# Patient Record
Sex: MALE | Race: WHITE | NOT HISPANIC OR LATINO | Employment: OTHER | URBAN - METROPOLITAN AREA
[De-identification: names, ages, dates, MRNs, and addresses within clinical notes are randomized per-mention and may not be internally consistent; named-entity substitution may affect disease eponyms.]

---

## 2017-08-09 ENCOUNTER — APPOINTMENT (OUTPATIENT)
Dept: RADIOLOGY | Facility: CLINIC | Age: 62
End: 2017-08-09
Payer: COMMERCIAL

## 2017-08-09 ENCOUNTER — TRANSCRIBE ORDERS (OUTPATIENT)
Dept: RADIOLOGY | Facility: CLINIC | Age: 62
End: 2017-08-09

## 2017-08-09 DIAGNOSIS — M54.40 LOW BACK PAIN WITH SCIATICA, SCIATICA LATERALITY UNSPECIFIED, UNSPECIFIED BACK PAIN LATERALITY, UNSPECIFIED CHRONICITY: ICD-10-CM

## 2017-08-09 DIAGNOSIS — M54.40 LOW BACK PAIN WITH SCIATICA, SCIATICA LATERALITY UNSPECIFIED, UNSPECIFIED BACK PAIN LATERALITY, UNSPECIFIED CHRONICITY: Primary | ICD-10-CM

## 2017-08-09 PROCEDURE — 72110 X-RAY EXAM L-2 SPINE 4/>VWS: CPT

## 2018-06-02 ENCOUNTER — HOSPITAL ENCOUNTER (EMERGENCY)
Facility: HOSPITAL | Age: 63
Discharge: HOME | End: 2018-06-02
Attending: EMERGENCY MEDICINE
Payer: MEDICARE

## 2018-06-02 VITALS
TEMPERATURE: 97.8 F | DIASTOLIC BLOOD PRESSURE: 80 MMHG | OXYGEN SATURATION: 99 % | RESPIRATION RATE: 18 BRPM | HEART RATE: 56 BPM | SYSTOLIC BLOOD PRESSURE: 149 MMHG | WEIGHT: 190 LBS

## 2018-06-02 DIAGNOSIS — Z76.0 PRESCRIPTION REFILL: Primary | ICD-10-CM

## 2018-06-02 PROCEDURE — 99281 EMR DPT VST MAYX REQ PHY/QHP: CPT

## 2018-06-02 RX ORDER — VENLAFAXINE HYDROCHLORIDE 150 MG/1
150 CAPSULE, EXTENDED RELEASE ORAL DAILY
COMMUNITY
End: 2018-06-14 | Stop reason: SDUPTHER

## 2018-06-02 RX ORDER — DIAZEPAM 5 MG/1
5 TABLET ORAL 2 TIMES DAILY
COMMUNITY
End: 2018-06-14 | Stop reason: SDUPTHER

## 2018-06-02 RX ORDER — BISOPROLOL FUMARATE AND HYDROCHLOROTHIAZIDE 10; 6.25 MG/1; MG/1
1 TABLET ORAL DAILY
COMMUNITY
End: 2018-06-14 | Stop reason: SDUPTHER

## 2018-06-02 RX ORDER — VENLAFAXINE HYDROCHLORIDE 150 MG/1
150 CAPSULE, EXTENDED RELEASE ORAL DAILY
Qty: 14 CAPSULE | Refills: 0 | Status: SHIPPED | OUTPATIENT
Start: 2018-06-02 | End: 2018-06-14 | Stop reason: SDUPTHER

## 2018-06-02 NOTE — ED PROVIDER NOTES
History  Chief Complaint   Patient presents with    Medication Refill     Pt reports he needs his Venlafaxine ER 150mg refilled  He reports his ins hasn't paid his provider so they won't refill his prescription  Pt reports having "withdrawl symptoms" including "electric shocks" and being "irritable which isn't good for anyone "  Pt was seen at [facility] yesterday but was told "due to regulations, go to [place] "       History provided by:  Patient   used: No    Medication Refill   Medications/supplies requested:  Venlafaxine  [facility]  Reason for refill: PCP was not paid by insurance company  Medications taken before: yes - see home medications    Patient has complete original prescription information: yes        Prior to Admission Medications   Prescriptions Last Dose Informant Patient Reported? Taking?   bisoprolol-hydrochlorothiazide (ZIAC) 10-6 25 MG per tablet   Yes Yes   Sig: Take 1 tablet by mouth daily   diazepam (VALIUM) 5 mg tablet   Yes Yes   Sig: Take 5 mg by mouth 2 (two) times a day   venlafaxine (EFFEXOR-XR) 150 mg 24 hr capsule   Yes Yes   Sig: Take 150 mg by mouth daily      Facility-Administered Medications: None       Past Medical History:   Diagnosis Date    Hypertension     Psychiatric disorder        Past Surgical History:   Procedure Laterality Date    APPENDECTOMY         History reviewed  No pertinent family history  I have reviewed and agree with the history as documented  Social History   Substance Use Topics    Smoking status: Current Some Day Smoker     Types: Cigars    Smokeless tobacco: Never Used    Alcohol use No        Review of Systems   Constitutional: Negative for activity change, appetite change and fatigue  HENT: Negative for nosebleeds, sneezing, sore throat, trouble swallowing and voice change  Eyes: Negative for photophobia, pain and visual disturbance  Respiratory: Negative for apnea, choking and stridor      Cardiovascular: Negative for palpitations and leg swelling  Gastrointestinal: Negative for anal bleeding and constipation  Endocrine: Negative for cold intolerance, heat intolerance, polydipsia and polyphagia  Genitourinary: Negative for decreased urine volume, enuresis, frequency, genital sores and urgency  Musculoskeletal: Negative for joint swelling and myalgias  Allergic/Immunologic: Negative for environmental allergies and food allergies  Neurological: Negative for tremors, seizures, speech difficulty and weakness  Hematological: Negative for adenopathy  Psychiatric/Behavioral: Negative for behavioral problems, decreased concentration, dysphoric mood and hallucinations  Physical Exam  Physical Exam   Constitutional: He is oriented to person, place, and time  He appears well-developed and well-nourished  No distress  HENT:   Head: Normocephalic and atraumatic  Right Ear: External ear normal    Left Ear: External ear normal    Nose: Nose normal    Mouth/Throat: Oropharynx is clear and moist    Eyes: Conjunctivae and EOM are normal  Pupils are equal, round, and reactive to light  Neck: Normal range of motion  Neck supple  Cardiovascular: Normal rate, regular rhythm and normal heart sounds  Exam reveals no gallop and no friction rub  No murmur heard  Pulmonary/Chest: Effort normal and breath sounds normal  No respiratory distress  He has no wheezes  Abdominal: Soft  Bowel sounds are normal    Neurological: He is alert and oriented to person, place, and time  Skin: Skin is warm and dry  He is not diaphoretic  Psychiatric: He has a normal mood and affect  His behavior is normal    Vitals reviewed        Vital Signs  ED Triage Vitals [06/02/18 1128]   Temperature Pulse Respirations Blood Pressure SpO2   97 8 °F (36 6 °C) 56 18 149/80 99 %      Temp Source Heart Rate Source Patient Position - Orthostatic VS BP Location FiO2 (%)   Tympanic Monitor Sitting Right arm --      Pain Score       7 Vitals:    06/02/18 1128   BP: 149/80   Pulse: 56   Patient Position - Orthostatic VS: Sitting       Visual Acuity      ED Medications  Medications - No data to display    Diagnostic Studies  Results Reviewed     None                 No orders to display              Procedures  Procedures       Phone Contacts  ED Phone Contact    ED Course                               MDM  CritCare Time    Disposition  Final diagnoses:   Prescription refill     Time reflects when diagnosis was documented in both MDM as applicable and the Disposition within this note     Time User Action Codes Description Comment    6/2/2018 11:40 AM Whitney Bermudez Add [Z76 0] Prescription refill       ED Disposition     ED Disposition Condition Comment    Discharge  Ambika Graham discharge to home/self care  Condition at discharge: Stable        Follow-up Information     Follow up With Specialties Details Why [address]  Schedule an appointment as soon as possible for a visit  Francisco Long [phone]          Patient's Medications   Discharge Prescriptions    VENLAFAXINE (EFFEXOR-XR) 150 MG 24 HR CAPSULE    Take 1 capsule (150 mg total) by mouth daily for 14 days       Start Date: 6/2/2018  End Date: 6/16/2018       Order Dose: 150 mg       Quantity: 14 capsule    Refills: 0     No discharge procedures on file      ED Provider  Electronically Signed by           José Martinez PA-C  06/02/18 1143

## 2018-06-02 NOTE — DISCHARGE INSTRUCTIONS
Medicine Refill   WHAT YOU NEED TO KNOW:   You may have been given a prescription in the emergency department for a few days of your medicine  It is important to refill your medicine before you completely run out  You need to follow up with your healthcare provider for a full prescription within the next few days  You will not be given additional refills in the emergency department  DISCHARGE INSTRUCTIONS:   Follow up with your healthcare provider:  Contact your healthcare provider before  you are completely out of medicine  Write down your questions so you remember to ask them during your visits  Refill tips:  Your medicine will treat your condition if you take the medicine regularly  Prevent missed doses by doing the following:  · Keep a chart of your medicine  Include all of your current medicines  Write down the name and strength of each medicine, the prescription number, and the number of refills  Also write down the dates of your refills  Ask your pharmacy or insurance provider for other ways to help you keep track of your medicines  · Refill medicines a few days before you run out  This will decrease any problems that will prevent you from getting your medicines on time  Problems include a closed pharmacy, or the pharmacy may have to contact your healthcare provider  · If you know you are going to be traveling, refill your medicines before you leave  You may not be able to get refills if you do not use your local pharmacy  You may need to call your insurance provider to make them aware of your travels  © 2017 [address] Information is for End User's use only and may not be sold, redistributed or otherwise used for commercial purposes  All illustrations and images included in CareNotes® are the copyrighted property of [employer]  or [employer]  The above information is an  only   It is not intended as medical advice for individual conditions or treatments  Talk to your doctor, nurse or pharmacist before following any medical regimen to see if it is safe and effective for you

## 2018-06-14 ENCOUNTER — OFFICE VISIT (OUTPATIENT)
Dept: FAMILY MEDICINE CLINIC | Facility: CLINIC | Age: 63
End: 2018-06-14
Payer: MEDICARE

## 2018-06-14 VITALS
HEART RATE: 58 BPM | SYSTOLIC BLOOD PRESSURE: 140 MMHG | DIASTOLIC BLOOD PRESSURE: 64 MMHG | HEIGHT: 72 IN | WEIGHT: 195 LBS | RESPIRATION RATE: 18 BRPM | OXYGEN SATURATION: 99 % | TEMPERATURE: 97.4 F | BODY MASS INDEX: 26.41 KG/M2

## 2018-06-14 DIAGNOSIS — F41.9 ANXIETY: ICD-10-CM

## 2018-06-14 DIAGNOSIS — I10 ESSENTIAL HYPERTENSION: ICD-10-CM

## 2018-06-14 DIAGNOSIS — F33.2 SEVERE EPISODE OF RECURRENT MAJOR DEPRESSIVE DISORDER, WITHOUT PSYCHOTIC FEATURES (HCC): Primary | ICD-10-CM

## 2018-06-14 PROBLEM — F43.10 PTSD (POST-TRAUMATIC STRESS DISORDER): Status: ACTIVE | Noted: 2018-06-14

## 2018-06-14 PROBLEM — F32.9 MAJOR DEPRESSION: Status: ACTIVE | Noted: 2018-06-14

## 2018-06-14 PROBLEM — F42.9 OCD (OBSESSIVE COMPULSIVE DISORDER): Status: ACTIVE | Noted: 2018-06-14

## 2018-06-14 PROCEDURE — 99213 OFFICE O/P EST LOW 20 MIN: CPT | Performed by: FAMILY MEDICINE

## 2018-06-14 RX ORDER — VENLAFAXINE HYDROCHLORIDE 150 MG/1
150 CAPSULE, EXTENDED RELEASE ORAL DAILY
Qty: 30 CAPSULE | Refills: 2 | Status: SHIPPED | OUTPATIENT
Start: 2018-06-14 | End: 2018-09-11 | Stop reason: SDUPTHER

## 2018-06-14 RX ORDER — BISOPROLOL FUMARATE AND HYDROCHLOROTHIAZIDE 10; 6.25 MG/1; MG/1
1 TABLET ORAL DAILY
Qty: 30 TABLET | Refills: 2 | Status: SHIPPED | OUTPATIENT
Start: 2018-06-14 | End: 2019-08-07 | Stop reason: ALTCHOICE

## 2018-06-14 RX ORDER — DIAZEPAM 5 MG/1
5 TABLET ORAL 2 TIMES DAILY
Qty: 60 TABLET | Refills: 0 | Status: SHIPPED | OUTPATIENT
Start: 2018-06-14 | End: 2020-09-14 | Stop reason: SDUPTHER

## 2018-06-14 RX ORDER — DIAZEPAM 5 MG/1
5 TABLET ORAL 2 TIMES DAILY
Qty: 30 TABLET | Refills: 0 | Status: SHIPPED | OUTPATIENT
Start: 2018-06-14 | End: 2018-06-14 | Stop reason: SDUPTHER

## 2018-06-14 NOTE — PATIENT INSTRUCTIONS
Anxiety   AMBULATORY CARE:   Anxiety  is a condition that causes you to feel extremely worried or nervous  The feelings are so strong that they can cause problems with your daily activities or sleep  Anxiety may be triggered by something you fear, or it may happen without a cause  Family or work stress, smoking, caffeine, and alcohol can increase your risk for anxiety  Certain medicines or health conditions can also increase your risk  Anxiety can become a long-term condition if it is not managed or treated  Common signs and symptoms that may occur with anxiety:   · Fatigue or muscle tightness     · Shaking, restlessness, or irritability     · Problems focusing     · Trouble sleeping     · Feeling jumpy, easily startled, or dizzy     · Rapid heartbeat or shortness of breath  Call 911 if:   · You have chest pain, tightness, or heaviness that may spread to your shoulders, arms, jaw, neck, or back  · You feel like hurting yourself or someone else  Contact your healthcare provider if:   · Your symptoms get worse or do not get better with treatment  · You think your medicine may be causing side effects  · Your anxiety keeps you from doing your regular daily activities  · You have new symptoms since your last visit  · You have questions or concerns about your condition or care  Treatment for anxiety  may include medicines to help you feel calm and relaxed, and decrease your symptoms  Medicines are usually given together with therapy or other treatments  Manage anxiety:   · Talk to someone about your anxiety  Your healthcare provider may suggest counseling  Cognitive behavioral therapy can help you understand and change how you react to events that trigger your symptoms  You might feel more comfortable talking with a friend or family member about your anxiety  Choose someone you know will be supportive and encouraging  · Find ways to relax    Activities such as exercise, meditation, or listening to music can help you relax  Spend time with friends, or do things you enjoy  · Practice deep breathing  Deep breathing can help you relax when you feel anxious  Focus on taking slow, deep breaths several times a day, or during an anxiety attack  Breathe in through your nose and out through your mouth  · Create a regular sleep routine  Regular sleep can help you feel calmer during the day  Go to sleep and wake up at the same times every day  Do not watch television or use the computer right before bed  Your room should be comfortable, dark, and quiet  · Eat a variety of healthy foods  Healthy foods include fruits, vegetables, low-fat dairy products, lean meats, fish, whole-grain breads, and cooked beans  Healthy foods can help you feel less anxious and have more energy  · Exercise regularly  Exercise can increase your energy level  Exercise may also lift your mood and help you sleep better  Your healthcare provider can help you create an exercise plan  · Do not smoke  Nicotine and other chemicals in cigarettes and cigars can increase anxiety  Ask your healthcare provider for information if you currently smoke and need help to quit  E-cigarettes or smokeless tobacco still contain nicotine  Talk to your healthcare provider before you use these products  · Do not have caffeine  Caffeine can make your symptoms worse  Do not have foods or drinks that are meant to increase your energy level  · Limit or do not drink alcohol  Ask your healthcare provider if alcohol is safe for you  You may not be able to drink alcohol if you take certain anxiety or depression medicines  Limit alcohol to 1 drink per day if you are a woman  Limit alcohol to 2 drinks per day if you are a man  A drink of alcohol is 12 ounces of beer, 5 ounces of wine, or 1½ ounces of liquor  · Do not use drugs  Drugs can make your anxiety worse  It can also make anxiety hard to manage   Talk to your healthcare provider if you use drugs and want help to quit  Follow up with your healthcare provider as directed:  Write down your questions so you remember to ask them during your visits  © 2017 [address] [employer] Information is for End User's use only and may not be sold, redistributed or otherwise used for commercial purposes  All illustrations and images included in CareNotes® are the copyrighted property of A [employer]  or [employer]  The above information is an  only  It is not intended as medical advice for individual conditions or treatments  Talk to your doctor, nurse or pharmacist before following any medical regimen to see if it is safe and effective for you  Anxiety   AMBULATORY CARE:   Anxiety  is a condition that causes you to feel extremely worried or nervous  The feelings are so strong that they can cause problems with your daily activities or sleep  Anxiety may be triggered by something you fear, or it may happen without a cause  Family or work stress, smoking, caffeine, and alcohol can increase your risk for anxiety  Certain medicines or health conditions can also increase your risk  Anxiety can become a long-term condition if it is not managed or treated  Common signs and symptoms that may occur with anxiety:   · Fatigue or muscle tightness     · Shaking, restlessness, or irritability     · Problems focusing     · Trouble sleeping     · Feeling jumpy, easily startled, or dizzy     · Rapid heartbeat or shortness of breath  Call 911 if:   · You have chest pain, tightness, or heaviness that may spread to your shoulders, arms, jaw, neck, or back  · You feel like hurting yourself or someone else  Contact your healthcare provider if:   · Your symptoms get worse or do not get better with treatment  · You think your medicine may be causing side effects  · Your anxiety keeps you from doing your regular daily activities  · You have new symptoms since your last visit      · You have questions or concerns about your condition or care  Treatment for anxiety  may include medicines to help you feel calm and relaxed, and decrease your symptoms  Medicines are usually given together with therapy or other treatments  Manage anxiety:   · Talk to someone about your anxiety  Your healthcare provider may suggest counseling  Cognitive behavioral therapy can help you understand and change how you react to events that trigger your symptoms  You might feel more comfortable talking with a friend or family member about your anxiety  Choose someone you know will be supportive and encouraging  · Find ways to relax  Activities such as exercise, meditation, or listening to music can help you relax  Spend time with friends, or do things you enjoy  · Practice deep breathing  Deep breathing can help you relax when you feel anxious  Focus on taking slow, deep breaths several times a day, or during an anxiety attack  Breathe in through your nose and out through your mouth  · Create a regular sleep routine  Regular sleep can help you feel calmer during the day  Go to sleep and wake up at the same times every day  Do not watch television or use the computer right before bed  Your room should be comfortable, dark, and quiet  · Eat a variety of healthy foods  Healthy foods include fruits, vegetables, low-fat dairy products, lean meats, fish, whole-grain breads, and cooked beans  Healthy foods can help you feel less anxious and have more energy  · Exercise regularly  Exercise can increase your energy level  Exercise may also lift your mood and help you sleep better  Your healthcare provider can help you create an exercise plan  · Do not smoke  Nicotine and other chemicals in cigarettes and cigars can increase anxiety  Ask your healthcare provider for information if you currently smoke and need help to quit  E-cigarettes or smokeless tobacco still contain nicotine   Talk to your healthcare provider before you use these products  · Do not have caffeine  Caffeine can make your symptoms worse  Do not have foods or drinks that are meant to increase your energy level  · Limit or do not drink alcohol  Ask your healthcare provider if alcohol is safe for you  You may not be able to drink alcohol if you take certain anxiety or depression medicines  Limit alcohol to 1 drink per day if you are a woman  Limit alcohol to 2 drinks per day if you are a man  A drink of alcohol is 12 ounces of beer, 5 ounces of wine, or 1½ ounces of liquor  · Do not use drugs  Drugs can make your anxiety worse  It can also make anxiety hard to manage  Talk to your healthcare provider if you use drugs and want help to quit  Follow up with your healthcare provider as directed:  Write down your questions so you remember to ask them during your visits  © 2017 [address] Information is for End User's use only and may not be sold, redistributed or otherwise used for commercial purposes  All illustrations and images included in CareNotes® are the copyrighted property of [employer]  or [employer]  The above information is an  only  It is not intended as medical advice for individual conditions or treatments  Talk to your doctor, nurse or pharmacist before following any medical regimen to see if it is safe and effective for you

## 2018-09-11 DIAGNOSIS — F33.2 SEVERE EPISODE OF RECURRENT MAJOR DEPRESSIVE DISORDER, WITHOUT PSYCHOTIC FEATURES (HCC): ICD-10-CM

## 2018-09-24 RX ORDER — VENLAFAXINE HYDROCHLORIDE 150 MG/1
150 CAPSULE, EXTENDED RELEASE ORAL DAILY
Qty: 30 CAPSULE | Refills: 0 | Status: SHIPPED | OUTPATIENT
Start: 2018-09-24 | End: 2021-05-13 | Stop reason: HOSPADM

## 2019-07-12 ENCOUNTER — TRANSCRIBE ORDERS (OUTPATIENT)
Dept: ADMINISTRATIVE | Facility: HOSPITAL | Age: 64
End: 2019-07-12

## 2019-07-12 DIAGNOSIS — K22.9 LESION OF ESOPHAGUS: Primary | ICD-10-CM

## 2019-07-15 ENCOUNTER — HOSPITAL ENCOUNTER (OUTPATIENT)
Dept: RADIOLOGY | Facility: HOSPITAL | Age: 64
Discharge: HOME | End: 2019-07-15
Payer: MEDICARE

## 2019-07-15 DIAGNOSIS — K22.9 LESION OF ESOPHAGUS: ICD-10-CM

## 2019-07-15 PROCEDURE — 74160 CT ABDOMEN W/CONTRAST: CPT

## 2019-07-15 PROCEDURE — 71260 CT THORAX DX C+: CPT

## 2019-07-15 RX ADMIN — IOHEXOL 100 ML: 350 INJECTION, SOLUTION INTRAVENOUS at 12:37

## 2019-07-18 ENCOUNTER — TRANSCRIBE ORDERS (OUTPATIENT)
Dept: ADMINISTRATIVE | Facility: HOSPITAL | Age: 64
End: 2019-07-18

## 2019-07-18 DIAGNOSIS — C15.9 MALIGNANT NEOPLASM OF ESOPHAGUS, UNSPECIFIED LOCATION (HCC): Primary | ICD-10-CM

## 2019-07-22 ENCOUNTER — HOSPITAL ENCOUNTER (OUTPATIENT)
Dept: RADIOLOGY | Age: 64
Discharge: HOME | End: 2019-07-22
Payer: MEDICARE

## 2019-07-22 DIAGNOSIS — C15.5 MALIGNANT NEOPLASM OF LOWER THIRD OF ESOPHAGUS (HCC): ICD-10-CM

## 2019-07-22 LAB — GLUCOSE SERPL-MCNC: 115 MG/DL (ref 65–140)

## 2019-07-22 PROCEDURE — 82948 REAGENT STRIP/BLOOD GLUCOSE: CPT

## 2019-07-22 PROCEDURE — A9552 F18 FDG: HCPCS

## 2019-07-22 PROCEDURE — 78815 PET IMAGE W/CT SKULL-THIGH: CPT

## 2019-07-24 ENCOUNTER — DOCUMENTATION (OUTPATIENT)
Dept: HEMATOLOGY ONCOLOGY | Facility: CLINIC | Age: 64
End: 2019-07-24

## 2019-07-24 NOTE — PROGRESS NOTES
GI Oncology Nurse Navigator Note    Called and spoke to Aidan Peng wife, introduced myself and asked if the [address] was ok for them as they live in [place], she stated they actually live close to this campus and will be fine coming here, told her I will be working on an appointment with Dr Luzma Andrade for Emily Lugo, scheduled the appointment and called her back, there was no answer, left her a voicemail message with the appointment, date, time and location and provided my contact information so she can call me back to confirm, awaiting her call back      Tk Cavazos returned call, appointment confirmed

## 2019-07-26 ENCOUNTER — CONSULT (OUTPATIENT)
Dept: HEMATOLOGY ONCOLOGY | Facility: CLINIC | Age: 64
End: 2019-07-26
Payer: MEDICARE

## 2019-07-26 ENCOUNTER — TELEPHONE (OUTPATIENT)
Dept: HEMATOLOGY ONCOLOGY | Facility: CLINIC | Age: 64
End: 2019-07-26

## 2019-07-26 ENCOUNTER — DOCUMENTATION (OUTPATIENT)
Dept: HEMATOLOGY ONCOLOGY | Facility: CLINIC | Age: 64
End: 2019-07-26

## 2019-07-26 VITALS
HEIGHT: 70 IN | DIASTOLIC BLOOD PRESSURE: 72 MMHG | SYSTOLIC BLOOD PRESSURE: 118 MMHG | WEIGHT: 157 LBS | OXYGEN SATURATION: 97 % | HEART RATE: 60 BPM | TEMPERATURE: 96.5 F | RESPIRATION RATE: 16 BRPM | BODY MASS INDEX: 22.48 KG/M2

## 2019-07-26 DIAGNOSIS — C16.0 MALIGNANT NEOPLASM OF CARDIA OF STOMACH (HCC): ICD-10-CM

## 2019-07-26 DIAGNOSIS — R11.0 NAUSEA: ICD-10-CM

## 2019-07-26 DIAGNOSIS — E86.0 DEHYDRATION: ICD-10-CM

## 2019-07-26 DIAGNOSIS — C15.5 MALIGNANT NEOPLASM OF LOWER THIRD OF ESOPHAGUS (HCC): ICD-10-CM

## 2019-07-26 DIAGNOSIS — C15.5 MALIGNANT NEOPLASM OF LOWER THIRD OF ESOPHAGUS (HCC): Primary | ICD-10-CM

## 2019-07-26 DIAGNOSIS — R11.0 NAUSEA: Primary | ICD-10-CM

## 2019-07-26 PROCEDURE — 99205 OFFICE O/P NEW HI 60 MIN: CPT | Performed by: INTERNAL MEDICINE

## 2019-07-26 RX ORDER — SODIUM CHLORIDE 9 MG/ML
20 INJECTION, SOLUTION INTRAVENOUS ONCE AS NEEDED
Status: CANCELLED | OUTPATIENT
Start: 2019-08-01

## 2019-07-26 RX ORDER — FLUOROURACIL 50 MG/ML
400 INJECTION, SOLUTION INTRAVENOUS ONCE
Status: CANCELLED | OUTPATIENT
Start: 2019-08-01

## 2019-07-26 RX ORDER — DEXTROSE MONOHYDRATE 50 MG/ML
20 INJECTION, SOLUTION INTRAVENOUS ONCE
Status: CANCELLED | OUTPATIENT
Start: 2019-08-01

## 2019-07-26 RX ORDER — PROCHLORPERAZINE MALEATE 10 MG
10 TABLET ORAL EVERY 6 HOURS PRN
Qty: 45 TABLET | Refills: 3 | Status: SHIPPED | OUTPATIENT
Start: 2019-07-26 | End: 2019-08-07

## 2019-07-26 NOTE — PROGRESS NOTES
GI Oncology Nurse Navigator Visit    Met Emily Parish and his wife face to face at his visit with Dr Luzma Andrade today, gave them printed information on [employer], support groups, eating hints book, chemotherapy and you book, and information on Palliative care, he may be interested in medical marijuana, he is having trouble eating/swallowing and has lost a significant amount of weight, I sent a referral to the dieticians, instructed him to call with any other questions or concerns

## 2019-07-26 NOTE — PROGRESS NOTES
Oncology Outpatient Consult Note  Jose Enrique Chavira 75 y o  male MRN: @ Encounter: [id number]        Date:  7/26/2019        CC:  Metastatic gastroesophageal cancer      HPI:  Jose Enrique Chavira is seen for initial consultation 7/26/2019 regarding Newly diagnosed metastatic gastroesophageal cancer  The patient has biopsy-proven adenocarcinoma of the esophagus  PET/CT scan was done which shows omental nodularity that is FDG avid indicating stage IV disease  The patient has lost approximately 40-50 pounds  His ECOG performance status is a 1-2  He states he does have some dysphagia and is only able to drink boost  He is not able to swallow solid foods  Does have nausea when he tries to eat too much  Denies any diarrhea  Does have abdominal discomfort and tenseness  Denies any focal neurological signs  The rest of his 14 point review of systems today was negative  Test Results:    Imaging: Nm Pet Ct Skull Base To Mid Thigh    Result Date: 7/22/2019  Narrative: PET/CT SCAN INDICATION: Newly diagnosed esophageal cancer  Initial staging  C15 5: Malignant neoplasm of lower third of esophagus MODIFIER: PI COMPARISON: CT chest and abdomen 7/15/2019 CELL TYPE:  adenocarcinoma, intestinal type mucin (distal esophagus mass biopsy 7/11/19) TECHNIQUE:   8 7 mCi F-18-FDG administered IV  Multiplanar attenuation corrected and non attenuation corrected PET images are available for interpretation, and contiguous, low dose, axial CT sections were obtained from the skull vertex through the femurs    Intravenous contrast material was not utilized  This examination, like all CT scans performed in the [facility], was performed utilizing techniques to minimize radiation dose exposure, including the use of iterative reconstruction and automated exposure control  Fasting serum glucose: 115 mg/dl FINDINGS: VISUALIZED BRAIN:   No acute abnormalities are seen   HEAD/NECK:   Small focus of FDG uptake noted in the right upper cervical chain, SUV max of 3 4  No CT correlate  See image 80 series 12 of the PET images  CT images: Unremarkable  CHEST:   Moderate FDG uptake at the distal esophagus, GE junction and proximal stomach, SUV max of 5 2  Region of activity is approximately 6 cm in length  Scattered subcentimeter pulmonary nodules again noted  6 mm nodule in the right middle lobe medially image 143 series 3  These are not FDG avid but may be too small to characterize  CT images: Esophagus is moderately distended with air-fluid level down to the GE junction  Small to moderate-sized bilateral pleural effusions, slightly increased  Emphysematous changes of the lung fields  ABDOMEN:   Moderate FDG uptake at the distal esophagus, GE junction and proximal stomach, SUV max of 5 2  Moderate FDG uptake within the nodular appearing omentum, SUV max of 5 0  CT images: Large volume of ascites similar to the prior exam   Scattered subcentimeter hypodensities in the liver, stable, not FDG avid  Scattered bilateral renal cysts  8 7 cm cyst in the right kidney midpole posteriorly  Abdominal aorta is mildly ectatic  There is colonic diverticulosis  PELVIS: Small focus of FDG uptake at the left aspect of the prostate gland, SUV max of 5 2  No obvious findings here on limited CT  The pelvic ascites demonstrates wall thickening with slight focal areas of FDG uptake along the margins, SUV max of 3 6 on the left  Findings raise suspicion for peritoneal disease  No FDG avid lymph nodes  CT images: Prostate is enlarged  OSSEOUS STRUCTURES: No FDG avid lesions are seen  CT images: No significant findings  Impression: 1  FDG uptake at the distal esophagus, GE junction and proximal stomach suspicious for malignancy  2   Small focus of FDG uptake in the right upper cervical chain, nonspecific, question reactive rather than metastatic  Recommend attention on follow-up   3   The subcentimeter pulmonary nodules are not FDG avid but may be too small to characterize  Recommend continued follow-up  4   FDG uptake within the nodular omentum compatible with metastasis  5   The pelvic ascites demonstrates wall thickening with focal areas of FDG uptake  Findings raise suspicion for peritoneal disease  6   Small focus of FDG uptake at the prostate gland on the left  Prostatic malignancy should be excluded  Workstation performed: [id number]     Ct Chest And Abdomen W Contrast    Result Date: 7/15/2019  Narrative: CT CHEST AND ABDOMEN WITH IV CONTRAST INDICATION:   K22 9: Disease of esophagus, unspecified  COMPARISON: None  TECHNIQUE:  CT examination of the chest and abdomen was performed  Axial, sagittal, and coronal 2D reformatted images were created from the source data and submitted for interpretation  Radiation dose length product (DLP) for this visit:  655 02 mGy-cm   This examination, like all CT scans performed in the [place], was performed utilizing techniques to minimize radiation dose exposure, including the use of iterative  reconstruction and automated exposure control  IV Contrast:  100 mL of iohexol (OMNIPAQUE) Enteric Contrast:  Enteric contrast was not administered  FINDINGS: CHEST LUNGS:  There is moderate pulmonary emphysema mostly in the upper lobes and mostly peripheral in location  There are bands of atelectasis within the right middle lobe and right lower lobe  Adjacent to the band of atelectasis in the right middle lobe there is a small noncalcified rounded nodule which is 5 mm diameter on image 37 series 2  There is another small nodule seen on image 27 series 2 although this may be along the fissure  It is 4 mm  A 3rd nodular density in the right lung on image 32 anteriorly is also possibly fissural in location and is 6 mm  There are no left-sided nodules appreciated  There are no airway obstructions  PLEURA:  There are small bilateral pleural effusions   HEART/GREAT VESSELS:  Heart size is within normal limits  There is no evidence of pericardial effusion  The aorta is intact  There is some coronary artery and aortic calcification  Pulmonary arteries appear normal in caliber  MEDIASTINUM AND NEVAEH:  There is abnormal enhancement identified along the mucosal surface of the distal esophagus and cardia of the stomach  This is most evident on images 43-56 of series 2  Delayed images which were obtained through this area demonstrate persistent abnormal enhancement and perhaps some thickening of the wall of the GE junction as well  This presumably represents the patient's known malignancy  There is no mediastinal or hilar lymphadenopathy seen  There do appear to be a couple small lymph nodes immediately adjacent to the GE junction  These are only a few millimeters each  BODY WALL AND LOWER NECK:   Unremarkable  ABDOMEN LIVER/BILIARY TREE:  There are small cystic foci at the dome of the liver and also scattered in the left hepatic lobe  These are subcentimeter size  There are no suspicious enhancing liver masses appreciated  Liver size is normal   No biliary obstruction  GALLBLADDER:  No calcified gallstones  No pericholecystic inflammatory change  SPLEEN:  Unremarkable  PANCREAS:  Unremarkable  ADRENAL GLANDS:  Unremarkable  KIDNEYS/URETERS:  There are multiple bilateral renal cysts, right side more prominent than left  These appear to be uniform low attenuation benign type cysts  There are no solid enhancing kidney masses seen  No hydronephrosis or kidney stones  VISUALIZED BOWEL:  Evaluation of the bowel is limited  The small bowel loops are all clustered centrally in the abdomen secondary to large volume of ascites  Difficult to ascertain whether there is bowel wall thickening or intrinsic bowel lesion    Along the mesenteric border of the entire visualized portion of the small bowel there is serpiginous appearing abnormal enhancement which is felt to probably represent additional sites of carcinomatosis  While the serpiginous appearing pattern could conceivably be a vascular type process, there is no visible superior mesenteric vein or portal vein thrombosis or obstruction  Therefore, a vascular cause seems less likely  As above, there is abnormal thickening and enhancement of the GE junction wall  There is moderate amount of stool visible within the included portion of the colon  VISUALIZED ABDOMINAL CAVITY:  There is a very large volume of ascites  There is no free air  The omentum appears potentially abnormal, somewhat thickened and enhanced in appearance  This might represent omental caking and as such would be concerning for carcinomatosis in the abdomen  It might be reasonable to obtain diagnostic paracentesis to assess for any malignant cells  Peritoneal surface appears slightly thickened and enhanced particularly in the right mid to upper abdomen posteriorly  OSSEOUS STRUCTURES:  No acute fracture or destructive osseous lesion  Impression: Pulmonary emphysema  There are bands of platelike atelectasis in the right middle and lower lobes and there are also scattered small right lung nodules  I would suggest that the patient have a repeat chest CT in 6 months to reassess the nodules and also to ensure that the areas of presumed atelectasis resolved  I see no obvious obstructing airway lesions or dominant masses at this time  There are bilateral small pleural effusions  There is abnormal enhancement and thickening of the wall of the distal esophagus and the cardia of the stomach which is apparently known to represent malignancy based on patient's medical record  There are a few small adjacent lymph nodes  Whether these represent local francisco j metastases is uncertain   There is large volume of ascites and there is evidence of intra-abdominal carcinomatosis with enhancement and nodular thickening along the peritoneal surface mostly in the right side of the abdomen as well as suspected abnormal thickening and enhancement  throughout the omentum as well as abnormal enhancement along the mesenteric border of the entire visualized portion of the small bowel  Hepatic and renal cysts  The study was marked in [facility] for immediate notification  Workstation performed: [id number]       ROS: As stated in history of present illness otherwise her 14 point review of systems today was negative      Active Problems:   Patient Active Problem List   Diagnosis    PTSD (post-traumatic stress disorder)    Essential hypertension    Major depression    OCD (obsessive compulsive disorder)    Anxiety       Past Medical History:   Past Medical History:   Diagnosis Date    Hypertension     Psychiatric disorder        Surgical History:   Past Surgical History:   Procedure Laterality Date    APPENDECTOMY       Social History:   Social History     Socioeconomic History    Marital status: /Civil Union     Spouse name: Not on file    Number of children: Not on file    Years of education: Not on file    Highest education level: Not on file   Occupational History    Not on file   Social Needs    Financial resource strain: Not on file    Food insecurity:     Worry: Not on file     Inability: Not on file    Transportation needs:     Medical: Not on file     Non-medical: Not on file   Tobacco Use    Smoking status: Current Some Day Smoker     Types: Cigars    Smokeless tobacco: Never Used   Substance and Sexual Activity    Alcohol use: No    Drug use: Yes     Types: Marijuana    Sexual activity: Not on file   Lifestyle    Physical activity:     Days per week: Not on file     Minutes per session: Not on file    Stress: Not on file   Relationships    Social connections:     Talks on phone: Not on file     Gets together: Not on file     Attends Christian service: Not on file     Active member of club or organization: Not on file     Attends meetings of clubs or organizations: Not on file     Relationship status: Not on file    Intimate partner violence:     Fear of current or ex partner: Not on file     Emotionally abused: Not on file     Physically abused: Not on file     Forced sexual activity: Not on file   Other Topics Concern    Not on file   Social History Narrative    Not on file       Current Medications:   Current Outpatient Medications   Medication Sig Dispense Refill    bisoprolol-hydrochlorothiazide (ZIAC) 10-6 25 MG per tablet Take 1 tablet by mouth daily 30 tablet 2    diazepam (VALIUM) 5 mg tablet Take 1 tablet (5 mg total) by mouth 2 (two) times a day 60 tablet 0    venlafaxine (EFFEXOR-XR) 150 mg 24 hr capsule Take 1 capsule (150 mg total) by mouth daily 30 capsule 0     No current facility-administered medications for this visit  Allergies: Allergies   Allergen Reactions    Bee Venom Anaphylaxis         Physical Exam:    Body surface area is 1 87 meters squared  Wt Readings from Last 3 Encounters:   07/26/19 71 2 kg (157 lb)   06/14/18 88 5 kg (195 lb)   06/02/18 86 2 kg (190 lb)        Temp Readings from Last 3 Encounters:   07/26/19 (!) 96 5 °F (35 8 °C) (Tympanic)   06/14/18 (!) 97 4 °F (36 3 °C)   06/02/18 97 8 °F (36 6 °C) (Tympanic)        BP Readings from Last 3 Encounters:   07/26/19 118/72   06/14/18 140/64   06/02/18 149/80         Pulse Readings from Last 3 Encounters:   07/26/19 60   06/14/18 58   06/02/18 56       Physical Exam     Constitutional   General appearance: No acute distress, well appearing and well nourished  Eyes   Conjunctiva and lids: No swelling, erythema or discharge  Pupils and irises: Equal, round and reactive to light  Ears, Nose, Mouth, and Throat   External inspection of ears and nose: Normal     Nasal mucosa, septum, and turbinates: Normal without edema or erythema  Oropharynx: Normal with no erythema, edema, exudate or lesions  Pulmonary   Respiratory effort: No increased work of breathing or signs of respiratory distress  Auscultation of lungs: Clear to auscultation  Cardiovascular   Palpation of heart: Normal PMI, no thrills  Auscultation of heart: Normal rate and rhythm, normal S1 and S2, without murmurs  Examination of extremities for edema and/or varicosities: Normal     Carotid pulses: Normal     Abdomen   Abdomen: Distended with probable ascites  Liver and spleen: No hepatomegaly or splenomegaly  Lymphatic   Palpation of lymph nodes in neck: No lymphadenopathy  Musculoskeletal   Gait and station: Normal     Digits and nails: Normal without clubbing or cyanosis  Inspection/palpation of joints, bones, and muscles: Normal     Skin   Skin and subcutaneous tissue: Normal without rashes or lesions  Neurologic   Cranial nerves: Cranial nerves 2-12 intact  Sensation: No sensory loss  Psychiatric   Orientation to person, place, and time: Normal     Mood and affect: Normal           Assessment/ Plan: This is an unfortunate 76-year-old male with what appears to be metastatic gastroesophageal cancer  His PET/CT scan shows uptake in a nodular omentum indicating metastatic disease  Previous biopsy revealed adenocarcinoma  At this point recommendation would be systemic chemotherapy in the palliative setting to help improve survival  Options include modified FOLFOX 6 versus EOF versus FLOT  Looking at side effect profiles and patient's performance status I think modified FOLFOX 6 would be the most reasonable regimen  I explained the risks benefits and alternatives of therapy  I explained the possible side effects to include but not limited to nausea, vomiting, diarrhea, abdominal pain, low blood counts, risk of infection and even death  The patient agreed to proceed  I will send off his tumor for molecular testing and HER-2/pito testing  I will give him Neulasta growth factor support for chemotherapy-induced neutropenia  He will sign a consent form today        Goals and Barriers:  Current Goal: Prolong Survival from Esophageal Cancer  Barriers: None  Patient's Capacity to Self Care:  Patient able to self care  Portions of the record may have been created with voice recognition software   Occasional wrong word or "sound a like" substitutions may have occurred due to the inherent limitations of voice recognition software   Read the chart carefully and recognize, using context, where substitutions have occurred

## 2019-07-26 NOTE — H&P (VIEW-ONLY)
Oncology Outpatient Consult Note  Des Khan 75 y o  male MRN: @ Encounter: [id number]        Date:  7/26/2019        CC:  Metastatic gastroesophageal cancer      HPI:  Des Khan is seen for initial consultation 7/26/2019 regarding Newly diagnosed metastatic gastroesophageal cancer  The patient has biopsy-proven adenocarcinoma of the esophagus  PET/CT scan was done which shows omental nodularity that is FDG avid indicating stage IV disease  The patient has lost approximately 40-50 pounds  His ECOG performance status is a 1-2  He states he does have some dysphagia and is only able to drink boost  He is not able to swallow solid foods  Does have nausea when he tries to eat too much  Denies any diarrhea  Does have abdominal discomfort and tenseness  Denies any focal neurological signs  The rest of his 14 point review of systems today was negative  Test Results:    Imaging: Nm Pet Ct Skull Base To Mid Thigh    Result Date: 7/22/2019  Narrative: PET/CT SCAN INDICATION: Newly diagnosed esophageal cancer  Initial staging  C15 5: Malignant neoplasm of lower third of esophagus MODIFIER: PI COMPARISON: CT chest and abdomen 7/15/2019 CELL TYPE:  adenocarcinoma, intestinal type mucin (distal esophagus mass biopsy 7/11/19) TECHNIQUE:   8 7 mCi F-18-FDG administered IV  Multiplanar attenuation corrected and non attenuation corrected PET images are available for interpretation, and contiguous, low dose, axial CT sections were obtained from the skull vertex through the femurs    Intravenous contrast material was not utilized  This examination, like all CT scans performed in the [facility], was performed utilizing techniques to minimize radiation dose exposure, including the use of iterative reconstruction and automated exposure control  Fasting serum glucose: 115 mg/dl FINDINGS: VISUALIZED BRAIN:   No acute abnormalities are seen   HEAD/NECK:   Small focus of FDG uptake noted in the right upper cervical chain, SUV max of 3 4  No CT correlate  See image 80 series 12 of the PET images  CT images: Unremarkable  CHEST:   Moderate FDG uptake at the distal esophagus, GE junction and proximal stomach, SUV max of 5 2  Region of activity is approximately 6 cm in length  Scattered subcentimeter pulmonary nodules again noted  6 mm nodule in the right middle lobe medially image 143 series 3  These are not FDG avid but may be too small to characterize  CT images: Esophagus is moderately distended with air-fluid level down to the GE junction  Small to moderate-sized bilateral pleural effusions, slightly increased  Emphysematous changes of the lung fields  ABDOMEN:   Moderate FDG uptake at the distal esophagus, GE junction and proximal stomach, SUV max of 5 2  Moderate FDG uptake within the nodular appearing omentum, SUV max of 5 0  CT images: Large volume of ascites similar to the prior exam   Scattered subcentimeter hypodensities in the liver, stable, not FDG avid  Scattered bilateral renal cysts  8 7 cm cyst in the right kidney midpole posteriorly  Abdominal aorta is mildly ectatic  There is colonic diverticulosis  PELVIS: Small focus of FDG uptake at the left aspect of the prostate gland, SUV max of 5 2  No obvious findings here on limited CT  The pelvic ascites demonstrates wall thickening with slight focal areas of FDG uptake along the margins, SUV max of 3 6 on the left  Findings raise suspicion for peritoneal disease  No FDG avid lymph nodes  CT images: Prostate is enlarged  OSSEOUS STRUCTURES: No FDG avid lesions are seen  CT images: No significant findings  Impression: 1  FDG uptake at the distal esophagus, GE junction and proximal stomach suspicious for malignancy  2   Small focus of FDG uptake in the right upper cervical chain, nonspecific, question reactive rather than metastatic  Recommend attention on follow-up   3   The subcentimeter pulmonary nodules are not FDG avid but may be too small to characterize  Recommend continued follow-up  4   FDG uptake within the nodular omentum compatible with metastasis  5   The pelvic ascites demonstrates wall thickening with focal areas of FDG uptake  Findings raise suspicion for peritoneal disease  6   Small focus of FDG uptake at the prostate gland on the left  Prostatic malignancy should be excluded  Workstation performed: [id number]     Ct Chest And Abdomen W Contrast    Result Date: 7/15/2019  Narrative: CT CHEST AND ABDOMEN WITH IV CONTRAST INDICATION:   K22 9: Disease of esophagus, unspecified  COMPARISON: None  TECHNIQUE:  CT examination of the chest and abdomen was performed  Axial, sagittal, and coronal 2D reformatted images were created from the source data and submitted for interpretation  Radiation dose length product (DLP) for this visit:  655 02 mGy-cm   This examination, like all CT scans performed in the [facility], was performed utilizing techniques to minimize radiation dose exposure, including the use of iterative  reconstruction and automated exposure control  IV Contrast:  100 mL of iohexol (OMNIPAQUE) Enteric Contrast:  Enteric contrast was not administered  FINDINGS: CHEST LUNGS:  There is moderate pulmonary emphysema mostly in the upper lobes and mostly peripheral in location  There are bands of atelectasis within the right middle lobe and right lower lobe  Adjacent to the band of atelectasis in the right middle lobe there is a small noncalcified rounded nodule which is 5 mm diameter on image 37 series 2  There is another small nodule seen on image 27 series 2 although this may be along the fissure  It is 4 mm  A 3rd nodular density in the right lung on image 32 anteriorly is also possibly fissural in location and is 6 mm  There are no left-sided nodules appreciated  There are no airway obstructions  PLEURA:  There are small bilateral pleural effusions   HEART/GREAT VESSELS:  Heart size is within normal limits  There is no evidence of pericardial effusion  The aorta is intact  There is some coronary artery and aortic calcification  Pulmonary arteries appear normal in caliber  MEDIASTINUM AND NEVAEH:  There is abnormal enhancement identified along the mucosal surface of the distal esophagus and cardia of the stomach  This is most evident on images 43-56 of series 2  Delayed images which were obtained through this area demonstrate persistent abnormal enhancement and perhaps some thickening of the wall of the GE junction as well  This presumably represents the patient's known malignancy  There is no mediastinal or hilar lymphadenopathy seen  There do appear to be a couple small lymph nodes immediately adjacent to the GE junction  These are only a few millimeters each  BODY WALL AND LOWER NECK:   Unremarkable  ABDOMEN LIVER/BILIARY TREE:  There are small cystic foci at the dome of the liver and also scattered in the left hepatic lobe  These are subcentimeter size  There are no suspicious enhancing liver masses appreciated  Liver size is normal   No biliary obstruction  GALLBLADDER:  No calcified gallstones  No pericholecystic inflammatory change  SPLEEN:  Unremarkable  PANCREAS:  Unremarkable  ADRENAL GLANDS:  Unremarkable  KIDNEYS/URETERS:  There are multiple bilateral renal cysts, right side more prominent than left  These appear to be uniform low attenuation benign type cysts  There are no solid enhancing kidney masses seen  No hydronephrosis or kidney stones  VISUALIZED BOWEL:  Evaluation of the bowel is limited  The small bowel loops are all clustered centrally in the abdomen secondary to large volume of ascites  Difficult to ascertain whether there is bowel wall thickening or intrinsic bowel lesion    Along the mesenteric border of the entire visualized portion of the small bowel there is serpiginous appearing abnormal enhancement which is felt to probably represent additional sites of carcinomatosis  While the serpiginous appearing pattern could conceivably be a vascular type process, there is no visible superior mesenteric vein or portal vein thrombosis or obstruction  Therefore, a vascular cause seems less likely  As above, there is abnormal thickening and enhancement of the GE junction wall  There is moderate amount of stool visible within the included portion of the colon  VISUALIZED ABDOMINAL CAVITY:  There is a very large volume of ascites  There is no free air  The omentum appears potentially abnormal, somewhat thickened and enhanced in appearance  This might represent omental caking and as such would be concerning for carcinomatosis in the abdomen  It might be reasonable to obtain diagnostic paracentesis to assess for any malignant cells  Peritoneal surface appears slightly thickened and enhanced particularly in the right mid to upper abdomen posteriorly  OSSEOUS STRUCTURES:  No acute fracture or destructive osseous lesion  Impression: Pulmonary emphysema  There are bands of platelike atelectasis in the right middle and lower lobes and there are also scattered small right lung nodules  I would suggest that the patient have a repeat chest CT in 6 months to reassess the nodules and also to ensure that the areas of presumed atelectasis resolved  I see no obvious obstructing airway lesions or dominant masses at this time  There are bilateral small pleural effusions  There is abnormal enhancement and thickening of the wall of the distal esophagus and the cardia of the stomach which is apparently known to represent malignancy based on patient's medical record  There are a few small adjacent lymph nodes  Whether these represent local francisco j metastases is uncertain   There is large volume of ascites and there is evidence of intra-abdominal carcinomatosis with enhancement and nodular thickening along the peritoneal surface mostly in the right side of the abdomen as well as suspected abnormal thickening and enhancement  throughout the omentum as well as abnormal enhancement along the mesenteric border of the entire visualized portion of the small bowel  Hepatic and renal cysts  The study was marked in [facility] for immediate notification  Workstation performed: [id number]       ROS: As stated in history of present illness otherwise her 14 point review of systems today was negative      Active Problems:   Patient Active Problem List   Diagnosis    PTSD (post-traumatic stress disorder)    Essential hypertension    Major depression    OCD (obsessive compulsive disorder)    Anxiety       Past Medical History:   Past Medical History:   Diagnosis Date    Hypertension     Psychiatric disorder        Surgical History:   Past Surgical History:   Procedure Laterality Date    APPENDECTOMY       Social History:   Social History     Socioeconomic History    Marital status: /Civil Union     Spouse name: Not on file    Number of children: Not on file    Years of education: Not on file    Highest education level: Not on file   Occupational History    Not on file   Social Needs    Financial resource strain: Not on file    Food insecurity:     Worry: Not on file     Inability: Not on file    Transportation needs:     Medical: Not on file     Non-medical: Not on file   Tobacco Use    Smoking status: Current Some Day Smoker     Types: Cigars    Smokeless tobacco: Never Used   Substance and Sexual Activity    Alcohol use: No    Drug use: Yes     Types: Marijuana    Sexual activity: Not on file   Lifestyle    Physical activity:     Days per week: Not on file     Minutes per session: Not on file    Stress: Not on file   Relationships    Social connections:     Talks on phone: Not on file     Gets together: Not on file     Attends [employer] service: Not on file     Active member of club or organization: Not on file     Attends meetings of clubs or organizations: Not on file     Relationship status: Not on file    Intimate partner violence:     Fear of current or ex partner: Not on file     Emotionally abused: Not on file     Physically abused: Not on file     Forced sexual activity: Not on file   Other Topics Concern    Not on file   Social History Narrative    Not on file       Current Medications:   Current Outpatient Medications   Medication Sig Dispense Refill    bisoprolol-hydrochlorothiazide (ZIAC) 10-6 25 MG per tablet Take 1 tablet by mouth daily 30 tablet 2    diazepam (VALIUM) 5 mg tablet Take 1 tablet (5 mg total) by mouth 2 (two) times a day 60 tablet 0    venlafaxine (EFFEXOR-XR) 150 mg 24 hr capsule Take 1 capsule (150 mg total) by mouth daily 30 capsule 0     No current facility-administered medications for this visit  Allergies: Allergies   Allergen Reactions    Bee Venom Anaphylaxis         Physical Exam:    Body surface area is 1 87 meters squared  Wt Readings from Last 3 Encounters:   07/26/19 71 2 kg (157 lb)   06/14/18 88 5 kg (195 lb)   06/02/18 86 2 kg (190 lb)        Temp Readings from Last 3 Encounters:   07/26/19 (!) 96 5 °F (35 8 °C) (Tympanic)   06/14/18 (!) 97 4 °F (36 3 °C)   06/02/18 97 8 °F (36 6 °C) (Tympanic)        BP Readings from Last 3 Encounters:   07/26/19 118/72   06/14/18 140/64   06/02/18 149/80         Pulse Readings from Last 3 Encounters:   07/26/19 60   06/14/18 58   06/02/18 56       Physical Exam     Constitutional   General appearance: No acute distress, well appearing and well nourished  Eyes   Conjunctiva and lids: No swelling, erythema or discharge  Pupils and irises: Equal, round and reactive to light  Ears, Nose, Mouth, and Throat   External inspection of ears and nose: Normal     Nasal mucosa, septum, and turbinates: Normal without edema or erythema  Oropharynx: Normal with no erythema, edema, exudate or lesions  Pulmonary   Respiratory effort: No increased work of breathing or signs of respiratory distress  Auscultation of lungs: Clear to auscultation  Cardiovascular   Palpation of heart: Normal PMI, no thrills  Auscultation of heart: Normal rate and rhythm, normal S1 and S2, without murmurs  Examination of extremities for edema and/or varicosities: Normal     Carotid pulses: Normal     Abdomen   Abdomen: Distended with probable ascites  Liver and spleen: No hepatomegaly or splenomegaly  Lymphatic   Palpation of lymph nodes in neck: No lymphadenopathy  Musculoskeletal   Gait and station: Normal     Digits and nails: Normal without clubbing or cyanosis  Inspection/palpation of joints, bones, and muscles: Normal     Skin   Skin and subcutaneous tissue: Normal without rashes or lesions  Neurologic   Cranial nerves: Cranial nerves 2-12 intact  Sensation: No sensory loss  Psychiatric   Orientation to person, place, and time: Normal     Mood and affect: Normal           Assessment/ Plan: This is an unfortunate 76-year-old male with what appears to be metastatic gastroesophageal cancer  His PET/CT scan shows uptake in a nodular omentum indicating metastatic disease  Previous biopsy revealed adenocarcinoma  At this point recommendation would be systemic chemotherapy in the palliative setting to help improve survival  Options include modified FOLFOX 6 versus EOF versus FLOT  Looking at side effect profiles and patient's performance status I think modified FOLFOX 6 would be the most reasonable regimen  I explained the risks benefits and alternatives of therapy  I explained the possible side effects to include but not limited to nausea, vomiting, diarrhea, abdominal pain, low blood counts, risk of infection and even death  The patient agreed to proceed  I will send off his tumor for molecular testing and HER-2/pito testing  I will give him Neulasta growth factor support for chemotherapy-induced neutropenia  He will sign a consent form today        Goals and Barriers:  Current Goal: Prolong Survival from Esophageal Cancer  Barriers: None  Patient's Capacity to Self Care:  Patient able to self care  Portions of the record may have been created with voice recognition software   Occasional wrong word or "sound a like" substitutions may have occurred due to the inherent limitations of voice recognition software   Read the chart carefully and recognize, using context, where substitutions have occurred

## 2019-07-26 NOTE — TELEPHONE ENCOUNTER
Please schedule pt at [facility]    They prefer afternoons  Referral is in [facility]

## 2019-07-30 ENCOUNTER — TELEPHONE (OUTPATIENT)
Dept: HEMATOLOGY ONCOLOGY | Facility: CLINIC | Age: 64
End: 2019-07-30

## 2019-07-30 NOTE — TELEPHONE ENCOUNTER
Per Dr Sandi Hawley, the pathology department is suppose to get the specimen for a second opinion and then send it for molecular testing

## 2019-07-30 NOTE — TELEPHONE ENCOUNTER
Kriss Guerra [facility]'s pathology called regarding the molecular testing form that was sent  They are unable to complete the request because they do not have the specimen  It looks like it was done in [facility]   Please contact pathology at [phone] to advise

## 2019-07-31 ENCOUNTER — TELEPHONE (OUTPATIENT)
Dept: HEMATOLOGY ONCOLOGY | Facility: HOSPITAL | Age: 64
End: 2019-07-31

## 2019-07-31 ENCOUNTER — HOSPITAL ENCOUNTER (OUTPATIENT)
Dept: NON INVASIVE DIAGNOSTICS | Facility: HOSPITAL | Age: 64
Discharge: HOME | End: 2019-07-31
Admitting: RADIOLOGY
Payer: MEDICARE

## 2019-07-31 ENCOUNTER — APPOINTMENT (OUTPATIENT)
Dept: LAB | Facility: HOSPITAL | Age: 64
End: 2019-07-31
Payer: MEDICARE

## 2019-07-31 ENCOUNTER — TRANSCRIBE ORDERS (OUTPATIENT)
Dept: ADMINISTRATIVE | Facility: HOSPITAL | Age: 64
End: 2019-07-31

## 2019-07-31 VITALS
HEIGHT: 70 IN | DIASTOLIC BLOOD PRESSURE: 69 MMHG | HEART RATE: 55 BPM | RESPIRATION RATE: 16 BRPM | BODY MASS INDEX: 22.48 KG/M2 | TEMPERATURE: 96.4 F | OXYGEN SATURATION: 93 % | WEIGHT: 157 LBS | SYSTOLIC BLOOD PRESSURE: 114 MMHG

## 2019-07-31 DIAGNOSIS — C16.0 MALIGNANT NEOPLASM OF CARDIA OF STOMACH (HCC): ICD-10-CM

## 2019-07-31 DIAGNOSIS — C15.5 MALIGNANT NEOPLASM OF LOWER THIRD OF ESOPHAGUS (HCC): ICD-10-CM

## 2019-07-31 LAB
ALBUMIN SERPL BCP-MCNC: 2.6 G/DL (ref 3.5–5)
ALP SERPL-CCNC: 83 U/L (ref 46–116)
ALT SERPL W P-5'-P-CCNC: 17 U/L (ref 12–78)
ANION GAP SERPL CALCULATED.3IONS-SCNC: 10 MMOL/L (ref 4–13)
AST SERPL W P-5'-P-CCNC: 13 U/L (ref 5–45)
BASOPHILS # BLD AUTO: 0.08 THOUSANDS/ΜL (ref 0–0.1)
BASOPHILS NFR BLD AUTO: 1 % (ref 0–1)
BILIRUB SERPL-MCNC: 0.4 MG/DL (ref 0.2–1)
BUN SERPL-MCNC: 21 MG/DL (ref 5–25)
CALCIUM SERPL-MCNC: 9.3 MG/DL (ref 8.3–10.1)
CHLORIDE SERPL-SCNC: 96 MMOL/L (ref 100–108)
CO2 SERPL-SCNC: 28 MMOL/L (ref 21–32)
CREAT SERPL-MCNC: 0.93 MG/DL (ref 0.6–1.3)
EOSINOPHIL # BLD AUTO: 0.24 THOUSAND/ΜL (ref 0–0.61)
EOSINOPHIL NFR BLD AUTO: 3 % (ref 0–6)
ERYTHROCYTE [DISTWIDTH] IN BLOOD BY AUTOMATED COUNT: 13.8 % (ref 11.6–15.1)
GFR SERPL CREATININE-BSD FRML MDRD: 85 ML/MIN/1.73SQ M
GLUCOSE SERPL-MCNC: 114 MG/DL (ref 65–140)
HCT VFR BLD AUTO: 41.8 % (ref 36.5–49.3)
HGB BLD-MCNC: 12.7 G/DL (ref 12–17)
IMM GRANULOCYTES # BLD AUTO: 0.04 THOUSAND/UL (ref 0–0.2)
IMM GRANULOCYTES NFR BLD AUTO: 0 % (ref 0–2)
LYMPHOCYTES # BLD AUTO: 2.14 THOUSANDS/ΜL (ref 0.6–4.47)
LYMPHOCYTES NFR BLD AUTO: 23 % (ref 14–44)
MCH RBC QN AUTO: 26.1 PG (ref 26.8–34.3)
MCHC RBC AUTO-ENTMCNC: 30.4 G/DL (ref 31.4–37.4)
MCV RBC AUTO: 86 FL (ref 82–98)
MONOCYTES # BLD AUTO: 1.1 THOUSAND/ΜL (ref 0.17–1.22)
MONOCYTES NFR BLD AUTO: 12 % (ref 4–12)
NEUTROPHILS # BLD AUTO: 5.6 THOUSANDS/ΜL (ref 1.85–7.62)
NEUTS SEG NFR BLD AUTO: 61 % (ref 43–75)
NRBC BLD AUTO-RTO: 0 /100 WBCS
PLATELET # BLD AUTO: 468 THOUSANDS/UL (ref 149–390)
PMV BLD AUTO: 11.9 FL (ref 8.9–12.7)
POTASSIUM SERPL-SCNC: 3.9 MMOL/L (ref 3.5–5.3)
PROT SERPL-MCNC: 7.8 G/DL (ref 6.4–8.2)
RBC # BLD AUTO: 4.86 MILLION/UL (ref 3.88–5.62)
SODIUM SERPL-SCNC: 134 MMOL/L (ref 136–145)
WBC # BLD AUTO: 9.2 THOUSAND/UL (ref 4.31–10.16)

## 2019-07-31 PROCEDURE — 99152 MOD SED SAME PHYS/QHP 5/>YRS: CPT

## 2019-07-31 PROCEDURE — 85025 COMPLETE CBC W/AUTO DIFF WBC: CPT

## 2019-07-31 PROCEDURE — 77001 FLUOROGUIDE FOR VEIN DEVICE: CPT | Performed by: RADIOLOGY

## 2019-07-31 PROCEDURE — C1788 PORT, INDWELLING, IMP: HCPCS

## 2019-07-31 PROCEDURE — 77001 FLUOROGUIDE FOR VEIN DEVICE: CPT

## 2019-07-31 PROCEDURE — 36415 COLL VENOUS BLD VENIPUNCTURE: CPT

## 2019-07-31 PROCEDURE — 36561 INSERT TUNNELED CV CATH: CPT | Performed by: RADIOLOGY

## 2019-07-31 PROCEDURE — 99152 MOD SED SAME PHYS/QHP 5/>YRS: CPT | Performed by: RADIOLOGY

## 2019-07-31 PROCEDURE — 76937 US GUIDE VASCULAR ACCESS: CPT | Performed by: RADIOLOGY

## 2019-07-31 PROCEDURE — 80053 COMPREHEN METABOLIC PANEL: CPT

## 2019-07-31 PROCEDURE — 76937 US GUIDE VASCULAR ACCESS: CPT

## 2019-07-31 PROCEDURE — 36561 INSERT TUNNELED CV CATH: CPT

## 2019-07-31 RX ORDER — FENTANYL CITRATE 50 UG/ML
INJECTION, SOLUTION INTRAMUSCULAR; INTRAVENOUS CODE/TRAUMA/SEDATION MEDICATION
Status: COMPLETED | OUTPATIENT
Start: 2019-07-31 | End: 2019-07-31

## 2019-07-31 RX ORDER — MIDAZOLAM HYDROCHLORIDE 1 MG/ML
INJECTION INTRAMUSCULAR; INTRAVENOUS CODE/TRAUMA/SEDATION MEDICATION
Status: COMPLETED | OUTPATIENT
Start: 2019-07-31 | End: 2019-07-31

## 2019-07-31 RX ORDER — LIDOCAINE HYDROCHLORIDE 10 MG/ML
INJECTION, SOLUTION INFILTRATION; PERINEURAL CODE/TRAUMA/SEDATION MEDICATION
Status: COMPLETED | OUTPATIENT
Start: 2019-07-31 | End: 2019-07-31

## 2019-07-31 RX ADMIN — FENTANYL CITRATE 100 MCG: 50 INJECTION, SOLUTION INTRAMUSCULAR; INTRAVENOUS at 14:36

## 2019-07-31 RX ADMIN — MIDAZOLAM HYDROCHLORIDE 2 MG: 1 INJECTION, SOLUTION INTRAMUSCULAR; INTRAVENOUS at 14:36

## 2019-07-31 RX ADMIN — LIDOCAINE HYDROCHLORIDE 10 ML: 10 INJECTION, SOLUTION INFILTRATION; PERINEURAL at 14:35

## 2019-07-31 NOTE — PERIOPERATIVE NURSING NOTE
Received patient from cath lab via stretcher  Awake and alert  Right upper chest dressing D+I with histoacryl intact to upper incision  Tolerating oral fluids well   Denies pain

## 2019-07-31 NOTE — DISCHARGE INSTRUCTIONS
Implanted Venous Access Port     WHAT YOU NEED TO KNOW:   An implanted venous access port is a device used to give treatments and take blood  It may also be called a central venous access device (CVAD)  The port is a small container that is placed under your skin, usually in your upper chest  The port is attached to a catheter that enters a large vein  DISCHARGE INSTRUCTIONS:   Resume your normal diet  Small sips of flat soda will help with mild nausea  Prevent an infection:   · Wash your hands often  Use soap and water  Clean your hands before and after you care for your port  Remind everyone who cares for your port to wash their hands  · Check your skin for infection every day  Look for redness, swelling, or fluid oozing from the port site  Care for your port:   1  You may shower beginning 48 hours after procedure  2  Change dressing if it becomes wet  3  Remove dressing after 24 hours  Leave glue in place  4  It is normal for some bruising to occur  5  Use Tylenol for pain  6  Limit use of arm on the side that your port was placed  Lift nothing heavier than 5 pounds for 1 week, and then gradually increase activity as tolerated  7  DO NOT apply ointment, lotion or cream to port site until incision is healed  Allow glue to fall off  DO NOT attempt to peel glue from skin even it it begins to flake  8, After the port incision is healed you may swim, bathe  Notify the Interventional Radiologist if you have any of the followin  Fever above 101 F    2  Increased redness or swelling after 1st day  3  Increased pain after 1st day  4  Any sign of infection (drainage from port site, skin separation, hot to touch)  5  Persistent nausea or vomiting  Contact Interventional Radiology at [phone] [facility] PATIENTS: Contact Interventional Radiology at [phone]) ([address]: Contact Interventional Radiology at [phone])

## 2019-07-31 NOTE — INTERVAL H&P NOTE
H&P reviewed  After examining the patient, I find no changed to the H&P since it had been written  Patient re-evaluated   Accept as history and physical     Inder Bermudez, /July 31, 2019/2:16 PM

## 2019-07-31 NOTE — SEDATION DOCUMENTATION
Port successfully placed  Patient tolerated well and will transfer back to [facility] via stretcher  Report given to primary RN

## 2019-07-31 NOTE — TELEPHONE ENCOUNTER
Pt was suppose to get her port placed today at [facility]  I spoke to someone at [facility] IR on 7 26 19 which confirmed that the pt would get their port on 7 31 19  Pt showed up at [address] [facility] they are telling them that the pt is not on the schedule  Pt is already there  Pt gets chemo tomorrow  Spoke with Yariel Culp) which informed me that they are doing their best to get the pt on today but that it not guaranteed  Pt was informed to wait in the outpatient waiting room until IR calls her at [phone]  Pt was asked to call me if for some reason the port cannot be placed today

## 2019-08-01 ENCOUNTER — HOSPITAL ENCOUNTER (OUTPATIENT)
Dept: INFUSION CENTER | Facility: HOSPITAL | Age: 64
Discharge: HOME | End: 2019-08-01
Payer: MEDICARE

## 2019-08-01 ENCOUNTER — TELEPHONE (OUTPATIENT)
Dept: HEMATOLOGY ONCOLOGY | Facility: CLINIC | Age: 64
End: 2019-08-01

## 2019-08-01 VITALS
WEIGHT: 156.31 LBS | OXYGEN SATURATION: 96 % | SYSTOLIC BLOOD PRESSURE: 163 MMHG | RESPIRATION RATE: 16 BRPM | BODY MASS INDEX: 22.38 KG/M2 | DIASTOLIC BLOOD PRESSURE: 78 MMHG | HEIGHT: 70 IN | TEMPERATURE: 97.9 F | HEART RATE: 62 BPM

## 2019-08-01 DIAGNOSIS — C16.0 MALIGNANT NEOPLASM OF CARDIA OF STOMACH (HCC): ICD-10-CM

## 2019-08-01 DIAGNOSIS — C15.5 MALIGNANT NEOPLASM OF LOWER THIRD OF ESOPHAGUS (HCC): Primary | ICD-10-CM

## 2019-08-01 DIAGNOSIS — Z95.828 PORT-A-CATH IN PLACE: Primary | ICD-10-CM

## 2019-08-01 PROCEDURE — 96411 CHEMO IV PUSH ADDL DRUG: CPT

## 2019-08-01 PROCEDURE — 96368 THER/DIAG CONCURRENT INF: CPT

## 2019-08-01 PROCEDURE — 96415 CHEMO IV INFUSION ADDL HR: CPT

## 2019-08-01 PROCEDURE — 96367 TX/PROPH/DG ADDL SEQ IV INF: CPT

## 2019-08-01 PROCEDURE — 96413 CHEMO IV INFUSION 1 HR: CPT

## 2019-08-01 RX ORDER — LIDOCAINE AND PRILOCAINE 25; 25 MG/G; MG/G
CREAM TOPICAL AS NEEDED
Qty: 30 G | Refills: 0 | Status: SHIPPED | OUTPATIENT
Start: 2019-08-01 | End: 2020-05-15

## 2019-08-01 RX ORDER — DEXTROSE MONOHYDRATE 50 MG/ML
20 INJECTION, SOLUTION INTRAVENOUS ONCE
Status: COMPLETED | OUTPATIENT
Start: 2019-08-01 | End: 2019-08-01

## 2019-08-01 RX ORDER — SODIUM CHLORIDE 9 MG/ML
20 INJECTION, SOLUTION INTRAVENOUS ONCE AS NEEDED
Status: DISCONTINUED | OUTPATIENT
Start: 2019-08-01 | End: 2019-08-04 | Stop reason: HOSPADM

## 2019-08-01 RX ORDER — FLUOROURACIL 50 MG/ML
400 INJECTION, SOLUTION INTRAVENOUS ONCE
Status: COMPLETED | OUTPATIENT
Start: 2019-08-01 | End: 2019-08-01

## 2019-08-01 RX ADMIN — DEXAMETHASONE SODIUM PHOSPHATE: 10 INJECTION, SOLUTION INTRAMUSCULAR; INTRAVENOUS at 08:42

## 2019-08-01 RX ADMIN — OXALIPLATIN 158.95 MG: 5 INJECTION, SOLUTION, CONCENTRATE INTRAVENOUS at 10:00

## 2019-08-01 RX ADMIN — LEUCOVORIN CALCIUM 750 MG: 200 INJECTION, POWDER, LYOPHILIZED, FOR SOLUTION INTRAMUSCULAR; INTRAVENOUS at 10:00

## 2019-08-01 RX ADMIN — DEXTROSE 20 ML/HR: 50 INJECTION, SOLUTION INTRAVENOUS at 09:59

## 2019-08-01 RX ADMIN — SODIUM CHLORIDE 20 ML/HR: 0.9 INJECTION, SOLUTION INTRAVENOUS at 08:42

## 2019-08-01 RX ADMIN — FLUOROURACIL 750 MG: 50 INJECTION, SOLUTION INTRAVENOUS at 12:13

## 2019-08-01 NOTE — TELEPHONE ENCOUNTER
Ry's wife called, they are in the infusion center and it was suggested that he have EMLA cream for his port, can you please order and send to his pharmacy?   Jodie muniz [place]  Thank you  MIKAYLA

## 2019-08-01 NOTE — PLAN OF CARE
Problem: Potential for Falls  Goal: Patient will remain free of falls  Description  INTERVENTIONS:  - Assess patient frequently for physical needs  -  Identify cognitive and physical deficits and behaviors that affect risk of falls  -  [place] fall precautions as indicated by assessment   - Educate patient/family on patient safety including physical limitations  - Instruct patient to call for assistance with activity based on assessment  - Modify environment to reduce risk of injury  Outcome: Progressing     Problem: Knowledge Deficit  Goal: Patient/family/caregiver demonstrates understanding of disease process, treatment plan, medications, and discharge instructions  Description  Complete learning assessment and assess knowledge base    Interventions:  - Provide teaching at level of understanding  - Provide teaching via preferred learning methods  Outcome: Progressing

## 2019-08-02 ENCOUNTER — TELEPHONE (OUTPATIENT)
Dept: HEMATOLOGY ONCOLOGY | Facility: CLINIC | Age: 64
End: 2019-08-02

## 2019-08-02 NOTE — TELEPHONE ENCOUNTER
Sahara Noland from [facility] called requesting labs, last office note and any Her 2 results if available; faxed to [phone]  Please review and fax as needed  For further assistance call back at [phone]   Sahara Noland also requested [phone] phone number  Phone number [phone] was provided and he verbalized understanding

## 2019-08-02 NOTE — PROGRESS NOTES
Outpatient Oncology Nutrition Consult  Type of Consult: Initial Consult  Care Location: Office Visit  - met with patient and wife Yudy Thomson)  Goes by "Tasia Ornelas"    Reason for referral: RN (Nayeli Vargas RN Navigator) request due to New patient Stage IV esophageal, has lost 40-50#, trouble eating/swallowing, living on pureed, ensure, boost, could use some direction and help  (Date of referral: 7/26/19)    Nutrition Assessment:  PMH: PTSD, HTN, major depression, OCD, anxiety  Meds include: bisoprolol-hydrochlorothiazide (ZIAC), Compazine, Valium  Oncology Diagnosis & Treatments: Stage IV metastatic gastroesophageal cancer  Undergoing chemotherapy in the palliative setting to help improve survival   Modified FOLFOX 6 (started 8/1/19 at the [facility]) with Neulasta support for chemotherapy-induced neutropenia       Malignant neoplasm of lower third of esophagus (HCC)    7/26/2019 Initial Diagnosis     Malignant neoplasm of lower third of esophagus (HCC)      8/1/2019 -  Chemotherapy     fluorouracil (ADRUCIL) injection 750 mg, 400 mg/m2 = 750 mg, Intravenous, Once, 1 of 12 cycles  Administration: 750 mg (8/1/2019)  pegfilgrastim (NEULASTA ONPRO) subcutaneous injection kit 6 mg, 6 mg, Subcutaneous, Once, 1 of 12 cycles  Administration: 6 mg (8/3/2019)  leucovorin 750 mg in dextrose 5 % 250 mL IVPB, 748 mg, Intravenous, Once, 1 of 12 cycles  Administration: 750 mg (8/1/2019)  oxaliplatin (ELOXATIN) 158 95 mg in dextrose 5 % 250 mL chemo infusion, 85 mg/m2 = 158 95 mg, Intravenous, Once, 1 of 12 cycles  Administration: 158 95 mg (8/1/2019)        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[address] )    7/26/2019 Initial Diagnosis     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[facility] )      8/1/2019 -  Chemotherapy     fluorouracil (ADRUCIL) injection 750 mg, 400 mg/m2 = 750 mg, Intravenous, Once, 1 of 12 cycles  Administration: 750 mg (8/1/2019)  pegfilgrastim (NEULASTA ONPRO) subcutaneous injection kit 6 mg, 6 mg, Subcutaneous, Once, 1 of 12 cycles  Administration: 6 mg (8/3/2019)  leucovorin 750 mg in dextrose 5 % 250 mL IVPB, 748 mg, Intravenous, Once, 1 of 12 cycles  Administration: 750 mg (8/1/2019)  oxaliplatin (ELOXATIN) 158 95 mg in dextrose 5 % 250 mL chemo infusion, 85 mg/m2 = 158 95 mg, Intravenous, Once, 1 of 12 cycles  Administration: 158 95 mg (8/1/2019)       Past Medical History:   Diagnosis Date    Esophageal cancer ([facility] )     Hypertension     Psychiatric disorder     Recovering alcoholic ([facility] )      Past Surgical History:   Procedure Laterality Date    APPENDECTOMY      IR PORT PLACEMENT  7/31/2019       Review of Medications:   Vitamins, Supplements and Herbals: no    Current Outpatient Medications:     diazepam (VALIUM) 5 mg tablet, Take 1 tablet (5 mg total) by mouth 2 (two) times a day, Disp: 60 tablet, Rfl: 0    fluorouracil 4,490 mg in CADD infusion pump, Infuse 4,490 mg (1,200 mg/m2/day x 1 87 m2 (Treatment plan recorded BSA)) into a venous catheter over 46 hours for 2 days Homestar to be administered on 8/15/19, Disp: 1 Device, Rfl: 0    lidocaine-prilocaine (EMLA) cream, Apply topically as needed for mild pain, Disp: 30 g, Rfl: 0    metoclopramide (REGLAN) 5 mg/5 mL oral solution, Take 10 mg by mouth 4 (four) times a day as needed for nausea, Disp: , Rfl:     ondansetron (ZOFRAN-ODT) 8 mg disintegrating tablet, Take 1 tablet (8 mg total) by mouth every 8 (eight) hours as needed for nausea or vomiting (If Compazine is not effective), Disp: 30 tablet, Rfl: 0    oxyCODONE (ROXICODONE) 5 mg/5 mL solution, Take 5 mg by mouth every 4 (four) hours as needed for pain, Disp: , Rfl:     pantoprazole (PROTONIX) 40 mg tablet, Take 40 mg by mouth daily before breakfast, Disp: , Rfl:     venlafaxine (EFFEXOR-XR) 150 mg 24 hr capsule, Take 1 capsule (150 mg total) by mouth daily, Disp: 30 capsule, Rfl: 0    Most Recent Lab Results:   Lab Results   Component Value Date    WBC 9 20 07/31/2019    ALT 17 07/31/2019 AST 13 07/31/2019    K 3 9 07/31/2019    BUN 21 07/31/2019    CREATININE 0 93 07/31/2019    CALCIUM 9 3 07/31/2019       Anthropometric Measurements:   Height: 69 5"  Ht Readings from Last 1 Encounters:   08/07/19 5' 10 08" (1 78 m)     -Weight History:   Usual Weight: 198-212# (last weighed this in May 2019)  Ideal Body Weight: 163#  Wt Readings from Last 20 Encounters:   08/07/19 69 1 kg (152 lb 5 oz)   08/01/19 70 9 kg (156 lb 4 9 oz)   07/31/19 71 2 kg (157 lb)   07/26/19 71 2 kg (157 lb)   06/14/18 88 5 kg (195 lb)   06/02/18 86 2 kg (190 lb)     Weight Changes: loss of 4 7# (3%) in 1 week (significant) and loss of 45 6# (23%) in 3 months (significant)  Estimated body mass index is 21 8 kg/m² as calculated from the following:    Height as of an earlier encounter on 8/7/19: 5' 10 08" (1 78 m)  Weight as of an earlier encounter on 8/7/19: 69 1 kg (152 lb 5 oz)  Nutrition-Focused Physical Findings: severe muscle depletion (Temples) - hollowing/scooping/depression, severe muscle depletion (Clavicle) - protruding/visible bone, severe body fat depletion (Orbital) - hollowing/depression/dark circles and severe body fat depletion (Triceps) - space between folds/fingers     Food/Nutrition-Related History & Client/Social History:    Current Nutrition Impact Symptoms:  [x] Nausea - when eats too much or too fast [x] Reduced Appetite - started on steroid by Palliative Care 8/7/19 [] Acid Reflux    [x] Vomiting - due to tumor blockage; will occur with liquids  Needs to drink very slowly/small quantities   [x] Unintended Wt Loss -40-50# wt loss [] Malabsorption    [] Diarrhea  [] Unintended Wt Gain  [] Dumping Syndrome    [x] Constipation - on/off, BM's are every 2-3 days, takes Citrucel; considering adding Miralax [] Thick Mucous/Secretions  [x] Abdominal Pain - pain, discomfort, and tenderness   [x] Dysgeusia (Altered Taste) - metallic taste just started with chemo [x] Xerostomia (Dry Mouth) - mild [] Gas    [] Dysosmia (Altered Smell)  [] Gwendloyn Bel  [] Difficulty Chewing    [] Oral Mucositis (Sore Mouth)  [x] Fatigue  - sleeps most of the day, does best in afternoon  [x] Other:Pain - following with Palliative Care    [x] Odynophagia - with solid foods [] Esophagitis  [] Other:    [x] Dysphagia - only able to drink Boost, not able to swallow solid foods [] Early Satiety  [] No Problems Eating      Food Allergies: no  Food Intolerances: no   -For Gout, avoids: organ meats, shellfish  Last gout flare was 2 years ago after a liverwurst sandwich  Current Diet: Pureed and Liquids - yogurt, pudding, applesauce, ice cream, mashed cauliflower  Current Nutrition Intake: Less than usual   Appetite: Poor  Nutrition Route: PO  Meal planning/preparation mainly done by: Self and Spouse/Partner  Oral Care: Brushing BID, flossing once daily, Listerine daily  Activity level: "nonexistent" lately  Used to be very active prior to wt loss  Social Hx: Quit drinking alcohol 30 years ago  Retied custom   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)    24 Hr Diet Recall:   Breakfast: 1 Boost Plus  Lunch: 1 Ensure Enlive  Dinner: vanilla pudding (4 oz)  Snacks: supplements    Yesterday: 3 Boost Plus, 4 oz pudding  Monday: 2 Boost Plus, 2 cups vanilla ice cream    Supplements: Combination of Ensure Enlive and Boost Plus - 3-4 per day (1514-9577 kcal, ~39-52 grams protein from supplements (if using Plus versions))  Beverages: water (16 oz x2), juice (occasionally), Oral Nutrition Supplements (3-4 per day); Averaging 56 oz per day (64% est needs)    Estimated Nutrition Needs: (based on 74 1kg/ 163# IBW)  Energy Needs: 4386-3888 kcal/day (35-40 kcal/kg)  Protein Needs: 111-148 grams/day (1 5-2 g/kg)  Fluid Needs: 8762-1941 mL/day (1 mL/kcal) -  oz    Discussion/Summary: Geovanni Will is here today accompanied by his wife, Ashley Lee, to establish care with this RD    He started chemotherapy last week for gastroesophageal cancer  He has lost ~45# in the past 3 months due to dysphagia and abdominal pain  He is only tolerating liquids and some pureed foods  He makes sure to eat/drink very slowly and only small quantities at a time, otherwise, he will experience N/V/abdominal pain  He has been drinking 3-4 Ensure Enlive/Boost Plus per day in addition to small amounts of items such as applesauce, pudding, and ice cream   He is averaging ~4470-8336 kcal per day (40-54% est needs)  He reports significant fatigue, mild xerostomia, poor appetite, metallic taste, and less frequent BM's  He is taking Citrucel and considering starting Miralax  He is still able to swallow pills with water, but sometimes he will vomit them back up, so he takes one pill at a time  Today, we focused on ways to boost kcal/protein/fluid intake to assist with wt stabilization/wt gain  Krystal Scottie shows signs of severe protein-calorie malnutrition today  He is very motivated to feel better and regain weight back  Discussed/Reviewed:   · weight management  · indications & use of oral nutrition supplements Try Boost Very High Calorie (5 per day would help you meet all of your calorie/protein needs); otherwise using 7 5 Ensure Enlive/Plus or Boost Plus would help you meet all of your calorie needs  May try homemade shake/smoothie recipes provided  · how to modify foods for anticipated nutrition impact symptoms pt may experience during CA tx  · high protein foods to include at all meals and snacks  - liquid and runny pureed options  · ways to increase overall calorie intake - liquid and runny pureed options  · encouraged eating every 2-3 hours (5-6 small meals/day)  · adequate hydation & tips to increase overall fluid intake  - increase to  oz per day    All questions and concerns addressed during todays visit  Krystal Scottie has RD contact information      Nutrition Diagnosis:  · Inadequate Energy Intake related to physiological causes, disease state and treatment related issues as evidenced by food recall, wt loss and discussion with pt and/or family  · Increased Nutrient Needs (kcal & pro) related to increased demand for nutrients and disease state as evidenced by cancer dx and pt undergoing tx for cancer  · Swallowing Difficulty related to diagnosis/treatment related causes as evidenced by N/V/Abdominal pain when eating anything thicker than pureed consistency and when eating/drinking too fast or large quantities     · Patient has clinical indicators (or ASPEN criteria) consistent with severe protein-calorie malnutrition in the context of Chronic Illness as evidenced by >7 5% wt loss in 3 months, </=75% energy intake vs  Estimated needs for >/=1 month, severe muscle depletion (Temples) - hollowing/scooping/depression, severe muscle depletion (Clavicle) - protruding/visible bone, severe body fat depletion (Orbital) - hollowing/depression/dark circles and severe body fat depletion (Triceps) - space between folds/fingers      Intervention & Recommendations:  Topics addressed: Nutrition education, Balance/Variety, Meals & Snacks, Meal planning, Choosing high protein meals/snacks, Meal pattern: eating small/frequent meals (every 2-3 hours), Nutrition Symptom Management, Adequate Hydration, Medical Food Supplements, Nutrition-Related Medication Management, Vitamin & Mineral Supplements and Weight Management    Barriers: None  Readiness to change: action  Comprehension: verbalizes understanding  Expected Compliance: good    Materials Provided: [employer] Eating Hints book, Ensure samples, Ensure Coupons, Boost samples, Boost Coupons, Unjury samples, Boost Very High Calorie ordering information, Oncology Milkshake and Smoothie Recipes ([facility]), ENU samples and written daily calorie, protein and fluid goals    Monitoring & Evaluation:  Dietitian to Monitor: Food and Nutrition Intake, Nutrtion Impact Symptoms, Body Weight and Biochemical Data     Goals:  · weight stabilization  · adequate nutrition related symptom management  · pt to meet >/=75% estimated nutrition needs daily    · Progress Towards Goals: Initiated    Nutrition Rx & Recommendations:  · Diet: Pureed or Full Liquid (as tolerated), High Calorie, High Protein  Avoid cold temperatures due to oxaliplatin (chemo) use  · Avoid spicy, acidic, sharp/hard/crunchy foods & carbonated beverages as needed  · Eat slowly and chew food thoroughly before swallowing  · Nutrition Supplements: Try Boost Very High Calorie (5 per day would help you meet all of your calorie/protein needs); otherwise using 7 5 Ensure Enlive/Plus or Boost Plus would help you meet all of your calorie needs  May try homemade shake/smoothie recipes provided     May use homemade shakes/smoothies as desired  If using a pre-made shake/bar, choose ones with >300 calories and >10 grams protein (ex  Ensure Enlive, Ensure Plus, Boost Plus, Boost Very High Calorie, etc )  · Small, frequent meals/snacks may be easier to tolerate than 3 large daily meals  Aim for 5-6 small meals per day (every 2-3 hours)  · Include protein at all meals/snacks  · Include a variety of foods (as tolerated/allowed by diet)  · Stay hydrated by sipping fluids of choice/tolerance throughout the day  · Liquid nutrition may be better tolerated than solids at times  · Alter food choices and eating patterns to accommodate changing needs  · Incorporate physical activity as able/allowed  · Refer to Eating Hints book for other meal/snack ideas and symptom management  · Follow proper oral care; Try baking soda/salt water rinse recipe (mix 3/4 tsp salt + 1 tsp baking soda + 1 qt water; rinse with pain water after using) in Eating Hints book (pg 18)  Brush your teeth before/after meals & before bed    · For appetite loss: try powdered or liquid nutrition supplements; eating by the clock every 2-3 hours; set a timer to remind yourself to eat, keep snacks nearby; add extra protein & calories to your diet; drink liquids in between meals, choose liquids that add calories; eat a bedtime snack; eat soft, cool or frozen foods; eat a larger meal when you feel well & rested; only sip small amounts of liquids during meals (see pages 10-12 in your Eating Hints book)  · For sore throat: choose foods that are easy to chew/swallow; cook foods until they are soft/tender; moisten & soften foods (gravy/sauce/broth/yogurt); cut food into small pieces; eat cold or room temperature food; suck on ice chips; Avoid citrus, spicy foods, tomatoes/ketchup, salty foods, raw vegetables, sharp/crunchy/hard foods & alcohol (see pages 26-28 in your Eating Hints book)  · For trouble swallowing: eat 5-6 small meals/day; choose foods that are easy to swallow; choose foods that are high in protein & calories; cook foods until they are soft/tender; cut food into small pieces; moisten & soften foods (gravy/sauce/broth/yogurt); sip drinks through a straw (as tolerated); avoid foods/drinks that can burn/scrape your throat (hot temperatures, spicy foods, acidic foods, sharp/hard/crunchy foods, alcohol); talk to your doctor about a Speech Therapy referral for a swallowing evaluation (see page 26-28 in your Eating Hints book)  · Weigh yourself regularly  If you notice weight loss, make an effort to increase your daily food/calorie intake  If you continue to notice loss after these efforts, reach out to your dietitian to establish a plan to stabilize weight  · Start baking soda salt water rinses (page 18 of Eating Hints book) to prevent mouth sores  · Try bone broth and mixing small amounts of cheese and/or sour cream/Greek yogurt into mashed potatoes      Follow Up Plan: 8/15/19 at 1pm for a phone follow up  Recommend Referral to Other Providers: none at this time

## 2019-08-02 NOTE — TELEPHONE ENCOUNTER
Dirk Alba from [facility] called stating that the providers nurse or  needs to call and request the MI profiling is sent to their lab for completion   She can be reached at [phone]

## 2019-08-03 ENCOUNTER — HOSPITAL ENCOUNTER (OUTPATIENT)
Dept: INFUSION CENTER | Facility: HOSPITAL | Age: 64
Discharge: HOME | End: 2019-08-03
Payer: MEDICARE

## 2019-08-03 VITALS
OXYGEN SATURATION: 96 % | SYSTOLIC BLOOD PRESSURE: 120 MMHG | RESPIRATION RATE: 16 BRPM | TEMPERATURE: 97.4 F | DIASTOLIC BLOOD PRESSURE: 74 MMHG | HEART RATE: 69 BPM

## 2019-08-03 DIAGNOSIS — C15.5 MALIGNANT NEOPLASM OF LOWER THIRD OF ESOPHAGUS (HCC): Primary | ICD-10-CM

## 2019-08-03 DIAGNOSIS — C16.0 MALIGNANT NEOPLASM OF CARDIA OF STOMACH (HCC): ICD-10-CM

## 2019-08-03 PROCEDURE — 96372 THER/PROPH/DIAG INJ SC/IM: CPT

## 2019-08-03 PROCEDURE — 96374 THER/PROPH/DIAG INJ IV PUSH: CPT

## 2019-08-03 PROCEDURE — 96361 HYDRATE IV INFUSION ADD-ON: CPT

## 2019-08-03 RX ORDER — ONDANSETRON 8 MG/1
8 TABLET, ORALLY DISINTEGRATING ORAL EVERY 8 HOURS PRN
Qty: 30 TABLET | Refills: 0 | Status: SHIPPED | OUTPATIENT
Start: 2019-08-03 | End: 2020-01-07

## 2019-08-03 RX ADMIN — PEGFILGRASTIM 6 MG: KIT SUBCUTANEOUS at 11:50

## 2019-08-03 RX ADMIN — SODIUM CHLORIDE 1000 ML: 0.9 INJECTION, SOLUTION INTRAVENOUS at 11:15

## 2019-08-03 RX ADMIN — ONDANSETRON 8 MG: 2 INJECTION INTRAMUSCULAR; INTRAVENOUS at 11:25

## 2019-08-03 NOTE — PLAN OF CARE
Problem: Potential for Falls  Goal: Patient will remain free of falls  Description  INTERVENTIONS:  - Assess patient frequently for physical needs  -  Identify cognitive and physical deficits and behaviors that affect risk of falls  -  [place] fall precautions as indicated by assessment   - Educate patient/family on patient safety including physical limitations  - Instruct patient to call for assistance with activity based on assessment  - Modify environment to reduce risk of injury  - Consider OT/PT consult to assist with strengthening/mobility  Outcome: Progressing     Problem: INFECTION - ADULT  Goal: Absence or prevention of progression during hospitalization  Description  INTERVENTIONS:  - Assess and monitor for signs and symptoms of infection  - Monitor lab/diagnostic results  - Monitor all insertion sites, i e  indwelling lines, tubes, and drains  - Instruct and encourage patient and family to use good hand hygiene technique  - Identify and instruct in appropriate isolation precautions for identified condition  Outcome: Progressing  Goal: Absence of fever/infection during neutropenic period  Description  INTERVENTIONS:  - Monitor WBC  Outcome: Progressing     Problem: Knowledge Deficit  Goal: Patient/family/caregiver demonstrates understanding of disease process, treatment plan, medications, and discharge instructions  Description  Complete learning assessment and assess knowledge base    Interventions:  - Provide teaching at level of understanding  - Provide teaching via preferred learning methods  Outcome: Progressing

## 2019-08-03 NOTE — PROGRESS NOTES
Patient reporting he vomited at least ten times yesterday and continued to vomit today  Unable to keep anything down  He reports he voided this morning and urine was "normal color" , pt denies dark yellow or discolored urine  His wife is with him and concerned  She said they had a consult at [facility] yesterday and it was suggested he may need hydration after chemo  Patient took Compazine prescription for nausea as prescribed at home but vomited the pill  I called the after hours Oncology service line and spoke to Dr Armando Dave who is on call this weekend  Notified him that patient's vitals stable, patient stated he does not feel he is bad enough to need to go to the emergency room  New orders received for 2 hours NS hydration and Zofran 8mg IV today  And to discharge patient home afterwards  I discussed this with patient and wife and they are agreeable to treatment plan today and verbalized understanding signs and symptoms of dehydration and when it is necessary to go to the ER and to notify Dr Perry Side office Monday morning of events this weekend

## 2019-08-05 NOTE — TELEPHONE ENCOUNTER
Todd reached back out to me  She found out that [facility] Mobile is the fisher of the block and slides, however, there was a request that came in from [facility]) this morning prior to ours so they will get slides first   It seems that [facility] keeps the slides then for nearly 1 month  We will have to wait for the outcome of their testing and move forward from there

## 2019-08-05 NOTE — TELEPHONE ENCOUNTER
Tried calling the lab listed on the pathology report [id number]  Their phone number is not listed on the report but according to an internet search the number is [phone] which appears to be the same as the doctors office ordering the biopsy - Dr Bridger Driscoll  The office personnel I spoke with, Micki Cannon, stated they do not have the tissue  I called Beth Conde in our specialty lab to inform her of the same    She asked to give her a few minutes and she will call me back

## 2019-08-06 PROBLEM — R18.0 MALIGNANT ASCITES: Status: ACTIVE | Noted: 2019-08-06

## 2019-08-06 PROBLEM — R11.0 CHEMOTHERAPY-INDUCED NAUSEA: Status: ACTIVE | Noted: 2019-08-06

## 2019-08-06 PROBLEM — R63.4 ABNORMAL WEIGHT LOSS: Status: ACTIVE | Noted: 2019-08-06

## 2019-08-06 PROBLEM — F10.21 RECOVERING ALCOHOLIC (HCC): Status: ACTIVE | Noted: 2019-08-06

## 2019-08-06 PROBLEM — T45.1X5A CHEMOTHERAPY-INDUCED NAUSEA: Status: ACTIVE | Noted: 2019-08-06

## 2019-08-06 PROBLEM — G89.3 CANCER ASSOCIATED PAIN: Status: ACTIVE | Noted: 2019-08-06

## 2019-08-06 RX ORDER — FLUOROURACIL 50 MG/ML
400 INJECTION, SOLUTION INTRAVENOUS ONCE
Status: CANCELLED | OUTPATIENT
Start: 2019-08-13

## 2019-08-06 RX ORDER — SODIUM CHLORIDE 9 MG/ML
20 INJECTION, SOLUTION INTRAVENOUS ONCE AS NEEDED
Status: CANCELLED | OUTPATIENT
Start: 2019-08-13

## 2019-08-06 RX ORDER — SODIUM CHLORIDE 9 MG/ML
20 INJECTION, SOLUTION INTRAVENOUS ONCE AS NEEDED
Status: CANCELLED | OUTPATIENT
Start: 2019-08-27

## 2019-08-06 RX ORDER — DEXTROSE MONOHYDRATE 50 MG/ML
20 INJECTION, SOLUTION INTRAVENOUS ONCE
Status: CANCELLED | OUTPATIENT
Start: 2019-08-27

## 2019-08-06 RX ORDER — DEXTROSE MONOHYDRATE 50 MG/ML
20 INJECTION, SOLUTION INTRAVENOUS ONCE
Status: CANCELLED | OUTPATIENT
Start: 2019-08-13

## 2019-08-06 RX ORDER — FLUOROURACIL 50 MG/ML
400 INJECTION, SOLUTION INTRAVENOUS ONCE
Status: CANCELLED | OUTPATIENT
Start: 2019-08-27

## 2019-08-06 NOTE — PROGRESS NOTES
Palliative and Supportive Care   Amirah Diana 75 y o  male [id number]    Assessment/Plan:  1  Malignant neoplasm of lower third of esophagus (HCC)    2  Cancer associated pain    3  Chemotherapy-induced nausea    4  Abnormal weight loss    5  Other fatigue    6  Essential hypertension    7  Other dysphagia      Requested Prescriptions     Signed Prescriptions Disp Refills    dexamethasone (DECADRON) 4 mg tablet 30 tablet 0     Sig: Take 1 tablet (4 mg total) by mouth daily with breakfast for 30 days     Medications Discontinued During This Encounter   Medication Reason    prochlorperazine (COMPAZINE) 10 mg tablet Will be using reglan liquid PRN    bisoprolol-hydrochlorothiazide (ZIAC) 10-6 25 MG per tablet Pt has lost 40lbs and is struggling to stay hydrated  Hoping for a spectrum of symptom control, pain, nausea, appetite stimulation, energy, I am starting the patient on dexamethasone 4mg daily  His medication list and schedule was printed out and reviewed with the patient and his wife  They were also given some direction on how to go about pursuing medical marijuana in [place]  They are meeting the medical nutrition therapist from oncology  45 minutes were spent face to face with Amirah Diana and his wife with greater than 50% of the time spent in counseling or coordination of care including discussions of treatment instructions and follow up requirements   All of the patient's questions were answered during this discussion  Return in about 1 month (around 9/7/2019)  Will be scheduled in [place], [place]  Subjective:   Chief Complaint  New consultation for:  symptom management  HPI     Amirah Diana is a 75 y o  male with 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  He was diagnosed in 7/2019  Locally he has seen Dr William Rouse in medical oncology  He has also seen the GI oncology department at [facility] - Dr Jeannie Agarwal   His current treatment is palliative intent FOLFOX  He was referred to the department of Palliative & Supportive care for concurrent symptom management  He has been experiencing chemotherapy related nausea  He has no appetite and has lost about 40 lbs  He has been recommended to drink 6-8 boost per day  At most he has been able to drink 5 boost   They have now added hydration and electrolytes to his chemo plan  He has to eat soft foods due to problems swallowing  He has had cancer related abdominal pain  He has no energy    The following portions of the medical history were reviewed: past medical history, problem list, medication list, and social history      Current Outpatient Medications:     diazepam (VALIUM) 5 mg tablet, Take 1 tablet (5 mg total) by mouth 2 (two) times a day, Disp: 60 tablet, Rfl: 0    fluorouracil 4,490 mg in CADD infusion pump, Infuse 4,490 mg (1,200 mg/m2/day x 1 87 m2 (Treatment plan recorded BSA)) into a venous catheter over 46 hours for 2 days Homestar to be administered on 8/15/19, Disp: 1 Device, Rfl: 0    lidocaine-prilocaine (EMLA) cream, Apply topically as needed for mild pain, Disp: 30 g, Rfl: 0    metoclopramide (REGLAN) 5 mg/5 mL oral solution, Take 10 mg by mouth 4 (four) times a day as needed for nausea, Disp: , Rfl:     ondansetron (ZOFRAN-ODT) 8 mg disintegrating tablet, Take 1 tablet (8 mg total) by mouth every 8 (eight) hours as needed for nausea or vomiting (If Compazine is not effective), Disp: 30 tablet, Rfl: 0    oxyCODONE (ROXICODONE) 5 mg/5 mL solution, Take 5 mg by mouth every 4 (four) hours as needed for pain, Disp: , Rfl:     pantoprazole (PROTONIX) 40 mg tablet, Take 40 mg by mouth daily before breakfast, Disp: , Rfl:     venlafaxine (EFFEXOR-XR) 150 mg 24 hr capsule, Take 1 capsule (150 mg total) by mouth daily, Disp: 30 capsule, Rfl: 0    dexamethasone (DECADRON) 4 mg tablet, Take 1 tablet (4 mg total) by mouth daily with breakfast for 30 days, Disp: 30 tablet, Rfl: 0  Review of Systems   Constitutional: Positive for activity change, appetite change, fatigue and unexpected weight change  Gastrointestinal: Positive for constipation, diarrhea and nausea  All other systems negative    Objective:  Vital Signs  /64 (BP Location: Right arm, Cuff Size: Standard)   Pulse 64   Temp 98 6 °F (37 °C) (Tympanic)   Resp 16   Ht 5' 10 08" (1 78 m)   Wt 69 1 kg (152 lb 5 oz)   BMI 21 80 kg/m²    Physical Exam    Constitutional: Slightly dissheveled  In no acute physical or emotional distress  Head: thin and drawn out facial features   Eyes: EOM are normal  No ocular discharge  No scleral icterus  Neck: No visible adenopathy or masses  Respiratory: Effort normal  No stridor  No respiratory distress  Gastrointestinal: No abdominal distension  Musculoskeletal: No edema  Neurological: Alert, oriented and appropriately conversant  Skin: No diaphoresis, no rashes seen on exposed areas of skin  Sallow coloring  Psychiatric: Displays a normal mood and affect   Behavior, judgement and thought content appear normal

## 2019-08-07 ENCOUNTER — NUTRITION (OUTPATIENT)
Dept: NUTRITION | Facility: CLINIC | Age: 64
End: 2019-08-07

## 2019-08-07 ENCOUNTER — OFFICE VISIT (OUTPATIENT)
Dept: PALLIATIVE MEDICINE | Facility: CLINIC | Age: 64
End: 2019-08-07
Payer: MEDICARE

## 2019-08-07 VITALS
HEART RATE: 64 BPM | TEMPERATURE: 98.6 F | BODY MASS INDEX: 21.81 KG/M2 | HEIGHT: 70 IN | RESPIRATION RATE: 16 BRPM | DIASTOLIC BLOOD PRESSURE: 64 MMHG | SYSTOLIC BLOOD PRESSURE: 120 MMHG | WEIGHT: 152.31 LBS

## 2019-08-07 DIAGNOSIS — G89.3 CANCER ASSOCIATED PAIN: ICD-10-CM

## 2019-08-07 DIAGNOSIS — R11.0 CHEMOTHERAPY-INDUCED NAUSEA: ICD-10-CM

## 2019-08-07 DIAGNOSIS — I10 ESSENTIAL HYPERTENSION: ICD-10-CM

## 2019-08-07 DIAGNOSIS — R63.4 ABNORMAL WEIGHT LOSS: ICD-10-CM

## 2019-08-07 DIAGNOSIS — Z71.3 NUTRITIONAL COUNSELING: Primary | ICD-10-CM

## 2019-08-07 DIAGNOSIS — C15.5 MALIGNANT NEOPLASM OF LOWER THIRD OF ESOPHAGUS (HCC): Primary | ICD-10-CM

## 2019-08-07 DIAGNOSIS — R53.83 OTHER FATIGUE: ICD-10-CM

## 2019-08-07 DIAGNOSIS — T45.1X5A CHEMOTHERAPY-INDUCED NAUSEA: ICD-10-CM

## 2019-08-07 DIAGNOSIS — C16.0 MALIGNANT NEOPLASM OF CARDIA OF STOMACH (HCC): ICD-10-CM

## 2019-08-07 DIAGNOSIS — R13.19 OTHER DYSPHAGIA: ICD-10-CM

## 2019-08-07 PROCEDURE — 99204 OFFICE O/P NEW MOD 45 MIN: CPT | Performed by: FAMILY MEDICINE

## 2019-08-07 RX ORDER — PANTOPRAZOLE SODIUM 40 MG/1
40 TABLET, DELAYED RELEASE ORAL
COMMUNITY
End: 2020-08-18 | Stop reason: SDUPTHER

## 2019-08-07 RX ORDER — METOCLOPRAMIDE HYDROCHLORIDE 5 MG/5ML
10 SOLUTION ORAL 4 TIMES DAILY PRN
COMMUNITY
End: 2020-01-07

## 2019-08-07 RX ORDER — DEXAMETHASONE 4 MG/1
4 TABLET ORAL
Qty: 30 TABLET | Refills: 0 | Status: SHIPPED | OUTPATIENT
Start: 2019-08-07 | End: 2019-09-03

## 2019-08-07 RX ORDER — OXYCODONE HCL 5 MG/5 ML
5 SOLUTION, ORAL ORAL EVERY 4 HOURS PRN
COMMUNITY
End: 2020-01-07

## 2019-08-07 NOTE — PATIENT INSTRUCTIONS
Nutrition Rx & Recommendations:  · Diet: Pureed or Full Liquid (as tolerated), High Calorie, High Protein  Avoid cold temperatures due to oxaliplatin (chemo) use  · Avoid spicy, acidic, sharp/hard/crunchy foods & carbonated beverages as needed  · Eat slowly and chew food thoroughly before swallowing  · Nutrition Supplements: Try Boost Very High Calorie (5 per day would help you meet all of your calorie/protein needs); otherwise using 7 5 Ensure Enlive/Plus or Boost Plus would help you meet all of your calorie needs  May try homemade shake/smoothie recipes provided     May use homemade shakes/smoothies as desired  If using a pre-made shake/bar, choose ones with >300 calories and >10 grams protein (ex  Ensure Enlive, Ensure Plus, Boost Plus, Boost Very High Calorie, etc )  · Small, frequent meals/snacks may be easier to tolerate than 3 large daily meals  Aim for 5-6 small meals per day (every 2-3 hours)  · Include protein at all meals/snacks  · Include a variety of foods (as tolerated/allowed by diet)  · Stay hydrated by sipping fluids of choice/tolerance throughout the day  · Liquid nutrition may be better tolerated than solids at times  · Alter food choices and eating patterns to accommodate changing needs  · Incorporate physical activity as able/allowed  · Refer to Eating Hints book for other meal/snack ideas and symptom management  · Follow proper oral care; Try baking soda/salt water rinse recipe (mix 3/4 tsp salt + 1 tsp baking soda + 1 qt water; rinse with pain water after using) in Eating Hints book (pg 18)  Brush your teeth before/after meals & before bed    · For appetite loss: try powdered or liquid nutrition supplements; eating by the clock every 2-3 hours; set a timer to remind yourself to eat, keep snacks nearby; add extra protein & calories to your diet; drink liquids in between meals, choose liquids that add calories; eat a bedtime snack; eat soft, cool or frozen foods; eat a larger meal when you feel well & rested; only sip small amounts of liquids during meals (see pages 10-12 in your Eating Hints book)  · For sore throat: choose foods that are easy to chew/swallow; cook foods until they are soft/tender; moisten & soften foods (gravy/sauce/broth/yogurt); cut food into small pieces; eat cold or room temperature food; suck on ice chips; Avoid citrus, spicy foods, tomatoes/ketchup, salty foods, raw vegetables, sharp/crunchy/hard foods & alcohol (see pages 26-28 in your Eating Hints book)  · For trouble swallowing: eat 5-6 small meals/day; choose foods that are easy to swallow; choose foods that are high in protein & calories; cook foods until they are soft/tender; cut food into small pieces; moisten & soften foods (gravy/sauce/broth/yogurt); sip drinks through a straw (as tolerated); avoid foods/drinks that can burn/scrape your throat (hot temperatures, spicy foods, acidic foods, sharp/hard/crunchy foods, alcohol); talk to your doctor about a Speech Therapy referral for a swallowing evaluation (see page 26-28 in your Eating Hints book)  · Weigh yourself regularly  If you notice weight loss, make an effort to increase your daily food/calorie intake  If you continue to notice loss after these efforts, reach out to your dietitian to establish a plan to stabilize weight  · Start baking soda salt water rinses (page 18 of Eating Hints book) to prevent mouth sores  · Try bone broth and mixing small amounts of cheese and/or sour cream/Greek yogurt into mashed potatoes      Follow Up Plan: 8/15/19 at 1pm for a phone follow up  Recommend Referral to Other Providers: none at this time

## 2019-08-08 PROBLEM — E86.0 DEHYDRATION: Status: ACTIVE | Noted: 2019-08-08

## 2019-08-08 PROBLEM — R11.0 NAUSEA: Status: ACTIVE | Noted: 2019-08-08

## 2019-08-09 ENCOUNTER — APPOINTMENT (OUTPATIENT)
Dept: LAB | Facility: HOSPITAL | Age: 64
End: 2019-08-09
Payer: MEDICARE

## 2019-08-09 DIAGNOSIS — C15.5 MALIGNANT NEOPLASM OF LOWER THIRD OF ESOPHAGUS (HCC): ICD-10-CM

## 2019-08-09 DIAGNOSIS — E86.0 DEHYDRATION: ICD-10-CM

## 2019-08-09 DIAGNOSIS — C16.0 MALIGNANT NEOPLASM OF CARDIA OF STOMACH (HCC): ICD-10-CM

## 2019-08-09 DIAGNOSIS — R11.0 NAUSEA: ICD-10-CM

## 2019-08-09 LAB
ALBUMIN SERPL BCP-MCNC: 2.7 G/DL (ref 3.5–5)
ALP SERPL-CCNC: 138 U/L (ref 46–116)
ALT SERPL W P-5'-P-CCNC: 12 U/L (ref 12–78)
ANION GAP SERPL CALCULATED.3IONS-SCNC: 9 MMOL/L (ref 4–13)
AST SERPL W P-5'-P-CCNC: 13 U/L (ref 5–45)
BASOPHILS # BLD MANUAL: 0 THOUSAND/UL (ref 0–0.1)
BASOPHILS NFR MAR MANUAL: 0 % (ref 0–1)
BILIRUB SERPL-MCNC: 0.2 MG/DL (ref 0.2–1)
BUN SERPL-MCNC: 19 MG/DL (ref 5–25)
CALCIUM SERPL-MCNC: 9.1 MG/DL (ref 8.3–10.1)
CHLORIDE SERPL-SCNC: 98 MMOL/L (ref 100–108)
CO2 SERPL-SCNC: 29 MMOL/L (ref 21–32)
CREAT SERPL-MCNC: 0.89 MG/DL (ref 0.6–1.3)
DOHLE BOD BLD QL SMEAR: PRESENT
EOSINOPHIL # BLD MANUAL: 0.19 THOUSAND/UL (ref 0–0.4)
EOSINOPHIL NFR BLD MANUAL: 1 % (ref 0–6)
ERYTHROCYTE [DISTWIDTH] IN BLOOD BY AUTOMATED COUNT: 13.9 % (ref 11.6–15.1)
GFR SERPL CREATININE-BSD FRML MDRD: 89 ML/MIN/1.73SQ M
GLUCOSE P FAST SERPL-MCNC: 130 MG/DL (ref 65–99)
HCT VFR BLD AUTO: 39 % (ref 36.5–49.3)
HGB BLD-MCNC: 11.9 G/DL (ref 12–17)
LG PLATELETS BLD QL SMEAR: PRESENT
LYMPHOCYTES # BLD AUTO: 14 % (ref 14–44)
LYMPHOCYTES # BLD AUTO: 2.65 THOUSAND/UL (ref 0.6–4.47)
MCH RBC QN AUTO: 26.2 PG (ref 26.8–34.3)
MCHC RBC AUTO-ENTMCNC: 30.5 G/DL (ref 31.4–37.4)
MCV RBC AUTO: 86 FL (ref 82–98)
MONOCYTES # BLD AUTO: 1.33 THOUSAND/UL (ref 0–1.22)
MONOCYTES NFR BLD: 7 % (ref 4–12)
NEUTROPHILS # BLD MANUAL: 14.77 THOUSAND/UL (ref 1.85–7.62)
NEUTS BAND NFR BLD MANUAL: 26 % (ref 0–8)
NEUTS SEG NFR BLD AUTO: 52 % (ref 43–75)
NRBC BLD AUTO-RTO: 0 /100 WBCS
PLATELET # BLD AUTO: 397 THOUSANDS/UL (ref 149–390)
PLATELET BLD QL SMEAR: ADEQUATE
PMV BLD AUTO: 11.5 FL (ref 8.9–12.7)
POTASSIUM SERPL-SCNC: 3.7 MMOL/L (ref 3.5–5.3)
PROT SERPL-MCNC: 7.8 G/DL (ref 6.4–8.2)
RBC # BLD AUTO: 4.54 MILLION/UL (ref 3.88–5.62)
RBC MORPH BLD: NORMAL
SODIUM SERPL-SCNC: 136 MMOL/L (ref 136–145)
TOTAL CELLS COUNTED SPEC: 100
WBC # BLD AUTO: 18.93 THOUSAND/UL (ref 4.31–10.16)

## 2019-08-09 PROCEDURE — 85027 COMPLETE CBC AUTOMATED: CPT

## 2019-08-09 PROCEDURE — 36415 COLL VENOUS BLD VENIPUNCTURE: CPT

## 2019-08-09 PROCEDURE — 85007 BL SMEAR W/DIFF WBC COUNT: CPT

## 2019-08-09 PROCEDURE — 80053 COMPREHEN METABOLIC PANEL: CPT

## 2019-08-12 NOTE — PROGRESS NOTES
Outpatient Oncology Nutrition Consult  Type of Consult: Follow Up  Care Location: Telephone Call  - met with patient and wife Nakia Tenorio)  Goes by "Norina Parent"    Nutrition Assessment:  PMH: PTSD, HTN, major depression, OCD, anxiety  Meds include: bisoprolol-hydrochlorothiazide (ZIAC), Compazine, Valium  Oncology Diagnosis & Treatments: Stage IV metastatic gastroesophageal cancer  Undergoing chemotherapy in the palliative setting to help improve survival   Modified FOLFOX 6 (started 8/1/19 at the [facility]) with Neulasta support for chemotherapy-induced neutropenia       Malignant neoplasm of lower third of esophagus (HCC)    7/26/2019 Initial Diagnosis     Malignant neoplasm of lower third of esophagus (HCC)      8/1/2019 -  Chemotherapy     fluorouracil (ADRUCIL) injection 750 mg, 400 mg/m2 = 750 mg, Intravenous, Once, 2 of 12 cycles  Administration: 750 mg (8/1/2019), 750 mg (8/13/2019)  pegfilgrastim (NEULASTA ONPRO) subcutaneous injection kit 6 mg, 6 mg, Subcutaneous, Once, 2 of 12 cycles  Administration: 6 mg (8/3/2019), 6 mg (8/15/2019)  leucovorin 750 mg in dextrose 5 % 250 mL IVPB, 748 mg, Intravenous, Once, 2 of 12 cycles  Administration: 750 mg (8/1/2019), 750 mg (8/13/2019)  oxaliplatin (ELOXATIN) 158 95 mg in dextrose 5 % 250 mL chemo infusion, 85 mg/m2 = 158 95 mg, Intravenous, Once, 2 of 12 cycles  Administration: 158 95 mg (8/1/2019), 158 95 mg (8/13/2019)        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[facility] )    7/26/2019 Initial Diagnosis     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[facility] )      8/1/2019 -  Chemotherapy     fluorouracil (ADRUCIL) injection 750 mg, 400 mg/m2 = 750 mg, Intravenous, Once, 2 of 12 cycles  Administration: 750 mg (8/1/2019), 750 mg (8/13/2019)  pegfilgrastim (NEULASTA ONPRO) subcutaneous injection kit 6 mg, 6 mg, Subcutaneous, Once, 2 of 12 cycles  Administration: 6 mg (8/3/2019), 6 mg (8/15/2019)  leucovorin 750 mg in dextrose 5 % 250 mL IVPB, 748 mg, Intravenous, Once, 2 of 12 cycles  Administration: 750 mg (8/1/2019), 750 mg (8/13/2019)  oxaliplatin (ELOXATIN) 158 95 mg in dextrose 5 % 250 mL chemo infusion, 85 mg/m2 = 158 95 mg, Intravenous, Once, 2 of 12 cycles  Administration: 158 95 mg (8/1/2019), 158 95 mg (8/13/2019)       Past Medical History:   Diagnosis Date    Dehydration 8/8/2019    [facility]     Hypertension     Nausea 8/8/2019    Psychiatric disorder     Recovering alcoholic ([facility] )      Past Surgical History:   Procedure Laterality Date    APPENDECTOMY      IR PORT PLACEMENT  7/31/2019       Review of Medications:   Vitamins, Supplements and Herbals: no    Current Outpatient Medications:     dexamethasone (DECADRON) 4 mg tablet, Take 1 tablet (4 mg total) by mouth daily with breakfast for 30 days, Disp: 30 tablet, Rfl: 0    diazepam (VALIUM) 5 mg tablet, Take 1 tablet (5 mg total) by mouth 2 (two) times a day, Disp: 60 tablet, Rfl: 0    lidocaine-prilocaine (EMLA) cream, Apply topically as needed for mild pain, Disp: 30 g, Rfl: 0    metoclopramide (REGLAN) 5 mg/5 mL oral solution, Take 10 mg by mouth 4 (four) times a day as needed for nausea, Disp: , Rfl:     ondansetron (ZOFRAN-ODT) 8 mg disintegrating tablet, Take 1 tablet (8 mg total) by mouth every 8 (eight) hours as needed for nausea or vomiting (If Compazine is not effective), Disp: 30 tablet, Rfl: 0    oxyCODONE (ROXICODONE) 5 mg/5 mL solution, Take 5 mg by mouth every 4 (four) hours as needed for pain, Disp: , Rfl:     pantoprazole (PROTONIX) 40 mg tablet, Take 40 mg by mouth daily before breakfast, Disp: , Rfl:     venlafaxine (EFFEXOR-XR) 150 mg 24 hr capsule, Take 1 capsule (150 mg total) by mouth daily, Disp: 30 capsule, Rfl: 0  No current facility-administered medications for this visit       Facility-Administered Medications Ordered in Other Visits:     sodium chloride 0 9 % infusion, 20 mL/hr, Intravenous, Once PRN, Orbob Jordan MD    Most Recent Lab Results:   Lab Results Component Value Date    WBC 18 93 (H) 08/09/2019    ALT 12 08/09/2019    AST 13 08/09/2019    K 3 7 08/09/2019    K 3 9 07/31/2019    BUN 19 08/09/2019    BUN 21 07/31/2019    CREATININE 0 89 08/09/2019    CREATININE 0 93 07/31/2019    CALCIUM 9 1 08/09/2019       Anthropometric Measurements:   Height: 69 5"  Ht Readings from Last 1 Encounters:   08/13/19 5' 9 49" (1 765 m)     -Weight History:   Usual Weight: 198-212# (last weighed this in May 2019)  Ideal Body Weight: 163#  Wt Readings from Last 20 Encounters:   08/13/19 67 1 kg (147 lb 14 9 oz)   08/07/19 69 1 kg (152 lb 5 oz)   08/01/19 70 9 kg (156 lb 4 9 oz)   07/31/19 71 2 kg (157 lb)   07/26/19 71 2 kg (157 lb)   06/14/18 88 5 kg (195 lb)   06/02/18 86 2 kg (190 lb)     Weight Changes: loss of 4 4# (2 9%) in 1 week (significant)   Estimated body mass index is 21 54 kg/m² as calculated from the following:    Height as of 8/13/19: 5' 9 49" (1 765 m)  Weight as of 8/13/19: 67 1 kg (147 lb 14 9 oz)      Nutrition-Focused Physical Findings: n/a today due to telephone call; last visit (8/7/19) observed: severe muscle depletion (Temples) - hollowing/scooping/depression, severe muscle depletion (Clavicle) - protruding/visible bone, severe body fat depletion (Orbital) - hollowing/depression/dark circles and severe body fat depletion (Triceps) - space between folds/fingers     Food/Nutrition-Related History & Client/Social History:    Current Nutrition Impact Symptoms:  [] Nausea - resolved [] Reduced Appetite - started on steroid by Palliative Care 8/7/19, appetite now fair-good [] Acid Reflux    [] Vomiting - resolved [x] Unintended Wt Loss -40-50# wt loss [] Malabsorption    [] Diarrhea  [] Unintended Wt Gain  [] Dumping Syndrome    [x] Constipation - now using Miralax daily, 2 large BM's yesterday [] Thick Mucous/Secretions  [] Abdominal Pain - resolved   [] Dysgeusia (Altered Taste) - metallic taste has not been an issue [x] Xerostomia (Dry Mouth) - now using Biotene mouthwash which is helping [] Gas    [] Dysosmia (Altered Smell)  [] Faisal Last  [] Difficulty Chewing    [] Oral Mucositis (Sore Mouth)  [x] Fatigue  - improving due to feeling better [x] Other: Pain - improved; following with Palliative Care    [] Odynophagia - resolved [] Esophagitis  [] Other:    [x] Dysphagia - makes sure to eat very slowly [] Early Satiety  [] No Problems Eating      Food Allergies: no  Food Intolerances: no   -For Gout, avoids: organ meats, shellfish  Last gout flare was 2 years ago after a liverwurst sandwich  Current Diet: Soft and Pureed - tolerating more soft solids this week  Current Nutrition Intake: Increased since last visit  Appetite: Good, Fair , Fluctuating  Nutrition Route: PO  Meal planning/preparation mainly done by: Self and Spouse/Partner  Oral Care: Brushing BID, Biotene mouthwash daily  Activity level: "nonexistent" lately  Used to be very active prior to wt loss  Social Hx: Quit drinking alcohol 30 years ago  Retied custom   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)    24 Hr Diet Recall: 3 Boost VHC, starting to eat more solids: hamburger ana without bun, ham sandwich on 2 slices of white bread with domínguez, yogurt, chocolate pudding, ice cream; had 1 large pancake today and a sausage ana  Supplements: Boost VHC TID - enjoys these; supplement providing 1590 kcal, 66 g protein  Beverages: water (12-16 oz metal container x2; had forgotten how great it is to drink a glass of water), apple juice (occasionally, twice per week), Oral Nutrition Supplements (TID); Averaging 48 oz per day (55% est needs)    Estimated Nutrition Needs: (based on 74 1kg/ 163# IBW)  Energy Needs: 0003-8009 kcal/day (35-40 kcal/kg)  Protein Needs: 111-148 grams/day (1 5-2 g/kg)  Fluid Needs: 1034-0791 mL/day (1 mL/kcal) -  oz    Discussion/Summary: Timothy Goss and his wife, Tiffany Henley, were contacted today for RD follow up   Information for today's visit was provided by Monroe Puente  Wife reports that Dominique Ford is feeling much better than last week with his new medication regimen initiated by Dr Tala Lee and he is very excited about this  He continues to lose wt  He is now able to tolerate more soft solids  His nausea, vomiting, abdominal pain, and dysgeusia have resolved  He has had more energy and is sleeping less during the day  His BM's are starting to normalize with Miralax use daily  His appetite has improved from poor and is now fluctuating between good and fair  He is only meeting 55% of his fluid needs  Wife asked physician's office about IV fluids today but was told they were not indicated at this time  Strongly encouraged increasing po fluid intake to closer to  oz/day as he is at very high risk for dehydration  Discussed ways to boost fluid intake using electrolyte and calorie containing fluids  Recommended trying to increase Boost VHC to 5 times per day  Emailed wife coupons for Michele Kurtz [employer]  Next follow up will [facility]  Discussed/Reviewed:   · weight management  · indications & use of oral nutrition supplements - Work up to [employer] Very High Calorie per day  · how to modify foods for anticipated nutrition impact symptoms pt may experience during CA tx  · high protein foods to include at all meals and snacks  - pureed and soft options  · ways to increase overall calorie intake - pureed and soft options  · encouraged eating every 2-3 hours (5-6 small meals/day)  · adequate hydation & tips to increase overall fluid intake  - increase to  oz per day    All questions and concerns addressed during todays visit  Dominique Ford has RD contact information  Nutrition Diagnosis:  · Inadequate Energy Intake related to physiological causes, disease state and treatment related issues as evidenced by food recall, wt loss and discussion with pt and/or family    · Increased Nutrient Needs (kcal & pro) related to increased demand for nutrients and disease state as evidenced by cancer dx and pt undergoing tx for cancer  · Swallowing Difficulty related to diagnosis/treatment related causes as evidenced by history of N/V/Abdominal pain when eating anything thicker than pureed consistency and when eating/drinking too fast or large quantities     · Patient has clinical indicators (or ASPEN criteria) consistent with severe protein-calorie malnutrition in the context of Chronic Illness as evidenced by >7 5% wt loss in 3 months, </=75% energy intake vs  Estimated needs for >/=1 month, severe muscle depletion (Temples) - hollowing/scooping/depression, severe muscle depletion (Clavicle) - protruding/visible bone, severe body fat depletion (Orbital) - hollowing/depression/dark circles and severe body fat depletion (Triceps) - space between folds/fingers  Intervention & Recommendations:  Topics addressed: Nutrition education, Balance/Variety, Meals & Snacks, Meal planning, Choosing high protein meals/snacks, Meal pattern: eating small/frequent meals (every 2-3 hours), Nutrition Symptom Management, Adequate Hydration, Medical Food Supplements, Nutrition-Related Medication Management and Weight Management    Barriers: None  Readiness to change: action  Comprehension: verbalizes understanding  Expected Compliance: good    Materials Provided: Boost Coupons emailed to wife ([facility] coupons)    Monitoring & Evaluation:  Dietitian to Monitor: Food and Nutrition Intake, Nutrtion Impact Symptoms, Body Weight and Biochemical Data     Goals:  · weight stabilization  · adequate nutrition related symptom management  · pt to meet >/=75% estimated nutrition needs daily    · Progress Towards Goals: Progressing and Not Met    Nutrition Rx & Recommendations:  · Diet: Consistencies as tolerated: High Calorie, High Protein  Avoid cold temperatures due to oxaliplatin (chemo) use    · Eat slowly and chew food thoroughly before swallowing  · Nutrition Supplements: Aim for 5 Boost Very High Calorie per day  May use homemade shakes/smoothies as desired  If using a pre-made shake/bar, choose ones with >300 calories and >10 grams protein (ex  Ensure Enlive, Ensure Plus, Boost Plus, Boost Very High Calorie, etc )  · Small, frequent meals/snacks may be easier to tolerate than 3 large daily meals  Aim for 5-6 small meals per day (every 2-3 hours)  · Include protein at all meals/snacks  · Stay hydrated by sipping fluids of choice/tolerance throughout the day  · Liquid nutrition may be better tolerated than solids at times  · Weigh yourself regularly  If you notice weight loss, make an effort to increase your daily food/calorie intake  If you continue to notice loss after these efforts, reach out to your dietitian to establish a plan to stabilize weight  · Try bone broth and mixing small amounts of cheese and/or sour cream/Greek yogurt into mashed potatoes  · Aim for  oz fluid per day, supplements count towards fluids  Sports drinks, diluted juices, and broth are good fluid options for calories and/or electrolytes to prevent dehydration      Follow Up Plan: 8/27/19 during chemo at [place]  Recommend Referral to Other Providers: none at this time

## 2019-08-13 ENCOUNTER — HOSPITAL ENCOUNTER (OUTPATIENT)
Dept: INFUSION CENTER | Facility: HOSPITAL | Age: 64
Discharge: HOME | End: 2019-08-13
Payer: MEDICARE

## 2019-08-13 VITALS
HEIGHT: 69 IN | SYSTOLIC BLOOD PRESSURE: 135 MMHG | BODY MASS INDEX: 21.91 KG/M2 | TEMPERATURE: 97.8 F | HEART RATE: 54 BPM | RESPIRATION RATE: 18 BRPM | OXYGEN SATURATION: 97 % | WEIGHT: 147.93 LBS | DIASTOLIC BLOOD PRESSURE: 67 MMHG

## 2019-08-13 DIAGNOSIS — R11.0 NAUSEA: ICD-10-CM

## 2019-08-13 DIAGNOSIS — C16.0 MALIGNANT NEOPLASM OF CARDIA OF STOMACH (HCC): ICD-10-CM

## 2019-08-13 DIAGNOSIS — E86.0 DEHYDRATION: ICD-10-CM

## 2019-08-13 DIAGNOSIS — C15.5 MALIGNANT NEOPLASM OF LOWER THIRD OF ESOPHAGUS (HCC): Primary | ICD-10-CM

## 2019-08-13 PROCEDURE — 96413 CHEMO IV INFUSION 1 HR: CPT

## 2019-08-13 PROCEDURE — 96411 CHEMO IV PUSH ADDL DRUG: CPT

## 2019-08-13 PROCEDURE — 96368 THER/DIAG CONCURRENT INF: CPT

## 2019-08-13 PROCEDURE — 96415 CHEMO IV INFUSION ADDL HR: CPT

## 2019-08-13 PROCEDURE — 96367 TX/PROPH/DG ADDL SEQ IV INF: CPT

## 2019-08-13 PROCEDURE — G0498 CHEMO EXTEND IV INFUS W/PUMP: HCPCS

## 2019-08-13 RX ORDER — DEXTROSE MONOHYDRATE 50 MG/ML
20 INJECTION, SOLUTION INTRAVENOUS ONCE
Status: COMPLETED | OUTPATIENT
Start: 2019-08-13 | End: 2019-08-13

## 2019-08-13 RX ORDER — FLUOROURACIL 50 MG/ML
400 INJECTION, SOLUTION INTRAVENOUS ONCE
Status: COMPLETED | OUTPATIENT
Start: 2019-08-13 | End: 2019-08-13

## 2019-08-13 RX ORDER — SODIUM CHLORIDE 9 MG/ML
20 INJECTION, SOLUTION INTRAVENOUS ONCE AS NEEDED
Status: DISCONTINUED | OUTPATIENT
Start: 2019-08-13 | End: 2019-08-16 | Stop reason: HOSPADM

## 2019-08-13 RX ADMIN — DEXTROSE 20 ML/HR: 50 INJECTION, SOLUTION INTRAVENOUS at 08:46

## 2019-08-13 RX ADMIN — OXALIPLATIN 158.95 MG: 5 INJECTION, SOLUTION, CONCENTRATE INTRAVENOUS at 09:37

## 2019-08-13 RX ADMIN — DEXAMETHASONE SODIUM PHOSPHATE: 10 INJECTION, SOLUTION INTRAMUSCULAR; INTRAVENOUS at 08:46

## 2019-08-13 RX ADMIN — LEUCOVORIN CALCIUM 750 MG: 200 INJECTION, POWDER, LYOPHILIZED, FOR SOLUTION INTRAMUSCULAR; INTRAVENOUS at 09:34

## 2019-08-13 RX ADMIN — FLUOROURACIL 750 MG: 50 INJECTION, SOLUTION INTRAVENOUS at 11:57

## 2019-08-13 NOTE — PLAN OF CARE
Problem: Potential for Falls  Goal: Patient will remain free of falls  Description  INTERVENTIONS:  - Assess patient frequently for physical needs  -  Identify cognitive and physical deficits and behaviors that affect risk of falls  -  [place] fall precautions as indicated by assessment   - Educate patient/family on patient safety including physical limitations  - Instruct patient to call for assistance with activity based on assessment  - Modify environment to reduce risk of injury  CARE COMPANION AT CHAIRSIDE  Outcome: Progressing     Problem: DISCHARGE PLANNING  Goal: Discharge to home or other facility with appropriate resources  Description  INTERVENTIONS:  - Identify barriers to discharge w/patient and caregiver  - Arrange for needed discharge resources and transportation as appropriate  - Identify discharge learning needs (meds, wound care, etc )  - Arrange for interpretive services to assist at discharge as needed  - Refer to Case Management Department for coordinating discharge planning if the patient needs post-hospital services based on physician/advanced practitioner order or complex needs related to functional status, cognitive ability, or social support system  Outcome: Progressing     Problem: Knowledge Deficit  Goal: Patient/family/caregiver demonstrates understanding of disease process, treatment plan, medications, and discharge instructions  Description  Complete learning assessment and assess knowledge base    Interventions:  - Provide teaching at level of understanding  - Provide teaching via preferred learning methods  Outcome: Progressing

## 2019-08-15 ENCOUNTER — NUTRITION (OUTPATIENT)
Dept: NUTRITION | Facility: CLINIC | Age: 64
End: 2019-08-15

## 2019-08-15 ENCOUNTER — HOSPITAL ENCOUNTER (OUTPATIENT)
Dept: INFUSION CENTER | Facility: HOSPITAL | Age: 64
Discharge: HOME | End: 2019-08-15
Payer: MEDICARE

## 2019-08-15 ENCOUNTER — HOSPITAL ENCOUNTER (OUTPATIENT)
Dept: INFUSION CENTER | Facility: HOSPITAL | Age: 64
Discharge: HOME | End: 2019-08-15

## 2019-08-15 VITALS
DIASTOLIC BLOOD PRESSURE: 59 MMHG | TEMPERATURE: 97.4 F | RESPIRATION RATE: 18 BRPM | SYSTOLIC BLOOD PRESSURE: 104 MMHG | OXYGEN SATURATION: 98 % | HEART RATE: 52 BPM

## 2019-08-15 DIAGNOSIS — C16.0 MALIGNANT NEOPLASM OF CARDIA OF STOMACH (HCC): ICD-10-CM

## 2019-08-15 DIAGNOSIS — R11.0 NAUSEA: ICD-10-CM

## 2019-08-15 DIAGNOSIS — Z71.3 NUTRITIONAL COUNSELING: Primary | ICD-10-CM

## 2019-08-15 DIAGNOSIS — C15.5 MALIGNANT NEOPLASM OF LOWER THIRD OF ESOPHAGUS (HCC): Primary | ICD-10-CM

## 2019-08-15 DIAGNOSIS — E86.0 DEHYDRATION: ICD-10-CM

## 2019-08-15 PROCEDURE — 96372 THER/PROPH/DIAG INJ SC/IM: CPT

## 2019-08-15 RX ADMIN — PEGFILGRASTIM 6 MG: KIT SUBCUTANEOUS at 10:46

## 2019-08-15 NOTE — PATIENT INSTRUCTIONS
Nutrition Rx & Recommendations:  · Diet: Consistencies as tolerated: High Calorie, High Protein  Avoid cold temperatures due to oxaliplatin (chemo) use  · Eat slowly and chew food thoroughly before swallowing  · Nutrition Supplements: Aim for 5 Boost Very High Calorie per day  May use homemade shakes/smoothies as desired  If using a pre-made shake/bar, choose ones with >300 calories and >10 grams protein (ex  Ensure Enlive, Ensure Plus, Boost Plus, Boost Very High Calorie, etc )  · Small, frequent meals/snacks may be easier to tolerate than 3 large daily meals  Aim for 5-6 small meals per day (every 2-3 hours)  · Include protein at all meals/snacks  · Stay hydrated by sipping fluids of choice/tolerance throughout the day  · Liquid nutrition may be better tolerated than solids at times  · Weigh yourself regularly  If you notice weight loss, make an effort to increase your daily food/calorie intake  If you continue to notice loss after these efforts, reach out to your dietitian to establish a plan to stabilize weight  · Try bone broth and mixing small amounts of cheese and/or sour cream/Greek yogurt into mashed potatoes  · Aim for  oz fluid per day, supplements count towards fluids  Sports drinks, diluted juices, and broth are good fluid options for calories and/or electrolytes to prevent dehydration      Follow Up Plan: 8/27/19 during chemo at [facility]  Recommend Referral to Other Providers: none at this time

## 2019-08-15 NOTE — Clinical Note
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 Thanks for everything you do!

## 2019-08-15 NOTE — PROGRESS NOTES
Patient's wife asking if patient is getting hydration today and said she has not heard from office  I called and spoke with Vinicio Reyes RN  Patient is only to receive hydration if clinically indicated and having symptoms showing dehydration including but not limited to being unable to eat,drink and/or vomiting, dizziness etc  I talked with patient and his wife and provided printed information regarding signs of dehydration and when to seek medical care  I explained that hydration is not scheduled unless patient has a need and for them to call the office if he has any signs or symptoms  They verbalized understanding

## 2019-08-20 DIAGNOSIS — C16.0 MALIGNANT NEOPLASM OF CARDIA OF STOMACH (HCC): ICD-10-CM

## 2019-08-20 DIAGNOSIS — C15.5 MALIGNANT NEOPLASM OF LOWER THIRD OF ESOPHAGUS (HCC): ICD-10-CM

## 2019-08-20 DIAGNOSIS — E86.0 DEHYDRATION: ICD-10-CM

## 2019-08-20 DIAGNOSIS — R11.0 NAUSEA: Primary | ICD-10-CM

## 2019-08-22 NOTE — PROGRESS NOTES
Outpatient Oncology Nutrition Consult  Type of Consult: Follow Up  Care Location: [place] [id number] with patient and wife Tracy Jean) at [facility]  Emily Lugo goes by [employer]"    Nutrition Assessment:  PMH: PTSD, HTN, major depression, OCD, anxiety  Meds include: bisoprolol-hydrochlorothiazide (ZIAC), Compazine, Valium  Oncology Diagnosis & Treatments: Stage IV metastatic gastroesophageal cancer  Undergoing chemotherapy in the palliative setting to help improve survival   Modified FOLFOX 6 (started 8/1/19 at the [facility]) with Neulasta support for chemotherapy-induced neutropenia       Malignant neoplasm of lower third of esophagus (HCC)    7/26/2019 Initial Diagnosis     Malignant neoplasm of lower third of esophagus (HCC)      8/1/2019 -  Chemotherapy     fluorouracil (ADRUCIL) injection 750 mg, 400 mg/m2 = 750 mg, Intravenous, Once, 2 of 12 cycles  Administration: 750 mg (8/1/2019), 750 mg (8/13/2019)  pegfilgrastim (NEULASTA ONPRO) subcutaneous injection kit 6 mg, 6 mg, Subcutaneous, Once, 2 of 12 cycles  Administration: 6 mg (8/3/2019), 6 mg (8/15/2019)  leucovorin 750 mg in dextrose 5 % 250 mL IVPB, 748 mg, Intravenous, Once, 2 of 12 cycles  Administration: 750 mg (8/1/2019), 750 mg (8/13/2019)  oxaliplatin (ELOXATIN) 158 95 mg in dextrose 5 % 250 mL chemo infusion, 85 mg/m2 = 158 95 mg, Intravenous, Once, 2 of 12 cycles  Administration: 158 95 mg (8/1/2019), 158 95 mg (8/13/2019)        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[facility] [address] )    7/26/2019 Initial Diagnosis     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[facility] [address] )      8/1/2019 -  Chemotherapy     fluorouracil (ADRUCIL) injection 750 mg, 400 mg/m2 = 750 mg, Intravenous, Once, 2 of 12 cycles  Administration: 750 mg (8/1/2019), 750 mg (8/13/2019)  pegfilgrastim (NEULASTA ONPRO) subcutaneous injection kit 6 mg, 6 mg, Subcutaneous, Once, 2 of 12 cycles  Administration: 6 mg (8/3/2019), 6 mg (8/15/2019)  leucovorin 750 mg in dextrose 5 % 250 mL IVPB, 748 mg, Intravenous, Once, 2 of 12 cycles  Administration: 750 mg (8/1/2019), 750 mg (8/13/2019)  oxaliplatin (ELOXATIN) 158 95 mg in dextrose 5 % 250 mL chemo infusion, 85 mg/m2 = 158 95 mg, Intravenous, Once, 2 of 12 cycles  Administration: 158 95 mg (8/1/2019), 158 95 mg (8/13/2019)       Past Medical History:   Diagnosis Date    Dehydration 8/8/2019    Esophageal cancer ([facility] )     Hypertension     Nausea 8/8/2019    Psychiatric disorder     Recovering alcoholic ([facility] )      Past Surgical History:   Procedure Laterality Date    APPENDECTOMY      IR PORT PLACEMENT  7/31/2019       Review of Medications:   Vitamins, Supplements and Herbals: no    Current Outpatient Medications:     dexamethasone (DECADRON) 4 mg tablet, Take 1 tablet (4 mg total) by mouth daily with breakfast for 30 days, Disp: 30 tablet, Rfl: 0    diazepam (VALIUM) 5 mg tablet, Take 1 tablet (5 mg total) by mouth 2 (two) times a day, Disp: 60 tablet, Rfl: 0    lidocaine-prilocaine (EMLA) cream, Apply topically as needed for mild pain, Disp: 30 g, Rfl: 0    metoclopramide (REGLAN) 5 mg/5 mL oral solution, Take 10 mg by mouth 4 (four) times a day as needed for nausea, Disp: , Rfl:     ondansetron (ZOFRAN-ODT) 8 mg disintegrating tablet, Take 1 tablet (8 mg total) by mouth every 8 (eight) hours as needed for nausea or vomiting (If Compazine is not effective), Disp: 30 tablet, Rfl: 0    oxyCODONE (ROXICODONE) 5 mg/5 mL solution, Take 5 mg by mouth every 4 (four) hours as needed for pain, Disp: , Rfl:     pantoprazole (PROTONIX) 40 mg tablet, Take 40 mg by mouth daily before breakfast, Disp: , Rfl:     venlafaxine (EFFEXOR-XR) 150 mg 24 hr capsule, Take 1 capsule (150 mg total) by mouth daily, Disp: 30 capsule, Rfl: 0    Most Recent Lab Results:   Lab Results   Component Value Date    WBC 36 58 (HH) 08/27/2019    ALT 12 08/09/2019    AST 13 08/09/2019    K 3 7 08/09/2019    K 3 9 07/31/2019    BUN 19 08/09/2019    BUN 21 07/31/2019 CREATININE 0 89 08/09/2019    CREATININE 0 93 07/31/2019    CALCIUM 9 1 08/09/2019       Anthropometric Measurements:   Height: 69 5"  Ht Readings from Last 1 Encounters:   08/27/19 5' 10" (1 778 m)     -Weight History:   Usual Weight: 198-212# (last weighed this in May 2019)  Ideal Body Weight: 163#  Wt Readings from Last 20 Encounters:   08/27/19 69 3 kg (152 lb 12 8 oz)   08/23/19 66 2 kg (146 lb)   08/13/19 67 1 kg (147 lb 14 9 oz)   08/07/19 69 1 kg (152 lb 5 oz)   08/01/19 70 9 kg (156 lb 4 9 oz)   07/31/19 71 2 kg (157 lb)   07/26/19 71 2 kg (157 lb)   06/14/18 88 5 kg (195 lb)   06/02/18 86 2 kg (190 lb)     Weight Changes: gain of 4 9#/ (3 3%) in 2 weeks (not significant) and loss of 4 2# (2 7%) in 1 month (not significant)   Estimated body mass index is 21 92 kg/m² as calculated from the following:    Height as of an earlier encounter on 8/27/19: 5' 10" (1 778 m)  Weight as of an earlier encounter on 8/27/19: 69 3 kg (152 lb 12 8 oz)      Nutrition-Focused Physical Findings: moderate muscle depletion (Temples) - hollowing/scooping/depression, moderate muscle depletion (Clavicle) - protruding/visible bone, moderate body fat depletion (Orbital) - hollowing/depression/dark circles and moderate body fat depletion (Triceps) - space between folds/fingers      Food/Nutrition-Related History & Client/Social History:    Current Nutrition Impact Symptoms:  [] Nausea - rare [x] Reduced Appetite - started on steroid by Palliative Care 8/7/19, appetite is poor but improving [] Acid Reflux    [x] Vomiting - "a little bit" on Saturday [x] Unintended Wt Loss [] Malabsorption    [] Diarrhea  [] Unintended Wt Gain  [] Dumping Syndrome    [] Constipation - continues on Miralax daily, BM's are solid and daily [] Thick Mucous/Secretions  [] Abdominal Pain    [] Dysgeusia (Altered Taste)  [x] Xerostomia (Dry Mouth) - using Biotene mouthwash which is helpful [] Gas    [] Dysosmia (Altered Smell)  [] Thrush  [] Difficulty Chewing    [] Oral Mucositis (Sore Mouth)  [x] Fatigue  - improving, took dog for a walk the other day, was sleeping 18 hrs per day and now sleeping 8 hrs per day  [] Other:   [] Odynophagia - tingling in throat with cold foods/drinks for a few days after chemo [] Esophagitis  [] Other:    [x] Dysphagia - occasionally with solid foods, improving overall [] Early Satiety  [] No Problems Eating      Food Allergies: no  Food Intolerances: no   -For Gout, avoids: organ meats, shellfish  Last gout flare was 2 years ago after a liverwurst sandwich  Current Diet: Regular Diet, No Restrictions and Soft - improved since last visit  Current Nutrition Intake: Increased since last visit  Appetite: Poor per pt, but he states that this is significantly improved overall  Nutrition Route: PO  Meal planning/preparation mainly done by: Self and Spouse/Partner  Oral Care: Brushing BID, Biotene mouthwash daily  Activity level: has been more active on his good days; has been watering the plants and took the dog for a walk once  Social Hx: Quit drinking alcohol 30 years ago  Retied custom   Wife is a teacher (pre-school and special ed)    25 Hr Diet Recall: craving: saleh, steak, beef, pork chops, fried chicken, salad  Breakfast: 1 Boost VHC so far; yesterday: plain whole milk [place] yogurt  Lunch: 2 slices of toast with butter  Dinner: 3 pancakes with butter and syrup, 4 sausage patties  Snacks: Boost VHC x3,  Boost Plus x2    Supplements: Boost VHC TID (1590 kcal, 66 g protien) and Boost Plus  kcal, 28 g protein =  2310 kcal and 94 g protein total from supplements, sometimes has a 2-3 supplements rather than 5  Beverages: water (16 oz glass x3), Oral Nutrition Supplements (8 oz x5), regular coffee with whole milk (12-14 oz x1);  Averaging 88 oz per day (100% est needs)    Estimated Nutrition Needs: (based on 74 1kg/ 163# IBW)  Energy Needs: 8532-7742 kcal/day (35-40 kcal/kg)  Protein Needs: 111-148 grams/day (1 5-2 g/kg)  Fluid Needs: 4966-0303 mL/day (1 mL/kcal) -  oz    Discussion/Summary: Krystal Rubin was seen today for RD follow up  He was accompanied by his wife, Cris Rubin has gained 4 4#/2 9% in the past 2 weeks, most of which has been since Friday, 8/23/19  He reports that he has been feeling much better overall and his energy level has improved  He appears less cachetic than his 8/7/19 visit  He is now meeting 100% of his hydration needs and eating soft and solid foods, as tolerated  He continues to supplement with a combination of Boost VHC and Boost Plus, up to 5 times per day  He has been craving foods such as saleh, salads, roasted chicken, steak, beef, and pork chops  Discussed ways to safely incorporate some of these foods back into his diet: mix them with a very moist food, add extra gravy/broth/sauce, chew until pureed/liquified, take very small bites to test tolerance, etc   Recommend avoiding tough foods and roughage such as steaks and salads  Recommend he continue with the same supplement regimen for now and push fluids to avoid dehydration  Discussed/Reviewed:   · weight management  · indications & use of oral nutrition supplements - 5 total per day (Boost VHC and Boost Plus)  · how to modify foods for anticipated nutrition impact symptoms pt may experience during CA tx  · high protein foods to include at all meals and snacks - soft and moist options  · ways to increase overall calorie intake - soft and moist options  · encouraged eating every 2-3 hours (5-6 small meals/day)  · adequate hydation & tips to increase overall fluid intake  - push po fluids, goal:  oz per day  · recipe suggestions/resources  · individualized calorie, protein and fluid daily goals  · ways to safely incorporate some of his faveorite foods that he has not eaten in awhile    All questions and concerns addressed during todays visit  Krystal Rubin has RD contact information      Nutrition Diagnosis:  · Inadequate Energy Intake related to physiological causes, disease state and treatment related issues as evidenced by food recall, wt loss and discussion with pt and/or family  · Increased Nutrient Needs (kcal & pro) related to increased demand for nutrients and disease state as evidenced by cancer dx and pt undergoing tx for cancer  · Swallowing Difficulty related to diagnosis/treatment related causes as evidenced by history of N/V/Abdominal pain when eating solids and when eating/drinking too fast or large quantities     · Patient has clinical indicators (or ASPEN criteria) consistent with moderate protein-calorie malnutrition in the context of Chronic Illness as evidenced by >7 5% wt loss in 3 months, </=75% energy intake vs  Estimated needs for >/=1 month, moderate muscle depletion (Temples) - hollowing/scooping/depression, moderate muscle depletion (Clavicle) - protruding/visible bone, moderate body fat depletion (Orbital) - hollowing/depression/dark circles and moderate body fat depletion (Triceps) - space between folds/fingers       Intervention & Recommendations:  Topics addressed: Nutrition education, Balance/Variety, Meals & Snacks, Meal planning, Choosing high protein meals/snacks, Meal pattern: eating small/frequent meals (every 2-3 hours), Nutrition Symptom Management, Adequate Hydration, Medical Food Supplements, Nutrition-Related Medication Management and Weight Management    Barriers: None  Readiness to change: action  Comprehension: verbalizes understanding  Expected Compliance: good    Materials Provided: not applicable     Monitoring & Evaluation:  Dietitian to Monitor: Food and Nutrition Intake, Nutrtion Impact Symptoms, Body Weight and Biochemical Data     Goals:  · adequate nutrition related symptom management  · pt to meet >/=75% estimated nutrition needs daily  · weight stabilization or regain of 1-2#/week    · Progress Towards Goals: Progressing    Nutrition Rx & Recommendations:  · Diet: Consistencies as tolerated: High Calorie, High Protein  Avoid cold temperatures due to oxaliplatin (chemo)  · Eat slowly and chew food thoroughly before swallowing  · Nutrition Supplements: Total of 5 supplements per day, combination of Boost VHC and Boost Plus  May use homemade shakes/smoothies as desired  If using a pre-made shake/bar, choose ones with >300 calories and >10 grams protein (ex  Ensure Enlive, Ensure Plus, Boost Plus, Boost Very High Calorie, etc )  · Small, frequent meals/snacks may be easier to tolerate than 3 large daily meals  Aim for 5-6 small meals per day (every 2-3 hours)  · Include protein at all meals/snacks  · Stay hydrated by sipping fluids of choice/tolerance throughout the day  · Liquid nutrition may be better tolerated than solids at times  · Weigh yourself regularly  If you notice weight loss, make an effort to increase your daily food/calorie intake  If you continue to notice loss after these efforts, reach out to your dietitian to establish a plan to stabilize weight  · Try some of your favorite foods that you have not had in awhile: roasted chicken; saleh mixed in a twice baked potato made with plain [place] yogurt  Always chew foods until pureed/liquified, take very small bites to test tolerance, and mix new foods with a very moist food and/or extra gravy/sauce/broth  · Aim for  oz fluid per day, supplements count towards fluids  Sports drinks, diluted juices, and broth are good fluid options for calories and/or electrolytes to prevent dehydration      Follow Up Plan: 9/24/19, 10/8/19, 10/22/19 during chemo at [facility]  Recommend Referral to Other Providers: none at this time

## 2019-08-23 ENCOUNTER — OFFICE VISIT (OUTPATIENT)
Dept: HEMATOLOGY ONCOLOGY | Facility: CLINIC | Age: 64
End: 2019-08-23
Payer: MEDICARE

## 2019-08-23 VITALS
DIASTOLIC BLOOD PRESSURE: 60 MMHG | SYSTOLIC BLOOD PRESSURE: 97 MMHG | OXYGEN SATURATION: 97 % | HEART RATE: 65 BPM | BODY MASS INDEX: 20.9 KG/M2 | WEIGHT: 146 LBS | TEMPERATURE: 96.4 F | HEIGHT: 70 IN

## 2019-08-23 DIAGNOSIS — C15.5 MALIGNANT NEOPLASM OF LOWER THIRD OF ESOPHAGUS (HCC): Primary | ICD-10-CM

## 2019-08-23 PROCEDURE — 99213 OFFICE O/P EST LOW 20 MIN: CPT | Performed by: NURSE PRACTITIONER

## 2019-08-23 NOTE — PROGRESS NOTES
Hematology/Oncology Outpatient Follow- up Note  Jose Enrique Chavira 75 y o  male MRN: @ Encounter: [id number]        Date:  8/23/2019    Presenting Complaint/Diagnosis :  Metastatic gastroesophageal cancer    HPI:  Jose Enrique Chavira was seen for initial consultation 7/26/2019 regarding Newly diagnosed metastatic gastroesophageal cancer  The patient has biopsy-proven adenocarcinoma of the esophagus  PET/CT scan was done which shows omental nodularity that is FDG avid indicating stage IV disease  The patient has lost approximately 40-50 pounds  His ECOG performance status is a 1-2      Previous Hematologic/ Oncologic History:       Malignant neoplasm of lower third of esophagus ([facility] )    7/26/2019 Initial Diagnosis     Malignant neoplasm of lower third of esophagus (HCC)      8/1/2019 -  Chemotherapy     fluorouracil (ADRUCIL) injection 750 mg, 400 mg/m2 = 750 mg, Intravenous, Once, 2 of 12 cycles  Administration: 750 mg (8/1/2019), 750 mg (8/13/2019)  pegfilgrastim (NEULASTA ONPRO) subcutaneous injection kit 6 mg, 6 mg, Subcutaneous, Once, 2 of 12 cycles  Administration: 6 mg (8/3/2019), 6 mg (8/15/2019)  leucovorin 750 mg in dextrose 5 % 250 mL IVPB, 748 mg, Intravenous, Once, 2 of 12 cycles  Administration: 750 mg (8/1/2019), 750 mg (8/13/2019)  oxaliplatin (ELOXATIN) 158 95 mg in dextrose 5 % 250 mL chemo infusion, 85 mg/m2 = 158 95 mg, Intravenous, Once, 2 of 12 cycles  Administration: 158 95 mg (8/1/2019), 158 95 mg (8/13/2019)        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[facility] )    7/26/2019 Initial Diagnosis     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[facility] )      8/1/2019 -  Chemotherapy     fluorouracil (ADRUCIL) injection 750 mg, 400 mg/m2 = 750 mg, Intravenous, Once, 2 of 12 cycles  Administration: 750 mg (8/1/2019), 750 mg (8/13/2019)  pegfilgrastim (NEULASTA ONPRO) subcutaneous injection kit 6 mg, 6 mg, Subcutaneous, Once, 2 of 12 cycles  Administration: 6 mg (8/3/2019), 6 mg (8/15/2019)  leucovorin 750 mg in dextrose 5 % 250 mL IVPB, 748 mg, Intravenous, Once, 2 of 12 cycles  Administration: 750 mg (8/1/2019), 750 mg (8/13/2019)  oxaliplatin (ELOXATIN) 158 95 mg in dextrose 5 % 250 mL chemo infusion, 85 mg/m2 = 158 95 mg, Intravenous, Once, 2 of 12 cycles  Administration: 158 95 mg (8/1/2019), 158 95 mg (8/13/2019)         Current Hematologic/ Oncologic Treatment:    FOLFOX every 2 weeks     Oxaliplatin 85 milligrams/meter squared  Leucovorin 400 milligrams/meters squared  5FU 400 milligrams/meters squared  5FU 2400 milligrams/meter squared CIVI over 46 hours    Neulasta support    Interval History:    The patient is here for follow-up regarding stage IV gastroesophageal cancer  He is currently on treatment with palliative chemotherapy  He is receiving modified FOLFOX 6 every 2 weeks  He has completed 2 cycles at this point and is feeling well  He reports that he is able to eat and drink much easier at this point  He is no longer having episodes of vomiting  He states that he is fatigued however feels much better  Denies chest pain, shortness of breath, nausea, vomiting, diarrhea, and fever/infections  He is getting Neulasta support  WBC 18 93 HGB 11 9 platelets 938 ANC 69 60  Test Results:    Imaging: Ir Port Placement    Result Date: 8/1/2019  Narrative: INDICATION: Gastroesophageal cancer  PROCEDURE: 1  Right internal jugular approach single lumen port-a-cath placement FINDINGS: 1  Single lumen port placed via right internal jugular vein with tip in the right atrium  Impression: Successful placement of a single lumen port  The catheter is ready for use  _______________________________________________________________ COMPARISON: PET/CT 7/22/2019 PROCEDURE DETAILS: Operators: Dr Larry Olivares, [address] [place] [address] attending, performed the procedure   Anesthesia: Conscious sedation was provided throughout a total intra-service time of 17 minutes during which the patient's hemodynamic parameters were continuously monitored by an independent trained radiology nurse  1% lidocaine with epinephrine was injected in the skin and subcutaneous tissues overlying the access site  Medications: 1% lidocaine, fentanyl, Versed Contrast: 0 mL of Omnipaque 300 Fluoroscopy time: 0 6 minutes Images: 4 COMMENTS: Following the discussion of the risks, benefits and alternatives to the procedure, written informed consent was obtained from the patient  The patient was placed supine on the imaging table  The site was prepped and draped in the usual sterile fashion  All elements of maximal sterile barrier technique were followed (cap, mask, sterile gown, sterile gloves, large sterile full-body drape, hand hygiene, and 2% chlorhexidine for cutaneous antisepsis)  Sterile ultrasound technique with sterile gel and sterile probe cover was also utilized  A preprocedure timeout was performed per [facility] protocol  Under continuous ultrasound guidance, the patent right internal jugular vein was compressible and accessed using a micropuncture needle  A static ultrasound image was saved to PACS  Subsequently, a nitinol wire was passed into the right atrium using fluoroscopic guidance  The needle was exchanged for a micropuncture sheath  The micropuncture inner dilator and wire were removed and replaced with a J-wire which was advanced into the inferior vena cava  Next, attention was turned towards creation of a subcutaneous pocket over the upper anterior chest wall  After instilling superficial and deeper local anesthesia using lidocaine mixed with epinephrine, a 2 5 cm transverse incision was made and a subcutaneous pocket was created by using blunt dissection  The catheter of the port was then tunneled from the subcutaneous pocket towards the venotomy site  The micropuncture sheath was exchanged for a peel-away sheath  The catheter was then threaded through the peel-away sheath into the right atrium  The peel-away sheath was then removed  Catheter length was confirmed with fluoroscopic imaging  The catheter was cut and connected to the port hub  The hub was then placed within the subcutaneous pocket  The port was accessed using a noncoring Elias needle, aspirated, flushed and hep-locked  The subcutaneous pocket was closed in layers with 3-0 interrupted Vicryl sutures and subcuticular continuous sutures using a Stratafix absorbable suture  3-0 Vicryl suture was applied to the neck venotomy  Histoacryl glue was applied over the venotomy and chest incisions  Sterile dressings were then applied  Final spot fluoroscopic image demonstrated good alignment of the catheter, no kinking and with its tip in the right atrium  The patient tolerated the procedure well without immediate complication  Workstation performed: [id number]       Labs:   Lab Results   Component Value Date    WBC 18 93 (H) 08/09/2019    HGB 11 9 (L) 08/09/2019    HCT 39 0 08/09/2019    MCV 86 08/09/2019     (H) 08/09/2019     Lab Results   Component Value Date    K 3 7 08/09/2019    CL 98 (L) 08/09/2019    CO2 29 08/09/2019    BUN 19 08/09/2019    CREATININE 0 89 08/09/2019    GLUF 130 (H) 08/09/2019    CALCIUM 9 1 08/09/2019    AST 13 08/09/2019    ALT 12 08/09/2019    ALKPHOS 138 (H) 08/09/2019    EGFR 89 08/09/2019         No results found for: SPEP, UPEP    No results found for: PSA    No results found for: CEA    No results found for:     No results found for: AFP    No results found for: IRON, TIBC, FERRITIN    No results found for: [id number]      ROS: As stated in the history of present illness otherwise his 14 point review of systems today was negative        Active Problems:   Patient Active Problem List   Diagnosis    PTSD (post-traumatic stress disorder)    Essential hypertension    Major depression    OCD (obsessive compulsive disorder)    Anxiety    Malignant neoplasm of lower third of esophagus ([address]    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[address]  Cancer associated pain    Chemotherapy-induced nausea    Abnormal weight loss    Malignant ascites    Recovering alcoholic (HCC)    Other fatigue    Other dysphagia    Nausea    Dehydration       Past Medical History:   Past Medical History:   Diagnosis Date    Dehydration 8/8/2019    Esophageal cancer ([facility] )     Hypertension     Nausea 8/8/2019    Psychiatric disorder     Recovering alcoholic ([facility] )        Surgical History:   Past Surgical History:   Procedure Laterality Date    APPENDECTOMY      IR PORT PLACEMENT  7/31/2019       Family History:    Family History   Problem Relation Age of Onset    Colon cancer Father        Cancer-related family history includes Colon cancer in his father      Social History:   Social History     Socioeconomic History    Marital status: /Civil Union     Spouse name: Not on file    Number of children: 2    Years of education: Not on file    Highest education level: Not on file   Occupational History    Occupation: retired    Social Needs    Financial resource strain: Not on file    Food insecurity:     Worry: Not on file     Inability: Not on file   [employer] needs:     Medical: Not on file     Non-medical: Not on file   Tobacco Use    Smoking status: Current Some Day Smoker     Types: Cigars    Smokeless tobacco: Never Used   Substance and Sexual Activity    Alcohol use: No    Drug use: Yes     Types: Marijuana    Sexual activity: Not on file   Lifestyle    Physical activity:     Days per week: Not on file     Minutes per session: Not on file    Stress: Not on file   Relationships    Social connections:     Talks on phone: Not on file     Gets together: Not on file     Attends Rastafari service: Not on file     Active member of club or organization: Not on file     Attends meetings of clubs or organizations: Not on file     Relationship status: Not on file    Intimate partner violence:     Fear of current or ex partner: Not on file     Emotionally abused: Not on file     Physically abused: Not on file     Forced sexual activity: Not on file   Other Topics Concern    Not on file   Social History Narrative    Not on file       Current Medications:   Current Outpatient Medications   Medication Sig Dispense Refill    dexamethasone (DECADRON) 4 mg tablet Take 1 tablet (4 mg total) by mouth daily with breakfast for 30 days 30 tablet 0    diazepam (VALIUM) 5 mg tablet Take 1 tablet (5 mg total) by mouth 2 (two) times a day 60 tablet 0    fluorouracil 4,490 mg in CADD infusion pump Infuse 4,490 mg (1,200 mg/m2/day x 1 87 m2 (Treatment plan recorded BSA)) into a venous catheter over 46 hours for 2 days [facility] to be administered on 8/27/19 1 Device 0    lidocaine-prilocaine (EMLA) cream Apply topically as needed for mild pain 30 g 0    metoclopramide (REGLAN) 5 mg/5 mL oral solution Take 10 mg by mouth 4 (four) times a day as needed for nausea      ondansetron (ZOFRAN-ODT) 8 mg disintegrating tablet Take 1 tablet (8 mg total) by mouth every 8 (eight) hours as needed for nausea or vomiting (If Compazine is not effective) 30 tablet 0    oxyCODONE (ROXICODONE) 5 mg/5 mL solution Take 5 mg by mouth every 4 (four) hours as needed for pain      pantoprazole (PROTONIX) 40 mg tablet Take 40 mg by mouth daily before breakfast      venlafaxine (EFFEXOR-XR) 150 mg 24 hr capsule Take 1 capsule (150 mg total) by mouth daily 30 capsule 0     No current facility-administered medications for this visit  Allergies: Allergies   Allergen Reactions    Bee Venom Anaphylaxis    Shellfish-Derived Products      Develops GOUT       Physical Exam:    Body surface area is 1 83 meters squared      Wt Readings from Last 3 Encounters:   08/23/19 66 2 kg (146 lb)   08/13/19 67 1 kg (147 lb 14 9 oz)   08/07/19 69 1 kg (152 lb 5 oz)        Temp Readings from Last 3 Encounters:   08/23/19 (!) 96 4 °F (35 8 °C) (Tympanic Core)   08/15/19 (!) 97 4 °F (36 3 °C) (Temporal)   08/13/19 97 8 °F (36 6 °C) (Temporal)        BP Readings from Last 3 Encounters:   08/23/19 97/60   08/15/19 104/59   08/13/19 135/67         Pulse Readings from Last 3 Encounters:   08/23/19 65   08/15/19 (!) 52   08/13/19 (!) 54     @LASTSAO2(3)@      Physical Exam     Constitutional   General appearance: No acute distress, well appearing and well nourished  Eyes   Conjunctiva and lids: No swelling, erythema or discharge  Pupils and irises: Equal, round and reactive to light  Ears, Nose, Mouth, and Throat   External inspection of ears and nose: Normal     Nasal mucosa, septum, and turbinates: Normal without edema or erythema  Oropharynx: Normal with no erythema, edema, exudate or lesions  Pulmonary   Respiratory effort: No increased work of breathing or signs of respiratory distress  Auscultation of lungs: Clear to auscultation  Cardiovascular   Palpation of heart: Normal PMI, no thrills  Auscultation of heart: Normal rate and rhythm, normal S1 and S2, without murmurs  Examination of extremities for edema and/or varicosities: Normal     Carotid pulses: Normal     Abdomen   Abdomen: Non-tender, no masses  Liver and spleen: No hepatomegaly or splenomegaly  Lymphatic   Palpation of lymph nodes in neck: No lymphadenopathy  Musculoskeletal   Gait and station: Normal     Digits and nails: Normal without clubbing or cyanosis  Inspection/palpation of joints, bones, and muscles: Normal     Skin   Skin and subcutaneous tissue: Normal without rashes or lesions  Neurologic   Cranial nerves: Cranial nerves 2-12 intact  Sensation: No sensory loss  Psychiatric   Orientation to person, place, and time: Normal     Mood and affect: Normal         Assessment / Plan:    The patient is a 25-year-old male who is here for follow-up regarding stage IV gastroesophageal cancer  He has been treated with 2 cycles of FOLFOX so far    He is tolerating this very well and is actually feeling much better and able to swallow food and drinks at this point  The patient's wife did ask about hydration  At this point the patient does not feel dehydrated, he does not have dark urine, he is not having any episodes of diarrhea or vomiting, and he is drinking an adequate amount of fluid  We will not plan for him to have scheduled hydration  I did discuss the signs and symptoms of dehydration and if he is experiencing those he can call the office and we can consider hydration at that point  Otherwise we will continue him on a modified FOLFOX 6 every 2 weeks  This includes oxaliplatin 85 milligrams/meter squared, leucovorin 400 milligrams/meter squared, 5  milligrams/meter subcutaneously, and 5 FU 2400 milligrams/meter squared CIVI over 46 hours  The patient's white blood cell count and ANC were slightly elevated as he is getting Neulasta injections with his treatment  If his [address] on his next set of blood work is around 20 we will discontinue his Neulasta  The patient will follow up again with us in 4 weeks  He will continue blood work every 2 weeks prior to chemotherapy  The patient is in agreement with the plan  He will continue to follow with palliative care for symptom management and with dietary  Goals and Barriers:  Current Goal:  Prolong Survival from gastroesophageal cancer  Barriers: None  Patient's Capacity to Self Care:  Patient  able to self care  Portions of the record may have been created with voice recognition software   Occasional wrong word or "sound a like" substitutions may have occurred due to the inherent limitations of voice recognition software   Read the chart carefully and recognize, using context, where substitutions have occurred

## 2019-08-27 ENCOUNTER — HOSPITAL ENCOUNTER (OUTPATIENT)
Dept: INFUSION CENTER | Facility: HOSPITAL | Age: 64
Discharge: HOME | End: 2019-08-27
Payer: MEDICARE

## 2019-08-27 ENCOUNTER — NUTRITION (OUTPATIENT)
Dept: NUTRITION | Facility: CLINIC | Age: 64
End: 2019-08-27

## 2019-08-27 VITALS
HEART RATE: 60 BPM | BODY MASS INDEX: 21.88 KG/M2 | TEMPERATURE: 97 F | DIASTOLIC BLOOD PRESSURE: 58 MMHG | HEIGHT: 70 IN | RESPIRATION RATE: 18 BRPM | SYSTOLIC BLOOD PRESSURE: 127 MMHG | OXYGEN SATURATION: 99 % | WEIGHT: 152.8 LBS

## 2019-08-27 DIAGNOSIS — C15.5 MALIGNANT NEOPLASM OF LOWER THIRD OF ESOPHAGUS (HCC): Primary | ICD-10-CM

## 2019-08-27 DIAGNOSIS — Z71.3 NUTRITIONAL COUNSELING: Primary | ICD-10-CM

## 2019-08-27 DIAGNOSIS — C16.0 MALIGNANT NEOPLASM OF CARDIA OF STOMACH (HCC): ICD-10-CM

## 2019-08-27 DIAGNOSIS — E86.0 DEHYDRATION: ICD-10-CM

## 2019-08-27 DIAGNOSIS — R11.0 NAUSEA: ICD-10-CM

## 2019-08-27 LAB
ALBUMIN SERPL BCP-MCNC: 2.8 G/DL (ref 3.5–5)
ALP SERPL-CCNC: 147 U/L (ref 46–116)
ALT SERPL W P-5'-P-CCNC: 34 U/L (ref 12–78)
ANION GAP SERPL CALCULATED.3IONS-SCNC: 10 MMOL/L (ref 4–13)
AST SERPL W P-5'-P-CCNC: 17 U/L (ref 5–45)
BASOPHILS # BLD MANUAL: 0 THOUSAND/UL (ref 0–0.1)
BASOPHILS NFR MAR MANUAL: 0 % (ref 0–1)
BILIRUB SERPL-MCNC: 0.3 MG/DL (ref 0.2–1)
BUN SERPL-MCNC: 31 MG/DL (ref 5–25)
CALCIUM SERPL-MCNC: 8.5 MG/DL (ref 8.3–10.1)
CHLORIDE SERPL-SCNC: 103 MMOL/L (ref 100–108)
CO2 SERPL-SCNC: 26 MMOL/L (ref 21–32)
CREAT SERPL-MCNC: 0.81 MG/DL (ref 0.6–1.3)
EOSINOPHIL # BLD MANUAL: 0 THOUSAND/UL (ref 0–0.4)
EOSINOPHIL NFR BLD MANUAL: 0 % (ref 0–6)
ERYTHROCYTE [DISTWIDTH] IN BLOOD BY AUTOMATED COUNT: 18.7 % (ref 11.6–15.1)
GFR SERPL CREATININE-BSD FRML MDRD: 93 ML/MIN/1.73SQ M
GLUCOSE SERPL-MCNC: 132 MG/DL (ref 65–140)
HCT VFR BLD AUTO: 39.3 % (ref 36.5–49.3)
HGB BLD-MCNC: 12 G/DL (ref 12–17)
LG PLATELETS BLD QL SMEAR: PRESENT
LYMPHOCYTES # BLD AUTO: 12 % (ref 14–44)
LYMPHOCYTES # BLD AUTO: 4.39 THOUSAND/UL (ref 0.6–4.47)
MCH RBC QN AUTO: 27.6 PG (ref 26.8–34.3)
MCHC RBC AUTO-ENTMCNC: 30.5 G/DL (ref 31.4–37.4)
MCV RBC AUTO: 91 FL (ref 82–98)
METAMYELOCYTES NFR BLD MANUAL: 2 % (ref 0–1)
MONOCYTES # BLD AUTO: 2.56 THOUSAND/UL (ref 0–1.22)
MONOCYTES NFR BLD: 7 % (ref 4–12)
MYELOCYTES NFR BLD MANUAL: 2 % (ref 0–1)
NEUTROPHILS # BLD MANUAL: 28.17 THOUSAND/UL (ref 1.85–7.62)
NEUTS BAND NFR BLD MANUAL: 27 % (ref 0–8)
NEUTS SEG NFR BLD AUTO: 50 % (ref 43–75)
NRBC BLD AUTO-RTO: 0 /100 WBCS
NRBC BLD AUTO-RTO: 1 /100 WBC (ref 0–2)
PLATELET # BLD AUTO: 263 THOUSANDS/UL (ref 149–390)
PLATELET BLD QL SMEAR: ADEQUATE
PMV BLD AUTO: 11.8 FL (ref 8.9–12.7)
POTASSIUM SERPL-SCNC: 4 MMOL/L (ref 3.5–5.3)
PROT SERPL-MCNC: 7 G/DL (ref 6.4–8.2)
RBC # BLD AUTO: 4.34 MILLION/UL (ref 3.88–5.62)
RBC MORPH BLD: NORMAL
SODIUM SERPL-SCNC: 139 MMOL/L (ref 136–145)
TOTAL CELLS COUNTED SPEC: 100
WBC # BLD AUTO: 36.58 THOUSAND/UL (ref 4.31–10.16)

## 2019-08-27 PROCEDURE — G0498 CHEMO EXTEND IV INFUS W/PUMP: HCPCS

## 2019-08-27 PROCEDURE — 96411 CHEMO IV PUSH ADDL DRUG: CPT

## 2019-08-27 PROCEDURE — 96415 CHEMO IV INFUSION ADDL HR: CPT

## 2019-08-27 PROCEDURE — 96413 CHEMO IV INFUSION 1 HR: CPT

## 2019-08-27 PROCEDURE — 80053 COMPREHEN METABOLIC PANEL: CPT | Performed by: INTERNAL MEDICINE

## 2019-08-27 PROCEDURE — 85007 BL SMEAR W/DIFF WBC COUNT: CPT | Performed by: INTERNAL MEDICINE

## 2019-08-27 PROCEDURE — 96367 TX/PROPH/DG ADDL SEQ IV INF: CPT

## 2019-08-27 PROCEDURE — 85027 COMPLETE CBC AUTOMATED: CPT | Performed by: INTERNAL MEDICINE

## 2019-08-27 PROCEDURE — 96368 THER/DIAG CONCURRENT INF: CPT

## 2019-08-27 RX ORDER — FLUOROURACIL 50 MG/ML
400 INJECTION, SOLUTION INTRAVENOUS ONCE
Status: COMPLETED | OUTPATIENT
Start: 2019-08-27 | End: 2019-08-27

## 2019-08-27 RX ORDER — DEXTROSE MONOHYDRATE 50 MG/ML
20 INJECTION, SOLUTION INTRAVENOUS ONCE
Status: COMPLETED | OUTPATIENT
Start: 2019-08-27 | End: 2019-08-27

## 2019-08-27 RX ADMIN — LEUCOVORIN CALCIUM 750 MG: 200 INJECTION, POWDER, LYOPHILIZED, FOR SOLUTION INTRAMUSCULAR; INTRAVENOUS at 12:09

## 2019-08-27 RX ADMIN — FLUOROURACIL 750 MG: 50 INJECTION, SOLUTION INTRAVENOUS at 14:17

## 2019-08-27 RX ADMIN — OXALIPLATIN 158.95 MG: 5 INJECTION, SOLUTION, CONCENTRATE INTRAVENOUS at 12:09

## 2019-08-27 RX ADMIN — DEXAMETHASONE SODIUM PHOSPHATE: 10 INJECTION, SOLUTION INTRAMUSCULAR; INTRAVENOUS at 11:20

## 2019-08-27 RX ADMIN — DEXTROSE 20 ML/HR: 50 INJECTION, SOLUTION INTRAVENOUS at 11:19

## 2019-08-27 NOTE — PROGRESS NOTES
Spoke with June Vargas RN re: critical WBC 36 58  Neulasta to be held this treatment  Pt made aware  No s/s of infection

## 2019-08-27 NOTE — PATIENT INSTRUCTIONS
Nutrition Rx & Recommendations:  · Diet: Consistencies as tolerated: High Calorie, High Protein  Avoid cold temperatures due to oxaliplatin (chemo)  · Eat slowly and chew food thoroughly before swallowing  · Nutrition Supplements: Total of 5 supplements per day, combination of Boost VHC and Boost Plus  May use homemade shakes/smoothies as desired  If using a pre-made shake/bar, choose ones with >300 calories and >10 grams protein (ex  Ensure Enlive, Ensure Plus, Boost Plus, Boost Very High Calorie, etc )  · Small, frequent meals/snacks may be easier to tolerate than 3 large daily meals  Aim for 5-6 small meals per day (every 2-3 hours)  · Include protein at all meals/snacks  · Stay hydrated by sipping fluids of choice/tolerance throughout the day  · Liquid nutrition may be better tolerated than solids at times  · Weigh yourself regularly  If you notice weight loss, make an effort to increase your daily food/calorie intake  If you continue to notice loss after these efforts, reach out to your dietitian to establish a plan to stabilize weight  · Try some of your favorite foods that you have not had in awhile: roasted chicken; saleh mixed in a twice baked potato made with plain [place] yogurt  Always chew foods until pureed/liquified, take very small bites to test tolerance, and mix new foods with a very moist food and/or extra gravy/sauce/broth  · Aim for  oz fluid per day, supplements count towards fluids  Sports drinks, diluted juices, and broth are good fluid options for calories and/or electrolytes to prevent dehydration      Follow Up Plan: 9/24/19, 10/8/19, 10/22/19 during chemo at [place]  Recommend Referral to Other Providers: none at this time

## 2019-08-27 NOTE — PLAN OF CARE
Problem: Potential for Falls  Goal: Patient will remain free of falls  Description  INTERVENTIONS:  - Assess patient frequently for physical needs  -  Identify cognitive and physical deficits and behaviors that affect risk of falls  -  [place] fall precautions as indicated by assessment   - Educate patient/family on patient safety including physical limitations  - Instruct patient to call for assistance with activity based on assessment  - Modify environment to reduce risk of injury  - Consider OT/PT consult to assist with strengthening/mobility  Outcome: Progressing     Problem: Potential for Falls  Goal: Patient will remain free of falls  Description  INTERVENTIONS:  - Assess patient frequently for physical needs  -  Identify cognitive and physical deficits and behaviors that affect risk of falls    -  [place] fall precautions as indicated by assessment   - Educate patient/family on patient safety including physical limitations  - Instruct patient to call for assistance with activity based on assessment  - Modify environment to reduce risk of injury  - Consider OT/PT consult to assist with strengthening/mobility  Outcome: Progressing     Problem: DISCHARGE PLANNING  Goal: Discharge to home or other facility with appropriate resources  Description  INTERVENTIONS:  - Identify barriers to discharge w/patient and caregiver  - Arrange for needed discharge resources and transportation as appropriate  - Identify discharge learning needs (meds, wound care, etc )  - Arrange for interpretive services to assist at discharge as needed  - Refer to Case Management Department for coordinating discharge planning if the patient needs post-hospital services based on physician/advanced practitioner order or complex needs related to functional status, cognitive ability, or social support system  Outcome: Progressing     Problem: Knowledge Deficit  Goal: Patient/family/caregiver demonstrates understanding of disease process, treatment plan, medications, and discharge instructions  Description  Complete learning assessment and assess knowledge base    Interventions:  - Provide teaching at level of understanding  - Provide teaching via preferred learning methods  Outcome: Progressing     Problem: INFECTION - ADULT  Goal: Absence or prevention of progression during hospitalization  Description  INTERVENTIONS:  - Assess and monitor for signs and symptoms of infection  - Monitor lab/diagnostic results  - Monitor all insertion sites, i e  indwelling lines, tubes, and drains  - Monitor endotracheal (as able) and nasal secretions for changes in amount and color  - [place] appropriate cooling/warming therapies per order  - Administer medications as ordered  - Instruct and encourage patient and family to use good hand hygiene technique  - Identify and instruct in appropriate isolation precautions for identified infection/condition  Outcome: Progressing  Goal: Absence of fever/infection during neutropenic period  Description  INTERVENTIONS:  - Monitor WBC  - Implement neutropenic guidelines  Outcome: Progressing

## 2019-08-29 ENCOUNTER — HOSPITAL ENCOUNTER (OUTPATIENT)
Dept: INFUSION CENTER | Facility: HOSPITAL | Age: 64
Discharge: HOME | End: 2019-08-29

## 2019-08-29 VITALS
TEMPERATURE: 97.8 F | SYSTOLIC BLOOD PRESSURE: 136 MMHG | HEART RATE: 59 BPM | RESPIRATION RATE: 18 BRPM | OXYGEN SATURATION: 100 % | DIASTOLIC BLOOD PRESSURE: 78 MMHG

## 2019-08-29 DIAGNOSIS — R11.0 NAUSEA: ICD-10-CM

## 2019-08-29 DIAGNOSIS — E86.0 DEHYDRATION: ICD-10-CM

## 2019-08-29 DIAGNOSIS — C16.0 MALIGNANT NEOPLASM OF CARDIA OF STOMACH (HCC): ICD-10-CM

## 2019-08-29 DIAGNOSIS — C15.5 MALIGNANT NEOPLASM OF LOWER THIRD OF ESOPHAGUS (HCC): Primary | ICD-10-CM

## 2019-09-01 NOTE — PROGRESS NOTES
Palliative and Supportive Care   Dat Howard 75 y o  male [id number]    Assessment/Plan:  1  Malignant neoplasm of lower third of esophagus (HCC)    2  Chemotherapy-induced nausea    3  Cancer associated pain    4  Other fatigue      Requested Prescriptions     Signed Prescriptions Disp Refills    dexamethasone (DECADRON) 2 mg tablet 30 tablet 1     Sig: Take 1 tablet (2 mg total) by mouth daily with breakfast     Medications Discontinued During This Encounter   Medication Reason    dexamethasone (DECADRON) 4 mg tablet Dose adjustment     Patient's symptoms appear to have been responsive to disease directed therapy  Will lower use of dexamethasone from 4mg to 2mg daily  He is using all other symptom control medications on a PRN basis  Representatives have queried the patient's controlled substance dispensing history in the Prescription Drug Monitoring Program in compliance with regulations before I have prescribed any controlled substances  The prescription history is consistent with prescribed therapy and our practice policies  20 minutes were spent face to face with [facility] with greater than 50% of the time spent in counseling or coordination of care including discussions of treatment instructions and follow up requirements   All of the patient's questions were answered during this discussion  Return in about 1 month (around 10/3/2019) for symptom assessment and management  Subjective:   Chief Complaint  Follow up visit for:  symptom management  HPI     aDt Howard is a 75 y o  male with 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  He was diagnosed in 7/2019  Locally he has seen Dr Mccarthy Resides in medical oncology  [place] [facility] [place]  His current treatment is palliative intent FOLFOX        He was referred to the department of Palliative & Supportive care for concurrent symptom management    At his last visit he was experiencing chemotherapy related nausea, low appetite and associated weight loss  The level dysphagia that he was experiencing from tumor burden was limiting his food choices  They met with a medical nutrition therapist for strategies to avoid further malnutrition  He was also started on dexamethasone  He has been doing much better and has re-gained some weight  This has also helped his energy level      His cancer related abdominal pain has also been responsive to use of the dexamethasone along with a PPI  He has only had to use liquid oxycodone on a PRN basis and reports he is using it a lot less than he used to  He is moving his bowel well  He is quite pleased with his overall symptom improvement since starting cancer treatment and seeing palliative care  The only time he feels really bad is in the few days following chemotherapy  The following portions of the medical history were reviewed: past medical history, problem list, medication list, and social history      Current Outpatient Medications:     bisoprolol (ZEBETA) 10 MG tablet, Take 10 mg by mouth daily, Disp: , Rfl:     diazepam (VALIUM) 5 mg tablet, Take 1 tablet (5 mg total) by mouth 2 (two) times a day, Disp: 60 tablet, Rfl: 0    lidocaine-prilocaine (EMLA) cream, Apply topically as needed for mild pain, Disp: 30 g, Rfl: 0    metoclopramide (REGLAN) 5 mg/5 mL oral solution, Take 10 mg by mouth 4 (four) times a day as needed for nausea, Disp: , Rfl:     ondansetron (ZOFRAN-ODT) 8 mg disintegrating tablet, Take 1 tablet (8 mg total) by mouth every 8 (eight) hours as needed for nausea or vomiting (If Compazine is not effective), Disp: 30 tablet, Rfl: 0    oxyCODONE (ROXICODONE) 5 mg/5 mL solution, Take 5 mg by mouth every 4 (four) hours as needed for pain, Disp: , Rfl:     pantoprazole (PROTONIX) 40 mg tablet, Take 40 mg by mouth daily before breakfast, Disp: , Rfl:     prochlorperazine (COMPAZINE) 10 mg tablet, Take 10 mg by mouth every 6 (six) hours as needed for nausea or vomiting, Disp: , Rfl:     venlafaxine (EFFEXOR-XR) 150 mg 24 hr capsule, Take 1 capsule (150 mg total) by mouth daily, Disp: 30 capsule, Rfl: 0    dexamethasone (DECADRON) 2 mg tablet, Take 1 tablet (2 mg total) by mouth daily with breakfast, Disp: 30 tablet, Rfl: 1  Review of Systems   Constitutional: Positive for appetite change (increase), fatigue (griselda after chemo) and unexpected weight change (gaining)  All other systems negative    Objective:  Vital Signs  /82 (BP Location: Right arm, Patient Position: Sitting, Cuff Size: Standard)   Pulse 68   Temp (!) 95 8 °F (35 4 °C) (Tympanic)   Resp 20   Ht 5' 10" (1 778 m)   Wt 69 kg (152 lb 3 2 oz)   SpO2 97%   BMI 21 84 kg/m²    Physical Exam    Constitutional: Appears well-developed and well-nourished  In no acute physical or emotional distress  Head: Normocephalic and atraumatic  Eyes: EOM are normal  No ocular discharge  No scleral icterus  Neck: No visible adenopathy or masses  Respiratory: Effort normal  No stridor  No respiratory distress  Gastrointestinal: No abdominal distension  Musculoskeletal: No edema  Neurological: Alert, oriented and appropriately conversant  Skin: No diaphoresis, no rashes seen on exposed areas of skin  Psychiatric: Displays a normal mood and affect   Behavior, judgement and thought content appear normal

## 2019-09-03 ENCOUNTER — OFFICE VISIT (OUTPATIENT)
Dept: PALLIATIVE MEDICINE | Facility: CLINIC | Age: 64
End: 2019-09-03
Payer: MEDICARE

## 2019-09-03 VITALS
DIASTOLIC BLOOD PRESSURE: 82 MMHG | RESPIRATION RATE: 20 BRPM | TEMPERATURE: 95.8 F | SYSTOLIC BLOOD PRESSURE: 120 MMHG | OXYGEN SATURATION: 97 % | BODY MASS INDEX: 21.79 KG/M2 | HEIGHT: 70 IN | HEART RATE: 68 BPM | WEIGHT: 152.2 LBS

## 2019-09-03 DIAGNOSIS — R11.0 CHEMOTHERAPY-INDUCED NAUSEA: ICD-10-CM

## 2019-09-03 DIAGNOSIS — C15.5 MALIGNANT NEOPLASM OF LOWER THIRD OF ESOPHAGUS (HCC): Primary | ICD-10-CM

## 2019-09-03 DIAGNOSIS — G89.3 CANCER ASSOCIATED PAIN: ICD-10-CM

## 2019-09-03 DIAGNOSIS — R53.83 OTHER FATIGUE: ICD-10-CM

## 2019-09-03 DIAGNOSIS — T45.1X5A CHEMOTHERAPY-INDUCED NAUSEA: ICD-10-CM

## 2019-09-03 PROCEDURE — 99213 OFFICE O/P EST LOW 20 MIN: CPT | Performed by: FAMILY MEDICINE

## 2019-09-03 RX ORDER — BISOPROLOL FUMARATE 10 MG/1
10 TABLET ORAL DAILY
COMMUNITY
End: 2019-10-01 | Stop reason: SDUPTHER

## 2019-09-03 RX ORDER — FLUOROURACIL 50 MG/ML
400 INJECTION, SOLUTION INTRAVENOUS ONCE
Status: CANCELLED | OUTPATIENT
Start: 2019-09-10

## 2019-09-03 RX ORDER — SODIUM CHLORIDE 9 MG/ML
20 INJECTION, SOLUTION INTRAVENOUS ONCE AS NEEDED
Status: CANCELLED | OUTPATIENT
Start: 2019-09-10

## 2019-09-03 RX ORDER — DEXAMETHASONE 2 MG/1
2 TABLET ORAL
Qty: 30 TABLET | Refills: 1 | Status: SHIPPED | OUTPATIENT
Start: 2019-09-03 | End: 2019-10-01

## 2019-09-03 RX ORDER — DEXTROSE MONOHYDRATE 50 MG/ML
20 INJECTION, SOLUTION INTRAVENOUS ONCE
Status: CANCELLED | OUTPATIENT
Start: 2019-09-10

## 2019-09-03 RX ORDER — PROCHLORPERAZINE MALEATE 10 MG
10 TABLET ORAL EVERY 6 HOURS PRN
COMMUNITY
End: 2020-01-07

## 2019-09-04 DIAGNOSIS — C16.0 MALIGNANT NEOPLASM OF CARDIA OF STOMACH (HCC): ICD-10-CM

## 2019-09-04 DIAGNOSIS — C15.5 MALIGNANT NEOPLASM OF LOWER THIRD OF ESOPHAGUS (HCC): ICD-10-CM

## 2019-09-04 DIAGNOSIS — R11.0 NAUSEA: Primary | ICD-10-CM

## 2019-09-04 DIAGNOSIS — E86.0 DEHYDRATION: ICD-10-CM

## 2019-09-06 ENCOUNTER — APPOINTMENT (OUTPATIENT)
Dept: LAB | Facility: HOSPITAL | Age: 64
End: 2019-09-06
Payer: MEDICARE

## 2019-09-06 ENCOUNTER — TRANSCRIBE ORDERS (OUTPATIENT)
Dept: ADMINISTRATIVE | Facility: HOSPITAL | Age: 64
End: 2019-09-06

## 2019-09-06 DIAGNOSIS — C15.5 MALIGNANT NEOPLASM OF LOWER THIRD OF ESOPHAGUS (HCC): ICD-10-CM

## 2019-09-06 DIAGNOSIS — R11.0 NAUSEA: ICD-10-CM

## 2019-09-06 DIAGNOSIS — C16.0 MALIGNANT NEOPLASM OF CARDIA OF STOMACH (HCC): ICD-10-CM

## 2019-09-06 DIAGNOSIS — E86.0 DEHYDRATION: ICD-10-CM

## 2019-09-06 LAB
ALBUMIN SERPL BCP-MCNC: 2.8 G/DL (ref 3.5–5)
ALP SERPL-CCNC: 92 U/L (ref 46–116)
ALT SERPL W P-5'-P-CCNC: 25 U/L (ref 12–78)
ANION GAP SERPL CALCULATED.3IONS-SCNC: 5 MMOL/L (ref 4–13)
AST SERPL W P-5'-P-CCNC: 17 U/L (ref 5–45)
BASOPHILS # BLD AUTO: 0.06 THOUSANDS/ΜL (ref 0–0.1)
BASOPHILS NFR BLD AUTO: 1 % (ref 0–1)
BILIRUB SERPL-MCNC: 0.8 MG/DL (ref 0.2–1)
BUN SERPL-MCNC: 23 MG/DL (ref 5–25)
CALCIUM SERPL-MCNC: 8.7 MG/DL (ref 8.3–10.1)
CHLORIDE SERPL-SCNC: 101 MMOL/L (ref 100–108)
CO2 SERPL-SCNC: 29 MMOL/L (ref 21–32)
CREAT SERPL-MCNC: 0.79 MG/DL (ref 0.6–1.3)
EOSINOPHIL # BLD AUTO: 0.07 THOUSAND/ΜL (ref 0–0.61)
EOSINOPHIL NFR BLD AUTO: 1 % (ref 0–6)
ERYTHROCYTE [DISTWIDTH] IN BLOOD BY AUTOMATED COUNT: 19.9 % (ref 11.6–15.1)
GFR SERPL CREATININE-BSD FRML MDRD: 94 ML/MIN/1.73SQ M
GLUCOSE SERPL-MCNC: 105 MG/DL (ref 65–140)
HCT VFR BLD AUTO: 39.4 % (ref 36.5–49.3)
HGB BLD-MCNC: 12 G/DL (ref 12–17)
IMM GRANULOCYTES # BLD AUTO: 0.02 THOUSAND/UL (ref 0–0.2)
IMM GRANULOCYTES NFR BLD AUTO: 0 % (ref 0–2)
LYMPHOCYTES # BLD AUTO: 2.3 THOUSANDS/ΜL (ref 0.6–4.47)
LYMPHOCYTES NFR BLD AUTO: 26 % (ref 14–44)
MCH RBC QN AUTO: 27 PG (ref 26.8–34.3)
MCHC RBC AUTO-ENTMCNC: 30.5 G/DL (ref 31.4–37.4)
MCV RBC AUTO: 89 FL (ref 82–98)
MONOCYTES # BLD AUTO: 0.95 THOUSAND/ΜL (ref 0.17–1.22)
MONOCYTES NFR BLD AUTO: 11 % (ref 4–12)
NEUTROPHILS # BLD AUTO: 5.44 THOUSANDS/ΜL (ref 1.85–7.62)
NEUTS SEG NFR BLD AUTO: 61 % (ref 43–75)
NRBC BLD AUTO-RTO: 0 /100 WBCS
PLATELET # BLD AUTO: 259 THOUSANDS/UL (ref 149–390)
PMV BLD AUTO: 11.4 FL (ref 8.9–12.7)
POTASSIUM SERPL-SCNC: 4.5 MMOL/L (ref 3.5–5.3)
PROT SERPL-MCNC: 6.6 G/DL (ref 6.4–8.2)
RBC # BLD AUTO: 4.44 MILLION/UL (ref 3.88–5.62)
SODIUM SERPL-SCNC: 135 MMOL/L (ref 136–145)
WBC # BLD AUTO: 8.84 THOUSAND/UL (ref 4.31–10.16)

## 2019-09-06 PROCEDURE — 36415 COLL VENOUS BLD VENIPUNCTURE: CPT

## 2019-09-06 PROCEDURE — 80053 COMPREHEN METABOLIC PANEL: CPT

## 2019-09-06 PROCEDURE — 85025 COMPLETE CBC W/AUTO DIFF WBC: CPT

## 2019-09-10 ENCOUNTER — HOSPITAL ENCOUNTER (OUTPATIENT)
Dept: INFUSION CENTER | Facility: HOSPITAL | Age: 64
Discharge: HOME | End: 2019-09-10
Payer: MEDICARE

## 2019-09-10 VITALS
WEIGHT: 157.63 LBS | OXYGEN SATURATION: 96 % | HEIGHT: 70 IN | TEMPERATURE: 97.3 F | HEART RATE: 80 BPM | DIASTOLIC BLOOD PRESSURE: 65 MMHG | RESPIRATION RATE: 20 BRPM | SYSTOLIC BLOOD PRESSURE: 145 MMHG | BODY MASS INDEX: 22.57 KG/M2

## 2019-09-10 DIAGNOSIS — C15.5 MALIGNANT NEOPLASM OF LOWER THIRD OF ESOPHAGUS (HCC): Primary | ICD-10-CM

## 2019-09-10 DIAGNOSIS — E86.0 DEHYDRATION: ICD-10-CM

## 2019-09-10 DIAGNOSIS — C16.0 MALIGNANT NEOPLASM OF CARDIA OF STOMACH (HCC): ICD-10-CM

## 2019-09-10 DIAGNOSIS — R11.0 NAUSEA: ICD-10-CM

## 2019-09-10 PROCEDURE — 96415 CHEMO IV INFUSION ADDL HR: CPT

## 2019-09-10 PROCEDURE — G0498 CHEMO EXTEND IV INFUS W/PUMP: HCPCS

## 2019-09-10 PROCEDURE — 96368 THER/DIAG CONCURRENT INF: CPT

## 2019-09-10 PROCEDURE — 96411 CHEMO IV PUSH ADDL DRUG: CPT

## 2019-09-10 PROCEDURE — 96413 CHEMO IV INFUSION 1 HR: CPT

## 2019-09-10 PROCEDURE — 96367 TX/PROPH/DG ADDL SEQ IV INF: CPT

## 2019-09-10 RX ORDER — FLUOROURACIL 50 MG/ML
400 INJECTION, SOLUTION INTRAVENOUS ONCE
Status: COMPLETED | OUTPATIENT
Start: 2019-09-10 | End: 2019-09-10

## 2019-09-10 RX ORDER — SODIUM CHLORIDE 9 MG/ML
20 INJECTION, SOLUTION INTRAVENOUS ONCE AS NEEDED
Status: DISCONTINUED | OUTPATIENT
Start: 2019-09-10 | End: 2019-09-13 | Stop reason: HOSPADM

## 2019-09-10 RX ORDER — DEXTROSE MONOHYDRATE 50 MG/ML
20 INJECTION, SOLUTION INTRAVENOUS ONCE
Status: COMPLETED | OUTPATIENT
Start: 2019-09-10 | End: 2019-09-10

## 2019-09-10 RX ADMIN — DEXTROSE 20 ML/HR: 50 INJECTION, SOLUTION INTRAVENOUS at 09:48

## 2019-09-10 RX ADMIN — DEXAMETHASONE SODIUM PHOSPHATE: 10 INJECTION, SOLUTION INTRAMUSCULAR; INTRAVENOUS at 09:50

## 2019-09-10 RX ADMIN — FLUOROURACIL 750 MG: 50 INJECTION, SOLUTION INTRAVENOUS at 12:40

## 2019-09-10 RX ADMIN — OXALIPLATIN 158.95 MG: 5 INJECTION, SOLUTION, CONCENTRATE INTRAVENOUS at 10:28

## 2019-09-10 RX ADMIN — LEUCOVORIN CALCIUM 750 MG: 200 INJECTION, POWDER, LYOPHILIZED, FOR SOLUTION INTRAMUSCULAR; INTRAVENOUS at 10:24

## 2019-09-10 NOTE — PLAN OF CARE
Problem: Potential for Falls  Goal: Patient will remain free of falls  Description  INTERVENTIONS:  - Assess patient frequently for physical needs  -  Identify cognitive and physical deficits and behaviors that affect risk of falls    -  [place] fall precautions as indicated by assessment   - Educate patient/family on patient safety including physical limitations  - Instruct patient to call for assistance with activity based on assessment  - Modify environment to reduce risk of injury  - Consider OT/PT consult to assist with strengthening/mobility  Outcome: Progressing

## 2019-09-12 ENCOUNTER — HOSPITAL ENCOUNTER (OUTPATIENT)
Dept: INFUSION CENTER | Facility: HOSPITAL | Age: 64
Discharge: HOME | End: 2019-09-12

## 2019-09-12 DIAGNOSIS — R11.0 NAUSEA: ICD-10-CM

## 2019-09-12 DIAGNOSIS — C16.0 MALIGNANT NEOPLASM OF CARDIA OF STOMACH (HCC): ICD-10-CM

## 2019-09-12 DIAGNOSIS — E86.0 DEHYDRATION: ICD-10-CM

## 2019-09-12 DIAGNOSIS — C15.5 MALIGNANT NEOPLASM OF LOWER THIRD OF ESOPHAGUS (HCC): ICD-10-CM

## 2019-09-12 NOTE — PLAN OF CARE
Problem: Potential for Falls  Goal: Patient will remain free of falls  Description  INTERVENTIONS:  - Assess patient frequently for physical needs  -  Identify cognitive and physical deficits and behaviors that affect risk of falls    -  [place] fall precautions as indicated by assessment   - Educate patient/family on patient safety including physical limitations  - Instruct patient to call for assistance with activity based on assessment  - Modify environment to reduce risk of injury  Outcome: Progressing

## 2019-09-18 DIAGNOSIS — C16.0 MALIGNANT NEOPLASM OF CARDIA OF STOMACH (HCC): ICD-10-CM

## 2019-09-18 DIAGNOSIS — C15.5 MALIGNANT NEOPLASM OF LOWER THIRD OF ESOPHAGUS (HCC): ICD-10-CM

## 2019-09-18 DIAGNOSIS — E86.0 DEHYDRATION: ICD-10-CM

## 2019-09-18 DIAGNOSIS — R11.0 NAUSEA: Primary | ICD-10-CM

## 2019-09-18 RX ORDER — FLUOROURACIL 50 MG/ML
400 INJECTION, SOLUTION INTRAVENOUS ONCE
Status: CANCELLED | OUTPATIENT
Start: 2019-09-24

## 2019-09-18 RX ORDER — DEXTROSE MONOHYDRATE 50 MG/ML
20 INJECTION, SOLUTION INTRAVENOUS ONCE
Status: CANCELLED | OUTPATIENT
Start: 2019-09-24

## 2019-09-18 RX ORDER — SODIUM CHLORIDE 9 MG/ML
20 INJECTION, SOLUTION INTRAVENOUS ONCE AS NEEDED
Status: CANCELLED | OUTPATIENT
Start: 2019-09-24

## 2019-09-19 NOTE — PROGRESS NOTES
Outpatient Oncology Nutrition Consult  Type of Consult: Follow Up  Care Location: [place] [id number] with patient and wife Danyell Elaine) at [facility]  Viaashlee Barone goes by [email or web address] "  Nutrition Assessment:  PMH: PTSD, HTN, major depression, OCD, anxiety  Oncology Diagnosis & Treatments: Stage IV metastatic gastroesophageal cancer  Undergoing chemotherapy in the palliative setting to help improve survival   Modified FOLFOX 6 (started 8/1/19 at the [facility]) with Neulasta support for chemotherapy-induced neutropenia       Malignant neoplasm of lower third of esophagus (HCC)    7/26/2019 Initial Diagnosis     Malignant neoplasm of lower third of esophagus (HCC)      8/1/2019 -  Chemotherapy     fluorouracil (ADRUCIL) injection 750 mg, 400 mg/m2 = 750 mg, Intravenous, Once, 5 of 12 cycles  Administration: 750 mg (8/1/2019), 750 mg (8/13/2019), 750 mg (8/27/2019), 750 mg (9/10/2019)  pegfilgrastim (NEULASTA ONPRO) subcutaneous injection kit 6 mg, 6 mg, Subcutaneous, Once, 2 of 2 cycles  Administration: 6 mg (8/3/2019), 6 mg (8/15/2019)  leucovorin 750 mg in dextrose 5 % 250 mL IVPB, 748 mg, Intravenous, Once, 5 of 12 cycles  Administration: 750 mg (8/1/2019), 750 mg (8/13/2019), 750 mg (8/27/2019), 750 mg (9/10/2019)  oxaliplatin (ELOXATIN) 158 95 mg in dextrose 5 % 250 mL chemo infusion, 85 mg/m2 = 158 95 mg, Intravenous, Once, 5 of 12 cycles  Administration: 158 95 mg (8/1/2019), 158 95 mg (8/13/2019), 158 95 mg (8/27/2019), 158 95 mg (9/10/2019)        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[facility] )    7/26/2019 Initial Diagnosis     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[facility] )      8/1/2019 -  Chemotherapy     fluorouracil (ADRUCIL) injection 750 mg, 400 mg/m2 = 750 mg, Intravenous, Once, 5 of 12 cycles  Administration: 750 mg (8/1/2019), 750 mg (8/13/2019), 750 mg (8/27/2019), 750 mg (9/10/2019)  pegfilgrastim (NEULASTA ONPRO) subcutaneous injection kit 6 mg, 6 mg, Subcutaneous, Once, 2 of 2 cycles  Administration: 6 mg (8/3/2019), 6 mg (8/15/2019)  leucovorin 750 mg in dextrose 5 % 250 mL IVPB, 748 mg, Intravenous, Once, 5 of 12 cycles  Administration: 750 mg (8/1/2019), 750 mg (8/13/2019), 750 mg (8/27/2019), 750 mg (9/10/2019)  oxaliplatin (Joseluis Hernandezila) 158 95 mg in dextrose 5 % 250 mL chemo infusion, 85 mg/m2 = 158 95 mg, Intravenous, Once, 5 of 12 cycles  Administration: 158 95 mg (8/1/2019), 158 95 mg (8/13/2019), 158 95 mg (8/27/2019), 158 95 mg (9/10/2019)       Past Medical History:   Diagnosis Date    Dehydration 8/8/2019    [facility]     Hypertension     Nausea 8/8/2019    Psychiatric disorder     Recovering alcoholic ([facility] )      Past Surgical History:   Procedure Laterality Date    APPENDECTOMY      IR PORT PLACEMENT  7/31/2019       Review of Medications:   Vitamins, Supplements and Herbals: no    Current Outpatient Medications:     bisoprolol (ZEBETA) 10 MG tablet, Take 10 mg by mouth daily, Disp: , Rfl:     dexamethasone (DECADRON) 2 mg tablet, Take 1 tablet (2 mg total) by mouth daily with breakfast, Disp: 30 tablet, Rfl: 1    diazepam (VALIUM) 5 mg tablet, Take 1 tablet (5 mg total) by mouth 2 (two) times a day, Disp: 60 tablet, Rfl: 0    lidocaine-prilocaine (EMLA) cream, Apply topically as needed for mild pain, Disp: 30 g, Rfl: 0    metoclopramide (REGLAN) 5 mg/5 mL oral solution, Take 10 mg by mouth 4 (four) times a day as needed for nausea, Disp: , Rfl:     ondansetron (ZOFRAN-ODT) 8 mg disintegrating tablet, Take 1 tablet (8 mg total) by mouth every 8 (eight) hours as needed for nausea or vomiting (If Compazine is not effective), Disp: 30 tablet, Rfl: 0    oxyCODONE (ROXICODONE) 5 mg/5 mL solution, Take 5 mg by mouth every 4 (four) hours as needed for pain, Disp: , Rfl:     pantoprazole (PROTONIX) 40 mg tablet, Take 40 mg by mouth daily before breakfast, Disp: , Rfl:     prochlorperazine (COMPAZINE) 10 mg tablet, Take 10 mg by mouth every 6 (six) hours as needed for nausea or vomiting, Disp: , Rfl:     venlafaxine (EFFEXOR-XR) 150 mg 24 hr capsule, Take 1 capsule (150 mg total) by mouth daily, Disp: 30 capsule, Rfl: 0  No current facility-administered medications for this visit       Facility-Administered Medications Ordered in Other Visits:     dextrose 5 % infusion, 20 mL/hr, Intravenous, Once, Marcela Haq MD    fluorouracil (ADRUCIL) injection 750 mg, 400 mg/m2 (Treatment Plan Recorded), Intravenous, Once, Marcela Haq MD    leucovorin 750 mg in dextrose 5 % 250 mL IVPB, 750 mg, Intravenous, Once, Marcela Haq MD    ondansetron (ZOFRAN) 16 mg, dexamethasone (DECADRON) 10 mg in sodium chloride 0 9 % 50 mL IVPB, , Intravenous, Once, Marcela Haq MD, Last Rate: 150 mL/hr at 09/24/19 0945    oxaliplatin (ELOXATIN) 158 95 mg in dextrose 5 % 250 mL chemo infusion, 85 mg/m2 (Treatment Plan Recorded), Intravenous, Once, Marcela Haq MD    sodium chloride 0 9 % infusion, 20 mL/hr, Intravenous, Once PRN, Marcela Haq MD, Last Rate: 20 mL/hr at 09/24/19 0945, 20 mL/hr at 09/24/19 0945    Most Recent Lab Results:   Lab Results   Component Value Date    WBC 12 24 (H) 09/20/2019    ALT 30 09/20/2019    AST 16 09/20/2019    K 3 9 09/20/2019    K 4 5 09/06/2019    BUN 28 (H) 09/20/2019    BUN 23 09/06/2019    CREATININE 0 68 09/20/2019    CREATININE 0 79 09/06/2019    CALCIUM 8 8 09/20/2019       Anthropometric Measurements:   Height: 69 5"  Ht Readings from Last 1 Encounters:   09/24/19 5' 9 02" (1 753 m)     -Weight History:   Usual Weight: 198-212# (last weighed this in May 2019)  Ideal Body Weight: 163#  Wt Readings from Last 20 Encounters:   09/24/19 75 2 kg (165 lb 12 6 oz)   09/20/19 74 6 kg (164 lb 8 oz)   09/10/19 71 5 kg (157 lb 10 1 oz)   09/03/19 69 kg (152 lb 3 2 oz)   08/27/19 69 3 kg (152 lb 12 8 oz)   08/23/19 66 2 kg (146 lb)   08/13/19 67 1 kg (147 lb 14 9 oz)   08/07/19 69 1 kg (152 lb 5 oz)   08/01/19 70 9 kg (156 lb 4 9 oz)   07/31/19 71 2 kg (157 lb)   07/26/19 71 2 kg (157 lb)   06/14/18 88 5 kg (195 lb)   06/02/18 86 2 kg (190 lb)     Weight Changes: gain of 13#/ (8 5%) in 1 month (significant) and loss of 29 2# (15%) in 3 months (significant); encouraged wt stability moving forward  Estimated body mass index is 24 47 kg/m² as calculated from the following:    Height as of an earlier encounter on 9/24/19: 5' 9 02" (1 753 m)  Weight as of an earlier encounter on 9/24/19: 75 2 kg (165 lb 12 6 oz)  Nutrition-Focused Physical Findings: mild  muscle depletion ([place]) - hollowing/scooping/depression, mild  muscle depletion (Clavicle) - protruding/visible bone, mild  body fat depletion (Orbital) - hollowing/depression/dark circles and mild  body fat depletion (Triceps) - space between folds/fingers      Food/Nutrition-Related History & Client/Social History:    Current Nutrition Impact Symptoms:  [] Nausea  [x] Reduced Appetite - fluctuates, stopped steroid [] Acid Reflux    [] Vomiting  [] Unintended Wt Loss - improved [] Malabsorption    [] Diarrhea  [] Unintended Wt Gain  [] Dumping Syndrome    [] Constipation - continues on Miralax daily, BM's are solid and daily [] Thick Mucous/Secretions  [] Abdominal Pain    [] Dysgeusia (Altered Taste)  [x] Xerostomia (Dry Mouth) - mild, using Biotene mouthwash which is helpful [] Gas    [] Dysosmia (Altered Smell)  [] Thrush  [] Difficulty Chewing    [] Oral Mucositis (Sore Mouth)  [x] Fatigue  - significant, plans to ask for a referral to PT to gain some strength back [] Other:   [] Odynophagia - tingling in throat with cold foods/drinks for a few days after chemo [] Esophagitis  [] Other:    [] Dysphagia - resolved [] Early Satiety  [] No Problems Eating      Food Allergies: no  Food Intolerances: no   -For Gout, avoids: organ meats, shellfish  Last gout flare was 2 years ago after a liverwurst sandwich  Current Diet: Regular Diet, No Restrictions   Current Nutrition Intake:  Increased since last visit  Appetite: Good, Fluctuating, sometimes not hungry in the morning but will drink Boost despite this   Nutrition Route: PO  Meal planning/preparation mainly done by: Self and Spouse/Partner  Oral Care: Brushing BID, Biotene mouthwash daily  Activity level: Has been much more active, doing a lot of yard work recently  Plans to ask physician about a referral to PT to gain some strength back  Social Hx: Quit drinking alcohol 30 years ago  Retied custom   Wife is a teacher (pre-school and Special Ed)    25 Hr Diet Recall: had 8 Boost Plus yesterday; plans to start cooking again; recently had homemade pizza  Breakfast: 1 Boost Plus; yesterday: 2 pc pork roll and 2 eggs, 1 pc toast  Lunch: homemade tomato tart, whole milk plain [place] yogurt with honey, lemon bars  Dinner: lemon bars, whole milk plain [place] yogurt  Snacks: Boost Plus x4 and Greek yogurt (wakes up in the middle of the night a couple of times and has supplements and yogurt), lemon bars, cinnamon hard candy    Supplements: Boost Plus - averaging 8 per day; just reordered Boost VHC (2880 kcal, 112 g protein); sometimes has 5-7 Boost   Usually mixes Boost Plus with Boost VHC  Will begin decreasing supplement 1 at a time to stimulate appetite for whole foods  Beverages: water (16 oz glass x3), Oral Nutrition Supplements (8 oz x8), V8 Healthy Greens juice (18 oz x1), regular coffee with half and half (12-14 oz x1); only drinking Gingerale at infusion center; Averaging 130 oz per day (>100% est needs)    Estimated Nutrition Needs: (based on 74 1kg/ 163# IBW)  Energy Needs: 8266-9775 kcal/day (35-40 kcal/kg)  Protein Needs: 111-148 grams/day (1 5-2 g/kg)  Fluid Needs: 5552-6206 mL/day (1 mL/kcal) -  oz    Discussion/Summary: Roman Monterroso was seen today during his infusion for RD follow up  He is doing very well, continues to gain wt, and is now tolerating all foods without difficulty  He is hydrating well  He plans to start cooking again    He wants to gain some strength back, therefore, recommended he speak with his physician about his physical activity restrictions and a potential referral to Physical Therapy  Encouraged continued adequate kcal and protein intake  Recommend he begin decreasing his oral nutrition supplements one at a time, while monitoring wt closely, to help stimulate appetite for whole foods  Will follow up in about 2 weeks  Discussed/Reviewed:   · weight management  · indications & use of oral nutrition supplements  - Recommended decreasing supplement by 1 Boost Plus per day, monitor wt closely, and keep decreasing by 1 Boost at a time,  as long as wt is stable or you are gaining wt  This will help stimulate your appetite for whole foods  · high protein foods to include at all meals and snacks    · ways to increase overall calorie intake    · encouraged eating every 2-3 hours (5-6 small meals/day)  · adequate hydation & tips to increase overall fluid intake  - goal:  oz per day  · MNT for: wt loss, malnutrition  · recipe suggestions/resources  · individualized calorie, protein and fluid daily goals    All questions and concerns addressed during todays visit  Erin Lundberg has RD contact information  Nutrition Diagnosis:  · Increased Nutrient Needs (kcal & pro) related to increased demand for nutrients and disease state as evidenced by cancer dx and pt undergoing tx for cancer  · Patient has clinical indicators (or ASPEN criteria) consistent with moderate protein-calorie malnutrition in the context of Chronic Illness as evidenced by >7 5% wt loss in 3 months, mild  muscle depletion ([place]) - hollowing/scooping/depression, mild  muscle depletion (Clavicle) - protruding/visible bone, mild  body fat depletion (Orbital) - hollowing/depression/dark circles and mild  body fat depletion (Triceps) - space between folds/fingers        Intervention & Recommendations:  Topics addressed: Nutrition education, Balance/Variety, Meals & Snacks, Meal planning, Choosing high protein meals/snacks, Meal pattern: eating small/frequent meals (every 2-3 hours), Nutrition Symptom Management, Adequate Hydration, Medical Food Supplements, Nutrition-Related Medication Management and Weight Management    Barriers: None  Readiness to change: action  Comprehension: verbalizes understanding  Expected Compliance: good    Materials Provided: not applicable, still has Boost coupons at home    Monitoring & Evaluation:  Dietitian to Monitor: Food and Nutrition Intake, Nutrtion Impact Symptoms, Body Weight and Biochemical Data     Goals:    · adequate nutrition related symptom management  · pt to meet >/=75% estimated nutrition needs daily  · weight stabilization or regain of 1-2#/week    · Progress Towards Goals: Progressing and Goal(s) Met    Nutrition Rx & Recommendations:  · Diet: Consistencies as tolerated: High Calorie, High Protein  Avoid cold temperatures due to oxaliplatin (chemo)  · Eat slowly and chew food thoroughly before swallowing  · Nutrition Supplements:  Recommended decreasing supplement by 1 Boost Plus per day, monitor wt closely, and keep decreasing by 1 Boost at a time,  as long as wt is stable or you are gaining wt  This will help stimulate your appetite for whole foods     May use homemade shakes/smoothies as desired  If using a pre-made shake/bar, choose ones with >300 calories and >10 grams protein (ex  Ensure Enlive, Ensure Plus, Boost Plus, Boost Very High Calorie, etc )  · Small, frequent meals/snacks may be easier to tolerate than 3 large daily meals  Aim for 5-6 small meals per day (every 2-3 hours)  · Include protein at all meals/snacks  · Stay hydrated by sipping fluids of choice/tolerance throughout the day  · Liquid nutrition may be better tolerated than solids at times  · Weigh yourself regularly  If you notice weight loss, make an effort to increase your daily food/calorie intake   If you continue to notice loss after these efforts, reach out to your dietitian to establish a plan to stabilize weight  · Try some of your favorite foods that you have not had in awhile: roasted chicken; saleh mixed in a twice baked potato made with plain [place] yogurt  Always chew foods until pureed/liquified, take very small bites to test tolerance, and mix new foods with a very moist food and/or extra gravy/sauce/broth  · Aim for  oz fluid per day, supplements count towards fluids  Sports drinks, diluted juices, and broth are good fluid options for calories and/or electrolytes to prevent dehydration      Follow Up Plan: 10/8/19, 11/19/19 during chemo at [place]   Recommend Referral to Other Providers: none at this time

## 2019-09-20 ENCOUNTER — APPOINTMENT (OUTPATIENT)
Dept: LAB | Facility: HOSPITAL | Age: 64
End: 2019-09-20
Payer: MEDICARE

## 2019-09-20 ENCOUNTER — OFFICE VISIT (OUTPATIENT)
Dept: HEMATOLOGY ONCOLOGY | Facility: CLINIC | Age: 64
End: 2019-09-20
Payer: MEDICARE

## 2019-09-20 VITALS
RESPIRATION RATE: 16 BRPM | WEIGHT: 164.5 LBS | HEIGHT: 69 IN | DIASTOLIC BLOOD PRESSURE: 76 MMHG | SYSTOLIC BLOOD PRESSURE: 136 MMHG | BODY MASS INDEX: 24.37 KG/M2 | OXYGEN SATURATION: 99 % | TEMPERATURE: 98.1 F | HEART RATE: 59 BPM

## 2019-09-20 DIAGNOSIS — R11.0 NAUSEA: ICD-10-CM

## 2019-09-20 DIAGNOSIS — C15.5 MALIGNANT NEOPLASM OF LOWER THIRD OF ESOPHAGUS (HCC): Primary | ICD-10-CM

## 2019-09-20 DIAGNOSIS — E86.0 DEHYDRATION: ICD-10-CM

## 2019-09-20 DIAGNOSIS — C15.5 MALIGNANT NEOPLASM OF LOWER THIRD OF ESOPHAGUS (HCC): ICD-10-CM

## 2019-09-20 DIAGNOSIS — C16.0 MALIGNANT NEOPLASM OF CARDIA OF STOMACH (HCC): ICD-10-CM

## 2019-09-20 LAB
ALBUMIN SERPL BCP-MCNC: 2.8 G/DL (ref 3.5–5)
ALP SERPL-CCNC: 63 U/L (ref 46–116)
ALT SERPL W P-5'-P-CCNC: 30 U/L (ref 12–78)
ANION GAP SERPL CALCULATED.3IONS-SCNC: 7 MMOL/L (ref 4–13)
AST SERPL W P-5'-P-CCNC: 16 U/L (ref 5–45)
BASOPHILS # BLD AUTO: 0.08 THOUSANDS/ΜL (ref 0–0.1)
BASOPHILS NFR BLD AUTO: 1 % (ref 0–1)
BILIRUB SERPL-MCNC: 0.4 MG/DL (ref 0.2–1)
BUN SERPL-MCNC: 28 MG/DL (ref 5–25)
CALCIUM SERPL-MCNC: 8.8 MG/DL (ref 8.3–10.1)
CHLORIDE SERPL-SCNC: 102 MMOL/L (ref 100–108)
CO2 SERPL-SCNC: 28 MMOL/L (ref 21–32)
CREAT SERPL-MCNC: 0.68 MG/DL (ref 0.6–1.3)
EOSINOPHIL # BLD AUTO: 0.06 THOUSAND/ΜL (ref 0–0.61)
EOSINOPHIL NFR BLD AUTO: 1 % (ref 0–6)
ERYTHROCYTE [DISTWIDTH] IN BLOOD BY AUTOMATED COUNT: 22.5 % (ref 11.6–15.1)
GFR SERPL CREATININE-BSD FRML MDRD: 100 ML/MIN/1.73SQ M
GLUCOSE SERPL-MCNC: 88 MG/DL (ref 65–140)
HCT VFR BLD AUTO: 37.6 % (ref 36.5–49.3)
HGB BLD-MCNC: 11.4 G/DL (ref 12–17)
IMM GRANULOCYTES # BLD AUTO: 0.14 THOUSAND/UL (ref 0–0.2)
IMM GRANULOCYTES NFR BLD AUTO: 1 % (ref 0–2)
LYMPHOCYTES # BLD AUTO: 4.39 THOUSANDS/ΜL (ref 0.6–4.47)
LYMPHOCYTES NFR BLD AUTO: 36 % (ref 14–44)
MCH RBC QN AUTO: 27.5 PG (ref 26.8–34.3)
MCHC RBC AUTO-ENTMCNC: 30.3 G/DL (ref 31.4–37.4)
MCV RBC AUTO: 91 FL (ref 82–98)
MONOCYTES # BLD AUTO: 1.67 THOUSAND/ΜL (ref 0.17–1.22)
MONOCYTES NFR BLD AUTO: 14 % (ref 4–12)
NEUTROPHILS # BLD AUTO: 5.9 THOUSANDS/ΜL (ref 1.85–7.62)
NEUTS SEG NFR BLD AUTO: 47 % (ref 43–75)
NRBC BLD AUTO-RTO: 0 /100 WBCS
PLATELET # BLD AUTO: 360 THOUSANDS/UL (ref 149–390)
PMV BLD AUTO: 11.1 FL (ref 8.9–12.7)
POTASSIUM SERPL-SCNC: 3.9 MMOL/L (ref 3.5–5.3)
PROT SERPL-MCNC: 6.4 G/DL (ref 6.4–8.2)
RBC # BLD AUTO: 4.14 MILLION/UL (ref 3.88–5.62)
SODIUM SERPL-SCNC: 137 MMOL/L (ref 136–145)
WBC # BLD AUTO: 12.24 THOUSAND/UL (ref 4.31–10.16)

## 2019-09-20 PROCEDURE — 36415 COLL VENOUS BLD VENIPUNCTURE: CPT

## 2019-09-20 PROCEDURE — 85025 COMPLETE CBC W/AUTO DIFF WBC: CPT

## 2019-09-20 PROCEDURE — 99214 OFFICE O/P EST MOD 30 MIN: CPT | Performed by: INTERNAL MEDICINE

## 2019-09-20 PROCEDURE — 80053 COMPREHEN METABOLIC PANEL: CPT

## 2019-09-20 NOTE — PROGRESS NOTES
Hematology/Oncology Outpatient Follow- up Note  Bharat Valencia 75 y o  male MRN: @ Encounter: [id number]        Date:  9/20/2019    Presenting Complaint/Diagnosis :     Metastatic gastroesophageal cancer    HPI:      Stacey Hawthorne seen for initial consultation 7/26/2019 regarding Newly diagnosed metastatic gastroesophageal cancer  The patient has biopsy-proven adenocarcinoma of the esophagus  PET/CT scan was done which shows omental nodularity that is FDG avid indicating stage IV disease  The patient has lost approximately 40-50 pounds  His ECOG performance status is a 1-2      Previous Hematologic/ Oncologic History:       Malignant neoplasm of lower third of esophagus ([facility] )    7/26/2019 Initial Diagnosis     Malignant neoplasm of lower third of esophagus (HCC)      8/1/2019 -  Chemotherapy     fluorouracil (ADRUCIL) injection 750 mg, 400 mg/m2 = 750 mg, Intravenous, Once, 4 of 12 cycles  Administration: 750 mg (8/1/2019), 750 mg (8/13/2019), 750 mg (8/27/2019), 750 mg (9/10/2019)  pegfilgrastim (NEULASTA ONPRO) subcutaneous injection kit 6 mg, 6 mg, Subcutaneous, Once, 2 of 2 cycles  Administration: 6 mg (8/3/2019), 6 mg (8/15/2019)  leucovorin 750 mg in dextrose 5 % 250 mL IVPB, 748 mg, Intravenous, Once, 4 of 12 cycles  Administration: 750 mg (8/1/2019), 750 mg (8/13/2019), 750 mg (8/27/2019), 750 mg (9/10/2019)  oxaliplatin (ELOXATIN) 158 95 mg in dextrose 5 % 250 mL chemo infusion, 85 mg/m2 = 158 95 mg, Intravenous, Once, 4 of 12 cycles  Administration: 158 95 mg (8/1/2019), 158 95 mg (8/13/2019), 158 95 mg (8/27/2019), 158 95 mg (9/10/2019)        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[facility] )    7/26/2019 Initial Diagnosis     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[facility] )      8/1/2019 -  Chemotherapy     fluorouracil (ADRUCIL) injection 750 mg, 400 mg/m2 = 750 mg, Intravenous, Once, 4 of 12 cycles  Administration: 750 mg (8/1/2019), 750 mg (8/13/2019), 750 mg (8/27/2019), 750 mg (9/10/2019)  pegfilgrastim (NEULASTA ONPRO) subcutaneous injection kit 6 mg, 6 mg, Subcutaneous, Once, 2 of 2 cycles  Administration: 6 mg (8/3/2019), 6 mg (8/15/2019)  leucovorin 750 mg in dextrose 5 % 250 mL IVPB, 748 mg, Intravenous, Once, 4 of 12 cycles  Administration: 750 mg (8/1/2019), 750 mg (8/13/2019), 750 mg (8/27/2019), 750 mg (9/10/2019)  oxaliplatin (ELOXATIN) 158 95 mg in dextrose 5 % 250 mL chemo infusion, 85 mg/m2 = 158 95 mg, Intravenous, Once, 4 of 12 cycles  Administration: 158 95 mg (8/1/2019), 158 95 mg (8/13/2019), 158 95 mg (8/27/2019), 158 95 mg (9/10/2019)         Current Hematologic/ Oncologic Treatment:      Modified FOLFOX 6  Cycle #5 will be on 24 September  FOLFOX every 2 weeks      Oxaliplatin 85 milligrams/meter squared  Leucovorin 400 milligrams/meters squared  5FU 400 milligrams/meters squared  5FU 2400 milligrams/meter squared CIVI over 46 hours     Neulasta support Has been discontinued for now  Interval History:      The patient returns for follow-up visit  He states he is doing well  He states over the last few weeks he has started eating better and has gained 8 pounds  His pain has resolved  Appetite has improved  He has more energy  Denies any nausea denies any vomiting denies any diarrhea  His swallowing much better  Is able to eat anything he wishes now  He is quite happy with this as is his wife  The rest of his 14 point review of systems today was negative  Test Results:    Imaging: No results found      Labs:   Lab Results   Component Value Date    WBC 12 24 (H) 09/20/2019    HGB 11 4 (L) 09/20/2019    HCT 37 6 09/20/2019    MCV 91 09/20/2019     09/20/2019     Lab Results   Component Value Date    K 3 9 09/20/2019     09/20/2019    CO2 28 09/20/2019    BUN 28 (H) 09/20/2019    CREATININE 0 68 09/20/2019    GLUF 130 (H) 08/09/2019    CALCIUM 8 8 09/20/2019    AST 16 09/20/2019    ALT 30 09/20/2019    ALKPHOS 63 09/20/2019    EGFR 100 09/20/2019 ROS: As stated in the history of present illness otherwise his 14 point review of systems today was negative  Active Problems:   Patient Active Problem List   Diagnosis    PTSD (post-traumatic stress disorder)    Essential hypertension    Major depression    OCD (obsessive compulsive disorder)    Anxiety    Malignant neoplasm of lower third of esophagus ([facility] )    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)    Cancer associated pain    Chemotherapy-induced nausea    Abnormal weight loss    Malignant ascites    [facility]    Other fatigue    Other dysphagia    Nausea    Dehydration       Past Medical History:   Past Medical History:   Diagnosis Date    Dehydration 8/8/2019    Esophageal cancer ([facility] )     Hypertension     Nausea 8/8/2019    Psychiatric disorder     [facility] ([facility] )        Surgical History:   Past Surgical History:   Procedure Laterality Date    APPENDECTOMY      IR PORT PLACEMENT  7/31/2019       Family History:    Family History   Problem Relation Age of Onset    Colon cancer Father        Cancer-related family history includes Colon cancer in his father      Social History:   Social History     Socioeconomic History    Marital status: /Civil Union     Spouse name: Not on file    Number of children: 2    Years of education: Not on file    Highest education level: Not on file   Occupational History    Occupation: retired    Social Needs    Financial resource strain: Not on file    Food insecurity:     Worry: Not on file     Inability: Not on file   [employer] needs:     Medical: Not on file     Non-medical: Not on file   Tobacco Use    Smoking status: Current Some Day Smoker     Types: Cigars    Smokeless tobacco: Never Used   Substance and Sexual Activity    Alcohol use: No    Drug use: Yes     Types: Marijuana    Sexual activity: Not on file   Lifestyle    Physical activity:     Days per week: Not on file     Minutes per session: Not on file    Stress: Not on file   Relationships    Social connections:     Talks on phone: Not on file     Gets together: Not on file     Attends [employer] service: Not on file     Active member of club or organization: Not on file     Attends meetings of clubs or organizations: Not on file     Relationship status: Not on file    Intimate partner violence:     Fear of current or ex partner: Not on file     Emotionally abused: Not on file     Physically abused: Not on file     Forced sexual activity: Not on file   Other Topics Concern    Not on file   Social History Narrative    Not on file       Current Medications:   Current Outpatient Medications   Medication Sig Dispense Refill    bisoprolol (ZEBETA) 10 MG tablet Take 10 mg by mouth daily      dexamethasone (DECADRON) 2 mg tablet Take 1 tablet (2 mg total) by mouth daily with breakfast 30 tablet 1    diazepam (VALIUM) 5 mg tablet Take 1 tablet (5 mg total) by mouth 2 (two) times a day 60 tablet 0    fluorouracil 4,490 mg in CADD infusion pump Infuse 4,490 mg (1,200 mg/m2/day x 1 87 m2 (Treatment plan recorded BSA)) into a venous catheter over 46 hours for 2 days [place] to be administered on 9/24/19 1 Device 0    lidocaine-prilocaine (EMLA) cream Apply topically as needed for mild pain 30 g 0    metoclopramide (REGLAN) 5 mg/5 mL oral solution Take 10 mg by mouth 4 (four) times a day as needed for nausea      ondansetron (ZOFRAN-ODT) 8 mg disintegrating tablet Take 1 tablet (8 mg total) by mouth every 8 (eight) hours as needed for nausea or vomiting (If Compazine is not effective) 30 tablet 0    oxyCODONE (ROXICODONE) 5 mg/5 mL solution Take 5 mg by mouth every 4 (four) hours as needed for pain      pantoprazole (PROTONIX) 40 mg tablet Take 40 mg by mouth daily before breakfast      prochlorperazine (COMPAZINE) 10 mg tablet Take 10 mg by mouth every 6 (six) hours as needed for nausea or vomiting      venlafaxine (EFFEXOR-XR) 150 mg 24 hr capsule Take 1 capsule (150 mg total) by mouth daily 30 capsule 0     No current facility-administered medications for this visit  Allergies: Allergies   Allergen Reactions    Bee Venom Anaphylaxis    Shellfish-Derived Products      Develops GOUT       Physical Exam:    Body surface area is 1 9 meters squared  Wt Readings from Last 3 Encounters:   09/20/19 74 6 kg (164 lb 8 oz)   09/10/19 71 5 kg (157 lb 10 1 oz)   09/03/19 69 kg (152 lb 3 2 oz)        Temp Readings from Last 3 Encounters:   09/20/19 98 1 °F (36 7 °C) (Oral)   09/10/19 (!) 97 3 °F (36 3 °C) (Temporal)   09/03/19 (!) 95 8 °F (35 4 °C) (Tympanic)        BP Readings from Last 3 Encounters:   09/20/19 136/76   09/10/19 145/65   09/03/19 120/82         Pulse Readings from Last 3 Encounters:   09/20/19 59   09/10/19 80   09/03/19 68         Physical Exam     Constitutional   General appearance: No acute distress, well appearing and well nourished  Eyes   Conjunctiva and lids: No swelling, erythema or discharge  Pupils and irises: Equal, round and reactive to light  Ears, Nose, Mouth, and Throat   External inspection of ears and nose: Normal     Nasal mucosa, septum, and turbinates: Normal without edema or erythema  Oropharynx: Normal with no erythema, edema, exudate or lesions  Pulmonary   Respiratory effort: No increased work of breathing or signs of respiratory distress  Auscultation of lungs: Clear to auscultation  Cardiovascular   Palpation of heart: Normal PMI, no thrills  Auscultation of heart: Normal rate and rhythm, normal S1 and S2, without murmurs  Examination of extremities for edema and/or varicosities: Normal     Carotid pulses: Normal     Abdomen   Abdomen: Non-tender, no masses  Liver and spleen: No hepatomegaly or splenomegaly  Lymphatic   Palpation of lymph nodes in neck: No lymphadenopathy      Musculoskeletal   Gait and station: Normal     Digits and nails: Normal without clubbing or cyanosis  Inspection/palpation of joints, bones, and muscles: Normal     Skin   Skin and subcutaneous tissue: Normal without rashes or lesions  Neurologic   Cranial nerves: Cranial nerves 2-12 intact  Sensation: No sensory loss  Psychiatric   Orientation to person, place, and time: Normal     Mood and affect: Normal         Assessment / Plan:      The patient is a pleasant 76-year-old male with a past medical history of metastatic gastroesophageal cancer  He has received 4 cycles of modified FOLFOX 6 chemotherapy so far  He has tolerated it well  He is now swallowing much better  He is eating well also  Denies any nausea vomiting or diarrhea  Has gained weight  We will continue him on his current regimen  I'll see him back in a month with repeat imaging  He will continue to get oxaliplatin 85 milligrams/meter squared, leucovorin 400 milligrams/meter squared, 5  milligrams/meter subcutaneously, and 5 FU 2400 milligrams/meter squared CIVI over 46 hours  If he has a nice response to therapy we will consider discontinuing the oxaliplatin  Goals and Barriers:  Current Goal:  Prolong Survival from Gastroesophageal cancer  Barriers: None  Patient's Capacity to Self Care:  Patient  able to self care  Portions of the record may have been created with voice recognition software   Occasional wrong word or "sound a like" substitutions may have occurred due to the inherent limitations of voice recognition software   Read the chart carefully and recognize, using context, where substitutions have occurred

## 2019-09-24 ENCOUNTER — NUTRITION (OUTPATIENT)
Dept: NUTRITION | Facility: CLINIC | Age: 64
End: 2019-09-24

## 2019-09-24 ENCOUNTER — HOSPITAL ENCOUNTER (OUTPATIENT)
Dept: INFUSION CENTER | Facility: HOSPITAL | Age: 64
Discharge: HOME | End: 2019-09-24
Payer: MEDICARE

## 2019-09-24 VITALS
HEIGHT: 69 IN | DIASTOLIC BLOOD PRESSURE: 71 MMHG | HEART RATE: 67 BPM | WEIGHT: 165.79 LBS | SYSTOLIC BLOOD PRESSURE: 141 MMHG | TEMPERATURE: 97.8 F | OXYGEN SATURATION: 99 % | RESPIRATION RATE: 20 BRPM | BODY MASS INDEX: 24.56 KG/M2

## 2019-09-24 DIAGNOSIS — C16.0 MALIGNANT NEOPLASM OF CARDIA OF STOMACH (HCC): ICD-10-CM

## 2019-09-24 DIAGNOSIS — R11.0 NAUSEA: ICD-10-CM

## 2019-09-24 DIAGNOSIS — E86.0 DEHYDRATION: ICD-10-CM

## 2019-09-24 DIAGNOSIS — C15.5 MALIGNANT NEOPLASM OF LOWER THIRD OF ESOPHAGUS (HCC): Primary | ICD-10-CM

## 2019-09-24 DIAGNOSIS — Z71.3 NUTRITIONAL COUNSELING: Primary | ICD-10-CM

## 2019-09-24 PROCEDURE — 96368 THER/DIAG CONCURRENT INF: CPT

## 2019-09-24 PROCEDURE — 96367 TX/PROPH/DG ADDL SEQ IV INF: CPT

## 2019-09-24 PROCEDURE — G0498 CHEMO EXTEND IV INFUS W/PUMP: HCPCS

## 2019-09-24 PROCEDURE — 96411 CHEMO IV PUSH ADDL DRUG: CPT

## 2019-09-24 PROCEDURE — 96415 CHEMO IV INFUSION ADDL HR: CPT

## 2019-09-24 PROCEDURE — 96413 CHEMO IV INFUSION 1 HR: CPT

## 2019-09-24 RX ORDER — FLUOROURACIL 50 MG/ML
400 INJECTION, SOLUTION INTRAVENOUS ONCE
Status: COMPLETED | OUTPATIENT
Start: 2019-09-24 | End: 2019-09-24

## 2019-09-24 RX ORDER — SODIUM CHLORIDE 9 MG/ML
20 INJECTION, SOLUTION INTRAVENOUS ONCE AS NEEDED
Status: DISCONTINUED | OUTPATIENT
Start: 2019-09-24 | End: 2019-09-27 | Stop reason: HOSPADM

## 2019-09-24 RX ORDER — DEXTROSE MONOHYDRATE 50 MG/ML
20 INJECTION, SOLUTION INTRAVENOUS ONCE
Status: COMPLETED | OUTPATIENT
Start: 2019-09-24 | End: 2019-09-24

## 2019-09-24 RX ADMIN — DEXTROSE 20 ML/HR: 50 INJECTION, SOLUTION INTRAVENOUS at 10:33

## 2019-09-24 RX ADMIN — OXALIPLATIN 158.95 MG: 5 INJECTION, SOLUTION, CONCENTRATE INTRAVENOUS at 10:36

## 2019-09-24 RX ADMIN — SODIUM CHLORIDE 20 ML/HR: 0.9 INJECTION, SOLUTION INTRAVENOUS at 09:45

## 2019-09-24 RX ADMIN — FLUOROURACIL 750 MG: 50 INJECTION, SOLUTION INTRAVENOUS at 12:46

## 2019-09-24 RX ADMIN — DEXAMETHASONE SODIUM PHOSPHATE: 10 INJECTION, SOLUTION INTRAMUSCULAR; INTRAVENOUS at 09:45

## 2019-09-24 RX ADMIN — LEUCOVORIN CALCIUM 750 MG: 200 INJECTION, POWDER, LYOPHILIZED, FOR SUSPENSION INTRAMUSCULAR; INTRAVENOUS at 10:36

## 2019-09-24 NOTE — PATIENT INSTRUCTIONS
Nutrition Rx & Recommendations:  · Diet: Consistencies as tolerated: High Calorie, High Protein  Avoid cold temperatures due to oxaliplatin (chemo)  · Eat slowly and chew food thoroughly before swallowing  · Nutrition Supplements:  Recommended decreasing supplement by 1 Boost Plus per day, monitor wt closely, and keep decreasing by 1 Boost at a time,  as long as wt is stable or you are gaining wt  This will help stimulate your appetite for whole foods     May use homemade shakes/smoothies as desired  If using a pre-made shake/bar, choose ones with >300 calories and >10 grams protein (ex  Ensure Enlive, Ensure Plus, Boost Plus, Boost Very High Calorie, etc )  · Small, frequent meals/snacks may be easier to tolerate than 3 large daily meals  Aim for 5-6 small meals per day (every 2-3 hours)  · Include protein at all meals/snacks  · Stay hydrated by sipping fluids of choice/tolerance throughout the day  · Liquid nutrition may be better tolerated than solids at times  · Weigh yourself regularly  If you notice weight loss, make an effort to increase your daily food/calorie intake  If you continue to notice loss after these efforts, reach out to your dietitian to establish a plan to stabilize weight  · Try some of your favorite foods that you have not had in awhile: roasted chicken; saleh mixed in a twice baked potato made with plain [place] yogurt  Always chew foods until pureed/liquified, take very small bites to test tolerance, and mix new foods with a very moist food and/or extra gravy/sauce/broth  · Aim for  oz fluid per day, supplements count towards fluids  Sports drinks, diluted juices, and broth are good fluid options for calories and/or electrolytes to prevent dehydration      Follow Up Plan: 10/8/19, 11/19/19 during chemo at [facility]   Recommend Referral to Other Providers: none at this time

## 2019-09-24 NOTE — PLAN OF CARE
Problem: Potential for Falls  Goal: Patient will remain free of falls  Description  INTERVENTIONS:  - Assess patient frequently for physical needs  -  Identify cognitive and physical deficits and behaviors that affect risk of falls  -  [place] fall precautions as indicated by assessment   - Educate patient/family on patient safety including physical limitations  - Instruct patient to call for assistance with activity based on assessment  - Modify environment to reduce risk of injury  Outcome: Progressing     Problem: Knowledge Deficit  Goal: Patient/family/caregiver demonstrates understanding of disease process, treatment plan, medications, and discharge instructions  Description  Complete learning assessment and assess knowledge base    Interventions:  - Provide teaching at level of understanding  - Provide teaching via preferred learning methods  Outcome: Progressing

## 2019-09-26 ENCOUNTER — HOSPITAL ENCOUNTER (OUTPATIENT)
Dept: INFUSION CENTER | Facility: HOSPITAL | Age: 64
Discharge: HOME | End: 2019-09-26

## 2019-09-26 VITALS
OXYGEN SATURATION: 99 % | HEART RATE: 56 BPM | SYSTOLIC BLOOD PRESSURE: 141 MMHG | DIASTOLIC BLOOD PRESSURE: 78 MMHG | TEMPERATURE: 97.8 F | RESPIRATION RATE: 20 BRPM

## 2019-09-26 DIAGNOSIS — R11.0 NAUSEA: ICD-10-CM

## 2019-09-26 DIAGNOSIS — E86.0 DEHYDRATION: ICD-10-CM

## 2019-09-26 DIAGNOSIS — C16.0 MALIGNANT NEOPLASM OF CARDIA OF STOMACH (HCC): ICD-10-CM

## 2019-09-26 DIAGNOSIS — C15.5 MALIGNANT NEOPLASM OF LOWER THIRD OF ESOPHAGUS (HCC): Primary | ICD-10-CM

## 2019-09-26 NOTE — PLAN OF CARE
Problem: Potential for Falls  Goal: Patient will remain free of falls  Description  INTERVENTIONS:  - Assess patient frequently for physical needs  -  Identify cognitive and physical deficits and behaviors that affect risk of falls  -  [place] fall precautions as indicated by assessment   - Educate patient/family on patient safety including physical limitations  - Instruct patient to call for assistance with activity based on assessment  - Modify environment to reduce risk of injury  Outcome: Progressing     Problem: Knowledge Deficit  Goal: Patient/family/caregiver demonstrates understanding of disease process, treatment plan, medications, and discharge instructions  Description  Complete learning assessment and assess knowledge base    Interventions:  - Provide teaching at level of understanding  - Provide teaching via preferred learning methods  Outcome: Progressing

## 2019-09-26 NOTE — PROGRESS NOTES
Patient denies feeling dizzy now  Reports feeling "better" after juice and snack and stated he will eat something and take BP medicine as soon as he leaves here  Patient denies driving himself and reports his neighbor is driving him today  BP improved now 141/78  Declined escort to lobby  Stable on feet

## 2019-09-30 NOTE — PROGRESS NOTES
Palliative and Supportive Care   Michael Thomas 75 y o  male [id number]    Assessment/Plan:  1  Malignant neoplasm of lower third of esophagus (HCC)    2  Decreased stamina    3  Impaired functional mobility, balance, and endurance    4  Generalized weakness    5  Chemotherapy-induced nausea    6  Other fatigue    7  Nausea    8  Essential hypertension      Requested Prescriptions     Signed Prescriptions Disp Refills    bisoprolol (ZEBETA) 10 MG tablet       Sig: Take 0 5 tablets (5 mg total) by mouth daily     Medications Discontinued During This Encounter   Medication Reason    dexamethasone (DECADRON) 2 mg tablet Symptoms have responded to effective disease directed cancer therapy     Orders Placed This Encounter   Procedures    Ambulatory referral to Physical Therapy     20 minutes were spent face to face with Michael Thomas with greater than 50% of the time spent in counseling or coordination of care including discussions of instructions for disease self management   All of the patient's questions were answered during this discussion  Return in about 3 months (around 1/1/2020)  Subjective:   Chief Complaint  Follow up visit for:  symptom management  HPI     Michael Thomas is a 75 y o  male with 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  Tirso Carrizales was diagnosed in 7/2019  Keyana Longoria he has seen Dr Sharon Avitia in medical oncology  Chahal Sofie has also seen the GI oncology department at [facility] - Dr Karmen Mascorro  His current treatment is palliative intent oxaliplatin, leucovorin, 5 FU      He was referred to the department of Palliative & Supportive care for concurrent symptom management  Rosette Howell of his initial symptoms have been improving with disease directed cancer therapy  His dysphagia, pain, poor appetite, and nausea are all better  He continues to take one anti-nausea medications before chemotherapy but does not know which one    He has been able to stop use of dexamethasone without sacrificing his symptom control  He is gaining weight  However he continues to feel tired and weak  The following portions of the medical history were reviewed: past medical history, problem list, medication list, and social history  Current Outpatient Medications:     bisoprolol (ZEBETA) 10 MG tablet, Take 0 5 tablets (5 mg total) by mouth daily, Disp: , Rfl:     diazepam (VALIUM) 5 mg tablet, Take 1 tablet (5 mg total) by mouth 2 (two) times a day, Disp: 60 tablet, Rfl: 0    lidocaine-prilocaine (EMLA) cream, Apply topically as needed for mild pain, Disp: 30 g, Rfl: 0    metoclopramide (REGLAN) 5 mg/5 mL oral solution, Take 10 mg by mouth 4 (four) times a day as needed for nausea, Disp: , Rfl:     ondansetron (ZOFRAN-ODT) 8 mg disintegrating tablet, Take 1 tablet (8 mg total) by mouth every 8 (eight) hours as needed for nausea or vomiting (If Compazine is not effective), Disp: 30 tablet, Rfl: 0    pantoprazole (PROTONIX) 40 mg tablet, Take 40 mg by mouth daily before breakfast, Disp: , Rfl:     prochlorperazine (COMPAZINE) 10 mg tablet, Take 10 mg by mouth every 6 (six) hours as needed for nausea or vomiting, Disp: , Rfl:     venlafaxine (EFFEXOR-XR) 150 mg 24 hr capsule, Take 1 capsule (150 mg total) by mouth daily, Disp: 30 capsule, Rfl: 0    oxyCODONE (ROXICODONE) 5 mg/5 mL solution, Take 5 mg by mouth every 4 (four) hours as needed for pain, Disp: , Rfl:   Review of Systems   Constitutional: Positive for activity change, appetite change, fatigue and unexpected weight change (now happy to be gaining)  HENT: Negative for trouble swallowing  Gastrointestinal: Positive for nausea  Neurological: Positive for weakness         All other systems negative    Objective:  Vital Signs  /70 (BP Location: Right arm, Patient Position: Sitting, Cuff Size: Standard)   Pulse (!) 54   Temp (!) 96 6 °F (35 9 °C) (Tympanic)   Resp 16   Ht 5' 9" (1 753 m)   Wt 76 6 kg (168 lb 12 8 oz) SpO2 98%   BMI 24 93 kg/m²    Physical Exam    Constitutional: Appears well-developed and well-nourished  In no acute physical or emotional distress  Head: Normocephalic and atraumatic  Eyes: EOM are normal  No ocular discharge  No scleral icterus  Neck: No visible adenopathy or masses  Respiratory: Effort normal  No stridor  No respiratory distress  Gastrointestinal: No abdominal distension  Musculoskeletal: No edema  Neurological: Alert, oriented and appropriately conversant  Skin: No diaphoresis, no rashes seen on exposed areas of skin  Psychiatric: Displays a normal mood and affect   Behavior, judgement and thought content appear normal

## 2019-10-01 ENCOUNTER — OFFICE VISIT (OUTPATIENT)
Dept: PALLIATIVE MEDICINE | Facility: CLINIC | Age: 64
End: 2019-10-01
Payer: MEDICARE

## 2019-10-01 VITALS
HEIGHT: 69 IN | OXYGEN SATURATION: 98 % | WEIGHT: 168.8 LBS | RESPIRATION RATE: 16 BRPM | SYSTOLIC BLOOD PRESSURE: 140 MMHG | TEMPERATURE: 96.6 F | HEART RATE: 54 BPM | BODY MASS INDEX: 25 KG/M2 | DIASTOLIC BLOOD PRESSURE: 70 MMHG

## 2019-10-01 DIAGNOSIS — Z74.09 IMPAIRED FUNCTIONAL MOBILITY, BALANCE, AND ENDURANCE: ICD-10-CM

## 2019-10-01 DIAGNOSIS — T45.1X5A CHEMOTHERAPY-INDUCED NAUSEA: ICD-10-CM

## 2019-10-01 DIAGNOSIS — I10 ESSENTIAL HYPERTENSION: ICD-10-CM

## 2019-10-01 DIAGNOSIS — R53.83 OTHER FATIGUE: ICD-10-CM

## 2019-10-01 DIAGNOSIS — R53.1 GENERALIZED WEAKNESS: ICD-10-CM

## 2019-10-01 DIAGNOSIS — R11.0 CHEMOTHERAPY-INDUCED NAUSEA: ICD-10-CM

## 2019-10-01 DIAGNOSIS — R11.0 NAUSEA: ICD-10-CM

## 2019-10-01 DIAGNOSIS — R53.83 DECREASED STAMINA: ICD-10-CM

## 2019-10-01 DIAGNOSIS — C15.5 MALIGNANT NEOPLASM OF LOWER THIRD OF ESOPHAGUS (HCC): Primary | ICD-10-CM

## 2019-10-01 PROCEDURE — 99213 OFFICE O/P EST LOW 20 MIN: CPT | Performed by: FAMILY MEDICINE

## 2019-10-01 RX ORDER — DEXTROSE MONOHYDRATE 50 MG/ML
20 INJECTION, SOLUTION INTRAVENOUS ONCE
Status: CANCELLED | OUTPATIENT
Start: 2019-10-08

## 2019-10-01 RX ORDER — BISOPROLOL FUMARATE 10 MG/1
5 TABLET ORAL DAILY
Start: 2019-10-01 | End: 2020-01-07

## 2019-10-01 RX ORDER — FLUOROURACIL 50 MG/ML
400 INJECTION, SOLUTION INTRAVENOUS ONCE
Status: CANCELLED | OUTPATIENT
Start: 2019-10-08

## 2019-10-01 RX ORDER — SODIUM CHLORIDE 9 MG/ML
20 INJECTION, SOLUTION INTRAVENOUS ONCE AS NEEDED
Status: CANCELLED | OUTPATIENT
Start: 2019-10-08

## 2019-10-02 DIAGNOSIS — C16.0 MALIGNANT NEOPLASM OF CARDIA OF STOMACH (HCC): ICD-10-CM

## 2019-10-02 DIAGNOSIS — R11.0 NAUSEA: Primary | ICD-10-CM

## 2019-10-02 DIAGNOSIS — C15.5 MALIGNANT NEOPLASM OF LOWER THIRD OF ESOPHAGUS (HCC): ICD-10-CM

## 2019-10-02 DIAGNOSIS — E86.0 DEHYDRATION: ICD-10-CM

## 2019-10-03 NOTE — PROGRESS NOTES
Outpatient Oncology Nutrition Consult  Type of Consult: Follow Up  Care Location: [place] [id number] with patient and wife Jonatan Alvarez) at [employer]  Lucille Amend goes by Lexx Chaparro"  Nutrition Assessment:  PMH: PTSD, HTN, major depression, OCD, anxiety  Oncology Diagnosis & Treatments: Stage IV metastatic gastroesophageal cancer  Undergoing chemotherapy in the palliative setting to help improve survival   Modified FOLFOX 6 (started 8/1/19)       Malignant neoplasm of lower third of esophagus (HCC)    7/26/2019 Initial Diagnosis     Malignant neoplasm of lower third of esophagus (HCC)      8/1/2019 -  Chemotherapy     fluorouracil (ADRUCIL) injection 750 mg, 400 mg/m2 = 750 mg, Intravenous, Once, 6 of 12 cycles  Administration: 750 mg (8/1/2019), 750 mg (8/13/2019), 750 mg (8/27/2019), 750 mg (9/10/2019), 750 mg (9/24/2019)  pegfilgrastim (NEULASTA ONPRO) subcutaneous injection kit 6 mg, 6 mg, Subcutaneous, Once, 2 of 2 cycles  Administration: 6 mg (8/3/2019), 6 mg (8/15/2019)  leucovorin 750 mg in dextrose 5 % 250 mL IVPB, 748 mg, Intravenous, Once, 6 of 12 cycles  Administration: 750 mg (8/1/2019), 750 mg (8/13/2019), 750 mg (8/27/2019), 750 mg (9/10/2019), 750 mg (9/24/2019)  oxaliplatin (ELOXATIN) 158 95 mg in dextrose 5 % 250 mL chemo infusion, 85 mg/m2 = 158 95 mg, Intravenous, Once, 6 of 12 cycles  Administration: 158 95 mg (8/1/2019), 158 95 mg (8/13/2019), 158 95 mg (8/27/2019), 158 95 mg (9/10/2019), 158 95 mg (9/24/2019)        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)    7/26/2019 Initial Diagnosis     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[facility] )      8/1/2019 -  Chemotherapy     fluorouracil (ADRUCIL) injection 750 mg, 400 mg/m2 = 750 mg, Intravenous, Once, 6 of 12 cycles  Administration: 750 mg (8/1/2019), 750 mg (8/13/2019), 750 mg (8/27/2019), 750 mg (9/10/2019), 750 mg (9/24/2019)  pegfilgrastim (NEULASTA ONPRO) subcutaneous injection kit 6 mg, 6 mg, Subcutaneous, Once, 2 of 2 cycles  Administration: 6 mg (8/3/2019), 6 mg (8/15/2019)  leucovorin 750 mg in dextrose 5 % 250 mL IVPB, 748 mg, Intravenous, Once, 6 of 12 cycles  Administration: 750 mg (8/1/2019), 750 mg (8/13/2019), 750 mg (8/27/2019), 750 mg (9/10/2019), 750 mg (9/24/2019)  oxaliplatin (ELOXATIN) 158 95 mg in dextrose 5 % 250 mL chemo infusion, 85 mg/m2 = 158 95 mg, Intravenous, Once, 6 of 12 cycles  Administration: 158 95 mg (8/1/2019), 158 95 mg (8/13/2019), 158 95 mg (8/27/2019), 158 95 mg (9/10/2019), 158 95 mg (9/24/2019)       Past Medical History:   Diagnosis Date    Dehydration 8/8/2019    [facility]     Hypertension     Nausea 8/8/2019    Psychiatric disorder     Recovering alcoholic ([facility] )      Past Surgical History:   Procedure Laterality Date    APPENDECTOMY      IR PORT PLACEMENT  7/31/2019       Review of Medications:   Vitamins, Supplements and Herbals: no    Current Outpatient Medications:     bisoprolol (ZEBETA) 10 MG tablet, Take 0 5 tablets (5 mg total) by mouth daily, Disp: , Rfl:     diazepam (VALIUM) 5 mg tablet, Take 1 tablet (5 mg total) by mouth 2 (two) times a day, Disp: 60 tablet, Rfl: 0    lidocaine-prilocaine (EMLA) cream, Apply topically as needed for mild pain, Disp: 30 g, Rfl: 0    metoclopramide (REGLAN) 5 mg/5 mL oral solution, Take 10 mg by mouth 4 (four) times a day as needed for nausea, Disp: , Rfl:     ondansetron (ZOFRAN-ODT) 8 mg disintegrating tablet, Take 1 tablet (8 mg total) by mouth every 8 (eight) hours as needed for nausea or vomiting (If Compazine is not effective), Disp: 30 tablet, Rfl: 0    oxyCODONE (ROXICODONE) 5 mg/5 mL solution, Take 5 mg by mouth every 4 (four) hours as needed for pain, Disp: , Rfl:     pantoprazole (PROTONIX) 40 mg tablet, Take 40 mg by mouth daily before breakfast, Disp: , Rfl:     prochlorperazine (COMPAZINE) 10 mg tablet, Take 10 mg by mouth every 6 (six) hours as needed for nausea or vomiting, Disp: , Rfl:     venlafaxine (EFFEXOR-XR) 150 mg 24 hr capsule, Take 1 capsule (150 mg total) by mouth daily, Disp: 30 capsule, Rfl: 0  No current facility-administered medications for this visit  Facility-Administered Medications Ordered in Other Visits:     fluorouracil (ADRUCIL) injection 750 mg, 400 mg/m2 (Treatment Plan Recorded), Intravenous, Once, Quinten Espinoza MD    leucovorin 750 mg in dextrose 5 % 250 mL IVPB, 750 mg, Intravenous, Once, Quinten Espinoza MD    oxaliplatin (ELOXATIN) 158 95 mg in dextrose 5 % 250 mL chemo infusion, 85 mg/m2 (Treatment Plan Recorded), Intravenous, Once, Quinten Espinoza MD    sodium chloride 0 9 % infusion, 20 mL/hr, Intravenous, Once PRN, Quinten Espinoza MD    Most Recent Lab Results:   Lab Results   Component Value Date    WBC 12 35 (H) 10/04/2019    ALT 33 10/04/2019    AST 13 10/04/2019    K 3 9 10/04/2019    K 3 9 09/20/2019    BUN 36 (H) 10/04/2019    BUN 28 (H) 09/20/2019    CREATININE 0 66 10/04/2019    CREATININE 0 68 09/20/2019    CALCIUM 8 6 10/04/2019       Anthropometric Measurements:   Height: 69 5"  Ht Readings from Last 1 Encounters:   10/08/19 5' 9 49" (1 765 m)     -Weight History:   Usual Weight: 198-212# (last weighed this in May 2019)  Ideal Body Weight: 163#  Wt Readings from Last 20 Encounters:   10/08/19 80 3 kg (177 lb 0 5 oz)   10/01/19 76 6 kg (168 lb 12 8 oz)   09/24/19 75 2 kg (165 lb 12 6 oz)   09/20/19 74 6 kg (164 lb 8 oz)   09/10/19 71 5 kg (157 lb 10 1 oz)   09/03/19 69 kg (152 lb 3 2 oz)   08/27/19 69 3 kg (152 lb 12 8 oz)   08/23/19 66 2 kg (146 lb)   08/13/19 67 1 kg (147 lb 14 9 oz)   08/07/19 69 1 kg (152 lb 5 oz)   08/01/19 70 9 kg (156 lb 4 9 oz)   07/31/19 71 2 kg (157 lb)   07/26/19 71 2 kg (157 lb)   06/14/18 88 5 kg (195 lb)   06/02/18 86 2 kg (190 lb)     Weight Changes: gain of 19 4#/ (12 3%) in 1 month (significant) and gain of 11 2#/ (6 8%) in 2 weeks (significant); encouraged wt stability moving forward, Pt's goal is to weigh 185#    Estimated body mass index is 25 78 kg/m² as calculated from the following:    Height as of an earlier encounter on 10/8/19: 5' 9 49" (1 765 m)  Weight as of an earlier encounter on 10/8/19: 80 3 kg (177 lb 0 5 oz)  Nutrition-Focused Physical Findings: none observed       Food/Nutrition-Related History & Client/Social History:    Current Nutrition Impact Symptoms:  [] Nausea  [] Reduced Appetite [] Acid Reflux    [] Vomiting  [] Unintended Wt Loss  [] Malabsorption    [] Diarrhea  [] Unintended Wt Gain  [] Dumping Syndrome    [] Constipation - continues on Miralax daily, BM's are solid and 1-2x/day; will start cutting back on Miralax [] Thick Mucous/Secretions  [] Abdominal Pain    [] Dysgeusia (Altered Taste)  [x] Xerostomia (Dry Mouth) - "comes and goes", using Biotene mouthwash which is helpful [] Gas    [] Dysosmia (Altered Smell)  [] Thrush  [] Difficulty Chewing    [] Oral Mucositis (Sore Mouth)  [x] Fatigue  - significant, has referral for PT, plans to call for an appointment [] Other:   [] Odynophagia  [] Esophagitis  [] Other:    [] Dysphagia - resolved [] Early Satiety  [] No Problems Eating      Food Allergies: no  Food Intolerances: no   -For Gout, avoids: organ meats, shellfish  Last gout flare was 2 years ago after a liverwurst sandwich  Current Diet: Regular Diet, No Restrictions, started cooking again  Current Nutrition Intake: Increased since last visit  Appetite: Good  Nutrition Route: PO  Meal planning/preparation mainly done by: Self and Spouse/Partner  Oral Care: Brushing BID, Biotene mouthwash daily  Activity level: Has been much more active, doing a lot of yard work recently  Plans to start PT (has referral)  Social Hx: Quit drinking alcohol 30 years ago  Retied custom     Wife is a teacher (pre-school and Special Ed)    25 Hr Diet Recall:started cooking again  Breakfast: 1 Boost VHC; yesterday: ham, 2 eggs, 1 pc toast, coffee   Lunch: egg salad (1 cup), Health Greens V8 juice  Dinner: cod (9 oz)  Snacks: greek yogurt (1 qt), chocolate pudding (16 oz), 5-6 resses peanut butter cup, 1 slice pineapple upside down cake, 1 slice apple pie, apple cider donuts    Beverages: water (16 oz glass x3), Oral Nutrition Supplements (8 oz x7), V8 Healthy Greens juice (18 oz x1), regular coffee with half and half (12-14 oz x1); only drinking Gingerale at infusion center  Supplements:    Boost Plus (8 oz, 360 kcal, 14 g pro) - TID   Boost Very High Calorie (8 oz, 530 kcal, 22 g pro) - BID   Equate Shakes (220 kcal, 20 g pro) - BID   Supplements provide a total of: 56 oz, 2580 kcal, 126 g protein  Estimated Nutrition Needs: (based on 74 1kg/ 163# IBW)  Energy Needs: 7755-0537 kcal/day (35-40 kcal/kg)  Protein Needs: 111-148 grams/day (1 5-2 g/kg)  Fluid Needs: 6294-3984 mL/day (1 mL/kcal) -  oz    Discussion/Summary: Jeannie Leonard was seen today during his infusion for RD follow up  He is doing very well, continues to gain wt and tolerate all foods without difficulty  He has started cooking again  He continues to express that he wants to gain some strength back, therefore, recommended he make an appointment with Physical Therapy now that he has a referral   His BM's have been regular, therefore, he will begin decreasing his Miralax use  Recommend he stop his Boost VHC and then begin decreasing his other oral nutrition supplements one at a time, while monitoring wt closely, to help stimulate appetite for whole foods and promote wt stability  Discussed importance of a cancer preventative eating pattern and incorporating more high fiber foods now that he does not have diet restrictions     Discussed/Reviewed:   · weight management  · indications & use of oral nutrition supplements - Stop Boost VHC, monitor wt closely, cut back other supplements to promote wt stability  · high protein foods to include at all meals and snacks - incorporate food sources of protein while cutting back supplements  · encouraged eating every 2-3 hours (5-6 small meals/day)  · adequate hydation & tips to increase overall fluid intake  - goal:  oz per day  · MNT for: malnutrition, constipation  · recipe suggestions/resources  · a cancer preventative eating pattern as a long-term nutrition goal    · individualized calorie, protein and fluid daily goals  · incorporate more high fiber foods: fruits, vegetables, beans, whole grains    All questions and concerns addressed during todays visit  Juliette Edouard has RD contact information  Nutrition Diagnosis:  · Increased Nutrient Needs (kcal & pro) related to increased demand for nutrients and disease state as evidenced by cancer dx and pt undergoing tx for cancer  Intervention & Recommendations:  Topics addressed: Nutrition education, Balance/Variety, Meals & Snacks, Meal planning, Choosing high protein meals/snacks, Meal pattern: eating small/frequent meals (every 2-3 hours), Nutrition Symptom Management, Adequate Hydration, Medical Food Supplements, Nutrition-Related Medication Management and Weight Management    Barriers: None  Readiness to change: action  Comprehension: verbalizes understanding  Expected Compliance: good    Materials Provided: not applicable    Monitoring & Evaluation:  Dietitian to Monitor: Food and Nutrition Intake, Nutrtion Impact Symptoms, Body Weight and Biochemical Data     Goals:    · weight stabilization  · adequate nutrition related symptom management  · pt to meet >/=75% estimated nutrition needs daily     · Progress Towards Goals: Goal(s) Met    Nutrition Rx & Recommendations:  · Diet: Consistencies as tolerated: High Calorie, High Protein  Avoid cold temperatures due to oxaliplatin (chemo)  · Eat slowly and chew food thoroughly before swallowing  · Nutrition Supplements: Stop Boost VHC, continue others for now, and continue to reduce supplements to promote weight stability  May use homemade shakes/smoothies as desired    If using a pre-made shake/bar, choose ones with >300 calories and >10 grams protein (ex  Ensure Enlive, Ensure Plus, Boost Plus, Boost Very High Calorie, etc )  · Small, frequent meals/snacks may be easier to tolerate than 3 large daily meals  Aim for 5-6 small meals per day (every 2-3 hours)  · Include protein at all meals/snacks  · Stay hydrated by sipping fluids of choice/tolerance throughout the day  · Incorporate physical activity as able/allowed  · Weigh yourself regularly  If you notice weight loss, make an effort to increase your daily food/calorie intake  If you continue to notice loss after these efforts, reach out to your dietitian to establish a plan to stabilize weight  · Incorporate more high fiber foods: fruits, vegetables, beans, whole grains    · Schedule Physical Therapy appointment    Follow Up Plan: 11/19/19 during chemo at [facility]   Recommend Referral to Other Providers: none at this time

## 2019-10-04 ENCOUNTER — APPOINTMENT (OUTPATIENT)
Dept: LAB | Facility: HOSPITAL | Age: 64
End: 2019-10-04
Payer: MEDICARE

## 2019-10-04 DIAGNOSIS — C16.0 MALIGNANT NEOPLASM OF CARDIA OF STOMACH (HCC): ICD-10-CM

## 2019-10-04 DIAGNOSIS — R11.0 NAUSEA: ICD-10-CM

## 2019-10-04 DIAGNOSIS — C15.5 MALIGNANT NEOPLASM OF LOWER THIRD OF ESOPHAGUS (HCC): ICD-10-CM

## 2019-10-04 DIAGNOSIS — E86.0 DEHYDRATION: ICD-10-CM

## 2019-10-04 LAB
ALBUMIN SERPL BCP-MCNC: 2.8 G/DL (ref 3.5–5)
ALP SERPL-CCNC: 76 U/L (ref 46–116)
ALT SERPL W P-5'-P-CCNC: 33 U/L (ref 12–78)
ANION GAP SERPL CALCULATED.3IONS-SCNC: 9 MMOL/L (ref 4–13)
AST SERPL W P-5'-P-CCNC: 13 U/L (ref 5–45)
BASOPHILS # BLD AUTO: 0.05 THOUSANDS/ΜL (ref 0–0.1)
BASOPHILS NFR BLD AUTO: 0 % (ref 0–1)
BILIRUB SERPL-MCNC: 0.5 MG/DL (ref 0.2–1)
BUN SERPL-MCNC: 36 MG/DL (ref 5–25)
CALCIUM SERPL-MCNC: 8.6 MG/DL (ref 8.3–10.1)
CHLORIDE SERPL-SCNC: 101 MMOL/L (ref 100–108)
CO2 SERPL-SCNC: 28 MMOL/L (ref 21–32)
CREAT SERPL-MCNC: 0.66 MG/DL (ref 0.6–1.3)
EOSINOPHIL # BLD AUTO: 0.09 THOUSAND/ΜL (ref 0–0.61)
EOSINOPHIL NFR BLD AUTO: 1 % (ref 0–6)
ERYTHROCYTE [DISTWIDTH] IN BLOOD BY AUTOMATED COUNT: 24.5 % (ref 11.6–15.1)
GFR SERPL CREATININE-BSD FRML MDRD: 101 ML/MIN/1.73SQ M
GLUCOSE SERPL-MCNC: 111 MG/DL (ref 65–140)
HCT VFR BLD AUTO: 38.7 % (ref 36.5–49.3)
HGB BLD-MCNC: 12.1 G/DL (ref 12–17)
IMM GRANULOCYTES # BLD AUTO: 0.12 THOUSAND/UL (ref 0–0.2)
IMM GRANULOCYTES NFR BLD AUTO: 1 % (ref 0–2)
LYMPHOCYTES # BLD AUTO: 3.83 THOUSANDS/ΜL (ref 0.6–4.47)
LYMPHOCYTES NFR BLD AUTO: 31 % (ref 14–44)
MCH RBC QN AUTO: 29.2 PG (ref 26.8–34.3)
MCHC RBC AUTO-ENTMCNC: 31.3 G/DL (ref 31.4–37.4)
MCV RBC AUTO: 94 FL (ref 82–98)
MONOCYTES # BLD AUTO: 1.24 THOUSAND/ΜL (ref 0.17–1.22)
MONOCYTES NFR BLD AUTO: 10 % (ref 4–12)
NEUTROPHILS # BLD AUTO: 7.02 THOUSANDS/ΜL (ref 1.85–7.62)
NEUTS SEG NFR BLD AUTO: 57 % (ref 43–75)
NRBC BLD AUTO-RTO: 1 /100 WBCS
PLATELET # BLD AUTO: 269 THOUSANDS/UL (ref 149–390)
PMV BLD AUTO: 10.3 FL (ref 8.9–12.7)
POTASSIUM SERPL-SCNC: 3.9 MMOL/L (ref 3.5–5.3)
PROT SERPL-MCNC: 6.7 G/DL (ref 6.4–8.2)
RBC # BLD AUTO: 4.14 MILLION/UL (ref 3.88–5.62)
SODIUM SERPL-SCNC: 138 MMOL/L (ref 136–145)
WBC # BLD AUTO: 12.35 THOUSAND/UL (ref 4.31–10.16)

## 2019-10-04 PROCEDURE — 36415 COLL VENOUS BLD VENIPUNCTURE: CPT

## 2019-10-04 PROCEDURE — 80053 COMPREHEN METABOLIC PANEL: CPT

## 2019-10-04 PROCEDURE — 85025 COMPLETE CBC W/AUTO DIFF WBC: CPT

## 2019-10-08 ENCOUNTER — HOSPITAL ENCOUNTER (OUTPATIENT)
Dept: INFUSION CENTER | Facility: HOSPITAL | Age: 64
Discharge: HOME | End: 2019-10-08
Payer: MEDICARE

## 2019-10-08 ENCOUNTER — NUTRITION (OUTPATIENT)
Dept: NUTRITION | Facility: CLINIC | Age: 64
End: 2019-10-08

## 2019-10-08 VITALS
OXYGEN SATURATION: 99 % | HEART RATE: 76 BPM | HEIGHT: 69 IN | BODY MASS INDEX: 26.22 KG/M2 | DIASTOLIC BLOOD PRESSURE: 72 MMHG | SYSTOLIC BLOOD PRESSURE: 145 MMHG | RESPIRATION RATE: 20 BRPM | TEMPERATURE: 97.6 F | WEIGHT: 177.03 LBS

## 2019-10-08 DIAGNOSIS — C16.0 MALIGNANT NEOPLASM OF CARDIA OF STOMACH (HCC): ICD-10-CM

## 2019-10-08 DIAGNOSIS — Z71.3 NUTRITIONAL COUNSELING: Primary | ICD-10-CM

## 2019-10-08 DIAGNOSIS — R11.0 NAUSEA: ICD-10-CM

## 2019-10-08 DIAGNOSIS — E86.0 DEHYDRATION: ICD-10-CM

## 2019-10-08 DIAGNOSIS — C15.5 MALIGNANT NEOPLASM OF LOWER THIRD OF ESOPHAGUS (HCC): Primary | ICD-10-CM

## 2019-10-08 PROCEDURE — 96415 CHEMO IV INFUSION ADDL HR: CPT

## 2019-10-08 PROCEDURE — 96411 CHEMO IV PUSH ADDL DRUG: CPT

## 2019-10-08 PROCEDURE — 96367 TX/PROPH/DG ADDL SEQ IV INF: CPT

## 2019-10-08 PROCEDURE — G0498 CHEMO EXTEND IV INFUS W/PUMP: HCPCS

## 2019-10-08 PROCEDURE — 96368 THER/DIAG CONCURRENT INF: CPT

## 2019-10-08 PROCEDURE — 96413 CHEMO IV INFUSION 1 HR: CPT

## 2019-10-08 RX ORDER — DEXTROSE MONOHYDRATE 50 MG/ML
20 INJECTION, SOLUTION INTRAVENOUS ONCE
Status: COMPLETED | OUTPATIENT
Start: 2019-10-08 | End: 2019-10-08

## 2019-10-08 RX ORDER — FLUOROURACIL 50 MG/ML
400 INJECTION, SOLUTION INTRAVENOUS ONCE
Status: COMPLETED | OUTPATIENT
Start: 2019-10-08 | End: 2019-10-08

## 2019-10-08 RX ORDER — SODIUM CHLORIDE 9 MG/ML
20 INJECTION, SOLUTION INTRAVENOUS ONCE AS NEEDED
Status: DISCONTINUED | OUTPATIENT
Start: 2019-10-08 | End: 2019-10-11 | Stop reason: HOSPADM

## 2019-10-08 RX ADMIN — OXALIPLATIN 158.95 MG: 5 INJECTION, SOLUTION, CONCENTRATE INTRAVENOUS at 10:46

## 2019-10-08 RX ADMIN — DEXAMETHASONE SODIUM PHOSPHATE: 10 INJECTION, SOLUTION INTRAMUSCULAR; INTRAVENOUS at 09:52

## 2019-10-08 RX ADMIN — FLUOROURACIL 750 MG: 50 INJECTION, SOLUTION INTRAVENOUS at 12:56

## 2019-10-08 RX ADMIN — LEUCOVORIN CALCIUM 750 MG: 200 INJECTION, POWDER, LYOPHILIZED, FOR SUSPENSION INTRAMUSCULAR; INTRAVENOUS at 10:44

## 2019-10-08 RX ADMIN — DEXTROSE 20 ML/HR: 50 INJECTION, SOLUTION INTRAVENOUS at 09:52

## 2019-10-08 NOTE — PATIENT INSTRUCTIONS
Nutrition Rx & Recommendations:  · Diet: Consistencies as tolerated: High Calorie, High Protein  Avoid cold temperatures due to oxaliplatin (chemo)  · Eat slowly and chew food thoroughly before swallowing  · Nutrition Supplements: Stop Boost VHC, continue others for now, and continue to reduce supplements to promote weight stability  May use homemade shakes/smoothies as desired  If using a pre-made shake/bar, choose ones with >300 calories and >10 grams protein (ex  Ensure Enlive, Ensure Plus, Boost Plus, Boost Very High Calorie, etc )  · Small, frequent meals/snacks may be easier to tolerate than 3 large daily meals  Aim for 5-6 small meals per day (every 2-3 hours)  · Include protein at all meals/snacks  · Stay hydrated by sipping fluids of choice/tolerance throughout the day  · Incorporate physical activity as able/allowed  · Weigh yourself regularly  If you notice weight loss, make an effort to increase your daily food/calorie intake  If you continue to notice loss after these efforts, reach out to your dietitian to establish a plan to stabilize weight  · Incorporate more high fiber foods: fruits, vegetables, beans, whole grains    · Schedule Physical Therapy appointment    Follow Up Plan: 11/19/19 during chemo at [facility]   Recommend Referral to Other Providers: none at this time

## 2019-10-08 NOTE — PLAN OF CARE
Problem: Potential for Falls  Goal: Patient will remain free of falls  Description  INTERVENTIONS:  - Assess patient frequently for physical needs  -  Identify cognitive and physical deficits and behaviors that affect risk of falls    -  [place] fall precautions as indicated by assessment   - Educate patient/family on patient safety including physical limitations  - Instruct patient to call for assistance with activity based on assessment  - Modify environment to reduce risk of injury    Outcome: Progressing     Problem: SAFETY ADULT  Goal: Maintain or return to baseline ADL function  Description  INTERVENTIONS:  -  Assess patient's ability to carry out ADLs; assess patient's baseline for ADL function and identify physical deficits which impact ability to perform ADLs (bathing, care of mouth/teeth, toileting, grooming, dressing, etc )  - Assess/evaluate cause of self-care deficits   - Assess range of motion  - Assess patient's mobility; develop plan if impaired  - Assess patient's need for assistive devices and provide as appropriate  - Encourage maximum independence but intervene and supervise when necessary  - Involve family in performance of ADLs  - Assess for home care needs following discharge   - Consider OT consult to assist with ADL evaluation and planning for discharge  - Provide patient education as appropriate  Outcome: Progressing  Goal: Maintain or return mobility status to optimal level  Description  INTERVENTIONS:  - Assess patient's baseline mobility status (ambulation, transfers, stairs, etc )    - Identify cognitive and physical deficits and behaviors that affect mobility  - Identify mobility aids required to assist with transfers and/or ambulation (gait belt, sit-to-stand, lift, walker, cane, etc )  - [place] fall precautions as indicated by assessment  - Record patient progress and toleration of activity level on Mobility SBAR; progress patient to next Phase/Stage  - Instruct patient to call for assistance with activity based on assessment  - Consider rehabilitation consult to assist with strengthening/weightbearing, etc   Outcome: Progressing     Problem: Knowledge Deficit  Goal: Patient/family/caregiver demonstrates understanding of disease process, treatment plan, medications, and discharge instructions  Description  Complete learning assessment and assess knowledge base    Interventions:  - Provide teaching at level of understanding  - Provide teaching via preferred learning methods  Outcome: Progressing

## 2019-10-10 ENCOUNTER — HOSPITAL ENCOUNTER (OUTPATIENT)
Dept: INFUSION CENTER | Facility: HOSPITAL | Age: 64
Discharge: HOME | End: 2019-10-10

## 2019-10-10 VITALS
HEART RATE: 60 BPM | DIASTOLIC BLOOD PRESSURE: 82 MMHG | RESPIRATION RATE: 20 BRPM | SYSTOLIC BLOOD PRESSURE: 153 MMHG | OXYGEN SATURATION: 100 % | TEMPERATURE: 97.6 F

## 2019-10-10 DIAGNOSIS — R11.0 NAUSEA: ICD-10-CM

## 2019-10-10 DIAGNOSIS — C15.5 MALIGNANT NEOPLASM OF LOWER THIRD OF ESOPHAGUS (HCC): Primary | ICD-10-CM

## 2019-10-10 DIAGNOSIS — E86.0 DEHYDRATION: ICD-10-CM

## 2019-10-10 DIAGNOSIS — C16.0 MALIGNANT NEOPLASM OF CARDIA OF STOMACH (HCC): ICD-10-CM

## 2019-10-11 ENCOUNTER — HOSPITAL ENCOUNTER (OUTPATIENT)
Dept: RADIOLOGY | Facility: HOSPITAL | Age: 64
Discharge: HOME | End: 2019-10-11
Payer: MEDICARE

## 2019-10-11 DIAGNOSIS — C15.5 MALIGNANT NEOPLASM OF LOWER THIRD OF ESOPHAGUS (HCC): ICD-10-CM

## 2019-10-11 PROCEDURE — 71260 CT THORAX DX C+: CPT

## 2019-10-11 PROCEDURE — 74177 CT ABD & PELVIS W/CONTRAST: CPT

## 2019-10-11 RX ADMIN — IOHEXOL 100 ML: 350 INJECTION, SOLUTION INTRAVENOUS at 15:08

## 2019-10-16 ENCOUNTER — EVALUATION (OUTPATIENT)
Dept: PHYSICAL THERAPY | Facility: CLINIC | Age: 64
End: 2019-10-16
Payer: MEDICARE

## 2019-10-16 DIAGNOSIS — C15.5 MALIGNANT NEOPLASM OF LOWER THIRD OF ESOPHAGUS (HCC): ICD-10-CM

## 2019-10-16 DIAGNOSIS — C16.0 MALIGNANT NEOPLASM OF CARDIA OF STOMACH (HCC): ICD-10-CM

## 2019-10-16 DIAGNOSIS — R11.0 NAUSEA: Primary | ICD-10-CM

## 2019-10-16 DIAGNOSIS — Z74.09 IMPAIRED FUNCTIONAL MOBILITY, BALANCE, AND ENDURANCE: Primary | ICD-10-CM

## 2019-10-16 DIAGNOSIS — E86.0 DEHYDRATION: ICD-10-CM

## 2019-10-16 DIAGNOSIS — R53.1 GENERALIZED WEAKNESS: ICD-10-CM

## 2019-10-16 DIAGNOSIS — R53.83 OTHER FATIGUE: ICD-10-CM

## 2019-10-16 DIAGNOSIS — R53.83 DECREASED STAMINA: ICD-10-CM

## 2019-10-16 PROCEDURE — 97163 PT EVAL HIGH COMPLEX 45 MIN: CPT | Performed by: PHYSICAL THERAPIST

## 2019-10-16 PROCEDURE — 97110 THERAPEUTIC EXERCISES: CPT | Performed by: PHYSICAL THERAPIST

## 2019-10-16 RX ORDER — FLUOROURACIL 50 MG/ML
400 INJECTION, SOLUTION INTRAVENOUS ONCE
Status: CANCELLED | OUTPATIENT
Start: 2019-10-22

## 2019-10-16 RX ORDER — SODIUM CHLORIDE 9 MG/ML
20 INJECTION, SOLUTION INTRAVENOUS ONCE AS NEEDED
Status: CANCELLED | OUTPATIENT
Start: 2019-10-22

## 2019-10-16 RX ORDER — DEXTROSE MONOHYDRATE 50 MG/ML
20 INJECTION, SOLUTION INTRAVENOUS ONCE
Status: CANCELLED | OUTPATIENT
Start: 2019-10-22

## 2019-10-16 NOTE — PROGRESS NOTES
PT Evaluation     Today's date: 10/16/2019  Patient name: Veronica Tirado  : 1955  MRN: [id number]  Referring provider: Edi Adame MD  Dx:   Encounter Diagnosis     ICD-10-CM    1  Decreased stamina R53 83 Ambulatory referral to Physical Therapy   2  Generalized weakness R53 1 Ambulatory referral to Physical Therapy   3  Impaired functional mobility, balance, and endurance [id number] [id number] Ambulatory referral to Physical Therapy   4  Other fatigue R53 83 Ambulatory referral to Physical Therapy                  Assessment  Assessment details: Patient is a 75year old male who presents to skilled PT for instruction and exercises to improve strength, endurance and stability  Patient is currently in active Cancer treatment with Chemo every 2 weeks with starting on Tuesday and end on same week Thursday  Plan for therapy will be Monday before chem and than 2 visits following week during recovery  Focus will be on CA based therapy program to avoid over fatiguing which can cause more harm than good and patient education handouts  Noted balance and endurance measures score below age norm values for all testing  Balance was improved with use of Rollator however patient does not wish to use it yet, trailed nordic balance poles and appeared to like those slightly better  Patient will benefit from skilled PT for instruction in CA program with balance and endurance with HEP consisting of seated exercises for safety  Impairments: abnormal gait, abnormal muscle tone, activity intolerance, impaired balance, lacks appropriate home exercise program and safety issue    Symptom irritability: moderateUnderstanding of Dx/Px/POC: good   Prognosis: fair    Goals  Short term goals:    1  Patient will demonstrate 12 improved time on TUG test to facilitate improved safety in all ambulation  2  Patient will demonstrate improved endurance to 350 feet with 6 minute walk test to facilitate improved safety with ambulation   3   Patient will be independent in basic HEP 2-3 weeks       Long term goals:  1  Patient will be independent in a comprehensive home exercise program   2  Patient will be able to demonstrate HT in gait without veering  3   Patient will demonstrate 500 feet with 6 minute walk test to facilitate return to safe functional mobility  Plan  Planned therapy interventions: manual therapy, postural training, patient education, neuromuscular re-education, sensory integrative techniques, strengthening, therapeutic activities, therapeutic exercise, therapeutic training, home exercise program, gait training, graded activity, flexibility, coordination, body mechanics training, balance and behavior modification  Frequency: 2x week  Plan of Care beginning date: 10/16/2019  Plan of Care expiration date: 1/16/2020  Treatment plan discussed with: patient        Subjective Evaluation    History of Present Illness  Mechanism of injury: Patient is a 75year old male who presents to skilled PT for Oncology based physical therapy with focus on Strength and stamina  Currently receives chemo every 2 weeks on a Tuesday and pump is removed on Thursday  Has Esophageal cancer and into intestine  Notes falling 2 weeks ago on the slant  Reports increased in dizziness  Goal from therapy is to be able to move a little better  Need to split logs which are about 40 to 60 lbs     Pain  No pain reported    Social Support  Steps to enter house: yes  Stairs in house: yes   Lives in: multiple-level home  Lives with: significant other    Hand dominance: right    Patient Goals  Patient goals for therapy: increased strength and improved balance          Objective     Functional Assessment        Comments  5 x sit to stands: 21 09 seconds 4/10 TYRESE with fatigue   TUG test: 11 seconds with AD, 15 seconds without AD   2 MWT: with Rollator  250 feet   FTEO firm: 30  FTEC firm: 30               Precautions:  has a past medical history of Dehydration (8/8/2019), [facility] ), Hypertension, Nausea (8/8/2019), Psychiatric disorder, and Recovering alcoholic ([facility] )        Seated Floor to ceiling: 10 sec hold 10 reps BL  Side to side 10 reps 10 sec hold BL

## 2019-10-18 ENCOUNTER — OFFICE VISIT (OUTPATIENT)
Dept: HEMATOLOGY ONCOLOGY | Facility: CLINIC | Age: 64
End: 2019-10-18
Payer: MEDICARE

## 2019-10-18 ENCOUNTER — TRANSCRIBE ORDERS (OUTPATIENT)
Dept: ADMINISTRATIVE | Facility: HOSPITAL | Age: 64
End: 2019-10-18

## 2019-10-18 ENCOUNTER — APPOINTMENT (OUTPATIENT)
Dept: LAB | Facility: HOSPITAL | Age: 64
End: 2019-10-18
Payer: MEDICARE

## 2019-10-18 VITALS
RESPIRATION RATE: 16 BRPM | HEART RATE: 71 BPM | HEIGHT: 69 IN | TEMPERATURE: 98.4 F | SYSTOLIC BLOOD PRESSURE: 130 MMHG | BODY MASS INDEX: 27.25 KG/M2 | DIASTOLIC BLOOD PRESSURE: 82 MMHG | OXYGEN SATURATION: 98 % | WEIGHT: 184 LBS

## 2019-10-18 DIAGNOSIS — C15.5 MALIGNANT NEOPLASM OF LOWER THIRD OF ESOPHAGUS (HCC): Primary | ICD-10-CM

## 2019-10-18 DIAGNOSIS — C16.0 MALIGNANT NEOPLASM OF CARDIA OF STOMACH (HCC): ICD-10-CM

## 2019-10-18 DIAGNOSIS — R11.0 NAUSEA: ICD-10-CM

## 2019-10-18 DIAGNOSIS — E86.0 DEHYDRATION: ICD-10-CM

## 2019-10-18 DIAGNOSIS — C15.5 MALIGNANT NEOPLASM OF LOWER THIRD OF ESOPHAGUS (HCC): ICD-10-CM

## 2019-10-18 DIAGNOSIS — D13.1: ICD-10-CM

## 2019-10-18 LAB
ALBUMIN SERPL BCP-MCNC: 2.8 G/DL (ref 3.5–5)
ALP SERPL-CCNC: 83 U/L (ref 46–116)
ALT SERPL W P-5'-P-CCNC: 34 U/L (ref 12–78)
ANION GAP SERPL CALCULATED.3IONS-SCNC: 8 MMOL/L (ref 4–13)
AST SERPL W P-5'-P-CCNC: 14 U/L (ref 5–45)
BASOPHILS # BLD AUTO: 0.03 THOUSANDS/ΜL (ref 0–0.1)
BASOPHILS NFR BLD AUTO: 0 % (ref 0–1)
BILIRUB SERPL-MCNC: 0.5 MG/DL (ref 0.2–1)
BUN SERPL-MCNC: 26 MG/DL (ref 5–25)
CALCIUM SERPL-MCNC: 8.5 MG/DL (ref 8.3–10.1)
CHLORIDE SERPL-SCNC: 102 MMOL/L (ref 100–108)
CO2 SERPL-SCNC: 27 MMOL/L (ref 21–32)
CREAT SERPL-MCNC: 0.73 MG/DL (ref 0.6–1.3)
EOSINOPHIL # BLD AUTO: 0.02 THOUSAND/ΜL (ref 0–0.61)
EOSINOPHIL NFR BLD AUTO: 0 % (ref 0–6)
GFR SERPL CREATININE-BSD FRML MDRD: 97 ML/MIN/1.73SQ M
GLUCOSE P FAST SERPL-MCNC: 113 MG/DL (ref 65–99)
HCT VFR BLD AUTO: 37.7 % (ref 36.5–49.3)
HGB BLD-MCNC: 11.6 G/DL (ref 12–17)
IMM GRANULOCYTES # BLD AUTO: 0.07 THOUSAND/UL (ref 0–0.2)
IMM GRANULOCYTES NFR BLD AUTO: 1 % (ref 0–2)
LYMPHOCYTES # BLD AUTO: 3.69 THOUSANDS/ΜL (ref 0.6–4.47)
LYMPHOCYTES NFR BLD AUTO: 31 % (ref 14–44)
MCH RBC QN AUTO: 29.9 PG (ref 26.8–34.3)
MCHC RBC AUTO-ENTMCNC: 30.8 G/DL (ref 31.4–37.4)
MCV RBC AUTO: 97 FL (ref 82–98)
MONOCYTES # BLD AUTO: 1.33 THOUSAND/ΜL (ref 0.17–1.22)
MONOCYTES NFR BLD AUTO: 11 % (ref 4–12)
NEUTROPHILS # BLD AUTO: 6.72 THOUSANDS/ΜL (ref 1.85–7.62)
NEUTS SEG NFR BLD AUTO: 57 % (ref 43–75)
NRBC BLD AUTO-RTO: 1 /100 WBCS
PLATELET # BLD AUTO: 241 THOUSANDS/UL (ref 149–390)
PMV BLD AUTO: 10.1 FL (ref 8.9–12.7)
POTASSIUM SERPL-SCNC: 3.9 MMOL/L (ref 3.5–5.3)
PROT SERPL-MCNC: 6.5 G/DL (ref 6.4–8.2)
RBC # BLD AUTO: 3.88 MILLION/UL (ref 3.88–5.62)
SODIUM SERPL-SCNC: 137 MMOL/L (ref 136–145)
WBC # BLD AUTO: 11.86 THOUSAND/UL (ref 4.31–10.16)

## 2019-10-18 PROCEDURE — 36415 COLL VENOUS BLD VENIPUNCTURE: CPT

## 2019-10-18 PROCEDURE — 85025 COMPLETE CBC W/AUTO DIFF WBC: CPT

## 2019-10-18 PROCEDURE — 99214 OFFICE O/P EST MOD 30 MIN: CPT | Performed by: INTERNAL MEDICINE

## 2019-10-18 PROCEDURE — 80053 COMPREHEN METABOLIC PANEL: CPT

## 2019-10-18 RX ORDER — FLUOROURACIL 50 MG/ML
400 INJECTION, SOLUTION INTRAVENOUS ONCE
Status: CANCELLED | OUTPATIENT
Start: 2019-10-22

## 2019-10-18 NOTE — PROGRESS NOTES
Hematology/Oncology Outpatient Follow- up Note  Sravanthi Munoz 75 y o  male MRN: @ Encounter: [id number]        Date:  10/18/2019    Presenting Complaint/Diagnosis : Metastatic gastroesophageal cancer    HPI:      Fe Amaya seen for initial consultation 7/26/2019 regarding Newly diagnosed metastatic gastroesophageal cancer  The patient has biopsy-proven adenocarcinoma of the esophagus  PET/CT scan was done which shows omental nodularity that is FDG avid indicating stage IV disease  The patient has lost approximately 40-50 pounds  His ECOG performance status is a 1-2      Previous Hematologic/ Oncologic History:       Malignant neoplasm of lower third of esophagus ([facility] )    7/26/2019 Initial Diagnosis     Malignant neoplasm of lower third of esophagus ([facility] )      8/1/2019 -  Chemotherapy     fluorouracil (ADRUCIL) injection 750 mg, 400 mg/m2 = 750 mg, Intravenous, Once, 6 of 12 cycles  Administration: 750 mg (8/1/2019), 750 mg (8/13/2019), 750 mg (8/27/2019), 750 mg (9/10/2019), 750 mg (9/24/2019), 750 mg (10/8/2019)  pegfilgrastim (NEULASTA ONPRO) subcutaneous injection kit 6 mg, 6 mg, Subcutaneous, Once, 2 of 2 cycles  Administration: 6 mg (8/3/2019), 6 mg (8/15/2019)  leucovorin 750 mg in dextrose 5 % 250 mL IVPB, 748 mg, Intravenous, Once, 6 of 12 cycles  Administration: 750 mg (8/1/2019), 750 mg (8/13/2019), 750 mg (8/27/2019), 750 mg (9/10/2019), 750 mg (9/24/2019), 750 mg (10/8/2019)  oxaliplatin (ELOXATIN) 158 95 mg in dextrose 5 % 250 mL chemo infusion, 85 mg/m2 = 158 95 mg, Intravenous, Once, 6 of 12 cycles  Administration: 158 95 mg (8/1/2019), 158 95 mg (8/13/2019), 158 95 mg (8/27/2019), 158 95 mg (9/10/2019), 158 95 mg (9/24/2019), 158 95 mg (10/8/2019)        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[facility] )    7/26/2019 Initial Diagnosis     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 )      8/1/2019 -  Chemotherapy     fluorouracil (ADRUCIL) injection 750 mg, 400 mg/m2 = 750 mg, Intravenous, Once, 6 of 12 cycles  Administration: 750 mg (8/1/2019), 750 mg (8/13/2019), 750 mg (8/27/2019), 750 mg (9/10/2019), 750 mg (9/24/2019), 750 mg (10/8/2019)  pegfilgrastim (NEULASTA ONPRO) subcutaneous injection kit 6 mg, 6 mg, Subcutaneous, Once, 2 of 2 cycles  Administration: 6 mg (8/3/2019), 6 mg (8/15/2019)  leucovorin 750 mg in dextrose 5 % 250 mL IVPB, 748 mg, Intravenous, Once, 6 of 12 cycles  Administration: 750 mg (8/1/2019), 750 mg (8/13/2019), 750 mg (8/27/2019), 750 mg (9/10/2019), 750 mg (9/24/2019), 750 mg (10/8/2019)  oxaliplatin (ELOXATIN) 158 95 mg in dextrose 5 % 250 mL chemo infusion, 85 mg/m2 = 158 95 mg, Intravenous, Once, 6 of 12 cycles  Administration: 158 95 mg (8/1/2019), 158 95 mg (8/13/2019), 158 95 mg (8/27/2019), 158 95 mg (9/10/2019), 158 95 mg (9/24/2019), 158 95 mg (10/8/2019)         Current Hematologic/ Oncologic Treatment:    Modified FOLFOX 6  Cycle #5 will be on 24 September  FOLFOX every 2 weeks      Oxaliplatin 85 milligrams/meter squared  Leucovorin 400 milligrams/meters squared  5FU 400 milligrams/meters squared  5FU 2400 milligrams/meter squared CIVI over 46 hours     Neulasta support Has been discontinued  Oxaliplatin will be discontinued for his next chemotherapy  Interval History:    The patient returns for follow-up visit  He states he is doing well  His most recent imaging shows stable to improved disease  The patient continues to gain weight  He is eating well  Really denies any complaints today  Denies any nausea denies any vomiting denies any diarrhea  The rest of his 14 point review of systems today was negative  Test Results:    Imaging: Ct Chest Abdomen Pelvis W Contrast    Result Date: 10/15/2019  Narrative: CT CHEST, ABDOMEN AND PELVIS WITH IV CONTRAST INDICATION:   C15 5: Malignant neoplasm of lower third of esophagus  Omental disease demonstrated on recent PET/CT  On chemotherapy  Follow-up   COMPARISON:  CT of the chest and abdomen from July 15, 2019   PET/CT from July 22, 2019  TECHNIQUE: CT examination of the chest, abdomen and pelvis was performed  Axial, sagittal, and coronal 2D reformatted images were created from the source data and submitted for interpretation  Radiation dose length product (DLP) for this visit:  458 53 mGy-cm   This examination, like all CT scans performed in the [facility], was performed utilizing techniques to minimize radiation dose exposure, including the use of iterative  reconstruction and automated exposure control  IV Contrast:  100 mL of iohexol (OMNIPAQUE) Enteric Contrast: Enteric contrast was administered  FINDINGS: CHEST LUNGS:  Bullous emphysema  3 x 5 mm nodule in the right middle lobe (series 3, image 84), unchanged in size since the last CT (by my measurements)  3 mm perifissural nodule in the right upper lobe (image 62), unchanged  3 mm perifissural nodule in the  anterior basilar segment of the right lower lobe (image 86), unchanged  6 mm nodule in the right middle lobe (image 75), unchanged  No new or enlarging pulmonary nodules  Resolution of areas of atelectasis in the bases of the right middle and lower lobes since the last CT  No mass or consolidation  PLEURA:  Unremarkable  HEART/GREAT VESSELS:  Coronary artery calcifications  Otherwise unremarkable  Tip of Port-A-Cath in the SVC  MEDIASTINUM AND NEVAEH: No lymphadenopathy or mass  Mild mural thickening in the distal esophagus and gastric cardia unchanged since the last CT, although previously demonstrated mucosal hyperenhancement resolved  Trachea and main stem bronchi normal  CHEST WALL AND LOWER NECK:   Unremarkable  ABDOMEN LIVER/BILIARY TREE:  Multiple subcentimeter low-attenuation masses in the liver, nonspecific, but most likely cysts, unchanged in size since the last CT  No evidence of new hepatic mass  Bile ducts normal in caliber  GALLBLADDER:  No calcified gallstones  No pericholecystic inflammatory change   SPLEEN: Unremarkable  PANCREAS:  Unremarkable  ADRENAL GLANDS:  Unremarkable  KIDNEYS/URETERS:  Bilateral renal cysts, the largest a 8 2 cm cyst in the upper pole of the right kidney  No suspicious renal mass  No calculus or hydronephrosis  STOMACH AND BOWEL:  Mural thickening of the gastric fundus adjacent to the gastroesophageal junction, unchanged in thickness with resolution of mucosal hyperenhancement  Stomach otherwise unremarkable  Small bowel and colon unremarkable  APPENDIX:  No findings to suggest appendicitis  ABDOMINOPELVIC CAVITY: No lymphadenopathy or mass  Resolution of ascites since the last CT  No evidence of residual omental caking  No extraluminal gas  VESSELS:  Atherosclerotic changes are present  No evidence of aneurysm  PELVIS REPRODUCTIVE ORGANS:  Prostatomegaly  URINARY BLADDER:  Unremarkable  ABDOMINAL WALL/INGUINAL REGIONS:  Unremarkable  OSSEOUS STRUCTURES:  No acute fracture or destructive osseous lesion  Impression: 1  Emphysema  2   Several subcentimeter pulmonary nodules as described above, unchanged since a CT from 7/5/1919  3   No new abnormality in the chest  4   Residual mural thickening in the distal esophagus and gastric cardia with resolution of mucosal hyperenhancement since the last CT  5   Stable small low-attenuation liver masses, most likely cysts  6   Resolution of ascites and omental thickening since 7/15/2019  7   No new abnormality in the abdomen or pelvis   Workstation performed: [id number]       Labs:   Lab Results   Component Value Date    WBC 12 35 (H) 10/04/2019    HGB 12 1 10/04/2019    HCT 38 7 10/04/2019    MCV 94 10/04/2019     10/04/2019     Lab Results   Component Value Date    K 3 9 10/04/2019     10/04/2019    CO2 28 10/04/2019    BUN 36 (H) 10/04/2019    CREATININE 0 66 10/04/2019    GLUF 130 (H) 08/09/2019    CALCIUM 8 6 10/04/2019    AST 13 10/04/2019    ALT 33 10/04/2019    ALKPHOS 76 10/04/2019    EGFR 101 10/04/2019       ROS: As stated in the history of present illness otherwise his 14 point review of systems today was negative  Active Problems:   Patient Active Problem List   Diagnosis    PTSD (post-traumatic stress disorder)    Essential hypertension    Major depression    OCD (obsessive compulsive disorder)    Anxiety    Malignant neoplasm of lower third of esophagus ([facility] )    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)    Cancer associated pain    Chemotherapy-induced nausea    Abnormal weight loss    Malignant ascites    [facility]    Other fatigue    Other dysphagia    Nausea    Dehydration       Past Medical History:   Past Medical History:   Diagnosis Date    Dehydration 8/8/2019    Es[facility] ([facility] )     Hypertension     Nausea 8/8/2019    Psychiatric disorder     Recovering alcoholic ([facility] )        Surgical History:   Past Surgical History:   Procedure Laterality Date    APPENDECTOMY      IR PORT PLACEMENT  7/31/2019       Family History:    Family History   Problem Relation Age of Onset    Colon cancer Father        Cancer-related family history includes Colon cancer in his father      Social History:   Social History     Socioeconomic History    Marital status: /Civil Union     Spouse name: Not on file    Number of children: 2    Years of education: Not on file    Highest education level: Not on file   Occupational History    Occupation: retired    Social Needs    Financial resource strain: Not on file    Food insecurity:     Worry: Not on file     Inability: Not on file   Contix needs:     Medical: Not on file     Non-medical: Not on file   Tobacco Use    Smoking status: Current Some Day Smoker     Types: Cigars    Smokeless tobacco: Never Used   Substance and Sexual Activity    Alcohol use: No    Drug use: Yes     Types: Marijuana    Sexual activity: Not on file   Lifestyle    Physical activity:     Days per week: Not on file     Minutes per session: Not on file    Stress: Not on file   Relationships    Social connections:     Talks on phone: Not on file     Gets together: Not on file     Attends Latter-day service: Not on file     Active member of club or organization: Not on file     Attends meetings of clubs or organizations: Not on file     Relationship status: Not on file    Intimate partner violence:     Fear of current or ex partner: Not on file     Emotionally abused: Not on file     Physically abused: Not on file     Forced sexual activity: Not on file   Other Topics Concern    Not on file   Social History Narrative    Not on file       Current Medications:   Current Outpatient Medications   Medication Sig Dispense Refill    bisoprolol (ZEBETA) 10 MG tablet Take 0 5 tablets (5 mg total) by mouth daily      diazepam (VALIUM) 5 mg tablet Take 1 tablet (5 mg total) by mouth 2 (two) times a day 60 tablet 0    fluorouracil 4,490 mg in CADD infusion pump Infuse 4,490 mg (1,200 mg/m2/day x 1 87 m2 (Treatment plan recorded BSA)) into a venous catheter over 46 hours for 2 days [employer]ar to be administered on 10/22/19 1 Device 0    lidocaine-prilocaine (EMLA) cream Apply topically as needed for mild pain 30 g 0    metoclopramide (REGLAN) 5 mg/5 mL oral solution Take 10 mg by mouth 4 (four) times a day as needed for nausea      ondansetron (ZOFRAN-ODT) 8 mg disintegrating tablet Take 1 tablet (8 mg total) by mouth every 8 (eight) hours as needed for nausea or vomiting (If Compazine is not effective) 30 tablet 0    oxyCODONE (ROXICODONE) 5 mg/5 mL solution Take 5 mg by mouth every 4 (four) hours as needed for pain      pantoprazole (PROTONIX) 40 mg tablet Take 40 mg by mouth daily before breakfast      prochlorperazine (COMPAZINE) 10 mg tablet Take 10 mg by mouth every 6 (six) hours as needed for nausea or vomiting      venlafaxine (EFFEXOR-XR) 150 mg 24 hr capsule Take 1 capsule (150 mg total) by mouth daily 30 capsule 0     No current facility-administered medications for this visit  Allergies: Allergies   Allergen Reactions    Bee Venom Anaphylaxis    Shellfish-Derived Products      Develops GOUT       Physical Exam:    Body surface area is 1 99 meters squared  Wt Readings from Last 3 Encounters:   10/18/19 83 5 kg (184 lb)   10/08/19 80 3 kg (177 lb 0 5 oz)   10/01/19 76 6 kg (168 lb 12 8 oz)        Temp Readings from Last 3 Encounters:   10/18/19 98 4 °F (36 9 °C) (Tympanic)   10/10/19 97 6 °F (36 4 °C) (Temporal)   10/08/19 97 6 °F (36 4 °C) (Temporal)        BP Readings from Last 3 Encounters:   10/18/19 130/82   10/10/19 153/82   10/08/19 145/72         Pulse Readings from Last 3 Encounters:   10/18/19 71   10/10/19 60   10/08/19 76         Physical Exam     Constitutional   General appearance: No acute distress, well appearing and well nourished  Eyes   Conjunctiva and lids: No swelling, erythema or discharge  Pupils and irises: Equal, round and reactive to light  Ears, Nose, Mouth, and Throat   External inspection of ears and nose: Normal     Nasal mucosa, septum, and turbinates: Normal without edema or erythema  Oropharynx: Normal with no erythema, edema, exudate or lesions  Pulmonary   Respiratory effort: No increased work of breathing or signs of respiratory distress  Auscultation of lungs: Clear to auscultation  Cardiovascular   Palpation of heart: Normal PMI, no thrills  Auscultation of heart: Normal rate and rhythm, normal S1 and S2, without murmurs  Examination of extremities for edema and/or varicosities: Normal     Carotid pulses: Normal     Abdomen   Abdomen: Non-tender, no masses  Liver and spleen: No hepatomegaly or splenomegaly  Lymphatic   Palpation of lymph nodes in neck: No lymphadenopathy  Musculoskeletal   Gait and station: Normal     Digits and nails: Normal without clubbing or cyanosis      Inspection/palpation of joints, bones, and muscles: Normal     Skin   Skin and subcutaneous tissue: Normal without rashes or lesions  Neurologic   Cranial nerves: Cranial nerves 2-12 intact  Sensation: No sensory loss  Psychiatric   Orientation to person, place, and time: Normal     Mood and affect: Normal         Assessment / Plan:      The patient is a pleasant 77-year-old male with a past medical history of metastatic gastroesophageal cancer  He has received 4 cycles of modified FOLFOX 6 chemotherapy so far  He has tolerated it well  He is now swallowing much better  He is eating well also  Denies any nausea vomiting or diarrhea  Has gained weight  His most recent imaging is stable to improved  Again in the past PET/CT scan had shown omental nodularity that was FDG avid indicating stage IV disease  We will continue him on his current regimen  I will discontinue the oxaliplatin  I will continue the rest of his chemotherapy as is  Patient and his wife are agreeable to this  I'll see him back in 4-6 weeks  He will now be on 5-FU and leucovorin with no Neulasta and no oxaliplatin  Goals and Barriers:  Current Goal:  Prolong Survival from Metastatic gastroesophageal cancer  Barriers: None  Patient's Capacity to Self Care:  Patient able to self care  Portions of the record may have been created with voice recognition software   Occasional wrong word or "sound a like" substitutions may have occurred due to the inherent limitations of voice recognition software   Read the chart carefully and recognize, using context, where substitutions have occurred

## 2019-10-21 ENCOUNTER — OFFICE VISIT (OUTPATIENT)
Dept: PHYSICAL THERAPY | Facility: CLINIC | Age: 64
End: 2019-10-21
Payer: MEDICARE

## 2019-10-21 DIAGNOSIS — R53.83 DECREASED STAMINA: Primary | ICD-10-CM

## 2019-10-21 DIAGNOSIS — R53.83 OTHER FATIGUE: ICD-10-CM

## 2019-10-21 DIAGNOSIS — Z74.09 IMPAIRED FUNCTIONAL MOBILITY, BALANCE, AND ENDURANCE: ICD-10-CM

## 2019-10-21 DIAGNOSIS — R53.1 GENERALIZED WEAKNESS: ICD-10-CM

## 2019-10-21 PROCEDURE — 97112 NEUROMUSCULAR REEDUCATION: CPT | Performed by: PHYSICAL THERAPIST

## 2019-10-21 PROCEDURE — 97530 THERAPEUTIC ACTIVITIES: CPT | Performed by: PHYSICAL THERAPIST

## 2019-10-21 PROCEDURE — 97110 THERAPEUTIC EXERCISES: CPT | Performed by: PHYSICAL THERAPIST

## 2019-10-21 NOTE — PROGRESS NOTES
Daily Note     Today's date: 10/21/2019  Patient name: Kerry Uriostegui  : 1955  MRN: [id number]  Referring provider: Courtney Rowe MD  Dx:   Encounter Diagnosis     ICD-10-CM    1  Decreased stamina R53 83    2  Generalized weakness R53 1    3  Impaired functional mobility, balance, and endurance Z74 09    4  Other fatigue R53 83                   Subjective: reports feeling ok just a little tired as usual        Objective: See treatment diary below    3 minutes active and 2 minute rest break   Floor to ceiling 10 sec hold   Side to side 10 sec hold    2minute active 3 minute rest  Froward step   Lateral step  Backward step     Assessment: Tolerated treatment well with use of circuit training with active exercise and rest to avoid over fatigue  Increase in rest break due to increase fatigue after first 2 sessions  Review 1st two and other 3 exercises in packet  Plan: Continue per plan of care  Precautions:  has a past medical history of Dehydration (2019), Esophageal cancer ([facility] ), Hypertension, Nausea (2019), Psychiatric disorder, and Recovering alcoholic ([facility] )

## 2019-10-22 ENCOUNTER — HOSPITAL ENCOUNTER (OUTPATIENT)
Dept: INFUSION CENTER | Facility: HOSPITAL | Age: 64
Discharge: HOME | End: 2019-10-22
Payer: MEDICARE

## 2019-10-22 VITALS
BODY MASS INDEX: 27.13 KG/M2 | WEIGHT: 183.2 LBS | DIASTOLIC BLOOD PRESSURE: 76 MMHG | SYSTOLIC BLOOD PRESSURE: 140 MMHG | OXYGEN SATURATION: 99 % | TEMPERATURE: 97 F | HEIGHT: 69 IN | HEART RATE: 70 BPM | RESPIRATION RATE: 16 BRPM

## 2019-10-22 DIAGNOSIS — E86.0 DEHYDRATION: ICD-10-CM

## 2019-10-22 DIAGNOSIS — C15.5 MALIGNANT NEOPLASM OF LOWER THIRD OF ESOPHAGUS (HCC): Primary | ICD-10-CM

## 2019-10-22 DIAGNOSIS — C16.0 MALIGNANT NEOPLASM OF CARDIA OF STOMACH (HCC): ICD-10-CM

## 2019-10-22 DIAGNOSIS — R11.0 NAUSEA: ICD-10-CM

## 2019-10-22 PROCEDURE — 96367 TX/PROPH/DG ADDL SEQ IV INF: CPT

## 2019-10-22 PROCEDURE — G0498 CHEMO EXTEND IV INFUS W/PUMP: HCPCS

## 2019-10-22 PROCEDURE — 96366 THER/PROPH/DIAG IV INF ADDON: CPT

## 2019-10-22 PROCEDURE — 96409 CHEMO IV PUSH SNGL DRUG: CPT

## 2019-10-22 RX ORDER — FLUOROURACIL 50 MG/ML
400 INJECTION, SOLUTION INTRAVENOUS ONCE
Status: COMPLETED | OUTPATIENT
Start: 2019-10-22 | End: 2019-10-22

## 2019-10-22 RX ORDER — DEXTROSE MONOHYDRATE 50 MG/ML
20 INJECTION, SOLUTION INTRAVENOUS ONCE
Status: DISCONTINUED | OUTPATIENT
Start: 2019-10-22 | End: 2019-10-25 | Stop reason: HOSPADM

## 2019-10-22 RX ORDER — SODIUM CHLORIDE 9 MG/ML
20 INJECTION, SOLUTION INTRAVENOUS ONCE AS NEEDED
Status: DISCONTINUED | OUTPATIENT
Start: 2019-10-22 | End: 2019-10-25 | Stop reason: HOSPADM

## 2019-10-22 RX ADMIN — SODIUM CHLORIDE 20 ML/HR: 0.9 INJECTION, SOLUTION INTRAVENOUS at 09:27

## 2019-10-22 RX ADMIN — DEXAMETHASONE SODIUM PHOSPHATE: 10 INJECTION, SOLUTION INTRAMUSCULAR; INTRAVENOUS at 09:27

## 2019-10-22 RX ADMIN — FLUOROURACIL 795 MG: 50 INJECTION, SOLUTION INTRAVENOUS at 12:44

## 2019-10-22 RX ADMIN — LEUCOVORIN CALCIUM 800 MG: 200 INJECTION, POWDER, LYOPHILIZED, FOR SUSPENSION INTRAMUSCULAR; INTRAVENOUS at 10:34

## 2019-10-24 ENCOUNTER — HOSPITAL ENCOUNTER (OUTPATIENT)
Dept: INFUSION CENTER | Facility: HOSPITAL | Age: 64
Discharge: HOME | End: 2019-10-24

## 2019-10-24 VITALS
HEART RATE: 70 BPM | TEMPERATURE: 97.4 F | RESPIRATION RATE: 18 BRPM | SYSTOLIC BLOOD PRESSURE: 130 MMHG | DIASTOLIC BLOOD PRESSURE: 78 MMHG | OXYGEN SATURATION: 99 %

## 2019-10-24 DIAGNOSIS — R11.0 NAUSEA: ICD-10-CM

## 2019-10-24 DIAGNOSIS — C15.5 MALIGNANT NEOPLASM OF LOWER THIRD OF ESOPHAGUS (HCC): ICD-10-CM

## 2019-10-24 DIAGNOSIS — E86.0 DEHYDRATION: ICD-10-CM

## 2019-10-24 DIAGNOSIS — C16.0 MALIGNANT NEOPLASM OF CARDIA OF STOMACH (HCC): ICD-10-CM

## 2019-10-25 ENCOUNTER — OFFICE VISIT (OUTPATIENT)
Dept: PHYSICAL THERAPY | Facility: CLINIC | Age: 64
End: 2019-10-25
Payer: MEDICARE

## 2019-10-25 DIAGNOSIS — R53.83 OTHER FATIGUE: ICD-10-CM

## 2019-10-25 DIAGNOSIS — Z74.09 IMPAIRED FUNCTIONAL MOBILITY, BALANCE, AND ENDURANCE: ICD-10-CM

## 2019-10-25 DIAGNOSIS — R53.83 DECREASED STAMINA: Primary | ICD-10-CM

## 2019-10-25 DIAGNOSIS — R53.1 GENERALIZED WEAKNESS: ICD-10-CM

## 2019-10-25 PROCEDURE — 97530 THERAPEUTIC ACTIVITIES: CPT | Performed by: PHYSICAL THERAPIST

## 2019-10-25 PROCEDURE — 97110 THERAPEUTIC EXERCISES: CPT | Performed by: PHYSICAL THERAPIST

## 2019-10-25 PROCEDURE — 97112 NEUROMUSCULAR REEDUCATION: CPT | Performed by: PHYSICAL THERAPIST

## 2019-10-25 NOTE — PROGRESS NOTES
Daily Note     Today's date: 10/25/2019  Patient name: Cathi Diaz  : 1955  MRN: [id number]  Referring provider: Gene Sims MD  Dx:   Encounter Diagnosis     ICD-10-CM    1  Decreased stamina R53 83    2  Generalized weakness R53 1    3  Impaired functional mobility, balance, and endurance Z74 09    4  Other fatigue R53 83                   Subjective: Notes being a little sore but felt better over all  Not over fatigued  Resting and taking names now  Objective: See treatment diary below    3 minutes active and 2 minute rest break   Floor to ceiling 10 sec hold   Side to side 10 sec hold    2 minute active 3 minute rest  Rocking 1 minute R leg forward and 1 minute L leg forward   Twist 1 minute R leg forward and 1 minute L leg forward   Sit to stand 2 minutes active self paced    Assessment: Tolerated treatment well with use of circuit training with active exercise and rest to avoid over fatigue  Increase in rest break due to increase fatigue after first 2 sessions  Reviewed HEP and updated with performance very other day switch the last 3 exercises  Plan: Continue per plan of care  Precautions:  has a past medical history of Dehydration (2019), Esophageal cancer ([facility] ), Hypertension, Nausea (2019), Psychiatric disorder, and Recovering alcoholic ([facility]

## 2019-10-28 ENCOUNTER — OFFICE VISIT (OUTPATIENT)
Dept: PHYSICAL THERAPY | Facility: CLINIC | Age: 64
End: 2019-10-28
Payer: MEDICARE

## 2019-10-28 DIAGNOSIS — R53.83 DECREASED STAMINA: ICD-10-CM

## 2019-10-28 DIAGNOSIS — R53.1 GENERALIZED WEAKNESS: ICD-10-CM

## 2019-10-28 DIAGNOSIS — Z74.09 IMPAIRED FUNCTIONAL MOBILITY, BALANCE, AND ENDURANCE: Primary | ICD-10-CM

## 2019-10-28 PROCEDURE — 97530 THERAPEUTIC ACTIVITIES: CPT | Performed by: PHYSICAL THERAPIST

## 2019-10-28 PROCEDURE — 97110 THERAPEUTIC EXERCISES: CPT | Performed by: PHYSICAL THERAPIST

## 2019-10-28 NOTE — PROGRESS NOTES
Daily Note     Today's date: 10/28/2019  Patient name: Joey Watkins  : 1955  MRN: [id number]  Referring provider: Terra Springer MD  Dx:   Encounter Diagnosis     ICD-10-CM    1  Impaired functional mobility, balance, and endurance Z74 09    2  Generalized weakness R53 1    3  Decreased stamina R53 83        Start Time: 1240  Stop Time: 1320  Total time in clinic (min): 40 minutes    Subjective: [employer] reports feeling tired and dizzy today (1/10), which he attributes to his chemo treatments (most recent was last Tuesday)  He states that he felt good following his PT session Friday  Objective: See treatment diary below    3 minutes active and 2 minute rest break   Floor to ceiling 10 sec hold   Side to side 10 sec hold    2minute active 3 minute rest  Froward step - alternating steps  Lateral step - alternating steps   Backward step - alternating steps     Assessment: Tolerated treatment well with use of circuit training with active exercise and rest to avoid over fatigue  [employer] demonstrated a tiredness throughout and dizziness that reached 3/10 but dissipated with the 2 minute rests  He was not able to tolerate the full 2 minutes of forward stepping due to fatigue and an onset of shoulder pain  This required 4 minutes of rest to alleviate, but [employer] was then able to complete 2 minutes of side stepping  His dizziness reached 2/10 afterwards, but returned to 1/10 with 1 minute of rest  After backwards stepping, his dizziness reached 3/10, and dissipated within the 2 minute rest  [employer] demonstrated slight memory lapses with exercise protocol/techniques but was able to complete with visual and/or verbal cueing  Increasing rest breaks would be beneficial to assist in reducing fatigue  [employer] said he enjoyed today's session and was happy he could feel his muscles working  Plan: Continue per plan of care        Precautions:  has a past medical history of Dehydration (2019), Esophageal cancer ([facility] ), Hypertension, Nausea (8/8/2019), Psychiatric disorder, and Recovering alcoholic ([facility] )

## 2019-10-29 RX ORDER — FLUOROURACIL 50 MG/ML
400 INJECTION, SOLUTION INTRAVENOUS ONCE
Status: CANCELLED | OUTPATIENT
Start: 2019-11-05

## 2019-10-29 RX ORDER — SODIUM CHLORIDE 9 MG/ML
20 INJECTION, SOLUTION INTRAVENOUS ONCE AS NEEDED
Status: CANCELLED | OUTPATIENT
Start: 2019-11-05

## 2019-10-29 RX ORDER — DEXTROSE MONOHYDRATE 50 MG/ML
20 INJECTION, SOLUTION INTRAVENOUS ONCE
Status: CANCELLED | OUTPATIENT
Start: 2019-11-05

## 2019-10-30 DIAGNOSIS — E86.0 DEHYDRATION: ICD-10-CM

## 2019-10-30 DIAGNOSIS — C16.0 MALIGNANT NEOPLASM OF CARDIA OF STOMACH (HCC): ICD-10-CM

## 2019-10-30 DIAGNOSIS — R11.0 NAUSEA: Primary | ICD-10-CM

## 2019-10-30 DIAGNOSIS — C15.5 MALIGNANT NEOPLASM OF LOWER THIRD OF ESOPHAGUS (HCC): ICD-10-CM

## 2019-11-01 ENCOUNTER — APPOINTMENT (OUTPATIENT)
Dept: LAB | Facility: HOSPITAL | Age: 64
End: 2019-11-01
Payer: MEDICARE

## 2019-11-01 ENCOUNTER — OFFICE VISIT (OUTPATIENT)
Dept: PHYSICAL THERAPY | Facility: CLINIC | Age: 64
End: 2019-11-01
Payer: MEDICARE

## 2019-11-01 DIAGNOSIS — R11.0 NAUSEA: ICD-10-CM

## 2019-11-01 DIAGNOSIS — Z74.09 IMPAIRED FUNCTIONAL MOBILITY, BALANCE, AND ENDURANCE: Primary | ICD-10-CM

## 2019-11-01 DIAGNOSIS — E86.0 DEHYDRATION: ICD-10-CM

## 2019-11-01 DIAGNOSIS — C16.0 MALIGNANT NEOPLASM OF CARDIA OF STOMACH (HCC): ICD-10-CM

## 2019-11-01 DIAGNOSIS — R53.83 DECREASED STAMINA: ICD-10-CM

## 2019-11-01 DIAGNOSIS — C15.5 MALIGNANT NEOPLASM OF LOWER THIRD OF ESOPHAGUS (HCC): ICD-10-CM

## 2019-11-01 DIAGNOSIS — R53.1 GENERALIZED WEAKNESS: ICD-10-CM

## 2019-11-01 LAB
ALBUMIN SERPL BCP-MCNC: 3.1 G/DL (ref 3.5–5)
ALP SERPL-CCNC: 105 U/L (ref 46–116)
ALT SERPL W P-5'-P-CCNC: 36 U/L (ref 12–78)
ANION GAP SERPL CALCULATED.3IONS-SCNC: 10 MMOL/L (ref 4–13)
AST SERPL W P-5'-P-CCNC: 22 U/L (ref 5–45)
BASOPHILS # BLD AUTO: 0.11 THOUSANDS/ΜL (ref 0–0.1)
BASOPHILS NFR BLD AUTO: 1 % (ref 0–1)
BILIRUB SERPL-MCNC: 0.7 MG/DL (ref 0.2–1)
BUN SERPL-MCNC: 15 MG/DL (ref 5–25)
CALCIUM SERPL-MCNC: 9 MG/DL (ref 8.3–10.1)
CHLORIDE SERPL-SCNC: 102 MMOL/L (ref 100–108)
CO2 SERPL-SCNC: 27 MMOL/L (ref 21–32)
CREAT SERPL-MCNC: 1 MG/DL (ref 0.6–1.3)
EOSINOPHIL # BLD AUTO: 0.09 THOUSAND/ΜL (ref 0–0.61)
EOSINOPHIL NFR BLD AUTO: 1 % (ref 0–6)
ERYTHROCYTE [DISTWIDTH] IN BLOOD BY AUTOMATED COUNT: 23.7 % (ref 11.6–15.1)
GFR SERPL CREATININE-BSD FRML MDRD: 78 ML/MIN/1.73SQ M
GLUCOSE SERPL-MCNC: 97 MG/DL (ref 65–140)
HCT VFR BLD AUTO: 42.6 % (ref 36.5–49.3)
HGB BLD-MCNC: 13.2 G/DL (ref 12–17)
IMM GRANULOCYTES # BLD AUTO: 0.1 THOUSAND/UL (ref 0–0.2)
IMM GRANULOCYTES NFR BLD AUTO: 1 % (ref 0–2)
LYMPHOCYTES # BLD AUTO: 5.01 THOUSANDS/ΜL (ref 0.6–4.47)
LYMPHOCYTES NFR BLD AUTO: 42 % (ref 14–44)
MCH RBC QN AUTO: 30.8 PG (ref 26.8–34.3)
MCHC RBC AUTO-ENTMCNC: 31 G/DL (ref 31.4–37.4)
MCV RBC AUTO: 99 FL (ref 82–98)
MONOCYTES # BLD AUTO: 1.48 THOUSAND/ΜL (ref 0.17–1.22)
MONOCYTES NFR BLD AUTO: 13 % (ref 4–12)
NEUTROPHILS # BLD AUTO: 4.99 THOUSANDS/ΜL (ref 1.85–7.62)
NEUTS SEG NFR BLD AUTO: 42 % (ref 43–75)
NRBC BLD AUTO-RTO: 0 /100 WBCS
PLATELET # BLD AUTO: 303 THOUSANDS/UL (ref 149–390)
PMV BLD AUTO: 10.5 FL (ref 8.9–12.7)
POTASSIUM SERPL-SCNC: 3.8 MMOL/L (ref 3.5–5.3)
PROT SERPL-MCNC: 7.2 G/DL (ref 6.4–8.2)
RBC # BLD AUTO: 4.29 MILLION/UL (ref 3.88–5.62)
SODIUM SERPL-SCNC: 139 MMOL/L (ref 136–145)
WBC # BLD AUTO: 11.78 THOUSAND/UL (ref 4.31–10.16)

## 2019-11-01 PROCEDURE — 85025 COMPLETE CBC W/AUTO DIFF WBC: CPT

## 2019-11-01 PROCEDURE — 97530 THERAPEUTIC ACTIVITIES: CPT | Performed by: PHYSICAL THERAPIST

## 2019-11-01 PROCEDURE — 80053 COMPREHEN METABOLIC PANEL: CPT

## 2019-11-01 PROCEDURE — 36415 COLL VENOUS BLD VENIPUNCTURE: CPT

## 2019-11-01 PROCEDURE — 97112 NEUROMUSCULAR REEDUCATION: CPT | Performed by: PHYSICAL THERAPIST

## 2019-11-01 NOTE — PROGRESS NOTES
Daily Note     Today's date: 2019  Patient name: Paula Keller  : 1955  MRN: [id number]  Referring provider: Jenny Trevino MD  Dx:   Encounter Diagnosis     ICD-10-CM    1  Impaired functional mobility, balance, and endurance Z74 09    2  Generalized weakness R53 1    3  Decreased stamina R53 83        Start Time: 1200  Stop Time: 1245  Total time in clinic (min): 45 minutes    Subjective: Elaine Weinstein reports feeling more tired than usual today, as he went to visit his daughter yesterday and the long drive wore him out a little  Dizziness rated 5/10  Objective: See treatment diary below    3 minutes active and 2 minute rest break   Floor to ceiling 10 sec hold   Side to side 10 sec hold    2minute active 3 minute rest  Froward step - alternating steps  Lateral step - alternating steps   Backward step - alternating steps     Assessment: Franklin tolerated treatment well with use of circuit training with active exercise and rest to avoid over fatigue  Despite complaints of increased fatigue and dizziness, Franklin's symptoms did not increase with activity, and even decreased to to 3/10 by the conclusion of therpay  He demonstrated improved flexibility/ROM as 3 minutes of seated activity progressed  Additionally, he was able to tolerate the full 2 minutes of standing exercises, reporting that the exercises felt good and he felt better overall afterwards  Elaine Weinstein would benefit from continued skill PT to increase his cardiovascular endurance as well as muscular strength  Plan: Continue per plan of care  Incorporate UE and core strengthening exercises - bicep curls, TrA, bridging, core rotation with med ball     Precautions:  has a past medical history of Dehydration (2019), [facility] ), Hypertension, Nausea (2019), Psychiatric disorder, and Recovering alcoholic ([facility] )

## 2019-11-04 ENCOUNTER — OFFICE VISIT (OUTPATIENT)
Dept: PHYSICAL THERAPY | Facility: CLINIC | Age: 64
End: 2019-11-04
Payer: MEDICARE

## 2019-11-04 DIAGNOSIS — R53.1 GENERALIZED WEAKNESS: ICD-10-CM

## 2019-11-04 DIAGNOSIS — R53.83 DECREASED STAMINA: ICD-10-CM

## 2019-11-04 DIAGNOSIS — Z74.09 IMPAIRED FUNCTIONAL MOBILITY, BALANCE, AND ENDURANCE: Primary | ICD-10-CM

## 2019-11-04 DIAGNOSIS — R53.83 OTHER FATIGUE: ICD-10-CM

## 2019-11-04 PROCEDURE — 97110 THERAPEUTIC EXERCISES: CPT

## 2019-11-04 NOTE — PROGRESS NOTES
Daily Note     Today's date: 2019  Patient name: Charlane Pallas  : 1955  MRN: [id number]  Referring provider: Lilli Yuan MD  Dx:   Encounter Diagnosis     ICD-10-CM    1  Impaired functional mobility, balance, and endurance Z74 09    2  Generalized weakness R53 1    3  Decreased stamina R53 83    4  Other fatigue R53 83        Start Time: 8299  Stop Time: 1343  Total time in clinic (min): 36 minutes    Subjective: Mik Smalls reports feeling more tired than usual today, as he went to visit his daughter yesterday and the long drive wore him out a little  Dizziness rated 5/10  Objective: See treatment diary below    3 minutes active and 2 minute rest break     Floor to ceiling 10 sec hold  Side to side 10 sec hold  Forward step - alternating steps  Lateral step - alternating steps   Backward step - alternating steps     Assessment: Mik Smalls tolerated treatment well with use of circuit training with active exercise and rest to avoid over fatigue  Able to maintain the 3 minutes on and 2 minutes off for the duration of the treatment today, patient fatigued post session but showed adequate amplitude  Patient to be added with upper extremity exercises next session  Mik Smalls would benefit from continued skill PT to increase his cardiovascular endurance as well as muscular strength  Plan: Continue per plan of care  Incorporate UE and core strengthening exercises - bicep curls, TrA, bridging, core rotation with med ball     Precautions:  has a past medical history of Dehydration (2019), Esophageal cancer ([facility] ), Hypertension, Nausea (2019), Psychiatric disorder, and Recovering alcoholic ([facility] )

## 2019-11-05 ENCOUNTER — HOSPITAL ENCOUNTER (OUTPATIENT)
Dept: INFUSION CENTER | Facility: HOSPITAL | Age: 64
Discharge: HOME | End: 2019-11-05
Payer: MEDICARE

## 2019-11-05 VITALS
WEIGHT: 190.04 LBS | BODY MASS INDEX: 28.15 KG/M2 | RESPIRATION RATE: 20 BRPM | HEIGHT: 69 IN | HEART RATE: 67 BPM | DIASTOLIC BLOOD PRESSURE: 60 MMHG | SYSTOLIC BLOOD PRESSURE: 122 MMHG | OXYGEN SATURATION: 98 % | TEMPERATURE: 97.1 F

## 2019-11-05 DIAGNOSIS — E86.0 DEHYDRATION: ICD-10-CM

## 2019-11-05 DIAGNOSIS — C15.5 MALIGNANT NEOPLASM OF LOWER THIRD OF ESOPHAGUS (HCC): Primary | ICD-10-CM

## 2019-11-05 DIAGNOSIS — R11.0 NAUSEA: ICD-10-CM

## 2019-11-05 DIAGNOSIS — C16.0 MALIGNANT NEOPLASM OF CARDIA OF STOMACH (HCC): ICD-10-CM

## 2019-11-05 PROCEDURE — 96409 CHEMO IV PUSH SNGL DRUG: CPT

## 2019-11-05 PROCEDURE — G0498 CHEMO EXTEND IV INFUS W/PUMP: HCPCS

## 2019-11-05 PROCEDURE — 36593 DECLOT VASCULAR DEVICE: CPT

## 2019-11-05 PROCEDURE — 96366 THER/PROPH/DIAG IV INF ADDON: CPT

## 2019-11-05 PROCEDURE — 96367 TX/PROPH/DG ADDL SEQ IV INF: CPT

## 2019-11-05 RX ORDER — FLUOROURACIL 50 MG/ML
400 INJECTION, SOLUTION INTRAVENOUS ONCE
Status: COMPLETED | OUTPATIENT
Start: 2019-11-05 | End: 2019-11-05

## 2019-11-05 RX ORDER — SODIUM CHLORIDE 9 MG/ML
20 INJECTION, SOLUTION INTRAVENOUS ONCE AS NEEDED
Status: DISCONTINUED | OUTPATIENT
Start: 2019-11-05 | End: 2019-11-08 | Stop reason: HOSPADM

## 2019-11-05 RX ADMIN — LEUCOVORIN CALCIUM 800 MG: 200 INJECTION, POWDER, LYOPHILIZED, FOR SOLUTION INTRAMUSCULAR; INTRAVENOUS at 11:22

## 2019-11-05 RX ADMIN — ALTEPLASE 2 MG: 2.2 INJECTION, POWDER, LYOPHILIZED, FOR SOLUTION INTRAVENOUS at 09:45

## 2019-11-05 RX ADMIN — SODIUM CHLORIDE 20 ML/HR: 0.9 INJECTION, SOLUTION INTRAVENOUS at 10:28

## 2019-11-05 RX ADMIN — FLUOROURACIL 795 MG: 50 INJECTION, SOLUTION INTRAVENOUS at 13:29

## 2019-11-05 RX ADMIN — DEXAMETHASONE SODIUM PHOSPHATE: 10 INJECTION, SOLUTION INTRAMUSCULAR; INTRAVENOUS at 10:28

## 2019-11-05 NOTE — PLAN OF CARE
Problem: Potential for Falls  Goal: Patient will remain free of falls  Description  INTERVENTIONS:  - Assess patient frequently for physical needs  -  Identify cognitive and physical deficits and behaviors that affect risk of falls    -  [place] fall precautions as indicated by assessment   - Educate patient/family on patient safety including physical limitations  - Instruct patient to call for assistance with activity based on assessment  - Modify environment to reduce risk of injury  - Consider OT/PT consult to assist with strengthening/mobility  Outcome: Progressing

## 2019-11-07 ENCOUNTER — HOSPITAL ENCOUNTER (OUTPATIENT)
Dept: INFUSION CENTER | Facility: HOSPITAL | Age: 64
Discharge: HOME | End: 2019-11-07

## 2019-11-07 VITALS
TEMPERATURE: 97.3 F | RESPIRATION RATE: 20 BRPM | SYSTOLIC BLOOD PRESSURE: 116 MMHG | HEART RATE: 62 BPM | OXYGEN SATURATION: 97 % | DIASTOLIC BLOOD PRESSURE: 77 MMHG

## 2019-11-07 DIAGNOSIS — R11.0 NAUSEA: ICD-10-CM

## 2019-11-07 DIAGNOSIS — E86.0 DEHYDRATION: ICD-10-CM

## 2019-11-07 DIAGNOSIS — C15.5 MALIGNANT NEOPLASM OF LOWER THIRD OF ESOPHAGUS (HCC): Primary | ICD-10-CM

## 2019-11-07 DIAGNOSIS — C16.0 MALIGNANT NEOPLASM OF CARDIA OF STOMACH (HCC): ICD-10-CM

## 2019-11-07 NOTE — PLAN OF CARE
Problem: Potential for Falls  Goal: Patient will remain free of falls  Description  INTERVENTIONS:  - Assess patient frequently for physical needs  -  Identify cognitive and physical deficits and behaviors that affect risk of falls    -  [place] fall precautions as indicated by assessment   - Educate patient/family on patient safety including physical limitations  - Instruct patient to call for assistance with activity based on assessment  - Modify environment to reduce risk of injury  Outcome: Progressing

## 2019-11-08 ENCOUNTER — APPOINTMENT (OUTPATIENT)
Dept: PHYSICAL THERAPY | Facility: CLINIC | Age: 64
End: 2019-11-08
Payer: MEDICARE

## 2019-11-11 ENCOUNTER — OFFICE VISIT (OUTPATIENT)
Dept: PHYSICAL THERAPY | Facility: CLINIC | Age: 64
End: 2019-11-11
Payer: MEDICARE

## 2019-11-11 DIAGNOSIS — R53.1 GENERALIZED WEAKNESS: ICD-10-CM

## 2019-11-11 DIAGNOSIS — Z74.09 IMPAIRED FUNCTIONAL MOBILITY, BALANCE, AND ENDURANCE: Primary | ICD-10-CM

## 2019-11-11 DIAGNOSIS — R53.83 DECREASED STAMINA: ICD-10-CM

## 2019-11-11 DIAGNOSIS — R53.83 OTHER FATIGUE: ICD-10-CM

## 2019-11-11 PROCEDURE — 97112 NEUROMUSCULAR REEDUCATION: CPT | Performed by: PHYSICAL THERAPIST

## 2019-11-11 PROCEDURE — 97110 THERAPEUTIC EXERCISES: CPT | Performed by: PHYSICAL THERAPIST

## 2019-11-11 PROCEDURE — 97530 THERAPEUTIC ACTIVITIES: CPT | Performed by: PHYSICAL THERAPIST

## 2019-11-11 NOTE — PROGRESS NOTES
Daily Note     Today's date: 2019  Patient name: Jennifer Love  : 1955  MRN: [id number]  Referring provider: Libertad Bruner MD  Dx:   Encounter Diagnosis     ICD-10-CM    1  Impaired functional mobility, balance, and endurance Z74 09    2  Generalized weakness R53 1    3  Decreased stamina R53 83    4  Other fatigue R53 83        Start Time: 1300  Stop Time: 1345  Total time in clinic (min): 45 minutes    Subjective: Marimar Marti reports feeling dizzy upon arrival to therapy, but it is no worse than usual      Objective: See treatment diary below    2 minutes active and 2 minute rest break  Floor to ceiling 10 sec hold  Side to side 10 sec hold  Forward step - alternating steps  Lateral step - alternating steps   Backward step - alternating steps     Bicep curls with 10 lbs - 10 reps 3x  Core rotation with 10 lb med ball - 25 reps 3x  Bridging - 2 minutes x2    Assessment: Franklin tolerated treatment well with use of circuit training with active exercise and rest  Circuit exercises were reduced to 2 minute durations to avoid fatigue and encourage implementation of strengthening  Marimar Marti was then able to complete multiple sets of bicep curls and core rotations, and demonstrates an ability to improve functional strength with continued performance of these exercises  A two minute rest was still incorporated between sets, and he reported "feeling good" with these exercises  Marimar Marti would benefit from continued skill PT to increase his cardiovascular endurance as well as muscular strength  Plan: Continue per plan of care  Incorporate lifting exercises (picking things up from the floor) to promote functional strength  Precautions:  has a past medical history of Dehydration (2019), Esophageal cancer ([facility] ), Hypertension, Nausea (2019), Psychiatric disorder, and Recovering alcoholic ([facility] )

## 2019-11-13 DIAGNOSIS — E86.0 DEHYDRATION: ICD-10-CM

## 2019-11-13 DIAGNOSIS — R11.0 NAUSEA: Primary | ICD-10-CM

## 2019-11-13 DIAGNOSIS — C15.5 MALIGNANT NEOPLASM OF LOWER THIRD OF ESOPHAGUS (HCC): ICD-10-CM

## 2019-11-13 DIAGNOSIS — C16.0 MALIGNANT NEOPLASM OF CARDIA OF STOMACH (HCC): ICD-10-CM

## 2019-11-13 RX ORDER — DEXTROSE MONOHYDRATE 50 MG/ML
20 INJECTION, SOLUTION INTRAVENOUS ONCE
Status: CANCELLED | OUTPATIENT
Start: 2019-11-19

## 2019-11-13 RX ORDER — FLUOROURACIL 50 MG/ML
400 INJECTION, SOLUTION INTRAVENOUS ONCE
Status: CANCELLED | OUTPATIENT
Start: 2019-11-19

## 2019-11-13 RX ORDER — SODIUM CHLORIDE 9 MG/ML
20 INJECTION, SOLUTION INTRAVENOUS ONCE AS NEEDED
Status: CANCELLED | OUTPATIENT
Start: 2019-11-19

## 2019-11-14 NOTE — PROGRESS NOTES
Outpatient Oncology Nutrition Consult  Type of Consult: Follow Up  Care Location: [place] [id number] with patient and wife Ciera Diaz) at [facility]  Buford Lipoma goes by Lexx Chaparro"  Nutrition Assessment:  PMH: PTSD, HTN, major depression, OCD, anxiety  Oncology Diagnosis & Treatments: Stage IV metastatic gastroesophageal cancer  Undergoing chemotherapy in the palliative setting to help improve survival (modified FOLFOX 6: 8/1/19-10/18/19; neulasta and oxaliplatin discontinued 10/18/19; Now receiving 5FU and leucovorin)       Malignant neoplasm of lower third of esophagus (HCC)    7/26/2019 Initial Diagnosis     Malignant neoplasm of lower third of esophagus ([facility] )      8/1/2019 -  Chemotherapy     fluorouracil (ADRUCIL) injection 750 mg, 400 mg/m2 = 750 mg, Intravenous, Once, 9 of 12 cycles  Administration: 750 mg (8/1/2019), 750 mg (8/13/2019), 750 mg (8/27/2019), 750 mg (9/10/2019), 750 mg (9/24/2019), 750 mg (10/8/2019), 795 mg (10/22/2019), 795 mg (11/5/2019)  pegfilgrastim (NEULASTA ONPRO) subcutaneous injection kit 6 mg, 6 mg, Subcutaneous, Once, 2 of 2 cycles  Administration: 6 mg (8/3/2019), 6 mg (8/15/2019)  leucovorin 750 mg in dextrose 5 % 250 mL IVPB, 748 mg, Intravenous, Once, 9 of 12 cycles  Administration: 750 mg (8/1/2019), 750 mg (8/13/2019), 750 mg (8/27/2019), 750 mg (9/10/2019), 750 mg (9/24/2019), 750 mg (10/8/2019), 800 mg (10/22/2019), 800 mg (11/5/2019)  oxaliplatin (ELOXATIN) 158 95 mg in dextrose 5 % 250 mL chemo infusion, 85 mg/m2 = 158 95 mg, Intravenous, Once, 6 of 6 cycles  Administration: 158 95 mg (8/1/2019), 158 95 mg (8/13/2019), 158 95 mg (8/27/2019), 158 95 mg (9/10/2019), 158 95 mg (9/24/2019), 158 95 mg (10/8/2019)        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 )      8/1/2019 -  Chemotherapy     fluorouracil (ADRUCIL) injection 750 mg, 400 mg/m2 = 750 mg, Intravenous, Once, 9 of 12 cycles  Administration: 750 mg (8/1/2019), 750 mg (8/13/2019), 750 mg (8/27/2019), 750 mg (9/10/2019), 750 mg (9/24/2019), 750 mg (10/8/2019), 795 mg (10/22/2019), 795 mg (11/5/2019)  pegfilgrastim (NEULASTA ONPRO) subcutaneous injection kit 6 mg, 6 mg, Subcutaneous, Once, 2 of 2 cycles  Administration: 6 mg (8/3/2019), 6 mg (8/15/2019)  leucovorin 750 mg in dextrose 5 % 250 mL IVPB, 748 mg, Intravenous, Once, 9 of 12 cycles  Administration: 750 mg (8/1/2019), 750 mg (8/13/2019), 750 mg (8/27/2019), 750 mg (9/10/2019), 750 mg (9/24/2019), 750 mg (10/8/2019), 800 mg (10/22/2019), 800 mg (11/5/2019)  oxaliplatin (ELOXATIN) 158 95 mg in dextrose 5 % 250 mL chemo infusion, 85 mg/m2 = 158 95 mg, Intravenous, Once, 6 of 6 cycles  Administration: 158 95 mg (8/1/2019), 158 95 mg (8/13/2019), 158 95 mg (8/27/2019), 158 95 mg (9/10/2019), 158 95 mg (9/24/2019), 158 95 mg (10/8/2019)       Past Medical History:   Diagnosis Date    Dehydration 8/8/2019    Esophageal cancer ([facility] )     Hypertension     Nausea 8/8/2019    Psychiatric disorder     Recovering alcoholic ([facility] )      Past Surgical History:   Procedure Laterality Date    APPENDECTOMY      IR PORT PLACEMENT  7/31/2019       Review of Medications:   Vitamins, Supplements and Herbals: no    Current Outpatient Medications:     bisoprolol (ZEBETA) 10 MG tablet, Take 0 5 tablets (5 mg total) by mouth daily, Disp: , Rfl:     diazepam (VALIUM) 5 mg tablet, Take 1 tablet (5 mg total) by mouth 2 (two) times a day, Disp: 60 tablet, Rfl: 0    lidocaine-prilocaine (EMLA) cream, Apply topically as needed for mild pain, Disp: 30 g, Rfl: 0    metoclopramide (REGLAN) 5 mg/5 mL oral solution, Take 10 mg by mouth 4 (four) times a day as needed for nausea, Disp: , Rfl:     ondansetron (ZOFRAN-ODT) 8 mg disintegrating tablet, Take 1 tablet (8 mg total) by mouth every 8 (eight) hours as needed for nausea or vomiting (If Compazine is not effective), Disp: 30 tablet, Rfl: 0   oxyCODONE (ROXICODONE) 5 mg/5 mL solution, Take 5 mg by mouth every 4 (four) hours as needed for pain, Disp: , Rfl:     pantoprazole (PROTONIX) 40 mg tablet, Take 40 mg by mouth daily before breakfast, Disp: , Rfl:     prochlorperazine (COMPAZINE) 10 mg tablet, Take 10 mg by mouth every 6 (six) hours as needed for nausea or vomiting, Disp: , Rfl:     venlafaxine (EFFEXOR-XR) 150 mg 24 hr capsule, Take 1 capsule (150 mg total) by mouth daily, Disp: 30 capsule, Rfl: 0  No current facility-administered medications for this visit       Facility-Administered Medications Ordered in Other Visits:     dextrose 5 % infusion, 20 mL/hr, Intravenous, Once, Blanca Fleming MD    fluorouracil (ADRUCIL) injection 795 mg, 400 mg/m2 (Treatment Plan Recorded), Intravenous, Once, Blanca Fleming MD    leucovorin 800 mg in dextrose 5 % 250 mL IVPB, 800 mg, Intravenous, Once, Blanca Fleming MD    ondansetron (ZOFRAN) 16 mg, dexamethasone (DECADRON) 10 mg in sodium chloride 0 9 % 50 mL IVPB, , Intravenous, Once, Blanca Fleming MD, Last Rate: 150 mL/hr at 11/19/19 0931    sodium chloride 0 9 % infusion, 20 mL/hr, Intravenous, Once PRN, Blanca Fleming MD, Last Rate: 20 mL/hr at 11/19/19 0931, 20 mL/hr at 11/19/19 0931    Most Recent Lab Results:   Lab Results   Component Value Date    WBC 8 78 11/15/2019    ALT 30 11/15/2019    AST 25 11/15/2019    K 4 0 11/15/2019    K 3 8 11/01/2019    BUN 17 11/15/2019    BUN 15 11/01/2019    CREATININE 0 94 11/15/2019    CREATININE 1 00 11/01/2019    CALCIUM 9 2 11/15/2019       Anthropometric Measurements:   Height: 69 5"  Ht Readings from Last 1 Encounters:   11/19/19 5' 9 02" (1 753 m)     -Weight History:   Usual Weight: 198-212# (last weighed this in May 2019)  Ideal Body Weight: 163#  Wt Readings from Last 20 Encounters:   11/19/19 86 7 kg (191 lb 2 2 oz)   11/05/19 86 2 kg (190 lb 0 6 oz)   10/22/19 83 1 kg (183 lb 3 2 oz)   10/18/19 83 5 kg (184 lb)   10/08/19 80 3 kg (177 lb 0 5 oz)   10/01/19 76 6 kg (168 lb 12 8 oz)   09/24/19 75 2 kg (165 lb 12 6 oz)   09/20/19 74 6 kg (164 lb 8 oz)   09/10/19 71 5 kg (157 lb 10 1 oz)   09/03/19 69 kg (152 lb 3 2 oz)   08/27/19 69 3 kg (152 lb 12 8 oz)   08/23/19 66 2 kg (146 lb)   08/13/19 67 1 kg (147 lb 14 9 oz)   08/07/19 69 1 kg (152 lb 5 oz)   08/01/19 70 9 kg (156 lb 4 9 oz)   07/31/19 71 2 kg (157 lb)   07/26/19 71 2 kg (157 lb)   06/14/18 88 5 kg (195 lb)   06/02/18 86 2 kg (190 lb)     Weight Changes: gain of 7 1#/ (3 9%) in 1 month (not significant) and gain of 43  2#/ (29 2%) in 3 months (significant); encouraged wt stability moving forward, Pt's goal is to weigh 185#  Estimated body mass index is 28 21 kg/m² as calculated from the following:    Height as of an earlier encounter on 11/19/19: 5' 9 02" (1 753 m)  Weight as of an earlier encounter on 11/19/19: 86 7 kg (191 lb 2 2 oz)  Nutrition-Focused Physical Findings: none observed       Food/Nutrition-Related History & Client/Social History:    Current Nutrition Impact Symptoms:  [x] Nausea - 1x the other day [] Reduced Appetite [] Acid Reflux    [] Vomiting  [] Unintended Wt Loss  [] Malabsorption    [] Diarrhea  [] Unintended Wt Gain  [] Dumping Syndrome    [] Constipation - BM's 1-2x/day and normal consistency, no longer taking Miralax [] Thick Mucous/Secretions  [] Abdominal Pain    [] Dysgeusia (Altered Taste)  [x] Xerostomia (Dry Mouth) - "comes and goes", has not been using Biotene, now using alcohol-free Crest mouthwash [] Gas    [] Dysosmia (Altered Smell)  [] Thrush  [] Difficulty Chewing    [x] Oral Mucositis (Sore Mouth) - mild burning on inside of cheeks for a few days after chemo; suggested using bs/sw rinses the week of chemo [x] Fatigue  -significant, stamina is slowly returning [] Other:   [] Odynophagia  [] Esophagitis  [] Other:    [] Dysphagia  [] Early Satiety  [] No Problems Eating      Food Allergies: no  Food Intolerances: no   -For Gout, avoids: organ meats, shellfish    Last gout flare was 2 years ago after a liverwurst sandwich  Current Diet: Regular Diet, No Restrictions  Current Nutrition Intake: Increased since last visit  Appetite: Good  Nutrition Route: PO  Meal planning/preparation mainly done by: Self and Spouse/Partner  Oral Care: Brushing BID, alcohol-free Crest Mouthwash; suggested bs/sw rinses the week of chemo  Activity level: Has been active for 2-3 hrs per day  Now doing PT  Social Hx: Quit drinking alcohol 30 years ago  Retied custom   Wife is a teacher (pre-school and Special Ed)    25 Hr Diet Recall:  Breakfast: 1 Boost Very High Calorie; yesterday: 2 eggs over easy and 1 slice of white bread (suggested whole grain bread, high fiber bread, or rye bread)  Lunch: [place] yogurt with raspberry preserves (suggested whole fruit)   Dinner: hamburger (made with 85% beef) without roll and cooked spinach (1 cup); has been having more asparagus, sweet potatoes, cod, [place] broil, fresh fruit  Snacks: 2 sugar coated peppermint leaves, potato chips and dip, Greek yogurt    Beverages: water (16 oz glass x4-5), Boost VHC (8 oz x1), V8 Healthy Greens juice (16 oz x2), regular coffee with half and half (12-14 oz x1); Aloe juice (4 oz x1)  Supplements:    Boost Very High Calorie (8 oz, 530 kcal, 22 g pro) - 1x/day; recommend he discontinue supplement      Estimated Nutrition Needs: (based on 74 1kg/ 163# IBW)  Energy Needs: 6190-8306 kcal/day (35-40 kcal/kg)  Protein Needs: 111-148 grams/day (1 5-2 g/kg)  Fluid Needs: 6898-1415 mL/day (1 mL/kcal) -  oz    Discussion/Summary: Angie Gomez was seen today during his infusion for RD follow up  He is doing very well, continues to gain wt and tolerate all foods without difficulty  His oxaliplatin was discontinued  He has been going to PT and feels that he is gaining some of his strength back  Due to continued wt gain, recommended that he discontinue his oral nutrition supplement usage    Reviewed the importance of a cancer preventative eating pattern and incorporating more high fiber and whole foods  To support muscle maintenance/growth, a healthy immune system, healing, and increased bodily demands for protein, discussed lean sources of protein to include at meals and snacks  Discussed healthy holiday eating  He plans to attend the December Art and Nutrition class series  Recommended that he continue to practice good oral care and incorporate bs/sw rinses the week of chemo to reduce the incidence of mouth sores  Discussed/Reviewed:   · weight management  · indications & use of oral nutrition supplements - discontinue supplements  · high protein foods to include at all meals and snacks chicken breast, fish, low-fat dairy and yogurt, egg whites, beans, nut butter, low-fat cheese  · encouraged eating every 2-3 hours (5-6 small meals/day)  · maintaining proper oral care - use bs/sw rinses the week of chemo  · adequate hydation & tips to increase overall fluid intake  - goal:  oz per day  · recipe suggestions/resources  · a cancer preventative eating pattern as a long-term nutrition goal - choose whole foods such as fruits, vegetables, and whole grains   · Eat within 1 hr of waking up, every if it is something small such as a piece of fruit with peanut butter    All questions and concerns addressed during todays visit  Charly Moyer has RD contact information  Nutrition Diagnosis:  · Increased Nutrient Needs (kcal & pro) related to increased demand for nutrients and disease state as evidenced by cancer dx and pt undergoing tx for cancer        Intervention & Recommendations:  Topics addressed: Nutrition education, Balance/Variety, Meals & Snacks, Meal planning, Choosing high protein meals/snacks, Meal pattern: eating small/frequent meals (every 2-3 hours), Nutrition Symptom Management, Adequate Hydration, Medical Food Supplements, Nutrition-Related Medication Management and Weight Management    Barriers: None  Readiness to change: action  Comprehension: verbalizes understanding  Expected Compliance: good    Materials Provided: Art & Nutrition Class flyer    Monitoring & Evaluation:  Dietitian to Monitor: Food and Nutrition Intake, Nutrtion Impact Symptoms, Body Weight and Biochemical Data     Goals:  · weight stabilization  · adequate nutrition related symptom management  · pt to meet >/=75% estimated nutrition needs daily     · Progress Towards Goals: Progressing and Goal(s) Met    Nutrition Rx & Recommendations:  · Diet: High Calorie, High Protein (for high calorie foods see pages 52-53, and for high protein foods see pages 49-51 in your Eating Hints book)  · Small, frequent meals/snacks may be easier to tolerate than 3 large daily meals  Aim for 5-6 small meals per day (every 2-3 hours)  · Include protein at all meals/snacks  · Stay hydrated by sipping fluids of choice/tolerance throughout the day  · Incorporate physical activity as able/allowed  · Follow proper oral care; Try baking soda/salt water rinse recipe (mix 3/4 tsp salt + 1 tsp baking soda + 1 qt water; rinse with pain water after using) in Eating Hints book (pg 18)  Brush your teeth before/after meals & before bed  · Weigh yourself regularly  If you notice weight loss, make an effort to increase your daily food/calorie intake  If you continue to notice loss after these efforts, reach out to your dietitian to establish a plan to stabilize weight  · Follow a Cancer Preventative Nutrition Pattern: colorful, plant-based, low-fat, avoid added sugars, limit alcohol, include high fiber foods, limit processed meats, limit red meat, choose lean protein sources, use low-fat cooking methods, balance calories with physical activity, avoid excessive weight gain throughout life  · Discontinue oral nutrition supplements (Boost)   · Choose whole foods when possible: fruits, vegetables, whole grains    · Choose lean protein sources: chicken breast, fish, low-fat dairy and yogurt, egg whites, beans, nut butter, low-fat cheese  · Use baking soda/salt water rinses the week of chemo  · Do not skip meals, try to eat within 1 hr of waking up in the morning, even if it is something small      Follow Up Plan: 12/17/19 during chemo at [place]   Recommend Referral to Other Providers: none at this time

## 2019-11-15 ENCOUNTER — OFFICE VISIT (OUTPATIENT)
Dept: PHYSICAL THERAPY | Facility: CLINIC | Age: 64
End: 2019-11-15
Payer: MEDICARE

## 2019-11-15 ENCOUNTER — APPOINTMENT (OUTPATIENT)
Dept: LAB | Facility: HOSPITAL | Age: 64
End: 2019-11-15
Payer: MEDICARE

## 2019-11-15 ENCOUNTER — TELEPHONE (OUTPATIENT)
Dept: HEMATOLOGY ONCOLOGY | Facility: CLINIC | Age: 64
End: 2019-11-15

## 2019-11-15 DIAGNOSIS — R53.1 GENERALIZED WEAKNESS: ICD-10-CM

## 2019-11-15 DIAGNOSIS — R53.83 OTHER FATIGUE: ICD-10-CM

## 2019-11-15 DIAGNOSIS — R53.83 DECREASED STAMINA: ICD-10-CM

## 2019-11-15 DIAGNOSIS — Z74.09 IMPAIRED FUNCTIONAL MOBILITY, BALANCE, AND ENDURANCE: Primary | ICD-10-CM

## 2019-11-15 LAB
ALBUMIN SERPL BCP-MCNC: 3.4 G/DL (ref 3.5–5)
ALP SERPL-CCNC: 101 U/L (ref 46–116)
ALT SERPL W P-5'-P-CCNC: 30 U/L (ref 12–78)
ANION GAP SERPL CALCULATED.3IONS-SCNC: 12 MMOL/L (ref 4–13)
AST SERPL W P-5'-P-CCNC: 25 U/L (ref 5–45)
BASOPHILS # BLD AUTO: 0.08 THOUSANDS/ΜL (ref 0–0.1)
BASOPHILS NFR BLD AUTO: 1 % (ref 0–1)
BILIRUB SERPL-MCNC: 0.68 MG/DL (ref 0.2–1)
BUN SERPL-MCNC: 17 MG/DL (ref 5–25)
CALCIUM SERPL-MCNC: 9.2 MG/DL (ref 8.3–10.1)
CHLORIDE SERPL-SCNC: 104 MMOL/L (ref 100–108)
CO2 SERPL-SCNC: 23 MMOL/L (ref 21–32)
CREAT SERPL-MCNC: 0.94 MG/DL (ref 0.6–1.3)
EOSINOPHIL # BLD AUTO: 0.08 THOUSAND/ΜL (ref 0–0.61)
EOSINOPHIL NFR BLD AUTO: 1 % (ref 0–6)
ERYTHROCYTE [DISTWIDTH] IN BLOOD BY AUTOMATED COUNT: 20.4 % (ref 11.6–15.1)
GFR SERPL CREATININE-BSD FRML MDRD: 84 ML/MIN/1.73SQ M
GLUCOSE SERPL-MCNC: 109 MG/DL (ref 65–140)
HCT VFR BLD AUTO: 36.7 % (ref 36.5–49.3)
HGB BLD-MCNC: 12.1 G/DL (ref 12–17)
IMM GRANULOCYTES # BLD AUTO: 0.05 THOUSAND/UL (ref 0–0.2)
IMM GRANULOCYTES NFR BLD AUTO: 1 % (ref 0–2)
LYMPHOCYTES # BLD AUTO: 3.17 THOUSANDS/ΜL (ref 0.6–4.47)
LYMPHOCYTES NFR BLD AUTO: 36 % (ref 14–44)
MCH RBC QN AUTO: 32.4 PG (ref 26.8–34.3)
MCHC RBC AUTO-ENTMCNC: 33 G/DL (ref 31.4–37.4)
MCV RBC AUTO: 98 FL (ref 82–98)
MONOCYTES # BLD AUTO: 0.92 THOUSAND/ΜL (ref 0.17–1.22)
MONOCYTES NFR BLD AUTO: 11 % (ref 4–12)
NEUTROPHILS # BLD AUTO: 4.48 THOUSANDS/ΜL (ref 1.85–7.62)
NEUTS SEG NFR BLD AUTO: 50 % (ref 43–75)
NRBC BLD AUTO-RTO: 0 /100 WBCS
PLATELET # BLD AUTO: 252 THOUSANDS/UL (ref 149–390)
PMV BLD AUTO: 10.4 FL (ref 8.9–12.7)
POTASSIUM SERPL-SCNC: 4 MMOL/L (ref 3.5–5.3)
PROT SERPL-MCNC: 6.9 G/DL (ref 6.4–8.2)
RBC # BLD AUTO: 3.73 MILLION/UL (ref 3.88–5.62)
SODIUM SERPL-SCNC: 139 MMOL/L (ref 136–145)
WBC # BLD AUTO: 8.78 THOUSAND/UL (ref 4.31–10.16)

## 2019-11-15 PROCEDURE — 97112 NEUROMUSCULAR REEDUCATION: CPT | Performed by: PHYSICAL THERAPIST

## 2019-11-15 PROCEDURE — 80053 COMPREHEN METABOLIC PANEL: CPT | Performed by: INTERNAL MEDICINE

## 2019-11-15 PROCEDURE — 36415 COLL VENOUS BLD VENIPUNCTURE: CPT | Performed by: INTERNAL MEDICINE

## 2019-11-15 PROCEDURE — 85025 COMPLETE CBC W/AUTO DIFF WBC: CPT | Performed by: INTERNAL MEDICINE

## 2019-11-15 NOTE — PROGRESS NOTES
Daily Note     Today's date: 11/15/2019  Patient name: Narcisa Ashraf   : 1955  MRN: [id number]  Referring provider: Luisa Khan MD  Dx:   Encounter Diagnosis     ICD-10-CM    1  Impaired functional mobility, balance, and endurance Z74 09    2  Generalized weakness R53 1    3  Decreased stamina R53 83    4  Other fatigue R53 83                 Notes dizziness is much better today than before  Subjective: Xenia Mathew reports feeling dizzy upon arrival to therapy, but it is no worse than usual       Objective: See treatment diary below    3 minutes active and 2 minute rest break  Floor to ceiling 10 sec hold  Side to side 10 sec hold  Forward step - alternating steps over adrian   Lateral step - alternating steps   Backward step - alternating steps     Bicep curls with 10 lbs - 10 reps 3x  Core rotation with 10 lb med ball - 25 reps 3x    FAEC foam pad 1 minute x 3 reps   Anterior/ Posterior sway 1 minute x 2 reps     Assessment:challenged with foam exericses and AP sway due to unsteadiness with foam pad  Increased use of Visual system for balance and stability  Xenia Mathew would benefit from continued skill PT to increase his cardiovascular endurance as well as muscular strength  Plan: Continue per plan of care  Incorporate lifting exercises (picking things up from the floor) to promote functional strength  Precautions:  has a past medical history of Dehydration (2019), Esophageal cancer ([facility] [address] ), Hypertension, Nausea (2019), Psychiatric disorder, and Recovering alcoholic ([facility] )

## 2019-11-18 ENCOUNTER — OFFICE VISIT (OUTPATIENT)
Dept: PHYSICAL THERAPY | Facility: CLINIC | Age: 64
End: 2019-11-18
Payer: MEDICARE

## 2019-11-18 DIAGNOSIS — R53.83 OTHER FATIGUE: ICD-10-CM

## 2019-11-18 DIAGNOSIS — Z74.09 IMPAIRED FUNCTIONAL MOBILITY, BALANCE, AND ENDURANCE: Primary | ICD-10-CM

## 2019-11-18 DIAGNOSIS — R53.83 DECREASED STAMINA: ICD-10-CM

## 2019-11-18 DIAGNOSIS — R53.1 GENERALIZED WEAKNESS: ICD-10-CM

## 2019-11-18 PROCEDURE — 97112 NEUROMUSCULAR REEDUCATION: CPT | Performed by: PHYSICAL THERAPIST

## 2019-11-18 PROCEDURE — 97110 THERAPEUTIC EXERCISES: CPT | Performed by: PHYSICAL THERAPIST

## 2019-11-18 NOTE — PROGRESS NOTES
Daily Note     Today's date: 2019  Patient name: Carlos Alberto Damon   : 1955  MRN: [id number]  Referring provider: Karan Dunbar MD  Dx:   Encounter Diagnosis     ICD-10-CM    1  Impaired functional mobility, balance, and endurance Z74 09    2  Generalized weakness R53 1    3  Decreased stamina R53 83    4  Other fatigue R53 83        Start Time: 1300  Stop Time: 1345  Total time in clinic (min): 45 minutes     Subjective: Darion Mckinley reports feeling good today, better than usual  He states that he is eating better but has not eaten yet today  He notes deficits in balance and is eager to work on that more  Objective: See treatment diary below    x10 bilaterally  Floor to ceiling 10 sec hold  Side to side 10 sec hold  Forward step - alternating steps over adrian   Lateral step - alternating steps   Backward step - alternating steps     Bicep curls with 10 lbs - 10 reps 3x  Core rotation with 15 lb med ball - 25 reps 2x    FAEC foam pad 1 minute x2    AP sway on foam 1 minute x2  Walking with head turns 20 ft x2 H&V     Assessment: Darion Mckinley performed well during today's session  He demonstrates improvements in cardiovascular endurance and strength, as noted by his ability to progress reps and resistance with exercises as well as subjective reports of all "getting easier each day " He is challenged with foam exercises, requiring haptic touch during first trial of [facility] on foam, but was able to complete the second trial without UE assistance  Additionally, he was challenged with the first trials of walking with head turns, demonstrating mild sway and path deviation  Darion Mckinley would benefit from continued skill PT to increase his cardiovascular endurance as well as muscular strength  Plan: Continue per plan of care  Incorporate lifting exercises (picking things up from the floor) to promote functional strength   CAMILA IS GOING FOR CHEMO INFUSION  - may need to reduce intensity of exercises during Friday's session  Precautions:  has a past medical history of Dehydration (8/8/2019), Esophageal cancer ([facility] ), Hypertension, Nausea (8/8/2019), Psychiatric disorder, and Recovering alcoholic ([facility] )

## 2019-11-19 ENCOUNTER — HOSPITAL ENCOUNTER (OUTPATIENT)
Dept: INFUSION CENTER | Facility: HOSPITAL | Age: 64
Discharge: HOME | End: 2019-11-19
Payer: MEDICARE

## 2019-11-19 ENCOUNTER — NUTRITION (OUTPATIENT)
Dept: NUTRITION | Facility: CLINIC | Age: 64
End: 2019-11-19

## 2019-11-19 VITALS
SYSTOLIC BLOOD PRESSURE: 147 MMHG | HEIGHT: 69 IN | WEIGHT: 191.14 LBS | TEMPERATURE: 96.9 F | OXYGEN SATURATION: 98 % | BODY MASS INDEX: 28.31 KG/M2 | HEART RATE: 66 BPM | RESPIRATION RATE: 16 BRPM | DIASTOLIC BLOOD PRESSURE: 77 MMHG

## 2019-11-19 DIAGNOSIS — C15.5 MALIGNANT NEOPLASM OF LOWER THIRD OF ESOPHAGUS (HCC): Primary | ICD-10-CM

## 2019-11-19 DIAGNOSIS — Z71.3 NUTRITIONAL COUNSELING: Primary | ICD-10-CM

## 2019-11-19 DIAGNOSIS — R11.0 NAUSEA: ICD-10-CM

## 2019-11-19 DIAGNOSIS — E86.0 DEHYDRATION: ICD-10-CM

## 2019-11-19 DIAGNOSIS — C16.0 MALIGNANT NEOPLASM OF CARDIA OF STOMACH (HCC): ICD-10-CM

## 2019-11-19 PROCEDURE — 96409 CHEMO IV PUSH SNGL DRUG: CPT

## 2019-11-19 PROCEDURE — 96366 THER/PROPH/DIAG IV INF ADDON: CPT

## 2019-11-19 PROCEDURE — 96367 TX/PROPH/DG ADDL SEQ IV INF: CPT

## 2019-11-19 PROCEDURE — G0498 CHEMO EXTEND IV INFUS W/PUMP: HCPCS

## 2019-11-19 RX ORDER — FLUOROURACIL 50 MG/ML
400 INJECTION, SOLUTION INTRAVENOUS ONCE
Status: COMPLETED | OUTPATIENT
Start: 2019-11-19 | End: 2019-11-19

## 2019-11-19 RX ORDER — SODIUM CHLORIDE 9 MG/ML
20 INJECTION, SOLUTION INTRAVENOUS ONCE AS NEEDED
Status: DISCONTINUED | OUTPATIENT
Start: 2019-11-19 | End: 2019-11-22 | Stop reason: HOSPADM

## 2019-11-19 RX ORDER — DEXTROSE MONOHYDRATE 50 MG/ML
20 INJECTION, SOLUTION INTRAVENOUS ONCE
Status: DISCONTINUED | OUTPATIENT
Start: 2019-11-19 | End: 2019-11-22 | Stop reason: HOSPADM

## 2019-11-19 RX ADMIN — FLUOROURACIL 795 MG: 50 INJECTION, SOLUTION INTRAVENOUS at 12:14

## 2019-11-19 RX ADMIN — SODIUM CHLORIDE 20 ML/HR: 9 INJECTION, SOLUTION INTRAVENOUS at 09:31

## 2019-11-19 RX ADMIN — LEUCOVORIN CALCIUM 800 MG: 200 INJECTION, POWDER, LYOPHILIZED, FOR SUSPENSION INTRAMUSCULAR; INTRAVENOUS at 10:02

## 2019-11-19 RX ADMIN — DEXAMETHASONE SODIUM PHOSPHATE: 10 INJECTION, SOLUTION INTRAMUSCULAR; INTRAVENOUS at 09:31

## 2019-11-19 NOTE — PATIENT INSTRUCTIONS
Nutrition Rx & Recommendations:  · Diet: High Calorie, High Protein (for high calorie foods see pages 52-53, and for high protein foods see pages 49-51 in your Eating Hints book)  · Small, frequent meals/snacks may be easier to tolerate than 3 large daily meals  Aim for 5-6 small meals per day (every 2-3 hours)  · Include protein at all meals/snacks  · Stay hydrated by sipping fluids of choice/tolerance throughout the day  · Incorporate physical activity as able/allowed  · Follow proper oral care; Try baking soda/salt water rinse recipe (mix 3/4 tsp salt + 1 tsp baking soda + 1 qt water; rinse with pain water after using) in Eating Hints book (pg 18)  Brush your teeth before/after meals & before bed  · Weigh yourself regularly  If you notice weight loss, make an effort to increase your daily food/calorie intake  If you continue to notice loss after these efforts, reach out to your dietitian to establish a plan to stabilize weight  · Follow a Cancer Preventative Nutrition Pattern: colorful, plant-based, low-fat, avoid added sugars, limit alcohol, include high fiber foods, limit processed meats, limit red meat, choose lean protein sources, use low-fat cooking methods, balance calories with physical activity, avoid excessive weight gain throughout life  · Discontinue oral nutrition supplements (Boost)   · Choose whole foods when possible: fruits, vegetables, whole grains  · Choose lean protein sources: chicken breast, fish, low-fat dairy and yogurt, egg whites, beans, nut butter, low-fat cheese  · Use baking soda/salt water rinses the week of chemo  · Do not skip meals, try to eat within 1 hr of waking up in the morning, even if it is something small      Follow Up Plan: 12/17/19 during chemo at [facility]   Recommend Referral to Other Providers: none at this time

## 2019-11-19 NOTE — PLAN OF CARE
Problem: Potential for Falls  Goal: Patient will remain free of falls  Description  INTERVENTIONS:  - Assess patient frequently for physical needs  -  Identify cognitive and physical deficits and behaviors that affect risk of falls  -  [place] fall precautions as indicated by assessment   - Educate patient/family on patient safety including physical limitations  - Instruct patient to call for assistance with activity based on assessment  - Modify environment to reduce risk of injury  - Consider OT/PT consult to assist with strengthening/mobility  Outcome: Progressing     Problem: Knowledge Deficit  Goal: Patient/family/caregiver demonstrates understanding of disease process, treatment plan, medications, and discharge instructions  Description  Complete learning assessment and assess knowledge base    Interventions:  - Provide teaching at level of understanding  - Provide teaching via preferred learning methods  Outcome: Progressing     Problem: DISCHARGE PLANNING  Goal: Discharge to home or other facility with appropriate resources  Description  INTERVENTIONS:  - Identify barriers to discharge w/patient and caregiver  - Arrange for needed discharge resources and transportation as appropriate  - Identify discharge learning needs (meds, wound care, etc )  - Arrange for interpretive services to assist at discharge as needed  - Refer to Case Management Department for coordinating discharge planning if the patient needs post-hospital services based on physician/advanced practitioner order or complex needs related to functional status, cognitive ability, or social support system  Outcome: Progressing     Problem: INFECTION - ADULT  Goal: Absence or prevention of progression during hospitalization  Description  INTERVENTIONS:  - Assess and monitor for signs and symptoms of infection  - Monitor lab/diagnostic results  - Monitor all insertion sites, i e  indwelling lines, tubes, and drains  - Monitor endotracheal if appropriate and nasal secretions for changes in amount and color  - [place] appropriate cooling/warming therapies per order  - Administer medications as ordered  - Instruct and encourage patient and family to use good hand hygiene technique  - Identify and instruct in appropriate isolation precautions for identified infection/condition  Outcome: Progressing  Goal: Absence of fever/infection during neutropenic period  Description  INTERVENTIONS:  - Monitor WBC    Outcome: Progressing

## 2019-11-21 ENCOUNTER — HOSPITAL ENCOUNTER (OUTPATIENT)
Dept: INFUSION CENTER | Facility: HOSPITAL | Age: 64
Discharge: HOME | End: 2019-11-21

## 2019-11-21 VITALS
SYSTOLIC BLOOD PRESSURE: 145 MMHG | RESPIRATION RATE: 20 BRPM | DIASTOLIC BLOOD PRESSURE: 76 MMHG | TEMPERATURE: 97.1 F | OXYGEN SATURATION: 99 % | HEART RATE: 66 BPM

## 2019-11-21 DIAGNOSIS — C15.5 MALIGNANT NEOPLASM OF LOWER THIRD OF ESOPHAGUS (HCC): Primary | ICD-10-CM

## 2019-11-21 DIAGNOSIS — R11.0 NAUSEA: ICD-10-CM

## 2019-11-21 DIAGNOSIS — C16.0 MALIGNANT NEOPLASM OF CARDIA OF STOMACH (HCC): ICD-10-CM

## 2019-11-21 DIAGNOSIS — E86.0 DEHYDRATION: ICD-10-CM

## 2019-11-22 ENCOUNTER — HOSPITAL ENCOUNTER (EMERGENCY)
Facility: HOSPITAL | Age: 64
Discharge: HOME | End: 2019-11-22
Attending: EMERGENCY MEDICINE
Payer: MEDICARE

## 2019-11-22 ENCOUNTER — APPOINTMENT (EMERGENCY)
Dept: ULTRASOUND IMAGING | Facility: HOSPITAL | Age: 64
End: 2019-11-22
Payer: MEDICARE

## 2019-11-22 ENCOUNTER — OFFICE VISIT (OUTPATIENT)
Dept: HEMATOLOGY ONCOLOGY | Facility: CLINIC | Age: 64
End: 2019-11-22
Payer: MEDICARE

## 2019-11-22 ENCOUNTER — OFFICE VISIT (OUTPATIENT)
Dept: PHYSICAL THERAPY | Facility: CLINIC | Age: 64
End: 2019-11-22
Payer: MEDICARE

## 2019-11-22 VITALS
RESPIRATION RATE: 16 BRPM | DIASTOLIC BLOOD PRESSURE: 66 MMHG | HEART RATE: 67 BPM | OXYGEN SATURATION: 98 % | BODY MASS INDEX: 28.29 KG/M2 | SYSTOLIC BLOOD PRESSURE: 114 MMHG | HEIGHT: 69 IN | TEMPERATURE: 96.8 F | WEIGHT: 191 LBS

## 2019-11-22 VITALS
DIASTOLIC BLOOD PRESSURE: 74 MMHG | SYSTOLIC BLOOD PRESSURE: 130 MMHG | TEMPERATURE: 97.7 F | RESPIRATION RATE: 16 BRPM | OXYGEN SATURATION: 98 % | HEART RATE: 57 BPM

## 2019-11-22 DIAGNOSIS — I80.01 SUPERFICIAL THROMBOPHLEBITIS OF RIGHT LEG: Primary | ICD-10-CM

## 2019-11-22 DIAGNOSIS — R11.0 NAUSEA: Primary | ICD-10-CM

## 2019-11-22 DIAGNOSIS — C15.5 MALIGNANT NEOPLASM OF LOWER THIRD OF ESOPHAGUS (HCC): ICD-10-CM

## 2019-11-22 DIAGNOSIS — Z74.09 IMPAIRED FUNCTIONAL MOBILITY, BALANCE, AND ENDURANCE: Primary | ICD-10-CM

## 2019-11-22 DIAGNOSIS — R53.83 DECREASED STAMINA: ICD-10-CM

## 2019-11-22 DIAGNOSIS — R53.83 OTHER FATIGUE: ICD-10-CM

## 2019-11-22 DIAGNOSIS — E86.0 DEHYDRATION: ICD-10-CM

## 2019-11-22 DIAGNOSIS — C15.5 MALIGNANT NEOPLASM OF LOWER THIRD OF ESOPHAGUS (HCC): Primary | ICD-10-CM

## 2019-11-22 DIAGNOSIS — C16.0 MALIGNANT NEOPLASM OF CARDIA OF STOMACH (HCC): ICD-10-CM

## 2019-11-22 DIAGNOSIS — R53.1 GENERALIZED WEAKNESS: ICD-10-CM

## 2019-11-22 PROCEDURE — 93970 EXTREMITY STUDY: CPT | Performed by: SURGERY

## 2019-11-22 PROCEDURE — 97110 THERAPEUTIC EXERCISES: CPT | Performed by: PHYSICAL THERAPIST

## 2019-11-22 PROCEDURE — 97112 NEUROMUSCULAR REEDUCATION: CPT | Performed by: PHYSICAL THERAPIST

## 2019-11-22 PROCEDURE — 99283 EMERGENCY DEPT VISIT LOW MDM: CPT

## 2019-11-22 PROCEDURE — NC001 PR NO CHARGE: Performed by: EMERGENCY MEDICINE

## 2019-11-22 PROCEDURE — 99214 OFFICE O/P EST MOD 30 MIN: CPT | Performed by: INTERNAL MEDICINE

## 2019-11-22 PROCEDURE — 93970 EXTREMITY STUDY: CPT

## 2019-11-22 RX ORDER — DEXTROSE MONOHYDRATE 50 MG/ML
20 INJECTION, SOLUTION INTRAVENOUS ONCE
Status: CANCELLED | OUTPATIENT
Start: 2019-12-03

## 2019-11-22 RX ORDER — FLUOROURACIL 50 MG/ML
400 INJECTION, SOLUTION INTRAVENOUS ONCE
Status: CANCELLED | OUTPATIENT
Start: 2019-12-03

## 2019-11-22 RX ORDER — SODIUM CHLORIDE 9 MG/ML
20 INJECTION, SOLUTION INTRAVENOUS ONCE AS NEEDED
Status: CANCELLED | OUTPATIENT
Start: 2019-12-03

## 2019-11-22 RX ADMIN — APIXABAN 10 MG: 5 TABLET, FILM COATED ORAL at 20:50

## 2019-11-22 NOTE — PROGRESS NOTES
Daily Note     Today's date: 2019  Patient name: Maine Garsia   : 1955  MRN: [id number]  Referring provider: Margarita Handley MD  Dx:   Encounter Diagnosis     ICD-10-CM    1  Impaired functional mobility, balance, and endurance Z74 09    2  Generalized weakness R53 1    3  Decreased stamina R53 83    4  Other fatigue R53 83                    Subjective: Maria R Kelley reports being dizzy today and not such a good day  I have my ups and downs  Objective: See treatment diary below    x10 bilaterally  Floor to ceiling 10 sec hold  Side to side 10 sec hold  Forward step - alternating steps over adrian   Lateral step - alternating steps   Backward step - alternating steps     Bicep curls with 10 lbs - 10 reps 3x  Core rotation with 15 lb med ball - 25 reps 2x  tricep press ups 9 lb wt 10 reps 3 sets     FAEC foam pad 1 minute x2    AP sway on foam 1 minute x2    Assessment: increase in rest breaks due to fatigue associated with Cancer treatment  Was able to improve balance without holding on  Held gait with head turns  Renata Larose would benefit from continued skill PT to increase his cardiovascular endurance as well as muscular strength  Plan: Continue per plan of care  Incorporate lifting exercises (picking things up from the floor) to promote functional strength  Precautions:  has a past medical history of Dehydration (2019), Esophageal cancer ([facility] ), Hypertension, Nausea (2019), Psychiatric disorder, and Recovering alcoholic ([facility] )

## 2019-11-22 NOTE — PROGRESS NOTES
Hematology/Oncology Outpatient Follow- up Note  Joey Watkins 75 y o  male MRN: @ Encounter: [id number]        Date:  11/22/2019    Presenting Complaint/Diagnosis : Metastatic gastroesophageal cancer    HPI:    Vickey Reynolds seen for initial consultation 7/26/2019 regarding Newly diagnosed metastatic gastroesophageal cancer  The patient has biopsy-proven adenocarcinoma of the esophagus  PET/CT scan was done which shows omental nodularity that is FDG avid indicating stage IV disease  The patient has lost approximately 40-50 pounds  His ECOG performance status is a 1-2      Previous Hematologic/ Oncologic History:       Malignant ne[facility] of lower third of esophagus ([facility] )    7/26/2019 Initial Diagnosis     Malignant neoplasm of lower third of esophagus ([facility] )      8/1/2019 -  Chemotherapy     fluorouracil (ADRUCIL) injection 750 mg, 400 mg/m2 = 750 mg, Intravenous, Once, 9 of 12 cycles  Administration: 750 mg (8/1/2019), 750 mg (8/13/2019), 750 mg (8/27/2019), 750 mg (9/10/2019), 750 mg (9/24/2019), 750 mg (10/8/2019), 795 mg (10/22/2019), 795 mg (11/5/2019), 795 mg (11/19/2019)  pegfilgrastim (NEULASTA ONPRO) subcutaneous injection kit 6 mg, 6 mg, Subcutaneous, Once, 2 of 2 cycles  Administration: 6 mg (8/3/2019), 6 mg (8/15/2019)  leucovorin 750 mg in dextrose 5 % 250 mL IVPB, 748 mg, Intravenous, Once, 9 of 12 cycles  Administration: 750 mg (8/1/2019), 750 mg (8/13/2019), 750 mg (8/27/2019), 750 mg (9/10/2019), 750 mg (9/24/2019), 750 mg (10/8/2019), 800 mg (10/22/2019), 800 mg (11/5/2019), 800 mg (11/19/2019)  oxaliplatin (ELOXATIN) 158 95 mg in dextrose 5 % 250 mL chemo infusion, 85 mg/m2 = 158 95 mg, Intravenous, Once, 6 of 6 cycles  Administration: 158 95 mg (8/1/2019), 158 95 mg (8/13/2019), 158 95 mg (8/27/2019), 158 95 mg (9/10/2019), 158 95 mg (9/24/2019), 158 95 mg (10/8/2019)        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[facility] [address] )    7/26/2019 Initial Diagnosis     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[facility] [address] )      8/1/2019 -  Chemotherapy     fluorouracil (ADRUCIL) injection 750 mg, 400 mg/m2 = 750 mg, Intravenous, Once, 9 of 12 cycles  Administration: 750 mg (8/1/2019), 750 mg (8/13/2019), 750 mg (8/27/2019), 750 mg (9/10/2019), 750 mg (9/24/2019), 750 mg (10/8/2019), 795 mg (10/22/2019), 795 mg (11/5/2019), 795 mg (11/19/2019)  pegfilgrastim (NEULASTA ONPRO) subcutaneous injection kit 6 mg, 6 mg, Subcutaneous, Once, 2 of 2 cycles  Administration: 6 mg (8/3/2019), 6 mg (8/15/2019)  leucovorin 750 mg in dextrose 5 % 250 mL IVPB, 748 mg, Intravenous, Once, 9 of 12 cycles  Administration: 750 mg (8/1/2019), 750 mg (8/13/2019), 750 mg (8/27/2019), 750 mg (9/10/2019), 750 mg (9/24/2019), 750 mg (10/8/2019), 800 mg (10/22/2019), 800 mg (11/5/2019), 800 mg (11/19/2019)  oxaliplatin (ELOXATIN) 158 95 mg in dextrose 5 % 250 mL chemo infusion, 85 mg/m2 = 158 95 mg, Intravenous, Once, 6 of 6 cycles  Administration: 158 95 mg (8/1/2019), 158 95 mg (8/13/2019), 158 95 mg (8/27/2019), 158 95 mg (9/10/2019), 158 95 mg (9/24/2019), 158 95 mg (10/8/2019)         Current Hematologic/ Oncologic Treatment:    Modified FOLFOX 6  Cycle #5 will be on 24 September  FOLFOX every 2 weeks      Oxaliplatin 85 milligrams/meter squared  Leucovorin 400 milligrams/meters squared  5FU 400 milligrams/meters squared  5FU 2400 milligrams/meter squared CIVI over 46 hours     Neulasta support Has been discontinued      Oxaliplatin will be discontinued for his next chemotherapy  Interval History:    The patient returns for follow-up visit  He states he is doing well  He continues to get chemotherapy and has no real side effects  He is eating well  Has gained weight  Denies any nausea denies any vomiting denies any diarrhea  Overall is doing quite well  His 14 point review of systems was otherwise negative  Test Results:    Imaging: No results found      Labs:   Lab Results   Component Value Date    WBC 8 78 11/15/2019    HGB 12 1 11/15/2019    HCT 36 7 11/15/2019    MCV 98 11/15/2019     11/15/2019     Lab Results   Component Value Date    K 4 0 11/15/2019     11/15/2019    CO2 23 11/15/2019    BUN 17 11/15/2019    CREATININE 0 94 11/15/2019    GLUF 113 (H) 10/18/2019    CALCIUM 9 2 11/15/2019    AST 25 11/15/2019    ALT 30 11/15/2019    ALKPHOS 101 11/15/2019    EGFR 84 11/15/2019         ROS: As stated in the history of present illness otherwise his 14 point review of systems today was negative  Active Problems:   Patient Active Problem List   Diagnosis    PTSD (post-traumatic stress disorder)    Essential hypertension    Major depression    OCD (obsessive compulsive disorder)    Anxiety    Malignant neoplasm of lower third of esophagus ([address] )    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)    Cancer associated pain    Chemotherapy-induced nausea    Abnormal weight loss    Malignant ascites    Recovering alcoholic ([address] )    Other fatigue    Other dysphagia    Nausea    Dehydration       Past Medical History:   Past Medical History:   Diagnosis Date    Dehydration 8/8/2019    Esophageal cancer ([address] )     Hypertension     Nausea 8/8/2019    Psychiatric disorder     Recovering alcoholic ([address] )        Surgical History:   Past Surgical History:   Procedure Laterality Date    APPENDECTOMY      IR PORT PLACEMENT  7/31/2019       Family History:    Family History   Problem Relation Age of Onset    Colon cancer Father        Cancer-related family history includes Colon cancer in his father      Social History:   Social History     Socioeconomic History    Marital status: /Civil Union     Spouse name: Not on file    Number of children: 2    Years of education: Not on file    Highest education level: Not on file   Occupational History    Occupation: retired    Social Needs    Financial resource strain: Not on file    Food insecurity:     Worry: Not on file     Inability: Not on file   [facility] Transportation needs:     Medical: Not on file     Non-medical: Not on file   Tobacco Use    Smoking status: Current Some Day Smoker     Types: Cigars    Smokeless tobacco: Never Used   Substance and Sexual Activity    Alcohol use: No    Drug use: Yes     Types: Marijuana    Sexual activity: Not on file   Lifestyle    Physical activity:     Days per week: Not on file     Minutes per session: Not on file    Stress: Not on file   Relationships    Social connections:     Talks on phone: Not on file     Gets together: Not on file     Attends Restorationist service: Not on file     Active member of club or organization: Not on file     Attends meetings of clubs or organizations: Not on file     Relationship status: Not on file    Intimate partner violence:     Fear of current or ex partner: Not on file     Emotionally abused: Not on file     Physically abused: Not on file     Forced sexual activity: Not on file   Other Topics Concern    Not on file   Social History Narrative    Not on file       Current Medications:   Current Outpatient Medications   Medication Sig Dispense Refill    bisoprolol (ZEBETA) 10 MG tablet Take 0 5 tablets (5 mg total) by mouth daily      diazepam (VALIUM) 5 mg tablet Take 1 tablet (5 mg total) by mouth 2 (two) times a day 60 tablet 0    lidocaine-prilocaine (EMLA) cream Apply topically as needed for mild pain 30 g 0    metoclopramide (REGLAN) 5 mg/5 mL oral solution Take 10 mg by mouth 4 (four) times a day as needed for nausea      ondansetron (ZOFRAN-ODT) 8 mg disintegrating tablet Take 1 tablet (8 mg total) by mouth every 8 (eight) hours as needed for nausea or vomiting (If Compazine is not effective) 30 tablet 0    oxyCODONE (ROXICODONE) 5 mg/5 mL solution Take 5 mg by mouth every 4 (four) hours as needed for pain      pantoprazole (PROTONIX) 40 mg tablet Take 40 mg by mouth daily before breakfast      prochlorperazine (COMPAZINE) 10 mg tablet Take 10 mg by mouth every 6 (six) hours as needed for nausea or vomiting      venlafaxine (EFFEXOR-XR) 150 mg 24 hr capsule Take 1 capsule (150 mg total) by mouth daily 30 capsule 0     No current facility-administered medications for this visit  Allergies: Allergies   Allergen Reactions    Bee Venom Anaphylaxis    Shellfish-Derived Products      Develops GOUT       Physical Exam:    Body surface area is 2 03 meters squared  Wt Readings from Last 3 Encounters:   11/22/19 86 6 kg (191 lb)   11/19/19 86 7 kg (191 lb 2 2 oz)   11/05/19 86 2 kg (190 lb 0 6 oz)        Temp Readings from Last 3 Encounters:   11/22/19 (!) 96 8 °F (36 °C) (Tympanic)   11/21/19 (!) 97 1 °F (36 2 °C) (Temporal)   11/19/19 (!) 96 9 °F (36 1 °C)        BP Readings from Last 3 Encounters:   11/22/19 114/66   11/21/19 145/76   11/19/19 147/77         Pulse Readings from Last 3 Encounters:   11/22/19 67   11/21/19 66   11/19/19 66      Physical Exam     Constitutional   General appearance: No acute distress, well appearing and well nourished  Eyes   Conjunctiva and lids: No swelling, erythema or discharge  Pupils and irises: Equal, round and reactive to light  Ears, Nose, Mouth, and Throat   External inspection of ears and nose: Normal     Nasal mucosa, septum, and turbinates: Normal without edema or erythema  Oropharynx: Normal with no erythema, edema, exudate or lesions  Pulmonary   Respiratory effort: No increased work of breathing or signs of respiratory distress  Auscultation of lungs: Clear to auscultation  Cardiovascular   Palpation of heart: Normal PMI, no thrills  Auscultation of heart: Normal rate and rhythm, normal S1 and S2, without murmurs  Examination of extremities for edema and/or varicosities: Normal     Carotid pulses: Normal     Abdomen   Abdomen: Non-tender, no masses  Liver and spleen: No hepatomegaly or splenomegaly  Lymphatic   Palpation of lymph nodes in neck: No lymphadenopathy      Musculoskeletal   Gait and station: Normal     Digits and nails: Normal without clubbing or cyanosis  The patient has dilated varicose veins in the right lower extremity and a DVT is possible  Inspection/palpation of joints, bones, and muscles: Normal     Skin   Skin and subcutaneous tissue: Normal without rashes or lesions  Neurologic   Cranial nerves: Cranial nerves 2-12 intact  Sensation: No sensory loss  Psychiatric   Orientation to person, place, and time: Normal     Mood and affect: Normal         Assessment / Plan:    The patient is a pleasant 18-year-old male with a past medical history of metastatic gastroesophageal cancer  He received 4 cycles of modified FOLFOX 6 chemotherapy After which his imaging showed a very nice response  We discontinued the oxaliplatin  He is done well on 5-FU leucovorin  He continues to eat and gain weight  He does have some very prominent varicose veins in the right lower extremity and I am concerned about a DVT  I will send him to the emergency room reevaluated and possibly treated if a DVT is found  The patient is in agreement with this  He will walk over to the emergency room after checkup and be evaluated and possibly treated for DVT  Since she has an active malignancy which is not curable he will stay on lifelong anticoagulation if he does have a DVT  I'll see him back in a month  Until that if he has any questions he will call our office  Goals and Barriers:  Current Goal:  Prolong Survival from Stage IV gastroesophageal malignancy   Barriers: None  Patient's Capacity to Self Care:  Patient  able to self care  Portions of the record may have been created with voice recognition software   Occasional wrong word or "sound a like" substitutions may have occurred due to the inherent limitations of voice recognition software   Read the chart carefully and recognize, using context, where substitutions have occurred

## 2019-11-23 NOTE — DISCHARGE INSTRUCTIONS
Diagnosis: right lower leg  thrombophlebitis of greater saphenous vein     - apixiban- 1- 10 mg tablet 2 times a day for 1 week-- then 1-5 mg tablet 2 times a day for the foreseeable future- please discuss this with dr Kamran Cobb    - please return to  the er for any bleeding--

## 2019-11-23 NOTE — ED PROVIDER NOTES
1445:  Received a call from pharmacy regarding previous prescription  Pharmacist had 2 prescriptions and was unclear of appropriate dosing  Patient seen for what appears to be superficial thrombophlebitis on DVT study  Per chart review, patient supposed to be taking 10 mg b i d  Eliquis for 7 days followed by 5 mg b i d   Discussed this with pharmacist who modified prescription       Sue Suresh MD  11/23/19 [id number]

## 2019-11-25 ENCOUNTER — TELEPHONE (OUTPATIENT)
Dept: HEMATOLOGY ONCOLOGY | Facility: CLINIC | Age: 64
End: 2019-11-25

## 2019-11-25 NOTE — TELEPHONE ENCOUNTER
Called [facility] for pt to call their ordering physician's office and inquire information about a generic ELIQUIS

## 2019-11-27 ENCOUNTER — TELEPHONE (OUTPATIENT)
Dept: HEMATOLOGY ONCOLOGY | Facility: CLINIC | Age: 64
End: 2019-11-27

## 2019-11-27 NOTE — TELEPHONE ENCOUNTER
PT wanted to [facility] to know that he was in the ER last Friday for lower right leg blood clots  He was pit on Eliquis

## 2019-11-29 ENCOUNTER — APPOINTMENT (OUTPATIENT)
Dept: LAB | Facility: HOSPITAL | Age: 64
End: 2019-11-29
Payer: MEDICARE

## 2019-11-29 ENCOUNTER — TRANSCRIBE ORDERS (OUTPATIENT)
Dept: ADMINISTRATIVE | Facility: HOSPITAL | Age: 64
End: 2019-11-29

## 2019-11-29 ENCOUNTER — TELEPHONE (OUTPATIENT)
Dept: HEMATOLOGY ONCOLOGY | Facility: CLINIC | Age: 64
End: 2019-11-29

## 2019-11-29 DIAGNOSIS — C15.5 MALIGNANT NEOPLASM OF LOWER THIRD OF ESOPHAGUS (HCC): ICD-10-CM

## 2019-11-29 DIAGNOSIS — R11.0 NAUSEA: ICD-10-CM

## 2019-11-29 DIAGNOSIS — D13.1: ICD-10-CM

## 2019-11-29 DIAGNOSIS — E86.0 DEHYDRATION: ICD-10-CM

## 2019-11-29 DIAGNOSIS — C15.5 MALIGNANT NEOPLASM OF LOWER THIRD OF ESOPHAGUS (HCC): Primary | ICD-10-CM

## 2019-11-29 LAB
ALBUMIN SERPL BCP-MCNC: 3.3 G/DL (ref 3.5–5)
ALP SERPL-CCNC: 115 U/L (ref 46–116)
ALT SERPL W P-5'-P-CCNC: 26 U/L (ref 12–78)
ANION GAP SERPL CALCULATED.3IONS-SCNC: 4 MMOL/L (ref 4–13)
AST SERPL W P-5'-P-CCNC: 21 U/L (ref 5–45)
BASOPHILS # BLD AUTO: 0.08 THOUSANDS/ΜL (ref 0–0.1)
BASOPHILS NFR BLD AUTO: 1 % (ref 0–1)
BILIRUB SERPL-MCNC: 0.68 MG/DL (ref 0.2–1)
BUN SERPL-MCNC: 14 MG/DL (ref 5–25)
CALCIUM SERPL-MCNC: 9 MG/DL (ref 8.3–10.1)
CHLORIDE SERPL-SCNC: 106 MMOL/L (ref 100–108)
CO2 SERPL-SCNC: 27 MMOL/L (ref 21–32)
CREAT SERPL-MCNC: 0.99 MG/DL (ref 0.6–1.3)
EOSINOPHIL # BLD AUTO: 0.12 THOUSAND/ΜL (ref 0–0.61)
EOSINOPHIL NFR BLD AUTO: 1 % (ref 0–6)
ERYTHROCYTE [DISTWIDTH] IN BLOOD BY AUTOMATED COUNT: 18 % (ref 11.6–15.1)
GFR SERPL CREATININE-BSD FRML MDRD: 79 ML/MIN/1.73SQ M
GLUCOSE SERPL-MCNC: 113 MG/DL (ref 65–140)
HCT VFR BLD AUTO: 38.4 % (ref 36.5–49.3)
HGB BLD-MCNC: 12.5 G/DL (ref 12–17)
IMM GRANULOCYTES # BLD AUTO: 0.1 THOUSAND/UL (ref 0–0.2)
IMM GRANULOCYTES NFR BLD AUTO: 1 % (ref 0–2)
LYMPHOCYTES # BLD AUTO: 3.62 THOUSANDS/ΜL (ref 0.6–4.47)
LYMPHOCYTES NFR BLD AUTO: 43 % (ref 14–44)
MCH RBC QN AUTO: 32.9 PG (ref 26.8–34.3)
MCHC RBC AUTO-ENTMCNC: 32.6 G/DL (ref 31.4–37.4)
MCV RBC AUTO: 101 FL (ref 82–98)
MONOCYTES # BLD AUTO: 1.23 THOUSAND/ΜL (ref 0.17–1.22)
MONOCYTES NFR BLD AUTO: 15 % (ref 4–12)
NEUTROPHILS # BLD AUTO: 3.23 THOUSANDS/ΜL (ref 1.85–7.62)
NEUTS SEG NFR BLD AUTO: 39 % (ref 43–75)
NRBC BLD AUTO-RTO: 0 /100 WBCS
PLATELET # BLD AUTO: 339 THOUSANDS/UL (ref 149–390)
PMV BLD AUTO: 10.4 FL (ref 8.9–12.7)
POTASSIUM SERPL-SCNC: 3.9 MMOL/L (ref 3.5–5.3)
PROT SERPL-MCNC: 7 G/DL (ref 6.4–8.2)
RBC # BLD AUTO: 3.8 MILLION/UL (ref 3.88–5.62)
SODIUM SERPL-SCNC: 137 MMOL/L (ref 136–145)
WBC # BLD AUTO: 8.38 THOUSAND/UL (ref 4.31–10.16)

## 2019-11-29 PROCEDURE — 36415 COLL VENOUS BLD VENIPUNCTURE: CPT | Performed by: INTERNAL MEDICINE

## 2019-11-29 PROCEDURE — 85025 COMPLETE CBC W/AUTO DIFF WBC: CPT | Performed by: INTERNAL MEDICINE

## 2019-11-29 PROCEDURE — 80053 COMPREHEN METABOLIC PANEL: CPT

## 2019-11-29 NOTE — TELEPHONE ENCOUNTER
[facility] put in, she needs CMP and CBC w/Diff, patient comes every two weeks

## 2019-12-02 ENCOUNTER — APPOINTMENT (OUTPATIENT)
Dept: PHYSICAL THERAPY | Facility: CLINIC | Age: 64
End: 2019-12-02
Payer: MEDICARE

## 2019-12-03 ENCOUNTER — HOSPITAL ENCOUNTER (OUTPATIENT)
Dept: INFUSION CENTER | Facility: HOSPITAL | Age: 64
Discharge: HOME | End: 2019-12-03
Payer: MEDICARE

## 2019-12-03 VITALS
TEMPERATURE: 97.6 F | DIASTOLIC BLOOD PRESSURE: 66 MMHG | BODY MASS INDEX: 28.87 KG/M2 | HEIGHT: 69 IN | WEIGHT: 194.89 LBS | OXYGEN SATURATION: 96 % | SYSTOLIC BLOOD PRESSURE: 132 MMHG | RESPIRATION RATE: 20 BRPM | HEART RATE: 62 BPM

## 2019-12-03 DIAGNOSIS — C16.0 MALIGNANT NEOPLASM OF CARDIA OF STOMACH (HCC): ICD-10-CM

## 2019-12-03 DIAGNOSIS — C15.5 MALIGNANT NEOPLASM OF LOWER THIRD OF ESOPHAGUS (HCC): Primary | ICD-10-CM

## 2019-12-03 DIAGNOSIS — E86.0 DEHYDRATION: ICD-10-CM

## 2019-12-03 DIAGNOSIS — R11.0 NAUSEA: ICD-10-CM

## 2019-12-03 PROCEDURE — 96409 CHEMO IV PUSH SNGL DRUG: CPT

## 2019-12-03 PROCEDURE — 96366 THER/PROPH/DIAG IV INF ADDON: CPT

## 2019-12-03 PROCEDURE — 96367 TX/PROPH/DG ADDL SEQ IV INF: CPT

## 2019-12-03 PROCEDURE — G0498 CHEMO EXTEND IV INFUS W/PUMP: HCPCS

## 2019-12-03 RX ORDER — SODIUM CHLORIDE 9 MG/ML
20 INJECTION, SOLUTION INTRAVENOUS ONCE AS NEEDED
Status: DISCONTINUED | OUTPATIENT
Start: 2019-12-03 | End: 2019-12-06 | Stop reason: HOSPADM

## 2019-12-03 RX ORDER — FLUOROURACIL 50 MG/ML
400 INJECTION, SOLUTION INTRAVENOUS ONCE
Status: COMPLETED | OUTPATIENT
Start: 2019-12-03 | End: 2019-12-03

## 2019-12-03 RX ORDER — DEXTROSE MONOHYDRATE 50 MG/ML
20 INJECTION, SOLUTION INTRAVENOUS ONCE
Status: DISCONTINUED | OUTPATIENT
Start: 2019-12-03 | End: 2019-12-06 | Stop reason: HOSPADM

## 2019-12-03 RX ADMIN — SODIUM CHLORIDE 20 ML/HR: 0.9 INJECTION, SOLUTION INTRAVENOUS at 09:59

## 2019-12-03 RX ADMIN — FLUOROURACIL 795 MG: 50 INJECTION, SOLUTION INTRAVENOUS at 13:00

## 2019-12-03 RX ADMIN — LEUCOVORIN CALCIUM 800 MG: 100 INJECTION, POWDER, LYOPHILIZED, FOR SUSPENSION INTRAMUSCULAR; INTRAVENOUS at 10:38

## 2019-12-03 RX ADMIN — DEXAMETHASONE SODIUM PHOSPHATE: 10 INJECTION, SOLUTION INTRAMUSCULAR; INTRAVENOUS at 09:59

## 2019-12-03 NOTE — PLAN OF CARE
Problem: Potential for Falls  Goal: Patient will remain free of falls  Description  INTERVENTIONS:  - Assess patient frequently for physical needs  -  Identify cognitive and physical deficits and behaviors that affect risk of falls    -  [place] fall precautions as indicated by assessment   - Educate patient/family on patient safety including physical limitations  - Instruct patient to call for assistance with activity based on assessment  - Modify environment to reduce risk of injury    Outcome: Progressing     Problem: SAFETY ADULT  Goal: Maintain or return to baseline ADL function  Description  INTERVENTIONS:  -  Assess patient's ability to carry out ADLs; assess patient's baseline for ADL function and identify physical deficits which impact ability to perform ADLs (bathing, care of mouth/teeth, toileting, grooming, dressing, etc )  - Assess/evaluate cause of self-care deficits   - Assess range of motion  - Assess patient's mobility; develop plan if impaired  - Assess patient's need for assistive devices and provide as appropriate  - Encourage maximum independence but intervene and supervise when necessary  - Involve family in performance of ADLs  - Assess for home care needs following discharge   - Consider OT consult to assist with ADL evaluation and planning for discharge  - Provide patient education as appropriate  Outcome: Progressing  Goal: Maintain or return mobility status to optimal level  Description  INTERVENTIONS:  - Assess patient's baseline mobility status (ambulation, transfers, stairs, etc )    - Identify cognitive and physical deficits and behaviors that affect mobility  - Identify mobility aids required to assist with transfers and/or ambulation (gait belt, sit-to-stand, lift, walker, cane, etc )  - [place] fall precautions as indicated by assessment  - Record patient progress and toleration of activity level on Mobility SBAR; progress patient to next Phase/Stage  - Instruct patient to call for assistance with activity based on assessment  - Consider rehabilitation consult to assist with strengthening/weightbearing, etc   Outcome: Progressing     Problem: Knowledge Deficit  Goal: Patient/family/caregiver demonstrates understanding of disease process, treatment plan, medications, and discharge instructions  Description  Complete learning assessment and assess knowledge base    Interventions:  - Provide teaching at level of understanding  - Provide teaching via preferred learning methods  Outcome: Progressing

## 2019-12-05 ENCOUNTER — OFFICE VISIT (OUTPATIENT)
Dept: PHYSICAL THERAPY | Facility: CLINIC | Age: 64
End: 2019-12-05
Payer: MEDICARE

## 2019-12-05 ENCOUNTER — HOSPITAL ENCOUNTER (OUTPATIENT)
Dept: INFUSION CENTER | Facility: HOSPITAL | Age: 64
Discharge: HOME | End: 2019-12-05

## 2019-12-05 VITALS
SYSTOLIC BLOOD PRESSURE: 155 MMHG | OXYGEN SATURATION: 97 % | HEART RATE: 58 BPM | DIASTOLIC BLOOD PRESSURE: 79 MMHG | TEMPERATURE: 97.4 F | RESPIRATION RATE: 20 BRPM

## 2019-12-05 DIAGNOSIS — R53.83 DECREASED STAMINA: ICD-10-CM

## 2019-12-05 DIAGNOSIS — R11.0 NAUSEA: ICD-10-CM

## 2019-12-05 DIAGNOSIS — Z74.09 IMPAIRED FUNCTIONAL MOBILITY, BALANCE, AND ENDURANCE: Primary | ICD-10-CM

## 2019-12-05 DIAGNOSIS — C15.5 MALIGNANT NEOPLASM OF LOWER THIRD OF ESOPHAGUS (HCC): Primary | ICD-10-CM

## 2019-12-05 DIAGNOSIS — R53.1 GENERALIZED WEAKNESS: ICD-10-CM

## 2019-12-05 DIAGNOSIS — E86.0 DEHYDRATION: ICD-10-CM

## 2019-12-05 DIAGNOSIS — C16.0 MALIGNANT NEOPLASM OF CARDIA OF STOMACH (HCC): ICD-10-CM

## 2019-12-05 DIAGNOSIS — R53.83 OTHER FATIGUE: ICD-10-CM

## 2019-12-05 PROCEDURE — 97110 THERAPEUTIC EXERCISES: CPT | Performed by: PHYSICAL THERAPIST

## 2019-12-05 PROCEDURE — 97112 NEUROMUSCULAR REEDUCATION: CPT | Performed by: PHYSICAL THERAPIST

## 2019-12-05 NOTE — PROGRESS NOTES
Daily Note     Today's date: 2019  Patient name: Maine Garsia  : 1955  MRN: [id number]  Referring provider: Margarita Handley MD  Dx:   Encounter Diagnosis     ICD-10-CM    1  Impaired functional mobility, balance, and endurance Z74 09    2  Generalized weakness R53 1    3  Decreased stamina R53 83    4  Other fatigue R53 83                   Subjective: Pt reports feeling "slightly tired," and is without pain  He reports his balance is his chief complaint at this time as he feels himself swaying left during activities at home  Pt verbalized he is not near his PLOF before cancer diagnosis  Objective: See treatment diary below  Most exercises performed continually with 2 minutes on 3 minutes off circuits    Floor to ceiling 10 sec hold  Side to side 10 sec hold  Forward step - alternating steps over adrian  Lateral step - alternating steps over adrian    Bicep curls with 10 lbs - 10 reps 3x  Core rotation with 15 lb med ball - 25 reps 2x  tricep press ups 10 lb wt 10 reps 3 sets     [facility] foam pad 1 minute x2    SLS on foam, 1 UE support, 30sx3  [place]one taps on foam, 1 UE support, 1 minute  [place]one taps on foam, no UE support, 1 minute      Assessment: Tolerated treatment well  Patient demonstrated fatigue post treatment and exhibited good technique with therapeutic exercises and remained motivated to complete activities throughout  Pt continues to ambulate with slow parrish and short steps but completed all activities in session without LOB  He will continue to benefit from skilled PT to improve global strength, manage fatigue,  and maintain functional mobility  Plan: Continue per plan of care  Precautions:  has a past medical history of Dehydration (2019), Esophageal cancer ([facility] ), Hypertension, Nausea (2019), Psychiatric disorder, and [facility]

## 2019-12-09 RX ORDER — DEXTROSE MONOHYDRATE 50 MG/ML
20 INJECTION, SOLUTION INTRAVENOUS ONCE
Status: CANCELLED | OUTPATIENT
Start: 2019-12-17

## 2019-12-09 RX ORDER — SODIUM CHLORIDE 9 MG/ML
20 INJECTION, SOLUTION INTRAVENOUS ONCE AS NEEDED
Status: CANCELLED | OUTPATIENT
Start: 2019-12-17

## 2019-12-09 RX ORDER — FLUOROURACIL 50 MG/ML
400 INJECTION, SOLUTION INTRAVENOUS ONCE
Status: CANCELLED | OUTPATIENT
Start: 2019-12-17

## 2019-12-10 DIAGNOSIS — E86.0 DEHYDRATION: ICD-10-CM

## 2019-12-10 DIAGNOSIS — C16.0 MALIGNANT NEOPLASM OF CARDIA OF STOMACH (HCC): ICD-10-CM

## 2019-12-10 DIAGNOSIS — R11.0 NAUSEA: Primary | ICD-10-CM

## 2019-12-10 DIAGNOSIS — C15.5 MALIGNANT NEOPLASM OF LOWER THIRD OF ESOPHAGUS (HCC): ICD-10-CM

## 2019-12-13 ENCOUNTER — APPOINTMENT (OUTPATIENT)
Dept: LAB | Facility: HOSPITAL | Age: 64
End: 2019-12-13
Payer: MEDICARE

## 2019-12-13 DIAGNOSIS — C15.5 MALIGNANT NEOPLASM OF LOWER THIRD OF ESOPHAGUS (HCC): ICD-10-CM

## 2019-12-13 DIAGNOSIS — C16.0 MALIGNANT NEOPLASM OF CARDIA OF STOMACH (HCC): Primary | ICD-10-CM

## 2019-12-13 DIAGNOSIS — C16.0 MALIGNANT NEOPLASM OF CARDIA OF STOMACH (HCC): ICD-10-CM

## 2019-12-13 LAB
ALBUMIN SERPL BCP-MCNC: 3.3 G/DL (ref 3.5–5)
ALP SERPL-CCNC: 108 U/L (ref 46–116)
ALT SERPL W P-5'-P-CCNC: 32 U/L (ref 12–78)
ANION GAP SERPL CALCULATED.3IONS-SCNC: 6 MMOL/L (ref 4–13)
AST SERPL W P-5'-P-CCNC: 27 U/L (ref 5–45)
BASOPHILS # BLD AUTO: 0.07 THOUSANDS/ΜL (ref 0–0.1)
BASOPHILS NFR BLD AUTO: 1 % (ref 0–1)
BILIRUB SERPL-MCNC: 0.7 MG/DL (ref 0.2–1)
BUN SERPL-MCNC: 18 MG/DL (ref 5–25)
CALCIUM SERPL-MCNC: 9.1 MG/DL (ref 8.3–10.1)
CHLORIDE SERPL-SCNC: 102 MMOL/L (ref 100–108)
CO2 SERPL-SCNC: 28 MMOL/L (ref 21–32)
CREAT SERPL-MCNC: 1.1 MG/DL (ref 0.6–1.3)
EOSINOPHIL # BLD AUTO: 0.16 THOUSAND/ΜL (ref 0–0.61)
EOSINOPHIL NFR BLD AUTO: 3 % (ref 0–6)
ERYTHROCYTE [DISTWIDTH] IN BLOOD BY AUTOMATED COUNT: 16.6 % (ref 11.6–15.1)
GFR SERPL CREATININE-BSD FRML MDRD: 70 ML/MIN/1.73SQ M
GLUCOSE SERPL-MCNC: 161 MG/DL (ref 65–140)
HCT VFR BLD AUTO: 39.1 % (ref 36.5–49.3)
HGB BLD-MCNC: 12.8 G/DL (ref 12–17)
IMM GRANULOCYTES # BLD AUTO: 0.03 THOUSAND/UL (ref 0–0.2)
IMM GRANULOCYTES NFR BLD AUTO: 1 % (ref 0–2)
LYMPHOCYTES # BLD AUTO: 2.8 THOUSANDS/ΜL (ref 0.6–4.47)
LYMPHOCYTES NFR BLD AUTO: 44 % (ref 14–44)
MCH RBC QN AUTO: 33.4 PG (ref 26.8–34.3)
MCHC RBC AUTO-ENTMCNC: 32.7 G/DL (ref 31.4–37.4)
MCV RBC AUTO: 102 FL (ref 82–98)
MONOCYTES # BLD AUTO: 0.5 THOUSAND/ΜL (ref 0.17–1.22)
MONOCYTES NFR BLD AUTO: 8 % (ref 4–12)
NEUTROPHILS # BLD AUTO: 2.86 THOUSANDS/ΜL (ref 1.85–7.62)
NEUTS SEG NFR BLD AUTO: 43 % (ref 43–75)
NRBC BLD AUTO-RTO: 0 /100 WBCS
PLATELET # BLD AUTO: 266 THOUSANDS/UL (ref 149–390)
PMV BLD AUTO: 11.1 FL (ref 8.9–12.7)
POTASSIUM SERPL-SCNC: 3.5 MMOL/L (ref 3.5–5.3)
PROT SERPL-MCNC: 7 G/DL (ref 6.4–8.2)
RBC # BLD AUTO: 3.83 MILLION/UL (ref 3.88–5.62)
SODIUM SERPL-SCNC: 136 MMOL/L (ref 136–145)
WBC # BLD AUTO: 6.42 THOUSAND/UL (ref 4.31–10.16)

## 2019-12-13 PROCEDURE — 36415 COLL VENOUS BLD VENIPUNCTURE: CPT

## 2019-12-13 PROCEDURE — 80053 COMPREHEN METABOLIC PANEL: CPT

## 2019-12-13 PROCEDURE — 85025 COMPLETE CBC W/AUTO DIFF WBC: CPT

## 2019-12-17 ENCOUNTER — NUTRITION (OUTPATIENT)
Dept: NUTRITION | Facility: CLINIC | Age: 64
End: 2019-12-17

## 2019-12-17 ENCOUNTER — HOSPITAL ENCOUNTER (OUTPATIENT)
Dept: INFUSION CENTER | Facility: HOSPITAL | Age: 64
Discharge: HOME | End: 2019-12-17
Payer: MEDICARE

## 2019-12-17 VITALS
WEIGHT: 195.33 LBS | DIASTOLIC BLOOD PRESSURE: 72 MMHG | RESPIRATION RATE: 16 BRPM | BODY MASS INDEX: 28.93 KG/M2 | TEMPERATURE: 97.6 F | HEART RATE: 76 BPM | HEIGHT: 69 IN | OXYGEN SATURATION: 97 % | SYSTOLIC BLOOD PRESSURE: 138 MMHG

## 2019-12-17 DIAGNOSIS — C15.5 MALIGNANT NEOPLASM OF LOWER THIRD OF ESOPHAGUS (HCC): Primary | ICD-10-CM

## 2019-12-17 DIAGNOSIS — C16.0 MALIGNANT NEOPLASM OF CARDIA OF STOMACH (HCC): ICD-10-CM

## 2019-12-17 DIAGNOSIS — E86.0 DEHYDRATION: ICD-10-CM

## 2019-12-17 DIAGNOSIS — R11.0 NAUSEA: ICD-10-CM

## 2019-12-17 DIAGNOSIS — Z71.3 NUTRITIONAL COUNSELING: Primary | ICD-10-CM

## 2019-12-17 PROCEDURE — 96409 CHEMO IV PUSH SNGL DRUG: CPT

## 2019-12-17 PROCEDURE — 96366 THER/PROPH/DIAG IV INF ADDON: CPT

## 2019-12-17 PROCEDURE — G0498 CHEMO EXTEND IV INFUS W/PUMP: HCPCS

## 2019-12-17 PROCEDURE — 96367 TX/PROPH/DG ADDL SEQ IV INF: CPT

## 2019-12-17 RX ORDER — FLUOROURACIL 50 MG/ML
400 INJECTION, SOLUTION INTRAVENOUS ONCE
Status: COMPLETED | OUTPATIENT
Start: 2019-12-17 | End: 2019-12-17

## 2019-12-17 RX ORDER — SODIUM CHLORIDE 9 MG/ML
20 INJECTION, SOLUTION INTRAVENOUS ONCE AS NEEDED
Status: DISCONTINUED | OUTPATIENT
Start: 2019-12-17 | End: 2019-12-20 | Stop reason: HOSPADM

## 2019-12-17 RX ORDER — DEXTROSE MONOHYDRATE 50 MG/ML
20 INJECTION, SOLUTION INTRAVENOUS ONCE
Status: DISCONTINUED | OUTPATIENT
Start: 2019-12-17 | End: 2019-12-20 | Stop reason: HOSPADM

## 2019-12-17 RX ADMIN — DEXAMETHASONE SODIUM PHOSPHATE: 10 INJECTION, SOLUTION INTRAMUSCULAR; INTRAVENOUS at 09:45

## 2019-12-17 RX ADMIN — FLUOROURACIL 795 MG: 50 INJECTION, SOLUTION INTRAVENOUS at 12:16

## 2019-12-17 RX ADMIN — LEUCOVORIN CALCIUM 800 MG: 100 INJECTION, POWDER, LYOPHILIZED, FOR SOLUTION INTRAMUSCULAR; INTRAVENOUS at 10:10

## 2019-12-17 RX ADMIN — SODIUM CHLORIDE 20 ML/HR: 0.9 INJECTION, SOLUTION INTRAVENOUS at 09:45

## 2019-12-17 NOTE — PATIENT INSTRUCTIONS
Nutrition Rx & Recommendations:  · Diet: High Calorie, High Protein (for high calorie foods see pages 52-53, and for high protein foods see pages 49-51 in your Eating Hints book)  · Small, frequent meals/snacks may be easier to tolerate than 3 large daily meals  Aim for 5-6 small meals per day (every 2-3 hours)  · Include protein at all meals/snacks  · Stay hydrated by sipping fluids of choice/tolerance throughout the day  · Incorporate physical activity as able/allowed  · Follow proper oral care; Try baking soda/salt water rinse recipe (mix 3/4 tsp salt + 1 tsp baking soda + 1 qt water; rinse with pain water after using) in Eating Hints book (pg 18)  Brush your teeth before/after meals & before bed  · Weigh yourself regularly  If you notice weight loss, make an effort to increase your daily food/calorie intake  If you continue to notice loss after these efforts, reach out to your dietitian to establish a plan to stabilize weight  · Follow a Cancer Preventative Nutrition Pattern: colorful, plant-based, low-fat, avoid added sugars, limit alcohol, include high fiber foods, limit processed meats, limit red meat, choose lean protein sources, use low-fat cooking methods, balance calories with physical activity, avoid excessive weight gain throughout life  · Discontinue oral nutrition supplements (Boost VHC)  When you don't feel like having a meal, try some of the Fruit/Vegetable Smoothie recipes we discussed, or, choose Boost Plus for a slighly lower calorie option  · Aim for  oz fluid per day  · Choose whole foods when possible: fruits, vegetables, whole grains (try [employer]'s Killer Bread)  · Choose lean protein sources: chicken breast, fish, low-fat dairy and yogurt, egg whites, beans, nut butter, low-fat cheese      Follow Up Plan: I will reach out mid-January to set up a follow up   Recommend Referral to Other Providers: none at this time

## 2019-12-17 NOTE — PLAN OF CARE
Problem: Potential for Falls  Goal: Patient will remain free of falls  Description  INTERVENTIONS:  - Assess patient frequently for physical needs  -  Identify cognitive and physical deficits and behaviors that affect risk of falls  -  [place] fall precautions as indicated by assessment   - Educate patient/family on patient safety including physical limitations  - Instruct patient to call for assistance with activity based on assessment  - Modify environment to reduce risk of injury  - Consider OT/PT consult to assist with strengthening/mobility  Outcome: Progressing     Problem: Knowledge Deficit  Goal: Patient/family/caregiver demonstrates understanding of disease process, treatment plan, medications, and discharge instructions  Description  Complete learning assessment and assess knowledge base    Interventions:  - Provide teaching at level of understanding  - Provide teaching via preferred learning methods  Outcome: Progressing     Problem: DISCHARGE PLANNING  Goal: Discharge to home or other facility with appropriate resources  Description  INTERVENTIONS:  - Identify barriers to discharge w/patient and caregiver  - Arrange for needed discharge resources and transportation as appropriate  - Identify discharge learning needs (meds, wound care, etc )  - Arrange for interpretive services to assist at discharge as needed  - Refer to Case Management Department for coordinating discharge planning if the patient needs post-hospital services based on physician/advanced practitioner order or complex needs related to functional status, cognitive ability, or social support system  Outcome: Progressing     Problem: INFECTION - ADULT  Goal: Absence or prevention of progression during hospitalization  Description  INTERVENTIONS:  - Assess and monitor for signs and symptoms of infection  - Monitor lab/diagnostic results  - Monitor all insertion sites, i e  indwelling lines, tubes, and drains  - Monitor endotracheal (as able) and nasal secretions for changes in amount and color  - [place] appropriate cooling/warming therapies per order  - Administer medications as ordered  - Instruct and encourage patient and family to use good hand hygiene technique  - Identify and instruct in appropriate isolation precautions for identified infection/condition  Outcome: Progressing  Goal: Absence of fever/infection during neutropenic period  Description  INTERVENTIONS:  - Monitor WBC  - Implement neutropenic guidelines  Outcome: Progressing

## 2019-12-19 ENCOUNTER — HOSPITAL ENCOUNTER (OUTPATIENT)
Dept: INFUSION CENTER | Facility: HOSPITAL | Age: 64
Discharge: HOME | End: 2019-12-19

## 2019-12-19 VITALS
HEART RATE: 61 BPM | RESPIRATION RATE: 18 BRPM | DIASTOLIC BLOOD PRESSURE: 66 MMHG | SYSTOLIC BLOOD PRESSURE: 115 MMHG | OXYGEN SATURATION: 96 % | TEMPERATURE: 96.8 F

## 2019-12-19 DIAGNOSIS — C16.0 MALIGNANT NEOPLASM OF CARDIA OF STOMACH (HCC): ICD-10-CM

## 2019-12-19 DIAGNOSIS — R11.0 NAUSEA: ICD-10-CM

## 2019-12-19 DIAGNOSIS — E86.0 DEHYDRATION: ICD-10-CM

## 2019-12-19 DIAGNOSIS — C15.5 MALIGNANT NEOPLASM OF LOWER THIRD OF ESOPHAGUS (HCC): Primary | ICD-10-CM

## 2019-12-19 NOTE — PLAN OF CARE
Problem: Potential for Falls  Goal: Patient will remain free of falls  Description  INTERVENTIONS:  - Assess patient frequently for physical needs  -  Identify cognitive and physical deficits and behaviors that affect risk of falls  -  [place] fall precautions as indicated by assessment   - Educate patient/family on patient safety including physical limitations  - Instruct patient to call for assistance with activity based on assessment  - Modify environment to reduce risk of injury  - Consider OT/PT consult to assist with strengthening/mobility  Outcome: Progressing     Problem: Potential for Falls  Goal: Patient will remain free of falls  Description  INTERVENTIONS:  - Assess patient frequently for physical needs  -  Identify cognitive and physical deficits and behaviors that affect risk of falls    -  [place] fall precautions as indicated by assessment   - Educate patient/family on patient safety including physical limitations  - Instruct patient to call for assistance with activity based on assessment  - Modify environment to reduce risk of injury  - Consider OT/PT consult to assist with strengthening/mobility  Outcome: Progressing     Problem: INFECTION - ADULT  Goal: Absence or prevention of progression during hospitalization  Description  INTERVENTIONS:  - Assess and monitor for signs and symptoms of infection  - Monitor lab/diagnostic results  - Monitor all insertion sites, i e  indwelling lines, tubes, and drains  - Monitor endotracheal (as able) and nasal secretions for changes in amount and color  - [place] appropriate cooling/warming therapies per order  - Administer medications as ordered  - Instruct and encourage patient and family to use good hand hygiene technique  - Identify and instruct in appropriate isolation precautions for identified infection/condition  Outcome: Progressing  Goal: Absence of fever/infection during neutropenic period  Description  INTERVENTIONS:  - Monitor WBC  - Implement neutropenic guidelines  Outcome: Progressing     Problem: DISCHARGE PLANNING  Goal: Discharge to home or other facility with appropriate resources  Description  INTERVENTIONS:  - Identify barriers to discharge w/patient and caregiver  - Arrange for needed discharge resources and transportation as appropriate  - Identify discharge learning needs (meds, wound care, etc )  - Arrange for interpretive services to assist at discharge as needed  - Refer to Case Management Department for coordinating discharge planning if the patient needs post-hospital services based on physician/advanced practitioner order or complex needs related to functional status, cognitive ability, or social support system  Outcome: Progressing     Problem: Knowledge Deficit  Goal: Patient/family/caregiver demonstrates understanding of disease process, treatment plan, medications, and discharge instructions  Description  Complete learning assessment and assess knowledge base    Interventions:  - Provide teaching at level of understanding  - Provide teaching via preferred learning methods  Outcome: Progressing

## 2019-12-23 RX ORDER — FLUOROURACIL 50 MG/ML
400 INJECTION, SOLUTION INTRAVENOUS ONCE
Status: CANCELLED | OUTPATIENT
Start: 2019-12-31

## 2019-12-23 RX ORDER — DEXTROSE MONOHYDRATE 50 MG/ML
20 INJECTION, SOLUTION INTRAVENOUS ONCE
Status: CANCELLED | OUTPATIENT
Start: 2019-12-31

## 2019-12-23 RX ORDER — SODIUM CHLORIDE 9 MG/ML
20 INJECTION, SOLUTION INTRAVENOUS ONCE AS NEEDED
Status: CANCELLED | OUTPATIENT
Start: 2019-12-31

## 2019-12-26 ENCOUNTER — OFFICE VISIT (OUTPATIENT)
Dept: HEMATOLOGY ONCOLOGY | Facility: CLINIC | Age: 64
End: 2019-12-26
Payer: MEDICARE

## 2019-12-26 ENCOUNTER — APPOINTMENT (OUTPATIENT)
Dept: LAB | Facility: HOSPITAL | Age: 64
End: 2019-12-26
Payer: MEDICARE

## 2019-12-26 ENCOUNTER — TELEPHONE (OUTPATIENT)
Dept: HEMATOLOGY ONCOLOGY | Facility: CLINIC | Age: 64
End: 2019-12-26

## 2019-12-26 VITALS
RESPIRATION RATE: 17 BRPM | HEART RATE: 66 BPM | TEMPERATURE: 97.8 F | WEIGHT: 194 LBS | BODY MASS INDEX: 27.77 KG/M2 | DIASTOLIC BLOOD PRESSURE: 70 MMHG | OXYGEN SATURATION: 97 % | HEIGHT: 70 IN | SYSTOLIC BLOOD PRESSURE: 130 MMHG

## 2019-12-26 DIAGNOSIS — R11.0 NAUSEA: ICD-10-CM

## 2019-12-26 DIAGNOSIS — R11.0 NAUSEA: Primary | ICD-10-CM

## 2019-12-26 DIAGNOSIS — C16.0 MALIGNANT NEOPLASM OF CARDIA OF STOMACH (HCC): ICD-10-CM

## 2019-12-26 DIAGNOSIS — C15.5 MALIGNANT NEOPLASM OF LOWER THIRD OF ESOPHAGUS (HCC): Primary | ICD-10-CM

## 2019-12-26 DIAGNOSIS — E86.0 DEHYDRATION: ICD-10-CM

## 2019-12-26 DIAGNOSIS — C15.5 MALIGNANT NEOPLASM OF LOWER THIRD OF ESOPHAGUS (HCC): ICD-10-CM

## 2019-12-26 LAB
ALBUMIN SERPL BCP-MCNC: 3.2 G/DL (ref 3.5–5)
ALP SERPL-CCNC: 95 U/L (ref 46–116)
ALT SERPL W P-5'-P-CCNC: 19 U/L (ref 12–78)
ANION GAP SERPL CALCULATED.3IONS-SCNC: 7 MMOL/L (ref 4–13)
AST SERPL W P-5'-P-CCNC: 12 U/L (ref 5–45)
BASOPHILS # BLD AUTO: 0.06 THOUSANDS/ΜL (ref 0–0.1)
BASOPHILS NFR BLD AUTO: 1 % (ref 0–1)
BILIRUB SERPL-MCNC: 1.1 MG/DL (ref 0.2–1)
BUN SERPL-MCNC: 14 MG/DL (ref 5–25)
CALCIUM SERPL-MCNC: 8.9 MG/DL (ref 8.3–10.1)
CHLORIDE SERPL-SCNC: 104 MMOL/L (ref 100–108)
CO2 SERPL-SCNC: 28 MMOL/L (ref 21–32)
CREAT SERPL-MCNC: 1.03 MG/DL (ref 0.6–1.3)
EOSINOPHIL # BLD AUTO: 0.13 THOUSAND/ΜL (ref 0–0.61)
EOSINOPHIL NFR BLD AUTO: 2 % (ref 0–6)
ERYTHROCYTE [DISTWIDTH] IN BLOOD BY AUTOMATED COUNT: 16.5 % (ref 11.6–15.1)
GFR SERPL CREATININE-BSD FRML MDRD: 75 ML/MIN/1.73SQ M
GLUCOSE SERPL-MCNC: 112 MG/DL (ref 65–140)
HCT VFR BLD AUTO: 36.4 % (ref 36.5–49.3)
HGB BLD-MCNC: 11.8 G/DL (ref 12–17)
IMM GRANULOCYTES # BLD AUTO: 0.03 THOUSAND/UL (ref 0–0.2)
IMM GRANULOCYTES NFR BLD AUTO: 1 % (ref 0–2)
LYMPHOCYTES # BLD AUTO: 2 THOUSANDS/ΜL (ref 0.6–4.47)
LYMPHOCYTES NFR BLD AUTO: 34 % (ref 14–44)
MCH RBC QN AUTO: 33.8 PG (ref 26.8–34.3)
MCHC RBC AUTO-ENTMCNC: 32.4 G/DL (ref 31.4–37.4)
MCV RBC AUTO: 104 FL (ref 82–98)
MONOCYTES # BLD AUTO: 0.96 THOUSAND/ΜL (ref 0.17–1.22)
MONOCYTES NFR BLD AUTO: 16 % (ref 4–12)
NEUTROPHILS # BLD AUTO: 2.68 THOUSANDS/ΜL (ref 1.85–7.62)
NEUTS SEG NFR BLD AUTO: 46 % (ref 43–75)
NRBC BLD AUTO-RTO: 0 /100 WBCS
PLATELET # BLD AUTO: 274 THOUSANDS/UL (ref 149–390)
PMV BLD AUTO: 10.7 FL (ref 8.9–12.7)
POTASSIUM SERPL-SCNC: 3.7 MMOL/L (ref 3.5–5.3)
PROT SERPL-MCNC: 6.8 G/DL (ref 6.4–8.2)
RBC # BLD AUTO: 3.49 MILLION/UL (ref 3.88–5.62)
SODIUM SERPL-SCNC: 139 MMOL/L (ref 136–145)
WBC # BLD AUTO: 5.86 THOUSAND/UL (ref 4.31–10.16)

## 2019-12-26 PROCEDURE — 99214 OFFICE O/P EST MOD 30 MIN: CPT | Performed by: NURSE PRACTITIONER

## 2019-12-26 PROCEDURE — 85025 COMPLETE CBC W/AUTO DIFF WBC: CPT

## 2019-12-26 PROCEDURE — 80053 COMPREHEN METABOLIC PANEL: CPT

## 2019-12-26 PROCEDURE — 36415 COLL VENOUS BLD VENIPUNCTURE: CPT

## 2019-12-26 RX ORDER — DEXTROSE MONOHYDRATE 50 MG/ML
20 INJECTION, SOLUTION INTRAVENOUS ONCE
Status: CANCELLED | OUTPATIENT
Start: 2020-01-14

## 2019-12-26 RX ORDER — SODIUM CHLORIDE 9 MG/ML
20 INJECTION, SOLUTION INTRAVENOUS ONCE AS NEEDED
Status: CANCELLED | OUTPATIENT
Start: 2020-01-14

## 2019-12-26 RX ORDER — FLUOROURACIL 50 MG/ML
400 INJECTION, SOLUTION INTRAVENOUS ONCE
Status: CANCELLED | OUTPATIENT
Start: 2020-01-14

## 2019-12-26 NOTE — PROGRESS NOTES
Hematology/Oncology Outpatient Follow- up Note  Lino Born 75 y o  male MRN: @ Encounter: [id number]        Date:  12/26/2019    Presenting Complaint/Diagnosis : Metastatic gastroesophageal cancer    HPI:  Winter Gaspar seen for initial consultation 7/26/2019 regarding Newly diagnosed metastatic gastroesophageal cancer  The patient has biopsy-proven adenocarcinoma of the esophagus  PET/CT scan was done which shows omental nodularity that is FDG avid indicating stage IV disease  The patient has lost approximately 40-50 pounds  His ECOG performance status is a 1-2      Previous Hematologic/ Oncologic History:       Malignant [facility] of lower third of [facility] ([facility] )    7/26/2019 Initial Diagnosis     Malignant neoplasm of lower third of esophagus ([facility] )      8/1/2019 -  Chemotherapy     fluorouracil (ADRUCIL) injection 750 mg, 400 mg/m2 = 750 mg, Intravenous, Once, 11 of 12 cycles  Administration: 750 mg (8/1/2019), 750 mg (8/13/2019), 750 mg (8/27/2019), 750 mg (9/10/2019), 750 mg (9/24/2019), 750 mg (10/8/2019), 795 mg (10/22/2019), 795 mg (11/5/2019), 795 mg (11/19/2019), 795 mg (12/3/2019), 795 mg (12/17/2019)  pegfilgrastim (NEULASTA ONPRO) subcutaneous injection kit 6 mg, 6 mg, Subcutaneous, Once, 2 of 2 cycles  Administration: 6 mg (8/3/2019), 6 mg (8/15/2019)  leucovorin 750 mg in dextrose 5 % 250 mL IVPB, 748 mg, Intravenous, Once, 11 of 12 cycles  Administration: 750 mg (8/1/2019), 750 mg (8/13/2019), 750 mg (8/27/2019), 750 mg (9/10/2019), 750 mg (9/24/2019), 750 mg (10/8/2019), 800 mg (10/22/2019), 800 mg (11/5/2019), 800 mg (11/19/2019), 800 mg (12/3/2019), 800 mg (12/17/2019)  oxaliplatin (ELOXATIN) 158 95 mg in dextrose 5 % 250 mL chemo infusion, 85 mg/m2 = 158 95 mg, Intravenous, Once, 6 of 6 cycles  Administration: 158 95 mg (8/1/2019), 158 95 mg (8/13/2019), 158 95 mg (8/27/2019), 158 95 mg (9/10/2019), 158 95 mg (9/24/2019), 158 95 mg (10/8/2019)        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[facility] )    7/26/2019 Initial Diagnosis     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      8/1/2019 -  Chemotherapy     fluorouracil (ADRUCIL) injection 750 mg, 400 mg/m2 = 750 mg, Intravenous, Once, 11 of 12 cycles  Administration: 750 mg (8/1/2019), 750 mg (8/13/2019), 750 mg (8/27/2019), 750 mg (9/10/2019), 750 mg (9/24/2019), 750 mg (10/8/2019), 795 mg (10/22/2019), 795 mg (11/5/2019), 795 mg (11/19/2019), 795 mg (12/3/2019), 795 mg (12/17/2019)  pegfilgrastim (NEULASTA ONPRO) subcutaneous injection kit 6 mg, 6 mg, Subcutaneous, Once, 2 of 2 cycles  Administration: 6 mg (8/3/2019), 6 mg (8/15/2019)  leucovorin 750 mg in dextrose 5 % 250 mL IVPB, 748 mg, Intravenous, Once, 11 of 12 cycles  Administration: 750 mg (8/1/2019), 750 mg (8/13/2019), 750 mg (8/27/2019), 750 mg (9/10/2019), 750 mg (9/24/2019), 750 mg (10/8/2019), 800 mg (10/22/2019), 800 mg (11/5/2019), 800 mg (11/19/2019), 800 mg (12/3/2019), 800 mg (12/17/2019)  oxaliplatin (ELOXATIN) 158 95 mg in dextrose 5 % 250 mL chemo infusion, 85 mg/m2 = 158 95 mg, Intravenous, Once, 6 of 6 cycles  Administration: 158 95 mg (8/1/2019), 158 95 mg (8/13/2019), 158 95 mg (8/27/2019), 158 95 mg (9/10/2019), 158 95 mg (9/24/2019), 158 95 mg (10/8/2019)         Current Hematologic/ Oncologic Treatment:    Modified FOLFOX 6  Cycle #5 will be on 24 September  FOLFOX every 2 weeks      Oxaliplatin 85 milligrams/meter squared  Leucovorin 400 milligrams/meters squared  5FU 400 milligrams/meters squared  5FU 2400 milligrams/meter squared CIVI over 46 hours     Neulasta support Has been discontinued      Oxaliplatin will be discontinued for his next chemotherapy  Interval History:    The patient returns for follow-up visit  Overall he is feeling well  He continues to gain weight  He does report slow stream with urination  Otherwise denies chest pain, shortness of breath, nausea, vomiting, diarrhea, bleeding/bruising, fevers/infection    Last month he had a Doppler completed that confirmed superficial thrombophlebitis  He was started on Eliquis  He does still have varicose veins in his right lower extremity however redness, pain , and swelling have resolved now that he is on Eliquis  Blood counts remain within acceptable range  Test Results:    Imaging: No results found  Labs:   Lab Results   Component Value Date    WBC 6 42 12/13/2019    HGB 12 8 12/13/2019    HCT 39 1 12/13/2019     (H) 12/13/2019     12/13/2019     Lab Results   Component Value Date    K 3 5 12/13/2019     12/13/2019    CO2 28 12/13/2019    BUN 18 12/13/2019    CREATININE 1 10 12/13/2019    GLUF 113 (H) 10/18/2019    CALCIUM 9 1 12/13/2019    AST 27 12/13/2019    ALT 32 12/13/2019    ALKPHOS 108 12/13/2019    EGFR 70 12/13/2019         No results found for: SPEP, UPEP    No results found for: PSA    No results found for: CEA    No results found for:     No results found for: AFP    No results found for: IRON, TIBC, FERRITIN    No results found for: SFWIHVBU17      ROS: As stated in the history of present illness otherwise his 14 point review of systems today was negative        Active Problems:   Patient Active Problem List   Diagnosis    PTSD (post-traumatic stress disorder)    Essential hypertension    Major depression    OCD (obsessive compulsive disorder)    Anxiety    Malignant neoplasm of lower third of esophagus ([facility] )    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)    Cancer associated pain    Chemotherapy-induced nausea    Abnormal weight loss    Malignant ascites    Recovering alcoholic ([facility] )    Other fatigue    Other dysphagia    Nausea    Dehydration       Past Medical History:   Past Medical History:   Diagnosis Date    Dehydration 8/8/2019    Esophageal cancer ([facility] )     Hypertension     Nausea 8/8/2019    Psychiatric disorder     Recovering alcoholic [facility]        Surgical History:   Past Surgical History:   Procedure Laterality Date    APPENDECTOMY      IR PORT PLACEMENT  7/31/2019       Family History:    Family History   Problem Relation Age of Onset    Colon cancer Father        Cancer-related family history includes Colon cancer in his father      Social History:   Social History     Socioeconomic History    Marital status: /Civil Union     Spouse name: Not on file    Number of children: 2    Years of education: Not on file    Highest education level: Not on file   Occupational History    Occupation: retired    Social Needs    Financial resource strain: Not on file    Food insecurity:     Worry: Not on file     Inability: Not on file   Otto Clave needs:     Medical: Not on file     Non-medical: Not on file   Tobacco Use    Smoking status: Current Some Day Smoker     Types: Cigars    Smokeless tobacco: Never Used   Substance and Sexual Activity    Alcohol use: No    Drug use: Yes     Types: Marijuana    Sexual activity: Not on file   Lifestyle    Physical activity:     Days per week: Not on file     Minutes per session: Not on file    Stress: Not on file   Relationships    Social connections:     Talks on phone: Not on file     Gets together: Not on file     Attends Gnosticist service: Not on file     Active member of club or organization: Not on file     Attends meetings of clubs or organizations: Not on file     Relationship status: Not on file    Intimate partner violence:     Fear of current or ex partner: Not on file     Emotionally abused: Not on file     Physically abused: Not on file     Forced sexual activity: Not on file   Other Topics Concern    Not on file   Social History Narrative    Not on file       Current Medications:   Current Outpatient Medications   Medication Sig Dispense Refill    apixaban (ELIQUIS) 5 mg Take 1 tablet (5 mg total) by mouth 2 (two) times a day for 154 doses 154 tablet 0    bisoprolol (ZEBETA) 10 MG tablet Take 0 5 tablets (5 mg total) by mouth daily  diazepam (VALIUM) 5 mg tablet Take 1 tablet (5 mg total) by mouth 2 (two) times a day 60 tablet 0    fluorouracil 4,775 mg in CADD infusion pump Infuse 4,775 mg (1,200 mg/m2/day x 1 99 m2 (Treatment plan recorded BSA)) into a venous catheter over 46 hours for 2 days [place] to be administered on 12/31/19 1 Device 0    lidocaine-prilocaine (EMLA) cream Apply topically as needed for mild pain 30 g 0    metoclopramide (REGLAN) 5 mg/5 mL oral solution Take 10 mg by mouth 4 (four) times a day as needed for nausea      ondansetron (ZOFRAN-ODT) 8 mg disintegrating tablet Take 1 tablet (8 mg total) by mouth every 8 (eight) hours as needed for nausea or vomiting (If Compazine is not effective) 30 tablet 0    oxyCODONE (ROXICODONE) 5 mg/5 mL solution Take 5 mg by mouth every 4 (four) hours as needed for pain      pantoprazole (PROTONIX) 40 mg tablet Take 40 mg by mouth daily before breakfast      prochlorperazine (COMPAZINE) 10 mg tablet Take 10 mg by mouth every 6 (six) hours as needed for nausea or vomiting      venlafaxine (EFFEXOR-XR) 150 mg 24 hr capsule Take 1 capsule (150 mg total) by mouth daily 30 capsule 0    apixaban (ELIQUIS) 5 mg Take 2 tablets (10 mg total) by mouth 2 (two) times a day for 13 doses (Patient not taking: Reported on 12/26/2019) 13 tablet 0     No current facility-administered medications for this visit  Allergies: Allergies   Allergen Reactions    Bee Venom Anaphylaxis    Shellfish-Derived Products      Develops GOUT       Physical Exam:    Body surface area is 2 05 meters squared      Wt Readings from Last 3 Encounters:   12/26/19 88 kg (194 lb)   12/17/19 88 6 kg (195 lb 5 2 oz)   12/03/19 88 4 kg (194 lb 14 2 oz)        Temp Readings from Last 3 Encounters:   12/19/19 (!) 96 8 °F (36 °C) (Temporal)   12/17/19 97 6 °F (36 4 °C)   12/05/19 (!) 97 4 °F (36 3 °C) (Temporal)        BP Readings from Last 3 Encounters:   12/19/19 115/66   12/17/19 138/72   12/05/19 155/79 Pulse Readings from Last 3 Encounters:   12/19/19 61   12/17/19 76   12/05/19 58     @LASTSAO2(3)@      Physical Exam     Constitutional   General appearance: No acute distress, well appearing and well nourished  Eyes   Conjunctiva and lids: No swelling, erythema or discharge  Pupils and irises: Equal, round and reactive to light  Ears, Nose, Mouth, and Throat   External inspection of ears and nose: Normal     Nasal mucosa, septum, and turbinates: Normal without edema or erythema  Oropharynx: Normal with no erythema, edema, exudate or lesions  Pulmonary   Respiratory effort: No increased work of breathing or signs of respiratory distress  Auscultation of lungs: Clear to auscultation  Cardiovascular   Palpation of heart: Normal PMI, no thrills  Auscultation of heart: Normal rate and rhythm, normal S1 and S2, without murmurs  Examination of extremities for edema and/or varicosities: Normal     Carotid pulses: Normal     Abdomen   Abdomen: Non-tender, no masses  Liver and spleen: No hepatomegaly or splenomegaly  Lymphatic   Palpation of lymph nodes in neck: No lymphadenopathy  Musculoskeletal   Gait and station: Normal     Digits and nails: Normal without clubbing or cyanosis  Inspection/palpation of joints, bones, and muscles: Normal     Skin   Skin and subcutaneous tissue: Normal without rashes or lesions  Neurologic   Cranial nerves: Cranial nerves 2-12 intact  Sensation: No sensory loss  Psychiatric   Orientation to person, place, and time: Normal     Mood and affect: Normal         Assessment / Plan:    The patient is a pleasant 26-year-old male with a past medical history of metastatic gastroesophageal cancer  He received 4 cycles of modified FOLFOX 6 chemotherapy After which his imaging showed a very nice response  We discontinued the oxaliplatin  He is done well on 5-FU leucovorin  He continues to eat and gain weight    He was found to have superficial thrombophlebitis after his last visit and was started on Eliquis and will continue on this  He will be due for his 3 month scan in January  We will schedule this today  I will also order a PSA as he reported slow urinary stream and his PET-CT on 07/22/2019 did show a small amount of uptake in his prostate  Otherwise we will continue him on 5  milligrams/meter squared, leucovorin 400 milligrams/meter squared, and 5 FU 1200 milligrams/meter squared per day continuous IV infusion over 46 hours  We will plan to see him back in 4 weeks with his scan results  The patient is in agreement with the plan and will call in the meantime with any questions or concerns  Goals and Barriers:  Current Goal:  Prolong Survival from gastroesophageal cancer  Barriers: None  Patient's Capacity to Self Care:  Patient able to self care  Portions of the record may have been created with voice recognition software   Occasional wrong word or "sound a like" substitutions may have occurred due to the inherent limitations of voice recognition software   Read the chart carefully and recognize, using context, where substitutions have occurred

## 2019-12-26 NOTE — TELEPHONE ENCOUNTER
Left message with the [facility] to reach out to patient to schedule additional tx

## 2019-12-31 ENCOUNTER — HOSPITAL ENCOUNTER (OUTPATIENT)
Dept: INFUSION CENTER | Facility: HOSPITAL | Age: 64
Discharge: HOME | End: 2019-12-31
Payer: MEDICARE

## 2019-12-31 VITALS
SYSTOLIC BLOOD PRESSURE: 135 MMHG | RESPIRATION RATE: 18 BRPM | HEIGHT: 69 IN | HEART RATE: 71 BPM | TEMPERATURE: 97.4 F | OXYGEN SATURATION: 95 % | DIASTOLIC BLOOD PRESSURE: 74 MMHG | BODY MASS INDEX: 28.41 KG/M2 | WEIGHT: 191.8 LBS

## 2019-12-31 DIAGNOSIS — C16.0 MALIGNANT NEOPLASM OF CARDIA OF STOMACH (HCC): ICD-10-CM

## 2019-12-31 DIAGNOSIS — E86.0 DEHYDRATION: ICD-10-CM

## 2019-12-31 DIAGNOSIS — R11.0 NAUSEA: ICD-10-CM

## 2019-12-31 DIAGNOSIS — C15.5 MALIGNANT NEOPLASM OF LOWER THIRD OF ESOPHAGUS (HCC): Primary | ICD-10-CM

## 2019-12-31 PROCEDURE — G0498 CHEMO EXTEND IV INFUS W/PUMP: HCPCS

## 2019-12-31 PROCEDURE — 96367 TX/PROPH/DG ADDL SEQ IV INF: CPT

## 2019-12-31 PROCEDURE — 96366 THER/PROPH/DIAG IV INF ADDON: CPT

## 2019-12-31 PROCEDURE — 96409 CHEMO IV PUSH SNGL DRUG: CPT

## 2019-12-31 RX ORDER — SODIUM CHLORIDE 9 MG/ML
20 INJECTION, SOLUTION INTRAVENOUS ONCE AS NEEDED
Status: DISCONTINUED | OUTPATIENT
Start: 2019-12-31 | End: 2020-01-03 | Stop reason: HOSPADM

## 2019-12-31 RX ORDER — FLUOROURACIL 50 MG/ML
400 INJECTION, SOLUTION INTRAVENOUS ONCE
Status: COMPLETED | OUTPATIENT
Start: 2019-12-31 | End: 2019-12-31

## 2019-12-31 RX ORDER — DEXTROSE MONOHYDRATE 50 MG/ML
20 INJECTION, SOLUTION INTRAVENOUS ONCE
Status: DISCONTINUED | OUTPATIENT
Start: 2019-12-31 | End: 2020-01-03 | Stop reason: HOSPADM

## 2019-12-31 RX ADMIN — DEXAMETHASONE SODIUM PHOSPHATE: 10 INJECTION, SOLUTION INTRAMUSCULAR; INTRAVENOUS at 10:34

## 2019-12-31 RX ADMIN — FLUOROURACIL 795 MG: 50 INJECTION, SOLUTION INTRAVENOUS at 13:22

## 2019-12-31 RX ADMIN — SODIUM CHLORIDE 20 ML/HR: 0.9 INJECTION, SOLUTION INTRAVENOUS at 10:34

## 2019-12-31 RX ADMIN — LEUCOVORIN CALCIUM 800 MG: 100 INJECTION, POWDER, LYOPHILIZED, FOR SOLUTION INTRAMUSCULAR; INTRAVENOUS at 11:11

## 2019-12-31 NOTE — PLAN OF CARE
Problem: Potential for Falls  Goal: Patient will remain free of falls  Description  INTERVENTIONS:  - Assess patient frequently for physical needs  -  Identify cognitive and physical deficits and behaviors that affect risk of falls  -  [place] fall precautions as indicated by assessment   - Educate patient/family on patient safety including physical limitations  - Instruct patient to call for assistance with activity based on assessment  - Modify environment to reduce risk of injury  - Consider OT/PT consult to assist with strengthening/mobility  Outcome: Progressing     Problem: Potential for Falls  Goal: Patient will remain free of falls  Description  INTERVENTIONS:  - Assess patient frequently for physical needs  -  Identify cognitive and physical deficits and behaviors that affect risk of falls    -  [place] fall precautions as indicated by assessment   - Educate patient/family on patient safety including physical limitations  - Instruct patient to call for assistance with activity based on assessment  - Modify environment to reduce risk of injury  - Consider OT/PT consult to assist with strengthening/mobility  Outcome: Progressing     Problem: INFECTION - ADULT  Goal: Absence or prevention of progression during hospitalization  Description  INTERVENTIONS:  - Assess and monitor for signs and symptoms of infection  - Monitor lab/diagnostic results  - Monitor all insertion sites, i e  indwelling lines, tubes, and drains  - Monitor endotracheal (as able) and nasal secretions for changes in amount and color  - [place] appropriate cooling/warming therapies per order  - Administer medications as ordered  - Instruct and encourage patient and family to use good hand hygiene technique  - Identify and instruct in appropriate isolation precautions for identified infection/condition  Outcome: Progressing  Goal: Absence of fever/infection during neutropenic period  Description  INTERVENTIONS:  - Monitor WBC  - Implement neutropenic guidelines  Outcome: Progressing     Problem: DISCHARGE PLANNING  Goal: Discharge to home or other facility with appropriate resources  Description  INTERVENTIONS:  - Identify barriers to discharge w/patient and caregiver  - Arrange for needed discharge resources and transportation as appropriate  - Identify discharge learning needs (meds, wound care, etc )  - Arrange for interpretive services to assist at discharge as needed  - Refer to Case Management Department for coordinating discharge planning if the patient needs post-hospital services based on physician/advanced practitioner order or complex needs related to functional status, cognitive ability, or social support system  Outcome: Progressing     Problem: Knowledge Deficit  Goal: Patient/family/caregiver demonstrates understanding of disease process, treatment plan, medications, and discharge instructions  Description  Complete learning assessment and assess knowledge base    Interventions:  - Provide teaching at level of understanding  - Provide teaching via preferred learning methods  Outcome: Progressing

## 2020-01-02 ENCOUNTER — HOSPITAL ENCOUNTER (OUTPATIENT)
Dept: INFUSION CENTER | Facility: HOSPITAL | Age: 65
Discharge: HOME | End: 2020-01-02

## 2020-01-02 VITALS
HEART RATE: 59 BPM | DIASTOLIC BLOOD PRESSURE: 85 MMHG | SYSTOLIC BLOOD PRESSURE: 156 MMHG | OXYGEN SATURATION: 93 % | TEMPERATURE: 96.9 F | RESPIRATION RATE: 18 BRPM

## 2020-01-02 DIAGNOSIS — C15.5 MALIGNANT NEOPLASM OF LOWER THIRD OF ESOPHAGUS (HCC): Primary | ICD-10-CM

## 2020-01-02 DIAGNOSIS — E86.0 DEHYDRATION: ICD-10-CM

## 2020-01-02 DIAGNOSIS — R11.0 NAUSEA: ICD-10-CM

## 2020-01-02 DIAGNOSIS — C16.0 MALIGNANT NEOPLASM OF CARDIA OF STOMACH (HCC): ICD-10-CM

## 2020-01-02 NOTE — PLAN OF CARE
Problem: Potential for Falls  Goal: Patient will remain free of falls  Description  INTERVENTIONS:  - Assess patient frequently for physical needs  -  Identify cognitive and physical deficits and behaviors that affect risk of falls  -  [place] fall precautions as indicated by assessment   - Educate patient/family on patient safety including physical limitations  - Instruct patient to call for assistance with activity based on assessment  - Modify environment to reduce risk of injury  - Consider OT/PT consult to assist with strengthening/mobility  Outcome: Progressing     Problem: Potential for Falls  Goal: Patient will remain free of falls  Description  INTERVENTIONS:  - Assess patient frequently for physical needs  -  Identify cognitive and physical deficits and behaviors that affect risk of falls    -  [place] fall precautions as indicated by assessment   - Educate patient/family on patient safety including physical limitations  - Instruct patient to call for assistance with activity based on assessment  - Modify environment to reduce risk of injury  - Consider OT/PT consult to assist with strengthening/mobility  Outcome: Progressing     Problem: INFECTION - ADULT  Goal: Absence or prevention of progression during hospitalization  Description  INTERVENTIONS:  - Assess and monitor for signs and symptoms of infection  - Monitor lab/diagnostic results  - Monitor all insertion sites, i e  indwelling lines, tubes, and drains  - Monitor endotracheal (as able) and nasal secretions for changes in amount and color  - [place] appropriate cooling/warming therapies per order  - Administer medications as ordered  - Instruct and encourage patient and family to use good hand hygiene technique  - Identify and instruct in appropriate isolation precautions for identified infection/condition  Outcome: Progressing  Goal: Absence of fever/infection during neutropenic period  Description  INTERVENTIONS:  - Monitor WBC  - Implement neutropenic guidelines  Outcome: Progressing     Problem: DISCHARGE PLANNING  Goal: Discharge to home or other facility with appropriate resources  Description  INTERVENTIONS:  - Identify barriers to discharge w/patient and caregiver  - Arrange for needed discharge resources and transportation as appropriate  - Identify discharge learning needs (meds, wound care, etc )  - Arrange for interpretive services to assist at discharge as needed  - Refer to Case Management Department for coordinating discharge planning if the patient needs post-hospital services based on physician/advanced practitioner order or complex needs related to functional status, cognitive ability, or social support system  Outcome: Progressing     Problem: Knowledge Deficit  Goal: Patient/family/caregiver demonstrates understanding of disease process, treatment plan, medications, and discharge instructions  Description  Complete learning assessment and assess knowledge base    Interventions:  - Provide teaching at level of understanding  - Provide teaching via preferred learning methods  Outcome: Progressing

## 2020-01-06 ENCOUNTER — OFFICE VISIT (OUTPATIENT)
Dept: PHYSICAL THERAPY | Facility: CLINIC | Age: 65
End: 2020-01-06
Payer: MEDICARE

## 2020-01-06 DIAGNOSIS — Z74.09 IMPAIRED FUNCTIONAL MOBILITY, BALANCE, AND ENDURANCE: Primary | ICD-10-CM

## 2020-01-06 DIAGNOSIS — R53.83 DECREASED STAMINA: ICD-10-CM

## 2020-01-06 DIAGNOSIS — R53.1 GENERALIZED WEAKNESS: ICD-10-CM

## 2020-01-06 PROCEDURE — 97110 THERAPEUTIC EXERCISES: CPT

## 2020-01-06 PROCEDURE — 97112 NEUROMUSCULAR REEDUCATION: CPT

## 2020-01-06 NOTE — PROGRESS NOTES
PT Re-Evaluation     Today's date: 2020  Patient name: Urvashi Cotter  : 1955  MRN: [id number]  Referring provider: Jasmin Santo MD  Dx:   Encounter Diagnosis     ICD-10-CM    1  Impaired functional mobility, balance, and endurance Z74 09                   Assessment  Assessment details: Pt displays moderate improvement in all measures retested to day: TUG, 5TSXT and 2 MWT  Functionally pt reports improved ability to lift heavy items, no longer needing help with 5 gallon containers of water  Pt was provided education that his program will continue to be advanced in a gradual manner to avoid fatigue  Pt is shopping for a cane and he plans to use it in community as soon as he obtains one  Patient will benefit from skilled PT for instruction in CA program with balance and endurance with HEP consisting of seated exercises for safety  Impairments: abnormal gait, abnormal muscle tone, activity intolerance, impaired balance, lacks appropriate home exercise program and safety issue    Symptom irritability: moderateUnderstanding of Dx/Px/POC: good   Prognosis: fair    Goals  Short term goals:    1  Patient will demonstrate 12 improved time on TUG test to facilitate improved safety in all ambulation  MET 20  2  Patient will demonstrate improved endurance to 350 feet with 6 minute walk test to facilitate improved safety with ambulation MET20  3  Patient will be independent in basic HEP 2-3 weeks       Long term goals:  1  Patient will be independent in a comprehensive home exercise program   2  Patient will be able to demonstrate HT in gait without veering  3   Patient will demonstrate 500 feet with 6 minute walk test to facilitate return to safe functional mobility            Plan  Plan details: POC UPDATED 20 TO 3/6/20  Planned therapy interventions: manual therapy, postural training, patient education, neuromuscular re-education, sensory integrative techniques, strengthening, therapeutic activities, therapeutic exercise, therapeutic training, home exercise program, gait training, graded activity, flexibility, coordination, body mechanics training, balance and behavior modification  Frequency: 2x week  Plan of Care beginning date: 10/16/2019  Plan of Care expiration date: 2020  Treatment plan discussed with: patient        Subjective Evaluation    History of Present Illness  Mechanism of injury: Patient is a 75year old male who continues skilled PT for Oncology based physical therapy with focus on Strength and stamina  Update19  Pt reports improvements in small increments  He feels good for a few days after PT  He naps every 4 hrs  He has given up splitting logs  He lifts 5 gallons of water now without assistance  He does not feel his balance is better  Last fall was one month ago  He got up independently but slowly  He did not get hurt  He thinks he tripped on the dog bed  He has not tripped since then  He feels off balance reaching for items  Pt wants to bump up his PT program to a little more challenging  Pain  No pain reported    Social Support  Steps to enter house: yes  Stairs in house: yes   Lives in: multiple-level home  Lives with: significant other    Hand dominance: right    Patient Goals  Patient goals for therapy: increased strength and improved balance          Objective     Functional Assessment        Comments  5 x sit to stands: 21 09 seconds 4/10 TYRESE with fatigue   TUG test: 11 seconds with AD, 15 seconds without AD   2 MWT: with Rollator  250 feet   FTEO firm: 30  FTEC firm: 30      Updated 2020  5XSTS: 18 22 sec no UE  TU 69 sec no AD  2 MWT: 373 feet no AD             Precautions:  has a past medical history of Dehydration (2019), Esophageal cancer ([facility] [address] ), Hypertension, Nausea (2019), Psychiatric disorder, and Recovering alcoholic ([facility] [address] )          19:   Most exercises performed continually with 2 minutes on 3 minutes off circuits  NP= not performed this date  Hip flexor stretches:   Seated on Chair 20 sec each leg x 3  Calf stretches: 20 sec x 3 bilat   Floor to ceiling 10 sec hold  NP: Side to side 10 sec hold  NP: Forward step - alternating steps over adrian  NP:Lateral step - alternating steps over adrian    Bicep curls with 10 lbs - 10 reps 3x  Core rotation with 10 lb med ball - 25 reps 2x  tricep press ups 10 lb wt 10 reps 3 sets     [facility] foam pad 1 minute x2  One light wall touch  SLS on foam, 1 UE support, 30sx3  NP: Cone taps on foam, 1 UE support, 1 minute  NP: Cone taps on foam, no UE support, 1 minute

## 2020-01-06 NOTE — PROGRESS NOTES
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)    2  Other fatigue    3  Skin fissures    4  Essential hypertension    5  Goals of care, counseling/discussion      Requested Prescriptions     Signed Prescriptions Disp Refills    ammonium lactate (LAC-HYDRIN) 12 % cream 385 g 2     Sig: Apply topically as needed for dry skin On hands and feet    bisoprolol (ZEBETA) 5 mg tablet       Sig: Take 1 tablet (5 mg total) by mouth daily     Medications Discontinued During This Encounter   Medication Reason    bisoprolol (ZEBETA) 10 MG tablet Dose adjusted    ondansetron (ZOFRAN-ODT) 8 mg disintegrating tablet     prochlorperazine (COMPAZINE) 10 mg tablet     oxyCODONE (ROXICODONE) 5 mg/5 mL solution     metoclopramide (REGLAN) 5 mg/5 mL oral solution      Disease directed therapy has improved his symptomatology  He remains fatigued and is in PT and getting a good night's sleep  I mentioned the use of stimulants but patient wants to avoid controlled substances if possible citing his "addictive personality "    Given lac-hydrin cream for skin fissures  Given his overall improving status, I encouraged him to use this time to update his advanced care planning - will, living will, POA  He has a will be states it needs to be amended  He is in need of a living will and POA and was given a 5 Wishes Document  Representatives have queried the patient's controlled substance dispensing history in the Prescription Drug Monitoring Program in compliance with regulations before I have prescribed any controlled substances  The prescription history is consistent with prescribed therapy and our practice policies  20 minutes were spent face to face with María Flores with greater than 50% of the time spent in counseling or coordination of care including discussions of treatment instructions and follow up requirements     All of the patient's questions were answered during this discussion  Return in about 3 months (around 4/7/2020) for symptom assessment and management  Subjective:   Chief Complaint  Follow up visit for:  symptom management  HPI     Urvashi Cotter is a 75 y o  male with 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  [facility] was diagnosed in 7/2019  Vania Sen he has seen Dr Anastasia Alegre in medical oncology  [facility] has also seen the GI oncology department at [facility] - Dr Skyla Steen  His current treatment is palliative intent oxaliplatin, leucovorin, 5 FU      He was referred to the department of Palliative & Supportive care for concurrent symptom management   Many of his initial symptoms have been improving with disease directed cancer therapy  His dysphagia, pain, poor appetite, and nausea are all better  He has been able to stop use of dexamethasone without sacrificing his symptom control  He is gaining weight  At his last visit he reported feeling tired and weak  He is engaged in PT  His BP has remains controlled despite taking 1/2 the dose of his initally blood pressure medication  He feels he is getting a good night sleep  The following portions of the medical history were reviewed: past medical history, problem list, medication list, and social history      Current Outpatient Medications:     apixaban (ELIQUIS) 5 mg, Take 1 tablet (5 mg total) by mouth 2 (two) times a day for 154 doses, Disp: 154 tablet, Rfl: 0    diazepam (VALIUM) 5 mg tablet, Take 1 tablet (5 mg total) by mouth 2 (two) times a day, Disp: 60 tablet, Rfl: 0    lidocaine-prilocaine (EMLA) cream, Apply topically as needed for mild pain, Disp: 30 g, Rfl: 0    pantoprazole (PROTONIX) 40 mg tablet, Take 40 mg by mouth daily before breakfast, Disp: , Rfl:     venlafaxine (EFFEXOR-XR) 150 mg 24 hr capsule, Take 1 capsule (150 mg total) by mouth daily, Disp: 30 capsule, Rfl: 0    ammonium lactate (LAC-HYDRIN) 12 % cream, Apply topically as needed for dry skin On hands and feet, Disp: 385 g, Rfl: 2    bisoprolol (ZEBETA) 5 mg tablet, Take 1 tablet (5 mg total) by mouth daily, Disp: , Rfl:   Review of Systems   Constitutional: Positive for fatigue  All other systems negative    Objective:  Vital Signs  /82 (BP Location: Left arm, Patient Position: Sitting, Cuff Size: Standard)   Pulse 55   Temp (!) 96 2 °F (35 7 °C) (Tympanic)   Resp 20   Ht 5' 9" (1 753 m)   Wt 85 3 kg (188 lb)   SpO2 98%   BMI 27 76 kg/m²    Physical Exam    Constitutional: Appears well-developed and well-nourished  In no acute physical or emotional distress  Head: Normocephalic and atraumatic  Eyes: EOM are normal  No ocular discharge  No scleral icterus  Neck: No visible adenopathy or masses  Respiratory: Effort normal  No stridor  No respiratory distress  Gastrointestinal: No abdominal distension  Musculoskeletal: No edema  Neurological: Alert, oriented and appropriately conversant  Skin: No diaphoresis, no rashes seen on exposed areas of skin  Psychiatric: Displays a normal mood and affect   Behavior, judgement and thought content appear normal

## 2020-01-07 ENCOUNTER — OFFICE VISIT (OUTPATIENT)
Dept: PALLIATIVE MEDICINE | Facility: CLINIC | Age: 65
End: 2020-01-07
Payer: MEDICARE

## 2020-01-07 VITALS
OXYGEN SATURATION: 98 % | TEMPERATURE: 96.2 F | HEART RATE: 55 BPM | RESPIRATION RATE: 20 BRPM | BODY MASS INDEX: 27.85 KG/M2 | SYSTOLIC BLOOD PRESSURE: 110 MMHG | WEIGHT: 188 LBS | DIASTOLIC BLOOD PRESSURE: 82 MMHG | HEIGHT: 69 IN

## 2020-01-07 DIAGNOSIS — Z71.89 GOALS OF CARE, COUNSELING/DISCUSSION: ICD-10-CM

## 2020-01-07 DIAGNOSIS — R53.83 OTHER FATIGUE: ICD-10-CM

## 2020-01-07 DIAGNOSIS — R23.4 SKIN FISSURES: ICD-10-CM

## 2020-01-07 DIAGNOSIS — C16.0 MALIGNANT NEOPLASM OF CARDIA OF STOMACH (HCC): Primary | ICD-10-CM

## 2020-01-07 DIAGNOSIS — I10 ESSENTIAL HYPERTENSION: ICD-10-CM

## 2020-01-07 PROBLEM — Z51.5 PALLIATIVE CARE PATIENT: Status: ACTIVE | Noted: 2020-01-07

## 2020-01-07 PROCEDURE — 99214 OFFICE O/P EST MOD 30 MIN: CPT | Performed by: FAMILY MEDICINE

## 2020-01-07 RX ORDER — AMMONIUM LACTATE 12 G/100G
CREAM TOPICAL AS NEEDED
Qty: 385 G | Refills: 2 | Status: SHIPPED | OUTPATIENT
Start: 2020-01-07 | End: 2021-05-13 | Stop reason: HOSPADM

## 2020-01-07 RX ORDER — BISOPROLOL FUMARATE 5 MG/1
5 TABLET ORAL DAILY
Start: 2020-01-07 | End: 2020-04-21

## 2020-01-08 ENCOUNTER — DOCUMENTATION (OUTPATIENT)
Dept: HEMATOLOGY ONCOLOGY | Facility: CLINIC | Age: 65
End: 2020-01-08

## 2020-01-08 DIAGNOSIS — R11.0 NAUSEA: Primary | ICD-10-CM

## 2020-01-08 DIAGNOSIS — E86.0 DEHYDRATION: ICD-10-CM

## 2020-01-08 DIAGNOSIS — C16.0 MALIGNANT NEOPLASM OF CARDIA OF STOMACH (HCC): ICD-10-CM

## 2020-01-08 DIAGNOSIS — C15.5 MALIGNANT NEOPLASM OF LOWER THIRD OF ESOPHAGUS (HCC): ICD-10-CM

## 2020-01-08 NOTE — PROGRESS NOTES
1-3-19  Received e-mail from providers office requesting I reach out to the patient regarding his medication Eliquis and inability to pay  Placed call no response left VM to return call  1-8-19  Placed follow up call to patient, no response again  Left another VM to return call  2nd attempt    1-15-20  Received call back from patient who stated he did not have time to come in anytime this week  He stated he wants to wait a week so he can get his financial records in order  Meeting with patient 1-21-20 1-21-20  Received call from patient stating he could not make it in today to see me  I offered to email the application to him and or another date and time and his response was he just didn't have time to deal with this cancer stuff  He will call me when he's ready  Email to team     1-29-20  Follow up call to patient  No documents received can not submit application for assitance without  Paitent states he will drop them off at office once he gets them together    2-11-20  Follow up call no response left message    2-27-20  Received VM from Caryle Deems returning my call regarding funding    2-28-20  Returned call to Caryle Deems, Left message    3-18-20  Follow up call- no response left message his is final attempt      5-19-20-  Received call from [address] [place] [address] stating patient called in stating he could not remember who was helping him with the application for his medication  She saw my notes and placed call, however I had left for the day  5-20-20  Return call to Citllai Tinajero, advised I would call patient back that day  Call to patient, left message with contact information, no response      5-28-20  Follow up call to patient, still no return back from patient or soha  6-28-20  Received VM from patient stating he was ready to send income information for assistance  Requested I call him back on where to bring in the documents  6-29-20  Call to patient, got VM for Caryle Deems   Left message    6-30-20 Follow up call twice today  Received call back from [facility], stating she knows he has been non-compliant  Requested my email address to forward the documents   Provided email address, office number, cell number

## 2020-01-10 ENCOUNTER — APPOINTMENT (OUTPATIENT)
Dept: LAB | Facility: HOSPITAL | Age: 65
End: 2020-01-10
Payer: MEDICARE

## 2020-01-10 ENCOUNTER — TRANSCRIBE ORDERS (OUTPATIENT)
Dept: ADMINISTRATIVE | Facility: HOSPITAL | Age: 65
End: 2020-01-10

## 2020-01-10 DIAGNOSIS — E86.0 DEHYDRATION: ICD-10-CM

## 2020-01-10 DIAGNOSIS — C15.5 MALIGNANT NEOPLASM OF LOWER THIRD OF ESOPHAGUS (HCC): ICD-10-CM

## 2020-01-10 DIAGNOSIS — C16.0 MALIGNANT NEOPLASM OF CARDIA OF STOMACH (HCC): ICD-10-CM

## 2020-01-10 DIAGNOSIS — R11.0 NAUSEA: ICD-10-CM

## 2020-01-10 LAB
ALBUMIN SERPL BCP-MCNC: 3.5 G/DL (ref 3.5–5)
ALP SERPL-CCNC: 100 U/L (ref 46–116)
ALT SERPL W P-5'-P-CCNC: 18 U/L (ref 12–78)
ANION GAP SERPL CALCULATED.3IONS-SCNC: 10 MMOL/L (ref 4–13)
AST SERPL W P-5'-P-CCNC: 16 U/L (ref 5–45)
BASOPHILS # BLD AUTO: 0.08 THOUSANDS/ΜL (ref 0–0.1)
BASOPHILS NFR BLD AUTO: 1 % (ref 0–1)
BILIRUB SERPL-MCNC: 0.7 MG/DL (ref 0.2–1)
BUN SERPL-MCNC: 14 MG/DL (ref 5–25)
CALCIUM SERPL-MCNC: 9.3 MG/DL (ref 8.3–10.1)
CHLORIDE SERPL-SCNC: 101 MMOL/L (ref 100–108)
CO2 SERPL-SCNC: 25 MMOL/L (ref 21–32)
CREAT SERPL-MCNC: 1.17 MG/DL (ref 0.6–1.3)
EOSINOPHIL # BLD AUTO: 0.17 THOUSAND/ΜL (ref 0–0.61)
EOSINOPHIL NFR BLD AUTO: 2 % (ref 0–6)
ERYTHROCYTE [DISTWIDTH] IN BLOOD BY AUTOMATED COUNT: 15.3 % (ref 11.6–15.1)
GFR SERPL CREATININE-BSD FRML MDRD: 65 ML/MIN/1.73SQ M
GLUCOSE SERPL-MCNC: 111 MG/DL (ref 65–140)
HCT VFR BLD AUTO: 39.3 % (ref 36.5–49.3)
HGB BLD-MCNC: 12.9 G/DL (ref 12–17)
IMM GRANULOCYTES # BLD AUTO: 0.03 THOUSAND/UL (ref 0–0.2)
IMM GRANULOCYTES NFR BLD AUTO: 0 % (ref 0–2)
LYMPHOCYTES # BLD AUTO: 3.45 THOUSANDS/ΜL (ref 0.6–4.47)
LYMPHOCYTES NFR BLD AUTO: 49 % (ref 14–44)
MCH RBC QN AUTO: 33.7 PG (ref 26.8–34.3)
MCHC RBC AUTO-ENTMCNC: 32.8 G/DL (ref 31.4–37.4)
MCV RBC AUTO: 103 FL (ref 82–98)
MONOCYTES # BLD AUTO: 0.74 THOUSAND/ΜL (ref 0.17–1.22)
MONOCYTES NFR BLD AUTO: 10 % (ref 4–12)
NEUTROPHILS # BLD AUTO: 2.74 THOUSANDS/ΜL (ref 1.85–7.62)
NEUTS SEG NFR BLD AUTO: 38 % (ref 43–75)
NRBC BLD AUTO-RTO: 0 /100 WBCS
PLATELET # BLD AUTO: 344 THOUSANDS/UL (ref 149–390)
PMV BLD AUTO: 10.6 FL (ref 8.9–12.7)
POTASSIUM SERPL-SCNC: 3.7 MMOL/L (ref 3.5–5.3)
PROT SERPL-MCNC: 7.1 G/DL (ref 6.4–8.2)
RBC # BLD AUTO: 3.83 MILLION/UL (ref 3.88–5.62)
SODIUM SERPL-SCNC: 136 MMOL/L (ref 136–145)
WBC # BLD AUTO: 7.21 THOUSAND/UL (ref 4.31–10.16)

## 2020-01-10 PROCEDURE — 80053 COMPREHEN METABOLIC PANEL: CPT

## 2020-01-10 PROCEDURE — 36415 COLL VENOUS BLD VENIPUNCTURE: CPT

## 2020-01-10 PROCEDURE — 85025 COMPLETE CBC W/AUTO DIFF WBC: CPT

## 2020-01-13 ENCOUNTER — HOSPITAL ENCOUNTER (OUTPATIENT)
Dept: RADIOLOGY | Facility: HOSPITAL | Age: 65
Discharge: HOME | End: 2020-01-13
Payer: MEDICARE

## 2020-01-13 ENCOUNTER — OFFICE VISIT (OUTPATIENT)
Dept: PHYSICAL THERAPY | Facility: CLINIC | Age: 65
End: 2020-01-13
Payer: MEDICARE

## 2020-01-13 DIAGNOSIS — R53.83 DECREASED STAMINA: ICD-10-CM

## 2020-01-13 DIAGNOSIS — Z74.09 IMPAIRED FUNCTIONAL MOBILITY, BALANCE, AND ENDURANCE: Primary | ICD-10-CM

## 2020-01-13 DIAGNOSIS — R53.1 GENERALIZED WEAKNESS: ICD-10-CM

## 2020-01-13 DIAGNOSIS — C15.5 MALIGNANT NEOPLASM OF LOWER THIRD OF ESOPHAGUS (HCC): ICD-10-CM

## 2020-01-13 PROCEDURE — 97112 NEUROMUSCULAR REEDUCATION: CPT

## 2020-01-13 PROCEDURE — 71260 CT THORAX DX C+: CPT

## 2020-01-13 PROCEDURE — 97116 GAIT TRAINING THERAPY: CPT

## 2020-01-13 PROCEDURE — 74177 CT ABD & PELVIS W/CONTRAST: CPT

## 2020-01-13 PROCEDURE — 97110 THERAPEUTIC EXERCISES: CPT

## 2020-01-13 RX ADMIN — IOHEXOL 100 ML: 350 INJECTION, SOLUTION INTRAVENOUS at 11:29

## 2020-01-13 NOTE — PROGRESS NOTES
Daily Note     Today's date: 2020  Patient name: Keyonna Marrero  : 1955  MRN: [id number]  Referring provider: Felicity Deras MD  Dx:   Encounter Diagnosis     ICD-10-CM    1  Impaired functional mobility, balance, and endurance Z74 09    2  Generalized weakness R53 1    3  Decreased stamina R53 83                   Subjective: Patient reports "always feeling tired," and notes today I am feeling pretty good comparatively  Pt verbalized he is not near his PLOF before cancer diagnosis  Objective: See treatment diary below  Most exercises performed continually with 2 minutes on 3 minutes off circuits    Floor to ceiling 10 sec hold  Side to side 10 sec hold  Forward step - alternating steps over adrian  Lateral step - alternating steps over adrian    Bicep curls with 15 lbs - 10 reps 3x  Core rotation with 15 lb med ball on foam - 3 sets, 10 reps  tricep press ups 10 lb wt 10 reps 3 sets     [facility] foam pad 1 minute x2  SLS on foam, 1 UE support, 30sx4  Sidestepping on foam with cone taps on foam, 1 UE support, 4 cycles  Reaching outside Parminder (same and opposite side): 20 reps each    Assessment: Patient able to tolerate treatment session well today  He was challenged with exercises on foam pad and displayed moderate postural sway, however able to self correct with <50% UE assist  Patient reported increase in dizziness when reaching across outside Parminder when going side to side that improved with performing all one side first and seated rest break  He will continue to benefit from skilled outpatient PT to improve overall strength, manage fatigue, and maintain functional mobility  Plan: Continue per plan of care  Precautions:  has a past medical history of Dehydration (2019), Esophageal cancer ([facility] [address] ), Hypertension, Nausea (2019), Psychiatric disorder, and Recovering alcoholic ([facility] [address] )

## 2020-01-14 ENCOUNTER — DOCUMENTATION (OUTPATIENT)
Dept: NUTRITION | Facility: CLINIC | Age: 65
End: 2020-01-14

## 2020-01-14 ENCOUNTER — HOSPITAL ENCOUNTER (OUTPATIENT)
Dept: INFUSION CENTER | Facility: HOSPITAL | Age: 65
Discharge: HOME | End: 2020-01-14
Payer: MEDICARE

## 2020-01-14 ENCOUNTER — TELEPHONE (OUTPATIENT)
Dept: HEMATOLOGY ONCOLOGY | Facility: CLINIC | Age: 65
End: 2020-01-14

## 2020-01-14 VITALS
HEART RATE: 61 BPM | SYSTOLIC BLOOD PRESSURE: 126 MMHG | RESPIRATION RATE: 20 BRPM | BODY MASS INDEX: 28.44 KG/M2 | DIASTOLIC BLOOD PRESSURE: 67 MMHG | WEIGHT: 192.02 LBS | OXYGEN SATURATION: 98 % | HEIGHT: 69 IN | TEMPERATURE: 97.7 F

## 2020-01-14 DIAGNOSIS — E86.0 DEHYDRATION: ICD-10-CM

## 2020-01-14 DIAGNOSIS — C15.5 MALIGNANT NEOPLASM OF LOWER THIRD OF ESOPHAGUS (HCC): Primary | ICD-10-CM

## 2020-01-14 DIAGNOSIS — R11.0 NAUSEA: ICD-10-CM

## 2020-01-14 DIAGNOSIS — C16.0 MALIGNANT NEOPLASM OF CARDIA OF STOMACH (HCC): ICD-10-CM

## 2020-01-14 PROCEDURE — 96409 CHEMO IV PUSH SNGL DRUG: CPT

## 2020-01-14 PROCEDURE — 96366 THER/PROPH/DIAG IV INF ADDON: CPT

## 2020-01-14 PROCEDURE — G0498 CHEMO EXTEND IV INFUS W/PUMP: HCPCS

## 2020-01-14 PROCEDURE — 96367 TX/PROPH/DG ADDL SEQ IV INF: CPT

## 2020-01-14 RX ORDER — SODIUM CHLORIDE 9 MG/ML
20 INJECTION, SOLUTION INTRAVENOUS ONCE AS NEEDED
Status: DISCONTINUED | OUTPATIENT
Start: 2020-01-14 | End: 2020-01-17 | Stop reason: HOSPADM

## 2020-01-14 RX ORDER — DEXTROSE MONOHYDRATE 50 MG/ML
20 INJECTION, SOLUTION INTRAVENOUS ONCE
Status: CANCELLED | OUTPATIENT
Start: 2020-01-28

## 2020-01-14 RX ORDER — SODIUM CHLORIDE 9 MG/ML
20 INJECTION, SOLUTION INTRAVENOUS ONCE AS NEEDED
Status: CANCELLED | OUTPATIENT
Start: 2020-01-28

## 2020-01-14 RX ORDER — FLUOROURACIL 50 MG/ML
400 INJECTION, SOLUTION INTRAVENOUS ONCE
Status: CANCELLED | OUTPATIENT
Start: 2020-01-28

## 2020-01-14 RX ORDER — FLUOROURACIL 50 MG/ML
400 INJECTION, SOLUTION INTRAVENOUS ONCE
Status: COMPLETED | OUTPATIENT
Start: 2020-01-14 | End: 2020-01-14

## 2020-01-14 RX ADMIN — LEUCOVORIN CALCIUM 800 MG: 200 INJECTION, POWDER, LYOPHILIZED, FOR SOLUTION INTRAMUSCULAR; INTRAVENOUS at 10:33

## 2020-01-14 RX ADMIN — SODIUM CHLORIDE 20 ML/HR: 0.9 INJECTION, SOLUTION INTRAVENOUS at 09:40

## 2020-01-14 RX ADMIN — DEXAMETHASONE SODIUM PHOSPHATE: 10 INJECTION, SOLUTION INTRAMUSCULAR; INTRAVENOUS at 09:40

## 2020-01-14 RX ADMIN — FLUOROURACIL 795 MG: 50 INJECTION, SOLUTION INTRAVENOUS at 12:50

## 2020-01-14 NOTE — PLAN OF CARE
Problem: Potential for Falls  Goal: Patient will remain free of falls  Description  INTERVENTIONS:  - Assess patient frequently for physical needs  -  Identify cognitive and physical deficits and behaviors that affect risk of falls    -  [place] fall precautions as indicated by assessment   - Educate patient/family on patient safety including physical limitations  - Instruct patient to call for assistance with activity based on assessment  - Modify environment to reduce risk of injury  - Consider OT/PT consult to assist with strengthening/mobility  Outcome: Progressing

## 2020-01-14 NOTE — PROGRESS NOTES
Brief visit with pt and wife today at the [facility] center  Wife reports that Keyona Kolb lost a little bit of wt due to fatigue and sleeping more often, but was able to gain some wt back  He is still tolerating all foods without difficulty  He is considering adding Boost Plus back into his diet for days when he does not eat as well  Set up follow up for 1/28 during chemo

## 2020-01-16 ENCOUNTER — HOSPITAL ENCOUNTER (OUTPATIENT)
Dept: INFUSION CENTER | Facility: HOSPITAL | Age: 65
Discharge: HOME | End: 2020-01-16

## 2020-01-16 VITALS
DIASTOLIC BLOOD PRESSURE: 62 MMHG | TEMPERATURE: 97 F | OXYGEN SATURATION: 96 % | SYSTOLIC BLOOD PRESSURE: 122 MMHG | HEART RATE: 52 BPM | RESPIRATION RATE: 18 BRPM

## 2020-01-16 DIAGNOSIS — C15.5 MALIGNANT NEOPLASM OF LOWER THIRD OF ESOPHAGUS (HCC): Primary | ICD-10-CM

## 2020-01-16 DIAGNOSIS — R11.0 NAUSEA: ICD-10-CM

## 2020-01-16 DIAGNOSIS — E86.0 DEHYDRATION: ICD-10-CM

## 2020-01-16 DIAGNOSIS — C16.0 MALIGNANT NEOPLASM OF CARDIA OF STOMACH (HCC): ICD-10-CM

## 2020-01-17 ENCOUNTER — APPOINTMENT (OUTPATIENT)
Dept: PHYSICAL THERAPY | Facility: CLINIC | Age: 65
End: 2020-01-17
Payer: MEDICARE

## 2020-01-20 ENCOUNTER — OFFICE VISIT (OUTPATIENT)
Dept: PHYSICAL THERAPY | Facility: CLINIC | Age: 65
End: 2020-01-20
Payer: MEDICARE

## 2020-01-20 DIAGNOSIS — C15.5 MALIGNANT NEOPLASM OF LOWER THIRD OF ESOPHAGUS (HCC): ICD-10-CM

## 2020-01-20 DIAGNOSIS — R53.1 GENERALIZED WEAKNESS: ICD-10-CM

## 2020-01-20 DIAGNOSIS — Z74.09 IMPAIRED FUNCTIONAL MOBILITY, BALANCE, AND ENDURANCE: Primary | ICD-10-CM

## 2020-01-20 DIAGNOSIS — R11.0 NAUSEA: Primary | ICD-10-CM

## 2020-01-20 DIAGNOSIS — E86.0 DEHYDRATION: ICD-10-CM

## 2020-01-20 DIAGNOSIS — C16.0 MALIGNANT NEOPLASM OF CARDIA OF STOMACH (HCC): ICD-10-CM

## 2020-01-20 PROCEDURE — 97110 THERAPEUTIC EXERCISES: CPT

## 2020-01-20 NOTE — PROGRESS NOTES
Daily Note     Today's date: 2020  Patient name: Alysha Abdalla  : 1955  MRN: [id number]  Referring provider: Gavin Brown MD  Dx:   Encounter Diagnosis     ICD-10-CM    1  Impaired functional mobility, balance, and endurance Z74 09    2  Generalized weakness R53 1        Start Time: 1522  Stop Time: 1600  Total time in clinic (min): 38 minutes    Subjective: Patient reports "always feeling tired," and notes today I am feeling pretty good comparatively  Pt verbalized he is not near his PLOF before cancer diagnosis  Objective: See treatment diary below  Most exercises performed continually with 2 minutes on 3 minutes off circuits    1 lb cuff weights on wrists    Floor to ceiling 10 sec hold  Side to side 10 sec hold  Forward step - alternating steps over adrian- 6" adrian   Lateral step - alternating steps over adrian- 6" adrian    Bicep curls with 10 lbs - 10 reps 3x  Core rotation with 15 lb med ball on foam - 3 sets, 10 reps  Tricep press ups 10 lb wt 10 reps 3 sets     Feet apart head turns on foam- Horizontal, Vertical, Diagonal head turns- 0/10 dizziness, 20 reps each direction, mild postural sway    SLS on foam, 1 UE support, 30sx4  Sidestepping on foam with cone taps on foam, 1 UE support, 4 cycles  Reaching outside Parminder (same and opposite side): 20 reps each    Assessment: Patient able to tolerate treatment session well today, increasing cuff weights on wrists and requiring adequate rest breaks without cuing from treating physical therapist  Patient continues to need rest breaks during session due to fatigue  Patient improving with stability but continues to show decreases in vestibular component of balance due to postural sway on foam  He will continue to benefit from skilled outpatient PT to improve overall strength, manage fatigue, and maintain functional mobility  Plan: Continue per plan of care        Precautions:  has a past medical history of Dehydration (2019), Esophageal cancer ([facility] ), Hypertension, Nausea (8/8/2019), Psychiatric disorder, and Recovering alcoholic ([facility] )

## 2020-01-24 ENCOUNTER — OFFICE VISIT (OUTPATIENT)
Dept: PHYSICAL THERAPY | Facility: CLINIC | Age: 65
End: 2020-01-24
Payer: MEDICARE

## 2020-01-24 ENCOUNTER — TELEPHONE (OUTPATIENT)
Dept: HEMATOLOGY ONCOLOGY | Facility: CLINIC | Age: 65
End: 2020-01-24

## 2020-01-24 ENCOUNTER — APPOINTMENT (OUTPATIENT)
Dept: LAB | Facility: HOSPITAL | Age: 65
End: 2020-01-24
Payer: MEDICARE

## 2020-01-24 ENCOUNTER — OFFICE VISIT (OUTPATIENT)
Dept: HEMATOLOGY ONCOLOGY | Facility: CLINIC | Age: 65
End: 2020-01-24
Payer: MEDICARE

## 2020-01-24 VITALS
WEIGHT: 192 LBS | SYSTOLIC BLOOD PRESSURE: 110 MMHG | TEMPERATURE: 98.9 F | DIASTOLIC BLOOD PRESSURE: 60 MMHG | OXYGEN SATURATION: 93 % | HEIGHT: 69 IN | BODY MASS INDEX: 28.44 KG/M2 | HEART RATE: 61 BPM | RESPIRATION RATE: 18 BRPM

## 2020-01-24 DIAGNOSIS — C15.5 MALIGNANT NEOPLASM OF LOWER THIRD OF ESOPHAGUS (HCC): ICD-10-CM

## 2020-01-24 DIAGNOSIS — E86.0 DEHYDRATION: ICD-10-CM

## 2020-01-24 DIAGNOSIS — R53.1 GENERALIZED WEAKNESS: ICD-10-CM

## 2020-01-24 DIAGNOSIS — R53.83 DECREASED STAMINA: ICD-10-CM

## 2020-01-24 DIAGNOSIS — R11.0 NAUSEA: ICD-10-CM

## 2020-01-24 DIAGNOSIS — Z74.09 IMPAIRED FUNCTIONAL MOBILITY, BALANCE, AND ENDURANCE: Primary | ICD-10-CM

## 2020-01-24 DIAGNOSIS — C16.0 MALIGNANT NEOPLASM OF CARDIA OF STOMACH (HCC): ICD-10-CM

## 2020-01-24 DIAGNOSIS — N40.0 ENLARGED PROSTATE: ICD-10-CM

## 2020-01-24 DIAGNOSIS — R97.20 ELEVATED PSA: Primary | ICD-10-CM

## 2020-01-24 LAB
ALBUMIN SERPL BCP-MCNC: 3.7 G/DL (ref 3.5–5)
ALP SERPL-CCNC: 96 U/L (ref 46–116)
ALT SERPL W P-5'-P-CCNC: 22 U/L (ref 12–78)
ANION GAP SERPL CALCULATED.3IONS-SCNC: 11 MMOL/L (ref 4–13)
AST SERPL W P-5'-P-CCNC: 19 U/L (ref 5–45)
BASOPHILS # BLD AUTO: 0.06 THOUSANDS/ΜL (ref 0–0.1)
BASOPHILS NFR BLD AUTO: 1 % (ref 0–1)
BILIRUB SERPL-MCNC: 1 MG/DL (ref 0.2–1)
BUN SERPL-MCNC: 16 MG/DL (ref 5–25)
CALCIUM SERPL-MCNC: 9.1 MG/DL (ref 8.3–10.1)
CHLORIDE SERPL-SCNC: 100 MMOL/L (ref 100–108)
CO2 SERPL-SCNC: 25 MMOL/L (ref 21–32)
CREAT SERPL-MCNC: 1.15 MG/DL (ref 0.6–1.3)
EOSINOPHIL # BLD AUTO: 0.15 THOUSAND/ΜL (ref 0–0.61)
EOSINOPHIL NFR BLD AUTO: 2 % (ref 0–6)
ERYTHROCYTE [DISTWIDTH] IN BLOOD BY AUTOMATED COUNT: 15.5 % (ref 11.6–15.1)
GFR SERPL CREATININE-BSD FRML MDRD: 66 ML/MIN/1.73SQ M
GLUCOSE SERPL-MCNC: 127 MG/DL (ref 65–140)
HCT VFR BLD AUTO: 40.5 % (ref 36.5–49.3)
HGB BLD-MCNC: 13.1 G/DL (ref 12–17)
IMM GRANULOCYTES # BLD AUTO: 0.03 THOUSAND/UL (ref 0–0.2)
IMM GRANULOCYTES NFR BLD AUTO: 0 % (ref 0–2)
LYMPHOCYTES # BLD AUTO: 2.82 THOUSANDS/ΜL (ref 0.6–4.47)
LYMPHOCYTES NFR BLD AUTO: 29 % (ref 14–44)
MCH RBC QN AUTO: 33.8 PG (ref 26.8–34.3)
MCHC RBC AUTO-ENTMCNC: 32.3 G/DL (ref 31.4–37.4)
MCV RBC AUTO: 104 FL (ref 82–98)
MONOCYTES # BLD AUTO: 1.46 THOUSAND/ΜL (ref 0.17–1.22)
MONOCYTES NFR BLD AUTO: 15 % (ref 4–12)
NEUTROPHILS # BLD AUTO: 5.23 THOUSANDS/ΜL (ref 1.85–7.62)
NEUTS SEG NFR BLD AUTO: 53 % (ref 43–75)
NRBC BLD AUTO-RTO: 0 /100 WBCS
PLATELET # BLD AUTO: 305 THOUSANDS/UL (ref 149–390)
PMV BLD AUTO: 11 FL (ref 8.9–12.7)
POTASSIUM SERPL-SCNC: 4.2 MMOL/L (ref 3.5–5.3)
PROT SERPL-MCNC: 7.4 G/DL (ref 6.4–8.2)
PSA SERPL-MCNC: 8 NG/ML (ref 0–4)
RBC # BLD AUTO: 3.88 MILLION/UL (ref 3.88–5.62)
SODIUM SERPL-SCNC: 136 MMOL/L (ref 136–145)
WBC # BLD AUTO: 9.75 THOUSAND/UL (ref 4.31–10.16)

## 2020-01-24 PROCEDURE — 36415 COLL VENOUS BLD VENIPUNCTURE: CPT

## 2020-01-24 PROCEDURE — 80053 COMPREHEN METABOLIC PANEL: CPT

## 2020-01-24 PROCEDURE — 85025 COMPLETE CBC W/AUTO DIFF WBC: CPT

## 2020-01-24 PROCEDURE — 97110 THERAPEUTIC EXERCISES: CPT

## 2020-01-24 PROCEDURE — 84153 ASSAY OF PSA TOTAL: CPT

## 2020-01-24 PROCEDURE — 97112 NEUROMUSCULAR REEDUCATION: CPT

## 2020-01-24 PROCEDURE — 99214 OFFICE O/P EST MOD 30 MIN: CPT | Performed by: NURSE PRACTITIONER

## 2020-01-24 RX ORDER — FLUOROURACIL 50 MG/ML
400 INJECTION, SOLUTION INTRAVENOUS ONCE
Status: CANCELLED | OUTPATIENT
Start: 2020-02-11

## 2020-01-24 RX ORDER — SODIUM CHLORIDE 9 MG/ML
20 INJECTION, SOLUTION INTRAVENOUS ONCE AS NEEDED
Status: CANCELLED | OUTPATIENT
Start: 2020-02-11

## 2020-01-24 RX ORDER — FLUOROURACIL 50 MG/ML
400 INJECTION, SOLUTION INTRAVENOUS ONCE
Status: CANCELLED | OUTPATIENT
Start: 2020-02-25

## 2020-01-24 RX ORDER — DEXTROSE MONOHYDRATE 50 MG/ML
20 INJECTION, SOLUTION INTRAVENOUS ONCE
Status: CANCELLED | OUTPATIENT
Start: 2020-02-25

## 2020-01-24 RX ORDER — DEXTROSE MONOHYDRATE 50 MG/ML
20 INJECTION, SOLUTION INTRAVENOUS ONCE
Status: CANCELLED | OUTPATIENT
Start: 2020-02-11

## 2020-01-24 RX ORDER — SODIUM CHLORIDE 9 MG/ML
20 INJECTION, SOLUTION INTRAVENOUS ONCE AS NEEDED
Status: CANCELLED | OUTPATIENT
Start: 2020-02-25

## 2020-01-24 NOTE — PROGRESS NOTES
Hematology/Oncology Outpatient Follow- up Note  Marciano Sidhu 75 y o  male MRN: @ Encounter: [id number]        Date:  1/24/2020    Presenting Complaint/Diagnosis : Metastatic gastroesophageal cancer    HPI:    Gene Costello seen for initial consultation 7/26/2019 regarding Newly diagnosed metastatic gastroesophageal cancer  The patient has biopsy-proven adenocarcinoma of the esophagus  PET/CT scan was done which shows omental nodularity that is FDG avid indicating stage IV disease  The patient has lost approximately 40-50 pounds  His ECOG performance status is a 1-2      Previous Hematologic/ Oncologic History:       Malignant neoplasm of lower third of esophagus ([facility] )    7/26/2019 Initial Diagnosis     Malignant neoplasm of lower third of esophagus ([facility] )      8/1/2019 -  Chemotherapy     fluorouracil (ADRUCIL) injection 750 mg, 400 mg/m2 = 750 mg, Intravenous, Once, 13 of 20 cycles  Administration: 750 mg (8/1/2019), 750 mg (8/13/2019), 750 mg (8/27/2019), 750 mg (9/10/2019), 750 mg (9/24/2019), 750 mg (10/8/2019), 795 mg (10/22/2019), 795 mg (11/5/2019), 795 mg (11/19/2019), 795 mg (12/3/2019), 795 mg (12/17/2019), 795 mg (12/31/2019), 795 mg (1/14/2020)  pegfilgrastim (NEULASTA ONPRO) subcutaneous injection kit 6 mg, 6 mg, Subcutaneous, Once, 2 of 2 cycles  Administration: 6 mg (8/3/2019), 6 mg (8/15/2019)  leucovorin 750 mg in dextrose 5 % 250 mL IVPB, 748 mg, Intravenous, Once, 13 of 20 cycles  Administration: 750 mg (8/1/2019), 750 mg (8/13/2019), 750 mg (8/27/2019), 750 mg (9/10/2019), 750 mg (9/24/2019), 750 mg (10/8/2019), 800 mg (10/22/2019), 800 mg (11/5/2019), 800 mg (11/19/2019), 800 mg (12/3/2019), 800 mg (12/17/2019), 800 mg (12/31/2019), 800 mg (1/14/2020)  oxaliplatin (ELOXATIN) 158 95 mg in dextrose 5 % 250 mL chemo infusion, 85 mg/m2 = 158 95 mg, Intravenous, Once, 6 of 6 cycles  Administration: 158 95 mg (8/1/2019), 158 95 mg (8/13/2019), 158 95 mg (8/27/2019), 158 95 mg (9/10/2019), 158 95 mg (9/24/2019), 158 95 mg (10/8/2019)        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[facility] )    7/26/2019 Initial Diagnosis     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[facility] )      8/1/2019 -  Chemotherapy     fluorouracil (ADRUCIL) injection 750 mg, 400 mg/m2 = 750 mg, Intravenous, Once, 13 of 20 cycles  Administration: 750 mg (8/1/2019), 750 mg (8/13/2019), 750 mg (8/27/2019), 750 mg (9/10/2019), 750 mg (9/24/2019), 750 mg (10/8/2019), 795 mg (10/22/2019), 795 mg (11/5/2019), 795 mg (11/19/2019), 795 mg (12/3/2019), 795 mg (12/17/2019), 795 mg (12/31/2019), 795 mg (1/14/2020)  pegfilgrastim (NEULASTA ONPRO) subcutaneous injection kit 6 mg, 6 mg, Subcutaneous, Once, 2 of 2 cycles  Administration: 6 mg (8/3/2019), 6 mg (8/15/2019)  leucovorin 750 mg in dextrose 5 % 250 mL IVPB, 748 mg, Intravenous, Once, 13 of 20 cycles  Administration: 750 mg (8/1/2019), 750 mg (8/13/2019), 750 mg (8/27/2019), 750 mg (9/10/2019), 750 mg (9/24/2019), 750 mg (10/8/2019), 800 mg (10/22/2019), 800 mg (11/5/2019), 800 mg (11/19/2019), 800 mg (12/3/2019), 800 mg (12/17/2019), 800 mg (12/31/2019), 800 mg (1/14/2020)  oxaliplatin (ELOXATIN) 158 95 mg in dextrose 5 % 250 mL chemo infusion, 85 mg/m2 = 158 95 mg, Intravenous, Once, 6 of 6 cycles  Administration: 158 95 mg (8/1/2019), 158 95 mg (8/13/2019), 158 95 mg (8/27/2019), 158 95 mg (9/10/2019), 158 95 mg (9/24/2019), 158 95 mg (10/8/2019)         Current Hematologic/ Oncologic Treatment:    Modified FOLFOX 6  Oxaliplatin discontinued after cycle 6    Leucovorin 400 milligrams/meters squared  5FU 400 milligrams/meters squared  5FU 2400 milligrams/meter squared CIVI over 46 hours    Interval History:    The patient returns for follow-up visit  He had a CT scan completed that shows stable disease  Blood counts remain within acceptable range  Overall the patient feels well and reports that he is gaining strength    Denies chest pain, shortness of breath, nausea, vomiting, diarrhea, bleeding/bruising, and fevers/infection  Test Results:    Imaging: Ct Chest Abdomen Pelvis W Contrast    Result Date: 1/14/2020  Narrative: CT CHEST, ABDOMEN AND PELVIS WITH IV CONTRAST INDICATION:   C15 5: Malignant neoplasm of lower third of esophagus  COMPARISON:  CT chest abdomen pelvis 10/11/2019 and 7/15/2019  PET/CT 7/22/2019  TECHNIQUE: CT examination of the chest, abdomen and pelvis was performed  Axial, sagittal, and coronal 2D reformatted images were created from the source data and submitted for interpretation  Radiation dose length product (DLP) for this visit:  541 13 mGy-cm   This examination, like all CT scans performed in the [facility], was performed utilizing techniques to minimize radiation dose exposure, including the use of iterative  reconstruction and automated exposure control  IV Contrast:  100 mL of iohexol (OMNIPAQUE) Enteric Contrast: Enteric contrast was administered  FINDINGS: CHEST LUNGS:  Lungs are clear  There is no tracheal or endobronchial lesion  PLEURA:  Stable 3 x 5 mm right middle lobe nodule #3/73  Stable 6 mm medial right middle lobe nodule #3/68  Benign perifissural nodules remain stable  No new pulmonary findings  Stable centrilobular and paraseptal emphysematous changes most prominent in the upper lobes  HEART/GREAT VESSELS:  Atherosclerotic MEDIASTINUM AND NEVAEH:  Unremarkable  CHEST WALL AND LOWER NECK:   Right anterior chest wall vascular port  Catheter is in unchanged position  ABDOMEN LIVER/BILIARY TREE:  Stable hypodensities unchanged from the prior studies are likely cysts  GALLBLADDER:  No calcified gallstones  No pericholecystic inflammatory change  SPLEEN:  Unremarkable  PANCREAS:  Unremarkable  ADRENAL GLANDS:  Unremarkable  KIDNEYS/URETERS:  Stable simple cysts with dominant 8 cm right renal cyst  No hydronephrosis or perinephric collection  STOMACH AND BOWEL:  Unchanged thickening of the gastric fundus   APPENDIX:  No findings to suggest appendicitis  ABDOMINOPELVIC CAVITY:  No recurrent ascites  No free intraperitoneal air  No lymphadenopathy  A few residual tiny omental nodules; for example a 9 mm anterior omental nodule #2/75 is unchanged from the prior study but better appreciated on today's study  VESSELS:  Unremarkable for patient's age  PELVIS REPRODUCTIVE ORGANS:  Prostamegaly  URINARY BLADDER:  Unremarkable  ABDOMINAL WALL/INGUINAL REGIONS:  Unremarkable  OSSEOUS STRUCTURES:  No acute fracture or destructive osseous lesion  Impression: No new findings  Stable thickening of the gastric fundus  Stable scattered pulmonary nodules  No recurrent ascites  Small residual omental nodules The study was marked in EPIC for significant notification  Workstation performed: [id number]       Labs:   Lab Results   Component Value Date    WBC 9 75 01/24/2020    HGB 13 1 01/24/2020    HCT 40 5 01/24/2020     (H) 01/24/2020     01/24/2020     Lab Results   Component Value Date    K 4 2 01/24/2020     01/24/2020    CO2 25 01/24/2020    BUN 16 01/24/2020    CREATININE 1 15 01/24/2020    GLUF 113 (H) 10/18/2019    CALCIUM 9 1 01/24/2020    AST 19 01/24/2020    ALT 22 01/24/2020    ALKPHOS 96 01/24/2020    EGFR 66 01/24/2020         No results found for: SPEP, UPEP    No results found for: PSA    No results found for: CEA    No results found for:     No results found for: AFP    No results found for: IRON, TIBC, FERRITIN    No results found for: [id number]      ROS: As stated in the history of present illness otherwise his 14 point review of systems today was negative        Active Problems:   Patient Active Problem List   Diagnosis    PTSD (post-traumatic stress disorder)    Essential hypertension    Major depression    OCD (obsessive compulsive disorder)    Anxiety    Malignant neoplasm of lower third of esophagus ([address] )    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)    Cancer associated pain    Chemotherapy-induced nausea  Abnormal weight loss    Malignant ascites    Recovering alcoholic (HCC)    Other fatigue    Other dysphagia    Nausea    Dehydration    Skin fissures    Goals of care, counseling/discussion    Palliative care patient       Past Medical History:   Past Medical History:   Diagnosis Date    Dehydration 8/8/2019    Esophageal cancer ([place] [id number] )     Hypertension     Nausea 8/8/2019    Psychiatric disorder     Recovering alcoholic ([place] [id number] )        Surgical History:   Past Surgical History:   Procedure Laterality Date    APPENDECTOMY      IR PORT PLACEMENT  7/31/2019       Family History:    Family History   Problem Relation Age of Onset    Colon cancer Father        Cancer-related family history includes Colon cancer in his father      Social History:   Social History     Socioeconomic History    Marital status: /Civil Union     Spouse name: Not on file    Number of children: 2    Years of education: Not on file    Highest education level: Not on file   Occupational History    Occupation: retired    Social Needs    Financial resource strain: Not on file    Food insecurity:     Worry: Not on file     Inability: Not on file   Baeta needs:     Medical: Not on file     Non-medical: Not on file   Tobacco Use    Smoking status: Current Some Day Smoker     Types: Cigars    Smokeless tobacco: Never Used   Substance and Sexual Activity    Alcohol use: No    Drug use: Yes     Types: Marijuana    Sexual activity: Not on file   Lifestyle    Physical activity:     Days per week: Not on file     Minutes per session: Not on file    Stress: Not on file   Relationships    Social connections:     Talks on phone: Not on file     Gets together: Not on file     Attends Restorationist service: Not on file     Active member of club or organization: Not on file     Attends meetings of clubs or organizations: Not on file     Relationship status: Not on file    Intimate partner violence:     Fear of current or ex partner: Not on file     Emotionally abused: Not on file     Physically abused: Not on file     Forced sexual activity: Not on file   Other Topics Concern    Not on file   Social History Narrative    Not on file       Current Medications:   Current Outpatient Medications   Medication Sig Dispense Refill    ammonium lactate (LAC-HYDRIN) 12 % cream Apply topically as needed for dry skin On hands and feet 385 g 2    apixaban (ELIQUIS) 5 mg Take 1 tablet (5 mg total) by mouth 2 (two) times a day for 154 doses 154 tablet 0    bisoprolol (ZEBETA) 5 mg tablet Take 1 tablet (5 mg total) by mouth daily      diazepam (VALIUM) 5 mg tablet Take 1 tablet (5 mg total) by mouth 2 (two) times a day 60 tablet 0    lidocaine-prilocaine (EMLA) cream Apply topically as needed for mild pain 30 g 0    pantoprazole (PROTONIX) 40 mg tablet Take 40 mg by mouth daily before breakfast      venlafaxine (EFFEXOR-XR) 150 mg 24 hr capsule Take 1 capsule (150 mg total) by mouth daily 30 capsule 0     No current facility-administered medications for this visit  Allergies: Allergies   Allergen Reactions    Bee Venom Anaphylaxis    Shellfish-Derived Products      Develops GOUT       Physical Exam:    There is no height or weight on file to calculate BSA  Wt Readings from Last 3 Encounters:   01/14/20 87 1 kg (192 lb 0 3 oz)   01/07/20 85 3 kg (188 lb)   12/31/19 87 kg (191 lb 12 8 oz)        Temp Readings from Last 3 Encounters:   01/16/20 (!) 97 °F (36 1 °C) (Temporal)   01/14/20 97 7 °F (36 5 °C) (Temporal)   01/07/20 (!) 96 2 °F (35 7 °C) (Tympanic)        BP Readings from Last 3 Encounters:   01/16/20 122/62   01/14/20 126/67   01/07/20 110/82         Pulse Readings from Last 3 Encounters:   01/16/20 (!) 52   01/14/20 61   01/07/20 55     @LASTSAO2(3)@      Physical Exam     Constitutional   General appearance: No acute distress, well appearing and well nourished      Eyes   Conjunctiva and lids: No swelling, erythema or discharge  Pupils and irises: Equal, round and reactive to light  Ears, Nose, Mouth, and Throat   External inspection of ears and nose: Normal     Nasal mucosa, septum, and turbinates: Normal without edema or erythema  Oropharynx: Normal with no erythema, edema, exudate or lesions  Pulmonary   Respiratory effort: No increased work of breathing or signs of respiratory distress  Auscultation of lungs: Clear to auscultation  Cardiovascular   Palpation of heart: Normal PMI, no thrills  Auscultation of heart: Normal rate and rhythm, normal S1 and S2, without murmurs  Examination of extremities for edema and/or varicosities: Normal     Carotid pulses: Normal     Abdomen   Abdomen: Non-tender, no masses  Liver and spleen: No hepatomegaly or splenomegaly  Lymphatic   Palpation of lymph nodes in neck: No lymphadenopathy  Musculoskeletal   Gait and station: Normal     Digits and nails: Normal without clubbing or cyanosis  Inspection/palpation of joints, bones, and muscles: Normal     Skin   Skin and subcutaneous tissue: Normal without rashes or lesions  Neurologic   Cranial nerves: Cranial nerves 2-12 intact  Sensation: No sensory loss  Psychiatric   Orientation to person, place, and time: Normal     Mood and affect: Normal         Assessment / Plan:    The patient is a pleasant 53-year-old male with a past medical history of metastatic gastroesophageal cancer  He received 4 cycles of modified FOLFOX 6 chemotherapy After which his imaging showed a very nice response  We discontinued the oxaliplatin  He is done well on 5-FU leucovorin  He continues to eat and gain weight  He was found to have superficial thrombophlebitis after his last visit and was started on Eliquis and will continue on this  CT scan was completed and shows stable disease  PSA is pending  He does have prostamegaly  We will continue to follow this    At this point we will continue him on leucovorin 400 milligrams/meter squared, 5  milligrams/meter squared, 5 FU 2400 milligrams/meter squared continuous IV infusion over 46 hours  We will see him back in 4 weeks  The patient is in agreement with the plan and will call with any questions or concerns  Goals and Barriers:  Current Goal:  Prolong Survival from metastatic gastroesophageal cancer  Barriers: None  Patient's Capacity to Self Care:  Patient able to self care  Portions of the record may have been created with voice recognition software   Occasional wrong word or "sound a like" substitutions may have occurred due to the inherent limitations of voice recognition software   Read the chart carefully and recognize, using context, where substitutions have occurred

## 2020-01-24 NOTE — TELEPHONE ENCOUNTER
Left  for patient (OK per communication consent) to inform him of his elevated PSA  We will refer him to urology

## 2020-01-24 NOTE — PROGRESS NOTES
Outpatient Oncology Nutrition Consult  Type of Consult: Follow Up  Care Location: [place] [id number] with patient and wife Mack Rock) at [employer]  Jeannie Horacio goes by [employer]"  Nutrition Assessment:  PMH: PTSD, HTN, major depression, OCD, anxiety  Oncology Diagnosis & Treatments: Stage IV metastatic gastroesophageal cancer  Undergoing chemotherapy in the palliative setting to help improve survival (modified FOLFOX 6: 8/1/19-10/18/19; oxaliplatin discontinued 10/18/19; Now receiving 5FU and leucovorin)       Malignant neoplasm of lower third of esophagus (HCC)    7/26/2019 Initial Diagnosis     Malignant neoplasm of lower third of esophagus ([facility] )      8/1/2019 -  Chemotherapy     fluorouracil (ADRUCIL) injection 750 mg, 400 mg/m2 = 750 mg, Intravenous, Once, 14 of 22 cycles  Administration: 750 mg (8/1/2019), 750 mg (8/13/2019), 750 mg (8/27/2019), 750 mg (9/10/2019), 750 mg (9/24/2019), 750 mg (10/8/2019), 795 mg (10/22/2019), 795 mg (11/5/2019), 795 mg (11/19/2019), 795 mg (12/3/2019), 795 mg (12/17/2019), 795 mg (12/31/2019), 795 mg (1/14/2020)  pegfilgrastim (NEULASTA ONPRO) subcutaneous injection kit 6 mg, 6 mg, Subcutaneous, Once, 2 of 2 cycles  Administration: 6 mg (8/3/2019), 6 mg (8/15/2019)  leucovorin 750 mg in dextrose 5 % 250 mL IVPB, 748 mg, Intravenous, Once, 14 of 22 cycles  Administration: 750 mg (8/1/2019), 750 mg (8/13/2019), 750 mg (8/27/2019), 750 mg (9/10/2019), 750 mg (9/24/2019), 750 mg (10/8/2019), 800 mg (10/22/2019), 800 mg (11/5/2019), 800 mg (11/19/2019), 800 mg (12/3/2019), 800 mg (12/17/2019), 800 mg (12/31/2019), 800 mg (1/14/2020)  oxaliplatin (ELOXATIN) 158 95 mg in dextrose 5 % 250 mL chemo infusion, 85 mg/m2 = 158 95 mg, Intravenous, Once, 6 of 6 cycles  Administration: 158 95 mg (8/1/2019), 158 95 mg (8/13/2019), 158 95 mg (8/27/2019), 158 95 mg (9/10/2019), 158 95 mg (9/24/2019), 158 95 mg (10/8/2019)        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[facility] [address] )    7/26/2019 Initial Diagnosis     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[facility] [address] )      8/1/2019 -  Chemotherapy     fluorouracil (ADRUCIL) injection 750 mg, 400 mg/m2 = 750 mg, Intravenous, Once, 14 of 22 cycles  Administration: 750 mg (8/1/2019), 750 mg (8/13/2019), 750 mg (8/27/2019), 750 mg (9/10/2019), 750 mg (9/24/2019), 750 mg (10/8/2019), 795 mg (10/22/2019), 795 mg (11/5/2019), 795 mg (11/19/2019), 795 mg (12/3/2019), 795 mg (12/17/2019), 795 mg (12/31/2019), 795 mg (1/14/2020)  pegfilgrastim (NEULASTA ONPRO) subcutaneous injection kit 6 mg, 6 mg, Subcutaneous, Once, 2 of 2 cycles  Administration: 6 mg (8/3/2019), 6 mg (8/15/2019)  leucovorin 750 mg in dextrose 5 % 250 mL IVPB, 748 mg, Intravenous, Once, 14 of 22 cycles  Administration: 750 mg (8/1/2019), 750 mg (8/13/2019), 750 mg (8/27/2019), 750 mg (9/10/2019), 750 mg (9/24/2019), 750 mg (10/8/2019), 800 mg (10/22/2019), 800 mg (11/5/2019), 800 mg (11/19/2019), 800 mg (12/3/2019), 800 mg (12/17/2019), 800 mg (12/31/2019), 800 mg (1/14/2020)  oxaliplatin (ELOXATIN) 158 95 mg in dextrose 5 % 250 mL chemo infusion, 85 mg/m2 = 158 95 mg, Intravenous, Once, 6 of 6 cycles  Administration: 158 95 mg (8/1/2019), 158 95 mg (8/13/2019), 158 95 mg (8/27/2019), 158 95 mg (9/10/2019), 158 95 mg (9/24/2019), 158 95 mg (10/8/2019)       Past Medical History:   Diagnosis Date    Dehydration 8/8/2019    Esophageal cancer ([facility] [address] )     Hypertension     Nausea 8/8/2019    Psychiatric disorder     Recovering alcoholic ([facility] )      Past Surgical History:   Procedure Laterality Date    APPENDECTOMY      IR PORT PLACEMENT  7/31/2019       Review of Medications:   Vitamins, Supplements and Herbals: no    Current Outpatient Medications:     ammonium lactate (LAC-HYDRIN) 12 % cream, Apply topically as needed for dry skin On hands and feet, Disp: 385 g, Rfl: 2    apixaban (ELIQUIS) 5 mg, Take 1 tablet (5 mg total) by mouth 2 (two) times a day for 154 doses, Disp: 154 tablet, Rfl: 0   bisoprolol (ZEBETA) 5 mg tablet, Take 1 tablet (5 mg total) by mouth daily, Disp: , Rfl:     diazepam (VALIUM) 5 mg tablet, Take 1 tablet (5 mg total) by mouth 2 (two) times a day, Disp: 60 tablet, Rfl: 0    lidocaine-prilocaine (EMLA) cream, Apply topically as needed for mild pain, Disp: 30 g, Rfl: 0    pantoprazole (PROTONIX) 40 mg tablet, Take 40 mg by mouth daily before breakfast, Disp: , Rfl:     venlafaxine (EFFEXOR-XR) 150 mg 24 hr capsule, Take 1 capsule (150 mg total) by mouth daily, Disp: 30 capsule, Rfl: 0  No current facility-administered medications for this visit       Facility-Administered Medications Ordered in Other Visits:     fluorouracil (ADRUCIL) injection 795 mg, 400 mg/m2 (Treatment Plan Recorded), Intravenous, Once, Ashok Alex MD    leucovorin 800 mg in dextrose 5 % 250 mL IVPB, 800 mg, Intravenous, Once, Ashok Alex MD    sodium chloride 0 9 % infusion, 20 mL/hr, Intravenous, Once PRN, Ashok Alex MD, Last Rate: 20 mL/hr at 01/28/20 0921, 20 mL/hr at 01/28/20 0921    Most Recent Lab Results:   Lab Results   Component Value Date    WBC 9 75 01/24/2020    ALT 22 01/24/2020    AST 19 01/24/2020    ALB 3 7 01/24/2020    SODIUM 136 01/24/2020    SODIUM 136 01/10/2020    K 4 2 01/24/2020    K 3 7 01/10/2020     01/24/2020    BUN 16 01/24/2020    BUN 14 01/10/2020    CREATININE 1 15 01/24/2020    CREATININE 1 17 01/10/2020    EGFR 66 01/24/2020    POCGLU 115 07/22/2019    GLUF 113 (H) 10/18/2019    GLUF 130 (H) 08/09/2019    GLUC 127 01/24/2020    CALCIUM 9 1 01/24/2020       Anthropometric Measurements:   Height: 69 5"  Ht Readings from Last 1 Encounters:   01/28/20 5' 9" (1 753 m)     -Weight History:   Usual Weight: 198-212# (last weighed this in May 2019)  Ideal Body Weight: 163#  Wt Readings from Last 20 Encounters:   01/28/20 86 3 kg (190 lb 4 1 oz)   01/24/20 87 1 kg (192 lb)   01/14/20 87 1 kg (192 lb 0 3 oz)   01/07/20 85 3 kg (188 lb)   12/31/19 87 kg (191 lb 12 8 oz)   12/26/19 88 kg (194 lb)   12/17/19 88 6 kg (195 lb 5 2 oz)   12/03/19 88 4 kg (194 lb 14 2 oz)   11/22/19 86 6 kg (191 lb)   11/19/19 86 7 kg (191 lb 2 2 oz)   11/05/19 86 2 kg (190 lb 0 6 oz)   10/22/19 83 1 kg (183 lb 3 2 oz)   10/18/19 83 5 kg (184 lb)   10/08/19 80 3 kg (177 lb 0 5 oz)   10/01/19 76 6 kg (168 lb 12 8 oz)   09/24/19 75 2 kg (165 lb 12 6 oz)   09/20/19 74 6 kg (164 lb 8 oz)   09/10/19 71 5 kg (157 lb 10 1 oz)   09/03/19 69 kg (152 lb 3 2 oz)   08/27/19 69 3 kg (152 lb 12 8 oz)     Weight Changes: loss of 3 7# (1 9%) in 1 month (not significant) and gain of 7 1#/ (3 9%) in 3 months (not significant); encouraged wt stability moving forward  Estimated body mass index is 28 1 kg/m² as calculated from the following:    Height as of an earlier encounter on 1/28/20: 5' 9" (1 753 m)  Weight as of an earlier encounter on 1/28/20: 86 3 kg (190 lb 4 1 oz)  Nutrition-Focused Physical Findings: none observed       Food/Nutrition-Related History & Client/Social History:    Current Nutrition Impact Symptoms:  [] Nausea [] Reduced Appetite [] Acid Reflux    [] Vomiting  [] Unintended Wt Loss  [] Malabsorption    [] Diarrhea  [] Unintended Wt Gain  [] Dumping Syndrome    [] Constipation [] Thick Mucous/Secretions  [] Abdominal Pain    [] Dysgeusia (Altered Taste)  [] Xerostomia (Dry Mouth)  [] Gas    [] Dysosmia (Altered Smell)  [] Thrush  [] Difficulty Chewing    [x] Oral Mucositis (Sore Mouth) - mild on roof of mouth; again, suggested bs/sw rinses again [x] Fatigue - significant, "comes out of nowhere" [] Other:   [] Odynophagia  [] Esophagitis  [] Other:    [] Dysphagia - resolved [] Early Satiety  [] No Problems Eating      Food Allergies: no  Food Intolerances: no   -For Gout, avoids: organ meats, shellfish  Last gout flare was 2 years ago after a liverwurst sandwich      Current Diet: Regular Diet, No Restrictions  Current Nutrition Intake: Unchanged from last visit  Appetite: Good, Fluctuating  (decreased slightly)  Nutrition Route: PO  Meal planning/preparation mainly done by: Self and Spouse/Partner  Oral Care: Brushing BID, alcohol-free Crest Mouthwash, flossing daily  Activity level: Has been active for 4-5 hrs per day (increased)  Continues with PT   Just bought a metal detector "for fun"  Social Hx: Quit drinking alcohol 30 years ago  Retied custom   Wife is a teacher (pre-school and Special Ed)  25 Hr Diet Recall:  Breakfast: Boost (unsure which kind, thinks it might have had 20 grams of protein); yesterday: 2 Boost, omelet with ham/spinach/cheese  Lunch: [place] yogurt, pieces of cheese, ham  Dinner: large plate spaghetti with Huang cheese; tonight plans to have a hamburger with spinach  Snacks: has not been snacking as much; will have fresh fruit    Beverages: water (16 oz glass x2-3), Boost (8 oz x2), V8 Healthy Greens juice (16 oz x1) with protein powder added "made out of vegetables", regular coffee with milk (14 oz x1); Aloe juice (1 oz x1); Averaging 81 oz per day (90% of est needs)  Supplements:    Boost (unsure which type) BID   V8 Healthy Greens juice with Amazing Grass Protein Superfood powder (1 scoop=110 kcal, 20 g protein)  added - 16 oz x1    12/17/19: Re-Estimated Nutrition Needs: (based on 88 8kg/ 195 3# actual wt)  Energy Needs: 2756-8449 kcal/day (30-35 kcal/kg)  Protein Needs: 107-133 grams/day (1 2-1 5 g/kg)  Fluid Needs: 7293-0636 mL/day (1 mL/kcal) -  oz    Discussion/Summary: Nani Phoenix was seen today during his infusion for RD follow up  He is doing well today  He continues to experience significant fatigue  He is still in PT and feels that his strength and stamina have improved overall because he has been much more active during the day  His appetite remains good overall but he notes that it is slightly decreased recently  His fluid intake has improved  He is using Boost and a vegetarian protein powder added to his V8 juice  He is tolerating all foods    We discussed lean sources of protein to incorporate at meals and snacks and ways to increase overall fluid intake  He plans to work on adding more fish into his eating pattern  He was provided with samples of Boost Plus and Boost coupons  We will follow up on 3/10  Discussed/Reviewed:   · weight management  · indications & use of oral nutrition supplements  · high protein foods to include at all meals and snacks  · encouraged eating every 2-3 hours (5-6 small meals/day)  · maintaining proper oral care  · adequate hydation & tips to increase overall fluid intake  · recipe suggestions/resources  · a cancer preventative eating pattern as a long-term nutrition goal  · individualized calorie, protein and fluid daily goals     All questions and concerns addressed during todays visit  Eddie Escobedo and his wife have RD contact information  Nutrition Diagnosis:  · Increased Nutrient Needs (kcal & pro) related to increased demand for nutrients and disease state as evidenced by cancer dx and pt undergoing tx for cancer        Intervention & Recommendations:  Topics addressed: Nutrition education, Balance/Variety, Meals & Snacks, Meal planning, Choosing high protein meals/snacks, Meal pattern: eating small/frequent meals (every 2-3 hours), Nutrition Symptom Management, Adequate Hydration, Medical Food Supplements, Nutrition-Related Medication Management and Weight Management    Barriers: None  Readiness to change: action  Comprehension: verbalizes understanding  Expected Compliance: good    Materials Provided: Boost samples and Boost Coupons    Monitoring & Evaluation:  Dietitian to Monitor: Food and Nutrition Intake, Nutrtion Impact Symptoms, Body Weight and Biochemical Data     Goals:  · weight stabilization  · adequate nutrition related symptom management  · pt to meet >/=75% estimated nutrition needs daily     · Progress Towards Goals: Progressing and Goal(s) Met    Nutrition Rx & Recommendations:  · Diet: High Calorie, High Protein (for high calorie foods see pages 52-53, and for high protein foods see pages 49-51 in your Eating Hints book)  · Nutrition Supplements: Boost Plus BID and Protein Powder once daily added to V8 juice  May use homemade shakes/smoothies as desired  If using a pre-made shake/bar, choose ones with >300 calories and >10 grams protein (ex  Ensure Enlive, Ensure Plus, Boost Plus, Boost Very High Calorie, etc )  · Small, frequent meals/snacks may be easier to tolerate than 3 large daily meals  Aim for 5-6 small meals per day (every 2-3 hours)  · Include protein at all meals/snacks  · Stay hydrated by sipping fluids of choice/tolerance throughout the day  · Incorporate physical activity as able/allowed  · Follow proper oral care; Try baking soda/salt water rinse recipe (mix 3/4 tsp salt + 1 tsp baking soda + 1 qt water; rinse with pain water after using) in Eating Hints book (pg 18)  Brush your teeth before/after meals & before bed  · Weigh yourself regularly  If you notice weight loss, make an effort to increase your daily food/calorie intake  If you continue to notice loss after these efforts, reach out to your dietitian to establish a plan to stabilize weight  · Follow a Cancer Preventative Nutrition Pattern: colorful, plant-based, low-fat, avoid added sugars, limit alcohol, include high fiber foods, limit processed meats, limit red meat, choose lean protein sources, use low-fat cooking methods, balance calories with physical activity, avoid excessive weight gain throughout life  · Aim for  oz fluid each day, water is the best choice  · Choose whole foods when possible: fruits, vegetables, whole grains (try Juan's Killer Bread)  · Choose lean protein sources: chicken breast, fish, low-fat dairy and yogurt, egg whites, beans, nut butter, low-fat cheese      Follow Up Plan: 3/10/20 during chemo   Recommend Referral to Other Providers: none at this time

## 2020-01-24 NOTE — PROGRESS NOTES
Daily Note     Today's date: 2020  Patient name: Ladonna Fry  : 1955  MRN: [id number]  Referring provider: Thai Tillman MD  Dx:   Encounter Diagnosis     ICD-10-CM    1  Impaired functional mobility, balance, and endurance Z74 09    2  Generalized weakness R53 1    3  Decreased stamina R53 83                   Subjective: Patient reports feeling "pretty good today, just always tired " He notes his next chemo treatment is Tuesday  He arrives 15 minutes late to session today  Objective: See treatment diary below    2 lb cuff weights on wrists and 2 lb cuff weight on ankles    - Floor to ceiling 10 sec hold, 10 reps  - Side to side 10 sec hold, 10 reps  - Forward step - alternating steps over adrian- 6" adrian, 2 minutes on/3 minutes off  - Lateral step - alternating steps over adrian- 6" adrian, 2 minutes on/3 minutes off  - Standing floor to ceiling with boomwhackers on foam: 10 reps  - Standing floor to ceiling with boomwhackers on foam (across body to target on floor to over opposite shoulder): 10 reps each way    Assessment: Patient able to tolerate treatment session well today with increased focus on stability exercises today  He demonstrated 2 LoB posteriorly with floor to ceiling exercise requiring 1 UE assist on wall to maintain stability  Patient able to tolerate cuff weight on all 4 extremities today with no reports of increased fatigue  He will continue to benefit from skilled outpatient PT in order to improve overall strength, manage fatigue, and maintain functional mobility  Plan: Continue per plan of care  Precautions:  has a past medical history of Dehydration (2019), [facility] ), Hypertension, Nausea (2019), Psychiatric disorder, and Recovering alcoholic ([facility] )

## 2020-01-27 ENCOUNTER — OFFICE VISIT (OUTPATIENT)
Dept: PHYSICAL THERAPY | Facility: CLINIC | Age: 65
End: 2020-01-27
Payer: MEDICARE

## 2020-01-27 DIAGNOSIS — R53.83 DECREASED STAMINA: ICD-10-CM

## 2020-01-27 DIAGNOSIS — Z74.09 IMPAIRED FUNCTIONAL MOBILITY, BALANCE, AND ENDURANCE: Primary | ICD-10-CM

## 2020-01-27 DIAGNOSIS — R53.1 GENERALIZED WEAKNESS: ICD-10-CM

## 2020-01-27 PROCEDURE — 97116 GAIT TRAINING THERAPY: CPT

## 2020-01-27 PROCEDURE — 97110 THERAPEUTIC EXERCISES: CPT

## 2020-01-27 PROCEDURE — 97112 NEUROMUSCULAR REEDUCATION: CPT

## 2020-01-27 NOTE — PROGRESS NOTES
Daily Note     Today's date: 2020  Patient name: Keyonna Marrero  : 1955  MRN: [id number]  Referring provider: Felicity Deras MD  Dx:   Encounter Diagnosis     ICD-10-CM    1  Impaired functional mobility, balance, and endurance Z74 09    2  Generalized weakness R53 1    3  Decreased stamina R53 83                   Subjective: Patient reports feeling "pretty good" after last session and notes that "we can increase the weight a little today "       Objective: See treatment diary below    3 lb cuff weights on wrists and 3 lb cuff weight on ankles    - Floor to ceiling 10 sec hold, 10 reps  - Side to side 10 sec hold, 10 reps  - Forward step - alternating steps over adrian- 6" adrian, 2 minutes on/3 minutes off  - Lateral step - alternating steps over adrian- 6" adrian, 2 minutes on/3 minutes off  - Standing floor to ceiling with blue boomwhackers on foam: 10 reps  - Standing floor to ceiling with blue boomwhackers on foam (across body to target on floor to over opposite shoulder): 10 reps each way  - Bicep curls on foam pad: 30 reps, 5 lb DB  - Med ball toss (6 lb) on foam: 3 reps      Assessment: Patient able to tolerate treatment session well today with continued focus on stability exercises  Patient demonstrated LoB posteriorly with boomwhacker exercise indicating decreased proprioception and required UE assist to maintain balance  He reported increased fatigue following med ball toss activity and required seated rest break  He will continue to benefit from skilled outpatient PT in order to improve overall strength, manage fatigue, and maintain functional mobility  Plan: Continue per plan of care  Precautions:  has a past medical history of Dehydration (2019), Esophageal cancer ([facility] [address] ), Hypertension, Nausea (2019), Psychiatric disorder, and Recovering alcoholic ([facility] [address] )

## 2020-01-28 ENCOUNTER — TELEPHONE (OUTPATIENT)
Dept: UROLOGY | Facility: MEDICAL CENTER | Age: 65
End: 2020-01-28

## 2020-01-28 ENCOUNTER — HOSPITAL ENCOUNTER (OUTPATIENT)
Dept: INFUSION CENTER | Facility: HOSPITAL | Age: 65
Discharge: HOME | End: 2020-01-28
Payer: MEDICARE

## 2020-01-28 ENCOUNTER — TELEPHONE (OUTPATIENT)
Dept: HEMATOLOGY ONCOLOGY | Facility: CLINIC | Age: 65
End: 2020-01-28

## 2020-01-28 ENCOUNTER — NUTRITION (OUTPATIENT)
Dept: NUTRITION | Facility: CLINIC | Age: 65
End: 2020-01-28

## 2020-01-28 VITALS
HEIGHT: 69 IN | WEIGHT: 190.26 LBS | RESPIRATION RATE: 18 BRPM | OXYGEN SATURATION: 94 % | HEART RATE: 63 BPM | SYSTOLIC BLOOD PRESSURE: 128 MMHG | TEMPERATURE: 97.2 F | BODY MASS INDEX: 28.18 KG/M2 | DIASTOLIC BLOOD PRESSURE: 66 MMHG

## 2020-01-28 DIAGNOSIS — C16.0 MALIGNANT NEOPLASM OF CARDIA OF STOMACH (HCC): ICD-10-CM

## 2020-01-28 DIAGNOSIS — C15.5 MALIGNANT NEOPLASM OF LOWER THIRD OF ESOPHAGUS (HCC): Primary | ICD-10-CM

## 2020-01-28 DIAGNOSIS — R11.0 NAUSEA: ICD-10-CM

## 2020-01-28 DIAGNOSIS — Z71.3 NUTRITIONAL COUNSELING: Primary | ICD-10-CM

## 2020-01-28 DIAGNOSIS — E86.0 DEHYDRATION: ICD-10-CM

## 2020-01-28 PROCEDURE — 96409 CHEMO IV PUSH SNGL DRUG: CPT

## 2020-01-28 PROCEDURE — 96366 THER/PROPH/DIAG IV INF ADDON: CPT

## 2020-01-28 PROCEDURE — G0498 CHEMO EXTEND IV INFUS W/PUMP: HCPCS

## 2020-01-28 PROCEDURE — 96367 TX/PROPH/DG ADDL SEQ IV INF: CPT

## 2020-01-28 RX ORDER — FLUOROURACIL 50 MG/ML
400 INJECTION, SOLUTION INTRAVENOUS ONCE
Status: COMPLETED | OUTPATIENT
Start: 2020-01-28 | End: 2020-01-28

## 2020-01-28 RX ORDER — SODIUM CHLORIDE 9 MG/ML
20 INJECTION, SOLUTION INTRAVENOUS ONCE AS NEEDED
Status: DISCONTINUED | OUTPATIENT
Start: 2020-01-28 | End: 2020-01-31 | Stop reason: HOSPADM

## 2020-01-28 RX ADMIN — LEUCOVORIN CALCIUM 800 MG: 200 INJECTION, POWDER, LYOPHILIZED, FOR SOLUTION INTRAMUSCULAR; INTRAVENOUS at 10:06

## 2020-01-28 RX ADMIN — FLUOROURACIL 795 MG: 50 INJECTION, SOLUTION INTRAVENOUS at 12:11

## 2020-01-28 RX ADMIN — SODIUM CHLORIDE 20 ML/HR: 0.9 INJECTION, SOLUTION INTRAVENOUS at 09:21

## 2020-01-28 RX ADMIN — DEXAMETHASONE SODIUM PHOSPHATE: 10 INJECTION, SOLUTION INTRAMUSCULAR; INTRAVENOUS at 09:21

## 2020-01-28 NOTE — PATIENT INSTRUCTIONS
Nutrition Rx & Recommendations:  · Diet: High Calorie, High Protein (for high calorie foods see pages 52-53, and for high protein foods see pages 49-51 in your Eating Hints book)  · Nutrition Supplements: Boost Plus BID and Protein Powder once daily added to V8 juice  May use homemade shakes/smoothies as desired  If using a pre-made shake/bar, choose ones with >300 calories and >10 grams protein (ex  Ensure Enlive, Ensure Plus, Boost Plus, Boost Very High Calorie, etc )  · Small, frequent meals/snacks may be easier to tolerate than 3 large daily meals  Aim for 5-6 small meals per day (every 2-3 hours)  · Include protein at all meals/snacks  · Stay hydrated by sipping fluids of choice/tolerance throughout the day  · Incorporate physical activity as able/allowed  · Follow proper oral care; Try baking soda/salt water rinse recipe (mix 3/4 tsp salt + 1 tsp baking soda + 1 qt water; rinse with pain water after using) in Eating Hints book (pg 18)  Brush your teeth before/after meals & before bed  · Weigh yourself regularly  If you notice weight loss, make an effort to increase your daily food/calorie intake  If you continue to notice loss after these efforts, reach out to your dietitian to establish a plan to stabilize weight  · Follow a Cancer Preventative Nutrition Pattern: colorful, plant-based, low-fat, avoid added sugars, limit alcohol, include high fiber foods, limit processed meats, limit red meat, choose lean protein sources, use low-fat cooking methods, balance calories with physical activity, avoid excessive weight gain throughout life  · Aim for  oz fluid each day, water is the best choice  · Choose whole foods when possible: fruits, vegetables, whole grains (try Juan's Killer Bread)  · Choose lean protein sources: chicken breast, fish, low-fat dairy and yogurt, egg whites, beans, nut butter, low-fat cheese      Follow Up Plan: 3/10/20 during chemo   Recommend Referral to Other Providers: none at this time

## 2020-01-28 NOTE — PLAN OF CARE
Problem: Potential for Falls  Goal: Patient will remain free of falls  Description  INTERVENTIONS:  - Assess patient frequently for physical needs  -  Identify cognitive and physical deficits and behaviors that affect risk of falls  -  [place] fall precautions as indicated by assessment   - Educate patient/family on patient safety including physical limitations  - Instruct patient to call for assistance with activity based on assessment  - Modify environment to reduce risk of injury  - Consider OT/PT consult to assist with strengthening/mobility  Outcome: Progressing     Problem: Potential for Falls  Goal: Patient will remain free of falls  Description  INTERVENTIONS:  - Assess patient frequently for physical needs  -  Identify cognitive and physical deficits and behaviors that affect risk of falls    -  [place] fall precautions as indicated by assessment   - Educate patient/family on patient safety including physical limitations  - Instruct patient to call for assistance with activity based on assessment  - Modify environment to reduce risk of injury  - Consider OT/PT consult to assist with strengthening/mobility  Outcome: Progressing     Problem: INFECTION - ADULT  Goal: Absence or prevention of progression during hospitalization  Description  INTERVENTIONS:  - Assess and monitor for signs and symptoms of infection  - Monitor lab/diagnostic results  - Monitor all insertion sites, i e  indwelling lines, tubes, and drains  - Monitor endotracheal (as able) and nasal secretions for changes in amount and color  - [place] appropriate cooling/warming therapies per order  - Administer medications as ordered  - Instruct and encourage patient and family to use good hand hygiene technique  - Identify and instruct in appropriate isolation precautions for identified infection/condition  Outcome: Progressing  Goal: Absence of fever/infection during neutropenic period  Description  INTERVENTIONS:  - Monitor WBC  - Implement neutropenic guidelines  Outcome: Progressing     Problem: DISCHARGE PLANNING  Goal: Discharge to home or other facility with appropriate resources  Description  INTERVENTIONS:  - Identify barriers to discharge w/patient and caregiver  - Arrange for needed discharge resources and transportation as appropriate  - Identify discharge learning needs (meds, wound care, etc )  - Arrange for interpretive services to assist at discharge as needed  - Refer to Case Management Department for coordinating discharge planning if the patient needs post-hospital services based on physician/advanced practitioner order or complex needs related to functional status, cognitive ability, or social support system  Outcome: Progressing     Problem: Knowledge Deficit  Goal: Patient/family/caregiver demonstrates understanding of disease process, treatment plan, medications, and discharge instructions  Description  Complete learning assessment and assess knowledge base    Interventions:  - Provide teaching at level of understanding  - Provide teaching via preferred learning methods  Outcome: Progressing

## 2020-01-28 NOTE — TELEPHONE ENCOUNTER
Spoke with Hayde Ashley in the Urology office regarding a consult appointment for the patient  The soonest at [facility] is 3/25, I asked her to check Julien as well and the result was the same  Hayde Ashley indicated she was going to route this to the nurse at [facility] to review and obtain a sooner appointment  Someone will call me back when that has been accomplished  I gave her the call center phone number along with my name and thanked her for her assistance

## 2020-01-28 NOTE — TELEPHONE ENCOUNTER
----- Message from Agustina Delgadillo sent at 1/24/2020  4:34 PM EST -----  Regarding: Urology appointment  Hi - this patient shows enlarged prostate on his CT scan and has an elevated PSA  I have put in a referral to urology to have this addressed  I called the patient and left him a voicemail explaining his results and what we were doing  I also told him that you would give him a call sometime next week to schedule a urology appointment

## 2020-01-28 NOTE — TELEPHONE ENCOUNTER
Reason for appointment/Complaint/Diagnosis : Please triage for new patient appointment  Referred by Luther Mejia [facility] due to rising PSA and Prostamegaly per 01/13/20 CT    Insurance: Medicare    History of Cancer? yes                       If yes, what kind? Esophageal and stomach    Previous urologist?     no                  Records requested/where? No    Outside testing/where? Not applicable    Location Preference for office visit?  [facility]

## 2020-01-28 NOTE — TELEPHONE ENCOUNTER
Within the next 2-3 weeks would be ideal   Probably most appropriate to be seen by a MD due to medical comorbidities

## 2020-01-28 NOTE — TELEPHONE ENCOUNTER
PSA done on 1/24/20 was 8 0  Patient has metastatic gastroesophageal cancer managed by heme/onc  Please advise appointment time frame and with MD or AP

## 2020-01-30 ENCOUNTER — HOSPITAL ENCOUNTER (OUTPATIENT)
Dept: INFUSION CENTER | Facility: HOSPITAL | Age: 65
Discharge: HOME | End: 2020-01-30

## 2020-01-30 VITALS
RESPIRATION RATE: 18 BRPM | HEART RATE: 56 BPM | SYSTOLIC BLOOD PRESSURE: 102 MMHG | OXYGEN SATURATION: 96 % | TEMPERATURE: 97.7 F | DIASTOLIC BLOOD PRESSURE: 51 MMHG

## 2020-01-30 DIAGNOSIS — R11.0 NAUSEA: ICD-10-CM

## 2020-01-30 DIAGNOSIS — C15.5 MALIGNANT NEOPLASM OF LOWER THIRD OF ESOPHAGUS (HCC): Primary | ICD-10-CM

## 2020-01-30 DIAGNOSIS — C16.0 MALIGNANT NEOPLASM OF CARDIA OF STOMACH (HCC): ICD-10-CM

## 2020-01-30 DIAGNOSIS — E86.0 DEHYDRATION: ICD-10-CM

## 2020-01-30 NOTE — TELEPHONE ENCOUNTER
I called pt and someone picked up but there was a lot of noise and talking in the background but no one answered when I was saying chacha

## 2020-01-30 NOTE — TELEPHONE ENCOUNTER
Pt being rescheduled from 2/3 due to provider being in the OR  I r/s pt pt to 2/12  Please confirm if pt calls back

## 2020-01-30 NOTE — PLAN OF CARE
Problem: Potential for Falls  Goal: Patient will remain free of falls  Description  INTERVENTIONS:  - Assess patient frequently for physical needs  -  Identify cognitive and physical deficits and behaviors that affect risk of falls    -  [place] fall precautions as indicated by assessment   - Educate patient/family on patient safety including physical limitations  - Instruct patient to call for assistance with activity based on assessment  - Modify environment to reduce risk of injury  - Consider OT/PT consult to assist with strengthening/mobility  Outcome: Progressing

## 2020-01-31 ENCOUNTER — APPOINTMENT (OUTPATIENT)
Dept: PHYSICAL THERAPY | Facility: CLINIC | Age: 65
End: 2020-01-31
Payer: MEDICARE

## 2020-02-03 ENCOUNTER — OFFICE VISIT (OUTPATIENT)
Dept: PHYSICAL THERAPY | Facility: CLINIC | Age: 65
End: 2020-02-03
Payer: MEDICARE

## 2020-02-03 DIAGNOSIS — R53.83 DECREASED STAMINA: ICD-10-CM

## 2020-02-03 DIAGNOSIS — R53.1 GENERALIZED WEAKNESS: ICD-10-CM

## 2020-02-03 DIAGNOSIS — Z74.09 IMPAIRED FUNCTIONAL MOBILITY, BALANCE, AND ENDURANCE: Primary | ICD-10-CM

## 2020-02-03 PROCEDURE — 97112 NEUROMUSCULAR REEDUCATION: CPT

## 2020-02-03 PROCEDURE — 97110 THERAPEUTIC EXERCISES: CPT

## 2020-02-03 PROCEDURE — 97116 GAIT TRAINING THERAPY: CPT

## 2020-02-03 NOTE — PROGRESS NOTES
Daily Note     Today's date: 2/3/2020  Patient name: Angélica Major  : 1955  MRN: [id number]  Referring provider: Angelika Varela MD  Dx:   Encounter Diagnosis     ICD-10-CM    1  Impaired functional mobility, balance, and endurance Z74 09    2  Generalized weakness R53 1    3  Decreased stamina R53 83                   Subjective: Patient reports feeling "a little extra tired" after last session, however notes increased fatigue today in which he attributes to having chemo on Tuesday  Objective: See treatment diary below    - Floor to ceiling 10 sec hold, 10 reps  - Side to side 10 sec hold, 10 reps  - Forward step - alternating steps over adrian- 9" adrian, 2 minutes on/3 minutes off  - Lateral step - alternating steps over adrian- 9" adrian, 2 minutes on/3 minutes off  - Standing floor to ceiling with blue boomwhackers on foam: 10 reps  - Standing floor to ceiling with blue boomwhackers on foam (across body to target on floor to over opposite shoulder): 10 reps each way  - Bicep curls on foam pad: 30 reps, 10 lb DB  - Overhead tricep press: 30 reps, 10 lb DB  - Toss/catch yellow PBall against wall: 20 reps, no LoB  - Stopping/passing soccer ball: 20 reps, no LoB      Assessment: Patient able to tolerate treatment session well today with reduction in intensity due to increased fatigue from chemo last Tuesday  Patient required increased duration of seated rest breaks today  He demonstrated fair balance and neuromuscular control with activities on foam, however performed decreased number of repetitions  He was challenged with stopping soccer ball when outside [place], however demonstrated no LoB and good ability to self correct  He will continue to benefit from skilled outpatient PT in order to improve overall strength, manage fatigue, and maintain functional mobility  Plan: Continue per plan of care        Precautions:  has a past medical history of Dehydration (2019), Esophageal cancer [facility]), Hypertension, Nausea (8/8/2019), Psychiatric disorder, and Recovering alcoholic ([facility] )

## 2020-02-04 DIAGNOSIS — C16.0 MALIGNANT NEOPLASM OF CARDIA OF STOMACH (HCC): ICD-10-CM

## 2020-02-04 DIAGNOSIS — R11.0 NAUSEA: Primary | ICD-10-CM

## 2020-02-04 DIAGNOSIS — E86.0 DEHYDRATION: ICD-10-CM

## 2020-02-04 DIAGNOSIS — C15.5 MALIGNANT NEOPLASM OF LOWER THIRD OF ESOPHAGUS (HCC): ICD-10-CM

## 2020-02-07 ENCOUNTER — OFFICE VISIT (OUTPATIENT)
Dept: PHYSICAL THERAPY | Facility: CLINIC | Age: 65
End: 2020-02-07
Payer: MEDICARE

## 2020-02-07 ENCOUNTER — APPOINTMENT (OUTPATIENT)
Dept: LAB | Facility: HOSPITAL | Age: 65
End: 2020-02-07
Payer: MEDICARE

## 2020-02-07 DIAGNOSIS — C15.5 MALIGNANT NEOPLASM OF LOWER THIRD OF ESOPHAGUS (HCC): ICD-10-CM

## 2020-02-07 DIAGNOSIS — E86.0 DEHYDRATION: ICD-10-CM

## 2020-02-07 DIAGNOSIS — C16.0 MALIGNANT NEOPLASM OF CARDIA OF STOMACH (HCC): ICD-10-CM

## 2020-02-07 DIAGNOSIS — Z74.09 IMPAIRED FUNCTIONAL MOBILITY, BALANCE, AND ENDURANCE: Primary | ICD-10-CM

## 2020-02-07 DIAGNOSIS — R11.0 NAUSEA: ICD-10-CM

## 2020-02-07 DIAGNOSIS — R53.1 GENERALIZED WEAKNESS: ICD-10-CM

## 2020-02-07 DIAGNOSIS — R53.83 DECREASED STAMINA: ICD-10-CM

## 2020-02-07 LAB
ALBUMIN SERPL BCP-MCNC: 3.5 G/DL (ref 3.5–5)
ALP SERPL-CCNC: 91 U/L (ref 46–116)
ALT SERPL W P-5'-P-CCNC: 22 U/L (ref 12–78)
ANION GAP SERPL CALCULATED.3IONS-SCNC: 10 MMOL/L (ref 4–13)
AST SERPL W P-5'-P-CCNC: 19 U/L (ref 5–45)
BASOPHILS # BLD AUTO: 0.11 THOUSANDS/ΜL (ref 0–0.1)
BASOPHILS NFR BLD AUTO: 1 % (ref 0–1)
BILIRUB SERPL-MCNC: 0.9 MG/DL (ref 0.2–1)
BUN SERPL-MCNC: 15 MG/DL (ref 5–25)
CALCIUM SERPL-MCNC: 9.1 MG/DL (ref 8.3–10.1)
CHLORIDE SERPL-SCNC: 100 MMOL/L (ref 100–108)
CO2 SERPL-SCNC: 25 MMOL/L (ref 21–32)
CREAT SERPL-MCNC: 1.16 MG/DL (ref 0.6–1.3)
EOSINOPHIL # BLD AUTO: 0.15 THOUSAND/ΜL (ref 0–0.61)
EOSINOPHIL NFR BLD AUTO: 1 % (ref 0–6)
ERYTHROCYTE [DISTWIDTH] IN BLOOD BY AUTOMATED COUNT: 15.4 % (ref 11.6–15.1)
GFR SERPL CREATININE-BSD FRML MDRD: 65 ML/MIN/1.73SQ M
GLUCOSE SERPL-MCNC: 119 MG/DL (ref 65–140)
HCT VFR BLD AUTO: 39.2 % (ref 36.5–49.3)
HGB BLD-MCNC: 12.4 G/DL (ref 12–17)
IMM GRANULOCYTES # BLD AUTO: 0.05 THOUSAND/UL (ref 0–0.2)
IMM GRANULOCYTES NFR BLD AUTO: 1 % (ref 0–2)
LYMPHOCYTES # BLD AUTO: 2.92 THOUSANDS/ΜL (ref 0.6–4.47)
LYMPHOCYTES NFR BLD AUTO: 26 % (ref 14–44)
MCH RBC QN AUTO: 33.3 PG (ref 26.8–34.3)
MCHC RBC AUTO-ENTMCNC: 31.6 G/DL (ref 31.4–37.4)
MCV RBC AUTO: 105 FL (ref 82–98)
MONOCYTES # BLD AUTO: 1.28 THOUSAND/ΜL (ref 0.17–1.22)
MONOCYTES NFR BLD AUTO: 12 % (ref 4–12)
NEUTROPHILS # BLD AUTO: 6.56 THOUSANDS/ΜL (ref 1.85–7.62)
NEUTS SEG NFR BLD AUTO: 59 % (ref 43–75)
NRBC BLD AUTO-RTO: 0 /100 WBCS
PLATELET # BLD AUTO: 329 THOUSANDS/UL (ref 149–390)
PMV BLD AUTO: 11.3 FL (ref 8.9–12.7)
POTASSIUM SERPL-SCNC: 4.1 MMOL/L (ref 3.5–5.3)
PROT SERPL-MCNC: 7.3 G/DL (ref 6.4–8.2)
RBC # BLD AUTO: 3.72 MILLION/UL (ref 3.88–5.62)
SODIUM SERPL-SCNC: 135 MMOL/L (ref 136–145)
WBC # BLD AUTO: 11.07 THOUSAND/UL (ref 4.31–10.16)

## 2020-02-07 PROCEDURE — 97112 NEUROMUSCULAR REEDUCATION: CPT

## 2020-02-07 PROCEDURE — 80053 COMPREHEN METABOLIC PANEL: CPT

## 2020-02-07 PROCEDURE — 85025 COMPLETE CBC W/AUTO DIFF WBC: CPT

## 2020-02-07 PROCEDURE — 36415 COLL VENOUS BLD VENIPUNCTURE: CPT

## 2020-02-07 NOTE — PROGRESS NOTES
Daily Note     Today's date: 2020  Patient name: Alysha Abdalla  : 1955  MRN: [id number]  Referring provider: Gavin Brown MD  Dx:   Encounter Diagnosis     ICD-10-CM    1  Impaired functional mobility, balance, and endurance Z74 09    2  Generalized weakness R53 1    3  Decreased stamina R53 83        Start Time: 1140  Stop Time: 1200  Total time in clinic (min): 20 minutes    Subjective: Patient reports feeling to session 25 minutes late  Objective: See treatment diary below    - Floor to ceiling 10 sec hold, 10 reps  - Side to side 10 sec hold, 10 reps  - Forward step - alternating steps over adrian- 9" adrian, 2 minutes on/3 minutes off  - Lateral step - alternating steps over adrian- 9" adrian, 2 minutes on/3 minutes off   on floor to over opposite shoulder): 10 reps each way  - Bicep curls on foam pad: 30 reps, 10 lb DB  - Overhead tricep press: 30 reps, 10 lb DB        Assessment: Patient able to tolerate treatment session well today but session reduced due to time restraints  Lateral LOB noted with side stepping, contact guard needed during session to reduce lateral and posterior LOB  He will continue to benefit from skilled outpatient PT in order to improve overall strength, manage fatigue, and maintain functional mobility  Plan: Continue per plan of care  Precautions:  has a past medical history of Dehydration (2019), Esophageal cancer ([facility] ), Hypertension, Nausea (2019), Psychiatric disorder, and Recovering alcoholic ([facility] )

## 2020-02-10 ENCOUNTER — OFFICE VISIT (OUTPATIENT)
Dept: PHYSICAL THERAPY | Facility: CLINIC | Age: 65
End: 2020-02-10
Payer: MEDICARE

## 2020-02-10 DIAGNOSIS — Z74.09 IMPAIRED FUNCTIONAL MOBILITY, BALANCE, AND ENDURANCE: Primary | ICD-10-CM

## 2020-02-10 DIAGNOSIS — R53.83 DECREASED STAMINA: ICD-10-CM

## 2020-02-10 DIAGNOSIS — R53.1 GENERALIZED WEAKNESS: ICD-10-CM

## 2020-02-10 PROCEDURE — 97110 THERAPEUTIC EXERCISES: CPT | Performed by: PHYSICAL THERAPIST

## 2020-02-10 NOTE — TELEPHONE ENCOUNTER
After numerous attempts, we have been unable to reach pt  He will be reminded of his apt through televox and the office will try again tomorrow during confirmation calls

## 2020-02-10 NOTE — PROGRESS NOTES
Daily Note     Today's date: 2/10/2020  Patient name: Ryan Kelley  : 1955  MRN: [id number]  Referring provider: Arti Hernandez MD  Dx:   Encounter Diagnosis     ICD-10-CM    1  Impaired functional mobility, balance, and endurance Z74 09    2  Generalized weakness R53 1    3  Decreased stamina R53 83                   Subjective: Patient arrives 25 min late (appt time was 2:45 pm)  No complaints of pain  Reports he doesn't feel well from chemo  Objective: See treatment diary below    - Floor to ceiling (seated) 10 sec hold, 10 reps  - Side to side (seated) 10 sec hold, 10 reps  - Forward step - alternating steps over adrian- 9" adrian, 2 minutes on/3 minutes off  - Bicep curls (seated): 30 reps, 10 lb DB  - Overhead tricep press (seated): 30 reps, 10 lb DB  - LAQ: 30 reps, 4 lb ankle wts   - Seated core rotation: 10 lb med ball         Assessment: Patient had modified treatment session today due to side effects of chemo  Performed most exercises in seated today due to fatigue  Pt worked within his tolerance  He will continue to benefit from skilled outpatient PT in order to improve overall strength, manage fatigue, and maintain functional mobility  Plan: Continue per plan of care  Precautions:  has a past medical history of Dehydration (2019), Esophageal cancer ([facility] ), Hypertension, Nausea (2019), Psychiatric disorder, and Recovering alcoholic ([facility] )

## 2020-02-11 ENCOUNTER — HOSPITAL ENCOUNTER (OUTPATIENT)
Dept: INFUSION CENTER | Facility: HOSPITAL | Age: 65
Discharge: HOME | End: 2020-02-11
Payer: MEDICARE

## 2020-02-11 VITALS
DIASTOLIC BLOOD PRESSURE: 73 MMHG | OXYGEN SATURATION: 92 % | WEIGHT: 190.26 LBS | BODY MASS INDEX: 28.18 KG/M2 | RESPIRATION RATE: 16 BRPM | HEART RATE: 64 BPM | TEMPERATURE: 97.5 F | HEIGHT: 69 IN | SYSTOLIC BLOOD PRESSURE: 131 MMHG

## 2020-02-11 DIAGNOSIS — R11.0 NAUSEA: ICD-10-CM

## 2020-02-11 DIAGNOSIS — C16.0 MALIGNANT NEOPLASM OF CARDIA OF STOMACH (HCC): ICD-10-CM

## 2020-02-11 DIAGNOSIS — C15.5 MALIGNANT NEOPLASM OF LOWER THIRD OF ESOPHAGUS (HCC): Primary | ICD-10-CM

## 2020-02-11 DIAGNOSIS — E86.0 DEHYDRATION: ICD-10-CM

## 2020-02-11 PROCEDURE — G0498 CHEMO EXTEND IV INFUS W/PUMP: HCPCS

## 2020-02-11 PROCEDURE — 96367 TX/PROPH/DG ADDL SEQ IV INF: CPT

## 2020-02-11 PROCEDURE — 96409 CHEMO IV PUSH SNGL DRUG: CPT

## 2020-02-11 PROCEDURE — 96366 THER/PROPH/DIAG IV INF ADDON: CPT

## 2020-02-11 RX ORDER — FLUOROURACIL 50 MG/ML
400 INJECTION, SOLUTION INTRAVENOUS ONCE
Status: COMPLETED | OUTPATIENT
Start: 2020-02-11 | End: 2020-02-11

## 2020-02-11 RX ORDER — SODIUM CHLORIDE 9 MG/ML
20 INJECTION, SOLUTION INTRAVENOUS ONCE AS NEEDED
Status: DISCONTINUED | OUTPATIENT
Start: 2020-02-11 | End: 2020-02-14 | Stop reason: HOSPADM

## 2020-02-11 RX ADMIN — LEUCOVORIN CALCIUM 800 MG: 200 INJECTION, POWDER, LYOPHILIZED, FOR SUSPENSION INTRAMUSCULAR; INTRAVENOUS at 10:14

## 2020-02-11 RX ADMIN — FLUOROURACIL 795 MG: 50 INJECTION, SOLUTION INTRAVENOUS at 12:17

## 2020-02-11 RX ADMIN — SODIUM CHLORIDE 20 ML/HR: 0.9 INJECTION, SOLUTION INTRAVENOUS at 09:39

## 2020-02-11 RX ADMIN — DEXAMETHASONE SODIUM PHOSPHATE: 10 INJECTION, SOLUTION INTRAMUSCULAR; INTRAVENOUS at 09:39

## 2020-02-11 NOTE — PATIENT INSTRUCTIONS
PROSTATE CANCER SCREENING OVERVIEW    Prostate cancer screening involves testing for prostate cancer in men who have no symptoms of the disease  This testing can find cancer at an early stage  However, medical experts agree that prostate cancer screening should not be routinely ordered for all men and that screening can lead to both benefits and harms  This article is designed to review the advantages and disadvantages of prostate cancer screening  You should talk with your health care provider to decide what is best in your individual situation  WHAT IS PROSTATE CANCER? Prostate cancer is a cancer of the prostate, a small gland in men that is located below the bladder and in front of the rectum (figure 1)  The prostate produces fluid that helps carry sperm during ejaculation  Although many men are diagnosed with prostate cancer, most of them do not die from their cancer  Prostate cancer often grows so slowly that many men die of other causes before they even develop symptoms of prostate cancer  PROSTATE CANCER RISK FACTORS  Age -- All men are at risk for prostate cancer, but the risk greatly increases with older age  Prostate cancer is rarely found in men younger than 48years old  Ethnic background --  men develop prostate cancer more often than white and  men   men also are more likely to die of prostate cancer than white or  men  Family medical history -- Men who have a first-degree relative (a father or brother) with prostate cancer are more likely to develop the disease  Men with female relatives with breast cancer related to the breast cancer gene (BRCA) may also be more likely to develop prostate cancer  Diet -- A diet high in animal fat or low in vegetables may increase a man's risk of prostate cancer      PROSTATE CANCER SCREENING TESTS  Prostate cancer screening involves blood test that measures prostate-specific antigen (PSA)  Prostate-specific antigen (PSA) -- PSA is a protein produced by the prostate  The PSA test measures the amount of PSA in a sample of blood  Although many men with prostate cancer have an elevated PSA concentration, a high level does not necessarily mean there is a cancer  The most common cause for an elevated PSA is benign prostatic hyperplasia (BPH), a noncancerous enlargement of the prostate  Other causes include prostate infection (prostatitis), trauma (bicycle riding), and sexual activity  You should avoid ejaculating or riding a bike for at least 48 hours before having a PSA test  (See "Patient education: Benign prostatic hyperplasia (BPH) (Beyond the Basics)"  )    Rectal examination -- A rectal examination is sometimes recommended, along with measurement of the PSA, to screen for prostate cancer  However, studies have not shown that rectal examination is an effective screening test for prostate cancer when used alone  If the PSA test is positive -- A positive PSA test is not a reason to panic; noncancerous conditions are the most common causes for an abnormal test, particularly for PSA tests  On the other hand, a positive test should not be ignored  The first step in evaluating an elevated PSA is usually to repeat the test   You should avoid ejaculating and riding a bike for at least 48 hours before repeating the test  If the PSA remains elevated, a prostate biopsy or other testing is usually recommended  Prostate biopsy -- A prostate biopsy involves having a rectal ultrasound and use of a needle to obtain tissue samples from the prostate gland  The biopsy is usually performed in the office by a urologist (a doctor who specializes in treatment of urinary, bladder, and prostate issues)  After the procedure, most men feel sore and you may see some blood in the urine or semen, this can last for up to 4-6 weeks  Biopsies can rarely cause serious infections   Sometimes biopsies are guided by magnetic resonance imaging (MRI)  PROS AND CONS OF PROSTATE CANCER SCREENING    There are a number of arguments for and against prostate cancer screening  Arguments for screening -- Experts in favor of prostate cancer screening cite the following arguments:    ?Results from a large  study of prostate cancer screening found that men who had prostate-specific antigen (PSA) testing had a 20 percent lower chance of dying from prostate cancer after 13 years compared with men who did not have prostate cancer screening [1]  Men who had PSA testing also had a 30 percent lower chance of developing metastatic disease (cancer that has spread to other parts of the body) [2]  In another  study of prostate cancer screening, the mortality benefit was even larger to prostate cancer screening  (the [place] trial)    ? A substantial number of men die from prostate cancer every year and many more suffer from the complications of advanced disease  ? For men with an aggressive prostate cancer, the best chance for curing it is by finding it at an early stage and then treating it with surgery or radiation  Studies have shown that men who have prostate cancer detected by PSA screening tend to have earlier-stage cancer than men who have a cancer detected by other means  (See "Patient education: Treatment for advanced prostate cancer (Beyond the Basics)" and "Patient education: Prostate cancer treatment; stage I to III cancer (Beyond the Basics)" )    ? The five-year survival for men who have prostate cancer confined to the prostate gland (early stage) is nearly 100 percent; this drops to 27 percent for men whose cancer has spread to other areas of the body  However, many early-stage cancers are not aggressive, and the five-year survival for those will be nearly 100 percent even without any treatment [3]  ?The available screening tests are not perfect, but they are easy to perform and have fair accuracy    Arguments against screening -- Other arguments have also been made against screening:    ?Even though the  study found a benefit of prostate cancer screening, PSA testing prevented only about one prostate cancer death for every 1000 screened men after 13 years [1]  Furthermore, 76 percent of men with an abnormal PSA who had a prostate biopsy did not have prostate cancer  However, in the [place] study, the number needed to screen and treat was much lower indicating that prostate cancer screening is ineffective screening measure which decreases the morbidity and mortality of prostate cancer  ?Many prostate cancers detected with screening are unlikely to cause death or disability  Thus, a number of men will be diagnosed with cancer and potentially suffer the side effects of cancer treatment for cancers that never would have been found without prostate cancer screening  In other words, even if screening finds a cancer early, it is not clear in all cases that the cancer must be treated  IS PROSTATE CANCER SCREENING RIGHT FOR ME? The answer to this question is not the same for everyone  The table includes some questions you can ask yourself when weighing the potential risk and benefits of screening (table 1)  Professional organizations -- Major medical associations and societies, including the [employer] Task Force [4], American Cancer Society [5], American Urological Association [6], and many  cancer societies, agree that men should discuss screening with their health care providers  Men should be informed about the benefits and risks of prostate cancer screening and treatment and make decisions that best reflect their personal values and preferences  Age to first consider screening -- Screening discussions should begin at age 48 years for men at average risk for developing prostate cancer   Men with risk factors for prostate cancer (such as black men or men with a father or brother who had prostate cancer) may want to begin screening discussions at age [address] to 39 years  How often to perform screening -- Once screening begins, it should occur every two to four years (if continued testing is desired) and should include a PSA blood test   Age to stop screening -- Guidelines suggest stopping screening after age 71, though some experts would continue offering screening to very healthy men beyond that age  Screening not recommended -- Screening is generally not recommended for men whose life expectancy, or ability to undergo curative treatment, is limited by serious health problems  In these situations, the potential benefits of screening are outweighed by the likely harms  WHERE TO GET MORE INFORMATION  Your healthcare provider is the best source of information for questions and concerns related to your medical problem  This article will be updated as needed on our web site (www [employer]/patients)  Related topics for patients, as well as selected articles written for healthcare professionals, are also available  Some of the most relevant are listed below  Patient level information -- [employer] offers two types of patient education materials  The Basics -- The Basics patient education pieces answer the four or five key questions a patient might have about a given condition  These articles are best for patients who want a general overview and who prefer short, easy-to-read materials  Patient education: Prostate cancer screening (PSA tests) (The Basics)  Patient education: Prostate cancer (The Basics)  Patient education: Cancer screening (The Basics)  Patient education: Choosing treatment for low-risk localized prostate cancer (The Basics)  Beyond the Basics -- Beyond the Basics patient education pieces are longer, more sophisticated, and more detailed  These articles are best for patients who want in-depth information and are comfortable with some medical jargon    Patient education: Treatment for advanced prostate cancer (Beyond the Basics)  Patient education: Prostate cancer treatment; stage I to III cancer (Beyond the Basics)  Patient education: Benign prostatic hyperplasia (BPH) (Beyond the Basics)  Professional level information -- Professional level articles are designed to keep doctors and other health professionals up-to-date on the latest medical findings  These articles are thorough, long, and complex, and they contain multiple references to the research on which they are based  Professional level articles are best for people who are comfortable with a lot of medical terminology and who want to read the same materials their doctors are reading  Measurement of prostate-specific antigen  Screening for prostate cancer  The following organizations also provide reliable health information  ? JUSTIN Cueva, [place]  8-665-8-CANCER  (www cancer gov/types/prostate)  ? American Society for Clinical Oncology (patient information website)  (www cancer  net)  ? [address] [place] [address] (patient and caregiver information website)  (www nccn org/patients)  ? [address]  4-967-TFP-2345  ([employer])  ? [employer]  (www [employer] gov/healthtopics  html)  ? [employer]OO! Prostate Cancer Education and Support  (www ustoo  org/Detection-PSA-And-DIAZ)

## 2020-02-11 NOTE — PROGRESS NOTES
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)       Other    Chemotherapy-induced nausea    Recovering alcoholic (HCC)    Goals of care, counseling/discussion    Palliative care patient      Other Visit Diagnoses     Elevated PSA, less than 10 ng/ml    -  Primary    Relevant Orders    PSA, total and free            Discussion:  I discussed with the patient the discovery of the PSA molecule and its original use in determining the return of prostate cancer after definitive therapy  I described the normal function of the PSA molecule in the reproductive process and also discussed the detection of PSA in the blood  We discussed the controversial history of PSA screening for prostate cancer in the [place] as well as the risk of over detection and over treatment of prostate cancer by way of PSA screening  The patient understands that PSA blood testing is an imperfect way to screen for prostate cancer and that elevated PSA levels in the blood may also be caused by infection, inflammation, prostatic trauma or manipulation, urological procedures, or by benign prostatic enlargement  The role of the digital rectal examination in prostate cancer screening was also discussed and I discussed with him that there is large interobserver variability in the findings of digital rectal examination  We discussed the continued workup of elevated PSA or abnormal digital rectal examination in the form of the performance of a prostate biopsy  The preparation for, and steps of, an office-based transrectal ultrasound of prostate biopsy were described to the patient  Benefits of obtaining tissue for pathologic analysis were discussed with him and the risks of prostate biopsy were also discussed at length    These risks include but are not limited to infection, bleeding, pain, sepsis with need for admission to hospital, risk of change in sexual function, and risk of diagnosis with prostate cancer  Alternatives to prostate biopsy in the form of continued PSA and DIAZ surveillance were also offered to him  All of his questions and concerns were answered and addressed with regard to that detailed above  Of note, given the myriad factors listed above under problem oriented charting, and his prognosis from aggressive esophageal cancer with previous discussion of palliative care, I would have a very high threshold to perform a prostate biopsy in this patient  His prostate is 50 or 60 grams and smooth without nodules, he has no family history of prostate cancer  His PSA was 8, I do recommend that we recheck this value in 3 months with a PSA free and total, my threshold to do a prostate biopsy on him would be higher than typical, potentially a value of 15 or 20, given the prognosis of severe soft you cancer/stomach cancer        Assessment and plan:       Please see problem oriented charting for the assessment plan of today's urological complaints    Makayla Johnson MD      Chief Complaint     Chief Complaint   Patient presents with    Elevated PSA         History of Present Illness     Carlos Alberto Damon is a 75 y o  gentleman referred to us by Dr Ratna Padilla of Medical Oncology for enlarged prostate and elevated PSA  A copy of today's consultation has been sent to the referring provider in the name of continuity of care  With regard to this complaint it is localized to the prostate  The quality is described as asymptomatic and the severity of this complaint is described as mild  These symptoms have been present for a number of months and the timing is ongoing  Previous treatments include none and previous work-up includes PSA testing  The patient mentions nothing as aggravating and alleviating factors, respectively  The following associated signs and symptoms are mentioned:  None          Other urologic complaints today include none  He is currently wearing a chemotherapy pump, his functional status is between ECOG of 0 and 1, no voiding complaints  I had a long and el discussion with the patient regarding PSA, its use for prostate cancer screening and diagnosis, and the plan for PSA surveillance at this time  Certainly his ongoing well-being is much more threatened from his gastrointestinal cancer relative to prostate cancer  The following portions of the patient's history were reviewed and updated as appropriate: allergies, current medications, past family history, past medical history, past social history, past surgical history and problem list     Detailed Urologic History     - please refer to HPI    Review of Systems     Review of Systems   Constitutional: Positive for fatigue and unexpected weight change  HENT: Negative  Eyes: Negative  Respiratory: Negative  Cardiovascular: Negative  Gastrointestinal: Negative  Endocrine: Negative  Genitourinary:        As per HPI   Musculoskeletal: Negative  Skin: Negative  Allergic/Immunologic: Negative  Neurological: Negative  Hematological: Negative  Psychiatric/Behavioral: Negative  Allergies     Allergies   Allergen Reactions    Bee Venom Anaphylaxis    Shellfish-Derived Products      Develops GOUT       Physical Exam     Physical Exam   Constitutional: He is oriented to person, place, and time  No distress  Patient appears somewhat fatigued, flat affect, otherwise pleasant, appears nontoxic   HENT:   Head: Normocephalic and atraumatic  Eyes: Right eye exhibits no discharge  Left eye exhibits no discharge  Neck: No tracheal deviation present  Cardiovascular: Intact distal pulses  Pulmonary/Chest: Effort normal  No stridor  No respiratory distress  Abdominal: Soft  He exhibits no distension and no mass  There is no tenderness  There is no rebound and no guarding  No hernia     Genitourinary:   Genitourinary Comments: Normal sphincter tone, prostate is 55 or 60 grams, smooth, no nodules, reassuring examination   Musculoskeletal: He exhibits no edema, tenderness or deformity  Neurological: He is alert and oriented to person, place, and time  No cranial nerve deficit  Coordination normal    Skin: Skin is warm and dry  No rash noted  He is not diaphoretic  There is erythema  No pallor  Patient with a corina complexion, wearing a chemotherapy pump as well   Psychiatric: He has a normal mood and affect  His behavior is normal  Judgment and thought content normal    Nursing note and vitals reviewed  Vital Signs  Vitals:    02/12/20 1523   BP: 136/72   BP Location: Left arm   Patient Position: Sitting   Cuff Size: Standard   Pulse: 58   Weight: 89 8 kg (198 lb)   Height: 6' (1 829 m)         Current Medications       Current Outpatient Medications:     ammonium lactate (LAC-HYDRIN) 12 % cream, Apply topically as needed for dry skin On hands and feet, Disp: 385 g, Rfl: 2    apixaban (ELIQUIS) 5 mg, Take 1 tablet (5 mg total) by mouth 2 (two) times a day for 154 doses, Disp: 154 tablet, Rfl: 0    bisoprolol (ZEBETA) 5 mg tablet, Take 1 tablet (5 mg total) by mouth daily, Disp: , Rfl:     diazepam (VALIUM) 5 mg tablet, Take 1 tablet (5 mg total) by mouth 2 (two) times a day, Disp: 60 tablet, Rfl: 0    lidocaine-prilocaine (EMLA) cream, Apply topically as needed for mild pain, Disp: 30 g, Rfl: 0    pantoprazole (PROTONIX) 40 mg tablet, Take 40 mg by mouth daily before breakfast, Disp: , Rfl:     venlafaxine (EFFEXOR-XR) 150 mg 24 hr capsule, Take 1 capsule (150 mg total) by mouth daily, Disp: 30 capsule, Rfl: 0  No current facility-administered medications for this visit       Facility-Administered Medications Ordered in Other Visits:     sodium chloride 0 9 % infusion, 20 mL/hr, Intravenous, Once PRN, Jordon Cano MD, Last Rate: 20 mL/hr at 02/11/20 0939, 20 mL/hr at 02/11/20 2411      Active Problems     Patient Active Problem List   Diagnosis    PTSD (post-traumatic stress disorder)    Essential hypertension    Major depression    OCD (obsessive compulsive disorder)    Anxiety    Malignant neoplasm of lower third of es[facility]    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)    Cancer associated pain    Chemotherapy-induced nausea    Abnormal weight loss    Malignant ascites    Recovering alcoholic (HCC)    Other fatigue    Other dysphagia    Nausea    Dehydration    Skin fissures    Goals of care, counseling/discussion    Palliative care patient         Past Medical History     Past Medical History:   Diagnosis Date    Dehydration 8/8/2019    Es[facility] )     Hypertension     Nausea 8/8/2019    Psychiatric disorder     [facility] alcoholic ([facility] )          Surgical History     Past Surgical History:   Procedure Laterality Date    APPENDECTOMY      IR PORT PLACEMENT  7/31/2019         Family History     Family History   Problem Relation Age of Onset    Colon cancer Father          Social History     Social History     Social History     Tobacco Use   Smoking Status Current Some Day Smoker    Types: Cigars   Smokeless Tobacco Never Used         Pertinent Lab Values     Lab Results   Component Value Date    CREATININE 1 16 02/07/2020       Lab Results   Component Value Date    PSA 8 0 (H) 01/24/2020             Pertinent Imaging       The patient's images were reviewed by me personally and also in real time with them in the examination room using our PACS imaging system  The imaging findings are significant for an enlarged prostate, no other pertinent urologic findings, thickening of the gastric fundus with pulmonary nodules, consistent with the patient's history of metastatic gastroesophageal cancer

## 2020-02-11 NOTE — PLAN OF CARE
Problem: Potential for Falls  Goal: Patient will remain free of falls  Description  INTERVENTIONS:  - Assess patient frequently for physical needs  -  Identify cognitive and physical deficits and behaviors that affect risk of falls    -  [place] fall precautions as indicated by assessment   - Educate patient/family on patient safety including physical limitations  - Instruct patient to call for assistance with activity based on assessment  - Modify environment to reduce risk of injury  - Consider OT/PT consult to assist with strengthening/mobility  Outcome: Progressing     Problem: INFECTION - ADULT  Goal: Absence or prevention of progression during hospitalization  Description  INTERVENTIONS:  - Assess and monitor for signs and symptoms of infection  - Monitor lab/diagnostic results  - Monitor all insertion sites, i e  indwelling lines, tubes, and drains  - Monitor endotracheal (as able) and nasal secretions for changes in amount and color  - [place] appropriate cooling/warming therapies per order  - Administer medications as ordered  - Instruct and encourage patient and family to use good hand hygiene technique  - Identify and instruct in appropriate isolation precautions for identified infection/condition  Outcome: Progressing  Goal: Absence of fever/infection during neutropenic period  Description  INTERVENTIONS:  - Monitor WBC  - Implement neutropenic guidelines  Outcome: Progressing     Problem: DISCHARGE PLANNING  Goal: Discharge to home or other facility with appropriate resources  Description  INTERVENTIONS:  - Identify barriers to discharge w/patient and caregiver  - Arrange for needed discharge resources and transportation as appropriate  - Identify discharge learning needs (meds, wound care, etc )  - Arrange for interpretive services to assist at discharge as needed  - Refer to Case Management Department for coordinating discharge planning if the patient needs post-hospital services based on physician/advanced practitioner order or complex needs related to functional status, cognitive ability, or social support system  Outcome: Progressing     Problem: Knowledge Deficit  Goal: Patient/family/caregiver demonstrates understanding of disease process, treatment plan, medications, and discharge instructions  Description  Complete learning assessment and assess knowledge base    Interventions:  - Provide teaching at level of understanding  - Provide teaching via preferred learning methods  Outcome: Progressing

## 2020-02-12 ENCOUNTER — CONSULT (OUTPATIENT)
Dept: UROLOGY | Facility: CLINIC | Age: 65
End: 2020-02-12
Payer: MEDICARE

## 2020-02-12 VITALS
BODY MASS INDEX: 26.82 KG/M2 | DIASTOLIC BLOOD PRESSURE: 72 MMHG | WEIGHT: 198 LBS | SYSTOLIC BLOOD PRESSURE: 136 MMHG | HEART RATE: 58 BPM | HEIGHT: 72 IN

## 2020-02-12 DIAGNOSIS — Z71.89 GOALS OF CARE, COUNSELING/DISCUSSION: ICD-10-CM

## 2020-02-12 DIAGNOSIS — R11.0 CHEMOTHERAPY-INDUCED NAUSEA: ICD-10-CM

## 2020-02-12 DIAGNOSIS — Z51.5 PALLIATIVE CARE PATIENT: ICD-10-CM

## 2020-02-12 DIAGNOSIS — T45.1X5A CHEMOTHERAPY-INDUCED NAUSEA: ICD-10-CM

## 2020-02-12 DIAGNOSIS — R97.20 ELEVATED PSA, LESS THAN 10 NG/ML: Primary | ICD-10-CM

## 2020-02-12 DIAGNOSIS — F10.21 RECOVERING ALCOHOLIC (HCC): ICD-10-CM

## 2020-02-12 DIAGNOSIS — C15.5 MALIGNANT NEOPLASM OF LOWER THIRD OF ESOPHAGUS (HCC): ICD-10-CM

## 2020-02-12 DIAGNOSIS — C16.0 MALIGNANT NEOPLASM OF CARDIA OF STOMACH (HCC): ICD-10-CM

## 2020-02-12 PROCEDURE — 99204 OFFICE O/P NEW MOD 45 MIN: CPT | Performed by: UROLOGY

## 2020-02-12 NOTE — LETTER
February 12, 2020     Lei Michelle, [address]    Patient: Osbaldo Guerrero   YOB: 1955   Date of Visit: 2/12/2020       Dear Dr Shirley Conrad: Thank you for referring Osbaldo Guerrero to me for evaluation  Below are my notes for this consultation  If you have questions, please do not hesitate to call me  I look forward to following your patient along with you  Sincerely,        Artur Urban MD        CC: MD Artur Art MD  2/12/2020  3:57 PM  Sign at close encounter       Problem List Items Addressed This Visit        Digestive    Malignant neoplasm of lower third of esophagus ([address] )    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[address] )       Other    Chemotherapy-induced nausea    Recovering alcoholic ([address] )    Goals of care, counseling/discussion    Palliative care patient      Other Visit Diagnoses     Elevated PSA, less than 10 ng/ml    -  Primary    Relevant Orders    PSA, total and free            Discussion:  I discussed with the patient the discovery of the PSA molecule and its original use in determining the return of prostate cancer after definitive therapy  I described the normal function of the PSA molecule in the reproductive process and also discussed the detection of PSA in the blood  We discussed the controversial history of PSA screening for prostate cancer in the [place] as well as the risk of over detection and over treatment of prostate cancer by way of PSA screening  The patient understands that PSA blood testing is an imperfect way to screen for prostate cancer and that elevated PSA levels in the blood may also be caused by infection, inflammation, prostatic trauma or manipulation, urological procedures, or by benign prostatic enlargement      The role of the digital rectal examination in prostate cancer screening was also discussed and I discussed with him that there is large interobserver variability in the findings of digital rectal examination  We discussed the continued workup of elevated PSA or abnormal digital rectal examination in the form of the performance of a prostate biopsy  The preparation for, and steps of, an office-based transrectal ultrasound of prostate biopsy were described to the patient  Benefits of obtaining tissue for pathologic analysis were discussed with him and the risks of prostate biopsy were also discussed at length  These risks include but are not limited to infection, bleeding, pain, sepsis with need for admission to hospital, risk of change in sexual function, and risk of diagnosis with prostate cancer  Alternatives to prostate biopsy in the form of continued PSA and DIAZ surveillance were also offered to him  All of his questions and concerns were answered and addressed with regard to that detailed above  Of note, given the myriad factors listed above under problem oriented charting, and his prognosis from aggressive esophageal cancer with previous discussion of palliative care, I would have a very high threshold to perform a prostate biopsy in this patient  His prostate is 50 or 60 grams and smooth without nodules, he has no family history of prostate cancer  His PSA was 8, I do recommend that we recheck this value in 3 months with a PSA free and total, my threshold to do a prostate biopsy on him would be higher than typical, potentially a value of 15 or 20, given the prognosis of severe soft you cancer/stomach cancer        Assessment and plan:       Please see problem oriented charting for the assessment plan of today's urological complaints    Kendra Cannon MD      Chief Complaint     Chief Complaint   Patient presents with    Elevated PSA         History of Present Illness     Carson Lerner is a 75 y o  gentleman referred to us by Dr Annie Vieyra of Medical Oncology for enlarged prostate and elevated PSA    A copy of today's consultation has been sent to the referring provider in the name of continuity of care  With regard to this complaint it is localized to the prostate  The quality is described as asymptomatic and the severity of this complaint is described as mild  These symptoms have been present for a number of months and the timing is ongoing  Previous treatments include none and previous work-up includes PSA testing  The patient mentions nothing as aggravating and alleviating factors, respectively  The following associated signs and symptoms are mentioned:  None  Other urologic complaints today include none  He is currently wearing a chemotherapy pump, his functional status is between ECOG of 0 and 1, no voiding complaints  I had a long and el discussion with the patient regarding PSA, its use for prostate cancer screening and diagnosis, and the plan for PSA surveillance at this time  Certainly his ongoing well-being is much more threatened from his gastrointestinal cancer relative to prostate cancer  The following portions of the patient's history were reviewed and updated as appropriate: allergies, current medications, past family history, past medical history, past social history, past surgical history and problem list     Detailed Urologic History     - please refer to HPI    Review of Systems     Review of Systems   Constitutional: Positive for fatigue and unexpected weight change  HENT: Negative  Eyes: Negative  Respiratory: Negative  Cardiovascular: Negative  Gastrointestinal: Negative  Endocrine: Negative  Genitourinary:        As per HPI   Musculoskeletal: Negative  Skin: Negative  Allergic/Immunologic: Negative  Neurological: Negative  Hematological: Negative  Psychiatric/Behavioral: Negative                Allergies     Allergies   Allergen Reactions    Bee Venom Anaphylaxis    Shellfish-Derived Products      Develops GOUT       Physical Exam     Physical Exam   Constitutional: He is oriented to person, place, and time  No distress  Patient appears somewhat fatigued, flat affect, otherwise pleasant, appears nontoxic   HENT:   Head: Normocephalic and atraumatic  Eyes: Right eye exhibits no discharge  Left eye exhibits no discharge  Neck: No tracheal deviation present  Cardiovascular: Intact distal pulses  Pulmonary/Chest: Effort normal  No stridor  No respiratory distress  Abdominal: Soft  He exhibits no distension and no mass  There is no tenderness  There is no rebound and no guarding  No hernia  Genitourinary:   Genitourinary Comments: Normal sphincter tone, prostate is 55 or 60 grams, smooth, no nodules, reassuring examination   Musculoskeletal: He exhibits no edema, tenderness or deformity  Neurological: He is alert and oriented to person, place, and time  No cranial nerve deficit  Coordination normal    Skin: Skin is warm and dry  No rash noted  He is not diaphoretic  There is erythema  No pallor  Patient with a corina complexion, wearing a chemotherapy pump as well   Psychiatric: He has a normal mood and affect  His behavior is normal  Judgment and thought content normal    Nursing note and vitals reviewed            Vital Signs  Vitals:    02/12/20 1523   BP: 136/72   BP Location: Left arm   Patient Position: Sitting   Cuff Size: Standard   Pulse: 58   Weight: 89 8 kg (198 lb)   Height: 6' (1 829 m)         Current Medications       Current Outpatient Medications:     ammonium lactate (LAC-HYDRIN) 12 % cream, Apply topically as needed for dry skin On hands and feet, Disp: 385 g, Rfl: 2    apixaban (ELIQUIS) 5 mg, Take 1 tablet (5 mg total) by mouth 2 (two) times a day for 154 doses, Disp: 154 tablet, Rfl: 0    bisoprolol (ZEBETA) 5 mg tablet, Take 1 tablet (5 mg total) by mouth daily, Disp: , Rfl:     diazepam (VALIUM) 5 mg tablet, Take 1 tablet (5 mg total) by mouth 2 (two) times a day, Disp: 60 tablet, Rfl: 0    lidocaine-prilocaine (EMLA) cream, Apply topically as needed for mild pain, Disp: 30 g, Rfl: 0    pantoprazole (PROTONIX) 40 mg tablet, Take 40 mg by mouth daily before breakfast, Disp: , Rfl:     venlafaxine (EFFEXOR-XR) 150 mg 24 hr capsule, Take 1 capsule (150 mg total) by mouth daily, Disp: 30 capsule, Rfl: 0  No current facility-administered medications for this visit       Facility-Administered Medications Ordered in Other Visits:     sodium chloride 0 9 % infusion, 20 mL/hr, Intravenous, Once PRN, Kevin Medeiros MD, Last Rate: 20 mL/hr at 02/11/20 0939, 20 mL/hr at 02/11/20 6066      Active Problems     Patient Active Problem List   Diagnosis    PTSD (post-traumatic stress disorder)    Essential hypertension    Major depression    OCD (obsessive compulsive disorder)    Anxiety    [facility]    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)    Cancer associated pain    Chemotherapy-induced nausea    Abnormal weight loss    Malignant ascites    Recovering alcoholic (HCC)    Other fatigue    Other dysphagia    Nausea    Dehydration    Skin fissures    Goals of care, counseling/discussion    Palliative care patient         Past Medical History     Past Medical History:   Diagnosis Date    Dehydration 8/8/2019    [facility]     Hypertension     Nausea 8/8/2019    Psychiatric disorder     [facility]          Surgical History     Past Surgical History:   Procedure Laterality Date    APPENDECTOMY      IR PORT PLACEMENT  7/31/2019         Family History     Family History   Problem Relation Age of Onset    Colon cancer Father          Social History     Social History     Social History     Tobacco Use   Smoking Status Current Some Day Smoker    Types: Cigars   Smokeless Tobacco Never Used         Pertinent Lab Values     Lab Results   Component Value Date    CREATININE 1 16 02/07/2020       Lab Results   Component Value Date    PSA 8 0 (H) 01/24/2020             Pertinent Imaging       The patient's images were reviewed by me personally and also in real time with them in the examination room using our PACS imaging system  The imaging findings are significant for an enlarged prostate, no other pertinent urologic findings, thickening of the gastric fundus with pulmonary nodules, consistent with the patient's history of metastatic gastroesophageal cancer

## 2020-02-13 ENCOUNTER — HOSPITAL ENCOUNTER (OUTPATIENT)
Dept: INFUSION CENTER | Facility: HOSPITAL | Age: 65
Discharge: HOME | End: 2020-02-13

## 2020-02-13 DIAGNOSIS — E86.0 DEHYDRATION: ICD-10-CM

## 2020-02-13 DIAGNOSIS — C16.0 MALIGNANT NEOPLASM OF CARDIA OF STOMACH (HCC): ICD-10-CM

## 2020-02-13 DIAGNOSIS — R11.0 NAUSEA: ICD-10-CM

## 2020-02-13 DIAGNOSIS — C15.5 MALIGNANT NEOPLASM OF LOWER THIRD OF ESOPHAGUS (HCC): Primary | ICD-10-CM

## 2020-02-14 ENCOUNTER — APPOINTMENT (OUTPATIENT)
Dept: PHYSICAL THERAPY | Facility: CLINIC | Age: 65
End: 2020-02-14
Payer: MEDICARE

## 2020-02-17 ENCOUNTER — OFFICE VISIT (OUTPATIENT)
Dept: PHYSICAL THERAPY | Facility: CLINIC | Age: 65
End: 2020-02-17
Payer: MEDICARE

## 2020-02-17 DIAGNOSIS — R53.1 GENERALIZED WEAKNESS: ICD-10-CM

## 2020-02-17 DIAGNOSIS — R53.83 DECREASED STAMINA: ICD-10-CM

## 2020-02-17 DIAGNOSIS — Z74.09 IMPAIRED FUNCTIONAL MOBILITY, BALANCE, AND ENDURANCE: Primary | ICD-10-CM

## 2020-02-17 PROCEDURE — 97112 NEUROMUSCULAR REEDUCATION: CPT

## 2020-02-17 NOTE — PROGRESS NOTES
PT Re-Evaluation     Today's date: 2020  Patient name: Lino Jones  : 1955  MRN: [id number]  Referring provider: Gerson Swain MD  Dx:   Encounter Diagnosis     ICD-10-CM    1  Impaired functional mobility, balance, and endurance Z74 09    2  Generalized weakness R53 1    3  Decreased stamina R53 83                   Assessment  Assessment details: Patient is a 75 y o  male who has been reporting to skilled outpatient PT for deficits in generalized LE strength, balance, endurance, and gait mechanics as a result of complications secondary to CA diagnosis and effects of chemotherapy  Patient has demonstrated improvement in these areas as indicated by scores associated with 5xSTS, 2 minute walk test, FGA, gait speed, and TUG  Patient remains a high risk for falls per cut off scores from APTA and Rehab Measures  Patient will continue to benefit from skilled outpatient PT in order to maintain maximal level of function, independence, and integrity with progression of terminal disease      Cut off score   All date taken from APTA Neuro Section or Rehab Measures    MAR test: 46/56                                              5 x STS Test:  MDC: 6 points                                                  MDC: 2 3 seconds   age norms                                                                 Age Norms   61-76 year old = M: 54, F: 55                        62-78 year old: 11 4 seconds   66-77 year old = M 47,  F: 50                       71-76 year old: 12 6 seconds      80-80 year old = M46,   F: 53                       80-80 year old: 14 8 seconds       TUG test:                                                                     10 Meter Walk Test:  MDC: 4 14 seconds       MDC:  59 ft/sec  Cut off score for Falls                                                  Age Norms  > 13 5 seconds community dwelling adults                20-29; M: 4 56 ft/sec F: 4 62 ft/sec  > 32 2 Frail Elderly 30-39: M 4 76 ft/sec  F: 4 68 ft/sec          40-49: M: 4 79 ft/sec  F: 4 62 ft/sec  6 Minute Walk Test      50-59: M: 4 76 ft/sec  F: 4 56 ft/sec  MDC: 190 feet       60-69: M: 4 56 ft/sec  F: 4 26 ft/sec  Age Norms       70-+    M: 4 36 ft/sec  F: 4 16 ft/sec  60-69:    M: 1876 F: 0021  12-83:    M: 1729 F: 1545    FGA:  80-89 +: M: 1368 F; 1286       MCID: 4 points            Geriatrics/ Community Dwelling Older Adults: </= 22/30 fall risk            Geriatrics/ Community Dwelling Older Adults: </= 20/30 unexplained falls in the next 6 months            Parkinsons: </= 18/30 fall risk      Patient verbalized understanding of POC  Impairments: abnormal gait, abnormal muscle tone, activity intolerance, impaired balance, lacks appropriate home exercise program and safety issue    Symptom irritability: moderateUnderstanding of Dx/Px/POC: good   Prognosis: fair    Goals  Short term goals:    1  Patient will demonstrate 12 improved time on TUG test to facilitate improved safety in all ambulation  MET 1/6/20  2  Patient will demonstrate improved endurance to 350 feet with 6 minute walk test to facilitate improved safety with ambulation MET1/6/20  3  Patient will be independent in basic HEP 2-3 weeks - PARTIALLY MET    Long term goals:  1  Patient will be independent in a comprehensive home exercise program - NOT MET  2  Patient will be able to demonstrate HT in gait without veering  - NOT MET  3  Patient will demonstrate 500 feet with 6 minute walk test to facilitate return to safe functional mobility  - NOT MET    Updated Goals (2-)  - Patient will improve FGA score to 22/30 in order to reduce risk for falls and maximize his function   - Patient will be able to complete 6 minute walk test to indicate improved cardiovascular endurance    - Patient will be able to ambulate on uneven terrain with 50% reduction in LoB in order to reduce falls and potential associated injuries  Plan  Planned therapy interventions: manual therapy, postural training, patient education, neuromuscular re-education, sensory integrative techniques, strengthening, therapeutic activities, therapeutic exercise, therapeutic training, home exercise program, gait training, graded activity, flexibility, coordination, body mechanics training, balance and behavior modification  Frequency: 2x week  Duration in weeks: 16  Plan of Care beginning date: 2020  Plan of Care expiration date: 2020  Treatment plan discussed with: patient        Subjective Evaluation    History of Present Illness  Mechanism of injury: Patient is a 75year old male who continues skilled PT for Oncology based physical therapy with focus on Strength and stamina  - keeping things at bay, improving strength, stamina, gait mechanics       Pain  No pain reported    Social Support  Steps to enter house: yes  Stairs in house: yes   Lives in: multiple-level home  Lives with: significant other    Hand dominance: right    Patient Goals  Patient goals for therapy: increased strength and improved balance          Objective     Functional Assessment        Comments  Updated 2020  5XSTS: 18 22 sec no UE  TU 69 sec no AD  2 MWT: 373 feet no AD    Updated 2020  5XSTS: 15 01 sec no UE  TU 56 sec no AD  2 MWT: 385 feet no AD  FGA:   10 meter walk test: 8 50 sec = 33/8 50 = 3 88 ft/sec    Exercises  - Stretches  - Bicep curls: 10 lb DB, 30 reps  - Hurdles: 9", 3 minutes, no UE  - Overhead tricep press: 10 lb DB, 30 reps  - Floor to ceiling on foam pad with orange boom whackers: 20 reps             Precautions:  has a past medical history of Dehydration (2019), Esophageal cancer ([facility] [address] ), Hypertension, Nausea (2019), Psychiatric disorder, and Recovering alcoholic ([facility] [address] )          19:   Most exercises performed continually with 2 minutes on 3 minutes off circuits  NP= not performed this date  Hip flexor stretches:   Seated on Chair 20 sec each leg x 3  Calf stretches: 20 sec x 3 bilat   Floor to ceiling 10 sec hold  NP: Side to side 10 sec hold  NP: Forward step - alternating steps over adrian  NP:Lateral step - alternating steps over adrian    Bicep curls with 10 lbs - 10 reps 3x  Core rotation with 10 lb med ball - 25 reps 2x  tricep press ups 10 lb wt 10 reps 3 sets     [facility] foam pad 1 minute x2  One light wall touch  SLS on foam, 1 UE support, 30sx3  NP: Cone taps on foam, 1 UE support, 1 minute  NP: Cone taps on foam, no UE support, 1 minute

## 2020-02-18 DIAGNOSIS — C15.5 MALIGNANT NEOPLASM OF LOWER THIRD OF ESOPHAGUS (HCC): ICD-10-CM

## 2020-02-18 DIAGNOSIS — R11.0 NAUSEA: Primary | ICD-10-CM

## 2020-02-18 DIAGNOSIS — C16.0 MALIGNANT NEOPLASM OF CARDIA OF STOMACH (HCC): ICD-10-CM

## 2020-02-18 DIAGNOSIS — E86.0 DEHYDRATION: ICD-10-CM

## 2020-02-19 PROCEDURE — 97164 PT RE-EVAL EST PLAN CARE: CPT

## 2020-02-21 ENCOUNTER — APPOINTMENT (OUTPATIENT)
Dept: LAB | Facility: HOSPITAL | Age: 65
End: 2020-02-21
Payer: MEDICARE

## 2020-02-21 ENCOUNTER — OFFICE VISIT (OUTPATIENT)
Dept: HEMATOLOGY ONCOLOGY | Facility: CLINIC | Age: 65
End: 2020-02-21
Payer: MEDICARE

## 2020-02-21 ENCOUNTER — TRANSCRIBE ORDERS (OUTPATIENT)
Dept: ADMINISTRATIVE | Facility: HOSPITAL | Age: 65
End: 2020-02-21

## 2020-02-21 ENCOUNTER — APPOINTMENT (OUTPATIENT)
Dept: PHYSICAL THERAPY | Facility: CLINIC | Age: 65
End: 2020-02-21
Payer: MEDICARE

## 2020-02-21 VITALS
HEIGHT: 69 IN | WEIGHT: 187 LBS | SYSTOLIC BLOOD PRESSURE: 128 MMHG | HEART RATE: 73 BPM | BODY MASS INDEX: 27.7 KG/M2 | TEMPERATURE: 98 F | RESPIRATION RATE: 16 BRPM | DIASTOLIC BLOOD PRESSURE: 68 MMHG | OXYGEN SATURATION: 94 %

## 2020-02-21 DIAGNOSIS — E86.0 DEHYDRATION: ICD-10-CM

## 2020-02-21 DIAGNOSIS — C15.5 MALIGNANT NEOPLASM OF LOWER THIRD OF ESOPHAGUS (HCC): ICD-10-CM

## 2020-02-21 DIAGNOSIS — R11.0 NAUSEA: ICD-10-CM

## 2020-02-21 DIAGNOSIS — C16.0 MALIGNANT NEOPLASM OF CARDIA OF STOMACH (HCC): ICD-10-CM

## 2020-02-21 LAB
ALBUMIN SERPL BCP-MCNC: 3.4 G/DL (ref 3.5–5)
ALP SERPL-CCNC: 85 U/L (ref 46–116)
ALT SERPL W P-5'-P-CCNC: 20 U/L (ref 12–78)
ANION GAP SERPL CALCULATED.3IONS-SCNC: 6 MMOL/L (ref 4–13)
AST SERPL W P-5'-P-CCNC: 15 U/L (ref 5–45)
BASOPHILS # BLD AUTO: 0.05 THOUSANDS/ΜL (ref 0–0.1)
BASOPHILS NFR BLD AUTO: 0 % (ref 0–1)
BILIRUB SERPL-MCNC: 1.6 MG/DL (ref 0.2–1)
BUN SERPL-MCNC: 15 MG/DL (ref 5–25)
CALCIUM SERPL-MCNC: 9.2 MG/DL (ref 8.3–10.1)
CHLORIDE SERPL-SCNC: 98 MMOL/L (ref 100–108)
CO2 SERPL-SCNC: 26 MMOL/L (ref 21–32)
CREAT SERPL-MCNC: 1.15 MG/DL (ref 0.6–1.3)
EOSINOPHIL # BLD AUTO: 0.05 THOUSAND/ΜL (ref 0–0.61)
EOSINOPHIL NFR BLD AUTO: 0 % (ref 0–6)
ERYTHROCYTE [DISTWIDTH] IN BLOOD BY AUTOMATED COUNT: 15.5 % (ref 11.6–15.1)
GFR SERPL CREATININE-BSD FRML MDRD: 66 ML/MIN/1.73SQ M
GLUCOSE SERPL-MCNC: 161 MG/DL (ref 65–140)
HCT VFR BLD AUTO: 40.6 % (ref 36.5–49.3)
HGB BLD-MCNC: 13.2 G/DL (ref 12–17)
IMM GRANULOCYTES # BLD AUTO: 0.04 THOUSAND/UL (ref 0–0.2)
IMM GRANULOCYTES NFR BLD AUTO: 0 % (ref 0–2)
LYMPHOCYTES # BLD AUTO: 1.63 THOUSANDS/ΜL (ref 0.6–4.47)
LYMPHOCYTES NFR BLD AUTO: 13 % (ref 14–44)
MCH RBC QN AUTO: 33.2 PG (ref 26.8–34.3)
MCHC RBC AUTO-ENTMCNC: 32.5 G/DL (ref 31.4–37.4)
MCV RBC AUTO: 102 FL (ref 82–98)
MONOCYTES # BLD AUTO: 0.42 THOUSAND/ΜL (ref 0.17–1.22)
MONOCYTES NFR BLD AUTO: 3 % (ref 4–12)
NEUTROPHILS # BLD AUTO: 10.33 THOUSANDS/ΜL (ref 1.85–7.62)
NEUTS SEG NFR BLD AUTO: 84 % (ref 43–75)
NRBC BLD AUTO-RTO: 0 /100 WBCS
PLATELET # BLD AUTO: 263 THOUSANDS/UL (ref 149–390)
PMV BLD AUTO: 11.2 FL (ref 8.9–12.7)
POTASSIUM SERPL-SCNC: 3.4 MMOL/L (ref 3.5–5.3)
PROT SERPL-MCNC: 7.1 G/DL (ref 6.4–8.2)
RBC # BLD AUTO: 3.97 MILLION/UL (ref 3.88–5.62)
SODIUM SERPL-SCNC: 130 MMOL/L (ref 136–145)
WBC # BLD AUTO: 12.52 THOUSAND/UL (ref 4.31–10.16)

## 2020-02-21 PROCEDURE — 99214 OFFICE O/P EST MOD 30 MIN: CPT | Performed by: INTERNAL MEDICINE

## 2020-02-21 PROCEDURE — 3074F SYST BP LT 130 MM HG: CPT | Performed by: INTERNAL MEDICINE

## 2020-02-21 PROCEDURE — 85025 COMPLETE CBC W/AUTO DIFF WBC: CPT

## 2020-02-21 PROCEDURE — 80053 COMPREHEN METABOLIC PANEL: CPT

## 2020-02-21 PROCEDURE — 1160F RVW MEDS BY RX/DR IN RCRD: CPT | Performed by: INTERNAL MEDICINE

## 2020-02-21 PROCEDURE — 36415 COLL VENOUS BLD VENIPUNCTURE: CPT

## 2020-02-21 PROCEDURE — 3078F DIAST BP <80 MM HG: CPT | Performed by: INTERNAL MEDICINE

## 2020-02-21 PROCEDURE — 3008F BODY MASS INDEX DOCD: CPT | Performed by: INTERNAL MEDICINE

## 2020-02-21 NOTE — PROGRESS NOTES
Hematology/Oncology Outpatient Follow- up Note  Kerry Uriostegui 75 y o  male MRN: @ Encounter: [id number]        Date:  2/21/2020    Presenting Complaint/Diagnosis : Metastatic gastroesophageal cancer    HPI:    Jonh Mack seen for initial consultation 7/26/2019 regarding Newly diagnosed metastatic gastroesophageal cancer  The patient has biopsy-proven adenocarcinoma of the esophagus  PET/CT scan was done which shows omental nodularity that is FDG avid indicating stage IV disease      Previous Hematologic/ Oncologic History:       Malignant neoplasm of lower third of esophagus ([facility] )    7/26/2019 Initial Diagnosis     Malignant neoplasm of lower third of esophagus ([facility] )      8/1/2019 -  Chemotherapy     fluorouracil (ADRUCIL) injection 750 mg, 400 mg/m2 = 750 mg, Intravenous, Once, 15 of 22 cycles  Administration: 750 mg (8/1/2019), 750 mg (8/13/2019), 750 mg (8/27/2019), 750 mg (9/10/2019), 750 mg (9/24/2019), 750 mg (10/8/2019), 795 mg (10/22/2019), 795 mg (11/5/2019), 795 mg (11/19/2019), 795 mg (12/3/2019), 795 mg (12/17/2019), 795 mg (12/31/2019), 795 mg (1/14/2020), 795 mg (1/28/2020), 795 mg (2/11/2020)  pegfilgrastim (NEULASTA ONPRO) subcutaneous injection kit 6 mg, 6 mg, Subcutaneous, Once, 2 of 2 cycles  Administration: 6 mg (8/3/2019), 6 mg (8/15/2019)  leucovorin 750 mg in dextrose 5 % 250 mL IVPB, 748 mg, Intravenous, Once, 15 of 22 cycles  Administration: 750 mg (8/1/2019), 750 mg (8/13/2019), 750 mg (8/27/2019), 750 mg (9/10/2019), 750 mg (9/24/2019), 750 mg (10/8/2019), 800 mg (10/22/2019), 800 mg (11/5/2019), 800 mg (11/19/2019), 800 mg (12/3/2019), 800 mg (12/17/2019), 800 mg (12/31/2019), 800 mg (1/14/2020), 800 mg (1/28/2020), 800 mg (2/11/2020)  oxaliplatin (ELOXATIN) 158 95 mg in dextrose 5 % 250 mL chemo infusion, 85 mg/m2 = 158 95 mg, Intravenous, Once, 6 of 6 cycles  Administration: 158 95 mg (8/1/2019), 158 95 mg (8/13/2019), 158 95 mg (8/27/2019), 158 95 mg (9/10/2019), 158 95 mg (9/24/2019), 158 95 mg (10/8/2019)        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[facility] [address] )    7/26/2019 Initial Diagnosis     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[facility] )      8/1/2019 -  Chemotherapy     fluorouracil (ADRUCIL) injection 750 mg, 400 mg/m2 = 750 mg, Intravenous, Once, 15 of 22 cycles  Administration: 750 mg (8/1/2019), 750 mg (8/13/2019), 750 mg (8/27/2019), 750 mg (9/10/2019), 750 mg (9/24/2019), 750 mg (10/8/2019), 795 mg (10/22/2019), 795 mg (11/5/2019), 795 mg (11/19/2019), 795 mg (12/3/2019), 795 mg (12/17/2019), 795 mg (12/31/2019), 795 mg (1/14/2020), 795 mg (1/28/2020), 795 mg (2/11/2020)  pegfilgrastim (NEULASTA ONPRO) subcutaneous injection kit 6 mg, 6 mg, Subcutaneous, Once, 2 of 2 cycles  Administration: 6 mg (8/3/2019), 6 mg (8/15/2019)  leucovorin 750 mg in dextrose 5 % 250 mL IVPB, 748 mg, Intravenous, Once, 15 of 22 cycles  Administration: 750 mg (8/1/2019), 750 mg (8/13/2019), 750 mg (8/27/2019), 750 mg (9/10/2019), 750 mg (9/24/2019), 750 mg (10/8/2019), 800 mg (10/22/2019), 800 mg (11/5/2019), 800 mg (11/19/2019), 800 mg (12/3/2019), 800 mg (12/17/2019), 800 mg (12/31/2019), 800 mg (1/14/2020), 800 mg (1/28/2020), 800 mg (2/11/2020)  oxaliplatin (ELOXATIN) 158 95 mg in dextrose 5 % 250 mL chemo infusion, 85 mg/m2 = 158 95 mg, Intravenous, Once, 6 of 6 cycles  Administration: 158 95 mg (8/1/2019), 158 95 mg (8/13/2019), 158 95 mg (8/27/2019), 158 95 mg (9/10/2019), 158 95 mg (9/24/2019), 158 95 mg (10/8/2019)         Current Hematologic/ Oncologic Treatment:    Modified FOLFOX 6  Oxaliplatin discontinued after cycle 6     Leucovorin 400 milligrams/meters squared  5FU 400 milligrams/meters squared  5FU 2400 milligrams/meter squared CIVI over 46 hours    Interval History:    The patient returns for follow-up visit  His most recent PET-CT scan in January was stable with no evidence of progression    His PSA was high so he was referred to see our colleagues in Urology who are following his PSA but at this point based on his exam and the fact that his PSA is only 8 a choosing to follow based on other medical conditions including his esophageal cancer  The patient states he gets dizzy on occasion  He does admit to not drinking a lot of fluids  Denies any other neurological signs and again his last scan did not show any evidence of progression of his metastatic disease  The rest of his 14 point review of systems today was negative  Test Results:    Imaging: No results found  Labs:   Lab Results   Component Value Date    WBC 12 52 (H) 02/21/2020    HGB 13 2 02/21/2020    HCT 40 6 02/21/2020     (H) 02/21/2020     02/21/2020     Lab Results   Component Value Date    K 4 1 02/07/2020     02/07/2020    CO2 25 02/07/2020    BUN 15 02/07/2020    CREATININE 1 16 02/07/2020    GLUF 113 (H) 10/18/2019    CALCIUM 9 1 02/07/2020    AST 19 02/07/2020    ALT 22 02/07/2020    ALKPHOS 91 02/07/2020    EGFR 65 02/07/2020       Lab Results   Component Value Date    PSA 8 0 (H) 01/24/2020     ROS: As stated in the history of present illness otherwise his 14 point review of systems today was negative        Active Problems:   Patient Active Problem List   Diagnosis    PTSD (post-traumatic stress disorder)    Essential hypertension    Major depression    OCD (obsessive compulsive disorder)    Anxiety    Malignant neoplasm of lower third of esophagus ([address] )    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)    Cancer associated pain    Chemotherapy-induced nausea    Abnormal weight loss    Malignant ascites    Recovering alcoholic ([address] )    Other fatigue    Other dysphagia    Nausea    Dehydration    Skin fissures    Goals of care, counseling/discussion    Palliative care patient       Past Medical History:   Past Medical History:   Diagnosis Date    Dehydration 8/8/2019    Esophageal cancer ([address] )     Hypertension     Nausea 8/8/2019    Psychiatric disorder     Recovering [facility])        Surgical History:   Past Surgical History:   Procedure Laterality Date    APPENDECTOMY      IR PORT PLACEMENT  7/31/2019       Family History:    Family History   Problem Relation Age of Onset    Colon cancer Father        Cancer-related family history includes Colon cancer in his father      Social History:   Social History     Socioeconomic History    Marital status: /Civil Union     Spouse name: Not on file    Number of children: 2    Years of education: Not on file    Highest education level: Not on file   Occupational History    Occupation: retired    Social Needs    Financial resource strain: Not on file    Food insecurity:     Worry: Not on file     Inability: Not on file   Showroomprive needs:     Medical: Not on file     Non-medical: Not on file   Tobacco Use    Smoking status: Current Some Day Smoker     Types: Cigars    Smokeless tobacco: Never Used   Substance and Sexual Activity    Alcohol use: No    Drug use: Yes     Types: Marijuana    Sexual activity: Not on file   Lifestyle    Physical activity:     Days per week: Not on file     Minutes per session: Not on file    Stress: Not on file   Relationships    Social connections:     Talks on phone: Not on file     Gets together: Not on file     Attends Mosque service: Not on file     Active member of club or organization: Not on file     Attends meetings of clubs or organizations: Not on file     Relationship status: Not on file    Intimate partner violence:     Fear of current or ex partner: Not on file     Emotionally abused: Not on file     Physically abused: Not on file     Forced sexual activity: Not on file   Other Topics Concern    Not on file   Social History Narrative    Not on file       Current Medications:   Current Outpatient Medications   Medication Sig Dispense Refill    ammonium lactate (LAC-HYDRIN) 12 % cream Apply topically as needed for dry skin On hands and feet 385 g 2    bisoprolol (ZEBETA) 5 mg tablet Take 1 tablet (5 mg total) by mouth daily      diazepam (VALIUM) 5 mg tablet Take 1 tablet (5 mg total) by mouth 2 (two) times a day 60 tablet 0    lidocaine-prilocaine (EMLA) cream Apply topically as needed for mild pain 30 g 0    pantoprazole (PROTONIX) 40 mg tablet Take 40 mg by mouth daily before breakfast      venlafaxine (EFFEXOR-XR) 150 mg 24 hr capsule Take 1 capsule (150 mg total) by mouth daily 30 capsule 0    apixaban (ELIQUIS) 5 mg Take 1 tablet (5 mg total) by mouth 2 (two) times a day for 154 doses 154 tablet 0     No current facility-administered medications for this visit  Allergies: Allergies   Allergen Reactions    Bee Venom Anaphylaxis    Shellfish-Derived Products      Develops GOUT       Physical Exam:    Body surface area is 2 01 meters squared  Wt Readings from Last 3 Encounters:   02/21/20 84 8 kg (187 lb)   02/12/20 89 8 kg (198 lb)   02/11/20 86 3 kg (190 lb 4 1 oz)        Temp Readings from Last 3 Encounters:   02/21/20 98 °F (36 7 °C) (Tympanic)   02/11/20 97 5 °F (36 4 °C) (Temporal)   01/30/20 97 7 °F (36 5 °C) (Tympanic)        BP Readings from Last 3 Encounters:   02/21/20 128/68   02/12/20 136/72   02/11/20 131/73         Pulse Readings from Last 3 Encounters:   02/21/20 73   02/12/20 58   02/11/20 64        Physical Exam     Constitutional   General appearance: No acute distress, well appearing and well nourished  Eyes   Conjunctiva and lids: No swelling, erythema or discharge  Pupils and irises: Equal, round and reactive to light  Ears, Nose, Mouth, and Throat   External inspection of ears and nose: Normal     Nasal mucosa, septum, and turbinates: Normal without edema or erythema  Oropharynx: Normal with no erythema, edema, exudate or lesions  Pulmonary   Respiratory effort: No increased work of breathing or signs of respiratory distress  Auscultation of lungs: Clear to auscultation      Cardiovascular Palpation of heart: Normal PMI, no thrills  Auscultation of heart: Normal rate and rhythm, normal S1 and S2, without murmurs  Examination of extremities for edema and/or varicosities: Normal     Carotid pulses: Normal     Abdomen   Abdomen: Non-tender, no masses  Liver and spleen: No hepatomegaly or splenomegaly  Lymphatic   Palpation of lymph nodes in neck: No lymphadenopathy  Musculoskeletal   Gait and station: Normal     Digits and nails: Normal without clubbing or cyanosis  Inspection/palpation of joints, bones, and muscles: Normal     Skin   Skin and subcutaneous tissue: Normal without rashes or lesions  Neurologic   Cranial nerves: Cranial nerves 2-12 intact  Sensation: No sensory loss  Psychiatric   Orientation to person, place, and time: Normal     Mood and affect: Normal         Assessment / Plan:      The patient is a pleasant 58-year-old male with a past medical history of metastatic gastroesophageal cancer  He received 4 cycles of modified FOLFOX 6 chemotherapy After which his imaging showed a very nice response  We discontinued the oxaliplatin  He is done well on 5-FU leucovorin  He continues to eat and gain weight   He was found to have superficial thrombophlebitis after his last visit and was started on Eliquis and will continue on this  CT scan was completed and shows stable disease  Clinically he is stable and doing well  At this point we will continue him on leucovorin 400 milligrams/meter squared, 5  milligrams/meter squared, 5 FU 2400 milligrams/meter squared continuous IV infusion over 46 hours  We will see him back in 4 weeks  The patient is in agreement with the plan and will call with any questions or concerns  At this point we will continue his anticoagulation and he is supposed to be getting prescriptions for his primary care physician  I did give him samples so he does not run out as he states it is expensive    He will stay on his current medication with no changes  We will see him back in 4-6 weeks  He will stay hydrated  If he has any worsening of dizziness he will call us and we will consider an MRI  I did suggest an MRI today of his brain but he refused stating he thinks this is related to dehydration as the the dizziness is not constant and is more episodic  He describes orthostatic symptoms and understands he needs to stay hydrated and not at any point fall because he is on an anticoagulant  Goals and Barriers:  Current Goal:  Prolong Survival from metastatic esophageal cancer  Barriers: None  Patient's Capacity to Self Care:  Patient able to self care  Portions of the record may have been created with voice recognition software   Occasional wrong word or "sound a like" substitutions may have occurred due to the inherent limitations of voice recognition software   Read the chart carefully and recognize, using context, where substitutions have occurred

## 2020-02-24 ENCOUNTER — OFFICE VISIT (OUTPATIENT)
Dept: PHYSICAL THERAPY | Facility: CLINIC | Age: 65
End: 2020-02-24
Payer: MEDICARE

## 2020-02-24 DIAGNOSIS — R53.1 GENERALIZED WEAKNESS: ICD-10-CM

## 2020-02-24 DIAGNOSIS — Z74.09 IMPAIRED FUNCTIONAL MOBILITY, BALANCE, AND ENDURANCE: Primary | ICD-10-CM

## 2020-02-24 DIAGNOSIS — R53.83 DECREASED STAMINA: ICD-10-CM

## 2020-02-24 PROCEDURE — 97112 NEUROMUSCULAR REEDUCATION: CPT | Performed by: PHYSICAL THERAPIST

## 2020-02-24 NOTE — PROGRESS NOTES
Daily Note     Today's date: 2020  Patient name: Gemma Acosta  : 1955  MRN: [id number]  Referring provider: Ritu Barahona MD  Dx:   Encounter Diagnosis     ICD-10-CM    1  Impaired functional mobility, balance, and endurance Z74 09    2  Generalized weakness R53 1    3  Decreased stamina R53 83                   Subjective: Patient reports that he's not feeling great, but normally feels better after completing his therapy  Objective: See treatment diary below    - Floor to ceiling 10 sec hold, 10 reps  - Side to side 10 sec hold, 10 reps  - Forward step - alternating steps over adrian- 9" adrian, 2 minutes on/3 minutes off  - Lateral step - alternating steps over adrian- 9" adrian, 2 minutes on/3 minutes off  - Standing floor to ceiling with blue boomwhackers on foam: 10 reps  - Standing floor to ceiling with blue boomwhackers on foam (across body to target on floor to over opposite shoulder): 10 reps each way  - Bicep curls: 30 reps, 10 lb DB  - Overhead tricep press: 30 reps, 10 lb DB  - Med Jose Antonio Gilbert press: 10 lbs, 30 reps  - Med Ball core rotations and PNF: 10 lb, 30 reps  - Ambulation: 50 ft x 10 cycles        Assessment: Patient tolerated treatment well today  Patient able to perform exercises with minimal cueing from therapist  Patient required multiple rest breaks between and during exercises  Patient showed improved balance by performing boomwhacker exercises on foam without LOB  Patient continues to have difficulty with adrian exercises, specifically laterally, but is able to regain balance after adrian  Patient will benefit from skilled PT services to improve strength, decrease fatigue, and maintain functional mobility  Plan: Continue per plan of care  Precautions:  has a past medical history of Dehydration (2019), Esophageal cancer ([facility] [address] ), Hypertension, Nausea (2019), Psychiatric disorder, and Recovering alcoholic ([facility] [address] )

## 2020-02-25 ENCOUNTER — HOSPITAL ENCOUNTER (OUTPATIENT)
Dept: INFUSION CENTER | Facility: HOSPITAL | Age: 65
Discharge: HOME | End: 2020-02-25
Payer: MEDICARE

## 2020-02-25 VITALS
BODY MASS INDEX: 28.15 KG/M2 | SYSTOLIC BLOOD PRESSURE: 131 MMHG | RESPIRATION RATE: 20 BRPM | HEIGHT: 69 IN | OXYGEN SATURATION: 93 % | DIASTOLIC BLOOD PRESSURE: 69 MMHG | WEIGHT: 190.04 LBS | HEART RATE: 66 BPM | TEMPERATURE: 97.5 F

## 2020-02-25 DIAGNOSIS — R11.0 NAUSEA: ICD-10-CM

## 2020-02-25 DIAGNOSIS — C16.0 MALIGNANT NEOPLASM OF CARDIA OF STOMACH (HCC): ICD-10-CM

## 2020-02-25 DIAGNOSIS — E86.0 DEHYDRATION: ICD-10-CM

## 2020-02-25 DIAGNOSIS — C15.5 MALIGNANT NEOPLASM OF LOWER THIRD OF ESOPHAGUS (HCC): Primary | ICD-10-CM

## 2020-02-25 PROCEDURE — 96409 CHEMO IV PUSH SNGL DRUG: CPT

## 2020-02-25 PROCEDURE — G0498 CHEMO EXTEND IV INFUS W/PUMP: HCPCS

## 2020-02-25 PROCEDURE — 96366 THER/PROPH/DIAG IV INF ADDON: CPT

## 2020-02-25 PROCEDURE — 96367 TX/PROPH/DG ADDL SEQ IV INF: CPT

## 2020-02-25 RX ORDER — SODIUM CHLORIDE 9 MG/ML
20 INJECTION, SOLUTION INTRAVENOUS ONCE AS NEEDED
Status: DISCONTINUED | OUTPATIENT
Start: 2020-02-25 | End: 2020-02-28 | Stop reason: HOSPADM

## 2020-02-25 RX ORDER — FLUOROURACIL 50 MG/ML
400 INJECTION, SOLUTION INTRAVENOUS ONCE
Status: COMPLETED | OUTPATIENT
Start: 2020-02-25 | End: 2020-02-25

## 2020-02-25 RX ADMIN — FLUOROURACIL 795 MG: 50 INJECTION, SOLUTION INTRAVENOUS at 12:29

## 2020-02-25 RX ADMIN — LEUCOVORIN CALCIUM 800 MG: 200 INJECTION, POWDER, LYOPHILIZED, FOR SUSPENSION INTRAMUSCULAR; INTRAVENOUS at 10:21

## 2020-02-25 RX ADMIN — SODIUM CHLORIDE 20 ML/HR: 0.9 INJECTION, SOLUTION INTRAVENOUS at 09:42

## 2020-02-25 RX ADMIN — DEXAMETHASONE SODIUM PHOSPHATE: 10 INJECTION, SOLUTION INTRAMUSCULAR; INTRAVENOUS at 09:42

## 2020-02-25 NOTE — PLAN OF CARE
Problem: Potential for Falls  Goal: Patient will remain free of falls  Description  INTERVENTIONS:  - Assess patient frequently for physical needs  -  Identify cognitive and physical deficits and behaviors that affect risk of falls    -  [place] fall precautions as indicated by assessment   - Educate patient/family on patient safety including physical limitations  - Instruct patient to call for assistance with activity based on assessment  - Modify environment to reduce risk of injury  - Consider OT/PT consult to assist with strengthening/mobility  Outcome: Progressing

## 2020-02-27 ENCOUNTER — HOSPITAL ENCOUNTER (OUTPATIENT)
Dept: INFUSION CENTER | Facility: HOSPITAL | Age: 65
Discharge: HOME | End: 2020-02-27

## 2020-02-27 VITALS — TEMPERATURE: 97.6 F

## 2020-02-27 DIAGNOSIS — R11.0 NAUSEA: ICD-10-CM

## 2020-02-27 DIAGNOSIS — C15.5 MALIGNANT NEOPLASM OF LOWER THIRD OF ESOPHAGUS (HCC): Primary | ICD-10-CM

## 2020-02-27 DIAGNOSIS — C16.0 MALIGNANT NEOPLASM OF CARDIA OF STOMACH (HCC): ICD-10-CM

## 2020-02-27 DIAGNOSIS — E86.0 DEHYDRATION: ICD-10-CM

## 2020-02-27 NOTE — PLAN OF CARE
Problem: Potential for Falls  Goal: Patient will remain free of falls  Description  INTERVENTIONS:  - Assess patient frequently for physical needs  -  Identify cognitive and physical deficits and behaviors that affect risk of falls    -  [place] fall precautions as indicated by assessment   - Educate patient/family on patient safety including physical limitations  - Instruct patient to call for assistance with activity based on assessment  - Modify environment to reduce risk of injury  - Consider OT/PT consult to assist with strengthening/mobility  Outcome: Progressing

## 2020-02-28 ENCOUNTER — APPOINTMENT (OUTPATIENT)
Dept: PHYSICAL THERAPY | Facility: CLINIC | Age: 65
End: 2020-02-28
Payer: MEDICARE

## 2020-02-28 DIAGNOSIS — C16.0 MALIGNANT NEOPLASM OF CARDIA OF STOMACH (HCC): ICD-10-CM

## 2020-02-28 DIAGNOSIS — E86.0 DEHYDRATION: ICD-10-CM

## 2020-02-28 DIAGNOSIS — C15.5 MALIGNANT NEOPLASM OF LOWER THIRD OF ESOPHAGUS (HCC): ICD-10-CM

## 2020-02-28 DIAGNOSIS — R11.0 NAUSEA: Primary | ICD-10-CM

## 2020-02-28 RX ORDER — SODIUM CHLORIDE 9 MG/ML
20 INJECTION, SOLUTION INTRAVENOUS ONCE AS NEEDED
Status: CANCELLED | OUTPATIENT
Start: 2020-03-24

## 2020-02-28 RX ORDER — FLUOROURACIL 50 MG/ML
400 INJECTION, SOLUTION INTRAVENOUS ONCE
Status: CANCELLED | OUTPATIENT
Start: 2020-03-24

## 2020-02-28 RX ORDER — DEXTROSE MONOHYDRATE 50 MG/ML
20 INJECTION, SOLUTION INTRAVENOUS ONCE
Status: CANCELLED | OUTPATIENT
Start: 2020-03-10

## 2020-02-28 RX ORDER — FLUOROURACIL 50 MG/ML
400 INJECTION, SOLUTION INTRAVENOUS ONCE
Status: CANCELLED | OUTPATIENT
Start: 2020-03-10

## 2020-02-28 RX ORDER — DEXTROSE MONOHYDRATE 50 MG/ML
20 INJECTION, SOLUTION INTRAVENOUS ONCE
Status: CANCELLED | OUTPATIENT
Start: 2020-03-24

## 2020-02-28 RX ORDER — SODIUM CHLORIDE 9 MG/ML
20 INJECTION, SOLUTION INTRAVENOUS ONCE AS NEEDED
Status: CANCELLED | OUTPATIENT
Start: 2020-03-10

## 2020-03-05 NOTE — PROGRESS NOTES
Outpatient Oncology Nutrition Consult  Type of Consult: Follow Up  Care Location: [place] [id number] with patient and wife Kayla Neely) at [facility]  Monika Thierry goes by [facility]"  Nutrition Assessment:  PMH: PTSD, HTN, major depression, OCD, anxiety  Oncology Diagnosis & Treatments: Stage IV metastatic gastroesophageal cancer  Undergoing chemotherapy in the palliative setting to help improve survival (modified FOLFOX 6: 8/1/19-10/18/19; oxaliplatin discontinued 10/18/19; Now receiving 5FU and leucovorin)        Malignant neoplasm of lower third of esophagus (HCC)    7/26/2019 Initial Diagnosis     Malignant neoplasm of lower third of esophagus ([facility] [address] )      8/1/2019 -  Chemotherapy     fluorouracil (ADRUCIL) injection 750 mg, 400 mg/m2 = 750 mg, Intravenous, Once, 17 of 26 cycles  Administration: 750 mg (8/1/2019), 750 mg (8/13/2019), 750 mg (8/27/2019), 750 mg (9/10/2019), 750 mg (9/24/2019), 750 mg (10/8/2019), 795 mg (10/22/2019), 795 mg (11/5/2019), 795 mg (11/19/2019), 795 mg (12/3/2019), 795 mg (12/17/2019), 795 mg (12/31/2019), 795 mg (1/14/2020), 795 mg (1/28/2020), 795 mg (2/11/2020), 795 mg (2/25/2020)  pegfilgrastim (NEULASTA ONPRO) subcutaneous injection kit 6 mg, 6 mg, Subcutaneous, Once, 2 of 2 cycles  Administration: 6 mg (8/3/2019), 6 mg (8/15/2019)  leucovorin 750 mg in dextrose 5 % 250 mL IVPB, 748 mg, Intravenous, Once, 17 of 26 cycles  Administration: 750 mg (8/1/2019), 750 mg (8/13/2019), 750 mg (8/27/2019), 750 mg (9/10/2019), 750 mg (9/24/2019), 750 mg (10/8/2019), 800 mg (10/22/2019), 800 mg (11/5/2019), 800 mg (11/19/2019), 800 mg (12/3/2019), 800 mg (12/17/2019), 800 mg (12/31/2019), 800 mg (1/14/2020), 800 mg (1/28/2020), 800 mg (2/11/2020), 800 mg (2/25/2020)  oxaliplatin (ELOXATIN) 158 95 mg in dextrose 5 % 250 mL chemo infusion, 85 mg/m2 = 158 95 mg, Intravenous, Once, 6 of 6 cycles  Administration: 158 95 mg (8/1/2019), 158 95 mg (8/13/2019), 158 95 mg (8/27/2019), 158 95 mg (9/10/2019), 158 95 mg (9/24/2019), 158 95 mg (10/8/2019)        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[facility] [address] )    7/26/2019 Initial Diagnosis     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[facility] [address] )      8/1/2019 -  Chemotherapy     fluorouracil (ADRUCIL) injection 750 mg, 400 mg/m2 = 750 mg, Intravenous, Once, 17 of 26 cycles  Administration: 750 mg (8/1/2019), 750 mg (8/13/2019), 750 mg (8/27/2019), 750 mg (9/10/2019), 750 mg (9/24/2019), 750 mg (10/8/2019), 795 mg (10/22/2019), 795 mg (11/5/2019), 795 mg (11/19/2019), 795 mg (12/3/2019), 795 mg (12/17/2019), 795 mg (12/31/2019), 795 mg (1/14/2020), 795 mg (1/28/2020), 795 mg (2/11/2020), 795 mg (2/25/2020)  pegfilgrastim (NEULASTA ONPRO) subcutaneous injection kit 6 mg, 6 mg, Subcutaneous, Once, 2 of 2 cycles  Administration: 6 mg (8/3/2019), 6 mg (8/15/2019)  leucovorin 750 mg in dextrose 5 % 250 mL IVPB, 748 mg, Intravenous, Once, 17 of 26 cycles  Administration: 750 mg (8/1/2019), 750 mg (8/13/2019), 750 mg (8/27/2019), 750 mg (9/10/2019), 750 mg (9/24/2019), 750 mg (10/8/2019), 800 mg (10/22/2019), 800 mg (11/5/2019), 800 mg (11/19/2019), 800 mg (12/3/2019), 800 mg (12/17/2019), 800 mg (12/31/2019), 800 mg (1/14/2020), 800 mg (1/28/2020), 800 mg (2/11/2020), 800 mg (2/25/2020)  oxaliplatin (ELOXATIN) 158 95 mg in dextrose 5 % 250 mL chemo infusion, 85 mg/m2 = 158 95 mg, Intravenous, Once, 6 of 6 cycles  Administration: 158 95 mg (8/1/2019), 158 95 mg (8/13/2019), 158 95 mg (8/27/2019), 158 95 mg (9/10/2019), 158 95 mg (9/24/2019), 158 95 mg (10/8/2019)       Past Medical History:   Diagnosis Date    Dehydration 8/8/2019    Esophageal cancer ([facility] [address] )     Hypertension     Nausea 8/8/2019    Psychiatric disorder     Recovering alcoholic [facility])      Past Surgical History:   Procedure Laterality Date    APPENDECTOMY      IR PORT PLACEMENT  7/31/2019       Review of Medications:   Vitamins, Supplements and Herbals: no    Current Outpatient Medications:     ammonium lactate (LAC-HYDRIN) 12 % cream, Apply topically as needed for dry skin On hands and feet, Disp: 385 g, Rfl: 2    apixaban (ELIQUIS) 5 mg, Take 1 tablet (5 mg total) by mouth 2 (two) times a day for 154 doses, Disp: 154 tablet, Rfl: 0    bisoprolol (ZEBETA) 5 mg tablet, Take 1 tablet (5 mg total) by mouth daily, Disp: , Rfl:     diazepam (VALIUM) 5 mg tablet, Take 1 tablet (5 mg total) by mouth 2 (two) times a day, Disp: 60 tablet, Rfl: 0    lidocaine-prilocaine (EMLA) cream, Apply topically as needed for mild pain, Disp: 30 g, Rfl: 0    pantoprazole (PROTONIX) 40 mg tablet, Take 40 mg by mouth daily before breakfast, Disp: , Rfl:     venlafaxine (EFFEXOR-XR) 150 mg 24 hr capsule, Take 1 capsule (150 mg total) by mouth daily, Disp: 30 capsule, Rfl: 0  No current facility-administered medications for this visit       Facility-Administered Medications Ordered in Other Visits:     dextrose 5 % infusion, 20 mL/hr, Intravenous, Once, Walker Martinez MD    fluorouracil (ADRUCIL) injection 795 mg, 400 mg/m2 (Treatment Plan Recorded), Intravenous, Once, Walker Martinez MD    leucovorin 800 mg in dextrose 5 % 250 mL IVPB, 800 mg, Intravenous, Once, Walker Martinez MD    ondansetron (ZOFRAN) 16 mg, dexamethasone (DECADRON) 10 mg in sodium chloride 0 9 % 50 mL IVPB, , Intravenous, Once, Walker Martinez MD, Last Rate: 150 mL/hr at 03/10/20 0944    sodium chloride 0 9 % infusion, 20 mL/hr, Intravenous, Once PRN, Walker Martinez MD, Last Rate: 20 mL/hr at 03/10/20 0944, 20 mL/hr at 03/10/20 0944    Most Recent Lab Results:   Lab Results   Component Value Date    WBC 14 77 (H) 03/06/2020    ALT 20 03/06/2020    AST 17 03/06/2020    ALB 3 5 03/06/2020    SODIUM 134 (L) 03/06/2020    SODIUM 130 (L) 02/21/2020    K 4 2 03/06/2020    K 3 4 (L) 02/21/2020    CL 99 (L) 03/06/2020    BUN 17 03/06/2020    BUN 15 02/21/2020    CREATININE 1 05 03/06/2020    CREATININE 1 15 02/21/2020    EGFR 74 03/06/2020    POCGLU 115 07/22/2019    GLUF 113 (H) 10/18/2019 GLUF 130 (H) 08/09/2019    GLUC 124 03/06/2020    CALCIUM 9 1 03/06/2020     Anthropometric Measurements:   Height: 69 5"  Ht Readings from Last 1 Encounters:   03/10/20 5' 9 02" (1 753 m)     -Weight History:   Usual Weight: 198-212# (last weighed this in May 2019)  Ideal Body Weight: 163#  Wt Readings from Last 20 Encounters:   03/10/20 85 1 kg (187 lb 9 8 oz)   02/25/20 86 2 kg (190 lb 0 6 oz)   02/21/20 84 8 kg (187 lb)   02/12/20 89 8 kg (198 lb)   02/11/20 86 3 kg (190 lb 4 1 oz)   01/28/20 86 3 kg (190 lb 4 1 oz)   01/24/20 87 1 kg (192 lb)   01/14/20 87 1 kg (192 lb 0 3 oz)   01/07/20 85 3 kg (188 lb)   12/31/19 87 kg (191 lb 12 8 oz)   12/26/19 88 kg (194 lb)   12/17/19 88 6 kg (195 lb 5 2 oz)   12/03/19 88 4 kg (194 lb 14 2 oz)   11/22/19 86 6 kg (191 lb)   11/19/19 86 7 kg (191 lb 2 2 oz)   11/05/19 86 2 kg (190 lb 0 6 oz)   10/22/19 83 1 kg (183 lb 3 2 oz)   10/18/19 83 5 kg (184 lb)   10/08/19 80 3 kg (177 lb 0 5 oz)   10/01/19 76 6 kg (168 lb 12 8 oz)     Weight Changes: loss of 2 6# (1 4%) in 1 month (not significant) and loss of 7 3# (3 7%) in 3 months (not significant)    -Ry reports wt loss is due to lack of appetite and sleeping through meals more often due to fatigue  Estimated body mass index is 27 69 kg/m² as calculated from the following:    Height as of an earlier encounter on 3/10/20: 5' 9 02" (1 753 m)  Weight as of an earlier encounter on 3/10/20: 85 1 kg (187 lb 9 8 oz)  Nutrition-Focused Physical Findings: none observed       Food/Nutrition-Related History & Client/Social History:    Current Nutrition Impact Symptoms:  [] Nausea [x] Reduced Appetite - reports he stopped the steroid "awhile ago" and his appetite is slightly decreased, plans to discuss with PC at the next visit   [] Acid Reflux    [] Vomiting  [] Unintended Wt Loss  [] Malabsorption    [] Diarrhea  [] Unintended Wt Gain  [] Dumping Syndrome    [] Constipation - bowels have been "spectacular" [] Thick Mucous/Secretions  [] Abdominal Pain    [] Dysgeusia (Altered Taste)  [] Xerostomia (Dry Mouth)  [] Gas    [] Dysosmia (Altered Smell)  [] Thrush  [] Difficulty Chewing    [] Oral Mucositis (Sore Mouth) - denies, did not start bs/sw rinses [x] Fatigue - significant [] Other:   [] Odynophagia  [] Esophagitis  [] Other:    [] Dysphagia - says that sometimes has a little bit of pain and resistance when swallowing cold water, sticks to cold water  [] Early Satiety  [] No Problems Eating      Food Allergies: no  Food Intolerances: no   -For Gout, avoids: organ meats, shellfish  Last gout flare was 2 years ago after a liverwurst sandwich  Current Diet: Regular Diet, No Restrictions  Current Nutrition Intake: Decreased since last visit  Appetite: Good, Fair - wants to start steroid again  Nutrition Route: PO  Meal planning/preparation mainly done by: Self and Spouse/Partner  Oral Care: Brushing BID, alcohol-free [place] Mouthwash, flossing daily  Activity level: Still going to PT, fatigue limits ability to be physically active  Social Hx: Quit drinking alcohol 30 years ago  Retied custom   Wife is a teacher (pre-school and Special Ed)  24 Hr Diet Recall: has been eating a lighter breakfast and snacks  Breakfast: 2 eggs over easy  Lunch: leftover pasta with tomato sauce and meatballs (sometimes has daniel sauce)  Snack: 2 cups new [place] clam chowder  Dinner: 2 cups spaghetti and meatballs and Huang cheese, plain [place] yogurt with honey mixed in  Snacks: tangerines, plain [place] yogurt, couple cups of ice cream, cheese Dutch    Beverages: water (16 oz glass x4), Boost Plus (8 oz x2), V8 Healthy Greens juice (16 oz x1) with Amazing Grass protein powder added, regular coffee with milk (14 oz x1);  Aloe juice (not recently)    -Averaging 96 oz per day (100% of est needs)  Supplements:    Boost Plus (8 oz, 360 kcal, 14 g pro) - BID   V8 Healthy Greens juice with Amazing Grass Protein Superfood powder (1 scoop=110 kcal, 20 g protein)  added - 16 oz x1    12/17/19: Re-Estimated Nutrition Needs: (based on 88 8kg/ 195 3# actual wt)  Energy Needs: 1128-2635 kcal/day (30-35 kcal/kg)  Protein Needs: 107-133 grams/day (1 2-1 5 g/kg)  Fluid Needs: 3209-4575 mL/day (1 mL/kcal) -  oz    Discussion/Summary: Michelle Reed was seen today during his infusion for RD follow up  He is doing well but reports significant fatigue  He says he stopped the steroid for his appetite a while ago and his appetite is slightly decreased  He is sleeping through meals and not snacking as much  He plans to speak with his Palliative Care doctor about his increase in sx  His weight is down 2 6# in the past month  He continues to use Boost Plus BID and his Amazing Grass protein powder added to V8  His hydration has improved and he is now meeting est needs  He denies mouth soreness today  Today, we focused on healthy and nourishing snack ideas to incorporate between meals and ways to add more kcal/protein to breakfast   We discussed MNT for fatigue  Keyona Kolb will continue all other aspects of his nutrition plan of care  Discussed/Reviewed:   · weight management  · indications & use of oral nutrition supplements  · high protein foods to include at all meals and snacks  · encouraged eating every 2-3 hours (5-6 small meals/day)  · adequate hydation & tips to increase overall fluid intake  · MNT for: fatigue, reduced appetite  · recipe suggestions/resources  · a cancer preventative eating pattern as a long-term nutrition goal  · individualized calorie, protein and fluid daily goals     All questions and concerns addressed during todays visit  Michelle Reed and his wife have RD contact information  Nutrition Diagnosis:  · Increased Nutrient Needs (kcal & pro) related to increased demand for nutrients and disease state as evidenced by cancer dx and pt undergoing tx for cancer        Intervention & Recommendations:  Topics addressed: Nutrition education, Balance/Variety, Meals & Snacks, Meal planning, Choosing high protein meals/snacks, Meal pattern: eating small/frequent meals (every 2-3 hours), Nutrition Symptom Management, Adequate Hydration, Medical Food Supplements, Nutrition-Related Medication Management and Weight Management    Barriers: None  Readiness to change: action  Comprehension: verbalizes understanding  Expected Compliance: good    Materials Provided: not applicable    Monitoring & Evaluation:  Dietitian to Monitor: Food and Nutrition Intake, Nutrtion Impact Symptoms, Body Weight and Biochemical Data     Goals:  · weight maintenance/stabilization  · adequate nutrition impact symptom management  · pt to meet >/=75% estimated nutrition needs daily  · eat a well-balanced breakfast each day  · add snacks between meals     · Progress Towards Goals: Progressing    Nutrition Rx & Recommendations:  · Diet: High Calorie, High Protein (for high calorie foods see pages 52-53, and for high protein foods see pages 49-51 in your Eating Hints book)  · Nutrition Supplements: Boost Plus BID and Protein Powder once daily added to V8 juice  May use homemade shakes/smoothies as desired  If using a pre-made shake/bar, choose ones with >300 calories and >10 grams protein (ex  Ensure Enlive, Ensure Plus, Boost Plus, Boost Very High Calorie, etc )  · Small, frequent meals/snacks may be easier to tolerate than 3 large daily meals  Aim for 5-6 small meals per day (every 2-3 hours)  · Include protein at all meals/snacks  · Stay hydrated by sipping fluids of choice/tolerance throughout the day  · Weigh yourself regularly  If you notice weight loss, make an effort to increase your daily food/calorie intake  If you continue to notice loss after these efforts, reach out to your dietitian to establish a plan to stabilize weight    · Follow a Cancer Preventative Nutrition Pattern: colorful, plant-based, low-fat, avoid added sugars, limit alcohol, include high fiber foods, limit processed meats, limit red meat, choose lean protein sources, use low-fat cooking methods, balance calories with physical activity, avoid excessive weight gain throughout life  · Aim for  oz fluid each day, water is the best choice  · Add snacks between meals and expand on breakfast meal:  · Breakfast Ideas: eggs with cheese and toast with butter, avocado, or peanut butter OR shredded wheat with whole milk and berries OR cream of wheat made with whole milk and peanut butter mixed in  · Snack Ideas: Greek yogurt with raspberries; cheese with crackers; oranges with cheese; Boost Plus; egg salad and cheese;  Well Yes Soups    Follow Up Plan: 4/21/20 during infusion   Recommend Referral to Other Providers: none at this time

## 2020-03-06 ENCOUNTER — OFFICE VISIT (OUTPATIENT)
Dept: PHYSICAL THERAPY | Facility: CLINIC | Age: 65
End: 2020-03-06
Payer: MEDICARE

## 2020-03-06 ENCOUNTER — APPOINTMENT (OUTPATIENT)
Dept: LAB | Facility: HOSPITAL | Age: 65
End: 2020-03-06
Payer: MEDICARE

## 2020-03-06 DIAGNOSIS — E86.0 DEHYDRATION: ICD-10-CM

## 2020-03-06 DIAGNOSIS — C16.0 MALIGNANT NEOPLASM OF CARDIA OF STOMACH (HCC): ICD-10-CM

## 2020-03-06 DIAGNOSIS — R11.0 NAUSEA: ICD-10-CM

## 2020-03-06 DIAGNOSIS — Z74.09 IMPAIRED FUNCTIONAL MOBILITY, BALANCE, AND ENDURANCE: Primary | ICD-10-CM

## 2020-03-06 DIAGNOSIS — R53.1 GENERALIZED WEAKNESS: ICD-10-CM

## 2020-03-06 DIAGNOSIS — C15.5 MALIGNANT NEOPLASM OF LOWER THIRD OF ESOPHAGUS (HCC): ICD-10-CM

## 2020-03-06 LAB
ALBUMIN SERPL BCP-MCNC: 3.5 G/DL (ref 3.5–5)
ALP SERPL-CCNC: 99 U/L (ref 46–116)
ALT SERPL W P-5'-P-CCNC: 20 U/L (ref 12–78)
ANION GAP SERPL CALCULATED.3IONS-SCNC: 10 MMOL/L (ref 4–13)
AST SERPL W P-5'-P-CCNC: 17 U/L (ref 5–45)
BASOPHILS # BLD AUTO: 0.09 THOUSANDS/ΜL (ref 0–0.1)
BASOPHILS NFR BLD AUTO: 1 % (ref 0–1)
BILIRUB SERPL-MCNC: 0.9 MG/DL (ref 0.2–1)
BUN SERPL-MCNC: 17 MG/DL (ref 5–25)
CALCIUM SERPL-MCNC: 9.1 MG/DL (ref 8.3–10.1)
CHLORIDE SERPL-SCNC: 99 MMOL/L (ref 100–108)
CO2 SERPL-SCNC: 25 MMOL/L (ref 21–32)
CREAT SERPL-MCNC: 1.05 MG/DL (ref 0.6–1.3)
EOSINOPHIL # BLD AUTO: 0.14 THOUSAND/ΜL (ref 0–0.61)
EOSINOPHIL NFR BLD AUTO: 1 % (ref 0–6)
ERYTHROCYTE [DISTWIDTH] IN BLOOD BY AUTOMATED COUNT: 15.6 % (ref 11.6–15.1)
GFR SERPL CREATININE-BSD FRML MDRD: 74 ML/MIN/1.73SQ M
GLUCOSE SERPL-MCNC: 124 MG/DL (ref 65–140)
HCT VFR BLD AUTO: 40.3 % (ref 36.5–49.3)
HGB BLD-MCNC: 13.1 G/DL (ref 12–17)
IMM GRANULOCYTES # BLD AUTO: 0.09 THOUSAND/UL (ref 0–0.2)
IMM GRANULOCYTES NFR BLD AUTO: 1 % (ref 0–2)
LYMPHOCYTES # BLD AUTO: 2.5 THOUSANDS/ΜL (ref 0.6–4.47)
LYMPHOCYTES NFR BLD AUTO: 17 % (ref 14–44)
MCH RBC QN AUTO: 34 PG (ref 26.8–34.3)
MCHC RBC AUTO-ENTMCNC: 32.5 G/DL (ref 31.4–37.4)
MCV RBC AUTO: 105 FL (ref 82–98)
MONOCYTES # BLD AUTO: 1.24 THOUSAND/ΜL (ref 0.17–1.22)
MONOCYTES NFR BLD AUTO: 8 % (ref 4–12)
NEUTROPHILS # BLD AUTO: 10.71 THOUSANDS/ΜL (ref 1.85–7.62)
NEUTS SEG NFR BLD AUTO: 72 % (ref 43–75)
NRBC BLD AUTO-RTO: 0 /100 WBCS
PLATELET # BLD AUTO: 371 THOUSANDS/UL (ref 149–390)
PMV BLD AUTO: 10.5 FL (ref 8.9–12.7)
POTASSIUM SERPL-SCNC: 4.2 MMOL/L (ref 3.5–5.3)
PROT SERPL-MCNC: 7.3 G/DL (ref 6.4–8.2)
RBC # BLD AUTO: 3.85 MILLION/UL (ref 3.88–5.62)
SODIUM SERPL-SCNC: 134 MMOL/L (ref 136–145)
WBC # BLD AUTO: 14.77 THOUSAND/UL (ref 4.31–10.16)

## 2020-03-06 PROCEDURE — 80053 COMPREHEN METABOLIC PANEL: CPT

## 2020-03-06 PROCEDURE — 85025 COMPLETE CBC W/AUTO DIFF WBC: CPT

## 2020-03-06 PROCEDURE — 97112 NEUROMUSCULAR REEDUCATION: CPT | Performed by: PHYSICAL THERAPIST

## 2020-03-06 PROCEDURE — 36415 COLL VENOUS BLD VENIPUNCTURE: CPT

## 2020-03-06 NOTE — PROGRESS NOTES
Daily Note     Today's date: 3/6/2020  Patient name: Derrick Hood  : 1955  MRN: [id number]  Referring provider: Rasheeda Amezquita MD  Dx:   Encounter Diagnosis     ICD-10-CM    1  Impaired functional mobility, balance, and endurance Z74 09    2  Generalized weakness R53 1                   Subjective: Patient reports that he's not feeling great, but normally feels better after completing his therapy  Objective: See treatment diary below    - Floor to ceiling 10 sec hold, 10 reps  - Side to side 10 sec hold, 10 reps  - Med Mattel press: 10 lbs, 30 reps  - Med Ball core rotations and PNF: 10 lb, 30 reps  - Overhead tricep press: 30 reps, 10 lb DB  - Forward step - alternating steps over adrain- 9" adrian, 2 minutes on/3 minutes off  - Lateral step - alternating steps over adrian- 9" adrian, 2 minutes on/3 minutes off  - Standing floor to ceiling with blue boomwhackers on foam: 10 reps  - Standing floor to ceiling with blue boomwhackers on foam (across body to target on floor to over opposite shoulder): 10 reps each way  - Bicep curls: 30 reps, 10 lb DB        Assessment: challenged with foam exercises with maintaining balance  Heather Gains continues to an issues  However pt report increase in strength with ability to carry 5 gal water with prior performance of being unable  Patient continues to have difficulty with adrian exercises, specifically laterally, but is able to regain balance after adrian  Patient will benefit from skilled PT services to improve strength, decrease fatigue, and maintain functional mobility  Plan: Continue per plan of care  Precautions:  has a past medical history of Dehydration (2019), Esophageal cancer ([facility] [address] ), Hypertension, Nausea (2019), Psychiatric disorder, and Recovering alcoholic ([facility] [address] )

## 2020-03-09 ENCOUNTER — APPOINTMENT (OUTPATIENT)
Dept: PHYSICAL THERAPY | Facility: CLINIC | Age: 65
End: 2020-03-09
Payer: MEDICARE

## 2020-03-10 ENCOUNTER — HOSPITAL ENCOUNTER (OUTPATIENT)
Dept: INFUSION CENTER | Facility: HOSPITAL | Age: 65
Discharge: HOME | End: 2020-03-10
Payer: MEDICARE

## 2020-03-10 ENCOUNTER — NUTRITION (OUTPATIENT)
Dept: NUTRITION | Facility: CLINIC | Age: 65
End: 2020-03-10

## 2020-03-10 VITALS
WEIGHT: 187.61 LBS | TEMPERATURE: 96.9 F | BODY MASS INDEX: 27.79 KG/M2 | HEIGHT: 69 IN | SYSTOLIC BLOOD PRESSURE: 152 MMHG | RESPIRATION RATE: 16 BRPM | HEART RATE: 57 BPM | OXYGEN SATURATION: 97 % | DIASTOLIC BLOOD PRESSURE: 69 MMHG

## 2020-03-10 DIAGNOSIS — C16.0 MALIGNANT NEOPLASM OF CARDIA OF STOMACH (HCC): ICD-10-CM

## 2020-03-10 DIAGNOSIS — R11.0 NAUSEA: ICD-10-CM

## 2020-03-10 DIAGNOSIS — Z71.3 NUTRITIONAL COUNSELING: Primary | ICD-10-CM

## 2020-03-10 DIAGNOSIS — E86.0 DEHYDRATION: ICD-10-CM

## 2020-03-10 DIAGNOSIS — C15.5 MALIGNANT NEOPLASM OF LOWER THIRD OF ESOPHAGUS (HCC): Primary | ICD-10-CM

## 2020-03-10 PROCEDURE — 96367 TX/PROPH/DG ADDL SEQ IV INF: CPT

## 2020-03-10 PROCEDURE — 96366 THER/PROPH/DIAG IV INF ADDON: CPT

## 2020-03-10 PROCEDURE — 96409 CHEMO IV PUSH SNGL DRUG: CPT

## 2020-03-10 PROCEDURE — G0498 CHEMO EXTEND IV INFUS W/PUMP: HCPCS

## 2020-03-10 RX ORDER — SODIUM CHLORIDE 9 MG/ML
20 INJECTION, SOLUTION INTRAVENOUS ONCE AS NEEDED
Status: DISCONTINUED | OUTPATIENT
Start: 2020-03-10 | End: 2020-03-13 | Stop reason: HOSPADM

## 2020-03-10 RX ORDER — FLUOROURACIL 50 MG/ML
400 INJECTION, SOLUTION INTRAVENOUS ONCE
Status: COMPLETED | OUTPATIENT
Start: 2020-03-10 | End: 2020-03-10

## 2020-03-10 RX ORDER — DEXTROSE MONOHYDRATE 50 MG/ML
20 INJECTION, SOLUTION INTRAVENOUS ONCE
Status: DISCONTINUED | OUTPATIENT
Start: 2020-03-10 | End: 2020-03-13 | Stop reason: HOSPADM

## 2020-03-10 RX ADMIN — FLUOROURACIL 795 MG: 50 INJECTION, SOLUTION INTRAVENOUS at 12:24

## 2020-03-10 RX ADMIN — SODIUM CHLORIDE 20 ML/HR: 0.9 INJECTION, SOLUTION INTRAVENOUS at 09:44

## 2020-03-10 RX ADMIN — DEXAMETHASONE SODIUM PHOSPHATE: 10 INJECTION, SOLUTION INTRAMUSCULAR; INTRAVENOUS at 09:44

## 2020-03-10 RX ADMIN — LEUCOVORIN CALCIUM 800 MG: 200 INJECTION, POWDER, LYOPHILIZED, FOR SUSPENSION INTRAMUSCULAR; INTRAVENOUS at 10:12

## 2020-03-10 NOTE — PATIENT INSTRUCTIONS
Nutrition Rx & Recommendations:  · Diet: High Calorie, High Protein (for high calorie foods see pages 52-53, and for high protein foods see pages 49-51 in your Eating Hints book)  · Nutrition Supplements: Boost Plus BID and Protein Powder once daily added to V8 juice  May use homemade shakes/smoothies as desired  If using a pre-made shake/bar, choose ones with >300 calories and >10 grams protein (ex  Ensure Enlive, Ensure Plus, Boost Plus, Boost Very High Calorie, etc )  · Small, frequent meals/snacks may be easier to tolerate than 3 large daily meals  Aim for 5-6 small meals per day (every 2-3 hours)  · Include protein at all meals/snacks  · Stay hydrated by sipping fluids of choice/tolerance throughout the day  · Weigh yourself regularly  If you notice weight loss, make an effort to increase your daily food/calorie intake  If you continue to notice loss after these efforts, reach out to your dietitian to establish a plan to stabilize weight  · Follow a Cancer Preventative Nutrition Pattern: colorful, plant-based, low-fat, avoid added sugars, limit alcohol, include high fiber foods, limit processed meats, limit red meat, choose lean protein sources, use low-fat cooking methods, balance calories with physical activity, avoid excessive weight gain throughout life  · Aim for  oz fluid each day, water is the best choice  · Add snacks between meals and expand on breakfast meal:  · Breakfast Ideas: eggs with cheese and toast with butter, avocado, or peanut butter OR shredded wheat with whole milk and berries OR cream of wheat made with whole milk and peanut butter mixed in  · Snack Ideas: Greek yogurt with raspberries; cheese with crackers; oranges with cheese; Boost Plus; egg salad and cheese;  Well Yes Soups    Follow Up Plan: 4/21/20 during infusion   Recommend Referral to Other Providers: none at this time

## 2020-03-10 NOTE — PLAN OF CARE
Problem: Potential for Falls  Goal: Patient will remain free of falls  Description  INTERVENTIONS:  - Assess patient frequently for physical needs  -  Identify cognitive and physical deficits and behaviors that affect risk of falls    -  [place] fall precautions as indicated by assessment   - Educate patient/family on patient safety including physical limitations  - Instruct patient to call for assistance with activity based on assessment  - Modify environment to reduce risk of injury  - Consider OT/PT consult to assist with strengthening/mobility  Outcome: Progressing     Problem: INFECTION - ADULT  Goal: Absence or prevention of progression during hospitalization  Description  INTERVENTIONS:  - Assess and monitor for signs and symptoms of infection  - Monitor lab/diagnostic results  - Monitor all insertion sites, i e  indwelling lines, tubes, and drains  - Monitor endotracheal (as able) and nasal secretions for changes in amount and color  - [place] appropriate cooling/warming therapies per order  - Administer medications as ordered  - Instruct and encourage patient and family to use good hand hygiene technique  - Identify and instruct in appropriate isolation precautions for identified infection/condition  Outcome: Progressing  Goal: Absence of fever/infection during neutropenic period  Description  INTERVENTIONS:  - Monitor WBC  - Implement neutropenic guidelines  Outcome: Progressing     Problem: DISCHARGE PLANNING  Goal: Discharge to home or other facility with appropriate resources  Description  INTERVENTIONS:  - Identify barriers to discharge w/patient and caregiver  - Arrange for needed discharge resources and transportation as appropriate  - Identify discharge learning needs (meds, wound care, etc )  - Arrange for interpretive services to assist at discharge as needed  - Refer to Case Management Department for coordinating discharge planning if the patient needs post-hospital services based on physician/advanced practitioner order or complex needs related to functional status, cognitive ability, or social support system  Outcome: Progressing     Problem: Knowledge Deficit  Goal: Patient/family/caregiver demonstrates understanding of disease process, treatment plan, medications, and discharge instructions  Description  Complete learning assessment and assess knowledge base    Interventions:  - Provide teaching at level of understanding  - Provide teaching via preferred learning methods  Outcome: Progressing

## 2020-03-12 ENCOUNTER — HOSPITAL ENCOUNTER (OUTPATIENT)
Dept: INFUSION CENTER | Facility: HOSPITAL | Age: 65
Discharge: HOME | End: 2020-03-12

## 2020-03-12 ENCOUNTER — APPOINTMENT (OUTPATIENT)
Dept: PHYSICAL THERAPY | Facility: CLINIC | Age: 65
End: 2020-03-12
Payer: MEDICARE

## 2020-03-12 VITALS — TEMPERATURE: 97.2 F

## 2020-03-12 DIAGNOSIS — C16.0 MALIGNANT NEOPLASM OF CARDIA OF STOMACH (HCC): ICD-10-CM

## 2020-03-12 DIAGNOSIS — R11.0 NAUSEA: ICD-10-CM

## 2020-03-12 DIAGNOSIS — C15.5 MALIGNANT NEOPLASM OF LOWER THIRD OF ESOPHAGUS (HCC): Primary | ICD-10-CM

## 2020-03-12 DIAGNOSIS — E86.0 DEHYDRATION: ICD-10-CM

## 2020-03-12 NOTE — PLAN OF CARE
Problem: Potential for Falls  Goal: Patient will remain free of falls  Description  INTERVENTIONS:  - Assess patient frequently for physical needs  -  Identify cognitive and physical deficits and behaviors that affect risk of falls    -  [place] fall precautions as indicated by assessment   - Educate patient/family on patient safety including physical limitations  - Instruct patient to call for assistance with activity based on assessment  - Modify environment to reduce risk of injury  - Consider OT/PT consult to assist with strengthening/mobility  Outcome: Progressing     Problem: INFECTION - ADULT  Goal: Absence or prevention of progression during hospitalization  Description  INTERVENTIONS:  - Assess and monitor for signs and symptoms of infection  - Monitor lab/diagnostic results  - Monitor all insertion sites, i e  indwelling lines, tubes, and drains  - Monitor endotracheal (as able) and nasal secretions for changes in amount and color  - [place] appropriate cooling/warming therapies per order  - Administer medications as ordered  - Instruct and encourage patient and family to use good hand hygiene technique  - Identify and instruct in appropriate isolation precautions for identified infection/condition  Outcome: Progressing  Goal: Absence of fever/infection during neutropenic period  Description  INTERVENTIONS:  - Monitor WBC  - Implement neutropenic guidelines  Outcome: Progressing     Problem: DISCHARGE PLANNING  Goal: Discharge to home or other facility with appropriate resources  Description  INTERVENTIONS:  - Identify barriers to discharge w/patient and caregiver  - Arrange for needed discharge resources and transportation as appropriate  - Identify discharge learning needs (meds, wound care, etc )  - Arrange for interpretive services to assist at discharge as needed  - Refer to Case Management Department for coordinating discharge planning if the patient needs post-hospital services based on physician/advanced practitioner order or complex needs related to functional status, cognitive ability, or social support system  Outcome: Progressing     Problem: Knowledge Deficit  Goal: Patient/family/caregiver demonstrates understanding of disease process, treatment plan, medications, and discharge instructions  Description  Complete learning assessment and assess knowledge base    Interventions:  - Provide teaching at level of understanding  - Provide teaching via preferred learning methods  Outcome: Progressing

## 2020-03-13 DIAGNOSIS — I10 ESSENTIAL HYPERTENSION: ICD-10-CM

## 2020-03-13 RX ORDER — BISOPROLOL FUMARATE 5 MG/1
5 TABLET ORAL DAILY
Start: 2020-03-13

## 2020-03-13 NOTE — TELEPHONE ENCOUNTER
Pt called requesting refill for   Bisoprolol 5 mg last filled  01/07/20  And   Dexamethasone 4 mg  Last filled 09/03/19   Per Dr Soraida Sr  Call placed to pt to assess status as last OV note states pt had successfully stopped dexamethasone ( unknown date stopped)  Per pt he had taken in past re poor appetite  He was good for awhile but now he is not eating well and feels he did better when taking dexamethasone  Pt has an office visit scheduled with   Dr Nicolás Rubio 03/31/20  Pt did sound to be in acute distress, pleasant  Understands this nurse was reaching out to physicians for direction  Sent to Dr Maryann Rubio

## 2020-03-13 NOTE — TELEPHONE ENCOUNTER
No refills at this time  He will follow up with Dr Ken Cedeno to discuss appropriate next steps for his appetite  The bisprolol should go through his PCP

## 2020-03-16 ENCOUNTER — APPOINTMENT (OUTPATIENT)
Dept: PHYSICAL THERAPY | Facility: CLINIC | Age: 65
End: 2020-03-16
Payer: MEDICARE

## 2020-03-18 DIAGNOSIS — C15.5 MALIGNANT NEOPLASM OF LOWER THIRD OF ESOPHAGUS (HCC): ICD-10-CM

## 2020-03-18 DIAGNOSIS — E86.0 DEHYDRATION: ICD-10-CM

## 2020-03-18 DIAGNOSIS — C16.0 MALIGNANT NEOPLASM OF CARDIA OF STOMACH (HCC): ICD-10-CM

## 2020-03-18 DIAGNOSIS — R11.0 NAUSEA: Primary | ICD-10-CM

## 2020-03-19 ENCOUNTER — APPOINTMENT (OUTPATIENT)
Dept: PHYSICAL THERAPY | Facility: CLINIC | Age: 65
End: 2020-03-19
Payer: MEDICARE

## 2020-03-20 ENCOUNTER — TELEPHONE (OUTPATIENT)
Dept: FAMILY MEDICINE CLINIC | Facility: CLINIC | Age: 65
End: 2020-03-20

## 2020-03-20 NOTE — TELEPHONE ENCOUNTER
Patient called on refill line for dexamethasone refill , we are not prescribing doctor , left message for him to call America Sanchez MD

## 2020-03-23 ENCOUNTER — APPOINTMENT (OUTPATIENT)
Dept: LAB | Facility: CLINIC | Age: 65
End: 2020-03-23
Payer: MEDICARE

## 2020-03-23 ENCOUNTER — APPOINTMENT (OUTPATIENT)
Dept: PHYSICAL THERAPY | Facility: CLINIC | Age: 65
End: 2020-03-23
Payer: MEDICARE

## 2020-03-23 DIAGNOSIS — E86.0 DEHYDRATION: ICD-10-CM

## 2020-03-23 DIAGNOSIS — R11.0 NAUSEA: ICD-10-CM

## 2020-03-23 DIAGNOSIS — C16.0 MALIGNANT NEOPLASM OF CARDIA OF STOMACH (HCC): ICD-10-CM

## 2020-03-23 DIAGNOSIS — C15.5 MALIGNANT NEOPLASM OF LOWER THIRD OF ESOPHAGUS (HCC): ICD-10-CM

## 2020-03-23 LAB
ALBUMIN SERPL BCP-MCNC: 3.3 G/DL (ref 3.5–5)
ALP SERPL-CCNC: 101 U/L (ref 46–116)
ALT SERPL W P-5'-P-CCNC: 17 U/L (ref 12–78)
ANION GAP SERPL CALCULATED.3IONS-SCNC: 7 MMOL/L (ref 4–13)
AST SERPL W P-5'-P-CCNC: 15 U/L (ref 5–45)
BASOPHILS # BLD AUTO: 0.14 THOUSANDS/ΜL (ref 0–0.1)
BASOPHILS NFR BLD AUTO: 1 % (ref 0–1)
BILIRUB SERPL-MCNC: 1.06 MG/DL (ref 0.2–1)
BUN SERPL-MCNC: 13 MG/DL (ref 5–25)
CALCIUM SERPL-MCNC: 9.4 MG/DL (ref 8.3–10.1)
CHLORIDE SERPL-SCNC: 105 MMOL/L (ref 100–108)
CO2 SERPL-SCNC: 25 MMOL/L (ref 21–32)
CREAT SERPL-MCNC: 1.03 MG/DL (ref 0.6–1.3)
EOSINOPHIL # BLD AUTO: 0.24 THOUSAND/ΜL (ref 0–0.61)
EOSINOPHIL NFR BLD AUTO: 2 % (ref 0–6)
ERYTHROCYTE [DISTWIDTH] IN BLOOD BY AUTOMATED COUNT: 16.9 % (ref 11.6–15.1)
GFR SERPL CREATININE-BSD FRML MDRD: 75 ML/MIN/1.73SQ M
GLUCOSE P FAST SERPL-MCNC: 146 MG/DL (ref 65–99)
HCT VFR BLD AUTO: 41 % (ref 36.5–49.3)
HGB BLD-MCNC: 12.8 G/DL (ref 12–17)
IMM GRANULOCYTES # BLD AUTO: 0.09 THOUSAND/UL (ref 0–0.2)
IMM GRANULOCYTES NFR BLD AUTO: 1 % (ref 0–2)
LYMPHOCYTES # BLD AUTO: 2.98 THOUSANDS/ΜL (ref 0.6–4.47)
LYMPHOCYTES NFR BLD AUTO: 24 % (ref 14–44)
MCH RBC QN AUTO: 32.7 PG (ref 26.8–34.3)
MCHC RBC AUTO-ENTMCNC: 31.2 G/DL (ref 31.4–37.4)
MCV RBC AUTO: 105 FL (ref 82–98)
MONOCYTES # BLD AUTO: 1.52 THOUSAND/ΜL (ref 0.17–1.22)
MONOCYTES NFR BLD AUTO: 12 % (ref 4–12)
NEUTROPHILS # BLD AUTO: 7.48 THOUSANDS/ΜL (ref 1.85–7.62)
NEUTS SEG NFR BLD AUTO: 60 % (ref 43–75)
NRBC BLD AUTO-RTO: 0 /100 WBCS
PLATELET # BLD AUTO: 204 THOUSANDS/UL (ref 149–390)
PMV BLD AUTO: 12.1 FL (ref 8.9–12.7)
POTASSIUM SERPL-SCNC: 3.9 MMOL/L (ref 3.5–5.3)
PROT SERPL-MCNC: 7.6 G/DL (ref 6.4–8.2)
RBC # BLD AUTO: 3.92 MILLION/UL (ref 3.88–5.62)
SODIUM SERPL-SCNC: 137 MMOL/L (ref 136–145)
WBC # BLD AUTO: 12.45 THOUSAND/UL (ref 4.31–10.16)

## 2020-03-23 PROCEDURE — 85025 COMPLETE CBC W/AUTO DIFF WBC: CPT

## 2020-03-23 PROCEDURE — 80053 COMPREHEN METABOLIC PANEL: CPT

## 2020-03-23 PROCEDURE — 36415 COLL VENOUS BLD VENIPUNCTURE: CPT

## 2020-03-24 ENCOUNTER — HOSPITAL ENCOUNTER (OUTPATIENT)
Dept: INFUSION CENTER | Facility: HOSPITAL | Age: 65
Discharge: HOME | End: 2020-03-24
Payer: MEDICARE

## 2020-03-24 VITALS
TEMPERATURE: 97.8 F | BODY MASS INDEX: 27.17 KG/M2 | WEIGHT: 183.42 LBS | OXYGEN SATURATION: 98 % | RESPIRATION RATE: 16 BRPM | SYSTOLIC BLOOD PRESSURE: 109 MMHG | HEIGHT: 69 IN | DIASTOLIC BLOOD PRESSURE: 77 MMHG | HEART RATE: 68 BPM

## 2020-03-24 DIAGNOSIS — R11.0 NAUSEA: ICD-10-CM

## 2020-03-24 DIAGNOSIS — C15.5 MALIGNANT NEOPLASM OF LOWER THIRD OF ESOPHAGUS (HCC): Primary | ICD-10-CM

## 2020-03-24 DIAGNOSIS — C16.0 MALIGNANT NEOPLASM OF CARDIA OF STOMACH (HCC): ICD-10-CM

## 2020-03-24 DIAGNOSIS — E86.0 DEHYDRATION: ICD-10-CM

## 2020-03-24 PROCEDURE — 96367 TX/PROPH/DG ADDL SEQ IV INF: CPT

## 2020-03-24 PROCEDURE — G0498 CHEMO EXTEND IV INFUS W/PUMP: HCPCS

## 2020-03-24 PROCEDURE — 96409 CHEMO IV PUSH SNGL DRUG: CPT

## 2020-03-24 PROCEDURE — 96366 THER/PROPH/DIAG IV INF ADDON: CPT

## 2020-03-24 RX ORDER — DEXTROSE MONOHYDRATE 50 MG/ML
20 INJECTION, SOLUTION INTRAVENOUS ONCE
Status: DISCONTINUED | OUTPATIENT
Start: 2020-03-24 | End: 2020-03-27 | Stop reason: HOSPADM

## 2020-03-24 RX ORDER — FLUOROURACIL 50 MG/ML
400 INJECTION, SOLUTION INTRAVENOUS ONCE
Status: COMPLETED | OUTPATIENT
Start: 2020-03-24 | End: 2020-03-24

## 2020-03-24 RX ORDER — SODIUM CHLORIDE 9 MG/ML
20 INJECTION, SOLUTION INTRAVENOUS ONCE AS NEEDED
Status: DISCONTINUED | OUTPATIENT
Start: 2020-03-24 | End: 2020-03-27 | Stop reason: HOSPADM

## 2020-03-24 RX ADMIN — SODIUM CHLORIDE 20 ML/HR: 0.9 INJECTION, SOLUTION INTRAVENOUS at 10:28

## 2020-03-24 RX ADMIN — DEXAMETHASONE SODIUM PHOSPHATE: 10 INJECTION, SOLUTION INTRAMUSCULAR; INTRAVENOUS at 10:30

## 2020-03-24 RX ADMIN — FLUOROURACIL 795 MG: 50 INJECTION, SOLUTION INTRAVENOUS at 13:13

## 2020-03-24 RX ADMIN — LEUCOVORIN CALCIUM 800 MG: 200 INJECTION, POWDER, LYOPHILIZED, FOR SUSPENSION INTRAMUSCULAR; INTRAVENOUS at 11:04

## 2020-03-25 ENCOUNTER — TELEPHONE (OUTPATIENT)
Dept: PHYSICAL THERAPY | Facility: CLINIC | Age: 65
End: 2020-03-25

## 2020-03-25 NOTE — TELEPHONE ENCOUNTER
S: Patient cancelled all future appts due to COVID-19 concerns  O: Discussed use of phone call/video chat for an e-visit and patient expressed interest  PT reviewed HEP including frequent ambulation  Stressed importance of safety when performing all exercises to minimize risk for falls  A: Patient verbalized understanding and PT will reach out to schedule e-visits  P: Follow-up with pt 1-2x per week as able

## 2020-03-26 ENCOUNTER — APPOINTMENT (OUTPATIENT)
Dept: PHYSICAL THERAPY | Facility: CLINIC | Age: 65
End: 2020-03-26
Payer: MEDICARE

## 2020-03-26 ENCOUNTER — TELEPHONE (OUTPATIENT)
Dept: PALLIATIVE MEDICINE | Facility: CLINIC | Age: 65
End: 2020-03-26

## 2020-03-26 ENCOUNTER — HOSPITAL ENCOUNTER (OUTPATIENT)
Dept: INFUSION CENTER | Facility: HOSPITAL | Age: 65
Discharge: HOME | End: 2020-03-26
Attending: INTERNAL MEDICINE

## 2020-03-26 VITALS — TEMPERATURE: 97.5 F

## 2020-03-26 DIAGNOSIS — R11.0 NAUSEA: ICD-10-CM

## 2020-03-26 DIAGNOSIS — C15.5 MALIGNANT NEOPLASM OF LOWER THIRD OF ESOPHAGUS (HCC): Primary | ICD-10-CM

## 2020-03-26 DIAGNOSIS — C16.0 MALIGNANT NEOPLASM OF CARDIA OF STOMACH (HCC): ICD-10-CM

## 2020-03-26 DIAGNOSIS — E86.0 DEHYDRATION: ICD-10-CM

## 2020-03-26 NOTE — TELEPHONE ENCOUNTER
Message on mediation line requesting refill on Dexamethasone  Noted he requested earlier this month  Medication not filled until pt seen/virtual by Dr Tosin Baez Rescheduled from Tuesday to Friday 03/27/20  Spoke to spouse who is agreeable to try video if she has capability otherwise will do virtual phone visit  Also , instructed spouse to contact pcp for management of bisoprolol

## 2020-03-27 ENCOUNTER — TELEMEDICINE (OUTPATIENT)
Dept: PALLIATIVE MEDICINE | Facility: CLINIC | Age: 65
End: 2020-03-27
Payer: MEDICARE

## 2020-03-27 DIAGNOSIS — T45.1X5A CHEMOTHERAPY-INDUCED NAUSEA: ICD-10-CM

## 2020-03-27 DIAGNOSIS — R11.0 CHEMOTHERAPY-INDUCED NAUSEA: ICD-10-CM

## 2020-03-27 DIAGNOSIS — C15.5 MALIGNANT NEOPLASM OF LOWER THIRD OF ESOPHAGUS (HCC): ICD-10-CM

## 2020-03-27 DIAGNOSIS — C16.0 MALIGNANT NEOPLASM OF CARDIA OF STOMACH (HCC): Primary | ICD-10-CM

## 2020-03-27 DIAGNOSIS — F41.9 ANXIETY: ICD-10-CM

## 2020-03-27 DIAGNOSIS — F43.10 PTSD (POST-TRAUMATIC STRESS DISORDER): ICD-10-CM

## 2020-03-27 DIAGNOSIS — G47.09 OTHER INSOMNIA: Chronic | ICD-10-CM

## 2020-03-27 DIAGNOSIS — F42.9 OBSESSIVE-COMPULSIVE DISORDER, UNSPECIFIED TYPE: ICD-10-CM

## 2020-03-27 DIAGNOSIS — Z51.5 PALLIATIVE CARE PATIENT: ICD-10-CM

## 2020-03-27 DIAGNOSIS — Z71.89 COUNSELING AND COORDINATION OF CARE: Primary | ICD-10-CM

## 2020-03-27 DIAGNOSIS — F33.9 RECURRENT MAJOR DEPRESSIVE DISORDER, REMISSION STATUS UNSPECIFIED (HCC): ICD-10-CM

## 2020-03-27 DIAGNOSIS — Z71.89 COUNSELING ON HEALTH PROMOTION AND DISEASE PREVENTION: ICD-10-CM

## 2020-03-27 DIAGNOSIS — R23.4 SKIN FISSURES: ICD-10-CM

## 2020-03-27 DIAGNOSIS — R53.83 OTHER FATIGUE: ICD-10-CM

## 2020-03-27 DIAGNOSIS — R13.19 OTHER DYSPHAGIA: ICD-10-CM

## 2020-03-27 DIAGNOSIS — G89.3 CANCER ASSOCIATED PAIN: ICD-10-CM

## 2020-03-27 PROCEDURE — 99443 PR PHYS/QHP TELEPHONE EVALUATION 21-30 MIN: CPT | Performed by: INTERNAL MEDICINE

## 2020-03-27 PROCEDURE — NC001 PR NO CHARGE

## 2020-03-27 PROCEDURE — 3074F SYST BP LT 130 MM HG: CPT

## 2020-03-27 PROCEDURE — 1160F RVW MEDS BY RX/DR IN RCRD: CPT

## 2020-03-27 PROCEDURE — 3078F DIAST BP <80 MM HG: CPT

## 2020-03-27 RX ORDER — OLANZAPINE 5 MG/1
5 TABLET ORAL
Qty: 30 TABLET | Refills: 0 | Status: SHIPPED | OUTPATIENT
Start: 2020-03-27 | End: 2020-04-23 | Stop reason: SDUPTHER

## 2020-03-27 RX ORDER — DEXAMETHASONE 4 MG/1
4 TABLET ORAL
Qty: 30 TABLET | Refills: 0 | Status: SHIPPED | OUTPATIENT
Start: 2020-03-27 | End: 2020-04-23 | Stop reason: SDUPTHER

## 2020-03-27 NOTE — PATIENT INSTRUCTIONS
It was a pleasure to speak with you today  Thank you for your time   I'm glad you're doing relatively well   For the return of nausea and vomiting, low appetite, fatigue d/t chemotherapy:  o Restarting dexamethasone 4mg qAM   o Starting olanzapine 5mg QHS   Please continue to see your Psychiatrist monthly, for therapy and medications   Please let us know if you need assistance with the 5 Wishes document  When finished, please bring it to the [facility] office ([facility]) so it can be scanned in  · Return in about 1 month  We can meet in person, via phone, or via video  · Call us for refills on medications that we supply, as needed  · If something changes and you need to come in sooner, please call our office! Please protect yourself from the novel Coronavirus (COVID-19)! Even though we do not have a vaccine or any antiviral drugs for this infection, the following strategies can help you stay healthy:    · Perform hand hygiene with soap and water or hand  with at least 60% alcohol often, but especially after going to the bathroom, after blowing your nose/coughing/sneezing, before eating, and after coming into contact with a potentially contaminated public surface  · Avoid touching your face! · Avoid close contact with people who are sick  Avoid large gatherings, and don't travel unnecessarily  · Stay home when you are sick, except to get medical care  If you can eat and drink and breathe and sleep, please consider calling your doctor's office instead of visiting in person  · Cover your coughs and sneezes with a tissue, or your elbow  · Clean frequently touched surfaces and objects daily (e g , tables, countertops, light switches, doorknobs, and cabinet handles)  Regular household detergent and water are sufficient

## 2020-03-27 NOTE — PROGRESS NOTES
Outpatient Virtual Brief Visit - Palliative and Supportive Care  Kris Edmond 75 y o  male [id number]    Intake and Identification:    Reason(s) for virtual visit: telephone, follow-up, symptom management and psychosocial support    Encounter provider Ra Brenner MD, located at:  [address] [place] [id number] [phone]    Recent Visits  Date Type Provider Dept   03/26/20 Telephone Yumiko Webber RN [facility]   Showing recent visits within past 7 days and meeting all other requirements     Today's Visits  Date Type Provider Dept   03/27/20 Telemedicine Ra Brenner, [address] today's visits and meeting all other requirements     Future Appointments  No visits were found meeting these conditions  Showing future appointments within next 150 days and meeting all other requirements      Patient agrees to participate in a virtual check in via telephone or video visit instead of presenting to the office to address urgent/immediate medical needs, or to respect quarantine and public health guidelines  Patient is aware this is a billable service  After connecting through telephone, the patient was identified by name and date of birth  Kris Edmond was informed that this was a telemedicine visit and that the visit is being conducted through telephone which may not be secure and therefore might not be HIPAA-compliant  My office door was closed  No one else was in the room  He acknowledged consent and understanding of privacy and security of the virtual check-in visit  I informed the patient that I have reviewed his record in Epic and presented the opportunity for him to ask any questions regarding the visit today  The patient initiated communication and agreed to participate      ASSESSMENT & PLAN:    Telephone assessment: patient in good spirits, making jokes, answering questions appropriately, alert and oriented    1  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[facility] [address] )    2  Malignant neoplasm of lower third of esophagus (HCC)    3  Recurrent major depressive disorder, remission status unspecified ([facility] [address] )    4  Anxiety    5  PTSD (post-traumatic stress disorder)    6  Obsessive-compulsive disorder, unspecified type    7  Other dysphagia    8  Skin fissures    9  Cancer associated pain    10  Chemotherapy-induced nausea    11  Palliative care patient    12  Other fatigue    13  Other insomnia    14  Counseling on health promotion and disease prevention       Patient is doing relatively well but has noted a return of nausea and vomiting, anorexia, fatigue since getting his chemotherapy (last infusion 3/24/20)  o Restarting dexamethasone 4mg qAM   o Starting olanzapine 5mg QHS   No significant pain, no other major symptoms aside from depression + anxiety  o Patient sees a Psychiatrist monthly, for therapy and medications   Patient has not made progress on 5 Wishes  Counseled   LSW present for virtual visit  Emotional support provided   Medication safety issues addressed - no driving under the influence of narcotics, watch for adverse effects including AMS and respiratory depression, keep medications stored in a safe/locked environment  Requested Prescriptions     Signed Prescriptions Disp Refills    OLANZapine (ZyPREXA) 5 mg tablet 30 tablet 0     Sig: Take 1 tablet (5 mg total) by mouth daily at bedtime    dexamethasone (DECADRON) 4 mg tablet 30 tablet 0     Sig: Take 1 tablet (4 mg total) by mouth daily with breakfast     There are no discontinued medications  Representatives have queried the patient's controlled substance dispensing history in the Prescription Drug Monitoring Program in compliance with regulations before I have prescribed any controlled substances  The prescription history is consistent with prescribed therapy and our practice policies        40+ minutes were spent with patient in this virtual check-in (phone) visit, with greater than 50% of the time spent in counseling or coordination of care including discussions of symptom assessment and management, medication review and adjustment, psychosocial support, chart review, imaging review, lab review, advanced directives  All of the patient's questions were answered during this discussion  5 minutes spent counseling this immunocompromised patient on safe practices during this COVID19 pandemic  Supportive listening and reassurance provided  All questions answered  He is invited to continue to follow with us  If there are questions or concerns, he knows to contact us through our clinic/answering service 24 hours a day, seven days a week  No follow-ups on file  SUBJECTIVE:  Chief Complaint   Patient presents with    Virtual Brief Visit    Anorexia    Nausea    Depression    Anxiety    Insomnia    Virtual Brief Visit      HPI    María Doctor is a 75 y o  male w/ 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 s/p chemotherapy, CINV, gait dysfunction, elevate PSA (8 0 on 1/24/20), recurrent major depression, anxiety, PTSD, OCD, gout  He follows w/ Dr Dimitris Padilla (Medical Oncology), Dr Shira Zavala (Urology) locally; he has also seen the Dr Ryne Chávez ([facility])  Current treatment is palliative modified FOLFOX6 (oxaliplatin discontinued after cycle 6 d/t burning sensation)  Patient is known to [facility] clinic; last seen by Dr Roscoe Meade 1/7/20 for symptom management, advanced care planning  Virtual visit today for same  Patient was initially referred to [facility] for symptom management  Many of his initial symptoms improved with disease-directed cancer therapy  Dysphagia, pain, poor appetite, and nausea are all improved, and he had been able to stop use of dexamethasone without sacrificing his symptom control      Unfortunately as his chemotherapy progressed, some of his symptoms returned  He c/o worsening anorexia, fatigue, CINV and wishes to restart the dexamethasone he had been on last Summer/Fall  He felt the 4mg qAM was very helpful  He is willing to try olanzapine as well  He also notes dry skin on bilateral hands; the ammonium lactate continues to provide relief  He can move his bowels regularly  He endorses anxiety and depression; he sees a Psychiatrist (Dr Ivelisse Rivers, [place] [address] ) monthly for medications and psychotherapy  His Effexor and Valium help w/ mood and sleep  PDMP shows no concerns  The following portions of the medical history were reviewed: past medical history, problem list, medication list, and social history  Current Outpatient Medications:     ammonium lactate (LAC-HYDRIN) 12 % cream, Apply topically as needed for dry skin On hands and feet, Disp: 385 g, Rfl: 2    apixaban (ELIQUIS) 5 mg, Take 1 tablet (5 mg total) by mouth 2 (two) times a day for 154 doses, Disp: 154 tablet, Rfl: 0    bisoprolol (ZEBETA) 5 mg tablet, Take 1 tablet (5 mg total) by mouth daily, Disp: , Rfl:     dexamethasone (DECADRON) 4 mg tablet, Take 1 tablet (4 mg total) by mouth daily with breakfast, Disp: 30 tablet, Rfl: 0    diazepam (VALIUM) 5 mg tablet, Take 1 tablet (5 mg total) by mouth 2 (two) times a day, Disp: 60 tablet, Rfl: 0    lidocaine-prilocaine (EMLA) cream, Apply topically as needed for mild pain, Disp: 30 g, Rfl: 0    OLANZapine (ZyPREXA) 5 mg tablet, Take 1 tablet (5 mg total) by mouth daily at bedtime, Disp: 30 tablet, Rfl: 0    pantoprazole (PROTONIX) 40 mg tablet, Take 40 mg by mouth daily before breakfast, Disp: , Rfl:     venlafaxine (EFFEXOR-XR) 150 mg 24 hr capsule, Take 1 capsule (150 mg total) by mouth daily, Disp: 30 capsule, Rfl: 0  No current facility-administered medications for this visit  Review of Systems   Constitutional: Positive for activity change, appetite change and fatigue     Gastrointestinal: Positive for nausea and vomiting  Psychiatric/Behavioral: Positive for dysphoric mood  The patient is nervous/anxious  All other systems reviewed and are negative      Chayo VergarautaMD verena  [address] and Supportive Care

## 2020-03-30 ENCOUNTER — APPOINTMENT (OUTPATIENT)
Dept: PHYSICAL THERAPY | Facility: CLINIC | Age: 65
End: 2020-03-30
Payer: MEDICARE

## 2020-03-30 DIAGNOSIS — E86.0 DEHYDRATION: ICD-10-CM

## 2020-03-30 DIAGNOSIS — C16.0 MALIGNANT NEOPLASM OF CARDIA OF STOMACH (HCC): ICD-10-CM

## 2020-03-30 DIAGNOSIS — R11.0 NAUSEA: Primary | ICD-10-CM

## 2020-03-30 DIAGNOSIS — C15.5 MALIGNANT NEOPLASM OF LOWER THIRD OF ESOPHAGUS (HCC): ICD-10-CM

## 2020-03-30 RX ORDER — DEXTROSE MONOHYDRATE 50 MG/ML
20 INJECTION, SOLUTION INTRAVENOUS ONCE
Status: CANCELLED | OUTPATIENT
Start: 2020-04-07

## 2020-03-30 RX ORDER — SODIUM CHLORIDE 9 MG/ML
20 INJECTION, SOLUTION INTRAVENOUS ONCE AS NEEDED
Status: CANCELLED | OUTPATIENT
Start: 2020-04-07

## 2020-03-30 RX ORDER — FLUOROURACIL 50 MG/ML
400 INJECTION, SOLUTION INTRAVENOUS ONCE
Status: CANCELLED | OUTPATIENT
Start: 2020-04-07

## 2020-04-02 ENCOUNTER — TELEPHONE (OUTPATIENT)
Dept: HEMATOLOGY ONCOLOGY | Facility: CLINIC | Age: 65
End: 2020-04-02

## 2020-04-02 DIAGNOSIS — I80.01 SUPERFICIAL THROMBOPHLEBITIS OF RIGHT LEG: ICD-10-CM

## 2020-04-03 ENCOUNTER — TELEPHONE (OUTPATIENT)
Dept: HEMATOLOGY ONCOLOGY | Facility: CLINIC | Age: 65
End: 2020-04-03

## 2020-04-03 ENCOUNTER — TELEPHONE (OUTPATIENT)
Dept: HEMATOLOGY ONCOLOGY | Facility: MEDICAL CENTER | Age: 65
End: 2020-04-03

## 2020-04-03 ENCOUNTER — APPOINTMENT (OUTPATIENT)
Dept: LAB | Facility: CLINIC | Age: 65
End: 2020-04-03
Payer: MEDICARE

## 2020-04-03 DIAGNOSIS — I80.01 SUPERFICIAL THROMBOPHLEBITIS OF RIGHT LEG: ICD-10-CM

## 2020-04-03 DIAGNOSIS — E86.0 DEHYDRATION: ICD-10-CM

## 2020-04-03 DIAGNOSIS — C15.5 MALIGNANT NEOPLASM OF LOWER THIRD OF ESOPHAGUS (HCC): ICD-10-CM

## 2020-04-03 DIAGNOSIS — R11.0 NAUSEA: ICD-10-CM

## 2020-04-03 DIAGNOSIS — C16.0 MALIGNANT NEOPLASM OF CARDIA OF STOMACH (HCC): ICD-10-CM

## 2020-04-03 LAB
ALBUMIN SERPL BCP-MCNC: 3.6 G/DL (ref 3.5–5)
ALP SERPL-CCNC: 87 U/L (ref 46–116)
ALT SERPL W P-5'-P-CCNC: 15 U/L (ref 12–78)
ANION GAP SERPL CALCULATED.3IONS-SCNC: 8 MMOL/L (ref 4–13)
AST SERPL W P-5'-P-CCNC: 12 U/L (ref 5–45)
BASOPHILS # BLD AUTO: 0.05 THOUSANDS/ΜL (ref 0–0.1)
BASOPHILS NFR BLD AUTO: 0 % (ref 0–1)
BILIRUB SERPL-MCNC: 0.47 MG/DL (ref 0.2–1)
BUN SERPL-MCNC: 22 MG/DL (ref 5–25)
CALCIUM SERPL-MCNC: 9.1 MG/DL (ref 8.3–10.1)
CHLORIDE SERPL-SCNC: 104 MMOL/L (ref 100–108)
CO2 SERPL-SCNC: 25 MMOL/L (ref 21–32)
CREAT SERPL-MCNC: 0.92 MG/DL (ref 0.6–1.3)
EOSINOPHIL # BLD AUTO: 0.1 THOUSAND/ΜL (ref 0–0.61)
EOSINOPHIL NFR BLD AUTO: 1 % (ref 0–6)
ERYTHROCYTE [DISTWIDTH] IN BLOOD BY AUTOMATED COUNT: 16.1 % (ref 11.6–15.1)
GFR SERPL CREATININE-BSD FRML MDRD: 86 ML/MIN/1.73SQ M
GLUCOSE SERPL-MCNC: 142 MG/DL (ref 65–140)
HCT VFR BLD AUTO: 38.2 % (ref 36.5–49.3)
HGB BLD-MCNC: 12.4 G/DL (ref 12–17)
IMM GRANULOCYTES # BLD AUTO: 0.08 THOUSAND/UL (ref 0–0.2)
IMM GRANULOCYTES NFR BLD AUTO: 1 % (ref 0–2)
LYMPHOCYTES # BLD AUTO: 3.82 THOUSANDS/ΜL (ref 0.6–4.47)
LYMPHOCYTES NFR BLD AUTO: 31 % (ref 14–44)
MCH RBC QN AUTO: 33.2 PG (ref 26.8–34.3)
MCHC RBC AUTO-ENTMCNC: 32.5 G/DL (ref 31.4–37.4)
MCV RBC AUTO: 102 FL (ref 82–98)
MONOCYTES # BLD AUTO: 1.04 THOUSAND/ΜL (ref 0.17–1.22)
MONOCYTES NFR BLD AUTO: 9 % (ref 4–12)
NEUTROPHILS # BLD AUTO: 7.19 THOUSANDS/ΜL (ref 1.85–7.62)
NEUTS SEG NFR BLD AUTO: 58 % (ref 43–75)
NRBC BLD AUTO-RTO: 0 /100 WBCS
PLATELET # BLD AUTO: 346 THOUSANDS/UL (ref 149–390)
PMV BLD AUTO: 11.3 FL (ref 8.9–12.7)
POTASSIUM SERPL-SCNC: 3.5 MMOL/L (ref 3.5–5.3)
PROT SERPL-MCNC: 7.4 G/DL (ref 6.4–8.2)
RBC # BLD AUTO: 3.73 MILLION/UL (ref 3.88–5.62)
SODIUM SERPL-SCNC: 137 MMOL/L (ref 136–145)
WBC # BLD AUTO: 12.28 THOUSAND/UL (ref 4.31–10.16)

## 2020-04-03 PROCEDURE — 80053 COMPREHEN METABOLIC PANEL: CPT

## 2020-04-03 PROCEDURE — 85025 COMPLETE CBC W/AUTO DIFF WBC: CPT

## 2020-04-03 PROCEDURE — 36415 COLL VENOUS BLD VENIPUNCTURE: CPT

## 2020-04-07 ENCOUNTER — HOSPITAL ENCOUNTER (OUTPATIENT)
Dept: INFUSION CENTER | Facility: HOSPITAL | Age: 65
Discharge: HOME | End: 2020-04-07
Payer: MEDICARE

## 2020-04-07 ENCOUNTER — TELEPHONE (OUTPATIENT)
Dept: HEMATOLOGY ONCOLOGY | Facility: MEDICAL CENTER | Age: 65
End: 2020-04-07

## 2020-04-07 VITALS
DIASTOLIC BLOOD PRESSURE: 73 MMHG | TEMPERATURE: 98.2 F | RESPIRATION RATE: 16 BRPM | HEART RATE: 59 BPM | BODY MASS INDEX: 27.11 KG/M2 | HEIGHT: 69 IN | SYSTOLIC BLOOD PRESSURE: 122 MMHG | WEIGHT: 183 LBS | OXYGEN SATURATION: 97 %

## 2020-04-07 DIAGNOSIS — R11.0 NAUSEA: ICD-10-CM

## 2020-04-07 DIAGNOSIS — C15.5 MALIGNANT NEOPLASM OF LOWER THIRD OF ESOPHAGUS (HCC): Primary | ICD-10-CM

## 2020-04-07 DIAGNOSIS — E86.0 DEHYDRATION: ICD-10-CM

## 2020-04-07 DIAGNOSIS — C16.0 MALIGNANT NEOPLASM OF CARDIA OF STOMACH (HCC): ICD-10-CM

## 2020-04-07 PROCEDURE — 96409 CHEMO IV PUSH SNGL DRUG: CPT

## 2020-04-07 PROCEDURE — 96366 THER/PROPH/DIAG IV INF ADDON: CPT

## 2020-04-07 PROCEDURE — G0498 CHEMO EXTEND IV INFUS W/PUMP: HCPCS

## 2020-04-07 PROCEDURE — 96367 TX/PROPH/DG ADDL SEQ IV INF: CPT

## 2020-04-07 RX ORDER — FLUOROURACIL 50 MG/ML
400 INJECTION, SOLUTION INTRAVENOUS ONCE
Status: COMPLETED | OUTPATIENT
Start: 2020-04-07 | End: 2020-04-07

## 2020-04-07 RX ORDER — SODIUM CHLORIDE 9 MG/ML
20 INJECTION, SOLUTION INTRAVENOUS ONCE AS NEEDED
Status: DISCONTINUED | OUTPATIENT
Start: 2020-04-07 | End: 2020-04-10 | Stop reason: HOSPADM

## 2020-04-07 RX ORDER — DEXTROSE MONOHYDRATE 50 MG/ML
20 INJECTION, SOLUTION INTRAVENOUS ONCE
Status: DISCONTINUED | OUTPATIENT
Start: 2020-04-07 | End: 2020-04-10 | Stop reason: HOSPADM

## 2020-04-07 RX ADMIN — DEXAMETHASONE SODIUM PHOSPHATE: 10 INJECTION, SOLUTION INTRAMUSCULAR; INTRAVENOUS at 10:42

## 2020-04-07 RX ADMIN — SODIUM CHLORIDE 20 ML/HR: 0.9 INJECTION, SOLUTION INTRAVENOUS at 10:30

## 2020-04-07 RX ADMIN — FLUOROURACIL 795 MG: 50 INJECTION, SOLUTION INTRAVENOUS at 13:29

## 2020-04-07 RX ADMIN — LEUCOVORIN CALCIUM 800 MG: 200 INJECTION, POWDER, LYOPHILIZED, FOR SUSPENSION INTRAMUSCULAR; INTRAVENOUS at 11:24

## 2020-04-08 ENCOUNTER — TELEPHONE (OUTPATIENT)
Dept: HEMATOLOGY ONCOLOGY | Facility: HOSPITAL | Age: 65
End: 2020-04-08

## 2020-04-09 ENCOUNTER — HOSPITAL ENCOUNTER (OUTPATIENT)
Dept: INFUSION CENTER | Facility: HOSPITAL | Age: 65
Discharge: HOME | End: 2020-04-09
Attending: INTERNAL MEDICINE

## 2020-04-09 ENCOUNTER — OFFICE VISIT (OUTPATIENT)
Dept: HEMATOLOGY ONCOLOGY | Facility: CLINIC | Age: 65
End: 2020-04-09
Payer: MEDICARE

## 2020-04-09 VITALS — TEMPERATURE: 96.4 F

## 2020-04-09 VITALS
HEIGHT: 69 IN | OXYGEN SATURATION: 99 % | HEART RATE: 59 BPM | WEIGHT: 186 LBS | TEMPERATURE: 97.7 F | BODY MASS INDEX: 27.55 KG/M2 | SYSTOLIC BLOOD PRESSURE: 120 MMHG | RESPIRATION RATE: 16 BRPM | DIASTOLIC BLOOD PRESSURE: 80 MMHG

## 2020-04-09 DIAGNOSIS — C16.0 MALIGNANT NEOPLASM OF CARDIA OF STOMACH (HCC): ICD-10-CM

## 2020-04-09 DIAGNOSIS — I80.01 SUPERFICIAL THROMBOPHLEBITIS OF RIGHT LEG: ICD-10-CM

## 2020-04-09 DIAGNOSIS — E86.0 DEHYDRATION: ICD-10-CM

## 2020-04-09 DIAGNOSIS — C15.5 MALIGNANT NEOPLASM OF LOWER THIRD OF ESOPHAGUS (HCC): ICD-10-CM

## 2020-04-09 DIAGNOSIS — R11.0 NAUSEA: ICD-10-CM

## 2020-04-09 DIAGNOSIS — C15.5 MALIGNANT NEOPLASM OF LOWER THIRD OF ESOPHAGUS (HCC): Primary | ICD-10-CM

## 2020-04-09 PROCEDURE — 1160F RVW MEDS BY RX/DR IN RCRD: CPT | Performed by: INTERNAL MEDICINE

## 2020-04-09 PROCEDURE — 3074F SYST BP LT 130 MM HG: CPT | Performed by: INTERNAL MEDICINE

## 2020-04-09 PROCEDURE — 99214 OFFICE O/P EST MOD 30 MIN: CPT | Performed by: INTERNAL MEDICINE

## 2020-04-09 PROCEDURE — 3008F BODY MASS INDEX DOCD: CPT | Performed by: INTERNAL MEDICINE

## 2020-04-09 PROCEDURE — 3079F DIAST BP 80-89 MM HG: CPT | Performed by: INTERNAL MEDICINE

## 2020-04-10 DIAGNOSIS — C15.5 MALIGNANT NEOPLASM OF LOWER THIRD OF ESOPHAGUS (HCC): ICD-10-CM

## 2020-04-10 DIAGNOSIS — R11.0 NAUSEA: Primary | ICD-10-CM

## 2020-04-10 DIAGNOSIS — E86.0 DEHYDRATION: ICD-10-CM

## 2020-04-10 DIAGNOSIS — C16.0 MALIGNANT NEOPLASM OF CARDIA OF STOMACH (HCC): ICD-10-CM

## 2020-04-10 RX ORDER — DEXTROSE MONOHYDRATE 50 MG/ML
20 INJECTION, SOLUTION INTRAVENOUS ONCE
Status: CANCELLED | OUTPATIENT
Start: 2020-04-21

## 2020-04-10 RX ORDER — SODIUM CHLORIDE 9 MG/ML
20 INJECTION, SOLUTION INTRAVENOUS ONCE AS NEEDED
Status: CANCELLED | OUTPATIENT
Start: 2020-04-21

## 2020-04-10 RX ORDER — FLUOROURACIL 50 MG/ML
400 INJECTION, SOLUTION INTRAVENOUS ONCE
Status: CANCELLED | OUTPATIENT
Start: 2020-04-21

## 2020-04-17 ENCOUNTER — TRANSCRIBE ORDERS (OUTPATIENT)
Dept: LAB | Facility: CLINIC | Age: 65
End: 2020-04-17

## 2020-04-17 ENCOUNTER — APPOINTMENT (OUTPATIENT)
Dept: LAB | Facility: CLINIC | Age: 65
End: 2020-04-17
Payer: MEDICARE

## 2020-04-17 DIAGNOSIS — E86.0 DEHYDRATION: ICD-10-CM

## 2020-04-17 DIAGNOSIS — C15.5 MALIGNANT NEOPLASM OF LOWER THIRD OF ESOPHAGUS (HCC): ICD-10-CM

## 2020-04-17 DIAGNOSIS — C16.0 MALIGNANT NEOPLASM OF CARDIA OF STOMACH (HCC): ICD-10-CM

## 2020-04-17 DIAGNOSIS — R11.0 NAUSEA: ICD-10-CM

## 2020-04-17 LAB
ALBUMIN SERPL BCP-MCNC: 2.8 G/DL (ref 3.5–5)
ALP SERPL-CCNC: 75 U/L (ref 46–116)
ALT SERPL W P-5'-P-CCNC: 30 U/L (ref 12–78)
ANION GAP SERPL CALCULATED.3IONS-SCNC: 7 MMOL/L (ref 4–13)
AST SERPL W P-5'-P-CCNC: 17 U/L (ref 5–45)
BASOPHILS # BLD AUTO: 0.03 THOUSANDS/ΜL (ref 0–0.1)
BASOPHILS NFR BLD AUTO: 0 % (ref 0–1)
BILIRUB SERPL-MCNC: 0.58 MG/DL (ref 0.2–1)
BUN SERPL-MCNC: 27 MG/DL (ref 5–25)
CALCIUM SERPL-MCNC: 9 MG/DL (ref 8.3–10.1)
CHLORIDE SERPL-SCNC: 106 MMOL/L (ref 100–108)
CO2 SERPL-SCNC: 26 MMOL/L (ref 21–32)
CREAT SERPL-MCNC: 0.87 MG/DL (ref 0.6–1.3)
EOSINOPHIL # BLD AUTO: 0.02 THOUSAND/ΜL (ref 0–0.61)
EOSINOPHIL NFR BLD AUTO: 0 % (ref 0–6)
ERYTHROCYTE [DISTWIDTH] IN BLOOD BY AUTOMATED COUNT: 17.2 % (ref 11.6–15.1)
GFR SERPL CREATININE-BSD FRML MDRD: 90 ML/MIN/1.73SQ M
GLUCOSE SERPL-MCNC: 126 MG/DL (ref 65–140)
HCT VFR BLD AUTO: 40.2 % (ref 36.5–49.3)
HGB BLD-MCNC: 12.6 G/DL (ref 12–17)
IMM GRANULOCYTES # BLD AUTO: 0.11 THOUSAND/UL (ref 0–0.2)
IMM GRANULOCYTES NFR BLD AUTO: 1 % (ref 0–2)
LYMPHOCYTES # BLD AUTO: 2.77 THOUSANDS/ΜL (ref 0.6–4.47)
LYMPHOCYTES NFR BLD AUTO: 20 % (ref 14–44)
MCH RBC QN AUTO: 32.6 PG (ref 26.8–34.3)
MCHC RBC AUTO-ENTMCNC: 31.3 G/DL (ref 31.4–37.4)
MCV RBC AUTO: 104 FL (ref 82–98)
MONOCYTES # BLD AUTO: 1.58 THOUSAND/ΜL (ref 0.17–1.22)
MONOCYTES NFR BLD AUTO: 12 % (ref 4–12)
NEUTROPHILS # BLD AUTO: 9.06 THOUSANDS/ΜL (ref 1.85–7.62)
NEUTS SEG NFR BLD AUTO: 67 % (ref 43–75)
NRBC BLD AUTO-RTO: 0 /100 WBCS
PLATELET # BLD AUTO: 278 THOUSANDS/UL (ref 149–390)
PMV BLD AUTO: 11.5 FL (ref 8.9–12.7)
POTASSIUM SERPL-SCNC: 4.2 MMOL/L (ref 3.5–5.3)
PROT SERPL-MCNC: 6.4 G/DL (ref 6.4–8.2)
RBC # BLD AUTO: 3.86 MILLION/UL (ref 3.88–5.62)
SODIUM SERPL-SCNC: 139 MMOL/L (ref 136–145)
WBC # BLD AUTO: 13.57 THOUSAND/UL (ref 4.31–10.16)

## 2020-04-17 PROCEDURE — 85025 COMPLETE CBC W/AUTO DIFF WBC: CPT

## 2020-04-17 PROCEDURE — 36415 COLL VENOUS BLD VENIPUNCTURE: CPT

## 2020-04-17 PROCEDURE — 80053 COMPREHEN METABOLIC PANEL: CPT

## 2020-04-21 ENCOUNTER — HOSPITAL ENCOUNTER (EMERGENCY)
Facility: HOSPITAL | Age: 65
Discharge: HOME | End: 2020-04-21
Attending: EMERGENCY MEDICINE | Admitting: EMERGENCY MEDICINE
Payer: MEDICARE

## 2020-04-21 ENCOUNTER — NUTRITION (OUTPATIENT)
Dept: NUTRITION | Facility: CLINIC | Age: 65
End: 2020-04-21

## 2020-04-21 ENCOUNTER — TELEPHONE (OUTPATIENT)
Dept: SURGICAL ONCOLOGY | Facility: CLINIC | Age: 65
End: 2020-04-21

## 2020-04-21 ENCOUNTER — HOSPITAL ENCOUNTER (OUTPATIENT)
Dept: INFUSION CENTER | Facility: HOSPITAL | Age: 65
Discharge: HOME | End: 2020-04-21
Payer: MEDICARE

## 2020-04-21 VITALS
RESPIRATION RATE: 20 BRPM | HEART RATE: 50 BPM | SYSTOLIC BLOOD PRESSURE: 190 MMHG | DIASTOLIC BLOOD PRESSURE: 98 MMHG | TEMPERATURE: 98.7 F | OXYGEN SATURATION: 100 %

## 2020-04-21 VITALS
BODY MASS INDEX: 27.89 KG/M2 | RESPIRATION RATE: 18 BRPM | DIASTOLIC BLOOD PRESSURE: 94 MMHG | TEMPERATURE: 97.7 F | WEIGHT: 188.27 LBS | HEART RATE: 49 BPM | SYSTOLIC BLOOD PRESSURE: 195 MMHG | HEIGHT: 69 IN

## 2020-04-21 DIAGNOSIS — C15.5 MALIGNANT NEOPLASM OF LOWER THIRD OF ESOPHAGUS (HCC): ICD-10-CM

## 2020-04-21 DIAGNOSIS — Z71.3 NUTRITIONAL COUNSELING: Primary | ICD-10-CM

## 2020-04-21 DIAGNOSIS — R11.0 NAUSEA: ICD-10-CM

## 2020-04-21 DIAGNOSIS — I10 HYPERTENSION: Primary | ICD-10-CM

## 2020-04-21 DIAGNOSIS — C16.0 MALIGNANT NEOPLASM OF CARDIA OF STOMACH (HCC): ICD-10-CM

## 2020-04-21 DIAGNOSIS — E86.0 DEHYDRATION: ICD-10-CM

## 2020-04-21 PROCEDURE — 99283 EMERGENCY DEPT VISIT LOW MDM: CPT | Performed by: PHYSICIAN ASSISTANT

## 2020-04-21 PROCEDURE — 99283 EMERGENCY DEPT VISIT LOW MDM: CPT

## 2020-04-21 PROCEDURE — 93005 ELECTROCARDIOGRAM TRACING: CPT

## 2020-04-21 RX ORDER — BISOPROLOL FUMARATE AND HYDROCHLOROTHIAZIDE 10; 6.25 MG/1; MG/1
1 TABLET ORAL DAILY
COMMUNITY
End: 2020-04-21 | Stop reason: SDUPTHER

## 2020-04-21 RX ORDER — BISOPROLOL FUMARATE AND HYDROCHLOROTHIAZIDE 10; 6.25 MG/1; MG/1
1 TABLET ORAL DAILY
Qty: 30 TABLET | Refills: 0 | Status: SHIPPED | OUTPATIENT
Start: 2020-04-21 | End: 2020-05-15 | Stop reason: SDUPTHER

## 2020-04-23 ENCOUNTER — TELEMEDICINE (OUTPATIENT)
Dept: PALLIATIVE MEDICINE | Facility: CLINIC | Age: 65
End: 2020-04-23
Payer: MEDICARE

## 2020-04-23 ENCOUNTER — TELEPHONE (OUTPATIENT)
Dept: HEMATOLOGY ONCOLOGY | Facility: CLINIC | Age: 65
End: 2020-04-23

## 2020-04-23 DIAGNOSIS — R53.83 OTHER FATIGUE: ICD-10-CM

## 2020-04-23 DIAGNOSIS — F42.9 OBSESSIVE-COMPULSIVE DISORDER, UNSPECIFIED TYPE: ICD-10-CM

## 2020-04-23 DIAGNOSIS — C16.0 MALIGNANT NEOPLASM OF CARDIA OF STOMACH (HCC): Primary | ICD-10-CM

## 2020-04-23 DIAGNOSIS — R13.19 OTHER DYSPHAGIA: ICD-10-CM

## 2020-04-23 DIAGNOSIS — G47.09 OTHER INSOMNIA: Chronic | ICD-10-CM

## 2020-04-23 DIAGNOSIS — T45.1X5A CHEMOTHERAPY-INDUCED NAUSEA: ICD-10-CM

## 2020-04-23 DIAGNOSIS — F33.9 RECURRENT MAJOR DEPRESSIVE DISORDER, REMISSION STATUS UNSPECIFIED (HCC): ICD-10-CM

## 2020-04-23 DIAGNOSIS — F43.10 PTSD (POST-TRAUMATIC STRESS DISORDER): ICD-10-CM

## 2020-04-23 DIAGNOSIS — C15.5 MALIGNANT NEOPLASM OF LOWER THIRD OF ESOPHAGUS (HCC): ICD-10-CM

## 2020-04-23 DIAGNOSIS — R23.4 SKIN FISSURES: ICD-10-CM

## 2020-04-23 DIAGNOSIS — F41.9 ANXIETY: ICD-10-CM

## 2020-04-23 DIAGNOSIS — E86.0 DEHYDRATION: ICD-10-CM

## 2020-04-23 DIAGNOSIS — R11.0 CHEMOTHERAPY-INDUCED NAUSEA: ICD-10-CM

## 2020-04-23 DIAGNOSIS — G89.3 CANCER ASSOCIATED PAIN: ICD-10-CM

## 2020-04-23 DIAGNOSIS — C16.0 MALIGNANT NEOPLASM OF CARDIA OF STOMACH (HCC): ICD-10-CM

## 2020-04-23 DIAGNOSIS — R11.0 NAUSEA: ICD-10-CM

## 2020-04-23 DIAGNOSIS — Z51.5 PALLIATIVE CARE PATIENT: ICD-10-CM

## 2020-04-23 LAB
ATRIAL RATE: 49 BPM
P AXIS: 40 DEGREES
PR INTERVAL: 174 MS
QRS AXIS: 49 DEGREES
QRSD INTERVAL: 98 MS
QT INTERVAL: 408 MS
QTC INTERVAL: 368 MS
T WAVE AXIS: 54 DEGREES
VENTRICULAR RATE: 49 BPM

## 2020-04-23 PROCEDURE — 99214 OFFICE O/P EST MOD 30 MIN: CPT | Performed by: INTERNAL MEDICINE

## 2020-04-23 PROCEDURE — 93010 ELECTROCARDIOGRAM REPORT: CPT | Performed by: INTERNAL MEDICINE

## 2020-04-23 RX ORDER — OLANZAPINE 5 MG/1
5 TABLET ORAL
Qty: 30 TABLET | Refills: 0 | Status: SHIPPED | OUTPATIENT
Start: 2020-04-23 | End: 2020-05-15

## 2020-04-23 RX ORDER — DEXTROSE MONOHYDRATE 50 MG/ML
20 INJECTION, SOLUTION INTRAVENOUS ONCE
Status: CANCELLED | OUTPATIENT
Start: 2020-04-29

## 2020-04-23 RX ORDER — FLUOROURACIL 50 MG/ML
400 INJECTION, SOLUTION INTRAVENOUS ONCE
Status: CANCELLED | OUTPATIENT
Start: 2020-05-13

## 2020-04-23 RX ORDER — DEXAMETHASONE 4 MG/1
4 TABLET ORAL
Qty: 30 TABLET | Refills: 0 | Status: SHIPPED | OUTPATIENT
Start: 2020-04-23 | End: 2020-05-19 | Stop reason: SDUPTHER

## 2020-04-23 RX ORDER — SODIUM CHLORIDE 9 MG/ML
20 INJECTION, SOLUTION INTRAVENOUS ONCE AS NEEDED
Status: CANCELLED | OUTPATIENT
Start: 2020-04-29

## 2020-04-23 RX ORDER — SODIUM CHLORIDE 9 MG/ML
20 INJECTION, SOLUTION INTRAVENOUS ONCE AS NEEDED
Status: CANCELLED | OUTPATIENT
Start: 2020-05-13

## 2020-04-23 RX ORDER — FLUOROURACIL 50 MG/ML
400 INJECTION, SOLUTION INTRAVENOUS ONCE
Status: CANCELLED | OUTPATIENT
Start: 2020-04-29

## 2020-04-23 RX ORDER — DEXTROSE MONOHYDRATE 50 MG/ML
20 INJECTION, SOLUTION INTRAVENOUS ONCE
Status: CANCELLED | OUTPATIENT
Start: 2020-05-13

## 2020-04-27 ENCOUNTER — APPOINTMENT (OUTPATIENT)
Dept: LAB | Facility: CLINIC | Age: 65
End: 2020-04-27
Payer: MEDICARE

## 2020-04-27 DIAGNOSIS — C15.5 MALIGNANT NEOPLASM OF LOWER THIRD OF ESOPHAGUS (HCC): ICD-10-CM

## 2020-04-27 DIAGNOSIS — R11.0 NAUSEA: ICD-10-CM

## 2020-04-27 DIAGNOSIS — C16.0 MALIGNANT NEOPLASM OF CARDIA OF STOMACH (HCC): ICD-10-CM

## 2020-04-27 DIAGNOSIS — E86.0 DEHYDRATION: ICD-10-CM

## 2020-04-27 DIAGNOSIS — R11.0 NAUSEA: Primary | ICD-10-CM

## 2020-04-27 LAB
ALBUMIN SERPL BCP-MCNC: 2.8 G/DL (ref 3.5–5)
ALP SERPL-CCNC: 83 U/L (ref 46–116)
ALT SERPL W P-5'-P-CCNC: 45 U/L (ref 12–78)
ANION GAP SERPL CALCULATED.3IONS-SCNC: 5 MMOL/L (ref 4–13)
ANISOCYTOSIS BLD QL SMEAR: PRESENT
AST SERPL W P-5'-P-CCNC: 26 U/L (ref 5–45)
BASOPHILS # BLD MANUAL: 0 THOUSAND/UL (ref 0–0.1)
BASOPHILS NFR MAR MANUAL: 0 % (ref 0–1)
BILIRUB SERPL-MCNC: 1.16 MG/DL (ref 0.2–1)
BUN SERPL-MCNC: 18 MG/DL (ref 5–25)
CALCIUM SERPL-MCNC: 9.2 MG/DL (ref 8.3–10.1)
CHLORIDE SERPL-SCNC: 101 MMOL/L (ref 100–108)
CO2 SERPL-SCNC: 29 MMOL/L (ref 21–32)
CREAT SERPL-MCNC: 0.89 MG/DL (ref 0.6–1.3)
EOSINOPHIL # BLD MANUAL: 0 THOUSAND/UL (ref 0–0.4)
EOSINOPHIL NFR BLD MANUAL: 0 % (ref 0–6)
ERYTHROCYTE [DISTWIDTH] IN BLOOD BY AUTOMATED COUNT: 18.4 % (ref 11.6–15.1)
GFR SERPL CREATININE-BSD FRML MDRD: 89 ML/MIN/1.73SQ M
GIANT PLATELETS BLD QL SMEAR: PRESENT
GLUCOSE SERPL-MCNC: 134 MG/DL (ref 65–140)
HCT VFR BLD AUTO: 40.7 % (ref 36.5–49.3)
HGB BLD-MCNC: 13.5 G/DL (ref 12–17)
LYMPHOCYTES # BLD AUTO: 2.11 THOUSAND/UL (ref 0.6–4.47)
LYMPHOCYTES # BLD AUTO: 21 % (ref 14–44)
MACROCYTES BLD QL AUTO: PRESENT
MCH RBC QN AUTO: 33.8 PG (ref 26.8–34.3)
MCHC RBC AUTO-ENTMCNC: 33.2 G/DL (ref 31.4–37.4)
MCV RBC AUTO: 102 FL (ref 82–98)
MONOCYTES # BLD AUTO: 2.11 THOUSAND/UL (ref 0–1.22)
MONOCYTES NFR BLD: 21 % (ref 4–12)
NEUTROPHILS # BLD MANUAL: 5.03 THOUSAND/UL (ref 1.85–7.62)
NEUTS SEG NFR BLD AUTO: 50 % (ref 43–75)
NRBC BLD AUTO-RTO: 0 /100 WBCS
PLATELET # BLD AUTO: 150 THOUSANDS/UL (ref 149–390)
PLATELET BLD QL SMEAR: ADEQUATE
PMV BLD AUTO: 11.8 FL (ref 8.9–12.7)
POLYCHROMASIA BLD QL SMEAR: PRESENT
POTASSIUM SERPL-SCNC: 4.1 MMOL/L (ref 3.5–5.3)
PROT SERPL-MCNC: 6.8 G/DL (ref 6.4–8.2)
RBC # BLD AUTO: 3.99 MILLION/UL (ref 3.88–5.62)
RBC MORPH BLD: PRESENT
SODIUM SERPL-SCNC: 135 MMOL/L (ref 136–145)
VARIANT LYMPHS # BLD AUTO: 8 %
WBC # BLD AUTO: 10.06 THOUSAND/UL (ref 4.31–10.16)

## 2020-04-27 PROCEDURE — 36415 COLL VENOUS BLD VENIPUNCTURE: CPT

## 2020-04-27 PROCEDURE — 85027 COMPLETE CBC AUTOMATED: CPT

## 2020-04-27 PROCEDURE — 80053 COMPREHEN METABOLIC PANEL: CPT

## 2020-04-27 PROCEDURE — 85007 BL SMEAR W/DIFF WBC COUNT: CPT

## 2020-04-29 ENCOUNTER — HOSPITAL ENCOUNTER (OUTPATIENT)
Dept: INFUSION CENTER | Facility: HOSPITAL | Age: 65
Discharge: HOME | End: 2020-04-29
Attending: INTERNAL MEDICINE
Payer: MEDICARE

## 2020-04-29 VITALS
RESPIRATION RATE: 22 BRPM | HEART RATE: 63 BPM | BODY MASS INDEX: 28.15 KG/M2 | TEMPERATURE: 97.3 F | DIASTOLIC BLOOD PRESSURE: 63 MMHG | OXYGEN SATURATION: 100 % | WEIGHT: 190.04 LBS | HEIGHT: 69 IN | SYSTOLIC BLOOD PRESSURE: 112 MMHG

## 2020-04-29 DIAGNOSIS — C15.5 MALIGNANT NEOPLASM OF LOWER THIRD OF ESOPHAGUS (HCC): Primary | ICD-10-CM

## 2020-04-29 DIAGNOSIS — C16.0 MALIGNANT NEOPLASM OF CARDIA OF STOMACH (HCC): ICD-10-CM

## 2020-04-29 DIAGNOSIS — E86.0 DEHYDRATION: ICD-10-CM

## 2020-04-29 DIAGNOSIS — R11.0 NAUSEA: ICD-10-CM

## 2020-04-29 PROCEDURE — 96367 TX/PROPH/DG ADDL SEQ IV INF: CPT

## 2020-04-29 PROCEDURE — 96409 CHEMO IV PUSH SNGL DRUG: CPT

## 2020-04-29 PROCEDURE — G0498 CHEMO EXTEND IV INFUS W/PUMP: HCPCS

## 2020-04-29 PROCEDURE — 96366 THER/PROPH/DIAG IV INF ADDON: CPT

## 2020-04-29 RX ORDER — FLUOROURACIL 50 MG/ML
400 INJECTION, SOLUTION INTRAVENOUS ONCE
Status: COMPLETED | OUTPATIENT
Start: 2020-04-29 | End: 2020-04-29

## 2020-04-29 RX ORDER — SODIUM CHLORIDE 9 MG/ML
20 INJECTION, SOLUTION INTRAVENOUS ONCE AS NEEDED
Status: DISCONTINUED | OUTPATIENT
Start: 2020-04-29 | End: 2020-05-02 | Stop reason: HOSPADM

## 2020-04-29 RX ORDER — DEXTROSE MONOHYDRATE 50 MG/ML
20 INJECTION, SOLUTION INTRAVENOUS ONCE
Status: COMPLETED | OUTPATIENT
Start: 2020-04-29 | End: 2020-04-29

## 2020-04-29 RX ADMIN — SODIUM CHLORIDE 20 ML/HR: 0.9 INJECTION, SOLUTION INTRAVENOUS at 10:34

## 2020-04-29 RX ADMIN — DEXTROSE 20 ML/HR: 50 INJECTION, SOLUTION INTRAVENOUS at 11:30

## 2020-04-29 RX ADMIN — FLUOROURACIL 795 MG: 50 INJECTION, SOLUTION INTRAVENOUS at 13:33

## 2020-04-29 RX ADMIN — DEXTROSE 800 MG: 5 SOLUTION INTRAVENOUS at 11:30

## 2020-04-29 RX ADMIN — DEXAMETHASONE SODIUM PHOSPHATE: 10 INJECTION, SOLUTION INTRAMUSCULAR; INTRAVENOUS at 10:33

## 2020-04-30 DIAGNOSIS — C15.5 MALIGNANT NEOPLASM OF LOWER THIRD OF ESOPHAGUS (HCC): Primary | ICD-10-CM

## 2020-05-01 ENCOUNTER — HOSPITAL ENCOUNTER (OUTPATIENT)
Dept: INFUSION CENTER | Facility: HOSPITAL | Age: 65
Discharge: HOME | End: 2020-05-01
Attending: INTERNAL MEDICINE

## 2020-05-01 VITALS — TEMPERATURE: 97 F

## 2020-05-01 DIAGNOSIS — R11.0 NAUSEA: ICD-10-CM

## 2020-05-01 DIAGNOSIS — E86.0 DEHYDRATION: ICD-10-CM

## 2020-05-01 DIAGNOSIS — C15.5 MALIGNANT NEOPLASM OF LOWER THIRD OF ESOPHAGUS (HCC): Primary | ICD-10-CM

## 2020-05-01 DIAGNOSIS — C16.0 MALIGNANT NEOPLASM OF CARDIA OF STOMACH (HCC): ICD-10-CM

## 2020-05-05 DIAGNOSIS — R11.0 NAUSEA: Primary | ICD-10-CM

## 2020-05-05 DIAGNOSIS — C16.0 MALIGNANT NEOPLASM OF CARDIA OF STOMACH (HCC): ICD-10-CM

## 2020-05-05 DIAGNOSIS — C15.5 MALIGNANT NEOPLASM OF LOWER THIRD OF ESOPHAGUS (HCC): ICD-10-CM

## 2020-05-05 DIAGNOSIS — E86.0 DEHYDRATION: ICD-10-CM

## 2020-05-11 ENCOUNTER — APPOINTMENT (OUTPATIENT)
Dept: LAB | Facility: CLINIC | Age: 65
End: 2020-05-11
Payer: MEDICARE

## 2020-05-11 ENCOUNTER — TELEPHONE (OUTPATIENT)
Dept: PHYSICAL THERAPY | Facility: CLINIC | Age: 65
End: 2020-05-11

## 2020-05-11 DIAGNOSIS — R11.0 NAUSEA: ICD-10-CM

## 2020-05-11 DIAGNOSIS — E86.0 DEHYDRATION: ICD-10-CM

## 2020-05-11 DIAGNOSIS — C15.5 MALIGNANT NEOPLASM OF LOWER THIRD OF ESOPHAGUS (HCC): ICD-10-CM

## 2020-05-11 DIAGNOSIS — C16.0 MALIGNANT NEOPLASM OF CARDIA OF STOMACH (HCC): ICD-10-CM

## 2020-05-11 LAB
ALBUMIN SERPL BCP-MCNC: 3 G/DL (ref 3.5–5)
ALP SERPL-CCNC: 90 U/L (ref 46–116)
ALT SERPL W P-5'-P-CCNC: 27 U/L (ref 12–78)
ANION GAP SERPL CALCULATED.3IONS-SCNC: 5 MMOL/L (ref 4–13)
AST SERPL W P-5'-P-CCNC: 12 U/L (ref 5–45)
BASOPHILS # BLD AUTO: 0.07 THOUSANDS/ΜL (ref 0–0.1)
BASOPHILS NFR BLD AUTO: 1 % (ref 0–1)
BILIRUB SERPL-MCNC: 0.63 MG/DL (ref 0.2–1)
BUN SERPL-MCNC: 23 MG/DL (ref 5–25)
CALCIUM SERPL-MCNC: 9.2 MG/DL (ref 8.3–10.1)
CHLORIDE SERPL-SCNC: 109 MMOL/L (ref 100–108)
CO2 SERPL-SCNC: 26 MMOL/L (ref 21–32)
CREAT SERPL-MCNC: 0.86 MG/DL (ref 0.6–1.3)
EOSINOPHIL # BLD AUTO: 0.12 THOUSAND/ΜL (ref 0–0.61)
EOSINOPHIL NFR BLD AUTO: 2 % (ref 0–6)
ERYTHROCYTE [DISTWIDTH] IN BLOOD BY AUTOMATED COUNT: 18.2 % (ref 11.6–15.1)
GFR SERPL CREATININE-BSD FRML MDRD: 90 ML/MIN/1.73SQ M
GLUCOSE SERPL-MCNC: 103 MG/DL (ref 65–140)
HCT VFR BLD AUTO: 35.9 % (ref 36.5–49.3)
HGB BLD-MCNC: 11.7 G/DL (ref 12–17)
IMM GRANULOCYTES # BLD AUTO: 0.03 THOUSAND/UL (ref 0–0.2)
IMM GRANULOCYTES NFR BLD AUTO: 0 % (ref 0–2)
LYMPHOCYTES # BLD AUTO: 3.65 THOUSANDS/ΜL (ref 0.6–4.47)
LYMPHOCYTES NFR BLD AUTO: 47 % (ref 14–44)
MCH RBC QN AUTO: 33.5 PG (ref 26.8–34.3)
MCHC RBC AUTO-ENTMCNC: 32.6 G/DL (ref 31.4–37.4)
MCV RBC AUTO: 103 FL (ref 82–98)
MONOCYTES # BLD AUTO: 0.86 THOUSAND/ΜL (ref 0.17–1.22)
MONOCYTES NFR BLD AUTO: 11 % (ref 4–12)
NEUTROPHILS # BLD AUTO: 2.97 THOUSANDS/ΜL (ref 1.85–7.62)
NEUTS SEG NFR BLD AUTO: 39 % (ref 43–75)
NRBC BLD AUTO-RTO: 0 /100 WBCS
PLATELET # BLD AUTO: 212 THOUSANDS/UL (ref 149–390)
PMV BLD AUTO: 10.5 FL (ref 8.9–12.7)
POTASSIUM SERPL-SCNC: 3.8 MMOL/L (ref 3.5–5.3)
PROT SERPL-MCNC: 6.3 G/DL (ref 6.4–8.2)
RBC # BLD AUTO: 3.49 MILLION/UL (ref 3.88–5.62)
SODIUM SERPL-SCNC: 140 MMOL/L (ref 136–145)
WBC # BLD AUTO: 7.7 THOUSAND/UL (ref 4.31–10.16)

## 2020-05-11 PROCEDURE — 85025 COMPLETE CBC W/AUTO DIFF WBC: CPT

## 2020-05-11 PROCEDURE — 80053 COMPREHEN METABOLIC PANEL: CPT

## 2020-05-11 PROCEDURE — 36415 COLL VENOUS BLD VENIPUNCTURE: CPT

## 2020-05-13 ENCOUNTER — HOSPITAL ENCOUNTER (OUTPATIENT)
Dept: INFUSION CENTER | Facility: HOSPITAL | Age: 65
Discharge: HOME | End: 2020-05-13
Attending: INTERNAL MEDICINE
Payer: MEDICARE

## 2020-05-13 VITALS
BODY MASS INDEX: 28.6 KG/M2 | SYSTOLIC BLOOD PRESSURE: 151 MMHG | HEART RATE: 60 BPM | WEIGHT: 193.12 LBS | RESPIRATION RATE: 18 BRPM | DIASTOLIC BLOOD PRESSURE: 74 MMHG | HEIGHT: 69 IN | TEMPERATURE: 96.8 F | OXYGEN SATURATION: 100 %

## 2020-05-13 DIAGNOSIS — C15.5 MALIGNANT NEOPLASM OF LOWER THIRD OF ESOPHAGUS (HCC): ICD-10-CM

## 2020-05-13 DIAGNOSIS — E86.0 DEHYDRATION: ICD-10-CM

## 2020-05-13 DIAGNOSIS — C16.0 MALIGNANT NEOPLASM OF CARDIA OF STOMACH (HCC): ICD-10-CM

## 2020-05-13 DIAGNOSIS — R11.0 NAUSEA: Primary | ICD-10-CM

## 2020-05-13 PROCEDURE — 96409 CHEMO IV PUSH SNGL DRUG: CPT

## 2020-05-13 PROCEDURE — 96366 THER/PROPH/DIAG IV INF ADDON: CPT

## 2020-05-13 PROCEDURE — 96367 TX/PROPH/DG ADDL SEQ IV INF: CPT

## 2020-05-13 PROCEDURE — G0498 CHEMO EXTEND IV INFUS W/PUMP: HCPCS

## 2020-05-13 RX ORDER — SODIUM CHLORIDE 9 MG/ML
20 INJECTION, SOLUTION INTRAVENOUS ONCE AS NEEDED
Status: DISCONTINUED | OUTPATIENT
Start: 2020-05-13 | End: 2020-05-16 | Stop reason: HOSPADM

## 2020-05-13 RX ORDER — DEXTROSE MONOHYDRATE 50 MG/ML
20 INJECTION, SOLUTION INTRAVENOUS ONCE
Status: DISCONTINUED | OUTPATIENT
Start: 2020-05-13 | End: 2020-05-16 | Stop reason: HOSPADM

## 2020-05-13 RX ORDER — FLUOROURACIL 50 MG/ML
400 INJECTION, SOLUTION INTRAVENOUS ONCE
Status: COMPLETED | OUTPATIENT
Start: 2020-05-13 | End: 2020-05-13

## 2020-05-13 RX ADMIN — LEUCOVORIN CALCIUM 800 MG: 200 INJECTION, POWDER, LYOPHILIZED, FOR SUSPENSION INTRAMUSCULAR; INTRAVENOUS at 11:44

## 2020-05-13 RX ADMIN — SODIUM CHLORIDE 20 ML/HR: 0.9 INJECTION, SOLUTION INTRAVENOUS at 10:13

## 2020-05-13 RX ADMIN — DEXAMETHASONE SODIUM PHOSPHATE: 10 INJECTION, SOLUTION INTRAMUSCULAR; INTRAVENOUS at 10:13

## 2020-05-13 RX ADMIN — FLUOROURACIL 795 MG: 50 INJECTION, SOLUTION INTRAVENOUS at 14:07

## 2020-05-14 ENCOUNTER — TELEMEDICINE (OUTPATIENT)
Dept: FAMILY MEDICINE CLINIC | Facility: CLINIC | Age: 65
End: 2020-05-14
Payer: MEDICARE

## 2020-05-14 DIAGNOSIS — I10 ESSENTIAL HYPERTENSION: Primary | ICD-10-CM

## 2020-05-14 DIAGNOSIS — C16.0 MALIGNANT NEOPLASM OF CARDIA OF STOMACH (HCC): ICD-10-CM

## 2020-05-14 PROCEDURE — 99213 OFFICE O/P EST LOW 20 MIN: CPT | Performed by: FAMILY MEDICINE

## 2020-05-15 ENCOUNTER — OFFICE VISIT (OUTPATIENT)
Dept: UROLOGY | Facility: CLINIC | Age: 65
End: 2020-05-15
Payer: MEDICARE

## 2020-05-15 ENCOUNTER — APPOINTMENT (OUTPATIENT)
Dept: LAB | Facility: CLINIC | Age: 65
End: 2020-05-15
Payer: MEDICARE

## 2020-05-15 ENCOUNTER — OFFICE VISIT (OUTPATIENT)
Dept: FAMILY MEDICINE CLINIC | Facility: CLINIC | Age: 65
End: 2020-05-15
Payer: MEDICARE

## 2020-05-15 ENCOUNTER — HOSPITAL ENCOUNTER (OUTPATIENT)
Dept: INFUSION CENTER | Facility: HOSPITAL | Age: 65
Discharge: HOME | End: 2020-05-15
Attending: INTERNAL MEDICINE

## 2020-05-15 VITALS
TEMPERATURE: 98.6 F | HEART RATE: 71 BPM | DIASTOLIC BLOOD PRESSURE: 70 MMHG | WEIGHT: 194.8 LBS | OXYGEN SATURATION: 96 % | SYSTOLIC BLOOD PRESSURE: 130 MMHG | HEIGHT: 72 IN | RESPIRATION RATE: 18 BRPM | BODY MASS INDEX: 26.38 KG/M2

## 2020-05-15 VITALS
HEIGHT: 72 IN | WEIGHT: 194 LBS | SYSTOLIC BLOOD PRESSURE: 144 MMHG | DIASTOLIC BLOOD PRESSURE: 80 MMHG | BODY MASS INDEX: 26.28 KG/M2 | HEART RATE: 75 BPM

## 2020-05-15 VITALS — TEMPERATURE: 98.5 F

## 2020-05-15 DIAGNOSIS — R97.20 ELEVATED PSA: Primary | ICD-10-CM

## 2020-05-15 DIAGNOSIS — R97.20 ELEVATED PSA: ICD-10-CM

## 2020-05-15 DIAGNOSIS — C16.0 MALIGNANT NEOPLASM OF CARDIA OF STOMACH (HCC): ICD-10-CM

## 2020-05-15 DIAGNOSIS — I10 HYPERTENSION: ICD-10-CM

## 2020-05-15 DIAGNOSIS — C15.5 MALIGNANT NEOPLASM OF LOWER THIRD OF ESOPHAGUS (HCC): ICD-10-CM

## 2020-05-15 DIAGNOSIS — R11.0 NAUSEA: ICD-10-CM

## 2020-05-15 DIAGNOSIS — Z51.5 PALLIATIVE CARE PATIENT: ICD-10-CM

## 2020-05-15 DIAGNOSIS — E86.0 DEHYDRATION: ICD-10-CM

## 2020-05-15 DIAGNOSIS — C15.5 MALIGNANT NEOPLASM OF LOWER THIRD OF ESOPHAGUS (HCC): Primary | ICD-10-CM

## 2020-05-15 PROCEDURE — 1036F TOBACCO NON-USER: CPT | Performed by: UROLOGY

## 2020-05-15 PROCEDURE — 3077F SYST BP >= 140 MM HG: CPT | Performed by: FAMILY MEDICINE

## 2020-05-15 PROCEDURE — 1160F RVW MEDS BY RX/DR IN RCRD: CPT | Performed by: FAMILY MEDICINE

## 2020-05-15 PROCEDURE — 84153 ASSAY OF PSA TOTAL: CPT

## 2020-05-15 PROCEDURE — 99213 OFFICE O/P EST LOW 20 MIN: CPT | Performed by: FAMILY MEDICINE

## 2020-05-15 PROCEDURE — 99213 OFFICE O/P EST LOW 20 MIN: CPT | Performed by: UROLOGY

## 2020-05-15 PROCEDURE — 3077F SYST BP >= 140 MM HG: CPT | Performed by: UROLOGY

## 2020-05-15 PROCEDURE — 84154 ASSAY OF PSA FREE: CPT

## 2020-05-15 PROCEDURE — 1160F RVW MEDS BY RX/DR IN RCRD: CPT | Performed by: UROLOGY

## 2020-05-15 PROCEDURE — 3078F DIAST BP <80 MM HG: CPT | Performed by: FAMILY MEDICINE

## 2020-05-15 PROCEDURE — 3079F DIAST BP 80-89 MM HG: CPT | Performed by: UROLOGY

## 2020-05-15 PROCEDURE — 3008F BODY MASS INDEX DOCD: CPT | Performed by: UROLOGY

## 2020-05-15 PROCEDURE — 1036F TOBACCO NON-USER: CPT | Performed by: FAMILY MEDICINE

## 2020-05-15 PROCEDURE — 36415 COLL VENOUS BLD VENIPUNCTURE: CPT

## 2020-05-15 RX ORDER — BISOPROLOL FUMARATE AND HYDROCHLOROTHIAZIDE 10; 6.25 MG/1; MG/1
1 TABLET ORAL DAILY
Qty: 90 TABLET | Refills: 1 | Status: SHIPPED | OUTPATIENT
Start: 2020-05-15 | End: 2020-09-08 | Stop reason: HOSPADM

## 2020-05-19 ENCOUNTER — TELEMEDICINE (OUTPATIENT)
Dept: PALLIATIVE MEDICINE | Facility: CLINIC | Age: 65
End: 2020-05-19
Payer: MEDICARE

## 2020-05-19 ENCOUNTER — EVALUATION (OUTPATIENT)
Dept: PHYSICAL THERAPY | Facility: CLINIC | Age: 65
End: 2020-05-19
Payer: MEDICARE

## 2020-05-19 DIAGNOSIS — C15.5 MALIGNANT NEOPLASM OF LOWER THIRD OF ESOPHAGUS (HCC): ICD-10-CM

## 2020-05-19 DIAGNOSIS — F43.10 PTSD (POST-TRAUMATIC STRESS DISORDER): ICD-10-CM

## 2020-05-19 DIAGNOSIS — C16.0 MALIGNANT NEOPLASM OF CARDIA OF STOMACH (HCC): ICD-10-CM

## 2020-05-19 DIAGNOSIS — R11.0 CHEMOTHERAPY-INDUCED NAUSEA: ICD-10-CM

## 2020-05-19 DIAGNOSIS — R23.4 SKIN FISSURES: ICD-10-CM

## 2020-05-19 DIAGNOSIS — G47.09 OTHER INSOMNIA: Chronic | ICD-10-CM

## 2020-05-19 DIAGNOSIS — Z51.5 PALLIATIVE CARE PATIENT: ICD-10-CM

## 2020-05-19 DIAGNOSIS — C15.5 MALIGNANT NEOPLASM OF LOWER THIRD OF ESOPHAGUS (HCC): Primary | ICD-10-CM

## 2020-05-19 DIAGNOSIS — R53.1 WEAKNESS: ICD-10-CM

## 2020-05-19 DIAGNOSIS — G89.3 CANCER ASSOCIATED PAIN: ICD-10-CM

## 2020-05-19 DIAGNOSIS — R53.83 OTHER FATIGUE: ICD-10-CM

## 2020-05-19 DIAGNOSIS — R26.89 BALANCE DISORDER: ICD-10-CM

## 2020-05-19 DIAGNOSIS — T45.1X5A CHEMOTHERAPY-INDUCED NAUSEA: ICD-10-CM

## 2020-05-19 DIAGNOSIS — R26.89 OTHER ABNORMALITIES OF GAIT AND MOBILITY: Primary | ICD-10-CM

## 2020-05-19 DIAGNOSIS — F42.9 OBSESSIVE-COMPULSIVE DISORDER, UNSPECIFIED TYPE: ICD-10-CM

## 2020-05-19 DIAGNOSIS — R11.0 NAUSEA: Primary | ICD-10-CM

## 2020-05-19 DIAGNOSIS — R13.19 OTHER DYSPHAGIA: ICD-10-CM

## 2020-05-19 DIAGNOSIS — E86.0 DEHYDRATION: ICD-10-CM

## 2020-05-19 DIAGNOSIS — F33.9 RECURRENT MAJOR DEPRESSIVE DISORDER, REMISSION STATUS UNSPECIFIED (HCC): ICD-10-CM

## 2020-05-19 DIAGNOSIS — F41.9 ANXIETY: ICD-10-CM

## 2020-05-19 PROCEDURE — 97163 PT EVAL HIGH COMPLEX 45 MIN: CPT

## 2020-05-19 PROCEDURE — 99214 OFFICE O/P EST MOD 30 MIN: CPT | Performed by: INTERNAL MEDICINE

## 2020-05-19 RX ORDER — DEXAMETHASONE 4 MG/1
4 TABLET ORAL
Qty: 30 TABLET | Refills: 0 | Status: SHIPPED | OUTPATIENT
Start: 2020-05-19 | End: 2020-08-18 | Stop reason: SDUPTHER

## 2020-05-19 RX ORDER — DEXTROSE MONOHYDRATE 50 MG/ML
20 INJECTION, SOLUTION INTRAVENOUS ONCE
Status: CANCELLED | OUTPATIENT
Start: 2020-05-27

## 2020-05-19 RX ORDER — SODIUM CHLORIDE 9 MG/ML
20 INJECTION, SOLUTION INTRAVENOUS ONCE AS NEEDED
Status: CANCELLED | OUTPATIENT
Start: 2020-05-27

## 2020-05-19 RX ORDER — FLUOROURACIL 50 MG/ML
400 INJECTION, SOLUTION INTRAVENOUS ONCE
Status: CANCELLED | OUTPATIENT
Start: 2020-05-27

## 2020-05-20 ENCOUNTER — HOSPITAL ENCOUNTER (OUTPATIENT)
Dept: RADIOLOGY | Facility: HOSPITAL | Age: 65
Discharge: HOME | End: 2020-05-20
Attending: INTERNAL MEDICINE
Payer: MEDICARE

## 2020-05-20 ENCOUNTER — TELEPHONE (OUTPATIENT)
Dept: HEMATOLOGY ONCOLOGY | Facility: CLINIC | Age: 65
End: 2020-05-20

## 2020-05-20 ENCOUNTER — TRANSCRIBE ORDERS (OUTPATIENT)
Dept: ADMINISTRATIVE | Facility: HOSPITAL | Age: 65
End: 2020-05-20

## 2020-05-20 ENCOUNTER — TELEPHONE (OUTPATIENT)
Dept: UROLOGY | Facility: CLINIC | Age: 65
End: 2020-05-20

## 2020-05-20 DIAGNOSIS — C16.0 MALIGNANT NEOPLASM OF CARDIA OF STOMACH (HCC): ICD-10-CM

## 2020-05-20 DIAGNOSIS — R97.20 ELEVATED PSA: Primary | ICD-10-CM

## 2020-05-20 LAB
PSA FREE MFR SERPL: 15.2 %
PSA FREE SERPL-MCNC: 1.29 NG/ML
PSA SERPL-MCNC: 8.5 NG/ML (ref 0–4)

## 2020-05-20 PROCEDURE — 74177 CT ABD & PELVIS W/CONTRAST: CPT

## 2020-05-20 PROCEDURE — 71260 CT THORAX DX C+: CPT

## 2020-05-20 RX ADMIN — IOHEXOL 100 ML: 350 INJECTION, SOLUTION INTRAVENOUS at 11:55

## 2020-05-21 ENCOUNTER — TELEPHONE (OUTPATIENT)
Dept: PHYSICAL THERAPY | Facility: CLINIC | Age: 65
End: 2020-05-21

## 2020-05-22 ENCOUNTER — TELEPHONE (OUTPATIENT)
Dept: HEMATOLOGY ONCOLOGY | Facility: HOSPITAL | Age: 65
End: 2020-05-22

## 2020-05-22 ENCOUNTER — APPOINTMENT (OUTPATIENT)
Dept: LAB | Facility: CLINIC | Age: 65
End: 2020-05-22
Payer: MEDICARE

## 2020-05-22 DIAGNOSIS — E86.0 DEHYDRATION: ICD-10-CM

## 2020-05-22 DIAGNOSIS — C16.0 MALIGNANT NEOPLASM OF CARDIA OF STOMACH (HCC): ICD-10-CM

## 2020-05-22 DIAGNOSIS — C15.5 MALIGNANT NEOPLASM OF LOWER THIRD OF ESOPHAGUS (HCC): ICD-10-CM

## 2020-05-22 DIAGNOSIS — R11.0 NAUSEA: ICD-10-CM

## 2020-05-22 LAB
ALBUMIN SERPL BCP-MCNC: 3.2 G/DL (ref 3.5–5)
ALP SERPL-CCNC: 148 U/L (ref 46–116)
ALT SERPL W P-5'-P-CCNC: 17 U/L (ref 12–78)
ANION GAP SERPL CALCULATED.3IONS-SCNC: 5 MMOL/L (ref 4–13)
AST SERPL W P-5'-P-CCNC: 8 U/L (ref 5–45)
BASOPHILS # BLD AUTO: 0.06 THOUSANDS/ΜL (ref 0–0.1)
BASOPHILS NFR BLD AUTO: 1 % (ref 0–1)
BILIRUB SERPL-MCNC: 0.9 MG/DL (ref 0.2–1)
BUN SERPL-MCNC: 28 MG/DL (ref 5–25)
CALCIUM SERPL-MCNC: 9.4 MG/DL (ref 8.3–10.1)
CHLORIDE SERPL-SCNC: 105 MMOL/L (ref 100–108)
CO2 SERPL-SCNC: 26 MMOL/L (ref 21–32)
CREAT SERPL-MCNC: 0.97 MG/DL (ref 0.6–1.3)
EOSINOPHIL # BLD AUTO: 0.06 THOUSAND/ΜL (ref 0–0.61)
EOSINOPHIL NFR BLD AUTO: 1 % (ref 0–6)
ERYTHROCYTE [DISTWIDTH] IN BLOOD BY AUTOMATED COUNT: 17.8 % (ref 11.6–15.1)
GFR SERPL CREATININE-BSD FRML MDRD: 81 ML/MIN/1.73SQ M
GLUCOSE SERPL-MCNC: 99 MG/DL (ref 65–140)
HCT VFR BLD AUTO: 38.1 % (ref 36.5–49.3)
HGB BLD-MCNC: 12.4 G/DL (ref 12–17)
IMM GRANULOCYTES # BLD AUTO: 0.07 THOUSAND/UL (ref 0–0.2)
IMM GRANULOCYTES NFR BLD AUTO: 1 % (ref 0–2)
LYMPHOCYTES # BLD AUTO: 3.91 THOUSANDS/ΜL (ref 0.6–4.47)
LYMPHOCYTES NFR BLD AUTO: 36 % (ref 14–44)
MCH RBC QN AUTO: 33 PG (ref 26.8–34.3)
MCHC RBC AUTO-ENTMCNC: 32.5 G/DL (ref 31.4–37.4)
MCV RBC AUTO: 101 FL (ref 82–98)
MONOCYTES # BLD AUTO: 0.92 THOUSAND/ΜL (ref 0.17–1.22)
MONOCYTES NFR BLD AUTO: 9 % (ref 4–12)
NEUTROPHILS # BLD AUTO: 5.76 THOUSANDS/ΜL (ref 1.85–7.62)
NEUTS SEG NFR BLD AUTO: 52 % (ref 43–75)
NRBC BLD AUTO-RTO: 0 /100 WBCS
PLATELET # BLD AUTO: 335 THOUSANDS/UL (ref 149–390)
PMV BLD AUTO: 10.3 FL (ref 8.9–12.7)
POTASSIUM SERPL-SCNC: 3.5 MMOL/L (ref 3.5–5.3)
PROT SERPL-MCNC: 6.5 G/DL (ref 6.4–8.2)
RBC # BLD AUTO: 3.76 MILLION/UL (ref 3.88–5.62)
SODIUM SERPL-SCNC: 136 MMOL/L (ref 136–145)
WBC # BLD AUTO: 10.78 THOUSAND/UL (ref 4.31–10.16)

## 2020-05-22 PROCEDURE — 36415 COLL VENOUS BLD VENIPUNCTURE: CPT

## 2020-05-22 PROCEDURE — 80053 COMPREHEN METABOLIC PANEL: CPT

## 2020-05-22 PROCEDURE — 85025 COMPLETE CBC W/AUTO DIFF WBC: CPT

## 2020-05-26 ENCOUNTER — OFFICE VISIT (OUTPATIENT)
Dept: HEMATOLOGY ONCOLOGY | Facility: CLINIC | Age: 65
End: 2020-05-26
Payer: MEDICARE

## 2020-05-26 ENCOUNTER — TELEMEDICINE (OUTPATIENT)
Dept: PHYSICAL THERAPY | Facility: CLINIC | Age: 65
End: 2020-05-26
Payer: MEDICARE

## 2020-05-26 VITALS
BODY MASS INDEX: 28.44 KG/M2 | HEART RATE: 63 BPM | HEIGHT: 69 IN | TEMPERATURE: 97.4 F | OXYGEN SATURATION: 98 % | DIASTOLIC BLOOD PRESSURE: 72 MMHG | SYSTOLIC BLOOD PRESSURE: 148 MMHG | WEIGHT: 192 LBS | RESPIRATION RATE: 16 BRPM

## 2020-05-26 DIAGNOSIS — R26.89 OTHER ABNORMALITIES OF GAIT AND MOBILITY: Primary | ICD-10-CM

## 2020-05-26 DIAGNOSIS — R26.89 BALANCE DISORDER: ICD-10-CM

## 2020-05-26 DIAGNOSIS — C15.5 MALIGNANT NEOPLASM OF LOWER THIRD OF ESOPHAGUS (HCC): Primary | ICD-10-CM

## 2020-05-26 DIAGNOSIS — R53.1 WEAKNESS: ICD-10-CM

## 2020-05-26 PROCEDURE — 97112 NEUROMUSCULAR REEDUCATION: CPT

## 2020-05-26 PROCEDURE — 99214 OFFICE O/P EST MOD 30 MIN: CPT | Performed by: INTERNAL MEDICINE

## 2020-05-26 PROCEDURE — 3078F DIAST BP <80 MM HG: CPT | Performed by: INTERNAL MEDICINE

## 2020-05-26 PROCEDURE — 3008F BODY MASS INDEX DOCD: CPT | Performed by: INTERNAL MEDICINE

## 2020-05-26 PROCEDURE — 1160F RVW MEDS BY RX/DR IN RCRD: CPT | Performed by: INTERNAL MEDICINE

## 2020-05-26 PROCEDURE — 97110 THERAPEUTIC EXERCISES: CPT

## 2020-05-26 PROCEDURE — 1036F TOBACCO NON-USER: CPT | Performed by: INTERNAL MEDICINE

## 2020-05-26 PROCEDURE — 3077F SYST BP >= 140 MM HG: CPT | Performed by: INTERNAL MEDICINE

## 2020-05-27 ENCOUNTER — TELEPHONE (OUTPATIENT)
Dept: PALLIATIVE MEDICINE | Facility: CLINIC | Age: 65
End: 2020-05-27

## 2020-05-27 ENCOUNTER — HOSPITAL ENCOUNTER (OUTPATIENT)
Dept: INFUSION CENTER | Facility: HOSPITAL | Age: 65
Discharge: HOME | End: 2020-05-27
Attending: INTERNAL MEDICINE
Payer: MEDICARE

## 2020-05-27 VITALS
RESPIRATION RATE: 16 BRPM | OXYGEN SATURATION: 98 % | DIASTOLIC BLOOD PRESSURE: 77 MMHG | BODY MASS INDEX: 28.28 KG/M2 | HEIGHT: 69 IN | HEART RATE: 54 BPM | WEIGHT: 190.92 LBS | TEMPERATURE: 97.1 F | SYSTOLIC BLOOD PRESSURE: 154 MMHG

## 2020-05-27 DIAGNOSIS — C16.0 MALIGNANT NEOPLASM OF CARDIA OF STOMACH (HCC): ICD-10-CM

## 2020-05-27 DIAGNOSIS — E86.0 DEHYDRATION: ICD-10-CM

## 2020-05-27 DIAGNOSIS — R11.0 NAUSEA: ICD-10-CM

## 2020-05-27 DIAGNOSIS — C15.5 MALIGNANT NEOPLASM OF LOWER THIRD OF ESOPHAGUS (HCC): Primary | ICD-10-CM

## 2020-05-27 PROCEDURE — 96367 TX/PROPH/DG ADDL SEQ IV INF: CPT

## 2020-05-27 PROCEDURE — G0498 CHEMO EXTEND IV INFUS W/PUMP: HCPCS

## 2020-05-27 PROCEDURE — 96409 CHEMO IV PUSH SNGL DRUG: CPT

## 2020-05-27 RX ORDER — DEXTROSE MONOHYDRATE 50 MG/ML
20 INJECTION, SOLUTION INTRAVENOUS ONCE
Status: DISCONTINUED | OUTPATIENT
Start: 2020-05-27 | End: 2020-05-30 | Stop reason: HOSPADM

## 2020-05-27 RX ORDER — SODIUM CHLORIDE 9 MG/ML
20 INJECTION, SOLUTION INTRAVENOUS ONCE AS NEEDED
Status: DISCONTINUED | OUTPATIENT
Start: 2020-05-27 | End: 2020-05-30 | Stop reason: HOSPADM

## 2020-05-27 RX ORDER — FLUOROURACIL 50 MG/ML
400 INJECTION, SOLUTION INTRAVENOUS ONCE
Status: COMPLETED | OUTPATIENT
Start: 2020-05-27 | End: 2020-05-27

## 2020-05-27 RX ADMIN — FLUOROURACIL 795 MG: 50 INJECTION, SOLUTION INTRAVENOUS at 11:29

## 2020-05-27 RX ADMIN — SODIUM CHLORIDE 20 ML/HR: 0.9 INJECTION, SOLUTION INTRAVENOUS at 10:30

## 2020-05-27 RX ADMIN — DEXAMETHASONE SODIUM PHOSPHATE: 10 INJECTION, SOLUTION INTRAMUSCULAR; INTRAVENOUS at 10:31

## 2020-05-28 ENCOUNTER — APPOINTMENT (OUTPATIENT)
Dept: PHYSICAL THERAPY | Facility: CLINIC | Age: 65
End: 2020-05-28
Payer: MEDICARE

## 2020-05-28 ENCOUNTER — HOSPITAL ENCOUNTER (OUTPATIENT)
Dept: INFUSION CENTER | Facility: HOSPITAL | Age: 65
Discharge: HOME | End: 2020-05-28
Payer: MEDICARE

## 2020-05-28 ENCOUNTER — TELEPHONE (OUTPATIENT)
Dept: PALLIATIVE MEDICINE | Facility: CLINIC | Age: 65
End: 2020-05-28

## 2020-05-28 VITALS — TEMPERATURE: 97.8 F

## 2020-05-28 DIAGNOSIS — R11.0 NAUSEA: ICD-10-CM

## 2020-05-28 DIAGNOSIS — C15.5 MALIGNANT NEOPLASM OF LOWER THIRD OF ESOPHAGUS (HCC): ICD-10-CM

## 2020-05-28 DIAGNOSIS — C16.0 MALIGNANT NEOPLASM OF CARDIA OF STOMACH (HCC): ICD-10-CM

## 2020-05-28 DIAGNOSIS — E86.0 DEHYDRATION: ICD-10-CM

## 2020-05-28 DIAGNOSIS — R11.0 NAUSEA: Primary | ICD-10-CM

## 2020-05-28 PROCEDURE — 96523 IRRIG DRUG DELIVERY DEVICE: CPT

## 2020-05-29 ENCOUNTER — HOSPITAL ENCOUNTER (OUTPATIENT)
Dept: INFUSION CENTER | Facility: HOSPITAL | Age: 65
End: 2020-05-29
Attending: INTERNAL MEDICINE

## 2020-06-01 RX ORDER — DEXTROSE MONOHYDRATE 50 MG/ML
20 INJECTION, SOLUTION INTRAVENOUS ONCE
Status: CANCELLED | OUTPATIENT
Start: 2020-06-10

## 2020-06-01 RX ORDER — FLUOROURACIL 50 MG/ML
400 INJECTION, SOLUTION INTRAVENOUS ONCE
Status: CANCELLED | OUTPATIENT
Start: 2020-06-10

## 2020-06-01 RX ORDER — SODIUM CHLORIDE 9 MG/ML
20 INJECTION, SOLUTION INTRAVENOUS ONCE AS NEEDED
Status: CANCELLED | OUTPATIENT
Start: 2020-06-10

## 2020-06-02 ENCOUNTER — TELEMEDICINE (OUTPATIENT)
Dept: PHYSICAL THERAPY | Facility: CLINIC | Age: 65
End: 2020-06-02
Payer: MEDICARE

## 2020-06-02 DIAGNOSIS — R11.0 NAUSEA: Primary | ICD-10-CM

## 2020-06-02 DIAGNOSIS — R26.89 BALANCE DISORDER: ICD-10-CM

## 2020-06-02 DIAGNOSIS — C16.0 MALIGNANT NEOPLASM OF CARDIA OF STOMACH (HCC): ICD-10-CM

## 2020-06-02 DIAGNOSIS — R53.1 WEAKNESS: ICD-10-CM

## 2020-06-02 DIAGNOSIS — E86.0 DEHYDRATION: ICD-10-CM

## 2020-06-02 DIAGNOSIS — C15.5 MALIGNANT NEOPLASM OF LOWER THIRD OF ESOPHAGUS (HCC): ICD-10-CM

## 2020-06-02 DIAGNOSIS — R26.89 OTHER ABNORMALITIES OF GAIT AND MOBILITY: Primary | ICD-10-CM

## 2020-06-02 PROCEDURE — 97110 THERAPEUTIC EXERCISES: CPT

## 2020-06-02 PROCEDURE — 97112 NEUROMUSCULAR REEDUCATION: CPT

## 2020-06-04 ENCOUNTER — TELEPHONE (OUTPATIENT)
Dept: PHYSICAL THERAPY | Facility: CLINIC | Age: 65
End: 2020-06-04

## 2020-06-05 ENCOUNTER — APPOINTMENT (OUTPATIENT)
Dept: LAB | Facility: CLINIC | Age: 65
End: 2020-06-05
Payer: MEDICARE

## 2020-06-05 DIAGNOSIS — C15.5 MALIGNANT NEOPLASM OF LOWER THIRD OF ESOPHAGUS (HCC): ICD-10-CM

## 2020-06-05 DIAGNOSIS — R11.0 NAUSEA: ICD-10-CM

## 2020-06-05 DIAGNOSIS — C16.0 MALIGNANT NEOPLASM OF CARDIA OF STOMACH (HCC): ICD-10-CM

## 2020-06-05 DIAGNOSIS — E86.0 DEHYDRATION: ICD-10-CM

## 2020-06-05 LAB
ALBUMIN SERPL BCP-MCNC: 2.9 G/DL (ref 3.5–5)
ALP SERPL-CCNC: 90 U/L (ref 46–116)
ALT SERPL W P-5'-P-CCNC: 22 U/L (ref 12–78)
ANION GAP SERPL CALCULATED.3IONS-SCNC: 9 MMOL/L (ref 4–13)
ANISOCYTOSIS BLD QL SMEAR: PRESENT
AST SERPL W P-5'-P-CCNC: 11 U/L (ref 5–45)
BASOPHILS # BLD MANUAL: 0 THOUSAND/UL (ref 0–0.1)
BASOPHILS NFR MAR MANUAL: 0 % (ref 0–1)
BILIRUB SERPL-MCNC: 0.64 MG/DL (ref 0.2–1)
BUN SERPL-MCNC: 20 MG/DL (ref 5–25)
CALCIUM SERPL-MCNC: 8.3 MG/DL (ref 8.3–10.1)
CHLORIDE SERPL-SCNC: 102 MMOL/L (ref 100–108)
CO2 SERPL-SCNC: 22 MMOL/L (ref 21–32)
CREAT SERPL-MCNC: 1.08 MG/DL (ref 0.6–1.3)
EOSINOPHIL # BLD MANUAL: 0 THOUSAND/UL (ref 0–0.4)
EOSINOPHIL NFR BLD MANUAL: 0 % (ref 0–6)
ERYTHROCYTE [DISTWIDTH] IN BLOOD BY AUTOMATED COUNT: 18.6 % (ref 11.6–15.1)
GFR SERPL CREATININE-BSD FRML MDRD: 71 ML/MIN/1.73SQ M
GLUCOSE SERPL-MCNC: 194 MG/DL (ref 65–140)
HCT VFR BLD AUTO: 34.4 % (ref 36.5–49.3)
HGB BLD-MCNC: 11.1 G/DL (ref 12–17)
LYMPHOCYTES # BLD AUTO: 17 % (ref 14–44)
LYMPHOCYTES # BLD AUTO: 2.36 THOUSAND/UL (ref 0.6–4.47)
MACROCYTES BLD QL AUTO: PRESENT
MCH RBC QN AUTO: 32.9 PG (ref 26.8–34.3)
MCHC RBC AUTO-ENTMCNC: 32.3 G/DL (ref 31.4–37.4)
MCV RBC AUTO: 102 FL (ref 82–98)
METAMYELOCYTES NFR BLD MANUAL: 1 % (ref 0–1)
MONOCYTES # BLD AUTO: 0.69 THOUSAND/UL (ref 0–1.22)
MONOCYTES NFR BLD: 5 % (ref 4–12)
NEUTROPHILS # BLD MANUAL: 10.4 THOUSAND/UL (ref 1.85–7.62)
NEUTS BAND NFR BLD MANUAL: 3 % (ref 0–8)
NEUTS SEG NFR BLD AUTO: 72 % (ref 43–75)
NRBC BLD AUTO-RTO: 0 /100 WBCS
PLATELET # BLD AUTO: 315 THOUSANDS/UL (ref 149–390)
PLATELET BLD QL SMEAR: ADEQUATE
PMV BLD AUTO: 10.4 FL (ref 8.9–12.7)
POTASSIUM SERPL-SCNC: 3.6 MMOL/L (ref 3.5–5.3)
PROT SERPL-MCNC: 6.3 G/DL (ref 6.4–8.2)
RBC # BLD AUTO: 3.37 MILLION/UL (ref 3.88–5.62)
RBC MORPH BLD: PRESENT
SODIUM SERPL-SCNC: 133 MMOL/L (ref 136–145)
VARIANT LYMPHS # BLD AUTO: 2 %
WBC # BLD AUTO: 13.86 THOUSAND/UL (ref 4.31–10.16)

## 2020-06-05 PROCEDURE — 85007 BL SMEAR W/DIFF WBC COUNT: CPT

## 2020-06-05 PROCEDURE — 36415 COLL VENOUS BLD VENIPUNCTURE: CPT

## 2020-06-05 PROCEDURE — 80053 COMPREHEN METABOLIC PANEL: CPT

## 2020-06-05 PROCEDURE — 85027 COMPLETE CBC AUTOMATED: CPT

## 2020-06-10 ENCOUNTER — HOSPITAL ENCOUNTER (OUTPATIENT)
Dept: INFUSION CENTER | Facility: HOSPITAL | Age: 65
Discharge: HOME | End: 2020-06-10
Attending: INTERNAL MEDICINE
Payer: MEDICARE

## 2020-06-10 VITALS
TEMPERATURE: 97.4 F | BODY MASS INDEX: 27.3 KG/M2 | DIASTOLIC BLOOD PRESSURE: 84 MMHG | HEIGHT: 69 IN | OXYGEN SATURATION: 98 % | HEART RATE: 78 BPM | RESPIRATION RATE: 18 BRPM | WEIGHT: 184.3 LBS | SYSTOLIC BLOOD PRESSURE: 137 MMHG

## 2020-06-10 DIAGNOSIS — C16.0 MALIGNANT NEOPLASM OF CARDIA OF STOMACH (HCC): ICD-10-CM

## 2020-06-10 DIAGNOSIS — R11.0 NAUSEA: ICD-10-CM

## 2020-06-10 DIAGNOSIS — E86.0 DEHYDRATION: ICD-10-CM

## 2020-06-10 DIAGNOSIS — C15.5 MALIGNANT NEOPLASM OF LOWER THIRD OF ESOPHAGUS (HCC): Primary | ICD-10-CM

## 2020-06-10 PROCEDURE — G0498 CHEMO EXTEND IV INFUS W/PUMP: HCPCS

## 2020-06-10 PROCEDURE — 96367 TX/PROPH/DG ADDL SEQ IV INF: CPT

## 2020-06-10 PROCEDURE — 96409 CHEMO IV PUSH SNGL DRUG: CPT

## 2020-06-10 RX ORDER — DEXTROSE MONOHYDRATE 50 MG/ML
20 INJECTION, SOLUTION INTRAVENOUS ONCE
Status: DISCONTINUED | OUTPATIENT
Start: 2020-06-10 | End: 2020-06-13 | Stop reason: HOSPADM

## 2020-06-10 RX ORDER — FLUOROURACIL 50 MG/ML
400 INJECTION, SOLUTION INTRAVENOUS ONCE
Status: COMPLETED | OUTPATIENT
Start: 2020-06-10 | End: 2020-06-10

## 2020-06-10 RX ORDER — SODIUM CHLORIDE 9 MG/ML
20 INJECTION, SOLUTION INTRAVENOUS ONCE AS NEEDED
Status: DISCONTINUED | OUTPATIENT
Start: 2020-06-10 | End: 2020-06-13 | Stop reason: HOSPADM

## 2020-06-10 RX ADMIN — FLUOROURACIL 795 MG: 50 INJECTION, SOLUTION INTRAVENOUS at 11:11

## 2020-06-10 RX ADMIN — DEXAMETHASONE SODIUM PHOSPHATE: 10 INJECTION, SOLUTION INTRAMUSCULAR; INTRAVENOUS at 10:26

## 2020-06-10 RX ADMIN — SODIUM CHLORIDE 20 ML/HR: 0.9 INJECTION, SOLUTION INTRAVENOUS at 10:25

## 2020-06-12 ENCOUNTER — HOSPITAL ENCOUNTER (OUTPATIENT)
Dept: INFUSION CENTER | Facility: HOSPITAL | Age: 65
Discharge: HOME | End: 2020-06-12
Attending: INTERNAL MEDICINE

## 2020-06-12 VITALS — TEMPERATURE: 97 F

## 2020-06-12 DIAGNOSIS — R11.0 NAUSEA: ICD-10-CM

## 2020-06-12 DIAGNOSIS — E86.0 DEHYDRATION: ICD-10-CM

## 2020-06-12 DIAGNOSIS — C16.0 MALIGNANT NEOPLASM OF CARDIA OF STOMACH (HCC): ICD-10-CM

## 2020-06-12 DIAGNOSIS — I80.01 SUPERFICIAL THROMBOPHLEBITIS OF RIGHT LEG: ICD-10-CM

## 2020-06-12 DIAGNOSIS — C15.5 MALIGNANT NEOPLASM OF LOWER THIRD OF ESOPHAGUS (HCC): Primary | ICD-10-CM

## 2020-06-16 RX ORDER — FLUOROURACIL 50 MG/ML
400 INJECTION, SOLUTION INTRAVENOUS ONCE
Status: CANCELLED | OUTPATIENT
Start: 2020-06-24

## 2020-06-16 RX ORDER — DEXTROSE MONOHYDRATE 50 MG/ML
20 INJECTION, SOLUTION INTRAVENOUS ONCE
Status: CANCELLED | OUTPATIENT
Start: 2020-06-24

## 2020-06-16 RX ORDER — SODIUM CHLORIDE 9 MG/ML
20 INJECTION, SOLUTION INTRAVENOUS ONCE AS NEEDED
Status: CANCELLED | OUTPATIENT
Start: 2020-06-24

## 2020-06-17 ENCOUNTER — TELEPHONE (OUTPATIENT)
Dept: FAMILY MEDICINE CLINIC | Facility: CLINIC | Age: 65
End: 2020-06-17

## 2020-06-17 ENCOUNTER — TELEPHONE (OUTPATIENT)
Dept: NUTRITION | Facility: CLINIC | Age: 65
End: 2020-06-17

## 2020-06-17 DIAGNOSIS — C16.0 MALIGNANT NEOPLASM OF CARDIA OF STOMACH (HCC): ICD-10-CM

## 2020-06-17 DIAGNOSIS — E86.0 DEHYDRATION: ICD-10-CM

## 2020-06-17 DIAGNOSIS — R11.0 NAUSEA: Primary | ICD-10-CM

## 2020-06-17 DIAGNOSIS — C15.5 MALIGNANT NEOPLASM OF LOWER THIRD OF ESOPHAGUS (HCC): ICD-10-CM

## 2020-06-19 ENCOUNTER — TELEPHONE (OUTPATIENT)
Dept: HEMATOLOGY ONCOLOGY | Facility: CLINIC | Age: 65
End: 2020-06-19

## 2020-06-19 DIAGNOSIS — C15.5 MALIGNANT NEOPLASM OF LOWER THIRD OF ESOPHAGUS (HCC): Primary | ICD-10-CM

## 2020-06-19 RX ORDER — ONDANSETRON 4 MG/1
4 TABLET, FILM COATED ORAL EVERY 8 HOURS PRN
Qty: 20 TABLET | Refills: 0 | Status: SHIPPED | OUTPATIENT
Start: 2020-06-19 | End: 2020-08-17

## 2020-06-22 ENCOUNTER — APPOINTMENT (OUTPATIENT)
Dept: LAB | Facility: CLINIC | Age: 65
End: 2020-06-22
Payer: MEDICARE

## 2020-06-22 DIAGNOSIS — C16.0 MALIGNANT NEOPLASM OF CARDIA OF STOMACH (HCC): ICD-10-CM

## 2020-06-22 DIAGNOSIS — E86.0 DEHYDRATION: ICD-10-CM

## 2020-06-22 DIAGNOSIS — C15.5 MALIGNANT NEOPLASM OF LOWER THIRD OF ESOPHAGUS (HCC): ICD-10-CM

## 2020-06-22 DIAGNOSIS — R11.0 NAUSEA: ICD-10-CM

## 2020-06-22 LAB
ALBUMIN SERPL BCP-MCNC: 3 G/DL (ref 3.5–5)
ALP SERPL-CCNC: 95 U/L (ref 46–116)
ALT SERPL W P-5'-P-CCNC: 24 U/L (ref 12–78)
ANION GAP SERPL CALCULATED.3IONS-SCNC: 10 MMOL/L (ref 4–13)
AST SERPL W P-5'-P-CCNC: 10 U/L (ref 5–45)
BASOPHILS # BLD AUTO: 0.01 THOUSANDS/ΜL (ref 0–0.1)
BASOPHILS NFR BLD AUTO: 0 % (ref 0–1)
BILIRUB SERPL-MCNC: 0.5 MG/DL (ref 0.2–1)
BUN SERPL-MCNC: 28 MG/DL (ref 5–25)
CALCIUM SERPL-MCNC: 9.4 MG/DL (ref 8.3–10.1)
CHLORIDE SERPL-SCNC: 105 MMOL/L (ref 100–108)
CO2 SERPL-SCNC: 23 MMOL/L (ref 21–32)
CREAT SERPL-MCNC: 0.95 MG/DL (ref 0.6–1.3)
EOSINOPHIL # BLD AUTO: 0.03 THOUSAND/ΜL (ref 0–0.61)
EOSINOPHIL NFR BLD AUTO: 0 % (ref 0–6)
ERYTHROCYTE [DISTWIDTH] IN BLOOD BY AUTOMATED COUNT: 19.4 % (ref 11.6–15.1)
GFR SERPL CREATININE-BSD FRML MDRD: 82 ML/MIN/1.73SQ M
GLUCOSE P FAST SERPL-MCNC: 131 MG/DL (ref 65–99)
HCT VFR BLD AUTO: 33.5 % (ref 36.5–49.3)
HGB BLD-MCNC: 10.7 G/DL (ref 12–17)
IMM GRANULOCYTES # BLD AUTO: 0.05 THOUSAND/UL (ref 0–0.2)
IMM GRANULOCYTES NFR BLD AUTO: 1 % (ref 0–2)
LYMPHOCYTES # BLD AUTO: 2.61 THOUSANDS/ΜL (ref 0.6–4.47)
LYMPHOCYTES NFR BLD AUTO: 26 % (ref 14–44)
MCH RBC QN AUTO: 33.6 PG (ref 26.8–34.3)
MCHC RBC AUTO-ENTMCNC: 31.9 G/DL (ref 31.4–37.4)
MCV RBC AUTO: 105 FL (ref 82–98)
MONOCYTES # BLD AUTO: 0.71 THOUSAND/ΜL (ref 0.17–1.22)
MONOCYTES NFR BLD AUTO: 7 % (ref 4–12)
NEUTROPHILS # BLD AUTO: 6.54 THOUSANDS/ΜL (ref 1.85–7.62)
NEUTS SEG NFR BLD AUTO: 66 % (ref 43–75)
NRBC BLD AUTO-RTO: 1 /100 WBCS
PLATELET # BLD AUTO: 235 THOUSANDS/UL (ref 149–390)
PMV BLD AUTO: 10.6 FL (ref 8.9–12.7)
POTASSIUM SERPL-SCNC: 3.7 MMOL/L (ref 3.5–5.3)
PROT SERPL-MCNC: 6.3 G/DL (ref 6.4–8.2)
RBC # BLD AUTO: 3.18 MILLION/UL (ref 3.88–5.62)
SODIUM SERPL-SCNC: 138 MMOL/L (ref 136–145)
WBC # BLD AUTO: 9.95 THOUSAND/UL (ref 4.31–10.16)

## 2020-06-22 PROCEDURE — 36415 COLL VENOUS BLD VENIPUNCTURE: CPT

## 2020-06-22 PROCEDURE — 85025 COMPLETE CBC W/AUTO DIFF WBC: CPT

## 2020-06-22 PROCEDURE — 80053 COMPREHEN METABOLIC PANEL: CPT

## 2020-06-23 ENCOUNTER — OFFICE VISIT (OUTPATIENT)
Dept: PALLIATIVE MEDICINE | Facility: CLINIC | Age: 65
End: 2020-06-23
Payer: MEDICARE

## 2020-06-23 VITALS
HEART RATE: 60 BPM | RESPIRATION RATE: 20 BRPM | DIASTOLIC BLOOD PRESSURE: 78 MMHG | TEMPERATURE: 96.1 F | BODY MASS INDEX: 27.34 KG/M2 | SYSTOLIC BLOOD PRESSURE: 129 MMHG | WEIGHT: 185.2 LBS

## 2020-06-23 DIAGNOSIS — Z51.5 PALLIATIVE CARE PATIENT: ICD-10-CM

## 2020-06-23 DIAGNOSIS — R11.10 REGURGITATION OF STOMACH CONTENTS: ICD-10-CM

## 2020-06-23 DIAGNOSIS — R53.83 OTHER FATIGUE: ICD-10-CM

## 2020-06-23 DIAGNOSIS — F43.10 PTSD (POST-TRAUMATIC STRESS DISORDER): ICD-10-CM

## 2020-06-23 DIAGNOSIS — F41.9 ANXIETY: ICD-10-CM

## 2020-06-23 DIAGNOSIS — F42.9 OBSESSIVE-COMPULSIVE DISORDER, UNSPECIFIED TYPE: ICD-10-CM
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
## 2020-06-23 DIAGNOSIS — R13.19 OTHER DYSPHAGIA: ICD-10-CM

## 2020-06-23 DIAGNOSIS — R11.0 NAUSEA: ICD-10-CM

## 2020-06-23 DIAGNOSIS — R11.0 CHEMOTHERAPY-INDUCED NAUSEA: ICD-10-CM

## 2020-06-23 DIAGNOSIS — G47.09 OTHER INSOMNIA: Chronic | ICD-10-CM

## 2020-06-23 DIAGNOSIS — C16.0 MALIGNANT NEOPLASM OF CARDIA OF STOMACH (HCC): ICD-10-CM

## 2020-06-23 DIAGNOSIS — F33.9 RECURRENT MAJOR DEPRESSIVE DISORDER, REMISSION STATUS UNSPECIFIED (HCC): ICD-10-CM

## 2020-06-23 DIAGNOSIS — C15.5 MALIGNANT NEOPLASM OF LOWER THIRD OF ESOPHAGUS (HCC): Primary | ICD-10-CM

## 2020-06-23 DIAGNOSIS — T45.1X5A CHEMOTHERAPY-INDUCED NAUSEA: ICD-10-CM

## 2020-06-23 DIAGNOSIS — G89.3 CANCER ASSOCIATED PAIN: ICD-10-CM

## 2020-06-23 DIAGNOSIS — R41.3 MEMORY LOSS: ICD-10-CM

## 2020-06-23 PROCEDURE — 99215 OFFICE O/P EST HI 40 MIN: CPT | Performed by: INTERNAL MEDICINE

## 2020-06-23 PROCEDURE — 1160F RVW MEDS BY RX/DR IN RCRD: CPT | Performed by: INTERNAL MEDICINE

## 2020-06-23 PROCEDURE — 3074F SYST BP LT 130 MM HG: CPT | Performed by: INTERNAL MEDICINE

## 2020-06-23 PROCEDURE — 1036F TOBACCO NON-USER: CPT | Performed by: INTERNAL MEDICINE

## 2020-06-23 PROCEDURE — 3078F DIAST BP <80 MM HG: CPT | Performed by: INTERNAL MEDICINE

## 2020-06-23 RX ORDER — OLANZAPINE 5 MG/1
5 TABLET ORAL
Qty: 30 TABLET | Refills: 0 | Status: SHIPPED | OUTPATIENT
Start: 2020-06-23 | End: 2020-08-18 | Stop reason: SDUPTHER

## 2020-06-24 ENCOUNTER — HOSPITAL ENCOUNTER (OUTPATIENT)
Dept: INFUSION CENTER | Facility: HOSPITAL | Age: 65
Discharge: HOME | End: 2020-06-24
Attending: INTERNAL MEDICINE
Payer: MEDICARE

## 2020-06-24 ENCOUNTER — TELEPHONE (OUTPATIENT)
Dept: GERIATRICS | Age: 65
End: 2020-06-24

## 2020-06-24 ENCOUNTER — TELEPHONE (OUTPATIENT)
Dept: PALLIATIVE MEDICINE | Facility: CLINIC | Age: 65
End: 2020-06-24

## 2020-06-24 VITALS
SYSTOLIC BLOOD PRESSURE: 148 MMHG | OXYGEN SATURATION: 97 % | HEIGHT: 69 IN | DIASTOLIC BLOOD PRESSURE: 72 MMHG | BODY MASS INDEX: 27.33 KG/M2 | WEIGHT: 184.53 LBS | TEMPERATURE: 96.3 F | RESPIRATION RATE: 18 BRPM | HEART RATE: 68 BPM

## 2020-06-24 DIAGNOSIS — C15.5 MALIGNANT NEOPLASM OF LOWER THIRD OF ESOPHAGUS (HCC): Primary | ICD-10-CM

## 2020-06-24 DIAGNOSIS — C16.0 MALIGNANT NEOPLASM OF CARDIA OF STOMACH (HCC): ICD-10-CM

## 2020-06-24 DIAGNOSIS — E86.0 DEHYDRATION: ICD-10-CM

## 2020-06-24 DIAGNOSIS — R11.0 NAUSEA: ICD-10-CM

## 2020-06-24 PROCEDURE — 96409 CHEMO IV PUSH SNGL DRUG: CPT

## 2020-06-24 PROCEDURE — G0498 CHEMO EXTEND IV INFUS W/PUMP: HCPCS

## 2020-06-24 PROCEDURE — 96367 TX/PROPH/DG ADDL SEQ IV INF: CPT

## 2020-06-24 RX ORDER — FLUOROURACIL 50 MG/ML
400 INJECTION, SOLUTION INTRAVENOUS ONCE
Status: COMPLETED | OUTPATIENT
Start: 2020-06-24 | End: 2020-06-24

## 2020-06-24 RX ORDER — SODIUM CHLORIDE 9 MG/ML
20 INJECTION, SOLUTION INTRAVENOUS ONCE AS NEEDED
Status: DISCONTINUED | OUTPATIENT
Start: 2020-06-24 | End: 2020-06-27 | Stop reason: HOSPADM

## 2020-06-24 RX ADMIN — DEXAMETHASONE SODIUM PHOSPHATE: 10 INJECTION, SOLUTION INTRAMUSCULAR; INTRAVENOUS at 10:25

## 2020-06-24 RX ADMIN — FLUOROURACIL 795 MG: 50 INJECTION, SOLUTION INTRAVENOUS at 11:24

## 2020-06-24 RX ADMIN — SODIUM CHLORIDE 20 ML/HR: 0.9 INJECTION, SOLUTION INTRAVENOUS at 10:25

## 2020-06-26 ENCOUNTER — HOSPITAL ENCOUNTER (OUTPATIENT)
Dept: INFUSION CENTER | Facility: HOSPITAL | Age: 65
Discharge: HOME | End: 2020-06-26
Attending: INTERNAL MEDICINE

## 2020-06-26 ENCOUNTER — TELEPHONE (OUTPATIENT)
Dept: OTHER | Facility: OTHER | Age: 65
End: 2020-06-26

## 2020-06-26 ENCOUNTER — TELEPHONE (OUTPATIENT)
Dept: PALLIATIVE MEDICINE | Facility: CLINIC | Age: 65
End: 2020-06-26

## 2020-06-26 VITALS — TEMPERATURE: 96.6 F

## 2020-06-26 DIAGNOSIS — C16.0 MALIGNANT NEOPLASM OF CARDIA OF STOMACH (HCC): ICD-10-CM

## 2020-06-26 DIAGNOSIS — E86.0 DEHYDRATION: ICD-10-CM

## 2020-06-26 DIAGNOSIS — R11.0 NAUSEA: ICD-10-CM

## 2020-06-26 DIAGNOSIS — C15.5 MALIGNANT NEOPLASM OF LOWER THIRD OF ESOPHAGUS (HCC): Primary | ICD-10-CM

## 2020-06-26 NOTE — PLAN OF CARE
Problem: Potential for Falls  Goal: Patient will remain free of falls  Description  INTERVENTIONS:  - Assess patient frequently for physical needs  -  Identify cognitive and physical deficits and behaviors that affect risk of falls    -  [place] fall precautions as indicated by assessment   - Educate patient/family on patient safety including physical limitations  - Instruct patient to call for assistance with activity based on assessment  - Modify environment to reduce risk of injury  Outcome: Progressing

## 2020-06-29 ENCOUNTER — TELEPHONE (OUTPATIENT)
Dept: HEMATOLOGY ONCOLOGY | Facility: CLINIC | Age: 65
End: 2020-06-29

## 2020-06-29 RX ORDER — DEXTROSE MONOHYDRATE 50 MG/ML
20 INJECTION, SOLUTION INTRAVENOUS ONCE
Status: CANCELLED | OUTPATIENT
Start: 2020-07-08

## 2020-06-29 RX ORDER — FLUOROURACIL 50 MG/ML
400 INJECTION, SOLUTION INTRAVENOUS ONCE
Status: CANCELLED | OUTPATIENT
Start: 2020-07-08

## 2020-06-29 RX ORDER — SODIUM CHLORIDE 9 MG/ML
20 INJECTION, SOLUTION INTRAVENOUS ONCE AS NEEDED
Status: CANCELLED | OUTPATIENT
Start: 2020-07-08

## 2020-06-30 DIAGNOSIS — C15.5 MALIGNANT NEOPLASM OF LOWER THIRD OF ESOPHAGUS (HCC): ICD-10-CM

## 2020-06-30 DIAGNOSIS — C16.0 MALIGNANT NEOPLASM OF CARDIA OF STOMACH (HCC): ICD-10-CM

## 2020-06-30 DIAGNOSIS — R11.0 NAUSEA: Primary | ICD-10-CM

## 2020-06-30 DIAGNOSIS — E86.0 DEHYDRATION: ICD-10-CM

## 2020-07-01 ENCOUNTER — TELEPHONE (OUTPATIENT)
Dept: HEMATOLOGY ONCOLOGY | Facility: CLINIC | Age: 65
End: 2020-07-01

## 2020-07-01 ENCOUNTER — TELEPHONE (OUTPATIENT)
Dept: PALLIATIVE MEDICINE | Facility: CLINIC | Age: 65
End: 2020-07-01

## 2020-07-01 NOTE — TELEPHONE ENCOUNTER
Per Hem / onc request, call placed to pt spouse re complaint of nausea,vomiting,9/10 pain  LMOM acknowledging pts complaints  Instructed to please call back so we can assess pt  Instructed that Dr Una Edmonds would be notified of current status  Instructed spouse that if his pain is uncontrolled and if his nausea, vomiting is significant he may need to be evaluated in ED  Await call back

## 2020-07-01 NOTE — TELEPHONE ENCOUNTER
Would not recommend opioids at this point  On chart review, he is essentially opioid-naive  I am concerned about him safely being able to take opioids w/o supervision  I had referred him to Geriatrics recently for decline in cognition, and recurrent falls  If patient's pain remains significant would recommend the patient go to the ED for evaluation, where he could receive opioids in a monitored environment  While I had heard that his wife had done some self-education on his medications, when last I saw them neither of them know what medications he was taking, or why  And they declined the [facility] in-home medication / safety check that had been set up  He still has not taken his prescribed Zofran or olanzapine  Definitely start there for the nausea  Thanks

## 2020-07-01 NOTE — TELEPHONE ENCOUNTER
Spouse called back  Reports this update    Pt is able to keep ginger ale down, but if her eats or drinks something more substantial he will regurgitate 1/3   Denies any issues with bowels  Appetite fair  Pt has Ondansetron in home and has never used  Instructed to take this for nausea and see if helpful may help decrease pain if no longer vomiting  Pt was to have Swallowing test today but cancelled due to pt not feeling well  Will be rescheduled  Pain seems to be more intense since he stopped his chemo ( temporary)   They do see Dr Will Brannon on 7/7  Pain is located in whole chest, his sides and belly  Pt is open to trying a low dose pain medication if Dr Diane Powers recommends  Both pt and spouse were receptive to call and await call back  Understand that if opioid ordered may require a PA  Instructed on on call service

## 2020-07-01 NOTE — TELEPHONE ENCOUNTER
Patients wife Price Osman called in, patient is having pain, starting a couple of days ago  On a scale of 1-10, the pain is about a 9, trouble keeping food down, vomiting  Is there anything that can be prescribed to help him  Pain is in abdomen area  Please call Price Osman back on [phone]

## 2020-07-06 ENCOUNTER — APPOINTMENT (OUTPATIENT)
Dept: LAB | Facility: CLINIC | Age: 65
End: 2020-07-06
Payer: MEDICARE

## 2020-07-06 DIAGNOSIS — R11.0 NAUSEA: ICD-10-CM

## 2020-07-06 DIAGNOSIS — C16.0 MALIGNANT NEOPLASM OF CARDIA OF STOMACH (HCC): ICD-10-CM

## 2020-07-06 DIAGNOSIS — C15.5 MALIGNANT NEOPLASM OF LOWER THIRD OF ESOPHAGUS (HCC): ICD-10-CM

## 2020-07-06 DIAGNOSIS — E86.0 DEHYDRATION: ICD-10-CM

## 2020-07-06 LAB
ALBUMIN SERPL BCP-MCNC: 2.7 G/DL (ref 3.5–5)
ALP SERPL-CCNC: 120 U/L (ref 46–116)
ALT SERPL W P-5'-P-CCNC: 22 U/L (ref 12–78)
ANION GAP SERPL CALCULATED.3IONS-SCNC: 7 MMOL/L (ref 4–13)
AST SERPL W P-5'-P-CCNC: 18 U/L (ref 5–45)
BASOPHILS # BLD AUTO: 0.06 THOUSANDS/ΜL (ref 0–0.1)
BASOPHILS NFR BLD AUTO: 2 % (ref 0–1)
BILIRUB SERPL-MCNC: 0.62 MG/DL (ref 0.2–1)
BUN SERPL-MCNC: 12 MG/DL (ref 5–25)
CALCIUM SERPL-MCNC: 9.2 MG/DL (ref 8.3–10.1)
CHLORIDE SERPL-SCNC: 106 MMOL/L (ref 100–108)
CO2 SERPL-SCNC: 27 MMOL/L (ref 21–32)
CREAT SERPL-MCNC: 1 MG/DL (ref 0.6–1.3)
EOSINOPHIL # BLD AUTO: 0.03 THOUSAND/ΜL (ref 0–0.61)
EOSINOPHIL NFR BLD AUTO: 1 % (ref 0–6)
ERYTHROCYTE [DISTWIDTH] IN BLOOD BY AUTOMATED COUNT: 18.6 % (ref 11.6–15.1)
GFR SERPL CREATININE-BSD FRML MDRD: 78 ML/MIN/1.73SQ M
GLUCOSE SERPL-MCNC: 123 MG/DL (ref 65–140)
HCT VFR BLD AUTO: 32.2 % (ref 36.5–49.3)
HGB BLD-MCNC: 10.4 G/DL (ref 12–17)
IMM GRANULOCYTES # BLD AUTO: 0.01 THOUSAND/UL (ref 0–0.2)
IMM GRANULOCYTES NFR BLD AUTO: 0 % (ref 0–2)
LYMPHOCYTES # BLD AUTO: 2.2 THOUSANDS/ΜL (ref 0.6–4.47)
LYMPHOCYTES NFR BLD AUTO: 66 % (ref 14–44)
MCH RBC QN AUTO: 34.3 PG (ref 26.8–34.3)
MCHC RBC AUTO-ENTMCNC: 32.3 G/DL (ref 31.4–37.4)
MCV RBC AUTO: 106 FL (ref 82–98)
MONOCYTES # BLD AUTO: 0.39 THOUSAND/ΜL (ref 0.17–1.22)
MONOCYTES NFR BLD AUTO: 12 % (ref 4–12)
NEUTROPHILS # BLD AUTO: 0.63 THOUSANDS/ΜL (ref 1.85–7.62)
NEUTS SEG NFR BLD AUTO: 19 % (ref 43–75)
NRBC BLD AUTO-RTO: 0 /100 WBCS
PLATELET # BLD AUTO: 295 THOUSANDS/UL (ref 149–390)
PMV BLD AUTO: 10.3 FL (ref 8.9–12.7)
POTASSIUM SERPL-SCNC: 3.3 MMOL/L (ref 3.5–5.3)
PROT SERPL-MCNC: 6.2 G/DL (ref 6.4–8.2)
RBC # BLD AUTO: 3.03 MILLION/UL (ref 3.88–5.62)
SODIUM SERPL-SCNC: 140 MMOL/L (ref 136–145)
WBC # BLD AUTO: 3.32 THOUSAND/UL (ref 4.31–10.16)

## 2020-07-06 PROCEDURE — 80053 COMPREHEN METABOLIC PANEL: CPT

## 2020-07-06 PROCEDURE — 36415 COLL VENOUS BLD VENIPUNCTURE: CPT

## 2020-07-06 PROCEDURE — 85025 COMPLETE CBC W/AUTO DIFF WBC: CPT

## 2020-07-06 NOTE — PROGRESS NOTES
Hematology/Oncology Outpatient Follow- up Note  Marbin Jimenez, 1955, [id number]  7/7/2020        Chief Complaint   Patient presents with    Follow-up   Metastatic gastroesophageal cancer    HPI: Marbin Jimenez is a 78 yo male with metastatic gastroesophageal cancer diagnosed in July 2019  He has biopsy-proven adenocarcinoma of the esophagus  PET/CT scan was done which shows omental nodularity that is FDG avid indicating stage IV disease       Previous Hematologic/ Oncologic History:       Malignant neoplasm of lower third of esophagus ([facility] )    7/26/2019 Initial Diagnosis     Malignant neoplasm of lower third of esophagus ([facility] )      8/1/2019 -  Chemotherapy     fluorouracil (ADRUCIL) injection 750 mg, 400 mg/m2 = 750 mg, Intravenous, Once, 25 of 28 cycles  Administration: 750 mg (8/1/2019), 750 mg (8/13/2019), 750 mg (8/27/2019), 750 mg (9/10/2019), 750 mg (9/24/2019), 750 mg (10/8/2019), 795 mg (10/22/2019), 795 mg (11/5/2019), 795 mg (11/19/2019), 795 mg (12/3/2019), 795 mg (12/17/2019), 795 mg (12/31/2019), 795 mg (1/14/2020), 795 mg (1/28/2020), 795 mg (2/11/2020), 795 mg (2/25/2020), 795 mg (3/10/2020), 795 mg (3/24/2020), 795 mg (4/7/2020), 795 mg (5/13/2020), 795 mg (5/27/2020), 795 mg (6/10/2020), 795 mg (6/24/2020), 795 mg (4/29/2020)  pegfilgrastim (NEULASTA ONPRO) subcutaneous injection kit 6 mg, 6 mg, Subcutaneous, Once, 2 of 2 cycles  Administration: 6 mg (8/3/2019), 6 mg (8/15/2019)  leucovorin 750 mg in dextrose 5 % 250 mL IVPB, 748 mg, Intravenous, Once, 21 of 21 cycles  Administration: 750 mg (8/1/2019), 750 mg (8/13/2019), 750 mg (8/27/2019), 750 mg (9/10/2019), 750 mg (9/24/2019), 750 mg (10/8/2019), 800 mg (10/22/2019), 800 mg (11/5/2019), 800 mg (11/19/2019), 800 mg (12/3/2019), 800 mg (12/17/2019), 800 mg (12/31/2019), 800 mg (1/14/2020), 800 mg (1/28/2020), 800 mg (2/11/2020), 800 mg (2/25/2020), 800 mg (3/10/2020), 800 mg (3/24/2020), 800 mg (4/7/2020), 800 mg (5/13/2020), 800 mg (4/29/2020)  oxaliplatin (ELOXATIN) 158 95 mg in dextrose 5 % 250 mL chemo infusion, 85 mg/m2 = 158 95 mg, Intravenous, Once, 6 of 6 cycles  Administration: 158 95 mg (8/1/2019), 158 95 mg (8/13/2019), 158 95 mg (8/27/2019), 158 95 mg (9/10/2019), 158 95 mg (9/24/2019), 158 95 mg (10/8/2019)        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)    7/26/2019 Initial Diagnosis     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[facility] )      8/1/2019 -  Chemotherapy     fluorouracil (ADRUCIL) injection 750 mg, 400 mg/m2 = 750 mg, Intravenous, Once, 25 of 28 cycles  Administration: 750 mg (8/1/2019), 750 mg (8/13/2019), 750 mg (8/27/2019), 750 mg (9/10/2019), 750 mg (9/24/2019), 750 mg (10/8/2019), 795 mg (10/22/2019), 795 mg (11/5/2019), 795 mg (11/19/2019), 795 mg (12/3/2019), 795 mg (12/17/2019), 795 mg (12/31/2019), 795 mg (1/14/2020), 795 mg (1/28/2020), 795 mg (2/11/2020), 795 mg (2/25/2020), 795 mg (3/10/2020), 795 mg (3/24/2020), 795 mg (4/7/2020), 795 mg (5/13/2020), 795 mg (5/27/2020), 795 mg (6/10/2020), 795 mg (6/24/2020), 795 mg (4/29/2020)  pegfilgrastim (NEULASTA ONPRO) subcutaneous injection kit 6 mg, 6 mg, Subcutaneous, Once, 2 of 2 cycles  Administration: 6 mg (8/3/2019), 6 mg (8/15/2019)  leucovorin 750 mg in dextrose 5 % 250 mL IVPB, 748 mg, Intravenous, Once, 21 of 21 cycles  Administration: 750 mg (8/1/2019), 750 mg (8/13/2019), 750 mg (8/27/2019), 750 mg (9/10/2019), 750 mg (9/24/2019), 750 mg (10/8/2019), 800 mg (10/22/2019), 800 mg (11/5/2019), 800 mg (11/19/2019), 800 mg (12/3/2019), 800 mg (12/17/2019), 800 mg (12/31/2019), 800 mg (1/14/2020), 800 mg (1/28/2020), 800 mg (2/11/2020), 800 mg (2/25/2020), 800 mg (3/10/2020), 800 mg (3/24/2020), 800 mg (4/7/2020), 800 mg (5/13/2020), 800 mg (4/29/2020)  oxaliplatin (ELOXATIN) 158 95 mg in dextrose 5 % 250 mL chemo infusion, 85 mg/m2 = 158 95 mg, Intravenous, Once, 6 of 6 cycles  Administration: 158 95 mg (8/1/2019), 158 95 mg (8/13/2019), 158 95 mg (2019), 158 95 mg (9/10/2019), 158 95 mg (2019), 158 95 mg (10/8/2019)       Current Hematologic/ Oncologic Treatment:    Modified FOLFOX 6  Oxaliplatin discontinued after cycle 6     Leucovorin 400 milligrams/meters squared which has been discontinued  5FU 400 milligrams/meters squared  5FU 2400 milligrams/meter squared CIVI over 46 hours      ECO - Symptomatic but completely ambulatory    Interval History:  The patient returns for a follow-up visit  He has been experiencing symptoms of persistent nausea, occasional vomiting, epigastric abdominal pain  He was seen by Dr Edmundo Murry from [facility] on  and restarted on his antiemetic regimen to help with symptoms  Patient and his wife report compliance today with taking prescribed dexamethasone every morning, olanzapine at bedtime, and Zofran as needed  He continues to have nausea and some reflux  He does not feel his pain is currently well managed  He has been taking ibuprofen to help with the pain without good relief  He is also endorsing some symptoms of dizziness, lightheadedness  He denies headaches, visual changes  His gait is unstable and he has had a few falls  Recent laboratory studies completed yesterday were reviewed  His ANC 0 63  He denies any fevers/chills  Cancer Staging:  Cancer Staging  No matching staging information was found for the patient  Molecular Testing:       Test Results:    Imaging: No results found      Labs:   Lab Results   Component Value Date    WBC 3 32 (L) 2020    HGB 10 4 (L) 2020    HCT 32 2 (L) 2020     (H) 2020     2020     Lab Results   Component Value Date    K 3 3 (L) 2020     2020    CO2 27 2020    BUN 12 2020    CREATININE 1 00 2020    GLUF 131 (H) 2020    CALCIUM 9 2 2020    AST 18 2020    ALT 22 2020    ALKPHOS 120 (H) 2020    EGFR 78 2020       Lab Results Component Value Date    PSA 8 5 (H) 05/15/2020    PSA 8 0 (H) 01/24/2020     Review of Systems   Constitutional: Positive for appetite change and fatigue  Gastrointestinal: Positive for abdominal pain and nausea  Musculoskeletal: Positive for arthralgias and gait problem (uses a walker)  Neurological: Positive for dizziness and light-headedness  Psychiatric/Behavioral: Positive for confusion and decreased concentration  All other systems reviewed and are negative  Active Problems:   Patient Active Problem List   Diagnosis    PTSD (post-traumatic stress disorder)    Essential hypertension    Major depression    OCD (obsessive compulsive disorder)    Anxiety    Malignant neoplasm of lower third of esophagus ([facility] )    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)    Cancer associated pain    Chemotherapy-induced nausea    Abnormal weight loss    Malignant ascites    Recovering alcoholic ([facility] )    Other fatigue    Other dysphagia    Nausea    Dehydration    Skin fissures    Goals of care, counseling/discussion    Palliative care patient    Other insomnia    Counseling on health promotion and disease prevention    Elevated PSA    Memory loss    Regurgitation of stomach contents    Falls       Past Medical History:   Past Medical History:   Diagnosis Date    Dehydration 8/8/2019    [facility] )     Hypertension     Nausea 8/8/2019    Psychiatric disorder     Recovering alcoholic ([facility] )        Surgical History:   Past Surgical History:   Procedure Laterality Date    APPENDECTOMY      IR PORT PLACEMENT  7/31/2019       Family History:    Family History   Problem Relation Age of Onset    Colon cancer Father        Cancer-related family history includes Colon cancer in his father      Social History:   Social History     Socioeconomic History    Marital status: /Civil Union     Spouse name: Not on file    Number of children: 2    Years of education: Not on file    Highest education level: Not on file   Occupational History    Occupation: retired    Social Needs    Financial resource strain: Not on file    Food insecurity:     Worry: Not on file     Inability: Not on file   [place] needs:     Medical: Not on file     Non-medical: Not on file   Tobacco Use    Smoking status: Former Smoker     Types: Cigars     Last attempt to quit: 5/15/2019     Years since quittin 1    Smokeless tobacco: Never Used   Substance and Sexual Activity    Alcohol use: No    Drug use: Yes     Types: Marijuana    Sexual activity: Not Currently   Lifestyle    Physical activity:     Days per week: Not on file     Minutes per session: Not on file    Stress: Not on file   Relationships    Social connections:     Talks on phone: Not on file     Gets together: Not on file     Attends [employer] service: Not on file     Active member of club or organization: Not on file     Attends meetings of clubs or organizations: Not on file     Relationship status: Not on file    Intimate partner violence:     Fear of current or ex partner: Not on file     Emotionally abused: Not on file     Physically abused: Not on file     Forced sexual activity: Not on file   Other Topics Concern    Not on file   Social History Narrative    Not on file       Current Medications:   Current Outpatient Medications   Medication Sig Dispense Refill    ammonium lactate (LAC-HYDRIN) 12 % cream Apply topically as needed for dry skin On hands and feet 385 g 2    apixaban (ELIQUIS) 5 mg Take 1 tablet (5 mg total) by mouth 2 (two) times a day for 60 doses 60 tablet 1    bisoprolol-hydrochlorothiazide (ZIAC) 10-6 25 MG per tablet Take 1 tablet by mouth daily 90 tablet 1    dexamethasone (DECADRON) 4 mg tablet Take 1 tablet (4 mg total) by mouth daily with breakfast 30 tablet 0    diazepam (VALIUM) 5 mg tablet Take 1 tablet (5 mg total) by mouth 2 (two) times a day 60 tablet 0    OLANZapine (ZyPREXA) 5 mg tablet Take 1 tablet (5 mg total) by mouth daily at bedtime 30 tablet 0    ondansetron (ZOFRAN) 4 mg tablet Take 1 tablet (4 mg total) by mouth every 8 (eight) hours as needed for nausea or vomiting 20 tablet 0    pantoprazole (PROTONIX) 40 mg tablet Take 40 mg by mouth daily before breakfast      venlafaxine (EFFEXOR-XR) 150 mg 24 hr capsule Take 1 capsule (150 mg total) by mouth daily 30 capsule 0     No current facility-administered medications for this visit  Allergies: Allergies   Allergen Reactions    Bee Venom Anaphylaxis    Shellfish-Derived Products      Develops GOUT       Physical Exam:  /88 (BP Location: Left arm)   Pulse 68   Temp 97 5 °F (36 4 °C) (Oral)   Resp 16   Ht 5' 9" (1 753 m)   Wt 83 5 kg (184 lb)   SpO2 98%   BMI 27 17 kg/m²   Body surface area is 1 99 meters squared  Wt Readings from Last 3 Encounters:   07/07/20 83 5 kg (184 lb)   06/24/20 83 7 kg (184 lb 8 4 oz)   06/23/20 84 kg (185 lb 3 2 oz)           Physical Exam   Constitutional: He is oriented to person, place, and time  No distress  Alert, pleasant chronically ill-appearing male in no acute distress   HENT:   Head: Normocephalic and atraumatic  Right Ear: External ear normal    Left Ear: External ear normal    Mouth/Throat: No oropharyngeal exudate  Eyes: EOM are normal  Right eye exhibits no discharge  Left eye exhibits no discharge  No scleral icterus  Neck: Normal range of motion  Cardiovascular: Normal rate and regular rhythm  Pulmonary/Chest: Effort normal and breath sounds normal    Abdominal: Soft  Bowel sounds are normal  There is tenderness (Epigastric region)  Musculoskeletal: He exhibits no edema  Gait unsteady, ambulates with cane  Lymphadenopathy:     He has no cervical adenopathy  Neurological: He is alert and oriented to person, place, and time  Skin: Skin is warm and dry  He is not diaphoretic  Psychiatric: He has a normal mood and affect   His behavior is normal        Assessment / Plan:    1  Malignant neoplasm of lower third of esophagus (HCC)    2  Malignant neoplasm of cardia of stomach [facility])  The patient is a pleasant 67-year-old male with a history of metastatic gastroesophageal cancer  He has been treated with modified FOLFOX 6  Oxaliplatin and leucovorin have been discontinued from his treatment regimen  His blood counts today reveal low ANC of 0 63  He is asymptomatic  His next cycle will be deferred for 2 weeks  I reviewed neutropenic precautions with him today and instructed him to notify us with any symptoms of fever/chills or temp 100 4 or higher  He is endorsing worsening epigastric abdominal pain along with persistent nausea and some occasional vomiting  I will order repeat CT of chest abdomen pelvis to evaluate for disease progression  3  Cancer associated pain   Patient is endorsing worsening epigastric abdominal pain  He denies any difficulty with swallowing  I have encouraged him to follow up with palliative care provider, Dr Lucilla Fleischer to help with his cancer related pain  4  Nausea   Patient and his wife endorse compliance with his anti emetic regimen  He continues to have persistent nausea  In addition, he endorses symptoms of lightheadedness, dizziness, periods of confusion, gait instability and falls  For this reason I have ordered a stat MRI of his brain to rule out any metastasis  Patient is wife were in agreement with plan of care created today  His treatment will be delayed for 2 weeks due to low blood counts  I recommended he get his imaging studies completed in the next 2 weeks and follow up in 3 weeks with Dr Hallie Hare to review  Patient was instructed to call with any questions or concerns prior to his next office visit  Goals and Barriers:  Current Goal:  Prolong Survival from metastatic gastroesophageal cancer  Barriers: None        Patient's Capacity to Self Care:  Patient is  able to self care     Portions of the record may have been created with voice recognition software  Occasional wrong word or "sound a like" substitutions may have occurred due to the inherent limitations of voice recognition software  Read the chart carefully and recognize, using context, where substitutions have occurred

## 2020-07-07 ENCOUNTER — OFFICE VISIT (OUTPATIENT)
Dept: HEMATOLOGY ONCOLOGY | Facility: CLINIC | Age: 65
End: 2020-07-07
Payer: MEDICARE

## 2020-07-07 ENCOUNTER — TELEPHONE (OUTPATIENT)
Dept: PALLIATIVE MEDICINE | Facility: CLINIC | Age: 65
End: 2020-07-07

## 2020-07-07 VITALS
WEIGHT: 184 LBS | SYSTOLIC BLOOD PRESSURE: 142 MMHG | HEIGHT: 69 IN | HEART RATE: 68 BPM | DIASTOLIC BLOOD PRESSURE: 88 MMHG | RESPIRATION RATE: 16 BRPM | OXYGEN SATURATION: 98 % | BODY MASS INDEX: 27.25 KG/M2 | TEMPERATURE: 97.5 F

## 2020-07-07 DIAGNOSIS — C15.5 MALIGNANT NEOPLASM OF LOWER THIRD OF ESOPHAGUS (HCC): Primary | ICD-10-CM

## 2020-07-07 DIAGNOSIS — R11.0 NAUSEA: ICD-10-CM

## 2020-07-07 DIAGNOSIS — G89.3 CANCER ASSOCIATED PAIN: ICD-10-CM

## 2020-07-07 DIAGNOSIS — C16.0 MALIGNANT NEOPLASM OF CARDIA OF STOMACH (HCC): ICD-10-CM

## 2020-07-07 PROCEDURE — 1160F RVW MEDS BY RX/DR IN RCRD: CPT | Performed by: NURSE PRACTITIONER

## 2020-07-07 PROCEDURE — 99215 OFFICE O/P EST HI 40 MIN: CPT | Performed by: NURSE PRACTITIONER

## 2020-07-07 PROCEDURE — 3008F BODY MASS INDEX DOCD: CPT | Performed by: NURSE PRACTITIONER

## 2020-07-07 PROCEDURE — 3077F SYST BP >= 140 MM HG: CPT | Performed by: NURSE PRACTITIONER

## 2020-07-07 PROCEDURE — 3079F DIAST BP 80-89 MM HG: CPT | Performed by: NURSE PRACTITIONER

## 2020-07-07 PROCEDURE — 1036F TOBACCO NON-USER: CPT | Performed by: NURSE PRACTITIONER

## 2020-07-07 NOTE — TELEPHONE ENCOUNTER
I left a message with wife Divya Oswald to see if a Speech therapy apt with therapist Gisela Anderson would be good for 7/13/20 at 145pm on [facility] in [place]  I also wanted to see if we could have her permission to reschedule the Barrium swallow study before this consult if possible  Elisabet Mendoza staff# [phone]  He is waiting to hear if this time and day works for this patient

## 2020-07-08 ENCOUNTER — HOSPITAL ENCOUNTER (OUTPATIENT)
Dept: MRI IMAGING | Facility: HOSPITAL | Age: 65
Discharge: HOME | End: 2020-07-08
Payer: MEDICARE

## 2020-07-08 ENCOUNTER — HOSPITAL ENCOUNTER (OUTPATIENT)
Dept: INFUSION CENTER | Facility: HOSPITAL | Age: 65
Discharge: HOME | End: 2020-07-08
Attending: INTERNAL MEDICINE

## 2020-07-08 DIAGNOSIS — C16.0 MALIGNANT NEOPLASM OF CARDIA OF STOMACH (HCC): ICD-10-CM

## 2020-07-08 DIAGNOSIS — R11.0 NAUSEA: ICD-10-CM

## 2020-07-08 DIAGNOSIS — C15.5 MALIGNANT NEOPLASM OF LOWER THIRD OF ESOPHAGUS (HCC): ICD-10-CM

## 2020-07-08 DIAGNOSIS — G89.3 CANCER ASSOCIATED PAIN: ICD-10-CM

## 2020-07-08 PROCEDURE — 70553 MRI BRAIN STEM W/O & W/DYE: CPT

## 2020-07-08 PROCEDURE — A9585 GADOBUTROL INJECTION: HCPCS | Performed by: NURSE PRACTITIONER

## 2020-07-08 RX ADMIN — GADOBUTROL 8 ML: 604.72 INJECTION INTRAVENOUS at 17:30

## 2020-07-10 ENCOUNTER — TELEPHONE (OUTPATIENT)
Dept: PALLIATIVE MEDICINE | Facility: CLINIC | Age: 65
End: 2020-07-10

## 2020-07-10 NOTE — TELEPHONE ENCOUNTER
Thank you Dr Keira Coto for following up on this  I understand your concerns  I ordered an MRI of his Brain, this was negative for metastatic disease  In my last visit with patient and his wife, his wife indicated that only one attempt was made by [facility] to see him and they were agreeable to [facility]? I agree with your recommendation to speak to wife about this  Thanks so much!

## 2020-07-10 NOTE — TELEPHONE ENCOUNTER
FYI    Per Mia Schulz from [facility],   3 attempts have been made over course of a few weeks to try and establish an initial visit with pt as per  Referral for [facility]  Pt has declined each time , stating "I don't need anything " has even hung up on agency       Case is closed

## 2020-07-10 NOTE — TELEPHONE ENCOUNTER
Discussed case w/ Femi LOMELI  We feel that it is likely the patient declined the [facility]; he is confused at baseline and may not have been aware that his wife wished to work w/ [address]  If there is any way we can ask the [facility] [address] to call the wife to reschedule, it would be appreciated  Social Work team - we feel that the patient's wife may be a little overwhelmed  She seems to be demonstrating a good effort to learning and managing his medications but could use some social support  Would someone be able to reach out to her today or Monday? Thanks!

## 2020-07-10 NOTE — TELEPHONE ENCOUNTER
Understood  Thanks  I remain concerned about this patient and the safety of medications in the home  We had hoped Home Health would be able to assess the situation in the home but currently it looks like that is not an option  One of our providers had mentioned that the patient's wife had asked to reschedule the [facility] visit; perhaps the issue is [facility] is talking to the patient, and not to his wife? Could we check on that? Failing a Home Health evaluation, we can discuss the situation w/ the patient's wife in the upcoming outpatient visit, and come up with a plan on how to best move forward

## 2020-07-13 ENCOUNTER — HOSPITAL ENCOUNTER (OUTPATIENT)
Dept: RADIOLOGY | Facility: HOSPITAL | Age: 65
Discharge: HOME | End: 2020-07-13
Payer: MEDICARE

## 2020-07-13 DIAGNOSIS — R11.0 NAUSEA: ICD-10-CM

## 2020-07-13 DIAGNOSIS — C16.0 MALIGNANT NEOPLASM OF CARDIA OF STOMACH (HCC): ICD-10-CM

## 2020-07-13 DIAGNOSIS — G89.3 CANCER ASSOCIATED PAIN: ICD-10-CM

## 2020-07-13 DIAGNOSIS — C15.5 MALIGNANT NEOPLASM OF LOWER THIRD OF ESOPHAGUS (HCC): ICD-10-CM

## 2020-07-13 PROCEDURE — 71260 CT THORAX DX C+: CPT

## 2020-07-13 PROCEDURE — 74177 CT ABD & PELVIS W/CONTRAST: CPT

## 2020-07-13 RX ADMIN — IOHEXOL 100 ML: 350 INJECTION, SOLUTION INTRAVENOUS at 08:34

## 2020-07-14 ENCOUNTER — TELEPHONE (OUTPATIENT)
Dept: HEMATOLOGY ONCOLOGY | Facility: CLINIC | Age: 65
End: 2020-07-14

## 2020-07-14 NOTE — TELEPHONE ENCOUNTER
I Left a detailed message on wife Jd Hill voice mail  If Albina Deutsch does not want to pursue a Speech Consult or schedule a Barium swallow thats fine, Albina Deutsch can talk to Dr Maegan Koenig about this decision at his 7/21 apt  Multiple attempts have been made to reschedule these apts

## 2020-07-15 ENCOUNTER — TELEPHONE (OUTPATIENT)
Dept: UROLOGY | Facility: CLINIC | Age: 65
End: 2020-07-15

## 2020-07-15 DIAGNOSIS — C15.5 MALIGNANT NEOPLASM OF LOWER THIRD OF ESOPHAGUS (HCC): ICD-10-CM

## 2020-07-15 DIAGNOSIS — C16.0 MALIGNANT NEOPLASM OF CARDIA OF STOMACH (HCC): ICD-10-CM

## 2020-07-15 DIAGNOSIS — E86.0 DEHYDRATION: ICD-10-CM

## 2020-07-15 DIAGNOSIS — R11.0 NAUSEA: ICD-10-CM

## 2020-07-15 RX ORDER — FLUOROURACIL 50 MG/ML
400 INJECTION, SOLUTION INTRAVENOUS ONCE
Status: CANCELLED | OUTPATIENT
Start: 2020-07-22

## 2020-07-15 RX ORDER — SODIUM CHLORIDE 9 MG/ML
20 INJECTION, SOLUTION INTRAVENOUS ONCE AS NEEDED
Status: CANCELLED | OUTPATIENT
Start: 2020-07-22

## 2020-07-15 RX ORDER — DEXTROSE MONOHYDRATE 50 MG/ML
20 INJECTION, SOLUTION INTRAVENOUS ONCE
Status: CANCELLED | OUTPATIENT
Start: 2020-07-22

## 2020-07-15 NOTE — TELEPHONE ENCOUNTER
Patient's previously scheduled appointment with Dr Erin Degroot on 7/31/20 was cancelled  He was notified via voicemail in May of his PSA results and new plan, which will be for follow up in one year (May 2021) with PSA prior, order is placed  Please advise patient of above

## 2020-07-15 NOTE — TELEPHONE ENCOUNTER
Called patient and advised him of below  Scheduled patient for follow up in May and patient aware he needs to get PSA before that visit   Mailed appointment card and script as per patients request

## 2020-07-16 ENCOUNTER — OFFICE VISIT (OUTPATIENT)
Dept: HEMATOLOGY ONCOLOGY | Facility: CLINIC | Age: 65
End: 2020-07-16
Payer: MEDICARE

## 2020-07-16 VITALS
HEART RATE: 76 BPM | OXYGEN SATURATION: 99 % | HEIGHT: 69 IN | BODY MASS INDEX: 27.11 KG/M2 | DIASTOLIC BLOOD PRESSURE: 82 MMHG | RESPIRATION RATE: 16 BRPM | SYSTOLIC BLOOD PRESSURE: 112 MMHG | TEMPERATURE: 97.6 F | WEIGHT: 183 LBS

## 2020-07-16 DIAGNOSIS — R11.0 NAUSEA: ICD-10-CM

## 2020-07-16 DIAGNOSIS — C15.5 MALIGNANT NEOPLASM OF LOWER THIRD OF ESOPHAGUS (HCC): ICD-10-CM

## 2020-07-16 DIAGNOSIS — C16.0 MALIGNANT NEOPLASM OF CARDIA OF STOMACH (HCC): ICD-10-CM

## 2020-07-16 DIAGNOSIS — E86.0 DEHYDRATION: ICD-10-CM

## 2020-07-16 DIAGNOSIS — R11.0 NAUSEA: Primary | ICD-10-CM

## 2020-07-16 DIAGNOSIS — C15.5 MALIGNANT NEOPLASM OF LOWER THIRD OF ESOPHAGUS (HCC): Primary | ICD-10-CM

## 2020-07-16 PROCEDURE — 1036F TOBACCO NON-USER: CPT | Performed by: INTERNAL MEDICINE

## 2020-07-16 PROCEDURE — 99214 OFFICE O/P EST MOD 30 MIN: CPT | Performed by: INTERNAL MEDICINE

## 2020-07-16 PROCEDURE — 3079F DIAST BP 80-89 MM HG: CPT | Performed by: INTERNAL MEDICINE

## 2020-07-16 PROCEDURE — 3008F BODY MASS INDEX DOCD: CPT | Performed by: INTERNAL MEDICINE

## 2020-07-16 PROCEDURE — 1160F RVW MEDS BY RX/DR IN RCRD: CPT | Performed by: INTERNAL MEDICINE

## 2020-07-16 PROCEDURE — 3074F SYST BP LT 130 MM HG: CPT | Performed by: INTERNAL MEDICINE

## 2020-07-16 NOTE — PROGRESS NOTES
Hematology/Oncology Outpatient Follow- up Note  Laverne Vick 77 y o  male MRN: @ Encounter: [id number]        Date:  7/16/2020    Presenting Complaint/Diagnosis :  Metastatic gastroesophageal cancer  HPI:    Angela Silverman seen for initial consultation 7/26/2019 regarding Newly diagnosed metastatic gastroesophageal cancer  The patient has biopsy-proven adenocarcinoma of the esophagus  PET/CT scan was done which shows omental nodularity that is FDG avid indicating stage IV disease      Previous Hematologic/ Oncologic History:       Malignant neoplasm of lower third of esophagus ([facility] )    7/26/2019 Initial Diagnosis     Malignant neoplasm of lower third of esophagus ([facility] )      8/1/2019 -  Chemotherapy     fluorouracil (ADRUCIL) injection 750 mg, 400 mg/m2 = 750 mg, Intravenous, Once, 25 of 28 cycles  Administration: 750 mg (8/1/2019), 750 mg (8/13/2019), 750 mg (8/27/2019), 750 mg (9/10/2019), 750 mg (9/24/2019), 750 mg (10/8/2019), 795 mg (10/22/2019), 795 mg (11/5/2019), 795 mg (11/19/2019), 795 mg (12/3/2019), 795 mg (12/17/2019), 795 mg (12/31/2019), 795 mg (1/14/2020), 795 mg (1/28/2020), 795 mg (2/11/2020), 795 mg (2/25/2020), 795 mg (3/10/2020), 795 mg (3/24/2020), 795 mg (4/7/2020), 795 mg (5/13/2020), 795 mg (5/27/2020), 795 mg (6/10/2020), 795 mg (6/24/2020), 795 mg (4/29/2020)  pegfilgrastim (NEULASTA ONPRO) subcutaneous injection kit 6 mg, 6 mg, Subcutaneous, Once, 2 of 2 cycles  Administration: 6 mg (8/3/2019), 6 mg (8/15/2019)  leucovorin 750 mg in dextrose 5 % 250 mL IVPB, 748 mg, Intravenous, Once, 21 of 21 cycles  Administration: 750 mg (8/1/2019), 750 mg (8/13/2019), 750 mg (8/27/2019), 750 mg (9/10/2019), 750 mg (9/24/2019), 750 mg (10/8/2019), 800 mg (10/22/2019), 800 mg (11/5/2019), 800 mg (11/19/2019), 800 mg (12/3/2019), 800 mg (12/17/2019), 800 mg (12/31/2019), 800 mg (1/14/2020), 800 mg (1/28/2020), 800 mg (2/11/2020), 800 mg (2/25/2020), 800 mg (3/10/2020), 800 mg (3/24/2020), 800 mg (4/7/2020), 800 mg (5/13/2020), 800 mg (4/29/2020)  oxaliplatin (ELOXATIN) 158 95 mg in dextrose 5 % 250 mL chemo infusion, 85 mg/m2 = 158 95 mg, Intravenous, Once, 6 of 6 cycles  Administration: 158 95 mg (8/1/2019), 158 95 mg (8/13/2019), 158 95 mg (8/27/2019), 158 95 mg (9/10/2019), 158 95 mg (9/24/2019), 158 95 mg (10/8/2019)        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)    7/26/2019 Initial Diagnosis     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[facility] )      8/1/2019 -  Chemotherapy     fluorouracil (ADRUCIL) injection 750 mg, 400 mg/m2 = 750 mg, Intravenous, Once, 25 of 28 cycles  Administration: 750 mg (8/1/2019), 750 mg (8/13/2019), 750 mg (8/27/2019), 750 mg (9/10/2019), 750 mg (9/24/2019), 750 mg (10/8/2019), 795 mg (10/22/2019), 795 mg (11/5/2019), 795 mg (11/19/2019), 795 mg (12/3/2019), 795 mg (12/17/2019), 795 mg (12/31/2019), 795 mg (1/14/2020), 795 mg (1/28/2020), 795 mg (2/11/2020), 795 mg (2/25/2020), 795 mg (3/10/2020), 795 mg (3/24/2020), 795 mg (4/7/2020), 795 mg (5/13/2020), 795 mg (5/27/2020), 795 mg (6/10/2020), 795 mg (6/24/2020), 795 mg (4/29/2020)  pegfilgrastim (NEULASTA ONPRO) subcutaneous injection kit 6 mg, 6 mg, Subcutaneous, Once, 2 of 2 cycles  Administration: 6 mg (8/3/2019), 6 mg (8/15/2019)  leucovorin 750 mg in dextrose 5 % 250 mL IVPB, 748 mg, Intravenous, Once, 21 of 21 cycles  Administration: 750 mg (8/1/2019), 750 mg (8/13/2019), 750 mg (8/27/2019), 750 mg (9/10/2019), 750 mg (9/24/2019), 750 mg (10/8/2019), 800 mg (10/22/2019), 800 mg (11/5/2019), 800 mg (11/19/2019), 800 mg (12/3/2019), 800 mg (12/17/2019), 800 mg (12/31/2019), 800 mg (1/14/2020), 800 mg (1/28/2020), 800 mg (2/11/2020), 800 mg (2/25/2020), 800 mg (3/10/2020), 800 mg (3/24/2020), 800 mg (4/7/2020), 800 mg (5/13/2020), 800 mg (4/29/2020)  oxaliplatin (ELOXATIN) 158 95 mg in dextrose 5 % 250 mL chemo infusion, 85 mg/m2 = 158 95 mg, Intravenous, Once, 6 of 6 cycles  Administration: 158 95 mg (8/1/2019), 158 95 mg (8/13/2019), 158 95 mg (8/27/2019), 158 95 mg (9/10/2019), 158 95 mg (9/24/2019), 158 95 mg (10/8/2019)         Current Hematologic/ Oncologic Treatment:    Modified FOLFOX 6  Oxaliplatin discontinued after cycle 6     Leucovorin 400 milligrams/meters squared which will now be discontinued  5FU 400 milligrams/meters squared  5FU 2400 milligrams/meter squared CIVI over 46 hours      Interval History:      The patient returns for follow-up visit  He is now on 5 FU alone  Leucovorin has been discontinued  He is doing slightly better  His most recent imaging shows stable disease  He has esophageal area which is involved which is stable  He has no progression elsewhere  Denies any nausea denies any vomiting denies any diarrhea  He was having some problems with vomiting a few times but this has resolved  His MRI of the brain was negative  The rest of his 14 point review of systems today was negative  He is changing palliative care doctors from what he describes  Test Results:    Imaging: Mri Brain W Wo Contrast    Result Date: 7/8/2020  Narrative: MRI BRAIN WITH AND WITHOUT CONTRAST INDICATION: C15 5: Malignant neoplasm of lower third of esophagus C16 0: Malignant neoplasm of cardia G89 3: Neoplasm related pain (acute) (chronic) R11 0: Nausea  COMPARISON:  None  TECHNIQUE: Sagittal T1, axial T2, axial FLAIR, axial T1, axial [place], axial diffusion  Sagittal, axial T1 postcontrast   Axial bravo postcontrast with coronal reconstructions  IV Contrast:  8 mL of gadobutrol injection (MULTI-DOSE)  IMAGE QUALITY:   Diagnostic  FINDINGS: BRAIN PARENCHYMA:  There is no discrete mass, mass effect or midline shift  There is no intracranial hemorrhage  Normal posterior fossa  Diffusion imaging is unremarkable    Small scattered hyperintensities on T2/FLAIR imaging are noted in the periventricular and subcortical white matter demonstrating an appearance that is statistically most likely to represent moderate microangiopathic change  Postcontrast imaging of the brain demonstrates no abnormal enhancement  VENTRICLES:  Diffuse brain atrophy, most severe in the frontotemporal lobes  SELLA AND PITUITARY GLAND:  Normal  ORBITS:  Normal  PARANASAL SINUSES:  Normal  VASCULATURE:  Evaluation of the major intracranial vasculature demonstrates appropriate flow voids  CALVARIUM AND SKULL BASE:  Normal  EXTRACRANIAL SOFT TISSUES:  Normal      Impression: 1  No MR evidence of acute ischemia  2  Brain volume loss/atrophy and microangiopathy as described above  3  No enhancing lesions to suggest brain metastatic disease  Workstation performed: [id number]     Ct Chest Abdomen Pelvis W Contrast    Result Date: 7/15/2020  Narrative: CT CHEST, ABDOMEN AND PELVIS WITH IV CONTRAST INDICATION:   C15 5: Malignant neoplasm of lower third of esophagus C16 0: Malignant neoplasm of cardia G89 3: Neoplasm related pain (acute) (chronic) R11 0: Nausea  Follow-up metastatic gastroesophageal carcinoma COMPARISON:  5/20/2020; 1/13/2020; 7/15/2019 TECHNIQUE: CT examination of the chest, abdomen and pelvis was performed  Axial, sagittal, and coronal 2D reformatted images were created from the source data and submitted for interpretation  Radiation dose length product (DLP) for this visit:  595 18 mGy-cm   This examination, like all CT scans performed in the [facility], was performed utilizing techniques to minimize radiation dose exposure, including the use of iterative  reconstruction and automated exposure control  IV Contrast:  100 mL of iohexol (OMNIPAQUE) Enteric Contrast: Enteric contrast was administered  FINDINGS: CHEST LUNGS:  Bullous emphysema is present  There is a stable 4 mm pleural-based nodule seen in the right middle lobe on image 64  6 mm nodule stable in the right middle lobe on image 71  3 mm nodule in the lingula is stable  2 mm nodule is stable on image 81 in the right middle lobe   There is no tracheal or endobronchial lesion  PLEURA:  Unremarkable  HEART/GREAT VESSELS:  Unremarkable for patient's age  MEDIASTINUM AND NEVAEH:  Diffuse esophageal thickening is similar to the prior study  CHEST WALL AND LOWER NECK:   Port-A-Cath is seen with tip in SVC  ABDOMEN LIVER/BILIARY TREE:  Small hypodensity is noted on image 61, series 2 measuring 5 mm without change  5 mm hypodensity on image 56 is stable  9 mm hypodensity is stable on image 54 and segment 3  A few other small hypodensities are stable and are small  GALLBLADDER:  No calcified gallstones  No pericholecystic inflammatory change  SPLEEN:  Unremarkable  PANCREAS:  Unremarkable  ADRENAL GLANDS:  Unremarkable  KIDNEYS/URETERS:  Multiple renal cysts are seen bilaterally    No hydronephrosis  There is focal thickening at the gastric cardia near the GE junction  This may be slightly thicker than the study of May although may be related to differences in distention  The superior wall of the coronal sequence measures 2 2 cm as compared to 1 5  The inferior wall measures 9 mm without change  STOMACH AND BOWEL:  Left colon diverticulosis is seen without CT evidence of diverticulitis  APPENDIX:  The patient is status post appendectomy  ABDOMINOPELVIC CAVITY:  No ascites  No pneumoperitoneum  No lymphadenopathy  Small mesenteric nodules near the left lobe of the liver appears to have resolved  An 8 mm nodule on image 62 lateral to the stomach is stable  Nodule on image 64 adjacent pancreas is also stable  A 1 cm nodule on image 79 in the transverse colon mesentery was not well seen on the prior study due to streak artifact but stable since January  Small gastrohepatic node is stable on coronal image 63  VESSELS:  Unremarkable for patient's age  PELVIS REPRODUCTIVE ORGANS:  Unremarkable for patient's age  URINARY BLADDER:  Unremarkable  ABDOMINAL WALL/INGUINAL REGIONS:  Unremarkable   OSSEOUS STRUCTURES: There are fractures of the 4th 5th and 6th ribs on the right with callus formation although appear new since the study of May  No lytic or blastic abnormality is appreciated  Impression: Focal thickening at the gastric esophageal junction extending into the cardia may represent primary tumor  Thickening may be slightly worse than the study of May  More diffuse esophageal thickening is also demonstrated which could be related to treatment  Small liver lesions are unchanged  Small mesenteric nodes are also not significantly changed  Stable lung nodules Right-sided rib fractures with callus formation should be correlated with any recent injury  Workstation performed: [id number]       Labs:   Lab Results   Component Value Date    WBC 3 32 (L) 07/06/2020    HGB 10 4 (L) 07/06/2020    HCT 32 2 (L) 07/06/2020     (H) 07/06/2020     07/06/2020     Lab Results   Component Value Date    K 3 3 (L) 07/06/2020     07/06/2020    CO2 27 07/06/2020    BUN 12 07/06/2020    CREATININE 1 00 07/06/2020    GLUF 131 (H) 06/22/2020    CALCIUM 9 2 07/06/2020    AST 18 07/06/2020    ALT 22 07/06/2020    ALKPHOS 120 (H) 07/06/2020    EGFR 78 07/06/2020         No results found for: SPEP, UPEP    Lab Results   Component Value Date    PSA 8 5 (H) 05/15/2020    PSA 8 0 (H) 01/24/2020       ROS: As stated in the history of present illness otherwise his 14 point review of systems today was negative        Active Problems:   Patient Active Problem List   Diagnosis    PTSD (post-traumatic stress disorder)    Essential hypertension    Major depression    OCD (obsessive compulsive disorder)    Anxiety    Malignant neoplasm of lower third of esophagus ([facility]    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[facility] )    Cancer associated pain    Chemotherapy-induced nausea    Abnormal weight loss    Malignant ascites    Recovering alcoholic ([facility] )    Other fatigue    Other dysphagia    Nausea    Dehydration    Skin fissures    Goals of care, counseling/discussion    Palliative care patient    Other insomnia    Counseling on health promotion and disease prevention    Elevated PSA    Memory loss    Regurgitation of stomach contents    Falls       Past Medical History:   Past Medical History:   Diagnosis Date    Dehydration 2019    Esophageal cancer ([facility] )     Hypertension     Nausea 2019    Psychiatric disorder     Recovering alcoholic ([facility] )        Surgical History:   Past Surgical History:   Procedure Laterality Date    APPENDECTOMY      IR PORT PLACEMENT  2019       Family History:    Family History   Problem Relation Age of Onset    Colon cancer Father        Cancer-related family history includes Colon cancer in his father      Social History:   Social History     Socioeconomic History    Marital status: /Civil Union     Spouse name: Not on file    Number of children: 2    Years of education: Not on file    Highest education level: Not on file   Occupational History    Occupation: retired    Social Needs    Financial resource strain: Not on file    Food insecurity:     Worry: Not on file     Inability: Not on file   Marblar needs:     Medical: Not on file     Non-medical: Not on file   Tobacco Use    Smoking status: Former Smoker     Types: Cigars     Last attempt to quit: 5/15/2019     Years since quittin 1    Smokeless tobacco: Never Used   Substance and Sexual Activity    Alcohol use: No    Drug use: Yes     Types: Marijuana    Sexual activity: Not Currently   Lifestyle    Physical activity:     Days per week: Not on file     Minutes per session: Not on file    Stress: Not on file   Relationships    Social connections:     Talks on phone: Not on file     Gets together: Not on file     Attends Orthodox service: Not on file     Active member of club or organization: Not on file     Attends meetings of clubs or organizations: Not on file     Relationship status: Not on file    Intimate partner violence:     Fear of current or ex partner: Not on file     Emotionally abused: Not on file     Physically abused: Not on file     Forced sexual activity: Not on file   Other Topics Concern    Not on file   Social History Narrative    Not on file       Current Medications:   Current Outpatient Medications   Medication Sig Dispense Refill    ammonium lactate (LAC-HYDRIN) 12 % cream Apply topically as needed for dry skin On hands and feet 385 g 2    bisoprolol-hydrochlorothiazide (ZIAC) 10-6 25 MG per tablet Take 1 tablet by mouth daily 90 tablet 1    dexamethasone (DECADRON) 4 mg tablet Take 1 tablet (4 mg total) by mouth daily with breakfast 30 tablet 0    diazepam (VALIUM) 5 mg tablet Take 1 tablet (5 mg total) by mouth 2 (two) times a day 60 tablet 0    fluorouracil 4,775 mg in CADD infusion pump Infuse 4,775 mg (1,200 mg/m2/day x 1 99 m2 (Treatment plan recorded BSA)) into a venous catheter over 46 hours for 2 days [place] to be administered on 7/22/20 1 Device 0    OLANZapine (ZyPREXA) 5 mg tablet Take 1 tablet (5 mg total) by mouth daily at bedtime 30 tablet 0    ondansetron (ZOFRAN) 4 mg tablet Take 1 tablet (4 mg total) by mouth every 8 (eight) hours as needed for nausea or vomiting 20 tablet 0    pantoprazole (PROTONIX) 40 mg tablet Take 40 mg by mouth daily before breakfast      venlafaxine (EFFEXOR-XR) 150 mg 24 hr capsule Take 1 capsule (150 mg total) by mouth daily 30 capsule 0    apixaban (ELIQUIS) 5 mg Take 1 tablet (5 mg total) by mouth 2 (two) times a day for 60 doses 60 tablet 1     No current facility-administered medications for this visit  Allergies: Allergies   Allergen Reactions    Bee Venom Anaphylaxis    Shellfish-Derived Products      Develops GOUT       Physical Exam:    Body surface area is 1 99 meters squared      Wt Readings from Last 3 Encounters:   07/16/20 83 kg (183 lb)   07/07/20 83 5 kg (184 lb)   06/24/20 83 7 kg (184 lb 8 4 oz)        Temp Readings from Last 3 Encounters: 07/16/20 97 6 °F (36 4 °C) (Tympanic)   07/07/20 97 5 °F (36 4 °C) (Oral)   06/26/20 (!) 96 6 °F (35 9 °C) (Temporal)        BP Readings from Last 3 Encounters:   07/16/20 112/82   07/07/20 142/88   06/24/20 148/72         Pulse Readings from Last 3 Encounters:   07/16/20 76   07/07/20 68   06/24/20 68         Physical Exam     Constitutional   General appearance: No acute distress, well appearing and well nourished  Eyes   Conjunctiva and lids: No swelling, erythema or discharge  Pupils and irises: Equal, round and reactive to light  Ears, Nose, Mouth, and Throat   External inspection of ears and nose: Normal     Nasal mucosa, septum, and turbinates: Normal without edema or erythema  Oropharynx: Normal with no erythema, edema, exudate or lesions  Pulmonary   Respiratory effort: No increased work of breathing or signs of respiratory distress  Auscultation of lungs: Clear to auscultation  Cardiovascular   Palpation of heart: Normal PMI, no thrills  Auscultation of heart: Normal rate and rhythm, normal S1 and S2, without murmurs  Examination of extremities for edema and/or varicosities: Normal     Carotid pulses: Normal     Abdomen   Abdomen: Non-tender, no masses  Liver and spleen: No hepatomegaly or splenomegaly  Lymphatic   Palpation of lymph nodes in neck: No lymphadenopathy  Musculoskeletal   Gait and station: Normal     Digits and nails: Normal without clubbing or cyanosis  Inspection/palpation of joints, bones, and muscles: Normal     Skin   Skin and subcutaneous tissue: Normal without rashes or lesions  Neurologic   Cranial nerves: Cranial nerves 2-12 intact  Sensation: No sensory loss  Psychiatric   Orientation to person, place, and time: Normal     Mood and affect: Normal         Assessment / Plan: This is a pleasant 78-year-old male with a past medical history of metastatic gastroesophageal cancer   He received 4 cycles of modified FOLFOX 6 chemotherapy After which his imaging showed a very nice response  We discontinued the oxaliplatin  He is done well on 5-FU leucovorin  He continues to eat and gain weight   He was found to have superficial thrombophlebitis and was started on Eliquis and will continue on this   CT scan was completed and shows stable disease  Clinically he is stable and doing well   At this point we will continue him on  5  milligrams/meter squared, 5 FU 2400 milligrams/meter squared continuous IV infusion over 46 hours  At this point we will continue his anticoagulation and he is supposed to be getting prescriptions for his primary care physician  Goals and Barriers:  Current Goal:  Prolong Survival from esophageal cancer  Barriers: None  Patient's Capacity to Self Care:  Patient able to self care  Portions of the record may have been created with voice recognition software   Occasional wrong word or "sound a like" substitutions may have occurred due to the inherent limitations of voice recognition software   Read the chart carefully and recognize, using context, where substitutions have occurred

## 2020-07-20 ENCOUNTER — APPOINTMENT (OUTPATIENT)
Dept: LAB | Facility: CLINIC | Age: 65
End: 2020-07-20
Payer: MEDICARE

## 2020-07-20 DIAGNOSIS — C15.5 MALIGNANT NEOPLASM OF LOWER THIRD OF ESOPHAGUS (HCC): ICD-10-CM

## 2020-07-20 DIAGNOSIS — E86.0 DEHYDRATION: ICD-10-CM

## 2020-07-20 DIAGNOSIS — R11.0 NAUSEA: ICD-10-CM

## 2020-07-20 DIAGNOSIS — C16.0 MALIGNANT NEOPLASM OF CARDIA OF STOMACH (HCC): ICD-10-CM

## 2020-07-20 LAB
ALBUMIN SERPL BCP-MCNC: 2.8 G/DL (ref 3.5–5)
ALP SERPL-CCNC: 110 U/L (ref 46–116)
ALT SERPL W P-5'-P-CCNC: 19 U/L (ref 12–78)
ANION GAP SERPL CALCULATED.3IONS-SCNC: 11 MMOL/L (ref 4–13)
AST SERPL W P-5'-P-CCNC: 14 U/L (ref 5–45)
BASOPHILS # BLD AUTO: 0.24 THOUSANDS/ΜL (ref 0–0.1)
BASOPHILS NFR BLD AUTO: 2 % (ref 0–1)
BILIRUB SERPL-MCNC: 0.5 MG/DL (ref 0.2–1)
BUN SERPL-MCNC: 13 MG/DL (ref 5–25)
CALCIUM SERPL-MCNC: 9.9 MG/DL (ref 8.3–10.1)
CHLORIDE SERPL-SCNC: 108 MMOL/L (ref 100–108)
CO2 SERPL-SCNC: 21 MMOL/L (ref 21–32)
CREAT SERPL-MCNC: 1.01 MG/DL (ref 0.6–1.3)
EOSINOPHIL # BLD AUTO: 0.14 THOUSAND/ΜL (ref 0–0.61)
EOSINOPHIL NFR BLD AUTO: 1 % (ref 0–6)
ERYTHROCYTE [DISTWIDTH] IN BLOOD BY AUTOMATED COUNT: 17.8 % (ref 11.6–15.1)
GFR SERPL CREATININE-BSD FRML MDRD: 77 ML/MIN/1.73SQ M
GLUCOSE SERPL-MCNC: 112 MG/DL (ref 65–140)
HCT VFR BLD AUTO: 35.7 % (ref 36.5–49.3)
HGB BLD-MCNC: 11.2 G/DL (ref 12–17)
IMM GRANULOCYTES # BLD AUTO: 0.26 THOUSAND/UL (ref 0–0.2)
IMM GRANULOCYTES NFR BLD AUTO: 2 % (ref 0–2)
LYMPHOCYTES # BLD AUTO: 3.01 THOUSANDS/ΜL (ref 0.6–4.47)
LYMPHOCYTES NFR BLD AUTO: 19 % (ref 14–44)
MCH RBC QN AUTO: 33.4 PG (ref 26.8–34.3)
MCHC RBC AUTO-ENTMCNC: 31.4 G/DL (ref 31.4–37.4)
MCV RBC AUTO: 107 FL (ref 82–98)
MONOCYTES # BLD AUTO: 1.88 THOUSAND/ΜL (ref 0.17–1.22)
MONOCYTES NFR BLD AUTO: 12 % (ref 4–12)
NEUTROPHILS # BLD AUTO: 10.76 THOUSANDS/ΜL (ref 1.85–7.62)
NEUTS SEG NFR BLD AUTO: 64 % (ref 43–75)
NRBC BLD AUTO-RTO: 0 /100 WBCS
PLATELET # BLD AUTO: 330 THOUSANDS/UL (ref 149–390)
PMV BLD AUTO: 11.4 FL (ref 8.9–12.7)
POTASSIUM SERPL-SCNC: 3.7 MMOL/L (ref 3.5–5.3)
PROT SERPL-MCNC: 6.8 G/DL (ref 6.4–8.2)
RBC # BLD AUTO: 3.35 MILLION/UL (ref 3.88–5.62)
SODIUM SERPL-SCNC: 140 MMOL/L (ref 136–145)
WBC # BLD AUTO: 16.29 THOUSAND/UL (ref 4.31–10.16)

## 2020-07-20 PROCEDURE — 85025 COMPLETE CBC W/AUTO DIFF WBC: CPT

## 2020-07-20 PROCEDURE — 80053 COMPREHEN METABOLIC PANEL: CPT

## 2020-07-20 PROCEDURE — 36415 COLL VENOUS BLD VENIPUNCTURE: CPT

## 2020-07-22 ENCOUNTER — HOSPITAL ENCOUNTER (OUTPATIENT)
Dept: INFUSION CENTER | Facility: HOSPITAL | Age: 65
Discharge: HOME | End: 2020-07-22
Attending: INTERNAL MEDICINE
Payer: MEDICARE

## 2020-07-22 ENCOUNTER — HOSPITAL ENCOUNTER (OUTPATIENT)
Dept: INFUSION CENTER | Facility: HOSPITAL | Age: 65
Discharge: HOME | End: 2020-07-22
Attending: INTERNAL MEDICINE

## 2020-07-22 ENCOUNTER — TELEPHONE (OUTPATIENT)
Dept: PALLIATIVE MEDICINE | Facility: CLINIC | Age: 65
End: 2020-07-22

## 2020-07-22 VITALS
HEIGHT: 69 IN | DIASTOLIC BLOOD PRESSURE: 79 MMHG | BODY MASS INDEX: 27.23 KG/M2 | WEIGHT: 183.86 LBS | SYSTOLIC BLOOD PRESSURE: 140 MMHG | HEART RATE: 76 BPM | RESPIRATION RATE: 20 BRPM | TEMPERATURE: 97.1 F | OXYGEN SATURATION: 97 %

## 2020-07-22 DIAGNOSIS — C16.0 MALIGNANT NEOPLASM OF CARDIA OF STOMACH (HCC): ICD-10-CM

## 2020-07-22 DIAGNOSIS — C15.5 MALIGNANT NEOPLASM OF LOWER THIRD OF ESOPHAGUS (HCC): Primary | ICD-10-CM

## 2020-07-22 DIAGNOSIS — R11.0 NAUSEA: ICD-10-CM

## 2020-07-22 DIAGNOSIS — E86.0 DEHYDRATION: ICD-10-CM

## 2020-07-22 PROCEDURE — 36593 DECLOT VASCULAR DEVICE: CPT

## 2020-07-22 PROCEDURE — 96367 TX/PROPH/DG ADDL SEQ IV INF: CPT

## 2020-07-22 PROCEDURE — G0498 CHEMO EXTEND IV INFUS W/PUMP: HCPCS

## 2020-07-22 PROCEDURE — 96409 CHEMO IV PUSH SNGL DRUG: CPT

## 2020-07-22 RX ORDER — SODIUM CHLORIDE 9 MG/ML
20 INJECTION, SOLUTION INTRAVENOUS ONCE AS NEEDED
Status: DISCONTINUED | OUTPATIENT
Start: 2020-07-22 | End: 2020-07-25 | Stop reason: HOSPADM

## 2020-07-22 RX ORDER — FLUOROURACIL 50 MG/ML
400 INJECTION, SOLUTION INTRAVENOUS ONCE
Status: COMPLETED | OUTPATIENT
Start: 2020-07-22 | End: 2020-07-22

## 2020-07-22 RX ORDER — DEXTROSE MONOHYDRATE 50 MG/ML
20 INJECTION, SOLUTION INTRAVENOUS ONCE
Status: DISCONTINUED | OUTPATIENT
Start: 2020-07-22 | End: 2020-07-25 | Stop reason: HOSPADM

## 2020-07-22 RX ADMIN — ALTEPLASE 2 MG: 2.2 INJECTION, POWDER, LYOPHILIZED, FOR SOLUTION INTRAVENOUS at 11:26

## 2020-07-22 RX ADMIN — SODIUM CHLORIDE 20 ML/HR: 9 INJECTION, SOLUTION INTRAVENOUS at 10:27

## 2020-07-22 RX ADMIN — DEXAMETHASONE SODIUM PHOSPHATE: 10 INJECTION, SOLUTION INTRAMUSCULAR; INTRAVENOUS at 10:27

## 2020-07-22 RX ADMIN — FLUOROURACIL 795 MG: 50 INJECTION, SOLUTION INTRAVENOUS at 12:47

## 2020-07-22 NOTE — TELEPHONE ENCOUNTER
Noted this barium swallow has not been scheduled yet  Phone call made to the patient's home number and left message to please call our office for assistance with scheduling this appointment

## 2020-07-22 NOTE — PLAN OF CARE
Problem: Potential for Falls  Goal: Patient will remain free of falls  Description  INTERVENTIONS:  - Assess patient frequently for physical needs  -  Identify cognitive and physical deficits and behaviors that affect risk of falls    -  [place] fall precautions as indicated by assessment   - Educate patient/family on patient safety including physical limitations  - Instruct patient to call for assistance with activity based on assessment  - Modify environment to reduce risk of injury  - Consider OT/PT consult to assist with strengthening/mobility  Outcome: Progressing

## 2020-07-23 ENCOUNTER — DOCUMENTATION (OUTPATIENT)
Dept: HEMATOLOGY ONCOLOGY | Facility: CLINIC | Age: 65
End: 2020-07-23

## 2020-07-23 NOTE — PROGRESS NOTES
1-3-19  Received e-mail from providers office requesting I reach out to the patient regarding his medication Eliquis and inability to pay  Placed call no response left VM to return call      1-8-19  Placed follow up call to patient, no response again  Left another VM to return call  2nd attempt     1-15-20  Received call back from patient who stated he did not have time to come in anytime this week  He stated he wants to wait a week so he can get his financial records in order  Meeting with patient 1-21-20 1-21-20  Received call from patient stating he could not make it in today to see me  I offered to email the application to him and or another date and time and his response was he just didn't have time to deal with this cancer stuff  He will call me when he's ready      Email to team      1-29-20  Follow up call to patient  No documents received can not submit application for assitance without  Paitent states he will drop them off at office once he gets them together     2-11-20  Follow up call no response left message     2-27-20  Received VM from Gunner returning my call regarding funding     2-28-20  Returned call to [employer], Left message     3-18-20  Follow up call- no response left message his is final attempt       5-19-20-  Received call from [address] [place] [address] stating patient called in stating he could not remember who was helping him with the application for his medication  She saw my notes and placed call, however I had left for the day       5-20-20  Return call to Adan Saucedo, advised I would call patient back that day  Call to patient, left message with contact information, no response        5-28-20  Follow up call to patient, still no return back from patient or soha       6-28-20  Received VM from patient stating he was ready to send income information for assistance  Requested I call him back on where to bring in the documents      6-29-20  Call to patient, got VM for [employer]   Left message     6-30-20   Follow up call twice today  Received call back from [facility], stating she knows he has been non-compliant  Requested my email address to forward the documents  Provided email address, office number, cell number    7-7-20  Follow up call to patient no response  Left message on # for significant other [facility]  7-14-20  Received voicemail from Maryellen-She would like to drop off paperwork required for eliquis  Called patient/Maryellen back and no response  Left message on ways to get the information to me mail,fax and office     7-22-20  Still no information received for assistance  Will not make further attempts  After this call    Call to [address] went straight to  however mailbox full unable to leave message

## 2020-07-24 ENCOUNTER — HOSPITAL ENCOUNTER (OUTPATIENT)
Dept: INFUSION CENTER | Facility: HOSPITAL | Age: 65
Discharge: HOME | End: 2020-07-24
Attending: INTERNAL MEDICINE

## 2020-07-24 VITALS — TEMPERATURE: 97.9 F

## 2020-07-24 DIAGNOSIS — R11.0 NAUSEA: ICD-10-CM

## 2020-07-24 DIAGNOSIS — E86.0 DEHYDRATION: ICD-10-CM

## 2020-07-24 DIAGNOSIS — C16.0 MALIGNANT NEOPLASM OF CARDIA OF STOMACH (HCC): ICD-10-CM

## 2020-07-24 DIAGNOSIS — C15.5 MALIGNANT NEOPLASM OF LOWER THIRD OF ESOPHAGUS (HCC): Primary | ICD-10-CM

## 2020-07-24 DIAGNOSIS — R11.0 NAUSEA: Primary | ICD-10-CM

## 2020-07-24 DIAGNOSIS — C15.5 MALIGNANT NEOPLASM OF LOWER THIRD OF ESOPHAGUS (HCC): ICD-10-CM

## 2020-07-24 RX ORDER — SODIUM CHLORIDE 9 MG/ML
20 INJECTION, SOLUTION INTRAVENOUS ONCE AS NEEDED
Status: CANCELLED | OUTPATIENT
Start: 2020-08-05

## 2020-07-24 RX ORDER — FLUOROURACIL 50 MG/ML
400 INJECTION, SOLUTION INTRAVENOUS ONCE
Status: CANCELLED | OUTPATIENT
Start: 2020-08-05

## 2020-07-24 RX ORDER — DEXTROSE MONOHYDRATE 50 MG/ML
20 INJECTION, SOLUTION INTRAVENOUS ONCE
Status: CANCELLED | OUTPATIENT
Start: 2020-08-05

## 2020-07-27 NOTE — TELEPHONE ENCOUNTER
I spoke with wife Tripp Marr- she doesn't feel this apt is needed  Let her follow up this possible testing with Dr Lino Puga at his 8/18/20 apt

## 2020-07-27 NOTE — TELEPHONE ENCOUNTER
This pt has an 8/18 apt with Dr Regla Marcano   Wife does not want to schedule this test- does not feel its needed  Let Dr Jarett Epperson address this need if needed

## 2020-08-03 ENCOUNTER — APPOINTMENT (OUTPATIENT)
Dept: LAB | Facility: CLINIC | Age: 65
End: 2020-08-03
Payer: MEDICARE

## 2020-08-03 DIAGNOSIS — E86.0 DEHYDRATION: ICD-10-CM

## 2020-08-03 DIAGNOSIS — C16.0 MALIGNANT NEOPLASM OF CARDIA OF STOMACH (HCC): ICD-10-CM

## 2020-08-03 DIAGNOSIS — C15.5 MALIGNANT NEOPLASM OF LOWER THIRD OF ESOPHAGUS (HCC): ICD-10-CM

## 2020-08-03 DIAGNOSIS — R11.0 NAUSEA: ICD-10-CM

## 2020-08-03 LAB
ALBUMIN SERPL BCP-MCNC: 3.2 G/DL (ref 3.5–5)
ALP SERPL-CCNC: 87 U/L (ref 46–116)
ALT SERPL W P-5'-P-CCNC: 14 U/L (ref 12–78)
ANION GAP SERPL CALCULATED.3IONS-SCNC: 5 MMOL/L (ref 4–13)
AST SERPL W P-5'-P-CCNC: 12 U/L (ref 5–45)
BASOPHILS # BLD AUTO: 0.1 THOUSANDS/ΜL (ref 0–0.1)
BASOPHILS NFR BLD AUTO: 2 % (ref 0–1)
BILIRUB SERPL-MCNC: 0.71 MG/DL (ref 0.2–1)
BUN SERPL-MCNC: 13 MG/DL (ref 5–25)
CALCIUM SERPL-MCNC: 9.7 MG/DL (ref 8.3–10.1)
CHLORIDE SERPL-SCNC: 108 MMOL/L (ref 100–108)
CO2 SERPL-SCNC: 25 MMOL/L (ref 21–32)
CREAT SERPL-MCNC: 0.99 MG/DL (ref 0.6–1.3)
EOSINOPHIL # BLD AUTO: 0.32 THOUSAND/ΜL (ref 0–0.61)
EOSINOPHIL NFR BLD AUTO: 5 % (ref 0–6)
ERYTHROCYTE [DISTWIDTH] IN BLOOD BY AUTOMATED COUNT: 16 % (ref 11.6–15.1)
GFR SERPL CREATININE-BSD FRML MDRD: 78 ML/MIN/1.73SQ M
GLUCOSE SERPL-MCNC: 133 MG/DL (ref 65–140)
HCT VFR BLD AUTO: 34.3 % (ref 36.5–49.3)
HGB BLD-MCNC: 10.6 G/DL (ref 12–17)
IMM GRANULOCYTES # BLD AUTO: 0.01 THOUSAND/UL (ref 0–0.2)
IMM GRANULOCYTES NFR BLD AUTO: 0 % (ref 0–2)
LYMPHOCYTES # BLD AUTO: 2.09 THOUSANDS/ΜL (ref 0.6–4.47)
LYMPHOCYTES NFR BLD AUTO: 31 % (ref 14–44)
MCH RBC QN AUTO: 33.2 PG (ref 26.8–34.3)
MCHC RBC AUTO-ENTMCNC: 30.9 G/DL (ref 31.4–37.4)
MCV RBC AUTO: 108 FL (ref 82–98)
MONOCYTES # BLD AUTO: 0.65 THOUSAND/ΜL (ref 0.17–1.22)
MONOCYTES NFR BLD AUTO: 10 % (ref 4–12)
NEUTROPHILS # BLD AUTO: 3.58 THOUSANDS/ΜL (ref 1.85–7.62)
NEUTS SEG NFR BLD AUTO: 52 % (ref 43–75)
NRBC BLD AUTO-RTO: 0 /100 WBCS
PLATELET # BLD AUTO: 297 THOUSANDS/UL (ref 149–390)
PMV BLD AUTO: 11.4 FL (ref 8.9–12.7)
POTASSIUM SERPL-SCNC: 3.8 MMOL/L (ref 3.5–5.3)
PROT SERPL-MCNC: 6.7 G/DL (ref 6.4–8.2)
RBC # BLD AUTO: 3.19 MILLION/UL (ref 3.88–5.62)
SODIUM SERPL-SCNC: 138 MMOL/L (ref 136–145)
WBC # BLD AUTO: 6.75 THOUSAND/UL (ref 4.31–10.16)

## 2020-08-03 PROCEDURE — 80053 COMPREHEN METABOLIC PANEL: CPT

## 2020-08-03 PROCEDURE — 36415 COLL VENOUS BLD VENIPUNCTURE: CPT

## 2020-08-03 PROCEDURE — 85025 COMPLETE CBC W/AUTO DIFF WBC: CPT

## 2020-08-05 ENCOUNTER — HOSPITAL ENCOUNTER (OUTPATIENT)
Dept: INFUSION CENTER | Facility: HOSPITAL | Age: 65
Discharge: HOME | End: 2020-08-05
Attending: INTERNAL MEDICINE
Payer: MEDICARE

## 2020-08-05 VITALS
BODY MASS INDEX: 26.58 KG/M2 | OXYGEN SATURATION: 97 % | HEIGHT: 69 IN | SYSTOLIC BLOOD PRESSURE: 129 MMHG | DIASTOLIC BLOOD PRESSURE: 76 MMHG | WEIGHT: 179.45 LBS | TEMPERATURE: 97.8 F | HEART RATE: 76 BPM | RESPIRATION RATE: 18 BRPM

## 2020-08-05 DIAGNOSIS — E86.0 DEHYDRATION: ICD-10-CM

## 2020-08-05 DIAGNOSIS — C16.0 MALIGNANT NEOPLASM OF CARDIA OF STOMACH (HCC): ICD-10-CM

## 2020-08-05 DIAGNOSIS — C15.5 MALIGNANT NEOPLASM OF LOWER THIRD OF ESOPHAGUS (HCC): Primary | ICD-10-CM

## 2020-08-05 DIAGNOSIS — R11.0 NAUSEA: ICD-10-CM

## 2020-08-05 PROCEDURE — 96367 TX/PROPH/DG ADDL SEQ IV INF: CPT

## 2020-08-05 PROCEDURE — G0498 CHEMO EXTEND IV INFUS W/PUMP: HCPCS

## 2020-08-05 PROCEDURE — 96409 CHEMO IV PUSH SNGL DRUG: CPT

## 2020-08-05 RX ORDER — SODIUM CHLORIDE 9 MG/ML
20 INJECTION, SOLUTION INTRAVENOUS ONCE AS NEEDED
Status: DISCONTINUED | OUTPATIENT
Start: 2020-08-05 | End: 2020-08-08 | Stop reason: HOSPADM

## 2020-08-05 RX ORDER — FLUOROURACIL 50 MG/ML
400 INJECTION, SOLUTION INTRAVENOUS ONCE
Status: COMPLETED | OUTPATIENT
Start: 2020-08-05 | End: 2020-08-05

## 2020-08-05 RX ORDER — DEXTROSE MONOHYDRATE 50 MG/ML
20 INJECTION, SOLUTION INTRAVENOUS ONCE
Status: DISCONTINUED | OUTPATIENT
Start: 2020-08-05 | End: 2020-08-08 | Stop reason: HOSPADM

## 2020-08-05 RX ADMIN — SODIUM CHLORIDE 20 ML/HR: 0.9 INJECTION, SOLUTION INTRAVENOUS at 10:32

## 2020-08-05 RX ADMIN — FLUOROURACIL 795 MG: 50 INJECTION, SOLUTION INTRAVENOUS at 11:34

## 2020-08-05 RX ADMIN — DEXAMETHASONE SODIUM PHOSPHATE: 10 INJECTION, SOLUTION INTRAMUSCULAR; INTRAVENOUS at 10:33

## 2020-08-05 NOTE — PLAN OF CARE
Problem: Potential for Falls  Goal: Patient will remain free of falls  Description: INTERVENTIONS:  - Assess patient frequently for physical needs  -  Identify cognitive and physical deficits and behaviors that affect risk of falls    -  [place] fall precautions as indicated by assessment   - Educate patient/family on patient safety including physical limitations  - Instruct patient to call for assistance with activity based on assessment  - Modify environment to reduce risk of injury  - Consider OT/PT consult to assist with strengthening/mobility  Outcome: Progressing

## 2020-08-07 ENCOUNTER — HOSPITAL ENCOUNTER (OUTPATIENT)
Dept: INFUSION CENTER | Facility: HOSPITAL | Age: 65
Discharge: HOME | End: 2020-08-07
Attending: INTERNAL MEDICINE

## 2020-08-07 VITALS — TEMPERATURE: 96.7 F

## 2020-08-07 DIAGNOSIS — C16.0 MALIGNANT NEOPLASM OF CARDIA OF STOMACH (HCC): ICD-10-CM

## 2020-08-07 DIAGNOSIS — E86.0 DEHYDRATION: ICD-10-CM

## 2020-08-07 DIAGNOSIS — R11.0 NAUSEA: ICD-10-CM

## 2020-08-07 DIAGNOSIS — C15.5 MALIGNANT NEOPLASM OF LOWER THIRD OF ESOPHAGUS (HCC): Primary | ICD-10-CM

## 2020-08-07 NOTE — PROGRESS NOTES
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  He has biopsy-proven adenocarcinoma of the esophagus    PET/CT scan was done which shows omental nodularity that is FDG avid indicating stage IV disease    Previous Hematologic/ Oncologic History:    Oncology History   Malignant neoplasm of lower third of esophagus ([facility] )   7/26/2019 Initial Diagnosis    Malignant neoplasm of lower third of esophagus ([facility] )     8/1/2019 -  Chemotherapy    fluorouracil (ADRUCIL) injection 750 mg, 400 mg/m2 = 750 mg, Intravenous, Once, 26 of 34 cycles  Administration: 750 mg (8/1/2019), 750 mg (8/13/2019), 750 mg (8/27/2019), 750 mg (9/10/2019), 750 mg (9/24/2019), 750 mg (10/8/2019), 795 mg (10/22/2019), 795 mg (11/5/2019), 795 mg (11/19/2019), 795 mg (12/3/2019), 795 mg (12/17/2019), 795 mg (12/31/2019), 795 mg (1/14/2020), 795 mg (1/28/2020), 795 mg (2/11/2020), 795 mg (2/25/2020), 795 mg (3/10/2020), 795 mg (3/24/2020), 795 mg (4/7/2020), 795 mg (5/13/2020), 795 mg (5/27/2020), 795 mg (6/10/2020), 795 mg (6/24/2020), 795 mg (7/22/2020), 795 mg (8/5/2020), 795 mg (4/29/2020)  pegfilgrastim (NEULASTA ONPRO) subcutaneous injection kit 6 mg, 6 mg, Subcutaneous, Once, 2 of 2 cycles  Administration: 6 mg (8/3/2019), 6 mg (8/15/2019)  leucovorin 750 mg in dextrose 5 % 250 mL IVPB, 748 mg, Intravenous, Once, 21 of 21 cycles  Administration: 750 mg (8/1/2019), 750 mg (8/13/2019), 750 mg (8/27/2019), 750 mg (9/10/2019), 750 mg (9/24/2019), 750 mg (10/8/2019), 800 mg (10/22/2019), 800 mg (11/5/2019), 800 mg (11/19/2019), 800 mg (12/3/2019), 800 mg (12/17/2019), 800 mg (12/31/2019), 800 mg (1/14/2020), 800 mg (1/28/2020), 800 mg (2/11/2020), 800 mg (2/25/2020), 800 mg (3/10/2020), 800 mg (3/24/2020), 800 mg (4/7/2020), 800 mg (5/13/2020), 800 mg (4/29/2020)  oxaliplatin (ELOXATIN) 158 95 mg in dextrose 5 % 250 mL chemo infusion, 85 mg/m2 = 158 95 mg, Intravenous, Once, 6 of 6 cycles  Administration: 158 95 mg (8/1/2019), 158 95 mg (8/13/2019), 158 95 mg (8/27/2019), 158 95 mg (9/10/2019), 158 95 mg (9/24/2019), 158 95 mg (10/8/2019)     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)   7/26/2019 Initial Diagnosis    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[facility] )     8/1/2019 -  Chemotherapy    fluorouracil (ADRUCIL) injection 750 mg, 400 mg/m2 = 750 mg, Intravenous, Once, 26 of 34 cycles  Administration: 750 mg (8/1/2019), 750 mg (8/13/2019), 750 mg (8/27/2019), 750 mg (9/10/2019), 750 mg (9/24/2019), 750 mg (10/8/2019), 795 mg (10/22/2019), 795 mg (11/5/2019), 795 mg (11/19/2019), 795 mg (12/3/2019), 795 mg (12/17/2019), 795 mg (12/31/2019), 795 mg (1/14/2020), 795 mg (1/28/2020), 795 mg (2/11/2020), 795 mg (2/25/2020), 795 mg (3/10/2020), 795 mg (3/24/2020), 795 mg (4/7/2020), 795 mg (5/13/2020), 795 mg (5/27/2020), 795 mg (6/10/2020), 795 mg (6/24/2020), 795 mg (7/22/2020), 795 mg (8/5/2020), 795 mg (4/29/2020)  pegfilgrastim (NEULASTA ONPRO) subcutaneous injection kit 6 mg, 6 mg, Subcutaneous, Once, 2 of 2 cycles  Administration: 6 mg (8/3/2019), 6 mg (8/15/2019)  leucovorin 750 mg in dextrose 5 % 250 mL IVPB, 748 mg, Intravenous, Once, 21 of 21 cycles  Administration: 750 mg (8/1/2019), 750 mg (8/13/2019), 750 mg (8/27/2019), 750 mg (9/10/2019), 750 mg (9/24/2019), 750 mg (10/8/2019), 800 mg (10/22/2019), 800 mg (11/5/2019), 800 mg (11/19/2019), 800 mg (12/3/2019), 800 mg (12/17/2019), 800 mg (12/31/2019), 800 mg (1/14/2020), 800 mg (1/28/2020), 800 mg (2/11/2020), 800 mg (2/25/2020), 800 mg (3/10/2020), 800 mg (3/24/2020), 800 mg (4/7/2020), 800 mg (5/13/2020), 800 mg (4/29/2020)  oxaliplatin (ELOXATIN) 158 95 mg in dextrose 5 % 250 mL chemo infusion, 85 mg/m2 = 158 95 mg, Intravenous, Once, 6 of 6 cycles  Administration: 158 95 mg (2019), 158 95 mg (2019), 158 95 mg (2019), 158 95 mg (9/10/2019), 158 95 mg (2019), 158 95 mg (10/8/2019)         Current Hematologic/ Oncologic Treatment:    Modified FOLFOX 6  Oxaliplatin discontinued after cycle 6     Leucovorin 400 milligrams/meters squared  discontinued   5FU 400 milligrams/meters squared  5FU 2400 milligrams/meter squared CIVI over 46 hours    ECO - Symptomatic but completely ambulatory    Interval History:  The patient returns for a follow up visit  He is due for his next treatment cycle on   His most recent blood work completed on 8/3/20 was reviewed  His labs remain in acceptable treatment range  As far as symptoms are concerned, he has some intermittent nausea, no vomiting  He is taking his antiemetics as directed  His appetite is decreased, he has lost a few pounds since last month  He doesn't feel very hungry  He is eating  Struggles with constipation  Has some intermittent RUQ abdominal pain  He is following with palliative care and has a follow up appointment next week  Cancer Staging:  Cancer Staging  No matching staging information was found for the patient  Molecular Testing:       Test Results:    Imaging: Mri Brain W Wo Contrast    Result Date: 2020  Narrative: MRI BRAIN WITH AND WITHOUT CONTRAST INDICATION: C15 5: Malignant neoplasm of lower third of esophagus C16 0: Malignant neoplasm of cardia G89 3: Neoplasm related pain (acute) (chronic) R11 0: Nausea  COMPARISON:  None  TECHNIQUE: Sagittal T1, axial T2, axial FLAIR, axial T1, axial [place], axial diffusion  Sagittal, axial T1 postcontrast   Axial bravo postcontrast with coronal reconstructions  IV Contrast:  8 mL of gadobutrol injection (MULTI-DOSE)  IMAGE QUALITY:   Diagnostic  FINDINGS: BRAIN PARENCHYMA:  There is no discrete mass, mass effect or midline shift  There is no intracranial hemorrhage  Normal posterior fossa  Diffusion imaging is unremarkable    Small scattered hyperintensities on T2/FLAIR imaging are noted in the periventricular and subcortical white matter demonstrating an appearance that is statistically most likely to represent moderate microangiopathic change  Postcontrast imaging of the brain demonstrates no abnormal enhancement  VENTRICLES:  Diffuse brain atrophy, most severe in the frontotemporal lobes  SELLA AND PITUITARY GLAND:  Normal  ORBITS:  Normal  PARANASAL SINUSES:  Normal  VASCULATURE:  Evaluation of the major intracranial vasculature demonstrates appropriate flow voids  CALVARIUM AND SKULL BASE:  Normal  EXTRACRANIAL SOFT TISSUES:  Normal      Impression: 1  No MR evidence of acute ischemia  2  Brain volume loss/atrophy and microangiopathy as described above  3  No enhancing lesions to suggest brain metastatic disease  Workstation performed: [id number]     Ct Chest Abdomen Pelvis W Contrast    Result Date: 7/15/2020  Narrative: CT CHEST, ABDOMEN AND PELVIS WITH IV CONTRAST INDICATION:   C15 5: Malignant neoplasm of lower third of esophagus C16 0: Malignant neoplasm of cardia G89 3: Neoplasm related pain (acute) (chronic) R11 0: Nausea  Follow-up metastatic gastroesophageal carcinoma COMPARISON:  5/20/2020; 1/13/2020; 7/15/2019 TECHNIQUE: CT examination of the chest, abdomen and pelvis was performed  Axial, sagittal, and coronal 2D reformatted images were created from the source data and submitted for interpretation  Radiation dose length product (DLP) for this visit:  595 18 mGy-cm   This examination, like all CT scans performed in the [facility], was performed utilizing techniques to minimize radiation dose exposure, including the use of iterative  reconstruction and automated exposure control  IV Contrast:  100 mL of iohexol (OMNIPAQUE) Enteric Contrast: Enteric contrast was administered  FINDINGS: CHEST LUNGS:  Bullous emphysema is present   There is a stable 4 mm pleural-based nodule seen in the right middle lobe on image 64  6 mm nodule stable in the right middle lobe on image 71  3 mm nodule in the lingula is stable  2 mm nodule is stable on image 81 in the right middle lobe  There is no tracheal or endobronchial lesion  PLEURA:  Unremarkable  HEART/GREAT VESSELS:  Unremarkable for patient's age  MEDIASTINUM AND NEVAEH:  Diffuse esophageal thickening is similar to the prior study  CHEST WALL AND LOWER NECK:   Port-A-Cath is seen with tip in SVC  ABDOMEN LIVER/BILIARY TREE:  Small hypodensity is noted on image 61, series 2 measuring 5 mm without change  5 mm hypodensity on image 56 is stable  9 mm hypodensity is stable on image 54 and segment 3  A few other small hypodensities are stable and are small  GALLBLADDER:  No calcified gallstones  No pericholecystic inflammatory change  SPLEEN:  Unremarkable  PANCREAS:  Unremarkable  ADRENAL GLANDS:  Unremarkable  KIDNEYS/URETERS:  Multiple renal cysts are seen bilaterally    No hydronephrosis  There is focal thickening at the gastric cardia near the GE junction  This may be slightly thicker than the study of May although may be related to differences in distention  The superior wall of the coronal sequence measures 2 2 cm as compared to 1 5  The inferior wall measures 9 mm without change  STOMACH AND BOWEL:  Left colon diverticulosis is seen without CT evidence of diverticulitis  APPENDIX:  The patient is status post appendectomy  ABDOMINOPELVIC CAVITY:  No ascites  No pneumoperitoneum  No lymphadenopathy  Small mesenteric nodules near the left lobe of the liver appears to have resolved  An 8 mm nodule on image 62 lateral to the stomach is stable  Nodule on image 64 adjacent pancreas is also stable  A 1 cm nodule on image 79 in the transverse colon mesentery was not well seen on the prior study due to streak artifact but stable since January  Small gastrohepatic node is stable on coronal image 63  VESSELS:  Unremarkable for patient's age   PELVIS REPRODUCTIVE ORGANS:  Unremarkable for patient's age  URINARY BLADDER:  Unremarkable  ABDOMINAL WALL/INGUINAL REGIONS:  Unremarkable  OSSEOUS STRUCTURES: There are fractures of the 4th 5th and 6th ribs on the right with callus formation although appear new since the study of May  No lytic or blastic abnormality is appreciated  Impression: Focal thickening at the gastric esophageal junction extending into the cardia may represent primary tumor  Thickening may be slightly worse than the study of May  More diffuse esophageal thickening is also demonstrated which could be related to treatment  Small liver lesions are unchanged  Small mesenteric nodes are also not significantly changed  Stable lung nodules Right-sided rib fractures with callus formation should be correlated with any recent injury  Workstation performed: [id number]       Labs:   Lab Results   Component Value Date    WBC 6 75 08/03/2020    HGB 10 6 (L) 08/03/2020    HCT 34 3 (L) 08/03/2020     (H) 08/03/2020     08/03/2020     Lab Results   Component Value Date    K 3 8 08/03/2020     08/03/2020    CO2 25 08/03/2020    BUN 13 08/03/2020    CREATININE 0 99 08/03/2020    GLUF 131 (H) 06/22/2020    CALCIUM 9 7 08/03/2020    AST 12 08/03/2020    ALT 14 08/03/2020    ALKPHOS 87 08/03/2020    EGFR 78 08/03/2020         Review of Systems   Constitutional: Positive for appetite change, fatigue and unexpected weight change (4 lbs in last month)  Gastrointestinal: Positive for abdominal pain, constipation and nausea  Psychiatric/Behavioral: The patient is nervous/anxious  All other systems reviewed and are negative          Active Problems:   Patient Active Problem List   Diagnosis    PTSD (post-traumatic stress disorder)    Essential hypertension    Major depression    OCD (obsessive compulsive disorder)    Anxiety    Malignant neoplasm of lower third of esophagus ([facility] )    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[facility] )    Cancer associated pain    Chemotherapy-induced nausea    Abnormal weight loss    Malignant ascites    Recovering alcoholic (HCC)    Other fatigue    Other dysphagia    Nausea    Dehydration    Skin fissures    Goals of care, counseling/discussion    Palliative care patient    Other insomnia    Counseling on health promotion and disease prevention    Elevated PSA    Memory loss    Regurgitation of stomach contents    Falls       Past Medical History:   Past Medical History:   Diagnosis Date    Dehydration 2019    [facility] )     Hypertension     Nausea 2019    Psychiatric disorder     Recovering alcoholic ([place] [address] )        Surgical History:   Past Surgical History:   Procedure Laterality Date    APPENDECTOMY      IR PORT PLACEMENT  2019       Family History:    Family History   Problem Relation Age of Onset    Colon cancer Father        Cancer-related family history includes Colon cancer in his father      Social History:   Social History     Socioeconomic History    Marital status: /Civil Union     Spouse name: Not on file    Number of children: 2    Years of education: Not on file    Highest education level: Not on file   Occupational History    Occupation: retired    Social Needs    Financial resource strain: Not on file    Food insecurity     Worry: Not on file     Inability: Not on file   [employer] needs     Medical: Not on file     Non-medical: Not on file   Tobacco Use    Smoking status: Former Smoker     Types: Cigars     Last attempt to quit: 5/15/2019     Years since quittin 2    Smokeless tobacco: Never Used   Substance and Sexual Activity    Alcohol use: No    Drug use: Yes     Types: Marijuana    Sexual activity: Not Currently   Lifestyle    Physical activity     Days per week: Not on file     Minutes per session: Not on file    Stress: Not on file   Relationships    Social connections     Talks on phone: Not on file     Gets together: Not on file     Attends Jain service: Not on file     Active member of club or organization: Not on file     Attends meetings of clubs or organizations: Not on file     Relationship status: Not on file    Intimate partner violence     Fear of current or ex partner: Not on file     Emotionally abused: Not on file     Physically abused: Not on file     Forced sexual activity: Not on file   Other Topics Concern    Not on file   Social History Narrative    Not on file       Current Medications:   Current Outpatient Medications   Medication Sig Dispense Refill    ammonium lactate (LAC-HYDRIN) 12 % cream Apply topically as needed for dry skin On hands and feet 385 g 2    bisoprolol-hydrochlorothiazide (ZIAC) 10-6 25 MG per tablet Take 1 tablet by mouth daily 90 tablet 1    dexamethasone (DECADRON) 4 mg tablet Take 1 tablet (4 mg total) by mouth daily with breakfast 30 tablet 0    diazepam (VALIUM) 5 mg tablet Take 1 tablet (5 mg total) by mouth 2 (two) times a day 60 tablet 0    fluorouracil 4,730 mg in CADD infusion pump Infuse 4,730 mg (1,200 mg/m2/day x 1 97 m2 (Treatment plan recorded BSA)) into a venous catheter over 46 hours for 2 days [place] to be administered on 8/19/20 1 Device 0    OLANZapine (ZyPREXA) 5 mg tablet Take 1 tablet (5 mg total) by mouth daily at bedtime 30 tablet 0    ondansetron (ZOFRAN) 4 mg tablet Take 1 tablet (4 mg total) by mouth every 8 (eight) hours as needed for nausea or vomiting 20 tablet 0    pantoprazole (PROTONIX) 40 mg tablet Take 40 mg by mouth daily before breakfast      venlafaxine (EFFEXOR-XR) 150 mg 24 hr capsule Take 1 capsule (150 mg total) by mouth daily 30 capsule 0    apixaban (ELIQUIS) 5 mg Take 1 tablet (5 mg total) by mouth 2 (two) times a day for 60 doses 60 tablet 1     No current facility-administered medications for this visit  Allergies:    Allergies   Allergen Reactions    Bee Venom Anaphylaxis    Shellfish-Derived Products      Develops GOUT Physical Exam:  /72 (BP Location: Left arm)   Pulse 68   Temp (!) 96 8 °F (36 °C) (Tympanic)   Resp 16   Ht 5' 9" (1 753 m)   Wt 81 2 kg (179 lb)   SpO2 99%   BMI 26 43 kg/m²   Body surface area is 1 97 meters squared  Wt Readings from Last 3 Encounters:   08/11/20 81 2 kg (179 lb)   08/05/20 81 4 kg (179 lb 7 3 oz)   07/22/20 83 4 kg (183 lb 13 8 oz)           Physical Exam  Constitutional:       General: He is not in acute distress  Appearance: He is not ill-appearing or toxic-appearing  HENT:      Head: Normocephalic and atraumatic  Mouth/Throat:      Mouth: Mucous membranes are moist       Pharynx: Oropharynx is clear  Eyes:      General: No scleral icterus  Right eye: No discharge  Left eye: No discharge  Neck:      Musculoskeletal: Normal range of motion and neck supple  Cardiovascular:      Rate and Rhythm: Normal rate and regular rhythm  Pulmonary:      Effort: Pulmonary effort is normal       Breath sounds: Normal breath sounds  Abdominal:      General: Bowel sounds are normal       Palpations: Abdomen is soft  Tenderness: There is no abdominal tenderness  Musculoskeletal: Normal range of motion  Right lower leg: No edema  Left lower leg: No edema  Comments: Use of cane to support ambulation   Lymphadenopathy:      Cervical: No cervical adenopathy  Neurological:      General: No focal deficit present  Mental Status: He is alert and oriented to person, place, and time  Psychiatric:         Mood and Affect: Mood normal          Behavior: Behavior normal          Assessment / Plan:    1  Malignant neoplasm of lower third of esophagus (HCC)    2  Malignant neoplasm of cardia of stomach [facility])    The patient is a pleasant 64-year-old male with a history of metastatic gastroesophageal cancer  He has been treated with modified FOLFOX 6  Oxaliplatin and leucovorin have been discontinued from his treatment regimen    Most recent imaging shows stable disease  His blood work remains in acceptable treatment range  Clinically he is stable but is still endorsing some symptoms that are likely secondary to his cancer diagnosis and treatment related side effects  I reviewed with him today his antiemetic regimen that he is taking to help with symptoms of nausea:   Dexamethasone 4 mg daily with breakfast, Olanzapine 5 mg at bedtime, ondansetron prn  I did discuss a bowel regimen and recommended daily MiraLax and/or senna S to help prevent constipation as constipation can make his symptoms of abdominal pain and nausea worse  He has an appointment with palliative care next week and will address his decreased appetite and cancer related pain at that visit  He will continue on his current regimen of 5  milligrams/meter squared, 5 FU 2400 milligrams/meter squared continuous IV infusion over 46 hours  He will have repeat blood work done prior to his next infusion that is scheduled for 8/19/20  Patient and his spouse verbalized understanding and agreement with plan of care  Patient will return for a follow-up visit in 4 weeks  He was instructed to call with any questions or concerns prior to his next office visit    Goals and Barriers:  Current Goal:  Prolong Survival from esophageal cancer  Barriers: None  Patient's Capacity to Self Care:  Patient is  able to self care  Portions of the record may have been created with voice recognition software  Occasional wrong word or "sound a like" substitutions may have occurred due to the inherent limitations of voice recognition software  Read the chart carefully and recognize, using context, where substitutions have occurred

## 2020-08-07 NOTE — PLAN OF CARE
Problem: Potential for Falls  Goal: Patient will remain free of falls  Description: INTERVENTIONS:  - Assess patient frequently for physical needs  -  Identify cognitive and physical deficits and behaviors that affect risk of falls  -  [place] fall precautions as indicated by assessment   - Educate patient/family on patient safety including physical limitations  - Instruct patient to call for assistance with activity based on assessment  - Modify environment to reduce risk of injury  - Consider OT/PT consult to assist with strengthening/mobility  Outcome: Progressing     Problem: Potential for Falls  Goal: Patient will remain free of falls  Description: INTERVENTIONS:  - Assess patient frequently for physical needs  -  Identify cognitive and physical deficits and behaviors that affect risk of falls    -  [place] fall precautions as indicated by assessment   - Educate patient/family on patient safety including physical limitations  - Instruct patient to call for assistance with activity based on assessment  - Modify environment to reduce risk of injury  - Consider OT/PT consult to assist with strengthening/mobility  Outcome: Progressing     Problem: INFECTION - ADULT  Goal: Absence or prevention of progression during hospitalization  Description: INTERVENTIONS:  - Assess and monitor for signs and symptoms of infection  - Monitor lab/diagnostic results  - Monitor all insertion sites, i e  indwelling lines, tubes, and drains  - Monitor endotracheal (as able) and nasal secretions for changes in amount and color  - [place] appropriate cooling/warming therapies per order  - Administer medications as ordered  - Instruct and encourage patient and family to use good hand hygiene technique  - Identify and instruct in appropriate isolation precautions for identified infection/condition  Outcome: Progressing  Goal: Absence of fever/infection during neutropenic period  Description: INTERVENTIONS:  - Monitor WBC  - Implement neutropenic guidelines  Outcome: Progressing     Problem: DISCHARGE PLANNING  Goal: Discharge to home or other facility with appropriate resources  Description: INTERVENTIONS:  - Identify barriers to discharge w/patient and caregiver  - Arrange for needed discharge resources and transportation as appropriate  - Identify discharge learning needs (meds, wound care, etc )  - Arrange for interpretive services to assist at discharge as needed  - Refer to Case Management Department for coordinating discharge planning if the patient needs post-hospital services based on physician/advanced practitioner order or complex needs related to functional status, cognitive ability, or social support system  Outcome: Progressing

## 2020-08-10 DIAGNOSIS — E86.0 DEHYDRATION: ICD-10-CM

## 2020-08-10 DIAGNOSIS — C15.5 MALIGNANT NEOPLASM OF LOWER THIRD OF ESOPHAGUS (HCC): ICD-10-CM

## 2020-08-10 DIAGNOSIS — C16.0 MALIGNANT NEOPLASM OF CARDIA OF STOMACH (HCC): ICD-10-CM

## 2020-08-10 DIAGNOSIS — C16.0 MALIGNANT NEOPLASM OF CARDIA OF STOMACH (HCC): Primary | ICD-10-CM

## 2020-08-10 DIAGNOSIS — R11.0 NAUSEA: Primary | ICD-10-CM

## 2020-08-10 RX ORDER — DEXTROSE MONOHYDRATE 50 MG/ML
20 INJECTION, SOLUTION INTRAVENOUS ONCE
Status: CANCELLED | OUTPATIENT
Start: 2020-08-19

## 2020-08-10 RX ORDER — SODIUM CHLORIDE 9 MG/ML
20 INJECTION, SOLUTION INTRAVENOUS ONCE AS NEEDED
Status: CANCELLED | OUTPATIENT
Start: 2020-08-19

## 2020-08-10 RX ORDER — FLUOROURACIL 50 MG/ML
400 INJECTION, SOLUTION INTRAVENOUS ONCE
Status: CANCELLED | OUTPATIENT
Start: 2020-08-19

## 2020-08-11 ENCOUNTER — OFFICE VISIT (OUTPATIENT)
Dept: HEMATOLOGY ONCOLOGY | Facility: CLINIC | Age: 65
End: 2020-08-11
Payer: MEDICARE

## 2020-08-11 VITALS
SYSTOLIC BLOOD PRESSURE: 132 MMHG | HEIGHT: 69 IN | RESPIRATION RATE: 16 BRPM | DIASTOLIC BLOOD PRESSURE: 72 MMHG | BODY MASS INDEX: 26.51 KG/M2 | WEIGHT: 179 LBS | HEART RATE: 68 BPM | TEMPERATURE: 96.8 F | OXYGEN SATURATION: 99 %

## 2020-08-11 DIAGNOSIS — C16.0 MALIGNANT NEOPLASM OF CARDIA OF STOMACH (HCC): ICD-10-CM

## 2020-08-11 DIAGNOSIS — C15.5 MALIGNANT NEOPLASM OF LOWER THIRD OF ESOPHAGUS (HCC): Primary | ICD-10-CM

## 2020-08-11 PROCEDURE — 99214 OFFICE O/P EST MOD 30 MIN: CPT | Performed by: NURSE PRACTITIONER

## 2020-08-11 PROCEDURE — 1036F TOBACCO NON-USER: CPT | Performed by: NURSE PRACTITIONER

## 2020-08-11 PROCEDURE — 1160F RVW MEDS BY RX/DR IN RCRD: CPT | Performed by: NURSE PRACTITIONER

## 2020-08-11 PROCEDURE — 3008F BODY MASS INDEX DOCD: CPT | Performed by: NURSE PRACTITIONER

## 2020-08-11 PROCEDURE — 3078F DIAST BP <80 MM HG: CPT | Performed by: NURSE PRACTITIONER

## 2020-08-11 PROCEDURE — 3075F SYST BP GE 130 - 139MM HG: CPT | Performed by: NURSE PRACTITIONER

## 2020-08-13 NOTE — PATIENT INSTRUCTIONS
PRESCRIPTION REFILL REMINDER:  All medication refills should be requested prior to [facility] on Friday  Any refill requests after noon on Friday would be addressed the following Monday  Please protect yourself from the novel Coronavirus (COVID-19)! Even though we STILL do not have a vaccine or good antiviral drugs for this infection, the following strategies can help you stay healthy:    = Wash your hands with soap and water, or hand  with at least 60% alcohol, often  = Avoid touching your face!   = Avoid close contact with others, even if they seem healthy  Avoid gatherings of more than 10 people, and don't travel unnecessarily  = Stay home, except to get medical care or other essentials  If you can eat and drink and breathe and sleep, please consider calling your doctor's office instead of visiting in person, even if you are ill   = Cover your cough with a tissue, or your elbow  = Clean frequently touched surfaces and objects daily (e g , tables, countertops, light switches, doorknobs, and cabinet handles)  Regular household detergent and water are sufficient  Numbers of cases of Coronavirus are spiking in many [place] States  This is not a more dangerous virus, but a sign that more people in a community are spreading the virus  Please check the local disease reports near you if you consider travelling this summer  We do NOT advise travel to any community or State with a rising viral caseload  Check out [employer] for [place] data that are updated daily  http://[employer]/   Global Epidemics  Org, from [facility] (OUTPATIENT C[facility]), will give you Onhgdq-[id number] information on virus cases  Https://globalepidemics  org/          Here is your recommended medication regimen to improve your eating and reduce your regurgitation:    FIRST THING IN MORNING:    - Pantoprazole to block acid    BEFORE A MEAL:    - Carafate liquid to block acid    - metoclopramide / Reglan tablet to help empty the stomach    AFTER LUNCH:    - dexamethasone 2mg to stimulate appetite    BEFORE BED:    - olanzapine / Zyprexa to help with sleep, and stimulate appetite    - pantoprazole to block acid

## 2020-08-15 DIAGNOSIS — I80.01 SUPERFICIAL THROMBOPHLEBITIS OF RIGHT LEG: ICD-10-CM

## 2020-08-15 DIAGNOSIS — C15.5 MALIGNANT NEOPLASM OF LOWER THIRD OF ESOPHAGUS (HCC): ICD-10-CM

## 2020-08-17 DIAGNOSIS — F33.9 RECURRENT MAJOR DEPRESSIVE DISORDER, REMISSION STATUS UNSPECIFIED (HCC): ICD-10-CM

## 2020-08-17 DIAGNOSIS — T45.1X5A CHEMOTHERAPY-INDUCED NAUSEA: ICD-10-CM

## 2020-08-17 DIAGNOSIS — F41.9 ANXIETY: ICD-10-CM

## 2020-08-17 DIAGNOSIS — F43.10 PTSD (POST-TRAUMATIC STRESS DISORDER): ICD-10-CM

## 2020-08-17 DIAGNOSIS — F42.9 OBSESSIVE-COMPULSIVE DISORDER, UNSPECIFIED TYPE: ICD-10-CM

## 2020-08-17 DIAGNOSIS — C15.5 MALIGNANT NEOPLASM OF LOWER THIRD OF ESOPHAGUS (HCC): ICD-10-CM

## 2020-08-17 DIAGNOSIS — R11.0 NAUSEA: ICD-10-CM

## 2020-08-17 DIAGNOSIS — C16.0 MALIGNANT NEOPLASM OF CARDIA OF STOMACH (HCC): ICD-10-CM

## 2020-08-17 DIAGNOSIS — R11.0 CHEMOTHERAPY-INDUCED NAUSEA: ICD-10-CM

## 2020-08-17 PROBLEM — I80.00 SUPERFICIAL THROMBOPHLEBITIS OF LOWER EXTREMITY: Status: ACTIVE | Noted: 2020-08-17

## 2020-08-17 RX ORDER — ONDANSETRON 4 MG/1
TABLET, FILM COATED ORAL
Qty: 20 TABLET | Refills: 0 | Status: SHIPPED | OUTPATIENT
Start: 2020-08-17 | End: 2020-08-18 | Stop reason: SDUPTHER

## 2020-08-17 RX ORDER — APIXABAN 5 MG/1
TABLET, FILM COATED ORAL
Qty: 60 TABLET | Refills: 1 | Status: ON HOLD | OUTPATIENT
Start: 2020-08-17 | End: 2021-01-14 | Stop reason: SDUPTHER

## 2020-08-17 RX ORDER — OLANZAPINE 5 MG/1
TABLET ORAL
Qty: 30 TABLET | Refills: 0 | OUTPATIENT
Start: 2020-08-17

## 2020-08-17 NOTE — TELEPHONE ENCOUNTER
Request for refill of olanzapine via [employer] came in today  This was prescribed on 06/23/2020, for 30 days, so patient would have run out nearly a month ago  Patient has an appointment scheduled with my partner Dr Fátima Calix tomorrow, so use of olanzapine can be addressed at that time

## 2020-08-18 ENCOUNTER — APPOINTMENT (OUTPATIENT)
Dept: LAB | Facility: CLINIC | Age: 65
End: 2020-08-18
Payer: MEDICARE

## 2020-08-18 ENCOUNTER — OFFICE VISIT (OUTPATIENT)
Dept: PALLIATIVE MEDICINE | Facility: CLINIC | Age: 65
End: 2020-08-18
Payer: MEDICARE

## 2020-08-18 ENCOUNTER — SOCIAL WORK (OUTPATIENT)
Dept: PALLIATIVE MEDICINE | Facility: CLINIC | Age: 65
End: 2020-08-18
Payer: MEDICARE

## 2020-08-18 VITALS
SYSTOLIC BLOOD PRESSURE: 138 MMHG | HEIGHT: 69 IN | OXYGEN SATURATION: 97 % | TEMPERATURE: 97.1 F | RESPIRATION RATE: 20 BRPM | WEIGHT: 181.5 LBS | DIASTOLIC BLOOD PRESSURE: 78 MMHG | BODY MASS INDEX: 26.88 KG/M2 | HEART RATE: 58 BPM

## 2020-08-18 DIAGNOSIS — R11.0 NAUSEA: ICD-10-CM

## 2020-08-18 DIAGNOSIS — F41.9 ANXIETY: ICD-10-CM

## 2020-08-18 DIAGNOSIS — C16.0 MALIGNANT NEOPLASM OF CARDIA OF STOMACH (HCC): ICD-10-CM

## 2020-08-18 DIAGNOSIS — R11.10 REGURGITATION OF STOMACH CONTENTS: ICD-10-CM

## 2020-08-18 DIAGNOSIS — F42.9 OBSESSIVE-COMPULSIVE DISORDER, UNSPECIFIED TYPE: ICD-10-CM

## 2020-08-18 DIAGNOSIS — F33.9 RECURRENT MAJOR DEPRESSIVE DISORDER, REMISSION STATUS UNSPECIFIED (HCC): ICD-10-CM

## 2020-08-18 DIAGNOSIS — Z51.5 PALLIATIVE CARE PATIENT: ICD-10-CM

## 2020-08-18 DIAGNOSIS — G89.3 CANCER-RELATED PAIN: Primary | Chronic | ICD-10-CM

## 2020-08-18 DIAGNOSIS — Z71.89 COUNSELING AND COORDINATION OF CARE: Primary | ICD-10-CM

## 2020-08-18 DIAGNOSIS — R11.0 CHEMOTHERAPY-INDUCED NAUSEA: ICD-10-CM

## 2020-08-18 DIAGNOSIS — C15.5 MALIGNANT NEOPLASM OF LOWER THIRD OF ESOPHAGUS (HCC): ICD-10-CM

## 2020-08-18 DIAGNOSIS — T45.1X5A CHEMOTHERAPY-INDUCED NAUSEA: ICD-10-CM

## 2020-08-18 DIAGNOSIS — E86.0 DEHYDRATION: ICD-10-CM

## 2020-08-18 DIAGNOSIS — R53.83 OTHER FATIGUE: ICD-10-CM

## 2020-08-18 DIAGNOSIS — F43.10 PTSD (POST-TRAUMATIC STRESS DISORDER): ICD-10-CM

## 2020-08-18 DIAGNOSIS — G47.09 OTHER INSOMNIA: Chronic | ICD-10-CM

## 2020-08-18 LAB
ALBUMIN SERPL BCP-MCNC: 3.1 G/DL (ref 3.5–5)
ALP SERPL-CCNC: 72 U/L (ref 46–116)
ALT SERPL W P-5'-P-CCNC: 14 U/L (ref 12–78)
ANION GAP SERPL CALCULATED.3IONS-SCNC: 7 MMOL/L (ref 4–13)
AST SERPL W P-5'-P-CCNC: 8 U/L (ref 5–45)
BASOPHILS # BLD AUTO: 0.02 THOUSANDS/ΜL (ref 0–0.1)
BASOPHILS NFR BLD AUTO: 0 % (ref 0–1)
BILIRUB SERPL-MCNC: 0.63 MG/DL (ref 0.2–1)
BUN SERPL-MCNC: 18 MG/DL (ref 5–25)
CALCIUM SERPL-MCNC: 9.3 MG/DL (ref 8.3–10.1)
CHLORIDE SERPL-SCNC: 108 MMOL/L (ref 100–108)
CO2 SERPL-SCNC: 26 MMOL/L (ref 21–32)
CREAT SERPL-MCNC: 0.86 MG/DL (ref 0.6–1.3)
EOSINOPHIL # BLD AUTO: 0.01 THOUSAND/ΜL (ref 0–0.61)
EOSINOPHIL NFR BLD AUTO: 0 % (ref 0–6)
ERYTHROCYTE [DISTWIDTH] IN BLOOD BY AUTOMATED COUNT: 15.8 % (ref 11.6–15.1)
GFR SERPL CREATININE-BSD FRML MDRD: 90 ML/MIN/1.73SQ M
GLUCOSE SERPL-MCNC: 88 MG/DL (ref 65–140)
HCT VFR BLD AUTO: 33.1 % (ref 36.5–49.3)
HGB BLD-MCNC: 10.2 G/DL (ref 12–17)
IMM GRANULOCYTES # BLD AUTO: 0.01 THOUSAND/UL (ref 0–0.2)
IMM GRANULOCYTES NFR BLD AUTO: 0 % (ref 0–2)
LYMPHOCYTES # BLD AUTO: 1.61 THOUSANDS/ΜL (ref 0.6–4.47)
LYMPHOCYTES NFR BLD AUTO: 23 % (ref 14–44)
MCH RBC QN AUTO: 32.3 PG (ref 26.8–34.3)
MCHC RBC AUTO-ENTMCNC: 30.8 G/DL (ref 31.4–37.4)
MCV RBC AUTO: 105 FL (ref 82–98)
MONOCYTES # BLD AUTO: 0.81 THOUSAND/ΜL (ref 0.17–1.22)
MONOCYTES NFR BLD AUTO: 12 % (ref 4–12)
NEUTROPHILS # BLD AUTO: 4.53 THOUSANDS/ΜL (ref 1.85–7.62)
NEUTS SEG NFR BLD AUTO: 65 % (ref 43–75)
NRBC BLD AUTO-RTO: 0 /100 WBCS
PLATELET # BLD AUTO: 317 THOUSANDS/UL (ref 149–390)
PMV BLD AUTO: 11.2 FL (ref 8.9–12.7)
POTASSIUM SERPL-SCNC: 4.1 MMOL/L (ref 3.5–5.3)
PROT SERPL-MCNC: 6.9 G/DL (ref 6.4–8.2)
RBC # BLD AUTO: 3.16 MILLION/UL (ref 3.88–5.62)
SODIUM SERPL-SCNC: 141 MMOL/L (ref 136–145)
WBC # BLD AUTO: 6.99 THOUSAND/UL (ref 4.31–10.16)

## 2020-08-18 PROCEDURE — 1160F RVW MEDS BY RX/DR IN RCRD: CPT | Performed by: FAMILY MEDICINE

## 2020-08-18 PROCEDURE — 3074F SYST BP LT 130 MM HG: CPT | Performed by: FAMILY MEDICINE

## 2020-08-18 PROCEDURE — 36415 COLL VENOUS BLD VENIPUNCTURE: CPT

## 2020-08-18 PROCEDURE — 1036F TOBACCO NON-USER: CPT | Performed by: FAMILY MEDICINE

## 2020-08-18 PROCEDURE — 80053 COMPREHEN METABOLIC PANEL: CPT

## 2020-08-18 PROCEDURE — 99024 POSTOP FOLLOW-UP VISIT: CPT

## 2020-08-18 PROCEDURE — 85025 COMPLETE CBC W/AUTO DIFF WBC: CPT

## 2020-08-18 PROCEDURE — 1160F RVW MEDS BY RX/DR IN RCRD: CPT

## 2020-08-18 PROCEDURE — 3008F BODY MASS INDEX DOCD: CPT

## 2020-08-18 PROCEDURE — 3078F DIAST BP <80 MM HG: CPT

## 2020-08-18 PROCEDURE — 99214 OFFICE O/P EST MOD 30 MIN: CPT | Performed by: FAMILY MEDICINE

## 2020-08-18 PROCEDURE — 3075F SYST BP GE 130 - 139MM HG: CPT

## 2020-08-18 PROCEDURE — 3078F DIAST BP <80 MM HG: CPT | Performed by: FAMILY MEDICINE

## 2020-08-18 RX ORDER — DEXAMETHASONE 2 MG/1
2 TABLET ORAL
Qty: 30 TABLET | Refills: 0 | Status: SHIPPED | OUTPATIENT
Start: 2020-08-18 | End: 2020-11-19

## 2020-08-18 RX ORDER — ONDANSETRON 4 MG/1
8 TABLET, FILM COATED ORAL EVERY 8 HOURS PRN
Qty: 75 TABLET | Refills: 0 | Status: SHIPPED | OUTPATIENT
Start: 2020-08-18 | End: 2021-05-13 | Stop reason: HOSPADM

## 2020-08-18 RX ORDER — METOCLOPRAMIDE 5 MG/1
5 TABLET ORAL
Qty: 90 TABLET | Refills: 0 | Status: SHIPPED | OUTPATIENT
Start: 2020-08-18 | End: 2020-09-28

## 2020-08-18 RX ORDER — OLANZAPINE 5 MG/1
5 TABLET ORAL
Qty: 30 TABLET | Refills: 0 | Status: SHIPPED | OUTPATIENT
Start: 2020-08-18 | End: 2020-09-28

## 2020-08-18 RX ORDER — SUCRALFATE ORAL 1 G/10ML
1 SUSPENSION ORAL
Qty: 1260 ML | Refills: 0 | Status: SHIPPED | OUTPATIENT
Start: 2020-08-18 | End: 2020-09-28

## 2020-08-18 RX ORDER — OXYCODONE HYDROCHLORIDE 5 MG/1
5 TABLET ORAL EVERY 4 HOURS PRN
Qty: 40 TABLET | Refills: 0 | Status: SHIPPED | OUTPATIENT
Start: 2020-08-18 | End: 2020-09-14 | Stop reason: SDUPTHER

## 2020-08-18 RX ORDER — DEXAMETHASONE 2 MG/1
2 TABLET ORAL
Qty: 30 TABLET | Refills: 0 | Status: SHIPPED | OUTPATIENT
Start: 2020-08-18 | End: 2020-08-18 | Stop reason: SDUPTHER

## 2020-08-18 RX ORDER — PANTOPRAZOLE SODIUM 40 MG/1
40 TABLET, DELAYED RELEASE ORAL
Qty: 60 TABLET | Refills: 0 | Status: ON HOLD | OUTPATIENT
Start: 2020-08-18 | End: 2021-01-14 | Stop reason: SDUPTHER

## 2020-08-18 NOTE — PROGRESS NOTES
Ratna Harrington and his wife, Shelia Brink present for outpatient [facility] follow up today  LSW introduced self and explained role of palliative SW   Pt is a pleasant 76 y/o gentleman with esophageal and gastric cancer  He is currently receiving cancer treatment  He has been seen by palliative colleagues for symptoms related to his cancer as well as treatments  Ratna Harrington and Shelia Brink live in [place]  Pt is a retired   He was working PT as a  making stands for instruments  He hasn't been able to do this secondary to his cancer  Shelia Brink works as a  and will be returning to work in 2 weeks  They have adult children (son and daughter) who also live in [place] and have 3 grand-children  Wife appears very supportive  Both pt and Shelia Brink appeared to be confused about his medications to help with nausea, vomiting and reflux  Much time spent reviewing his medications and making adjustments per Dr Abel Haddad to help relieve symptoms  Both were very appreciative of assistance and support  He will return in 1 month
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
## 2020-08-19 ENCOUNTER — HOSPITAL ENCOUNTER (OUTPATIENT)
Dept: INFUSION CENTER | Facility: HOSPITAL | Age: 65
Discharge: HOME | End: 2020-08-19
Attending: INTERNAL MEDICINE
Payer: MEDICARE

## 2020-08-19 VITALS
RESPIRATION RATE: 18 BRPM | TEMPERATURE: 96.9 F | HEIGHT: 69 IN | SYSTOLIC BLOOD PRESSURE: 127 MMHG | WEIGHT: 179.9 LBS | HEART RATE: 66 BPM | DIASTOLIC BLOOD PRESSURE: 65 MMHG | BODY MASS INDEX: 26.64 KG/M2 | OXYGEN SATURATION: 100 %

## 2020-08-19 DIAGNOSIS — C15.5 MALIGNANT NEOPLASM OF LOWER THIRD OF ESOPHAGUS (HCC): Primary | ICD-10-CM

## 2020-08-19 DIAGNOSIS — E86.0 DEHYDRATION: ICD-10-CM

## 2020-08-19 DIAGNOSIS — C16.0 MALIGNANT NEOPLASM OF CARDIA OF STOMACH (HCC): ICD-10-CM

## 2020-08-19 DIAGNOSIS — R11.0 NAUSEA: ICD-10-CM

## 2020-08-19 PROCEDURE — 96409 CHEMO IV PUSH SNGL DRUG: CPT

## 2020-08-19 PROCEDURE — 96367 TX/PROPH/DG ADDL SEQ IV INF: CPT

## 2020-08-19 PROCEDURE — G0498 CHEMO EXTEND IV INFUS W/PUMP: HCPCS

## 2020-08-19 RX ORDER — FLUOROURACIL 50 MG/ML
400 INJECTION, SOLUTION INTRAVENOUS ONCE
Status: COMPLETED | OUTPATIENT
Start: 2020-08-19 | End: 2020-08-19

## 2020-08-19 RX ORDER — SODIUM CHLORIDE 9 MG/ML
20 INJECTION, SOLUTION INTRAVENOUS ONCE AS NEEDED
Status: DISCONTINUED | OUTPATIENT
Start: 2020-08-19 | End: 2020-08-22 | Stop reason: HOSPADM

## 2020-08-19 RX ADMIN — SODIUM CHLORIDE 20 ML/HR: 9 INJECTION, SOLUTION INTRAVENOUS at 10:13

## 2020-08-19 RX ADMIN — FLUOROURACIL 790 MG: 50 INJECTION, SOLUTION INTRAVENOUS at 10:50

## 2020-08-19 RX ADMIN — DEXAMETHASONE SODIUM PHOSPHATE: 10 INJECTION, SOLUTION INTRAMUSCULAR; INTRAVENOUS at 10:13

## 2020-08-19 NOTE — PLAN OF CARE
Problem: Potential for Falls  Goal: Patient will remain free of falls  Description: INTERVENTIONS:  - Assess patient frequently for physical needs  -  Identify cognitive and physical deficits and behaviors that affect risk of falls    -  [place] fall precautions as indicated by assessment   - Educate patient/family on patient safety including physical limitations  - Instruct patient to call for assistance with activity based on assessment  - Modify environment to reduce risk of injury  - Consider OT/PT consult to assist with strengthening/mobility  Outcome: Progressing     Problem: INFECTION - ADULT  Goal: Absence or prevention of progression during hospitalization  Description: INTERVENTIONS:  - Assess and monitor for signs and symptoms of infection  - Monitor lab/diagnostic results  - Monitor all insertion sites, i e  indwelling lines, tubes, and drains  - Monitor endotracheal (as able) and nasal secretions for changes in amount and color  - [place] appropriate cooling/warming therapies per order  - Administer medications as ordered  - Instruct and encourage patient and family to use good hand hygiene technique  - Identify and instruct in appropriate isolation precautions for identified infection/condition  Outcome: Progressing  Goal: Absence of fever/infection during neutropenic period  Description: INTERVENTIONS:  - Monitor WBC  - Implement neutropenic guidelines  Outcome: Progressing     Problem: DISCHARGE PLANNING  Goal: Discharge to home or other facility with appropriate resources  Description: INTERVENTIONS:  - Identify barriers to discharge w/patient and caregiver  - Arrange for needed discharge resources and transportation as appropriate  - Identify discharge learning needs (meds, wound care, etc )  - Arrange for interpretive services to assist at discharge as needed  - Refer to Case Management Department for coordinating discharge planning if the patient needs post-hospital services based on physician/advanced practitioner order or complex needs related to functional status, cognitive ability, or social support system  Outcome: Progressing     Problem: Potential for Falls  Goal: Patient will remain free of falls  Description: INTERVENTIONS:  - Assess patient frequently for physical needs  -  Identify cognitive and physical deficits and behaviors that affect risk of falls    -  [place] fall precautions as indicated by assessment   - Educate patient/family on patient safety including physical limitations  - Instruct patient to call for assistance with activity based on assessment  - Modify environment to reduce risk of injury  - Consider OT/PT consult to assist with strengthening/mobility  Outcome: Progressing

## 2020-08-20 ENCOUNTER — HOSPITAL ENCOUNTER (OUTPATIENT)
Dept: INFUSION CENTER | Facility: HOSPITAL | Age: 65
Discharge: HOME | End: 2020-08-20
Payer: MEDICARE

## 2020-08-20 VITALS — TEMPERATURE: 96.8 F

## 2020-08-20 PROCEDURE — 99211 OFF/OP EST MAY X REQ PHY/QHP: CPT

## 2020-08-20 NOTE — PROGRESS NOTES
CADD PUMP INFUSING WITHOUT ANY FURTHER PROBLEMS AT THIS TIME  PATIENT INSTRUCTED IF SAME ALARM REOCCURS TO CLAMP TUBING AS INSTRUCTED AND TO NOTIFY INFUSION OR HOME STAR IF NOT DURING BUSINESS HOURS  PATIENT VERBALIZES UNDERSTANDING

## 2020-08-20 NOTE — PROGRESS NOTES
Mrs Arturo Umanzor called and stated that her husbands cadd pump was reading cassette damaged - advised wife to clamp line and have pt come In for an eval

## 2020-08-20 NOTE — PLAN OF CARE
Problem: Potential for Falls  Goal: Patient will remain free of falls  Description: INTERVENTIONS:  - Assess patient frequently for physical needs  -  Identify cognitive and physical deficits and behaviors that affect risk of falls    -  [place] fall precautions as indicated by assessment   - Educate patient/family on patient safety including physical limitations  - Instruct patient to call for assistance with activity based on assessment  - Modify environment to reduce risk of injury  - Consider OT/PT consult to assist with strengthening/mobility  Outcome: Progressing

## 2020-08-20 NOTE — PROGRESS NOTES
CASSETTE REMOVED AND TUBING MASSAGED ON RECOMMENDATION BY HOME STAR SPOKE WITH Shahrzad Mason  CASSETTE REINSERTED AND CADD PUMP RESUMED AFTER DOUBLE CHECK BY ZAYDA SWARTZ RN OF PUMP SETTINGS

## 2020-08-20 NOTE — PROGRESS NOTES
RECEIVED PATIENT  CADD PUMP ALARMING CASSETTE DAMAGED  FREE FLOW MAY OCCUR  CLAMP TUBING  TUBING CLAMPED  SPOKE [address] STATED THAT THEY HAVE BEEN HAVING THIS ISSUE WITH THE CASSETTES LATELY  Ashley Grady RN NOTIFIED  INSTRUCTED BY Ashley Grady RN TO RESTART PUMP AND TO MONITOR PATIENT FOR 30 MINUTES IF ALARM OCCURS AGAIN HOME STAR IS TO REPLACE THE CASSETTE

## 2020-08-21 ENCOUNTER — HOSPITAL ENCOUNTER (OUTPATIENT)
Dept: INFUSION CENTER | Facility: HOSPITAL | Age: 65
Discharge: HOME | End: 2020-08-21
Attending: INTERNAL MEDICINE

## 2020-08-21 VITALS — TEMPERATURE: 96.5 F

## 2020-08-21 DIAGNOSIS — C16.0 MALIGNANT NEOPLASM OF CARDIA OF STOMACH (HCC): ICD-10-CM

## 2020-08-21 DIAGNOSIS — R11.0 NAUSEA: ICD-10-CM

## 2020-08-21 DIAGNOSIS — E86.0 DEHYDRATION: ICD-10-CM

## 2020-08-21 DIAGNOSIS — C15.5 MALIGNANT NEOPLASM OF LOWER THIRD OF ESOPHAGUS (HCC): Primary | ICD-10-CM

## 2020-08-21 NOTE — PLAN OF CARE
Problem: Potential for Falls  Goal: Patient will remain free of falls  Description: INTERVENTIONS:  - Assess patient frequently for physical needs  -  Identify cognitive and physical deficits and behaviors that affect risk of falls    -  [place] fall precautions as indicated by assessment   - Educate patient/family on patient safety including physical limitations  - Instruct patient to call for assistance with activity based on assessment  - Modify environment to reduce risk of injury  - Consider OT/PT consult to assist with strengthening/mobility  Outcome: Progressing     Problem: INFECTION - ADULT  Goal: Absence or prevention of progression during hospitalization  Description: INTERVENTIONS:  - Assess and monitor for signs and symptoms of infection  - Monitor lab/diagnostic results  - Monitor all insertion sites, i e  indwelling lines, tubes, and drains  - Monitor endotracheal (as able) and nasal secretions for changes in amount and color  - [place] appropriate cooling/warming therapies per order  - Administer medications as ordered  - Instruct and encourage patient and family to use good hand hygiene technique  - Identify and instruct in appropriate isolation precautions for identified infection/condition  Outcome: Progressing  Goal: Absence of fever/infection during neutropenic period  Description: INTERVENTIONS:  - Monitor WBC  - Implement neutropenic guidelines  Outcome: Progressing     Problem: DISCHARGE PLANNING  Goal: Discharge to home or other facility with appropriate resources  Description: INTERVENTIONS:  - Identify barriers to discharge w/patient and caregiver  - Arrange for needed discharge resources and transportation as appropriate  - Identify discharge learning needs (meds, wound care, etc )  - Arrange for interpretive services to assist at discharge as needed  - Refer to Case Management Department for coordinating discharge planning if the patient needs post-hospital services based on physician/advanced practitioner order or complex needs related to functional status, cognitive ability, or social support system  Outcome: Progressing     Problem: Potential for Falls  Goal: Patient will remain free of falls  Description: INTERVENTIONS:  - Assess patient frequently for physical needs  -  Identify cognitive and physical deficits and behaviors that affect risk of falls  -  [place] fall precautions as indicated by assessment   - Educate patient/family on patient safety including physical limitations  - Instruct patient to call for assistance with activity based on assessment  - Modify environment to reduce risk of injury  - Consider OT/PT consult to assist with strengthening/mobility  Outcome: Progressing     Problem: Potential for Falls  Goal: Patient will remain free of falls  Description: INTERVENTIONS:  - Assess patient frequently for physical needs  -  Identify cognitive and physical deficits and behaviors that affect risk of falls    -  [place] fall precautions as indicated by assessment   - Educate patient/family on patient safety including physical limitations  - Instruct patient to call for assistance with activity based on assessment  - Modify environment to reduce risk of injury  - Consider OT/PT consult to assist with strengthening/mobility  Outcome: Progressing

## 2020-08-24 DIAGNOSIS — R11.0 NAUSEA: Primary | ICD-10-CM

## 2020-08-24 DIAGNOSIS — C15.5 MALIGNANT NEOPLASM OF LOWER THIRD OF ESOPHAGUS (HCC): ICD-10-CM

## 2020-08-24 DIAGNOSIS — C16.0 MALIGNANT NEOPLASM OF CARDIA OF STOMACH (HCC): ICD-10-CM

## 2020-08-24 DIAGNOSIS — E86.0 DEHYDRATION: ICD-10-CM

## 2020-08-24 RX ORDER — FLUOROURACIL 50 MG/ML
400 INJECTION, SOLUTION INTRAVENOUS ONCE
Status: CANCELLED | OUTPATIENT
Start: 2020-09-02

## 2020-08-24 RX ORDER — SODIUM CHLORIDE 9 MG/ML
20 INJECTION, SOLUTION INTRAVENOUS ONCE AS NEEDED
Status: CANCELLED | OUTPATIENT
Start: 2020-09-02

## 2020-08-24 RX ORDER — DEXTROSE MONOHYDRATE 50 MG/ML
20 INJECTION, SOLUTION INTRAVENOUS ONCE
Status: CANCELLED | OUTPATIENT
Start: 2020-09-02

## 2020-08-30 ENCOUNTER — APPOINTMENT (EMERGENCY)
Dept: RADIOLOGY | Facility: HOSPITAL | Age: 65
DRG: 871 | End: 2020-08-30
Payer: MEDICARE

## 2020-08-30 ENCOUNTER — HOSPITAL ENCOUNTER (INPATIENT)
Facility: HOSPITAL | Age: 65
LOS: 9 days | Discharge: NURSING, REHAB OR LONG-TERM CARE | DRG: 871 | End: 2020-09-08
Attending: EMERGENCY MEDICINE | Admitting: FAMILY MEDICINE
Payer: MEDICARE

## 2020-08-30 DIAGNOSIS — R11.0 NAUSEA: ICD-10-CM

## 2020-08-30 DIAGNOSIS — R50.81 NEUTROPENIC FEVER (HCC): ICD-10-CM

## 2020-08-30 DIAGNOSIS — R78.81 BACTEREMIA: ICD-10-CM

## 2020-08-30 DIAGNOSIS — D70.9 NEUTROPENIC FEVER (HCC): ICD-10-CM

## 2020-08-30 DIAGNOSIS — E86.0 DEHYDRATION: ICD-10-CM

## 2020-08-30 DIAGNOSIS — C16.0 MALIGNANT NEOPLASM OF CARDIA OF STOMACH (HCC): ICD-10-CM

## 2020-08-30 DIAGNOSIS — C16.0 GASTROESOPHAGEAL CANCER (HCC): ICD-10-CM

## 2020-08-30 DIAGNOSIS — R65.21 SEPTIC SHOCK (HCC): Primary | ICD-10-CM

## 2020-08-30 DIAGNOSIS — R94.31 NONSPECIFIC ST-T WAVE ELECTROCARDIOGRAPHIC CHANGES: ICD-10-CM

## 2020-08-30 DIAGNOSIS — N39.41 URGE URINARY INCONTINENCE: ICD-10-CM

## 2020-08-30 DIAGNOSIS — D70.9 NEUTROPENIA (HCC): ICD-10-CM

## 2020-08-30 DIAGNOSIS — A41.9 SEPTIC SHOCK (HCC): Primary | ICD-10-CM

## 2020-08-30 DIAGNOSIS — C15.5 MALIGNANT NEOPLASM OF LOWER THIRD OF ESOPHAGUS (HCC): ICD-10-CM

## 2020-08-30 DIAGNOSIS — Z82.49 FAMILY HISTORY OF EARLY CAD: ICD-10-CM

## 2020-08-30 DIAGNOSIS — R97.20 ELEVATED PSA: ICD-10-CM

## 2020-08-30 DIAGNOSIS — R13.19 OTHER DYSPHAGIA: ICD-10-CM

## 2020-08-30 DIAGNOSIS — I48.91 A-FIB (HCC): ICD-10-CM

## 2020-08-30 DIAGNOSIS — J18.9 PNEUMONIA: ICD-10-CM

## 2020-08-30 PROBLEM — N17.9 AKI (ACUTE KIDNEY INJURY) (HCC): Status: ACTIVE | Noted: 2020-08-30

## 2020-08-30 LAB
ALBUMIN SERPL BCP-MCNC: 2.2 G/DL (ref 3.5–5)
ALP SERPL-CCNC: 54 U/L (ref 46–116)
ALT SERPL W P-5'-P-CCNC: 14 U/L (ref 12–78)
AMPHETAMINES SERPL QL SCN: NEGATIVE
ANION GAP SERPL CALCULATED.3IONS-SCNC: 20 MMOL/L (ref 4–13)
ANISOCYTOSIS BLD QL SMEAR: PRESENT
ARTERIAL PATENCY WRIST A: YES
AST SERPL W P-5'-P-CCNC: 24 U/L (ref 5–45)
BACTERIA UR QL AUTO: ABNORMAL /HPF
BARBITURATES UR QL: NEGATIVE
BASE EX.OXY STD BLDV CALC-SCNC: 74.8 % (ref 60–80)
BASE EXCESS BLDA CALC-SCNC: -8.4 MMOL/L
BASE EXCESS BLDV CALC-SCNC: -12.1 MMOL/L
BASOPHILS # BLD MANUAL: 0 THOUSAND/UL (ref 0–0.1)
BASOPHILS NFR MAR MANUAL: 0 % (ref 0–1)
BENZODIAZ UR QL: POSITIVE
BILIRUB SERPL-MCNC: 0.9 MG/DL (ref 0.2–1)
BILIRUB UR QL STRIP: NEGATIVE
BODY TEMPERATURE: 99.7 DEGREES FEHRENHEIT
BUN SERPL-MCNC: 29 MG/DL (ref 5–25)
CALCIUM SERPL-MCNC: 8.5 MG/DL (ref 8.3–10.1)
CHLORIDE SERPL-SCNC: 100 MMOL/L (ref 100–108)
CLARITY UR: CLEAR
CO2 SERPL-SCNC: 14 MMOL/L (ref 21–32)
COCAINE UR QL: NEGATIVE
COLOR UR: ABNORMAL
CREAT SERPL-MCNC: 1.69 MG/DL (ref 0.6–1.3)
DACRYOCYTES BLD QL SMEAR: PRESENT
EOSINOPHIL # BLD MANUAL: 0.03 THOUSAND/UL (ref 0–0.4)
EOSINOPHIL NFR BLD MANUAL: 10 % (ref 0–6)
ERYTHROCYTE [DISTWIDTH] IN BLOOD BY AUTOMATED COUNT: 16.5 % (ref 11.6–15.1)
ETHANOL SERPL-MCNC: <3 MG/DL (ref 0–3)
GFR SERPL CREATININE-BSD FRML MDRD: 41 ML/MIN/1.73SQ M
GLUCOSE SERPL-MCNC: 104 MG/DL (ref 65–140)
GLUCOSE SERPL-MCNC: 171 MG/DL (ref 65–140)
GLUCOSE SERPL-MCNC: 216 MG/DL (ref 65–140)
GLUCOSE SERPL-MCNC: 269 MG/DL (ref 65–140)
GLUCOSE SERPL-MCNC: 63 MG/DL (ref 65–140)
GLUCOSE UR STRIP-MCNC: NEGATIVE MG/DL
HCO3 BLDA-SCNC: 14.5 MMOL/L (ref 22–28)
HCO3 BLDV-SCNC: 13.5 MMOL/L (ref 24–30)
HCT VFR BLD AUTO: 29.6 % (ref 36.5–49.3)
HFNC FLOW LPM: 40
HGB BLD-MCNC: 9.2 G/DL (ref 12–17)
HGB UR QL STRIP.AUTO: ABNORMAL
KETONES UR STRIP-MCNC: NEGATIVE MG/DL
LACTATE SERPL-SCNC: 7.6 MMOL/L (ref 0.5–2)
LACTATE SERPL-SCNC: 7.8 MMOL/L (ref 0.5–2)
LACTATE SERPL-SCNC: 8 MMOL/L (ref 0.5–2)
LACTATE SERPL-SCNC: 8.4 MMOL/L (ref 0.5–2)
LACTATE SERPL-SCNC: 8.8 MMOL/L (ref 0.5–2)
LACTATE SERPL-SCNC: 9 MMOL/L (ref 0.5–2)
LACTATE SERPL-SCNC: 9.9 MMOL/L (ref 0.5–2)
LEUKOCYTE ESTERASE UR QL STRIP: NEGATIVE
LG PLATELETS BLD QL SMEAR: PRESENT
LYMPHOCYTES # BLD AUTO: 0.2 THOUSAND/UL (ref 0.6–4.47)
LYMPHOCYTES # BLD AUTO: 60 % (ref 14–44)
MACROCYTES BLD QL AUTO: PRESENT
MAGNESIUM SERPL-MCNC: 1.7 MG/DL (ref 1.6–2.6)
MCH RBC QN AUTO: 31.4 PG (ref 26.8–34.3)
MCHC RBC AUTO-ENTMCNC: 31.1 G/DL (ref 31.4–37.4)
MCV RBC AUTO: 101 FL (ref 82–98)
METHADONE UR QL: NEGATIVE
MONOCYTES # BLD AUTO: 0.07 THOUSAND/UL (ref 0–1.22)
MONOCYTES NFR BLD: 20 % (ref 4–12)
NEUTROPHILS # BLD MANUAL: 0.03 THOUSAND/UL (ref 1.85–7.62)
NEUTS SEG NFR BLD AUTO: 10 % (ref 43–75)
NITRITE UR QL STRIP: NEGATIVE
NON VENT HFNC FIO2: 60
NON VENT TYPE HFNC: ABNORMAL
NON-SQ EPI CELLS URNS QL MICRO: ABNORMAL /HPF
NRBC BLD AUTO-RTO: 6 /100 WBCS
NT-PROBNP SERPL-MCNC: 2008 PG/ML
O2 CT BLDA-SCNC: 14.1 ML/DL (ref 16–23)
O2 CT BLDV-SCNC: 10.2 ML/DL
OPIATES UR QL SCN: NEGATIVE
OVALOCYTES BLD QL SMEAR: PRESENT
OXYCODONE+OXYMORPHONE UR QL SCN: POSITIVE
OXYHGB MFR BLDA: 98.5 % (ref 94–97)
PCO2 BLDA: 22.5 MM HG (ref 36–44)
PCO2 BLDV: 29.7 MM HG (ref 42–50)
PCO2 TEMP ADJ BLDA: 23.1 MM HG (ref 36–44)
PCP UR QL: NEGATIVE
PH BLD: 7.42 [PH] (ref 7.35–7.45)
PH BLDA: 7.43 [PH] (ref 7.35–7.45)
PH BLDV: 7.28 [PH] (ref 7.3–7.4)
PH UR STRIP.AUTO: 6 [PH]
PLATELET # BLD AUTO: 236 THOUSANDS/UL (ref 149–390)
PLATELET BLD QL SMEAR: ADEQUATE
PMV BLD AUTO: 10.2 FL (ref 8.9–12.7)
PO2 BLD: 249.3 MM HG (ref 75–129)
PO2 BLDA: 246.4 MM HG (ref 75–129)
PO2 BLDV: 47.7 MM HG (ref 35–45)
POIKILOCYTOSIS BLD QL SMEAR: PRESENT
POTASSIUM SERPL-SCNC: 3.5 MMOL/L (ref 3.5–5.3)
PROT SERPL-MCNC: 6 G/DL (ref 6.4–8.2)
PROT UR STRIP-MCNC: ABNORMAL MG/DL
RBC # BLD AUTO: 2.93 MILLION/UL (ref 3.88–5.62)
RBC #/AREA URNS AUTO: ABNORMAL /HPF
RBC MORPH BLD: PRESENT
SARS-COV-2 RNA RESP QL NAA+PROBE: NEGATIVE
SODIUM SERPL-SCNC: 134 MMOL/L (ref 136–145)
SP GR UR STRIP.AUTO: <=1.005 (ref 1–1.03)
SPECIMEN SOURCE: ABNORMAL
THC UR QL: POSITIVE
TOTAL CELLS COUNTED SPEC: 10
TROPONIN I SERPL-MCNC: 1.54 NG/ML
UROBILINOGEN UR QL STRIP.AUTO: 0.2 E.U./DL
WBC # BLD AUTO: 0.34 THOUSAND/UL (ref 4.31–10.16)
WBC #/AREA URNS AUTO: ABNORMAL /HPF

## 2020-08-30 PROCEDURE — 71045 X-RAY EXAM CHEST 1 VIEW: CPT

## 2020-08-30 PROCEDURE — 94003 VENT MGMT INPAT SUBQ DAY: CPT

## 2020-08-30 PROCEDURE — 71275 CT ANGIOGRAPHY CHEST: CPT

## 2020-08-30 PROCEDURE — 4A133B1 MONITORING OF ARTERIAL PRESSURE, PERIPHERAL, PERCUTANEOUS APPROACH: ICD-10-PCS | Performed by: ANESTHESIOLOGY

## 2020-08-30 PROCEDURE — 96375 TX/PRO/DX INJ NEW DRUG ADDON: CPT

## 2020-08-30 PROCEDURE — 94760 N-INVAS EAR/PLS OXIMETRY 1: CPT

## 2020-08-30 PROCEDURE — 85007 BL SMEAR W/DIFF WBC COUNT: CPT | Performed by: EMERGENCY MEDICINE

## 2020-08-30 PROCEDURE — 87635 SARS-COV-2 COVID-19 AMP PRB: CPT | Performed by: EMERGENCY MEDICINE

## 2020-08-30 PROCEDURE — 70450 CT HEAD/BRAIN W/O DYE: CPT

## 2020-08-30 PROCEDURE — 80320 DRUG SCREEN QUANTALCOHOLS: CPT | Performed by: EMERGENCY MEDICINE

## 2020-08-30 PROCEDURE — 03HY32Z INSERTION OF MONITORING DEVICE INTO UPPER ARTERY, PERCUTANEOUS APPROACH: ICD-10-PCS | Performed by: ANESTHESIOLOGY

## 2020-08-30 PROCEDURE — 36556 INSERT NON-TUNNEL CV CATH: CPT | Performed by: EMERGENCY MEDICINE

## 2020-08-30 PROCEDURE — 87040 BLOOD CULTURE FOR BACTERIA: CPT | Performed by: EMERGENCY MEDICINE

## 2020-08-30 PROCEDURE — 81001 URINALYSIS AUTO W/SCOPE: CPT | Performed by: EMERGENCY MEDICINE

## 2020-08-30 PROCEDURE — 87086 URINE CULTURE/COLONY COUNT: CPT | Performed by: EMERGENCY MEDICINE

## 2020-08-30 PROCEDURE — 82948 REAGENT STRIP/BLOOD GLUCOSE: CPT

## 2020-08-30 PROCEDURE — 96365 THER/PROPH/DIAG IV INF INIT: CPT

## 2020-08-30 PROCEDURE — 83605 ASSAY OF LACTIC ACID: CPT | Performed by: EMERGENCY MEDICINE

## 2020-08-30 PROCEDURE — 96368 THER/DIAG CONCURRENT INF: CPT

## 2020-08-30 PROCEDURE — 99291 CRITICAL CARE FIRST HOUR: CPT | Performed by: PHYSICIAN ASSISTANT

## 2020-08-30 PROCEDURE — 85027 COMPLETE CBC AUTOMATED: CPT | Performed by: EMERGENCY MEDICINE

## 2020-08-30 PROCEDURE — 93005 ELECTROCARDIOGRAM TRACING: CPT

## 2020-08-30 PROCEDURE — 99292 CRITICAL CARE ADDL 30 MIN: CPT | Performed by: PHYSICIAN ASSISTANT

## 2020-08-30 PROCEDURE — G1004 CDSM NDSC: HCPCS

## 2020-08-30 PROCEDURE — 96367 TX/PROPH/DG ADDL SEQ IV INF: CPT

## 2020-08-30 PROCEDURE — 82805 BLOOD GASES W/O2 SATURATION: CPT | Performed by: EMERGENCY MEDICINE

## 2020-08-30 PROCEDURE — 84484 ASSAY OF TROPONIN QUANT: CPT | Performed by: EMERGENCY MEDICINE

## 2020-08-30 PROCEDURE — 87081 CULTURE SCREEN ONLY: CPT | Performed by: ANESTHESIOLOGY

## 2020-08-30 PROCEDURE — 83735 ASSAY OF MAGNESIUM: CPT | Performed by: EMERGENCY MEDICINE

## 2020-08-30 PROCEDURE — 80307 DRUG TEST PRSMV CHEM ANLYZR: CPT | Performed by: EMERGENCY MEDICINE

## 2020-08-30 PROCEDURE — 36600 WITHDRAWAL OF ARTERIAL BLOOD: CPT

## 2020-08-30 PROCEDURE — 74177 CT ABD & PELVIS W/CONTRAST: CPT

## 2020-08-30 PROCEDURE — 87186 SC STD MICRODIL/AGAR DIL: CPT | Performed by: EMERGENCY MEDICINE

## 2020-08-30 PROCEDURE — 82805 BLOOD GASES W/O2 SATURATION: CPT | Performed by: PHYSICIAN ASSISTANT

## 2020-08-30 PROCEDURE — 83880 ASSAY OF NATRIURETIC PEPTIDE: CPT | Performed by: EMERGENCY MEDICINE

## 2020-08-30 PROCEDURE — 36620 INSERTION CATHETER ARTERY: CPT | Performed by: PHYSICIAN ASSISTANT

## 2020-08-30 PROCEDURE — 80053 COMPREHEN METABOLIC PANEL: CPT | Performed by: EMERGENCY MEDICINE

## 2020-08-30 PROCEDURE — 72125 CT NECK SPINE W/O DYE: CPT

## 2020-08-30 PROCEDURE — 99291 CRITICAL CARE FIRST HOUR: CPT | Performed by: EMERGENCY MEDICINE

## 2020-08-30 PROCEDURE — 94762 N-INVAS EAR/PLS OXIMTRY CONT: CPT

## 2020-08-30 PROCEDURE — 99285 EMERGENCY DEPT VISIT HI MDM: CPT

## 2020-08-30 PROCEDURE — 4A133J1 MONITORING OF ARTERIAL PULSE, PERIPHERAL, PERCUTANEOUS APPROACH: ICD-10-PCS | Performed by: ANESTHESIOLOGY

## 2020-08-30 PROCEDURE — 36415 COLL VENOUS BLD VENIPUNCTURE: CPT | Performed by: EMERGENCY MEDICINE

## 2020-08-30 PROCEDURE — 87077 CULTURE AEROBIC IDENTIFY: CPT | Performed by: EMERGENCY MEDICINE

## 2020-08-30 PROCEDURE — 96361 HYDRATE IV INFUSION ADD-ON: CPT

## 2020-08-30 PROCEDURE — 83605 ASSAY OF LACTIC ACID: CPT | Performed by: PHYSICIAN ASSISTANT

## 2020-08-30 PROCEDURE — 94002 VENT MGMT INPAT INIT DAY: CPT

## 2020-08-30 RX ORDER — ACETAMINOPHEN 325 MG/1
650 TABLET ORAL EVERY 6 HOURS PRN
Status: DISCONTINUED | OUTPATIENT
Start: 2020-08-30 | End: 2020-09-08 | Stop reason: HOSPADM

## 2020-08-30 RX ORDER — SODIUM CHLORIDE, SODIUM GLUCONATE, SODIUM ACETATE, POTASSIUM CHLORIDE, MAGNESIUM CHLORIDE, SODIUM PHOSPHATE, DIBASIC, AND POTASSIUM PHOSPHATE .53; .5; .37; .037; .03; .012; .00082 G/100ML; G/100ML; G/100ML; G/100ML; G/100ML; G/100ML; G/100ML
125 INJECTION, SOLUTION INTRAVENOUS CONTINUOUS
Status: DISCONTINUED | OUTPATIENT
Start: 2020-08-30 | End: 2020-08-31

## 2020-08-30 RX ORDER — VENLAFAXINE HYDROCHLORIDE 150 MG/1
150 CAPSULE, EXTENDED RELEASE ORAL DAILY
Status: DISCONTINUED | OUTPATIENT
Start: 2020-08-30 | End: 2020-09-08 | Stop reason: HOSPADM

## 2020-08-30 RX ORDER — PANTOPRAZOLE SODIUM 40 MG/1
40 TABLET, DELAYED RELEASE ORAL
Status: DISCONTINUED | OUTPATIENT
Start: 2020-08-31 | End: 2020-09-08 | Stop reason: HOSPADM

## 2020-08-30 RX ORDER — DEXTROSE MONOHYDRATE 25 G/50ML
25 INJECTION, SOLUTION INTRAVENOUS ONCE
Status: COMPLETED | OUTPATIENT
Start: 2020-08-30 | End: 2020-08-30

## 2020-08-30 RX ORDER — ONDANSETRON 2 MG/ML
4 INJECTION INTRAMUSCULAR; INTRAVENOUS EVERY 8 HOURS PRN
Status: DISCONTINUED | OUTPATIENT
Start: 2020-08-30 | End: 2020-09-08 | Stop reason: HOSPADM

## 2020-08-30 RX ORDER — METHYLPREDNISOLONE SODIUM SUCCINATE 125 MG/2ML
125 INJECTION, POWDER, LYOPHILIZED, FOR SOLUTION INTRAMUSCULAR; INTRAVENOUS ONCE
Status: COMPLETED | OUTPATIENT
Start: 2020-08-30 | End: 2020-08-30

## 2020-08-30 RX ORDER — DEXTROSE MONOHYDRATE 25 G/50ML
25 INJECTION, SOLUTION INTRAVENOUS ONCE
Status: DISCONTINUED | OUTPATIENT
Start: 2020-08-30 | End: 2020-09-03 | Stop reason: ALTCHOICE

## 2020-08-30 RX ORDER — ALBUMIN (HUMAN) 12.5 G/50ML
25 SOLUTION INTRAVENOUS ONCE
Status: COMPLETED | OUTPATIENT
Start: 2020-08-30 | End: 2020-08-30

## 2020-08-30 RX ADMIN — NOREPINEPHRINE BITARTRATE 12 MCG/MIN: 1 INJECTION INTRAVENOUS at 15:42

## 2020-08-30 RX ADMIN — SODIUM CHLORIDE, SODIUM GLUCONATE, SODIUM ACETATE, POTASSIUM CHLORIDE AND MAGNESIUM CHLORIDE 125 ML/HR: 526; 502; 368; 37; 30 INJECTION, SOLUTION INTRAVENOUS at 16:15

## 2020-08-30 RX ADMIN — SODIUM CHLORIDE 1000 ML: 0.9 INJECTION, SOLUTION INTRAVENOUS at 09:27

## 2020-08-30 RX ADMIN — METRONIDAZOLE 500 MG: 500 INJECTION, SOLUTION INTRAVENOUS at 12:59

## 2020-08-30 RX ADMIN — METRONIDAZOLE 500 MG: 500 INJECTION, SOLUTION INTRAVENOUS at 19:53

## 2020-08-30 RX ADMIN — HYDROCORTISONE SODIUM SUCCINATE 100 MG: 100 INJECTION, POWDER, FOR SOLUTION INTRAMUSCULAR; INTRAVENOUS at 21:15

## 2020-08-30 RX ADMIN — INSULIN LISPRO 2 UNITS: 100 INJECTION, SOLUTION INTRAVENOUS; SUBCUTANEOUS at 17:24

## 2020-08-30 RX ADMIN — ALBUMIN (HUMAN) 25 G: 0.25 INJECTION, SOLUTION INTRAVENOUS at 10:32

## 2020-08-30 RX ADMIN — VASOPRESSIN 0.04 UNITS/MIN: 20 INJECTION INTRAVENOUS at 13:02

## 2020-08-30 RX ADMIN — TBO-FILGRASTIM 480 MCG: 480 INJECTION, SOLUTION SUBCUTANEOUS at 15:46

## 2020-08-30 RX ADMIN — VANCOMYCIN HYDROCHLORIDE 1250 MG: 10 INJECTION, POWDER, LYOPHILIZED, FOR SOLUTION INTRAVENOUS at 11:03

## 2020-08-30 RX ADMIN — CEFEPIME HYDROCHLORIDE 2000 MG: 2 INJECTION, SOLUTION INTRAVENOUS at 09:38

## 2020-08-30 RX ADMIN — DEXTROSE MONOHYDRATE 25 G: 25 INJECTION, SOLUTION INTRAVENOUS at 09:34

## 2020-08-30 RX ADMIN — INSULIN LISPRO 1 UNITS: 100 INJECTION, SOLUTION INTRAVENOUS; SUBCUTANEOUS at 23:21

## 2020-08-30 RX ADMIN — APIXABAN 5 MG: 5 TABLET, FILM COATED ORAL at 17:24

## 2020-08-30 RX ADMIN — NOREPINEPHRINE BITARTRATE 10 MCG/MIN: 1 INJECTION INTRAVENOUS at 10:53

## 2020-08-30 RX ADMIN — CEFEPIME 2000 MG: 2 INJECTION, POWDER, FOR SOLUTION INTRAVENOUS at 21:15

## 2020-08-30 RX ADMIN — SODIUM CHLORIDE 1000 ML: 0.9 INJECTION, SOLUTION INTRAVENOUS at 10:55

## 2020-08-30 RX ADMIN — METHYLPREDNISOLONE SODIUM SUCCINATE 125 MG: 125 INJECTION, POWDER, FOR SOLUTION INTRAMUSCULAR; INTRAVENOUS at 09:34

## 2020-08-30 RX ADMIN — ONDANSETRON 4 MG: 2 INJECTION INTRAMUSCULAR; INTRAVENOUS at 15:23

## 2020-08-30 RX ADMIN — IOHEXOL 100 ML: 350 INJECTION, SOLUTION INTRAVENOUS at 11:43

## 2020-08-30 RX ADMIN — SODIUM CHLORIDE 1000 ML: 0.9 INJECTION, SOLUTION INTRAVENOUS at 09:35

## 2020-08-30 RX ADMIN — HYDROCORTISONE SODIUM SUCCINATE 100 MG: 100 INJECTION, POWDER, FOR SOLUTION INTRAMUSCULAR; INTRAVENOUS at 13:23

## 2020-08-30 NOTE — ED PROVIDER NOTES
History  Chief Complaint   Patient presents with    Shortness of Breath    Cough     47-year-old male presents with shortness of breath cough fevers for the past day  Patient is the history of esophageal cancer currently on chemotherapy  Patient denies any chest pain diarrhea abdominal pain back pain  He says this morning he lost his balance fell hit his head  Denies loss of consciousness no neck pain or any other injuries or complaints at this time  He is on Eliquis  Patient is currently in respiratory distress tachypneic  Brought in by medics  Lives at home  History provided by:  Patient and EMS personnel   used: No        Prior to Admission Medications   Prescriptions Last Dose Informant Patient Reported? Taking?    Eliquis 5 MG 8/30/2020 at Unknown time  No Yes   Sig: TAKE ONE TABLET BY MOUTH TWICE A DAY   OLANZapine (ZyPREXA) 5 mg tablet 8/29/2020 at Unknown time  No Yes   Sig: Take 1 tablet (5 mg total) by mouth daily at bedtime   ammonium lactate (LAC-HYDRIN) 12 % cream Unknown at Unknown time Self No No   Sig: Apply topically as needed for dry skin On hands and feet   bisoprolol-hydrochlorothiazide (ZIAC) 10-6 25 MG per tablet   No No   Sig: Take 1 tablet by mouth daily   dexamethasone (DECADRON) 2 mg tablet 8/29/2020 at Unknown time  No Yes   Sig: Take 1 tablet (2 mg total) by mouth daily after lunch   Patient taking differently: Take 4 mg by mouth daily after lunch    diazepam (VALIUM) 5 mg tablet 8/29/2020 at Unknown time Self No Yes   Sig: Take 1 tablet (5 mg total) by mouth 2 (two) times a day   fluorouracil 4,730 mg in CADD infusion pump   No No   Sig: Infuse 4,730 mg (1,200 mg/m2/day x 1 97 m2 (Treatment plan recorded BSA)) into a venous catheter over 46 hours for 2 days [place] to be administered on   metoclopramide (REGLAN) 5 mg tablet 8/30/2020 at Unknown time  No Yes   Sig: Take 1 tablet (5 mg total) by mouth 3 (three) times a day before meals   ondansetron (ZOFRAN) 4 mg tablet Unknown at Unknown time  No No   Sig: Take 2 tablets (8 mg total) by mouth every 8 (eight) hours as needed for nausea or vomiting   oxyCODONE (ROXICODONE) 5 mg immediate release tablet Unknown at Unknown time  No No   Sig: Take 1 tablet (5 mg total) by mouth every 4 (four) hours as needed for severe pain May crush and give in slurryMax Daily Amount: 30 mg   pantoprazole (PROTONIX) 40 mg tablet 2020 at Unknown time  No Yes   Sig: Take 1 tablet (40 mg total) by mouth daily before breakfast   sucralfate (CARAFATE) 1 g/10 mL suspension Unknown at Unknown time  No No   Sig: Take 10 mL (1 g total) by mouth 4 (four) times a day (with meals and at bedtime)   venlafaxine (EFFEXOR-XR) 150 mg 24 hr capsule Unknown at Unknown time Self No No   Sig: Take 1 capsule (150 mg total) by mouth daily      Facility-Administered Medications: None       Past Medical History:   Diagnosis Date    Dehydration 2019    [facility]ophageal [facility] ([facility] )     Hypertension     Nausea 2019    Psychiatric disorder     Recovering alcoholic ([facility] )        Past Surgical History:   Procedure Laterality Date    APPENDECTOMY      IR PORT PLACEMENT  2019       Family History   Problem Relation Age of Onset    Colon cancer Father      I have reviewed and agree with the history as documented  E-Cigarette/Vaping     E-Cigarette/Vaping Substances    Nicotine No     THC No     CBD No     Flavoring No     Other No     Unknown No      Social History     Tobacco Use    Smoking status: Former Smoker     Types: Cigars     Last attempt to quit: 5/15/2019     Years since quittin 2    Smokeless tobacco: Never Used   Substance Use Topics    Alcohol use: No    Drug use: Yes     Types: Marijuana       Review of Systems   Constitutional: Positive for chills and fever  HENT: Negative  Eyes: Negative  Respiratory: Positive for cough and shortness of breath  Cardiovascular: Negative      Gastrointestinal: Positive for nausea  Endocrine: Negative  Genitourinary: Negative  Musculoskeletal: Negative  Skin: Negative  Allergic/Immunologic: Negative  Neurological: Negative  Hematological: Negative  Psychiatric/Behavioral: Negative  All other systems reviewed and are negative  Physical Exam  Physical Exam  Vitals signs and nursing note reviewed  Constitutional:       Appearance: He is well-developed  Comments: Patient appears tachypneic in respiratory distress with flushed skin  HENT:      Head: Normocephalic and atraumatic  Eyes:      Pupils: Pupils are equal, round, and reactive to light  Neck:      Musculoskeletal: Normal range of motion and neck supple  Cardiovascular:      Rate and Rhythm: Normal rate and regular rhythm  Pulmonary:      Effort: Tachypnea, accessory muscle usage and respiratory distress present  Breath sounds: Normal breath sounds  No decreased breath sounds, wheezing, rhonchi or rales  Abdominal:      General: Bowel sounds are normal       Palpations: Abdomen is soft  Musculoskeletal: Normal range of motion  Skin:     General: Skin is warm and dry  Neurological:      Mental Status: He is alert and oriented to person, place, and time           Vital Signs  ED Triage Vitals   Temperature Pulse Respirations Blood Pressure SpO2   08/30/20 0909 08/30/20 0909 08/30/20 0909 08/30/20 0915 08/30/20 0909   99 7 °F (37 6 °C) 104 (!) 24 (!) 80/55 96 %      Temp Source Heart Rate Source Patient Position - Orthostatic VS BP Location FiO2 (%)   08/30/20 0909 08/30/20 0909 08/30/20 0909 08/30/20 0909 --   Tympanic Monitor Lying Right arm       Pain Score       08/30/20 1241       No Pain           Vitals:    08/30/20 1200 08/30/20 1241 08/30/20 1400 08/30/20 1500   BP: 110/58 99/68  125/70   Pulse: 88 79 78 78   Patient Position - Orthostatic VS: Sitting Lying           Visual Acuity  Visual Acuity      Most Recent Value   L Pupil Size (mm)  2   R Pupil Size (mm)  2 L Pupil Shape  Round   R Pupil Shape  Round          ED Medications  Medications   dextrose 50 % IV solution 25 g (0 g Intravenous Hold 8/30/20 0950)   norepinephrine (LEVOPHED) 4 mg (STANDARD CONCENTRATION) IV in sodium chloride 0 9% 250 mL (12 mcg/min Intravenous New Bag 8/30/20 1542)   cefepime (MAXIPIME) 2 g/50 mL dextrose IVPB (has no administration in time range)   metroNIDAZOLE (FLAGYL) IVPB (premix) 500 mg 100 mL (500 mg Intravenous New Bag 8/30/20 1259)   acetaminophen (TYLENOL) tablet 650 mg (has no administration in time range)   vasopressin (PITRESSIN) 20 Units in sodium chloride 0 9 % 100 mL infusion (0 04 Units/min Intravenous New Bag 8/30/20 1302)   hydrocortisone sodium succinate (PF) (Solu-CORTEF) injection 100 mg (100 mg Intravenous Given 8/30/20 1323)   vancomycin (VANCOCIN) 1,750 mg in sodium chloride 0 9 % 500 mL IVPB (has no administration in time range)   tbo-filgrastim (GRANIX) subcutaneous injection 480 mcg (480 mcg Subcutaneous Given 8/30/20 1546)   apixaban (ELIQUIS) tablet 5 mg (has no administration in time range)   pantoprazole (PROTONIX) EC tablet 40 mg (has no administration in time range)   venlafaxine (EFFEXOR-XR) 24 hr capsule 150 mg (150 mg Oral Not Given 8/30/20 1547)   ondansetron (ZOFRAN) injection 4 mg (4 mg Intravenous Given 8/30/20 1523)   pneumococcal 13-valent conjugate vaccine (PREVNAR-13) IM injection 0 5 mL (has no administration in time range)   insulin lispro (HumaLOG) 100 units/mL subcutaneous injection 1-6 Units (has no administration in time range)   methylPREDNISolone sodium succinate (Solu-MEDROL) injection 125 mg (125 mg Intravenous Given 8/30/20 0934)   sodium chloride 0 9 % bolus 1,000 mL (0 mL Intravenous Stopped 8/30/20 1016)   sodium chloride 0 9 % bolus 1,000 mL (0 mL Intravenous Stopped 8/30/20 1016)   dextrose 50 % IV solution 25 g (25 g Intravenous Given 8/30/20 0934)   cefepime (MAXIPIME) 2 g/50 mL dextrose IVPB (0 mg Intravenous Stopped 8/30/20 1008)   sodium chloride 0 9 % bolus 1,000 mL (1,000 mL Intravenous New Bag 8/30/20 1055)   vancomycin (VANCOCIN) 1,250 mg in sodium chloride 0 9 % 250 mL IVPB (0 mg Intravenous Stopped 8/30/20 1239)   albumin human (FLEXBUMIN) 25 % injection 25 g (0 g Intravenous Stopped 8/30/20 1051)   iohexol (OMNIPAQUE) 350 MG/ML injection (MULTI-DOSE) 100 mL (100 mL Intravenous Given 8/30/20 1143)       Diagnostic Studies  Results Reviewed     Procedure Component Value Units Date/Time    Rapid drug screen, urine [[id number]]  (Abnormal) Collected:  08/30/20 1317    Lab Status:  Final result Specimen:  Urine, Catheter Updated:  08/30/20 1341     Amph/Meth UR Negative     Barbiturate Ur Negative     Benzodiazepine Urine Positive     Cocaine Urine Negative     Methadone Urine Negative     Opiate Urine Negative     PCP Ur Negative     THC Urine Positive     Oxycodone Urine Positive    Narrative:       Presumptive report  If requested, specimen will be sent to reference lab for confirmation  FOR MEDICAL PURPOSES ONLY  IF CONFIRMATION NEEDED PLEASE CONTACT THE LAB WITHIN 5 DAYS  Drug Screen Cutoff Levels:  AMPHETAMINE/METHAMPHETAMINES  1000 ng/mL  BARBITURATES     200 ng/mL  BENZODIAZEPINES     200 ng/mL  COCAINE      300 ng/mL  METHADONE      300 ng/mL  OPIATES      300 ng/mL  PHENCYCLIDINE     25 ng/mL  THC       50 ng/mL  OXYCODONE      100 ng/mL    UA (URINE) with reflex to Scope [[id number]]  (Abnormal) Collected:  08/30/20 1317    Lab Status:  Final result Specimen:  Urine, Indwelling Antunez Catheter Updated:  08/30/20 1327     Color, UA Light Yellow     Clarity, UA Clear     Specific Gravity, UA <=1 005     pH, UA 6 0     Leukocytes, UA Negative     Nitrite, UA Negative     Protein, UA 30 (1+) mg/dl      Glucose, UA Negative mg/dl      Ketones, UA Negative mg/dl      Urobilinogen, UA 0 2 E U /dl      Bilirubin, UA Negative     Blood, UA Large    Urine culture [[id number]] Collected:  08/30/20 1317    Lab Status:   In process Specimen:  Urine, Indwelling Antunez Catheter Updated:  08/30/20 1320    Fingerstick Glucose (POCT) [[id number]]  (Abnormal) Collected:  08/30/20 0943    Lab Status:  Final result Updated:  08/30/20 1158     POC Glucose 269 mg/dl     Fingerstick Glucose (POCT) [[id number]]  (Abnormal) Collected:  08/30/20 0923    Lab Status:  Final result Updated:  08/30/20 1158     POC Glucose 63 mg/dl     Lactic acid 2 Hours [[id number]]  (Abnormal) Collected:  08/30/20 1115    Lab Status:  Final result Specimen:  Blood from Arm, Left Updated:  08/30/20 1141     LACTIC ACID 7 6 mmol/L     Narrative:       Result may be elevated if tourniquet was used during collection  [facility] [[id number]]  (Normal) Collected:  08/30/20 0920    Lab Status:  Final result Specimen:  Nares from Nose Updated:  08/30/20 1021     SARS-CoV-2 Negative    Narrative: The specimen collection materials, transport medium, and/or testing methodology utilized in the production of these test results have been proven to be reliable in a limited validation with an abbreviated program under the Emergency Utilization Authorization provided by the FDA  Testing reported as "Presumptive positive" will be confirmed with secondary testing with a reference laboratory to ensure result accuracy  Clinical caution and judgement should be used with the interpretation of these results with consideration of the clinical impression and other laboratory testing  Testing reported as "Positive" or "Negative" has been proven to be accurate according to standard laboratory validation requirements  All testing is performed with control materials showing appropriate reactivity at standard intervals        Ethanol [[id number]]  (Normal) Collected:  08/30/20 0933    Lab Status:  Final result Specimen:  Blood from Arm, Left Updated:  08/30/20 1001     Ethanol Lvl <3 mg/dL     CBC and differential [[id number]]  (Abnormal) Collected:  08/30/20 0915    Lab Status:  Final result Specimen:  Blood from Arm, Left Updated:  08/30/20 1000     WBC 0 34 Thousand/uL      RBC 2 93 Million/uL      Hemoglobin 9 2 g/dL      Hematocrit 29 6 %       fL      MCH 31 4 pg      MCHC 31 1 g/dL      RDW 16 5 %      MPV 10 2 fL      Platelets 532 Thousands/uL      nRBC 6 /100 WBCs     Lactic acid [[id number]]  (Abnormal) Collected:  08/30/20 0915    Lab Status:  Final result Specimen:  Blood from Arm, Left Updated:  08/30/20 0959     LACTIC ACID 9 9 mmol/L     Narrative:       Result may be elevated if tourniquet was used during collection      NT-BNP PRO [[id number]]  (Abnormal) Collected:  08/30/20 0916    Lab Status:  Final result Specimen:  Blood from Arm, Left Updated:  08/30/20 0948     NT-proBNP 2,008 pg/mL     Magnesium [[id number]]  (Normal) Collected:  08/30/20 0916    Lab Status:  Final result Specimen:  Blood from Arm, Left Updated:  08/30/20 0948     Magnesium 1 7 mg/dL     Troponin I [[id number]]  (Abnormal) Collected:  08/30/20 0916    Lab Status:  Final result Specimen:  Blood from Arm, Left Updated:  08/30/20 0945     Troponin I 1 54 ng/mL     Comprehensive metabolic panel [[id number]]  (Abnormal) Collected:  08/30/20 0916    Lab Status:  Final result Specimen:  Blood from Arm, Left Updated:  08/30/20 0942     Sodium 134 mmol/L      Potassium 3 5 mmol/L      Chloride 100 mmol/L      CO2 14 mmol/L      ANION GAP 20 mmol/L      BUN 29 mg/dL      Creatinine 1 69 mg/dL      Glucose 104 mg/dL      Calcium 8 5 mg/dL      AST 24 U/L      ALT 14 U/L      Alkaline Phosphatase 54 U/L      Total Protein 6 0 g/dL      Albumin 2 2 g/dL      Total Bilirubin 0 90 mg/dL      eGFR 41 ml/min/1 73sq m     Narrative:       [place] guidelines for Chronic Kidney Disease (CKD):     Stage 1 with normal or high GFR (GFR > 90 mL/min/1 73 square meters)    Stage 2 Mild CKD (GFR = 60-89 mL/min/1 73 square meters)    Stage 3A Moderate CKD (GFR = 45-59 mL/min/1 73 square meters)    Stage 3B Moderate CKD (GFR = 30-44 mL/min/1 73 square meters)    Stage 4 Severe CKD (GFR = 15-29 mL/min/1 73 square meters)    Stage 5 End Stage CKD (GFR <15 mL/min/1 73 square meters)  Note: GFR calculation is accurate only with a steady state creatinine    Blood gas, arterial [[id number]]  (Abnormal) Collected:  08/30/20 0929    Lab Status:  Final result Specimen:  Blood, Arterial from Radial, Right Updated:  08/30/20 0939     pH, Arterial 7 427     PH ART TC 7 418     pCO2, Arterial 22 5 mm Hg      PCO2 (TC) Arterial 23 1 mm Hg      pO2, Arterial 246 4 mm Hg      PO2 (TC) Arterial 249 3 mm Hg      HCO3, Arterial 14 5 mmol/L      Base Excess, Arterial -8 4 mmol/L      O2 Content, Arterial 14 1 mL/dL      O2 HGB,Arterial  98 5 %      SOURCE Radial, Right     DONAL TEST Yes     Temperature 99 7 Degrees Fehrenheit      Non Vent Type- HFNC HFNC Flow     NV HFNC FIO2 60     HFNC FLOW LPM 40    Blood culture #1 [[id number]] Collected:  08/30/20 0926    Lab Status: In process Specimen:  Blood from Arm, Right Updated:  08/30/20 0936    Blood culture #2 [[id number]] Collected:  08/30/20 0916    Lab Status: In process Specimen:  Blood from Arm, Left Updated:  08/30/20 7598                 CT head without contrast   Final Result by Castro Hardy MD (08/30 1205)      No acute intracranial abnormality  Workstation performed: [id number]         CT spine cervical without contrast   Final Result by Castro Hardy MD (08/30 1207)      No cervical spine fracture or traumatic malalignment  Workstation performed: [id number]         CT pe study w abdomen pelvis w contrast   Final Result by Castro Hardy MD (08/30 1221)      Extensive airspace opacity throughout the left lung most suspicious for acute pneumonia  No pulmonary embolus  Primary malignancy at the gastroesophageal junction, similar in appearance from July 13, 2020    Again noted is omental nodularity with small omental nodules measuring up to 10 mm, unchanged from previous examination and most consistent with metastatic    disease  There are subcentimeter pulmonary nodules in the right lung which are indeterminate and unchanged from July 13, 2020  Unchanged hepatic hypodensities, thought most likely to represent cysts  Mediastinal and hilar lymphadenopathy, probably reactive but for which continued close interval follow-up is recommended  This examination was marked "immediate notification" in Epic in order to begin the standard process by which the radiology reading room liaison alerts the referring practitioner  Workstation performed: [id number]         XR chest 1 view portable   Final Result by Teagan Browne MD (08/30 2186)      Limited study  Satisfactory position of right IJ catheter  Increased density of left-sided pulmonary opacity  Possibly worsening pneumonia            Workstation performed: [id number]         XR chest 1 view portable   Final Result by Teagan Browne MD (08/30 7728)      Extensive nodularity of the lungs left greater than right  Please refer to follow-up chest CT for further evaluation              Workstation performed: [id number]                    Procedures  ECG 12 Lead Documentation Only  Performed by: Chris Molina DO  Authorized by: Chris Molina DO     ECG reviewed by me, the ED Provider: yes    Patient location:  ED  Previous ECG:     Previous ECG:  Unavailable    Comparison to cardiac monitor: Yes    Interpretation:     Interpretation: non-specific    Rate:     ECG rate assessment: normal    Rhythm:     Rhythm: sinus rhythm    Ectopy:     Ectopy: none    QRS:     QRS axis:  Normal  Conduction:     Conduction: normal    ST segments:     ST segments:  Non-specific  T waves:     T waves: non-specific    CriticalCare Time  Performed by: Chris Molina DO  Authorized by: Chris Molina DO     Critical care provider statement:     Critical care time (minutes):  45    Critical care was necessary to treat or prevent imminent or life-threatening deterioration of the following conditions:  Sepsis and shock    Critical care was time spent personally by me on the following activities:  Blood draw for specimens, obtaining history from patient or surrogate, development of treatment plan with patient or surrogate, discussions with consultants, discussions with primary provider, examination of patient, ordering and performing treatments and interventions, ordering and review of laboratory studies, ordering and review of radiographic studies, re-evaluation of patient's condition and review of old charts    I assumed direction of critical care for this patient from another provider in my specialty: no    Central Line    Date/Time: 8/30/2020 11:00 AM  Performed by: Isha Avila DO  Authorized by: Isha Avila DO     Patient location:  ED  Consent:     Consent obtained:  Verbal    Consent given by:  Patient    Risks discussed:  Arterial puncture    Alternatives discussed:  No treatment  Universal protocol:     Procedure explained and questions answered to patient or proxy's satisfaction: no      Relevant documents present and verified: no      Test results available and properly labeled: no      Radiology Images displayed and confirmed  If images not available, report reviewed: no      Required blood products, implants, devices, and special equipment available: no      Site/side marked: no      Immediately prior to procedure, a time out was called: no      Patient identity confirmed:  Verbally with patient  Pre-procedure details:     Skin preparation:  2% chlorhexidine  Indications:     Central line indications: medications requiring central line and hemodynamic monitoring    Anesthesia (see MAR for exact dosages):      Anesthesia method:  Local infiltration    Local anesthetic:  Lidocaine 1% WITH epi  Procedure details:     Location:  Right internal jugular    Vessel type: vein      Laterality:  Right    Approach: percutaneous technique used      Catheter type:  Triple lumen    Catheter size:  5 5 Fr    Landmarks identified: yes      Ultrasound guidance: yes      Sterile ultrasound techniques: Sterile gel and sterile probe covers were used      Manometry confirmation: no      Number of attempts:  1    Successful placement: yes    Post-procedure details:     Post-procedure:  Dressing applied    Assessment:  Blood return through all ports    Patient tolerance of procedure: Tolerated well, no immediate complications    Observer: Yes               ED Course       US AUDIT      Most Recent Value   Initial Alcohol Screen: US AUDIT-C    1  How often do you have a drink containing alcohol?  0 Filed at: 08/30/2020 0909   2  How many drinks containing alcohol do you have on a typical day you are drinking? 0 Filed at: 08/30/2020 0909   3b  FEMALE Any Age, or MALE 65+: How often do you have 4 or more drinks on one occassion? 0 Filed at: 08/30/2020 1485   Audit-C Score  0 Filed at: 08/30/2020 1771                  DAXA/DAST-10      Most Recent Value   How many times in the past year have you    Used an illegal drug or used a prescription medication for non-medical reasons?   Never Filed at: 08/30/2020 0910                                MDM  Number of Diagnoses or Management Options  Neutropenia ([facility] ):   Pneumonia:   Septic shock [facility]):      Amount and/or Complexity of Data Reviewed  Clinical lab tests: ordered and reviewed  Tests in the radiology section of CPT®: ordered and reviewed  Tests in the medicine section of CPT®: ordered and reviewed    Patient Progress  Patient progress: stable        Disposition  Final diagnoses:   Septic shock ([facility] )   Pneumonia   Neutropenia ([facility] )     Time reflects when diagnosis was documented in both MDM as applicable and the Disposition within this note     Time User Action Codes Description Comment    8/30/2020 11:46 AM Danise Phoenix Add [A41 9, R65 21] Septic shock ([place] [address] )     8/30/2020 11:47 AM Dorean Minus Add [D70 9,  R50 81] Neutropenic fever ([place] [address] )     8/30/2020 11:52 AM Anepu, Amber Add [J18 9] Pneumonia     8/30/2020 11:52 AM Anepu, Amber Modify [A41 9,  R65 21] Septic shock ([place] [address] )     8/30/2020 11:52 AM Anepu, Amber Add [D70 9] Neutropenia [facility])       ED Disposition     ED Disposition Condition Date/Time Comment    Admit Stable Sun Aug 30, 2020 11:52 AM          Follow-up Information    None         Current Discharge Medication List      CONTINUE these medications which have NOT CHANGED    Details   dexamethasone (DECADRON) 2 mg tablet Take 1 tablet (2 mg total) by mouth daily after lunch  Qty: 30 tablet, Refills: 0    Associated Diagnoses: 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[place] [address] ); Malignant neoplasm of lower third of esophagus ([place] [address] ); Chemotherapy-induced nausea; Palliative care patient; Other fatigue; Other insomnia      diazepam (VALIUM) 5 mg tablet Take 1 tablet (5 mg total) by mouth 2 (two) times a day  Qty: 60 tablet, Refills: 0    Associated Diagnoses: Anxiety      Eliquis 5 MG TAKE ONE TABLET BY MOUTH TWICE A DAY  Qty: 60 tablet, Refills: 1    Associated Diagnoses: Superficial thrombophlebitis of right leg      metoclopramide (REGLAN) 5 mg tablet Take 1 tablet (5 mg total) by mouth 3 (three) times a day before meals  Qty: 90 tablet, Refills: 0    Associated Diagnoses: Regurgitation of stomach contents      OLANZapine (ZyPREXA) 5 mg tablet Take 1 tablet (5 mg total) by mouth daily at bedtime  Qty: 30 tablet, Refills: 0    Associated Diagnoses: Malignant neoplasm of lower third of esophagus ([place] [address] ); 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[place] [address] ); Recurrent major depressive disorder, remission status unspecified ([place] [address] ); Anxiety; Obsessive-compulsive disorder, unspecified type; PTSD (post-traumatic stress disorder); Chemotherapy-induced nausea;  Nausea      pantoprazole (PROTONIX) 40 mg tablet Take 1 tablet (40 mg total) by mouth daily before breakfast  Qty: 60 tablet, Refills: 0    Associated Diagnoses: Regurgitation of stomach contents      ammonium lactate (LAC-HYDRIN) 12 % cream Apply topically as needed for dry skin On hands and feet  Qty: 385 g, Refills: 2    Associated Diagnoses: Skin fissures      bisoprolol-hydrochlorothiazide (ZIAC) 10-6 25 MG per tablet Take 1 tablet by mouth daily  Qty: 90 tablet, Refills: 1    Associated Diagnoses: Hypertension      fluorouracil 4,730 mg in CADD infusion pump Infuse 4,730 mg (1,200 mg/m2/day x 1 97 m2 (Treatment plan recorded BSA)) into a venous catheter over 46 hours for 2 days [facility] to be administered on  Qty: 1 Device, Refills: 0    Associated Diagnoses: Nausea; Dehydration; Malignant neoplasm of lower third of esophagus ([facility] [address] ); 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)      ondansetron (ZOFRAN) 4 mg tablet Take 2 tablets (8 mg total) by mouth every 8 (eight) hours as needed for nausea or vomiting  Qty: 75 tablet, Refills: 0    Associated Diagnoses: Malignant neoplasm of lower third of esophagus (HCC)      oxyCODONE (ROXICODONE) 5 mg immediate release tablet Take 1 tablet (5 mg total) by mouth every 4 (four) hours as needed for severe pain May crush and give in slurryMax Daily Amount: 30 mg  Qty: 40 tablet, Refills: 0    Associated Diagnoses: 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[facility] [address] ); Cancer-related pain      sucralfate (CARAFATE) 1 g/10 mL suspension Take 10 mL (1 g total) by mouth 4 (four) times a day (with meals and at bedtime)  Qty: 1260 mL, Refills: 0    Associated Diagnoses: Regurgitation of stomach contents      venlafaxine (EFFEXOR-XR) 150 mg 24 hr capsule Take 1 capsule (150 mg total) by mouth daily  Qty: 30 capsule, Refills: 0    Associated Diagnoses: Severe episode of recurrent major depressive disorder, without psychotic features ([facility] [address] )           No discharge procedures on file      PDMP Review       Value Time User    PDMP Reviewed  Yes 6/23/2020  2:58 PM Oral MD [facility] ED Provider  Electronically Signed by           Radha Medeiros DO  08/30/20 6124

## 2020-08-30 NOTE — ASSESSMENT & PLAN NOTE
CT shows concern for Left sided multifocal pna  Start empiric broad spectrum abx  Trend procal, fever and WBC  Currently requiring 3L NC, keep sats >90%

## 2020-08-30 NOTE — H&P
H&P- Miranda Slice 1955, 77 y o  male MRN: [id number]    Unit/Bed#: ICU 11 Encounter: [id number]    Primary Care Provider: Joseph Spears DO   Date and time admitted to hospital: 8/30/2020  9:00 AM    * Septic shock [facility])  Assessment & Plan  Ongoing chemotherapy for GE CA, noted to be febrile this morning by wife s/p fall in kitchen  Lactic 9 9 on admission, received 3L IVF bolus in ED Bcx obtained and started broad spectrum abx  CT CAP: Suspicious for Left sided multi-lobar pna, no PE noted, masses remain unchanged compared to prior films  CT Head/Neck: no acute intracranial changes    Started Levophed, titrate to MAP >65  Start Trans[employer]aign[employer] Stress dose steroids  Strict I/O's with lopez    JENNIFER (acute kidney injury) ([facility] [address] )  Assessment & Plan  sCr admission 1 69, baseline sCr 0 8  Likely ATN d/t hypotension  Given 3L Crystalloid bolus in ED and started on vasopressors, keep map >65  Insert lopez and monitor strict I/O's    PNA (pneumonia)  Assessment & Plan  CT shows concern for Left sided multifocal pna  Start empiric broad spectrum abx  Trend procal, fever and WBC  Currently requiring 3L NC, keep sats >90%    Neutropenic (HCC)  Assessment & Plan  AIN 0 03  Neutropenic precautions  Start Granix 5mcg/kg x 3 days  consult heme/onc    Gastroesophageal cancer [facility])  Assessment & Plan  Follows with 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 (currently on chemo, last 8/20), superficial venous thrombosis of LUE on Eliquis   Pt presents with 2 days of lethargy, this AM wife reported that he fell in the kitchen and when she went to see him he felt as if he had a fever  EMS was called and he was brought in to the ED  In the ED he became hypoxic and hypotensive, given 3L crystalloid and given supplemental o2 with improvement but ultimately required Levophed to be initiated  CT scans showed no intracranial abnormalities, stable tumors, no PE, and left sided multifocal pneumonia  Disposition: Admit to Critical Care   Code Status: Level 1 - Full Code  ---------------------------------------------------------------------------------------  SUBJECTIVE      Review of Systems   Unable to perform ROS: Acuity of condition     Review of systems was unable to be performed secondary to acuity of condition  ---------------------------------------------------------------------------------------  OBJECTIVE    Vitals   Vitals:    20 1045 20 1115 20 1200 20 1241   BP: 95/51 90/59 110/58 99/68   BP Location: Right arm Right arm Right arm Right arm   Pulse: 88 82 88 79   Resp: (!) 27 (!) 25 (!) 32 (!) 25   Temp:    97 8 °F (36 6 °C)   TempSrc:    Tympanic   SpO2: 99% 100% 95% 95%   Weight:    81 5 kg (179 lb 10 8 oz)   Height:    6' (1 829 m)     Temp (24hrs), Av 8 °F (37 1 °C), Min:97 8 °F (36 6 °C), Max:99 7 °F (37 6 °C)  Current: Temperature: 97 8 °F (36 6 °C)          Respiratory:  SpO2: SpO2: 95 %  Nasal Cannula O2 Flow Rate (L/min): 3 L/min    Invasive/non-invasive ventilation settings   Respiratory    Lab Data (Last 4 hours)       0929            pH, Arterial       7 427           pCO2, Arterial       22 5           pO2, Arterial       246 4           HCO3, Arterial       14 5           Base Excess, Arterial       -8 4                O2/Vent Data           Most Recent        Non-Invasive Ventilation Mode   HFNC                  Physical Exam  Vitals signs and nursing note reviewed  Constitutional:       General: He is not in acute distress       Appearance: He is ill-appearing and diaphoretic  He is not toxic-appearing  HENT:      Head: Normocephalic and atraumatic  Nose: Nose normal       Mouth/Throat:      Mouth: Mucous membranes are moist    Eyes:      Pupils: Pupils are equal, round, and reactive to light  Neck:      Musculoskeletal: Normal range of motion and neck supple  No neck rigidity or muscular tenderness  Cardiovascular:      Rate and Rhythm: Regular rhythm  Tachycardia present  Pulses: Normal pulses  Heart sounds: Normal heart sounds  No murmur  Pulmonary:      Effort: Pulmonary effort is normal  No respiratory distress  Breath sounds: Normal breath sounds  No wheezing  Abdominal:      General: Bowel sounds are normal       Palpations: Abdomen is soft  Musculoskeletal:         General: No swelling  Right lower leg: No edema  Left lower leg: No edema  Skin:     General: Skin is warm  Capillary Refill: Capillary refill takes less than 2 seconds  Neurological:      General: No focal deficit present  Mental Status: He is alert           Laboratory and Diagnostics:  Results from last 7 days   Lab Units 08/30/20  0915   WBC Thousand/uL 0 34*   HEMOGLOBIN g/dL 9 2*   HEMATOCRIT % 29 6*   PLATELETS Thousands/uL 236   MONO PCT % 20*     Results from last 7 days   Lab Units 08/30/20  0916   SODIUM mmol/L 134*   POTASSIUM mmol/L 3 5   CHLORIDE mmol/L 100   CO2 mmol/L 14*   ANION GAP mmol/L 20*   BUN mg/dL 29*   CREATININE mg/dL 1 69*   CALCIUM mg/dL 8 5   GLUCOSE RANDOM mg/dL 104   ALT U/L 14   AST U/L 24   ALK PHOS U/L 54   ALBUMIN g/dL 2 2*   TOTAL BILIRUBIN mg/dL 0 90     Results from last 7 days   Lab Units 08/30/20  0916   MAGNESIUM mg/dL 1 7           Results from last 7 days   Lab Units 08/30/20  0916   TROPONIN I ng/mL 1 54*     Results from last 7 days   Lab Units 08/30/20  1115 08/30/20  0915   LACTIC ACID mmol/L 7 6* 9 9*     ABG:  Results from last 7 days   Lab Units 08/30/20  0929   PH ART  7 427   PCO2 ART mm Hg 22 5* PO2 ART mm Hg 246 4*   HCO3 ART mmol/L 14 5*   BASE EXC ART mmol/L -8 4   ABG SOURCE  Radial, Right     VBG:  Results from last 7 days   Lab Units 08/30/20  0929   ABG SOURCE  Radial, Right           Micro        EKG: NSR  Imaging: I have personally reviewed pertinent reports  and I have personally reviewed pertinent films in PACS    Intake and Output  I/O       08/28 0701 - 08/29 0700 08/29 0701 - 08/30 0700 08/30 0701 - 08/31 0700    I V  (mL/kg)   68 8 (0 8)    IV Piggyback   2400    Total Intake(mL/kg)   2468 8 (30 3)    Net   +2468 8                 Height and Weights   Height: 6' (182 9 cm)  IBW: 77 6 kg  Body mass index is 24 37 kg/m²  Weight (last 2 days)     Date/Time   Weight    08/30/20 1241   81 5 (179 68)                Nutrition       Diet Orders   (From admission, onward)             Start     Ordered    08/30/20 1140  Diet Clear Liquid  Diet effective now     Question Answer Comment   Diet Type Clear Liquid    RD to adjust diet per protocol?  Yes        08/30/20 1150                  Active Medications  Scheduled Meds:  Current Facility-Administered Medications   Medication Dose Route Frequency Provider Last Rate    acetaminophen  650 mg Oral Q6H PRN EDVIN Romero      cefepime  2,000 mg Intravenous Q12H EDVIN Romero      dextrose  25 g Intravenous Once Amber Anepu, DO      hydrocortisone sodium succinate  100 mg Intravenous [facility] EDVIN Romero      metroNIDAZOLE  500 mg Intravenous Q8H [place], [place]      norepinephrine  1-30 mcg/min Intravenous Titrated Amber Anepu, DO 20 mcg/min (08/30/20 1243)    tbo-filgrastim  5 mcg/kg Subcutaneous Daily EDVIN Romero      [START ON 8/31/2020] vancomycin  20 mg/kg Intravenous Q24H [place], [place]      vasopressin  0 04 Units/min Intravenous Continuous EDVIN Romero       Continuous Infusions:  norepinephrine, 1-30 mcg/min, Last Rate: 20 mcg/min (08/30/20 1243)  vasopressin, 0 04 Units/min      PRN Meds:   acetaminophen, 650 mg, Q6H PRN        Invasive Devices Review  Invasive Devices     Central Venous Catheter Line            Port A Cath 07/31/19 Right Chest 396 days    CVC Central Lines 08/30/20 Triple Right Internal jugular less than 1 day          Peripheral Intravenous Line            Peripheral IV 08/30/20 Left Antecubital less than 1 day          Drain            Urethral Catheter 16 Fr  less than 1 day                Rationale for remaining devices: CVC for meds   ---------------------------------------------------------------------------------------  Advance Directive and Living Will:      Power of :    POLST:    ---------------------------------------------------------------------------------------  Care Time Delivered:   Upon my evaluation, this patient had a high probability of imminent or life-threatening deterioration due to septic shock, which required my direct attention, intervention, and personal management  I have personally provided 60 minutes (11:45 to 12:45) of critical care time, exclusive of procedures, teaching, family meetings, and any prior time recorded by providers other than myself  Anthony Kruse PA-C      Portions of the record may have been created with voice recognition software  Occasional wrong word or "sound a like" substitutions may have occurred due to the inherent limitations of voice recognition software    Read the chart carefully and recognize, using context, where substitutions have occurred

## 2020-08-30 NOTE — ASSESSMENT & PLAN NOTE
sCr admission 1 69, baseline sCr 0 8  Likely ATN d/t hypotension  Given 3L Crystalloid bolus in ED and started on vasopressors, keep map >65  Insert lopez and monitor strict I/O's

## 2020-08-30 NOTE — ASSESSMENT & PLAN NOTE
Follows with [facility]  Pt does have mets to peritoneum but has been fairly responsive to chemotherapy  Last treatment 8/20 with 5 FU  Follows with palliative, after readdressing status with wife plan to remain full code

## 2020-08-30 NOTE — PROGRESS NOTES
Vancomycin Assessment    Miranda Gr is a 77 y o  male who is currently receiving vancomycin 1250 mg IV x1 dose, 1250 mg(15 mg/kg) IV q24h for Pneumonia     Relevant clinical data and objective history reviewed:  Creatinine   Date Value Ref Range Status   08/30/2020 1 69 (H) 0 60 - 1 30 mg/dL Final     Comment:     Standardized to IDMS reference method   08/18/2020 0 86 0 60 - 1 30 mg/dL Final     Comment:     Standardized to IDMS reference method   08/03/2020 0 99 0 60 - 1 30 mg/dL Final     Comment:     Standardized to IDMS reference method     BP 99/68 (BP Location: Right arm)   Pulse 79   Temp 97 8 °F (36 6 °C) (Tympanic)   Resp (!) 25   Ht 6' (1 829 m)   Wt 81 5 kg (179 lb 10 8 oz)   SpO2 95%   BMI 24 37 kg/m²   No intake/output data recorded  Lab Results   Component Value Date/Time    BUN 29 (H) 08/30/2020 09:16 AM    WBC 0 34 (LL) 08/30/2020 09:15 AM    HGB 9 2 (L) 08/30/2020 09:15 AM    HCT 29 6 (L) 08/30/2020 09:15 AM     (H) 08/30/2020 09:15 AM     08/30/2020 09:15 AM     Temp Readings from Last 3 Encounters:   08/30/20 97 8 °F (36 6 °C) (Tympanic)   08/21/20 (!) 96 5 °F (35 8 °C) (Temporal)   08/20/20 (!) 96 8 °F (36 °C)     Vancomycin Days of Therapy: 1    Assessment/Plan  The patient is currently on vancomycin utilizing scheduled dosing based on actual body weight  Baseline risks associated with therapy include: pre-existing renal impairment, concomitant nephrotoxic medications, advanced age, and dehydration  The patient is currently receiving 1250 mg IV x1 dose, 1250 mg(15 mg/kg) IV q24h and after clinical evaluation will be changed to 1750 mg(20 mg/kg) IV q24h  Pharmacy will also follow closely for s/sx of nephrotoxicity, infusion reactions, and appropriateness of therapy  BMP and CBC will be ordered per protocol  Plan for trough as patient approaches steady state, prior to the 4th  dose at approximately 0800 on 9/2/20    Due to infection severity, will target a trough of 15-20 (appropriate for most indications)   Pharmacy will continue to follow the patients culture results and clinical progress daily      Oscar Chaudhary, Pharmacist

## 2020-08-30 NOTE — ASSESSMENT & PLAN NOTE
Ongoing chemotherapy for GE CA, noted to be febrile this morning by wife s/p fall in kitchen    Lactic 9 9 on admission, received 3L IVF bolus in ED Bcx obtained and started broad spectrum abx  CT CAP: Suspicious for Left sided multi-lobar pna, no PE noted, masses remain unchanged compared to prior films  CT Head/Neck: no acute intracranial changes    Started Levophed, titrate to MAP >65  Start Trans[employer]aigne Stress dose steroids  Strict I/O's with lopez

## 2020-08-30 NOTE — PROCEDURES
Arterial Line Insertion    Date/Time: 8/30/2020 1:53 PM  Performed by: Teri Osorio PA-C  Authorized by: Teri Osorio PA-C     Patient location:  ICU  Consent:     Consent obtained:  Emergent situation    Consent given by:  Patient    Risks discussed:  Bleeding, ischemia, repeat procedure, infection and pain  Universal protocol:     Procedure explained and questions answered to patient or proxy's satisfaction: yes      Relevant documents present and verified: yes      Test results available and properly labeled: yes      Radiology Images displayed and confirmed  If images not available, report reviewed: yes      Required blood products, implants, devices, and special equipment available: yes      Site/side marked: yes      Immediately prior to procedure a time out was called: yes      Patient identity confirmed:  Verbally with patient, arm band and hospital-assigned identification number  Indications:     Indications: hemodynamic monitoring, multiple ABGs and continuous blood pressure monitoring    Pre-procedure details:     Skin preparation:  Chlorhexidine    Preparation: Patient was prepped and draped in sterile fashion    Anesthesia (see MAR for exact dosages): Anesthesia method:  None  Procedure details:     Location / Tip of Catheter:  Radial    Laterality:  Left    Colton's test performed: yes      Colton's test abnormal: no      Needle gauge:  20 G    Placement technique:  Percutaneous    Number of attempts:  1    Successful placement: yes      Transducer: waveform confirmed    Post-procedure details:     Post-procedure:  Sutured and sterile dressing applied    CMS:  Normal    Patient tolerance of procedure:   Tolerated well, no immediate complications

## 2020-08-30 NOTE — PLAN OF CARE
INTERVENTIONS:  - Assess for changes in respiratory status  - Assess for changes in mentation and behavior  - Position to facilitate oxygenation and minimize respiratory effort  - Oxygen administered by appropriate delivery if ordered  - Initiate smoking cessation education as indicated  - Encourage broncho-pulmonary hygiene including cough, deep breathe, Incentive Spirometry  - Assess the need for suctioning and aspirate as needed  - Assess and instruct to report SOB or any respiratory difficulty  - Respiratory Therapy support as indicated

## 2020-08-31 LAB
ALBUMIN SERPL BCP-MCNC: 1.9 G/DL (ref 3.5–5)
ALP SERPL-CCNC: 40 U/L (ref 46–116)
ALT SERPL W P-5'-P-CCNC: 37 U/L (ref 12–78)
ANION GAP SERPL CALCULATED.3IONS-SCNC: 13 MMOL/L (ref 4–13)
ANISOCYTOSIS BLD QL SMEAR: PRESENT
AST SERPL W P-5'-P-CCNC: 69 U/L (ref 5–45)
ATRIAL RATE: 91 BPM
BACTERIA UR CULT: NORMAL
BILIRUB SERPL-MCNC: 0.8 MG/DL (ref 0.2–1)
BUN SERPL-MCNC: 20 MG/DL (ref 5–25)
CALCIUM SERPL-MCNC: 7.4 MG/DL (ref 8.3–10.1)
CHLORIDE SERPL-SCNC: 107 MMOL/L (ref 100–108)
CO2 SERPL-SCNC: 19 MMOL/L (ref 21–32)
CREAT SERPL-MCNC: 1.07 MG/DL (ref 0.6–1.3)
ERYTHROCYTE [DISTWIDTH] IN BLOOD BY AUTOMATED COUNT: 16.2 % (ref 11.6–15.1)
GFR SERPL CREATININE-BSD FRML MDRD: 71 ML/MIN/1.73SQ M
GLUCOSE SERPL-MCNC: 105 MG/DL (ref 65–140)
GLUCOSE SERPL-MCNC: 121 MG/DL (ref 65–140)
GLUCOSE SERPL-MCNC: 34 MG/DL (ref 65–140)
GLUCOSE SERPL-MCNC: 72 MG/DL (ref 65–140)
GLUCOSE SERPL-MCNC: 81 MG/DL (ref 65–140)
GLUCOSE SERPL-MCNC: 99 MG/DL (ref 65–140)
HCT VFR BLD AUTO: 23.1 % (ref 36.5–49.3)
HGB BLD-MCNC: 7.2 G/DL (ref 12–17)
LACTATE SERPL-SCNC: 5.2 MMOL/L (ref 0.5–2)
LG PLATELETS BLD QL SMEAR: PRESENT
MAGNESIUM SERPL-MCNC: 1.9 MG/DL (ref 1.6–2.6)
MCH RBC QN AUTO: 31.2 PG (ref 26.8–34.3)
MCHC RBC AUTO-ENTMCNC: 31.2 G/DL (ref 31.4–37.4)
MCV RBC AUTO: 100 FL (ref 82–98)
MRSA NOSE QL CULT: NORMAL
NRBC BLD AUTO-RTO: 7 /100 WBCS
P AXIS: 44 DEGREES
PHOSPHATE SERPL-MCNC: 2.5 MG/DL (ref 2.3–4.1)
PLATELET # BLD AUTO: 149 THOUSANDS/UL (ref 149–390)
PLATELET BLD QL SMEAR: ABNORMAL
PMV BLD AUTO: 10.6 FL (ref 8.9–12.7)
POIKILOCYTOSIS BLD QL SMEAR: PRESENT
POTASSIUM SERPL-SCNC: 3.1 MMOL/L (ref 3.5–5.3)
PR INTERVAL: 146 MS
PROCALCITONIN SERPL-MCNC: 150.29 NG/ML
PROT SERPL-MCNC: 5.1 G/DL (ref 6.4–8.2)
QRS AXIS: 55 DEGREES
QRSD INTERVAL: 100 MS
QT INTERVAL: 344 MS
QTC INTERVAL: 423 MS
RBC # BLD AUTO: 2.31 MILLION/UL (ref 3.88–5.62)
RBC MORPH BLD: PRESENT
SODIUM SERPL-SCNC: 139 MMOL/L (ref 136–145)
T WAVE AXIS: 38 DEGREES
VENTRICULAR RATE: 91 BPM
WBC # BLD AUTO: 0.61 THOUSAND/UL (ref 4.31–10.16)

## 2020-08-31 PROCEDURE — 99233 SBSQ HOSP IP/OBS HIGH 50: CPT | Performed by: INTERNAL MEDICINE

## 2020-08-31 PROCEDURE — 84100 ASSAY OF PHOSPHORUS: CPT | Performed by: PHYSICIAN ASSISTANT

## 2020-08-31 PROCEDURE — 87077 CULTURE AEROBIC IDENTIFY: CPT | Performed by: ANESTHESIOLOGY

## 2020-08-31 PROCEDURE — 94760 N-INVAS EAR/PLS OXIMETRY 1: CPT

## 2020-08-31 PROCEDURE — 84145 PROCALCITONIN (PCT): CPT | Performed by: PHYSICIAN ASSISTANT

## 2020-08-31 PROCEDURE — 94640 AIRWAY INHALATION TREATMENT: CPT

## 2020-08-31 PROCEDURE — 94664 DEMO&/EVAL PT USE INHALER: CPT

## 2020-08-31 PROCEDURE — 87205 SMEAR GRAM STAIN: CPT | Performed by: ANESTHESIOLOGY

## 2020-08-31 PROCEDURE — 83605 ASSAY OF LACTIC ACID: CPT | Performed by: NURSE PRACTITIONER

## 2020-08-31 PROCEDURE — 85027 COMPLETE CBC AUTOMATED: CPT | Performed by: PHYSICIAN ASSISTANT

## 2020-08-31 PROCEDURE — 99223 1ST HOSP IP/OBS HIGH 75: CPT | Performed by: INTERNAL MEDICINE

## 2020-08-31 PROCEDURE — 87186 SC STD MICRODIL/AGAR DIL: CPT | Performed by: ANESTHESIOLOGY

## 2020-08-31 PROCEDURE — 82948 REAGENT STRIP/BLOOD GLUCOSE: CPT

## 2020-08-31 PROCEDURE — 93010 ELECTROCARDIOGRAM REPORT: CPT | Performed by: INTERNAL MEDICINE

## 2020-08-31 PROCEDURE — 80053 COMPREHEN METABOLIC PANEL: CPT | Performed by: PHYSICIAN ASSISTANT

## 2020-08-31 PROCEDURE — 85007 BL SMEAR W/DIFF WBC COUNT: CPT | Performed by: PHYSICIAN ASSISTANT

## 2020-08-31 PROCEDURE — 83735 ASSAY OF MAGNESIUM: CPT | Performed by: PHYSICIAN ASSISTANT

## 2020-08-31 PROCEDURE — 92610 EVALUATE SWALLOWING FUNCTION: CPT

## 2020-08-31 PROCEDURE — 87070 CULTURE OTHR SPECIMN AEROBIC: CPT | Performed by: ANESTHESIOLOGY

## 2020-08-31 PROCEDURE — 87106 FUNGI IDENTIFICATION YEAST: CPT | Performed by: ANESTHESIOLOGY

## 2020-08-31 RX ORDER — MAGNESIUM SULFATE 1 G/100ML
1 INJECTION INTRAVENOUS ONCE
Status: COMPLETED | OUTPATIENT
Start: 2020-08-31 | End: 2020-08-31

## 2020-08-31 RX ORDER — POTASSIUM CHLORIDE 29.8 MG/ML
40 INJECTION INTRAVENOUS ONCE
Status: COMPLETED | OUTPATIENT
Start: 2020-08-31 | End: 2020-08-31

## 2020-08-31 RX ORDER — LEVALBUTEROL 1.25 MG/.5ML
1.25 SOLUTION, CONCENTRATE RESPIRATORY (INHALATION)
Status: DISCONTINUED | OUTPATIENT
Start: 2020-08-31 | End: 2020-09-08 | Stop reason: HOSPADM

## 2020-08-31 RX ADMIN — HYDROCORTISONE SODIUM SUCCINATE 50 MG: 100 INJECTION, POWDER, FOR SOLUTION INTRAMUSCULAR; INTRAVENOUS at 21:21

## 2020-08-31 RX ADMIN — LEVALBUTEROL HYDROCHLORIDE 1.25 MG: 1.25 SOLUTION, CONCENTRATE RESPIRATORY (INHALATION) at 19:42

## 2020-08-31 RX ADMIN — SODIUM CHLORIDE, SODIUM GLUCONATE, SODIUM ACETATE, POTASSIUM CHLORIDE AND MAGNESIUM CHLORIDE 125 ML/HR: 526; 502; 368; 37; 30 INJECTION, SOLUTION INTRAVENOUS at 01:02

## 2020-08-31 RX ADMIN — CEFEPIME 2000 MG: 2 INJECTION, POWDER, FOR SOLUTION INTRAVENOUS at 17:51

## 2020-08-31 RX ADMIN — CEFEPIME 2000 MG: 2 INJECTION, POWDER, FOR SOLUTION INTRAVENOUS at 09:18

## 2020-08-31 RX ADMIN — METRONIDAZOLE 500 MG: 500 INJECTION, SOLUTION INTRAVENOUS at 20:14

## 2020-08-31 RX ADMIN — VANCOMYCIN HYDROCHLORIDE 1750 MG: 1 INJECTION, POWDER, LYOPHILIZED, FOR SOLUTION INTRAVENOUS at 10:10

## 2020-08-31 RX ADMIN — IPRATROPIUM BROMIDE 0.5 MG: 0.5 SOLUTION RESPIRATORY (INHALATION) at 14:22

## 2020-08-31 RX ADMIN — TBO-FILGRASTIM 480 MCG: 480 INJECTION, SOLUTION SUBCUTANEOUS at 09:08

## 2020-08-31 RX ADMIN — ACETAMINOPHEN 650 MG: 325 TABLET, FILM COATED ORAL at 20:13

## 2020-08-31 RX ADMIN — PANTOPRAZOLE SODIUM 40 MG: 40 TABLET, DELAYED RELEASE ORAL at 05:46

## 2020-08-31 RX ADMIN — METRONIDAZOLE 500 MG: 500 INJECTION, SOLUTION INTRAVENOUS at 03:34

## 2020-08-31 RX ADMIN — IPRATROPIUM BROMIDE 0.5 MG: 0.5 SOLUTION RESPIRATORY (INHALATION) at 09:15

## 2020-08-31 RX ADMIN — POTASSIUM CHLORIDE 40 MEQ: 29.8 INJECTION, SOLUTION INTRAVENOUS at 08:10

## 2020-08-31 RX ADMIN — LEVALBUTEROL HYDROCHLORIDE 1.25 MG: 1.25 SOLUTION, CONCENTRATE RESPIRATORY (INHALATION) at 14:22

## 2020-08-31 RX ADMIN — VENLAFAXINE HYDROCHLORIDE 150 MG: 150 CAPSULE, EXTENDED RELEASE ORAL at 09:04

## 2020-08-31 RX ADMIN — TOBRAMYCIN SULFATE 390 MG: 40 INJECTION, SOLUTION INTRAMUSCULAR; INTRAVENOUS at 17:06

## 2020-08-31 RX ADMIN — MAGNESIUM SULFATE HEPTAHYDRATE 1 G: 1 INJECTION, SOLUTION INTRAVENOUS at 08:10

## 2020-08-31 RX ADMIN — APIXABAN 5 MG: 5 TABLET, FILM COATED ORAL at 17:09

## 2020-08-31 RX ADMIN — APIXABAN 5 MG: 5 TABLET, FILM COATED ORAL at 09:04

## 2020-08-31 RX ADMIN — LEVALBUTEROL HYDROCHLORIDE 1.25 MG: 1.25 SOLUTION, CONCENTRATE RESPIRATORY (INHALATION) at 09:15

## 2020-08-31 RX ADMIN — POTASSIUM PHOSPHATE, MONOBASIC AND POTASSIUM PHOSPHATE, DIBASIC 12 MMOL: 224; 236 INJECTION, SOLUTION, CONCENTRATE INTRAVENOUS at 07:55

## 2020-08-31 RX ADMIN — HYDROCORTISONE SODIUM SUCCINATE 100 MG: 100 INJECTION, POWDER, FOR SOLUTION INTRAMUSCULAR; INTRAVENOUS at 05:46

## 2020-08-31 RX ADMIN — IPRATROPIUM BROMIDE 0.5 MG: 0.5 SOLUTION RESPIRATORY (INHALATION) at 19:42

## 2020-08-31 RX ADMIN — METRONIDAZOLE 500 MG: 500 INJECTION, SOLUTION INTRAVENOUS at 12:46

## 2020-08-31 NOTE — PLAN OF CARE
Problem: CARDIOVASCULAR - ADULT  Goal: Maintains optimal cardiac output and hemodynamic stability  Description: INTERVENTIONS:  - Monitor I/O, vital signs and rhythm  - Monitor for S/S and trends of decreased cardiac output  - Administer and titrate ordered vasoactive medications to optimize hemodynamic stability  - Assess quality of pulses, skin color and temperature  - Assess for signs of decreased coronary artery perfusion  - Instruct patient to report change in severity of symptoms  Outcome: Progressing  Goal: Absence of cardiac dysrhythmias or at baseline rhythm  Description: INTERVENTIONS:  - Continuous cardiac monitoring, vital signs, obtain 12 lead EKG if ordered  - Administer antiarrhythmic and heart rate control medications as ordered  - Monitor electrolytes and administer replacement therapy as ordered  Outcome: Progressing     Problem: GENITOURINARY - ADULT  Goal: Maintains or returns to baseline urinary function  Description: INTERVENTIONS:  - Assess urinary function  - Encourage oral fluids to ensure adequate hydration if ordered  - Administer IV fluids as ordered to ensure adequate hydration  - Administer ordered medications as needed  - Offer frequent toileting  - Follow urinary retention protocol if ordered  Outcome: Progressing  Goal: Urinary catheter remains patent  Description: INTERVENTIONS:  - Assess patency of urinary catheter  - If patient has a chronic lopez, consider changing catheter if non-functioning  - Follow guidelines for intermittent irrigation of non-functioning urinary catheter  Outcome: Progressing     Problem: METABOLIC, FLUID AND ELECTROLYTES - ADULT  Goal: Electrolytes maintained within normal limits  Description: INTERVENTIONS:  - Monitor labs and assess patient for signs and symptoms of electrolyte imbalances  - Administer electrolyte replacement as ordered  - Monitor response to electrolyte replacements, including repeat lab results as appropriate  - Instruct patient on fluid and nutrition as appropriate  Outcome: Progressing  Goal: Fluid balance maintained  Description: INTERVENTIONS:  - Monitor labs   - Monitor I/O and WT  - Instruct patient on fluid and nutrition as appropriate  - Assess for signs & symptoms of volume excess or deficit  Outcome: Progressing  Goal: Glucose maintained within target range  Description: INTERVENTIONS:  - Monitor Blood Glucose as ordered  - Assess for signs and symptoms of hyperglycemia and hypoglycemia  - Administer ordered medications to maintain glucose within target range  - Assess nutritional intake and initiate nutrition service referral as needed  Outcome: Progressing     Problem: PAIN - ADULT  Goal: Verbalizes/displays adequate comfort level or baseline comfort level  Description: Interventions:  - Encourage patient to monitor pain and request assistance  - Assess pain using appropriate pain scale  - Administer analgesics based on type and severity of pain and evaluate response  - Implement non-pharmacological measures as appropriate and evaluate response  - Consider cultural and social influences on pain and pain management  - Notify physician/advanced practitioner if interventions unsuccessful or patient reports new pain  Outcome: Progressing     Problem: INFECTION - ADULT  Goal: Absence or prevention of progression during hospitalization  Description: INTERVENTIONS:  - Assess and monitor for signs and symptoms of infection  - Monitor lab/diagnostic results  - Monitor all insertion sites, i e  indwelling lines, tubes, and drains  - Monitor endotracheal if appropriate and nasal secretions for changes in amount and color  - [place] appropriate cooling/warming therapies per order  - Administer medications as ordered  - Instruct and encourage patient and family to use good hand hygiene technique  - Identify and instruct in appropriate isolation precautions for identified infection/condition  Outcome: Progressing  Goal: Absence of fever/infection during neutropenic period  Description: INTERVENTIONS:  - Monitor WBC    Outcome: Progressing     Problem: SAFETY ADULT  Goal: Patient will remain free of falls  Description: INTERVENTIONS:  - Assess patient frequently for physical needs  -  Identify cognitive and physical deficits and behaviors that affect risk of falls    -  [place] fall precautions as indicated by assessment   - Educate patient/family on patient safety including physical limitations  - Instruct patient to call for assistance with activity based on assessment  - Modify environment to reduce risk of injury  - Consider OT/PT consult to assist with strengthening/mobility  Outcome: Progressing  Goal: Maintain or return to baseline ADL function  Description: INTERVENTIONS:  -  Assess patient's ability to carry out ADLs; assess patient's baseline for ADL function and identify physical deficits which impact ability to perform ADLs (bathing, care of mouth/teeth, toileting, grooming, dressing, etc )  - Assess/evaluate cause of self-care deficits   - Assess range of motion  - Assess patient's mobility; develop plan if impaired  - Assess patient's need for assistive devices and provide as appropriate  - Encourage maximum independence but intervene and supervise when necessary  - Involve family in performance of ADLs  - Assess for home care needs following discharge   - Consider OT consult to assist with ADL evaluation and planning for discharge  - Provide patient education as appropriate  Outcome: Progressing  Goal: Maintain or return mobility status to optimal level  Description: INTERVENTIONS:  - Assess patient's baseline mobility status (ambulation, transfers, stairs, etc )    - Identify cognitive and physical deficits and behaviors that affect mobility  - Identify mobility aids required to assist with transfers and/or ambulation (gait belt, sit-to-stand, lift, walker, cane, etc )  - [place] fall precautions as indicated by assessment  - Record patient progress and toleration of activity level on Mobility SBAR; progress patient to next Phase/Stage  - Instruct patient to call for assistance with activity based on assessment  - Consider rehabilitation consult to assist with strengthening/weightbearing, etc   Outcome: Progressing     Problem: DISCHARGE PLANNING  Goal: Discharge to home or other facility with appropriate resources  Description: INTERVENTIONS:  - Identify barriers to discharge w/patient and caregiver  - Arrange for needed discharge resources and transportation as appropriate  - Identify discharge learning needs (meds, wound care, etc )  - Arrange for interpretive services to assist at discharge as needed  - Refer to Case Management Department for coordinating discharge planning if the patient needs post-hospital services based on physician/advanced practitioner order or complex needs related to functional status, cognitive ability, or social support system  Outcome: Progressing     Problem: Knowledge Deficit  Goal: Patient/family/caregiver demonstrates understanding of disease process, treatment plan, medications, and discharge instructions  Description: Complete learning assessment and assess knowledge base    Interventions:  - Provide teaching at level of understanding  - Provide teaching via preferred learning methods  Outcome: Progressing     Problem: Prexisting or High Potential for Compromised Skin Integrity  Goal: Skin integrity is maintained or improved  Description: INTERVENTIONS:  - Identify patients at risk for skin breakdown  - Assess and monitor skin integrity  - Assess and monitor nutrition and hydration status  - Monitor labs   - Assess for incontinence   - Turn and reposition patient  - Assist with mobility/ambulation  - Relieve pressure over bony prominences  - Avoid friction and shearing  - Provide appropriate hygiene as needed including keeping skin clean and dry  - Evaluate need for skin moisturizer/barrier cream  - Collaborate with interdisciplinary team   - Patient/family teaching  - Consider wound care consult   Outcome: Progressing     Problem: Potential for Falls  Goal: Patient will remain free of falls  Description: INTERVENTIONS:  - Assess patient frequently for physical needs  -  Identify cognitive and physical deficits and behaviors that affect risk of falls  -  [place] fall precautions as indicated by assessment   - Educate patient/family on patient safety including physical limitations  - Instruct patient to call for assistance with activity based on assessment  - Modify environment to reduce risk of injury  - Consider OT/PT consult to assist with strengthening/mobility  Outcome: Progressing     Problem: Nutrition/Hydration-ADULT  Goal: Nutrient/Hydration intake appropriate for improving, restoring or maintaining nutritional needs  Description: Monitor and assess patient's nutrition/hydration status for malnutrition  Collaborate with interdisciplinary team and initiate plan and interventions as ordered  Monitor patient's weight and dietary intake as ordered or per policy  Utilize nutrition screening tool and intervene as necessary  Determine patient's food preferences and provide high-protein, high-caloric foods as appropriate       INTERVENTIONS:  - Monitor oral intake, urinary output, labs, and treatment plans  - Assess nutrition and hydration status and recommend course of action  - Evaluate amount of meals eaten  - Assist patient with eating if necessary   - Allow adequate time for meals  - Recommend/ encourage appropriate diets, oral nutritional supplements, and vitamin/mineral supplements  - Order, calculate, and assess calorie counts as needed  - Recommend, monitor, and adjust tube feedings and TPN/PPN based on assessed needs  - Assess need for intravenous fluids  - Provide specific nutrition/hydration education as appropriate  - Include patient/family/caregiver in decisions related to nutrition  Outcome: Progressing

## 2020-08-31 NOTE — ASSESSMENT & PLAN NOTE
Ongoing chemotherapy for GE CA, noted to be febrile this morning by wife s/p fall in kitchen    Lactic 9 9 on admission, received 3L IVF bolus in ED Bcx obtained and started broad spectrum abx  CT CAP: Suspicious for Left sided multi-lobar pna, no PE noted, masses remain unchanged compared to prior films  CT Head/Neck: no acute intracranial changes    · Levo and vaso weaned off overnight, MAPs remain >65  · Continue Stress dose steroids  · Strict I/O's with lopez  · Lactic trending down on Q2's, though pressor requirement has improved significantly, will recheck lactic in am   · Procal pending   · BC 2/2 GNR, can likely d/c Vanc if MRSA cx negative   · Vanc, Cefepime, Flagyl D2

## 2020-08-31 NOTE — PROGRESS NOTES
Progress Note Rogelio Foot 1955, 77 y o  male MRN: [id number]    Unit/Bed#: ICU 11 Encounter: [id number]    Primary Care Provider: Colleen Gotti DO   Date and time admitted to hospital: 8/30/2020  9:00 AM        * Septic shock [facility])  Assessment & Plan  Ongoing chemotherapy for GE CA, noted to be febrile this morning by wife s/p fall in kitchen  Lactic 9 9 on admission, received 3L IVF bolus in ED Bcx obtained and started broad spectrum abx  CT CAP: Suspicious for Left sided multi-lobar pna, no PE noted, masses remain unchanged compared to prior films  CT Head/Neck: no acute intracranial changes    · Levo and vaso weaned off overnight, MAPs remain >65  · Continue Stress dose steroids  · Strict I/O's with lopez  · Lactic trending down on Q2's, though pressor requirement has improved significantly, will recheck lactic in am   · Procal pending   · BC 2/2 GNR, can likely d/c Vanc if MRSA cx negative   · Vanc, Cefepime, Flagyl D2    PNA (pneumonia)  Assessment & Plan  · CT shows concern for Left sided multifocal pna, likely source of sepsis   · Trend procal, fever and WBC  · Currently requiring 3L NC, keep sats >90%  · BC 2/2 GNR, may consider aspiration event   · Aspiration precautions     Neutropenic (HCC)  Assessment & Plan  · AIN 0 03  · Neutropenic precautions  · Start Granix 5mcg/kg x 3 days  · consult heme/onc    Gastroesophageal cancer ([facility] [address] )  Assessment & Plan  · Follows with [facility]  · Pt does have mets to peritoneum but has been fairly responsive to chemotherapy  · Last treatment 8/20 with 5 FU  · Follows with palliative, after readdressing status with wife plan to remain full code  Falls  Assessment & Plan  · Fall precuations   · PT/OT when appropriate     Chemotherapy-induced nausea  Assessment & Plan  · zofran prn ordered    ----------------------------------------------------------------------------------------  HPI/24hr events: Overnight, he was weaned off vasopressors   BC 2/2 positive for GNR  He endorsed no complaints overnight  Disposition: Transfer to Stepdown Level 1   Code Status: Level 1 - Full Code  ---------------------------------------------------------------------------------------  SUBJECTIVE  "I'm alright"    Review of Systems  Review of systems was reviewed and negative unless stated above in HPI/24-hour events   ---------------------------------------------------------------------------------------  OBJECTIVE    Vitals   Vitals:    20 0007 20 0100 20 0200 20 0300   BP:  105/57 116/63 94/55   Pulse:  73 76 72   Resp:  (!) 26 (!) 29 (!) 26   Temp:       TempSrc:       SpO2: 92% 96% 95% (!) 89%   Weight:       Height:         Temp (24hrs), Av 2 °F (37 3 °C), Min:97 8 °F (36 6 °C), Max:100 °F (37 8 °C)  Current: Temperature: 99 4 °F (37 4 °C)  Arterial Line BP: 129/58  Arterial Line MAP (mmHg): 82 mmHg    Respiratory:  SpO2: SpO2: (!) 89 %  , SpO2 Activity: SpO2 Activity: At Rest, SpO2 Device: O2 Device: Nasal cannula  Nasal Cannula O2 Flow Rate (L/min): 5 L/min    Invasive/non-invasive ventilation settings   Respiratory    Lab Data (Last 4 hours)    None         O2/Vent Data (Last 4 hours)    None                Physical Exam  Vitals signs and nursing note reviewed  Constitutional:       General: He is not in acute distress  Appearance: He is well-developed  He is ill-appearing (appears chronically ill)  He is not diaphoretic  Interventions: Nasal cannula in place  HENT:      Head: Normocephalic and atraumatic  Nose: Nose normal  No congestion or rhinorrhea  Mouth/Throat:      Pharynx: No oropharyngeal exudate or posterior oropharyngeal erythema  Eyes:      General: Lids are normal  No scleral icterus  Right eye: No discharge  Left eye: No discharge  Extraocular Movements: Extraocular movements intact  Neck:      Musculoskeletal: Neck supple  No neck rigidity     Cardiovascular:      Rate and Rhythm: Normal rate and regular rhythm  Pulses: Normal pulses  Heart sounds: Normal heart sounds  No murmur  No friction rub  No gallop  Pulmonary:      Effort: Tachypnea present  No accessory muscle usage  Breath sounds: Examination of the left-upper field reveals decreased breath sounds  Examination of the left-middle field reveals decreased breath sounds  Examination of the left-lower field reveals decreased breath sounds and wheezing  Decreased breath sounds and wheezing present  Abdominal:      General: There is no distension  Palpations: Abdomen is soft  Tenderness: There is no abdominal tenderness  Genitourinary:     Comments: Antunez present  Musculoskeletal: Normal range of motion  General: No swelling or tenderness  Skin:     General: Skin is warm and dry  Capillary Refill: Capillary refill takes less than 2 seconds  Neurological:      General: No focal deficit present  Mental Status: He is alert and oriented to person, place, and time  GCS: GCS eye subscore is 4  GCS verbal subscore is 5  GCS motor subscore is 6  Psychiatric:         Speech: Speech normal          Behavior: Behavior is cooperative           Laboratory and Diagnostics:  Results from last 7 days   Lab Units 08/30/20  0915   WBC Thousand/uL 0 34*   HEMOGLOBIN g/dL 9 2*   HEMATOCRIT % 29 6*   PLATELETS Thousands/uL 236   MONO PCT % 20*     Results from last 7 days   Lab Units 08/30/20  0916   SODIUM mmol/L 134*   POTASSIUM mmol/L 3 5   CHLORIDE mmol/L 100   CO2 mmol/L 14*   ANION GAP mmol/L 20*   BUN mg/dL 29*   CREATININE mg/dL 1 69*   CALCIUM mg/dL 8 5   GLUCOSE RANDOM mg/dL 104   ALT U/L 14   AST U/L 24   ALK PHOS U/L 54   ALBUMIN g/dL 2 2*   TOTAL BILIRUBIN mg/dL 0 90     Results from last 7 days   Lab Units 08/30/20  0916   MAGNESIUM mg/dL 1 7           Results from last 7 days   Lab Units 08/30/20  0916   TROPONIN I ng/mL 1 54*     Results from last 7 days   Lab Units 08/30/20  2119 08/30/20  1930 08/30/20  1727 08/30/20  1526 08/30/20  1309 08/30/20  1115 08/30/20  0915   LACTIC ACID mmol/L 8 0* 8 8* 8 4* 9 0* 7 8* 7 6* 9 9*     ABG:  Results from last 7 days   Lab Units 08/30/20  0929   PH ART  7 427   PCO2 ART mm Hg 22 5*   PO2 ART mm Hg 246 4*   HCO3 ART mmol/L 14 5*   BASE EXC ART mmol/L -8 4   ABG SOURCE  Radial, Right     VBG:  Results from last 7 days   Lab Units 08/30/20  1309 08/30/20  0929   PH CYNTHIA  7 276*  --    PCO2 CYNTHIA mm Hg 29 7*  --    PO2 CYNTHIA mm Hg 47 7*  --    HCO3 CYNTHIA mmol/L 13 5*  --    BASE EXC CYNTHIA mmol/L -12 1  --    ABG SOURCE   --  Radial, Right           Micro  Results from last 7 days   Lab Units 08/30/20  0926 08/30/20  0916   GRAM STAIN RESULT  Gram negative rods* Gram negative rods*       EKG: NSR on tele   Imaging: I have personally reviewed pertinent reports  Intake and Output  I/O       08/29 0701 - 08/30 0700 08/30 0701 - 08/31 0700    I V  (mL/kg)  624 (7 7)    IV Piggyback  2750    Total Intake(mL/kg)  3374 (41 4)    Urine (mL/kg/hr)  2020    Total Output  2020    Net  +1354                Height and Weights   Height: 6' (182 9 cm)  IBW: 77 6 kg  Body mass index is 24 37 kg/m²  Weight (last 2 days)     Date/Time   Weight    08/30/20 1241   81 5 (179 68)                Nutrition       Diet Orders   (From admission, onward)             Start     Ordered    08/30/20 1140  Diet Clear Liquid  Diet effective now     Question Answer Comment   Diet Type Clear Liquid    RD to adjust diet per protocol?  Yes        08/30/20 1150                  Active Medications  Scheduled Meds:  Current Facility-Administered Medications   Medication Dose Route Frequency Provider Last Rate    acetaminophen  650 mg Oral Q6H PRN Cruz Uriah, PA-C      apixaban  5 mg Oral BID Cruz Uriah, PA-C      cefepime  2,000 mg Intravenous Q12H Cruz Uriah, PA-C 2,000 mg (08/30/20 8740)    dextrose  25 g Intravenous Once Amber Anepu, DO      hydrocortisone sodium succinate  100 mg Intravenous [facility] Sil [place], [place]      insulin lispro  1-6 Units Subcutaneous Q6H [facility] [place]      metroNIDAZOLE  500 mg Intravenous Q8H Sil Arreguin PA-C 500 mg (08/31/20 0334)    multi-electrolyte  125 mL/hr Intravenous Continuous Sil Arreguin PA-C 125 mL/hr (08/31/20 0102)    norepinephrine  1-30 mcg/min Intravenous Titrated Amber Quach DO Stopped (08/30/20 2202)    ondansetron  4 mg Intravenous Q8H PRN Sil Arreguin PA-C      pantoprazole  40 mg Oral Daily Before Breakfast Sil Arreguin PA-C      pneumococcal 13-valent conjugate vaccine  0 5 mL Intramuscular Prior to discharge Luisa Comer MD      tbarmond-filgrastim  480 mcg Subcutaneous Daily Sil Arreguin PA-C      vancomycin  20 mg/kg Intravenous Q24H Stevan Marx      vasopressin  0 04 Units/min Intravenous Continuous Sil Arreguin PA-C Stopped (08/30/20 1930)    venlafaxine  150 mg Oral Daily Sil Arreguin PA-C       Continuous Infusions:  multi-electrolyte, 125 mL/hr, Last Rate: 125 mL/hr (08/31/20 0102)  norepinephrine, 1-30 mcg/min, Last Rate: Stopped (08/30/20 2202)  vasopressin, 0 04 Units/min, Last Rate: Stopped (08/30/20 1930)      PRN Meds:   acetaminophen, 650 mg, Q6H PRN  ondansetron, 4 mg, Q8H PRN  pneumococcal 13-valent conjugate vaccine, 0 5 mL, Prior to discharge        Invasive Devices Review  Invasive Devices     Central Venous Catheter Line            Port A Cath 07/31/19 Right Chest 397 days    CVC Central Lines 08/30/20 Triple Right Internal jugular less than 1 day          Peripheral Intravenous Line            Peripheral IV 08/30/20 Left Antecubital less than 1 day          Arterial Line            Arterial Line 08/30/20 Radial less than 1 day          Drain            Urethral Catheter 16 Fr  less than 1 day                Rationale for remaining devices: IV abx  ---------------------------------------------------------------------------------------  Advance Directive and Living Will:      Power of :    POLST:    ---------------------------------------------------------------------------------------  Care Time Delivered:   No Critical Care time spent       YANI Rico      Portions of the record may have been created with voice recognition software  Occasional wrong word or "sound a like" substitutions may have occurred due to the inherent limitations of voice recognition software    Read the chart carefully and recognize, using context, where substitutions have occurred

## 2020-08-31 NOTE — SPEECH THERAPY NOTE
Speech Language/Pathology  Speech-Language Pathology Bedside Swallow Evaluation        Patient Name: Kobi Cade    [id number] Date: 8/31/2020     Problem List  Principal Problem:    Septic shock ([facility] )  Active Problems:    G[facility] ([facility] )    Chemotherapy-induced nausea    Abnormal weight loss    Falls    Neutropenic (HCC)    PNA (pneumonia)    JENNIFER (acute kidney injury) [facility])         Past Medical History  Past Medical History:   Diagnosis Date    Dehydration 8/8/2019    Esophageal cancer ([facility] )     Hypertension     Nausea 8/8/2019    Psychiatric disorder     Recovering alcoholic ([facility] )        Past Surgical History  Past Surgical History:   Procedure Laterality Date    APPENDECTOMY      IR PORT PLACEMENT  7/31/2019       Summary    Pt presents with mild-moderate oropharyngeal dysphagia based on the materials assessed during this evaluation  Recommendations:   Diet: mechanically altered/level 2 diet and thin liquids   Meds: whole with liquid and whole with puree   Frequent Oral care: 4x/day  Aspiration precautions and compensatory swallowing strategies: upright posture and small bites/sips  Other Recommendations/ considerations: n/a       Current Medical Status  Pt is a 77 y o  male who presented to [address] [place] [address] with shortness of breath cough fevers for the past day  Patient is the history of esophageal cancer currently on chemotherapy  Patient denies any chest pain diarrhea abdominal pain back pain  He says this morning he lost his balance fell hit his head  Denies loss of consciousness no neck pain or any other injuries or complaints at this time  He is on Eliquis  Patient is currently in respiratory distress tachypneic  Brought in by medics  Lives at home       Past medical history:   Please see H&P for details    Special Studies:  8/30/20: CT head without contrast: No acute intracranial abnormality    8/30/20: CT spine cervical without contrast:No cervical spine fracture or traumatic malalignment  8/30/20: CT PE study w abdomen pelvis w contrast: Extensive airspace opacity throughout the left lung most suspicious for acute pneumonia      No pulmonary embolus      Primary malignancy at the gastroesophageal junction, similar in appearance from July 13, 2020  Again noted is omental nodularity with small omental nodules measuring up to 10 mm, unchanged from previous examination and most consistent with metastatic   disease  There are subcentimeter pulmonary nodules in the right lung which are indeterminate and unchanged from July 13, 2020  Unchanged hepatic hypodensities, thought most likely to represent cysts      Mediastinal and hilar lymphadenopathy, probably reactive but for which continued close interval follow-up is recommended  Social/Education/Vocational Hx:  Pt lives with family    Swallow Information   Current Risks for Dysphagia & Aspiration: esophageal cancer; active on chemo  Current Symptoms/Concerns: mastication difficulties  Current Diet: mechanically altered/level 2 diet and thin liquids   Baseline Diet: regular diet and thin liquids   Pt  Is currently agreeable to a downgraded diet due to oral impairment and dentures not available  Will probably be able to upgrade once medical status improves and once he has his lower dentition  Baseline Assessment   Behavior/Cognition: alert  Speech/Language Status: able to participate in conversation and able to follow commands  Patient Positioning: upright in bed     Swallow Mechanism Exam   Facial: symmetrical  Labial: WFL  Lingual: WFL  Velum: symmetrical  Mandible: adequate ROM  Dentition: upper teeth and reportedly has lower dentures at home  Vocal quality:clear/adequate   Volitional Cough: strong/productive   Respiratory: 4L NC SPO2= 95%    Consistencies Assessed and Performance   Consistencies Administered: thin liquids and hard solids- Pt  Only agreeable to crackers and gingerale   Refused breakfast reportedly this AM  Stated he has no appetite and some abdominal pain  Oral Stage: mild-moderate impairment  Pt  Has poor mastication skills in part due to his lower dentures not available at this time  He did state that he would have his wife bring them in  He took one bite of saltine and required max time to [facility] and then was unable to completely masticate and had to spit out half the cracker  The rest he had to wash with liquid  Liquid via straw and cup were appropriate  Pharyngeal Stage: suspect WFL  On limited trials today, Swallow initiation appeared prompt  Laryngeal rise was palpated and judged to be within functional limits  No throat clearing, change in vocal quality, or respiratory status noted with today's materials  Pt  Did cough, however both patient and nursing reported that he has been coughing secondary to medical status and not necessarily related to food/drink intake        Esophageal Concerns: reportedly has active GERD, however did not report any sesenation at this time      Results Reviewed with: patient, RN and MD   Dysphagia Goals: pt will tolerate dysphagia level 2 with thin liquids without s/s of aspiration x3-4 sessions  Discharge recommendation: speech follow up for diet upgrades    Speech Therapy Prognosis   Prognosis: good    Prognosis Considerations: medical status and appetite     Brittany Romero MS CCC-SLP  Speech Language Pathologist  Available via [facility] License # [id number]  [address] [place] [address] # [address] [place] [address] [id number]

## 2020-08-31 NOTE — PROGRESS NOTES
Art line dcd with pressure dressing applied,noted puffiness above insertion site,seen by YESENIA Jung  ,microfoam dsg applied  no further swelling noted

## 2020-08-31 NOTE — PROGRESS NOTES
Vancomycin Assessment    Linda Siu is a 77 y o  male who is currently receiving vancomycin 1750mg IV q24h for Pneumonia     Relevant clinical data and objective history reviewed:  Creatinine   Date Value Ref Range Status   08/31/2020 1 07 0 60 - 1 30 mg/dL Final     Comment:     Standardized to IDMS reference method   08/30/2020 1 69 (H) 0 60 - 1 30 mg/dL Final     Comment:     Standardized to IDMS reference method   08/18/2020 0 86 0 60 - 1 30 mg/dL Final     Comment:     Standardized to IDMS reference method     /69   Pulse 67   Temp 98 3 °F (36 8 °C) (Temporal)   Resp (!) 26   Ht 6' (1 829 m)   Wt 82 kg (180 lb 12 4 oz)   SpO2 98%   BMI 24 52 kg/m²   I/O last 3 completed shifts: In: 4893 9 [P O :120; I V :2023 9; IV Piggyback:2750]  Out: 2370 [Urine:2370]  Lab Results   Component Value Date/Time    BUN 20 08/31/2020 05:45 AM    WBC 0 61 (LL) 08/31/2020 05:45 AM    HGB 7 2 (L) 08/31/2020 05:45 AM    HCT 23 1 (L) 08/31/2020 05:45 AM     (H) 08/31/2020 05:45 AM     08/31/2020 05:45 AM     Temp Readings from Last 3 Encounters:   08/31/20 98 3 °F (36 8 °C) (Temporal)   08/21/20 (!) 96 5 °F (35 8 °C) (Temporal)   08/20/20 (!) 96 8 °F (36 °C)     Vancomycin Days of Therapy: 2    Assessment/Plan  The patient is currently on vancomycin utilizing scheduled dosing  Baseline risks associated with therapy include: concomitant nephrotoxic medications, advanced age, and dehydration  The patient is receiving 1750mg IV q24h based on a goal of 15-20 (appropriate for most indications) ; therefore, after clinical evaluation will be changed to 1250mg IV q12h (15mg/kg)   Pharmacy will continue to follow closely for s/sx of nephrotoxicity, infusion reactions, and appropriateness of therapy  BMP and CBC will be ordered per protocol  Plan for trough as patient approaches steady state, prior to the 4th  dose at approximately 2100 on 9/1/20   Pharmacy will continue to follow the patients culture results and clinical progress daily      Tressa Shelton, Pharmacist

## 2020-08-31 NOTE — ASSESSMENT & PLAN NOTE
· [facility]s with [facility]  · Pt does have mets to peritoneum but has been fairly responsive to chemotherapy  · Last treatment 8/20 with 5 FU  · Follows with palliative, after readdressing status with wife plan to remain full code

## 2020-08-31 NOTE — ASSESSMENT & PLAN NOTE
· CT shows concern for Left sided multifocal pna, likely source of sepsis   · Trend procal, fever and WBC  · Currently requiring 3L NC, keep sats >90%  · BC 2/2 GNR, may consider aspiration event   · Aspiration precautions

## 2020-08-31 NOTE — CONSULTS
Consultation - Infectious Disease   Belkys Sheets 77 y o  male MRN: [id number]  Unit/Bed#: ICU 11 Encounter: [id number]      IMPRESSION & RECOMMENDATIONS:   1  Septic shock-POA  Appears to be secondary to gram-negative bacteremia of an undefined source  Consideration for the possibility of translocation of organisms across the gut wall  Consideration for the possibility of pneumonia  Consideration for the possibility of typhilitis  Consideration for the possibility of another source such as a Port-A-Cath infection although this is less likely  Patient's blood pressure has recovered he is now off all vasopressor support  He seems to be tolerating the antibiotics without difficulty  -continue cefepime will increase the dose to 2 g IV q 8 hours to make sure Pseudomonas is adequately covered  -will give tobramycin 390 mg IV x1 dose  -discontinue vancomycin  -continue Flagyl and can likely transition to oral once blood pressure stays consistently in the normal range  -follow up sensitivities and adjust antibiotics as needed  -recheck CBC with diff and BMP  -recheck procalcitonin  -supportive care    2  Gram-negative bacteremia-possibly secondary to pneumonia  Possibly secondary to an intra-abdominal process although CT abdomen pelvis is negative  Patient does have some abdominal tenderness on exam   Possibly secondary to a catheter related bacteremia of this less likely  -antibiotics as above  -recheck blood cultures to confirm clearance of the bacteremia  -follow up sensitivities and adjust antibiotics as needed  -follow up sputum culture to see if match sweats in the blood  -no additional ID workup for now    3  Pancytopenia-with severe neutropenia in the setting of cancer chemotherapy  Suspect secondary to the patient's chemotherapy  The acute infectious process may also be playing a role   -continue G-CSF  -recheck CBC with diff  -hematology oncology follow-up    4  Pneumonia-possibly aspiration    More dense consolidation on the left  His respiratory status is relatively stable and he is not requiring any more oxygen support   -antibiotics as above  -monitor respiratory status  -pulmonary toilet  -close Pulmonary critical care follow-up    5  Gastroesophageal cancer-on modified FOLFOX  Hematology oncology follow-up    Have discussed the above management plan in detail with the primary service    Extensive review of the medical records in epic including review of the notes, radiographs, and laboratory results     HISTORY OF PRESENT ILLNESS:  Reason for Consult:  Gram-negative bacteremia, septic shin  HPI: Marina Smith is a 77y o  year old male with metastatic GE junction carcinoma admitted to [facility] with lethargy, subjective fever, and found to be hypoxic and hypotensive who I am asked to assist with antibiotic management for septic shock and gram-negative bacteremia  In the 3 weeks prior to this admission he had generalized malaise, weakness, nausea in the setting of ongoing cancer chemotherapy  He has been receiving modified FOLFOX with his last treatment August 19th to 21st   The 2 days prior to admission the patient developed subjective fever  Because of his worsening symptomatology was brought to the emergency department for further evaluation  Emergency department he was found to be hypotensive and had evidence of severe neutropenia, and acute kidney injury  He also had a lactic acidosis  He had blood cultures and urine cultures obtained and was started on vancomycin, cefepime, and Flagyl  CT of the chest abdomen pelvis revealed evidence of left-sided pneumonia with mediastinal and hilar lymphadenopathy  He was admitted to the intensive care unit initially required vasopressor support  The patient has now been resuscitated, and has clinically improved with resolution of the vasopressor requirements  He still feels quite fatigued    He does admit to having some abdominal pain, nausea and vomiting  He denies any diarrhea  He denies any dysuria or hematuria, denies any new rash or skin lesions, denies any new joint or muscle pains  He does have some cough and there was a concern about possible aspiration  REVIEW OF SYSTEMS:  A complete review of systems is negative other than that noted in the HPI  PAST MEDICAL HISTORY:  Past Medical History:   Diagnosis Date    Dehydration 2019    [facility] cancer ([facility] )     Hypertension     Nausea 2019    Psychiatric disorder     Recovering alcoholic ([facility] )      Past Surgical History:   Procedure Laterality Date    APPENDECTOMY      IR PORT PLACEMENT  2019       FAMILY HISTORY:  Non-contributory    SOCIAL HISTORY:  Social History   Social History     Substance and Sexual Activity   Alcohol Use No    Frequency: Never    Binge frequency: Never     Social History     Substance and Sexual Activity   Drug Use Yes    Types: Marijuana     Social History     Tobacco Use   Smoking Status Former Smoker    Types: Cigars    Last attempt to quit: 5/15/2019    Years since quittin 2   Smokeless Tobacco Never Used       ALLERGIES:  Allergies   Allergen Reactions    [place] Venom Anaphylaxis    Shellfish-Derived Products      Develops GOUT       MEDICATIONS:  All current active medications have been reviewed    Antibiotics:  Cefepime 2, Flagyl 2, vancomycin 2    PHYSICAL EXAM:  Temp:  [97 1 °F (36 2 °C)-100 °F (37 8 °C)] 98 3 °F (36 8 °C)  HR:  [67-97] 77  Resp:  [20-35] 24  BP: ()/(55-78) 137/78  SpO2:  [86 %-98 %] 93 %  Temp (24hrs), Av 7 °F (37 1 °C), Min:97 1 °F (36 2 °C), Max:100 °F (37 8 °C)  Current: Temperature: 98 3 °F (36 8 °C)    Intake/Output Summary (Last 24 hours) at 2020 1607  Last data filed at 2020 1401  Gross per 24 hour   Intake 3495 12 ml   Output 2445 ml   Net 1050 12 ml       General Appearance:  Appearing chronically ill, nontoxic, and in no distress   Head:  Normocephalic, without obvious abnormality, atraumatic   Eyes:  Conjunctiva pale and sclera anicteric, both eyes   Nose: Nares normal, mucosa normal, no drainage   Throat: Oropharynx moist without lesions   Neck: Supple, symmetrical, no adenopathy, no tenderness/mass/nodules   Back:   Symmetric, no curvature, ROM normal, no CVA tenderness   Lungs:   Decreased breath sounds bilaterally, respirations unlabored   Chest Wall:  No tenderness or deformity   Heart:  RRR; no murmur, rub or gallop   Abdomen:   Soft, non-tender, non-distended, positive bowel sounds    Extremities: No cyanosis, clubbing or edema   Skin: No rashes or lesions  No draining wounds noted  Lymph nodes: Cervical, supraclavicular nodes normal   Neurologic: Alert, answer simple questions appropriately, able to move all 4 extremities without difficulty       LABS, IMAGING, & OTHER STUDIES:  Lab Results:  I have personally reviewed pertinent labs    Results from last 7 days   Lab Units 08/31/20  0545 08/30/20  0915   WBC Thousand/uL 0 61* 0 34*   HEMOGLOBIN g/dL 7 2* 9 2*   PLATELETS Thousands/uL 149 236     Results from last 7 days   Lab Units 08/31/20  0545 08/30/20  0916   SODIUM mmol/L 139 134*   POTASSIUM mmol/L 3 1* 3 5   CHLORIDE mmol/L 107 100   CO2 mmol/L 19* 14*   BUN mg/dL 20 29*   CREATININE mg/dL 1 07 1 69*   EGFR ml/min/1 73sq m 71 41   CALCIUM mg/dL 7 4* 8 5   AST U/L 69* 24   ALT U/L 37 14   ALK PHOS U/L 40* 54     Results from last 7 days   Lab Units 08/31/20  0939 08/30/20  1317 08/30/20  0926 08/30/20  0916   GRAM STAIN RESULT  2+ Epithelial cells per low power field*  No polys seen*  1+ Gram negative rods*  Rare Gram positive cocci in pairs*  --  Gram negative rods* Gram negative rods*   URINE CULTURE   --  No Growth <1000 cfu/mL  --   --      Results from last 7 days   Lab Units 08/31/20  0545   PROCALCITONIN ng/ml 150 29*                   Imaging Studies:     CT head-no acute intracranial abnormality    CT cervical spine-no fracture or traumatic malalignment    CT chest abdomen pelvis-extensive airspace opacities throughout left lung  No pulmonary embolism  Stable malignancy at the GE junction    Mediastinal adenopathy    Images personally reviewed by me in PACS

## 2020-09-01 LAB
ALBUMIN SERPL BCP-MCNC: 1.8 G/DL (ref 3.5–5)
ALP SERPL-CCNC: 47 U/L (ref 46–116)
ALT SERPL W P-5'-P-CCNC: 34 U/L (ref 12–78)
ANION GAP SERPL CALCULATED.3IONS-SCNC: 10 MMOL/L (ref 4–13)
ANISOCYTOSIS BLD QL SMEAR: PRESENT
AST SERPL W P-5'-P-CCNC: 57 U/L (ref 5–45)
BASOPHILS # BLD MANUAL: 0 THOUSAND/UL (ref 0–0.1)
BASOPHILS NFR MAR MANUAL: 0 % (ref 0–1)
BILIRUB SERPL-MCNC: 1 MG/DL (ref 0.2–1)
BUN SERPL-MCNC: 16 MG/DL (ref 5–25)
CALCIUM SERPL-MCNC: 8.2 MG/DL (ref 8.3–10.1)
CHLORIDE SERPL-SCNC: 104 MMOL/L (ref 100–108)
CO2 SERPL-SCNC: 22 MMOL/L (ref 21–32)
CREAT SERPL-MCNC: 0.89 MG/DL (ref 0.6–1.3)
DOHLE BOD BLD QL SMEAR: PRESENT
EOSINOPHIL # BLD MANUAL: 0.02 THOUSAND/UL (ref 0–0.4)
EOSINOPHIL NFR BLD MANUAL: 1 % (ref 0–6)
ERYTHROCYTE [DISTWIDTH] IN BLOOD BY AUTOMATED COUNT: 16.3 % (ref 11.6–15.1)
FERRITIN SERPL-MCNC: 175 NG/ML (ref 8–388)
FOLATE SERPL-MCNC: 10.6 NG/ML (ref 3.1–17.5)
GFR SERPL CREATININE-BSD FRML MDRD: 88 ML/MIN/1.73SQ M
GLUCOSE SERPL-MCNC: 118 MG/DL (ref 65–140)
GLUCOSE SERPL-MCNC: 121 MG/DL (ref 65–140)
GLUCOSE SERPL-MCNC: 167 MG/DL (ref 65–140)
GLUCOSE SERPL-MCNC: 82 MG/DL (ref 65–140)
HCT VFR BLD AUTO: 24 % (ref 36.5–49.3)
HGB BLD-MCNC: 7.4 G/DL (ref 12–17)
IRON SATN MFR SERPL: 4 %
IRON SERPL-MCNC: 13 UG/DL (ref 65–175)
LACTATE SERPL-SCNC: 2.5 MMOL/L (ref 0.5–2)
LG PLATELETS BLD QL SMEAR: PRESENT
LYMPHOCYTES # BLD AUTO: 0.39 THOUSAND/UL (ref 0.6–4.47)
LYMPHOCYTES # BLD AUTO: 24 % (ref 14–44)
MAGNESIUM SERPL-MCNC: 2.1 MG/DL (ref 1.6–2.6)
MCH RBC QN AUTO: 30.6 PG (ref 26.8–34.3)
MCHC RBC AUTO-ENTMCNC: 30.8 G/DL (ref 31.4–37.4)
MCV RBC AUTO: 99 FL (ref 82–98)
MONOCYTES # BLD AUTO: 0.34 THOUSAND/UL (ref 0–1.22)
MONOCYTES NFR BLD: 21 % (ref 4–12)
NEUTROPHILS # BLD MANUAL: 0.87 THOUSAND/UL (ref 1.85–7.62)
NEUTS BAND NFR BLD MANUAL: 30 % (ref 0–8)
NEUTS SEG NFR BLD AUTO: 24 % (ref 43–75)
NRBC BLD AUTO-RTO: 5 /100 WBCS
NRBC BLD AUTO-RTO: 6 /100 WBC (ref 0–2)
PHOSPHATE SERPL-MCNC: 1.7 MG/DL (ref 2.3–4.1)
PLATELET # BLD AUTO: 156 THOUSANDS/UL (ref 149–390)
PLATELET BLD QL SMEAR: ADEQUATE
PMV BLD AUTO: 10.8 FL (ref 8.9–12.7)
POIKILOCYTOSIS BLD QL SMEAR: PRESENT
POTASSIUM SERPL-SCNC: 2.8 MMOL/L (ref 3.5–5.3)
POTASSIUM SERPL-SCNC: 2.9 MMOL/L (ref 3.5–5.3)
POTASSIUM SERPL-SCNC: 4 MMOL/L (ref 3.5–5.3)
PROCALCITONIN SERPL-MCNC: 86.37 NG/ML
PROT SERPL-MCNC: 5.3 G/DL (ref 6.4–8.2)
RBC # BLD AUTO: 2.42 MILLION/UL (ref 3.88–5.62)
RBC MORPH BLD: PRESENT
SCHISTOCYTES BLD QL SMEAR: PRESENT
SODIUM SERPL-SCNC: 136 MMOL/L (ref 136–145)
TIBC SERPL-MCNC: 341 UG/DL (ref 250–450)
TOTAL CELLS COUNTED SPEC: 100
VIT B12 SERPL-MCNC: 803 PG/ML (ref 100–900)
WBC # BLD AUTO: 1.61 THOUSAND/UL (ref 4.31–10.16)

## 2020-09-01 PROCEDURE — 82746 ASSAY OF FOLIC ACID SERUM: CPT | Performed by: PHYSICIAN ASSISTANT

## 2020-09-01 PROCEDURE — 82607 VITAMIN B-12: CPT | Performed by: PHYSICIAN ASSISTANT

## 2020-09-01 PROCEDURE — 82948 REAGENT STRIP/BLOOD GLUCOSE: CPT

## 2020-09-01 PROCEDURE — 99233 SBSQ HOSP IP/OBS HIGH 50: CPT | Performed by: INTERNAL MEDICINE

## 2020-09-01 PROCEDURE — 83540 ASSAY OF IRON: CPT | Performed by: PHYSICIAN ASSISTANT

## 2020-09-01 PROCEDURE — 84132 ASSAY OF SERUM POTASSIUM: CPT | Performed by: PHYSICIAN ASSISTANT

## 2020-09-01 PROCEDURE — 87040 BLOOD CULTURE FOR BACTERIA: CPT | Performed by: NURSE PRACTITIONER

## 2020-09-01 PROCEDURE — 85027 COMPLETE CBC AUTOMATED: CPT | Performed by: PHYSICIAN ASSISTANT

## 2020-09-01 PROCEDURE — 80053 COMPREHEN METABOLIC PANEL: CPT | Performed by: INTERNAL MEDICINE

## 2020-09-01 PROCEDURE — 94760 N-INVAS EAR/PLS OXIMETRY 1: CPT

## 2020-09-01 PROCEDURE — 99222 1ST HOSP IP/OBS MODERATE 55: CPT | Performed by: PHYSICIAN ASSISTANT

## 2020-09-01 PROCEDURE — 97535 SELF CARE MNGMENT TRAINING: CPT

## 2020-09-01 PROCEDURE — 85007 BL SMEAR W/DIFF WBC COUNT: CPT | Performed by: PHYSICIAN ASSISTANT

## 2020-09-01 PROCEDURE — 82728 ASSAY OF FERRITIN: CPT | Performed by: PHYSICIAN ASSISTANT

## 2020-09-01 PROCEDURE — 97110 THERAPEUTIC EXERCISES: CPT

## 2020-09-01 PROCEDURE — 83735 ASSAY OF MAGNESIUM: CPT | Performed by: PHYSICIAN ASSISTANT

## 2020-09-01 PROCEDURE — 99232 SBSQ HOSP IP/OBS MODERATE 35: CPT | Performed by: INTERNAL MEDICINE

## 2020-09-01 PROCEDURE — 83605 ASSAY OF LACTIC ACID: CPT | Performed by: PHYSICIAN ASSISTANT

## 2020-09-01 PROCEDURE — 84100 ASSAY OF PHOSPHORUS: CPT | Performed by: PHYSICIAN ASSISTANT

## 2020-09-01 PROCEDURE — 84145 PROCALCITONIN (PCT): CPT | Performed by: PHYSICIAN ASSISTANT

## 2020-09-01 PROCEDURE — 97167 OT EVAL HIGH COMPLEX 60 MIN: CPT

## 2020-09-01 PROCEDURE — 97163 PT EVAL HIGH COMPLEX 45 MIN: CPT

## 2020-09-01 PROCEDURE — 99291 CRITICAL CARE FIRST HOUR: CPT | Performed by: PHYSICIAN ASSISTANT

## 2020-09-01 PROCEDURE — 94640 AIRWAY INHALATION TREATMENT: CPT

## 2020-09-01 PROCEDURE — 83550 IRON BINDING TEST: CPT | Performed by: PHYSICIAN ASSISTANT

## 2020-09-01 RX ORDER — DEXAMETHASONE 4 MG/1
4 TABLET ORAL
Status: DISCONTINUED | OUTPATIENT
Start: 2020-09-01 | End: 2020-09-08 | Stop reason: HOSPADM

## 2020-09-01 RX ORDER — POTASSIUM CHLORIDE 20 MEQ/1
40 TABLET, EXTENDED RELEASE ORAL ONCE
Status: COMPLETED | OUTPATIENT
Start: 2020-09-01 | End: 2020-09-01

## 2020-09-01 RX ORDER — ALBUMIN (HUMAN) 12.5 G/50ML
25 SOLUTION INTRAVENOUS ONCE
Status: COMPLETED | OUTPATIENT
Start: 2020-09-01 | End: 2020-09-01

## 2020-09-01 RX ORDER — DIAZEPAM 5 MG/1
5 TABLET ORAL DAILY PRN
Status: DISCONTINUED | OUTPATIENT
Start: 2020-09-01 | End: 2020-09-08 | Stop reason: HOSPADM

## 2020-09-01 RX ORDER — DIAZEPAM 5 MG/ML
5 INJECTION, SOLUTION INTRAMUSCULAR; INTRAVENOUS ONCE
Status: COMPLETED | OUTPATIENT
Start: 2020-09-01 | End: 2020-09-01

## 2020-09-01 RX ORDER — POTASSIUM CHLORIDE 14.9 MG/ML
20 INJECTION INTRAVENOUS ONCE
Status: DISCONTINUED | OUTPATIENT
Start: 2020-09-01 | End: 2020-09-01

## 2020-09-01 RX ORDER — MEROPENEM 1 G/1
1000 INJECTION, POWDER, FOR SOLUTION INTRAVENOUS EVERY 8 HOURS
Status: DISCONTINUED | OUTPATIENT
Start: 2020-09-01 | End: 2020-09-02

## 2020-09-01 RX ORDER — POTASSIUM CHLORIDE 14.9 MG/ML
20 INJECTION INTRAVENOUS ONCE
Status: COMPLETED | OUTPATIENT
Start: 2020-09-01 | End: 2020-09-01

## 2020-09-01 RX ORDER — POTASSIUM CHLORIDE 20 MEQ/1
40 TABLET, EXTENDED RELEASE ORAL 2 TIMES DAILY
Status: DISPENSED | OUTPATIENT
Start: 2020-09-01 | End: 2020-09-02

## 2020-09-01 RX ORDER — OLANZAPINE 2.5 MG/1
5 TABLET ORAL
Status: DISCONTINUED | OUTPATIENT
Start: 2020-09-01 | End: 2020-09-08 | Stop reason: HOSPADM

## 2020-09-01 RX ADMIN — OLANZAPINE 5 MG: 2.5 TABLET, FILM COATED ORAL at 21:33

## 2020-09-01 RX ADMIN — LEVALBUTEROL HYDROCHLORIDE 1.25 MG: 1.25 SOLUTION, CONCENTRATE RESPIRATORY (INHALATION) at 08:34

## 2020-09-01 RX ADMIN — METRONIDAZOLE 500 MG: 500 INJECTION, SOLUTION INTRAVENOUS at 11:56

## 2020-09-01 RX ADMIN — POTASSIUM CHLORIDE 40 MEQ: 1500 TABLET, EXTENDED RELEASE ORAL at 08:59

## 2020-09-01 RX ADMIN — MEROPENEM 1000 MG: 1 INJECTION, POWDER, FOR SOLUTION INTRAVENOUS at 16:02

## 2020-09-01 RX ADMIN — ALBUMIN (HUMAN) 25 G: 0.25 INJECTION, SOLUTION INTRAVENOUS at 18:19

## 2020-09-01 RX ADMIN — METOPROLOL TARTRATE 25 MG: 25 TABLET, FILM COATED ORAL at 11:58

## 2020-09-01 RX ADMIN — DEXAMETHASONE 4 MG: 4 TABLET ORAL at 14:03

## 2020-09-01 RX ADMIN — POTASSIUM CHLORIDE 40 MEQ: 1500 TABLET, EXTENDED RELEASE ORAL at 14:03

## 2020-09-01 RX ADMIN — AMIODARONE HYDROCHLORIDE 150 MG: 50 INJECTION, SOLUTION INTRAVENOUS at 23:05

## 2020-09-01 RX ADMIN — IPRATROPIUM BROMIDE 0.5 MG: 0.5 SOLUTION RESPIRATORY (INHALATION) at 08:34

## 2020-09-01 RX ADMIN — IPRATROPIUM BROMIDE 0.5 MG: 0.5 SOLUTION RESPIRATORY (INHALATION) at 19:30

## 2020-09-01 RX ADMIN — LEVALBUTEROL HYDROCHLORIDE 1.25 MG: 1.25 SOLUTION, CONCENTRATE RESPIRATORY (INHALATION) at 13:37

## 2020-09-01 RX ADMIN — APIXABAN 5 MG: 5 TABLET, FILM COATED ORAL at 17:31

## 2020-09-01 RX ADMIN — AMIODARONE HYDROCHLORIDE 1 MG/MIN: 50 INJECTION, SOLUTION INTRAVENOUS at 23:18

## 2020-09-01 RX ADMIN — POTASSIUM PHOSPHATE, MONOBASIC AND POTASSIUM PHOSPHATE, DIBASIC 30 MMOL: 224; 236 INJECTION, SOLUTION, CONCENTRATE INTRAVENOUS at 07:05

## 2020-09-01 RX ADMIN — CEFEPIME 2000 MG: 2 INJECTION, POWDER, FOR SOLUTION INTRAVENOUS at 09:30

## 2020-09-01 RX ADMIN — CEFEPIME 2000 MG: 2 INJECTION, POWDER, FOR SOLUTION INTRAVENOUS at 01:20

## 2020-09-01 RX ADMIN — IPRATROPIUM BROMIDE 0.5 MG: 0.5 SOLUTION RESPIRATORY (INHALATION) at 13:37

## 2020-09-01 RX ADMIN — AMIODARONE HYDROCHLORIDE 150 MG: 50 INJECTION, SOLUTION INTRAVENOUS at 22:00

## 2020-09-01 RX ADMIN — MEROPENEM 1000 MG: 1 INJECTION, POWDER, FOR SOLUTION INTRAVENOUS at 23:26

## 2020-09-01 RX ADMIN — TBO-FILGRASTIM 480 MCG: 480 INJECTION, SOLUTION SUBCUTANEOUS at 09:01

## 2020-09-01 RX ADMIN — DIAZEPAM 5 MG: 10 INJECTION, SOLUTION INTRAMUSCULAR; INTRAVENOUS at 12:38

## 2020-09-01 RX ADMIN — METRONIDAZOLE 500 MG: 500 INJECTION, SOLUTION INTRAVENOUS at 04:06

## 2020-09-01 RX ADMIN — PANTOPRAZOLE SODIUM 40 MG: 40 TABLET, DELAYED RELEASE ORAL at 05:39

## 2020-09-01 RX ADMIN — POTASSIUM CHLORIDE 20 MEQ: 14.9 INJECTION, SOLUTION INTRAVENOUS at 18:05

## 2020-09-01 RX ADMIN — APIXABAN 5 MG: 5 TABLET, FILM COATED ORAL at 08:59

## 2020-09-01 RX ADMIN — INSULIN LISPRO 1 UNITS: 100 INJECTION, SOLUTION INTRAVENOUS; SUBCUTANEOUS at 21:33

## 2020-09-01 RX ADMIN — VENLAFAXINE HYDROCHLORIDE 150 MG: 150 CAPSULE, EXTENDED RELEASE ORAL at 09:03

## 2020-09-01 RX ADMIN — POTASSIUM CHLORIDE 20 MEQ: 14.9 INJECTION, SOLUTION INTRAVENOUS at 14:03

## 2020-09-01 RX ADMIN — LEVALBUTEROL HYDROCHLORIDE 1.25 MG: 1.25 SOLUTION, CONCENTRATE RESPIRATORY (INHALATION) at 19:30

## 2020-09-01 RX ADMIN — POTASSIUM CHLORIDE 20 MEQ: 14.9 INJECTION, SOLUTION INTRAVENOUS at 16:03

## 2020-09-01 NOTE — PHYSICAL THERAPY NOTE
PT EVALUATION       09/01/20 1120   Note Type   Note type Eval/Treat   Pain Assessment   Pain Assessment Tool Pain Assessment not indicated - pt denies pain   Home Living   Type of [address] One level; Laundry in basement   [address] [facility]   Prior Function   Level of [place] Independent with ADLs and functional mobility   Lives With Spouse   Receives Help From Family   ADL Assistance Independent   Comments Pt amb w/wout cane PTA   Restrictions/Precautions   Other Precautions O2;Fall Risk;Bed Alarm; Chair Alarm   General   Additional Pertinent History Pt adm with lethargy, (+) fall and a fever  Family/Caregiver Present No   Cognition   Overall Cognitive Status WFL   Arousal/Participation Cooperative   Orientation Level Oriented X4   Following Commands Follows one step commands with increased time or repetition   RLE Assessment   RLE Assessment WFL  (3 to 3+/5)   LLE Assessment   LLE Assessment WFL  (3 to 3+/5)   Bed Mobility   Supine to Sit 3  Moderate assistance   Additional items Assist x 1;Verbal cues   Transfers   Sit to Stand 3  Moderate assistance   Additional items Assist x 1;Verbal cues   Stand to Sit 3  Moderate assistance   Additional items Assist x 1;Verbal cues   Ambulation/Elevation   Gait pattern   (unsteady;loss of balance multidirectional)   Gait Assistance   (min/mod A)   Additional items Assist x 1;Verbal cues; Tactile cues   Assistive Device None;Rolling walker   Distance Pt stood with mod/min A briefly and then returned to sitting EOB due to weakness and imbalance  Pt then amb with RW x 10 feet with change in direction  Pt with poor insight into functional deficits - unsafe - at risk for falls  Pt sat OOB in chair with all needs in reach, (+) chair alarm;RN aware;pt mod SOB with limited activity     Balance   Static Sitting Fair +   Static Standing Fair -   Dynamic Standing Poor +   Ambulatory Poor   Activity Tolerance   Activity Tolerance Patient limited by fatigue  (SOB;weakness;imbalance)   Assessment   Problem List Decreased strength;Decreased range of motion;Decreased endurance; Impaired balance;Decreased mobility; Decreased coordination; Impaired judgement;Decreased safety awareness   Assessment Patient seen for Physical Therapy evaluation  Patient admitted with Septic shock ([facility] )  Comorbidities affecting patient's physical performance include: falls, neutropenic, pneumonia, JENNIFER, PTSD, OCS, esophageal cancer, malignant ascites, recovering alcoholic  Personal factors affecting patient at time of initial evaluation include: lives in one story house, inability to navigate community distances, inability to navigate level surfaces without external assistance, inability to perform dynamic tasks in community, positive fall history, anxiety, depression, inability to perform ADLS, inability to perform IADLS  and inability to live alone  Prior to admission, patient was independent with functional mobility with cane, independent with functional mobility without assistive device, independent with ADLS, living with spouse in a one level home with ? ? steps to enter and ambulating household distance  Please find objective findings from Physical Therapy assessment regarding body systems outlined above with impairments and limitations including weakness, decreased ROM, impaired balance, decreased endurance, impaired coordination, gait deviations, decreased activity tolerance, decreased functional mobility tolerance, decreased safety awareness, fall risk and SOB upon exertion  The Barthel Index was used as a functional outcome tool presenting with a score of 30 today indicating marked limitations of functional mobility and ADLS    Patient's clinical presentation is currently unstable/unpredictable as seen in patient's presentation of vital sign response, varying levels of cognitive performance, increased fall risk, new onset of impairment of functional mobility, decreased endurance and new onset of weakness  Pt would benefit from continued Physical Therapy treatment to address deficits as defined above and maximize level of functional mobility  As demonstrated by objective findings, the assigned level of complexity for this evaluation is high  Goals   Patient Goals go home   STG Expiration Date 09/08/20   Short Term Goal #1 bed mob - min A; trans - min A   Short Term Goal #2 pt will amb with RW functional household distances - min A; balance with RW - F/F+ for safe mobility and to decrease fall risk   LTG Expiration Date 09/15/20   Long Term Goal #1 bed mob - I; trans - I   Long Term Goal #2 pt will amb with RW functional household distances - I; balance with RW - F+/G for safe mobility and to decrease fall risk; strength LEs - 3+ to 4-/5   Plan   Treatment/Interventions ADL retraining;Functional transfer training;LE strengthening/ROM; Therapeutic exercise; Endurance training;Patient/family training;Equipment eval/education; Bed mobility;Gait training; Compensatory technique education   PT Frequency 5x/wk   Recommendation   PT Discharge Recommendation Post-Acute Rehabilitation Services   Additional Comments Pt has limited insight into functional deficits and is impulsive with all mobility  Barthel Index   Feeding 5   Bathing 0   Grooming Score 0   Dressing Score 5   Bladder Score 0   Bowels Score 10   Toilet Use Score 5   Transfers (Bed/Chair) Score 5   Mobility (Level Surface) Score 0   Stairs Score 0   Barthel Index Score 30   Licensure   [place] License Number  Paty [address] [place], [place] [id number]     Time LR:0258  Time NDT:0997  Total Time: 10 mins      S:  "weak"  O:  Pt trans sit to stand with mod A  Pt amb with RW with mod/min A 10 feet to and from bathroom  Pt needs minimal assist for hygiene after BM  Pt returned to chair with all needs in reach, (+) chair alarm and RN aware  A:  Pt will benefit from cont skilled PT services to increase pt's strength and mobility   Pt is noted with limited insight into deficits, is impulsive and at risk for falls  P:  Cont per PT POC  DCP - post-acute rehab services      Amy Chappell [place]   [phone]

## 2020-09-01 NOTE — ASSESSMENT & PLAN NOTE
· Follows with [facility]  · Pt does have mets to peritoneum but has been fairly responsive to chemotherapy  · Last treatment 8/20 with 5 FU  · Follows with palliative, after readdressing status with wife plan to remain full code

## 2020-09-01 NOTE — ASSESSMENT & PLAN NOTE
sCr admission 1 69, baseline sCr 0 8  Likely ATN d/t hypotension  Given 3L Crystalloid bolus in ED and started on vasopressors, keep map >65  Antunez d/c'ed

## 2020-09-01 NOTE — PROGRESS NOTES
Progress Note Carlos Gallardo 1955, 77 y o  male MRN: [id number]    Unit/Bed#: ICU 11 Encounter: [id number]    Primary Care Provider: Eugene Burns DO   Date and time admitted to hospital: 8/30/2020  9:00 AM        * Septic shock [facility])  Assessment & Plan  Ongoing chemotherapy for GE CA, noted to be febrile this morning by wife s/p fall in kitchen  Lactic 9 9 on admission, received 3L IVF bolus in ED Bcx obtained and started broad spectrum abx  CT CAP: Suspicious for Left sided multi-lobar pna, no PE noted, masses remain unchanged compared to prior films  CT Head/Neck: no acute intracranial changes    · Levo and vaso weaned off overnight, MAPs remain >65  · Continue Stress dose steroids  · Strict I/O's with lopez  · Lactic trending down on Q2's, though pressor requirement has improved significantly, will recheck lactic in am   · Procal pending   · BC 2/2 GNR, can likely d/c Vanc if MRSA cx negative   · Cefepime, Flagyl D3; Vanc d/c'ed, Tobramycin x1    JENNIFER (acute kidney injury) ([facility] )  Assessment & Plan  sCr admission 1 69, baseline sCr 0 8  Likely ATN d/t hypotension  Given 3L Crystalloid bolus in ED and started on vasopressors, keep map >65  Lopez d/c'ed    PNA (pneumonia)  Assessment & Plan  · CT shows concern for Left sided multifocal pna, likely source of sepsis   · Trend procal, fever and WBC  · Currently requiring 3L NC, keep sats >90%  · BC 2/2 GNR, may consider aspiration event   · Sputum GNR  · Aspiration precautions     Neutropenic (HCC)  Assessment & Plan  · AIN 0 03  · Neutropenic precautions  · Start Granix 5mcg/kg x 3 days  · consult heme/onc    Gastroesophageal cancer ([facility] [address] )  Assessment & Plan  · Follows with [facility]  · Pt does have mets to peritoneum but has been fairly responsive to chemotherapy  · Last treatment 8/20 with 5 FU  · Follows with palliative, after readdressing status with wife plan to remain full code      Falls  Assessment & Plan  · Fall precuations   · PT/OT when appropriate     Chemotherapy-induced nausea  Assessment & Plan  · zofran prn ordered        ----------------------------------------------------------------------------------------  HPI/24hr events: He remained HD stable overnight, he endorses no complaints  Disposition: Transfer to Med-Surg   Code Status: Level 1 - Full Code  ---------------------------------------------------------------------------------------  SUBJECTIVE  "I'm okay"    Review of Systems  Review of systems was reviewed and negative unless stated above in HPI/24-hour events   ---------------------------------------------------------------------------------------  OBJECTIVE    Vitals   Vitals:    20 2200 20 0000 20 0200 20 0400   BP: 107/59 119/64 134/69 142/76   Pulse: 93 81 77 76   Resp: (!) 28 (!) 28 (!) 29 (!) 27   Temp: (!) 101 4 °F (38 6 °C) 99 7 °F (37 6 °C)  98 5 °F (36 9 °C)   TempSrc: Tympanic Tympanic  Tympanic   SpO2: 94% 98% 95% 96%   Weight:       Height:         Temp (24hrs), Av 3 °F (37 4 °C), Min:97 1 °F (36 2 °C), Max:101 6 °F (38 7 °C)  Current: Temperature: 98 5 °F (36 9 °C)  Arterial Line BP: 142/62  Arterial Line MAP (mmHg): 89 mmHg    Respiratory:  SpO2: SpO2: 96 %, SpO2 Activity: SpO2 Activity: At Rest, SpO2 Device: O2 Device: Nasal cannula  Nasal Cannula O2 Flow Rate (L/min): 5 L/min    Invasive/non-invasive ventilation settings   Respiratory    Lab Data (Last 4 hours)    None         O2/Vent Data (Last 4 hours)    None                Physical Exam  Vitals signs and nursing note reviewed  Constitutional:       Appearance: He is ill-appearing (appears chronically ill )  Interventions: Nasal cannula in place  HENT:      Head: Normocephalic and atraumatic  Mouth/Throat:      Mouth: Mucous membranes are dry  Pharynx: Oropharynx is clear  Cardiovascular:      Rate and Rhythm: Normal rate and regular rhythm  Pulses: Normal pulses        Heart sounds: Normal heart sounds  Pulmonary:      Effort: Pulmonary effort is normal  Tachypnea present  No respiratory distress  Breath sounds: No stridor  Examination of the left-upper field reveals wheezing  Examination of the left-middle field reveals wheezing  Examination of the left-lower field reveals wheezing  Decreased breath sounds and wheezing present  Abdominal:      General: Abdomen is flat  Palpations: Abdomen is soft  Genitourinary:     Comments: Voiding independently   Musculoskeletal: Normal range of motion  General: No swelling or tenderness  Skin:     General: Skin is warm  Capillary Refill: Capillary refill takes less than 2 seconds  Coloration: Skin is pale  Neurological:      General: No focal deficit present  Mental Status: He is alert and oriented to person, place, and time  Mental status is at baseline  GCS: GCS eye subscore is 4  GCS verbal subscore is 5  GCS motor subscore is 6  Psychiatric:         Attention and Perception: Attention normal          Mood and Affect: Mood normal          Speech: Speech normal          Behavior: Behavior normal          Thought Content:  Thought content normal          Laboratory and Diagnostics:  Results from last 7 days   Lab Units 08/31/20  0545 08/30/20  0915   WBC Thousand/uL 0 61* 0 34*   HEMOGLOBIN g/dL 7 2* 9 2*   HEMATOCRIT % 23 1* 29 6*   PLATELETS Thousands/uL 149 236   MONO PCT %  --  20*     Results from last 7 days   Lab Units 09/01/20  0537 08/31/20  0545 08/30/20  0916   SODIUM mmol/L 136 139 134*   POTASSIUM mmol/L 2 8* 3 1* 3 5   CHLORIDE mmol/L 104 107 100   CO2 mmol/L 22 19* 14*   ANION GAP mmol/L 10 13 20*   BUN mg/dL 16 20 29*   CREATININE mg/dL 0 89 1 07 1 69*   CALCIUM mg/dL 8 2* 7 4* 8 5   GLUCOSE RANDOM mg/dL 121 99 104   ALT U/L 34 37 14   AST U/L 57* 69* 24   ALK PHOS U/L 47 40* 54   ALBUMIN g/dL 1 8* 1 9* 2 2*   TOTAL BILIRUBIN mg/dL 1 00 0 80 0 90     Results from last 7 days   Lab Units 09/01/20  0537 08/31/20  0545 08/30/20  0916   MAGNESIUM mg/dL 2 1 1 9 1 7   PHOSPHORUS mg/dL 1 7* 2 5  --            Results from last 7 days   Lab Units 08/30/20  0916   TROPONIN I ng/mL 1 54*     Results from last 7 days   Lab Units 08/31/20  0545 08/30/20  2119 08/30/20  1930 08/30/20  1727 08/30/20  1526 08/30/20  1309 08/30/20  1115   LACTIC ACID mmol/L 5 2* 8 0* 8 8* 8 4* 9 0* 7 8* 7 6*     ABG:  Results from last 7 days   Lab Units 08/30/20  0929   PH ART  7 427   PCO2 ART mm Hg 22 5*   PO2 ART mm Hg 246 4*   HCO3 ART mmol/L 14 5*   BASE EXC ART mmol/L -8 4   ABG SOURCE  Radial, Right     VBG:  Results from last 7 days   Lab Units 08/30/20  1309 08/30/20  0929   PH CYNTHIA  7 276*  --    PCO2 CYNTHIA mm Hg 29 7*  --    PO2 CYNTHIA mm Hg 47 7*  --    HCO3 CYNTHIA mmol/L 13 5*  --    BASE EXC CYNTHIA mmol/L -12 1  --    ABG SOURCE   --  Radial, Right     Results from last 7 days   Lab Units 08/31/20  0545   PROCALCITONIN ng/ml 150 29*       Micro  Results from last 7 days   Lab Units 08/31/20  0939 08/30/20  1317 08/30/20  1316 08/30/20  0926 08/30/20  0916   GRAM STAIN RESULT  2+ Epithelial cells per low power field*  No polys seen*  1+ Gram negative rods*  Rare Gram positive cocci in pairs*  --   --  Gram negative rods* Gram negative rods*   URINE CULTURE   --  No Growth <1000 cfu/mL  --   --   --    MRSA CULTURE ONLY   --   --  No Methicillin Resistant Staphlyococcus aureus (MRSA) isolated  --   --        EKG: NSR on tele   Imaging: I have personally reviewed pertinent reports  Intake and Output  I/O       08/30 0701 - 08/31 0700 08/31 0701 - 09/01 0700    P  O  120 420    I V  (mL/kg) 2023 9 (24 7) 50 (0 6)    IV Piggyback 2750 1450    Total Intake(mL/kg) 4893 9 (59 7) 1920 (23 4)    Urine (mL/kg/hr) 2370 2070 (1 1)    Total Output 2370 2070    Net +0383 9 -150                Height and Weights   Height: 6' (182 9 cm)  IBW: 77 6 kg  Body mass index is 24 52 kg/m²    Weight (last 2 days)     Date/Time   Weight    08/31/20 0554   82 (180 78)    08/30/20 1241   81 5 (179 68)                Nutrition       Diet Orders   (From admission, onward)             Start     Ordered    08/31/20 1026  Diet Dysphagia/Modified Consistency; Dysphagia 2-Mechanical Soft; Thin Liquid  Diet effective now     Question Answer Comment   Diet Type Dysphagia/Modified Consistency    Dysphagia/Modified Consistency Dysphagia 2-Mechanical Soft    Liquid Modifier Thin Liquid    RD to adjust diet per protocol?  Yes        08/31/20 1025                  Active Medications  Scheduled Meds:  Current Facility-Administered Medications   Medication Dose Route Frequency Provider Last Rate    acetaminophen  650 mg Oral Q6H PRN Marline Juarez PA-C      apixaban  5 mg Oral BID Marline Juarez PA-C      cefepime  2,000 mg Intravenous Q8H Kevon Sotelo MD Stopped (09/01/20 0150)    dextrose  25 g Intravenous Once Amber Anepu, DO      hydrocortisone sodium succinate  50 mg Intravenous Q12H [facility] Marline Juarez PA-C      insulin lispro  1-6 Units Subcutaneous TID AC Marline Juarez PA-C      insulin lispro  1-6 Units Subcutaneous HS Marline Juarez PA-C      ipratropium  0 5 mg Nebulization TID [place] Fliplife, DO      levalbuterol  1 25 mg Nebulization TID [place] Fliplife, DO      metroNIDAZOLE  500 mg Intravenous Q8H Marline Juarez PA-C Stopped (09/01/20 0436)    ondansetron  4 mg Intravenous Q8H PRN Marline Juarez PA-C      pantoprazole  40 mg Oral Daily Before Breakfast Marline Juarez PA-C      pneumococcal 13-valent conjugate vaccine  0 5 mL Intramuscular Prior to discharge Ac Tang MD      tbo-filgrastim  480 mcg Subcutaneous Daily Marline Juarez PA-C      venlafaxine  150 mg Oral Daily Marline Juarez PA-C       Continuous Infusions:     PRN Meds:   acetaminophen, 650 mg, Q6H PRN  ondansetron, 4 mg, Q8H PRN  pneumococcal 13-valent conjugate vaccine, 0 5 mL, Prior to discharge        Invasive Devices Review  Invasive Devices     Central Venous Catheter Line            Port A Cath 07/31/19 Right Chest 398 days          Peripheral Intravenous Line            Peripheral IV 08/30/20 Left Antecubital 1 day    Peripheral IV 08/31/20 Right Antecubital less than 1 day                Rationale for remaining devices: IV abx  ---------------------------------------------------------------------------------------  Advance Directive and Living Will:      Power of :    POLST:    ---------------------------------------------------------------------------------------  Care Time Delivered:   No Critical Care time spent       YANI Horta      Portions of the record may have been created with voice recognition software  Occasional wrong word or "sound a like" substitutions may have occurred due to the inherent limitations of voice recognition software    Read the chart carefully and recognize, using context, where substitutions have occurred

## 2020-09-01 NOTE — OCCUPATIONAL THERAPY NOTE
Occupational Therapy Evaluation/Treatment       09/01/20 1238   Note Type   Note type Eval/Treat   Restrictions/Precautions   Other Precautions Immunosuppressed; Impulsive;Cognitive; Chair Alarm; Bed Alarm;Multiple lines; Fall Risk;Pain   Pain Assessment   Pain Assessment Tool 0-10   Pain Score 5   Pain Location/Orientation Location: Abdomen;Orientation: Mid   Home Living   Type of [address] One level; Laundry in basement;Stairs to enter with rails  ([address])   Bathroom Shower/Tub Tub/shower unit   Bathroom Toilet Standard   Bathroom Accessibility Accessible   Home Equipment Cane   Prior Function   Level of [place] Independent with ADLs and functional mobility; Needs assistance with IADLs   Lives With Spouse   Receives Help From Family   ADL Assistance Independent   IADLs Needs assistance   Falls in the last 6 months 5 to 10   Comments Pt ambulated with cane PTA  Performs own self care, but pt reports falling at least 1x/month due to LOB  Psychosocial   Psychosocial (WDL) X   Patient Behaviors/Mood Anxious   Needs Expressed Physical   Ability to Express Feelings Able to express   Ability to Express Needs Able to express   Ability to Express Thoughts Able to express   Ability to Understand Others Usually understands   Subjective   Subjective "I have to pee!"   ADL   Where Assessed Supine, bed   Eating Assistance 4  Minimal Assistance   Grooming Assistance 3  Moderate Assistance   UB Bathing Assistance 3  Moderate Assistance   LB Bathing Assistance 3  Moderate Assistance   UB Dressing Assistance 3  Moderate Assistance   LB Dressing Assistance 3  Moderate Assistance   Toileting Assistance  3  Moderate Assistance   Additional Comments Impulsive;Setup;Verbal cueing; Increased time to complete for all ADLs at this time   Bed Mobility   Supine to Sit 3  Moderate assistance   Additional items Assist x 1; Impulsive;Verbal cues   Sit to Supine 3  Moderate assistance   Additional items Assist x 1; Impulsive;Verbal cues   Transfers   Sit to Stand 3  Moderate assistance   Additional items Assist x 1; Impulsive;Verbal cues   Stand to Sit 3  Moderate assistance   Additional items Assist x 1; Impulsive;Verbal cues   Balance   Static Sitting Fair +   Dynamic Sitting Fair   Static Standing Fair -   Dynamic Standing Poor +   Ambulatory Poor   Activity Tolerance   Activity Tolerance Patient limited by fatigue;Treatment limited secondary to agitation  (Pt anxious, restless and impulsive  MD aware )   Medical Staff Made Aware yes   Nurse Guill[address], RN   RUE Assessment   RUE Assessment WFL  (gross strength 4-/5)   LUE Assessment   LUE Assessment WFL  (gross strength 4-/5)   Hand Function   Gross Motor Coordination Functional   Fine Motor Coordination Functional   Vision-Basic Assessment   Current Vision Wears glasses only for reading   Cognition   Overall Cognitive Status Impaired   Arousal/Participation Alert   Attention Attends with cues to redirect   Orientation Level Oriented X4   Memory Decreased short term memory   Following Commands Follows one step commands with increased time or repetition   Comments very impulsive   Assessment   Limitation Decreased ADL status; Decreased UE strength;Decreased Safe judgement during ADL;Decreased cognition;Decreased endurance;Decreased self-care trans;Decreased high-level ADLs  (decreased balance and mobility)   Prognosis Good   Assessment Patient evaluated by Occupational Therapy  Patient admitted with Septic shock ([facility] [address] )  The patients occupational profile, medical and therapy history includes a extensive additional review of physical, cognitive, or psychosocial history related to current functional performance  Comorbidities affecting functional mobility and ADLS include:  falls, neutropenic, pneumonia, JENNIFER, PTSD, OCS, Metastatic gastroesophageal cancer diagnosed in July 2019, malignant ascites, recovering alcoholic, poor nutrition    Prior to admission, patient was independent with functional mobility with cane, independent with ADLS, requiring assist for IADLS, living with wife in a 1 level home with 5 steps to enter and ambulating household distance  The evaluation identifies the following performance deficits: weakness, impaired balance, decreased endurance, increased fall risk, new onset of impairment of functional mobility, decreased ADLS, decreased IADLS, pain, decreased activity tolerance, decreased safety awareness, impaired judgement, SOB upon exertion, decreased cognition, decreased strength and impaired psychosocial skills, that result in activity limitations and/or participation restrictions  This evaluation requires clinical decision making of high complexity, because the patient presents with comorbidites that affect occupational performance and required significant modification of tasks or assistance with consideration of multiple treatment options  The Barthel Index was used as a functional outcome tool presenting with a score of 30, indicating marked limitations of functional mobility and ADLS  Patient will benefit from skilled Occupational Therapy services to address above deficits and facilitate a safe return to prior level of function  Goals   Patient Goals go to the bathroom myself   STG Time Frame   (1-7)   Short Term Goal #1 Patient will increase standing tolerance to 5 minutes during ADL task to decrease assistance level and decrease fall risk; Patient will increase bed mobility to min assist in preparation for ADLS and transfers;  Patient will increase functional mobility to and from bathroom with rolling walker with min assist to increase performance with ADLS and to use a toilet; Patient will tolerate 10 minutes of UE ROM/strengthening to increase general activity tolerance and performance in ADLS/IADLS; Patient will improve functional activity tolerance to 10 minutes of sustained functional tasks to increase participation in basic self-care and decrease assistance level;  Patient will be able to to verbalize understanding and perform energy conservation/proper body mechanics during ADLS and functional mobility at least 75% of the time with minimal cueing to decrease signs of fatigue and increase stamina to return to prior level of function; Patient will increase dynamic sitting balance to fair+ to improve the ability to sit at edge of bed or on a chair for ADLS;  Patient will increase static standing balance to fair to improve postural stability and decrease fall risk during standing ADLS and transfers  LTG Time Frame   (8-14)   Long Term Goal #1 Patient will increase standing tolerance to 10 minutes during ADL task to decrease assistance level and decrease fall risk; Patient will increase bed mobility to independent in preparation for ADLS and transfers; Patient will increase functional mobility to and from bathroom with rolling walker independently to increase performance with ADLS and to use a toilet; Patient will tolerate 15 minutes of UE ROM/strengthening to increase general activity tolerance and performance in ADLS/IADLS; Patient will improve functional activity tolerance to 20 minutes of sustained functional tasks to increase participation in basic self-care and decrease assistance level;  Patient will be able to to verbalize understanding and perform energy conservation/proper body mechanics during ADLS and functional mobility at least 90% of the time with minimalcueing to decrease signs of fatigue and increase stamina to return to prior level of function; Patient will increase static/dynamic sitting balance to good to improve the ability to sit at edge of bed or on a chair for ADLS;  Patient will increase static/dynamic standing balance to fair+ to improve postural stability and decrease fall risk during standing ADLS and transfers  Functional Transfer Goals   Pt Will Perform All Functional Transfers With min assist;With assistive devices; With good judgment/safety  (LTG- Independent)   ADL Goals   Pt Will Perform All ADL's In chair; With min assist;With setup; With cues; With good judgment/safety  (LTG- Independent standing in bathroom)   Plan   Treatment Interventions ADL retraining;Functional transfer training;UE strengthening/ROM; Endurance training;Cognitive reorientation;Patient/family training;Equipment evaluation/education; Compensatory technique education; Energy conservation   Goal Expiration Date 09/15/20   OT Frequency 5x/wk   Additional Treatment Session   Start Time 1225   End Time 1417   Treatment Assessment Pt seen for ADL training  Education and training begun with pt regarding improved safety awareness and proper body mechanics during ADLS and functional mobility to decrease fall risks, decrease signs of fatigue and increase stamina needed to return to prior level of function  Pt very impulsive and trying to climb out of bed with SCD pumps still attached to BLE  Tried to stand without AD to get to bathroom  Returned to bed and given a urinal and required ModA to use properly  Also coughing a lot with bloody oral secretions  ModA to clean face and hands afterwards  Pt SOB and tachycardic with minimal activity  Pt restless and frequently rolling and changing positions in bed  Nurse and MD aware  Patient left in bed with all needs within reach, SCD pumps, and bed alarm in place  Nurse in room  Continue OT per POC  Recommendation   OT Discharge Recommendation Post-Acute Rehabilitation Services   Barthel Index   Feeding 5   Bathing 0   Grooming Score 0   Dressing Score 5   Bladder Score 0   Bowels Score 10   Toilet Use Score 5   Transfers (Bed/Chair) Score 5   Mobility (Level Surface) Score 0   Stairs Score 0   Barthel Index Score 30   Licensure   [place] License Number  Liyah Hillman [address], OTR/L, [facility]# [id number]

## 2020-09-01 NOTE — PLAN OF CARE
Plan of care cont        Problem: RESPIRATORY - ADULT  Goal: Achieves optimal ventilation and oxygenation  Description: INTERVENTIONS:  - Assess for changes in respiratory status  - Assess for changes in mentation and behavior  - Position to facilitate oxygenation and minimize respiratory effort  - Oxygen administered by appropriate delivery if ordered  - Initiate smoking cessation education as indicated  - Encourage broncho-pulmonary hygiene including cough, deep breathe, Incentive Spirometry  - Assess the need for suctioning and aspirate as needed  - Assess and instruct to report SOB or any respiratory difficulty  - Respiratory Therapy support as indicated  Outcome: Progressing     Problem: CARDIOVASCULAR - ADULT  Goal: Maintains optimal cardiac output and hemodynamic stability  Description: INTERVENTIONS:  - Monitor I/O, vital signs and rhythm  - Monitor for S/S and trends of decreased cardiac output  - Administer and titrate ordered vasoactive medications to optimize hemodynamic stability  - Assess quality of pulses, skin color and temperature  - Assess for signs of decreased coronary artery perfusion  - Instruct patient to report change in severity of symptoms  Outcome: Progressing  Goal: Absence of cardiac dysrhythmias or at baseline rhythm  Description: INTERVENTIONS:  - Continuous cardiac monitoring, vital signs, obtain 12 lead EKG if ordered  - Administer antiarrhythmic and heart rate control medications as ordered  - Monitor electrolytes and administer replacement therapy as ordered  Outcome: Progressing     Problem: GENITOURINARY - ADULT  Goal: Maintains or returns to baseline urinary function  Description: INTERVENTIONS:  - Assess urinary function  - Encourage oral fluids to ensure adequate hydration if ordered  - Administer IV fluids as ordered to ensure adequate hydration  - Administer ordered medications as needed  - Offer frequent toileting  - Follow urinary retention protocol if ordered  Outcome: Progressing  Goal: Urinary catheter remains patent  Description: INTERVENTIONS:  - Assess patency of urinary catheter  - If patient has a chronic lopez, consider changing catheter if non-functioning  - Follow guidelines for intermittent irrigation of non-functioning urinary catheter  Outcome: Progressing     Problem: METABOLIC, FLUID AND ELECTROLYTES - ADULT  Goal: Electrolytes maintained within normal limits  Description: INTERVENTIONS:  - Monitor labs and assess patient for signs and symptoms of electrolyte imbalances  - Administer electrolyte replacement as ordered  - Monitor response to electrolyte replacements, including repeat lab results as appropriate  - Instruct patient on fluid and nutrition as appropriate  Outcome: Progressing  Goal: Fluid balance maintained  Description: INTERVENTIONS:  - Monitor labs   - Monitor I/O and WT  - Instruct patient on fluid and nutrition as appropriate  - Assess for signs & symptoms of volume excess or deficit  Outcome: Progressing  Goal: Glucose maintained within target range  Description: INTERVENTIONS:  - Monitor Blood Glucose as ordered  - Assess for signs and symptoms of hyperglycemia and hypoglycemia  - Administer ordered medications to maintain glucose within target range  - Assess nutritional intake and initiate nutrition service referral as needed  Outcome: Progressing     Problem: PAIN - ADULT  Goal: Verbalizes/displays adequate comfort level or baseline comfort level  Description: Interventions:  - Encourage patient to monitor pain and request assistance  - Assess pain using appropriate pain scale  - Administer analgesics based on type and severity of pain and evaluate response  - Implement non-pharmacological measures as appropriate and evaluate response  - Consider cultural and social influences on pain and pain management  - Notify physician/advanced practitioner if interventions unsuccessful or patient reports new pain  Outcome: Progressing     Problem: INFECTION - ADULT  Goal: Absence or prevention of progression during hospitalization  Description: INTERVENTIONS:  - Assess and monitor for signs and symptoms of infection  - Monitor lab/diagnostic results  - Monitor all insertion sites, i e  indwelling lines, tubes, and drains  - Monitor endotracheal if appropriate and nasal secretions for changes in amount and color  - [place] appropriate cooling/warming therapies per order  - Administer medications as ordered  - Instruct and encourage patient and family to use good hand hygiene technique  - Identify and instruct in appropriate isolation precautions for identified infection/condition  Outcome: Progressing  Goal: Absence of fever/infection during neutropenic period  Description: INTERVENTIONS:  - Monitor WBC    Outcome: Progressing     Problem: SAFETY ADULT  Goal: Patient will remain free of falls  Description: INTERVENTIONS:  - Assess patient frequently for physical needs  -  Identify cognitive and physical deficits and behaviors that affect risk of falls    -  [place] fall precautions as indicated by assessment   - Educate patient/family on patient safety including physical limitations  - Instruct patient to call for assistance with activity based on assessment  - Modify environment to reduce risk of injury  - Consider OT/PT consult to assist with strengthening/mobility  Outcome: Progressing  Goal: Maintain or return to baseline ADL function  Description: INTERVENTIONS:  -  Assess patient's ability to carry out ADLs; assess patient's baseline for ADL function and identify physical deficits which impact ability to perform ADLs (bathing, care of mouth/teeth, toileting, grooming, dressing, etc )  - Assess/evaluate cause of self-care deficits   - Assess range of motion  - Assess patient's mobility; develop plan if impaired  - Assess patient's need for assistive devices and provide as appropriate  - Encourage maximum independence but intervene and supervise when necessary  - Involve family in performance of ADLs  - Assess for home care needs following discharge   - Consider OT consult to assist with ADL evaluation and planning for discharge  - Provide patient education as appropriate  Outcome: Progressing  Goal: Maintain or return mobility status to optimal level  Description: INTERVENTIONS:  - Assess patient's baseline mobility status (ambulation, transfers, stairs, etc )    - Identify cognitive and physical deficits and behaviors that affect mobility  - Identify mobility aids required to assist with transfers and/or ambulation (gait belt, sit-to-stand, lift, walker, cane, etc )  - [place] fall precautions as indicated by assessment  - Record patient progress and toleration of activity level on Mobility SBAR; progress patient to next Phase/Stage  - Instruct patient to call for assistance with activity based on assessment  - Consider rehabilitation consult to assist with strengthening/weightbearing, etc   Outcome: Progressing     Problem: DISCHARGE PLANNING  Goal: Discharge to home or other facility with appropriate resources  Description: INTERVENTIONS:  - Identify barriers to discharge w/patient and caregiver  - Arrange for needed discharge resources and transportation as appropriate  - Identify discharge learning needs (meds, wound care, etc )  - Arrange for interpretive services to assist at discharge as needed  - Refer to Case Management Department for coordinating discharge planning if the patient needs post-hospital services based on physician/advanced practitioner order or complex needs related to functional status, cognitive ability, or social support system  Outcome: Progressing     Problem: Knowledge Deficit  Goal: Patient/family/caregiver demonstrates understanding of disease process, treatment plan, medications, and discharge instructions  Description: Complete learning assessment and assess knowledge base    Interventions:  - Provide teaching at level of understanding  - Provide teaching via preferred learning methods  Outcome: Progressing     Problem: Prexisting or High Potential for Compromised Skin Integrity  Goal: Skin integrity is maintained or improved  Description: INTERVENTIONS:  - Identify patients at risk for skin breakdown  - Assess and monitor skin integrity  - Assess and monitor nutrition and hydration status  - Monitor labs   - Assess for incontinence   - Turn and reposition patient  - Assist with mobility/ambulation  - Relieve pressure over bony prominences  - Avoid friction and shearing  - Provide appropriate hygiene as needed including keeping skin clean and dry  - Evaluate need for skin moisturizer/barrier cream  - Collaborate with interdisciplinary team   - Patient/family teaching  - Consider wound care consult   Outcome: Progressing     Problem: Potential for Falls  Goal: Patient will remain free of falls  Description: INTERVENTIONS:  - Assess patient frequently for physical needs  -  Identify cognitive and physical deficits and behaviors that affect risk of falls  -  [place] fall precautions as indicated by assessment   - Educate patient/family on patient safety including physical limitations  - Instruct patient to call for assistance with activity based on assessment  - Modify environment to reduce risk of injury  - Consider OT/PT consult to assist with strengthening/mobility  Outcome: Progressing     Problem: Nutrition/Hydration-ADULT  Goal: Nutrient/Hydration intake appropriate for improving, restoring or maintaining nutritional needs  Description: Monitor and assess patient's nutrition/hydration status for malnutrition  Collaborate with interdisciplinary team and initiate plan and interventions as ordered  Monitor patient's weight and dietary intake as ordered or per policy  Utilize nutrition screening tool and intervene as necessary  Determine patient's food preferences and provide high-protein, high-caloric foods as appropriate  INTERVENTIONS:  - Monitor oral intake, urinary output, labs, and treatment plans  - Assess nutrition and hydration status and recommend course of action  - Evaluate amount of meals eaten  - Assist patient with eating if necessary   - Allow adequate time for meals  - Recommend/ encourage appropriate diets, oral nutritional supplements, and vitamin/mineral supplements  - Order, calculate, and assess calorie counts as needed  - Recommend, monitor, and adjust tube feedings and TPN/PPN based on assessed needs  - Assess need for intravenous fluids  - Provide specific nutrition/hydration education as appropriate  - Include patient/family/caregiver in decisions related to nutrition  Outcome: Progressing

## 2020-09-01 NOTE — ASSESSMENT & PLAN NOTE
Ongoing chemotherapy for GE CA, noted to be febrile this morning by wife s/p fall in kitchen    Lactic 9 9 on admission, received 3L IVF bolus in ED Bcx obtained and started broad spectrum abx  CT CAP: Suspicious for Left sided multi-lobar pna, no PE noted, masses remain unchanged compared to prior films  CT Head/Neck: no acute intracranial changes    · Levo and vaso weaned off overnight, MAPs remain >65  · Continue Stress dose steroids  · Strict I/O's with lopez  · Lactic trending down on Q2's, though pressor requirement has improved significantly, will recheck lactic in am   · Procal pending   · BC 2/2 GNR, can likely d/c Vanc if MRSA cx negative   · Cefepime, Flagyl D3; Vanc d/c'ed, Tobramycin x1

## 2020-09-01 NOTE — ASSESSMENT & PLAN NOTE
· CT shows concern for Left sided multifocal pna, likely source of sepsis   · Trend procal, fever and WBC  · Currently requiring 3L NC, keep sats >90%  · BC 2/2 GNR, may consider aspiration event   · Sputum GNR  · Aspiration precautions

## 2020-09-01 NOTE — CONSULTS
The patient's vancomycin therapy has been discontinued  Pharmacy will sign off now  Thank you for this consult

## 2020-09-01 NOTE — CONSULTS
Medical Oncology/Hematology Consult Note  Tri Quiñonez, 77 y o , 1955  ICU 11/ICU 11, [id number]  09/01/20    Assessment and Plan:    1  Febrile neutropenia  8/30/20 ANC 0 03 ==> 9/1/20 ANC 0 87  Improvement after two doses of grand  Patient was given his last dose of Granix today  We will continue to observe the patient's white blood cell count tomorrow  It is possible that the patient would be able to tolerate two additional dosages  This decision will be made after CBC results tomorrow  I will have hematology follow-up with the patient tomorrow  2  Poor PO nutritional intake  Patient has poor oral intake is concerning  Patient states that he has a hard time keeping down solids including scrambled eggs  Patient has been utilizing boost twice a day  Patient does not other means of nutritional support such as a PEG tube, etc     During this admission, consult has been placed for nutrition  I am concern, patient notes that he has not been eating significantly over the past three months  I will check the patient's vitamin studies including iron, B12 and folate  Supplementations will be given if needed  Nutrition future? TPN VS PEG  This will need to be discussed during nutrition consult  3   Metastatic gastroesophageal cancer diagnosed in July 2019  Patient's CT scan performed on admission demonstrated cancer disease stability  No interval changes from chest CT completed approximately six weeks prior  Patient is on modified FOLFOX; and is no longer utilizing oxaliplatin or Leucovorin  Patient receives five FU bolus and five FU infusion with the pump  Obviously additional treatments will be held until the patient is re-evaluated by primary hematology team   Patient has an appointment scheduled for September 10th  Please do not hesitate to contact me if you have any questions or need additional information   Thank you for this consult   _______________________________________________________________    Reason for Consultation: Inpatient consult to Hematology  Consult performed by: Rin Turner PA-C  Consult ordered by: Rhys Aaron PA-C        Chief Complaint   Patient presents with    Shortness of Breath    Cough     History of present illness: This is a 49-year-old man with past medical history of hypertension, gastric esophageal cancer and previous alcohol abuse who presented to the [facility] Emergency Room on 8/30/20 secondary to fever, cough  Cancer history:  Patient was diagnosed with metastatic gastroesophageal cancer with omental Mets  Patient underwent modified FOLFOX six  After disease stability, patient's oxaliplatin and Leucovorin was dropped  Patient presently receives five FU bolus and five FU infusion  Last infusion was completed on 08/21  Patient did not receive GCSF  Patient's last interval scanning in July demonstrated disease stability  Patient had repeat imaging done in the emergency room which also demonstrated no significant progression of gastroesophageal cancer  Patient was admitted to the ICU and Infectious Disease workup demonstrated Gram-negative bacteremia  Patient continues on antibiotics and being seen by infectious disease  Patient was noted to have neutropenia and hematology was consulted  Interval history: pt states he feels ok  Lots of cough, not very talkative  He notes his oral intake has been quite poor  Patient reflects that he has seen nutrition in the past   Patient is drinking two ensures a day  However it is very hard for him to keep even eggs down  Discussed case with ICU EDVIN  Review of Systems   Constitutional: Positive for fatigue  Negative for activity change, appetite change and fever  HENT: Negative for nosebleeds  Respiratory: Positive for cough  Negative for choking and shortness of breath      Cardiovascular: Negative for chest pain, palpitations and leg swelling  Gastrointestinal: Negative for abdominal distention, abdominal pain, anal bleeding, blood in stool, constipation, diarrhea, nausea and vomiting  Endocrine: Negative for cold intolerance  Genitourinary: Negative for hematuria  Musculoskeletal: Negative for myalgias  Skin: Negative for color change, pallor and rash  Allergic/Immunologic: Negative for immunocompromised state  Neurological: Negative for headaches  Hematological: Negative for adenopathy  Does not bruise/bleed easily  All other systems reviewed and are negative  Past medical history:   Past Medical History:   Diagnosis Date    Dehydration 8/8/2019    Esophageal cancer ([facility] )     Hypertension     Nausea 8/8/2019    Psychiatric disorder     Recovering alcoholic ([facility] )      Past surgical history:   Past Surgical History:   Procedure Laterality Date    APPENDECTOMY      IR PORT PLACEMENT  7/31/2019     Allergies:    Allergies   Allergen Reactions    Bee Venom Anaphylaxis    Shellfish-Derived Products      Develops GOUT     Home medications:   Medications Prior to Admission   Medication    dexamethasone (DECADRON) 2 mg tablet    diazepam (VALIUM) 5 mg tablet    Eliquis 5 MG    metoclopramide (REGLAN) 5 mg tablet    OLANZapine (ZyPREXA) 5 mg tablet    pantoprazole (PROTONIX) 40 mg tablet    ammonium lactate (LAC-HYDRIN) 12 % cream    bisoprolol-hydrochlorothiazide (ZIAC) 10-6 25 MG per tablet    [START ON 9/2/2020] fluorouracil 4,730 mg in CADD infusion pump    ondansetron (ZOFRAN) 4 mg tablet    oxyCODONE (ROXICODONE) 5 mg immediate release tablet    sucralfate (CARAFATE) 1 g/10 mL suspension    venlafaxine (EFFEXOR-XR) 150 mg 24 hr capsule     Hospital medications:   Current Facility-Administered Medications:     acetaminophen (TYLENOL) tablet 650 mg, 650 mg, Oral, Q6H PRN, Fiona Regan PA-C, 650 mg at 08/31/20 2013    apixaban (ELIQUIS) tablet 5 mg, 5 mg, Oral, BID, Fiona Regan PA-C, 5 mg at 08/31/20 1709    cefepime (MAXIPIME) 2 g/50 mL dextrose IVPB, 2,000 mg, Intravenous, Q8H, Karla Litten, MD, Stopped at 09/01/20 0150    dexamethasone (DECADRON) tablet 4 mg, 4 mg, Oral, After Lunch, Kraig Jung PA-C    dextrose 50 % IV solution 25 g, 25 g, Intravenous, Once, Amber Quach DO, Stopped at 08/30/20 0950    insulin lispro (HumaLOG) 100 units/mL subcutaneous injection 1-6 Units, 1-6 Units, Subcutaneous, TID AC **AND** Fingerstick Glucose (POCT), , , 4x Daily AC and at bedtime, Toy Ramos PA-C    insulin lispro (HumaLOG) 100 units/mL subcutaneous injection 1-6 Units, 1-6 Units, Subcutaneous, HS, Rose Jung PA-C    ipratropium (ATROVENT) 0 02 % inhalation solution 0 5 mg, 0 5 mg, Nebulization, TID, Monika Seek Viotto, DO, 0 5 mg at 09/01/20 0834    levalbuterol (Cephus Searing) inhalation solution 1 25 mg, 1 25 mg, Nebulization, TID, Monika Seek Viotto, DO, 1 25 mg at 09/01/20 0834    metroNIDAZOLE (FLAGYL) IVPB (premix) 500 mg 100 mL, 500 mg, Intravenous, Q8H, Toy Ramos PA-C, Stopped at 09/01/20 0436    ondansetron (ZOFRAN) injection 4 mg, 4 mg, Intravenous, Q8H PRN, Toy Ramos PA-C, 4 mg at 08/30/20 1523    pantoprazole (PROTONIX) EC tablet 40 mg, 40 mg, Oral, Daily Before Breakfast, Toy Ramos PA-C, 40 mg at 09/01/20 0539    pneumococcal 13-valent conjugate vaccine (PREVNAR-13) IM injection 0 5 mL, 0 5 mL, Intramuscular, Prior to discharge, Vanessa Tariq MD    potassium chloride (K-DUR,KLOR-CON) CR tablet 40 mEq, 40 mEq, Oral, BID, Toy Ramos PA-C    potassium phosphates 30 mmol in sodium chloride 0 9 % 250 mL infusion, 30 mmol, Intravenous, Once, Toy Ramos PA-C, Last Rate: 41 7 mL/hr at 09/01/20 0705, 30 mmol at 09/01/20 0705    tbo-filgrastim (GRANIX) subcutaneous injection 480 mcg, 480 mcg, Subcutaneous, Daily, Toy Ramos PA-C, 480 mcg at 08/31/20 0908    venlafaxine (EFFEXOR-XR) 24 hr capsule 150 mg, 150 mg, Oral, Daily, Toy Ramos PA-C, 150 mg at 20 0904    Social history:   Social History     Tobacco Use    Smoking status: Former Smoker     Types: Cigars     Last attempt to quit: 5/15/2019     Years since quittin 3    Smokeless tobacco: Never Used   Substance Use Topics    Alcohol use: No     Frequency: Never     Binge frequency: Never    Drug use: Yes     Types: Marijuana     Family history:   Family History   Problem Relation Age of Onset    Colon cancer Father      Vitals:  Vitals:    20 1000   BP: 164/82   Pulse: 94   Resp: (!) 33   Temp:    SpO2: 97%     Physical Exam  HENT:      Head: Normocephalic and atraumatic  Eyes:      General: No scleral icterus  Extraocular Movements: Extraocular movements intact  Pupils: Pupils are equal, round, and reactive to light  Cardiovascular:      Rate and Rhythm: Normal rate  Pulmonary:      Effort: Pulmonary effort is normal       Breath sounds: Normal breath sounds  Abdominal:      General: Abdomen is flat  There is no distension  Tenderness: There is no abdominal tenderness  Neurological:      General: No focal deficit present  Mental Status: He is alert  Labs and Pertinent Reports Reviewed  Lab Results   Component Value Date    NEUTROABS 4 53 2020     Lab Results   Component Value Date    WBC 1 61 (LL) 2020    HGB 7 4 (L) 2020    HCT 24 0 (L) 2020    MCV 99 (H) 2020     2020     Lab Results   Component Value Date    SODIUM 136 2020    K 2 8 (L) 2020     2020    CO2 22 2020    AGAP 10 2020    BUN 16 2020    CREATININE 0 89 2020    GLUC 121 2020    GLUF 131 (H) 2020    CALCIUM 8 2 (L) 2020    AST 57 (H) 2020    ALT 34 2020    ALKPHOS 47 2020    TP 5 3 (L) 2020    TBILI 1 00 2020    EGFR 88 2020       Please note: This report has been generated by voice recognition software system   Therefore, there may be syntax, spelling and/or grammatical errors  Please call if you have any questions

## 2020-09-01 NOTE — PROGRESS NOTES
Progress Note - Infectious Disease   Brenda Ding 77 y o  male MRN: [id number]  Unit/Bed#: ICU 11 Encounter: [id number]      Impression/Plan:  1  Septic shock-POA  Appears to be secondary to gram-negative bacteremia of an undefined source  Consideration for the possibility of translocation of organisms across the gut wall  Consideration for the possibility of pneumonia  Consideration for the possibility of typhilitis  Consideration for the possibility of another source such as a Port-A-Cath infection although this is less likely  Patient's blood pressure has recovered he is now off all vasopressor support  He seems to be tolerating the antibiotics without difficulty  Consideration for the possibility of an ESBL  -discontinue cefepime Flagyl  -meropenem 1 g IV q 8 hours  -follow up sensitivities and adjust antibiotics as needed  -recheck CBC with diff and BMP  -recheck procalcitonin  -supportive care     2  E  Coli bacteremia-possibly secondary to pneumonia  Possibly secondary to an intra-abdominal process although CT abdomen pelvis is negative  Patient does have some abdominal tenderness on exam   Possibly secondary to a catheter related bacteremia of this less likely  Consideration for the possibility of an ESBL  -antibiotics as above  -recheck blood cultures to confirm clearance of the bacteremia  -follow up sensitivities and adjust antibiotics as needed  -follow up sputum culture to see if match sweats in the blood  -no additional ID workup for now     3  Pancytopenia-with severe neutropenia in the setting of cancer chemotherapy  Suspect secondary to the patient's chemotherapy  The acute infectious process may also be playing a role  Neutropenia is recovering on G-CSF  -continue G-CSF  -recheck CBC with diff  -hematology oncology follow-up     4  Pneumonia-possibly aspiration  More dense consolidation on the left    His respiratory status is relatively stable remains a bit tenuous requiring oxygen support  -antibiotics as above  -monitor respiratory status  -pulmonary toilet  -close Pulmonary critical care follow-up     5  Gastroesophageal cancer-on modified FOLFOX  Hematology oncology follow-up    Have discussed the above management plan with the critical care service    Antibiotics:  Cefepime 3  Flagyl 3  Tobramycin x1 dose    Subjective:  Patient still having intermittent fever; having some nausea and vomiting but no reported diarrhea; still requiring oxygen support; no increased pain  No new symptoms  He remains hemodynamically stable without vasopressor support    Objective:  Vitals:  Temp:  [98 5 °F (36 9 °C)-101 6 °F (38 7 °C)] 99 8 °F (37 7 °C)  HR:  [] 110  Resp:  [22-40] 27  BP: ()/(59-94) 95/64  SpO2:  [88 %-98 %] 96 %  Temp (24hrs), Av 9 °F (37 7 °C), Min:98 5 °F (36 9 °C), Max:101 6 °F (38 7 °C)  Current: Temperature: 99 8 °F (37 7 °C)    Physical Exam:   General Appearance:  Somnolent, arousable, nontoxic, no acute distress  Throat: Oropharynx dry without lesions  Lungs:   Decreased breath sounds bilaterally; no wheezes, rhonchi or rales; respirations unlabored   Heart:  Tachycardia; no murmur, rub or gallop   Abdomen:   Soft, non-tender, non-distended, positive bowel sounds  Extremities: No clubbing, cyanosis or edema   Skin: No new rashes or lesions  No draining wounds noted    Still has patchy erythema on the face in the hand       Labs, Imaging, & Other studies:   All pertinent labs and imaging studies were personally reviewed  Results from last 7 days   Lab Units 20  0537 20  0545 20  0915   WBC Thousand/uL 1 61* 0 61* 0 34*   HEMOGLOBIN g/dL 7 4* 7 2* 9 2*   PLATELETS Thousands/uL 156 149 236     Results from last 7 days   Lab Units 20  1305 20  0537 20  0545 20  0916   SODIUM mmol/L  --  136 139 134*   POTASSIUM mmol/L 2 9* 2 8* 3 1* 3 5   CHLORIDE mmol/L  --  104 107 100   CO2 mmol/L  --  22 19* 14*   BUN mg/dL --  16 20 29*   CREATININE mg/dL  --  0 89 1 07 1 69*   EGFR ml/min/1 73sq m  --  88 71 41   CALCIUM mg/dL  --  8 2* 7 4* 8 5   AST U/L  --  57* 69* 24   ALT U/L  --  34 37 14   ALK PHOS U/L  --  47 40* 54     Results from last 7 days   Lab Units 09/01/20  0617 09/01/20  0537 08/31/20  0939 08/30/20  1317 08/30/20  1316 08/30/20  0926 08/30/20  0916   BLOOD CULTURE  Received in Microbiology Lab  Culture in Progress  Received in Microbiology Lab  Culture in Progress    --   --   --  Escherichia coli* Escherichia coli*   SPUTUM CULTURE   --   --  1+ Growth of Klebsiella oxytoca*  --   --   --   --    GRAM STAIN RESULT   --   --  2+ Epithelial cells per low power field*  No polys seen*  1+ Gram negative rods*  Rare Gram positive cocci in pairs*  --   --  Gram negative rods* Gram negative rods*   URINE CULTURE   --   --   --  No Growth <1000 cfu/mL  --   --   --    MRSA CULTURE ONLY   --   --   --   --  No Methicillin Resistant Staphlyococcus aureus (MRSA) isolated  --   --      Results from last 7 days   Lab Units 09/01/20  0537 08/31/20  0545   PROCALCITONIN ng/ml 86 37* 150 29*         Results from last 7 days   Lab Units 09/01/20  0537   FERRITIN ng/mL 175

## 2020-09-01 NOTE — PLAN OF CARE
Problem: CARDIOVASCULAR - ADULT  Goal: Maintains optimal cardiac output and hemodynamic stability  Description: INTERVENTIONS:  - Monitor I/O, vital signs and rhythm  - Monitor for S/S and trends of decreased cardiac output  - Administer and titrate ordered vasoactive medications to optimize hemodynamic stability  - Assess quality of pulses, skin color and temperature  - Assess for signs of decreased coronary artery perfusion  - Instruct patient to report change in severity of symptoms  Outcome: Progressing  Goal: Absence of cardiac dysrhythmias or at baseline rhythm  Description: INTERVENTIONS:  - Continuous cardiac monitoring, vital signs, obtain 12 lead EKG if ordered  - Administer antiarrhythmic and heart rate control medications as ordered  - Monitor electrolytes and administer replacement therapy as ordered  Outcome: Progressing     Problem: GENITOURINARY - ADULT  Goal: Maintains or returns to baseline urinary function  Description: INTERVENTIONS:  - Assess urinary function  - Encourage oral fluids to ensure adequate hydration if ordered  - Administer IV fluids as ordered to ensure adequate hydration  - Administer ordered medications as needed  - Offer frequent toileting  - Follow urinary retention protocol if ordered  Outcome: Progressing  Goal: Urinary catheter remains patent  Description: INTERVENTIONS:  - Assess patency of urinary catheter  - If patient has a chronic lopez, consider changing catheter if non-functioning  - Follow guidelines for intermittent irrigation of non-functioning urinary catheter  Outcome: Progressing     Problem: METABOLIC, FLUID AND ELECTROLYTES - ADULT  Goal: Electrolytes maintained within normal limits  Description: INTERVENTIONS:  - Monitor labs and assess patient for signs and symptoms of electrolyte imbalances  - Administer electrolyte replacement as ordered  - Monitor response to electrolyte replacements, including repeat lab results as appropriate  - Instruct patient on fluid and nutrition as appropriate  Outcome: Progressing  Goal: Fluid balance maintained  Description: INTERVENTIONS:  - Monitor labs   - Monitor I/O and WT  - Instruct patient on fluid and nutrition as appropriate  - Assess for signs & symptoms of volume excess or deficit  Outcome: Progressing  Goal: Glucose maintained within target range  Description: INTERVENTIONS:  - Monitor Blood Glucose as ordered  - Assess for signs and symptoms of hyperglycemia and hypoglycemia  - Administer ordered medications to maintain glucose within target range  - Assess nutritional intake and initiate nutrition service referral as needed  Outcome: Progressing     Problem: PAIN - ADULT  Goal: Verbalizes/displays adequate comfort level or baseline comfort level  Description: Interventions:  - Encourage patient to monitor pain and request assistance  - Assess pain using appropriate pain scale  - Administer analgesics based on type and severity of pain and evaluate response  - Implement non-pharmacological measures as appropriate and evaluate response  - Consider cultural and social influences on pain and pain management  - Notify physician/advanced practitioner if interventions unsuccessful or patient reports new pain  Outcome: Progressing     Problem: INFECTION - ADULT  Goal: Absence or prevention of progression during hospitalization  Description: INTERVENTIONS:  - Assess and monitor for signs and symptoms of infection  - Monitor lab/diagnostic results  - Monitor all insertion sites, i e  indwelling lines, tubes, and drains  - Monitor endotracheal if appropriate and nasal secretions for changes in amount and color  - [place] appropriate cooling/warming therapies per order  - Administer medications as ordered  - Instruct and encourage patient and family to use good hand hygiene technique  - Identify and instruct in appropriate isolation precautions for identified infection/condition  Outcome: Progressing  Goal: Absence of fever/infection during neutropenic period  Description: INTERVENTIONS:  - Monitor WBC    Outcome: Progressing     Problem: SAFETY ADULT  Goal: Patient will remain free of falls  Description: INTERVENTIONS:  - Assess patient frequently for physical needs  -  Identify cognitive and physical deficits and behaviors that affect risk of falls    -  [place] fall precautions as indicated by assessment   - Educate patient/family on patient safety including physical limitations  - Instruct patient to call for assistance with activity based on assessment  - Modify environment to reduce risk of injury  - Consider OT/PT consult to assist with strengthening/mobility  Outcome: Progressing  Goal: Maintain or return to baseline ADL function  Description: INTERVENTIONS:  -  Assess patient's ability to carry out ADLs; assess patient's baseline for ADL function and identify physical deficits which impact ability to perform ADLs (bathing, care of mouth/teeth, toileting, grooming, dressing, etc )  - Assess/evaluate cause of self-care deficits   - Assess range of motion  - Assess patient's mobility; develop plan if impaired  - Assess patient's need for assistive devices and provide as appropriate  - Encourage maximum independence but intervene and supervise when necessary  - Involve family in performance of ADLs  - Assess for home care needs following discharge   - Consider OT consult to assist with ADL evaluation and planning for discharge  - Provide patient education as appropriate  Outcome: Progressing  Goal: Maintain or return mobility status to optimal level  Description: INTERVENTIONS:  - Assess patient's baseline mobility status (ambulation, transfers, stairs, etc )    - Identify cognitive and physical deficits and behaviors that affect mobility  - Identify mobility aids required to assist with transfers and/or ambulation (gait belt, sit-to-stand, lift, walker, cane, etc )  - [place] fall precautions as indicated by assessment  - Record patient progress and toleration of activity level on Mobility SBAR; progress patient to next Phase/Stage  - Instruct patient to call for assistance with activity based on assessment  - Consider rehabilitation consult to assist with strengthening/weightbearing, etc   Outcome: Progressing     Problem: DISCHARGE PLANNING  Goal: Discharge to home or other facility with appropriate resources  Description: INTERVENTIONS:  - Identify barriers to discharge w/patient and caregiver  - Arrange for needed discharge resources and transportation as appropriate  - Identify discharge learning needs (meds, wound care, etc )  - Arrange for interpretive services to assist at discharge as needed  - Refer to Case Management Department for coordinating discharge planning if the patient needs post-hospital services based on physician/advanced practitioner order or complex needs related to functional status, cognitive ability, or social support system  Outcome: Progressing     Problem: Prexisting or High Potential for Compromised Skin Integrity  Goal: Skin integrity is maintained or improved  Description: INTERVENTIONS:  - Identify patients at risk for skin breakdown  - Assess and monitor skin integrity  - Assess and monitor nutrition and hydration status  - Monitor labs   - Assess for incontinence   - Turn and reposition patient  - Assist with mobility/ambulation  - Relieve pressure over bony prominences  - Avoid friction and shearing  - Provide appropriate hygiene as needed including keeping skin clean and dry  - Evaluate need for skin moisturizer/barrier cream  - Collaborate with interdisciplinary team   - Patient/family teaching  - Consider wound care consult   Outcome: Progressing     Problem: Potential for Falls  Goal: Patient will remain free of falls  Description: INTERVENTIONS:  - Assess patient frequently for physical needs  -  Identify cognitive and physical deficits and behaviors that affect risk of falls   -  [place] fall precautions as indicated by assessment   - Educate patient/family on patient safety including physical limitations  - Instruct patient to call for assistance with activity based on assessment  - Modify environment to reduce risk of injury  - Consider OT/PT consult to assist with strengthening/mobility  Outcome: Progressing     Problem: Nutrition/Hydration-ADULT  Goal: Nutrient/Hydration intake appropriate for improving, restoring or maintaining nutritional needs  Description: Monitor and assess patient's nutrition/hydration status for malnutrition  Collaborate with interdisciplinary team and initiate plan and interventions as ordered  Monitor patient's weight and dietary intake as ordered or per policy  Utilize nutrition screening tool and intervene as necessary  Determine patient's food preferences and provide high-protein, high-caloric foods as appropriate       INTERVENTIONS:  - Monitor oral intake, urinary output, labs, and treatment plans  - Assess nutrition and hydration status and recommend course of action  - Evaluate amount of meals eaten  - Assist patient with eating if necessary   - Allow adequate time for meals  - Recommend/ encourage appropriate diets, oral nutritional supplements, and vitamin/mineral supplements  - Order, calculate, and assess calorie counts as needed  - Recommend, monitor, and adjust tube feedings and TPN/PPN based on assessed needs  - Assess need for intravenous fluids  - Provide specific nutrition/hydration education as appropriate  - Include patient/family/caregiver in decisions related to nutrition  Outcome: Progressing

## 2020-09-02 ENCOUNTER — APPOINTMENT (INPATIENT)
Dept: NON INVASIVE DIAGNOSTICS | Facility: HOSPITAL | Age: 65
DRG: 871 | End: 2020-09-02
Payer: MEDICARE

## 2020-09-02 ENCOUNTER — HOSPITAL ENCOUNTER (OUTPATIENT)
Dept: INFUSION CENTER | Facility: HOSPITAL | Age: 65
Discharge: HOME | End: 2020-09-02
Attending: INTERNAL MEDICINE

## 2020-09-02 PROBLEM — I48.91 A-FIB (HCC): Status: ACTIVE | Noted: 2020-09-02

## 2020-09-02 PROBLEM — E44.0 MODERATE PROTEIN-CALORIE MALNUTRITION (HCC): Status: ACTIVE | Noted: 2020-09-02

## 2020-09-02 PROBLEM — N17.9 AKI (ACUTE KIDNEY INJURY) (HCC): Status: RESOLVED | Noted: 2020-08-30 | Resolved: 2020-09-02

## 2020-09-02 LAB
ALBUMIN SERPL BCP-MCNC: 2.1 G/DL (ref 3.5–5)
ALP SERPL-CCNC: 53 U/L (ref 46–116)
ALT SERPL W P-5'-P-CCNC: 36 U/L (ref 12–78)
ANION GAP SERPL CALCULATED.3IONS-SCNC: 10 MMOL/L (ref 4–13)
ANION GAP SERPL CALCULATED.3IONS-SCNC: 13 MMOL/L (ref 4–13)
AST SERPL W P-5'-P-CCNC: 56 U/L (ref 5–45)
BACTERIA BLD CULT: ABNORMAL
BACTERIA BLD CULT: ABNORMAL
BASOPHILS NFR BLD AUTO: 0 % (ref 0–1)
BILIRUB SERPL-MCNC: 1.8 MG/DL (ref 0.2–1)
BUN SERPL-MCNC: 14 MG/DL (ref 5–25)
BUN SERPL-MCNC: 16 MG/DL (ref 5–25)
CALCIUM SERPL-MCNC: 8.4 MG/DL (ref 8.3–10.1)
CALCIUM SERPL-MCNC: 8.6 MG/DL (ref 8.3–10.1)
CHLORIDE SERPL-SCNC: 104 MMOL/L (ref 100–108)
CHLORIDE SERPL-SCNC: 106 MMOL/L (ref 100–108)
CO2 SERPL-SCNC: 19 MMOL/L (ref 21–32)
CO2 SERPL-SCNC: 24 MMOL/L (ref 21–32)
CREAT SERPL-MCNC: 0.81 MG/DL (ref 0.6–1.3)
CREAT SERPL-MCNC: 0.93 MG/DL (ref 0.6–1.3)
EOSINOPHIL NFR BLD AUTO: 0 % (ref 0–6)
ERYTHROCYTE [DISTWIDTH] IN BLOOD BY AUTOMATED COUNT: 16.7 % (ref 11.6–15.1)
GFR SERPL CREATININE-BSD FRML MDRD: 85 ML/MIN/1.73SQ M
GFR SERPL CREATININE-BSD FRML MDRD: 92 ML/MIN/1.73SQ M
GLUCOSE SERPL-MCNC: 172 MG/DL (ref 65–140)
GLUCOSE SERPL-MCNC: 175 MG/DL (ref 65–140)
GLUCOSE SERPL-MCNC: 176 MG/DL (ref 65–140)
GLUCOSE SERPL-MCNC: 209 MG/DL (ref 65–140)
GLUCOSE SERPL-MCNC: 233 MG/DL (ref 65–140)
GRAM STN SPEC: ABNORMAL
GRAM STN SPEC: ABNORMAL
HCT VFR BLD AUTO: 24.7 % (ref 36.5–49.3)
HGB BLD-MCNC: 8 G/DL (ref 12–17)
IMM GRANULOCYTES NFR BLD AUTO: 2 % (ref 0–2)
LYMPHOCYTES NFR BLD AUTO: 8 % (ref 14–44)
MAGNESIUM SERPL-MCNC: 1.9 MG/DL (ref 1.6–2.6)
MAGNESIUM SERPL-MCNC: 2.2 MG/DL (ref 1.6–2.6)
MCH RBC QN AUTO: 31.6 PG (ref 26.8–34.3)
MCHC RBC AUTO-ENTMCNC: 32.4 G/DL (ref 31.4–37.4)
MCV RBC AUTO: 98 FL (ref 82–98)
MONOCYTES NFR BLD AUTO: 7 % (ref 4–12)
NEUTS SEG NFR BLD AUTO: 83 % (ref 43–75)
NRBC BLD AUTO-RTO: 0 /100 WBCS
PHOSPHATE SERPL-MCNC: 1.3 MG/DL (ref 2.3–4.1)
PHOSPHATE SERPL-MCNC: 1.7 MG/DL (ref 2.3–4.1)
PLATELET # BLD AUTO: 179 THOUSANDS/UL (ref 149–390)
PMV BLD AUTO: 12 FL (ref 8.9–12.7)
POTASSIUM SERPL-SCNC: 2.9 MMOL/L (ref 3.5–5.3)
POTASSIUM SERPL-SCNC: 4 MMOL/L (ref 3.5–5.3)
PROCALCITONIN SERPL-MCNC: 49.96 NG/ML
PROT SERPL-MCNC: 5.6 G/DL (ref 6.4–8.2)
RBC # BLD AUTO: 2.53 MILLION/UL (ref 3.88–5.62)
SODIUM SERPL-SCNC: 136 MMOL/L (ref 136–145)
SODIUM SERPL-SCNC: 140 MMOL/L (ref 136–145)
WBC # BLD AUTO: 10.82 THOUSAND/UL (ref 4.31–10.16)

## 2020-09-02 PROCEDURE — 94640 AIRWAY INHALATION TREATMENT: CPT

## 2020-09-02 PROCEDURE — 84145 PROCALCITONIN (PCT): CPT | Performed by: INTERNAL MEDICINE

## 2020-09-02 PROCEDURE — 80048 BASIC METABOLIC PNL TOTAL CA: CPT | Performed by: PHYSICIAN ASSISTANT

## 2020-09-02 PROCEDURE — 84100 ASSAY OF PHOSPHORUS: CPT | Performed by: PHYSICIAN ASSISTANT

## 2020-09-02 PROCEDURE — 99232 SBSQ HOSP IP/OBS MODERATE 35: CPT | Performed by: INTERNAL MEDICINE

## 2020-09-02 PROCEDURE — 99233 SBSQ HOSP IP/OBS HIGH 50: CPT | Performed by: INTERNAL MEDICINE

## 2020-09-02 PROCEDURE — 92526 ORAL FUNCTION THERAPY: CPT

## 2020-09-02 PROCEDURE — 83735 ASSAY OF MAGNESIUM: CPT | Performed by: PHYSICIAN ASSISTANT

## 2020-09-02 PROCEDURE — 93306 TTE W/DOPPLER COMPLETE: CPT | Performed by: INTERNAL MEDICINE

## 2020-09-02 PROCEDURE — 80053 COMPREHEN METABOLIC PANEL: CPT | Performed by: INTERNAL MEDICINE

## 2020-09-02 PROCEDURE — 85027 COMPLETE CBC AUTOMATED: CPT | Performed by: PHYSICIAN ASSISTANT

## 2020-09-02 PROCEDURE — 82948 REAGENT STRIP/BLOOD GLUCOSE: CPT

## 2020-09-02 PROCEDURE — 94760 N-INVAS EAR/PLS OXIMETRY 1: CPT

## 2020-09-02 PROCEDURE — C8929 TTE W OR WO FOL WCON,DOPPLER: HCPCS

## 2020-09-02 PROCEDURE — 99291 CRITICAL CARE FIRST HOUR: CPT | Performed by: INTERNAL MEDICINE

## 2020-09-02 RX ORDER — ALBUMIN, HUMAN INJ 5% 5 %
12.5 SOLUTION INTRAVENOUS ONCE
Status: COMPLETED | OUTPATIENT
Start: 2020-09-02 | End: 2020-09-02

## 2020-09-02 RX ORDER — POTASSIUM CHLORIDE 20 MEQ/1
20 TABLET, EXTENDED RELEASE ORAL ONCE
Status: COMPLETED | OUTPATIENT
Start: 2020-09-02 | End: 2020-09-02

## 2020-09-02 RX ORDER — MAGNESIUM SULFATE HEPTAHYDRATE 40 MG/ML
2 INJECTION, SOLUTION INTRAVENOUS ONCE
Status: COMPLETED | OUTPATIENT
Start: 2020-09-02 | End: 2020-09-02

## 2020-09-02 RX ORDER — METOPROLOL TARTRATE 5 MG/5ML
5 INJECTION INTRAVENOUS ONCE
Status: COMPLETED | OUTPATIENT
Start: 2020-09-02 | End: 2020-09-02

## 2020-09-02 RX ORDER — CEFAZOLIN SODIUM 2 G/50ML
2000 SOLUTION INTRAVENOUS EVERY 8 HOURS
Status: COMPLETED | OUTPATIENT
Start: 2020-09-02 | End: 2020-09-08

## 2020-09-02 RX ORDER — NOREPINEPHRINE BITARTRATE 1 MG/ML
INJECTION, SOLUTION INTRAVENOUS
Status: DISPENSED
Start: 2020-09-02 | End: 2020-09-02

## 2020-09-02 RX ORDER — METRONIDAZOLE 500 MG/1
500 TABLET ORAL EVERY 8 HOURS SCHEDULED
Status: DISCONTINUED | OUTPATIENT
Start: 2020-09-02 | End: 2020-09-02

## 2020-09-02 RX ADMIN — INSULIN LISPRO 1 UNITS: 100 INJECTION, SOLUTION INTRAVENOUS; SUBCUTANEOUS at 16:18

## 2020-09-02 RX ADMIN — PERFLUTREN 0.3 ML/MIN: 6.52 INJECTION, SUSPENSION INTRAVENOUS at 12:31

## 2020-09-02 RX ADMIN — METOROPROLOL TARTRATE 5 MG: 5 INJECTION, SOLUTION INTRAVENOUS at 09:39

## 2020-09-02 RX ADMIN — DIAZEPAM 5 MG: 5 TABLET ORAL at 23:08

## 2020-09-02 RX ADMIN — MEROPENEM 1000 MG: 1 INJECTION, POWDER, FOR SOLUTION INTRAVENOUS at 08:33

## 2020-09-02 RX ADMIN — LEVALBUTEROL HYDROCHLORIDE 1.25 MG: 1.25 SOLUTION, CONCENTRATE RESPIRATORY (INHALATION) at 07:51

## 2020-09-02 RX ADMIN — AMIODARONE HYDROCHLORIDE 150 MG: 50 INJECTION, SOLUTION INTRAVENOUS at 10:32

## 2020-09-02 RX ADMIN — OLANZAPINE 5 MG: 2.5 TABLET, FILM COATED ORAL at 23:00

## 2020-09-02 RX ADMIN — MAGNESIUM SULFATE HEPTAHYDRATE 2 G: 40 INJECTION, SOLUTION INTRAVENOUS at 08:34

## 2020-09-02 RX ADMIN — PANTOPRAZOLE SODIUM 40 MG: 40 TABLET, DELAYED RELEASE ORAL at 06:00

## 2020-09-02 RX ADMIN — POTASSIUM CHLORIDE 20 MEQ: 1500 TABLET, EXTENDED RELEASE ORAL at 19:47

## 2020-09-02 RX ADMIN — NOREPINEPHRINE BITARTRATE 5 MCG/MIN: 1 INJECTION, SOLUTION, CONCENTRATE INTRAVENOUS at 02:07

## 2020-09-02 RX ADMIN — LEVALBUTEROL HYDROCHLORIDE 1.25 MG: 1.25 SOLUTION, CONCENTRATE RESPIRATORY (INHALATION) at 14:07

## 2020-09-02 RX ADMIN — SODIUM CHLORIDE 500 ML: 0.9 INJECTION, SOLUTION INTRAVENOUS at 11:42

## 2020-09-02 RX ADMIN — INSULIN LISPRO 1 UNITS: 100 INJECTION, SOLUTION INTRAVENOUS; SUBCUTANEOUS at 06:42

## 2020-09-02 RX ADMIN — ALBUMIN (HUMAN) 12.5 G: 12.5 INJECTION, SOLUTION INTRAVENOUS at 00:45

## 2020-09-02 RX ADMIN — DEXAMETHASONE 4 MG: 4 TABLET ORAL at 14:39

## 2020-09-02 RX ADMIN — SODIUM PHOSPHATE, MONOBASIC, MONOHYDRATE 21 MMOL: 276; 142 INJECTION, SOLUTION INTRAVENOUS at 08:41

## 2020-09-02 RX ADMIN — INSULIN LISPRO 2 UNITS: 100 INJECTION, SOLUTION INTRAVENOUS; SUBCUTANEOUS at 23:00

## 2020-09-02 RX ADMIN — IPRATROPIUM BROMIDE 0.5 MG: 0.5 SOLUTION RESPIRATORY (INHALATION) at 07:51

## 2020-09-02 RX ADMIN — IPRATROPIUM BROMIDE 0.5 MG: 0.5 SOLUTION RESPIRATORY (INHALATION) at 19:57

## 2020-09-02 RX ADMIN — APIXABAN 5 MG: 5 TABLET, FILM COATED ORAL at 09:59

## 2020-09-02 RX ADMIN — LEVALBUTEROL HYDROCHLORIDE 1.25 MG: 1.25 SOLUTION, CONCENTRATE RESPIRATORY (INHALATION) at 19:57

## 2020-09-02 RX ADMIN — HYDROCORTISONE SODIUM SUCCINATE 50 MG: 100 INJECTION, POWDER, FOR SOLUTION INTRAMUSCULAR; INTRAVENOUS at 02:00

## 2020-09-02 RX ADMIN — IPRATROPIUM BROMIDE 0.5 MG: 0.5 SOLUTION RESPIRATORY (INHALATION) at 14:07

## 2020-09-02 RX ADMIN — APIXABAN 5 MG: 5 TABLET, FILM COATED ORAL at 17:50

## 2020-09-02 RX ADMIN — POTASSIUM PHOSPHATE, MONOBASIC AND POTASSIUM PHOSPHATE, DIBASIC 30 MMOL: 224; 236 INJECTION, SOLUTION, CONCENTRATE INTRAVENOUS at 19:42

## 2020-09-02 RX ADMIN — CEFAZOLIN SODIUM 2000 MG: 2 SOLUTION INTRAVENOUS at 16:15

## 2020-09-02 RX ADMIN — INSULIN LISPRO 3 UNITS: 100 INJECTION, SOLUTION INTRAVENOUS; SUBCUTANEOUS at 11:38

## 2020-09-02 NOTE — PROGRESS NOTES
Hematology - Oncology Progress Note    Tri Quiñonez, 1955, [id number]  ICU 11/ICU 11      Impression and plan:    53-year-old male gastroesophageal cancer  Issues:    Sepsis  Patient is being followed by infectious disease and is on antibiotics  ICU care is in process  Patient has a consolidation on the left, being treated for pneumonia, possibly aspiration  CBC parameters  Prior neutropenia is now higher/better; G-CSF has been discontinued  Hemoglobin level is low but okay/acceptable  Platelet count is within normal limits  Continue to monitor the trends  Gastroesophageal adenocarcinoma  Chemotherapy is obviously on hold  When patient is much better, stable and ready for discharge, Mr Geraldo Davenport will follow-up with his primary oncologist   __________________________________________________________________________________________    Chief complaint:  Sepsis, history of esophageal gastric cancer    History of present illness: 53-year-old male with a history of /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  The patient had been on FOLFOX subsequently the FOLFOX Backbone  Patient was admitted to the ICU with Gram-negative bacteremia  Mr Geraldo Davenport is was found in bed asleep in no apparent distress      Hospital medications list:  Current Facility-Administered Medications   Medication Dose Route Frequency Provider Last Rate    acetaminophen  650 mg Oral Q6H PRN Toy Ramos PA-C      amiodarone  0 5 mg/min Intravenous Continuous Kirstie Rocha PA-C 0 5 mg/min (09/02/20 0518)    apixaban  5 mg Oral BID Toy Ramos PA-C      cefazolin  2,000 mg Intravenous Q8H Karla Litten, MD Stopped (09/02/20 1645)    dexamethasone  4 mg Oral After Lunch Toy Ramos PA-C      dextrose  25 g Intravenous Once Amber Anepu, DO      diazepam  5 mg Oral Daily PRN Toy Ramos PA-C      insulin lispro  1-6 Units Subcutaneous TID AC YESENIA Greer-ALEJANDRA      insulin lispro  1-6 Units Subcutaneous HS EDVIN Romero      ipratropium  0 5 mg Nebulization TID [employer], DO      levalbuterol  1 25 mg Nebulization TID [employer], DO      norepinephrine  1-30 mcg/min Intravenous Titrated Estuardo Rocha PA-C 2 mcg/min (09/02/20 0615)    OLANZapine  5 mg Oral HS NickEDVIN cunha      ondansetron  4 mg Intravenous Q8H PRN EDVIN Romero      pantoprazole  40 mg Oral Daily Before Breakfast EDVIN Romero      pneumococcal 13-valent conjugate vaccine  0 5 mL Intramuscular Prior to discharge Vanessa Tariq MD      venlafaxine  150 mg Oral Daily EDVIN Romero       Physical exam  /83   Pulse 86   Temp 98 2 °F (36 8 °C) (Temporal)   Resp 14   Ht 6' (1 829 m)   Wt 80 6 kg (177 lb 11 1 oz)   SpO2 96%   BMI 24 10 kg/m²   Constitutional: Well formed male, no respiratory distress  Respiratory:  Fair air entry bilaterally, scattered bilateral rhonchi  Cardiovascular: Normal rate, normal rhythm, no murmurs, no gallops, no rubs    GI: Soft, nondistended, +bowel sounds   Integument:  Pale, warm, moist, scattered purpura, no active bleeding  Extremities:  0-1 +bilateral lower extremity edema    Laboratory test results    Results for Denny Gallegos (MRN [id number]) as of 9/2/2020 18:00   Ref   Range 9/2/2020 05:39   WBC Latest Ref Range: 4 31 - 10 16 Thousand/uL 10 82 (H)   Red Blood Cell Count Latest Ref Range: 3 88 - 5 62 Million/uL 2 53 (L)   Hemoglobin Latest Ref Range: 12 0 - 17 0 g/dL 8 0 (L)   HCT Latest Ref Range: 36 5 - 49 3 % 24 7 (L)   MCV Latest Ref Range: 82 - 98 fL 98   MCH Latest Ref Range: 26 8 - 34 3 pg 31 6   MCHC Latest Ref Range: 31 4 - 37 4 g/dL 32 4   RDW Latest Ref Range: 11 6 - 15 1 % 16 7 (H)   Platelet Count Latest Ref Range: 149 - 390 Thousands/uL 179   MPV Latest Ref Range: 8 9 - 12 7 fL 12 0   nRBC Latest Units: /100 WBCs 0   Neutrophils % Latest Ref Range: 43 - 75 % 83 (H)   Immat GRANS % Latest Ref Range: 0 - 2 % 2 Lymphocytes Relative Latest Ref Range: 14 - 44 % 8 (L)   Monocytes Relative Latest Ref Range: 4 - 12 % 7     Results for Damian Anders (MRN [id number]) as of 9/2/2020 18:00   Ref   Range 9/2/2020 05:39   BUN Latest Ref Range: 5 - 25 mg/dL 14   Creatinine Latest Ref Range: 0 60 - 1 30 mg/dL 0 81

## 2020-09-02 NOTE — SPEECH THERAPY NOTE
Speech Language/Pathology    Speech/Language Pathology Progress Note    Patient Name: Marina DEL CASTILLO Date: 9/2/2020         Subjective:  Nsg stated that patient was made NPO after breakfast this AM  According to CNA and nurse, pt  Had pureed waffles and Ensure for breakfast  He only ate a small portion of waffle and drank some Ensure and promptly threw everything up  Pt  Was in bed talking on his phone when I entered  He was periodically coughing and spitting out blood tinged phlegm  Objective:  Pt  Was assessed with pudding, saltine, and thin and nectar thick liquids  He was noted to have oral residue (half the cracker) still on his tongue following a swallow however he stated "I swallowed it"  Cued and able to clear with subsequent swallows  Then given regular water via cup  2 oz no s/s of aspiration however next oz resulted in delayed coughing  Pt  Then trialed with nectar thick liquids and produced an immediate cough  Trialed honey thick liquids via cup and no overt s/s of aspiration was noted  No difficulties noted with pudding  Assessment:  Increase in cough response with thin liquids and nectar thick liquids  Unclear if cough and earlier episode of vomiting is related to chronic dysphagia or current medical status  Pt  Is ok with modified diet until further assessments can be completed  Plan/Recommendations:  Change diet to Dys 2 with honey thick liquids via cup, no straws, medications in puree     Continue follow up with speech  Aspiration precautions  Possible VBS to rule out aspiration may be warranted in the furture  Discussed with CRNP and will let dietary know to change supplements to HTL    Monik Galaviz MS CCC-SLP  Speech Language Pathologist  Available via [facility] License # [id number]  [address] [place] [address] St # [id number]

## 2020-09-02 NOTE — PROGRESS NOTES
Progress Note Lorenzo Nunez 1955, 77 y o  male MRN: [id number]    Unit/Bed#: ICU 11 Encounter: [id number]    Primary Care Provider: Jinny Magaña DO   Date and time admitted to hospital: 8/30/2020  9:00 AM        * Septic shock [facility])  Assessment & Plan  Ongoing chemotherapy for GE CA, noted to be febrile on morning of admission by wife s/p fall in kitchen  Lactic 9 9 on admission, received 3L IVF bolus in ED Bcx obtained and started broad spectrum abx  CT CAP: Suspicious for Left sided multi-lobar pna, no PE noted, masses remain unchanged compared to prior films  CT Head/Neck: no acute intracranial changes    · Levophed and vasopressin started on day of admission and d/linaa on 8/31  Levophed restarted morning of 9/2 due to hypotension  · received stress dose steroids on admission  Transitioned to home Decadron on 9/1  Given additional 50mg of Solucortef morning of 9/2 due to hypotension  · Strict I/O's with lopez  · Lactic trending down, most recent 2 5 on 9/1  · Procal also trending down: 150-> 86 (9/1)   · BC 2/2 E  Coli, Sputum cx +Klebsiella oxytoca  · Abx  · S/p 3 days cefepime (8/30-9/1)  · S/p Tobramycin x1 on 8/31  · S/p 2 days vanc (8/30-8/31)  · Meropenem 1G q8hrs (9/1-)  · ID consulted    PNA (pneumonia)  Assessment & Plan  · CT shows concern for Left sided multifocal pna, likely source of sepsis   · Trend procal, fever and WBC  · Currently requiring 3L NC, keep sats >90%  · BC 2/2 E  Coli  · Sputum +Klebsiella oxystoca  · Aspiration precautions   · Currently on meropenem 1G q8hrs    Neutropenic (HCC)  Assessment & Plan  · Neutropenic precautions  · Start Granix 5mcg/kg x 3 days-> will possibly tolerate 3 more days   Will wait for heme/onc recommendation   · Heme/onc on consult    JENNIFER (acute kidney injury) (HCC)resolved as of 9/2/2020  Assessment & Plan  sCr admission 1 69, baseline sCr 0 8  Likely ATN d/t hypotension  Given 3L Crystalloid bolus in ED and started on vasopressors, keep map >65  Tulio garcia/victorina'ed   Cr 0 81      ----------------------------------------------------------------------------------------  HPI/24hr events: Overnight, patient had episode of afib with RVR  Patient cardioverted with 2 boluses of amiodarone followed by a drip (he is currently on 0 5mg/min)  Patient then became hypotensive (73/41) and levophed was restarted  He was also given 12 5 mg albumin and 50 mg Solucortef  BP has been stable since  Currently on Levophed 2  Disposition: Continue Stepdown Level 1 level of care   Code Status: Level 1 - Full Code  ---------------------------------------------------------------------------------------  SUBJECTIVE  Patient was seen and examined at bedside  He notes that he continues to have productive cough  Patient also notes that he is nauseous and does not have much of an appetite  He denies vomiting and abdominal pain       Review of Systems  Review of systems was reviewed and negative unless stated above in HPI/24-hour events   ---------------------------------------------------------------------------------------  OBJECTIVE    Vitals   Vitals:    20 0400 20 0500 20 0530 20 0600   BP: 107/66 113/63 108/72 124/75   BP Location: Right arm Right arm     Pulse: 78 77 79 85   Resp: (!) 23 (!) 23 21 (!) 27   Temp:  97 7 °F (36 5 °C)     TempSrc:  Tympanic     SpO2: 96% 95% 93% 90%   Weight:    80 6 kg (177 lb 11 1 oz)   Height:         Temp (24hrs), Av 6 °F (37 °C), Min:97 7 °F (36 5 °C), Max:99 8 °F (37 7 °C)  Current: Temperature: 97 7 °F (36 5 °C)  Arterial Line BP: 142/62  Arterial Line MAP (mmHg): 89 mmHg    Respiratory:  SpO2: SpO2: 90 %, SpO2 Activity: SpO2 Activity: At Rest, SpO2 Device: O2 Device: Nasal cannula, Capnography:    Nasal Cannula O2 Flow Rate (L/min): 2 L/min    Invasive/non-invasive ventilation settings   Respiratory    Lab Data (Last 4 hours)    None         O2/Vent Data (Last 4 hours)    None                Physical Exam  Constitutional:       General: He is awake  He is not in acute distress  Appearance: He is ill-appearing  Cardiovascular:      Rate and Rhythm: Normal rate and regular rhythm  Heart sounds: Normal heart sounds  Comments: Port in right chest  Pulmonary:      Effort: Pulmonary effort is normal       Breath sounds: Examination of the left-upper field reveals decreased breath sounds and wheezing  Examination of the left-middle field reveals decreased breath sounds, wheezing and rhonchi  Examination of the left-lower field reveals decreased breath sounds, wheezing and rhonchi  Decreased breath sounds, wheezing and rhonchi present  Abdominal:      General: Bowel sounds are normal       Palpations: Abdomen is soft  Tenderness: There is no abdominal tenderness  Neurological:      Mental Status: He is alert  Psychiatric:         Mood and Affect: Mood normal          Behavior: Behavior is cooperative           Cognition and Memory: Cognition normal          Laboratory and Diagnostics:  Results from last 7 days   Lab Units 09/02/20  0539 09/01/20  0537 08/31/20  0545 08/30/20  0915   WBC Thousand/uL 10 82* 1 61* 0 61* 0 34*   HEMOGLOBIN g/dL 8 0* 7 4* 7 2* 9 2*   HEMATOCRIT % 24 7* 24 0* 23 1* 29 6*   PLATELETS Thousands/uL 179 156 149 236   NEUTROS PCT % 83*  --   --   --    BANDS PCT %  --  30*  --   --    MONOS PCT % 7  --   --   --    MONO PCT %  --  21*  --  20*     Results from last 7 days   Lab Units 09/02/20  0539 09/01/20  2158 09/01/20  1305 09/01/20  0537 08/31/20  0545 08/30/20  0916   SODIUM mmol/L 136  --   --  136 139 134*   POTASSIUM mmol/L 4 0 4 0 2 9* 2 8* 3 1* 3 5   CHLORIDE mmol/L 104  --   --  104 107 100   CO2 mmol/L 19*  --   --  22 19* 14*   ANION GAP mmol/L 13  --   --  10 13 20*   BUN mg/dL 14  --   --  16 20 29*   CREATININE mg/dL 0 81  --   --  0 89 1 07 1 69*   CALCIUM mg/dL 8 6  --   --  8 2* 7 4* 8 5   GLUCOSE RANDOM mg/dL 176*  --   --  121 99 104   ALT U/L 36  -- --  34 37 14   AST U/L 56*  --   --  57* 69* 24   ALK PHOS U/L 53  --   --  47 40* 54   ALBUMIN g/dL 2 1*  --   --  1 8* 1 9* 2 2*   TOTAL BILIRUBIN mg/dL 1 80*  --   --  1 00 0 80 0 90     Results from last 7 days   Lab Units 09/02/20  0539 09/01/20  0537 08/31/20  0545 08/30/20  0916   MAGNESIUM mg/dL 1 9 2 1 1 9 1 7   PHOSPHORUS mg/dL 1 3* 1 7* 2 5  --            Results from last 7 days   Lab Units 08/30/20  0916   TROPONIN I ng/mL 1 54*     Results from last 7 days   Lab Units 09/01/20  0537 08/31/20  0545 08/30/20  2119 08/30/20  1930 08/30/20  1727 08/30/20  1526 08/30/20  1309   LACTIC ACID mmol/L 2 5* 5 2* 8 0* 8 8* 8 4* 9 0* 7 8*     ABG:  Results from last 7 days   Lab Units 08/30/20  0929   PH ART  7 427   PCO2 ART mm Hg 22 5*   PO2 ART mm Hg 246 4*   HCO3 ART mmol/L 14 5*   BASE EXC ART mmol/L -8 4   ABG SOURCE  Radial, Right     VBG:  Results from last 7 days   Lab Units 08/30/20  1309 08/30/20  0929   PH CYNTHIA  7 276*  --    PCO2 CYNTHIA mm Hg 29 7*  --    PO2 CYNTHIA mm Hg 47 7*  --    HCO3 CYNTHIA mmol/L 13 5*  --    BASE EXC CYNTHIA mmol/L -12 1  --    ABG SOURCE   --  Radial, Right     Results from last 7 days   Lab Units 09/01/20  0537 08/31/20  0545   PROCALCITONIN ng/ml 86 37* 150 29*       Micro  Results from last 7 days   Lab Units 09/01/20  0617 09/01/20  0537 08/31/20  0939 08/30/20  1317 08/30/20  1316 08/30/20  0926 08/30/20  0916   BLOOD CULTURE  Received in Microbiology Lab  Culture in Progress  Received in Microbiology Lab  Culture in Progress    --   --   --  Escherichia coli* Escherichia coli*   SPUTUM CULTURE   --   --  1+ Growth of Klebsiella oxytoca*  --   --   --   --    GRAM STAIN RESULT   --   --  2+ Epithelial cells per low power field*  No polys seen*  1+ Gram negative rods*  Rare Gram positive cocci in pairs*  --   --  Gram negative rods* Gram negative rods*   URINE CULTURE   --   --   --  No Growth <1000 cfu/mL  --   --   --    MRSA CULTURE ONLY   --   --   --   --  No Methicillin Resistant Staphlyococcus aureus (MRSA) isolated  --   --        EKG: none  Imaging: I have personally reviewed pertinent reports  Intake and Output  I/O       08/31 0701 - 09/01 0700 09/01 0701 - 09/02 0700 09/02 0701 - 09/03 0700    P  O  540 535     I V  (mL/kg) 50 (0 6) 40 (0 5)     IV Piggyback 1450 1250 2     Total Intake(mL/kg) 2040 (24 6) 1825 2 (22 6)     Urine (mL/kg/hr) 2170 (1 1) 2625 (1 4)     Stool  0     Total Output 2170 2625     Net -130 -799 8            Unmeasured Urine Occurrence  2 x     Unmeasured Stool Occurrence  1 x           Height and Weights   Height: 6' (182 9 cm)  IBW: 77 6 kg  Body mass index is 24 1 kg/m²  Weight (last 2 days)     Date/Time   Weight    09/02/20 0600   80 6 (177 69)    09/01/20 0600   83 (182 98)    08/31/20 0554   82 (180 78)                Nutrition       Diet Orders   (From admission, onward)             Start     Ordered    09/01/20 1639  Dietary nutrition supplements  Once     Question Answer Comment   Select Supplement: Ensure Enlive-Chocolate    Frequency Breakfast, Lunch, Dinner        09/01/20 1639    09/01/20 0945  Room Service  Once     Question:  Type of Service  Answer:  Room Service - Appropriate with Assistance    09/01/20 0944    08/31/20 1026  Diet Dysphagia/Modified Consistency; Dysphagia 2-Mechanical Soft; Thin Liquid  Diet effective now     Question Answer Comment   Diet Type Dysphagia/Modified Consistency    Dysphagia/Modified Consistency Dysphagia 2-Mechanical Soft    Liquid Modifier Thin Liquid    RD to adjust diet per protocol?  Yes        08/31/20 1025                  Active Medications  Scheduled Meds:  Current Facility-Administered Medications   Medication Dose Route Frequency Provider Last Rate    acetaminophen  650 mg Oral Q6H PRN Andry Rapp PA-C      amiodarone  0 5 mg/min Intravenous Continuous Kirstie Rocha PA-C 0 5 mg/min (09/02/20 0518)    apixaban  5 mg Oral BID Andry Rapp PA-C      dexamethasone  4 mg Oral After Lunch Nick, EDVIN      dextrose  25 g Intravenous Once [employer], DO      diazepam  5 mg Oral Daily PRN NickEDVIN cunha      insulin lispro  1-6 Units Subcutaneous TID [facility] EDVIN Romero      insulin lispro  1-6 Units Subcutaneous HS NickEDVIN cunha      ipratropium  0 5 mg Nebulization TID [employer], DO      levalbuterol  1 25 mg Nebulization TID [employer], DO      meropenem  1,000 mg Intravenous Q8H Adenike Steen MD 1,000 mg (09/01/20 1602)    norepinephrine           norepinephrine  1-30 mcg/min Intravenous Titrated Kirstie Rocha PA-C 2 mcg/min (09/02/20 0615)    OLANZapine  5 mg Oral HS Nick, EDVIN      ondansetron  4 mg Intravenous Q8H PRN Nick, EDVIN      pantoprazole  40 mg Oral Daily Before Breakfast NickEDVIN cunha      pneumococcal 13-valent conjugate vaccine  0 5 mL Intramuscular Prior to discharge Heavenly Pederson MD      potassium chloride  40 mEq Oral BID Nick, EDVIN      venlafaxine  150 mg Oral Daily NickEDVIN cunha       Continuous Infusions:  amiodarone, 0 5 mg/min, Last Rate: 0 5 mg/min (09/02/20 0518)  norepinephrine, 1-30 mcg/min, Last Rate: 2 mcg/min (09/02/20 0615)      PRN Meds:   acetaminophen, 650 mg, Q6H PRN  diazepam, 5 mg, Daily PRN  ondansetron, 4 mg, Q8H PRN  pneumococcal 13-valent conjugate vaccine, 0 5 mL, Prior to discharge        Invasive Devices Review  Invasive Devices     Central Venous Catheter Line            Port A Cath 07/31/19 Right Chest 399 days    Port A Cath 09/02/20 Right Chest less than 1 day          Peripheral Intravenous Line            Peripheral IV 08/30/20 Left Antecubital 2 days    Peripheral IV 09/01/20 Right Wrist less than 1 day                Rationale for remaining devices: IV medications   Port for chemotherapy   ---------------------------------------------------------------------------------------  Advance Directive and Living Will:      Power of : POLST:    ---------------------------------------------------------------------------------------  Care Time Delivered:   No Critical Care time spent       [place], DO      Portions of the record may have been created with voice recognition software  Occasional wrong word or "sound a like" substitutions may have occurred due to the inherent limitations of voice recognition software    Read the chart carefully and recognize, using context, where substitutions have occurred

## 2020-09-02 NOTE — MALNUTRITION/BMI
This medical record reflects one or more clinical indicators suggestive of malnutrition  Malnutrition Findings:   Malnutrition type: Chronic illness(Moderate malnutrition of chronic illness related to inadequate energy intake as evidenced by somewhat hollow orbits with somewhat dark circles, somewhat depression at temples  Awaiting treatment plan)  Degree of Malnutrition: Malnutrition of moderate degree  Malnutrition Characteristics: Fat loss, Muscle loss    BMI Findings: Body mass index is 24 1 kg/m²  See Nutrition note dated 09/02/2020 for additional details  Completed nutrition assessment is viewable in the nutrition documentation

## 2020-09-02 NOTE — ASSESSMENT & PLAN NOTE
· CT shows concern for Left sided multifocal pna, likely source of sepsis   · Trend procal, fever and WBC  · Currently requiring 3L NC, keep sats >90%  · BC 2/2 E   Coli  · Sputum +Klebsiella oxystoca  · Aspiration precautions   · Currently on meropenem 1G q8hrs

## 2020-09-02 NOTE — ASSESSMENT & PLAN NOTE
Ongoing chemotherapy for GE CA, noted to be febrile on morning of admission by wife s/p fall in kitchen  Lactic 9 9 on admission, received 3L IVF bolus in ED Bcx obtained and started broad spectrum abx  CT CAP: Suspicious for Left sided multi-lobar pna, no PE noted, masses remain unchanged compared to prior films  CT Head/Neck: no acute intracranial changes    · Levophed and vasopressin started on day of admission and d/liana on 8/31  Levophed restarted morning of 9/2 due to hypotension  · received stress dose steroids on admission  Transitioned to home Decadron on 9/1  Given additional 50mg of Solucortef morning of 9/2 due to hypotension  · Strict I/O's with lopez  · Lactic trending down, most recent 2 5 on 9/1  · Procal also trending down: 150-> 86 (9/1)   · BC 2/2 E   Coli, Sputum cx +Klebsiella oxytoca  · Abx  · S/p 3 days cefepime (8/30-9/1)  · S/p Tobramycin x1 on 8/31  · S/p 2 days vanc (8/30-8/31)  · Meropenem 1G q8hrs (9/1-)  · ID consulted

## 2020-09-02 NOTE — PHYSICAL THERAPY NOTE
Attempted therapy however PT/OT cancelled by RN as pt had increased HR, low BP earlier  Will follow    Nadine Pablo PT  [id number]

## 2020-09-02 NOTE — ASSESSMENT & PLAN NOTE
· Neutropenic precautions  · Start Granix 5mcg/kg x 3 days-> will possibly tolerate 3 more days   Will wait for heme/onc recommendation   · Heme/onc on consult

## 2020-09-02 NOTE — PLAN OF CARE
Problem: RESPIRATORY - ADULT  Goal: Achieves optimal ventilation and oxygenation  Description: INTERVENTIONS:  - Assess for changes in respiratory status  - Assess for changes in mentation and behavior  - Position to facilitate oxygenation and minimize respiratory effort  - Oxygen administered by appropriate delivery if ordered  - Initiate smoking cessation education as indicated  - Encourage broncho-pulmonary hygiene including cough, deep breathe, Incentive Spirometry  - Assess the need for suctioning and aspirate as needed  - Assess and instruct to report SOB or any respiratory difficulty  - Respiratory Therapy support as indicated  Outcome: Progressing     Problem: PAIN - ADULT  Goal: Verbalizes/displays adequate comfort level or baseline comfort level  Description: Interventions:  - Encourage patient to monitor pain and request assistance  - Assess pain using appropriate pain scale  - Administer analgesics based on type and severity of pain and evaluate response  - Implement non-pharmacological measures as appropriate and evaluate response  - Consider cultural and social influences on pain and pain management  - Notify physician/advanced practitioner if interventions unsuccessful or patient reports new pain  Outcome: Progressing     Problem: INFECTION - ADULT  Goal: Absence or prevention of progression during hospitalization  Description: INTERVENTIONS:  - Assess and monitor for signs and symptoms of infection  - Monitor lab/diagnostic results  - Monitor all insertion sites, i e  indwelling lines, tubes, and drains  - Monitor endotracheal if appropriate and nasal secretions for changes in amount and color  - [place] appropriate cooling/warming therapies per order  - Administer medications as ordered  - Instruct and encourage patient and family to use good hand hygiene technique  - Identify and instruct in appropriate isolation precautions for identified infection/condition  Outcome: Progressing  Goal: Absence of fever/infection during neutropenic period  Description: INTERVENTIONS:  - Monitor WBC    Outcome: Progressing     Problem: SAFETY ADULT  Goal: Patient will remain free of falls  Description: INTERVENTIONS:  - Assess patient frequently for physical needs  -  Identify cognitive and physical deficits and behaviors that affect risk of falls    -  [place] fall precautions as indicated by assessment   - Educate patient/family on patient safety including physical limitations  - Instruct patient to call for assistance with activity based on assessment  - Modify environment to reduce risk of injury  - Consider OT/PT consult to assist with strengthening/mobility  Outcome: Progressing  Goal: Maintain or return to baseline ADL function  Description: INTERVENTIONS:  -  Assess patient's ability to carry out ADLs; assess patient's baseline for ADL function and identify physical deficits which impact ability to perform ADLs (bathing, care of mouth/teeth, toileting, grooming, dressing, etc )  - Assess/evaluate cause of self-care deficits   - Assess range of motion  - Assess patient's mobility; develop plan if impaired  - Assess patient's need for assistive devices and provide as appropriate  - Encourage maximum independence but intervene and supervise when necessary  - Involve family in performance of ADLs  - Assess for home care needs following discharge   - Consider OT consult to assist with ADL evaluation and planning for discharge  - Provide patient education as appropriate  Outcome: Progressing  Goal: Maintain or return mobility status to optimal level  Description: INTERVENTIONS:  - Assess patient's baseline mobility status (ambulation, transfers, stairs, etc )    - Identify cognitive and physical deficits and behaviors that affect mobility  - Identify mobility aids required to assist with transfers and/or ambulation (gait belt, sit-to-stand, lift, walker, cane, etc )  - [place] fall precautions as indicated by assessment  - Record patient progress and toleration of activity level on Mobility SBAR; progress patient to next Phase/Stage  - Instruct patient to call for assistance with activity based on assessment  - Consider rehabilitation consult to assist with strengthening/weightbearing, etc   Outcome: Progressing     Problem: DISCHARGE PLANNING  Goal: Discharge to home or other facility with appropriate resources  Description: INTERVENTIONS:  - Identify barriers to discharge w/patient and caregiver  - Arrange for needed discharge resources and transportation as appropriate  - Identify discharge learning needs (meds, wound care, etc )  - Arrange for interpretive services to assist at discharge as needed  - Refer to Case Management Department for coordinating discharge planning if the patient needs post-hospital services based on physician/advanced practitioner order or complex needs related to functional status, cognitive ability, or social support system  Outcome: Progressing     Problem: Knowledge Deficit  Goal: Patient/family/caregiver demonstrates understanding of disease process, treatment plan, medications, and discharge instructions  Description: Complete learning assessment and assess knowledge base    Interventions:  - Provide teaching at level of understanding  - Provide teaching via preferred learning methods  Outcome: Progressing     Problem: CARDIOVASCULAR - ADULT  Goal: Maintains optimal cardiac output and hemodynamic stability  Description: INTERVENTIONS:  - Monitor I/O, vital signs and rhythm  - Monitor for S/S and trends of decreased cardiac output  - Administer and titrate ordered vasoactive medications to optimize hemodynamic stability  - Assess quality of pulses, skin color and temperature  - Assess for signs of decreased coronary artery perfusion  - Instruct patient to report change in severity of symptoms  Outcome: Progressing  Goal: Absence of cardiac dysrhythmias or at baseline rhythm  Description: INTERVENTIONS:  - Continuous cardiac monitoring, vital signs, obtain 12 lead EKG if ordered  - Administer antiarrhythmic and heart rate control medications as ordered  - Monitor electrolytes and administer replacement therapy as ordered  Outcome: Progressing     Problem: GENITOURINARY - ADULT  Goal: Maintains or returns to baseline urinary function  Description: INTERVENTIONS:  - Assess urinary function  - Encourage oral fluids to ensure adequate hydration if ordered  - Administer IV fluids as ordered to ensure adequate hydration  - Administer ordered medications as needed  - Offer frequent toileting  - Follow urinary retention protocol if ordered  Outcome: Progressing  Goal: Urinary catheter remains patent  Description: INTERVENTIONS:  - Assess patency of urinary catheter  - If patient has a chronic lopez, consider changing catheter if non-functioning  - Follow guidelines for intermittent irrigation of non-functioning urinary catheter  Outcome: Progressing     Problem: METABOLIC, FLUID AND ELECTROLYTES - ADULT  Goal: Electrolytes maintained within normal limits  Description: INTERVENTIONS:  - Monitor labs and assess patient for signs and symptoms of electrolyte imbalances  - Administer electrolyte replacement as ordered  - Monitor response to electrolyte replacements, including repeat lab results as appropriate  - Instruct patient on fluid and nutrition as appropriate  Outcome: Progressing  Goal: Fluid balance maintained  Description: INTERVENTIONS:  - Monitor labs   - Monitor I/O and WT  - Instruct patient on fluid and nutrition as appropriate  - Assess for signs & symptoms of volume excess or deficit  Outcome: Progressing  Goal: Glucose maintained within target range  Description: INTERVENTIONS:  - Monitor Blood Glucose as ordered  - Assess for signs and symptoms of hyperglycemia and hypoglycemia  - Administer ordered medications to maintain glucose within target range  - Assess nutritional intake and initiate nutrition service referral as needed  Outcome: Progressing     Problem: Prexisting or High Potential for Compromised Skin Integrity  Goal: Skin integrity is maintained or improved  Description: INTERVENTIONS:  - Identify patients at risk for skin breakdown  - Assess and monitor skin integrity  - Assess and monitor nutrition and hydration status  - Monitor labs   - Assess for incontinence   - Turn and reposition patient  - Assist with mobility/ambulation  - Relieve pressure over bony prominences  - Avoid friction and shearing  - Provide appropriate hygiene as needed including keeping skin clean and dry  - Evaluate need for skin moisturizer/barrier cream  - Collaborate with interdisciplinary team   - Patient/family teaching  - Consider wound care consult   Outcome: Progressing     Problem: Potential for Falls  Goal: Patient will remain free of falls  Description: INTERVENTIONS:  - Assess patient frequently for physical needs  -  Identify cognitive and physical deficits and behaviors that affect risk of falls  -  [place] fall precautions as indicated by assessment   - Educate patient/family on patient safety including physical limitations  - Instruct patient to call for assistance with activity based on assessment  - Modify environment to reduce risk of injury  - Consider OT/PT consult to assist with strengthening/mobility  Outcome: Progressing     Problem: Nutrition/Hydration-ADULT  Goal: Nutrient/Hydration intake appropriate for improving, restoring or maintaining nutritional needs  Description: Monitor and assess patient's nutrition/hydration status for malnutrition  Collaborate with interdisciplinary team and initiate plan and interventions as ordered  Monitor patient's weight and dietary intake as ordered or per policy  Utilize nutrition screening tool and intervene as necessary  Determine patient's food preferences and provide high-protein, high-caloric foods as appropriate       INTERVENTIONS:  - Monitor oral intake, urinary output, labs, and treatment plans  - Assess nutrition and hydration status and recommend course of action  - Evaluate amount of meals eaten  - Assist patient with eating if necessary   - Allow adequate time for meals  - Recommend/ encourage appropriate diets, oral nutritional supplements, and vitamin/mineral supplements  - Order, calculate, and assess calorie counts as needed  - Recommend, monitor, and adjust tube feedings and TPN/PPN based on assessed needs  - Assess need for intravenous fluids  - Provide specific nutrition/hydration education as appropriate  - Include patient/family/caregiver in decisions related to nutrition  Outcome: Progressing

## 2020-09-02 NOTE — ASSESSMENT & PLAN NOTE
New onset Afib probably 2/2 to PNA  Cardioconverted overnight on 9/2 with amiodarone  Currently on amiodarone drip  Echo 9/2/2020: Ejection fraction was estimated in the range of 35 % to 40 % to be 37 %  There was moderate diffuse hypokinesis  · Features were consistent with a pseudonormal left ventricular filling pattern, with concomitant abnormal relaxation and increased filling pressure (grade 2 diastolic dysfunction)

## 2020-09-02 NOTE — QUICK NOTE
Patient coughing and aspirating when eating his breakfast this am   Patient made NPO and speech called to come reassess patient

## 2020-09-02 NOTE — ASSESSMENT & PLAN NOTE
sCr admission 1 69, baseline sCr 0 8  Likely ATN d/t hypotension  Given 3L Crystalloid bolus in ED and started on vasopressors, keep map >65  Tulio d/victorina'ed  9/1 Cr 0 81

## 2020-09-02 NOTE — PROGRESS NOTES
Progress Note - Infectious Disease   Kobi Cade 77 y o  male MRN: [id number]  Unit/Bed#: ICU 11 Encounter: [id number]      Impression/Plan:  1  Septic shock-POA   Appears to be secondary to gram-negative bacteremia of an undefined source   Consideration for the possibility of translocation of organisms across the gut wall   Consideration for the possibility of pneumonia   Consideration for the possibility of typhilitis   Consideration for the possibility of another source such as a Port-A-Cath infection although this is less likely   Patient's blood pressure has recovered he is now off all vasopressor support   He seems to be tolerating the antibiotics without difficulty  No resistant organisms isolated  -discontinue meropenem  -cefazolin 2 g IV q 8 hours  -can likely transition to oral antibiotics the next 24-48 hours if the follow-up blood cultures remain negative  -recheck CBC with diff and BMP  -recheck procalcitonin  -supportive care     2  E  Coli bacteremia-unclear primary source as the sputum culture does not match what is in the blood  Probably secondary to translocation across the bowel wall in the setting of severe neutropenia  No definitive primary intra-abdominal process found on CT abdomen pelvis    Possibly secondary to a catheter related bacteremia of this less likely  no resistant organisms isolated  -antibiotics as above  -recheck blood cultures to confirm clearance of the bacteremia  -no additional ID workup for now  -will need repeat blood cultures to be done 1 week after the antibiotics with the Port-A-Cath in place     3  Pancytopenia-with severe neutropenia in the setting of cancer chemotherapy   Suspect secondary to the patient's chemotherapy   The acute infectious process may also be playing a role  Neutropenia is much improved  -no additional G-CSF  -recheck CBC with diff  -hematology oncology follow-up     4  Pneumonia-possibly aspiration   More dense consolidation on the left   His respiratory status is relatively stable remains a bit tenuous requiring oxygen support but is much improved  -antibiotics as above  -monitor respiratory status  -pulmonary toilet  -close Pulmonary critical care follow-up     5  Gastroesophageal cancer-on modified FOLFOX   Hematology oncology follow-up    Have discussed the above management plan with the critical care service    Antibiotics:  Meropenem 2  Antibiotics 4  Subjective:  Patient has no fever, chills, sweats; no nausea, had episode of vomiting that resulted in aspiration event once again, no diarrhea; no cough, shortness of breath; no pain  No new symptoms  He is still requiring O2 by nasal cannula    Objective:  Vitals:  Temp:  [96 3 °F (35 7 °C)-99 1 °F (37 3 °C)] 98 °F (36 7 °C)  HR:  [] 83  Resp:  [12-32] 22  BP: ()/(41-80) 108/64  SpO2:  [88 %-98 %] 97 %  Temp (24hrs), Av 8 °F (36 6 °C), Min:96 3 °F (35 7 °C), Max:99 1 °F (37 3 °C)  Current: Temperature: 98 °F (36 7 °C)    Physical Exam:   General Appearance:  Alert, interactive, nontoxic, no acute distress  Throat: Oropharynx moist without lesions  Lungs:   Decreased breath sounds bilaterally; no wheezes, rhonchi or rales; respirations unlabored   Heart:  RRR; no murmur, rub or gallop   Abdomen:   Soft, non-tender, non-distended, positive bowel sounds  Extremities: No clubbing, cyanosis or edema   Skin: No new rashes or lesions  No draining wounds noted         Labs, Imaging, & Other studies:   All pertinent labs and imaging studies were personally reviewed  Results from last 7 days   Lab Units 20  0539 20  0537 20  0545   WBC Thousand/uL 10 82* 1 61* 0 61*   HEMOGLOBIN g/dL 8 0* 7 4* 7 2*   PLATELETS Thousands/uL 179 156 149     Results from last 7 days   Lab Units 20  0539  20  0537 20  0545   SODIUM mmol/L 136  --  136 139   POTASSIUM mmol/L 4 0   < > 2 8* 3 1*   CHLORIDE mmol/L 104  --  104 107   CO2 mmol/L 19*  --  22 19* BUN mg/dL 14  --  16 20   CREATININE mg/dL 0 81  --  0 89 1 07   EGFR ml/min/1 73sq m 92  --  88 71   CALCIUM mg/dL 8 6  --  8 2* 7 4*   AST U/L 56*  --  57* 69*   ALT U/L 36  --  34 37   ALK PHOS U/L 53  --  47 40*    < > = values in this interval not displayed  Results from last 7 days   Lab Units 09/01/20  0617 09/01/20  0537 08/31/20  0939 08/30/20  1317 08/30/20  1316 08/30/20  0926 08/30/20  0916   BLOOD CULTURE  No Growth at 24 hrs   No Growth at 24 hrs   --   --   --  Escherichia coli* Escherichia coli*   SPUTUM CULTURE   --   --  1+ Growth of Klebsiella oxytoca*  1+ Growth of Yeast*  --   --   --   --    GRAM STAIN RESULT   --   --  2+ Epithelial cells per low power field*  No polys seen*  1+ Gram negative rods*  Rare Gram positive cocci in pairs*  --   --  Gram negative rods* Gram negative rods*   URINE CULTURE   --   --   --  No Growth <1000 cfu/mL  --   --   --    MRSA CULTURE ONLY   --   --   --   --  No Methicillin Resistant Staphlyococcus aureus (MRSA) isolated  --   --      Results from last 7 days   Lab Units 09/02/20  0539 09/01/20  0537 08/31/20  0545   PROCALCITONIN ng/ml 49 96* 86 37* 150 29*         Results from last 7 days   Lab Units 09/01/20  0537   FERRITIN ng/mL 175

## 2020-09-02 NOTE — PROGRESS NOTES
Critical Care Services- Interval Progress Note   Jose Grace 77 y o  male MRN: [id number]  Unit/Bed#: ICU 11 Encounter: [id number]  Assessment and Plan  Interval Events:  Patient converted to atrial fibrillation with rapid ventricular response, BP very borderline at that time       Diagnosis: AF RVR  o Plan: Given borderline BP, new onset AF, and already on anticoagulation, will attempt amiodarone bolus and infusion  Electrolytes have been repleted     Diagnosis: Hypotension  o Plan: Patient BP has been borderline low since earlier today  Received beta blocker this AM  Stress dose steroids were also transitioned to home dose  Will give albumin, consider one time dose of steroid and monitor response  Upon my evaluation, this patient had a high probability of imminent or life-threatening deterioration due to atrial fibrillation with RVR, which required my direct attention, intervention, and personal management  I have personally provided 40 minutes (2200 to 2230) on 09/01/20 of critical care time, exclusive of procedures, teaching, family meetings, and any prior time recorded by providers other than myself  Time includes review of laboratory data, monitoring for potential decompensation  Interventions were performed as documented above    -------------------------------------------------------------------------------------------------------------------------------------  Hemodynamic Monitoring:  Vital Signs:   Blood pressure (!) 79/50, pulse 87, temperature 98 °F (36 7 °C), temperature source Tympanic, resp  rate (!) 27, height 6' (1 829 m), weight 83 kg (182 lb 15 7 oz), SpO2 93 %          Laboratory   Results from last 7 days   Lab Units 09/01/20  0537 08/31/20  0545 08/30/20  0915   WBC Thousand/uL 1 61* 0 61* 0 34*   HEMOGLOBIN g/dL 7 4* 7 2* 9 2*   HEMATOCRIT % 24 0* 23 1* 29 6*   PLATELETS Thousands/uL 156 149 236   BANDS PCT % 30*  --   --    MONO PCT % 21*  --  20*     Results from last 7 days Lab Units 09/01/20  2158 09/01/20  1305 09/01/20  0537 08/31/20  0545 08/30/20  0916   SODIUM mmol/L  --   --  136 139 134*   POTASSIUM mmol/L 4 0 2 9* 2 8* 3 1* 3 5   CHLORIDE mmol/L  --   --  104 107 100   CO2 mmol/L  --   --  22 19* 14*   ANION GAP mmol/L  --   --  10 13 20*   BUN mg/dL  --   --  16 20 29*   CREATININE mg/dL  --   --  0 89 1 07 1 69*   CALCIUM mg/dL  --   --  8 2* 7 4* 8 5   GLUCOSE RANDOM mg/dL  --   --  121 99 104   ALT U/L  --   --  34 37 14   AST U/L  --   --  57* 69* 24   ALK PHOS U/L  --   --  47 40* 54   ALBUMIN g/dL  --   --  1 8* 1 9* 2 2*   TOTAL BILIRUBIN mg/dL  --   --  1 00 0 80 0 90     Results from last 7 days   Lab Units 09/01/20  0537 08/31/20  0545 08/30/20  0916   MAGNESIUM mg/dL 2 1 1 9 1 7   PHOSPHORUS mg/dL 1 7* 2 5  --            Results from last 7 days   Lab Units 08/30/20  0916   TROPONIN I ng/mL 1 54*     Results from last 7 days   Lab Units 09/01/20  0537 08/31/20  0545 08/30/20  2119 08/30/20  1930 08/30/20  1727 08/30/20  1526 08/30/20  1309   LACTIC ACID mmol/L 2 5* 5 2* 8 0* 8 8* 8 4* 9 0* 7 8*     ABG:  Results from last 7 days   Lab Units 08/30/20  0929   PH ART  7 427   PCO2 ART mm Hg 22 5*   PO2 ART mm Hg 246 4*   HCO3 ART mmol/L 14 5*   BASE EXC ART mmol/L -8 4   ABG SOURCE  Radial, Right     VBG:  Results from last 7 days   Lab Units 08/30/20  1309 08/30/20  0929   PH CYNTHIA  7 276*  --    PCO2 CYNTHIA mm Hg 29 7*  --    PO2 CYNTHIA mm Hg 47 7*  --    HCO3 CYNTHIA mmol/L 13 5*  --    BASE EXC CYNTHIA mmol/L -12 1  --    ABG SOURCE   --  Radial, Right     Results from last 7 days   Lab Units 09/01/20  0537 08/31/20  0545   PROCALCITONIN ng/ml 86 37* 150 29*       Micro  Results from last 7 days   Lab Units 09/01/20  0617 09/01/20  0537 08/31/20  0939 08/30/20  1317 08/30/20  1316 08/30/20  0926 08/30/20  0916   BLOOD CULTURE  Received in Microbiology Lab  Culture in Progress  Received in Microbiology Lab  Culture in Progress    --   --   --  Escherichia coli* Escherichia coli* SPUTUM CULTURE   --   --  1+ Growth of Klebsiella oxytoca*  --   --   --   --    GRAM STAIN RESULT   --   --  2+ Epithelial cells per low power field*  No polys seen*  1+ Gram negative rods*  Rare Gram positive cocci in pairs*  --   --  Gram negative rods* Gram negative rods*   URINE CULTURE   --   --   --  No Growth <1000 cfu/mL  --   --   --    MRSA CULTURE ONLY   --   --   --   --  No Methicillin Resistant Staphlyococcus aureus (MRSA) isolated  --   --        Diagnostic Imaging / Data: I have personally reviewed pertinent reports        Medications:  Current Facility-Administered Medications   Medication Dose Route Frequency    acetaminophen (TYLENOL) tablet 650 mg  650 mg Oral Q6H PRN    amiodarone (CORDARONE) 900 mg in dextrose 5 % 500 mL infusion  1 mg/min Intravenous Continuous    Followed by   [facility] amiodarone (CORDARONE) 900 mg in dextrose 5 % 500 mL infusion  0 5 mg/min Intravenous Continuous    apixaban (ELIQUIS) tablet 5 mg  5 mg Oral BID    dexamethasone (DECADRON) tablet 4 mg  4 mg Oral After Lunch    dextrose 50 % IV solution 25 g  25 g Intravenous Once    diazepam (VALIUM) tablet 5 mg  5 mg Oral Daily PRN    hydrocortisone sodium succinate (PF) (Solu-CORTEF) injection 50 mg  50 mg Intravenous Once    insulin lispro (HumaLOG) 100 units/mL subcutaneous injection 1-6 Units  1-6 Units Subcutaneous TID AC    insulin lispro (HumaLOG) 100 units/mL subcutaneous injection 1-6 Units  1-6 Units Subcutaneous HS    ipratropium (ATROVENT) 0 02 % inhalation solution 0 5 mg  0 5 mg Nebulization TID    levalbuterol (XOPENEX) inhalation solution 1 25 mg  1 25 mg Nebulization TID    meropenem (MERREM) 1000 mg in 100 mL sodium chloride 0 9% IVPB  1,000 mg Intravenous Q8H    norepinephrine (LEVOPHED) 4 mg (STANDARD CONCENTRATION) IV in sodium chloride 0 9% 250 mL  1-30 mcg/min Intravenous Titrated    OLANZapine (ZyPREXA) tablet 5 mg  5 mg Oral HS    ondansetron (ZOFRAN) injection 4 mg  4 mg Intravenous Q8H PRN    pantoprazole (PROTONIX) EC tablet 40 mg  40 mg Oral Daily Before Breakfast    pneumococcal 13-valent conjugate vaccine (PREVNAR-13) IM injection 0 5 mL  0 5 mL Intramuscular Prior to discharge    potassium chloride (K-DUR,KLOR-CON) CR tablet 40 mEq  40 mEq Oral BID    venlafaxine (EFFEXOR-XR) 24 hr capsule 150 mg  150 mg Oral Daily       SIGNATURE: Kirstie Rocha PA-C    Portions of the record may have been created with voice recognition software  Occasional wrong word or "sound a like" substitutions may have occurred due to the inherent limitations of voice recognition software    Read the chart carefully and recognize, using context, where substitutions have occurred

## 2020-09-03 PROBLEM — I51.89 GRADE II DIASTOLIC DYSFUNCTION: Status: ACTIVE | Noted: 2020-09-03

## 2020-09-03 LAB
ALBUMIN SERPL BCP-MCNC: 2 G/DL (ref 3.5–5)
ALP SERPL-CCNC: 67 U/L (ref 46–116)
ALT SERPL W P-5'-P-CCNC: 30 U/L (ref 12–78)
ANION GAP SERPL CALCULATED.3IONS-SCNC: 10 MMOL/L (ref 4–13)
AST SERPL W P-5'-P-CCNC: 25 U/L (ref 5–45)
BACTERIA SPT RESP CULT: ABNORMAL
BASOPHILS # BLD MANUAL: 0 THOUSAND/UL (ref 0–0.1)
BASOPHILS NFR MAR MANUAL: 0 % (ref 0–1)
BILIRUB SERPL-MCNC: 0.6 MG/DL (ref 0.2–1)
BUN SERPL-MCNC: 18 MG/DL (ref 5–25)
CALCIUM SERPL-MCNC: 8.1 MG/DL (ref 8.3–10.1)
CHLORIDE SERPL-SCNC: 108 MMOL/L (ref 100–108)
CO2 SERPL-SCNC: 23 MMOL/L (ref 21–32)
CREAT SERPL-MCNC: 0.95 MG/DL (ref 0.6–1.3)
EOSINOPHIL # BLD MANUAL: 0 THOUSAND/UL (ref 0–0.4)
EOSINOPHIL NFR BLD MANUAL: 0 % (ref 0–6)
ERYTHROCYTE [DISTWIDTH] IN BLOOD BY AUTOMATED COUNT: 17.2 % (ref 11.6–15.1)
GFR SERPL CREATININE-BSD FRML MDRD: 82 ML/MIN/1.73SQ M
GLUCOSE SERPL-MCNC: 112 MG/DL (ref 65–140)
GLUCOSE SERPL-MCNC: 155 MG/DL (ref 65–140)
GLUCOSE SERPL-MCNC: 156 MG/DL (ref 65–140)
GLUCOSE SERPL-MCNC: 166 MG/DL (ref 65–140)
GLUCOSE SERPL-MCNC: 169 MG/DL (ref 65–140)
GRAM STN SPEC: ABNORMAL
HCT VFR BLD AUTO: 26.2 % (ref 36.5–49.3)
HGB BLD-MCNC: 7.9 G/DL (ref 12–17)
HYPERCHROMIA BLD QL SMEAR: PRESENT
LYMPHOCYTES # BLD AUTO: 12 % (ref 14–44)
LYMPHOCYTES # BLD AUTO: 2.63 THOUSAND/UL (ref 0.6–4.47)
MAGNESIUM SERPL-MCNC: 2 MG/DL (ref 1.6–2.6)
MCH RBC QN AUTO: 30.3 PG (ref 26.8–34.3)
MCHC RBC AUTO-ENTMCNC: 30.2 G/DL (ref 31.4–37.4)
MCV RBC AUTO: 100 FL (ref 82–98)
MONOCYTES # BLD AUTO: 2.19 THOUSAND/UL (ref 0–1.22)
MONOCYTES NFR BLD: 10 % (ref 4–12)
NEUTROPHILS # BLD MANUAL: 17.09 THOUSAND/UL (ref 1.85–7.62)
NEUTS BAND NFR BLD MANUAL: 17 % (ref 0–8)
NEUTS SEG NFR BLD AUTO: 61 % (ref 43–75)
NRBC BLD AUTO-RTO: 1 /100 WBCS
PHOSPHATE SERPL-MCNC: 2.7 MG/DL (ref 2.3–4.1)
PLATELET # BLD AUTO: 227 THOUSANDS/UL (ref 149–390)
PLATELET BLD QL SMEAR: ADEQUATE
PMV BLD AUTO: 12.3 FL (ref 8.9–12.7)
POTASSIUM SERPL-SCNC: 3.7 MMOL/L (ref 3.5–5.3)
PROCALCITONIN SERPL-MCNC: 35.18 NG/ML
PROT SERPL-MCNC: 5.3 G/DL (ref 6.4–8.2)
RBC # BLD AUTO: 2.61 MILLION/UL (ref 3.88–5.62)
RBC MORPH BLD: PRESENT
SODIUM SERPL-SCNC: 141 MMOL/L (ref 136–145)
TOTAL CELLS COUNTED SPEC: 100
WBC # BLD AUTO: 21.91 THOUSAND/UL (ref 4.31–10.16)

## 2020-09-03 PROCEDURE — 99233 SBSQ HOSP IP/OBS HIGH 50: CPT | Performed by: INTERNAL MEDICINE

## 2020-09-03 PROCEDURE — 83735 ASSAY OF MAGNESIUM: CPT | Performed by: NURSE PRACTITIONER

## 2020-09-03 PROCEDURE — 97530 THERAPEUTIC ACTIVITIES: CPT

## 2020-09-03 PROCEDURE — 94640 AIRWAY INHALATION TREATMENT: CPT

## 2020-09-03 PROCEDURE — 82948 REAGENT STRIP/BLOOD GLUCOSE: CPT

## 2020-09-03 PROCEDURE — 85007 BL SMEAR W/DIFF WBC COUNT: CPT | Performed by: NURSE PRACTITIONER

## 2020-09-03 PROCEDURE — 84145 PROCALCITONIN (PCT): CPT | Performed by: INTERNAL MEDICINE

## 2020-09-03 PROCEDURE — 97110 THERAPEUTIC EXERCISES: CPT

## 2020-09-03 PROCEDURE — 99222 1ST HOSP IP/OBS MODERATE 55: CPT | Performed by: INTERNAL MEDICINE

## 2020-09-03 PROCEDURE — 84100 ASSAY OF PHOSPHORUS: CPT | Performed by: NURSE PRACTITIONER

## 2020-09-03 PROCEDURE — 94760 N-INVAS EAR/PLS OXIMETRY 1: CPT

## 2020-09-03 PROCEDURE — 99232 SBSQ HOSP IP/OBS MODERATE 35: CPT | Performed by: FAMILY MEDICINE

## 2020-09-03 PROCEDURE — 80053 COMPREHEN METABOLIC PANEL: CPT | Performed by: NURSE PRACTITIONER

## 2020-09-03 PROCEDURE — 92526 ORAL FUNCTION THERAPY: CPT

## 2020-09-03 PROCEDURE — 85027 COMPLETE CBC AUTOMATED: CPT | Performed by: NURSE PRACTITIONER

## 2020-09-03 RX ORDER — AMIODARONE HYDROCHLORIDE 200 MG/1
400 TABLET ORAL 2 TIMES DAILY WITH MEALS
Status: DISCONTINUED | OUTPATIENT
Start: 2020-09-03 | End: 2020-09-06

## 2020-09-03 RX ORDER — AMIODARONE HYDROCHLORIDE 200 MG/1
200 TABLET ORAL
Status: DISCONTINUED | OUTPATIENT
Start: 2020-10-11 | End: 2020-09-06

## 2020-09-03 RX ORDER — AMIODARONE HYDROCHLORIDE 200 MG/1
400 TABLET ORAL
Status: DISCONTINUED | OUTPATIENT
Start: 2020-09-11 | End: 2020-09-06

## 2020-09-03 RX ORDER — POTASSIUM CHLORIDE 20 MEQ/1
40 TABLET, EXTENDED RELEASE ORAL ONCE
Status: COMPLETED | OUTPATIENT
Start: 2020-09-03 | End: 2020-09-03

## 2020-09-03 RX ADMIN — IPRATROPIUM BROMIDE 0.5 MG: 0.5 SOLUTION RESPIRATORY (INHALATION) at 19:37

## 2020-09-03 RX ADMIN — IPRATROPIUM BROMIDE 0.5 MG: 0.5 SOLUTION RESPIRATORY (INHALATION) at 13:51

## 2020-09-03 RX ADMIN — APIXABAN 5 MG: 5 TABLET, FILM COATED ORAL at 08:49

## 2020-09-03 RX ADMIN — DEXAMETHASONE 4 MG: 4 TABLET ORAL at 14:46

## 2020-09-03 RX ADMIN — DIAZEPAM 5 MG: 5 TABLET ORAL at 23:30

## 2020-09-03 RX ADMIN — INSULIN LISPRO 1 UNITS: 100 INJECTION, SOLUTION INTRAVENOUS; SUBCUTANEOUS at 23:22

## 2020-09-03 RX ADMIN — CEFAZOLIN SODIUM 2000 MG: 2 SOLUTION INTRAVENOUS at 23:22

## 2020-09-03 RX ADMIN — IPRATROPIUM BROMIDE 0.5 MG: 0.5 SOLUTION RESPIRATORY (INHALATION) at 07:27

## 2020-09-03 RX ADMIN — AMIODARONE HYDROCHLORIDE 400 MG: 200 TABLET ORAL at 15:47

## 2020-09-03 RX ADMIN — PANTOPRAZOLE SODIUM 40 MG: 40 TABLET, DELAYED RELEASE ORAL at 06:40

## 2020-09-03 RX ADMIN — LEVALBUTEROL HYDROCHLORIDE 1.25 MG: 1.25 SOLUTION, CONCENTRATE RESPIRATORY (INHALATION) at 19:37

## 2020-09-03 RX ADMIN — METOPROLOL TARTRATE 25 MG: 25 TABLET, FILM COATED ORAL at 23:22

## 2020-09-03 RX ADMIN — POTASSIUM CHLORIDE 40 MEQ: 1500 TABLET, EXTENDED RELEASE ORAL at 08:49

## 2020-09-03 RX ADMIN — INSULIN LISPRO 1 UNITS: 100 INJECTION, SOLUTION INTRAVENOUS; SUBCUTANEOUS at 11:37

## 2020-09-03 RX ADMIN — APIXABAN 5 MG: 5 TABLET, FILM COATED ORAL at 15:49

## 2020-09-03 RX ADMIN — OLANZAPINE 5 MG: 2.5 TABLET, FILM COATED ORAL at 23:22

## 2020-09-03 RX ADMIN — INSULIN LISPRO 1 UNITS: 100 INJECTION, SOLUTION INTRAVENOUS; SUBCUTANEOUS at 06:43

## 2020-09-03 RX ADMIN — LEVALBUTEROL HYDROCHLORIDE 1.25 MG: 1.25 SOLUTION, CONCENTRATE RESPIRATORY (INHALATION) at 13:51

## 2020-09-03 RX ADMIN — CEFAZOLIN SODIUM 2000 MG: 2 SOLUTION INTRAVENOUS at 00:36

## 2020-09-03 RX ADMIN — CEFAZOLIN SODIUM 2000 MG: 2 SOLUTION INTRAVENOUS at 15:42

## 2020-09-03 RX ADMIN — VENLAFAXINE HYDROCHLORIDE 150 MG: 150 CAPSULE, EXTENDED RELEASE ORAL at 08:49

## 2020-09-03 RX ADMIN — LEVALBUTEROL HYDROCHLORIDE 1.25 MG: 1.25 SOLUTION, CONCENTRATE RESPIRATORY (INHALATION) at 07:27

## 2020-09-03 RX ADMIN — CEFAZOLIN SODIUM 2000 MG: 2 SOLUTION INTRAVENOUS at 08:49

## 2020-09-03 RX ADMIN — AMIODARONE HYDROCHLORIDE 0.5 MG/MIN: 50 INJECTION, SOLUTION INTRAVENOUS at 00:34

## 2020-09-03 NOTE — ASSESSMENT & PLAN NOTE
Ongoing chemotherapy for GE CA, noted to be febrile on morning of admission by wife s/p fall in kitchen  Lactic 9 9 on admission, received 3L IVF bolus in ED Bcx obtained and started broad spectrum abx  CT CAP: Suspicious for Left sided multi-lobar pna, no PE noted, masses remain unchanged compared to prior films  CT Head/Neck: no acute intracranial changes    · Levophed and vasopressin started on day of admission and d/liana on 8/31  Levophed restarted morning of 9/2 due to hypotension  · received stress dose steroids on admission  Transitioned to home Decadron on 9/1  Given additional 50mg of Solucortef morning of 9/2 due to hypotension  · Strict I/O's with lopez  · Lactic trending down, most recent 2 5 on 9/1  · Procal also trending down: 150-> 86 (9/1)   · BC 2/2 E   Coli, Sputum cx +Klebsiella oxytoca  · Abx  · S/p 3 days cefepime (8/30-9/1)  · S/p Tobramycin x1 on 8/31  · S/p 2 days vanc (8/30-8/31)  · Meropenem 1G q8hrs (9/1-9/2)  · Cefazolin 2 g IV q 8 hours (9/2-)  -can likely transition to oral antibiotics the next 24-48 hours if the follow-up blood cultures remain negative per ID rec

## 2020-09-03 NOTE — ASSESSMENT & PLAN NOTE
Malnutrition Findings:   Malnutrition type: Chronic illness(Moderate malnutrition of chronic illness related to inadequate energy intake as evidenced by somewhat hollow orbits with somewhat dark circles, somewhat depression at temples  Awaiting treatment plan)  Degree of Malnutrition: Malnutrition of moderate degree    BMI Findings: Body mass index is 24 58 kg/m²

## 2020-09-03 NOTE — CONSULTS
Consultation - Cardiology   Brenda Ding 77 y o  male MRN: [id number]  Unit/Bed#: ICU 11 Encounter: [id number]    Assessment/Plan     Assessment:  1  Paroxysmal atrial fibrillation in the setting septic shock and E coli bacteremia  2  E coli bacteremia  3  Hypertension  4  Dilated cardiomyopathy:  EF visibly estimated at 37%  5  Stage IV Esophageal cancer with metastasis   6  Weight loss with moderate protein calorie malnutrition     Plan:  Patient is currently in the intensive care unit  1  Patient has episodes of atrial fibrillation are decreasing in length and duration  Would continue amiodarone loading  IV drip has completed a 24 hour course  Will transition patient to    -  amiodarone 400 mg twice a day x1 week    -  amiodarone 400 mg daily x1 month    -  amiodarone 200 mg once a day thereafter (orders currently in chart)    2  Continue Eliquis 5 mg b i d , patient was taking pre-hospital for his DVT  3  Add low-dose Lopressor 25 mg twice a day with parameters    4  IV antibiotics per infectious disease    5  Dilated cardiomyopathy may be due to multiple reasons as patient has had long history of recurrent palpitations (question atrial fibrillation), hypertension, and history of alcohol abuse  6  Would recommend ischemic workup once patient is stable, attempts to reach family have been unsuccessful  I am told by the nursing staff his wife should be in later on today  Would like to speak to her regarding what further testing family wishes due to his cancer status  7  Would add advanced heart failure medications as patient's condition tolerates        History of Present Illness   Physician Requesting Consult: Sintia Alfaro DO  Reason for Consult / Principal Problem:  Atrial fibrillation in the setting of pneumonia    HPI: Brenda Ding is a 77y o  year old male who was admitted to the intensive care unit on 08/30/2020 after he presented to the emergency room with complaints of shortness of breath, cough, weight loss and a fall at home  Patient was found to be in septic shock with E coli bacteremia  Patient initially required fluids and pressors but within approximately 12 hours he had stabilized  He has a history of esophageal cancer with Mets to the omentum  On 09/01, patient was noted to go into rapid atrial fibrillation  He was started on IV amiodarone which was successful for converting him back to sinus rhythm  Since that time patient continues to have intermittent bouts of rapid atrial fibrillation  Reviewing alarms in chart, episodes appear to be decreasing in frequency and also duration on IV amiodarone  Patient states that he has had a history of palpitations for many years  He states he had seen a cardiologist in [facility] probably 20 years ago was started on Toprol  He does not remember if he was never given a formal diagnosis of atrial fibrillation  He does note recently, over last 6 months, he has had increasing bouts of palpitations which she has not been concerned about  They do not bother me  Patient is aware when he does have the rapid rate  Patient follows with [facility] for his cancer management  He also has a history of hypertension, left lower extremity DVT, and alcohol abuse for which he has not drank in 30 years  Patient is a former smoker, he [place]  Patient had 2D echocardiogram performed yesterday which demonstrates ejection fraction of 37% with diffuse hypokinesis and grade 2 diastolic dysfunction, left atrium is noted to be mildly dilated and IVC was also dilated  Inpatient consult to Cardiology  Consult performed by: YANI Mccain  Consult ordered by: YANI Barnett          Review of Systems   Constitutional: Positive for activity change, appetite change, fatigue and unexpected weight change  HENT: Negative    Negative for congestion, ear discharge, hearing loss, rhinorrhea, sore throat and trouble swallowing  Eyes: Negative  Negative for photophobia and visual disturbance  Respiratory: Positive for shortness of breath  Negative for chest tightness  Cardiovascular: Positive for palpitations  Negative for chest pain and leg swelling  Gastrointestinal: Negative for abdominal distention, abdominal pain, diarrhea, nausea and vomiting  Endocrine: Negative  Negative for polydipsia, polyphagia and polyuria  Genitourinary: Negative  Musculoskeletal: Positive for gait problem  Skin: Negative  Neurological: Negative for dizziness, tremors, speech difficulty and light-headedness  Hematological: Negative  Psychiatric/Behavioral: Positive for agitation          Patient on 1:1       Historical Information   Past Medical History:   Diagnosis Date    Dehydration 2019    [facility] )     Hypertension     Nausea 2019    Psychiatric disorder     Recovering alcoholic ([facility] )      Past Surgical History:   Procedure Laterality Date    APPENDECTOMY      IR PORT PLACEMENT  2019     Social History     Substance and Sexual Activity   Alcohol Use Not Currently    Frequency: Never    Binge frequency: Never     Social History     Substance and Sexual Activity   Drug Use Yes    Types: Marijuana     E-Cigarette/Vaping     E-Cigarette/Vaping Substances    Nicotine No     THC No     CBD No     Flavoring No     Other No     Unknown No      Social History     Tobacco Use   Smoking Status Former Smoker    Types: Cigars    Last attempt to quit: 5/15/2019    Years since quittin 3   Smokeless Tobacco Never Used     Family History:   Family History   Problem Relation Age of Onset    Colon cancer Father        Meds/Allergies   all current active meds have been reviewed, current meds:   Current Facility-Administered Medications   Medication Dose Route Frequency    acetaminophen (TYLENOL) tablet 650 mg  650 mg Oral Q6H PRN    amiodarone (CORDARONE) 900 mg in dextrose 5 % 500 mL infusion  0 5 mg/min Intravenous Continuous    amiodarone tablet 400 mg  400 mg Oral BID With Meals    Followed by   Renzo Specking ON 9/11/2020] amiodarone tablet 400 mg  400 mg Oral Daily With Breakfast    Followed by   Renzo Specking ON 10/11/2020] amiodarone tablet 200 mg  200 mg Oral Daily With Breakfast    apixaban (ELIQUIS) tablet 5 mg  5 mg Oral BID    ceFAZolin (ANCEF) IVPB (premix) 2,000 mg 50 mL  2,000 mg Intravenous Q8H    dexamethasone (DECADRON) tablet 4 mg  4 mg Oral After Lunch    dextrose 50 % IV solution 25 g  25 g Intravenous Once    diazepam (VALIUM) tablet 5 mg  5 mg Oral Daily PRN    insulin lispro (HumaLOG) 100 units/mL subcutaneous injection 1-6 Units  1-6 Units Subcutaneous TID AC    insulin lispro (HumaLOG) 100 units/mL subcutaneous injection 1-6 Units  1-6 Units Subcutaneous HS    ipratropium (ATROVENT) 0 02 % inhalation solution 0 5 mg  0 5 mg Nebulization TID    levalbuterol (XOPENEX) inhalation solution 1 25 mg  1 25 mg Nebulization TID    OLANZapine (ZyPREXA) tablet 5 mg  5 mg Oral HS    ondansetron (ZOFRAN) injection 4 mg  4 mg Intravenous Q8H PRN    pantoprazole (PROTONIX) EC tablet 40 mg  40 mg Oral Daily Before Breakfast    pneumococcal 13-valent conjugate vaccine (PREVNAR-13) IM injection 0 5 mL  0 5 mL Intramuscular Prior to discharge    venlafaxine (EFFEXOR-XR) 24 hr capsule 150 mg  150 mg Oral Daily    and PTA meds:   Prior to Admission Medications   Prescriptions Last Dose Informant Patient Reported? Taking?    Eliquis 5 MG 8/30/2020 at Unknown time  No Yes   Sig: TAKE ONE TABLET BY MOUTH TWICE A DAY   OLANZapine (ZyPREXA) 5 mg tablet 8/29/2020 at Unknown time  No Yes   Sig: Take 1 tablet (5 mg total) by mouth daily at bedtime   ammonium lactate (LAC-HYDRIN) 12 % cream Unknown at Unknown time Self No No   Sig: Apply topically as needed for dry skin On hands and feet   bisoprolol-hydrochlorothiazide [facility]) 10-6 25 MG per tablet   No No   Sig: Take 1 tablet by mouth daily   dexamethasone (DECADRON) 2 mg tablet 8/29/2020 at Unknown time  No Yes   Sig: Take 1 tablet (2 mg total) by mouth daily after lunch   Patient taking differently: Take 4 mg by mouth daily after lunch    diazepam (VALIUM) 5 mg tablet 8/29/2020 at Unknown time Self No Yes   Sig: Take 1 tablet (5 mg total) by mouth 2 (two) times a day   fluorouracil 4,730 mg in CADD infusion pump   No No   Sig: Infuse 4,730 mg (1,200 mg/m2/day x 1 97 m2 (Treatment plan recorded BSA)) into a venous catheter over 46 hours for 2 days [place] to be administered on   metoclopramide (REGLAN) 5 mg tablet 8/30/2020 at Unknown time  No Yes   Sig: Take 1 tablet (5 mg total) by mouth 3 (three) times a day before meals   ondansetron (ZOFRAN) 4 mg tablet Unknown at Unknown time  No No   Sig: Take 2 tablets (8 mg total) by mouth every 8 (eight) hours as needed for nausea or vomiting   oxyCODONE (ROXICODONE) 5 mg immediate release tablet Unknown at Unknown time  No No   Sig: Take 1 tablet (5 mg total) by mouth every 4 (four) hours as needed for severe pain May crush and give in slurryMax Daily Amount: 30 mg   pantoprazole (PROTONIX) 40 mg tablet 8/30/2020 at Unknown time  No Yes   Sig: Take 1 tablet (40 mg total) by mouth daily before breakfast   sucralfate (CARAFATE) 1 g/10 mL suspension Unknown at Unknown time  No No   Sig: Take 10 mL (1 g total) by mouth 4 (four) times a day (with meals and at bedtime)   venlafaxine (EFFEXOR-XR) 150 mg 24 hr capsule Unknown at Unknown time Self No No   Sig: Take 1 capsule (150 mg total) by mouth daily      Facility-Administered Medications: None     Allergies   Allergen Reactions    Bee Venom Anaphylaxis    Shellfish-Derived Products      Develops GOUT       Objective   Vitals: Blood pressure 119/73, pulse 87, temperature 98 4 °F (36 9 °C), temperature source Tympanic, resp   rate (!) 28, height 6' (1 829 m), weight 82 2 kg (181 lb 3 5 oz), SpO2 92 %   Orthostatic Blood Pressures      Most Recent Value   Blood Pressure  119/73 filed at 09/03/2020 0800   Patient Position - Orthostatic VS  Lying filed at 09/03/2020 0800            Intake/Output Summary (Last 24 hours) at 9/3/2020 1010  Last data filed at 9/3/2020 1000  Gross per 24 hour   Intake 1948 9 ml   Output 1575 ml   Net 373 9 ml       Invasive Devices     Central Venous Catheter Line            Port A Cath 07/31/19 Right Chest 400 days    Port A Cath 09/02/20 Right Chest 1 day          Peripheral Intravenous Line            Peripheral IV 08/30/20 Left Antecubital 4 days    Peripheral IV 09/01/20 Right Wrist 1 day                Physical Exam  Vitals signs and nursing note reviewed  Constitutional:       Appearance: Normal appearance  He is normal weight  HENT:      Head: Normocephalic  Right Ear: External ear normal       Left Ear: External ear normal       Nose: Nose normal    Eyes:      General: No scleral icterus  Right eye: No discharge  Left eye: No discharge  Conjunctiva/sclera: Conjunctivae normal       Pupils: Pupils are equal, round, and reactive to light  Neck:      Musculoskeletal: Normal range of motion and neck supple  Cardiovascular:      Rate and Rhythm: Normal rate and regular rhythm  Pulses: Normal pulses  Heart sounds: Normal heart sounds  No murmur  Pulmonary:      Effort: Pulmonary effort is normal  No respiratory distress  Breath sounds: Examination of the left-lower field reveals decreased breath sounds  Decreased breath sounds present  No wheezing, rhonchi or rales  Abdominal:      General: Bowel sounds are normal  There is no distension  Palpations: Abdomen is soft  Musculoskeletal:      Right lower leg: No edema  Left lower leg: No edema  Skin:     General: Skin is warm and dry  Capillary Refill: Capillary refill takes less than 2 seconds  Neurological:      General: No focal deficit present        Mental Status: He is alert  Psychiatric:         Attention and Perception: Attention normal          Mood and Affect: Affect is inappropriate  Speech: Speech normal          Behavior: Behavior is slowed  Behavior is cooperative  Thought Content: Thought content normal          Cognition and Memory: Cognition is impaired  Judgment: Judgment is impulsive  Comments: Inappropriate affect at times         Lab Results:   I have personally reviewed pertinent lab results  CBC with diff:   Results from last 7 days   Lab Units 09/03/20  0640   WBC Thousand/uL 21 91*   RBC Million/uL 2 61*   HEMOGLOBIN g/dL 7 9*   HEMATOCRIT % 26 2*   MCV fL 100*   MCH pg 30 3   MCHC g/dL 30 2*   RDW % 17 2*   MPV fL 12 3   PLATELETS Thousands/uL 227     CMP:   Results from last 7 days   Lab Units 09/03/20  0641   SODIUM mmol/L 141   POTASSIUM mmol/L 3 7   CHLORIDE mmol/L 108   CO2 mmol/L 23   BUN mg/dL 18   CREATININE mg/dL 0 95   CALCIUM mg/dL 8 1*   AST U/L 25   ALT U/L 30   ALK PHOS U/L 67   EGFR ml/min/1 73sq m 82     Troponin:   0   Lab Value Date/Time    TROPONINI 1 54 (H) 08/30/2020 0916     BNP:   Results from last 7 days   Lab Units 09/03/20  0641   POTASSIUM mmol/L 3 7   CHLORIDE mmol/L 108   CO2 mmol/L 23   BUN mg/dL 18   CREATININE mg/dL 0 95   CALCIUM mg/dL 8 1*   EGFR ml/min/1 73sq m 82     Magnesium:   Results from last 7 days   Lab Units 09/03/20  0641   MAGNESIUM mg/dL 2 0     Imaging: I have personally reviewed pertinent reports  EKG:  Telemetry demonstrates sinus rhythm at this time, review of alarms patient noted to have atrial fibrillation starting on the evening/early a m of 9/2, patient converted with amiodarone IV, but since that time has been having intermittent runs of rapid atrial fibrillation  Review of episodes demonstrates decreased frequency and length of time    VTE Prophylaxis: Sequential compression device (Venodyne)  and Eliquis    Code Status: Level 1 - Full Code  Advance Directive and Living Will:      Power of :    POLST:      Vannessa Blanchard, [address] [facility]

## 2020-09-03 NOTE — SOCIAL WORK
LOS - 4 days    CPR2  Bundle-Sepsis    SW met with pt to assess needs and discuss plans  Discussed goals of making sure pt's needs are met upon discharge and that Freedom of Choice is offered  Also discuss enrollment in Medicare Bundle Program   Provided Bundle Program Notification Letter  Pt lives with his wife  Prior to admission pt said he was independent with ADLs and mobility  Has cane that he uses routinely and an old walker that was another family member's at home  No HHC/STR history  Pt has PTSD, OCD and major depression  No D&A issues  Pt's PCP is [facility]  Pt also considers his oncologists his PCP as well  Per pt he has prescription coverage and has no difficulty getting his medication as prescribed  Preferred pharmacy is [facility]  Pt does not have POA/advanced directives  Pt expressed that he has to work on one  Offered information on and assistance with completing advanced directive  Pt requested forms to review and will consider completing them  Living will and DPOA for Healthcare forms given to pt  Per pt his wife is his healthcare representative at this time  Discussed discharge plans with pt  STR placement is being recommended  Pt stated that he is much weaker now than before admission  Pt willing to consider rehab  Provided pt with list of area rehab facilities to consider  Offered ongoing support and assistance to pt  SW also called wife to introduce services, provide contact information and offer assistance with planning  SW will continue to follow to monitor needs/progress and assist with planning as needed

## 2020-09-03 NOTE — OCCUPATIONAL THERAPY NOTE
OT TREATMENT       09/03/20 0845   Restrictions/Precautions   Other Precautions Chair Alarm; Bed Alarm; Fall Risk;Multiple lines   Pain Assessment   Pain Assessment Tool Pain Assessment not indicated - pt denies pain   ADL   Grooming Assistance 5  Supervision/Setup   Grooming Deficit Brushing hair   Grooming Comments seated in chair    Bed Mobility   Supine to Sit 4  Minimal assistance   Additional items Assist x 1;Verbal cues   Sit to Supine 4  Minimal assistance   Additional items Assist x 1;Verbal cues   Transfers   Sit to Stand 4  Minimal assistance   Additional items Assist x 1;Verbal cues   Stand to Sit 4  Minimal assistance   Additional items Assist x 1;Verbal cues   Functional Mobility   Functional Mobility 4  Minimal assistance   Additional Comments few steps bed to chair with RW   Additional items Rolling walker   Assessment   Assessment Patient is cooperative and pleasant  Patient is motivated to get OOB in chair to have water and work with speech therapy  Patient requires min assist for transfers bed to chair today  Plan   Treatment Interventions ADL retraining;Functional transfer training; Endurance training;Equipment evaluation/education;Patient/family training; Activityengagement   Recommendation   OT Discharge Recommendation [address] License Number  [place] [address] OTR/L [id number]

## 2020-09-03 NOTE — PROGRESS NOTES
Progress Note - Infectious Disease   Laverne Vick 77 y o  male MRN: [id number]  Unit/Bed#: ICU 11 Encounter: [id number]      Impression/Plan:  1  Septic shock-POA   Appears to be secondary to gram-negative bacteremia of an undefined source   Consideration for the possibility of translocation of organisms across the gut wall   Consideration for the possibility of pneumonia   Consideration for the possibility of typhilitis   Consideration for the possibility of another source such as a Port-A-Cath infection although this is less likely   Patient's blood pressure has recovered he is now off all vasopressor support   He seems to be tolerating the antibiotics without difficulty  No resistant organisms isolated  -continue cefazolin  -can likely transition to oral antibiotics tomorrow if the patient able to take orals  -recheck CBC with diff and BMP  -recheck procalcitonin  -supportive care     2  E  Coli bacteremia-unclear primary source as the sputum culture does not match what is in the blood  Probably secondary to translocation across the bowel wall in the setting of severe neutropenia  No definitive primary intra-abdominal process found on CT abdomen pelvis    Possibly secondary to a catheter related bacteremia of this less likely   no resistant organisms isolated  -antibiotics as above  -recheck blood cultures to confirm clearance of the bacteremia  -no additional ID workup for now  -will need repeat blood cultures to be done 1 week after the antibiotics with the Port-A-Cath in place     3  Pancytopenia-with severe neutropenia in the setting of cancer chemotherapy   Suspect secondary to the patient's chemotherapy   The acute infectious process may also be playing a role   Neutropenia is much improved and the patient now has leukocytosis  -no additional G-CSF  -recheck CBC with diff  -hematology oncology follow-up     4   Pneumonia-possibly aspiration   More dense consolidation on the left   His respiratory status is relatively stable remains a bit tenuous requiring oxygen support but is much improved  -antibiotics as above  -monitor respiratory status  -pulmonary toilet  -close Pulmonary critical care follow-up     5  Gastroesophageal cancer-on modified FOLFOX   Hematology oncology follow-up    Discussed the above management plan with the critical care service    Antibiotics:  Cefazolin 2  Antibiotics 5    Subjective:  Patient has no fever, chills, sweats; no nausea, vomiting, diarrhea; no cough, shortness of breath; no pain  No new symptoms  He seems in a bit better spirits today  But he would like his diet advanced    Objective:  Vitals:  Temp:  [96 3 °F (35 7 °C)-98 6 °F (37 °C)] 98 4 °F (36 9 °C)  HR:  [] 79  Resp:  [14-26] 24  BP: ()/(50-93) 117/74  SpO2:  [88 %-98 %] 95 %  Temp (24hrs), Av °F (36 7 °C), Min:96 3 °F (35 7 °C), Max:98 6 °F (37 °C)  Current: Temperature: 98 4 °F (36 9 °C)    Physical Exam:   General Appearance:  Alert, interactive, nontoxic, no acute distress  Throat: Oropharynx moist without lesions  Lungs:   Decreased breath sounds bilaterally; no wheezes, rhonchi or rales; respirations unlabored   Heart:  RRR; no murmur, rub or gallop   Abdomen:   Soft, non-tender, non-distended, positive bowel sounds  Extremities: No clubbing, cyanosis or edema   Skin: No new rashes or lesions  No draining wounds noted         Labs, Imaging, & Other studies:   All pertinent labs and imaging studies were personally reviewed  Results from last 7 days   Lab Units 20  0640 20  0539 20  0537   WBC Thousand/uL 21 91* 10 82* 1 61*   HEMOGLOBIN g/dL 7 9* 8 0* 7 4*   PLATELETS Thousands/uL 227 179 156     Results from last 7 days   Lab Units 20  0641 20  1820 20  0539  20  0537   SODIUM mmol/L 141 140 136  --  136   POTASSIUM mmol/L 3 7 2 9* 4 0   < > 2 8*   CHLORIDE mmol/L 108 106 104  --  104   CO2 mmol/L 23 24 19*  --  22   BUN mg/dL 18 16 14  --  16 CREATININE mg/dL 0 95 0 93 0 81  --  0 89   EGFR ml/min/1 73sq m 82 85 92  --  88   CALCIUM mg/dL 8 1* 8 4 8 6  --  8 2*   AST U/L 25  --  56*  --  57*   ALT U/L 30  --  36  --  34   ALK PHOS U/L 67  --  53  --  47    < > = values in this interval not displayed  Results from last 7 days   Lab Units 09/01/20  0617 09/01/20  0537 08/31/20  0939 08/30/20  1317 08/30/20  1316 08/30/20  0926 08/30/20  0916   BLOOD CULTURE  No Growth at 24 hrs   No Growth at 24 hrs   --   --   --  Escherichia coli* Escherichia coli*   SPUTUM CULTURE   --   --  1+ Growth of Klebsiella oxytoca*  1+ Growth of Yeast*  --   --   --   --    GRAM STAIN RESULT   --   --  2+ Epithelial cells per low power field*  No polys seen*  1+ Gram negative rods*  Rare Gram positive cocci in pairs*  --   --  Gram negative rods* Gram negative rods*   URINE CULTURE   --   --   --  No Growth <1000 cfu/mL  --   --   --    MRSA CULTURE ONLY   --   --   --   --  No Methicillin Resistant Staphlyococcus aureus (MRSA) isolated  --   --      Results from last 7 days   Lab Units 09/02/20  0539 09/01/20  0537 08/31/20  0545   PROCALCITONIN ng/ml 49 96* 86 37* 150 29*         Results from last 7 days   Lab Units 09/01/20  0537   FERRITIN ng/mL 175

## 2020-09-03 NOTE — ASSESSMENT & PLAN NOTE
· Follows with [facility]  · Pt does have mets to peritoneum but has been fairly responsive to chemotherapy  · Last treatment 8/20 with 5 FU  · Follows with palliative, after readdressing status with wife plan to remain full code

## 2020-09-03 NOTE — SPEECH THERAPY NOTE
Speech Language/Pathology    Speech/Language Pathology Progress Note    Patient Name: Dominic Pratt  POPAMELAQRobertI Date: 9/3/2020       Subjective:  Pt  Was OOB in recliner chair and awake and alert  Stated that he no longer "wants this crap"- referring to his thickened liquids  He was asking for regular water  Objective:  Pt  Was seen for dysphagia treatment for follow up to current diet tolerance  He had been downgraded to honey thick liquids yesterday because he had been coughing with and without drinking and also reportedly vomited his breakfast  He also had been coughing up blood tinged phlegm  Trialed thin liquids via cup and via straw  No coughing, change in vocal quality, or change in respiration was noted on the trials  Utilized chin tuck to ensure airway protection with the thin liquids via straw  Pt  Was educated and able to return via demonstration  Nursing was also present and he was trialed with taking his pills one by one with water  No s/s of aspiration was noted with single pills and water wash with chin tuck  It is recommended that pt  Continue with Dys level 2 diet at this time and he is agreeable  Assessment:  No overt s/s of aspiration on trials of thin liquids during this session  Use of chin tuck for added airway protection  Pt  Is in agreement with strategies and diet  Plan/Recommendations:  Continue diet as Dys 2 and thin liquids via cup or straw, utilize chin tuck with straw  Medication as tolerated  RN, PCA, and MD have been updated  Speech to follow      Shannan Ocampo MS CCC-SLP  Speech Language Pathologist  Available via [facility] License # [id number]  [facility] \[facility] [place] [facility]\"" # [id number]
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
## 2020-09-03 NOTE — PROGRESS NOTES
All belongings documented and accounted for during transfer to  including cell phone with  and glasses   See belongings list

## 2020-09-03 NOTE — PLAN OF CARE
Problem: OCCUPATIONAL THERAPY ADULT  Goal: Performs self-care activities at highest level of function for planned discharge setting  See evaluation for individualized goals  Outcome: Progressing  Note: Limitation: Decreased ADL status, Decreased UE strength, Decreased Safe judgement during ADL, Decreased cognition, Decreased endurance, Decreased self-care trans, Decreased high-level ADLs(decreased balance and mobility)  Prognosis: Good  Assessment: Patient is cooperative and pleasant  Patient is motivated to get OOB in chair to have water and work with speech therapy  Patient requires min assist for transfers bed to chair today         OT Discharge Recommendation: Post-Acute Rehabilitation Services

## 2020-09-03 NOTE — PROGRESS NOTES
Progress Note Krystal Alanis 1955, 77 y o  male MRN: [id number]    Unit/Bed#: ICU 11 Encounter: [id number]    Primary Care Provider: Eula Chang DO   Date and time admitted to hospital: 8/30/2020  9:00 AM        * Septic shock [facility])  Assessment & Plan  Ongoing chemotherapy for GE CA, noted to be febrile on morning of admission by wife s/p fall in kitchen  Lactic 9 9 on admission, received 3L IVF bolus in ED Bcx obtained and started broad spectrum abx  CT CAP: Suspicious for Left sided multi-lobar pna, no PE noted, masses remain unchanged compared to prior films  CT Head/Neck: no acute intracranial changes    · Levophed and vasopressin started on day of admission and d/liana on 8/31  Levophed restarted morning of 9/2 due to hypotension  · received stress dose steroids on admission  Transitioned to home Decadron on 9/1  Given additional 50mg of Solucortef morning of 9/2 due to hypotension  · Strict I/O's with lopez  · Lactic trending down, most recent 2 5 on 9/1  · Procal also trending down: 150-> 86 (9/1)   · BC 2/2 E  Coli, Sputum cx +Klebsiella oxytoca  · Abx  · S/p 3 days cefepime (8/30-9/1)  · S/p Tobramycin x1 on 8/31  · S/p 2 days vanc (8/30-8/31)  · Meropenem 1G q8hrs (9/1-9/2)  · Cefazolin 2 g IV q 8 hours (9/2-)  -can likely transition to oral antibiotics the next 24-48 hours if the follow-up blood cultures remain negative per ID rec      PNA (pneumonia)  Assessment & Plan  · CT shows concern for Left sided multifocal pna, likely source of sepsis   · Trend procal, fever and WBC  · Currently requiring 3L NC, keep sats >90%  · BC 2/2 E  Coli  · Sputum +Klebsiella oxystoca  · Aspiration precautions   · Cefazolin 2 g IV q 8 hours (9/2-  · can likely transition to oral antibiotics the next 24-48 hours if the follow-up blood cultures remain negative per ID rec  · Speech therapy recs: Change diet to Dys 2 with honey thick liquids via cup, no straws, medications in puree       Gastroesophageal cancer [facility])  Assessment & Plan  · Follows with [facility] Heme/Onc  · Pt does have mets to peritoneum but has been fairly responsive to chemotherapy  · Last treatment 8/20 with 5 FU  · Follows with palliative, after readdressing status with wife plan to remain full code  A-fib [facility])  Assessment & Plan  New onset Afib probably 2/2 to PNA  Cardioconverted overnight on 9/2 with amiodarone  Currently on amiodarone drip  Echo 9/2/2020: Ejection fraction was estimated in the range of 35 % to 40 % to be 37 %  There was moderate diffuse hypokinesis  · Features were consistent with a pseudonormal left ventricular filling pattern, with concomitant abnormal relaxation and increased filling pressure (grade 2 diastolic dysfunction)  Neutropenic ([address] )  Assessment & Plan  · Neutropenic precautions  · Start Granix 5mcg/kg x 3 days  · Heme/onc on consult    Chemotherapy-induced nausea  Assessment & Plan  · zofran prn ordered    Falls  Assessment & Plan  · Fall precuations   · PT/OT when appropriate     Moderate protein-calorie malnutrition ([facility] [address] )  Assessment & Plan  Malnutrition Findings:   Malnutrition type: Chronic illness(Moderate malnutrition of chronic illness related to inadequate energy intake as evidenced by somewhat hollow orbits with somewhat dark circles, somewhat depression at temples  Awaiting treatment plan)  Degree of Malnutrition: Malnutrition of moderate degree    BMI Findings: Body mass index is 24 58 kg/m²  Grade II diastolic dysfunction  Assessment & Plan  Noted on Echo on 9/2/2020        ----------------------------------------------------------------------------------------  HPI/24hr events: Echo done yesterday showed grade 2 diastolic dysfunction with an EF of 37%  Cardiology consulted       Disposition: Continue Stepdown Level 1 level of care   Code Status: Level 1 - Full Code  ---------------------------------------------------------------------------------------  SUBJECTIVE  Patient seen and examined at bedside  He notes that cough seems to be improving  He is very happy that he received letters from his grandson yesterday  Review of Systems  Review of systems was reviewed and negative unless stated above in HPI/24-hour events   ---------------------------------------------------------------------------------------  OBJECTIVE    Vitals   Vitals:    20 0400 20 0534 20 0638 20 0727   BP: 112/75      BP Location: Right arm      Pulse: 96      Resp: 20      Temp:   98 6 °F (37 °C)    TempSrc:   Temporal    SpO2: 95%   95%   Weight:  82 2 kg (181 lb 3 5 oz)     Height:         Temp (24hrs), Av 8 °F (36 6 °C), Min:96 3 °F (35 7 °C), Max:98 6 °F (37 °C)  Current: Temperature: 98 6 °F (37 °C)  Arterial Line BP: 142/62  Arterial Line MAP (mmHg): 89 mmHg    Respiratory:  SpO2: SpO2: 95 %, SpO2 Activity: SpO2 Activity: At Rest, SpO2 Device: O2 Device: Nasal cannula, Capnography:    Nasal Cannula O2 Flow Rate (L/min): 3 L/min    Invasive/non-invasive ventilation settings   Respiratory    Lab Data (Last 4 hours)    None         O2/Vent Data (Last 4 hours)    None                Physical Exam  Constitutional:       General: He is awake  He is not in acute distress  Appearance: He is cachectic  HENT:      Head: Normocephalic  Mouth/Throat:      Lips: Pink  Mouth: Mucous membranes are moist    Cardiovascular:      Rate and Rhythm: Normal rate and regular rhythm  Pulmonary:      Breath sounds: Examination of the left-upper field reveals decreased breath sounds  Examination of the left-middle field reveals decreased breath sounds  Examination of the left-lower field reveals decreased breath sounds  Decreased breath sounds present  No wheezing  Chest:      Comments: Port in right chest  Abdominal:      General: Abdomen is flat  There is no distension  Tenderness: There is no abdominal tenderness  Skin:     General: Skin is warm and dry     Neurological: Mental Status: He is alert and oriented to person, place, and time  Psychiatric:         Attention and Perception: Attention normal          Mood and Affect: Mood normal          Speech: Speech normal          Behavior: Behavior is cooperative           Laboratory and Diagnostics:  Results from last 7 days   Lab Units 09/03/20  0640 09/02/20  0539 09/01/20  0537 08/31/20  0545 08/30/20  0915   WBC Thousand/uL 21 91* 10 82* 1 61* 0 61* 0 34*   HEMOGLOBIN g/dL 7 9* 8 0* 7 4* 7 2* 9 2*   HEMATOCRIT % 26 2* 24 7* 24 0* 23 1* 29 6*   PLATELETS Thousands/uL 227 179 156 149 236   NEUTROS PCT %  --  83*  --   --   --    BANDS PCT % 17*  --  30*  --   --    MONOS PCT %  --  7  --   --   --    MONO PCT % 10  --  21*  --  20*     Results from last 7 days   Lab Units 09/02/20  1820 09/02/20  0539 09/01/20  2158 09/01/20  1305 09/01/20  0537 08/31/20  0545 08/30/20  0916   SODIUM mmol/L 140 136  --   --  136 139 134*   POTASSIUM mmol/L 2 9* 4 0 4 0 2 9* 2 8* 3 1* 3 5   CHLORIDE mmol/L 106 104  --   --  104 107 100   CO2 mmol/L 24 19*  --   --  22 19* 14*   ANION GAP mmol/L 10 13  --   --  10 13 20*   BUN mg/dL 16 14  --   --  16 20 29*   CREATININE mg/dL 0 93 0 81  --   --  0 89 1 07 1 69*   CALCIUM mg/dL 8 4 8 6  --   --  8 2* 7 4* 8 5   GLUCOSE RANDOM mg/dL 172* 176*  --   --  121 99 104   ALT U/L  --  36  --   --  34 37 14   AST U/L  --  56*  --   --  57* 69* 24   ALK PHOS U/L  --  53  --   --  47 40* 54   ALBUMIN g/dL  --  2 1*  --   --  1 8* 1 9* 2 2*   TOTAL BILIRUBIN mg/dL  --  1 80*  --   --  1 00 0 80 0 90     Results from last 7 days   Lab Units 09/02/20  1820 09/02/20  0539 09/01/20  0537 08/31/20  0545 08/30/20  0916   MAGNESIUM mg/dL 2 2 1 9 2 1 1 9 1 7   PHOSPHORUS mg/dL 1 7* 1 3* 1 7* 2 5  --            Results from last 7 days   Lab Units 08/30/20  0916   TROPONIN I ng/mL 1 54*     Results from last 7 days   Lab Units 09/01/20  0537 08/31/20  0545 08/30/20  2119 08/30/20  1930 08/30/20  1727 08/30/20  1526 08/30/20  1309   LACTIC ACID mmol/L 2 5* 5 2* 8 0* 8 8* 8 4* 9 0* 7 8*     ABG:  Results from last 7 days   Lab Units 08/30/20  0929   PH ART  7 427   PCO2 ART mm Hg 22 5*   PO2 ART mm Hg 246 4*   HCO3 ART mmol/L 14 5*   BASE EXC ART mmol/L -8 4   ABG SOURCE  Radial, Right     VBG:  Results from last 7 days   Lab Units 08/30/20  1309 08/30/20  0929   PH CYNTHIA  7 276*  --    PCO2 CYNTHIA mm Hg 29 7*  --    PO2 CYNTHIA mm Hg 47 7*  --    HCO3 CYNTHIA mmol/L 13 5*  --    BASE EXC CYNTHIA mmol/L -12 1  --    ABG SOURCE   --  Radial, Right     Results from last 7 days   Lab Units 09/02/20  0539 09/01/20  0537 08/31/20  0545   PROCALCITONIN ng/ml 49 96* 86 37* 150 29*       Micro  Results from last 7 days   Lab Units 09/01/20  0617 09/01/20  0537 08/31/20  0939 08/30/20  1317 08/30/20  1316 08/30/20  0926 08/30/20  0916   BLOOD CULTURE  No Growth at 24 hrs  No Growth at 24 hrs   --   --   --  Escherichia coli* Escherichia coli*   SPUTUM CULTURE   --   --  1+ Growth of Klebsiella oxytoca*  1+ Growth of Yeast*  --   --   --   --    GRAM STAIN RESULT   --   --  2+ Epithelial cells per low power field*  No polys seen*  1+ Gram negative rods*  Rare Gram positive cocci in pairs*  --   --  Gram negative rods* Gram negative rods*   URINE CULTURE   --   --   --  No Growth <1000 cfu/mL  --   --   --    MRSA CULTURE ONLY   --   --   --   --  No Methicillin Resistant Staphlyococcus aureus (MRSA) isolated  --   --          Imaging: I have personally reviewed pertinent reports  Intake and Output  I/O       09/01 0701 - 09/02 0700 09/02 0701 - 09/03 0700 09/03 0701 - 09/04 0700    P  O  535 218     I V  (mL/kg) 40 (0 5) 382 (4 6)     IV Piggyback 1250 2 1250     Total Intake(mL/kg) 1825 2 (22 6) 1850 (22 5)     Urine (mL/kg/hr) 2625 (1 4) 1775 (0 9)     Stool 0      Total Output 2625 1775     Net -799 8 +75            Unmeasured Urine Occurrence 2 x      Unmeasured Stool Occurrence 1 x            Height and Weights   Height: 6' (182 9 cm)  IBW: 77 6 kg  Body mass index is 24 58 kg/m²  Weight (last 2 days)     Date/Time   Weight    09/03/20 0534   82 2 (181 22)    09/02/20 0600   80 6 (177 69)    09/01/20 0600   83 (182 98)                Nutrition       Diet Orders   (From admission, onward)             Start     Ordered    09/02/20 1510  Dietary nutrition supplements  Once     Question Answer Comment   Select Supplement: Magic Cup Chocolate    Frequency Dinner        09/02/20 1510    09/02/20 1510  Dietary nutrition supplements  Once     Question Answer Comment   Select Supplement: MightyShake    Frequency Breakfast        09/02/20 1510    09/02/20 1509  Dietary nutrition supplements  Once     Question Answer Comment   Select Supplement: Magic Cup Vanilla    Frequency Lunch        09/02/20 1510    09/02/20 1401  Diet Dysphagia/Modified Consistency; Dysphagia 2-Mechanical Soft; Honey Thick Liquid; Dysphagia 2-Mechanical Soft  Diet effective now     Question Answer Comment   Diet Type Dysphagia/Modified Consistency    Dysphagia/Modified Consistency Dysphagia 2-Mechanical Soft    Liquid Modifier Honey Thick Liquid    Other Restriction(s): Dysphagia 2-Mechanical Soft    Special Instructions No straw    RD to adjust diet per protocol?  Yes        09/02/20 1401    09/01/20 0945  Room Service  Once     Question:  Type of Service  Answer:  Room Service - Appropriate with Assistance    09/01/20 0944                  Active Medications  Scheduled Meds:  Current Facility-Administered Medications   Medication Dose Route Frequency Provider Last Rate    acetaminophen  650 mg Oral Q6H PRN Arsenio Johnston PA-C      amiodarone  0 5 mg/min Intravenous Continuous Kirstie Rocha PA-C 0 5 mg/min (09/03/20 0034)    apixaban  5 mg Oral BID Arsenio Johnston PA-C      cefazolin  2,000 mg Intravenous Q8H Ruba Monzon MD Stopped (09/03/20 0105)    dexamethasone  4 mg Oral After Lunch Arsenio Johnston PA-C      dextrose  25 g Intravenous Once Amber DO Main      diazepam  5 mg Oral Daily PRN Sil Arreguin PA-C      insulin lispro  1-6 Units Subcutaneous TID [facility] Sil Arreguin PA-C      insulin lispro  1-6 Units Subcutaneous HS Stevan Marx      ipratropium  0 5 mg Nebulization TID [employer], DO      levalbuterol  1 25 mg Nebulization TID [employer], DO      OLANZapine  5 mg Oral HS Sil Arreguin PA-C      ondansetron  4 mg Intravenous Q8H PRN Sil Arreguin PA-C      pantoprazole  40 mg Oral Daily Before Breakfast Sil Arreguin PA-C      pneumococcal 13-valent conjugate vaccine  0 5 mL Intramuscular Prior to discharge Luisa Comer MD      venlafaxine  150 mg Oral Daily Sil Arreguin PA-C       Continuous Infusions:  amiodarone, 0 5 mg/min, Last Rate: 0 5 mg/min (09/03/20 0034)      PRN Meds:   acetaminophen, 650 mg, Q6H PRN  diazepam, 5 mg, Daily PRN  ondansetron, 4 mg, Q8H PRN  pneumococcal 13-valent conjugate vaccine, 0 5 mL, Prior to discharge        Invasive Devices Review  Invasive Devices     Central Venous Catheter Line            Port A Cath 07/31/19 Right Chest 400 days    Port A Cath 09/02/20 Right Chest 1 day          Peripheral Intravenous Line            Peripheral IV 08/30/20 Left Antecubital 3 days    Peripheral IV 09/01/20 Right Wrist 1 day                Rationale for remaining devices: critically ill   ---------------------------------------------------------------------------------------  Advance Directive and Living Will:      Power of :    POLST:    ---------------------------------------------------------------------------------------  Care Time Delivered:   No Critical Care time spent       [employer], DO      Portions of the record may have been created with voice recognition software  Occasional wrong word or "sound a like" substitutions may have occurred due to the inherent limitations of voice recognition software    Read the chart carefully and recognize, using context, where substitutions have occurred

## 2020-09-03 NOTE — ASSESSMENT & PLAN NOTE
· CT shows concern for Left sided multifocal pna, likely source of sepsis   · Trend procal, fever and WBC  · Currently requiring 3L NC, keep sats >90%  · BC 2/2 E  Coli  · Sputum +Klebsiella oxystoca  · Aspiration precautions   · Cefazolin 2 g IV q 8 hours (9/2-  · can likely transition to oral antibiotics the next 24-48 hours if the follow-up blood cultures remain negative per ID rec  · Speech therapy recs: Change diet to Dys 2 with honey thick liquids via cup, no straws, medications in puree

## 2020-09-04 ENCOUNTER — HOSPITAL ENCOUNTER (OUTPATIENT)
Dept: INFUSION CENTER | Facility: HOSPITAL | Age: 65
End: 2020-09-04
Attending: INTERNAL MEDICINE

## 2020-09-04 ENCOUNTER — APPOINTMENT (INPATIENT)
Dept: RADIOLOGY | Facility: HOSPITAL | Age: 65
DRG: 871 | End: 2020-09-04
Payer: MEDICARE

## 2020-09-04 PROBLEM — I42.0 DILATED CARDIOMYOPATHY (HCC): Status: ACTIVE | Noted: 2020-09-03

## 2020-09-04 PROBLEM — E44.0 MODERATE MALNUTRITION (HCC): Status: ACTIVE | Noted: 2019-08-06

## 2020-09-04 PROBLEM — N39.41 URGE URINARY INCONTINENCE: Status: ACTIVE | Noted: 2020-09-04

## 2020-09-04 LAB
ANION GAP SERPL CALCULATED.3IONS-SCNC: 10 MMOL/L (ref 4–13)
BUN SERPL-MCNC: 20 MG/DL (ref 5–25)
CALCIUM SERPL-MCNC: 8 MG/DL (ref 8.3–10.1)
CHLORIDE SERPL-SCNC: 107 MMOL/L (ref 100–108)
CO2 SERPL-SCNC: 23 MMOL/L (ref 21–32)
CREAT SERPL-MCNC: 0.93 MG/DL (ref 0.6–1.3)
ERYTHROCYTE [DISTWIDTH] IN BLOOD BY AUTOMATED COUNT: 17.5 % (ref 11.6–15.1)
GFR SERPL CREATININE-BSD FRML MDRD: 85 ML/MIN/1.73SQ M
GLUCOSE SERPL-MCNC: 101 MG/DL (ref 65–140)
GLUCOSE SERPL-MCNC: 121 MG/DL (ref 65–140)
GLUCOSE SERPL-MCNC: 127 MG/DL (ref 65–140)
GLUCOSE SERPL-MCNC: 138 MG/DL (ref 65–140)
GLUCOSE SERPL-MCNC: 256 MG/DL (ref 65–140)
HCT VFR BLD AUTO: 27.3 % (ref 36.5–49.3)
HGB BLD-MCNC: 8.5 G/DL (ref 12–17)
MAGNESIUM SERPL-MCNC: 2.1 MG/DL (ref 1.6–2.6)
MCH RBC QN AUTO: 31.6 PG (ref 26.8–34.3)
MCHC RBC AUTO-ENTMCNC: 31.1 G/DL (ref 31.4–37.4)
MCV RBC AUTO: 102 FL (ref 82–98)
NRBC BLD AUTO-RTO: 1 /100 WBCS
PHOSPHATE SERPL-MCNC: 3.4 MG/DL (ref 2.3–4.1)
PLATELET # BLD AUTO: 260 THOUSANDS/UL (ref 149–390)
PMV BLD AUTO: 11.7 FL (ref 8.9–12.7)
POTASSIUM SERPL-SCNC: 4.4 MMOL/L (ref 3.5–5.3)
PROCALCITONIN SERPL-MCNC: 19.14 NG/ML
RBC # BLD AUTO: 2.69 MILLION/UL (ref 3.88–5.62)
SODIUM SERPL-SCNC: 140 MMOL/L (ref 136–145)
WBC # BLD AUTO: 26.35 THOUSAND/UL (ref 4.31–10.16)

## 2020-09-04 PROCEDURE — 99232 SBSQ HOSP IP/OBS MODERATE 35: CPT | Performed by: INTERNAL MEDICINE

## 2020-09-04 PROCEDURE — 51798 US URINE CAPACITY MEASURE: CPT

## 2020-09-04 PROCEDURE — 94640 AIRWAY INHALATION TREATMENT: CPT

## 2020-09-04 PROCEDURE — 84100 ASSAY OF PHOSPHORUS: CPT | Performed by: STUDENT IN AN ORGANIZED HEALTH CARE EDUCATION/TRAINING PROGRAM

## 2020-09-04 PROCEDURE — 84145 PROCALCITONIN (PCT): CPT | Performed by: STUDENT IN AN ORGANIZED HEALTH CARE EDUCATION/TRAINING PROGRAM

## 2020-09-04 PROCEDURE — 83735 ASSAY OF MAGNESIUM: CPT | Performed by: STUDENT IN AN ORGANIZED HEALTH CARE EDUCATION/TRAINING PROGRAM

## 2020-09-04 PROCEDURE — 87205 SMEAR GRAM STAIN: CPT | Performed by: STUDENT IN AN ORGANIZED HEALTH CARE EDUCATION/TRAINING PROGRAM

## 2020-09-04 PROCEDURE — 80048 BASIC METABOLIC PNL TOTAL CA: CPT | Performed by: STUDENT IN AN ORGANIZED HEALTH CARE EDUCATION/TRAINING PROGRAM

## 2020-09-04 PROCEDURE — 94762 N-INVAS EAR/PLS OXIMTRY CONT: CPT

## 2020-09-04 PROCEDURE — 99232 SBSQ HOSP IP/OBS MODERATE 35: CPT | Performed by: FAMILY MEDICINE

## 2020-09-04 PROCEDURE — 85027 COMPLETE CBC AUTOMATED: CPT | Performed by: STUDENT IN AN ORGANIZED HEALTH CARE EDUCATION/TRAINING PROGRAM

## 2020-09-04 PROCEDURE — 87070 CULTURE OTHR SPECIMN AEROBIC: CPT | Performed by: STUDENT IN AN ORGANIZED HEALTH CARE EDUCATION/TRAINING PROGRAM

## 2020-09-04 PROCEDURE — 87106 FUNGI IDENTIFICATION YEAST: CPT | Performed by: STUDENT IN AN ORGANIZED HEALTH CARE EDUCATION/TRAINING PROGRAM

## 2020-09-04 PROCEDURE — 93005 ELECTROCARDIOGRAM TRACING: CPT

## 2020-09-04 PROCEDURE — 94760 N-INVAS EAR/PLS OXIMETRY 1: CPT

## 2020-09-04 PROCEDURE — 99233 SBSQ HOSP IP/OBS HIGH 50: CPT | Performed by: INTERNAL MEDICINE

## 2020-09-04 PROCEDURE — 82948 REAGENT STRIP/BLOOD GLUCOSE: CPT

## 2020-09-04 RX ORDER — LOSARTAN POTASSIUM 25 MG/1
25 TABLET ORAL DAILY
Status: DISCONTINUED | OUTPATIENT
Start: 2020-09-04 | End: 2020-09-08

## 2020-09-04 RX ADMIN — LOSARTAN POTASSIUM 25 MG: 25 TABLET, FILM COATED ORAL at 14:42

## 2020-09-04 RX ADMIN — OLANZAPINE 5 MG: 2.5 TABLET, FILM COATED ORAL at 22:10

## 2020-09-04 RX ADMIN — LEVALBUTEROL HYDROCHLORIDE 1.25 MG: 1.25 SOLUTION, CONCENTRATE RESPIRATORY (INHALATION) at 19:36

## 2020-09-04 RX ADMIN — CEFAZOLIN SODIUM 2000 MG: 2 SOLUTION INTRAVENOUS at 08:37

## 2020-09-04 RX ADMIN — PANTOPRAZOLE SODIUM 40 MG: 40 TABLET, DELAYED RELEASE ORAL at 05:52

## 2020-09-04 RX ADMIN — DIAZEPAM 5 MG: 5 TABLET ORAL at 22:14

## 2020-09-04 RX ADMIN — DEXAMETHASONE 4 MG: 4 TABLET ORAL at 14:42

## 2020-09-04 RX ADMIN — METOPROLOL TARTRATE 25 MG: 25 TABLET, FILM COATED ORAL at 22:10

## 2020-09-04 RX ADMIN — LEVALBUTEROL HYDROCHLORIDE 1.25 MG: 1.25 SOLUTION, CONCENTRATE RESPIRATORY (INHALATION) at 07:48

## 2020-09-04 RX ADMIN — IPRATROPIUM BROMIDE 0.5 MG: 0.5 SOLUTION RESPIRATORY (INHALATION) at 19:36

## 2020-09-04 RX ADMIN — APIXABAN 5 MG: 5 TABLET, FILM COATED ORAL at 17:19

## 2020-09-04 RX ADMIN — APIXABAN 5 MG: 5 TABLET, FILM COATED ORAL at 08:37

## 2020-09-04 RX ADMIN — IPRATROPIUM BROMIDE 0.5 MG: 0.5 SOLUTION RESPIRATORY (INHALATION) at 14:11

## 2020-09-04 RX ADMIN — AMIODARONE HYDROCHLORIDE 400 MG: 200 TABLET ORAL at 17:19

## 2020-09-04 RX ADMIN — METOPROLOL TARTRATE 25 MG: 25 TABLET, FILM COATED ORAL at 08:37

## 2020-09-04 RX ADMIN — IPRATROPIUM BROMIDE 0.5 MG: 0.5 SOLUTION RESPIRATORY (INHALATION) at 07:48

## 2020-09-04 RX ADMIN — VENLAFAXINE HYDROCHLORIDE 150 MG: 150 CAPSULE, EXTENDED RELEASE ORAL at 08:37

## 2020-09-04 RX ADMIN — CEFAZOLIN SODIUM 2000 MG: 2 SOLUTION INTRAVENOUS at 17:19

## 2020-09-04 RX ADMIN — LEVALBUTEROL HYDROCHLORIDE 1.25 MG: 1.25 SOLUTION, CONCENTRATE RESPIRATORY (INHALATION) at 14:11

## 2020-09-04 RX ADMIN — AMIODARONE HYDROCHLORIDE 400 MG: 200 TABLET ORAL at 08:37

## 2020-09-04 NOTE — ASSESSMENT & PLAN NOTE
ECHO this admission showed:  1  LV: Ejection fraction was estimated in the range of 35 % to 40 % to be 37 %  Moderate diffuse hypokinesis  Pseudonormal left ventricular filling pattern, with concomitant abnormal relaxation and increased filling pressure (grade 2 diastolic dysfunction)  2  RV: Low normal systolic function with Tapse 1 6    Etiology unknown at this time  Patient will need follow-up when stable or as outpatient  Cardiology consulted  Appreciate recommendations      Will continue Lopressor 25 mg po QHS

## 2020-09-04 NOTE — PLAN OF CARE
Problem: RESPIRATORY - ADULT  Goal: Achieves optimal ventilation and oxygenation  Description: INTERVENTIONS:  - Assess for changes in respiratory status  - Assess for changes in mentation and behavior  - Position to facilitate oxygenation and minimize respiratory effort  - Oxygen administered by appropriate delivery if ordered  - Initiate smoking cessation education as indicated  - Encourage broncho-pulmonary hygiene including cough, deep breathe, Incentive Spirometry  - Assess the need for suctioning and aspirate as needed  - Assess and instruct to report SOB or any respiratory difficulty  - Respiratory Therapy support as indicated  Outcome: Progressing     Problem: PAIN - ADULT  Goal: Verbalizes/displays adequate comfort level or baseline comfort level  Description: Interventions:  - Encourage patient to monitor pain and request assistance  - Assess pain using appropriate pain scale  - Administer analgesics based on type and severity of pain and evaluate response  - Implement non-pharmacological measures as appropriate and evaluate response  - Consider cultural and social influences on pain and pain management  - Notify physician/advanced practitioner if interventions unsuccessful or patient reports new pain  Outcome: Progressing - Patient has no complaints of pain at this time       Problem: INFECTION - ADULT  Goal: Absence or prevention of progression during hospitalization  Description: INTERVENTIONS:  - Assess and monitor for signs and symptoms of infection  - Monitor lab/diagnostic results  - Monitor all insertion sites, i e  indwelling lines, tubes, and drains  - Administer medications as ordered  - Instruct and encourage patient and family to use good hand hygiene technique  - Identify and instruct in appropriate isolation precautions for identified infection/condition  Outcome: Progressing  Goal: Absence of fever/infection during neutropenic period  Description: INTERVENTIONS:  - Monitor WBC    Outcome: Progressing     Goal: Maintain or return to baseline ADL function  Description: INTERVENTIONS:  -  Assess patient's ability to carry out ADLs; assess patient's baseline for ADL function and identify physical deficits which impact ability to perform ADLs (bathing, care of mouth/teeth, toileting, grooming, dressing, etc )  - Assess/evaluate cause of self-care deficits   - Assess range of motion  - Assess patient's mobility; develop plan if impaired  - Assess patient's need for assistive devices and provide as appropriate  - Encourage maximum independence but intervene and supervise when necessary  - Involve family in performance of ADLs  - Assess for home care needs following discharge   - Consider OT consult to assist with ADL evaluation and planning for discharge  - Provide patient education as appropriate  Outcome: Progressing  Goal: Maintain or return mobility status to optimal level  Description: INTERVENTIONS:  - Assess patient's baseline mobility status (ambulation, transfers, stairs, etc )    - Identify cognitive and physical deficits and behaviors that affect mobility  - Identify mobility aids required to assist with transfers and/or ambulation (gait belt, sit-to-stand, lift, walker, cane, etc )  - [place] fall precautions as indicated by assessment  - Record patient progress and toleration of activity level on Mobility SBAR; progress patient to next Phase/Stage  - Instruct patient to call for assistance with activity based on assessment  - Consider rehabilitation consult to assist with strengthening/weightbearing, etc   Outcome: Progressing     Problem: DISCHARGE PLANNING  Goal: Discharge to home or other facility with appropriate resources  Description: INTERVENTIONS:  - Identify barriers to discharge w/patient and caregiver  - Arrange for needed discharge resources and transportation as appropriate  - Identify discharge learning needs (meds, wound care, etc )  - Arrange for interpretive services to assist at discharge as needed  - Refer to Case Management Department for coordinating discharge planning if the patient needs post-hospital services based on physician/advanced practitioner order or complex needs related to functional status, cognitive ability, or social support system  Outcome: Progressing     Problem: Knowledge Deficit  Goal: Patient/family/caregiver demonstrates understanding of disease process, treatment plan, medications, and discharge instructions  Description: Complete learning assessment and assess knowledge base  Interventions:  - Provide teaching at level of understanding  - Provide teaching via preferred learning methods  Outcome: Progressing     Problem: CARDIOVASCULAR - ADULT  Goal: Maintains optimal cardiac output and hemodynamic stability  Description: INTERVENTIONS:  - Monitor I/O, vital signs and rhythm  - Monitor for S/S and trends of decreased cardiac output  - Administer and titrate ordered vasoactive medications to optimize hemodynamic stability  - Assess quality of pulses, skin color and temperature  - Assess for signs of decreased coronary artery perfusion  - Instruct patient to report change in severity of symptoms  Outcome: Progressing  Goal: Absence of cardiac dysrhythmias or at baseline rhythm  Description: INTERVENTIONS:  - Continuous cardiac monitoring, vital signs, obtain 12 lead EKG if ordered  - Administer antiarrhythmic and heart rate control medications as ordered  - Monitor electrolytes and administer replacement therapy as ordered  Outcome: Progressing - Patient is on continuous cardiac monitoring        Problem: GENITOURINARY - ADULT  Goal: Maintains or returns to baseline urinary function  Description: INTERVENTIONS:  - Assess urinary function  - Encourage oral fluids to ensure adequate hydration if ordered  - Administer IV fluids as ordered to ensure adequate hydration  - Administer ordered medications as needed  - Offer frequent toileting  - Follow urinary retention protocol if ordered  Outcome: Progressing     Problem: METABOLIC, FLUID AND ELECTROLYTES - ADULT  Goal: Electrolytes maintained within normal limits  Description: INTERVENTIONS:  - Monitor labs and assess patient for signs and symptoms of electrolyte imbalances  - Administer electrolyte replacement as ordered  - Monitor response to electrolyte replacements, including repeat lab results as appropriate  - Instruct patient on fluid and nutrition as appropriate  Outcome: Progressing  Goal: Fluid balance maintained  Description: INTERVENTIONS:  - Monitor labs   - Monitor I/O and WT  - Instruct patient on fluid and nutrition as appropriate  - Assess for signs & symptoms of volume excess or deficit  Outcome: Progressing  Goal: Glucose maintained within target range  Description: INTERVENTIONS:  - Monitor Blood Glucose as ordered  - Assess for signs and symptoms of hyperglycemia and hypoglycemia  - Administer ordered medications to maintain glucose within target range  - Assess nutritional intake and initiate nutrition service referral as needed  Outcome: Progressing     Problem: Prexisting or High Potential for Compromised Skin Integrity  Goal: Skin integrity is maintained or improved  Description: INTERVENTIONS:  - Identify patients at risk for skin breakdown  - Assess and monitor skin integrity  - Assess and monitor nutrition and hydration status  - Monitor labs   - Assess for incontinence   - Turn and reposition patient  - Assist with mobility/ambulation  - Relieve pressure over bony prominences  - Avoid friction and shearing  - Provide appropriate hygiene as needed including keeping skin clean and dry  - Evaluate need for skin moisturizer/barrier cream  - Collaborate with interdisciplinary team   - Patient/family teaching  - Consider wound care consult   Outcome: Progressing     Problem: Potential for Falls  Goal: Patient will remain free of falls  Description: INTERVENTIONS:  - Assess patient frequently for physical needs  -  Identify cognitive and physical deficits and behaviors that affect risk of falls  -  [place] fall precautions as indicated by assessment   - Educate patient/family on patient safety including physical limitations  - Instruct patient to call for assistance with activity based on assessment  - Modify environment to reduce risk of injury  - Consider OT/PT consult to assist with strengthening/mobility  Outcome: Progressing - Patient on a continual observation for safety  Problem: Nutrition/Hydration-ADULT  Goal: Nutrient/Hydration intake appropriate for improving, restoring or maintaining nutritional needs  Description: Monitor and assess patient's nutrition/hydration status for malnutrition  Collaborate with interdisciplinary team and initiate plan and interventions as ordered  Monitor patient's weight and dietary intake as ordered or per policy  Utilize nutrition screening tool and intervene as necessary  Determine patient's food preferences and provide high-protein, high-caloric foods as appropriate       INTERVENTIONS:  - Monitor oral intake, urinary output, labs, and treatment plans  - Assess nutrition and hydration status and recommend course of action  - Evaluate amount of meals eaten  - Assist patient with eating if necessary   - Allow adequate time for meals  - Recommend/ encourage appropriate diets, oral nutritional supplements, and vitamin/mineral supplements  - Order, calculate, and assess calorie counts as needed  - Recommend, monitor, and adjust tube feedings and TPN/PPN based on assessed needs  - Assess need for intravenous fluids  - Provide specific nutrition/hydration education as appropriate  - Include patient/family/caregiver in decisions related to nutrition  Outcome: Progressing

## 2020-09-04 NOTE — ASSESSMENT & PLAN NOTE
New onset Afib suspected to be 2/2 to PNA  Cardioconverted overnight on 9/2 with amiodarone  Currently on amiodarone po  No palpitations or CP today    · Continue to monitor on telemetry  · Continue p o  amiodarone with bolus titration per cardiology recommendations  Patient was counseled regarding the side effects of amiodarone    · Amiodarone 200 QD  · Continue Eliquis, 5mg BID  · Lopressor 25mg QHS  · Appreciate cardiology recommendations

## 2020-09-04 NOTE — ASSESSMENT & PLAN NOTE
Ongoing chemotherapy for GE CA, noted to be febrile on morning of admission by wife s/p fall in kitchen  Lactic 9 9 on admission, received 3L IVF bolus in ED, remained invasive pressor support for 3 days,  started broad spectrum abx  CT CAP: Suspicious for Left sided multi-lobar pna, no PE noted, masses remain unchanged compared to prior films  CT Head/Neck: no acute intracranial changes  Patient initially admitted to ICU due to requirement for vasopressors  Discontinued on 8/31, however levophed restarted on 9/2 due to hypotension, then weaned off during the night       · received stress dose steroids on admission  Transitioned to home Decadron on 9/1  Given additional 50mg of Solucortef morning of 9/2 due to hypotension  · Strict I/O's with lopez  · Lactic trending down, most recent 2 5 on 9/1  · Procal also trending down: 150-> 86 ->49->35 18 (9/3)   · BC 2/2 E  Coli, Sputum cx +Klebsiella oxytoca  ? Repeat BC on 9/1: NG @ 48hrs  · Abx  ? S/p 3 days cefepime (8/30-9/1)  ? S/p Tobramycin x1 on 8/31  ? S/p 2 days vanc (8/30-8/31)  ? Meropenem 1G q8hrs (9/1-9/2)  ?  Cefazolin 2 g IV q 8 hours (9/2-)  -transition to PO Abx when able to tolerate PO abx

## 2020-09-04 NOTE — PROGRESS NOTES
[facility] Progress Note - Walker Echols 77 y o  male MRN: [id number]    Unit/Bed#: 2 [place] [address] Encounter: [id number]      Assessment/Plan:  * Sepsis [facility])  Assessment & Plan  Ongoing chemotherapy for GE CA, noted to be febrile on morning of admission by wife s/p fall in kitchen  Lactic 9 9 on admission, received 3L IVF bolus in ED Bcx obtained and started broad spectrum abx  CT CAP: Suspicious for Left sided multi-lobar pna, no PE noted, masses remain unchanged compared to prior films  CT Head/Neck: no acute intracranial changes  Patient initially admitted to ICU due to requirement for vasopressors  Discontinued on 8/31, however levophed restarted on 9/2 due to hypotension, then weaned off during the night       · received stress dose steroids on admission  Transitioned to home Decadron on 9/1  Given additional 50mg of Solucortef morning of 9/2 due to hypotension  · Strict I/O's with lopez  · Lactic trending down, most recent 2 5 on 9/1  · Procal also trending down: 150-> 86 ->49->35 18 (9/3)   · BC 2/2 E  Coli, Sputum cx +Klebsiella oxytoca  ? Repeat BC on 9/1: NG @ 48hrs  · Abx  ? S/p 3 days cefepime (8/30-9/1)  ? S/p Tobramycin x1 on 8/31  ? S/p 2 days vanc (8/30-8/31)  ? Meropenem 1G q8hrs (9/1-9/2)  ? Cefazolin 2 g IV q 8 hours (9/2-)  -can likely transition to oral antibiotics the next 24-48 hours if the follow-up blood cultures remain negative per ID rec    Grade II diastolic dysfunction  Assessment & Plan  Noted on Echo on 9/2/2020    Moderate protein-calorie malnutrition (HCC)  Assessment & Plan  Malnutrition Findings:   Malnutrition type: Chronic illness(Moderate malnutrition of chronic illness related to inadequate energy intake as evidenced by somewhat hollow orbits with somewhat dark circles, somewhat depression at temples  Awaiting treatment plan)  Degree of Malnutrition: Malnutrition of moderate degree    BMI Findings: Body mass index is 24 58 kg/m²         A-fib [facility])  Assessment & Plan  New onset Afib suspected to be 2/2 to PNA  Cardioconverted overnight on 9/2 with amiodarone  Currently on amiodarone drip  Plan to transition to amiodarone PO in AM      · Continue to monitor on telemetry      Echo 9/2/2020: Ejection fraction was estimated in the range of 35 % to 40 % to be 37 %  There was moderate diffuse hypokinesis  · Features were consistent with a pseudonormal left ventricular filling pattern, with concomitant abnormal relaxation and increased filling pressure (grade 2 diastolic dysfunction)  PNA (pneumonia)  Assessment & Plan  · On 8/30, CT with concern for left sided multifocal PNA  · CT shows concern for Left sided multifocal pna, likely source of sepsis   · Trend procal, fever and WBC  · Currently requiring 3L NC, keep sats >90%  · BC 2/2 E  Coli  ? Repeat BC on 9/1: neg @ 48 hrs  · Sputum +Klebsiella oxystoca  · Aspiration precautions   · Cefazolin 2 g IV q 8 hours (9/2-  ? can likely transition to oral antibiotics the next 24-48 hours if the follow-up blood cultures remain negative per ID rec  · Speech therapy recs: Change diet to Dys 2 with honey thick liquids via cup, no straws, medications in puree  Neutropenic ([address] )  Assessment & Plan  · Neutropenic precautions  · Start Granix 5mcg/kg x 3 days  · Heme/onc on consult  · Neutropenia improving: WBC 21 91  · Will d/c granix  · Will continue to monitor    Falls  Assessment & Plan  · Fall precuations   · PT/OT ordered, recommendations appreciated    Chemotherapy-induced nausea  Assessment & Plan  On Zofran 4 mg PRN    Gastroesophageal cancer ([address] )  Assessment & Plan  · Follows with [facility]  · Pt does have mets to peritoneum but has been fairly responsive to chemotherapy  ? Last treatment 8/20 with 5 FU  · Follows with palliative, after readdressing status with wife plan to remain full code  Subjective:   Patient seen and examined at bedside  Patient reports he feels better   Denies headache, blurry vision, chest pain, SOB, palpitations, abd pain, N/V, change in bowel and urinary function  Objective:     Vitals: Blood pressure 130/77, pulse 75, temperature (!) 96 5 °F (35 8 °C), resp  rate 18, height 6' (1 829 m), weight 82 2 kg (181 lb 3 5 oz), SpO2 98 %  ,Body mass index is 24 58 kg/m²    Wt Readings from Last 3 Encounters:   09/03/20 82 2 kg (181 lb 3 5 oz)   08/19/20 81 6 kg (179 lb 14 3 oz)   08/18/20 82 3 kg (181 lb 8 oz)       Intake/Output Summary (Last 24 hours) at 9/3/2020 2057  Last data filed at 9/3/2020 1710  Gross per 24 hour   Intake 1023 24 ml   Output 1000 ml   Net 23 24 ml       Physical Exam: /77   Pulse 75   Temp (!) 96 5 °F (35 8 °C)   Resp 18   Ht 6' (1 829 m)   Wt 82 2 kg (181 lb 3 5 oz)   SpO2 98%   BMI 24 58 kg/m²   General appearance: alert and oriented, in no acute distress  Head: Normocephalic, without obvious abnormality, atraumatic  Eyes: Conjuctiva normal, EOM normal  Lungs: clear to auscultation bilaterally  Heart: regular rate and rhythm, S1, S2 normal, no murmur, click, rub or gallop  Abdomen: soft, non-tender; bowel sounds normal; no masses,  no organomegaly  Extremities: extremities normal, warm and well-perfused; no cyanosis, clubbing, or edema, LLE with varicose veins  Pulses: 2+ and symmetric     Recent Results (from the past 24 hour(s))   Fingerstick Glucose (POCT)    Collection Time: 09/02/20 10:37 PM   Result Value Ref Range    POC Glucose 209 (H) 65 - 140 mg/dl   Fingerstick Glucose (POCT)    Collection Time: 09/03/20  6:39 AM   Result Value Ref Range    POC Glucose 169 (H) 65 - 140 mg/dl   Procalcitonin Reflex    Collection Time: 09/03/20  6:40 AM   Result Value Ref Range    Procalcitonin 35 18 (H) <=0 25 ng/ml   CBC and differential    Collection Time: 09/03/20  6:40 AM   Result Value Ref Range    WBC 21 91 (H) 4 31 - 10 16 Thousand/uL    RBC 2 61 (L) 3 88 - 5 62 Million/uL    Hemoglobin 7 9 (L) 12 0 - 17 0 g/dL    Hematocrit 26 2 (L) 36 5 - 49 3 %     (H) 82 - 98 fL    MCH 30 3 26 8 - 34 3 pg    MCHC 30 2 (L) 31 4 - 37 4 g/dL    RDW 17 2 (H) 11 6 - 15 1 %    MPV 12 3 8 9 - 12 7 fL    Platelets 566 582 - 696 Thousands/uL    nRBC 1 /100 WBCs   Manual Differential(PHLEBS Do Not Order)    Collection Time: 09/03/20  6:40 AM   Result Value Ref Range    Segmented % 61 43 - 75 %    Bands % 17 (H) 0 - 8 %    Lymphocytes % 12 (L) 14 - 44 %    Monocytes % 10 4 - 12 %    Eosinophils, % 0 0 - 6 %    Basophils % 0 0 - 1 %    Absolute Neutrophils 17 09 (H) 1 85 - 7 62 Thousand/uL    Lymphocytes Absolute 2 63 0 60 - 4 47 Thousand/uL    Monocytes Absolute 2 19 (H) 0 00 - 1 22 Thousand/uL    Eosinophils Absolute 0 00 0 00 - 0 40 Thousand/uL    Basophils Absolute 0 00 0 00 - 0 10 Thousand/uL    Total Counted 100     RBC Morphology Present     Hypochromia Present     Platelet Estimate Adequate Adequate   Comprehensive metabolic panel    Collection Time: 09/03/20  6:41 AM   Result Value Ref Range    Sodium 141 136 - 145 mmol/L    Potassium 3 7 3 5 - 5 3 mmol/L    Chloride 108 100 - 108 mmol/L    CO2 23 21 - 32 mmol/L    ANION GAP 10 4 - 13 mmol/L    BUN 18 5 - 25 mg/dL    Creatinine 0 95 0 60 - 1 30 mg/dL    Glucose 166 (H) 65 - 140 mg/dL    Calcium 8 1 (L) 8 3 - 10 1 mg/dL    AST 25 5 - 45 U/L    ALT 30 12 - 78 U/L    Alkaline Phosphatase 67 46 - 116 U/L    Total Protein 5 3 (L) 6 4 - 8 2 g/dL    Albumin 2 0 (L) 3 5 - 5 0 g/dL    Total Bilirubin 0 60 0 20 - 1 00 mg/dL    eGFR 82 ml/min/1 73sq m   Magnesium    Collection Time: 09/03/20  6:41 AM   Result Value Ref Range    Magnesium 2 0 1 6 - 2 6 mg/dL   Phosphorus    Collection Time: 09/03/20  6:41 AM   Result Value Ref Range    Phosphorus 2 7 2 3 - 4 1 mg/dL   Fingerstick Glucose (POCT)    Collection Time: 09/03/20 11:26 AM   Result Value Ref Range    POC Glucose 155 (H) 65 - 140 mg/dl   Fingerstick Glucose (POCT)    Collection Time: 09/03/20  3:57 PM   Result Value Ref Range    POC Glucose 112 65 - 140 mg/dl       Current Facility-Administered Medications   Medication Dose Route Frequency Provider Last Rate Last Dose    acetaminophen (TYLENOL) tablet 650 mg  650 mg Oral Q6H PRN Telma Trivedi DO   650 mg at 08/31/20 2013    amiodarone tablet 400 mg  400 mg Oral BID With Meals [email or web address], DO   400 mg at 09/03/20 1547    Followed by   Hiren Mccartney ON 9/11/2020] amiodarone tablet 400 mg  400 mg Oral Daily With Breakfast Telma Trivedi DO        Followed by   Hiren Mccartney ON 10/11/2020] amiodarone tablet 200 mg  200 mg Oral Daily With Breakfast Telma Trivedi DO        apixaban (ELIQUIS) tablet 5 mg  5 mg Oral BID Telma Trivedi DO   5 mg at 09/03/20 1549    ceFAZolin (ANCEF) IVPB (premix) 2,000 mg 50 mL  2,000 mg Intravenous Q8H Telma Trivedi  mL/hr at 09/03/20 1542 2,000 mg at 09/03/20 1542    dexamethasone (DECADRON) tablet 4 mg  4 mg Oral After Masala Adriannao, DO   4 mg at 09/03/20 1446    diazepam (VALIUM) tablet 5 mg  5 mg Oral Daily PRN Vara Rico rTivedi DO   5 mg at 09/02/20 2308    insulin lispro (HumaLOG) 100 units/mL subcutaneous injection 1-6 Units  1-6 Units Subcutaneous TID AC Telma Trivedi DO   1 Units at 09/03/20 1137    insulin lispro (HumaLOG) 100 units/mL subcutaneous injection 1-6 Units  1-6 Units Subcutaneous HS Telma Trivedi DO   2 Units at 09/02/20 2300    ipratropium (ATROVENT) 0 02 % inhalation solution 0 5 mg  0 5 mg Nebulization TID [employer], DO   0 5 mg at 09/03/20 1937    levalbuterol (XOPENEX) inhalation solution 1 25 mg  1 25 mg Nebulization TID [employer], DO   1 25 mg at 09/03/20 1937    metoprolol tartrate (LOPRESSOR) tablet 25 mg  25 mg Oral Q12H [address] Telma Viotto, DO        OLANZapine (ZyPREXA) tablet 5 mg  5 mg Oral HS Telma Trivedi DO   5 mg at 09/02/20 2300    ondansetron (ZOFRAN) injection 4 mg  4 mg Intravenous Q8H PRN Telma Trivedi DO   4 mg at 08/30/20 1523    pantoprazole (PROTONIX) EC tablet 40 mg  40 mg Oral Daily Before Breakfast Telma Trivedi DO   40 mg at 09/03/20 0640    pneumococcal 13-valent conjugate vaccine (PREVNAR-13) IM injection 0 5 mL  0 5 mL Intramuscular Prior to discharge [employer], DO        venlafaxine (EFFEXOR-XR) 24 hr capsule 150 mg  150 mg Oral Daily Telma Trivedi DO   150 mg at 09/03/20 0849       Invasive Devices     Central Venous Catheter Line            Port A Cath 07/31/19 Right Chest 400 days    Port A Cath 09/02/20 Right Chest 1 day          Peripheral Intravenous Line            Peripheral IV 09/01/20 Right Wrist 2 days                Lab, Imaging and other studies: I have personally reviewed pertinent reports      VTE Pharmacologic Prophylaxis: Eliquis   VTE Mechanical Prophylaxis: sequential compression device    Ladonna Taylor MD

## 2020-09-04 NOTE — ASSESSMENT & PLAN NOTE
· Follows with [facility]  · Pt does have mets to peritoneum but has been fairly responsive to chemotherapy  ? Last treatment 8/20 with 5 FU  · Follows with palliative, after readdressing status with wife plan to remain full code    · Barium swallow Tuesday, follow up results

## 2020-09-04 NOTE — ASSESSMENT & PLAN NOTE
Moderate malnutrition the setting of chronic disease  Nutrition consulted  Appreciate recommendations

## 2020-09-04 NOTE — ASSESSMENT & PLAN NOTE
· Neutropenic precautions  · Completed Granix 5mcg/kg x 3 days  · Heme/onc on consult  · Neutropenia improving: ANC 17 on 9/3  · Will continue to monitor daily CBC with diff

## 2020-09-04 NOTE — PROGRESS NOTES
Progress Note - Cardiology   [facility]     Kenny Baca 77 y o  male MRN: [id number]  : 1955  Unit/Bed#: [address] [place] [address] Encounter: [id number]    Assessment and Plan:   1  Paroxysmal atrial fibrillation in the setting septic shock and E coli bacteremia:  Heart rate improving with amiodarone and beta-blocker  Will continue to monitor telemetry  Continue amiodarone taper    -  amiodarone 400 mg twice a day x1 week    -  amiodarone 400 mg daily x1 month    -  amiodarone 200 mg once a day thereafter (orders currently in chart)    -  continue Eliquis    -  Lopressor 25 mg twice a day, may need to down titrate if patient becomes bradycardic    2  E coli bacteremia:  Continue IV antibiotics per infectious disease    3  Hypertension:  Blood pressures improved with both heart rate control and sepsis management  Will continue to monitor and adjust medications as needed  4  Dilated cardiomyopathy:  EF visibly estimated at 37%  Cause may be multifactorial due to patient's history of alcohol abuse, hypertension, and recurrent palpitations, question atrial fibrillation  -  will continue monitor    -  consider ischemic workup once patient is stable from infection, can be done as outpatient    -  will introduce advanced heart failure medications as patient's condition tolerates  5  Stage IV Esophageal cancer with metastasis     6  Weight loss with moderate protein calorie malnutrition     Subjective / Objective:   Patient seen and examined  He denies any complaints of chest pain, pressure or palpitations  Patient maintaining sinus rhythm for longer period of time  Will continue amiodarone loading  Patient also on beta-blocker, heart rates while in sinus have been 60s to 80s  Will continue to monitor and may need to decrease beta-blocker dose  Continue Eliquis  Vitals: Blood pressure 122/78, pulse 78, temperature 97 6 °F (36 4 °C), temperature source Oral, resp   rate 17, height 6' (1 829 m), weight 81 8 kg (180 lb 5 4 oz), SpO2 94 %  Vitals:    09/03/20 0534 09/04/20 0600   Weight: 82 2 kg (181 lb 3 5 oz) 81 8 kg (180 lb 5 4 oz)     Body mass index is 24 46 kg/m²  BP Readings from Last 3 Encounters:   09/04/20 122/78   08/19/20 127/65   08/18/20 138/78     Orthostatic Blood Pressures      Most Recent Value   Blood Pressure  122/78 filed at 09/04/2020 0835   Patient Position - Orthostatic VS  Sitting filed at 09/04/2020 0835        I/O       09/02 0701 - 09/03 0700 09/03 0701 - 09/04 0700 09/04 0701 - 09/05 0700    P  O  218 420     I V  (mL/kg) 382 (4 6) 373 2 (4 6)     IV Piggyback 1250 100     Total Intake(mL/kg) 1850 (22 5) 893 2 (10 9)     Urine (mL/kg/hr) 1775 (0 9) 1425 (0 7)     Stool  0     Total Output 1775 1425     Net +75 -531 8            Unmeasured Urine Occurrence  1 x     Unmeasured Stool Occurrence  1 x         Invasive Devices     Central Venous Catheter Line            Port A Cath 07/31/19 Right Chest 401 days    Port A Cath 09/02/20 Right Chest 2 days          Peripheral Intravenous Line            Peripheral IV 09/01/20 Right Wrist 2 days                  Intake/Output Summary (Last 24 hours) at 9/4/2020 1026  Last data filed at 9/4/2020 0601  Gross per 24 hour   Intake 526 34 ml   Output 1275 ml   Net -748 66 ml         Physical Exam:   Physical Exam  Vitals signs and nursing note reviewed  Constitutional:       General: He is not in acute distress  Appearance: Normal appearance  He is normal weight  HENT:      Head: Normocephalic  Right Ear: External ear normal       Left Ear: External ear normal       Nose: Nose normal    Eyes:      General: No scleral icterus  Right eye: No discharge  Left eye: No discharge  Conjunctiva/sclera: Conjunctivae normal       Pupils: Pupils are equal, round, and reactive to light  Neck:      Musculoskeletal: Normal range of motion and neck supple     Cardiovascular:      Rate and Rhythm: Normal rate and regular rhythm  Pulses: Normal pulses  Heart sounds: Normal heart sounds  No murmur  Pulmonary:      Effort: Pulmonary effort is normal  No respiratory distress  Breath sounds: Decreased breath sounds present  No wheezing  Abdominal:      General: Bowel sounds are normal       Palpations: Abdomen is soft  Musculoskeletal:      Right lower leg: No edema  Skin:     General: Skin is warm and dry  Capillary Refill: Capillary refill takes less than 2 seconds  Neurological:      General: No focal deficit present  Mental Status: He is alert  Mental status is at baseline  Psychiatric:         Attention and Perception: Attention normal          Mood and Affect: Affect is blunt  Speech: Speech normal          Behavior: Behavior is slowed  Behavior is cooperative  Thought Content: Thought content normal          Judgment: Judgment is impulsive                 Medications/ Allergies:     Current Facility-Administered Medications   Medication Dose Route Frequency Provider Last Rate    acetaminophen  650 mg Oral Q6H PRN Roopa Grand, DO      amiodarone  400 mg Oral BID With Meals Roopa Puentes DO      Followed by   Rock Camacho ON 9/11/2020] amiodarone  400 mg Oral Daily With Breakfast Telma Trivedi DO      Followed by   Rock Camacho ON 10/11/2020] amiodarone  200 mg Oral Daily With Breakfast Telma Trivedi DO      apixaban  5 mg Oral BID [address]      cefazolin  2,000 mg Intravenous Q8H Telma Trivedi DO 2,000 mg (09/04/20 0837)    dexamethasone  4 mg Oral After TMAT DO Shikha      diazepam  5 mg Oral Daily PRN Roopaanh Puentes, DO      insulin lispro  1-6 Units Subcutaneous 4x Daily (AC & HS) Shelia Gillis MD      ipratropium  0 5 mg Nebulization TID Roopa Grand, DO      levalbuterol  1 25 mg Nebulization TID Roopa Grand, DO      metoprolol tartrate  25 mg Oral Q12H [facility] Telma Trivedi DO      OLANZapine  5 mg Oral HS Roopaanh Puentes, DO  ondansetron  4 mg Intravenous Q8H PRN Rachel Silversmith, DO      pantoprazole  40 mg Oral Daily Before Breakfast Rachel Silversmith, DO      pneumococcal 13-valent conjugate vaccine  0 5 mL Intramuscular Prior to discharge Rachel Vinsmith, DO      venlafaxine  150 mg Oral Daily Telma Trivedi, DO       acetaminophen, 650 mg, Q6H PRN  diazepam, 5 mg, Daily PRN  ondansetron, 4 mg, Q8H PRN  pneumococcal 13-valent conjugate vaccine, 0 5 mL, Prior to discharge      Allergies   Allergen Reactions    Bee Venom Anaphylaxis    Shellfish-Derived Products      Develops GOUT       VTE Pharmacologic Prophylaxis:   Eliquis    Labs:   Troponins:  Results from last 7 days   Lab Units 08/30/20  0916   TROPONIN I ng/mL 1 54*     CBC with diff:  Results from last 7 days   Lab Units 09/04/20  0601 09/03/20  0640 09/02/20  0539 09/01/20  0537 08/31/20  0545 08/30/20  0915   WBC Thousand/uL 26 35* 21 91* 10 82* 1 61* 0 61* 0 34*   HEMOGLOBIN g/dL 8 5* 7 9* 8 0* 7 4* 7 2* 9 2*   HEMATOCRIT % 27 3* 26 2* 24 7* 24 0* 23 1* 29 6*   MCV fL 102* 100* 98 99* 100* 101*   PLATELETS Thousands/uL 260 227 179 156 149 236   MCH pg 31 6 30 3 31 6 30 6 31 2 31 4   MCHC g/dL 31 1* 30 2* 32 4 30 8* 31 2* 31 1*   RDW % 17 5* 17 2* 16 7* 16 3* 16 2* 16 5*   MPV fL 11 7 12 3 12 0 10 8 10 6 10 2   NRBC AUTO /100 WBCs 1 1 0 5 7 6   NRBC /100 WBC  --   --   --  6*  --   --      CMP:  Results from last 7 days   Lab Units 09/04/20  0601 09/03/20  0641 09/02/20  1820 09/02/20  0539 09/01/20  2158 09/01/20  1305 09/01/20  0537 08/31/20  0545 08/30/20  0916   SODIUM mmol/L 140 141 140 136  --   --  136 139 134*   POTASSIUM mmol/L 4 4 3 7 2 9* 4 0 4 0 2 9* 2 8* 3 1* 3 5   CHLORIDE mmol/L 107 108 106 104  --   --  104 107 100   CO2 mmol/L 23 23 24 19*  --   --  22 19* 14*   ANION GAP mmol/L 10 10 10 13  --   --  10 13 20*   BUN mg/dL 20 18 16 14  --   --  16 20 29*   CREATININE mg/dL 0 93 0 95 0 93 0 81  --   --  0 89 1 07 1 69*   CALCIUM mg/dL 8 0* 8 1* 8 4 8 6  -- --  8 2* 7 4* 8 5   AST U/L  --  25  --  56*  --   --  57* 69* 24   ALT U/L  --  30  --  36  --   --  34 37 14   ALK PHOS U/L  --  67  --  53  --   --  47 40* 54   TOTAL PROTEIN g/dL  --  5 3*  --  5 6*  --   --  5 3* 5 1* 6 0*   ALBUMIN g/dL  --  2 0*  --  2 1*  --   --  1 8* 1 9* 2 2*   TOTAL BILIRUBIN mg/dL  --  0 60  --  1 80*  --   --  1 00 0 80 0 90   EGFR ml/min/1 73sq m 85 82 85 92  --   --  88 71 41     Magnesium:  Results from last 7 days   Lab Units 09/04/20  0601 09/03/20  0641 09/02/20  1820 09/02/20  0539 09/01/20  0537 08/31/20  0545 08/30/20  0916   MAGNESIUM mg/dL 2 1 2 0 2 2 1 9 2 1 1 9 1 7       Imaging & Testing   I have personally reviewed pertinent reports  Xr Chest 1 View Portable    Result Date: 8/30/2020  Narrative: CHEST INDICATION:   line placement  COMPARISON:  Earlier the same day EXAM PERFORMED/VIEWS:  XR CHEST PORTABLE FINDINGS:  There is a new right-sided IJ central venous catheter which terminates in the lower portion of the superior vena cava adjacent to the pre-existing infusion port catheter device  Cardiac monitor leads are present on the chest which creates some artifact mostly over the right lung apex  Heart shadow appears unremarkable  Atherosclerotic vascular calcifications are noted  Stable to increased density of left-sided pulmonary infiltrate mostly in the perihilar distribution  No gross evidence of pleural fluid or significant pneumothorax  As above, the right apical region is difficult to see  Osseous structures appear within normal limits for patient age  Impression: Limited study  Satisfactory position of right IJ catheter  Increased density of left-sided pulmonary opacity  Possibly worsening pneumonia Workstation performed: [id number]     Xr Chest 1 View Portable    Result Date: 8/30/2020  Narrative: CHEST INDICATION:   short of breath   COMPARISON:  None EXAM PERFORMED/VIEWS:  XR CHEST PORTABLE FINDINGS:  Right-sided infusion port catheter device noted  Cardiomediastinal silhouette appears unremarkable  There are innumerable nodular opacities of small size noted throughout the left lung probably some within the right lung as well  These seem somewhat ill-defined  There is some consolidation medially in the left base  There is limited inspiration  There is no pleural fluid or pneumothorax appreciated  Osseous structures appear within normal limits for patient age  Impression: Extensive nodularity of the lungs left greater than right  Please refer to follow-up chest CT for further evaluation  Workstation performed: [id number]     Ct Head Without Contrast    Result Date: 8/30/2020  Narrative: CT BRAIN - WITHOUT CONTRAST INDICATION:   Altered mental status  COMPARISON:  MRI performed July 8, 2020  TECHNIQUE:  CT examination of the brain was performed  In addition to axial images, sagittal and coronal 2D reformatted images were created and submitted for interpretation  Radiation dose length product (DLP) for this visit:  934 03 mGy-cm   This examination, like all CT scans performed in the [facility], was performed utilizing techniques to minimize radiation dose exposure, including the use of iterative  reconstruction and automated exposure control  IMAGE QUALITY:  Diagnostic  FINDINGS: PARENCHYMA: Decreased attenuation is noted in periventricular and subcortical white matter demonstrating an appearance that is statistically most likely to represent mild microangiopathic change; this appearance is similar when compared to most recent prior examination  No CT signs of acute infarction  No intracranial mass, mass effect or midline shift  No acute parenchymal hemorrhage  VENTRICLES AND EXTRA-AXIAL SPACES:  Enlargement of ventricles and extra-axial CSF spaces, out of proportion to the patient's age most consistent with cerebral and cerebellar atrophy, similar from previous examination   VISUALIZED ORBITS AND PARANASAL SINUSES:  Right maxillary and ethmoid sinus mucosal thickening, similar from prior exam   Orbits are unremarkable  CALVARIUM AND EXTRACRANIAL SOFT TISSUES:  Normal      Impression: No acute intracranial abnormality  Workstation performed: [id number]     Ct Spine Cervical Without Contrast    Result Date: 8/30/2020  Narrative: CT CERVICAL SPINE - WITHOUT CONTRAST INDICATION:   Altered mental status  Shortness of breath  COMPARISON:  None  TECHNIQUE:  CT examination of the cervical spine was performed without intravenous contrast   Contiguous axial images were obtained  Sagittal and coronal reconstructions were performed  Radiation dose length product (DLP) for this visit:  342 61 mGy-cm   This examination, like all CT scans performed in the [facility], was performed utilizing techniques to minimize radiation dose exposure, including the use of iterative  reconstruction and automated exposure control  IMAGE QUALITY:  Diagnostic  FINDINGS: ALIGNMENT:  There is straightening of normal cervical lordosis  Minimal grade 1 degenerative anterolisthesis of C3 relative to C4  No traumatic subluxation or compression deformity  VERTEBRAL BODIES:  No fracture  DEGENERATIVE CHANGES:  Mild multilevel cervical degenerative changes are noted without critical central canal stenosis  PREVERTEBRAL AND PARASPINAL SOFT TISSUES:  Unremarkable  THORACIC INLET:  Emphysematous changes noted in the lung apices  Impression: No cervical spine fracture or traumatic malalignment  Workstation performed: [id number]     Ct Pe Study W Abdomen Pelvis W Contrast    Result Date: 8/30/2020  Narrative: CT PULMONARY ANGIOGRAM OF THE CHEST AND CT ABDOMEN AND PELVIS WITH INTRAVENOUS CONTRAST INDICATION:   Dyspnea, chronic  Esophageal cancer  COMPARISON:  July 13, 2020 TECHNIQUE:  CT examination of the chest, abdomen and pelvis was performed    Thin section CT angiographic technique was used in the chest in order to evaluate for pulmonary embolus and coronal 3D MIP postprocessing was performed on the acquisition scanner  In addition to portal venous phase postcontrast scanning through the abdomen and pelvis, delayed phase postcontrast scanning was performed through the upper abdominal viscera  Axial, sagittal, and coronal 2D reformatted images were created from the source data and submitted for interpretation  Radiation dose length product (DLP) for this visit:  2375 01 mGy-cm   This examination, like all CT scans performed in the [facility], was performed utilizing techniques to minimize radiation dose exposure, including the use of iterative reconstruction and automated exposure control  IV Contrast:  100 mL of iohexol (OMNIPAQUE) Enteric Contrast:  Enteric contrast was not administered  FINDINGS: CHEST PULMONARY ARTERIAL TREE:  No pulmonary embolus is seen  LUNGS: Background centrilobular and paraseptal emphysematous changes are reidentified  Extensive patchy consolidative and groundglass airspace opacity throughout the left lung, new when compared to July 13, 2020 most consistent with left-sided pneumonia  Atelectasis noted in the posterior lung bases more notable on the left on the right  Few scattered pulmonary nodules including a 4 mm pleural-based nodule seen in the right middle lobe on image 67 and a 6 mm nodule in the right middle lobe on image 72 are unchanged from prior examination  A previously noted 3 mm nodule in the lingula is  not seen, presumably due to infectious infiltrates  No convincing evidence of new dominant pulmonary mass  PLEURA:  Unremarkable  HEART/AORTA:  Unremarkable for patient's age   MEDIASTINUM AND NEVAEH:  Infiltrative density in the mediastinum, especially in the right paratracheal region measuring up to 14 mm in short axis on image 61 as well as prominent soft tissue density in the nevaeh is nonspecific and could represent reactive lymphadenopathy or developing metastatic disease and warrants continued interval follow-up  There is layering mucus/debris in the distal trachea and mainstem bronchi  CHEST WALL AND LOWER NECK:   Right chest port  No axillary lymphadenopathy  ABDOMEN LIVER/BILIARY TREE:  Circumscribed hepatic hypodensities suspicious for cysts are unchanged from July 13, 2020  Periportal edema is noted  The liver is again enlarged  No biliary dilatation  Patent portal and hepatic veins  GALLBLADDER:  No calcified gallstones  No pericholecystic inflammatory change  SPLEEN:  Unremarkable  PANCREAS:  Fatty pancreatic atrophy  No pancreatic mass  ADRENAL GLANDS:  Unremarkable  KIDNEYS/URETERS:  Bilateral renal cysts, larger on the right on the left, are reidentified  No solid renal mass, urinary tract calculus, or hydronephrosis  STOMACH AND BOWEL:  Again noted is an enhancing mass at the gastroesophageal junction consistent with the patient's known malignancy  This is very well demonstrated on image 20 of delayed series 15  Small intestine appears unremarkable  Colonic diverticulosis noted without evidence of acute diverticulitis  APPENDIX:  No findings to suggest appendicitis  ABDOMINOPELVIC CAVITY:  No significant ascites  No pneumoperitoneum  There is omental nodularity consistent with metastatic disease  A representative omental nodule measuring 10 mm on image 47 of series 10 is unchanged from July 13, 2020  Other smaller omental nodules are noted on image 44 and 45 in the anterior upper abdomen  VESSELS:  Advanced atherosclerotic vascular calcification  PELVIS REPRODUCTIVE ORGANS:  Prostatomegaly with impression on the urinary bladder base, similar from previous examination  URINARY BLADDER:  Unremarkable  ABDOMINAL WALL/INGUINAL REGIONS:  Unremarkable  OSSEOUS STRUCTURES:  No acute fracture or destructive osseous lesion  Impression: Extensive airspace opacity throughout the left lung most suspicious for acute pneumonia  No pulmonary embolus   Primary malignancy at the gastroesophageal junction, similar in appearance from 2020  Again noted is omental nodularity with small omental nodules measuring up to 10 mm, unchanged from previous examination and most consistent with metastatic disease  There are subcentimeter pulmonary nodules in the right lung which are indeterminate and unchanged from 2020  Unchanged hepatic hypodensities, thought most likely to represent cysts  Mediastinal and hilar lymphadenopathy, probably reactive but for which continued close interval follow-up is recommended  This examination was marked "immediate notification" in Epic in order to begin the standard process by which the radiology reading room liaison alerts the referring practitioner  Workstation performed: [id number]        EKG / Monitor: Personally reviewed  Predominantly sinus rhythm, sinus bradycardia, noted to have short bouts approximately 1 minute of atrial fibrillation early this morning  Will continue to monitor  Cardiac testing:   Results for orders placed during the hospital encounter of 20   Echo complete with contrast if indicated    Narrative [address]  [address] [facility] [address]  [phone]    Transthoracic Echocardiogram  2D, M-mode, Doppler, and Color Doppler    Study date:  02-Sep-2020    Patient: Galina Rice  MR number: [id number]  Account number: [de-identified]  : 1955  Age: 77 years  Gender: Male  Status: Inpatient  Location: Bedside  Height: 72 in  Weight: 176 7 lb  BP: 83/ 50 mmHg    Indications: A Fib  Diagnoses: I48 0 - Atrial fibrillation    Sonographer:  TALA Valdez  Primary Physician: Joseph Spears DO  Referring Physician:  Derrick Trivedi DO  Group:  [facility]  Interpreting Physician:  Bryan Fischer MD    SUMMARY    LEFT VENTRICLE:  The ventricle was mildly dilated  Systolic function was moderately to markedly reduced by Teichholz   Ejection fraction was estimated in the range of 35 % to 40 % to be 37 %  There was moderate diffuse hypokinesis  Features were consistent with a pseudonormal left ventricular filling pattern, with concomitant abnormal relaxation and increased filling pressure (grade 2 diastolic dysfunction)  LEFT ATRIUM:  The atrium was mildly dilated  MITRAL VALVE:  There was mild annular calcification  There was trace regurgitation  TRICUSPID VALVE:  There was mild regurgitation  PULMONIC VALVE:  There was mild regurgitation  IVC, HEPATIC VEINS:  The inferior vena cava was dilated  HISTORY: PRIOR HISTORY: Gastroesophageal Cancer,Chemotherapy,Neutropenic,Septic shock,pneumonia,HTN  PROCEDURE: The procedure was performed at the bedside  This was a routine study  The transthoracic approach was used  The study included complete 2D imaging, M-mode, complete spectral Doppler, and color Doppler  The heart rate was 78 bpm,  at the start of the study  Images were obtained from the parasternal, apical, subcostal, and suprasternal notch acoustic windows  Intravenous contrast ( 0 3ml Definity in NSS) was administered to opacify the left ventricle  Image quality  was adequate  LEFT VENTRICLE: The ventricle was mildly dilated  Systolic function was moderately to markedly reduced by Teichholz  Ejection fraction was estimated in the range of 35 % to 40 % to be 37 %  There was moderate diffuse hypokinesis  Wall  thickness was normal  No evidence of apical thrombus  DOPPLER: Features were consistent with a pseudonormal left ventricular filling pattern, with concomitant abnormal relaxation and increased filling pressure (grade 2 diastolic  dysfunction)  RIGHT VENTRICLE: The size was normal  Systolic function was low normal measuring 1 6cm Wall thickness was normal     LEFT ATRIUM: The atrium was mildly dilated  RIGHT ATRIUM: Size was normal     MITRAL VALVE: There was mild annular calcification   There was normal leaflet separation  DOPPLER: The transmitral velocity was within the normal range  There was no evidence for stenosis  There was trace regurgitation  AORTIC VALVE: The valve was trileaflet  Leaflets exhibited mildly increased thickness, mild calcification, and normal cuspal separation  DOPPLER: Transaortic velocity was within the normal range  There was no evidence for stenosis  There  was no significant regurgitation  TRICUSPID VALVE: The valve structure was normal  There was normal leaflet separation  DOPPLER: The transtricuspid velocity was within the normal range  There was no evidence for stenosis  There was mild regurgitation  Estimated peak PA  pressure was 34 mmHg  PULMONIC VALVE: Leaflets exhibited normal thickness, no calcification, and normal cuspal separation  DOPPLER: The transpulmonic velocity was within the normal range  There was mild regurgitation  PERICARDIUM: There was no pericardial effusion  The pericardium was normal in appearance  AORTA: The root exhibited normal size  SYSTEMIC VEINS: IVC: The inferior vena cava was dilated  SYSTEM MEASUREMENT TABLES    2D mode  AoR Diam 2D: 3 8 cm  LA Diam (2D): 4 3 cm  LA/Ao (2D): 1 13  FS (2D Teich): 26 1 %  IVSd (2D): 0 93 cm  LVDEV: 148 cmï¾³  LVEDV MOD BP: 201 cmï¾³  LVESV: 72 9 cmï¾³  LVIDd(2D): 5 51 cm  LVISd (2D): 4 07 cm  LVOT Area 2D: 3 14 cmï¾²  LVPWd (2D): 1 09 cm  SV (Teich): 75 1 cmï¾³    Apical four chamber  LVEDV MOD A4C: 186 cmï¾³  LVEF A4C: 29 %  LVESV A4C: 111 cmï¾³  LVLD A4C: 8 23 cm  LVLS A4C: 7 35 cm    Apical two chamber  LVEF A2C: 35 %    Unspecified Scan Mode  MANA Cont Eq (Peak Stefan): 2 52 cmï¾²  LVOT Diam : 2 cm  LVOT Vmax: 1210 mm/s  LVOT Vmax; Mean: 1210 mm/s  Peak Grad ; Mean: 6 mm[Hg]  MV Peak A Stefan: 1070 mm/s  MV Peak E Stefan   Mean: 1040 mm/s  MVA (PHT): 4 31 cmï¾²  PHT: 51 ms  Max P mm[Hg]  V Max: 2480 mm/s  Vmax: 2520 mm/s  RA Area: 20 8 cmï¾²  RA Volume: 62 8 cmï¾³  TAPSE: 1 6 cm    [employer] [facility]redited [facility]    Prepared and electronically signed by    Lei Gómez MD  Signed 02-Sep-2020 14:31:50         Ragena Dandy, CRNP        "This note has been constructed using a voice recognition system  Therefore there may be syntax, spelling, and/or grammatical errors   Please call if you have any questions  "

## 2020-09-04 NOTE — PROGRESS NOTES
Hematology - Oncology Progress Note    Salvador Sacks, 1955, [id number]  [address] [facility]      Impression and plan:    40-year-old male with a gastroesophageal cancer recently on chemotherapy  Patient was admitted with sepsis, now completing antibiotics  Patient seems to be much better, more interactive today  No obvious pain control issues  CBC parameters see are listed below  White count continues to climb; G-CSF was discontinued approximately 4 days ago  The rising white count may be due to the lingering affects of the G-CSF but patient needs to be monitored closely for any signs of infection  Hemoglobin level is low but okay/acceptable; platelet count is within normal limits  Once patient is better/stable and ready for discharge, he will be follow-up with his primary oncologist   __________________________________________________________________________________________    Chief complaint:  Sepsis, history of esophageal gastric cancer  History of present illness: 40-year-old male with a history of /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  The patient had been on FOLFOX subsequently the FOLFOX Backbone  Patient was admitted to the ICU with Gram-negative bacteremia  Mr Cortez Spikes has been transferred to a general medical floor  Patient was found in bed in no apparent distress, 1-1 at bedside  Patient was pleasant responsive  No pain control issues  Activities are extremely limited      Hospital medications list:  Current Facility-Administered Medications   Medication Dose Route Frequency Provider Last Rate    acetaminophen  650 mg Oral Q6H PRN Pauly Alvarez DO      amiodarone  400 mg Oral BID With Meals Pauly Alvarez DO      Followed by   Mat Steward ON 9/11/2020] amiodarone  400 mg Oral Daily With Breakfast Telma Trivedi DO      Followed by   Mat Steward ON 10/11/2020] amiodarone  200 mg Oral Daily With Breakfast Telma Trivedi DO      apixaban  5 mg Oral BID Milta Grout, CRNP      cefazolin  2,000 mg Intravenous Q8H Telma Rodaso, DO 2,000 mg (09/04/20 0837)    dexamethasone  4 mg Oral After [employer] [facility] [facility], DO      diazepam  5 mg Oral Daily PRN [facility], DO      insulin lispro  1-6 Units Subcutaneous 4x Daily (AC & HS) Leno Miller MD      ipratropium  0 5 mg Nebulization TID [facility], DO      levalbuterol  1 25 mg Nebulization TID [facility], DO      losartan  25 mg Oral Daily Milta Grout, CRNP      metoprolol tartrate  25 mg Oral Q12H [address] Telma Viotto, DO      OLANZapine  5 mg Oral HS Telma Trivedi, DO      ondansetron  4 mg Intravenous Q8H PRN Bryon Holstein Maria Teresaotto, DO      pantoprazole  40 mg Oral Daily Before Breakfast [facility], DO      pneumococcal 13-valent conjugate vaccine  0 5 mL Intramuscular Prior to discharge [facility], DO      venlafaxine  150 mg Oral Daily [facility], DO       Physical exam  /76   Pulse 73   Temp (!) 97 4 °F (36 3 °C)   Resp 16   Ht 6' (1 829 m)   Wt 81 8 kg (180 lb 5 4 oz)   SpO2 98%   BMI 24 46 kg/m²   Constitutional:  Well-nourished male, no respiratory distress, no signs of pain  Eyes: PERRL, conjunctiva pale, anicteric    HENT: Atraumatic, external ears normal, nose normal   Neck: Good range of motion, no adenopathy  Respiratory:  Fair entry bilaterally, scattered bilateral rhonchi  Cardiovascular: Normal rate, normal rhythm, no murmurs, no gallops, no rubs    GI: Soft, nondistended, normal bowel sounds, nontender, no organomegaly, no mass, no rebound, no guarding    : No costovertebral angle tenderness, normal reproductive organs, no discharge  Integument:  Pale, warm, moist, scattered ecchymoses, upper extremity purpura, no active bleeding  Lymphatic: No adenopathy in the neck, supra-clavicular region or axilla bilaterally  Extremities:  No lower extremity edema, no cords, pulses are 1+ bilaterally  Neurologic: Alert & oriented x 3, CN 2-12 normal, normal motor function, normal sensory function, no focal deficits noted  Psychiatric:  Pleasant, responsive, appropriate  Rectal: Deferred    Laboratory test results    Results for Krystle Angel (MRN [id number]) as of 9/4/2020 15:02   Ref   Range 9/1/2020 05:37 9/2/2020 05:39 9/3/2020 06:40 9/4/2020 06:01   WBC Latest Ref Range: 4 31 - 10 16 Thousand/uL 1 61 (LL) 10 82 (H) 21 91 (H) 26 35 (H)   Red Blood Cell Count Latest Ref Range: 3 88 - 5 62 Million/uL 2 42 (L) 2 53 (L) 2 61 (L) 2 69 (L)   Hemoglobin Latest Ref Range: 12 0 - 17 0 g/dL 7 4 (L) 8 0 (L) 7 9 (L) 8 5 (L)   HCT Latest Ref Range: 36 5 - 49 3 % 24 0 (L) 24 7 (L) 26 2 (L) 27 3 (L)   MCV Latest Ref Range: 82 - 98 fL 99 (H) 98 100 (H) 102 (H)   MCH Latest Ref Range: 26 8 - 34 3 pg 30 6 31 6 30 3 31 6   MCHC Latest Ref Range: 31 4 - 37 4 g/dL 30 8 (L) 32 4 30 2 (L) 31 1 (L)   RDW Latest Ref Range: 11 6 - 15 1 % 16 3 (H) 16 7 (H) 17 2 (H) 17 5 (H)   Platelet Count Latest Ref Range: 149 - 390 Thousands/uL 156 179 227 260

## 2020-09-04 NOTE — ASSESSMENT & PLAN NOTE
On 8/30, CT with concern for left sided multifocal PNA  Concern for aspiration in the setting of dysphagia  · CT shows concern for Left sided multifocal pna, likely source of sepsis   · Trend fever, procal, and WBC    · Patient remains afebrile  Results from last 7 days   Lab Units 09/05/20  0627 09/04/20  0601 09/03/20  0640 09/02/20  0539 09/01/20  0537   PROCALCITONIN ng/ml 8 07* 19 14* 35 18* 49 96* 86 37*     Results from last 7 days   Lab Units 09/06/20  0553 09/05/20  0627 09/04/20  0601 09/03/20  0640 09/02/20  0539   WBC Thousand/uL 22 02* 22 57* 26 35* 21 91* 10 82*       · Currently requiring 2L NC, keep sats >90%  · Cultures  ? Blood cultures  ? 8/30 BC 2/2 E  Coli  ? 9/1 BC: NG at 5 days  ? Sputum cultures  ? 8/31 Sputum 1+Klebsiella oxytoca, 1+Candida  ? 9/4 Sputum culture pending  · Aspiration precautions   · Cefazolin 2 g IV q 8 hours (Day #3)  · oral antibiotics when tolerate POR  · Diet  ? Change diet to Dys 2 with honey thick liquids via cup, no straws, medications in puree

## 2020-09-04 NOTE — SOCIAL WORK
SW met with patient to follow up on STR recommendation and choices  Patient states that he did not make a decision yet, and wished for SW to come back later  SW will call patient wife as well

## 2020-09-04 NOTE — PROGRESS NOTES
Progress Note - Infectious Disease   Belkys Sheets 77 y o  male MRN: [id number]  Unit/Bed#: [address] [place] [address] Encounter: [id number]      Impression/Plan:  1  Septic shock-POA   Appears to be secondary to gram-negative bacteremia of an undefined source   Consideration for the possibility of translocation of organisms across the gut wall   Consideration for the possibility of pneumonia   Consideration for the possibility of typhilitis   Consideration for the possibility of another source such as a Port-A-Cath infection although this is less likely   Patient's blood pressure has recovered he is now off all vasopressor support   He seems to be tolerating the antibiotics without difficulty   No resistant organisms isolated  The white cell count has continued to increase likely secondary to the G-CSF which is now been discontinued, but the procalcitonin level to continues to decreased  -continue cefazolin  -once eating and drinking well, can transition to Keflex 500 mg p o  Q i d  To be continued through 9/8/2020 which will be 10 days total treatment  -recheck CBC with diff and BMP  -recheck procalcitonin  -supportive care     2  E  Coli bacteremia-unclear primary source as the sputum culture does not match what is in the blood   Probably secondary to translocation across the bowel wall in the setting of severe neutropenia   No definitive primary intra-abdominal process found on CT abdomen pelvis    Possibly secondary to a catheter related bacteremia of this less likely   no resistant organisms isolated    Repeat blood cultures are negative thus far  -antibiotics as above  -follow up repeat blood cultures  -no additional ID workup for now  -will need repeat blood cultures to be done 1 week after the antibiotics with the Port-A-Cath in place     3  Pancytopenia-with severe neutropenia in the setting of cancer chemotherapy   Suspect secondary to the patient's chemotherapy   The acute infectious process may also be playing a role   Neutropenia is much improved and the patient now has leukocytosis  Suspect the leukocytosis is secondary to the G-CSF but will need to be watched  -no additional G-CSF  -recheck CBC with diff  -hematology oncology follow-up     4  Pneumonia-possibly aspiration   More dense consolidation on the left   His respiratory status is relatively stable remains a bit tenuous requiring oxygen support but is much improved  -antibiotics as above  -monitor respiratory status  -pulmonary toilet  -close Pulmonary critical care follow-up     5  Gastroesophageal cancer-on modified FOLFOX   Hematology oncology follow-up    Will see the patient again 2020 if not discharged  Please call if questions    Antibiotics:  Cefazolin 3  Antibiotics 6    Subjective:  Patient has no fever, chills, sweats; no nausea, vomiting, diarrhea; he has now started to eat; no increased cough, or shortness of breath; no pain  He has been transferred out of the intensive care unit as remained hemodynamically stable  Objective:  Vitals:  Temp:  [96 5 °F (35 8 °C)-98 4 °F (36 9 °C)] 97 4 °F (36 3 °C)  HR:  [65-86] 73  Resp:  [16-24] 16  BP: (112-131)/(66-78) 120/76  SpO2:  [92 %-98 %] 98 %  Temp (24hrs), Av 4 °F (36 3 °C), Min:96 5 °F (35 8 °C), Max:98 4 °F (36 9 °C)  Current: Temperature: (!) 97 4 °F (36 3 °C)    Physical Exam:   General Appearance:  Alert, interactive, nontoxic, no acute distress  Throat: Oropharynx moist without lesions  Lungs:   Decreased breath sounds bilaterally; no wheezes, rhonchi or rales; respirations unlabored   Heart:  RRR; no murmur, rub or gallop   Abdomen:   Soft, non-tender, non-distended, positive bowel sounds  Extremities: No clubbing, cyanosis or edema   Skin: No new rashes or lesions  No draining wounds noted         Labs, Imaging, & Other studies:   All pertinent labs and imaging studies were personally reviewed  Results from last 7 days   Lab Units 20  0601 20  0640 20  3752 WBC Thousand/uL 26 35* 21 91* 10 82*   HEMOGLOBIN g/dL 8 5* 7 9* 8 0*   PLATELETS Thousands/uL 260 227 179     Results from last 7 days   Lab Units 09/04/20  0601 09/03/20  0641 09/02/20  1820 09/02/20  0539  09/01/20  0537   SODIUM mmol/L 140 141 140 136  --  136   POTASSIUM mmol/L 4 4 3 7 2 9* 4 0   < > 2 8*   CHLORIDE mmol/L 107 108 106 104  --  104   CO2 mmol/L 23 23 24 19*  --  22   BUN mg/dL 20 18 16 14  --  16   CREATININE mg/dL 0 93 0 95 0 93 0 81  --  0 89   EGFR ml/min/1 73sq m 85 82 85 92  --  88   CALCIUM mg/dL 8 0* 8 1* 8 4 8 6  --  8 2*   AST U/L  --  25  --  56*  --  57*   ALT U/L  --  30  --  36  --  34   ALK PHOS U/L  --  67  --  53  --  47    < > = values in this interval not displayed  Results from last 7 days   Lab Units 09/01/20  0617 09/01/20  0537 08/31/20  0939 08/30/20  1317 08/30/20  1316 08/30/20  0926 08/30/20  0916   BLOOD CULTURE  No Growth at 72 hrs   No Growth at 72 hrs   --   --   --  Escherichia coli* Escherichia coli*   SPUTUM CULTURE   --   --  1+ Growth of Klebsiella oxytoca*  1+ Growth of Candida albicans*  1+ Growth of   --   --   --   --    GRAM STAIN RESULT   --   --  2+ Epithelial cells per low power field*  No polys seen*  1+ Gram negative rods*  Rare Gram positive cocci in pairs*  --   --  Gram negative rods* Gram negative rods*   URINE CULTURE   --   --   --  No Growth <1000 cfu/mL  --   --   --    MRSA CULTURE ONLY   --   --   --   --  No Methicillin Resistant Staphlyococcus aureus (MRSA) isolated  --   --      Results from last 7 days   Lab Units 09/04/20  0601 09/03/20  0640 09/02/20  0539 09/01/20  0537 08/31/20  0545   PROCALCITONIN ng/ml 19 14* 35 18* 49 96* 86 37* 150 29*         Results from last 7 days   Lab Units 09/01/20  0537   FERRITIN ng/mL 175        bladder ultrasound-minimal postvoid residual   Focal lobulation of the prostate    Images personally reviewed by me in PACS

## 2020-09-04 NOTE — ASSESSMENT & PLAN NOTE
Patient has enuresis for 3 days    · Consult to urology  · Started on flomax 0 4 mg QD  · Consider adult diaper at night  · Avoid catheter given recent sepsis and immunocompromised, concern for infection

## 2020-09-04 NOTE — PLAN OF CARE
Problem: RESPIRATORY - ADULT  Goal: Achieves optimal ventilation and oxygenation  Description: INTERVENTIONS:  - Assess for changes in respiratory status  - Assess for changes in mentation and behavior  - Position to facilitate oxygenation and minimize respiratory effort  - Oxygen administered by appropriate delivery if ordered  - Initiate smoking cessation education as indicated  - Encourage broncho-pulmonary hygiene including cough, deep breathe, Incentive Spirometry  - Assess the need for suctioning and aspirate as needed  - Assess and instruct to report SOB or any respiratory difficulty  - Respiratory Therapy support as indicated  Outcome: Progressing     Problem: PAIN - ADULT  Goal: Verbalizes/displays adequate comfort level or baseline comfort level  Description: Interventions:  - Encourage patient to monitor pain and request assistance  - Assess pain using appropriate pain scale  - Administer analgesics based on type and severity of pain and evaluate response  - Implement non-pharmacological measures as appropriate and evaluate response  - Consider cultural and social influences on pain and pain management  - Notify physician/advanced practitioner if interventions unsuccessful or patient reports new pain  Outcome: Progressing     Problem: INFECTION - ADULT  Goal: Absence or prevention of progression during hospitalization  Description: INTERVENTIONS:  - Assess and monitor for signs and symptoms of infection  - Monitor lab/diagnostic results  - Monitor all insertion sites, i e  indwelling lines, tubes, and drains  - Monitor endotracheal if appropriate and nasal secretions for changes in amount and color  - [place] appropriate cooling/warming therapies per order  - Administer medications as ordered  - Instruct and encourage patient and family to use good hand hygiene technique  - Identify and instruct in appropriate isolation precautions for identified infection/condition  Outcome: Progressing  Goal: Absence of fever/infection during neutropenic period  Description: INTERVENTIONS:  - Monitor WBC    Outcome: Progressing     Problem: SAFETY ADULT  Goal: Patient will remain free of falls  Description: INTERVENTIONS:  - Assess patient frequently for physical needs  -  Identify cognitive and physical deficits and behaviors that affect risk of falls    -  [place] fall precautions as indicated by assessment   - Educate patient/family on patient safety including physical limitations  - Instruct patient to call for assistance with activity based on assessment  - Modify environment to reduce risk of injury  - Consider OT/PT consult to assist with strengthening/mobility  Outcome: Progressing  Goal: Maintain or return to baseline ADL function  Description: INTERVENTIONS:  -  Assess patient's ability to carry out ADLs; assess patient's baseline for ADL function and identify physical deficits which impact ability to perform ADLs (bathing, care of mouth/teeth, toileting, grooming, dressing, etc )  - Assess/evaluate cause of self-care deficits   - Assess range of motion  - Assess patient's mobility; develop plan if impaired  - Assess patient's need for assistive devices and provide as appropriate  - Encourage maximum independence but intervene and supervise when necessary  - Involve family in performance of ADLs  - Assess for home care needs following discharge   - Consider OT consult to assist with ADL evaluation and planning for discharge  - Provide patient education as appropriate  Outcome: Progressing  Goal: Maintain or return mobility status to optimal level  Description: INTERVENTIONS:  - Assess patient's baseline mobility status (ambulation, transfers, stairs, etc )    - Identify cognitive and physical deficits and behaviors that affect mobility  - Identify mobility aids required to assist with transfers and/or ambulation (gait belt, sit-to-stand, lift, walker, cane, etc )  - [place] fall precautions as indicated by assessment  - Record patient progress and toleration of activity level on Mobility SBAR; progress patient to next Phase/Stage  - Instruct patient to call for assistance with activity based on assessment  - Consider rehabilitation consult to assist with strengthening/weightbearing, etc   Outcome: Progressing     Problem: DISCHARGE PLANNING  Goal: Discharge to home or other facility with appropriate resources  Description: INTERVENTIONS:  - Identify barriers to discharge w/patient and caregiver  - Arrange for needed discharge resources and transportation as appropriate  - Identify discharge learning needs (meds, wound care, etc )  - Arrange for interpretive services to assist at discharge as needed  - Refer to Case Management Department for coordinating discharge planning if the patient needs post-hospital services based on physician/advanced practitioner order or complex needs related to functional status, cognitive ability, or social support system  Outcome: Progressing     Problem: Knowledge Deficit  Goal: Patient/family/caregiver demonstrates understanding of disease process, treatment plan, medications, and discharge instructions  Description: Complete learning assessment and assess knowledge base    Interventions:  - Provide teaching at level of understanding  - Provide teaching via preferred learning methods  Outcome: Progressing     Problem: CARDIOVASCULAR - ADULT  Goal: Maintains optimal cardiac output and hemodynamic stability  Description: INTERVENTIONS:  - Monitor I/O, vital signs and rhythm  - Monitor for S/S and trends of decreased cardiac output  - Administer and titrate ordered vasoactive medications to optimize hemodynamic stability  - Assess quality of pulses, skin color and temperature  - Assess for signs of decreased coronary artery perfusion  - Instruct patient to report change in severity of symptoms  Outcome: Progressing  Goal: Absence of cardiac dysrhythmias or at baseline rhythm  Description: INTERVENTIONS:  - Continuous cardiac monitoring, vital signs, obtain 12 lead EKG if ordered  - Administer antiarrhythmic and heart rate control medications as ordered  - Monitor electrolytes and administer replacement therapy as ordered  Outcome: Progressing     Problem: GENITOURINARY - ADULT  Goal: Maintains or returns to baseline urinary function  Description: INTERVENTIONS:  - Assess urinary function  - Encourage oral fluids to ensure adequate hydration if ordered  - Administer IV fluids as ordered to ensure adequate hydration  - Administer ordered medications as needed  - Offer frequent toileting  - Follow urinary retention protocol if ordered  Outcome: Progressing  Goal: Urinary catheter remains patent  Description: INTERVENTIONS:  - Assess patency of urinary catheter  - If patient has a chronic lopez, consider changing catheter if non-functioning  - Follow guidelines for intermittent irrigation of non-functioning urinary catheter  Outcome: Progressing     Problem: METABOLIC, FLUID AND ELECTROLYTES - ADULT  Goal: Electrolytes maintained within normal limits  Description: INTERVENTIONS:  - Monitor labs and assess patient for signs and symptoms of electrolyte imbalances  - Administer electrolyte replacement as ordered  - Monitor response to electrolyte replacements, including repeat lab results as appropriate  - Instruct patient on fluid and nutrition as appropriate  Outcome: Progressing  Goal: Fluid balance maintained  Description: INTERVENTIONS:  - Monitor labs   - Monitor I/O and WT  - Instruct patient on fluid and nutrition as appropriate  - Assess for signs & symptoms of volume excess or deficit  Outcome: Progressing  Goal: Glucose maintained within target range  Description: INTERVENTIONS:  - Monitor Blood Glucose as ordered  - Assess for signs and symptoms of hyperglycemia and hypoglycemia  - Administer ordered medications to maintain glucose within target range  - Assess nutritional intake and initiate nutrition service referral as needed  Outcome: Progressing     Problem: Prexisting or High Potential for Compromised Skin Integrity  Goal: Skin integrity is maintained or improved  Description: INTERVENTIONS:  - Identify patients at risk for skin breakdown  - Assess and monitor skin integrity  - Assess and monitor nutrition and hydration status  - Monitor labs   - Assess for incontinence   - Turn and reposition patient  - Assist with mobility/ambulation  - Relieve pressure over bony prominences  - Avoid friction and shearing  - Provide appropriate hygiene as needed including keeping skin clean and dry  - Evaluate need for skin moisturizer/barrier cream  - Collaborate with interdisciplinary team   - Patient/family teaching  - Consider wound care consult   Outcome: Progressing     Problem: Potential for Falls  Goal: Patient will remain free of falls  Description: INTERVENTIONS:  - Assess patient frequently for physical needs  -  Identify cognitive and physical deficits and behaviors that affect risk of falls  -  [place] fall precautions as indicated by assessment   - Educate patient/family on patient safety including physical limitations  - Instruct patient to call for assistance with activity based on assessment  - Modify environment to reduce risk of injury  - Consider OT/PT consult to assist with strengthening/mobility  Outcome: Progressing     Problem: Nutrition/Hydration-ADULT  Goal: Nutrient/Hydration intake appropriate for improving, restoring or maintaining nutritional needs  Description: Monitor and assess patient's nutrition/hydration status for malnutrition  Collaborate with interdisciplinary team and initiate plan and interventions as ordered  Monitor patient's weight and dietary intake as ordered or per policy  Utilize nutrition screening tool and intervene as necessary  Determine patient's food preferences and provide high-protein, high-caloric foods as appropriate       INTERVENTIONS:  - Monitor oral intake, urinary output, labs, and treatment plans  - Assess nutrition and hydration status and recommend course of action  - Evaluate amount of meals eaten  - Assist patient with eating if necessary   - Allow adequate time for meals  - Recommend/ encourage appropriate diets, oral nutritional supplements, and vitamin/mineral supplements  - Order, calculate, and assess calorie counts as needed  - Recommend, monitor, and adjust tube feedings and TPN/PPN based on assessed needs  - Assess need for intravenous fluids  - Provide specific nutrition/hydration education as appropriate  - Include patient/family/caregiver in decisions related to nutrition  Outcome: Progressing

## 2020-09-04 NOTE — SOCIAL WORK
GIORGIO left a message for patient spouse to discuss discharge plans and also follow up on STR recommendation and choice list  SW will follow up with both patient and wife

## 2020-09-04 NOTE — PROGRESS NOTES
[facility] Progress Note - Felix Ao 77 y o  male MRN: [id number]    Unit/Bed#: 2 [place] [address] Encounter: [id number]      Assessment/Plan:  PNA (pneumonia)  Assessment & Plan  On 8/30, CT with concern for left sided multifocal PNA  Concern for aspiration in the setting of dysphagia  · CT shows concern for Left sided multifocal pna, likely source of sepsis   · Trend procal, fever and WBC  · Currently requiring 2L NC, keep sats >90%  · BC 2/2 E  Coli  ? Repeat BC on 9/1: neg @ 48 hrs  · Sputum +Klebsiella oxystoca  · Aspiration precautions   · Cefazolin 2 g IV q 8 hours (Day #3)  ? oral antibiotics tomorrow if the follow-up blood cultures remain negative per ID  · Diet  ? Change diet to Dys 2 with honey thick liquids via cup, no straws, medications in puree  [facility])  Assessment & Plan  New onset Afib suspected to be 2/2 to PNA  Cardioconverted overnight on 9/2 with amiodarone  Currently on amiodarone po  No palpitations or CP today    · Continue to monitor on telemetry  · Continue p o  amiodarone with bolus titration per cardiology recommendations  Patient was counseled regarding the side effects of amiodarone  · 400 BID x 7 days (day 2/7)  · 400 QD x 1m  · 200 QD after  · Continue Eliquis, 5mg BID  · Lopressor 25mg Q12  · Appreciate cardiology recommendations       Urge urinary incontinence  Assessment & Plan  Patient has enuresis for 3 days  · Consult to urology  · Consider adult diaper at night  · Avoid catheter given recent sepsis and immunocompromised, concern for infection    Dilated cardiomyopathy ([facility] )  Assessment & Plan  ECHO this admission showed:  1  LV: Ejection fraction was estimated in the range of 35 % to 40 % to be 37 %  Moderate diffuse hypokinesis  Pseudonormal left ventricular filling pattern, with concomitant abnormal relaxation and increased filling pressure (grade 2 diastolic dysfunction)    2  RV: Low normal systolic function with Tapse 1 6    Etiology unknown at this time  Patient will need follow-up when stable or as outpatient  Cardiology consulted  Appreciate recommendations  Will continue Lopressor 25 mg po q12h sched    Moderate protein-calorie malnutrition (HCC)  Assessment & Plan  Malnutrition Findings:   Malnutrition type: Chronic illness(Moderate malnutrition of chronic illness related to inadequate energy intake as evidenced by somewhat hollow orbits with somewhat dark circles, somewhat depression at temples  Awaiting treatment plan)  Degree of Malnutrition: Malnutrition of moderate degree    BMI Findings: Body mass index is 24 58 kg/m²  Ne[facility]ropenic ([facility] )  Assessment & Plan  · Neutropenic precautions  · Completed Granix 5mcg/kg x 3 days  · Heme/onc on consult  · Neutropenia improving: ANC 17 on 9/3  · Will continue to monitor daily CBC with diff    Falls  Assessment & Plan  · Fall precuations   · PT/OT ordered, recommendations appreciated    Moderate malnutrition ([facility] )  Assessment & Plan  Moderate malnutrition the setting of chronic disease  Nutrition consulted  Appreciate recommendations  Chemotherapy-induced nausea  Assessment & Plan  On Zofran 4 mg PRN    Gastroesophageal cancer ([facility] )  Assessment & Plan  · Follows with [facility] Heme/Onc  · Pt does have mets to peritoneum but has been fairly responsive to chemotherapy  ? Last treatment 8/20 with 5 FU  · Follows with palliative, after readdressing status with wife plan to remain full code  * Sepsis [facility])  Assessment & Plan  Ongoing chemotherapy for GE CA, noted to be febrile on morning of admission by wife s/p fall in kitchen  Lactic 9 9 on admission, received 3L IVF bolus in ED, remained invasive pressor support for 3 days,  started broad spectrum abx  CT CAP: Suspicious for Left sided multi-lobar pna, no PE noted, masses remain unchanged compared to prior films  CT Head/Neck: no acute intracranial changes  Patient initially admitted to ICU due to requirement for vasopressors  Discontinued on 8/31, however levophed restarted on 9/2 due to hypotension, then weaned off during the night       · received stress dose steroids on admission  Transitioned to home Decadron on 9/1  Given additional 50mg of Solucortef morning of 9/2 due to hypotension  · Strict I/O's with lopez  · Lactic trending down, most recent 2 5 on 9/1  · Procal also trending down: 150-> 86 ->49->35 18 (9/3)   · BC 2/2 E  Coli, Sputum cx +Klebsiella oxytoca  ? Repeat BC on 9/1: NG @ 48hrs  · Abx  ? S/p 3 days cefepime (8/30-9/1)  ? S/p Tobramycin x1 on 8/31  ? S/p 2 days vanc (8/30-8/31)  ? Meropenem 1G q8hrs (9/1-9/2)  ? Cefazolin 2 g IV q 8 hours (9/2-)  -can likely transition to oral antibiotics the next 24-48 hours if the follow-up blood cultures remain negative per ID rec          Subjective:   Patient seen and examined at bedside  Patient is eating and in no acute distress  Patient says that he is feeling better compared to previous days  Patient states that his breathing has greatly improved, although he does still have some difficulty  Patient distally complains of nocturnal enuresis for the past 3 days, secondary to urgency  Patient denies any headache, dizziness, chest pain, nausea, vomiting, constipation, or diarrhea  Objective:     Vitals: Blood pressure 122/78, pulse 78, temperature 97 6 °F (36 4 °C), resp  rate 18, height 6' (1 829 m), weight 81 8 kg (180 lb 5 4 oz), SpO2 94 %  ,Body mass index is 24 46 kg/m²    Wt Readings from Last 3 Encounters:   09/04/20 81 8 kg (180 lb 5 4 oz)   08/19/20 81 6 kg (179 lb 14 3 oz)   08/18/20 82 3 kg (181 lb 8 oz)       Intake/Output Summary (Last 24 hours) at 9/4/2020 0842  Last data filed at 9/4/2020 0601  Gross per 24 hour   Intake 833 24 ml   Output 1425 ml   Net -591 76 ml       Physical Exam:   General: Pt is AAO x 3, not in any acute distress  Cardio: RRR, S1 and S2 +, no murmurs/rubs/gallops/clicks  Resp: CTA b/l, no wheezes/rales/rhonchi  Abdomen: soft, BS+, TTP  Extremities: no cyanosis or edema  PP 2+ b/l          Recent Results (from the past 24 hour(s))   Fingerstick Glucose (POCT)    Collection Time: 09/03/20 11:26 AM   Result Value Ref Range    POC Glucose 155 (H) 65 - 140 mg/dl   Fingerstick Glucose (POCT)    Collection Time: 09/03/20  3:57 PM   Result Value Ref Range    POC Glucose 112 65 - 140 mg/dl   Fingerstick Glucose (POCT)    Collection Time: 09/03/20  8:52 PM   Result Value Ref Range    POC Glucose 156 (H) 65 - 140 mg/dl   Magnesium    Collection Time: 09/04/20  6:01 AM   Result Value Ref Range    Magnesium 2 1 1 6 - 2 6 mg/dL   Phosphorus    Collection Time: 09/04/20  6:01 AM   Result Value Ref Range    Phosphorus 3 4 2 3 - 4 1 mg/dL   Basic metabolic panel    Collection Time: 09/04/20  6:01 AM   Result Value Ref Range    Sodium 140 136 - 145 mmol/L    Potassium 4 4 3 5 - 5 3 mmol/L    Chloride 107 100 - 108 mmol/L    CO2 23 21 - 32 mmol/L    ANION GAP 10 4 - 13 mmol/L    BUN 20 5 - 25 mg/dL    Creatinine 0 93 0 60 - 1 30 mg/dL    Glucose 138 65 - 140 mg/dL    Calcium 8 0 (L) 8 3 - 10 1 mg/dL    eGFR 85 ml/min/1 73sq m   CBC and differential    Collection Time: 09/04/20  6:01 AM   Result Value Ref Range    WBC 26 35 (H) 4 31 - 10 16 Thousand/uL    RBC 2 69 (L) 3 88 - 5 62 Million/uL    Hemoglobin 8 5 (L) 12 0 - 17 0 g/dL    Hematocrit 27 3 (L) 36 5 - 49 3 %     (H) 82 - 98 fL    MCH 31 6 26 8 - 34 3 pg    MCHC 31 1 (L) 31 4 - 37 4 g/dL    RDW 17 5 (H) 11 6 - 15 1 %    MPV 11 7 8 9 - 12 7 fL    Platelets 094 573 - 679 Thousands/uL    nRBC 1 /100 WBCs   Fingerstick Glucose (POCT)    Collection Time: 09/04/20  7:27 AM   Result Value Ref Range    POC Glucose 127 65 - 140 mg/dl       Current Facility-Administered Medications   Medication Dose Route Frequency Provider Last Rate Last Dose    acetaminophen (TYLENOL) tablet 650 mg  650 mg Oral Q6H PRN Telma Trivedi DO   650 mg at 08/31/20 2013    amiodarone tablet 400 mg  400 mg Oral BID With Meals [facility], DO   400 mg at 09/04/20 1140    Followed by   Juan Lo ON 9/11/2020] amiodarone tablet 400 mg  400 mg Oral Daily With Breakfast Telma Trivedi DO        Followed by   Juan Lo ON 10/11/2020] amiodarone tablet 200 mg  200 mg Oral Daily With Breakfast Telma Trivedi, DO        apixaban (ELIQUIS) tablet 5 mg  5 mg Oral BID Telmadhruv Trivedi, DO   5 mg at 09/04/20 0837    ceFAZolin (ANCEF) IVPB (premix) 2,000 mg 50 mL  2,000 mg Intravenous Q8H Telmadhruv Rodaso,  mL/hr at 09/04/20 0837 2,000 mg at 09/04/20 0837    dexamethasone (DECADRON) tablet 4 mg  4 mg Oral After [facility], DO   4 mg at 09/03/20 1446    diazepam (VALIUM) tablet 5 mg  5 mg Oral Daily PRN [facility], DO   5 mg at 09/03/20 2330    insulin lispro (HumaLOG) 100 units/mL subcutaneous injection 1-6 Units  1-6 Units Subcutaneous 4x Daily (AC & HS) Dimple Velásquez MD        ipratropium (ATROVENT) 0 02 % inhalation solution 0 5 mg  0 5 mg Nebulization TID [facility], DO   0 5 mg at 09/04/20 0748    levalbuterol (XOPENEX) inhalation solution 1 25 mg  1 25 mg Nebulization TID [facility], DO   1 25 mg at 09/04/20 0748    metoprolol tartrate (LOPRESSOR) tablet 25 mg  25 mg Oral Q12H [address] [facility]   25 mg at 09/04/20 0837    OLANZapine (ZyPREXA) tablet 5 mg  5 mg Oral HS Telma Shikha, DO   5 mg at 09/03/20 2322    ondansetron (ZOFRAN) injection 4 mg  4 mg Intravenous Q8H PRN Telmadhruv Rodaso, DO   4 mg at 08/30/20 1523    pantoprazole (PROTONIX) EC tablet 40 mg  40 mg Oral Daily Before Breakfast Telmadhruv Trivedi, DO   40 mg at 09/04/20 0552    pneumococcal 13-valent conjugate vaccine (PREVNAR-13) IM injection 0 5 mL  0 5 mL Intramuscular Prior to discharge [facility] OF DO ALEXYS        venlafaxine (EFFEXOR-XR) 24 hr capsule 150 mg  150 mg Oral Daily Telma Trivedi DO   150 mg at 09/04/20 3766       Invasive Devices     Central Venous Catheter Line            Port A Cath 07/31/19 Right Chest 401 days    Port A Cath 09/02/20 Right Chest 2 days          Peripheral Intravenous Line            Peripheral IV 09/01/20 Right Wrist 2 days                Lab, Imaging and other studies: I have personally reviewed pertinent reports      VTE Pharmacologic Prophylaxis: Eliquis  VTE Mechanical Prophylaxis: sequential compression device    Jame Leiva MD

## 2020-09-05 LAB
ANION GAP SERPL CALCULATED.3IONS-SCNC: 7 MMOL/L (ref 4–13)
BUN SERPL-MCNC: 21 MG/DL (ref 5–25)
CALCIUM SERPL-MCNC: 8.4 MG/DL (ref 8.3–10.1)
CHLORIDE SERPL-SCNC: 105 MMOL/L (ref 100–108)
CO2 SERPL-SCNC: 26 MMOL/L (ref 21–32)
CREAT SERPL-MCNC: 0.76 MG/DL (ref 0.6–1.3)
ERYTHROCYTE [DISTWIDTH] IN BLOOD BY AUTOMATED COUNT: 18 % (ref 11.6–15.1)
GFR SERPL CREATININE-BSD FRML MDRD: 94 ML/MIN/1.73SQ M
GLUCOSE SERPL-MCNC: 132 MG/DL (ref 65–140)
GLUCOSE SERPL-MCNC: 132 MG/DL (ref 65–140)
GLUCOSE SERPL-MCNC: 133 MG/DL (ref 65–140)
GLUCOSE SERPL-MCNC: 145 MG/DL (ref 65–140)
GLUCOSE SERPL-MCNC: 160 MG/DL (ref 65–140)
HCT VFR BLD AUTO: 27.8 % (ref 36.5–49.3)
HGB BLD-MCNC: 8.4 G/DL (ref 12–17)
MAGNESIUM SERPL-MCNC: 2 MG/DL (ref 1.6–2.6)
MCH RBC QN AUTO: 30.8 PG (ref 26.8–34.3)
MCHC RBC AUTO-ENTMCNC: 30.2 G/DL (ref 31.4–37.4)
MCV RBC AUTO: 102 FL (ref 82–98)
NRBC BLD AUTO-RTO: 1 /100 WBCS
PHOSPHATE SERPL-MCNC: 3.2 MG/DL (ref 2.3–4.1)
PLATELET # BLD AUTO: 288 THOUSANDS/UL (ref 149–390)
PMV BLD AUTO: 11.9 FL (ref 8.9–12.7)
POTASSIUM SERPL-SCNC: 4.3 MMOL/L (ref 3.5–5.3)
PROCALCITONIN SERPL-MCNC: 8.07 NG/ML
RBC # BLD AUTO: 2.73 MILLION/UL (ref 3.88–5.62)
SODIUM SERPL-SCNC: 138 MMOL/L (ref 136–145)
WBC # BLD AUTO: 22.57 THOUSAND/UL (ref 4.31–10.16)

## 2020-09-05 PROCEDURE — 84100 ASSAY OF PHOSPHORUS: CPT | Performed by: STUDENT IN AN ORGANIZED HEALTH CARE EDUCATION/TRAINING PROGRAM

## 2020-09-05 PROCEDURE — 94762 N-INVAS EAR/PLS OXIMTRY CONT: CPT

## 2020-09-05 PROCEDURE — 94640 AIRWAY INHALATION TREATMENT: CPT

## 2020-09-05 PROCEDURE — 85027 COMPLETE CBC AUTOMATED: CPT | Performed by: STUDENT IN AN ORGANIZED HEALTH CARE EDUCATION/TRAINING PROGRAM

## 2020-09-05 PROCEDURE — 99232 SBSQ HOSP IP/OBS MODERATE 35: CPT | Performed by: INTERNAL MEDICINE

## 2020-09-05 PROCEDURE — 80048 BASIC METABOLIC PNL TOTAL CA: CPT | Performed by: STUDENT IN AN ORGANIZED HEALTH CARE EDUCATION/TRAINING PROGRAM

## 2020-09-05 PROCEDURE — 94760 N-INVAS EAR/PLS OXIMETRY 1: CPT

## 2020-09-05 PROCEDURE — 84145 PROCALCITONIN (PCT): CPT | Performed by: STUDENT IN AN ORGANIZED HEALTH CARE EDUCATION/TRAINING PROGRAM

## 2020-09-05 PROCEDURE — 82948 REAGENT STRIP/BLOOD GLUCOSE: CPT

## 2020-09-05 PROCEDURE — 99232 SBSQ HOSP IP/OBS MODERATE 35: CPT | Performed by: FAMILY MEDICINE

## 2020-09-05 PROCEDURE — 83735 ASSAY OF MAGNESIUM: CPT | Performed by: STUDENT IN AN ORGANIZED HEALTH CARE EDUCATION/TRAINING PROGRAM

## 2020-09-05 RX ORDER — TAMSULOSIN HYDROCHLORIDE 0.4 MG/1
0.4 CAPSULE ORAL
Status: DISCONTINUED | OUTPATIENT
Start: 2020-09-05 | End: 2020-09-08 | Stop reason: HOSPADM

## 2020-09-05 RX ADMIN — LEVALBUTEROL HYDROCHLORIDE 1.25 MG: 1.25 SOLUTION, CONCENTRATE RESPIRATORY (INHALATION) at 07:39

## 2020-09-05 RX ADMIN — APIXABAN 5 MG: 5 TABLET, FILM COATED ORAL at 17:30

## 2020-09-05 RX ADMIN — METOPROLOL TARTRATE 25 MG: 25 TABLET, FILM COATED ORAL at 22:03

## 2020-09-05 RX ADMIN — IPRATROPIUM BROMIDE 0.5 MG: 0.5 SOLUTION RESPIRATORY (INHALATION) at 14:07

## 2020-09-05 RX ADMIN — TAMSULOSIN HYDROCHLORIDE 0.4 MG: 0.4 CAPSULE ORAL at 17:31

## 2020-09-05 RX ADMIN — AMIODARONE HYDROCHLORIDE 400 MG: 200 TABLET ORAL at 17:30

## 2020-09-05 RX ADMIN — OLANZAPINE 5 MG: 2.5 TABLET, FILM COATED ORAL at 22:03

## 2020-09-05 RX ADMIN — DEXAMETHASONE 4 MG: 4 TABLET ORAL at 14:15

## 2020-09-05 RX ADMIN — CEFAZOLIN SODIUM 2000 MG: 2 SOLUTION INTRAVENOUS at 23:46

## 2020-09-05 RX ADMIN — APIXABAN 5 MG: 5 TABLET, FILM COATED ORAL at 08:29

## 2020-09-05 RX ADMIN — VENLAFAXINE HYDROCHLORIDE 150 MG: 150 CAPSULE, EXTENDED RELEASE ORAL at 08:30

## 2020-09-05 RX ADMIN — CEFAZOLIN SODIUM 2000 MG: 2 SOLUTION INTRAVENOUS at 17:29

## 2020-09-05 RX ADMIN — DIAZEPAM 5 MG: 5 TABLET ORAL at 22:03

## 2020-09-05 RX ADMIN — PANTOPRAZOLE SODIUM 40 MG: 40 TABLET, DELAYED RELEASE ORAL at 06:42

## 2020-09-05 RX ADMIN — CEFAZOLIN SODIUM 2000 MG: 2 SOLUTION INTRAVENOUS at 01:16

## 2020-09-05 RX ADMIN — LOSARTAN POTASSIUM 25 MG: 25 TABLET, FILM COATED ORAL at 08:30

## 2020-09-05 RX ADMIN — IPRATROPIUM BROMIDE 0.5 MG: 0.5 SOLUTION RESPIRATORY (INHALATION) at 07:39

## 2020-09-05 RX ADMIN — LEVALBUTEROL HYDROCHLORIDE 1.25 MG: 1.25 SOLUTION, CONCENTRATE RESPIRATORY (INHALATION) at 14:07

## 2020-09-05 RX ADMIN — LEVALBUTEROL HYDROCHLORIDE 1.25 MG: 1.25 SOLUTION, CONCENTRATE RESPIRATORY (INHALATION) at 19:07

## 2020-09-05 RX ADMIN — CEFAZOLIN SODIUM 2000 MG: 2 SOLUTION INTRAVENOUS at 08:28

## 2020-09-05 RX ADMIN — METOPROLOL TARTRATE 25 MG: 25 TABLET, FILM COATED ORAL at 08:30

## 2020-09-05 RX ADMIN — AMIODARONE HYDROCHLORIDE 400 MG: 200 TABLET ORAL at 08:29

## 2020-09-05 RX ADMIN — IPRATROPIUM BROMIDE 0.5 MG: 0.5 SOLUTION RESPIRATORY (INHALATION) at 19:07

## 2020-09-05 NOTE — PROGRESS NOTES
Progress Note - Cardiology   Hamilton Suri 77 y o  male MRN: [id number]  Unit/Bed#: 2 [place] [address] Encounter: [id number]    Assessment/Plan:  New recognized severe heart failure EF37%- started on heart failure therapy  Challenge with ARB yesterday  Currently loaded with amiodarone therapy  Limited options for afib management given his cardiomyopathy and co-morbidities  currenlty on metoprolol + losartan  Reviewed with family  Hemoglobin stable on eliquis therapy    Atrial fibrillation currently rhythm controlled on amiodarone  Loading on amiodarone  Possible short six month course with evaluation of EF as outpatient  Will target amiodarone 200mg in morning and metoprolol tartate 25mg at night prior to discharge  Elevated [id number] score  Continue eliquis therapy    Gram negative bacteremia- antibiotic therapy    Stage 4 esophageal cancer    Subjective/Objective   Heart rhythm is controlled  No active chest pain    Objective:     Vitals: /74 (BP Location: Left arm)   Pulse 67   Temp (!) 96 1 °F (35 6 °C) (Oral)   Resp 19   Ht 6' (1 829 m)   Wt 81 5 kg (179 lb 10 8 oz)   SpO2 95%   BMI 24 37 kg/m²   Vitals:    09/04/20 0600 09/05/20 0600   Weight: 81 8 kg (180 lb 5 4 oz) 81 5 kg (179 lb 10 8 oz)     Orthostatic Blood Pressures      Most Recent Value   Blood Pressure  141/74 filed at 09/05/2020 9979   Patient Position - Orthostatic VS  Lying filed at 09/05/2020 0758            Intake/Output Summary (Last 24 hours) at 9/5/2020 1344  Last data filed at 9/5/2020 1201  Gross per 24 hour   Intake 290 ml   Output 2175 ml   Net -1885 ml       Invasive Devices     Central Venous Catheter Line            Port A Cath Right Chest -- days    Port A Cath 07/31/19 Right Chest 402 days          Peripheral Intravenous Line            Peripheral IV 09/01/20 Right Wrist 3 days                Review of Systems: reviewed3    Physical Exam   Constitutional: He is oriented to person, place, and time  He appears ill   No distress  HENT:   Head: Normocephalic and atraumatic  Right Ear: External ear normal    Left Ear: External ear normal    Nose: Nose normal    Mouth/Throat: No oropharyngeal exudate  Eyes: Pupils are equal, round, and reactive to light  Conjunctivae are normal  Right eye exhibits no discharge  Left eye exhibits no discharge  No scleral icterus  Neck: Normal range of motion  No JVD present  No tracheal deviation present  No thyromegaly present  Cardiovascular: Normal rate, regular rhythm and intact distal pulses  Exam reveals gallop  Exam reveals no friction rub  No murmur heard  Pulmonary/Chest: No stridor  No respiratory distress  He has no wheezes  He has no rales  He exhibits no tenderness  Abdominal: Soft  Bowel sounds are normal  He exhibits distension  He exhibits no mass  There is no abdominal tenderness  There is no rebound and no guarding  Genitourinary:    Genitourinary Comments: No CVA tenderness     Musculoskeletal:         General: Deformity present  No tenderness or edema  Neurological: He is alert and oriented to person, place, and time  He displays normal reflexes  He exhibits normal muscle tone  Skin: Skin is warm and dry  No rash noted  He is not diaphoretic  No erythema  Psychiatric: He has a normal mood and affect   His behavior is normal  Judgment and thought content normal      Lab Results:   CBC with diff:   Results from last 7 days   Lab Units 09/05/20  0627   WBC Thousand/uL 22 57*   RBC Million/uL 2 73*   HEMOGLOBIN g/dL 8 4*   HEMATOCRIT % 27 8*   MCV fL 102*   MCH pg 30 8   MCHC g/dL 30 2*   RDW % 18 0*   MPV fL 11 9   PLATELETS Thousands/uL 288     CMP:   Results from last 7 days   Lab Units 09/05/20  0627  09/03/20  0641   SODIUM mmol/L 138   < > 141   POTASSIUM mmol/L 4 3   < > 3 7   CHLORIDE mmol/L 105   < > 108   CO2 mmol/L 26   < > 23   BUN mg/dL 21   < > 18   CREATININE mg/dL 0 76   < > 0 95   CALCIUM mg/dL 8 4   < > 8 1*   AST U/L  --   --  25   ALT U/L  -- --  30   ALK PHOS U/L  --   --  67   EGFR ml/min/1 73sq m 94   < > 82    < > = values in this interval not displayed  Troponin:   0   Lab Value Date/Time    TROPONINI 1 54 (H) 08/30/2020 0916     BNP:   Results from last 7 days   Lab Units 09/05/20  0627   POTASSIUM mmol/L 4 3   CHLORIDE mmol/L 105   CO2 mmol/L 26   BUN mg/dL 21   CREATININE mg/dL 0 76   CALCIUM mg/dL 8 4   EGFR ml/min/1 73sq m 94     Coags:     TSH:     Magnesium:   Results from last 7 days   Lab Units 09/05/20  0627   MAGNESIUM mg/dL 2 0     Lipid Profile:     Imaging: I have personally reviewed pertinent reports  EKG: reviewed  VTE Pharmacologic Prophylaxis: Sequential compression device (Venodyne)   VTE Mechanical Prophylaxis: sequential compression device    Counseling / Coordination of Care  Total time spent today 40 minutes  Greater than 50% of total time was spent with the patient and / or family counseling and / or coordination of care   A description of the counseling / coordination of care: heart failure

## 2020-09-05 NOTE — PLAN OF CARE
Problem: RESPIRATORY - ADULT  Goal: Achieves optimal ventilation and oxygenation  Description: INTERVENTIONS:  - Assess for changes in respiratory status  - Assess for changes in mentation and behavior  - Position to facilitate oxygenation and minimize respiratory effort  - Oxygen administered by appropriate delivery if ordered  - Initiate smoking cessation education as indicated  - Encourage broncho-pulmonary hygiene including cough, deep breathe, Incentive Spirometry  - Assess the need for suctioning and aspirate as needed  - Assess and instruct to report SOB or any respiratory difficulty  - Respiratory Therapy support as indicated  Outcome: Progressing     Problem: PAIN - ADULT  Goal: Verbalizes/displays adequate comfort level or baseline comfort level  Description: Interventions:  - Encourage patient to monitor pain and request assistance  - Assess pain using appropriate pain scale  - Administer analgesics based on type and severity of pain and evaluate response  - Implement non-pharmacological measures as appropriate and evaluate response  - Consider cultural and social influences on pain and pain management  - Notify physician/advanced practitioner if interventions unsuccessful or patient reports new pain  Outcome: Progressing     Problem: INFECTION - ADULT  Goal: Absence or prevention of progression during hospitalization  Description: INTERVENTIONS:  - Assess and monitor for signs and symptoms of infection  - Monitor lab/diagnostic results  - Monitor all insertion sites, i e  indwelling lines, tubes, and drains  - Monitor endotracheal if appropriate and nasal secretions for changes in amount and color  - [place] appropriate cooling/warming therapies per order  - Administer medications as ordered  - Instruct and encourage patient and family to use good hand hygiene technique  - Identify and instruct in appropriate isolation precautions for identified infection/condition  Outcome: Progressing  Goal: Absence of fever/infection during neutropenic period  Description: INTERVENTIONS:  - Monitor WBC    Outcome: Progressing     Problem: SAFETY ADULT  Goal: Patient will remain free of falls  Description: INTERVENTIONS:  - Assess patient frequently for physical needs  -  Identify cognitive and physical deficits and behaviors that affect risk of falls    -  [place] fall precautions as indicated by assessment   - Educate patient/family on patient safety including physical limitations  - Instruct patient to call for assistance with activity based on assessment  - Modify environment to reduce risk of injury  - Consider OT/PT consult to assist with strengthening/mobility  Outcome: Progressing  Goal: Maintain or return to baseline ADL function  Description: INTERVENTIONS:  -  Assess patient's ability to carry out ADLs; assess patient's baseline for ADL function and identify physical deficits which impact ability to perform ADLs (bathing, care of mouth/teeth, toileting, grooming, dressing, etc )  - Assess/evaluate cause of self-care deficits   - Assess range of motion  - Assess patient's mobility; develop plan if impaired  - Assess patient's need for assistive devices and provide as appropriate  - Encourage maximum independence but intervene and supervise when necessary  - Involve family in performance of ADLs  - Assess for home care needs following discharge   - Consider OT consult to assist with ADL evaluation and planning for discharge  - Provide patient education as appropriate  Outcome: Progressing  Goal: Maintain or return mobility status to optimal level  Description: INTERVENTIONS:  - Assess patient's baseline mobility status (ambulation, transfers, stairs, etc )    - Identify cognitive and physical deficits and behaviors that affect mobility  - Identify mobility aids required to assist with transfers and/or ambulation (gait belt, sit-to-stand, lift, walker, cane, etc )  - [place] fall precautions as indicated by assessment  - Record patient progress and toleration of activity level on Mobility SBAR; progress patient to next Phase/Stage  - Instruct patient to call for assistance with activity based on assessment  - Consider rehabilitation consult to assist with strengthening/weightbearing, etc   Outcome: Progressing     Problem: DISCHARGE PLANNING  Goal: Discharge to home or other facility with appropriate resources  Description: INTERVENTIONS:  - Identify barriers to discharge w/patient and caregiver  - Arrange for needed discharge resources and transportation as appropriate  - Identify discharge learning needs (meds, wound care, etc )  - Arrange for interpretive services to assist at discharge as needed  - Refer to Case Management Department for coordinating discharge planning if the patient needs post-hospital services based on physician/advanced practitioner order or complex needs related to functional status, cognitive ability, or social support system  Outcome: Progressing     Problem: Knowledge Deficit  Goal: Patient/family/caregiver demonstrates understanding of disease process, treatment plan, medications, and discharge instructions  Description: Complete learning assessment and assess knowledge base    Interventions:  - Provide teaching at level of understanding  - Provide teaching via preferred learning methods  Outcome: Progressing     Problem: CARDIOVASCULAR - ADULT  Goal: Maintains optimal cardiac output and hemodynamic stability  Description: INTERVENTIONS:  - Monitor I/O, vital signs and rhythm  - Monitor for S/S and trends of decreased cardiac output  - Administer and titrate ordered vasoactive medications to optimize hemodynamic stability  - Assess quality of pulses, skin color and temperature  - Assess for signs of decreased coronary artery perfusion  - Instruct patient to report change in severity of symptoms  Outcome: Progressing  Goal: Absence of cardiac dysrhythmias or at baseline rhythm  Description: INTERVENTIONS:  - Continuous cardiac monitoring, vital signs, obtain 12 lead EKG if ordered  - Administer antiarrhythmic and heart rate control medications as ordered  - Monitor electrolytes and administer replacement therapy as ordered  Outcome: Progressing     Problem: GENITOURINARY - ADULT  Goal: Maintains or returns to baseline urinary function  Description: INTERVENTIONS:  - Assess urinary function  - Encourage oral fluids to ensure adequate hydration if ordered  - Administer IV fluids as ordered to ensure adequate hydration  - Administer ordered medications as needed  - Offer frequent toileting  - Follow urinary retention protocol if ordered  Outcome: Progressing  Goal: Urinary catheter remains patent  Description: INTERVENTIONS:  - Assess patency of urinary catheter  - If patient has a chronic lopez, consider changing catheter if non-functioning  - Follow guidelines for intermittent irrigation of non-functioning urinary catheter  Outcome: Progressing     Problem: METABOLIC, FLUID AND ELECTROLYTES - ADULT  Goal: Electrolytes maintained within normal limits  Description: INTERVENTIONS:  - Monitor labs and assess patient for signs and symptoms of electrolyte imbalances  - Administer electrolyte replacement as ordered  - Monitor response to electrolyte replacements, including repeat lab results as appropriate  - Instruct patient on fluid and nutrition as appropriate  Outcome: Progressing  Goal: Fluid balance maintained  Description: INTERVENTIONS:  - Monitor labs   - Monitor I/O and WT  - Instruct patient on fluid and nutrition as appropriate  - Assess for signs & symptoms of volume excess or deficit  Outcome: Progressing  Goal: Glucose maintained within target range  Description: INTERVENTIONS:  - Monitor Blood Glucose as ordered  - Assess for signs and symptoms of hyperglycemia and hypoglycemia  - Administer ordered medications to maintain glucose within target range  - Assess nutritional intake and initiate nutrition service referral as needed  Outcome: Progressing     Problem: Prexisting or High Potential for Compromised Skin Integrity  Goal: Skin integrity is maintained or improved  Description: INTERVENTIONS:  - Identify patients at risk for skin breakdown  - Assess and monitor skin integrity  - Assess and monitor nutrition and hydration status  - Monitor labs   - Assess for incontinence   - Turn and reposition patient  - Assist with mobility/ambulation  - Relieve pressure over bony prominences  - Avoid friction and shearing  - Provide appropriate hygiene as needed including keeping skin clean and dry  - Evaluate need for skin moisturizer/barrier cream  - Collaborate with interdisciplinary team   - Patient/family teaching  - Consider wound care consult   Outcome: Progressing     Problem: Potential for Falls  Goal: Patient will remain free of falls  Description: INTERVENTIONS:  - Assess patient frequently for physical needs  -  Identify cognitive and physical deficits and behaviors that affect risk of falls  -  [place] fall precautions as indicated by assessment   - Educate patient/family on patient safety including physical limitations  - Instruct patient to call for assistance with activity based on assessment  - Modify environment to reduce risk of injury  - Consider OT/PT consult to assist with strengthening/mobility  Outcome: Progressing     Problem: Nutrition/Hydration-ADULT  Goal: Nutrient/Hydration intake appropriate for improving, restoring or maintaining nutritional needs  Description: Monitor and assess patient's nutrition/hydration status for malnutrition  Collaborate with interdisciplinary team and initiate plan and interventions as ordered  Monitor patient's weight and dietary intake as ordered or per policy  Utilize nutrition screening tool and intervene as necessary  Determine patient's food preferences and provide high-protein, high-caloric foods as appropriate       INTERVENTIONS:  - Monitor oral intake, urinary output, labs, and treatment plans  - Assess nutrition and hydration status and recommend course of action  - Evaluate amount of meals eaten  - Assist patient with eating if necessary   - Allow adequate time for meals  - Recommend/ encourage appropriate diets, oral nutritional supplements, and vitamin/mineral supplements  - Order, calculate, and assess calorie counts as needed  - Assess need for intravenous fluids  - Provide specific nutrition/hydration education as appropriate  - Include patient/family/caregiver in decisions related to nutrition  Outcome: Progressing

## 2020-09-05 NOTE — CONSULTS
H&P Exam - Urology       Patient: Clyde Ya   : 1955 Sex: male   MRN: [id number]     CSN: [id number]      History of Present Illness   HPI:  Clyde Ya is a 77 y o  male who presents to [address] [place] [address] with sepsis history of gastric esophageal cancer status post chemotherapy malnourished min pneumonia placed in the ICU now on the floor urologic consult for incontinence  Patient seen at the bedside with his wife present  Patient has a history of voiding issues including hesitancy slow stream nocturia x2 seen recently by [facility] for minimally elevated PSA and was told has normal rectal exam and to follow up in a few months for repeat evaluation  Staff states that he has urinary incontinence noted mostly at night        Review of Systems:   Constitutional:  Negative for activity change, fever, chills and diaphoresis  HENT: Negative for hearing loss and trouble swallowing  Eyes: Negative for itching and visual disturbance  Respiratory: Negative for chest tightness and shortness of breath  Cardiovascular: Negative for chest pain, edema  Gastrointestinal: Negative for abdominal distention, na abdominal pain, constipation, diarrhea, Nausea and vomiting  Genitourinary: Negative for decreased urine volume, difficulty urinating, dysuria, enuresis, frequency, hematuria and urgency  Musculoskeletal: Negative for gait problem and myalgias  Neurological: Negative for dizziness and headaches  Hematological: Does not bruise/bleed easily         Historical Information   Past Medical History:   Diagnosis Date    Dehydration 2019    Esophageal cancer ([facility] [address] )     Hypertension     Nausea 2019    Psychiatric disorder     Recovering alcoholic [facility])      Past Surgical History:   Procedure Laterality Date    APPENDECTOMY      IR PORT PLACEMENT  2019     Social History   Social History     Substance and Sexual Activity   Alcohol Use Not Currently    Frequency: Never    Binge frequency: Never     Social History     Substance and Sexual Activity   Drug Use Yes    Types: Marijuana     Social History     Tobacco Use   Smoking Status Former Smoker    Types: Cigars    Last attempt to quit: 5/15/2019    Years since quittin 3   Smokeless Tobacco Never Used     Family History:   Family History   Problem Relation Age of Onset    Colon cancer Father        Meds/Allergies   Medications Prior to Admission   Medication    dexamethasone (DECADRON) 2 mg tablet    diazepam (VALIUM) 5 mg tablet    Eliquis 5 MG    metoclopramide (REGLAN) 5 mg tablet    OLANZapine (ZyPREXA) 5 mg tablet    pantoprazole (PROTONIX) 40 mg tablet    ammonium lactate (LAC-HYDRIN) 12 % cream    bisoprolol-hydrochlorothiazide (ZIAC) 10-6 25 MG per tablet    [] fluorouracil 4,730 mg in CADD infusion pump    ondansetron (ZOFRAN) 4 mg tablet    oxyCODONE (ROXICODONE) 5 mg immediate release tablet    sucralfate (CARAFATE) 1 g/10 mL suspension    venlafaxine (EFFEXOR-XR) 150 mg 24 hr capsule     Allergies   Allergen Reactions    Bee Venom Anaphylaxis    Shellfish-Derived Products      Develops GOUT       Objective   Vitals: /74 (BP Location: Left arm)   Pulse 67   Temp (!) 96 1 °F (35 6 °C) (Oral)   Resp 19   Ht 6' (1 829 m)   Wt 81 5 kg (179 lb 10 8 oz)   SpO2 95%   BMI 24 37 kg/m²     Physical Exam:  General Alert awake   Normocephalic atraumatic PERRLA  Lungs clear bilaterally  Cardiac normal S1 normal S2  Abdomen soft, flank pain  Bladder not distended  Two normal testicles  No hernias  Rectal exam not performed  Extremities no edema    I/O last 24 hours:   In: 290 [P O :240; IV Piggyback:50]  Out: 2175 [Urine:2175]    Invasive Devices     Central Venous Catheter Line            Port A Cath Right Chest -- days    Port A Cath 19 Right Chest 402 days          Peripheral Intravenous Line            Peripheral IV 20 Right Wrist 3 days                    Lab Results: CBC: Lab Results   Component Value Date    WBC 22 57 (H) 09/05/2020    HGB 8 4 (L) 09/05/2020    HCT 27 8 (L) 09/05/2020     (H) 09/05/2020     09/05/2020    MCH 30 8 09/05/2020    MCHC 30 2 (L) 09/05/2020    RDW 18 0 (H) 09/05/2020    MPV 11 9 09/05/2020    NRBC 1 09/05/2020     CMP:   Lab Results   Component Value Date     09/05/2020    CO2 26 09/05/2020    BUN 21 09/05/2020    CREATININE 0 76 09/05/2020    CALCIUM 8 4 09/05/2020    AST 25 09/03/2020    ALT 30 09/03/2020    ALKPHOS 67 09/03/2020    EGFR 94 09/05/2020     Urinalysis:   Lab Results   Component Value Date    COLORU Light Yellow 08/30/2020    CLARITYU Clear 08/30/2020    SPECGRAV <=1 005 08/30/2020    PHUR 6 0 08/30/2020    LEUKOCYTESUR Negative 08/30/2020    NITRITE Negative 08/30/2020    GLUCOSEU Negative 08/30/2020    KETONESU Negative 08/30/2020    BILIRUBINUR Negative 08/30/2020    BLOODU Large (A) 08/30/2020     Urine Culture:   Lab Results   Component Value Date    URINECX No Growth <1000 cfu/mL 08/30/2020     PSA:   Lab Results   Component Value Date    PSA 8 5 (H) 05/15/2020    PSA 8 0 (H) 01/24/2020           Assessment/ Plan:  Slow stream  Hesitancy  Nocturnal enuresis   elevated PSA 8 5  Patient appears to have BPH obstructive symptoms which more than likely secondary bladder instability causing urge incontinence at night  Would start tamsulosin 0 4 mg  Postvoid residual  Will follow up with Dr Jeffrey Shankar for re-evaluation of symptoms and recently diagnosed elevated PSA        Randolph Resendiz MD

## 2020-09-05 NOTE — PLAN OF CARE
Problem: RESPIRATORY - ADULT  Goal: Achieves optimal ventilation and oxygenation  Description: INTERVENTIONS:  - Assess for changes in respiratory status  - Assess for changes in mentation and behavior  - Position to facilitate oxygenation and minimize respiratory effort  - Oxygen administered by appropriate delivery if ordered  - Initiate smoking cessation education as indicated  - Encourage broncho-pulmonary hygiene including cough, deep breathe, Incentive Spirometry  - Assess the need for suctioning and aspirate as needed  - Assess and instruct to report SOB or any respiratory difficulty  - Respiratory Therapy support as indicated  Outcome: Progressing     Problem: PAIN - ADULT  Goal: Verbalizes/displays adequate comfort level or baseline comfort level  Description: Interventions:  - Encourage patient to monitor pain and request assistance  - Assess pain using appropriate pain scale  - Administer analgesics based on type and severity of pain and evaluate response  - Implement non-pharmacological measures as appropriate and evaluate response  - Consider cultural and social influences on pain and pain management  - Notify physician/advanced practitioner if interventions unsuccessful or patient reports new pain  Outcome: Progressing     Problem: INFECTION - ADULT  Goal: Absence or prevention of progression during hospitalization  Description: INTERVENTIONS:  - Assess and monitor for signs and symptoms of infection  - Monitor lab/diagnostic results  - Monitor all insertion sites, i e  indwelling lines, tubes, and drains  - Administer medications as ordered  - Instruct and encourage patient and family to use good hand hygiene technique  - Identify and instruct in appropriate isolation precautions for identified infection/condition  Outcome: Progressing  Goal: Absence of fever/infection during neutropenic period  Description: INTERVENTIONS:  - Monitor WBC    Outcome: Progressing     Problem: SAFETY ADULT  Goal: Patient will remain free of falls  Description: INTERVENTIONS:  - Assess patient frequently for physical needs  -  Identify cognitive and physical deficits and behaviors that affect risk of falls  -  [place] fall precautions as indicated by assessment   - Educate patient/family on patient safety including physical limitations  - Instruct patient to call for assistance with activity based on assessment  - Modify environment to reduce risk of injury  - Consider OT/PT consult to assist with strengthening/mobility  Outcome: Progressing - Patient is on a continual observation for safety    Goal: Maintain or return to baseline ADL function  Description: INTERVENTIONS:  -  Assess patient's ability to carry out ADLs; assess patient's baseline for ADL function and identify physical deficits which impact ability to perform ADLs (bathing, care of mouth/teeth, toileting, grooming, dressing, etc )  - Assess/evaluate cause of self-care deficits   - Assess range of motion  - Assess patient's mobility; develop plan if impaired  - Assess patient's need for assistive devices and provide as appropriate  - Encourage maximum independence but intervene and supervise when necessary  - Involve family in performance of ADLs  - Assess for home care needs following discharge   - Consider OT consult to assist with ADL evaluation and planning for discharge  - Provide patient education as appropriate  Outcome: Progressing  Goal: Maintain or return mobility status to optimal level  Description: INTERVENTIONS:  - Assess patient's baseline mobility status (ambulation, transfers, stairs, etc )    - Identify cognitive and physical deficits and behaviors that affect mobility  - Identify mobility aids required to assist with transfers and/or ambulation (gait belt, sit-to-stand, lift, walker, cane, etc )  - [place] fall precautions as indicated by assessment  - Record patient progress and toleration of activity level on Mobility SBAR; progress patient to next Phase/Stage  - Instruct patient to call for assistance with activity based on assessment  - Consider rehabilitation consult to assist with strengthening/weightbearing, etc   Outcome: Progressing     Problem: DISCHARGE PLANNING  Goal: Discharge to home or other facility with appropriate resources  Description: INTERVENTIONS:  - Identify barriers to discharge w/patient and caregiver  - Arrange for needed discharge resources and transportation as appropriate  - Identify discharge learning needs (meds, wound care, etc )  - Arrange for interpretive services to assist at discharge as needed  - Refer to Case Management Department for coordinating discharge planning if the patient needs post-hospital services based on physician/advanced practitioner order or complex needs related to functional status, cognitive ability, or social support system  Outcome: Progressing     Problem: Knowledge Deficit  Goal: Patient/family/caregiver demonstrates understanding of disease process, treatment plan, medications, and discharge instructions  Description: Complete learning assessment and assess knowledge base    Interventions:  - Provide teaching at level of understanding  - Provide teaching via preferred learning methods  Outcome: Progressing     Problem: CARDIOVASCULAR - ADULT  Goal: Maintains optimal cardiac output and hemodynamic stability  Description: INTERVENTIONS:  - Monitor I/O, vital signs and rhythm  - Monitor for S/S and trends of decreased cardiac output  - Administer and titrate ordered vasoactive medications to optimize hemodynamic stability  - Assess quality of pulses, skin color and temperature  - Assess for signs of decreased coronary artery perfusion  - Instruct patient to report change in severity of symptoms  Outcome: Progressing  Goal: Absence of cardiac dysrhythmias or at baseline rhythm  Description: INTERVENTIONS:  - Continuous cardiac monitoring, vital signs, obtain 12 lead EKG if ordered  - Administer antiarrhythmic and heart rate control medications as ordered  - Monitor electrolytes and administer replacement therapy as ordered  Outcome: Progressing - Patient is currently on continuous cardiac monitoring  Problem: GENITOURINARY - ADULT  Goal: Maintains or returns to baseline urinary function  Description: INTERVENTIONS:  - Assess urinary function  - Encourage oral fluids to ensure adequate hydration if ordered  - Administer IV fluids as ordered to ensure adequate hydration  - Administer ordered medications as needed  - Offer frequent toileting  - Follow urinary retention protocol if ordered  Outcome: Progressing     Problem: METABOLIC, FLUID AND ELECTROLYTES - ADULT  Goal: Electrolytes maintained within normal limits  Description: INTERVENTIONS:  - Monitor labs and assess patient for signs and symptoms of electrolyte imbalances  - Administer electrolyte replacement as ordered  - Monitor response to electrolyte replacements, including repeat lab results as appropriate  - Instruct patient on fluid and nutrition as appropriate  Outcome: Progressing  Goal: Fluid balance maintained  Description: INTERVENTIONS:  - Monitor labs   - Monitor I/O and WT  - Instruct patient on fluid and nutrition as appropriate  - Assess for signs & symptoms of volume excess or deficit  Outcome: Progressing  Goal: Glucose maintained within target range  Description: INTERVENTIONS:  - Monitor Blood Glucose as ordered  - Assess for signs and symptoms of hyperglycemia and hypoglycemia  - Administer ordered medications to maintain glucose within target range  - Assess nutritional intake and initiate nutrition service referral as needed  Outcome: Progressing - Patient received bedtime covered for elevated blood glucose level       Problem: Prexisting or High Potential for Compromised Skin Integrity  Goal: Skin integrity is maintained or improved  Description: INTERVENTIONS:  - Identify patients at risk for skin breakdown  - Assess and monitor skin integrity  - Assess and monitor nutrition and hydration status  - Monitor labs   - Assess for incontinence   - Turn and reposition patient  - Assist with mobility/ambulation  - Relieve pressure over bony prominences  - Avoid friction and shearing  - Provide appropriate hygiene as needed including keeping skin clean and dry  - Evaluate need for skin moisturizer/barrier cream  - Collaborate with interdisciplinary team   - Patient/family teaching  - Consider wound care consult   Outcome: Progressing     Problem: Nutrition/Hydration-ADULT  Goal: Nutrient/Hydration intake appropriate for improving, restoring or maintaining nutritional needs  Description: Monitor and assess patient's nutrition/hydration status for malnutrition  Collaborate with interdisciplinary team and initiate plan and interventions as ordered  Monitor patient's weight and dietary intake as ordered or per policy  Utilize nutrition screening tool and intervene as necessary  Determine patient's food preferences and provide high-protein, high-caloric foods as appropriate  INTERVENTIONS:  - Monitor oral intake, urinary output, labs, and treatment plans  - Assess nutrition and hydration status and recommend course of action  - Evaluate amount of meals eaten  - Assist patient with eating if necessary   - Allow adequate time for meals  - Recommend/ encourage appropriate diets, oral nutritional supplements, and vitamin/mineral supplements  - Order, calculate, and assess calorie counts as needed  - Assess need for intravenous fluids  - Provide specific nutrition/hydration education as appropriate  - Include patient/family/caregiver in decisions related to nutrition  Outcome: Progressing - Patient drinking nutritional supplements

## 2020-09-05 NOTE — PROGRESS NOTES
[facility] 26 Progress Note - Jose Grace 77 y o  male MRN: [id number]    Unit/Bed#: [address] [place] [address] Encounter: [id number]      Assessment/Plan:  * Sepsis [facility])  Assessment & Plan  Ongoing chemotherapy for GE CA, noted to be febrile on morning of admission by wife s/p fall in kitchen  Lactic 9 9 on admission, received 3L IVF bolus in ED, remained invasive pressor support for 3 days,  started broad spectrum abx  CT CAP: Suspicious for Left sided multi-lobar pna, no PE noted, masses remain unchanged compared to prior films  CT Head/Neck: no acute intracranial changes  Patient initially admitted to ICU due to requirement for vasopressors  Discontinued on 8/31, however levophed restarted on 9/2 due to hypotension, then weaned off during the night       · received stress dose steroids on admission  Transitioned to home Decadron on 9/1  Given additional 50mg of Solucortef morning of 9/2 due to hypotension  · Strict I/O's with lopez  · Lactic trending down, most recent 2 5 on 9/1  · Procal also trending down: 150-> 86 ->49->35 18 (9/3)   · BC 2/2 E  Coli, Sputum cx +Klebsiella oxytoca  ? Repeat BC on 9/1: NG @ 48hrs  · Abx  ? S/p 3 days cefepime (8/30-9/1)  ? S/p Tobramycin x1 on 8/31  ? S/p 2 days vanc (8/30-8/31)  ? Meropenem 1G q8hrs (9/1-9/2)  ? Cefazolin 2 g IV q 8 hours (9/2-)  -can likely transition to oral antibiotics the next 24-48 hours if the follow-up blood cultures remain negative per ID rec    PNA (pneumonia)  Assessment & Plan  On 8/30, CT with concern for left sided multifocal PNA  Concern for aspiration in the setting of dysphagia  · CT shows concern for Left sided multifocal pna, likely source of sepsis   · Trend procal, fever and WBC  · Currently requiring 2L NC, keep sats >90%  · BC 2/2 E  Coli  ? Repeat BC on 9/1: neg @ 48 hrs  · Sputum +Klebsiella oxystoca  · Aspiration precautions   · Cefazolin 2 g IV q 8 hours (Day #3)  ?  oral antibiotics tomorrow if the follow-up blood cultures remain negative per ID  · Diet  ? Change diet to Dys 2 with honey thick liquids via cup, no straws, medications in puree  Urge urinary incontinence  Assessment & Plan  Patient has enuresis for 3 days  · Consult to urology  · Consider adult diaper at night  · Avoid catheter given recent sepsis and immunocompromised, concern for infection    Dilated cardiomyopathy ([facility] )  Assessment & Plan  ECHO this admission showed:  1  LV: Ejection fraction was estimated in the range of 35 % to 40 % to be 37 %  Moderate diffuse hypokinesis  Pseudonormal left ventricular filling pattern, with concomitant abnormal relaxation and increased filling pressure (grade 2 diastolic dysfunction)  2  RV: Low normal systolic function with Tapse 1 6    Etiology unknown at this time  Patient will need follow-up when stable or as outpatient  Cardiology consulted  Appreciate recommendations  Will continue Lopressor 25 mg po q12h sched    Moderate protein-calorie malnutrition (HCC)  Assessment & Plan  Malnutrition Findings:   Malnutrition type: Chronic illness(Moderate malnutrition of chronic illness related to inadequate energy intake as evidenced by somewhat hollow orbits with somewhat dark circles, somewhat depression at temples  Awaiting treatment plan)  Degree of Malnutrition: Malnutrition of moderate degree    BMI Findings: Body mass index is 24 58 kg/m²  A-fib [facility])  Assessment & Plan  New onset Afib suspected to be 2/2 to PNA  Cardioconverted overnight on 9/2 with amiodarone  Currently on amiodarone po  No palpitations or CP today    · Continue to monitor on telemetry  · Continue p o  amiodarone with bolus titration per cardiology recommendations  Patient was counseled regarding the side effects of amiodarone    · 400 BID x 7 days (day 2/7)  · 400 QD x 1m  · 200 QD after  · Continue Eliquis, 5mg BID  · Lopressor 25mg Q12  · Appreciate cardiology recommendations       Neutropenic ([facility] )  Assessment & Plan  · Neutropenic precautions  · Completed Granix 5mcg/kg x 3 days  · Heme/onc on consult  · Neutropenia improving: ANC 17 on 9/3  · Will continue to monitor daily CBC with diff    Falls  Assessment & Plan  · Fall precuations   · PT/OT ordered, recommendations appreciated    Moderate malnutrition ([facility] )  Assessment & Plan  Moderate malnutrition the setting of chronic disease  Nutrition consulted  Appreciate recommendations  Chemotherapy-induced nausea  Assessment & Plan  On Zofran 4 mg PRN    Gastroesophageal cancer ([facility] )  Assessment & Plan  · Follows with [facility] Heme/Onc  · Pt does have mets to peritoneum but has been fairly responsive to chemotherapy  ? Last treatment 8/20 with 5 FU  · Follows with palliative, after readdressing status with wife plan to remain full code  · Barium swallow ordered given complaints of regurgitation and aspiration (9/5)        Subjective:   Patient was seen and examined at bedside  Reports continued wet cough  Able to eat breakfast this morning without issue but has been complaining of regurgitation and symptoms consistent with aspiration outpatient  Clinically improving  Otherwise no complaints today  Objective:     Vitals: Blood pressure 141/74, pulse 67, temperature (!) 96 1 °F (35 6 °C), temperature source Oral, resp  rate 19, height 6' (1 829 m), weight 81 5 kg (179 lb 10 8 oz), SpO2 95 %  ,Body mass index is 24 37 kg/m²    Wt Readings from Last 3 Encounters:   09/05/20 81 5 kg (179 lb 10 8 oz)   08/19/20 81 6 kg (179 lb 14 3 oz)   08/18/20 82 3 kg (181 lb 8 oz)       Intake/Output Summary (Last 24 hours) at 9/5/2020 1149  Last data filed at 9/5/2020 0900  Gross per 24 hour   Intake 290 ml   Output 1775 ml   Net -1485 ml       Physical Exam: General appearance: alert and oriented, in no acute distress  Lungs: clear to auscultation bilaterally  Heart: regular rate and rhythm, S1, S2 normal, no murmur, click, rub or gallop     Recent Results (from the past 24 hour(s)) Fingerstick Glucose (POCT)    Collection Time: 09/04/20 11:54 AM   Result Value Ref Range    POC Glucose 121 65 - 140 mg/dl   Sputum culture and Gram stain    Collection Time: 09/04/20  4:06 PM    Specimen: Expectorated Sputum   Result Value Ref Range    Sputum Culture Culture too young- will reincubate     Gram Stain Result 1+ Epithelial cells per low power field (A)     Gram Stain Result No polys seen (A)     Gram Stain Result Rare Yeast (A)    Fingerstick Glucose (POCT)    Collection Time: 09/04/20  4:27 PM   Result Value Ref Range    POC Glucose 101 65 - 140 mg/dl   Fingerstick Glucose (POCT)    Collection Time: 09/04/20  8:28 PM   Result Value Ref Range    POC Glucose 256 (H) 65 - 140 mg/dl   Magnesium    Collection Time: 09/05/20  6:27 AM   Result Value Ref Range    Magnesium 2 0 1 6 - 2 6 mg/dL   Phosphorus    Collection Time: 09/05/20  6:27 AM   Result Value Ref Range    Phosphorus 3 2 2 3 - 4 1 mg/dL   Basic metabolic panel    Collection Time: 09/05/20  6:27 AM   Result Value Ref Range    Sodium 138 136 - 145 mmol/L    Potassium 4 3 3 5 - 5 3 mmol/L    Chloride 105 100 - 108 mmol/L    CO2 26 21 - 32 mmol/L    ANION GAP 7 4 - 13 mmol/L    BUN 21 5 - 25 mg/dL    Creatinine 0 76 0 60 - 1 30 mg/dL    Glucose 132 65 - 140 mg/dL    Calcium 8 4 8 3 - 10 1 mg/dL    eGFR 94 ml/min/1 73sq m   CBC and differential    Collection Time: 09/05/20  6:27 AM   Result Value Ref Range    WBC 22 57 (H) 4 31 - 10 16 Thousand/uL    RBC 2 73 (L) 3 88 - 5 62 Million/uL    Hemoglobin 8 4 (L) 12 0 - 17 0 g/dL    Hematocrit 27 8 (L) 36 5 - 49 3 %     (H) 82 - 98 fL    MCH 30 8 26 8 - 34 3 pg    MCHC 30 2 (L) 31 4 - 37 4 g/dL    RDW 18 0 (H) 11 6 - 15 1 %    MPV 11 9 8 9 - 12 7 fL    Platelets 724 288 - 650 Thousands/uL    nRBC 1 /100 WBCs   Fingerstick Glucose (POCT)    Collection Time: 09/05/20  7:25 AM   Result Value Ref Range    POC Glucose 132 65 - 140 mg/dl   Fingerstick Glucose (POCT)    Collection Time: 09/05/20 11:02 AM   Result Value Ref Range    POC Glucose 133 65 - 140 mg/dl       Current Facility-Administered Medications   Medication Dose Route Frequency Provider Last Rate Last Dose    acetaminophen (TYLENOL) tablet 650 mg  650 mg Oral Q6H PRN Telma Trivedi DO   650 mg at 08/31/20 2013    amiodarone tablet 400 mg  400 mg Oral BID With Meals [facility], DO   400 mg at 09/05/20 4577    Followed by   Kristine Kunz ON 9/11/2020] amiodarone tablet 400 mg  400 mg Oral Daily With Breakfast Telma Trivedi DO        Followed by   Kristine Kunz ON 10/11/2020] amiodarone tablet 200 mg  200 mg Oral Daily With Breakfast Telma Rodaso DO        apixaban (ELIQUIS) tablet 5 mg  5 mg Oral BID Milta Grout, CRNP   5 mg at 09/05/20 0829    ceFAZolin (ANCEF) IVPB (premix) 2,000 mg 50 mL  2,000 mg Intravenous Q8H Telma Trivedi  mL/hr at 09/05/20 0828 2,000 mg at 09/05/20 0828    dexamethasone (DECADRON) tablet 4 mg  4 mg Oral After [place] [facility], DO   4 mg at 09/04/20 1442    diazepam (VALIUM) tablet 5 mg  5 mg Oral Daily PRN [facility], DO   5 mg at 09/04/20 2214    insulin lispro (HumaLOG) 100 units/mL subcutaneous injection 1-6 Units  1-6 Units Subcutaneous 4x Daily (AC & HS) Leno Miller MD   3 Units at 09/04/20 2210    ipratropium (ATROVENT) 0 02 % inhalation solution 0 5 mg  0 5 mg Nebulization TID Bryon Holstein Vikaylao, DO   0 5 mg at 09/05/20 0739    levalbuterol (XOPENEX) inhalation solution 1 25 mg  1 25 mg Nebulization TID Bryon Holstein Viotto, DO   1 25 mg at 09/05/20 0739    losartan (COZAAR) tablet 25 mg  25 mg Oral Daily Milta Grout, CRNP   25 mg at 09/05/20 0830    metoprolol tartrate (LOPRESSOR) tablet 25 mg  25 mg Oral Q12H [address] Telma Viotto, DO   25 mg at 09/05/20 0830    OLANZapine (ZyPREXA) tablet 5 mg  5 mg Oral HS Telma Trivedi, DO   5 mg at 09/04/20 2210    ondansetron (ZOFRAN) injection 4 mg  4 mg Intravenous Q8H PRN Telma Trivedi, DO   4 mg at 08/30/20 1523    pantoprazole (PROTONIX) EC tablet 40 mg  40 mg Oral Daily Before Breakfast Telma Trivedi DO   40 mg at 09/05/20 9398    pneumococcal 13-valent conjugate vaccine (PREVNAR-13) IM injection 0 5 mL  0 5 mL Intramuscular Prior to discharge RouttAeternusLED DO        venlafaxine (EFFEXOR-XR) 24 hr capsule 150 mg  150 mg Oral Daily Telma Trivedi DO   150 mg at 09/05/20 0830       Invasive Devices     Central Venous Catheter Line            Port A Cath Right Chest -- days    Port A Cath 07/31/19 Right Chest 402 days          Peripheral Intravenous Line            Peripheral IV 09/01/20 Right Wrist 3 days                Lab, Imaging and other studies: I have personally reviewed pertinent reports      VTE Pharmacologic Prophylaxis: Reason for no pharmacologic prophylaxis Eliquis  VTE Mechanical Prophylaxis: sequential compression device    Jacklyn Levine DO

## 2020-09-05 NOTE — SPEECH THERAPY NOTE
VBS order received- note indicating patient is tolerating diet with episodes of regurgitation/concern for aspiration  Unable to complete this weekend- will plan for Monday or Tuesday given the holiday  Ordering provider aware      CAROLA Mandel , [id number] [place] [address]  Speech Language Pathologist   Available via [address] [place] [address] #[id number]  [place] #[id number]

## 2020-09-06 LAB
ANION GAP SERPL CALCULATED.3IONS-SCNC: 8 MMOL/L (ref 4–13)
ANISOCYTOSIS BLD QL SMEAR: PRESENT
BACTERIA BLD CULT: NORMAL
BACTERIA BLD CULT: NORMAL
BASOPHILS # BLD MANUAL: 0 THOUSAND/UL (ref 0–0.1)
BASOPHILS NFR MAR MANUAL: 0 % (ref 0–1)
BUN SERPL-MCNC: 21 MG/DL (ref 5–25)
CALCIUM SERPL-MCNC: 8.5 MG/DL (ref 8.3–10.1)
CHLORIDE SERPL-SCNC: 103 MMOL/L (ref 100–108)
CO2 SERPL-SCNC: 28 MMOL/L (ref 21–32)
CREAT SERPL-MCNC: 0.84 MG/DL (ref 0.6–1.3)
EOSINOPHIL # BLD MANUAL: 0 THOUSAND/UL (ref 0–0.4)
EOSINOPHIL NFR BLD MANUAL: 0 % (ref 0–6)
ERYTHROCYTE [DISTWIDTH] IN BLOOD BY AUTOMATED COUNT: 18.3 % (ref 11.6–15.1)
GFR SERPL CREATININE-BSD FRML MDRD: 91 ML/MIN/1.73SQ M
GLUCOSE SERPL-MCNC: 109 MG/DL (ref 65–140)
GLUCOSE SERPL-MCNC: 112 MG/DL (ref 65–140)
GLUCOSE SERPL-MCNC: 120 MG/DL (ref 65–140)
GLUCOSE SERPL-MCNC: 174 MG/DL (ref 65–140)
GLUCOSE SERPL-MCNC: 95 MG/DL (ref 65–140)
HCT VFR BLD AUTO: 30.6 % (ref 36.5–49.3)
HGB BLD-MCNC: 9.4 G/DL (ref 12–17)
LYMPHOCYTES # BLD AUTO: 15 % (ref 14–44)
LYMPHOCYTES # BLD AUTO: 3.3 THOUSAND/UL (ref 0.6–4.47)
MACROCYTES BLD QL AUTO: PRESENT
MAGNESIUM SERPL-MCNC: 2.1 MG/DL (ref 1.6–2.6)
MCH RBC QN AUTO: 31.2 PG (ref 26.8–34.3)
MCHC RBC AUTO-ENTMCNC: 30.7 G/DL (ref 31.4–37.4)
MCV RBC AUTO: 102 FL (ref 82–98)
METAMYELOCYTES NFR BLD MANUAL: 3 % (ref 0–1)
MONOCYTES # BLD AUTO: 2.64 THOUSAND/UL (ref 0–1.22)
MONOCYTES NFR BLD: 12 % (ref 4–12)
MYELOCYTES NFR BLD MANUAL: 1 % (ref 0–1)
NEUTROPHILS # BLD MANUAL: 15.19 THOUSAND/UL (ref 1.85–7.62)
NEUTS BAND NFR BLD MANUAL: 4 % (ref 0–8)
NEUTS SEG NFR BLD AUTO: 65 % (ref 43–75)
NRBC BLD AUTO-RTO: 1 /100 WBCS
PHOSPHATE SERPL-MCNC: 3.8 MG/DL (ref 2.3–4.1)
PLATELET # BLD AUTO: 339 THOUSANDS/UL (ref 149–390)
PLATELET BLD QL SMEAR: ADEQUATE
PMV BLD AUTO: 12 FL (ref 8.9–12.7)
POTASSIUM SERPL-SCNC: 4.4 MMOL/L (ref 3.5–5.3)
PROCALCITONIN SERPL-MCNC: 4.15 NG/ML
RBC # BLD AUTO: 3.01 MILLION/UL (ref 3.88–5.62)
RBC MORPH BLD: PRESENT
SODIUM SERPL-SCNC: 139 MMOL/L (ref 136–145)
TOTAL CELLS COUNTED SPEC: 100
WBC # BLD AUTO: 22.02 THOUSAND/UL (ref 4.31–10.16)

## 2020-09-06 PROCEDURE — 94760 N-INVAS EAR/PLS OXIMETRY 1: CPT

## 2020-09-06 PROCEDURE — 97110 THERAPEUTIC EXERCISES: CPT

## 2020-09-06 PROCEDURE — 85007 BL SMEAR W/DIFF WBC COUNT: CPT | Performed by: STUDENT IN AN ORGANIZED HEALTH CARE EDUCATION/TRAINING PROGRAM

## 2020-09-06 PROCEDURE — 83735 ASSAY OF MAGNESIUM: CPT | Performed by: STUDENT IN AN ORGANIZED HEALTH CARE EDUCATION/TRAINING PROGRAM

## 2020-09-06 PROCEDURE — 97530 THERAPEUTIC ACTIVITIES: CPT

## 2020-09-06 PROCEDURE — 99232 SBSQ HOSP IP/OBS MODERATE 35: CPT | Performed by: FAMILY MEDICINE

## 2020-09-06 PROCEDURE — 94640 AIRWAY INHALATION TREATMENT: CPT

## 2020-09-06 PROCEDURE — 80048 BASIC METABOLIC PNL TOTAL CA: CPT | Performed by: STUDENT IN AN ORGANIZED HEALTH CARE EDUCATION/TRAINING PROGRAM

## 2020-09-06 PROCEDURE — 84145 PROCALCITONIN (PCT): CPT | Performed by: STUDENT IN AN ORGANIZED HEALTH CARE EDUCATION/TRAINING PROGRAM

## 2020-09-06 PROCEDURE — 82948 REAGENT STRIP/BLOOD GLUCOSE: CPT

## 2020-09-06 PROCEDURE — 84100 ASSAY OF PHOSPHORUS: CPT | Performed by: STUDENT IN AN ORGANIZED HEALTH CARE EDUCATION/TRAINING PROGRAM

## 2020-09-06 PROCEDURE — 99232 SBSQ HOSP IP/OBS MODERATE 35: CPT | Performed by: INTERNAL MEDICINE

## 2020-09-06 PROCEDURE — 85027 COMPLETE CBC AUTOMATED: CPT | Performed by: STUDENT IN AN ORGANIZED HEALTH CARE EDUCATION/TRAINING PROGRAM

## 2020-09-06 PROCEDURE — 93005 ELECTROCARDIOGRAM TRACING: CPT

## 2020-09-06 RX ORDER — BENZONATATE 100 MG/1
100 CAPSULE ORAL 3 TIMES DAILY PRN
Status: DISCONTINUED | OUTPATIENT
Start: 2020-09-06 | End: 2020-09-08 | Stop reason: HOSPADM

## 2020-09-06 RX ORDER — AMIODARONE HYDROCHLORIDE 200 MG/1
200 TABLET ORAL
Status: DISCONTINUED | OUTPATIENT
Start: 2020-09-07 | End: 2020-09-08 | Stop reason: HOSPADM

## 2020-09-06 RX ADMIN — CEFAZOLIN SODIUM 2000 MG: 2 SOLUTION INTRAVENOUS at 09:09

## 2020-09-06 RX ADMIN — METOPROLOL TARTRATE 25 MG: 25 TABLET, FILM COATED ORAL at 22:10

## 2020-09-06 RX ADMIN — IPRATROPIUM BROMIDE 0.5 MG: 0.5 SOLUTION RESPIRATORY (INHALATION) at 07:19

## 2020-09-06 RX ADMIN — CEFAZOLIN SODIUM 2000 MG: 2 SOLUTION INTRAVENOUS at 17:04

## 2020-09-06 RX ADMIN — VENLAFAXINE HYDROCHLORIDE 150 MG: 150 CAPSULE, EXTENDED RELEASE ORAL at 09:10

## 2020-09-06 RX ADMIN — IPRATROPIUM BROMIDE 0.5 MG: 0.5 SOLUTION RESPIRATORY (INHALATION) at 19:10

## 2020-09-06 RX ADMIN — APIXABAN 5 MG: 5 TABLET, FILM COATED ORAL at 17:04

## 2020-09-06 RX ADMIN — LOSARTAN POTASSIUM 25 MG: 25 TABLET, FILM COATED ORAL at 09:09

## 2020-09-06 RX ADMIN — LEVALBUTEROL HYDROCHLORIDE 1.25 MG: 1.25 SOLUTION, CONCENTRATE RESPIRATORY (INHALATION) at 07:19

## 2020-09-06 RX ADMIN — PANTOPRAZOLE SODIUM 40 MG: 40 TABLET, DELAYED RELEASE ORAL at 05:45

## 2020-09-06 RX ADMIN — DEXAMETHASONE 4 MG: 4 TABLET ORAL at 14:10

## 2020-09-06 RX ADMIN — CEFAZOLIN SODIUM 2000 MG: 2 SOLUTION INTRAVENOUS at 23:32

## 2020-09-06 RX ADMIN — AMIODARONE HYDROCHLORIDE 400 MG: 200 TABLET ORAL at 09:08

## 2020-09-06 RX ADMIN — TAMSULOSIN HYDROCHLORIDE 0.4 MG: 0.4 CAPSULE ORAL at 17:04

## 2020-09-06 RX ADMIN — BENZONATATE 100 MG: 100 CAPSULE ORAL at 05:45

## 2020-09-06 RX ADMIN — METOPROLOL TARTRATE 25 MG: 25 TABLET, FILM COATED ORAL at 09:09

## 2020-09-06 RX ADMIN — LEVALBUTEROL HYDROCHLORIDE 1.25 MG: 1.25 SOLUTION, CONCENTRATE RESPIRATORY (INHALATION) at 19:10

## 2020-09-06 RX ADMIN — OLANZAPINE 5 MG: 2.5 TABLET, FILM COATED ORAL at 22:09

## 2020-09-06 RX ADMIN — APIXABAN 5 MG: 5 TABLET, FILM COATED ORAL at 09:09

## 2020-09-06 NOTE — OCCUPATIONAL THERAPY NOTE
OT TREATMENT       09/06/20 0940   Restrictions/Precautions   Other Precautions Chair Alarm; Bed Alarm; Fall Risk;O2   Pain Assessment   Pain Assessment Tool Guzman-Baker FACES   Guzman-Baker FACES Pain Rating 2   Pain Location/Orientation Orientation: Left; Location: Shoulder  (with ROM)   ADL   LB Dressing Assistance 5  Supervision/Setup   LB Dressing Deficit Don/doff L sock   LB Dressing Comments seated on edge of bed    Functional Standing Tolerance   Time 10 minute static stand with min assist x 1   Bed Mobility   Supine to Sit 5  Supervision   Sit to Supine 5  Supervision   Transfers   Sit to Stand 4  Minimal assistance   Additional items Assist x 2   Stand to Sit 4  Minimal assistance   Additional items Assist x 2   Functional Mobility   Functional Mobility 4  Minimal assistance   Additional Comments assist of 2, few steps to head of bed    ROM- Right Upper Extremities   R Shoulder AROM; Flexion; Horizontal ABduction   R Elbow AROM;Elbow extension;Elbow flexion   R Hand AROM; Thumb; Index finger; Long finger;Ring finger;Little finger   R Weight/Reps/Sets 2x10 reps seated on edge of bed with rest breaks   ROM - Left Upper Extremities    L Shoulder AROM; Flexion; Horizontal ABduction   L Elbow AROM;Elbow flexion;Elbow extension   L Hand AROM; Thumb; Index finger; Long finger;Ring finger;Little finger   L Weight/Reps/Sets 2x10 reps seated on edge of bed with rest breaks   Assessment   Assessment Patient tolerated well  Cooperative and pleasant  Patients activity tolerance is improving  Few rest breaks required during session  Patient is generally weak and deconditioned and will benefit from continued OT services to increase performance with ADLS and transfers   Plan   Treatment Interventions ADL retraining;UE strengthening/ROM; Endurance training;Patient/family training; Activityengagement; Compensatory technique education   OT Frequency 5x/wk   Recommendation   OT Discharge Recommendation Post-Acute Rehabilitation Services Licensure   [place] License Number  Virgabrielda Kimmy Haley Tony 85 OTR/L [id number]

## 2020-09-06 NOTE — PROGRESS NOTES
Farrukh Johnson [facility] 26 Progress Note - J Luis Meth 77 y o  male MRN: [id number]    Unit/Bed#: 2 [place] [address] Encounter: [id number]      Assessment/Plan:  PNA (pneumonia)  Assessment & Plan  On 8/30, CT with concern for left sided multifocal PNA  Concern for aspiration in the setting of dysphagia  · CT shows concern for Left sided multifocal pna, likely source of sepsis   · Trend fever, procal, and WBC    · Patient remains afebrile  Results from last 7 days   Lab Units 09/05/20  0627 09/04/20  0601 09/03/20  0640 09/02/20  0539 09/01/20  0537   PROCALCITONIN ng/ml 8 07* 19 14* 35 18* 49 96* 86 37*     Results from last 7 days   Lab Units 09/06/20  0553 09/05/20  0627 09/04/20  0601 09/03/20  0640 09/02/20  0539   WBC Thousand/uL 22 02* 22 57* 26 35* 21 91* 10 82*       · Currently requiring 2L NC, keep sats >90%  · Cultures  ? Blood cultures  ? 8/30 BC 2/2 E  Coli  ? 9/1 BC: NG at 5 days  ? Sputum cultures  ? 8/31 Sputum 1+Klebsiella oxytoca, 1+Candida  ? 9/4 Sputum culture pending  · Aspiration precautions   · Cefazolin 2 g IV q 8 hours (Day #3)  · oral antibiotics when tolerate POR  · Diet  ? Change diet to Dys 2 with honey thick liquids via cup, no straws, medications in puree  [facility]  Assessment & Plan  New onset Afib suspected to be 2/2 to PNA  Cardioconverted overnight on 9/2 with amiodarone  Currently on amiodarone po  No palpitations or CP today    · Continue to monitor on telemetry  · Continue p o  amiodarone with bolus titration per cardiology recommendations  Patient was counseled regarding the side effects of amiodarone  · Amiodarone 200 QD  · Continue Eliquis, 5mg BID  · Lopressor 25mg QHS  · Appreciate cardiology recommendations       Urge urinary incontinence  Assessment & Plan  Patient has enuresis for 3 days    · Consult to urology  · Started on flomax 0 4 mg QD  · Consider adult diaper at night  · Avoid catheter given recent sepsis and immunocompromised, concern for infection    Dilated cardiomyopathy ([facility] )  Assessment & Plan  ECHO this admission showed:  1  LV: Ejection fraction was estimated in the range of 35 % to 40 % to be 37 %  Moderate diffuse hypokinesis  Pseudonormal left ventricular filling pattern, with concomitant abnormal relaxation and increased filling pressure (grade 2 diastolic dysfunction)  2  RV: Low normal systolic function with Tapse 1 6    Etiology unknown at this time  Patient will need follow-up when stable or as outpatient  Cardiology consulted  Appreciate recommendations  Will continue Lopressor 25 mg po QHS    Moderate protein-calorie malnutrition (HCC)  Assessment & Plan  Malnutrition Findings:   Malnutrition type: Chronic illness(Moderate malnutrition of chronic illness related to inadequate energy intake as evidenced by somewhat hollow orbits with somewhat dark circles, somewhat depression at temples  Awaiting treatment plan)  Degree of Malnutrition: Malnutrition of moderate degree    BMI Findings: Body mass index is 24 58 kg/m²  Neutropenic ([facility] )  Assessment & Plan  · Neutropenic precautions  · Completed Granix 5mcg/kg x 3 days  · Heme/onc on consult  · Neutropenia improving: ANC 17 on 9/3  · Will continue to monitor daily CBC with diff    Falls  Assessment & Plan  · Fall precuations   · PT/OT ordered, recommendations appreciated    Moderate malnutrition ([place] [address] )  Assessment & Plan  Moderate malnutrition the setting of chronic disease  Nutrition consulted  Appreciate recommendations  Chemotherapy-induced nausea  Assessment & Plan  On Zofran 4 mg PRN    Gastroesophageal cancer ([facility] )  Assessment & Plan  · Follows with [facility] Heme/Onc  · Pt does have mets to peritoneum but has been fairly responsive to chemotherapy  ? Last treatment 8/20 with 5 FU  · Follows with palliative, after readdressing status with wife plan to remain full code    · Barium swallow Tuesday, follow up results    * Sepsis [facility])  Assessment & Plan  Ongoing chemotherapy for GE CA, noted to be febrile on morning of admission by wife s/p fall in kitchen  Lactic 9 9 on admission, received 3L IVF bolus in ED, remained invasive pressor support for 3 days,  started broad spectrum abx  CT CAP: Suspicious for Left sided multi-lobar pna, no PE noted, masses remain unchanged compared to prior films  CT Head/Neck: no acute intracranial changes  Patient initially admitted to ICU due to requirement for vasopressors  Discontinued on 8/31, however levophed restarted on 9/2 due to hypotension, then weaned off during the night       · received stress dose steroids on admission  Transitioned to home Decadron on 9/1  Given additional 50mg of Solucortef morning of 9/2 due to hypotension  · Strict I/O's with lopez  · Lactic trending down, most recent 2 5 on 9/1  · Procal also trending down: 150-> 86 ->49->35 18 (9/3)   · BC 2/2 E  Coli, Sputum cx +Klebsiella oxytoca  ? Repeat BC on 9/1: NG @ 48hrs  · Abx  ? S/p 3 days cefepime (8/30-9/1)  ? S/p Tobramycin x1 on 8/31  ? S/p 2 days vanc (8/30-8/31)  ? Meropenem 1G q8hrs (9/1-9/2)  ? Cefazolin 2 g IV q 8 hours (9/2-)  -transition to PO Abx when able to tolerate PO abx          Subjective:   Patient seen and examined at bedside  Patient in no acute distress  Patient does continue to complain of nocturnal enuresis  Patient was started on Flomax per Urology  Patient continues to have difficulty swallowing, plan for barium swallow tomorrow  Objective:     Vitals: Blood pressure 126/81, pulse 64, temperature (!) 97 1 °F (36 2 °C), temperature source Oral, resp  rate 17, height 6' (1 829 m), weight 80 kg (176 lb 5 9 oz), SpO2 93 %  ,Body mass index is 23 92 kg/m²    Wt Readings from Last 3 Encounters:   09/06/20 80 kg (176 lb 5 9 oz)   08/19/20 81 6 kg (179 lb 14 3 oz)   08/18/20 82 3 kg (181 lb 8 oz)       Intake/Output Summary (Last 24 hours) at 9/6/2020 0842  Last data filed at 9/6/2020 0749  Gross per 24 hour   Intake 1040 ml   Output 3650 ml   Net -2610 ml       Physical Exam:   General: Pt is AAO x 3, not in any acute distress  Cardio: RRR, S1 and S2 +, no murmurs/rubs/gallops/clicks  Resp: CTA b/l, no wheezes/rales/rhonchi  Abdomen: soft, NT/ND, BS+  Extremities: no cyanosis or edema  PP 2+ b/l          Recent Results (from the past 24 hour(s))   Fingerstick Glucose (POCT)    Collection Time: 09/05/20 11:02 AM   Result Value Ref Range    POC Glucose 133 65 - 140 mg/dl   Fingerstick Glucose (POCT)    Collection Time: 09/05/20  5:33 PM   Result Value Ref Range    POC Glucose 145 (H) 65 - 140 mg/dl   Fingerstick Glucose (POCT)    Collection Time: 09/05/20  9:07 PM   Result Value Ref Range    POC Glucose 160 (H) 65 - 140 mg/dl   Magnesium    Collection Time: 09/06/20  5:53 AM   Result Value Ref Range    Magnesium 2 1 1 6 - 2 6 mg/dL   Phosphorus    Collection Time: 09/06/20  5:53 AM   Result Value Ref Range    Phosphorus 3 8 2 3 - 4 1 mg/dL   Basic metabolic panel    Collection Time: 09/06/20  5:53 AM   Result Value Ref Range    Sodium 139 136 - 145 mmol/L    Potassium 4 4 3 5 - 5 3 mmol/L    Chloride 103 100 - 108 mmol/L    CO2 28 21 - 32 mmol/L    ANION GAP 8 4 - 13 mmol/L    BUN 21 5 - 25 mg/dL    Creatinine 0 84 0 60 - 1 30 mg/dL    Glucose 120 65 - 140 mg/dL    Calcium 8 5 8 3 - 10 1 mg/dL    eGFR 91 ml/min/1 73sq m   CBC and differential    Collection Time: 09/06/20  5:53 AM   Result Value Ref Range    WBC 22 02 (H) 4 31 - 10 16 Thousand/uL    RBC 3 01 (L) 3 88 - 5 62 Million/uL    Hemoglobin 9 4 (L) 12 0 - 17 0 g/dL    Hematocrit 30 6 (L) 36 5 - 49 3 %     (H) 82 - 98 fL    MCH 31 2 26 8 - 34 3 pg    MCHC 30 7 (L) 31 4 - 37 4 g/dL    RDW 18 3 (H) 11 6 - 15 1 %    MPV 12 0 8 9 - 12 7 fL    Platelets 524 782 - 058 Thousands/uL    nRBC 1 /100 WBCs   Manual Differential(PHLEBS Do Not Order)    Collection Time: 09/06/20  5:53 AM   Result Value Ref Range    Segmented % 65 43 - 75 %    Bands % 4 0 - 8 %    Lymphocytes % 15 14 - 44 % Monocytes % 12 4 - 12 %    Eosinophils, % 0 0 - 6 %    Basophils % 0 0 - 1 %    Metamyelocytes% 3 (H) 0 - 1 %    Myelocytes % 1 0 - 1 %    Absolute Neutrophils 15 19 (H) 1 85 - 7 62 Thousand/uL    Lymphocytes Absolute 3 30 0 60 - 4 47 Thousand/uL    Monocytes Absolute 2 64 (H) 0 00 - 1 22 Thousand/uL    Eosinophils Absolute 0 00 0 00 - 0 40 Thousand/uL    Basophils Absolute 0 00 0 00 - 0 10 Thousand/uL    Total Counted 100     RBC Morphology Present     Anisocytosis Present     Macrocytes Present     Platelet Estimate Adequate Adequate   Fingerstick Glucose (POCT)    Collection Time: 09/06/20  7:26 AM   Result Value Ref Range    POC Glucose 109 65 - 140 mg/dl       Current Facility-Administered Medications   Medication Dose Route Frequency Provider Last Rate Last Dose    acetaminophen (TYLENOL) tablet 650 mg  650 mg Oral Q6H PRN Telma Morelotto, DO   650 mg at 08/31/20 2013    amiodarone tablet 400 mg  400 mg Oral BID With Meals [employer], DO   400 mg at 09/05/20 1730    Followed by   Ary Jim ON 9/11/2020] amiodarone tablet 400 mg  400 mg Oral Daily With Breakfast Telma Trivedi DO        Followed by   Ary Jim ON 10/11/2020] amiodarone tablet 200 mg  200 mg Oral Daily With Breakfast Telma Morelotto, DO        apixaban (ELIQUIS) tablet 5 mg  5 mg Oral BID YANI Vines   5 mg at 09/05/20 1730    benzonatate (TESSALON PERLES) capsule 100 mg  100 mg Oral TID PRN Laural Bosworth, MD   100 mg at 09/06/20 0545    ceFAZolin (ANCEF) IVPB (premix) 2,000 mg 50 mL  2,000 mg Intravenous Q8H Telma Morelotto,  mL/hr at 09/05/20 2346 2,000 mg at 09/05/20 2346    dexamethasone (DECADRON) tablet 4 mg  4 mg Oral After Sun Number Viotto, DO   4 mg at 09/05/20 1415    diazepam (VALIUM) tablet 5 mg  5 mg Oral Daily PRN Jessenia Rodaso, DO   5 mg at 09/05/20 2203    insulin lispro (HumaLOG) 100 units/mL subcutaneous injection 1-6 Units  1-6 Units Subcutaneous 4x Daily (AC & HS) Alvina Raymond MD   1 Units at 09/05/20 2207    ipratropium (ATROVENT) 0 02 % inhalation solution 0 5 mg  0 5 mg Nebulization TID Boyle Clinked, DO   0 5 mg at 09/06/20 0719    levalbuterol (XOPENEX) inhalation solution 1 25 mg  1 25 mg Nebulization TID Boyle Clinked, DO   1 25 mg at 09/06/20 0719    losartan (COZAAR) tablet 25 mg  25 mg Oral Daily YANI Tamez   25 mg at 09/05/20 0830    metoprolol tartrate (LOPRESSOR) tablet 25 mg  25 mg Oral Q12H [facility] Telma Morelnancy DO   25 mg at 09/05/20 2203    OLANZapine (ZyPREXA) tablet 5 mg  5 mg Oral HS Telma Trivedi DO   5 mg at 09/05/20 2203    ondansetron (ZOFRAN) injection 4 mg  4 mg Intravenous Q8H PRN Telma Trivedi DO   4 mg at 08/30/20 1523    pantoprazole (PROTONIX) EC tablet 40 mg  40 mg Oral Daily Before Breakfast Telma Maria Teresanancy DO   40 mg at 09/06/20 0545    pneumococcal 13-valent conjugate vaccine (PREVNAR-13) IM injection 0 5 mL  0 5 mL Intramuscular Prior to discharge Boyle Clinked, DO        tamsulosin (FLOMAX) capsule 0 4 mg  0 4 mg Oral Daily With Demarco James MD   0 4 mg at 09/05/20 1731    venlafaxine (EFFEXOR-XR) 24 hr capsule 150 mg  150 mg Oral Daily Telma Morelnancy DO   150 mg at 09/05/20 0830       Invasive Devices     Central Venous Catheter Line            Port A Cath Right Chest -- days    Port A Cath 07/31/19 Right Chest 403 days          Peripheral Intravenous Line            Peripheral IV 09/01/20 Right Wrist 4 days                Lab, Imaging and other studies: I have personally reviewed pertinent reports      VTE Pharmacologic Prophylaxis: Eliquis  VTE Mechanical Prophylaxis: sequential compression device    Mati Fonseca MD

## 2020-09-06 NOTE — PLAN OF CARE
Problem: RESPIRATORY - ADULT  Goal: Achieves optimal ventilation and oxygenation  Description: INTERVENTIONS:  - Assess for changes in respiratory status  - Assess for changes in mentation and behavior  - Position to facilitate oxygenation and minimize respiratory effort  - Oxygen administered by appropriate delivery if ordered  - Initiate smoking cessation education as indicated  - Encourage broncho-pulmonary hygiene including cough, deep breathe, Incentive Spirometry  - Assess the need for suctioning and aspirate as needed  - Assess and instruct to report SOB or any respiratory difficulty  - Respiratory Therapy support as indicated  Outcome: Progressing     Problem: PAIN - ADULT  Goal: Verbalizes/displays adequate comfort level or baseline comfort level  Description: Interventions:  - Encourage patient to monitor pain and request assistance  - Assess pain using appropriate pain scale  - Administer analgesics based on type and severity of pain and evaluate response  - Implement non-pharmacological measures as appropriate and evaluate response  - Consider cultural and social influences on pain and pain management  - Notify physician/advanced practitioner if interventions unsuccessful or patient reports new pain  Outcome: Progressing     Problem: INFECTION - ADULT  Goal: Absence or prevention of progression during hospitalization  Description: INTERVENTIONS:  - Assess and monitor for signs and symptoms of infection  - Monitor lab/diagnostic results  - Monitor all insertion sites, i e  indwelling lines, tubes, and drains  - Monitor endotracheal if appropriate and nasal secretions for changes in amount and color  - [place] appropriate cooling/warming therapies per order  - Administer medications as ordered  - Instruct and encourage patient and family to use good hand hygiene technique  - Identify and instruct in appropriate isolation precautions for identified infection/condition  Outcome: Progressing  Goal: Absence of fever/infection during neutropenic period  Description: INTERVENTIONS:  - Monitor WBC    Outcome: Progressing     Problem: SAFETY ADULT  Goal: Patient will remain free of falls  Description: INTERVENTIONS:  - Assess patient frequently for physical needs  -  Identify cognitive and physical deficits and behaviors that affect risk of falls    -  [place] fall precautions as indicated by assessment   - Educate patient/family on patient safety including physical limitations  - Instruct patient to call for assistance with activity based on assessment  - Modify environment to reduce risk of injury  - Consider OT/PT consult to assist with strengthening/mobility  Outcome: Progressing  Goal: Maintain or return to baseline ADL function  Description: INTERVENTIONS:  -  Assess patient's ability to carry out ADLs; assess patient's baseline for ADL function and identify physical deficits which impact ability to perform ADLs (bathing, care of mouth/teeth, toileting, grooming, dressing, etc )  - Assess/evaluate cause of self-care deficits   - Assess range of motion  - Assess patient's mobility; develop plan if impaired  - Assess patient's need for assistive devices and provide as appropriate  - Encourage maximum independence but intervene and supervise when necessary  - Involve family in performance of ADLs  - Assess for home care needs following discharge   - Consider OT consult to assist with ADL evaluation and planning for discharge  - Provide patient education as appropriate  Outcome: Progressing  Goal: Maintain or return mobility status to optimal level  Description: INTERVENTIONS:  - Assess patient's baseline mobility status (ambulation, transfers, stairs, etc )    - Identify cognitive and physical deficits and behaviors that affect mobility  - Identify mobility aids required to assist with transfers and/or ambulation (gait belt, sit-to-stand, lift, walker, cane, etc )  - [place] fall precautions as indicated by assessment  - Record patient progress and toleration of activity level on Mobility SBAR; progress patient to next Phase/Stage  - Instruct patient to call for assistance with activity based on assessment  - Consider rehabilitation consult to assist with strengthening/weightbearing, etc   Outcome: Progressing     Problem: DISCHARGE PLANNING  Goal: Discharge to home or other facility with appropriate resources  Description: INTERVENTIONS:  - Identify barriers to discharge w/patient and caregiver  - Arrange for needed discharge resources and transportation as appropriate  - Identify discharge learning needs (meds, wound care, etc )  - Arrange for interpretive services to assist at discharge as needed  - Refer to Case Management Department for coordinating discharge planning if the patient needs post-hospital services based on physician/advanced practitioner order or complex needs related to functional status, cognitive ability, or social support system  Outcome: Progressing     Problem: Knowledge Deficit  Goal: Patient/family/caregiver demonstrates understanding of disease process, treatment plan, medications, and discharge instructions  Description: Complete learning assessment and assess knowledge base    Interventions:  - Provide teaching at level of understanding  - Provide teaching via preferred learning methods  Outcome: Progressing     Problem: CARDIOVASCULAR - ADULT  Goal: Maintains optimal cardiac output and hemodynamic stability  Description: INTERVENTIONS:  - Monitor I/O, vital signs and rhythm  - Monitor for S/S and trends of decreased cardiac output  - Administer and titrate ordered vasoactive medications to optimize hemodynamic stability  - Assess quality of pulses, skin color and temperature  - Assess for signs of decreased coronary artery perfusion  - Instruct patient to report change in severity of symptoms  Outcome: Progressing  Goal: Absence of cardiac dysrhythmias or at baseline rhythm  Description: INTERVENTIONS:  - Continuous cardiac monitoring, vital signs, obtain 12 lead EKG if ordered  - Administer antiarrhythmic and heart rate control medications as ordered  - Monitor electrolytes and administer replacement therapy as ordered  Outcome: Progressing     Problem: GENITOURINARY - ADULT  Goal: Maintains or returns to baseline urinary function  Description: INTERVENTIONS:  - Assess urinary function  - Encourage oral fluids to ensure adequate hydration if ordered  - Administer IV fluids as ordered to ensure adequate hydration  - Administer ordered medications as needed  - Offer frequent toileting  - Follow urinary retention protocol if ordered  Outcome: Progressing  Goal: Urinary catheter remains patent  Description: INTERVENTIONS:  - Assess patency of urinary catheter  - If patient has a chronic lopez, consider changing catheter if non-functioning  - Follow guidelines for intermittent irrigation of non-functioning urinary catheter  Outcome: Progressing     Problem: METABOLIC, FLUID AND ELECTROLYTES - ADULT  Goal: Electrolytes maintained within normal limits  Description: INTERVENTIONS:  - Monitor labs and assess patient for signs and symptoms of electrolyte imbalances  - Administer electrolyte replacement as ordered  - Monitor response to electrolyte replacements, including repeat lab results as appropriate  - Instruct patient on fluid and nutrition as appropriate  Outcome: Progressing  Goal: Fluid balance maintained  Description: INTERVENTIONS:  - Monitor labs   - Monitor I/O and WT  - Instruct patient on fluid and nutrition as appropriate  - Assess for signs & symptoms of volume excess or deficit  Outcome: Progressing  Goal: Glucose maintained within target range  Description: INTERVENTIONS:  - Monitor Blood Glucose as ordered  - Assess for signs and symptoms of hyperglycemia and hypoglycemia  - Administer ordered medications to maintain glucose within target range  - Assess nutritional intake and initiate nutrition service referral as needed  Outcome: Progressing     Problem: Prexisting or High Potential for Compromised Skin Integrity  Goal: Skin integrity is maintained or improved  Description: INTERVENTIONS:  - Identify patients at risk for skin breakdown  - Assess and monitor skin integrity  - Assess and monitor nutrition and hydration status  - Monitor labs   - Assess for incontinence   - Turn and reposition patient  - Assist with mobility/ambulation  - Relieve pressure over bony prominences  - Avoid friction and shearing  - Provide appropriate hygiene as needed including keeping skin clean and dry  - Evaluate need for skin moisturizer/barrier cream  - Collaborate with interdisciplinary team   - Patient/family teaching  - Consider wound care consult   Outcome: Progressing     Problem: Potential for Falls  Goal: Patient will remain free of falls  Description: INTERVENTIONS:  - Assess patient frequently for physical needs  -  Identify cognitive and physical deficits and behaviors that affect risk of falls  -  Start fall precautions as indicated by assessment   - Educate patient/family on patient safety including physical limitations  - Instruct patient to call for assistance with activity based on assessment  - Modify environment to reduce risk of injury  - Consider OT/PT consult to assist with strengthening/mobility  Outcome: Progressing     Problem: Nutrition/Hydration-ADULT  Goal: Nutrient/Hydration intake appropriate for improving, restoring or maintaining nutritional needs  Description: Monitor and assess patient's nutrition/hydration status for malnutrition  Collaborate with interdisciplinary team and initiate plan and interventions as ordered  Monitor patient's weight and dietary intake as ordered or per policy  Utilize nutrition screening tool and intervene as necessary  Determine patient's food preferences and provide high-protein, high-caloric foods as appropriate       INTERVENTIONS:  - Monitor oral intake, urinary output, labs, and treatment plans  - Assess nutrition and hydration status and recommend course of action  - Evaluate amount of meals eaten  - Assist patient with eating if necessary   - Allow adequate time for meals  - Recommend/ encourage appropriate diets, oral nutritional supplements, and vitamin/mineral supplements  - Order, calculate, and assess calorie counts as needed  - Assess need for intravenous fluids  - Provide specific nutrition/hydration education as appropriate  - Include patient/family/caregiver in decisions related to nutrition  Outcome: Progressing

## 2020-09-06 NOTE — PHYSICAL THERAPY NOTE
PT TREATMENT     09/06/20 1410   Pain Assessment   Pain Assessment Tool Pain Assessment not indicated - pt denies pain   Restrictions/Precautions   Other Precautions Fall Risk;O2;Bed Alarm; Chair Alarm   General   Chart Reviewed Yes   Cognition   Arousal/Participation Cooperative   Attention Attends with cues to redirect   Following Commands Follows one step commands with increased time or repetition   Subjective   Subjective "I want to walk"   Bed Mobility   Supine to Sit 3  Moderate assistance   Transfers   Sit to Stand 4  Minimal assistance   Stand to Sit 4  Minimal assistance   Stand pivot 4  Minimal assistance   Additional items   (with walker)   Ambulation/Elevation   Gait pattern   (flexed posture, feet too far away from walker)   Gait Assistance 4  Minimal assist, verbal cues   Assistive Device Rolling walker  (3L02)   Distance 5 feet, 20 feet, 25 feet with seated rest break between walks, Sp02 >95% with activity, HR in the 80s with activity  Balance   Static Sitting Fair +   Dynamic Sitting Fair   Static Standing Fair   Dynamic Standing Fair -   Ambulatory Fair -   Activity Tolerance   Activity Tolerance Patient tolerated treatment well;Patient limited by fatigue   Assessment   Prognosis Good   Problem List Decreased strength;Decreased endurance; Impaired balance;Decreased mobility   Assessment Pt demonstrates improving endurance and function  Pt  is motivated and cooperative  Plan to increase ambulation distance next session  Plan   Treatment/Interventions ADL retraining;Functional transfer training;LE strengthening/ROM; Therapeutic exercise;Gait training;Bed mobility; Equipment eval/education;Patient/family training; Endurance training   Progress Progressing toward goals   PT Frequency 5x/wk   Recommendation   PT Discharge Recommendation Post-Acute Rehabilitation Services  [facility])   Licensure   [place] License Number  Serge HEALY [id number]

## 2020-09-06 NOTE — PROGRESS NOTES
Progress Note - Cardiology   Malu Paul 77 y o  male MRN: [id number]  Unit/Bed#: [address] [place] [address] Encounter: [id number]    Assessment/Plan:  New recognized severe heart failure EF37%- started on heart failure therapy  Challenge with ARB yesterday  Currently loaded with amiodarone therapy  Limited options for afib management given his cardiomyopathy and co-morbidities  Change to amiodarone 200mg in morning + metoprolol tartate 25mg at night+ losartan  Reviewed with family  Hemoglobin stable on eliquis therapy  Abnormal t-wave changes- reports strong family hx for CAD/prior smoking- he deferred cardiac cath as outpatient  Recommend starting atorvastatin 40mg  Atrial fibrillation currently rhythm controlled on amiodarone  Loading on amiodarone  Possible short six month course with evaluation of EF as outpatient  Change to  amiodarone 200mg in morning and metoprolol tartate 25mg at night prior to discharge  Elevated [id number] score  Continue eliquis therapy    Gram negative bacteremia- antibiotic therapy    Stage 4 esophageal cancer    Subjective/Objective   Wife at bedside- discussed echo results  Heart rhythm is controlled   No active chest pain    Objective:     Vitals: /81 (BP Location: Left arm)   Pulse 64   Temp (!) 97 1 °F (36 2 °C) (Oral)   Resp 17   Ht 6' (1 829 m)   Wt 80 kg (176 lb 5 9 oz)   SpO2 93%   BMI 23 92 kg/m²   Vitals:    09/05/20 0600 09/06/20 0600   Weight: 81 5 kg (179 lb 10 8 oz) 80 kg (176 lb 5 9 oz)     Orthostatic Blood Pressures      Most Recent Value   Blood Pressure  126/81 filed at 09/06/2020 0537   Patient Position - Orthostatic VS  Lying filed at 09/06/2020 9286            Intake/Output Summary (Last 24 hours) at 9/6/2020 1242  Last data filed at 9/6/2020 1045  Gross per 24 hour   Intake 400 ml   Output 3300 ml   Net -2900 ml       Invasive Devices     Central Venous Catheter Line            Port A Cath Right Chest -- days    Port A Cath 07/31/19 Right Chest 403 days Review of Systems: reviewed3    Physical Exam   Constitutional: He is oriented to person, place, and time  He appears well-developed and well-nourished  He appears ill  No distress  HENT:   Head: Normocephalic and atraumatic  Right Ear: External ear normal    Left Ear: External ear normal    Nose: Nose normal    Mouth/Throat: No oropharyngeal exudate  Eyes: Pupils are equal, round, and reactive to light  Conjunctivae are normal  Right eye exhibits no discharge  Left eye exhibits no discharge  No scleral icterus  Neck: Normal range of motion  No JVD present  No tracheal deviation present  No thyromegaly present  Cardiovascular: Normal rate, regular rhythm and intact distal pulses  Exam reveals gallop  Exam reveals no friction rub  No murmur heard  Pulmonary/Chest: Effort normal and breath sounds normal  No stridor  No respiratory distress  He has no wheezes  He has no rales  He exhibits no tenderness  Abdominal: Soft  Bowel sounds are normal  He exhibits distension  He exhibits no mass  There is no abdominal tenderness  There is no rebound and no guarding  Genitourinary:    Genitourinary Comments: No CVA tenderness     Musculoskeletal:         General: Deformity present  No tenderness or edema  Neurological: He is alert and oriented to person, place, and time  He displays normal reflexes  He exhibits normal muscle tone  Skin: Skin is warm and dry  No rash noted  He is not diaphoretic  No erythema  Psychiatric: He has a normal mood and affect   His behavior is normal  Judgment and thought content normal      Lab Results:   CBC with diff:   Results from last 7 days   Lab Units 09/06/20  0553   WBC Thousand/uL 22 02*   RBC Million/uL 3 01*   HEMOGLOBIN g/dL 9 4*   HEMATOCRIT % 30 6*   MCV fL 102*   MCH pg 31 2   MCHC g/dL 30 7*   RDW % 18 3*   MPV fL 12 0   PLATELETS Thousands/uL 339     CMP:   Results from last 7 days   Lab Units 09/06/20  0553  09/03/20  0641   SODIUM mmol/L 139   < > 141   POTASSIUM mmol/L 4 4   < > 3 7   CHLORIDE mmol/L 103   < > 108   CO2 mmol/L 28   < > 23   BUN mg/dL 21   < > 18   CREATININE mg/dL 0 84   < > 0 95   CALCIUM mg/dL 8 5   < > 8 1*   AST U/L  --   --  25   ALT U/L  --   --  30   ALK PHOS U/L  --   --  67   EGFR ml/min/1 73sq m 91   < > 82    < > = values in this interval not displayed  Troponin:   0   Lab Value Date/Time    TROPONINI 1 54 (H) 08/30/2020 0916     BNP:   Results from last 7 days   Lab Units 09/06/20  0553   POTASSIUM mmol/L 4 4   CHLORIDE mmol/L 103   CO2 mmol/L 28   BUN mg/dL 21   CREATININE mg/dL 0 84   CALCIUM mg/dL 8 5   EGFR ml/min/1 73sq m 91     Coags:     TSH:     Magnesium:   Results from last 7 days   Lab Units 09/06/20  0553   MAGNESIUM mg/dL 2 1     Lipid Profile:     Imaging: I have personally reviewed pertinent reports  EKG: reviewed  VTE Pharmacologic Prophylaxis: Sequential compression device (Venodyne)   VTE Mechanical Prophylaxis: sequential compression device    Counseling / Coordination of Care  Total time spent today 40 minutes  Greater than 50% of total time was spent with the patient and / or family counseling and / or coordination of care   A description of the counseling / coordination of care: heart failure

## 2020-09-06 NOTE — PLAN OF CARE
Problem: PHYSICAL THERAPY ADULT  Goal: Performs mobility at highest level of function for planned discharge setting  See evaluation for individualized goals  Description: Treatment/Interventions: ADL retraining, Functional transfer training, LE strengthening/ROM, Therapeutic exercise, Gait training, Bed mobility, Equipment eval/education, Patient/family training, Endurance training          See flowsheet documentation for full assessment, interventions and recommendations  Outcome: Progressing  Note: Prognosis: Good  Problem List: Decreased strength, Decreased endurance, Impaired balance, Decreased mobility  Assessment: Pt demonstrates improving endurance and function  Pt  is motivated and cooperative  Plan to increase ambulation distance next session  PT Discharge Recommendation: Post-Acute Rehabilitation Services(STR)          See flowsheet documentation for full assessment

## 2020-09-07 LAB
ANION GAP SERPL CALCULATED.3IONS-SCNC: 7 MMOL/L (ref 4–13)
ATRIAL RATE: 62 BPM
ATRIAL RATE: 66 BPM
ATRIAL RATE: 70 BPM
ATRIAL RATE: 73 BPM
BACTERIA SPT RESP CULT: ABNORMAL
BACTERIA SPT RESP CULT: ABNORMAL
BASOPHILS NFR BLD AUTO: 0 % (ref 0–1)
BUN SERPL-MCNC: 20 MG/DL (ref 5–25)
CALCIUM SERPL-MCNC: 8.8 MG/DL (ref 8.3–10.1)
CHLORIDE SERPL-SCNC: 102 MMOL/L (ref 100–108)
CO2 SERPL-SCNC: 28 MMOL/L (ref 21–32)
CREAT SERPL-MCNC: 0.89 MG/DL (ref 0.6–1.3)
EOSINOPHIL NFR BLD AUTO: 0 % (ref 0–6)
ERYTHROCYTE [DISTWIDTH] IN BLOOD BY AUTOMATED COUNT: 19.1 % (ref 11.6–15.1)
GFR SERPL CREATININE-BSD FRML MDRD: 88 ML/MIN/1.73SQ M
GLUCOSE SERPL-MCNC: 103 MG/DL (ref 65–140)
GLUCOSE SERPL-MCNC: 118 MG/DL (ref 65–140)
GLUCOSE SERPL-MCNC: 139 MG/DL (ref 65–140)
GLUCOSE SERPL-MCNC: 91 MG/DL (ref 65–140)
GLUCOSE SERPL-MCNC: 92 MG/DL (ref 65–140)
GRAM STN SPEC: ABNORMAL
HCT VFR BLD AUTO: 31.9 % (ref 36.5–49.3)
HGB BLD-MCNC: 9.8 G/DL (ref 12–17)
IMM GRANULOCYTES NFR BLD AUTO: 16 % (ref 0–2)
LYMPHOCYTES NFR BLD AUTO: 10 % (ref 14–44)
MAGNESIUM SERPL-MCNC: 2.2 MG/DL (ref 1.6–2.6)
MCH RBC QN AUTO: 31.4 PG (ref 26.8–34.3)
MCHC RBC AUTO-ENTMCNC: 30.7 G/DL (ref 31.4–37.4)
MCV RBC AUTO: 102 FL (ref 82–98)
MONOCYTES NFR BLD AUTO: 6 % (ref 4–12)
NEUTS SEG NFR BLD AUTO: 68 % (ref 43–75)
NRBC BLD AUTO-RTO: 0 /100 WBCS
P AXIS: 31 DEGREES
P AXIS: 33 DEGREES
P AXIS: 39 DEGREES
P AXIS: 40 DEGREES
PHOSPHATE SERPL-MCNC: 4.5 MG/DL (ref 2.3–4.1)
PLATELET # BLD AUTO: 365 THOUSANDS/UL (ref 149–390)
PMV BLD AUTO: 11.7 FL (ref 8.9–12.7)
POTASSIUM SERPL-SCNC: 4.3 MMOL/L (ref 3.5–5.3)
PR INTERVAL: 150 MS
PR INTERVAL: 160 MS
PR INTERVAL: 164 MS
PR INTERVAL: 168 MS
PROCALCITONIN SERPL-MCNC: 2.44 NG/ML
QRS AXIS: 16 DEGREES
QRS AXIS: 53 DEGREES
QRS AXIS: 54 DEGREES
QRS AXIS: 66 DEGREES
QRSD INTERVAL: 100 MS
QRSD INTERVAL: 108 MS
QRSD INTERVAL: 96 MS
QRSD INTERVAL: 98 MS
QT INTERVAL: 414 MS
QT INTERVAL: 414 MS
QT INTERVAL: 420 MS
QT INTERVAL: 442 MS
QTC INTERVAL: 426 MS
QTC INTERVAL: 434 MS
QTC INTERVAL: 456 MS
QTC INTERVAL: 477 MS
RBC # BLD AUTO: 3.12 MILLION/UL (ref 3.88–5.62)
SODIUM SERPL-SCNC: 137 MMOL/L (ref 136–145)
T WAVE AXIS: 60 DEGREES
T WAVE AXIS: 66 DEGREES
T WAVE AXIS: 66 DEGREES
T WAVE AXIS: 75 DEGREES
VENTRICULAR RATE: 62 BPM
VENTRICULAR RATE: 66 BPM
VENTRICULAR RATE: 70 BPM
VENTRICULAR RATE: 73 BPM
WBC # BLD AUTO: 20.53 THOUSAND/UL (ref 4.31–10.16)

## 2020-09-07 PROCEDURE — 80048 BASIC METABOLIC PNL TOTAL CA: CPT | Performed by: STUDENT IN AN ORGANIZED HEALTH CARE EDUCATION/TRAINING PROGRAM

## 2020-09-07 PROCEDURE — 94760 N-INVAS EAR/PLS OXIMETRY 1: CPT

## 2020-09-07 PROCEDURE — 84145 PROCALCITONIN (PCT): CPT | Performed by: STUDENT IN AN ORGANIZED HEALTH CARE EDUCATION/TRAINING PROGRAM

## 2020-09-07 PROCEDURE — G0009 ADMIN PNEUMOCOCCAL VACCINE: HCPCS | Performed by: STUDENT IN AN ORGANIZED HEALTH CARE EDUCATION/TRAINING PROGRAM

## 2020-09-07 PROCEDURE — 83735 ASSAY OF MAGNESIUM: CPT | Performed by: STUDENT IN AN ORGANIZED HEALTH CARE EDUCATION/TRAINING PROGRAM

## 2020-09-07 PROCEDURE — 85025 COMPLETE CBC W/AUTO DIFF WBC: CPT | Performed by: STUDENT IN AN ORGANIZED HEALTH CARE EDUCATION/TRAINING PROGRAM

## 2020-09-07 PROCEDURE — 84100 ASSAY OF PHOSPHORUS: CPT | Performed by: STUDENT IN AN ORGANIZED HEALTH CARE EDUCATION/TRAINING PROGRAM

## 2020-09-07 PROCEDURE — 99232 SBSQ HOSP IP/OBS MODERATE 35: CPT | Performed by: INTERNAL MEDICINE

## 2020-09-07 PROCEDURE — 99231 SBSQ HOSP IP/OBS SF/LOW 25: CPT | Performed by: FAMILY MEDICINE

## 2020-09-07 PROCEDURE — 94640 AIRWAY INHALATION TREATMENT: CPT

## 2020-09-07 PROCEDURE — 93010 ELECTROCARDIOGRAM REPORT: CPT | Performed by: INTERNAL MEDICINE

## 2020-09-07 PROCEDURE — 90670 PCV13 VACCINE IM: CPT | Performed by: STUDENT IN AN ORGANIZED HEALTH CARE EDUCATION/TRAINING PROGRAM

## 2020-09-07 PROCEDURE — 97110 THERAPEUTIC EXERCISES: CPT

## 2020-09-07 PROCEDURE — 82948 REAGENT STRIP/BLOOD GLUCOSE: CPT

## 2020-09-07 PROCEDURE — 93005 ELECTROCARDIOGRAM TRACING: CPT

## 2020-09-07 RX ADMIN — CEFAZOLIN SODIUM 2000 MG: 2 SOLUTION INTRAVENOUS at 16:25

## 2020-09-07 RX ADMIN — CEFAZOLIN SODIUM 2000 MG: 2 SOLUTION INTRAVENOUS at 08:04

## 2020-09-07 RX ADMIN — AMIODARONE HYDROCHLORIDE 200 MG: 200 TABLET ORAL at 08:04

## 2020-09-07 RX ADMIN — PANTOPRAZOLE SODIUM 40 MG: 40 TABLET, DELAYED RELEASE ORAL at 05:37

## 2020-09-07 RX ADMIN — LOSARTAN POTASSIUM 25 MG: 25 TABLET, FILM COATED ORAL at 08:04

## 2020-09-07 RX ADMIN — LEVALBUTEROL HYDROCHLORIDE 1.25 MG: 1.25 SOLUTION, CONCENTRATE RESPIRATORY (INHALATION) at 07:43

## 2020-09-07 RX ADMIN — DEXAMETHASONE 4 MG: 4 TABLET ORAL at 13:26

## 2020-09-07 RX ADMIN — LEVALBUTEROL HYDROCHLORIDE 1.25 MG: 1.25 SOLUTION, CONCENTRATE RESPIRATORY (INHALATION) at 20:22

## 2020-09-07 RX ADMIN — IPRATROPIUM BROMIDE 0.5 MG: 0.5 SOLUTION RESPIRATORY (INHALATION) at 20:22

## 2020-09-07 RX ADMIN — CEFAZOLIN SODIUM 2000 MG: 2 SOLUTION INTRAVENOUS at 23:47

## 2020-09-07 RX ADMIN — APIXABAN 5 MG: 5 TABLET, FILM COATED ORAL at 18:42

## 2020-09-07 RX ADMIN — APIXABAN 5 MG: 5 TABLET, FILM COATED ORAL at 08:04

## 2020-09-07 RX ADMIN — OLANZAPINE 5 MG: 2.5 TABLET, FILM COATED ORAL at 21:37

## 2020-09-07 RX ADMIN — VENLAFAXINE HYDROCHLORIDE 150 MG: 150 CAPSULE, EXTENDED RELEASE ORAL at 08:04

## 2020-09-07 RX ADMIN — IPRATROPIUM BROMIDE 0.5 MG: 0.5 SOLUTION RESPIRATORY (INHALATION) at 07:43

## 2020-09-07 RX ADMIN — PNEUMOCOCCAL 13-VALENT CONJUGATE VACCINE 0.5 ML: 2.2; 2.2; 2.2; 2.2; 2.2; 4.4; 2.2; 2.2; 2.2; 2.2; 2.2; 2.2; 2.2 INJECTION, SUSPENSION INTRAMUSCULAR at 20:15

## 2020-09-07 RX ADMIN — IPRATROPIUM BROMIDE 0.5 MG: 0.5 SOLUTION RESPIRATORY (INHALATION) at 13:30

## 2020-09-07 RX ADMIN — LEVALBUTEROL HYDROCHLORIDE 1.25 MG: 1.25 SOLUTION, CONCENTRATE RESPIRATORY (INHALATION) at 13:30

## 2020-09-07 RX ADMIN — TAMSULOSIN HYDROCHLORIDE 0.4 MG: 0.4 CAPSULE ORAL at 16:25

## 2020-09-07 RX ADMIN — ACETAMINOPHEN 650 MG: 325 TABLET, FILM COATED ORAL at 13:25

## 2020-09-07 NOTE — PROGRESS NOTES
Cardiology Inpatient Progress Note  [facility]   Chanelle Amin  1955  [id number]  PCP: Denisa Chpaman DO  Date of Admission:  8/30/2020    Assessment/Plan:   Newly recognized severe heart failure EF 30% possibly ischemic  Patient declines invasive cardiac workup as an inpatient  Plan to continue optimal medical therapy  He is currently on amiodarone 200 mg in morning and 25 mg at night  Continue eliquis therapy  uptitration of his losartan to 50mg daily    Afib currently rate controlled on amiodarone  Suppressed  Gram negative bacteremia/antibiotic therapy    Stage 4 esophageal cancer    Subjective:   No chest pain  Remains in sinus rhythm    Review of Systems:   CONSTITUTIONAL:  As per hpi  HEENT:  Eyes:  No visual loss, blurred vision, double vision or yellow sclerae  Ears, Nose, Throat:  No hearing loss, sneezing, congestion, runny nose or sore throat  SKIN:  No rash or itching  CARDIOVASCULAR:  As per hpi  RESPIRATORY:  As per hpi  GASTROINTESTINAL:  No anorexia, nausea, vomiting or diarrhea  No abdominal pain or blood  GENITOURINARY:  Burning on urination  No flank pain  NEUROLOGICAL:  No headache, dizziness, syncope, paralysis, ataxia, numbness or tingling in the extremities  No change in bowel or bladder control  MUSCULOSKELETAL:  No muscle, back pain, joint pain or stiffness  HEMATOLOGIC:  No anemia, bleeding or bruising  LYMPHATICS:  No enlarged nodes  No history of splenectomy  PSYCHIATRIC:  No active suicidal or homicidal ideation  ENDOCRINOLOGIC:  No reports of sweating, cold or heat intolerance  No polyuria or polydipsia  ALLERGIES:  No history of asthma, hives, eczema or rhinitis  More than 10 systems reviewed and otherwise noncontributory  Vitals: Blood pressure 135/82, pulse 69, temperature (!) 97 3 °F (36 3 °C), resp  rate 16, height 6' (1 829 m), weight 77 9 kg (171 lb 11 8 oz), SpO2 96 %    Vitals:    09/06/20 0600 09/07/20 0548   Weight: 80 kg (176 lb 5 9 oz) 77 9 kg (171 lb 11 8 oz)     Body mass index is 23 29 kg/m²  BP Readings from Last 3 Encounters:   09/07/20 135/82   08/19/20 127/65   08/18/20 138/78     Orthostatic Blood Pressures      Most Recent Value   Blood Pressure  135/82 filed at 09/07/2020 0733   Patient Position - Orthostatic VS  Lying filed at 09/06/2020 0733        I/O       09/05 0701 - 09/06 0700 09/06 0701 - 09/07 0700 09/07 0701 - 09/08 0700    P  O  1040 1120     IV Piggyback  100     Total Intake(mL/kg) 1040 (13) 1220 (15 7)     Urine (mL/kg/hr) 3350 (1 7) 3050 (1 6) 400 (0 7)    Total Output 3350 3050 400    Net -2310 -1830 -400           Unmeasured Urine Occurrence  2 x         Invasive Devices     Central Venous Catheter Line            Port A Cath Right Chest -- days    Port A Cath 07/31/19 Right Chest 404 days                  Intake/Output Summary (Last 24 hours) at 9/7/2020 1359  Last data filed at 9/7/2020 1038  Gross per 24 hour   Intake 820 ml   Output 2550 ml   Net -1730 ml     Physical Examination:   Physical Exam  Constitutional:       General: He is not in acute distress  Appearance: He is well-developed  He is ill-appearing  He is not diaphoretic  HENT:      Head: Normocephalic and atraumatic  Right Ear: External ear normal       Left Ear: External ear normal    Eyes:      General: No scleral icterus  Right eye: No discharge  Left eye: No discharge  Conjunctiva/sclera: Conjunctivae normal       Pupils: Pupils are equal, round, and reactive to light  Neck:      Musculoskeletal: Normal range of motion and neck supple  Thyroid: No thyromegaly  Vascular: No JVD  Trachea: No tracheal deviation  Cardiovascular:      Rate and Rhythm: Normal rate and regular rhythm  Heart sounds: Murmur present  No friction rub  Gallop present  Pulmonary:      Effort: Pulmonary effort is normal  No respiratory distress  Breath sounds: Normal breath sounds  No stridor   No wheezing or rales  Chest:      Chest wall: No tenderness  Abdominal:      General: Bowel sounds are normal  There is no distension  Palpations: Abdomen is soft  There is no mass  Tenderness: There is no abdominal tenderness  There is no guarding or rebound  Musculoskeletal: Normal range of motion  General: No tenderness or deformity  Skin:     General: Skin is warm and dry  Coloration: Skin is not pale  Findings: No erythema or rash  Neurological:      Mental Status: He is oriented to person, place, and time  Cranial Nerves: No cranial nerve deficit  Motor: No abnormal muscle tone  Coordination: Coordination normal       Deep Tendon Reflexes: Reflexes are normal and symmetric  Reflexes normal    Psychiatric:         Behavior: Behavior normal          Thought Content: Thought content normal          Judgment: Judgment normal          Labs:   Lab Results:  I Have Reviewed All Lab Data Below:  CBC with diff:  Results from last 7 days   Lab Units 09/07/20  0546 09/06/20  0553 09/05/20  0627 09/04/20  0601 09/03/20  0640 09/02/20  0539 09/01/20  0537   WBC Thousand/uL 20 53* 22 02* 22 57* 26 35* 21 91* 10 82* 1 61*   HEMOGLOBIN g/dL 9 8* 9 4* 8 4* 8 5* 7 9* 8 0* 7 4*   HEMATOCRIT % 31 9* 30 6* 27 8* 27 3* 26 2* 24 7* 24 0*   MCV fL 102* 102* 102* 102* 100* 98 99*   PLATELETS Thousands/uL 365 339 288 260 227 179 156   MCH pg 31 4 31 2 30 8 31 6 30 3 31 6 30 6   MCHC g/dL 30 7* 30 7* 30 2* 31 1* 30 2* 32 4 30 8*   RDW % 19 1* 18 3* 18 0* 17 5* 17 2* 16 7* 16 3*   MPV fL 11 7 12 0 11 9 11 7 12 3 12 0 10 8   NRBC AUTO /100 WBCs 0 1 1 1 1 0 5   NRBC /100 WBC  --   --   --   --   --   --  6*       CMP:  Results from last 7 days   Lab Units 09/07/20  0546 09/06/20  0553 09/05/20  0627 09/04/20  0601 09/03/20  0641 09/02/20  1820 09/02/20  0539  09/01/20  0537   SODIUM mmol/L 137 139 138 140 141 140 136  --  136   POTASSIUM mmol/L 4 3 4 4 4 3 4 4 3 7 2 9* 4 0   < > 2 8* CHLORIDE mmol/L 102 103 105 107 108 106 104  --  104   CO2 mmol/L 28 28 26 23 23 24 19*  --  22   ANION GAP mmol/L 7 8 7 10 10 10 13  --  10   BUN mg/dL 20 21 21 20 18 16 14  --  16   CREATININE mg/dL 0 89 0 84 0 76 0 93 0 95 0 93 0 81  --  0 89   CALCIUM mg/dL 8 8 8 5 8 4 8 0* 8 1* 8 4 8 6  --  8 2*   AST U/L  --   --   --   --  25  --  56*  --  57*   ALT U/L  --   --   --   --  30  --  36  --  34   ALK PHOS U/L  --   --   --   --  67  --  53  --  47   TOTAL PROTEIN g/dL  --   --   --   --  5 3*  --  5 6*  --  5 3*   ALBUMIN g/dL  --   --   --   --  2 0*  --  2 1*  --  1 8*   TOTAL BILIRUBIN mg/dL  --   --   --   --  0 60  --  1 80*  --  1 00   EGFR ml/min/1 73sq m 88 91 94 85 82 85 92  --  88    < > = values in this interval not displayed  Magnesium:  Results from last 7 days   Lab Units 20  0546 20  0553 20  0627 20  0601 20  0641 20  1820 20  0539   MAGNESIUM mg/dL 2 2 2 1 2 0 2 1 2 0 2 2 1 9         Imaging & Testing   I have personally reviewed pertinent reports  Cardiac Testing     Results for orders placed during the hospital encounter of 20   Echo complete with contrast if indicated    Narrative [address]  [address] [facility] [address]  [phone]    Transthoracic Echocardiogram  2D, M-mode, Doppler, and Color Doppler    Study date:  02-Sep-2020    Patient: Heavenly De La Cruz  MR number: [id number]  Account number: [de-identified]  : 1955  Age: 77 years  Gender: Male  Status: Inpatient  Location: Bedside  Height: 72 in  Weight: 176 7 lb  BP: 83/ 50 mmHg    Indications: A Fib  Diagnoses: I48 0 - Atrial fibrillation    Sonographer:  TALA Barba  Primary Physician: Sukhwinder Ng DO  Referring Physician:  Porsche Trivedi DO  Group:  [facility]  Interpreting Physician:  Ashok Roblero MD    SUMMARY    LEFT VENTRICLE:  The ventricle was mildly dilated    Systolic function was moderately to markedly reduced by Teichholz  Ejection fraction was estimated in the range of 35 % to 40 % to be 37 %  There was moderate diffuse hypokinesis  Features were consistent with a pseudonormal left ventricular filling pattern, with concomitant abnormal relaxation and increased filling pressure (grade 2 diastolic dysfunction)  LEFT ATRIUM:  The atrium was mildly dilated  MITRAL VALVE:  There was mild annular calcification  There was trace regurgitation  TRICUSPID VALVE:  There was mild regurgitation  PULMONIC VALVE:  There was mild regurgitation  IVC, HEPATIC VEINS:  The inferior vena cava was dilated  HISTORY: PRIOR HISTORY: Gastroesophageal Cancer,Chemotherapy,Neutropenic,Septic shock,pneumonia,HTN  PROCEDURE: The procedure was performed at the bedside  This was a routine study  The transthoracic approach was used  The study included complete 2D imaging, M-mode, complete spectral Doppler, and color Doppler  The heart rate was 78 bpm,  at the start of the study  Images were obtained from the parasternal, apical, subcostal, and suprasternal notch acoustic windows  Intravenous contrast ( 0 3ml Definity in NSS) was administered to opacify the left ventricle  Image quality  was adequate  LEFT VENTRICLE: The ventricle was mildly dilated  Systolic function was moderately to markedly reduced by Teichholz  Ejection fraction was estimated in the range of 35 % to 40 % to be 37 %  There was moderate diffuse hypokinesis  Wall  thickness was normal  No evidence of apical thrombus  DOPPLER: Features were consistent with a pseudonormal left ventricular filling pattern, with concomitant abnormal relaxation and increased filling pressure (grade 2 diastolic  dysfunction)  RIGHT VENTRICLE: The size was normal  Systolic function was low normal measuring 1 6cm Wall thickness was normal     LEFT ATRIUM: The atrium was mildly dilated      RIGHT ATRIUM: Size was normal     MITRAL VALVE: There was mild annular calcification  There was normal leaflet separation  DOPPLER: The transmitral velocity was within the normal range  There was no evidence for stenosis  There was trace regurgitation  AORTIC VALVE: The valve was trileaflet  Leaflets exhibited mildly increased thickness, mild calcification, and normal cuspal separation  DOPPLER: Transaortic velocity was within the normal range  There was no evidence for stenosis  There  was no significant regurgitation  TRICUSPID VALVE: The valve structure was normal  There was normal leaflet separation  DOPPLER: The transtricuspid velocity was within the normal range  There was no evidence for stenosis  There was mild regurgitation  Estimated peak PA  pressure was 34 mmHg  PULMONIC VALVE: Leaflets exhibited normal thickness, no calcification, and normal cuspal separation  DOPPLER: The transpulmonic velocity was within the normal range  There was mild regurgitation  PERICARDIUM: There was no pericardial effusion  The pericardium was normal in appearance  AORTA: The root exhibited normal size  SYSTEMIC VEINS: IVC: The inferior vena cava was dilated  SYSTEM MEASUREMENT TABLES    2D mode  AoR Diam 2D: 3 8 cm  LA Diam (2D): 4 3 cm  LA/Ao (2D): 1 13  FS (2D Teich): 26 1 %  IVSd (2D): 0 93 cm  LVDEV: 148 cmï¾³  LVEDV MOD BP: 201 cmï¾³  LVESV: 72 9 cmï¾³  LVIDd(2D): 5 51 cm  LVISd (2D): 4 07 cm  LVOT Area 2D: 3 14 cmï¾²  LVPWd (2D): 1 09 cm  SV (Teich): 75 1 cmï¾³    Apical four chamber  LVEDV MOD A4C: 186 cmï¾³  LVEF A4C: 29 %  LVESV A4C: 111 cmï¾³  LVLD A4C: 8 23 cm  LVLS A4C: 7 35 cm    Apical two chamber  LVEF A2C: 35 %    Unspecified Scan Mode  MANA Cont Eq (Peak Stefan): 2 52 cmï¾²  LVOT Diam : 2 cm  LVOT Vmax: 1210 mm/s  LVOT Vmax; Mean: 1210 mm/s  Peak Grad ; Mean: 6 mm[Hg]  MV Peak A Stefan: 1070 mm/s  MV Peak E Stefan   Mean: 1040 mm/s  MVA (PHT): 4 31 cmï¾²  PHT: 51 ms  Max P mm[Hg]  V Max: 2480 mm/s  Vmax: 2520 mm/s  RA Area: 20 8 cmï¾²  RA Volume: 62 8 cmï¾³  TAPSE: 1 6 cm    [employer] Accredited Echocardiography Laboratory    Prepared and electronically signed by    Bakair Bhardwaj MD  Signed 02-Sep-2020 14:31:50         EKG: Personally reviewed  Reviewed    Counseling :  Counseling / Coordination of Care Time: 40min  Greater than 50% of total time spent on patient counseling and coordination of care  Code Status: Level 1 - Full Code  Collaboration of Care: Were Recommendations Directly Discussed with Primary Treatment Team? - Yes     Bakari Bhardwaj MD [facility]    "This note has been constructed using a voice recognition system  Therefore there may be syntax, spelling, and/or grammatical errors   Please call if you have any questions  "

## 2020-09-07 NOTE — SPEECH THERAPY NOTE
Speech Language/Pathology  Noted VBS order  Not able to perform today due to radiologist schedule at [facility]  Will plan for Tuesday 9/8/20       Mihai Blair MS CCC-SLP  Speech Language Pathologist  Available via [address] [place] [address] License # [id number]  [facility] \[facility] [place] [facility]\"" # [id number]- [id number]

## 2020-09-07 NOTE — OCCUPATIONAL THERAPY NOTE
Occupational Therapy Attempt Note    Attempted OT treatment this PM however patient declined due to fatigue   Will follow up as schedule permits and as patient is agreeable    Marlene Moreno, OTR/L [id number]

## 2020-09-07 NOTE — PLAN OF CARE
Problem: PHYSICAL THERAPY ADULT  Goal: Performs mobility at highest level of function for planned discharge setting  See evaluation for individualized goals  Description: Treatment/Interventions: ADL retraining, Functional transfer training, LE strengthening/ROM, Therapeutic exercise, Endurance training, Gait training, Bed mobility, Equipment eval/education, Patient/family training          See flowsheet documentation for full assessment, interventions and recommendations  Outcome: Progressing  Note: Prognosis: Good  Problem List: Decreased strength, Decreased endurance, Impaired balance, Decreased mobility, Pain  Assessment: Pt initially declined PT however then agreed after getting tylenol for should pain  Pt demonstrates improving endurance and function  Pt does well with encouragement  PT Discharge Recommendation: Post-Acute Rehabilitation Services(STR)          See flowsheet documentation for full assessment

## 2020-09-07 NOTE — PLAN OF CARE
Problem: RESPIRATORY - ADULT  Goal: Achieves optimal ventilation and oxygenation  Description: INTERVENTIONS:  - Assess for changes in respiratory status  - Assess for changes in mentation and behavior  - Position to facilitate oxygenation and minimize respiratory effort  - Oxygen administered by appropriate delivery if ordered  - Initiate smoking cessation education as indicated  - Encourage broncho-pulmonary hygiene including cough, deep breathe, Incentive Spirometry  - Assess the need for suctioning and aspirate as needed  - Assess and instruct to report SOB or any respiratory difficulty  - Respiratory Therapy support as indicated  Outcome: Progressing     Problem: PAIN - ADULT  Goal: Verbalizes/displays adequate comfort level or baseline comfort level  Description: Interventions:  - Encourage patient to monitor pain and request assistance  - Assess pain using appropriate pain scale  - Administer analgesics based on type and severity of pain and evaluate response  - Implement non-pharmacological measures as appropriate and evaluate response  - Consider cultural and social influences on pain and pain management  - Notify physician/advanced practitioner if interventions unsuccessful or patient reports new pain  Outcome: Progressing     Problem: INFECTION - ADULT  Goal: Absence or prevention of progression during hospitalization  Description: INTERVENTIONS:  - Assess and monitor for signs and symptoms of infection  - Monitor lab/diagnostic results  - Monitor all insertion sites, i e  indwelling lines, tubes, and drains  - Monitor endotracheal if appropriate and nasal secretions for changes in amount and color  - [place] appropriate cooling/warming therapies per order  - Administer medications as ordered  - Instruct and encourage patient and family to use good hand hygiene technique  - Identify and instruct in appropriate isolation precautions for identified infection/condition  Outcome: Progressing  Goal: Absence of fever/infection during neutropenic period  Description: INTERVENTIONS:  - Monitor WBC    Outcome: Progressing     Problem: SAFETY ADULT  Goal: Patient will remain free of falls  Description: INTERVENTIONS:  - Assess patient frequently for physical needs  -  Identify cognitive and physical deficits and behaviors that affect risk of falls    -  [place] fall precautions as indicated by assessment   - Educate patient/family on patient safety including physical limitations  - Instruct patient to call for assistance with activity based on assessment  - Modify environment to reduce risk of injury  - Consider OT/PT consult to assist with strengthening/mobility  Outcome: Progressing  Goal: Maintain or return to baseline ADL function  Description: INTERVENTIONS:  -  Assess patient's ability to carry out ADLs; assess patient's baseline for ADL function and identify physical deficits which impact ability to perform ADLs (bathing, care of mouth/teeth, toileting, grooming, dressing, etc )  - Assess/evaluate cause of self-care deficits   - Assess range of motion  - Assess patient's mobility; develop plan if impaired  - Assess patient's need for assistive devices and provide as appropriate  - Encourage maximum independence but intervene and supervise when necessary  - Involve family in performance of ADLs  - Assess for home care needs following discharge   - Consider OT consult to assist with ADL evaluation and planning for discharge  - Provide patient education as appropriate  Outcome: Progressing  Goal: Maintain or return mobility status to optimal level  Description: INTERVENTIONS:  - Assess patient's baseline mobility status (ambulation, transfers, stairs, etc )    - Identify cognitive and physical deficits and behaviors that affect mobility  - Identify mobility aids required to assist with transfers and/or ambulation (gait belt, sit-to-stand, lift, walker, cane, etc )  - [place] fall precautions as indicated by assessment  - Record patient progress and toleration of activity level on Mobility SBAR; progress patient to next Phase/Stage  - Instruct patient to call for assistance with activity based on assessment  - Consider rehabilitation consult to assist with strengthening/weightbearing, etc   Outcome: Progressing     Problem: DISCHARGE PLANNING  Goal: Discharge to home or other facility with appropriate resources  Description: INTERVENTIONS:  - Identify barriers to discharge w/patient and caregiver  - Arrange for needed discharge resources and transportation as appropriate  - Identify discharge learning needs (meds, wound care, etc )  - Arrange for interpretive services to assist at discharge as needed  - Refer to Case Management Department for coordinating discharge planning if the patient needs post-hospital services based on physician/advanced practitioner order or complex needs related to functional status, cognitive ability, or social support system  Outcome: Progressing     Problem: Knowledge Deficit  Goal: Patient/family/caregiver demonstrates understanding of disease process, treatment plan, medications, and discharge instructions  Description: Complete learning assessment and assess knowledge base    Interventions:  - Provide teaching at level of understanding  - Provide teaching via preferred learning methods  Outcome: Progressing     Problem: CARDIOVASCULAR - ADULT  Goal: Maintains optimal cardiac output and hemodynamic stability  Description: INTERVENTIONS:  - Monitor I/O, vital signs and rhythm  - Monitor for S/S and trends of decreased cardiac output  - Administer and titrate ordered vasoactive medications to optimize hemodynamic stability  - Assess quality of pulses, skin color and temperature  - Assess for signs of decreased coronary artery perfusion  - Instruct patient to report change in severity of symptoms  Outcome: Progressing  Goal: Absence of cardiac dysrhythmias or at baseline rhythm  Description: INTERVENTIONS:  - Continuous cardiac monitoring, vital signs, obtain 12 lead EKG if ordered  - Administer antiarrhythmic and heart rate control medications as ordered  - Monitor electrolytes and administer replacement therapy as ordered  Outcome: Progressing     Problem: GENITOURINARY - ADULT  Goal: Maintains or returns to baseline urinary function  Description: INTERVENTIONS:  - Assess urinary function  - Encourage oral fluids to ensure adequate hydration if ordered  - Administer IV fluids as ordered to ensure adequate hydration  - Administer ordered medications as needed  - Offer frequent toileting  - Follow urinary retention protocol if ordered  Outcome: Progressing  Goal: Urinary catheter remains patent  Description: INTERVENTIONS:  - Assess patency of urinary catheter  - If patient has a chronic lopez, consider changing catheter if non-functioning  - Follow guidelines for intermittent irrigation of non-functioning urinary catheter  Outcome: Progressing     Problem: METABOLIC, FLUID AND ELECTROLYTES - ADULT  Goal: Electrolytes maintained within normal limits  Description: INTERVENTIONS:  - Monitor labs and assess patient for signs and symptoms of electrolyte imbalances  - Administer electrolyte replacement as ordered  - Monitor response to electrolyte replacements, including repeat lab results as appropriate  - Instruct patient on fluid and nutrition as appropriate  Outcome: Progressing  Goal: Fluid balance maintained  Description: INTERVENTIONS:  - Monitor labs   - Monitor I/O and WT  - Instruct patient on fluid and nutrition as appropriate  - Assess for signs & symptoms of volume excess or deficit  Outcome: Progressing  Goal: Glucose maintained within target range  Description: INTERVENTIONS:  - Monitor Blood Glucose as ordered  - Assess for signs and symptoms of hyperglycemia and hypoglycemia  - Administer ordered medications to maintain glucose within target range  - Assess nutritional intake and initiate nutrition service referral as needed  Outcome: Progressing     Problem: Prexisting or High Potential for Compromised Skin Integrity  Goal: Skin integrity is maintained or improved  Description: INTERVENTIONS:  - Identify patients at risk for skin breakdown  - Assess and monitor skin integrity  - Assess and monitor nutrition and hydration status  - Monitor labs   - Assess for incontinence   - Turn and reposition patient  - Assist with mobility/ambulation  - Relieve pressure over bony prominences  - Avoid friction and shearing  - Provide appropriate hygiene as needed including keeping skin clean and dry  - Evaluate need for skin moisturizer/barrier cream  - Collaborate with interdisciplinary team   - Patient/family teaching  - Consider wound care consult   Outcome: Progressing     Problem: Potential for Falls  Goal: Patient will remain free of falls  Description: INTERVENTIONS:  - Assess patient frequently for physical needs  -  Identify cognitive and physical deficits and behaviors that affect risk of falls  -  [place] fall precautions as indicated by assessment   - Educate patient/family on patient safety including physical limitations  - Instruct patient to call for assistance with activity based on assessment  - Modify environment to reduce risk of injury  - Consider OT/PT consult to assist with strengthening/mobility  Outcome: Progressing     Problem: Nutrition/Hydration-ADULT  Goal: Nutrient/Hydration intake appropriate for improving, restoring or maintaining nutritional needs  Description: Monitor and assess patient's nutrition/hydration status for malnutrition  Collaborate with interdisciplinary team and initiate plan and interventions as ordered  Monitor patient's weight and dietary intake as ordered or per policy  Utilize nutrition screening tool and intervene as necessary  Determine patient's food preferences and provide high-protein, high-caloric foods as appropriate       INTERVENTIONS:  - Monitor oral intake, urinary output, labs, and treatment plans  - Assess nutrition and hydration status and recommend course of action  - Evaluate amount of meals eaten  - Assist patient with eating if necessary   - Allow adequate time for meals  - Recommend/ encourage appropriate diets, oral nutritional supplements, and vitamin/mineral supplements  - Order, calculate, and assess calorie counts as needed  - Assess need for intravenous fluids  - Provide specific nutrition/hydration education as appropriate  - Include patient/family/caregiver in decisions related to nutrition  Outcome: Progressing

## 2020-09-07 NOTE — PHYSICAL THERAPY NOTE
PT TREATMENT     09/07/20 1440   Pain Assessment   Pain Assessment Tool Pain Assessment not indicated - pt denies pain   Restrictions/Precautions   Other Precautions Fall Risk;Pain;O2;Chair Alarm; Bed Alarm   General   Chart Reviewed Yes   Cognition   Arousal/Participation Cooperative   Attention Within functional limits   Following Commands Follows one step commands without difficulty   Subjective   Subjective "I'm glad I got OOB"   Bed Mobility   Supine to Sit 5  Supervision   Transfers   Sit to Stand 4  Minimal assistance   Stand to Sit 4  Minimal assistance   Stand pivot 4  Minimal assistance   Ambulation/Elevation   Gait Assistance 4  Minimal assist   Assistive Device Rolling walker  (2L02)   Distance 2x25 feet with 180 degree turn   Balance   Static Sitting Fair +   Dynamic Sitting Fair   Static Standing Fair   Dynamic Standing Fair -   Ambulatory Fair -   Activity Tolerance   Activity Tolerance Patient tolerated treatment well;Patient limited by fatigue   Assessment   Prognosis Good   Problem List Decreased strength;Decreased endurance; Impaired balance;Decreased mobility;Pain   Assessment Pt initially declined PT however then agreed after getting tylenol for should pain  Pt demonstrates improving endurance and function  Pt does well with encouragement  Plan   Treatment/Interventions ADL retraining;Functional transfer training;LE strengthening/ROM; Therapeutic exercise; Endurance training;Gait training;Bed mobility; Equipment eval/education;Patient/family training   Progress Progressing toward goals   PT Frequency 5x/wk   Recommendation   PT Discharge Recommendation Post-Acute Rehabilitation Services  [facility])   Licensure   [place] License Number  Serge HEALY  [id number]

## 2020-09-07 NOTE — PROGRESS NOTES
[facility] Progress Note - Nando Nevarez 77 y o  male MRN: [id number]    Unit/Bed#: [address] [place] [address] Encounter: [id number]      Assessment/Plan:  Urge urinary incontinence  Assessment & Plan  Patient has enuresis for 3 days  · Consult to urology  · Started on flomax 0 4 mg QD  · Consider adult diaper at night  · Avoid catheter given recent sepsis and immunocompromised, concern for infection    Dilated cardiomyopathy ([address] )  Assessment & Plan  ECHO this admission showed:  1  LV: Ejection fraction was estimated in the range of 35 % to 40 % to be 37 %  Moderate diffuse hypokinesis  Pseudonormal left ventricular filling pattern, with concomitant abnormal relaxation and increased filling pressure (grade 2 diastolic dysfunction)  2  RV: Low normal systolic function with Tapse 1 6    Etiology unknown at this time  Patient will need follow-up when stable or as outpatient  Cardiology consulted  Appreciate recommendations  Will continue Lopressor 25 mg po QHS    Moderate protein-calorie malnutrition (HCC)  Assessment & Plan  Malnutrition Findings:   Malnutrition type: Chronic illness(Moderate malnutrition of chronic illness related to inadequate energy intake as evidenced by somewhat hollow orbits with somewhat dark circles, somewhat depression at temples  Awaiting treatment plan)  Degree of Malnutrition: Malnutrition of moderate degree    BMI Findings: Body mass index is 24 58 kg/m²  A-fib [facility])  Assessment & Plan  New onset Afib suspected to be 2/2 to PNA  Cardioconverted overnight on 9/2 with amiodarone  Currently on amiodarone po  No palpitations or CP today    · Continue to monitor on telemetry  · Continue p o  amiodarone with bolus titration per cardiology recommendations  Patient was counseled regarding the side effects of amiodarone    · Amiodarone 200 QD  · Continue Eliquis, 5mg BID  · Lopressor 25mg QHS  · Appreciate cardiology recommendations       PNA (pneumonia)  Assessment & Plan  On 8/30, CT with concern for left sided multifocal PNA  Concern for aspiration in the setting of dysphagia  · CT shows concern for Left sided multifocal pna, likely source of sepsis   · Trend fever, procal, and WBC    · Patient remains afebrile  Results from last 7 days   Lab Units 09/05/20  0627 09/04/20  0601 09/03/20  0640 09/02/20  0539 09/01/20  0537   PROCALCITONIN ng/ml 8 07* 19 14* 35 18* 49 96* 86 37*     Results from last 7 days   Lab Units 09/06/20  0553 09/05/20  0627 09/04/20  0601 09/03/20  0640 09/02/20  0539   WBC Thousand/uL 22 02* 22 57* 26 35* 21 91* 10 82*       · Currently requiring 2L NC, keep sats >90%  · Cultures  ? Blood cultures  ? 8/30 BC 2/2 E  Coli  ? 9/1 BC: NG at 5 days  ? Sputum cultures  ? 8/31 Sputum 1+Klebsiella oxytoca, 1+Candida  ? 9/4 Sputum culture pending  · Aspiration precautions   · Cefazolin 2 g IV q 8 hours (Day #3)  · oral antibiotics when tolerate POR  · Diet  ? Change diet to Dys 2 with honey thick liquids via cup, no straws, medications in puree  Neutropenic ([facility] [address] )  Assessment & Plan  · Neutropenic precautions  · Completed Granix 5mcg/kg x 3 days  · Heme/onc on consult  · Neutropenia improving: ANC 17 on 9/3  · Will continue to monitor daily CBC with diff    Falls  Assessment & Plan  · Fall precuations   · PT/OT ordered, recommendations appreciated    Moderate malnutrition ([address] )  Assessment & Plan  Moderate malnutrition the setting of chronic disease  Nutrition consulted  Appreciate recommendations  Chemotherapy-induced nausea  Assessment & Plan  On Zofran 4 mg PRN    Gastroesophageal cancer ([address] )  Assessment & Plan  · Follows with [facility] Heme/Onc  · Pt does have mets to peritoneum but has been fairly responsive to chemotherapy  ? Last treatment 8/20 with 5 FU  · Follows with palliative, after readdressing status with wife plan to remain full code    · Barium swallow Tuesday, follow up results    * Sepsis [facility])  Assessment & Plan  Ongoing chemotherapy for GE CA, noted to be febrile on morning of admission by wife s/p fall in kitchen  Lactic 9 9 on admission, received 3L IVF bolus in ED, remained invasive pressor support for 3 days,  started broad spectrum abx  CT CAP: Suspicious for Left sided multi-lobar pna, no PE noted, masses remain unchanged compared to prior films  CT Head/Neck: no acute intracranial changes  Patient initially admitted to ICU due to requirement for vasopressors  Discontinued on 8/31, however levophed restarted on 9/2 due to hypotension, then weaned off during the night       · received stress dose steroids on admission  Transitioned to home Decadron on 9/1  Given additional 50mg of Solucortef morning of 9/2 due to hypotension  · Strict I/O's with lopez  · Lactic trending down, most recent 2 5 on 9/1  · Procal also trending down: 150-> 86 ->49->35 18 (9/3)   · BC 2/2 E  Coli, Sputum cx +Klebsiella oxytoca  ? Repeat BC on 9/1: NG @ 48hrs  · Abx  ? S/p 3 days cefepime (8/30-9/1)  ? S/p Tobramycin x1 on 8/31  ? S/p 2 days vanc (8/30-8/31)  ? Meropenem 1G q8hrs (9/1-9/2)  ? Cefazolin 2 g IV q 8 hours (9/2-)  -transition to PO Abx when able to tolerate PO abx          Subjective:   Patient was seen and examined at bedside  Patient states that he is still having productive cough but denies any fever, chills, chest pain, or shortness of breath  Patient stating "I'm peeing all the time"  Objective:     Vitals: Blood pressure 135/82, pulse 69, temperature (!) 97 3 °F (36 3 °C), resp  rate 16, height 6' (1 829 m), weight 77 9 kg (171 lb 11 8 oz), SpO2 97 %  ,Body mass index is 23 29 kg/m²    Wt Readings from Last 3 Encounters:   09/07/20 77 9 kg (171 lb 11 8 oz)   08/19/20 81 6 kg (179 lb 14 3 oz)   08/18/20 82 3 kg (181 lb 8 oz)       Intake/Output Summary (Last 24 hours) at 9/7/2020 0804  Last data filed at 9/7/2020 0601  Gross per 24 hour   Intake 1220 ml   Output 2750 ml   Net -1530 ml       Physical Exam:   General: Pt is AAO x 3, not in any acute distress  Cardio: RRR, S1 and S2 +, no murmurs/rubs/gallops/clicks  Resp: CTA b/l, no wheezes/rales/rhonchi  Abdomen: soft, NT/ND, BS+  Extremities: no cyanosis or edema  PP 2+ b/l          Recent Results (from the past 24 hour(s))   Procalcitonin with AM Reflex    Collection Time: 09/06/20 10:57 AM   Result Value Ref Range    Procalcitonin 4 15 (H) <=0 25 ng/ml   Fingerstick Glucose (POCT)    Collection Time: 09/06/20 11:13 AM   Result Value Ref Range    POC Glucose 112 65 - 140 mg/dl   Fingerstick Glucose (POCT)    Collection Time: 09/06/20  4:06 PM   Result Value Ref Range    POC Glucose 95 65 - 140 mg/dl   Fingerstick Glucose (POCT)    Collection Time: 09/06/20 10:00 PM   Result Value Ref Range    POC Glucose 174 (H) 65 - 140 mg/dl   CBC and differential    Collection Time: 09/07/20  5:46 AM   Result Value Ref Range    WBC 20 53 (H) 4 31 - 10 16 Thousand/uL    RBC 3 12 (L) 3 88 - 5 62 Million/uL    Hemoglobin 9 8 (L) 12 0 - 17 0 g/dL    Hematocrit 31 9 (L) 36 5 - 49 3 %     (H) 82 - 98 fL    MCH 31 4 26 8 - 34 3 pg    MCHC 30 7 (L) 31 4 - 37 4 g/dL    RDW 19 1 (H) 11 6 - 15 1 %    MPV 11 7 8 9 - 12 7 fL    Platelets 076 478 - 665 Thousands/uL    nRBC 0 /100 WBCs    Neutrophils Relative 68 43 - 75 %    Immat GRANS % 16 (H) 0 - 2 %    Lymphocytes Relative 10 (L) 14 - 44 %    Monocytes Relative 6 4 - 12 %    Eosinophils Relative 0 0 - 6 %    Basophils Relative 0 0 - 1 %   Fingerstick Glucose (POCT)    Collection Time: 09/07/20  7:22 AM   Result Value Ref Range    POC Glucose 92 65 - 140 mg/dl       Current Facility-Administered Medications   Medication Dose Route Frequency Provider Last Rate Last Dose    acetaminophen (TYLENOL) tablet 650 mg  650 mg Oral Q6H PRN Telma Trivedi DO   650 mg at 08/31/20 2013    amiodarone tablet 200 mg  200 mg Oral Daily With Breakfast Jeannine Rice MD   200 mg at 09/07/20 0804    apixaban (ELIQUIS) tablet 5 mg  5 mg Oral BID YANI Rosenberg 5 mg at 09/07/20 0804    benzonatate (TESSALON PERLES) capsule 100 mg  100 mg Oral TID PRN Joyce Vasquez MD   100 mg at 09/06/20 0545    ceFAZolin (ANCEF) IVPB (premix) 2,000 mg 50 mL  2,000 mg Intravenous Q8H Telma Trivedi,  mL/hr at 09/07/20 0804 2,000 mg at 09/07/20 0804    dexamethasone (DECADRON) tablet 4 mg  4 mg Oral After [employer], DO   4 mg at 09/06/20 1410    diazepam (VALIUM) tablet 5 mg  5 mg Oral Daily PRN Sam Query, DO   5 mg at 09/05/20 2203    insulin lispro (HumaLOG) 100 units/mL subcutaneous injection 1-6 Units  1-6 Units Subcutaneous 4x Daily (AC & HS) Dillon Gaspar MD   1 Units at 09/06/20 2211    ipratropium (ATROVENT) 0 02 % inhalation solution 0 5 mg  0 5 mg Nebulization TID Sam Query, DO   0 5 mg at 09/07/20 0743    levalbuterol (XOPENEX) inhalation solution 1 25 mg  1 25 mg Nebulization TID Sam Query, DO   1 25 mg at 09/07/20 0743    losartan (COZAAR) tablet 25 mg  25 mg Oral Daily YANI Hawley   25 mg at 09/07/20 0804    metoprolol tartrate (LOPRESSOR) tablet 25 mg  25 mg Oral HS Tanya Slaughter MD   25 mg at 09/06/20 2210    OLANZapine (ZyPREXA) tablet 5 mg  5 mg Oral HS Telma Trivedi, DO   5 mg at 09/06/20 2209    ondansetron (ZOFRAN) injection 4 mg  4 mg Intravenous Q8H PRN Telma Trivedi, DO   4 mg at 08/30/20 1523    pantoprazole (PROTONIX) EC tablet 40 mg  40 mg Oral Daily Before Breakfast Telma Trivedi, DO   40 mg at 09/07/20 0537    pneumococcal 13-valent conjugate vaccine (PREVNAR-13) IM injection 0 5 mL  0 5 mL Intramuscular Prior to discharge Sam Query, DO        tamsulosin (FLOMAX) capsule 0 4 mg  0 4 mg Oral Daily With Kristin Malloy MD   0 4 mg at 09/06/20 1704    venlafaxine (EFFEXOR-XR) 24 hr capsule 150 mg  150 mg Oral Daily Telma Trivedi DO   150 mg at 09/07/20 0804       Invasive Devices     Central Venous Catheter Line            Port A Cath Right Chest -- days    Port A Cath 07/31/19 Right Chest 404 days                Lab, Imaging and other studies: I have personally reviewed pertinent reports      VTE Pharmacologic Prophylaxis: Eliquis  VTE Mechanical Prophylaxis: sequential compression device    Amada Velasquez DO

## 2020-09-07 NOTE — PLAN OF CARE
Problem: RESPIRATORY - ADULT  Goal: Achieves optimal ventilation and oxygenation  Description: INTERVENTIONS:  - Assess for changes in respiratory status  - Assess for changes in mentation and behavior  - Position to facilitate oxygenation and minimize respiratory effort  - Oxygen administered by appropriate delivery if ordered  - Initiate smoking cessation education as indicated  - Encourage broncho-pulmonary hygiene including cough, deep breathe, Incentive Spirometry  - Assess the need for suctioning and aspirate as needed  - Assess and instruct to report SOB or any respiratory difficulty  - Respiratory Therapy support as indicated  Outcome: Progressing     Problem: PAIN - ADULT  Goal: Verbalizes/displays adequate comfort level or baseline comfort level  Description: Interventions:  - Encourage patient to monitor pain and request assistance  - Assess pain using appropriate pain scale  - Administer analgesics based on type and severity of pain and evaluate response  - Implement non-pharmacological measures as appropriate and evaluate response  - Consider cultural and social influences on pain and pain management  - Notify physician/advanced practitioner if interventions unsuccessful or patient reports new pain  Outcome: Progressing     Problem: INFECTION - ADULT  Goal: Absence or prevention of progression during hospitalization  Description: INTERVENTIONS:  - Assess and monitor for signs and symptoms of infection  - Monitor lab/diagnostic results  - Monitor all insertion sites, i e  indwelling lines, tubes, and drains  - Monitor endotracheal if appropriate and nasal secretions for changes in amount and color  - Mobile appropriate cooling/warming therapies per order  - Administer medications as ordered  - Instruct and encourage patient and family to use good hand hygiene technique  - Identify and instruct in appropriate isolation precautions for identified infection/condition  Outcome: Progressing  Goal: Absence of fever/infection during neutropenic period  Description: INTERVENTIONS:  - Monitor WBC    Outcome: Progressing     Problem: SAFETY ADULT  Goal: Patient will remain free of falls  Description: INTERVENTIONS:  - Assess patient frequently for physical needs  -  Identify cognitive and physical deficits and behaviors that affect risk of falls    -  [place] fall precautions as indicated by assessment   - Educate patient/family on patient safety including physical limitations  - Instruct patient to call for assistance with activity based on assessment  - Modify environment to reduce risk of injury  - Consider OT/PT consult to assist with strengthening/mobility  Outcome: Progressing  Goal: Maintain or return to baseline ADL function  Description: INTERVENTIONS:  -  Assess patient's ability to carry out ADLs; assess patient's baseline for ADL function and identify physical deficits which impact ability to perform ADLs (bathing, care of mouth/teeth, toileting, grooming, dressing, etc )  - Assess/evaluate cause of self-care deficits   - Assess range of motion  - Assess patient's mobility; develop plan if impaired  - Assess patient's need for assistive devices and provide as appropriate  - Encourage maximum independence but intervene and supervise when necessary  - Involve family in performance of ADLs  - Assess for home care needs following discharge   - Consider OT consult to assist with ADL evaluation and planning for discharge  - Provide patient education as appropriate  Outcome: Progressing  Goal: Maintain or return mobility status to optimal level  Description: INTERVENTIONS:  - Assess patient's baseline mobility status (ambulation, transfers, stairs, etc )    - Identify cognitive and physical deficits and behaviors that affect mobility  - Identify mobility aids required to assist with transfers and/or ambulation (gait belt, sit-to-stand, lift, walker, cane, etc )  - [place] fall precautions as indicated by assessment  - Record patient progress and toleration of activity level on Mobility SBAR; progress patient to next Phase/Stage  - Instruct patient to call for assistance with activity based on assessment  - Consider rehabilitation consult to assist with strengthening/weightbearing, etc   Outcome: Progressing     Problem: DISCHARGE PLANNING  Goal: Discharge to home or other facility with appropriate resources  Description: INTERVENTIONS:  - Identify barriers to discharge w/patient and caregiver  - Arrange for needed discharge resources and transportation as appropriate  - Identify discharge learning needs (meds, wound care, etc )  - Arrange for interpretive services to assist at discharge as needed  - Refer to Case Management Department for coordinating discharge planning if the patient needs post-hospital services based on physician/advanced practitioner order or complex needs related to functional status, cognitive ability, or social support system  Outcome: Progressing     Problem: Knowledge Deficit  Goal: Patient/family/caregiver demonstrates understanding of disease process, treatment plan, medications, and discharge instructions  Description: Complete learning assessment and assess knowledge base    Interventions:  - Provide teaching at level of understanding  - Provide teaching via preferred learning methods  Outcome: Progressing     Problem: CARDIOVASCULAR - ADULT  Goal: Maintains optimal cardiac output and hemodynamic stability  Description: INTERVENTIONS:  - Monitor I/O, vital signs and rhythm  - Monitor for S/S and trends of decreased cardiac output  - Administer and titrate ordered vasoactive medications to optimize hemodynamic stability  - Assess quality of pulses, skin color and temperature  - Assess for signs of decreased coronary artery perfusion  - Instruct patient to report change in severity of symptoms  Outcome: Progressing  Goal: Absence of cardiac dysrhythmias or at baseline rhythm  Description: INTERVENTIONS:  - Continuous cardiac monitoring, vital signs, obtain 12 lead EKG if ordered  - Administer antiarrhythmic and heart rate control medications as ordered  - Monitor electrolytes and administer replacement therapy as ordered  Outcome: Progressing     Problem: GENITOURINARY - ADULT  Goal: Maintains or returns to baseline urinary function  Description: INTERVENTIONS:  - Assess urinary function  - Encourage oral fluids to ensure adequate hydration if ordered  - Administer IV fluids as ordered to ensure adequate hydration  - Administer ordered medications as needed  - Offer frequent toileting  - Follow urinary retention protocol if ordered  Outcome: Progressing  Goal: Urinary catheter remains patent  Description: INTERVENTIONS:  - Assess patency of urinary catheter  - If patient has a chronic lopez, consider changing catheter if non-functioning  - Follow guidelines for intermittent irrigation of non-functioning urinary catheter  Outcome: Progressing     Problem: METABOLIC, FLUID AND ELECTROLYTES - ADULT  Goal: Electrolytes maintained within normal limits  Description: INTERVENTIONS:  - Monitor labs and assess patient for signs and symptoms of electrolyte imbalances  - Administer electrolyte replacement as ordered  - Monitor response to electrolyte replacements, including repeat lab results as appropriate  - Instruct patient on fluid and nutrition as appropriate  Outcome: Progressing  Goal: Fluid balance maintained  Description: INTERVENTIONS:  - Monitor labs   - Monitor I/O and WT  - Instruct patient on fluid and nutrition as appropriate  - Assess for signs & symptoms of volume excess or deficit  Outcome: Progressing  Goal: Glucose maintained within target range  Description: INTERVENTIONS:  - Monitor Blood Glucose as ordered  - Assess for signs and symptoms of hyperglycemia and hypoglycemia  - Administer ordered medications to maintain glucose within target range  - Assess nutritional intake and initiate nutrition service referral as needed  Outcome: Progressing     Problem: Prexisting or High Potential for Compromised Skin Integrity  Goal: Skin integrity is maintained or improved  Description: INTERVENTIONS:  - Identify patients at risk for skin breakdown  - Assess and monitor skin integrity  - Assess and monitor nutrition and hydration status  - Monitor labs   - Assess for incontinence   - Turn and reposition patient  - Assist with mobility/ambulation  - Relieve pressure over bony prominences  - Avoid friction and shearing  - Provide appropriate hygiene as needed including keeping skin clean and dry  - Evaluate need for skin moisturizer/barrier cream  - Collaborate with interdisciplinary team   - Patient/family teaching  - Consider wound care consult   Outcome: Progressing     Problem: Potential for Falls  Goal: Patient will remain free of falls  Description: INTERVENTIONS:  - Assess patient frequently for physical needs  -  Identify cognitive and physical deficits and behaviors that affect risk of falls  -  [place] fall precautions as indicated by assessment   - Educate patient/family on patient safety including physical limitations  - Instruct patient to call for assistance with activity based on assessment  - Modify environment to reduce risk of injury  - Consider OT/PT consult to assist with strengthening/mobility  Outcome: Progressing     Problem: Nutrition/Hydration-ADULT  Goal: Nutrient/Hydration intake appropriate for improving, restoring or maintaining nutritional needs  Description: Monitor and assess patient's nutrition/hydration status for malnutrition  Collaborate with interdisciplinary team and initiate plan and interventions as ordered  Monitor patient's weight and dietary intake as ordered or per policy  Utilize nutrition screening tool and intervene as necessary  Determine patient's food preferences and provide high-protein, high-caloric foods as appropriate       INTERVENTIONS:  - Monitor oral intake, urinary output, labs, and treatment plans  - Assess nutrition and hydration status and recommend course of action  - Evaluate amount of meals eaten  - Assist patient with eating if necessary   - Allow adequate time for meals  - Recommend/ encourage appropriate diets, oral nutritional supplements, and vitamin/mineral supplements  - Order, calculate, and assess calorie counts as needed  - Assess need for intravenous fluids  - Provide specific nutrition/hydration education as appropriate  - Include patient/family/caregiver in decisions related to nutrition  Outcome: Progressing

## 2020-09-07 NOTE — PROGRESS NOTES
Progress Note - Infectious Disease   Marietta Ogden 77 y o  male MRN: [id number]  Unit/Bed#: 2 [place] [address] Encounter: [id number]      Impression/Plan:  1  Septic shock-POA   Appears to be secondary to gram-negative bacteremia of an undefined source   Consideration for the possibility of translocation of organisms across the gut wall   Consideration for the possibility of pneumonia   Consideration for the possibility of typhilitis   Consideration for the possibility of another source such as a Port-A-Cath infection although this is less likely   Patient's blood pressure has recovered he is now off all vasopressor support   He seems to be tolerating the antibiotics without difficulty   No resistant organisms isolated  The white cell count has started to drift down after rising quite a bit while on the G-CSF and soon after  -continue cefazolin  -plan antibiotics at least through tomorrow but can consider changing to oral  -recheck CBC with diff and BMP  -recheck procalcitonin  -supportive care     2  E  Coli bacteremia-unclear primary source as the sputum culture does not match what is in the blood   Probably secondary to translocation across the bowel wall in the setting of severe neutropenia   No definitive primary intra-abdominal process found on CT abdomen pelvis    Possibly secondary to a catheter related bacteremia of this less likely   no resistant organisms isolated  Repeat blood cultures are negative thus far  -antibiotics as above  -follow up repeat blood cultures  -no additional ID workup for now  -will need repeat blood cultures to be done 1 week after the antibiotics with the Port-A-Cath in place     3  Pancytopenia-with severe neutropenia in the setting of cancer chemotherapy   Suspect secondary to the patient's chemotherapy   The acute infectious process may also be playing a role   Neutropenia is much improved and the patient now has leukocytosis    Suspect the leukocytosis is secondary to the G-CSF but will need to be watched  -no additional G-CSF  -recheck CBC with diff  -hematology oncology follow-up     4  Pneumonia-possibly aspiration   More dense consolidation on the left   His respiratory status is relatively stable remains a bit tenuous requiring oxygen support but is much improved  -antibiotics as above  -monitor respiratory status  -pulmonary toilet  -close Pulmonary critical care follow-up     5  Gastroesophageal cancer-on modified FOLFOX   Hematology oncology follow-up    Discussed the above management plan in detail with the primary service     Antibiotics:  Cefazolin 6  Antibiotics 9    Subjective:  Patient has no fever, chills, sweats; no nausea, vomiting, diarrhea; no increased cough, decreased shortness of breath; no pain  No new symptoms  Objective:  Vitals:  Temp:  [96 7 °F (35 9 °C)-98 1 °F (36 7 °C)] 97 3 °F (36 3 °C)  HR:  [61-79] 69  Resp:  [16-19] 16  BP: (114-135)/(68-83) 135/82  SpO2:  [93 %-98 %] 96 %  Temp (24hrs), Av 3 °F (36 3 °C), Min:96 7 °F (35 9 °C), Max:98 1 °F (36 7 °C)  Current: Temperature: (!) 97 3 °F (36 3 °C)    Physical Exam:   General Appearance:  Alert, interactive, nontoxic, no acute distress  Throat: Oropharynx moist without lesions  Lungs:   Clear to auscultation bilaterally; no wheezes, rhonchi or rales; respirations unlabored   Heart:  RRR; no murmur, rub or gallop   Abdomen:   Soft, non-tender, non-distended, positive bowel sounds  Extremities: No clubbing, cyanosis or edema   Skin: No new rashes or lesions  No draining wounds noted         Labs, Imaging, & Other studies:   All pertinent labs and imaging studies were personally reviewed  Results from last 7 days   Lab Units 20  0546 20  0553 20  0627   WBC Thousand/uL 20 53* 22 02* 22 57*   HEMOGLOBIN g/dL 9 8* 9 4* 8 4*   PLATELETS Thousands/uL 365 339 288     Results from last 7 days   Lab Units 20  0546 20  2039 20  2698  20  3068  20  0539 09/01/20  0537   SODIUM mmol/L 137 139 138   < > 141   < > 136  --  136   POTASSIUM mmol/L 4 3 4 4 4 3   < > 3 7   < > 4 0   < > 2 8*   CHLORIDE mmol/L 102 103 105   < > 108   < > 104  --  104   CO2 mmol/L 28 28 26   < > 23   < > 19*  --  22   BUN mg/dL 20 21 21   < > 18   < > 14  --  16   CREATININE mg/dL 0 89 0 84 0 76   < > 0 95   < > 0 81  --  0 89   EGFR ml/min/1 73sq m 88 91 94   < > 82   < > 92  --  88   CALCIUM mg/dL 8 8 8 5 8 4   < > 8 1*   < > 8 6  --  8 2*   AST U/L  --   --   --   --  25  --  56*  --  57*   ALT U/L  --   --   --   --  30  --  36  --  34   ALK PHOS U/L  --   --   --   --  67  --  53  --  47    < > = values in this interval not displayed  Results from last 7 days   Lab Units 09/04/20  1606 09/01/20  0617 09/01/20  0537   BLOOD CULTURE   --  No Growth After 5 Days  No Growth After 5 Days     SPUTUM CULTURE  Culture too young- will reincubate  --   --    GRAM STAIN RESULT  1+ Epithelial cells per low power field*  No polys seen*  Rare Yeast*  --   --      Results from last 7 days   Lab Units 09/06/20  1057 09/05/20  0627 09/04/20  0601 09/03/20  0640 09/02/20  0539 09/01/20  0537   PROCALCITONIN ng/ml 4 15* 8 07* 19 14* 35 18* 49 96* 86 37*         Results from last 7 days   Lab Units 09/01/20  0537   FERRITIN ng/mL 175

## 2020-09-08 ENCOUNTER — TELEPHONE (OUTPATIENT)
Dept: HEMATOLOGY ONCOLOGY | Facility: CLINIC | Age: 65
End: 2020-09-08

## 2020-09-08 VITALS
SYSTOLIC BLOOD PRESSURE: 117 MMHG | RESPIRATION RATE: 19 BRPM | HEART RATE: 95 BPM | HEIGHT: 72 IN | WEIGHT: 169.53 LBS | OXYGEN SATURATION: 93 % | BODY MASS INDEX: 22.96 KG/M2 | TEMPERATURE: 97.5 F | DIASTOLIC BLOOD PRESSURE: 76 MMHG

## 2020-09-08 PROBLEM — A41.9 SEPSIS (HCC): Status: RESOLVED | Noted: 2020-08-30 | Resolved: 2020-09-08

## 2020-09-08 PROBLEM — J18.9 PNA (PNEUMONIA): Status: RESOLVED | Noted: 2020-08-30 | Resolved: 2020-09-08

## 2020-09-08 PROBLEM — D70.9 NEUTROPENIC (HCC): Status: RESOLVED | Noted: 2020-08-30 | Resolved: 2020-09-08

## 2020-09-08 LAB
ANION GAP SERPL CALCULATED.3IONS-SCNC: 10 MMOL/L (ref 4–13)
BUN SERPL-MCNC: 22 MG/DL (ref 5–25)
CALCIUM SERPL-MCNC: 8.8 MG/DL (ref 8.3–10.1)
CHLORIDE SERPL-SCNC: 100 MMOL/L (ref 100–108)
CO2 SERPL-SCNC: 26 MMOL/L (ref 21–32)
CREAT SERPL-MCNC: 0.95 MG/DL (ref 0.6–1.3)
ERYTHROCYTE [DISTWIDTH] IN BLOOD BY AUTOMATED COUNT: 19.5 % (ref 11.6–15.1)
GFR SERPL CREATININE-BSD FRML MDRD: 82 ML/MIN/1.73SQ M
GLUCOSE SERPL-MCNC: 114 MG/DL (ref 65–140)
GLUCOSE SERPL-MCNC: 135 MG/DL (ref 65–140)
GLUCOSE SERPL-MCNC: 94 MG/DL (ref 65–140)
HCT VFR BLD AUTO: 34.5 % (ref 36.5–49.3)
HGB BLD-MCNC: 10.3 G/DL (ref 12–17)
MAGNESIUM SERPL-MCNC: 2.3 MG/DL (ref 1.6–2.6)
MCH RBC QN AUTO: 30.4 PG (ref 26.8–34.3)
MCHC RBC AUTO-ENTMCNC: 29.9 G/DL (ref 31.4–37.4)
MCV RBC AUTO: 102 FL (ref 82–98)
NRBC BLD AUTO-RTO: 0 /100 WBCS
PHOSPHATE SERPL-MCNC: 4 MG/DL (ref 2.3–4.1)
PLATELET # BLD AUTO: 369 THOUSANDS/UL (ref 149–390)
PMV BLD AUTO: 11.7 FL (ref 8.9–12.7)
POTASSIUM SERPL-SCNC: 4.4 MMOL/L (ref 3.5–5.3)
PROCALCITONIN SERPL-MCNC: 1.42 NG/ML
RBC # BLD AUTO: 3.39 MILLION/UL (ref 3.88–5.62)
SODIUM SERPL-SCNC: 136 MMOL/L (ref 136–145)
WBC # BLD AUTO: 19.83 THOUSAND/UL (ref 4.31–10.16)

## 2020-09-08 PROCEDURE — 94760 N-INVAS EAR/PLS OXIMETRY 1: CPT

## 2020-09-08 PROCEDURE — 80048 BASIC METABOLIC PNL TOTAL CA: CPT | Performed by: STUDENT IN AN ORGANIZED HEALTH CARE EDUCATION/TRAINING PROGRAM

## 2020-09-08 PROCEDURE — 94640 AIRWAY INHALATION TREATMENT: CPT

## 2020-09-08 PROCEDURE — 99238 HOSP IP/OBS DSCHRG MGMT 30/<: CPT | Performed by: FAMILY MEDICINE

## 2020-09-08 PROCEDURE — 85027 COMPLETE CBC AUTOMATED: CPT | Performed by: STUDENT IN AN ORGANIZED HEALTH CARE EDUCATION/TRAINING PROGRAM

## 2020-09-08 PROCEDURE — 99232 SBSQ HOSP IP/OBS MODERATE 35: CPT | Performed by: INTERNAL MEDICINE

## 2020-09-08 PROCEDURE — 93005 ELECTROCARDIOGRAM TRACING: CPT

## 2020-09-08 PROCEDURE — 83735 ASSAY OF MAGNESIUM: CPT | Performed by: STUDENT IN AN ORGANIZED HEALTH CARE EDUCATION/TRAINING PROGRAM

## 2020-09-08 PROCEDURE — 84145 PROCALCITONIN (PCT): CPT | Performed by: INTERNAL MEDICINE

## 2020-09-08 PROCEDURE — 82948 REAGENT STRIP/BLOOD GLUCOSE: CPT

## 2020-09-08 PROCEDURE — 84100 ASSAY OF PHOSPHORUS: CPT | Performed by: STUDENT IN AN ORGANIZED HEALTH CARE EDUCATION/TRAINING PROGRAM

## 2020-09-08 RX ORDER — ATORVASTATIN CALCIUM 40 MG/1
40 TABLET, FILM COATED ORAL
Status: DISCONTINUED | OUTPATIENT
Start: 2020-09-08 | End: 2020-09-08 | Stop reason: HOSPADM

## 2020-09-08 RX ORDER — AMIODARONE HYDROCHLORIDE 200 MG/1
200 TABLET ORAL
Qty: 30 TABLET | Refills: 2 | Status: SHIPPED | OUTPATIENT
Start: 2020-09-09 | End: 2020-12-21

## 2020-09-08 RX ORDER — CEPHALEXIN 500 MG/1
500 CAPSULE ORAL EVERY 6 HOURS SCHEDULED
Status: DISCONTINUED | OUTPATIENT
Start: 2020-09-08 | End: 2020-09-08 | Stop reason: HOSPADM

## 2020-09-08 RX ORDER — CEPHALEXIN 500 MG/1
500 CAPSULE ORAL EVERY 6 HOURS SCHEDULED
Qty: 2 CAPSULE | Refills: 0 | Status: SHIPPED | OUTPATIENT
Start: 2020-09-08 | End: 2020-09-09

## 2020-09-08 RX ORDER — LOSARTAN POTASSIUM 50 MG/1
50 TABLET ORAL DAILY
Qty: 30 TABLET | Refills: 2 | Status: SHIPPED | OUTPATIENT
Start: 2020-09-09 | End: 2020-11-17

## 2020-09-08 RX ORDER — TAMSULOSIN HYDROCHLORIDE 0.4 MG/1
0.4 CAPSULE ORAL
Qty: 30 CAPSULE | Refills: 2 | Status: SHIPPED | OUTPATIENT
Start: 2020-09-08 | End: 2020-11-19

## 2020-09-08 RX ORDER — ATORVASTATIN CALCIUM 40 MG/1
40 TABLET, FILM COATED ORAL
Qty: 30 TABLET | Refills: 2 | Status: SHIPPED | OUTPATIENT
Start: 2020-09-08 | End: 2020-09-28

## 2020-09-08 RX ORDER — LOSARTAN POTASSIUM 50 MG/1
50 TABLET ORAL DAILY
Status: DISCONTINUED | OUTPATIENT
Start: 2020-09-09 | End: 2020-09-08 | Stop reason: HOSPADM

## 2020-09-08 RX ADMIN — IPRATROPIUM BROMIDE 0.5 MG: 0.5 SOLUTION RESPIRATORY (INHALATION) at 07:22

## 2020-09-08 RX ADMIN — APIXABAN 5 MG: 5 TABLET, FILM COATED ORAL at 09:07

## 2020-09-08 RX ADMIN — DEXAMETHASONE 4 MG: 4 TABLET ORAL at 14:43

## 2020-09-08 RX ADMIN — AMIODARONE HYDROCHLORIDE 200 MG: 200 TABLET ORAL at 08:30

## 2020-09-08 RX ADMIN — TAMSULOSIN HYDROCHLORIDE 0.4 MG: 0.4 CAPSULE ORAL at 16:40

## 2020-09-08 RX ADMIN — LEVALBUTEROL HYDROCHLORIDE 1.25 MG: 1.25 SOLUTION, CONCENTRATE RESPIRATORY (INHALATION) at 07:22

## 2020-09-08 RX ADMIN — CEFAZOLIN SODIUM 2000 MG: 2 SOLUTION INTRAVENOUS at 08:30

## 2020-09-08 RX ADMIN — CEFAZOLIN SODIUM 2000 MG: 2 SOLUTION INTRAVENOUS at 14:46

## 2020-09-08 RX ADMIN — LEVALBUTEROL HYDROCHLORIDE 1.25 MG: 1.25 SOLUTION, CONCENTRATE RESPIRATORY (INHALATION) at 13:19

## 2020-09-08 RX ADMIN — LOSARTAN POTASSIUM 25 MG: 25 TABLET, FILM COATED ORAL at 09:07

## 2020-09-08 RX ADMIN — VENLAFAXINE HYDROCHLORIDE 150 MG: 150 CAPSULE, EXTENDED RELEASE ORAL at 09:07

## 2020-09-08 RX ADMIN — ATORVASTATIN CALCIUM 40 MG: 40 TABLET, FILM COATED ORAL at 16:40

## 2020-09-08 RX ADMIN — PANTOPRAZOLE SODIUM 40 MG: 40 TABLET, DELAYED RELEASE ORAL at 05:03

## 2020-09-08 RX ADMIN — IPRATROPIUM BROMIDE 0.5 MG: 0.5 SOLUTION RESPIRATORY (INHALATION) at 13:19

## 2020-09-08 NOTE — TELEPHONE ENCOUNTER
Patient's wife Amira Green called and stated patient is going to inpatient rehab   Amira Green has some questions regarding chemo and stated appt on 9-10-20 need's to be cancelled   Please call Amira Green back at [phone]

## 2020-09-08 NOTE — INCIDENTAL FINDINGS
Hilar and mediastinal lymphadenopathy in setting of metastatic cancer and infection  Probably reactive due to infection, but should be followed up

## 2020-09-08 NOTE — [place] UNIVERSAL TRANSFER FORM
[place] UNIVERSAL TRANSFER FORM  (ALL ITEMS MUST BE COMPLETED)    1  TRANSFER FROM: [facility]      TRANSFER TO: [facility]      2  DATE OF TRANSFER: 9/8/2020                        TIME OF TRANSFER: 1530      3  PATIENT NAME: Ivania Stout,        YOB: 1955                             GENDER: male    4  LANGUAGE:   English    5  PHYSICIAN NAME:  Akash Coffman MD                   PHONE: [phone]  CODE STATUS: Level 1 - Full Code        Out of Hospital DNR Attached: No    7  :                                      :  Extended Emergency Contact Information  Primary Emergency Contact: Kirstie Comer  Mobile Phone: [phone]  Relation: Spouse  Secondary Emergency Contact: Remigiogabriel [address] Phone: [phone]  Relation: [id number] Representative/Proxy:  No           Legal Guardian:  No             NAME OF:           HEALTH CARE REPRESENTATIVE/PROXY:                                         OR           LEGAL GUARDIAN, IF NOT :                                               PHONE:  (Day)           (Night)                        (Cell)    8  REASON FOR TRANSFER: (Must include brief medical history and recent changes in physical function or cognition ) Rehab            V/S: /76   Pulse (!) 113   Temp 97 5 °F (36 4 °C)   Resp 19   Ht 6' (1 829 m)   Wt 76 9 kg (169 lb 8 5 oz)   SpO2 97%   BMI 22 99 kg/m²           PAIN: None    9  PRIMARY DIAGNOSIS: Sepsis ([address] )      Secondary Diagnosis:         Pacemaker: No      Internal Defib: No          Mental Health Diagnosis (if Applicable):    10  RESTRAINTS: No     11  RESPIRATORY NEEDS: Oxygen Device Nasal cannula 2L,    12  ISOLATION/PRECAUTION: None    13  ALLERGY: Bee venom and Shellfish-derived products    14  SENSORY:       Vision Glasses    15  SKIN CONDITION: No Wounds    16   DIET: Special (describe)Thin liquids, mechanical soft    17  IV ACCESS: OtherPort A Cath - right chest    18  PERSONAL ITEMS SENT WITH PATIENT: OtherPhone, phone , blanket, hairbrush, toothpaste, mouthwash    19  ATTACHED DOCUMENTS: MUST ATTACH CURRENT MEDICATION INFORMATION Medication Reconciliation    20  AT RISK ALERTS:Falls, Pressure Ulcer and Aspiration        HARM TO: N/A    21  WEIGHT BEARING STATUS:         Left Leg: Limited        Right Leg: Limited    22  MENTAL STATUS:Alert and Oriented    23  FUNCTION:        Walk: With Help        Transfer: With Help        Toilet: With Help        Feed: Self    24  IMMUNIZATIONS/SCREENING:     Immunization History   Administered Date(s) Administered    Pneumococcal Conjugate 13-Valent 09/07/2020       25  BOWEL: Continent and Date Last BM9/8/2020    32  BLADDER: Continent    27   SENDING FACILITY CONTACT: Ratna Andrade                  Title: RN        Unit: 2S        Phone: [phone] [address] (if known):        Title:        Unit:         Phone:         FORM PREFILLED BY (if applicable)       Title:       Unit:        Phone:         Kaleb Haines BY Ratna Andrade RN      Title: RN      Phone: [phone]

## 2020-09-08 NOTE — PROGRESS NOTES
Progress Note - Infectious Disease   Malu Paul 77 y o  male MRN: [id number]  Unit/Bed#: 2 [place] [address] Encounter: [id number]      Impression/Plan:  1  Septic shock-POA   Appears to be secondary to gram-negative bacteremia of an undefined source   Consideration for the possibility of translocation of organisms across the gut wall   Consideration for the possibility of pneumonia   Consideration for the possibility of typhilitis   Consideration for the possibility of another source such as a Port-A-Cath infection although this is less likely   Patient's blood pressure has recovered he is now off all vasopressor support   He seems to be tolerating the antibiotics without difficulty   No resistant organisms isolated   The white cell count has started to drift down after rising quite a bit while on the G-CSF and soon after  Procalcitonin level continues to decreased  -discontinue cefazolin after last dose today  -recheck CBC with diff   -supportive care     2  E  Coli bacteremia-unclear primary source as the sputum culture does not match what is in the blood   Probably secondary to translocation across the bowel wall in the setting of severe neutropenia   No definitive primary intra-abdominal process found on CT abdomen pelvis    Possibly secondary to a catheter related bacteremia of this less likely   no resistant organisms isolated  Bacteremia cleared  -antibiotics as above  -no additional ID workup for now  -recheck blood cultures 9/15/2020 in the setting of having a Port-A-Cath to make sure not secondarily infected     3  Pancytopenia-with severe neutropenia in the setting of cancer chemotherapy   Suspect secondary to the patient's chemotherapy   The acute infectious process may also be playing a role   Neutropenia is much improved and the patient now has leukocytosis   Suspect the leukocytosis is secondary to the G-CSF but will need to be watched    The white cell count has started to come down  -no additional G-CSF  -recheck CBC with diff  -hematology oncology follow-up     4  Pneumonia-possibly aspiration   More dense consolidation on the left   His respiratory status is relatively stable remains a bit tenuous requiring oxygen support but is much improved  Procalcitonin level continues to decrease  -antibiotics as above  -monitor respiratory status  -pulmonary toilet  -close Pulmonary critical care follow-up     5  Gastroesophageal cancer-on modified FOLFOX   Hematology oncology follow-up    Antibiotics:  Cefazolin 7  Total antibiotics 10    Subjective:  Patient has no fever, chills, sweats; no nausea, vomiting, diarrhea; no cough, shortness of breath; no pain  No new symptoms  He is still requiring low level oxygen support by nasal cannula  He is anxious to get out of the hospital     Objective:  Vitals:  Temp:  [97 5 °F (36 4 °C)-98 2 °F (36 8 °C)] 97 5 °F (36 4 °C)  HR:  [79-98] 98  Resp:  [16-19] 19  BP: ()/(72-75) 111/72  SpO2:  [93 %-97 %] 96 %  Temp (24hrs), Av 9 °F (36 6 °C), Min:97 5 °F (36 4 °C), Max:98 2 °F (36 8 °C)  Current: Temperature: 97 5 °F (36 4 °C)    Physical Exam:   General Appearance:  Alert, interactive, nontoxic, no acute distress  Throat: Oropharynx moist without lesions  Lungs:   Decreased breath sounds bilaterally; no wheezes, rhonchi or rales; respirations unlabored   Heart:  RRR; no murmur, rub or gallop   Abdomen:   Soft, non-tender, non-distended, positive bowel sounds  Extremities: No clubbing, cyanosis or edema   Skin: No new rashes or lesions  No draining wounds noted         Labs, Imaging, & Other studies:   All pertinent labs and imaging studies were personally reviewed  Results from last 7 days   Lab Units 20  0524 20  0546 20  0553   WBC Thousand/uL 19 83* 20 53* 22 02*   HEMOGLOBIN g/dL 10 3* 9 8* 9 4*   PLATELETS Thousands/uL 369 365 339     Results from last 7 days   Lab Units 20  0524 20  0546 20  0553  20  8996 09/02/20  0539   SODIUM mmol/L 136 137 139   < > 141   < > 136   POTASSIUM mmol/L 4 4 4 3 4 4   < > 3 7   < > 4 0   CHLORIDE mmol/L 100 102 103   < > 108   < > 104   CO2 mmol/L 26 28 28   < > 23   < > 19*   BUN mg/dL 22 20 21   < > 18   < > 14   CREATININE mg/dL 0 95 0 89 0 84   < > 0 95   < > 0 81   EGFR ml/min/1 73sq m 82 88 91   < > 82   < > 92   CALCIUM mg/dL 8 8 8 8 8 5   < > 8 1*   < > 8 6   AST U/L  --   --   --   --  25  --  56*   ALT U/L  --   --   --   --  30  --  36   ALK PHOS U/L  --   --   --   --  67  --  53    < > = values in this interval not displayed       Results from last 7 days   Lab Units 09/04/20  1606   SPUTUM CULTURE  3+ Growth of Candida albicans*  3+ Growth of    GRAM STAIN RESULT  1+ Epithelial cells per low power field*  No polys seen*  Rare Yeast*     Results from last 7 days   Lab Units 09/08/20  0524 09/07/20  0546 09/06/20  1057 09/05/20  0627 09/04/20  0601 09/03/20  0640 09/02/20  0539   PROCALCITONIN ng/ml 1 42* 2 44* 4 15* 8 07* 19 14* 35 18* 49 96*

## 2020-09-08 NOTE — DISCHARGE INSTRUCTIONS
A-fib (Atrial Fibrillation)   WHAT YOU NEED TO KNOW:   A-fib may come and go, or it may be a long-term condition  A-fib can cause blood clots, stroke, or heart failure  These conditions may become life-threatening  It is important to treat and manage a-fib to help prevent a blood clot, stroke, or heart failure  DISCHARGE INSTRUCTIONS:   Call 911 for any of the following:   · You have any of the following signs of a heart attack:      ¨ Squeezing, pressure, or pain in your chest that lasts longer than 5 minutes or returns    ¨ Discomfort or pain in your back, neck, jaw, stomach, or arm     ¨ Trouble breathing    ¨ Nausea or vomiting    ¨ Lightheadedness or a sudden cold sweat, especially with chest pain or trouble breathing    · You have any of the following signs of a stroke:      ¨ Numbness or drooping on one side of your face     ¨ Weakness in an arm or leg    ¨ Confusion or difficulty speaking    ¨ Dizziness, a severe headache, or vision loss  Seek care immediately if:  You have any of the following signs of a blood clot:  · You feel lightheaded, are short of breath, and have chest pain  · You cough up blood  · You have swelling, redness, pain, or warmth in your arm or leg  Contact your cardiologist or healthcare provider if:   · Your heart rate is higher than your healthcare provider said it should be  · You have new or worsening swelling in your legs, feet, ankles, or abdomen  · You are short of breath, even at rest      · You have questions or concerns about your condition or care  Medicines: You may need any of the following:  · Heart medicines  help control your heart rate and rhythm  You may need more than one medicine to treat your symptoms  · Blood thinners    help prevent blood clots  Examples of blood thinners include heparin and warfarin  Clots can cause strokes, heart attacks, and death   The following are general safety guidelines to follow while you are taking a blood thinner:    ¨ Watch for bleeding and bruising while you take blood thinners  Watch for bleeding from your gums or nose  Watch for blood in your urine and bowel movements  Use a soft washcloth on your skin, and a soft toothbrush to brush your teeth  This can keep your skin and gums from bleeding  If you shave, use an electric shaver  Do not play contact sports  ¨ Tell your dentist and other healthcare providers that you take anticoagulants  Wear a bracelet or necklace that says you take this medicine  ¨ Do not start or stop any medicines unless your healthcare provider tells you to  Many medicines cannot be used with blood thinners  ¨ Tell your healthcare provider right away if you forget to take the medicine, or if you take too much  ¨ Warfarin  is a blood thinner that you may need to take  The following are things you should be aware of if you take warfarin  § Foods and medicines can affect the amount of warfarin in your blood  Do not make major changes to your diet while you take warfarin  Warfarin works best when you eat about the same amount of vitamin K every day  Vitamin K is found in green leafy vegetables and certain other foods  Ask for more information about what to eat when you are taking warfarin  § You will need to see your healthcare provider for follow-up visits when you are on warfarin  You will need regular blood tests  These tests are used to decide how much medicine you need  · Antiplatelets , such as aspirin, help prevent blood clots  Take your antiplatelet medicine exactly as directed  These medicines make it more likely for you to bleed or bruise  If you are told to take aspirin, do not take acetaminophen or ibuprofen instead  · Take your medicine as directed  Contact your healthcare provider if you think your medicine is not helping or if you have side effects  Tell him or her if you are allergic to any medicine   Keep a list of the medicines, vitamins, and herbs you take  Include the amounts, and when and why you take them  Bring the list or the pill bottles to follow-up visits  Carry your medicine list with you in case of an emergency  Follow up with your cardiologist as directed: You will need regular blood tests and monitoring  Write down your questions so you remember to ask them during your visits  Manage A-fib:   · Know your target heart rate  Learn how to take your pulse and monitor your heart rate  · Manage other health conditions  This includes high blood pressure, sleep apnea, thyroid disease, diabetes, and other heart conditions  Take medicine as directed and follow your treatment plan  · Limit or do not drink alcohol  Alcohol can make a-fib hard to manage  Ask your healthcare provider if it is safe for you to drink alcohol  A drink of alcohol is 12 ounces of beer, 5 ounces of wine, or 1½ ounces of liquor  · Do not smoke  Nicotine and other chemicals in cigarettes and cigars can cause heart and lung damage  Ask your healthcare provider for information if you currently smoke and need help to quit  E-cigarettes or smokeless tobacco still contain nicotine  Talk to your healthcare provider before you use these products  · Eat heart-healthy foods  Heart healthy foods will help keep your cholesterol low  These include fruits, vegetables, whole-grain breads, low-fat dairy products, beans, lean meats, and fish  Replace butter and margarine with heart-healthy oils such as olive oil and canola oil  · Maintain a healthy weight  Ask your healthcare provider how much you should weigh  Ask him to help you create a weight loss plan if you are overweight  · Exercise for 30 minutes  most days of the week  Ask your healthcare provider about the best exercise plan for you  © 2017 [address] [employer] Information is for End User's use only and may not be sold, redistributed or otherwise used for commercial purposes   All illustrations and images included in CareNotes® are the copyrighted property of A D A M [employer] Inc  or Doc Howell  The above information is an  only  It is not intended as medical advice for individual conditions or treatments  Talk to your doctor, nurse or pharmacist before following any medical regimen to see if it is safe and effective for you

## 2020-09-08 NOTE — PLAN OF CARE
Problem: RESPIRATORY - ADULT  Goal: Achieves optimal ventilation and oxygenation  Description: INTERVENTIONS:  - Assess for changes in respiratory status  - Assess for changes in mentation and behavior  - Position to facilitate oxygenation and minimize respiratory effort  - Oxygen administered by appropriate delivery if ordered  - Initiate smoking cessation education as indicated  - Encourage broncho-pulmonary hygiene including cough, deep breathe, Incentive Spirometry  - Assess the need for suctioning and aspirate as needed  - Assess and instruct to report SOB or any respiratory difficulty  - Respiratory Therapy support as indicated  Outcome: Completed     Problem: PAIN - ADULT  Goal: Verbalizes/displays adequate comfort level or baseline comfort level  Description: Interventions:  - Encourage patient to monitor pain and request assistance  - Assess pain using appropriate pain scale  - Administer analgesics based on type and severity of pain and evaluate response  - Implement non-pharmacological measures as appropriate and evaluate response  - Consider cultural and social influences on pain and pain management  - Notify physician/advanced practitioner if interventions unsuccessful or patient reports new pain  Outcome: Completed     Problem: INFECTION - ADULT  Goal: Absence or prevention of progression during hospitalization  Description: INTERVENTIONS:  - Assess and monitor for signs and symptoms of infection  - Monitor lab/diagnostic results  - Monitor all insertion sites, i e  indwelling lines, tubes, and drains  - Monitor endotracheal if appropriate and nasal secretions for changes in amount and color  - [place] appropriate cooling/warming therapies per order  - Administer medications as ordered  - Instruct and encourage patient and family to use good hand hygiene technique  - Identify and instruct in appropriate isolation precautions for identified infection/condition  Outcome: Completed  Goal: Absence of fever/infection during neutropenic period  Description: INTERVENTIONS:  - Monitor WBC    Outcome: Completed     Problem: SAFETY ADULT  Goal: Patient will remain free of falls  Description: INTERVENTIONS:  - Assess patient frequently for physical needs  -  Identify cognitive and physical deficits and behaviors that affect risk of falls    -  [place] fall precautions as indicated by assessment   - Educate patient/family on patient safety including physical limitations  - Instruct patient to call for assistance with activity based on assessment  - Modify environment to reduce risk of injury  - Consider OT/PT consult to assist with strengthening/mobility  Outcome: Completed  Goal: Maintain or return to baseline ADL function  Description: INTERVENTIONS:  -  Assess patient's ability to carry out ADLs; assess patient's baseline for ADL function and identify physical deficits which impact ability to perform ADLs (bathing, care of mouth/teeth, toileting, grooming, dressing, etc )  - Assess/evaluate cause of self-care deficits   - Assess range of motion  - Assess patient's mobility; develop plan if impaired  - Assess patient's need for assistive devices and provide as appropriate  - Encourage maximum independence but intervene and supervise when necessary  - Involve family in performance of ADLs  - Assess for home care needs following discharge   - Consider OT consult to assist with ADL evaluation and planning for discharge  - Provide patient education as appropriate  Outcome: Completed  Goal: Maintain or return mobility status to optimal level  Description: INTERVENTIONS:  - Assess patient's baseline mobility status (ambulation, transfers, stairs, etc )    - Identify cognitive and physical deficits and behaviors that affect mobility  - Identify mobility aids required to assist with transfers and/or ambulation (gait belt, sit-to-stand, lift, walker, cane, etc )  - [place] fall precautions as indicated by assessment  - Record patient progress and toleration of activity level on Mobility SBAR; progress patient to next Phase/Stage  - Instruct patient to call for assistance with activity based on assessment  - Consider rehabilitation consult to assist with strengthening/weightbearing, etc   Outcome: Completed     Problem: DISCHARGE PLANNING  Goal: Discharge to home or other facility with appropriate resources  Description: INTERVENTIONS:  - Identify barriers to discharge w/patient and caregiver  - Arrange for needed discharge resources and transportation as appropriate  - Identify discharge learning needs (meds, wound care, etc )  - Arrange for interpretive services to assist at discharge as needed  - Refer to Case Management Department for coordinating discharge planning if the patient needs post-hospital services based on physician/advanced practitioner order or complex needs related to functional status, cognitive ability, or social support system  Outcome: Completed     Problem: Knowledge Deficit  Goal: Patient/family/caregiver demonstrates understanding of disease process, treatment plan, medications, and discharge instructions  Description: Complete learning assessment and assess knowledge base    Interventions:  - Provide teaching at level of understanding  - Provide teaching via preferred learning methods  Outcome: Completed     Problem: CARDIOVASCULAR - ADULT  Goal: Maintains optimal cardiac output and hemodynamic stability  Description: INTERVENTIONS:  - Monitor I/O, vital signs and rhythm  - Monitor for S/S and trends of decreased cardiac output  - Administer and titrate ordered vasoactive medications to optimize hemodynamic stability  - Assess quality of pulses, skin color and temperature  - Assess for signs of decreased coronary artery perfusion  - Instruct patient to report change in severity of symptoms  Outcome: Completed  Goal: Absence of cardiac dysrhythmias or at baseline rhythm  Description: INTERVENTIONS:  - Continuous cardiac monitoring, vital signs, obtain 12 lead EKG if ordered  - Administer antiarrhythmic and heart rate control medications as ordered  - Monitor electrolytes and administer replacement therapy as ordered  Outcome: Completed     Problem: GENITOURINARY - ADULT  Goal: Maintains or returns to baseline urinary function  Description: INTERVENTIONS:  - Assess urinary function  - Encourage oral fluids to ensure adequate hydration if ordered  - Administer IV fluids as ordered to ensure adequate hydration  - Administer ordered medications as needed  - Offer frequent toileting  - Follow urinary retention protocol if ordered  Outcome: Completed  Goal: Urinary catheter remains patent  Description: INTERVENTIONS:  - Assess patency of urinary catheter  - If patient has a chronic lopez, consider changing catheter if non-functioning  - Follow guidelines for intermittent irrigation of non-functioning urinary catheter  Outcome: Completed     Problem: METABOLIC, FLUID AND ELECTROLYTES - ADULT  Goal: Electrolytes maintained within normal limits  Description: INTERVENTIONS:  - Monitor labs and assess patient for signs and symptoms of electrolyte imbalances  - Administer electrolyte replacement as ordered  - Monitor response to electrolyte replacements, including repeat lab results as appropriate  - Instruct patient on fluid and nutrition as appropriate  Outcome: Completed  Goal: Fluid balance maintained  Description: INTERVENTIONS:  - Monitor labs   - Monitor I/O and WT  - Instruct patient on fluid and nutrition as appropriate  - Assess for signs & symptoms of volume excess or deficit  Outcome: Completed  Goal: Glucose maintained within target range  Description: INTERVENTIONS:  - Monitor Blood Glucose as ordered  - Assess for signs and symptoms of hyperglycemia and hypoglycemia  - Administer ordered medications to maintain glucose within target range  - Assess nutritional intake and initiate nutrition service referral as needed  Outcome: Completed     Problem: Prexisting or High Potential for Compromised Skin Integrity  Goal: Skin integrity is maintained or improved  Description: INTERVENTIONS:  - Identify patients at risk for skin breakdown  - Assess and monitor skin integrity  - Assess and monitor nutrition and hydration status  - Monitor labs   - Assess for incontinence   - Turn and reposition patient  - Assist with mobility/ambulation  - Relieve pressure over bony prominences  - Avoid friction and shearing  - Provide appropriate hygiene as needed including keeping skin clean and dry  - Evaluate need for skin moisturizer/barrier cream  - Collaborate with interdisciplinary team   - Patient/family teaching  - Consider wound care consult   Outcome: Completed     Problem: Potential for Falls  Goal: Patient will remain free of falls  Description: INTERVENTIONS:  - Assess patient frequently for physical needs  -  Identify cognitive and physical deficits and behaviors that affect risk of falls  -  [place] fall precautions as indicated by assessment   - Educate patient/family on patient safety including physical limitations  - Instruct patient to call for assistance with activity based on assessment  - Modify environment to reduce risk of injury  - Consider OT/PT consult to assist with strengthening/mobility  Outcome: Completed     Problem: Nutrition/Hydration-ADULT  Goal: Nutrient/Hydration intake appropriate for improving, restoring or maintaining nutritional needs  Description: Monitor and assess patient's nutrition/hydration status for malnutrition  Collaborate with interdisciplinary team and initiate plan and interventions as ordered  Monitor patient's weight and dietary intake as ordered or per policy  Utilize nutrition screening tool and intervene as necessary  Determine patient's food preferences and provide high-protein, high-caloric foods as appropriate       INTERVENTIONS:  - Monitor oral intake, urinary output, labs, and treatment plans  - Assess nutrition and hydration status and recommend course of action  - Evaluate amount of meals eaten  - Assist patient with eating if necessary   - Allow adequate time for meals  - Recommend/ encourage appropriate diets, oral nutritional supplements, and vitamin/mineral supplements  - Order, calculate, and assess calorie counts as needed  - Assess need for intravenous fluids  - Provide specific nutrition/hydration education as appropriate  - Include patient/family/caregiver in decisions related to nutrition  Outcome: Completed     Patient to be discharged at 1530 to [facility]

## 2020-09-08 NOTE — SOCIAL WORK
Discharge ordered  Pt has been accepted at [facility], [facility]  SW notified pt and wife, both requested transfer to [facility] upon discharge  Pt will be transferred to [facility]  for skilled rehab  [facility] contacted to arrange stretcher transport  [place] scheduled to transport around 3:30 pm   PASQUALE Siu), [facility] and pt/wife (Akash Begum) aware of plan

## 2020-09-08 NOTE — SOCIAL WORK
LOS - 9 days    CPR2  Bundle-Sepsis    SW following to assist with DCP  Case discussed with [facility] resident  GIORGIO also spoke with pt and wife  STR placement is now planned  Pt's wife had the opportunity to explore facility options over weekend  Wife requested referral be made to [facility], [facility]  Discussed need for additional choices in case of no availability at [facility]  SW offered to email wife the list of facilities to review  Wife provided email and list sent ([email or web address])  Referral has been made to [facility], [facility]  SW will continue to follow to assist with planning and transfer when ready

## 2020-09-08 NOTE — NURSING NOTE
Clarified with resident, Flor Sena, regarding patient leaving with access port  Kimberly Siddiqi said to keep the port accessed due to chemotherapy that the patient will be getting later this week  I gave report to [facility], the supervisor at [facility] and I emphasized the importance of flushing the patient's port a cath every shift to prevent infection and keep it accessed

## 2020-09-08 NOTE — QUICK NOTE
Patient discharged to [facility] with port access open, as discussed with nurse  Discussed case with attending, will leave orders for nursing home to flush port every shift to prevent infection   Discussed with attending at nursing home, geriatric resident on call and will place order in nursing H&P

## 2020-09-08 NOTE — NURSING NOTE
Patient discharged at 1700 via wheelchair with transport team to [facility]  Accompanied by spouse and left with the following belongings: blankets from home, phone, phone , and several toiletries  Port a cath is in place on the R chest per resident Shannan Douglas's order to keep it accessed  Report was given to Courtney Hawk at [facility], as well as Marilyn, the [facility] supervisor, about the patient and the need for his port to be flushed every shift  Patient's spouse and transport team also made aware of the port and its need to be flushed every shift

## 2020-09-09 ENCOUNTER — TRANSITIONAL CARE MANAGEMENT (OUTPATIENT)
Dept: FAMILY MEDICINE CLINIC | Facility: CLINIC | Age: 65
End: 2020-09-09

## 2020-09-09 ENCOUNTER — TELEPHONE (OUTPATIENT)
Dept: FAMILY MEDICINE CLINIC | Facility: CLINIC | Age: 65
End: 2020-09-09

## 2020-09-09 ENCOUNTER — TELEPHONE (OUTPATIENT)
Dept: UROLOGY | Facility: MEDICAL CENTER | Age: 65
End: 2020-09-09

## 2020-09-09 DIAGNOSIS — B96.20 E. COLI BACTEREMIA: Primary | ICD-10-CM

## 2020-09-09 DIAGNOSIS — R78.81 E. COLI BACTEREMIA: Primary | ICD-10-CM

## 2020-09-09 NOTE — TELEPHONE ENCOUNTER
Per Dr Claudia Leggett telephone note from 5/20/20 and additional telephone note from 7/15/20 patient knows elevated PSA, recommendations are for repeat PSA in one year, which has been scheduled  Please relay this to [place] at [facility]

## 2020-09-09 NOTE — TELEPHONE ENCOUNTER
Patient seen in the [facility] office  Patient currently residing in the [facility]  Elevated PSA of 8 5  S/P hospital follow up -- esophageal cancer  Requesting a virtual visit secondary to patient's debilitated condition  Please call Armando Gonzales at the [facility] to assist with scheduling    She can be reached at [phone]  Thank you

## 2020-09-09 NOTE — DISCHARGE SUMMARY
[facility] Discharge Summary - Medical Db Berger 77 y o  male MRN: [id number]    [address]  Room / Bed: [address] [place] [address] Encounter: [id number]    BRIEF OVERVIEW  Admitting Provider: Antonio August MD  Discharge Provider: Dr Finesse Bynum    Discharge To: Short term rehab  Facility: [facility]     Outpatient Follow-Up:   Cardiology, Dr Linda Qureshi  Urology, Dr Barry Troncoso    Things to address at first follow up visit:   Every shift flush port  Does patient have f/u appointment for cardiomyopathy? Is patient Repeat blood cx in 1 week scheduled? Did patient complete Barium Swallow? Labs and results pending at discharge:   None    Admission Date: 8/30/2020     Discharge Date: 9/8/2020  5:00 PM    Primary Discharge Diagnosis  Principal Problem (Resolved):    Sepsis ([address] )  Active Problems:    A-fib ([address] )    Gastroesophageal cancer ([facility] [address] )    Chemotherapy-induced nausea    Moderate malnutrition (HCC)    Falls    Moderate protein-calorie malnutrition (HCC)    Dilated cardiomyopathy (HCC)    Urge urinary incontinence  Resolved Problems:    PNA (pneumonia)    Neutropenic ([address] )    JENNIFER (acute kidney injury) [facility])        Consulting Providers   Urology  Cardiology  Oncology  ID        [address] STAY    Procedures Performed/Pertinent Test results  US bladder with post void residual   Final Result      Post void residual of 15 mL  Prostate indents the base of the bladder with the focal lobulation measuring 1 9 cm suggesting prominent middle lobe  This is similar to CT finding  Workstation performed: [id number]         CT head without contrast   Final Result      No acute intracranial abnormality  Workstation performed: [id number]         CT spine cervical without contrast   Final Result      No cervical spine fracture or traumatic malalignment                     Workstation performed: [id number]         CT pe study w abdomen pelvis w contrast   Final Result      Extensive airspace opacity throughout the left lung most suspicious for acute pneumonia  No pulmonary embolus  Primary malignancy at the gastroesophageal junction, similar in appearance from July 13, 2020  Again noted is omental nodularity with small omental nodules measuring up to 10 mm, unchanged from previous examination and most consistent with metastatic    disease  There are subcentimeter pulmonary nodules in the right lung which are indeterminate and unchanged from July 13, 2020  Unchanged hepatic hypodensities, thought most likely to represent cysts  Mediastinal and hilar lymphadenopathy, probably reactive but for which continued close interval follow-up is recommended  This examination was marked "immediate notification" in Epic in order to begin the standard process by which the radiology reading room liaison alerts the referring practitioner  Workstation performed: [id number]         XR chest 1 view portable   Final Result      Limited study  Satisfactory position of right IJ catheter  Increased density of left-sided pulmonary opacity  Possibly worsening pneumonia            Workstation performed: [id number]         XR chest 1 view portable   Final Result      Extensive nodularity of the lungs left greater than right  Please refer to follow-up chest CT for further evaluation  Workstation performed: [id number]         FL barium swallow video w speech    (Results Pending)   FL barium swallow    (Results Pending)          ECHO  Left ventricle mildly dilated, systolic function with EF 35-40%  G2DD        HPI  78 yo M with pmh 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 (currently on chemo, last 8/20), superficial venous thrombosis of LUE on Eliquis  Pt presents with 2 days of lethargy, this AM wife reported that he fell in the kitchen and when she went to see him he felt as if he had a fever   EMS was called and he was brought in to the ED  In the ED he became hypoxic and hypotensive, given 3L crystalloid and given supplemental o2 with improvement but ultimately required Levophed to be initiated  CT scans showed no intracranial abnormalities, stable tumors, no PE, and left sided multifocal pneumonia  Hospital Course      Sepsis  On presentation to ED patient found to be in septic shock with blood pressure 80/50, pulse 104, respiratory rate 24  Initial workup shows lactic acid of 9 9, in CT imaging showing suspicion for left-sided multi lobar pneumonia  Patient was given 3 L IV fluid bolus with normal saline, cefepime 2 g, vancomycin 1 25 g, started on Levophed drip  His admit to the ICU for continued vasopressor support  He is weaned off Levophed, during the hospitalization 1, briefly required Levophed drip again on day of hospitalization 4  Patient had stress dose of steroids during admission transition to home Decadron, additionally requiring 50 mg Solu-Cortef on 09/02 due to hypotension  ID consult adjusted antibiotic regimen throughout course of hospital stay, according to culture results  Blood cultures show Gram-negative bacteria, patient was eventually started on cefazolin 2 g IV Q 8  Patient continue his antibiotic regimen throughout the remaining course of hospitalization  On day of discharge patient was transition to p o  Antibiotics to receive 2 doses of Keflex after discharge  Antibiotic regimen during stay as follows:  ? cefepime (8/30-9/1)  ? Tobramycin x1 on 8/31  ? Vancomycin (8/30-8/31)  ? Meropenem 1G q8hrs (9/1-9/2)  ? Cefazolin 2 g IV q 8 hours (9/2-9/8)    Neutropenia  On admission patient WBC of a 0 34, and absolute neutrophil count of 0 03  Patient received Granix 480 mcg q d  x3 days, and his white count and neutropenia resolved    White count continued to rise several additional more days white count peaking day of hospitalization 6 to 26 before decreasing to 19 8 on day of discharge  Pneumonia  On admission CT showed multi lobar pneumonia  Patient was treated with antibiotics per ID as described above in sepsis hospital course  Bacteremia  Patient culture showed Gram-negative bacteremia likely from pneumonia although other sources of infection were considered including translocation from the gut wall  Patient was treated with antibiotics per sepsis hospital course above    Afib  While in ICU patient developed atrial fibrillation requiring amiodarone drip started on date hospitalization 3 continue through day of hospitalization 5  Patient was then transition to p o  Amiodarone 400 mg b i d , which was subsequently decreased to 200 mg q d  Monia Le Patient additionally started on metoprolol 25 mg q h s , Eliquis 5 mg b i d  JENNIFER  On admission patient creatinine 1 69, with a baseline of 0 9-1  JENNIFER resolved following day after IV fluids, creatinine remained normal throughout hospital course  Dilated Cardiomyopathy  On admission patient had BNP elevated to 2000  Cardiac echo was ordered showing left ventricle dilation if the EF of 35-40%, G2DD  Urge Incontinence/Enuresis  During hospitalization patient noted to have enuresis secondary to urge incontinence  Following urology consult patient was started on Flomax which improved his symptoms, patient without enuresis day before for discharge  Gastroesophageal Cancer  Patient last chemotherapy on 08/20 with 5 FU  Patient having dysphagia during hospitalization, plan for barium swallow after discharge  Physical Exam at Discharge  Physical Exam  Constitutional:       General: He is not in acute distress  Appearance: He is normal weight  HENT:      Mouth/Throat:      Mouth: Mucous membranes are moist       Pharynx: Oropharynx is clear  No oropharyngeal exudate  Eyes:      Extraocular Movements: Extraocular movements intact  Cardiovascular:      Rate and Rhythm: Normal rate and regular rhythm        Pulses: Normal pulses  Heart sounds: Normal heart sounds  No murmur  No friction rub  No gallop  Pulmonary:      Effort: Pulmonary effort is normal  No respiratory distress  Breath sounds: Normal breath sounds  No wheezing or rhonchi  Chest:      Comments: Port in place  Abdominal:      General: Abdomen is flat  There is no distension  Palpations: Abdomen is soft  Tenderness: There is abdominal tenderness  Comments: Tender 2/2 GE CA, Less tender then previous exams   Skin:     General: Skin is warm  Capillary Refill: Capillary refill takes less than 2 seconds  Neurological:      General: No focal deficit present  Mental Status: He is oriented to person, place, and time     Psychiatric:         Mood and Affect: Mood normal          Behavior: Behavior normal            Medications   Your Medications     STOP taking these medications     STOP taking these medications    bisoprolol-hydrochlorothiazide 10-6 25 MG per tablet   Commonly known as: ZIAC    START taking these medications     START taking these medications     Morning  Afternoon  Evening  Bedtime  As Needed     amiodarone 200 mg tablet   Take 1 tablet (200 mg total) by mouth daily with breakfast   Refills: 2   Last time this was given: 200 mg on September 8, 2020  8:30 AM          atorvastatin 40 mg tablet   Commonly known as: LIPITOR   Take 1 tablet (40 mg total) by mouth daily with dinner   Refills: 2   Last time this was given: 40 mg on September 8, 2020  4:40 PM          cephalexin 500 mg capsule   Commonly known as: Ramila Kasi   For: Infection of the Respiratory Tract   Take 1 capsule (500 mg total) by mouth every 6 (six) hours for 2 doses   Refills: 0          losartan 50 mg tablet   Commonly known as: COZAAR   Take 1 tablet (50 mg total) by mouth daily   Refills: 2   Last time this was given: 25 mg on September 8, 2020  9:07 AM          metoprolol tartrate 25 mg tablet   Commonly known as: LOPRESSOR   Take 1 tablet (25 mg total) by mouth daily at bedtime   Refills: 2   Last time this was given: 25 mg on September 6, 2020 10:10 PM          tamsulosin 0 4 mg   Commonly known as: FLOMAX   Take 1 capsule (0 4 mg total) by mouth daily with dinner   Refills: 2   Last time this was given: 0 4 mg on September 8, 2020  4:40 PM         CHANGE how you take these medications     CHANGE how you take these medications     Morning  Afternoon  Evening  Bedtime  As Needed     dexamethasone 2 mg tablet   Commonly known as: DECADRON   Take 1 tablet (2 mg total) by mouth daily after lunch   Refills: 0   Last time this was given: 4 mg on September 8, 2020  2:43 PM   What changed: how much to take         CONTINUE taking these medications     CONTINUE taking these medications     Morning  Afternoon  Evening  Bedtime  As Needed     ammonium lactate 12 % cream   Commonly known as: LAC-HYDRIN   Apply topically as needed for dry skin On hands and feet   Refills: 2          diazepam 5 mg tablet   Commonly known as: VALIUM   Take 1 tablet (5 mg total) by mouth 2 (two) times a day   Refills: 0   Last time this was given: 5 mg on September 5, 2020 10:03 PM          Eliquis 5 mg   Generic drug: apixaban   TAKE ONE TABLET BY MOUTH TWICE A DAY   Refills: 1   Last time this was given: 5 mg on September 8, 2020  9:07 AM          fluorouracil 4,730 mg in CADD infusion pump   Infuse 4,730 mg (1,200 mg/m2/day x 1 97 m2 (Treatment plan recorded BSA)) into a venous catheter over 46 hours for 2 days [place] to be administered on   Refills: 0          metoclopramide 5 mg tablet   Commonly known as: REGLAN   Take 1 tablet (5 mg total) by mouth 3 (three) times a day before meals   Refills: 0          OLANZapine 5 mg tablet   Commonly known as: ZyPREXA   Take 1 tablet (5 mg total) by mouth daily at bedtime   Refills: 0   Last time this was given: 5 mg on September 7, 2020  9:37 PM          ondansetron 4 mg tablet   Commonly known as: ZOFRAN   Take 2 tablets (8 mg total) by mouth every 8 (eight) hours as needed for nausea or vomiting   Refills: 0          oxyCODONE 5 mg immediate release tablet   Commonly known as: ROXICODONE   Take 1 tablet (5 mg total) by mouth every 4 (four) hours as needed for severe pain May crush and give in slurryMax Daily Amount: 30 mg   Refills: 0          pantoprazole 40 mg tablet   Commonly known as: PROTONIX   Take 1 tablet (40 mg total) by mouth daily before breakfast   Refills: 0   Last time this was given: 40 mg on September 8, 2020  5:03 AM          sucralfate 1 g/10 mL suspension   Commonly known as: CARAFATE   Take 10 mL (1 g total) by mouth 4 (four) times a day (with meals and at bedtime)   Refills: 0          venlafaxine 150 mg 24 hr capsule   Commonly known as: EFFEXOR-XR   Take 1 capsule (150 mg total) by mouth daily   Refills: 0   Last time this was given: 150 mg on September 8, 2020  9:07 AM                       Allergies  Allergies   Allergen Reactions    Bee Venom Anaphylaxis    Shellfish-Derived Products      Develops GOUT       Diet restrictions:  Dys 2 and thin liquids via cup or straw  Activity restrictions: As tolerated per PT eval  Code Status: Prior    Discharge Condition: fair      Discharge  Statement   I spent 25 minutes discharging the patient  This time was spent on the day of discharge  I had direct contact with the patient on the day of discharge  Additional documentation is required if more than 30 minutes were spent on discharge

## 2020-09-09 NOTE — TELEPHONE ENCOUNTER
Dr Lety Putnam from [facility] pharmacy called to get clarification on BP med  Pls  Call [phone]    Thanks

## 2020-09-09 NOTE — TELEPHONE ENCOUNTER
I cancelled appointment for tomorrow, and infusion for 9/16  I kept infusion for 9/30 and rescheduled patients appointment to 9/29 with Dr Ronan Galarza  I did call and leave a message for the patients wife in regards to the schedule changes  We can readjust after discharge  I kept 10/6 appt with Dr Ronan Galarza for right now  Can we please contact patients wife just to notify her that appt on 9/29 at 2:20 was scheduled

## 2020-09-10 ENCOUNTER — EPISODE CHANGES (OUTPATIENT)
Dept: CASE MANAGEMENT | Facility: HOSPITAL | Age: 65
End: 2020-09-10

## 2020-09-11 ENCOUNTER — EPISODE CHANGES (OUTPATIENT)
Dept: CASE MANAGEMENT | Facility: OTHER | Age: 65
End: 2020-09-11

## 2020-09-11 ENCOUNTER — PATIENT OUTREACH (OUTPATIENT)
Dept: CASE MANAGEMENT | Facility: OTHER | Age: 65
End: 2020-09-11

## 2020-09-11 LAB
ATRIAL RATE: 64 BPM
ATRIAL RATE: 74 BPM
P AXIS: 16 DEGREES
P AXIS: 8 DEGREES
PR INTERVAL: 160 MS
PR INTERVAL: 168 MS
QRS AXIS: -3 DEGREES
QRS AXIS: 85 DEGREES
QRSD INTERVAL: 100 MS
QRSD INTERVAL: 92 MS
QT INTERVAL: 408 MS
QT INTERVAL: 466 MS
QTC INTERVAL: 452 MS
QTC INTERVAL: 480 MS
T WAVE AXIS: -4 DEGREES
T WAVE AXIS: 64 DEGREES
VENTRICULAR RATE: 64 BPM
VENTRICULAR RATE: 74 BPM

## 2020-09-11 PROCEDURE — 93010 ELECTROCARDIOGRAM REPORT: CPT | Performed by: INTERNAL MEDICINE

## 2020-09-11 NOTE — PROGRESS NOTES
Patient identified as New Medicare Bundle through report  Email with bundle communication tool sent to facility with bundle dates, DRG, and LOS  This care manager assistant will continue to monitor via chart and CarePort review throughout bundle episode

## 2020-09-14 ENCOUNTER — TELEPHONE (OUTPATIENT)
Dept: HEMATOLOGY ONCOLOGY | Facility: CLINIC | Age: 65
End: 2020-09-14

## 2020-09-14 DIAGNOSIS — G89.3 CANCER-RELATED PAIN: Chronic | ICD-10-CM

## 2020-09-14 DIAGNOSIS — R11.0 CHEMOTHERAPY-INDUCED NAUSEA: ICD-10-CM

## 2020-09-14 DIAGNOSIS — F43.10 PTSD (POST-TRAUMATIC STRESS DISORDER): ICD-10-CM

## 2020-09-14 DIAGNOSIS — R13.10 DYSPHAGIA, UNSPECIFIED TYPE: ICD-10-CM

## 2020-09-14 DIAGNOSIS — F42.9 OBSESSIVE-COMPULSIVE DISORDER, UNSPECIFIED TYPE: ICD-10-CM

## 2020-09-14 DIAGNOSIS — C16.0 MALIGNANT NEOPLASM OF CARDIA OF STOMACH (HCC): Primary | ICD-10-CM

## 2020-09-14 DIAGNOSIS — F33.9 RECURRENT MAJOR DEPRESSIVE DISORDER, REMISSION STATUS UNSPECIFIED (HCC): ICD-10-CM

## 2020-09-14 DIAGNOSIS — C16.0 MALIGNANT NEOPLASM OF CARDIA OF STOMACH (HCC): ICD-10-CM

## 2020-09-14 DIAGNOSIS — F41.9 ANXIETY: ICD-10-CM

## 2020-09-14 DIAGNOSIS — C15.5 MALIGNANT NEOPLASM OF LOWER THIRD OF ESOPHAGUS (HCC): ICD-10-CM

## 2020-09-14 DIAGNOSIS — T45.1X5A CHEMOTHERAPY-INDUCED NAUSEA: ICD-10-CM

## 2020-09-14 DIAGNOSIS — R11.0 NAUSEA: ICD-10-CM

## 2020-09-14 RX ORDER — NALOXONE HYDROCHLORIDE 4 MG/.1ML
SPRAY NASAL
Qty: 1 EACH | Refills: 1 | Status: SHIPPED | OUTPATIENT
Start: 2020-09-14 | End: 2020-09-28

## 2020-09-14 RX ORDER — OXYCODONE HYDROCHLORIDE 5 MG/1
5 TABLET ORAL EVERY 4 HOURS PRN
Qty: 60 TABLET | Refills: 0 | Status: SHIPPED | OUTPATIENT
Start: 2020-09-14 | End: 2020-12-22

## 2020-09-14 RX ORDER — NALOXONE HYDROCHLORIDE 4 MG/.1ML
SPRAY NASAL
Qty: 1 EACH | Refills: 1 | Status: SHIPPED | OUTPATIENT
Start: 2020-09-14 | End: 2020-09-14 | Stop reason: SDUPTHER

## 2020-09-14 RX ORDER — DIAZEPAM 5 MG/1
5 TABLET ORAL 2 TIMES DAILY
Qty: 60 TABLET | Refills: 5 | Status: ON HOLD | OUTPATIENT
Start: 2020-09-14 | End: 2021-05-13 | Stop reason: ALTCHOICE

## 2020-09-14 NOTE — TELEPHONE ENCOUNTER
Patient's wife, Jorge Marin, is calling in to inform that she is taking her  out of rehab and will continue to do his rehab at home and would like to know if he needs to come in for chemo since he will now be home

## 2020-09-15 ENCOUNTER — PATIENT OUTREACH (OUTPATIENT)
Dept: CASE MANAGEMENT | Facility: OTHER | Age: 65
End: 2020-09-15

## 2020-09-15 NOTE — TELEPHONE ENCOUNTER
Can we please see if we can bring him in to see Dr Gordon Pizarro or [facility] and move up his treatment to next week?

## 2020-09-15 NOTE — PROGRESS NOTES
This CM assistant received a Email from [address] [place] [address] the patient was discharged 9/14/20 to Home with [facility]  I emailed Luz Maria Duvall and Jermaine Matos at [facility] to confirm the patients discharge  This CM assistant received a email from Jermaine Matos at [facility] in regards to the patient  Galina Rice confirmed the patient was discharged 9/14/20 to Home with [facility] care  A email was sent to the facility requesting discharge instructions  When CM assistant has received the Discharge paperwork CM assistant will attach to this episode  I have removed myself off of the care team, sent a inbasket to the [facility], updated   the BPCI form, and updated the care coordination note

## 2020-09-16 ENCOUNTER — TELEPHONE (OUTPATIENT)
Dept: HEMATOLOGY ONCOLOGY | Facility: HOSPITAL | Age: 65
End: 2020-09-16

## 2020-09-16 ENCOUNTER — HOSPITAL ENCOUNTER (OUTPATIENT)
Dept: INFUSION CENTER | Facility: HOSPITAL | Age: 65
End: 2020-09-16
Attending: INTERNAL MEDICINE

## 2020-09-16 NOTE — TELEPHONE ENCOUNTER
Left voicemail for patients wife regarding a follow up appointment [email or web address]    Requested a return call to discuss

## 2020-09-16 NOTE — TELEPHONE ENCOUNTER
GABRIELAM for Vasu Shell patients wife offering an appointment for [email or web address]    Requested a return call to discuss

## 2020-09-17 ENCOUNTER — TELEPHONE (OUTPATIENT)
Dept: FAMILY MEDICINE CLINIC | Facility: CLINIC | Age: 65
End: 2020-09-17

## 2020-09-17 NOTE — TELEPHONE ENCOUNTER
Pt too weak to get up and wife cannot get him into car for appt  She will call [phone] now and have patient to the ER

## 2020-09-18 ENCOUNTER — OFFICE VISIT (OUTPATIENT)
Dept: FAMILY MEDICINE CLINIC | Facility: CLINIC | Age: 65
End: 2020-09-18
Payer: MEDICARE

## 2020-09-18 ENCOUNTER — APPOINTMENT (EMERGENCY)
Dept: RADIOLOGY | Facility: HOSPITAL | Age: 65
DRG: 871 | End: 2020-09-18
Payer: MEDICARE

## 2020-09-18 ENCOUNTER — TELEPHONE (OUTPATIENT)
Dept: HEMATOLOGY ONCOLOGY | Facility: CLINIC | Age: 65
End: 2020-09-18

## 2020-09-18 ENCOUNTER — PATIENT OUTREACH (OUTPATIENT)
Dept: FAMILY MEDICINE CLINIC | Facility: CLINIC | Age: 65
End: 2020-09-18

## 2020-09-18 ENCOUNTER — HOSPITAL ENCOUNTER (INPATIENT)
Facility: HOSPITAL | Age: 65
LOS: 7 days | Discharge: HOME HEALTH | DRG: 871 | End: 2020-09-25
Attending: EMERGENCY MEDICINE | Admitting: FAMILY MEDICINE
Payer: MEDICARE

## 2020-09-18 VITALS
BODY MASS INDEX: 23.03 KG/M2 | HEART RATE: 79 BPM | SYSTOLIC BLOOD PRESSURE: 78 MMHG | DIASTOLIC BLOOD PRESSURE: 50 MMHG | WEIGHT: 170 LBS | OXYGEN SATURATION: 82 % | TEMPERATURE: 94.5 F | HEIGHT: 72 IN | RESPIRATION RATE: 14 BRPM

## 2020-09-18 DIAGNOSIS — E44.0 MODERATE PROTEIN-CALORIE MALNUTRITION (HCC): ICD-10-CM

## 2020-09-18 DIAGNOSIS — E87.1 HYPONATREMIA: ICD-10-CM

## 2020-09-18 DIAGNOSIS — N17.9 AKI (ACUTE KIDNEY INJURY) (HCC): ICD-10-CM

## 2020-09-18 DIAGNOSIS — R97.20 ELEVATED PSA: ICD-10-CM

## 2020-09-18 DIAGNOSIS — T45.1X5A CHEMOTHERAPY-INDUCED NAUSEA: ICD-10-CM

## 2020-09-18 DIAGNOSIS — J18.9 PNEUMONIA: ICD-10-CM

## 2020-09-18 DIAGNOSIS — R65.21 SEPTIC SHOCK (HCC): ICD-10-CM

## 2020-09-18 DIAGNOSIS — E43 SEVERE PROTEIN-CALORIE MALNUTRITION (GOMEZ: LESS THAN 60% OF STANDARD WEIGHT) (HCC): ICD-10-CM

## 2020-09-18 DIAGNOSIS — J96.91 RESPIRATORY FAILURE WITH HYPOXIA (HCC): Primary | ICD-10-CM

## 2020-09-18 DIAGNOSIS — J18.9 PNEUMONIA OF LEFT LUNG DUE TO INFECTIOUS ORGANISM, UNSPECIFIED PART OF LUNG: ICD-10-CM

## 2020-09-18 DIAGNOSIS — C15.5 MALIGNANT NEOPLASM OF LOWER THIRD OF ESOPHAGUS (HCC): ICD-10-CM

## 2020-09-18 DIAGNOSIS — R11.0 CHEMOTHERAPY-INDUCED NAUSEA: ICD-10-CM

## 2020-09-18 DIAGNOSIS — A41.9 SEPSIS, DUE TO UNSPECIFIED ORGANISM, UNSPECIFIED WHETHER ACUTE ORGAN DYSFUNCTION PRESENT (HCC): Primary | ICD-10-CM

## 2020-09-18 DIAGNOSIS — A41.9 SEPTIC SHOCK (HCC): ICD-10-CM

## 2020-09-18 PROBLEM — I48.0 PAF (PAROXYSMAL ATRIAL FIBRILLATION) (HCC): Status: ACTIVE | Noted: 2020-09-02

## 2020-09-18 PROBLEM — I50.42 CHRONIC COMBINED SYSTOLIC AND DIASTOLIC CHF (CONGESTIVE HEART FAILURE) (HCC): Chronic | Status: ACTIVE | Noted: 2020-09-18

## 2020-09-18 PROBLEM — R13.19 OTHER DYSPHAGIA: Chronic | Status: ACTIVE | Noted: 2019-08-07

## 2020-09-18 PROBLEM — E87.2 LACTIC ACIDOSIS: Status: ACTIVE | Noted: 2020-09-18

## 2020-09-18 PROBLEM — F32.9 MAJOR DEPRESSION: Chronic | Status: ACTIVE | Noted: 2018-06-14

## 2020-09-18 PROBLEM — F41.9 ANXIETY: Chronic | Status: ACTIVE | Noted: 2018-06-14

## 2020-09-18 PROBLEM — R79.89 ELEVATED LACTIC ACID LEVEL: Status: ACTIVE | Noted: 2020-09-18

## 2020-09-18 PROBLEM — I50.9 CHF (CONGESTIVE HEART FAILURE) (HCC): Status: ACTIVE | Noted: 2020-09-18

## 2020-09-18 PROBLEM — Z95.828 PORT-A-CATH IN PLACE: Status: ACTIVE | Noted: 2020-09-18

## 2020-09-18 PROBLEM — J96.01 ACUTE RESPIRATORY FAILURE WITH HYPOXEMIA (HCC): Status: ACTIVE | Noted: 2020-09-18

## 2020-09-18 PROBLEM — R31.9 HEMATURIA: Status: ACTIVE | Noted: 2020-09-18

## 2020-09-18 LAB
ALBUMIN SERPL BCP-MCNC: 2.3 G/DL (ref 3.5–5)
ALP SERPL-CCNC: 95 U/L (ref 46–116)
ALT SERPL W P-5'-P-CCNC: 18 U/L (ref 12–78)
ANION GAP SERPL CALCULATED.3IONS-SCNC: 12 MMOL/L (ref 4–13)
ANISOCYTOSIS BLD QL SMEAR: PRESENT
APTT PPP: 40 SECONDS (ref 23–37)
AST SERPL W P-5'-P-CCNC: 18 U/L (ref 5–45)
ATRIAL RATE: 64 BPM
BACTERIA UR QL AUTO: ABNORMAL /HPF
BASOPHILS # BLD MANUAL: 0 THOUSAND/UL (ref 0–0.1)
BASOPHILS NFR MAR MANUAL: 0 % (ref 0–1)
BILIRUB SERPL-MCNC: 0.6 MG/DL (ref 0.2–1)
BILIRUB UR QL STRIP: NEGATIVE
BUN SERPL-MCNC: 32 MG/DL (ref 5–25)
CALCIUM ALBUM COR SERPL-MCNC: 10.2 MG/DL (ref 8.3–10.1)
CALCIUM SERPL-MCNC: 8.8 MG/DL (ref 8.3–10.1)
CHLORIDE SERPL-SCNC: 95 MMOL/L (ref 100–108)
CLARITY UR: ABNORMAL
CO2 SERPL-SCNC: 24 MMOL/L (ref 21–32)
COLOR UR: ABNORMAL
CREAT SERPL-MCNC: 1.66 MG/DL (ref 0.6–1.3)
EOSINOPHIL # BLD MANUAL: 0.48 THOUSAND/UL (ref 0–0.4)
EOSINOPHIL NFR BLD MANUAL: 4 % (ref 0–6)
ERYTHROCYTE [DISTWIDTH] IN BLOOD BY AUTOMATED COUNT: 18.3 % (ref 11.6–15.1)
FLUAV RNA NPH QL NAA+PROBE: NORMAL
FLUBV RNA NPH QL NAA+PROBE: NORMAL
GFR SERPL CREATININE-BSD FRML MDRD: 42 ML/MIN/1.73SQ M
GLUCOSE SERPL-MCNC: 68 MG/DL (ref 65–140)
GLUCOSE UR STRIP-MCNC: NEGATIVE MG/DL
HCT VFR BLD AUTO: 34.9 % (ref 36.5–49.3)
HGB BLD-MCNC: 10.6 G/DL (ref 12–17)
HGB UR QL STRIP.AUTO: ABNORMAL
HYPERCHROMIA BLD QL SMEAR: PRESENT
INR PPP: 1.92 (ref 0.84–1.19)
KETONES UR STRIP-MCNC: NEGATIVE MG/DL
LACTATE SERPL-SCNC: 1.5 MMOL/L (ref 0.5–2)
LACTATE SERPL-SCNC: 2.1 MMOL/L (ref 0.5–2)
LACTATE SERPL-SCNC: 4.7 MMOL/L (ref 0.5–2)
LEUKOCYTE ESTERASE UR QL STRIP: NEGATIVE
LG PLATELETS BLD QL SMEAR: PRESENT
LYMPHOCYTES # BLD AUTO: 1.43 THOUSAND/UL (ref 0.6–4.47)
LYMPHOCYTES # BLD AUTO: 12 % (ref 14–44)
MAGNESIUM SERPL-MCNC: 1.7 MG/DL (ref 1.6–2.6)
MCH RBC QN AUTO: 31.1 PG (ref 26.8–34.3)
MCHC RBC AUTO-ENTMCNC: 30.4 G/DL (ref 31.4–37.4)
MCV RBC AUTO: 102 FL (ref 82–98)
MONOCYTES # BLD AUTO: 0.72 THOUSAND/UL (ref 0–1.22)
MONOCYTES NFR BLD: 6 % (ref 4–12)
NEUTROPHILS # BLD MANUAL: 9.32 THOUSAND/UL (ref 1.85–7.62)
NEUTS BAND NFR BLD MANUAL: 30 % (ref 0–8)
NEUTS SEG NFR BLD AUTO: 48 % (ref 43–75)
NITRITE UR QL STRIP: NEGATIVE
NON-SQ EPI CELLS URNS QL MICRO: ABNORMAL /HPF
NRBC BLD AUTO-RTO: 0 /100 WBCS
NT-PROBNP SERPL-MCNC: 5950 PG/ML
P AXIS: 33 DEGREES
PH UR STRIP.AUTO: 5.5 [PH]
PHOSPHATE SERPL-MCNC: 3.4 MG/DL (ref 2.3–4.1)
PLATELET # BLD AUTO: 351 THOUSANDS/UL (ref 149–390)
PLATELET BLD QL SMEAR: ADEQUATE
PLATELET CLUMP BLD QL SMEAR: PRESENT
PMV BLD AUTO: 11.3 FL (ref 8.9–12.7)
POTASSIUM SERPL-SCNC: 3.8 MMOL/L (ref 3.5–5.3)
PR INTERVAL: 158 MS
PROCALCITONIN SERPL-MCNC: 58.27 NG/ML
PROT SERPL-MCNC: 7 G/DL (ref 6.4–8.2)
PROT UR STRIP-MCNC: ABNORMAL MG/DL
PROTHROMBIN TIME: 21.7 SECONDS (ref 11.6–14.5)
QRS AXIS: 46 DEGREES
QRSD INTERVAL: 108 MS
QT INTERVAL: 408 MS
QTC INTERVAL: 420 MS
RBC # BLD AUTO: 3.41 MILLION/UL (ref 3.88–5.62)
RBC #/AREA URNS AUTO: ABNORMAL /HPF
RBC MORPH BLD: PRESENT
RSV RNA NPH QL NAA+PROBE: NORMAL
SARS-COV-2 RNA RESP QL NAA+PROBE: NEGATIVE
SODIUM SERPL-SCNC: 131 MMOL/L (ref 136–145)
SP GR UR STRIP.AUTO: 1.01 (ref 1–1.03)
T WAVE AXIS: 64 DEGREES
TOTAL CELLS COUNTED SPEC: 100
TROPONIN I SERPL-MCNC: <0.02 NG/ML
UROBILINOGEN UR QL STRIP.AUTO: 0.2 E.U./DL
VENTRICULAR RATE: 64 BPM
WBC # BLD AUTO: 11.95 THOUSAND/UL (ref 4.31–10.16)
WBC #/AREA URNS AUTO: ABNORMAL /HPF

## 2020-09-18 PROCEDURE — 93005 ELECTROCARDIOGRAM TRACING: CPT

## 2020-09-18 PROCEDURE — 83605 ASSAY OF LACTIC ACID: CPT | Performed by: EMERGENCY MEDICINE

## 2020-09-18 PROCEDURE — 71045 X-RAY EXAM CHEST 1 VIEW: CPT

## 2020-09-18 PROCEDURE — 83880 ASSAY OF NATRIURETIC PEPTIDE: CPT | Performed by: EMERGENCY MEDICINE

## 2020-09-18 PROCEDURE — 94760 N-INVAS EAR/PLS OXIMETRY 1: CPT

## 2020-09-18 PROCEDURE — 87040 BLOOD CULTURE FOR BACTERIA: CPT | Performed by: EMERGENCY MEDICINE

## 2020-09-18 PROCEDURE — 87081 CULTURE SCREEN ONLY: CPT | Performed by: ANESTHESIOLOGY

## 2020-09-18 PROCEDURE — 83735 ASSAY OF MAGNESIUM: CPT | Performed by: PHYSICIAN ASSISTANT

## 2020-09-18 PROCEDURE — 84100 ASSAY OF PHOSPHORUS: CPT | Performed by: ANESTHESIOLOGY

## 2020-09-18 PROCEDURE — 96375 TX/PRO/DX INJ NEW DRUG ADDON: CPT

## 2020-09-18 PROCEDURE — 99285 EMERGENCY DEPT VISIT HI MDM: CPT

## 2020-09-18 PROCEDURE — 99291 CRITICAL CARE FIRST HOUR: CPT | Performed by: EMERGENCY MEDICINE

## 2020-09-18 PROCEDURE — 84484 ASSAY OF TROPONIN QUANT: CPT | Performed by: EMERGENCY MEDICINE

## 2020-09-18 PROCEDURE — 85610 PROTHROMBIN TIME: CPT | Performed by: EMERGENCY MEDICINE

## 2020-09-18 PROCEDURE — 83605 ASSAY OF LACTIC ACID: CPT | Performed by: NURSE PRACTITIONER

## 2020-09-18 PROCEDURE — 87449 NOS EACH ORGANISM AG IA: CPT | Performed by: ANESTHESIOLOGY

## 2020-09-18 PROCEDURE — 96365 THER/PROPH/DIAG IV INF INIT: CPT

## 2020-09-18 PROCEDURE — 94664 DEMO&/EVAL PT USE INHALER: CPT

## 2020-09-18 PROCEDURE — 81001 URINALYSIS AUTO W/SCOPE: CPT | Performed by: EMERGENCY MEDICINE

## 2020-09-18 PROCEDURE — 87631 RESP VIRUS 3-5 TARGETS: CPT | Performed by: EMERGENCY MEDICINE

## 2020-09-18 PROCEDURE — 84145 PROCALCITONIN (PCT): CPT | Performed by: EMERGENCY MEDICINE

## 2020-09-18 PROCEDURE — 80053 COMPREHEN METABOLIC PANEL: CPT | Performed by: EMERGENCY MEDICINE

## 2020-09-18 PROCEDURE — 94002 VENT MGMT INPAT INIT DAY: CPT

## 2020-09-18 PROCEDURE — 93010 ELECTROCARDIOGRAM REPORT: CPT | Performed by: INTERNAL MEDICINE

## 2020-09-18 PROCEDURE — 85027 COMPLETE CBC AUTOMATED: CPT | Performed by: EMERGENCY MEDICINE

## 2020-09-18 PROCEDURE — 1124F ACP DISCUSS-NO DSCNMKR DOCD: CPT | Performed by: EMERGENCY MEDICINE

## 2020-09-18 PROCEDURE — 99291 CRITICAL CARE FIRST HOUR: CPT | Performed by: ANESTHESIOLOGY

## 2020-09-18 PROCEDURE — 36415 COLL VENOUS BLD VENIPUNCTURE: CPT | Performed by: EMERGENCY MEDICINE

## 2020-09-18 PROCEDURE — 96361 HYDRATE IV INFUSION ADD-ON: CPT

## 2020-09-18 PROCEDURE — 87635 SARS-COV-2 COVID-19 AMP PRB: CPT | Performed by: EMERGENCY MEDICINE

## 2020-09-18 PROCEDURE — 85007 BL SMEAR W/DIFF WBC COUNT: CPT | Performed by: EMERGENCY MEDICINE

## 2020-09-18 PROCEDURE — 94668 MNPJ CHEST WALL SBSQ: CPT

## 2020-09-18 PROCEDURE — 99214 OFFICE O/P EST MOD 30 MIN: CPT | Performed by: FAMILY MEDICINE

## 2020-09-18 PROCEDURE — 85730 THROMBOPLASTIN TIME PARTIAL: CPT | Performed by: EMERGENCY MEDICINE

## 2020-09-18 RX ORDER — ATORVASTATIN CALCIUM 40 MG/1
40 TABLET, FILM COATED ORAL
Status: DISCONTINUED | OUTPATIENT
Start: 2020-09-18 | End: 2020-09-25 | Stop reason: HOSPADM

## 2020-09-18 RX ORDER — PANTOPRAZOLE SODIUM 40 MG/1
40 TABLET, DELAYED RELEASE ORAL
Status: DISCONTINUED | OUTPATIENT
Start: 2020-09-19 | End: 2020-09-25 | Stop reason: HOSPADM

## 2020-09-18 RX ORDER — ALBUMIN, HUMAN INJ 5% 5 %
12.5 SOLUTION INTRAVENOUS ONCE
Status: DISCONTINUED | OUTPATIENT
Start: 2020-09-18 | End: 2020-09-18

## 2020-09-18 RX ORDER — AMIODARONE HYDROCHLORIDE 200 MG/1
200 TABLET ORAL
Status: DISCONTINUED | OUTPATIENT
Start: 2020-09-19 | End: 2020-09-25 | Stop reason: HOSPADM

## 2020-09-18 RX ORDER — LOSARTAN POTASSIUM 50 MG/1
50 TABLET ORAL DAILY
Status: DISCONTINUED | OUTPATIENT
Start: 2020-09-18 | End: 2020-09-18

## 2020-09-18 RX ORDER — POTASSIUM CHLORIDE 14.9 MG/ML
20 INJECTION INTRAVENOUS ONCE
Status: COMPLETED | OUTPATIENT
Start: 2020-09-18 | End: 2020-09-18

## 2020-09-18 RX ORDER — CEFTRIAXONE 1 G/50ML
1000 INJECTION, SOLUTION INTRAVENOUS ONCE
Status: DISCONTINUED | OUTPATIENT
Start: 2020-09-18 | End: 2020-09-18

## 2020-09-18 RX ORDER — TAMSULOSIN HYDROCHLORIDE 0.4 MG/1
0.4 CAPSULE ORAL
Status: DISCONTINUED | OUTPATIENT
Start: 2020-09-18 | End: 2020-09-25 | Stop reason: HOSPADM

## 2020-09-18 RX ORDER — ACETAMINOPHEN 325 MG/1
650 TABLET ORAL EVERY 6 HOURS PRN
Status: DISCONTINUED | OUTPATIENT
Start: 2020-09-18 | End: 2020-09-25 | Stop reason: HOSPADM

## 2020-09-18 RX ORDER — VENLAFAXINE HYDROCHLORIDE 150 MG/1
150 CAPSULE, EXTENDED RELEASE ORAL DAILY
Status: DISCONTINUED | OUTPATIENT
Start: 2020-09-18 | End: 2020-09-25 | Stop reason: HOSPADM

## 2020-09-18 RX ORDER — METOCLOPRAMIDE 10 MG/1
5 TABLET ORAL
Status: DISCONTINUED | OUTPATIENT
Start: 2020-09-18 | End: 2020-09-25 | Stop reason: HOSPADM

## 2020-09-18 RX ORDER — MAGNESIUM SULFATE HEPTAHYDRATE 40 MG/ML
2 INJECTION, SOLUTION INTRAVENOUS ONCE
Status: COMPLETED | OUTPATIENT
Start: 2020-09-18 | End: 2020-09-18

## 2020-09-18 RX ORDER — ALBUTEROL SULFATE 2.5 MG/3ML
2.5 SOLUTION RESPIRATORY (INHALATION) EVERY 4 HOURS PRN
Status: DISCONTINUED | OUTPATIENT
Start: 2020-09-18 | End: 2020-09-25 | Stop reason: HOSPADM

## 2020-09-18 RX ORDER — DIAZEPAM 5 MG/1
5 TABLET ORAL 2 TIMES DAILY
Status: DISCONTINUED | OUTPATIENT
Start: 2020-09-18 | End: 2020-09-18

## 2020-09-18 RX ADMIN — ATORVASTATIN CALCIUM 40 MG: 40 TABLET, FILM COATED ORAL at 16:47

## 2020-09-18 RX ADMIN — HYDROCORTISONE SODIUM SUCCINATE 100 MG: 100 INJECTION, POWDER, FOR SOLUTION INTRAMUSCULAR; INTRAVENOUS at 13:35

## 2020-09-18 RX ADMIN — MAGNESIUM SULFATE HEPTAHYDRATE 2 G: 40 INJECTION, SOLUTION INTRAVENOUS at 17:08

## 2020-09-18 RX ADMIN — ALBUTEROL SULFATE 2.5 MG: 2.5 SOLUTION RESPIRATORY (INHALATION) at 15:44

## 2020-09-18 RX ADMIN — APIXABAN 5 MG: 5 TABLET, FILM COATED ORAL at 18:20

## 2020-09-18 RX ADMIN — METRONIDAZOLE 500 MG: 500 INJECTION, SOLUTION INTRAVENOUS at 18:17

## 2020-09-18 RX ADMIN — METRONIDAZOLE 500 MG: 500 INJECTION, SOLUTION INTRAVENOUS at 23:56

## 2020-09-18 RX ADMIN — SODIUM CHLORIDE 1000 ML: 0.9 INJECTION, SOLUTION INTRAVENOUS at 13:35

## 2020-09-18 RX ADMIN — CEFEPIME HYDROCHLORIDE 2000 MG: 2 INJECTION, SOLUTION INTRAVENOUS at 13:37

## 2020-09-18 RX ADMIN — SODIUM CHLORIDE, SODIUM LACTATE, POTASSIUM CHLORIDE, AND CALCIUM CHLORIDE 1000 ML: .6; .31; .03; .02 INJECTION, SOLUTION INTRAVENOUS at 14:43

## 2020-09-18 RX ADMIN — METOCLOPRAMIDE 5 MG: 10 TABLET ORAL at 16:47

## 2020-09-18 RX ADMIN — TAMSULOSIN HYDROCHLORIDE 0.4 MG: 0.4 CAPSULE ORAL at 16:47

## 2020-09-18 RX ADMIN — VANCOMYCIN HYDROCHLORIDE 1500 MG: 10 INJECTION, POWDER, LYOPHILIZED, FOR SOLUTION INTRAVENOUS at 14:08

## 2020-09-18 RX ADMIN — VENLAFAXINE HYDROCHLORIDE 150 MG: 150 CAPSULE, EXTENDED RELEASE ORAL at 16:46

## 2020-09-18 RX ADMIN — SODIUM CHLORIDE 500 ML: 0.9 INJECTION, SOLUTION INTRAVENOUS at 12:29

## 2020-09-18 RX ADMIN — SODIUM CHLORIDE, SODIUM LACTATE, POTASSIUM CHLORIDE, AND CALCIUM CHLORIDE 1000 ML: .6; .31; .03; .02 INJECTION, SOLUTION INTRAVENOUS at 16:52

## 2020-09-18 RX ADMIN — POTASSIUM CHLORIDE 20 MEQ: 14.9 INJECTION, SOLUTION INTRAVENOUS at 16:58

## 2020-09-18 NOTE — ASSESSMENT & PLAN NOTE
Wt Readings from Last 3 Encounters:   09/18/20 79 1 kg (174 lb 6 1 oz)   09/18/20 77 1 kg (170 lb)   09/08/20 76 9 kg (169 lb 8 5 oz)       · Echo 9/2/2020-EF 35-40%  Moderate diffuse hypokinesis  G2DD  · Hx of dilated cardiomyopathy, thought possibly to be tachy-mediated secondary to new onset Afib RVR last admission     · Hold home metoprolol and losaratan secondary to hypotension on admission in setting of sepsis, resume when able   · Continue statin   · NT pro BNP 5950 from prior 2000  · Patient does not exam clinically volume overloaded at this time  · Strict I&O and daily weight

## 2020-09-18 NOTE — ASSESSMENT & PLAN NOTE
Patient with History of gastroesophageal cancer currently being treated with chemotherapy (last session 8/20)   Follow up as out patient

## 2020-09-18 NOTE — ASSESSMENT & PLAN NOTE
Patient with JENNIFER, creatinine on admission 1 66  Last Cr level was 0 95 on 9/8/20  Probably prerenal in nature 2a to dehydration vs septic shock  -rehydrate isolyte 30cc/kg initial resusitation     -follow BMP

## 2020-09-18 NOTE — ASSESSMENT & PLAN NOTE
Patient with recent hospitalization 2a to left lobe pneumonia and septic shock  Had recurrent SOB and episode of hypotension and hypoxia today and was refered to ED by primary care physician after office visit  On ED placed on high flow supplemental O2 due to hypoxia  Chest Xray shows new right middle lung pneumonia with improved aspect of left consolidation where prior pneumonia had been  Currently on Vanc + flagyl + cefepime

## 2020-09-18 NOTE — ASSESSMENT & PLAN NOTE
Patient has Active gastroesophageal carcinoma with peritoneal metastasis being treated with chemotherapy (Last chemo 8/20)

## 2020-09-18 NOTE — PROGRESS NOTES
Vancomycin Assessment    Walker Echols is a 77 y o  male who is currently receiving vancomycin Vancomycin 1500 mg IV X 1 and then, Vancomycin 750 mg IV q12h for Pneumonia     Relevant clinical data and objective history reviewed:  Creatinine   Date Value Ref Range Status   09/18/2020 1 66 (H) 0 60 - 1 30 mg/dL Final     Comment:     Standardized to IDMS reference method   09/08/2020 0 95 0 60 - 1 30 mg/dL Final     Comment:     Standardized to IDMS reference method   09/07/2020 0 89 0 60 - 1 30 mg/dL Final     Comment:     Standardized to IDMS reference method     BP (!) 84/50   Pulse 62   Temp 97 5 °F (36 4 °C)   Resp 18   SpO2 93%   No intake/output data recorded  Lab Results   Component Value Date/Time    BUN 32 (H) 09/18/2020 12:31 PM    WBC 11 95 (H) 09/18/2020 12:31 PM    HGB 10 6 (L) 09/18/2020 12:31 PM    HCT 34 9 (L) 09/18/2020 12:31 PM     (H) 09/18/2020 12:31 PM     09/18/2020 12:31 PM     Temp Readings from Last 3 Encounters:   09/18/20 97 5 °F (36 4 °C)   09/18/20 (!) 94 5 °F (34 7 °C) (Tympanic)   09/08/20 97 5 °F (36 4 °C)     Vancomycin Days of Therapy: 1    Assessment/Plan  The patient is currently on vancomycin utilizing scheduled dosing based on actual body weight  Baseline risks associated with therapy include: pre-existing renal impairment, concomitant nephrotoxic medications, and advanced age  The patient is currently receiving Vancomycin 1500 mg IV X 1 and then, Vancomycin 750 mg IV q12h and is clinically appropriate and dose will be continued  Pharmacy will also follow closely for s/sx of nephrotoxicity, infusion reactions, and appropriateness of therapy  BMP and CBC will be ordered per protocol  Plan for trough as patient approaches steady state, prior to the 5th  dose at approximately 1330 on 09/20/2020  Due to infection severity, will target a trough of 15-20 (appropriate for most indications)     Pharmacy will continue to follow the patients culture results and clinical progress daily      Susie Brunner, Pharmacist

## 2020-09-18 NOTE — TELEPHONE ENCOUNTER
Spoke with patients wife  She states PA Chris unsure of last name came into his room and stated that he had progression and wanted to know what the patients end of life plans were  I reviewed the chart with the patients wife and advised that there were no new scans since his admission that suggest progression of disease  He had a CXR for pneumonia  I was not able to find any notation at this point in regards to who this PA was or any notation from Med/onc that patient was being referred to hospice or had progression  I advised her that we would follow up with him as an outpatient and we will review the chart and discuss further

## 2020-09-18 NOTE — ASSESSMENT & PLAN NOTE
Patient presenting with septic shock 2a to new onset right lung pneumonia  hypotension present on admission, Lactic acid 4 7, repeat 2 1     B/P stable with IVF management      -monitor vitals  -continue resuscitation with IVF  -stress dose steroids   -Pressure support as needed  -Continue Antibiotic treatment (day 1)

## 2020-09-18 NOTE — PLAN OF CARE
Problem: Potential for Falls  Goal: Patient will remain free of falls  Description: INTERVENTIONS:  - Assess patient frequently for physical needs  -  Identify cognitive and physical deficits and behaviors that affect risk of falls    -  [place] fall precautions as indicated by assessment   - Educate patient/family on patient safety including physical limitations  - Instruct patient to call for assistance with activity based on assessment  - Modify environment to reduce risk of injury  - Consider OT/PT consult to assist with strengthening/mobility  Outcome: Progressing     Problem: RESPIRATORY - ADULT  Goal: Achieves optimal ventilation and oxygenation  Description: INTERVENTIONS:  - Assess for changes in respiratory status  - Assess for changes in mentation and behavior  - Position to facilitate oxygenation and minimize respiratory effort  - Oxygen administered by appropriate delivery if ordered  - Initiate smoking cessation education as indicated  - Encourage broncho-pulmonary hygiene including cough, deep breathe, Incentive Spirometry  - Assess the need for suctioning and aspirate as needed  - Assess and instruct to report SOB or any respiratory difficulty  - Respiratory Therapy support as indicated  - Vapotherm  Outcome: Progressing     Problem: Prexisting or High Potential for Compromised Skin Integrity  Goal: Skin integrity is maintained or improved  Description: INTERVENTIONS:  - Identify patients at risk for skin breakdown  - Assess and monitor skin integrity  - Assess and monitor nutrition and hydration status  - Monitor labs   - Assess for incontinence   - Turn and reposition patient  - Assist with mobility/ambulation  - Relieve pressure over bony prominences  - Avoid friction and shearing  - Provide appropriate hygiene as needed including keeping skin clean and dry  - Evaluate need for skin moisturizer/barrier cream  - Collaborate with interdisciplinary team   - Patient/family teaching  - Consider wound care consult   Outcome: Progressing

## 2020-09-18 NOTE — ASSESSMENT & PLAN NOTE
Patient presenting with septic shock 2/2 to new onset right lung pneumonia  hypotension present on admission, Lactic acid 4 7, repeat 2 1     B/P stable with IVF management       - IVF resuscitated at 3 5 L   - stress dose steroids x1  - No additional pressure support required after IV fluid resuscitation    - Continue empiric Antibiotic treatment of vanco + cefepime + flagyl (9/18-)

## 2020-09-18 NOTE — ASSESSMENT & PLAN NOTE
Patient with recent hospitalization 2/2 left lobe pneumonia and septic shock  Had recurrent SOB and episode of hypotension and hypoxia on 9/18 and was refered to ED by primary care physician after office visit  On ED placed on high flow supplemental O2 due to hypoxia  Chest Xray shows new right middle lung pneumonia with improved aspect of left consolidation where prior pneumonia had been  - Currently on vancomycin 1500 mg IV q 12 hours day 6, Flagyl 500 mg IV Q 8 day 6, cefepime 2 g IV q 12 day 6   - since patient is most likely HAP will need 7 days of IV abx and discharged most likely on PO antibiotics  - Procalcitonin tonight   - monitor vitals  - sputum culture final results Candida lipolytica and mixed respiratory bobby and gram stain showed Gram-positive cocci  - No fever in past 24hr continue to monitor for improvement clinically  - nasal cannula at 3L, will begin to wean off NC and will monitor for SOB    - trend sodium  - Vanco trough 16 8

## 2020-09-18 NOTE — ASSESSMENT & PLAN NOTE
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

## 2020-09-18 NOTE — ASSESSMENT & PLAN NOTE
Wt Readings from Last 3 Encounters:   09/23/20 76 kg (167 lb 8 8 oz)   09/18/20 77 1 kg (170 lb)   09/08/20 76 9 kg (169 lb 8 5 oz)     Echo 9/2/2020:     EF 35-40%  · There was moderate diffuse hypokinesis  · Features were consistent with a pseudonormal left ventricular filling pattern, with concomitant abnormal relaxation and increased filling pressure (grade 2 diastolic dysfunction)  Patient arrived with elevated BNP of 5950  - monitor daily fluid status with I/O and daily weights, diurese as needed   - avoid excessive IV hydration

## 2020-09-18 NOTE — ASSESSMENT & PLAN NOTE
9/18/20 episode of hypotension and hypoxia and was refered to ED by primary care physician after office visit  On ED placed on high flow supplemental O2 due to hypoxia  Chest Xray shows Right greater than left pneumonia     -Admit to critical care unit (9/18-)  -DNR/DNI     -high flow nasal cannula initially, required 20-25 LPM of O2 on vapotherm eventually   -following day, patient was successfully weaned down to 3 L NC

## 2020-09-18 NOTE — ED PROVIDER NOTES
History  Chief Complaint   Patient presents with    Shortness of Breath     per wife patient with low blood pressure, low oxygen saturations, SOB  sent by [facility]  recent d/c with pneumonia  Patient is a 77year old male with a past medical history significant for gastroesophageal carcinoma with peritoneal metastasis, h/o DVT on Elqiuis, recent admission from 8/30/2020-9/8/2020 for septic shock secondary to pneumonia as well as a dilated cardiomyopathy with EF 35%, atrial fibrillation, malnutrition, on chronic steroids who was discharged from rehab on Monday 9/14/2020, was doing well until yesterday when he developed significant generalized weakness and shortness of breath who today presents with hypotension, hypoxia from his primary care provider's after going in for a sick visit  Reports that yesterday he felt "terrible"  Describes feeling incredibly tired and short of breath, then felt slightly improved at night, but woke up feeling poorly again  Prior to Admission Medications   Prescriptions Last Dose Informant Patient Reported? Taking?    Eliquis 5 MG   No No   Sig: TAKE ONE TABLET BY MOUTH TWICE A DAY   OLANZapine (ZyPREXA) 5 mg tablet   No No   Sig: Take 1 tablet (5 mg total) by mouth daily at bedtime   amiodarone 200 mg tablet   No No   Sig: Take 1 tablet (200 mg total) by mouth daily with breakfast   ammonium lactate (LAC-HYDRIN) 12 % cream  Self No No   Sig: Apply topically as needed for dry skin On hands and feet   atorvastatin (LIPITOR) 40 mg tablet   No No   Sig: Take 1 tablet (40 mg total) by mouth daily with dinner   dexamethasone (DECADRON) 2 mg tablet   No No   Sig: Take 1 tablet (2 mg total) by mouth daily after lunch   Patient taking differently: Take 4 mg by mouth daily after lunch    diazepam (VALIUM) 5 mg tablet   No No   Sig: Take 1 tablet (5 mg total) by mouth 2 (two) times a day   losartan (COZAAR) 50 mg tablet   No No   Sig: Take 1 tablet (50 mg total) by mouth daily metoclopramide (REGLAN) 5 mg tablet   No No   Sig: Take 1 tablet (5 mg total) by mouth 3 (three) times a day before meals   metoprolol tartrate (LOPRESSOR) 25 mg tablet   No No   Sig: Take 1 tablet (25 mg total) by mouth daily at bedtime   naloxone (NARCAN) 4 mg/0 1 mL nasal spray   No No   Sig: Administer 1 spray into a nostril  If breathing does not return to normal or if breathing difficulty resumes after 2-3 minutes, give another dose in the other nostril using a new spray  ondansetron (ZOFRAN) 4 mg tablet   No No   Sig: Take 2 tablets (8 mg total) by mouth every 8 (eight) hours as needed for nausea or vomiting   oxyCODONE (ROXICODONE) 5 mg immediate release tablet   No No   Sig: Take 1 tablet (5 mg total) by mouth every 4 (four) hours as needed for severe pain May crush and give in slurryMax Daily Amount: 30 mg   pantoprazole (PROTONIX) 40 mg tablet   No No   Sig: Take 1 tablet (40 mg total) by mouth daily before breakfast   sucralfate (CARAFATE) 1 g/10 mL suspension   No No   Sig: Take 10 mL (1 g total) by mouth 4 (four) times a day (with meals and at bedtime)   tamsulosin (FLOMAX) 0 4 mg   No No   Sig: Take 1 capsule (0 4 mg total) by mouth daily with dinner   venlafaxine (EFFEXOR-XR) 150 mg 24 hr capsule  Self No No   Sig: Take 1 capsule (150 mg total) by mouth daily      Facility-Administered Medications: None       Past Medical History:   Diagnosis Date    Dehydration 8/8/2019    Esophageal cancer ([facility] )     Hypertension     Nausea 8/8/2019    Psychiatric disorder     [facility]        Past Surgical History:   Procedure Laterality Date    APPENDECTOMY      IR PORT PLACEMENT  7/31/2019       Family History   Problem Relation Age of Onset    Colon cancer Father      I have reviewed and agree with the history as documented      E-Cigarette/Vaping     E-Cigarette/Vaping Substances    Nicotine No     THC No     CBD No     Flavoring No     Other No     Unknown No      Social History     Tobacco Use    Smoking status: Former Smoker     Types: Cigars     Last attempt to quit: 5/15/2019     Years since quittin 3    Smokeless tobacco: Never Used   Substance Use Topics    Alcohol use: Not Currently     Frequency: Never     Binge frequency: Never    Drug use: Yes     Types: Marijuana       Review of Systems   Constitutional: Positive for fatigue  Negative for chills and fever  HENT: Negative for congestion and rhinorrhea  Eyes: Negative for photophobia and visual disturbance  Respiratory: Positive for chest tightness and shortness of breath  Negative for cough, wheezing and stridor  Cardiovascular: Negative for chest pain, palpitations and leg swelling  Gastrointestinal: Negative for abdominal pain, blood in stool, constipation, diarrhea, nausea and vomiting  Genitourinary: Negative for dysuria, flank pain and hematuria  Musculoskeletal: Negative for back pain, neck pain and neck stiffness  Skin: Positive for pallor  Negative for rash and wound  Neurological: Positive for weakness  Negative for dizziness, seizures, syncope, light-headedness and headaches  Physical Exam  Physical Exam  Vitals signs and nursing note reviewed  Constitutional:       General: He is not in acute distress  Appearance: He is well-developed  He is cachectic  He is ill-appearing  He is not diaphoretic  HENT:      Head: Normocephalic and atraumatic  Right Ear: External ear normal       Left Ear: External ear normal       Nose: Nose normal       Mouth/Throat:      Mouth: Mucous membranes are moist       Pharynx: No oropharyngeal exudate  Eyes:      Extraocular Movements: Extraocular movements intact  Conjunctiva/sclera: Conjunctivae normal       Pupils: Pupils are equal, round, and reactive to light  Neck:      Musculoskeletal: Normal range of motion and neck supple  Cardiovascular:      Rate and Rhythm: Normal rate and regular rhythm        Heart sounds: Normal heart sounds  No murmur  No friction rub  No gallop  Comments: Port in the right chest wall  Pulmonary:      Effort: Pulmonary effort is normal  Tachypnea present  No accessory muscle usage or respiratory distress  Breath sounds: Decreased air movement present  No stridor  Decreased breath sounds and rhonchi present  No wheezing or rales  Chest:      Chest wall: No tenderness  Abdominal:      General: Bowel sounds are normal  There is no distension  Palpations: Abdomen is soft  Tenderness: There is no abdominal tenderness  There is no guarding or rebound  Musculoskeletal: Normal range of motion  General: No tenderness  Right lower leg: No edema  Left lower leg: No edema  Skin:     General: Skin is warm and dry  Coloration: Skin is pale  Skin is not jaundiced  Findings: No bruising, erythema, lesion or rash  Neurological:      General: No focal deficit present  Mental Status: He is alert and oriented to person, place, and time  Psychiatric:         Mood and Affect: Mood normal          Behavior: Behavior normal  Behavior is cooperative           Vital Signs  ED Triage Vitals   Temperature Pulse Respirations Blood Pressure SpO2   09/18/20 1213 09/18/20 1213 09/18/20 1213 09/18/20 1213 09/18/20 1213   98 4 °F (36 9 °C) 74 22 (!) 79/49 (!) 68 %      Temp Source Heart Rate Source Patient Position - Orthostatic VS BP Location FiO2 (%)   09/18/20 1213 09/18/20 1213 09/18/20 1213 09/18/20 1213 09/18/20 1248   Tympanic Monitor Sitting Left arm 50      Pain Score       --                  Vitals:    09/18/20 1330 09/18/20 1415 09/18/20 1430 09/18/20 1500   BP: (!) 81/48 115/65 (!) 84/50 106/62   Pulse: 62 64 62 62   Patient Position - Orthostatic VS:             Visual Acuity      ED Medications  Medications   vancomycin (VANCOCIN) IVPB (premix in dextrose) 750 mg 150 mL (has no administration in time range)   cefepime (MAXIPIME) 2 g/50 mL dextrose IVPB (has no administration in time range)   metroNIDAZOLE (FLAGYL) IVPB (premix) 500 mg 100 mL (has no administration in time range)   venlafaxine (EFFEXOR-XR) 24 hr capsule 150 mg (150 mg Oral Given 9/18/20 1646)   metoclopramide (REGLAN) tablet 5 mg (5 mg Oral Given 9/18/20 1647)   pantoprazole (PROTONIX) EC tablet 40 mg (has no administration in time range)   amiodarone tablet 200 mg (has no administration in time range)   atorvastatin (LIPITOR) tablet 40 mg (40 mg Oral Given 9/18/20 1647)   tamsulosin (FLOMAX) capsule 0 4 mg (0 4 mg Oral Given 9/18/20 1647)   potassium chloride 20 mEq IVPB (premix) (20 mEq Intravenous New Bag 9/18/20 1658)   magnesium sulfate 2 g/50 mL IVPB (premix) 2 g (2 g Intravenous New Bag 9/18/20 1708)   albuterol inhalation solution 2 5 mg (2 5 mg Nebulization Given 9/18/20 1544)   apixaban (ELIQUIS) tablet 5 mg (has no administration in time range)   lactated ringers bolus 1,000 mL (1,000 mL Intravenous New Bag 9/18/20 1652)   sodium chloride 0 9 % bolus 500 mL (0 mL Intravenous Stopped 9/18/20 1329)   hydrocortisone sodium succinate (PF) (Solu-CORTEF) injection 100 mg (100 mg Intravenous Given 9/18/20 1335)   sodium chloride 0 9 % bolus 1,000 mL (0 mL Intravenous Stopped 9/18/20 1439)   vancomycin (VANCOCIN) 1,500 mg in sodium chloride 0 9 % 250 mL IVPB (1,500 mg Intravenous New Bag 9/18/20 1408)   cefepime (MAXIPIME) 2 g/50 mL dextrose IVPB (0 mg Intravenous Stopped 9/18/20 1407)   lactated ringers bolus 1,000 mL (0 mL Intravenous Stopped 9/18/20 1615)       Diagnostic Studies  Results Reviewed     Procedure Component Value Units Date/Time    Blood culture #1 [[id number]] Collected:  09/18/20 1231    Lab Status:  Preliminary result Specimen:  Blood from Arm, Left Updated:  09/18/20 1701     Blood Culture Received in Microbiology Lab  Culture in Progress      Blood culture #2 [[id number]] Collected:  09/18/20 1231    Lab Status:  Preliminary result Specimen:  Blood from Arm, Right Updated:  09/18/20 1701     Blood Culture Received in Microbiology Lab  Culture in Progress  Procalcitonin with AM Reflex [[id number]]  (Abnormal) Collected:  09/18/20 1231    Lab Status:  Final result Specimen:  Blood from Arm, Left Updated:  09/18/20 1601     Procalcitonin 58 27 ng/ml     Lactic acid 2 Hours [[id number]]  (Abnormal) Collected:  09/18/20 1430    Lab Status:  Final result Specimen:  Blood from Arm, Right Updated:  09/18/20 1548     LACTIC ACID 2 1 mmol/L     Narrative:       Result may be elevated if tourniquet was used during collection  Urinalysis with microscopic [[id number]]  (Abnormal) Collected:  09/18/20 1534    Lab Status:  Final result Specimen:  Urine, Clean Catch Updated:  09/18/20 1547     Clarity, UA Cloudy     Color, UA Red     Specific [place], UA 1 010     pH, UA 5 5     Glucose, UA Negative mg/dl      Ketones, UA Negative mg/dl      Blood, UA Large     Protein, UA 30 (1+) mg/dl      Nitrite, UA Negative     Bilirubin, UA Negative     Urobilinogen, UA 0 2 E U /dl      Leukocytes, UA Negative     WBC, UA       Field obscured, unable to enumerate     /hpf     RBC, UA Innumerable /hpf      Bacteria, UA Occasional /hpf      Epithelial Cells Occasional /hpf     Phosphorus [[id number]]  (Normal) Collected:  09/18/20 1231    Lab Status:  Final result Specimen:  Blood from Arm, Left Updated:  09/18/20 1544     Phosphorus 3 4 mg/dL     Magnesium [[id number]]  (Normal) Collected:  09/18/20 1231    Lab Status:  Final result Specimen:  Blood from Arm, Left Updated:  09/18/20 1514     Magnesium 1 7 mg/dL     Novel Coronavirus (Covid-19),PCR [facility] [[id number]]  (Normal) Collected:  09/18/20 1231    Lab Status:  Final result Specimen:  Nares from Nose Updated:  09/18/20 1339     SARS-CoV-2 Negative    Narrative:        The specimen collection materials, transport medium, and/or testing methodology utilized in the production of these test results have been proven to be reliable in a limited validation with an abbreviated program under the Emergency Utilization Authorization provided by the FDA  Testing reported as "Presumptive positive" will be confirmed with secondary testing with a reference laboratory to ensure result accuracy  Clinical caution and judgement should be used with the interpretation of these results with consideration of the clinical impression and other laboratory testing  Testing reported as "Positive" or "Negative" has been proven to be accurate according to standard laboratory validation requirements  All testing is performed with control materials showing appropriate reactivity at standard intervals        Influenza A/B and RSV PCR [[id number]]  (Normal) Collected:  09/18/20 1231    Lab Status:  Final result Specimen:  Nasopharyngeal from Nose Updated:  09/18/20 1329     INFLUENZA A PCR None Detected     INFLUENZA B PCR None Detected     RSV PCR None Detected    NT-BNP PRO [[id number]]  (Abnormal) Collected:  09/18/20 1231    Lab Status:  Final result Specimen:  Blood from Arm, Left Updated:  09/18/20 1326     NT-proBNP 5,950 pg/mL     Comprehensive metabolic panel [[id number]]  (Abnormal) Collected:  09/18/20 1231    Lab Status:  Final result Specimen:  Blood from Arm, Left Updated:  09/18/20 1320     Sodium 131 mmol/L      Potassium 3 8 mmol/L      Chloride 95 mmol/L      CO2 24 mmol/L      ANION GAP 12 mmol/L      BUN 32 mg/dL      Creatinine 1 66 mg/dL      Glucose 68 mg/dL      Calcium 8 8 mg/dL      Corrected Calcium 10 2 mg/dL      AST 18 U/L      ALT 18 U/L      Alkaline Phosphatase 95 U/L      Total Protein 7 0 g/dL      Albumin 2 3 g/dL      Total Bilirubin 0 60 mg/dL      eGFR 42 ml/min/1 73sq m     Narrative:       Narendra guidelines for Chronic Kidney Disease (CKD):     Stage 1 with normal or high GFR (GFR > 90 mL/min/1 73 square meters)    Stage 2 Mild CKD (GFR = 60-89 mL/min/1 73 square meters)    Stage 3A Moderate CKD (GFR = 45-59 mL/min/1 73 square meters)    Stage 3B Moderate CKD (GFR = 30-44 mL/min/1 73 square meters)    Stage 4 Severe CKD (GFR = 15-29 mL/min/1 73 square meters)    Stage 5 End Stage CKD (GFR <15 mL/min/1 73 square meters)  Note: GFR calculation is accurate only with a steady state creatinine    CBC and differential [[id number]]  (Abnormal) Collected:  09/18/20 1231    Lab Status:  Final result Specimen:  Blood from Arm, Left Updated:  09/18/20 1319     WBC 11 95 Thousand/uL      RBC 3 41 Million/uL      Hemoglobin 10 6 g/dL      Hematocrit 34 9 %       fL      MCH 31 1 pg      MCHC 30 4 g/dL      RDW 18 3 %      MPV 11 3 fL      Platelets 131 Thousands/uL      nRBC 0 /100 WBCs     Narrative: This is an appended report  These results have been appended to a previously verified report  Lactic acid [[id number]]  (Abnormal) Collected:  09/18/20 1231    Lab Status:  Final result Specimen:  Blood from Arm, Left Updated:  09/18/20 1315     LACTIC ACID 4 7 mmol/L     Narrative:       Result may be elevated if tourniquet was used during collection      Troponin I [[id number]]  (Normal) Collected:  09/18/20 1231    Lab Status:  Final result Specimen:  Blood from Arm, Left Updated:  09/18/20 1308     Troponin I <0 02 ng/mL     Protime-INR [[id number]]  (Abnormal) Collected:  09/18/20 1231    Lab Status:  Final result Specimen:  Blood from Arm, Left Updated:  09/18/20 1302     Protime 21 7 seconds      INR 1 92    APTT [[id number]]  (Abnormal) Collected:  09/18/20 1231    Lab Status:  Final result Specimen:  Blood from Arm, Left Updated:  09/18/20 1302     PTT 40 seconds                  XR chest portable   ED Interpretation by Kacy Mahajan DO (09/18 1340)   Abnormal   Significant worsening of right-sided pneumonia on chest x-ray is compared to 08/30/2020 with near complete opacification of the right lung as interpreted me independently      Final Result by Marvin Villa MD (09/18 1349)      Right greater than left pneumonia, new on the right and improved on the left              Workstation performed: [id number]                    Procedures  ECG 12 Lead Documentation Only    Date/Time: 9/18/2020 12:45 PM  Performed by: Ole Araya DO  Authorized by: Ole Araya DO     Indications / Diagnosis:  Sob  ECG reviewed by me, the ED Provider: yes    Patient location:  ED  Previous ECG:     Previous ECG:  Compared to current  Interpretation:     Interpretation: normal    Rate:     ECG rate:  64    ECG rate assessment: normal    Rhythm:     Rhythm: sinus rhythm    Ectopy:     Ectopy: none    QRS:     QRS axis:  Normal    QRS intervals:  Normal  Conduction:     Conduction: normal    ST segments:     ST segments:  Normal  T waves:     T waves: inverted      Inverted:  V2    CriticalCare Time  Performed by: Ole Araya DO  Authorized by: Ole Araya DO     Critical care provider statement:     Critical care time (minutes):  60    Critical care start time:  9/18/2020 1:09 PM    Critical care end time:  9/18/2020 2:09 PM    Critical care time was exclusive of:  Separately billable procedures and treating other patients and teaching time    Critical care was necessary to treat or prevent imminent or life-threatening deterioration of the following conditions:  Circulatory failure, respiratory failure, sepsis and shock    Critical care was time spent personally by me on the following activities:  Blood draw for specimens, obtaining history from patient or surrogate, development of treatment plan with patient or surrogate, discussions with consultants, discussions with primary provider, evaluation of patient's response to treatment, review of old charts, examination of patient, re-evaluation of patient's condition, ordering and review of radiographic studies, ordering and performing treatments and interventions and ordering and review of laboratory studies    I assumed direction of critical care for this patient from another provider in my specialty: no    Comments:      77year old M chronically very ill presenting hypotensive, tachypnea and hypoxic in the 60s  Concern for PNA as patient recently admitted and discharged after septic shock secondary to PNA  Given stress dose steroids as he is on chronic steroids, IVF, placed on supplemental oxygen and respiratory called to place on vapotherm  CXR is significantly worsened as compared to prior with large right sided PNA  Also found to have an JENNIFER and hyponatremia  Frequently reassessed patient, update him and wife regarding status of current medical conditions  Discussed with critical care team               ED Course  ED Course as of Sep 18 1709   Fri Sep 18, 2020   1305 Placed on vapotherm (60% FiO2 at 25L) with improvement of O2 sat to 93%      1317 WBC(!): 11 95   1317 LACTIC ACID(!!): 4 7   1317 SpO2(!): 68 %   1317 Blood Pressure(!): 79/49   1325 JENNIFER    Creatinine(!): 1 66   1325 Hyponatremia    Sodium(!): 131   1331 Critical care PA at bedside for evaluation  1331 Elevated as compared to prior admission   NT-proBNP(!): 5,950           HEART Risk Score      Most Recent Value   Heart Score Risk Calculator   History  0 Filed at: 09/18/2020 1708   ECG  0 Filed at: 09/18/2020 1708   Age  2 Filed at: 09/18/2020 1708   Risk Factors  2 Filed at: 09/18/2020 1708   Troponin  0 Filed at: 09/18/2020 1708   HEART Score  4 Filed at: 09/18/2020 1708                  Initial Sepsis Screening     Row Name 09/18/20 1352                Is the patient's history suggestive of a new or worsening infection? (!) Yes (Proceed)  -LK        Suspected source of infection  pneumonia  -LK        Are two or more of the following signs & symptoms of infection both present and new to the patient?   (!) Yes (Proceed)  -LK        Indicate SIRS criteria  Tachypnea > 20 resp per min;WBC > 10% bands  -LK        If the answer is yes to both questions, suspicion of sepsis is present          If severe sepsis is present AND tissue hypoperfusion perists in the hour after fluid resuscitation or lactate > 4, the patient meets criteria for SEPTIC SHOCK          Are any of the following organ dysfunction criteria present within 6 hours of suspected infection and SIRS criteria that are NOT considered to be chronic conditions? (!) Yes  -LK        Organ dysfunction  Creatinine > 0 5 mg/dl ABOVE BASELINE;Lactate > 2 0 mmol/L;Lactate >/equal 4 0 mmol/L;SBP decrease > 40 mmHg from baseline;SBP < 90 mmHg  -LK        Date of presentation of severe sepsis  09/18/20  -LK        Time of presentation of severe sepsis  1354  -LK        Tissue hypoperfusion persists in the hour after crystalloid fluid administration, evidenced, by either:          Was hypotension present within one hour of the conclusion of crystalloid fluid administration?         Date of presentation of septic shock          Time of presentation of septic shock            User Key  (r) = Recorded By, (t) = Taken By, (c) = Cosigned By    [address] Name Provider Type    [facility] [employer] [facility] Phys[facility]                      MDM  Number of Diagnoses or Management Options  JENNIFER (acute kidney injury) [facility]): Hyponatremia:   Pneumonia:   Respiratory failure with hypoxia [facility]):   Septic shock [facility]):   Diagnosis management comments: Assessment and Plan:  77year old M presenting with SOB, generalized weakness associated with hypotension, hypoxia  Infectious workup, cardiac workup  Pneumonia is highest on differential at this time though also considering CHF and ACS with least likely PE  Will start with 500mL NS, solucortef, supplemental oxygen         Disposition  Final diagnoses:   Respiratory failure with hypoxia ([address] )   Pneumonia   Septic shock ([address] )   Hyponatremia   JENNIFER (acute kidney injury) ([facility] )     Time reflects when diagnosis was documented in both MDM as applicable and the Disposition within this note     Time User Action Codes Description Comment    9/18/2020  1:56 PM Lewis Bajwa [J96 91] Respiratory failure with hypoxia ([place] [address] )     9/18/2020  1:57 PM Marcela Dial Add [J18 9] Pneumonia     9/18/2020  1:57 PM Marcela Dial Add [A41 9,  R65 21] Septic shock ([place] [address] )     9/18/2020  1:57 PM Marcela Dial Add [E87 1] Hyponatremia     9/18/2020  1:57 PM Marcela Dial Add [N17 9] JENNIFER (acute kidney injury) ([place] [address] )     9/18/2020  4:44 PM Marie Fillers Add [R97 20] Elevated PSA       ED Disposition     ED Disposition Condition Date/Time Comment    Admit Stable Fri Sep 18, 2020  1:56 PM Case was discussed with Dr Nils Cartagena  and the patient's admission status was agreed to be Admission Status: inpatient status to the service of Dr Nils Cartagena   Follow-up Information    None         Current Discharge Medication List      CONTINUE these medications which have NOT CHANGED    Details   amiodarone 200 mg tablet Take 1 tablet (200 mg total) by mouth daily with breakfast  Qty: 30 tablet, Refills: 2    Associated Diagnoses: A-fib (HCC)      ammonium lactate (LAC-HYDRIN) 12 % cream Apply topically as needed for dry skin On hands and feet  Qty: 385 g, Refills: 2    Associated Diagnoses: Skin fissures      atorvastatin (LIPITOR) 40 mg tablet Take 1 tablet (40 mg total) by mouth daily with dinner  Qty: 30 tablet, Refills: 2    Associated Diagnoses: Family history of early CAD; Nonspecific ST-T wave electrocardiographic changes      dexamethasone (DECADRON) 2 mg tablet Take 1 tablet (2 mg total) by mouth daily after lunch  Qty: 30 tablet, Refills: 0    Associated Diagnoses: 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[facility] ); Malignant neoplasm of lower third of esophagus ([facility] ); Chemotherapy-induced nausea; Palliative care patient; Other fatigue;  Other insomnia      diazepam (VALIUM) 5 mg tablet Take 1 tablet (5 mg total) by mouth 2 (two) times a day  Qty: 60 tablet, Refills: 5    Associated Diagnoses: Anxiety      Eliquis 5 MG TAKE ONE TABLET BY MOUTH TWICE A DAY  Qty: 60 tablet, Refills: 1    Associated Diagnoses: Superficial thrombophlebitis of right leg      losartan (COZAAR) 50 mg tablet Take 1 tablet (50 mg total) by mouth daily  Qty: 30 tablet, Refills: 2    Associated Diagnoses: A-fib (HCC)      metoclopramide (REGLAN) 5 mg tablet Take 1 tablet (5 mg total) by mouth 3 (three) times a day before meals  Qty: 90 tablet, Refills: 0    Associated Diagnoses: Regurgitation of stomach contents      metoprolol tartrate (LOPRESSOR) 25 mg tablet Take 1 tablet (25 mg total) by mouth daily at bedtime  Qty: 30 tablet, Refills: 2    Associated Diagnoses: A-fib (HCC)      naloxone (NARCAN) 4 mg/0 1 mL nasal spray Administer 1 spray into a nostril  If breathing does not return to normal or if breathing difficulty resumes after 2-3 minutes, give another dose in the other nostril using a new spray  Qty: 1 each, Refills: 1    Associated Diagnoses: Cancer-related pain      OLANZapine (ZyPREXA) 5 mg tablet Take 1 tablet (5 mg total) by mouth daily at bedtime  Qty: 30 tablet, Refills: 0    Associated Diagnoses: Malignant neoplasm of lower third of esophagus ([facility] [address] ); 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[facility] [address] ); Rec[facility] major [facility] disorder, [facility] status unspecified [facility] ); Anxiety; Obsessive-compulsive disorder, unspecified type; PTSD (post-traumatic stress disorder); Chemotherapy-induced nausea; Nausea      ondansetron (ZOFRAN) 4 mg tablet Take 2 tablets (8 mg total) by mouth every 8 (eight) hours as needed for nausea or vomiting  Qty: 75 tablet, Refills: 0    Associated Diagnoses: Malignant neoplasm of lower third of esophagus (HCC)      oxyCODONE (ROXICODONE) 5 mg immediate release tablet Take 1 tablet (5 mg total) by mouth every 4 (four) hours as needed for severe pain May crush and give in slurryMax Daily Amount: 30 mg  Qty: 60 tablet, Refills: 0    Associated Diagnoses: 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[facility] [address] );  Cancer-related pain      pantoprazole (PROTONIX) 40 mg tablet Take 1 tablet (40 mg total) by mouth daily before breakfast  Qty: 60 tablet, Refills: 0    Associated Diagnoses: Regurgitation of stomach contents      sucralfate (CARAFATE) 1 g/10 mL suspension Take 10 mL (1 g total) by mouth 4 (four) times a day (with meals and at bedtime)  Qty: 1260 mL, Refills: 0    Associated Diagnoses: Regurgitation of stomach contents      tamsulosin (FLOMAX) 0 4 mg Take 1 capsule (0 4 mg total) by mouth daily with dinner  Qty: 30 capsule, Refills: 2    Associated Diagnoses: Urge urinary incontinence; Elevated PSA      venlafaxine (EFFEXOR-XR) 150 mg 24 hr capsule Take 1 capsule (150 mg total) by mouth daily  Qty: 30 capsule, Refills: 0    Associated Diagnoses: Severe episode of recurrent major depressive disorder, without psychotic features ([facility] )           No discharge procedures on file      PDMP Review       Value Time User    PDMP Reviewed  Yes 6/23/2020  2:58 PM Gerri Lamar MD          ED Provider  Electronically Signed by           Radha Armenta DO  09/18/20 9869

## 2020-09-18 NOTE — ASSESSMENT & PLAN NOTE
Patient had episode of el hematuria today while in ICU, UA consistent with presence of abundant RBC and WBC  No nitrates or leukocytes, occassional bacteria   -hold apixaban

## 2020-09-18 NOTE — ED NOTES
0 9% nss 1000ml did not infused  Pt's arm bent IV site at right [facility]   Doctor made aware, order was changed to a LR bolus       Cem Franks RN  09/18/20 0082

## 2020-09-18 NOTE — RESPIRATORY THERAPY NOTE
Patient placed on vapotherm at 50% and 25 lpm   Pt had immediate desaturation from 92% to 87% between fast change of nasal cannula 5 lpm to vapotherm initiation  No sob noted

## 2020-09-18 NOTE — H&P
H&P- Darreld Service 1955, 77 y o  male MRN: [id number]    Unit/Bed#: ICU 04 Encounter: [id number]    Primary Care Provider: Veronica Russell MD   Date and time admitted to hospital: 9/18/2020 12:24 PM        * Acute respiratory failure with hypoxemia [facility])  Assessment & Plan  Recent hospitalization 8/30/20-9/8/20 for septic shock secondary to left lung pneumonia with subsequent rehab until 9/14  Patient refers being well at home until yesterday when he developed significant generalized weakness and shortness of breath  Today had episode of hypotension and hypoxia and was refered to ED by primary care physician after office visit  On ED placed on high flow supplemental O2 due to hypoxia  Chest Xray shows Right greater than left pneumonia      Of note Patient has Active gastroesophageal carcinoma with peritoneal metastasis being treated with chemotherapy (Last chemo 8/20)  DVT on Elqiuis and dilated CHF (EF 35%), A  Fib         -Admit to critical care unit  -DNR/DNI  -high flow nasal cannula   -consider BiPAP  -pressure support as needed  Pneumonia  Assessment & Plan  Patient with recent hospitalization 2a to left lobe pneumonia and septic shock  Had recurrent SOB and episode of hypotension and hypoxia today and was refered to ED by primary care physician after office visit  On ED placed on high flow supplemental O2 due to hypoxia  Chest Xray shows new right middle lung pneumonia with improved aspect of left consolidation where prior pneumonia had been  Currently on Vanc + flagyl + cefepime  Septic Shock [facility])  Assessment & Plan  Patient presenting with septic shock 2a to new onset right lung pneumonia  hypotension present on admission, Lactic acid 4 7, repeat 2 1     B/P stable with IVF management      -monitor vitals  -continue resuscitation with IVF  -stress dose steroids   -Pressure support as needed  -Continue Antibiotic treatment (day 1)           JENNIFER (acute kidney injury) [facility])  Assessment & Plan  Patient with JENNIFER, creatinine on admission 1 66  Last Cr level was 0 95 on 9/8/20  Probably prerenal in nature 2a to dehydration vs septic shock  -rehydrate isolyte 30cc/kg initial resusitation  -follow BMP      Hematuria  Assessment & Plan  Patient had episode of el hematuria today while in ICU, UA consistent with presence of abundant RBC and WBC  No nitrates or leukocytes, occassional bacteria  -hold apixaban        Elevated lactic acid level  Assessment & Plan  Lactic Acid on admission 4 7, repeat 2 1  Port-A-Cath in place  Assessment & Plan  For chemotherapy  CHF (congestive heart failure) ([facility])  Assessment & Plan  Wt Readings from Last 3 Encounters:   09/18/20 77 1 kg (170 lb)   09/08/20 76 9 kg (169 lb 8 5 oz)   08/19/20 81 6 kg (179 lb 14 3 oz)       Patient with history of DCHF presenting with SOB  proBNP on admission was >5k  PAF (paroxysmal atrial fibrillation) ([facility] [address] )  Assessment & Plan  Has history of paroxysmal a fib currently taking apixaban  Gastroesophageal cancer [facility])  Assessment & Plan  Patient with History of gastroesophageal cancer currently being treated with chemotherapy (last session 8/20)   Follow up as out patient       -------------------------------------------------------------------------------------------------------------  Chief Complaint: SOB    History of Present Illness      Brenda Ding is a 77 y o  male with Recent hospitalization 8/30/20-9/8/20 for septic shock secondary to left lung pneumonia with subsequent rehab until 9/14  Patient refers being well at home until yesterday when he developed significant generalized weakness and shortness of breath  Today had episode of hypotension and hypoxia and was refered to ED by primary care physician after office visit  On ED placed on high flow supplemental O2 due to hypoxia  Chest Xray shows new onset right lung pneumonia   Transferred to ED 2a to hypotension/respiratory failure and concern for septic shock  History obtained from the patient   -------------------------------------------------------------------------------------------------------------  Dispo: Admit to Critical Care     Code Status: Level 3 - DNAR and DNI  --------------------------------------------------------------------------------------------------------------  Review of Systems   Constitutional: Positive for activity change and fatigue  HENT: Negative  Eyes: Negative  Respiratory: Positive for shortness of breath  Cardiovascular: Negative  Gastrointestinal: Negative  Genitourinary: Positive for urgency  Musculoskeletal: Negative  Skin: Negative  Allergic/Immunologic: Negative  Neurological: Negative  Hematological: Negative  Psychiatric/Behavioral: Negative  A 12-point, complete review of systems was reviewed and negative except as stated above     Physical Exam  Constitutional:       Appearance: He is ill-appearing  HENT:      Head: Normocephalic and atraumatic  Nose: Nose normal       Mouth/Throat:      Pharynx: Oropharynx is clear  Eyes:      Extraocular Movements: Extraocular movements intact  Conjunctiva/sclera: Conjunctivae normal       Pupils: Pupils are equal, round, and reactive to light  Neck:      Musculoskeletal: Normal range of motion  Cardiovascular:      Rate and Rhythm: Normal rate and regular rhythm  Pulses: Normal pulses  Heart sounds: Normal heart sounds  Pulmonary:      Effort: Respiratory distress present  Breath sounds: Normal breath sounds  Abdominal:      General: Abdomen is flat  Palpations: Abdomen is soft  Musculoskeletal: Normal range of motion  Skin:     General: Skin is warm and dry  Capillary Refill: Capillary refill takes less than 2 seconds  Neurological:      General: No focal deficit present  Mental Status: He is alert and oriented to person, place, and time  Cranial Nerves:  No cranial nerve deficit        --------------------------------------------------------------------------------------------------------------  Vitals:   Vitals:    09/18/20 1430 09/18/20 1500 09/18/20 1527 09/18/20 1544   BP: (!) 84/50 106/62     BP Location:       Pulse: 62 62     Resp: 18 21     Temp:       TempSrc:       SpO2: 93% 92% 96% 92%   Weight:         Temp  Min: 94 5 °F (34 7 °C)  Max: 98 4 °F (36 9 °C)  IBW: -88 kg     Body mass index is 23 65 kg/m²  Laboratory and Diagnostics:  Results from last 7 days   Lab Units 09/18/20  1231   WBC Thousand/uL 11 95*   HEMOGLOBIN g/dL 10 6*   HEMATOCRIT % 34 9*   PLATELETS Thousands/uL 351   BANDS PCT % 30*   MONO PCT % 6     Results from last 7 days   Lab Units 09/18/20  1231   SODIUM mmol/L 131*   POTASSIUM mmol/L 3 8   CHLORIDE mmol/L 95*   CO2 mmol/L 24   ANION GAP mmol/L 12   BUN mg/dL 32*   CREATININE mg/dL 1 66*   CALCIUM mg/dL 8 8   GLUCOSE RANDOM mg/dL 68   ALT U/L 18   AST U/L 18   ALK PHOS U/L 95   ALBUMIN g/dL 2 3*   TOTAL BILIRUBIN mg/dL 0 60     Results from last 7 days   Lab Units 09/18/20  1231   MAGNESIUM mg/dL 1 7   PHOSPHORUS mg/dL 3 4      Results from last 7 days   Lab Units 09/18/20  1231   INR  1 92*   PTT seconds 40*      Results from last 7 days   Lab Units 09/18/20  1231   TROPONIN I ng/mL <0 02     Results from last 7 days   Lab Units 09/18/20  1430 09/18/20  1231   LACTIC ACID mmol/L 2 1* 4 7*     ABG:    VBG:    Results from last 7 days   Lab Units 09/18/20  1231   PROCALCITONIN ng/ml 58 27*       Micro:        EKG: sinus  Imaging: I have personally reviewed pertinent reports     and I have personally reviewed pertinent films in PACS      Historical Information   Past Medical History:   Diagnosis Date    Dehydration 8/8/2019    Esophageal cancer ([facility] [address] )     Hypertension     Nausea 8/8/2019    Psychiatric disorder     Recovering alcoholic ([facility] )      Past Surgical History:   Procedure Laterality Date    APPENDECTOMY      IR PORT PLACEMENT  2019     Social History   Social History     Substance and Sexual Activity   Alcohol Use Not Currently    Frequency: Never    Binge frequency: Never     Social History     Substance and Sexual Activity   Drug Use Yes    Types: Marijuana     Social History     Tobacco Use   Smoking Status Former Smoker    Types: Cigars    Last attempt to quit: 5/15/2019    Years since quittin 3   Smokeless Tobacco Never Used     Exercise History: none  Family History:   Family History   Problem Relation Age of Onset    Colon cancer Father      I have reviewed this patient's family history and commented on sigificant items within the HPI      Medications:  Current Facility-Administered Medications   Medication Dose Route Frequency    albuterol inhalation solution 2 5 mg  2 5 mg Nebulization Q4H PRN    [START ON 2020] amiodarone tablet 200 mg  200 mg Oral Daily With Breakfast    apixaban (ELIQUIS) tablet 5 mg  5 mg Oral BID    atorvastatin (LIPITOR) tablet 40 mg  40 mg Oral Daily With Dinner    [START ON 2020] cefepime (MAXIPIME) 2 g/50 mL dextrose IVPB  2,000 mg Intravenous Q12H    magnesium sulfate 2 g/50 mL IVPB (premix) 2 g  2 g Intravenous Once    metoclopramide (REGLAN) tablet 5 mg  5 mg Oral TID AC    metroNIDAZOLE (FLAGYL) IVPB (premix) 500 mg 100 mL  500 mg Intravenous Q8H    [START ON 2020] pantoprazole (PROTONIX) EC tablet 40 mg  40 mg Oral Daily Before Breakfast    potassium chloride 20 mEq IVPB (premix)  20 mEq Intravenous Once    tamsulosin (FLOMAX) capsule 0 4 mg  0 4 mg Oral Daily With Dinner    vancomycin (VANCOCIN) IVPB (premix in dextrose) 750 mg 150 mL  10 mg/kg Intravenous Q12H    venlafaxine (EFFEXOR-XR) 24 hr capsule 150 mg  150 mg Oral Daily     Home medications:  Prior to Admission Medications   Prescriptions Last Dose Informant Patient Reported? Taking?    Eliquis 5 MG   No No   Sig: TAKE ONE TABLET BY MOUTH TWICE A DAY   OLANZapine (ZyPREXA) 5 mg tablet No No   Sig: Take 1 tablet (5 mg total) by mouth daily at bedtime   amiodarone 200 mg tablet   No No   Sig: Take 1 tablet (200 mg total) by mouth daily with breakfast   ammonium lactate (LAC-HYDRIN) 12 % cream  Self No No   Sig: Apply topically as needed for dry skin On hands and feet   atorvastatin (LIPITOR) 40 mg tablet   No No   Sig: Take 1 tablet (40 mg total) by mouth daily with dinner   dexamethasone (DECADRON) 2 mg tablet   No No   Sig: Take 1 tablet (2 mg total) by mouth daily after lunch   Patient taking differently: Take 4 mg by mouth daily after lunch    diazepam (VALIUM) 5 mg tablet   No No   Sig: Take 1 tablet (5 mg total) by mouth 2 (two) times a day   losartan (COZAAR) 50 mg tablet   No No   Sig: Take 1 tablet (50 mg total) by mouth daily   metoclopramide (REGLAN) 5 mg tablet   No No   Sig: Take 1 tablet (5 mg total) by mouth 3 (three) times a day before meals   metoprolol tartrate (LOPRESSOR) 25 mg tablet   No No   Sig: Take 1 tablet (25 mg total) by mouth daily at bedtime   naloxone (NARCAN) 4 mg/0 1 mL nasal spray   No No   Sig: Administer 1 spray into a nostril  If breathing does not return to normal or if breathing difficulty resumes after 2-3 minutes, give another dose in the other nostril using a new spray     ondansetron (ZOFRAN) 4 mg tablet   No No   Sig: Take 2 tablets (8 mg total) by mouth every 8 (eight) hours as needed for nausea or vomiting   oxyCODONE (ROXICODONE) 5 mg immediate release tablet   No No   Sig: Take 1 tablet (5 mg total) by mouth every 4 (four) hours as needed for severe pain May crush and give in slurryMax Daily Amount: 30 mg   pantoprazole (PROTONIX) 40 mg tablet   No No   Sig: Take 1 tablet (40 mg total) by mouth daily before breakfast   sucralfate (CARAFATE) 1 g/10 mL suspension   No No   Sig: Take 10 mL (1 g total) by mouth 4 (four) times a day (with meals and at bedtime)   tamsulosin (FLOMAX) 0 4 mg   No No   Sig: Take 1 capsule (0 4 mg total) by mouth daily with dinner   venlafaxine (EFFEXOR-XR) 150 mg 24 hr capsule  Self No No   Sig: Take 1 capsule (150 mg total) by mouth daily      Facility-Administered Medications: None     Allergies: Allergies   Allergen Reactions    Bee Venom Anaphylaxis    Shellfish-Derived Products      Develops GOUT       ------------------------------------------------------------------------------------------------------------  Advance Directive and Living Will:      Power of :    POLST:    ------------------------------------------------------------------------------------------------------------  Anticipated Length of Stay is > 2 midnights    Care Time Delivered:   No Critical Care time spent       Chasidy Mcnamara MD        Portions of the record may have been created with voice recognition software  Occasional wrong word or "sound a like" substitutions may have occurred due to the inherent limitations of voice recognition software    Read the chart carefully and recognize, using context, where substitutions have occurred

## 2020-09-18 NOTE — ASSESSMENT & PLAN NOTE
· Hx of dysphagia likely secondary to esophageal cancer  · Patient endorses that he was suppose to have a VBS this past Monday, however it was cancelled d/t scheduling issues  · Formal speech/swallow eval ordered, patient will likely need to have VBS done though following this  · Keep NPO for now, as dysphagia has likely resulted in aspiration pneumonia

## 2020-09-18 NOTE — ASSESSMENT & PLAN NOTE
Wt Readings from Last 3 Encounters:   09/18/20 77 1 kg (170 lb)   09/08/20 76 9 kg (169 lb 8 5 oz)   08/19/20 81 6 kg (179 lb 14 3 oz)       Patient with history of DCHF presenting with SOB  proBNP on admission was >5k

## 2020-09-18 NOTE — TELEPHONE ENCOUNTER
Patients wife called to see if Dr Emiliana Morales or Krishan Dumont can call her to discuss patients current situation in the hospital   Per patients wife physicians there are talking to him about how his disease is worse and mentioned hospice  Wife stated that Noa Son "got a good report" last time he was in the office  She would like to speak to someone who is familiar with his case  Wife was tearful on the phone  I will forward the msg to the office to see if Dr Emiliana Morales or Krishan Dumont can call the wife to discuss      Patient was admitted to the hospital with pneumonia     Call back number for patients wife Leslie Woodard

## 2020-09-18 NOTE — PROGRESS NOTES
Subjective:           Problem List Items Addressed This Visit        Digestive    Malignant neoplasm of lower third of esophagus (HCC)       Respiratory    Pneumonia of left lung due to infectious organism       Other    Chemotherapy-induced nausea    Sepsis ([address] ) - Primary    Moderate protein-calorie malnutrition ([facility] [address] )         to ED immediately for evaluation and stabilizations ? sepsis      No orders of the defined types were placed in this encounter  There are no Patient Instructions on file for this visit  BMI Counseling: Body mass index is 23 06 kg/m²  Discussed the patient's BMI with him  [facility]    Chief Complaint   Patient presents with    Fatigue     dizziness ,fatigue/shaky for long time worst onhis hands/arms,no new food or drug allergies        HPI weakness fatigue worseing at home s/p [facility] pending Heme chemo palliative care now ill low o2 low temp weakness         BP (!) 78/50 (BP Location: Left arm, Patient Position: Sitting, Cuff Size: Standard)   Pulse 79   Temp (!) 94 5 °F (34 7 °C) (Tympanic)   Resp 14   Ht 6' (1 829 m)   Wt 77 1 kg (170 lb)   SpO2 (!) 82%   BMI 23 06 kg/m²       Allergies   Allergen Reactions    Bee Venom Anaphylaxis    Shellfish-Derived Products      Develops GOUT       Current Outpatient Medications on File Prior to Visit   Medication Sig Dispense Refill    amiodarone 200 mg tablet Take 1 tablet (200 mg total) by mouth daily with breakfast 30 tablet 2    ammonium lactate (LAC-HYDRIN) 12 % cream Apply topically as needed for dry skin On hands and feet 385 g 2    atorvastatin (LIPITOR) 40 mg tablet Take 1 tablet (40 mg total) by mouth daily with dinner 30 tablet 2    dexamethasone (DECADRON) 2 mg tablet Take 1 tablet (2 mg total) by mouth daily after lunch (Patient taking differently: Take 4 mg by mouth daily after lunch ) 30 tablet 0    diazepam (VALIUM) 5 mg tablet Take 1 tablet (5 mg total) by mouth 2 (two) times a day 60 tablet 5    Eliquis 5 MG TAKE ONE TABLET BY MOUTH TWICE A DAY 60 tablet 1    losartan (COZAAR) 50 mg tablet Take 1 tablet (50 mg total) by mouth daily 30 tablet 2    metoclopramide (REGLAN) 5 mg tablet Take 1 tablet (5 mg total) by mouth 3 (three) times a day before meals 90 tablet 0    metoprolol tartrate (LOPRESSOR) 25 mg tablet Take 1 tablet (25 mg total) by mouth daily at bedtime 30 tablet 2    naloxone (NARCAN) 4 mg/0 1 mL nasal spray Administer 1 spray into a nostril  If breathing does not return to normal or if breathing difficulty resumes after 2-3 minutes, give another dose in the other nostril using a new spray  1 each 1    OLANZapine (ZyPREXA) 5 mg tablet Take 1 tablet (5 mg total) by mouth daily at bedtime 30 tablet 0    ondansetron (ZOFRAN) 4 mg tablet Take 2 tablets (8 mg total) by mouth every 8 (eight) hours as needed for nausea or vomiting 75 tablet 0    oxyCODONE (ROXICODONE) 5 mg immediate release tablet Take 1 tablet (5 mg total) by mouth every 4 (four) hours as needed for severe pain May crush and give in slurryMax Daily Amount: 30 mg 60 tablet 0    pantoprazole (PROTONIX) 40 mg tablet Take 1 tablet (40 mg total) by mouth daily before breakfast 60 tablet 0    sucralfate (CARAFATE) 1 g/10 mL suspension Take 10 mL (1 g total) by mouth 4 (four) times a day (with meals and at bedtime) 1260 mL 0    tamsulosin (FLOMAX) 0 4 mg Take 1 capsule (0 4 mg total) by mouth daily with dinner 30 capsule 2    venlafaxine (EFFEXOR-XR) 150 mg 24 hr capsule Take 1 capsule (150 mg total) by mouth daily 30 capsule 0     No current facility-administered medications on file prior to visit          Past Medical History:   Diagnosis Date    Dehydration 8/8/2019    Esophageal cancer ([address] )     Hypertension     Nausea 8/8/2019    Psychiatric disorder     Recovering alcoholic [facility])        Past Surgical History:   Procedure Laterality Date    APPENDECTOMY      IR PORT PLACEMENT  7/31/2019          reports that he quit smoking about 16 months ago  His smoking use included cigars  He has never used smokeless tobacco  He reports previous alcohol use  He reports current drug use  Drug: Marijuana  reports that he quit smoking about 16 months ago  His smoking use included cigars  He has never used smokeless tobacco         Review of Systems   Constitutional: Positive for appetite change and fatigue  Low temp   Respiratory: Positive for cough and shortness of breath  Cardiovascular: Negative for chest pain and palpitations  Gastrointestinal: Negative for abdominal distention  Genitourinary: Negative for hematuria  Musculoskeletal: Negative for arthralgias  Skin: Positive for pallor (mottling)  Neurological: Positive for speech difficulty, weakness and light-headedness  Negative for headaches  Psychiatric/Behavioral: Negative for agitation  Physical Exam  Constitutional:       Appearance: He is ill-appearing  He is not diaphoretic  Comments: frail   HENT:      Head: Normocephalic  Nose: Nose normal    Eyes:      General: No scleral icterus  Pupils: Pupils are equal, round, and reactive to light  Cardiovascular:      Rate and Rhythm: Normal rate  Pulmonary:      Breath sounds: Rhonchi present  Comments: decreased  Abdominal:      General: Abdomen is flat  Skin:     Capillary Refill: Capillary refill takes 2 to 3 seconds  Coloration: Skin is pale  Neurological:      Mental Status: He is alert     Psychiatric:         Mood and Affect: Mood normal

## 2020-09-18 NOTE — ASSESSMENT & PLAN NOTE
Has a hx of New onset paroxysmal Afib suspected to be 2/2 to PNA  Cardioconverted overnight on 9/2 with amiodarone   Currently on amiodarone po     continue taking Amiodarone 200 mg PO BID and Eliquis 5 mg PO BID

## 2020-09-18 NOTE — SEPSIS NOTE
Sepsis Note   Veronica Chang 77 y o  male MRN: [id number]  Unit/Bed#: ED CT1 Encounter: [id number]      qSOFA     Row Name 09/18/20 1330 09/18/20 1300 09/18/20 1230 09/18/20 1213       Altered mental status GCS < 15             Respiratory Rate > / =22  0  0  1  1     Systolic BP < / =518  1  1  1  1     Q Sofa Score  1  1  2  2         Initial Sepsis Screening     Row Name 09/18/20 1352                Is the patient's history suggestive of a new or worsening infection? (!) Yes (Proceed)  -LK        Suspected source of infection  pneumonia  -LK        Are two or more of the following signs & symptoms of infection both present and new to the patient? (!) Yes (Proceed)  -LK        Indicate SIRS criteria  Tachypnea > 20 resp per min;WBC > 10% bands  -LK        If the answer is yes to both questions, suspicion of sepsis is present          If severe sepsis is present AND tissue hypoperfusion perists in the hour after fluid resuscitation or lactate > 4, the patient meets criteria for SEPTIC SHOCK          Are any of the following organ dysfunction criteria present within 6 hours of suspected infection and SIRS criteria that are NOT considered to be chronic conditions? (!) Yes  -LK        Organ dysfunction  Creatinine > 0 5 mg/dl ABOVE BASELINE;Lactate > 2 0 mmol/L;Lactate >/equal 4 0 mmol/L;SBP decrease > 40 mmHg from baseline;SBP < 90 mmHg  -LK        Date of presentation of severe sepsis  09/18/20  -LK        Time of presentation of severe sepsis  1354  -LK        Tissue hypoperfusion persists in the hour after crystalloid fluid administration, evidenced, by either:          Was hypotension present within one hour of the conclusion of crystalloid fluid administration?           Date of presentation of septic shock          Time of presentation of septic shock            User Key  (r) = Recorded By, (t) = Taken By, (c) = Cosigned By    [address] Name Provider Type    NORY Fleming DO Physician

## 2020-09-18 NOTE — ASSESSMENT & PLAN NOTE
Recent hospitalization 8/30/20-9/8/20 for septic shock secondary to left lung pneumonia with subsequent rehab until 9/14  Patient refers being well at home until yesterday when he developed significant generalized weakness and shortness of breath  Today had episode of hypotension and hypoxia and was refered to ED by primary care physician after office visit  On ED placed on high flow supplemental O2 due to hypoxia  Chest Xray shows Right greater than left pneumonia      Of note Patient has Active gastroesophageal carcinoma with peritoneal metastasis being treated with chemotherapy (Last chemo 8/20)  DVT on Elqiuis and dilated CHF (EF 35%), A  Fib         -Admit to critical care unit  -DNR/DNI  -high flow nasal cannula   -consider BiPAP  -pressure support as needed

## 2020-09-18 NOTE — ASSESSMENT & PLAN NOTE
Patient with JENNIFER, creatinine on admission 1 66  Last Cr level was 0 95 on 9/8/20  Probably prerenal in nature 2/2 dehydration vs septic shock  - patient rehydrated with IV NS bolus and isolyte 30cc/kg initial resusitation   Further IVF hydration held for now due to his poor CHF   -follow BMP

## 2020-09-19 PROBLEM — J96.01 ACUTE RESPIRATORY FAILURE WITH HYPOXEMIA (HCC): Status: RESOLVED | Noted: 2020-09-18 | Resolved: 2020-09-19

## 2020-09-19 PROBLEM — E87.2 LACTIC ACIDOSIS: Status: RESOLVED | Noted: 2020-09-18 | Resolved: 2020-09-19

## 2020-09-19 PROBLEM — N17.9 AKI (ACUTE KIDNEY INJURY) (HCC): Status: RESOLVED | Noted: 2020-08-30 | Resolved: 2020-09-19

## 2020-09-19 PROBLEM — E16.2 HYPOGLYCEMIA: Status: ACTIVE | Noted: 2020-09-19

## 2020-09-19 LAB
ANION GAP SERPL CALCULATED.3IONS-SCNC: 8 MMOL/L (ref 4–13)
BASOPHILS # BLD AUTO: 0.02 THOUSANDS/ΜL (ref 0–0.1)
BASOPHILS NFR BLD AUTO: 0 % (ref 0–1)
BUN SERPL-MCNC: 18 MG/DL (ref 5–25)
CALCIUM SERPL-MCNC: 7.7 MG/DL (ref 8.3–10.1)
CHLORIDE SERPL-SCNC: 105 MMOL/L (ref 100–108)
CO2 SERPL-SCNC: 25 MMOL/L (ref 21–32)
CREAT SERPL-MCNC: 0.97 MG/DL (ref 0.6–1.3)
EOSINOPHIL # BLD AUTO: 0.14 THOUSAND/ΜL (ref 0–0.61)
EOSINOPHIL NFR BLD AUTO: 2 % (ref 0–6)
ERYTHROCYTE [DISTWIDTH] IN BLOOD BY AUTOMATED COUNT: 17.9 % (ref 11.6–15.1)
GFR SERPL CREATININE-BSD FRML MDRD: 80 ML/MIN/1.73SQ M
GLUCOSE SERPL-MCNC: 102 MG/DL (ref 65–140)
GLUCOSE SERPL-MCNC: 103 MG/DL (ref 65–140)
GLUCOSE SERPL-MCNC: 137 MG/DL (ref 65–140)
GLUCOSE SERPL-MCNC: 58 MG/DL (ref 65–140)
GLUCOSE SERPL-MCNC: 65 MG/DL (ref 65–140)
GLUCOSE SERPL-MCNC: 66 MG/DL (ref 65–140)
HCT VFR BLD AUTO: 25 % (ref 36.5–49.3)
HGB BLD-MCNC: 7.8 G/DL (ref 12–17)
IMM GRANULOCYTES # BLD AUTO: 0.16 THOUSAND/UL (ref 0–0.2)
IMM GRANULOCYTES NFR BLD AUTO: 2 % (ref 0–2)
L PNEUMO1 AG UR QL IA.RAPID: NEGATIVE
LYMPHOCYTES # BLD AUTO: 1.14 THOUSANDS/ΜL (ref 0.6–4.47)
LYMPHOCYTES NFR BLD AUTO: 14 % (ref 14–44)
MAGNESIUM SERPL-MCNC: 2.3 MG/DL (ref 1.6–2.6)
MCH RBC QN AUTO: 31.2 PG (ref 26.8–34.3)
MCHC RBC AUTO-ENTMCNC: 31.2 G/DL (ref 31.4–37.4)
MCV RBC AUTO: 100 FL (ref 82–98)
MONOCYTES # BLD AUTO: 0.36 THOUSAND/ΜL (ref 0.17–1.22)
MONOCYTES NFR BLD AUTO: 4 % (ref 4–12)
NEUTROPHILS # BLD AUTO: 6.31 THOUSANDS/ΜL (ref 1.85–7.62)
NEUTS SEG NFR BLD AUTO: 78 % (ref 43–75)
NRBC BLD AUTO-RTO: 0 /100 WBCS
PHOSPHATE SERPL-MCNC: 3.1 MG/DL (ref 2.3–4.1)
PLATELET # BLD AUTO: 222 THOUSANDS/UL (ref 149–390)
PMV BLD AUTO: 10.6 FL (ref 8.9–12.7)
POTASSIUM SERPL-SCNC: 3.2 MMOL/L (ref 3.5–5.3)
PROCALCITONIN SERPL-MCNC: 32.31 NG/ML
RBC # BLD AUTO: 2.5 MILLION/UL (ref 3.88–5.62)
S PNEUM AG UR QL: NEGATIVE
SODIUM SERPL-SCNC: 138 MMOL/L (ref 136–145)
WBC # BLD AUTO: 8.13 THOUSAND/UL (ref 4.31–10.16)

## 2020-09-19 PROCEDURE — 99232 SBSQ HOSP IP/OBS MODERATE 35: CPT | Performed by: FAMILY MEDICINE

## 2020-09-19 PROCEDURE — 94668 MNPJ CHEST WALL SBSQ: CPT

## 2020-09-19 PROCEDURE — 87070 CULTURE OTHR SPECIMN AEROBIC: CPT | Performed by: ANESTHESIOLOGY

## 2020-09-19 PROCEDURE — 82948 REAGENT STRIP/BLOOD GLUCOSE: CPT

## 2020-09-19 PROCEDURE — 94664 DEMO&/EVAL PT USE INHALER: CPT

## 2020-09-19 PROCEDURE — 85025 COMPLETE CBC W/AUTO DIFF WBC: CPT | Performed by: PHYSICIAN ASSISTANT

## 2020-09-19 PROCEDURE — 87205 SMEAR GRAM STAIN: CPT | Performed by: ANESTHESIOLOGY

## 2020-09-19 PROCEDURE — 94669 MECHANICAL CHEST WALL OSCILL: CPT

## 2020-09-19 PROCEDURE — 94760 N-INVAS EAR/PLS OXIMETRY 1: CPT

## 2020-09-19 PROCEDURE — 99233 SBSQ HOSP IP/OBS HIGH 50: CPT | Performed by: ANESTHESIOLOGY

## 2020-09-19 PROCEDURE — 84100 ASSAY OF PHOSPHORUS: CPT | Performed by: PHYSICIAN ASSISTANT

## 2020-09-19 PROCEDURE — 84145 PROCALCITONIN (PCT): CPT | Performed by: EMERGENCY MEDICINE

## 2020-09-19 PROCEDURE — 94003 VENT MGMT INPAT SUBQ DAY: CPT

## 2020-09-19 PROCEDURE — 83735 ASSAY OF MAGNESIUM: CPT | Performed by: PHYSICIAN ASSISTANT

## 2020-09-19 PROCEDURE — 87106 FUNGI IDENTIFICATION YEAST: CPT | Performed by: ANESTHESIOLOGY

## 2020-09-19 PROCEDURE — 92610 EVALUATE SWALLOWING FUNCTION: CPT

## 2020-09-19 PROCEDURE — 94640 AIRWAY INHALATION TREATMENT: CPT

## 2020-09-19 PROCEDURE — 80048 BASIC METABOLIC PNL TOTAL CA: CPT | Performed by: PHYSICIAN ASSISTANT

## 2020-09-19 RX ORDER — DEXTROSE MONOHYDRATE 25 G/50ML
25 INJECTION, SOLUTION INTRAVENOUS EVERY 6 HOURS PRN
Status: DISCONTINUED | OUTPATIENT
Start: 2020-09-19 | End: 2020-09-25 | Stop reason: HOSPADM

## 2020-09-19 RX ORDER — POTASSIUM CHLORIDE 14.9 MG/ML
20 INJECTION INTRAVENOUS
Status: COMPLETED | OUTPATIENT
Start: 2020-09-19 | End: 2020-09-19

## 2020-09-19 RX ORDER — AMOXICILLIN 250 MG
1 CAPSULE ORAL
Status: DISCONTINUED | OUTPATIENT
Start: 2020-09-19 | End: 2020-09-25 | Stop reason: HOSPADM

## 2020-09-19 RX ADMIN — AMIODARONE HYDROCHLORIDE 200 MG: 200 TABLET ORAL at 08:40

## 2020-09-19 RX ADMIN — METOCLOPRAMIDE 5 MG: 10 TABLET ORAL at 11:15

## 2020-09-19 RX ADMIN — ATORVASTATIN CALCIUM 40 MG: 40 TABLET, FILM COATED ORAL at 17:05

## 2020-09-19 RX ADMIN — POTASSIUM CHLORIDE 20 MEQ: 14.9 INJECTION, SOLUTION INTRAVENOUS at 08:39

## 2020-09-19 RX ADMIN — VANCOMYCIN HYDROCHLORIDE 1500 MG: 10 INJECTION, POWDER, LYOPHILIZED, FOR SOLUTION INTRAVENOUS at 22:30

## 2020-09-19 RX ADMIN — CEFEPIME HYDROCHLORIDE 2000 MG: 2 INJECTION, POWDER, FOR SOLUTION INTRAVENOUS at 04:16

## 2020-09-19 RX ADMIN — APIXABAN 5 MG: 5 TABLET, FILM COATED ORAL at 08:40

## 2020-09-19 RX ADMIN — METRONIDAZOLE 500 MG: 500 INJECTION, SOLUTION INTRAVENOUS at 08:39

## 2020-09-19 RX ADMIN — VANCOMYCIN HYDROCHLORIDE 1500 MG: 10 INJECTION, POWDER, LYOPHILIZED, FOR SOLUTION INTRAVENOUS at 10:42

## 2020-09-19 RX ADMIN — TAMSULOSIN HYDROCHLORIDE 0.4 MG: 0.4 CAPSULE ORAL at 17:05

## 2020-09-19 RX ADMIN — DOCUSATE SODIUM AND SENNOSIDES 1 TABLET: 8.6; 5 TABLET, FILM COATED ORAL at 21:02

## 2020-09-19 RX ADMIN — ALBUTEROL SULFATE 2.5 MG: 2.5 SOLUTION RESPIRATORY (INHALATION) at 23:03

## 2020-09-19 RX ADMIN — CEFEPIME HYDROCHLORIDE 2000 MG: 2 INJECTION, POWDER, FOR SOLUTION INTRAVENOUS at 15:50

## 2020-09-19 RX ADMIN — METRONIDAZOLE 500 MG: 500 INJECTION, SOLUTION INTRAVENOUS at 16:29

## 2020-09-19 RX ADMIN — POTASSIUM CHLORIDE 20 MEQ: 14.9 INJECTION, SOLUTION INTRAVENOUS at 06:32

## 2020-09-19 RX ADMIN — APIXABAN 5 MG: 5 TABLET, FILM COATED ORAL at 17:05

## 2020-09-19 RX ADMIN — DEXTROSE MONOHYDRATE 25 ML: 25 INJECTION, SOLUTION INTRAVENOUS at 07:25

## 2020-09-19 RX ADMIN — PANTOPRAZOLE SODIUM 40 MG: 40 TABLET, DELAYED RELEASE ORAL at 06:32

## 2020-09-19 RX ADMIN — VANCOMYCIN HYDROCHLORIDE 750 MG: 750 INJECTION, SOLUTION INTRAVENOUS at 01:37

## 2020-09-19 RX ADMIN — METOCLOPRAMIDE 5 MG: 10 TABLET ORAL at 06:29

## 2020-09-19 RX ADMIN — VENLAFAXINE HYDROCHLORIDE 150 MG: 150 CAPSULE, EXTENDED RELEASE ORAL at 08:40

## 2020-09-19 NOTE — SPEECH THERAPY NOTE
Speech-Language Pathology Bedside Swallow Evaluation      Patient Name: Marietta Ogden    UQHFV'Y Date: 9/19/2020     Problem List  Principal Problem:    Septic Shock ([place] [address] )  Active Problems:    Major depression    Anxiety    Gastroesophageal cancer ([place] [address] )    Severe protein-calorie malnutrition [place]: less than 60% of standard weight) ([place] [address] )    Other dysphagia    JENNIFER (acute kidney injury) ([place] [address] )    PAF (paroxysmal atrial fibrillation) ([facility])    Pneumonia    Chronic combined systolic and diastolic CHF (congestive heart failure) ([place] [address] )    Port-A-Cath in place    Lactic acidosis    Acute respiratory failure with hypoxemia ([place] [address] )    Hematuria    Hyponatremia      Past Medical History  Past Medical History:   Diagnosis Date    Dehydration 8/8/2019    Esophageal cancer ([place] [address] )     Hypertension     Nausea 8/8/2019    Psychiatric disorder     Recovering alcoholic ([place] [address] )        Past Surgical History  Past Surgical History:   Procedure Laterality Date    APPENDECTOMY      IR PORT PLACEMENT  7/31/2019       Summary   Pt presented with functional appearing oral and pharyngeal stage swallowing skills with limited amounts of jello, thick and thin liquid  Pt verbalized benefit of and used small bites with jello and single sips with liquids, as well as slow rate of intake  Agree with esophagram and VBS for further analysis      Recommended Diet: consider clear liquid diet until esophagram and Video Swallow study   Recommended Form of Meds: IVs or as per MD  Precautions and swallowing strategies:   1  upright posture, maintain full upright position during and for a minof 1 hour after po intake and 30 degree minimum at all times   2   slow rate of feeding, small bites/sips         Current Medical Status  Rg Joshua is a 78 y/o M w/ 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, PAF, and dilated CM with EF of ~37% who is s/p recent hospitalization (8/30-9/8) for left sided PNA and E  coli bacteremia s/p full course of abx   Patient was discharged to rehab but left rehab early a few days ago because wife was not satisfied with the care he was receiving there  He was readmitted 9/18 with 1-day history of generalized weakness  Active Issues:  -Septic shock, POA  -Right lung PNA, POA  -Acute hypoxic respiratory failure 2* HCAP vs aspiration pneumonia  -Lactic acidosis  -JENNIFER  -Hypomagnesemia  -Hyponatremia  -Chronic anemia  -Paroxysmal A  fib  -Dilated CM (EF~37%)  --Moderate protein-calorie malnutrition  · -Stage IV gastroesophageal cancer  · Received 4 cycles of modified FOLFOX 6 chemotherapy with improvement in imaging  He was then started on 5-FU leucovorin-last treatment 8/20  · Follows with Dr Lion Carter as an outpatient, as well as palliative medicine    Continue home regimen of reglan and protonix   -h/o dysphagia likely 2* esophageal cancer      Patient was scheduled for outpatient Video Swallow Study ? cancelled 2* scheduling issues  He is NPO pending formal Swallowing Evaluation  Orders for Barium Swallow and Video Swallow Study noted at this time  Current Precautions:  Fall & Aspiration precautions (keep HOB > 30 degrees, respiratory protocol, airway clearance protocol)    Allergies:  Shell-fish derived products    Past medical history:  Please see H&P for details    Special Studies:  CXR of 9/18: Right greater than left pneumonia (large dense R sided infiltrate, small L LL infiltrate), new on the right and improved on the left      Social/Education/Vocational Hx:  Pt lives with wife    Swallow Information   Current Risks for Dysphagia & Aspiration: stage IV gastroesophageal cancer  Current Symptoms/Concerns: extensive R sided pneumonia, cachexia  Current Diet: NPO   Baseline Diet: mechanically altered/level 2 diet and thin liquids (during recent hospitalization) and reportedly on puree at [facility]/Rehab center      Baseline Assessment   Behavior/Cognition: alert  Speech/Language Status: able to participate in conversation and able to follow commands  Patient Positioning: upright in bed  Pain Status/Interventions/Response to Interventions:  No report of or nonverbal indications of pain  Swallow Mechanism Exam  Facial: symmetrical  Labial: WFL  Lingual: WFL  Velum: symmetrical  Mandible: adequate ROM  Dentition: Natural upper dentition, no lower dentition (reports he has a lower plate)  Vocal quality:clear/adequate   Volitional Cough: fair strength   Respiratory Status: on vapotherm 20L at 45% FiO2    Consistencies Assessed and Performance   Consistencies/Materials administered included 2 tsps jello, 3 of honey thick liquid, 2-3 ozs of both nectar thick and thin liquid    Oral Stage: Appeared to be [facility]  Adequate retrieval and adequate appearing formation, transfer and control  Pharyngeal Stage: WFL suspected with min of jello and single sips of thick and thin clear liquids  Swallow Mechanics:  Swallowing initiation appeared prompt  Laryngeal rise was palpated and judged to be within functional limits  No coughing, throat clearing, change in vocal quality or respiratory status noted today       Esophageal Concerns: admits to "regurgitation"    Strategies and Efficacy: SLOW RATE, small bites/sips to promote esophageal clearance and minimize aspiration risk    Summary and Recommendations (see above)    Results Reviewed with: patient, RN and PA     Treatment ecommended: Agree with Esophagram and VBS/MBS (then f/u as indicated)    Patient Stated Goal: "I want to do everything I can "    Goals  -Patient will demonstrate safe and effective oral intake (without overt s/s significant oral/pharyngeal dysphagia including s/s penetration or aspiration) for the highest appropriate diet level (begin with clear liquid and advance as indicated after esophagram and VBS)    -Patient will utilize trained compensatory strategies as indicated by performance on VBS and clinically with 90% accuracy to reduce risk for anterograde- and retrograde-related aspiration with the least restrictive food/liquid consistencies    Pepito Caldwell [id number] [phone]  Available via [employer]

## 2020-09-19 NOTE — ASSESSMENT & PLAN NOTE
· Likely from NPO status  · Continue q6 hour checks, but likely will improve even with CLD  · If persistent may need dextrose containing IVF until diet can be advanced

## 2020-09-19 NOTE — ASSESSMENT & PLAN NOTE
· POA as evidenced by hypotension (79/49), hypoxia (68% on RA), bandemia 30%, with lactic acidosis of 4 7 and JENNIFER  · Source=b/l pneumonia R>L, concern for aspiration pneumonia   · Patient received 30mL/kg of IVF  (total 3 5L), and BP improved  · Received 1 time dose of 100mg IV Hydrocortisone, no need to continue stress dose steroids as BP rapidly improved with fluid resuscitation   · Broad spectrum abx cefepime, flagyl, vanco, narrow as able   · Blood cultures x 2 pending, of note patient had E coli bacteremia 8/30  · Procal 58, continue to trend  · Trend temps and WBC

## 2020-09-19 NOTE — PLAN OF CARE
Pt presented with functional appearing oral and pharyngeal stage swallowing skills with limited amounts of jello, thick and thin liquid  Pt verbalized benefit of and used small bites with jello and single sips with liquids, as well as slow rate of intake  Agree with esophagram and VBS for further analysis      Recommended Diet: consider clear liquid diet until esophagram and Video Swallow study   Recommended Form of Meds: IVs or as per MD  Precautions and swallowing strategies:   1  upright posture, maintain full upright position during and for a minof 1 hour after po intake and 30 degree minimum at all times   2   slow rate of feeding, small bites/sips

## 2020-09-19 NOTE — ASSESSMENT & PLAN NOTE
Patient with recent hospitalization 2a to left lobe pneumonia and septic shock  Had recurrent SOB and episode of hypotension and hypoxia today and was refered to ED by primary care physician after office visit  On ED placed on high flow supplemental O2 due to hypoxia  Chest Xray shows new right middle lung pneumonia with improved aspect of left consolidation where prior pneumonia had been  Currently on Vanc + flagyl + cefepime    · Strep pneumo/legionella pending   · COVID-19 PCR negative

## 2020-09-19 NOTE — ASSESSMENT & PLAN NOTE
· Currently NSR, monitor rhythm on telemetry   · Continue Eliquis for AC  · Hold metoprolol secondary to soft BP, resume when able  · Resume home Amio

## 2020-09-19 NOTE — ASSESSMENT & PLAN NOTE
· Recent hospitalization 8/30/20-9/8/20 for septic shock secondary to left lung pneumonia with subsequent rehab until 9/14  Patient states he was feeling well at home until the day prior to admission when he developed significant generalized weakness and shortness of breath  He presented to PCP yesterday with hypotension and hypoxia and was refered to ED  · RA O2 sat 68%, patient placed on Vapotherm in ED with improvement in oxygenation  · CXR with large dense right sided infiltrate and small LLL infiltrate  Findings concerning for aspiration pneumonia  Abx therapy as below     · Vapotherm settings currently 25L 50%, wean for O2 sat > 92%   · Aspiration precautions, keep HOB > 30 degrees, respiratory protocol, airway clearance protocol

## 2020-09-19 NOTE — PROGRESS NOTES
Vancomycin Assessment    Chato Brizuela is a 77 y o  male who is currently receiving vancomycin Vancomycin 750 mg IV  q12h for Pneumonia     Relevant clinical data and objective history reviewed:  Creatinine   Date Value Ref Range Status   09/19/2020 0 97 0 60 - 1 30 mg/dL Final     Comment:     Standardized to IDMS reference method   09/18/2020 1 66 (H) 0 60 - 1 30 mg/dL Final     Comment:     Standardized to IDMS reference method   09/08/2020 0 95 0 60 - 1 30 mg/dL Final     Comment:     Standardized to IDMS reference method     /64   Pulse 61   Temp (!) 97 °F (36 1 °C) (Temporal)   Resp 21   Ht 6' (1 829 m)   Wt 81 2 kg (179 lb 0 2 oz)   SpO2 97%   BMI 24 28 kg/m²   I/O last 3 completed shifts: In: 550 [IV Piggyback:550]  Out: 2800 [Urine:2800]  Lab Results   Component Value Date/Time    BUN 18 09/19/2020 05:50 AM    WBC 8 13 09/19/2020 05:50 AM    HGB 7 8 (L) 09/19/2020 05:50 AM    HCT 25 0 (L) 09/19/2020 05:50 AM     (H) 09/19/2020 05:50 AM     09/19/2020 05:50 AM     Temp Readings from Last 3 Encounters:   09/19/20 (!) 97 °F (36 1 °C) (Temporal)   09/18/20 (!) 94 5 °F (34 7 °C) (Tympanic)   09/08/20 97 5 °F (36 4 °C)     Vancomycin Days of Therapy: 2    Assessment/Plan  The patient is currently on vancomycin utilizing scheduled dosing  Baseline risks associated with therapy include: pre-existing renal impairment, concomitant nephrotoxic medications, and advanced age  The patient is receiving Vancomycin 750 mg IV  q12h with the most recent vancomycin level being at steady-state and therapeutic based on a goal of 15-20 (appropriate for most indications) ; therefore, after clinical evaluation will be changed to Vancomycin 1500 mg IV q12h (due to rapid improvement in renal function)   Pharmacy will continue to follow closely for s/sx of nephrotoxicity, infusion reactions, and appropriateness of therapy  BMP and CBC will be ordered per protocol    Plan for trough as patient approaches steady state, prior to the 5th  dose at approximately 1000 on 09/21/2020  Pharmacy will continue to follow the patients culture results and clinical progress daily      Jaylon Senate, Pharmacist

## 2020-09-19 NOTE — PROGRESS NOTES
Progress Note - Miranda Slice 1955, 77 y o  male MRN: [id number]    Unit/Bed#: ICU 04 Encounter: [id number]    Primary Care Provider: Estuardo Grier MD   Date and time admitted to hospital: 9/18/2020 12:24 PM        Acute respiratory failure with hypoxemia [facility])  Assessment & Plan  · Recent hospitalization 8/30/20-9/8/20 for septic shock secondary to left lung pneumonia with subsequent rehab until 9/14  Patient states he was feeling well at home until the day prior to admission when he developed significant generalized weakness and shortness of breath  He presented to PCP yesterday with hypotension and hypoxia and was refered to ED  · RA O2 sat 68%, patient placed on Vapotherm in ED with improvement in oxygenation  · CXR with large dense right sided infiltrate and small LLL infiltrate  Findings concerning for aspiration pneumonia  Abx therapy as below  · Vapotherm settings currently 25L 50%, wean for O2 sat > 92%   · Aspiration precautions, keep HOB > 30 degrees, respiratory protocol, airway clearance protocol           * Septic Shock ([address] )  Assessment & Plan  · POA as evidenced by hypotension (79/49), hypoxia (68% on RA), bandemia 30%, with lactic acidosis of 4 7 and JENNIFER  · Source=b/l pneumonia R>L, concern for aspiration pneumonia   · Patient received 30mL/kg of IVF  (total 3 5L), and BP improved  · Received 1 time dose of 100mg IV Hydrocortisone, no need to continue stress dose steroids as BP rapidly improved with fluid resuscitation   · Broad spectrum abx cefepime, flagyl, vanco, narrow as able   · Blood cultures x 2 pending, of note patient had E coli bacteremia 8/30  · Procal 58, continue to trend  · Trend temps and WBC           Pneumonia  Assessment & Plan  Patient with recent hospitalization 2a to left lobe pneumonia and septic shock  Had recurrent SOB and episode of hypotension and hypoxia today and was refered to ED by primary care physician after office visit   On ED placed on high flow supplemental O2 due to hypoxia  Chest Xray shows new right middle lung pneumonia with improved aspect of left consolidation where prior pneumonia had been  Currently on Vanc + flagyl + cefepime  · Strep pneumo/legionella pending   · COVID-19 PCR negative      Gastroesophageal cancer ([facility] )  Assessment & Plan  · Stage IV Gastroesophageal CA with mets to omentum   · Received 4 cycles of modified FOLFOX 6 chemotherapy with improvement in imaging  He was then started on 5-FU leucovorin-last treatment 8/20  · Follows with Dr Antonella Pantoja as an outpatient, as well as palliative medicine   · Continue home regimen of reglan and protonix       JENNIFER (acute kidney injury) [facility])  Assessment & Plan  · Patient with JENNIFER, creatinine on admission 1 66  Last Cr level was 0 95 on 9/8/20  · Likely prerenal in nature secondary to septic shock  · Patient has received IVF resuscitation  · Trend BUN/creatinine  · Strict I&O  · Avoid nephrotoxic agents     Chronic combined systolic and diastolic CHF (congestive heart failure) ([facility] )  Overview  Echo 9/2/2020:     EF 35-40%  · There was moderate diffuse hypokinesis  · Features were consistent with a pseudonormal left ventricular filling pattern, with concomitant abnormal relaxation and increased filling pressure (grade 2 diastolic dysfunction)  Assessment & Plan  Wt Readings from Last 3 Encounters:   09/18/20 79 1 kg (174 lb 6 1 oz)   09/18/20 77 1 kg (170 lb)   09/08/20 76 9 kg (169 lb 8 5 oz)       · Echo 9/2/2020-EF 35-40%  Moderate diffuse hypokinesis  G2DD  · Hx of dilated cardiomyopathy, thought possibly to be tachy-mediated secondary to new onset Afib RVR last admission     · Hold home metoprolol and losaratan secondary to hypotension on admission in setting of sepsis, resume when able   · Continue statin   · NT pro BNP 5950 from prior 2000  · Patient does not exam clinically volume overloaded at this time  · Strict I&O and daily weight       Severe protein-calorie malnutrition Jason Mancuso: less than 60% of standard weight) (HCC)  Assessment & Plan  Malnutrition Findings: moderately cachetic appearing  Patient states he has a good appetite at home and wants to eat  BMI Findings: Body mass index is 23 65 kg/m²  · Formal swallowing eval by speech therapy  · Nutrition consult    Other dysphagia  Assessment & Plan  · Hx of dysphagia likely secondary to esophageal cancer  · Patient endorses that he was suppose to have a VBS this past Monday, however it was cancelled d/t scheduling issues  · Formal speech/swallow eval ordered, patient will likely need to have VBS done though following this  · Keep NPO for now, as dysphagia has likely resulted in aspiration pneumonia    Hematuria  Assessment & Plan  · Patient had episode of el hematuria yesterday upon admission to ICU  UA consistent with presence of abundant RBC and WBC  No nitrates or leukocytes, occassional bacteria  · Urine now punch colored, no evidence of urinary retention  · Patient states he has not had this issue at home, but did have a lopez placed his last admission   · Continue home flomax and monitor for urinary retention   · Trend hgb        PAF (paroxysmal atrial fibrillation) ([facility] )  Overview  New onset Afib suspected to be 2/2 to PNA  Cardioconverted overnight on 9/2 with amiodarone  Currently on amiodarone po          Assessment & Plan  · Currently NSR, monitor rhythm on telemetry   · Continue Eliquis for AC  · Hold metoprolol secondary to soft BP, resume when able  · Resume home Amio     Hyponatremia  Assessment & Plan  · Mild hyponatremia on admission, Na 131 (baseline 136-138)  · Likely secondary to intravascular volume depletion in setting of septic shock  · Patient received 3 5L IVF resuscitation   · Continue to trend BMP daily     Anxiety  Assessment & Plan  · Continue home Effexor     Major depression  Assessment & Plan  · Continue home Effexor     Port-A-Cath in place  Assessment & Plan  · R chest wall permacath for chemo  · Not currently accessed        ----------------------------------------------------------------------------------------  HPI/24hr events: Patient remains on Vapotherm 25L 50%  BP stable overnight post fluid resuscitation  Lactic acidosis cleared  Disposition: Transfer to Stepdown Level 1   Code Status: Level 3 - DNAR and DNI  ---------------------------------------------------------------------------------------  SUBJECTIVE  "When can I have something to eat?"    Review of Systems   All other systems reviewed and are negative  Review of systems was reviewed and negative unless stated above in HPI/24-hour events   ---------------------------------------------------------------------------------------  OBJECTIVE    Vitals   Vitals:    20 1800 20 1900 20   BP: 98/54 (!) 89/54  (!) 86/53   BP Location:       Pulse: 61 60 63 61   Resp: 20 20  19   Temp:    98 3 °F (36 8 °C)   TempSrc:    Temporal   SpO2: 95% 92% 93% 94%   Weight:       Height:         Temp (24hrs), Av 2 °F (36 2 °C), Min:94 5 °F (34 7 °C), Max:98 4 °F (36 9 °C)  Current: Temperature: 98 3 °F (36 8 °C)          Respiratory:  SpO2: SpO2: 94 %, SpO2 Activity: SpO2 Activity: At Rest, SpO2 Device: O2 Device: High flow nasal cannula(Vapotherm)  Nasal Cannula O2 Flow Rate (L/min): 5 L/min    Invasive/non-invasive ventilation settings   Respiratory    Lab Data (Last 4 hours)    None         O2/Vent Data (Last 4 hours)    None                Physical Exam  Constitutional:       General: He is awake  Appearance: Normal appearance  He is cachectic  HENT:      Head: Normocephalic and atraumatic  Eyes:      General: Lids are normal  Vision grossly intact  Extraocular Movements: Extraocular movements intact  Conjunctiva/sclera: Conjunctivae normal    Neck:      Musculoskeletal: Full passive range of motion without pain, normal range of motion and neck supple     Cardiovascular:      Rate and Rhythm: Normal rate and regular rhythm  Pulses: Normal pulses  Radial pulses are 2+ on the right side and 2+ on the left side  Dorsalis pedis pulses are 2+ on the right side and 2+ on the left side  Heart sounds: Normal heart sounds, S1 normal and S2 normal    Pulmonary:      Effort: Pulmonary effort is normal       Comments: Course diminished right sided breath sounds  Abdominal:      General: Bowel sounds are normal       Palpations: Abdomen is soft  Genitourinary:     Comments: +hematuria  Musculoskeletal: Normal range of motion  Skin:     General: Skin is warm and dry  Capillary Refill: Capillary refill takes less than 2 seconds  Neurological:      General: No focal deficit present  Mental Status: He is alert and oriented to person, place, and time  GCS: GCS eye subscore is 4  GCS verbal subscore is 5  GCS motor subscore is 6  Psychiatric:         Attention and Perception: Attention normal          Mood and Affect: Affect is angry  Speech: Speech normal          Behavior: Behavior normal  Behavior is cooperative  Thought Content:  Thought content normal          Cognition and Memory: Cognition and memory normal          Judgment: Judgment normal          Laboratory and Diagnostics:  Results from last 7 days   Lab Units 09/18/20  1231   WBC Thousand/uL 11 95*   HEMOGLOBIN g/dL 10 6*   HEMATOCRIT % 34 9*   PLATELETS Thousands/uL 351   BANDS PCT % 30*   MONO PCT % 6     Results from last 7 days   Lab Units 09/18/20  1231   SODIUM mmol/L 131*   POTASSIUM mmol/L 3 8   CHLORIDE mmol/L 95*   CO2 mmol/L 24   ANION GAP mmol/L 12   BUN mg/dL 32*   CREATININE mg/dL 1 66*   CALCIUM mg/dL 8 8   GLUCOSE RANDOM mg/dL 68   ALT U/L 18   AST U/L 18   ALK PHOS U/L 95   ALBUMIN g/dL 2 3*   TOTAL BILIRUBIN mg/dL 0 60     Results from last 7 days   Lab Units 09/18/20  1231   MAGNESIUM mg/dL 1 7   PHOSPHORUS mg/dL 3 4      Results from last 7 days   Lab Units 09/18/20  1231   INR  1 92*   PTT seconds 40*      Results from last 7 days   Lab Units 09/18/20  1231   TROPONIN I ng/mL <0 02     Results from last 7 days   Lab Units 09/18/20  1430 09/18/20  1231   LACTIC ACID mmol/L 2 1* 4 7*     ABG:    VBG:    Results from last 7 days   Lab Units 09/18/20  1231   PROCALCITONIN ng/ml 58 27*       Micro  Results from last 7 days   Lab Units 09/18/20  1231   BLOOD CULTURE  Received in Microbiology Lab  Culture in Progress  Received in Microbiology Lab  Culture in Progress  No new imaging     Intake and Output  I/O       09/17 0701 - 09/18 0700 09/18 0701 - 09/19 0700    IV Piggyback  550    Total Intake(mL/kg)  550 (7)    Urine (mL/kg/hr)  1100    Total Output  1100    Net  -550                Height and Weights   Height: 6' (182 9 cm)  IBW: 77 6 kg  Body mass index is 23 65 kg/m²  Weight (last 2 days)     Date/Time   Weight    09/18/20 1413   79 1 (174 38)                Nutrition       Diet Orders   (From admission, onward)             Start     Ordered    09/18/20 1528  Diet NPO; Sips with meds  Diet effective now     Question Answer Comment   Diet Type NPO    NPO Except: Sips with meds    RD to adjust diet per protocol?  No        09/18/20 1527                  Active Medications  Scheduled Meds:  Current Facility-Administered Medications   Medication Dose Route Frequency Provider Last Rate    acetaminophen  650 mg Oral Q6H PRN [employer] YANI SCHULTE      albuterol  2 5 mg Nebulization Q4H PRN Celia Jack MD      [START ON 9/19/2020] amiodarone  200 mg Oral Daily With Breakfast Filomena Caceres PA-C      apixaban  5 mg Oral BID Filomena SitEDVIN traylor      atorvastatin  40 mg Oral Daily With [employer], PA-C      [START ON 9/19/2020] cefepime  2,000 mg Intravenous Q12H Filomena SitEDVIN traylor      metoclopramide  5 mg Oral TID [facility] [place] Filomena SitEDVIN traylor      metroNIDAZOLE  500 mg Intravenous Q8H Filomena SitEDVIN traylor Stopped (09/18/20 1850)   Silvana Carrasco [START ON 9/19/2020] pantoprazole  40 mg Oral Daily Before Breakfast [place], [place]      tamsulosin  0 4 mg Oral Daily With [employer], [place]      vancomycin  10 mg/kg Intravenous Q12H EDVIN Romero      venlafaxine  150 mg Oral Daily EDVIN Romero       Continuous Infusions:     PRN Meds:   acetaminophen, 650 mg, Q6H PRN  albuterol, 2 5 mg, Q4H PRN        Invasive Devices Review  Invasive Devices     Central Venous Catheter Line            Port A Cath Right Chest -- days    Port A Cath 07/31/19 Right Chest 415 days          Peripheral Intravenous Line            Peripheral IV 09/18/20 Dorsal (posterior); Left Forearm less than 1 day    Peripheral IV 09/18/20 Dorsal (posterior); Left Hand less than 1 day    Peripheral IV 09/18/20 Right Antecubital less than 1 day                Rationale for remaining devices: Right chest wall port-un accessed   ---------------------------------------------------------------------------------------  Advance Directive and Living Will:    none on file  Power of :  wife Juan Haynes   POLST:  none   ---------------------------------------------------------------------------------------  Care Time Delivered:   No Critical Care time spent       YANI Crandall      Portions of the record may have been created with voice recognition software  Occasional wrong word or "sound a like" substitutions may have occurred due to the inherent limitations of voice recognition software    Read the chart carefully and recognize, using context, where substitutions have occurred

## 2020-09-19 NOTE — ASSESSMENT & PLAN NOTE
· Recent hospitalization 8/30/20-9/8/20 for septic shock secondary to left lung pneumonia with subsequent rehab until 9/14  Patient states he was feeling well at home until the day prior to admission when he developed significant generalized weakness and shortness of breath  He presented to PCP yesterday with hypotension and hypoxia and was refered to ED  · RA O2 sat 68%, patient placed on Vapotherm in ED with improvement in oxygenation  · CXR with large dense right sided infiltrate and small LLL infiltrate  Findings concerning for aspiration pneumonia  Abx therapy as below     · Weaned to NC from vapotherm this AM, currently on 4L  · Aspiration precautions, keep HOB > 30 degrees, respiratory protocol, airway clearance protocol

## 2020-09-19 NOTE — ASSESSMENT & PLAN NOTE
Malnutrition Findings: moderately cachetic appearing  Patient states he has a good appetite at home and wants to eat  BMI Findings: Body mass index is 23 65 kg/m²       · Formal swallowing eval by speech therapy  · Nutrition consult

## 2020-09-19 NOTE — ASSESSMENT & PLAN NOTE
· Mild hyponatremia on admission, Na 131 (baseline 136-138)  · Likely secondary to intravascular volume depletion in setting of septic shock  · Patient received 3 5L IVF resuscitation   · Continue to trend BMP daily

## 2020-09-19 NOTE — PLAN OF CARE
Problem: Potential for Falls  Goal: Patient will remain free of falls  Description: INTERVENTIONS:  - Assess patient frequently for physical needs  -  Identify cognitive and physical deficits and behaviors that affect risk of falls    -  Geraldine fall precautions as indicated by assessment   - Educate patient/family on patient safety including physical limitations  - Instruct patient to call for assistance with activity based on assessment  - Modify environment to reduce risk of injury  - Consider OT/PT consult to assist with strengthening/mobility  Outcome: Progressing

## 2020-09-19 NOTE — ASSESSMENT & PLAN NOTE
· Patient had episode of el hematuria yesterday upon admission to ICU  UA consistent with presence of abundant RBC and WBC   No nitrates or leukocytes, occassional bacteria  · Urine now punch colored, no evidence of urinary retention  · Patient states he has not had this issue at home, but did have a lopez placed his last admission   · Continue home flomax and monitor for urinary retention   · Trend hgb

## 2020-09-19 NOTE — ASSESSMENT & PLAN NOTE
· Hx of dysphagia likely secondary to esophageal cancer  · Patient endorses that he was suppose to have a VBS this past Monday, however it was cancelled d/t scheduling issues  · Formal speech/swallow eval this AM, okay for CLD, but patient to have VBS and esophagram done to evaluate whether aspiration vs regurgitation is the true issue     · Keep NPO for now, as dysphagia has likely resulted in aspiration pneumonia

## 2020-09-19 NOTE — ASSESSMENT & PLAN NOTE
Malnutrition Findings: moderately cachetic appearing  Patient states he has a good appetite at home and wants to eat  BMI Findings: Body mass index is 24 28 kg/m²       · Formal swallowing eval by speech therapy  · Nutrition consult

## 2020-09-19 NOTE — ASSESSMENT & PLAN NOTE
Wt Readings from Last 3 Encounters:   09/19/20 81 2 kg (179 lb 0 2 oz)   09/18/20 77 1 kg (170 lb)   09/08/20 76 9 kg (169 lb 8 5 oz)       · Echo 9/2/2020-EF 35-40%  Moderate diffuse hypokinesis  G2DD  · Hx of dilated cardiomyopathy, thought possibly to be tachy-mediated secondary to new onset Afib RVR last admission     · Hold home metoprolol and losaratan secondary to hypotension on admission in setting of sepsis, resume when able   · Continue statin   · NT pro BNP 5950 from prior 2000  · Patient does not exam clinically volume overloaded at this time  · Strict I&O and daily weight

## 2020-09-19 NOTE — ASSESSMENT & PLAN NOTE
· Stage IV Gastroesophageal CA with mets to omentum   · Received 4 cycles of modified FOLFOX 6 chemotherapy with improvement in imaging  He was then started on 5-FU leucovorin-last treatment 8/20  · Follows with Dr Anastasia Severe as an outpatient, as well as palliative medicine   · Continue home regimen of reglan and protonix   · Will need formal esophagram and VBS to evaluate whether or not regurgitation is playing a role in recurrent aspiration pneumonia   These have been ordered

## 2020-09-19 NOTE — ASSESSMENT & PLAN NOTE
· Patient with JENNIFER, creatinine on admission 1 66  Last Cr level was 0 95 on 9/8/20     · Likely prerenal in nature secondary to septic shock  · Patient has received IVF resuscitation  · Trend BUN/creatinine  · Strict I&O  · Avoid nephrotoxic agents

## 2020-09-20 PROBLEM — A41.9 SEPSIS (HCC): Status: RESOLVED | Noted: 2020-08-30 | Resolved: 2020-09-20

## 2020-09-20 PROBLEM — E43 SEVERE PROTEIN-CALORIE MALNUTRITION (HCC): Status: ACTIVE | Noted: 2020-09-20

## 2020-09-20 LAB
ANION GAP SERPL CALCULATED.3IONS-SCNC: 9 MMOL/L (ref 4–13)
BASOPHILS # BLD AUTO: 0.04 THOUSANDS/ΜL (ref 0–0.1)
BASOPHILS NFR BLD AUTO: 1 % (ref 0–1)
BUN SERPL-MCNC: 8 MG/DL (ref 5–25)
CA-I BLD-SCNC: 1.11 MMOL/L (ref 1.12–1.32)
CALCIUM SERPL-MCNC: 7.8 MG/DL (ref 8.3–10.1)
CHLORIDE SERPL-SCNC: 103 MMOL/L (ref 100–108)
CO2 SERPL-SCNC: 24 MMOL/L (ref 21–32)
CREAT SERPL-MCNC: 0.87 MG/DL (ref 0.6–1.3)
EOSINOPHIL # BLD AUTO: 0.21 THOUSAND/ΜL (ref 0–0.61)
EOSINOPHIL NFR BLD AUTO: 3 % (ref 0–6)
ERYTHROCYTE [DISTWIDTH] IN BLOOD BY AUTOMATED COUNT: 17.8 % (ref 11.6–15.1)
GFR SERPL CREATININE-BSD FRML MDRD: 89 ML/MIN/1.73SQ M
GLUCOSE SERPL-MCNC: 66 MG/DL (ref 65–140)
GLUCOSE SERPL-MCNC: 69 MG/DL (ref 65–140)
GLUCOSE SERPL-MCNC: 73 MG/DL (ref 65–140)
GLUCOSE SERPL-MCNC: 75 MG/DL (ref 65–140)
GLUCOSE SERPL-MCNC: 80 MG/DL (ref 65–140)
GLUCOSE SERPL-MCNC: 81 MG/DL (ref 65–140)
GLUCOSE SERPL-MCNC: 84 MG/DL (ref 65–140)
HCT VFR BLD AUTO: 27.5 % (ref 36.5–49.3)
HGB BLD-MCNC: 8.4 G/DL (ref 12–17)
IMM GRANULOCYTES # BLD AUTO: 0.06 THOUSAND/UL (ref 0–0.2)
IMM GRANULOCYTES NFR BLD AUTO: 1 % (ref 0–2)
LYMPHOCYTES # BLD AUTO: 1.19 THOUSANDS/ΜL (ref 0.6–4.47)
LYMPHOCYTES NFR BLD AUTO: 16 % (ref 14–44)
MAGNESIUM SERPL-MCNC: 1.6 MG/DL (ref 1.6–2.6)
MCH RBC QN AUTO: 30.7 PG (ref 26.8–34.3)
MCHC RBC AUTO-ENTMCNC: 30.5 G/DL (ref 31.4–37.4)
MCV RBC AUTO: 100 FL (ref 82–98)
MONOCYTES # BLD AUTO: 0.39 THOUSAND/ΜL (ref 0.17–1.22)
MONOCYTES NFR BLD AUTO: 5 % (ref 4–12)
MRSA NOSE QL CULT: NORMAL
NEUTROPHILS # BLD AUTO: 5.44 THOUSANDS/ΜL (ref 1.85–7.62)
NEUTS SEG NFR BLD AUTO: 74 % (ref 43–75)
NRBC BLD AUTO-RTO: 0 /100 WBCS
PHOSPHATE SERPL-MCNC: 2.8 MG/DL (ref 2.3–4.1)
PLATELET # BLD AUTO: 224 THOUSANDS/UL (ref 149–390)
PMV BLD AUTO: 10.5 FL (ref 8.9–12.7)
POTASSIUM SERPL-SCNC: 3.5 MMOL/L (ref 3.5–5.3)
PROCALCITONIN SERPL-MCNC: 14.07 NG/ML
RBC # BLD AUTO: 2.74 MILLION/UL (ref 3.88–5.62)
SODIUM SERPL-SCNC: 136 MMOL/L (ref 136–145)
WBC # BLD AUTO: 7.33 THOUSAND/UL (ref 4.31–10.16)

## 2020-09-20 PROCEDURE — 83735 ASSAY OF MAGNESIUM: CPT | Performed by: PHYSICIAN ASSISTANT

## 2020-09-20 PROCEDURE — 82330 ASSAY OF CALCIUM: CPT | Performed by: PHYSICIAN ASSISTANT

## 2020-09-20 PROCEDURE — 94668 MNPJ CHEST WALL SBSQ: CPT

## 2020-09-20 PROCEDURE — 80048 BASIC METABOLIC PNL TOTAL CA: CPT | Performed by: PHYSICIAN ASSISTANT

## 2020-09-20 PROCEDURE — 85025 COMPLETE CBC W/AUTO DIFF WBC: CPT | Performed by: PHYSICIAN ASSISTANT

## 2020-09-20 PROCEDURE — 94669 MECHANICAL CHEST WALL OSCILL: CPT

## 2020-09-20 PROCEDURE — 99232 SBSQ HOSP IP/OBS MODERATE 35: CPT | Performed by: FAMILY MEDICINE

## 2020-09-20 PROCEDURE — 82948 REAGENT STRIP/BLOOD GLUCOSE: CPT

## 2020-09-20 PROCEDURE — 94760 N-INVAS EAR/PLS OXIMETRY 1: CPT

## 2020-09-20 PROCEDURE — 97163 PT EVAL HIGH COMPLEX 45 MIN: CPT

## 2020-09-20 PROCEDURE — 97530 THERAPEUTIC ACTIVITIES: CPT

## 2020-09-20 PROCEDURE — 84100 ASSAY OF PHOSPHORUS: CPT | Performed by: PHYSICIAN ASSISTANT

## 2020-09-20 PROCEDURE — 84145 PROCALCITONIN (PCT): CPT | Performed by: STUDENT IN AN ORGANIZED HEALTH CARE EDUCATION/TRAINING PROGRAM

## 2020-09-20 RX ORDER — POTASSIUM CHLORIDE 20 MEQ/1
40 TABLET, EXTENDED RELEASE ORAL ONCE
Status: COMPLETED | OUTPATIENT
Start: 2020-09-20 | End: 2020-09-20

## 2020-09-20 RX ORDER — SACCHAROMYCES BOULARDII 250 MG
250 CAPSULE ORAL 2 TIMES DAILY
Status: DISCONTINUED | OUTPATIENT
Start: 2020-09-20 | End: 2020-09-25 | Stop reason: HOSPADM

## 2020-09-20 RX ADMIN — Medication 250 MG: at 10:30

## 2020-09-20 RX ADMIN — APIXABAN 5 MG: 5 TABLET, FILM COATED ORAL at 08:46

## 2020-09-20 RX ADMIN — Medication 250 MG: at 21:57

## 2020-09-20 RX ADMIN — METOCLOPRAMIDE 5 MG: 10 TABLET ORAL at 15:30

## 2020-09-20 RX ADMIN — TAMSULOSIN HYDROCHLORIDE 0.4 MG: 0.4 CAPSULE ORAL at 15:30

## 2020-09-20 RX ADMIN — DOCUSATE SODIUM AND SENNOSIDES 1 TABLET: 8.6; 5 TABLET, FILM COATED ORAL at 21:57

## 2020-09-20 RX ADMIN — VENLAFAXINE HYDROCHLORIDE 150 MG: 150 CAPSULE, EXTENDED RELEASE ORAL at 08:50

## 2020-09-20 RX ADMIN — ATORVASTATIN CALCIUM 40 MG: 40 TABLET, FILM COATED ORAL at 15:31

## 2020-09-20 RX ADMIN — MAGNESIUM OXIDE TAB 400 MG (241.3 MG ELEMENTAL MG) 800 MG: 400 (241.3 MG) TAB at 08:46

## 2020-09-20 RX ADMIN — METOCLOPRAMIDE 5 MG: 10 TABLET ORAL at 06:00

## 2020-09-20 RX ADMIN — CEFEPIME HYDROCHLORIDE 2000 MG: 2 INJECTION, POWDER, FOR SOLUTION INTRAVENOUS at 16:34

## 2020-09-20 RX ADMIN — CEFEPIME HYDROCHLORIDE 2000 MG: 2 INJECTION, POWDER, FOR SOLUTION INTRAVENOUS at 05:00

## 2020-09-20 RX ADMIN — METRONIDAZOLE 500 MG: 500 INJECTION, SOLUTION INTRAVENOUS at 08:46

## 2020-09-20 RX ADMIN — AMIODARONE HYDROCHLORIDE 200 MG: 200 TABLET ORAL at 08:46

## 2020-09-20 RX ADMIN — POTASSIUM CHLORIDE 40 MEQ: 1500 TABLET, EXTENDED RELEASE ORAL at 08:46

## 2020-09-20 RX ADMIN — VANCOMYCIN HYDROCHLORIDE 1500 MG: 10 INJECTION, POWDER, LYOPHILIZED, FOR SOLUTION INTRAVENOUS at 21:57

## 2020-09-20 RX ADMIN — METRONIDAZOLE 500 MG: 500 INJECTION, SOLUTION INTRAVENOUS at 00:35

## 2020-09-20 RX ADMIN — METOCLOPRAMIDE 5 MG: 10 TABLET ORAL at 11:59

## 2020-09-20 RX ADMIN — METRONIDAZOLE 500 MG: 500 INJECTION, SOLUTION INTRAVENOUS at 15:31

## 2020-09-20 RX ADMIN — PANTOPRAZOLE SODIUM 40 MG: 40 TABLET, DELAYED RELEASE ORAL at 06:00

## 2020-09-20 RX ADMIN — VANCOMYCIN HYDROCHLORIDE 1500 MG: 10 INJECTION, POWDER, LYOPHILIZED, FOR SOLUTION INTRAVENOUS at 10:30

## 2020-09-20 RX ADMIN — APIXABAN 5 MG: 5 TABLET, FILM COATED ORAL at 18:05

## 2020-09-20 NOTE — CONSULTS
09/20/20 1441   Recommendations/Interventions   Nutrition Recommendations Continue diet as ordered  (diet consistency per SLP   vanilla ensure BID once diet advanced)

## 2020-09-20 NOTE — PROGRESS NOTES
[facility] Practice Progress Note - Linda Siu 77 y o  male MRN: [id number]    Unit/Bed#: [address] [place] [address] Encounter: [id number]      Assessment/Plan:  * Septic Shock (HCC)resolved as of 9/20/2020  Assessment & Plan  Patient presenting with septic shock 2/2 to new onset right lung pneumonia  hypotension present on admission, Lactic acid 4 7, repeat 2 1  B/P stable with IVF management       - IVF resuscitated at 3 5 L   - stress dose steroids x1  - No additional pressure support required after IV fluid resuscitation    - Continue empiric Antibiotic treatment of vanco + cefepime + flagyl (9/18-)    Pneumonia  Assessment & Plan  Patient with recent hospitalization 2/2 left lobe pneumonia and septic shock  Had recurrent SOB and episode of hypotension and hypoxia on 9/18 and was refered to ED by primary care physician after office visit  On ED placed on high flow supplemental O2 due to hypoxia  Chest Xray shows new right middle lung pneumonia with improved aspect of left consolidation where prior pneumonia had been  Currently on Vanc + flagyl + cefepime   -continue antibiotics and monitor vitals  -nasal cannula being continued 6 L    Other dysphagia  Assessment & Plan  Hx of dysphagia 2/2 esophageal cancer; may have potentially resulted in aspiration pneumonia  - As per speech therapist:   Agree with esophagram and VBS for further analysis:   To be performed 9/20  Recommended Diet: consider clear liquid diet until esophagram and Video Swallow study   Recommended Form of Meds: IVs or as per MD  Precautions and swallowing strategies:   1  upright posture, maintain full upright position during and for a minof 1 hour after po intake and 30 degree minimum at all times   2   slow rate of feeding, small bites/sips     Chronic combined systolic and diastolic CHF (congestive heart failure) (HCC)  Assessment & Plan  Wt Readings from Last 3 Encounters:   09/18/20 79 1 kg (174 lb 6 1 oz)   09/18/20 77 1 kg (170 lb) 09/08/20 76 9 kg (169 lb 8 5 oz)     Echo 9/2/2020:     EF 35-40%  · There was moderate diffuse hypokinesis  · Features were consistent with a pseudonormal left ventricular filling pattern, with concomitant abnormal relaxation and increased filling pressure (grade 2 diastolic dysfunction)  Patient arrived with elevated BNP of 5950  - monitor daily fluid status with I/O and daily weights, diurese as needed   - avoid excessive IV hydration  PAF (paroxysmal atrial fibrillation) ([facility] [address] )  Assessment & Plan  Has a hx of New onset paroxysmal Afib suspected to be 2/2 to PNA  Cardioconverted overnight on 9/2 with amiodarone  Currently on amiodarone po     continue taking Amiodarone 200 mg PO BID and Eliquis 5 mg PO BID     Gastroesophageal cancer [facility])  Assessment & Plan  Patient has Active gastroesophageal carcinoma with peritoneal metastasis being treated with chemotherapy (Last chemo 8/20)  Major depression  Assessment & Plan  Stable  Continue taking Effexor- mg PO daily     Acute respiratory failure with hypoxemia (HCC)resolved as of 9/19/2020  Assessment & Plan  9/18/20 episode of hypotension and hypoxia and was refered to ED by primary care physician after office visit  On ED placed on high flow supplemental O2 due to hypoxia  Chest Xray shows Right greater than left pneumonia     -Admit to critical care unit (9/18-)  -DNR/DNI  -high flow nasal cannula initially, required 20-25 LPM of O2 on vapotherm eventually   -following day, patient was successfully weaned down to 3 L NC    JENNIFER (acute kidney injury) (HCC)resolved as of 9/19/2020  Assessment & Plan  Patient with JENNIFER, creatinine on admission 1 66  Last Cr level was 0 95 on 9/8/20  Probably prerenal in nature 2/2 dehydration vs septic shock  - patient rehydrated with IV NS bolus and isolyte 30cc/kg initial resusitation   Further IVF hydration held for now due to his poor CHF   -follow BMP          Subjective:   Patient seen examined bedside, no acute events overnight  Patient states that his breathing is about the same, however he is currently very short of breath at all  Patient denies any chest pain, fevers, chills  Patient has been having adequate p o  Intake clear liquids, requesting that he be changed to solids  Objective:     Vitals: Blood pressure 158/75, pulse 79, temperature 97 9 °F (36 6 °C), resp  rate 22, height 6' (1 829 m), weight 80 7 kg (178 lb), SpO2 95 %  ,Body mass index is 24 14 kg/m²  Wt Readings from Last 3 Encounters:   09/20/20 80 7 kg (178 lb)   09/18/20 77 1 kg (170 lb)   09/08/20 76 9 kg (169 lb 8 5 oz)       Intake/Output Summary (Last 24 hours) at 9/20/2020 0825  Last data filed at 9/20/2020 0543  Gross per 24 hour   Intake 1300 ml   Output 3300 ml   Net -2000 ml       Physical Exam: General: Pt is AAO x 3, not in any acute distress, ill-appearing  Cardio: RRR, S1 and S2 +  Resp:  Decreased breath sounds right middle and lower lobes  Abdomen: soft, NT/ND, BS+  Extremities: no cyanosis or edema  PP 2+ b/l         Recent Results (from the past 24 hour(s))   Fingerstick Glucose (POCT)    Collection Time: 09/19/20 11:16 AM   Result Value Ref Range    POC Glucose 58 (L) 65 - 140 mg/dl   Fingerstick Glucose (POCT)    Collection Time: 09/19/20 11:31 AM   Result Value Ref Range    POC Glucose 66 65 - 140 mg/dl   Fingerstick Glucose (POCT)    Collection Time: 09/19/20  2:20 PM   Result Value Ref Range    POC Glucose 103 65 - 140 mg/dl   Fingerstick Glucose (POCT)    Collection Time: 09/19/20  6:07 PM   Result Value Ref Range    POC Glucose 102 65 - 140 mg/dl   Fingerstick Glucose (POCT)    Collection Time: 09/20/20 12:19 AM   Result Value Ref Range    POC Glucose 66 65 - 140 mg/dl   CBC and differential    Collection Time: 09/20/20  5:43 AM   Result Value Ref Range    WBC 7 33 4 31 - 10 16 Thousand/uL    RBC 2 74 (L) 3 88 - 5 62 Million/uL    Hemoglobin 8 4 (L) 12 0 - 17 0 g/dL    Hematocrit 27 5 (L) 36 5 - 49 3 %  (H) 82 - 98 fL    MCH 30 7 26 8 - 34 3 pg    MCHC 30 5 (L) 31 4 - 37 4 g/dL    RDW 17 8 (H) 11 6 - 15 1 %    MPV 10 5 8 9 - 12 7 fL    Platelets 065 943 - 415 Thousands/uL    nRBC 0 /100 WBCs    Neutrophils Relative 74 43 - 75 %    Immat GRANS % 1 0 - 2 %    Lymphocytes Relative 16 14 - 44 %    Monocytes Relative 5 4 - 12 %    Eosinophils Relative 3 0 - 6 %    Basophils Relative 1 0 - 1 %    Neutrophils Absolute 5 44 1 85 - 7 62 Thousands/µL    Immature Grans Absolute 0 06 0 00 - 0 20 Thousand/uL    Lymphocytes Absolute 1 19 0 60 - 4 47 Thousands/µL    Monocytes Absolute 0 39 0 17 - 1 22 Thousand/µL    Eosinophils Absolute 0 21 0 00 - 0 61 Thousand/µL    Basophils Absolute 0 04 0 00 - 0 10 Thousands/µL   Basic metabolic panel    Collection Time: 09/20/20  5:43 AM   Result Value Ref Range    Sodium 136 136 - 145 mmol/L    Potassium 3 5 3 5 - 5 3 mmol/L    Chloride 103 100 - 108 mmol/L    CO2 24 21 - 32 mmol/L    ANION GAP 9 4 - 13 mmol/L    BUN 8 5 - 25 mg/dL    Creatinine 0 87 0 60 - 1 30 mg/dL    Glucose 69 65 - 140 mg/dL    Calcium 7 8 (L) 8 3 - 10 1 mg/dL    eGFR 89 ml/min/1 73sq m   Magnesium    Collection Time: 09/20/20  5:43 AM   Result Value Ref Range    Magnesium 1 6 1 6 - 2 6 mg/dL   Phosphorus    Collection Time: 09/20/20  5:43 AM   Result Value Ref Range    Phosphorus 2 8 2 3 - 4 1 mg/dL   Calcium, ionized    Collection Time: 09/20/20  5:43 AM   Result Value Ref Range    Calcium, Ionized 1 11 (L) 1 12 - 1 32 mmol/L   Fingerstick Glucose (POCT)    Collection Time: 09/20/20  6:31 AM   Result Value Ref Range    POC Glucose 84 65 - 140 mg/dl   Fingerstick Glucose (POCT)    Collection Time: 09/20/20  7:31 AM   Result Value Ref Range    POC Glucose 73 65 - 140 mg/dl       Current Facility-Administered Medications   Medication Dose Route Frequency Provider Last Rate Last Dose    acetaminophen (TYLENOL) tablet 650 mg  650 mg Oral Q6H PRN Jasvir Cheeks, PA-C        albuterol inhalation solution 2 5 mg  2 5 mg Nebulization Q4H PRN Mason Rehman PA-C   2 5 mg at 09/19/20 2303    amiodarone tablet 200 mg  200 mg Oral Daily With Breakfast Mason Rehman PA-C   200 mg at 09/19/20 0840    apixaban (ELIQUIS) tablet 5 mg  5 mg Oral BID KATELYN BryantC   5 mg at 09/19/20 1705    atorvastatin (LIPITOR) tablet 40 mg  40 mg Oral Daily With KATELYN GutierrezC   40 mg at 09/19/20 1705    cefepime (MAXIPIME) 2 g/50 mL dextrose IVPB  2,000 mg Intravenous Q12H KATELYN BryantC 100 mL/hr at 09/20/20 0500 2,000 mg at 09/20/20 0500    dextrose 50 % IV solution 25 mL  25 mL Intravenous Q6H PRN KATELYN BryantC   25 mL at 09/19/20 0725    magnesium oxide (MAG-OX) tablet 800 mg  800 mg Oral Once Sneha Andrea MD        metoclopramide (REGLAN) tablet 5 mg  5 mg Oral TID [facility] Mason Rehman PA-C   5 mg at 09/20/20 0600    metroNIDAZOLE (FLAGYL) IVPB (premix) 500 mg 100 mL  500 mg Intravenous Q8H Mason Rehman PA-C 200 mL/hr at 09/20/20 0035 500 mg at 09/20/20 0035    pantoprazole (PROTONIX) EC tablet 40 mg  40 mg Oral Daily Before Breakfast Mason Rehman PA-C   40 mg at 09/20/20 0600    potassium chloride (K-DUR,KLOR-CON) CR tablet 40 mEq  40 mEq Oral Once Sneha Andrea MD        senna-docusate sodium (SENOKOT S) 8 6-50 mg per tablet 1 tablet  1 tablet Oral HS Mason Rehman PA-C   1 tablet at 09/19/20 2102    tamsulosin (FLOMAX) capsule 0 4 mg  0 4 mg Oral Daily With [facility]   0 4 mg at 09/19/20 1705    vancomycin (VANCOCIN) 1,500 mg in sodium chloride 0 9 % 250 mL IVPB  17 5 mg/kg Intravenous Q12H Mason Rehman PA-C 166 7 mL/hr at 09/19/20 2230 1,500 mg at 09/19/20 2230    venlafaxine (EFFEXOR-XR) 24 hr capsule 150 mg  150 mg Oral Daily Mason Rehman PA-C   150 mg at 09/19/20 0840       Invasive Devices     Central Venous Catheter Line            Port A Cath Right Chest -- days    Port A Cath 07/31/19 Right Chest 417 days          Peripheral Intravenous Line            Peripheral IV 09/18/20 Right Antecubital 2 days    Peripheral IV 09/18/20 Dorsal (posterior); Left Forearm 1 day    Peripheral IV 09/18/20 Dorsal (posterior); Left Hand 1 day                Lab, Imaging and other studies: I have personally reviewed pertinent reports      VTE Pharmacologic Prophylaxis: eliquis  VTE Mechanical Prophylaxis: sequential compression device    Vince Calderon MD

## 2020-09-20 NOTE — ASSESSMENT & PLAN NOTE
Hx of dysphagia 2/2 esophageal cancer; may have potentially resulted in aspiration pneumonia  - As per speech therapist:   Agree with esophagram and VBS for further analysis: To be performed 9/20  Recommended Diet: consider clear liquid diet until esophagram and Video Swallow study   Recommended Form of Meds: IVs or as per MD  Precautions and swallowing strategies:   1  upright posture, maintain full upright position during and for a minof 1 hour after po intake and 30 degree minimum at all times   2   slow rate of feeding, small bites/sips   - moderate-severe oropharyngeal dysphagia characterized by silent aspiration on nectar thick liquids, thin liquids, ambient fluid from mixed consistencies  Recommended is puree/level 1 diet and honey thick liquids and aspiration precautions

## 2020-09-20 NOTE — PROGRESS NOTES
[facility] Progress Note - Yahaira Garcia 77 y o  male MRN: [id number]    Unit/Bed#: [address] [place] [address] Encounter: [id number]      Assessment/Plan:  * Septic Shock [facility])  Assessment & Plan  Patient presenting with septic shock 2/2 to new onset right lung pneumonia  hypotension present on admission, Lactic acid 4 7, repeat 2 1  B/P stable with IVF management       - IVF resuscitated at 3 5 L   - stress dose steroids x1  - No additional pressure support required after IV fluid resuscitation    - Continue empiric Antibiotic treatment of vanco + cefepime + flagyl (9/18-)    Pneumonia  Assessment & Plan  Patient with recent hospitalization 2/2 left lobe pneumonia and septic shock  Had recurrent SOB and episode of hypotension and hypoxia on 9/18 and was refered to ED by primary care physician after office visit  On ED placed on high flow supplemental O2 due to hypoxia  Chest Xray shows new right middle lung pneumonia with improved aspect of left consolidation where prior pneumonia had been  Currently on Vanc + flagyl + cefepime  Acute respiratory failure with hypoxemia (HCC)resolved as of 9/19/2020  Assessment & Plan  9/18/20 episode of hypotension and hypoxia and was refered to ED by primary care physician after office visit  On ED placed on high flow supplemental O2 due to hypoxia  Chest Xray shows Right greater than left pneumonia     -Admit to critical care unit (9/18-)  -DNR/DNI  -high flow nasal cannula initially, required 20-25 LPM of O2 on vapotherm eventually   -following day, patient was successfully weaned down to 3 L NC    JENNIFER (acute kidney injury) (HCC)resolved as of 9/19/2020  Assessment & Plan  Patient with JENNIFER, creatinine on admission 1 66  Last Cr level was 0 95 on 9/8/20  Probably prerenal in nature 2/2 dehydration vs septic shock  - patient rehydrated with IV NS bolus and isolyte 30cc/kg initial resusitation   Further IVF hydration held for now due to his poor CHF   -follow BMP    Chronic combined systolic and diastolic CHF (congestive heart failure) ([facility] [address] )  Overview  Echo 9/2/2020:     EF 35-40%  · There was moderate diffuse hypokinesis  · Features were consistent with a pseudonormal left ventricular filling pattern, with concomitant abnormal relaxation and increased filling pressure (grade 2 diastolic dysfunction)  Assessment & Plan  Wt Readings from Last 3 Encounters:   09/18/20 79 1 kg (174 lb 6 1 oz)   09/18/20 77 1 kg (170 lb)   09/08/20 76 9 kg (169 lb 8 5 oz)     Echo 9/2/2020:     EF 35-40%  · There was moderate diffuse hypokinesis  · Features were consistent with a pseudonormal left ventricular filling pattern, with concomitant abnormal relaxation and increased filling pressure (grade 2 diastolic dysfunction)  Patient arrived with elevated BNP of 5950  - monitor daily fluid status with I/O and daily weights, diurese as needed   - avoid excessive IV hydration         PAF (paroxysmal atrial fibrillation) (HCC)  Overview  New onset Afib suspected to be 2/2 to PNA  Cardioconverted overnight on 9/2 with amiodarone  Currently on amiodarone po  Assessment & Plan  Has a hx of New onset paroxysmal Afib suspected to be 2/2 to PNA  Cardioconverted overnight on 9/2 with amiodarone  Currently on amiodarone po     continue taking Amiodarone 200 mg PO BID and Eliquis 5 mg PO BID     Gastroesophageal cancer [facility])  Assessment & Plan  Patient has Active gastroesophageal carcinoma with peritoneal metastasis being treated with chemotherapy (Last chemo 8/20)  Other dysphagia  Assessment & Plan  Hx of dysphagia 2/2 esophageal cancer; may have potentially resulted in aspiration pneumonia  - As per speech therapist:   Agree with esophagram and VBS for further analysis    Recommended Diet: consider clear liquid diet until esophagram and Video Swallow study   Recommended Form of Meds: IVs or as per MD  Precautions and swallowing strategies:   1  upright posture, maintain full upright position during and for a minof 1 hour after po intake and 30 degree minimum at all times   2   slow rate of feeding, small bites/sips     Major depression  Assessment & Plan  Stable  Continue taking Effexor- mg PO daily     F avoid excessive oral hydration   E monitor and correct prn   N Clear Liquid Diet   D SCD & Eliquis      Subjective:   Patient was seen at bedside reclining with the bed up 45 degrees breathing comfortably  Patient was stepped down from ICU after successfully weaned down to 3 L NC  Patient has been afebrile throughout the course of the stay  Patient has diuresed 3 L with a net output of nearly 1 L after patient's IVF resuscitation yesterday  Patient endorses improvement in his breathing  Denies fever, chills, or loss of appetite  Endorses some intermittent abdominal pain  Speech therapy has evaluated patient's swallowing and has recommended clear liquid diet until esophagram and video swallow study are complete  Patient is currently on Day 2 on vancomycin + Flagyl + cefepime  [email or web address] H in the blood cultures  Objective:     Vitals: Blood pressure 124/68, pulse 79, temperature (!) 97 3 °F (36 3 °C), resp  rate 16, height 6' (1 829 m), weight 81 2 kg (179 lb 0 2 oz), SpO2 98 %  ,Body mass index is 24 28 kg/m²  Wt Readings from Last 3 Encounters:   09/19/20 81 2 kg (179 lb 0 2 oz)   09/18/20 77 1 kg (170 lb)   09/08/20 76 9 kg (169 lb 8 5 oz)       Intake/Output Summary (Last 24 hours) at 9/19/2020 2338  Last data filed at 9/19/2020 2106  Gross per 24 hour   Intake 2025 ml   Output 3000 ml   Net -975 ml       Physical Exam:   Physical Exam  Vitals signs reviewed  Constitutional:       General: He is not in acute distress  Appearance: He is cachectic  He is ill-appearing  He is not diaphoretic  Cardiovascular:      Rate and Rhythm: Normal rate and regular rhythm  Pulses: Normal pulses  Pulmonary:      Effort: Pulmonary effort is normal  No respiratory distress        Breath sounds: Normal air entry  No decreased air movement  Examination of the right-upper field reveals decreased breath sounds  Examination of the right-middle field reveals decreased breath sounds  Examination of the right-lower field reveals decreased breath sounds and rales  Examination of the left-lower field reveals rales  Decreased breath sounds and rales present  No wheezing or rhonchi  Musculoskeletal:      Right lower leg: No edema  Left lower leg: No edema  Skin:     General: Skin is warm  Capillary Refill: Capillary refill takes less than 2 seconds  Findings: No bruising  Neurological:      Mental Status: He is alert  Psychiatric:         Mood and Affect: Mood normal          Behavior: Behavior normal          Thought Content:  Thought content normal            Recent Results (from the past 24 hour(s))   Procalcitonin Reflex    Collection Time: 09/19/20  5:50 AM   Result Value Ref Range    Procalcitonin 32 31 (H) <=0 25 ng/ml   CBC and differential    Collection Time: 09/19/20  5:50 AM   Result Value Ref Range    WBC 8 13 4 31 - 10 16 Thousand/uL    RBC 2 50 (L) 3 88 - 5 62 Million/uL    Hemoglobin 7 8 (L) 12 0 - 17 0 g/dL    Hematocrit 25 0 (L) 36 5 - 49 3 %     (H) 82 - 98 fL    MCH 31 2 26 8 - 34 3 pg    MCHC 31 2 (L) 31 4 - 37 4 g/dL    RDW 17 9 (H) 11 6 - 15 1 %    MPV 10 6 8 9 - 12 7 fL    Platelets 587 698 - 640 Thousands/uL    nRBC 0 /100 WBCs    Neutrophils Relative 78 (H) 43 - 75 %    Immat GRANS % 2 0 - 2 %    Lymphocytes Relative 14 14 - 44 %    Monocytes Relative 4 4 - 12 %    Eosinophils Relative 2 0 - 6 %    Basophils Relative 0 0 - 1 %    Neutrophils Absolute 6 31 1 85 - 7 62 Thousands/µL    Immature Grans Absolute 0 16 0 00 - 0 20 Thousand/uL    Lymphocytes Absolute 1 14 0 60 - 4 47 Thousands/µL    Monocytes Absolute 0 36 0 17 - 1 22 Thousand/µL    Eosinophils Absolute 0 14 0 00 - 0 61 Thousand/µL    Basophils Absolute 0 02 0 00 - 0 10 Thousands/µL   Basic metabolic panel    Collection Time: 09/19/20  5:50 AM   Result Value Ref Range    Sodium 138 136 - 145 mmol/L    Potassium 3 2 (L) 3 5 - 5 3 mmol/L    Chloride 105 100 - 108 mmol/L    CO2 25 21 - 32 mmol/L    ANION GAP 8 4 - 13 mmol/L    BUN 18 5 - 25 mg/dL    Creatinine 0 97 0 60 - 1 30 mg/dL    Glucose 65 65 - 140 mg/dL    Calcium 7 7 (L) 8 3 - 10 1 mg/dL    eGFR 80 ml/min/1 73sq m   Magnesium    Collection Time: 09/19/20  5:50 AM   Result Value Ref Range    Magnesium 2 3 1 6 - 2 6 mg/dL   Phosphorus    Collection Time: 09/19/20  5:50 AM   Result Value Ref Range    Phosphorus 3 1 2 3 - 4 1 mg/dL   Fingerstick Glucose (POCT)    Collection Time: 09/19/20  7:53 AM   Result Value Ref Range    POC Glucose 137 65 - 140 mg/dl   Fingerstick Glucose (POCT)    Collection Time: 09/19/20 11:16 AM   Result Value Ref Range    POC Glucose 58 (L) 65 - 140 mg/dl   Fingerstick Glucose (POCT)    Collection Time: 09/19/20 11:31 AM   Result Value Ref Range    POC Glucose 66 65 - 140 mg/dl   Fingerstick Glucose (POCT)    Collection Time: 09/19/20  2:20 PM   Result Value Ref Range    POC Glucose 103 65 - 140 mg/dl   Fingerstick Glucose (POCT)    Collection Time: 09/19/20  6:07 PM   Result Value Ref Range    POC Glucose 102 65 - 140 mg/dl       Current Facility-Administered Medications   Medication Dose Route Frequency Provider Last Rate Last Dose    acetaminophen (TYLENOL) tablet 650 mg  650 mg Oral Q6H PRN Pieter Youssef PA-C        albuterol inhalation solution 2 5 mg  2 5 mg Nebulization Q4H PRN Pieter Youssef PA-C   2 5 mg at 09/19/20 2303    amiodarone tablet 200 mg  200 mg Oral Daily With Breakfast Pieter Youssef PA-C   200 mg at 09/19/20 0840    apixaban (ELIQUIS) tablet 5 mg  5 mg Oral BID Pieter Youssef PA-C   5 mg at 09/19/20 1705    atorvastatin (LIPITOR) tablet 40 mg  40 mg Oral Daily With CortezYESENIA mo-C   40 mg at 09/19/20 1705    cefepime (MAXIPIME) 2 g/50 mL dextrose IVPB  2,000 mg Intravenous Q12H Pieter Youssef PA-C 100 mL/hr at 09/19/20 1550 2,000 mg at 09/19/20 1550    dextrose 50 % IV solution 25 mL  25 mL Intravenous Q6H PRN Inocente Whittaker PA-C   25 mL at 09/19/20 0725    metoclopramide (REGLAN) tablet 5 mg  5 mg Oral TID [facility] Inocente Whittaker PA-C   5 mg at 09/19/20 1115    metroNIDAZOLE (FLAGYL) IVPB (premix) 500 mg 100 mL  500 mg Intravenous Q8H KATELYN OteroC 200 mL/hr at 09/19/20 1629 500 mg at 09/19/20 1629    pantoprazole (PROTONIX) EC tablet 40 mg  40 mg Oral Daily Before Breakfast Inocente Whittaker PA-C   40 mg at 09/19/20 4145    senna-docusate sodium (SENOKOT S) 8 6-50 mg per tablet 1 tablet  1 tablet Oral HS Inocente Whittaker PA-C   1 tablet at 09/19/20 2102    tamsulosin (FLOMAX) capsule 0 4 mg  0 4 mg Oral Daily With Mert Knight PA-C   0 4 mg at 09/19/20 1705    vancomycin (VANCOCIN) 1,500 mg in sodium chloride 0 9 % 250 mL IVPB  17 5 mg/kg Intravenous Q12H Inocente Whittaker PA-C 166 7 mL/hr at 09/19/20 2230 1,500 mg at 09/19/20 2230    venlafaxine (EFFEXOR-XR) 24 hr capsule 150 mg  150 mg Oral Daily Inocente Whittaker PA-C   150 mg at 09/19/20 0840       Invasive Devices     Central Venous Catheter Line            Port A Cath Right Chest -- days    Port A Cath 07/31/19 Right Chest 416 days          Peripheral Intravenous Line            Peripheral IV 09/18/20 Dorsal (posterior); Left Forearm 1 day    Peripheral IV 09/18/20 Dorsal (posterior); Left Hand 1 day    Peripheral IV 09/18/20 Right Antecubital 1 day                Lab, Imaging and other studies: I have personally reviewed pertinent reports      VTE Pharmacologic Prophylaxis: Eliquis   VTE Mechanical Prophylaxis: sequential compression device    Maryagnes Pallas, MD

## 2020-09-20 NOTE — PROGRESS NOTES
Vancomycin IV Pharmacy-to-Dose Consultation    Kobi Cade is a 77 y o  male who is currently receiving Vancomycin IV with management by the Pharmacy Consult service  Assessment/Plan:  The patient was reviewed  Renal function is stable and no signs or symptoms of nephrotoxicity and/or infusion reactions were documented in the chart  Based on todays assessment, continue current vancomycin (day # 3 dosing of 1500 mg IV q12h, with a plan for trough to be drawn at 1000 on 9/21/20  We will continue to follow the patients culture results and clinical progress daily      Liz Pantoja, Pharmacist

## 2020-09-20 NOTE — OCCUPATIONAL THERAPY NOTE
Occupational Therapy Attempt Note    OT orders received  Chart reviewed  Attempt OT evaluation this PM however patient declined due to fatigue   Will follow up as patient is agreeable    Guru Duran, OTR/L [id number]

## 2020-09-20 NOTE — MALNUTRITION/BMI
This medical record reflects one or more clinical indicators suggestive of malnutrition and/or morbid obesity  Malnutrition Findings:   Malnutrition type: Chronic illness  Degree of Malnutrition: Other severe protein calorie malnutrition(7% wt loss x1 month, <75% energy intake compared to estimated energy needs for > 1 month  treatment= diet advance/supplement/nutrition education)  Malnutrition Characteristics: Weight loss    BMI Findings: Body mass index is 24 14 kg/m²  See Nutrition note dated 9/20/20 for additional details  Completed nutrition assessment is viewable in the nutrition documentation

## 2020-09-20 NOTE — PHYSICAL THERAPY NOTE
PT EVALUATION       09/20/20 7557   Note Type   Note type Eval/Treat   Pain Assessment   Pain Assessment Tool Pain Assessment not indicated - pt denies pain   Home Living   Type of [address] One level  (4 ALEJANDRO)   Home Equipment Cane;Walker   Prior Function   Level of [place] Needs assistance with ADLs and functional mobility  (ambulates with walker recently;  recent decline in function)   Lives With Spouse   Receives Help From [facility]   General   Additional Pertinent History Pt admitted with PNA  Pt was recently discharged from the hospital    Cognition   Arousal/Participation Cooperative   Following Commands Follows one step commands without difficulty   RLE Assessment   RLE Assessment   (ROM WFL, MMT 4/5)   LLE Assessment   LLE Assessment   (ROM WFL, MMT 4/5)   Bed Mobility   Supine to Sit 5  Supervision   Transfers   Sit to Stand 4  Minimal assistance   Stand to Sit 4  Minimal assistance   Stand pivot 4  Minimal assistance   Additional items   (with walker)   Ambulation/Elevation   Gait Assistance   (supervision/min assist)   Additional items Verbal cues; Tactile cues  (safety)   Assistive Device Rolling walker  (6L02)   Distance 12 feet    Balance   Static Sitting Fair +   Dynamic Sitting Fair +   Static Standing Fair +   Dynamic Standing Fair   Ambulatory Fair -   Activity Tolerance   Activity Tolerance Patient tolerated treatment well;Patient limited by fatigue   Nurse Made Aware Pt OOB in chair   Assessment   Prognosis Good   Problem List Decreased strength;Decreased endurance; Impaired balance;Decreased mobility   Assessment Patient seen for Physical Therapy evaluation  Patient admitted with Pneumonia  Comorbidities affecting patient's physical performance include: phychiatric disorder  Personal factors affecting patient at time of initial evaluation include: ambulating with assistive device, stairs to enter home and inability to navigate level surfaces without external assistance  Prior to admission, patient was independent with functional mobility without assistive device and independent with ADLS  Please find objective findings from Physical Therapy assessment regarding body systems outlined above with impairments and limitations including weakness, impaired balance, decreased endurance, decreased activity tolerance, decreased functional mobility tolerance, altered sensation and fall risk  The Barthel Index was used as a functional outcome tool presenting with a score of 55 today indicating marked limitations of functional mobility and ADLS  Patient's clinical presentation is currently unstable/unpredictable as seen in patient's presentation of increased fall risk, new onset of impairment of functional mobility, decreased endurance and new onset of weakness  Pt would benefit from continued Physical Therapy treatment to address deficits as defined above and maximize level of functional mobility  As demonstrated by objective findings, the assigned level of complexity for this evaluation is high  Goals   Patient Goals "go home"   STG Expiration Date 09/27/20   Short Term Goal #1 independent bed mobility, independent transfers bed to chair, supervision ambulation with walker 100 feet so pt can navigate his home   LTG Expiration Date 10/04/20   Long Term Goal #1 no falls, independent ambulation with rolling walker 150 feet  independent up and down 4 steps so pt can enter and exit his home   Plan   Treatment/Interventions ADL retraining;Functional transfer training;LE strengthening/ROM; Elevations; Therapeutic exercise; Endurance training;Gait training;Bed mobility; Equipment eval/education;Patient/family training   PT Frequency 5x/wk   Recommendation   PT Discharge Recommendation Home with skilled therapy  (24 hour supervision)   Equipment Recommended   (pt has a walker, possible home 02 eval)   Barthel Index   Feeding 10   Bathing 0   Grooming Score 0   Dressing Score 5   Bladder Score 10 Bowels Score 10   Toilet Use Score 5   Transfers (Bed/Chair) Score 10   Mobility (Level Surface) Score 0   Stairs Score 5   Barthel Index Score 54   Licensure   [place] License Number  Xochitl Anderson PT [id number]       Time QA:6979  Time JUF:5964  Total Time: 10      S:  "I could walk more"  O:  Supervision ambulation with rolling walker and 6L02 x 100 feet  Verbal cues needed to use walker safely  A:  Pt tolerated activity well  Pt needs supervision for safety    P:  Continue PT    Chelle Funez, PT

## 2020-09-21 ENCOUNTER — APPOINTMENT (INPATIENT)
Dept: RADIOLOGY | Facility: HOSPITAL | Age: 65
DRG: 871 | End: 2020-09-21
Payer: MEDICARE

## 2020-09-21 PROBLEM — J69.0 ASPIRATION PNEUMONIA (HCC): Status: ACTIVE | Noted: 2020-09-18

## 2020-09-21 LAB
ANION GAP SERPL CALCULATED.3IONS-SCNC: 11 MMOL/L (ref 4–13)
BASOPHILS # BLD AUTO: 0.07 THOUSANDS/ΜL (ref 0–0.1)
BASOPHILS NFR BLD AUTO: 1 % (ref 0–1)
BUN SERPL-MCNC: 6 MG/DL (ref 5–25)
CALCIUM SERPL-MCNC: 8 MG/DL (ref 8.3–10.1)
CHLORIDE SERPL-SCNC: 101 MMOL/L (ref 100–108)
CO2 SERPL-SCNC: 21 MMOL/L (ref 21–32)
CREAT SERPL-MCNC: 0.84 MG/DL (ref 0.6–1.3)
EOSINOPHIL # BLD AUTO: 0.33 THOUSAND/ΜL (ref 0–0.61)
EOSINOPHIL NFR BLD AUTO: 3 % (ref 0–6)
ERYTHROCYTE [DISTWIDTH] IN BLOOD BY AUTOMATED COUNT: 18.1 % (ref 11.6–15.1)
GFR SERPL CREATININE-BSD FRML MDRD: 91 ML/MIN/1.73SQ M
GLUCOSE SERPL-MCNC: 69 MG/DL (ref 65–140)
GLUCOSE SERPL-MCNC: 71 MG/DL (ref 65–140)
GLUCOSE SERPL-MCNC: 72 MG/DL (ref 65–140)
GLUCOSE SERPL-MCNC: 73 MG/DL (ref 65–140)
GLUCOSE SERPL-MCNC: 74 MG/DL (ref 65–140)
GLUCOSE SERPL-MCNC: 74 MG/DL (ref 65–140)
HCT VFR BLD AUTO: 32.6 % (ref 36.5–49.3)
HGB BLD-MCNC: 9.5 G/DL (ref 12–17)
IMM GRANULOCYTES # BLD AUTO: 0.07 THOUSAND/UL (ref 0–0.2)
IMM GRANULOCYTES NFR BLD AUTO: 1 % (ref 0–2)
LYMPHOCYTES # BLD AUTO: 2 THOUSANDS/ΜL (ref 0.6–4.47)
LYMPHOCYTES NFR BLD AUTO: 19 % (ref 14–44)
MAGNESIUM SERPL-MCNC: 1.7 MG/DL (ref 1.6–2.6)
MCH RBC QN AUTO: 30.4 PG (ref 26.8–34.3)
MCHC RBC AUTO-ENTMCNC: 29.1 G/DL (ref 31.4–37.4)
MCV RBC AUTO: 104 FL (ref 82–98)
MONOCYTES # BLD AUTO: 0.83 THOUSAND/ΜL (ref 0.17–1.22)
MONOCYTES NFR BLD AUTO: 8 % (ref 4–12)
NEUTROPHILS # BLD AUTO: 7.12 THOUSANDS/ΜL (ref 1.85–7.62)
NEUTS SEG NFR BLD AUTO: 68 % (ref 43–75)
NRBC BLD AUTO-RTO: 0 /100 WBCS
PHOSPHATE SERPL-MCNC: 3 MG/DL (ref 2.3–4.1)
PLATELET # BLD AUTO: 241 THOUSANDS/UL (ref 149–390)
PMV BLD AUTO: 11.7 FL (ref 8.9–12.7)
POTASSIUM SERPL-SCNC: 3.8 MMOL/L (ref 3.5–5.3)
PROCALCITONIN SERPL-MCNC: 6.11 NG/ML
RBC # BLD AUTO: 3.13 MILLION/UL (ref 3.88–5.62)
SODIUM SERPL-SCNC: 133 MMOL/L (ref 136–145)
VANCOMYCIN TROUGH SERPL-MCNC: 16.8 UG/ML (ref 10–20)
WBC # BLD AUTO: 10.42 THOUSAND/UL (ref 4.31–10.16)

## 2020-09-21 PROCEDURE — 84145 PROCALCITONIN (PCT): CPT | Performed by: STUDENT IN AN ORGANIZED HEALTH CARE EDUCATION/TRAINING PROGRAM

## 2020-09-21 PROCEDURE — 83735 ASSAY OF MAGNESIUM: CPT | Performed by: STUDENT IN AN ORGANIZED HEALTH CARE EDUCATION/TRAINING PROGRAM

## 2020-09-21 PROCEDURE — 85025 COMPLETE CBC W/AUTO DIFF WBC: CPT | Performed by: STUDENT IN AN ORGANIZED HEALTH CARE EDUCATION/TRAINING PROGRAM

## 2020-09-21 PROCEDURE — 80202 ASSAY OF VANCOMYCIN: CPT | Performed by: PHYSICIAN ASSISTANT

## 2020-09-21 PROCEDURE — 80048 BASIC METABOLIC PNL TOTAL CA: CPT | Performed by: STUDENT IN AN ORGANIZED HEALTH CARE EDUCATION/TRAINING PROGRAM

## 2020-09-21 PROCEDURE — 92611 MOTION FLUOROSCOPY/SWALLOW: CPT

## 2020-09-21 PROCEDURE — 97167 OT EVAL HIGH COMPLEX 60 MIN: CPT

## 2020-09-21 PROCEDURE — 99232 SBSQ HOSP IP/OBS MODERATE 35: CPT | Performed by: FAMILY MEDICINE

## 2020-09-21 PROCEDURE — 94669 MECHANICAL CHEST WALL OSCILL: CPT

## 2020-09-21 PROCEDURE — 82948 REAGENT STRIP/BLOOD GLUCOSE: CPT

## 2020-09-21 PROCEDURE — 84100 ASSAY OF PHOSPHORUS: CPT | Performed by: STUDENT IN AN ORGANIZED HEALTH CARE EDUCATION/TRAINING PROGRAM

## 2020-09-21 PROCEDURE — 74230 X-RAY XM SWLNG FUNCJ C+: CPT

## 2020-09-21 PROCEDURE — 94760 N-INVAS EAR/PLS OXIMETRY 1: CPT

## 2020-09-21 PROCEDURE — 94668 MNPJ CHEST WALL SBSQ: CPT

## 2020-09-21 PROCEDURE — 94660 CPAP INITIATION&MGMT: CPT

## 2020-09-21 RX ADMIN — TAMSULOSIN HYDROCHLORIDE 0.4 MG: 0.4 CAPSULE ORAL at 17:02

## 2020-09-21 RX ADMIN — CEFEPIME HYDROCHLORIDE 2000 MG: 2 INJECTION, POWDER, FOR SOLUTION INTRAVENOUS at 04:25

## 2020-09-21 RX ADMIN — APIXABAN 5 MG: 5 TABLET, FILM COATED ORAL at 17:02

## 2020-09-21 RX ADMIN — METOCLOPRAMIDE 5 MG: 10 TABLET ORAL at 13:23

## 2020-09-21 RX ADMIN — METRONIDAZOLE 500 MG: 500 INJECTION, SOLUTION INTRAVENOUS at 17:02

## 2020-09-21 RX ADMIN — Medication 250 MG: at 20:51

## 2020-09-21 RX ADMIN — METOCLOPRAMIDE 5 MG: 10 TABLET ORAL at 17:02

## 2020-09-21 RX ADMIN — METRONIDAZOLE 500 MG: 500 INJECTION, SOLUTION INTRAVENOUS at 01:00

## 2020-09-21 RX ADMIN — CEFEPIME HYDROCHLORIDE 2000 MG: 2 INJECTION, POWDER, FOR SOLUTION INTRAVENOUS at 17:03

## 2020-09-21 RX ADMIN — ATORVASTATIN CALCIUM 40 MG: 40 TABLET, FILM COATED ORAL at 17:02

## 2020-09-21 RX ADMIN — VANCOMYCIN HYDROCHLORIDE 1500 MG: 10 INJECTION, POWDER, LYOPHILIZED, FOR SOLUTION INTRAVENOUS at 23:49

## 2020-09-21 RX ADMIN — AMIODARONE HYDROCHLORIDE 200 MG: 200 TABLET ORAL at 13:23

## 2020-09-21 RX ADMIN — VANCOMYCIN HYDROCHLORIDE 1500 MG: 10 INJECTION, POWDER, LYOPHILIZED, FOR SOLUTION INTRAVENOUS at 12:39

## 2020-09-21 RX ADMIN — DOCUSATE SODIUM AND SENNOSIDES 1 TABLET: 8.6; 5 TABLET, FILM COATED ORAL at 22:19

## 2020-09-21 RX ADMIN — METRONIDAZOLE 500 MG: 500 INJECTION, SOLUTION INTRAVENOUS at 08:52

## 2020-09-21 RX ADMIN — PANTOPRAZOLE SODIUM 40 MG: 40 TABLET, DELAYED RELEASE ORAL at 13:23

## 2020-09-21 NOTE — PROCEDURES
Speech Pathology Videofluoroscopic Swallow Study      Patient Name: Tri Quiñonez    BWIVR'O Date: 9/21/2020     Problem List  Principal Problem:    Pneumonia  Active Problems:    Major depression    Anxiety    Gastroesophageal cancer ([facility] [address] )    Severe protein-calorie malnutrition Pedro Antunez: less than 60% of standard weight) ([facility] )    Other dysphagia    PAF (paroxysmal atrial fibrillation) (HCC)    Chronic combined systolic and diastolic CHF (congestive heart failure) ([facility] )    Port-A-Cath in place    Hematuria    Hyponatremia    Hypoglycemia    Severe protein-calorie malnutrition ([facility] )        Past Medical History  Past Medical History:   Diagnosis Date    Dehydration 8/8/2019    [facility]     Hypertension     Nausea 8/8/2019    Psychiatric disorder     Recovering alcoholic ([facility] )        Past Surgical History  Past Surgical History:   Procedure Laterality Date    APPENDECTOMY      IR PORT PLACEMENT  7/31/2019         General Information;  Pt is a 77 y o  male with a  past medical history significant for gastroesophageal carcinoma with peritoneal metastasis, h/o DVT on Elqiuis, recent admission from 8/30/2020-9/8/2020 for septic shock secondary to pneumonia as well as a dilated cardiomyopathy with EF 35%, atrial fibrillation, malnutrition, on chronic steroids who was discharged from rehab on Monday 9/14/2020, was doing well until yesterday when he developed significant generalized weakness and shortness of breath who today presents with hypotension, hypoxia from his primary care provider's after going in for a sick visit  Reports that yesterday he felt "terrible"  Describes feeling incredibly tired and short of breath, then felt slightly improved at night, but woke up feeling poorly again  Prior VBS/Relevant Imaging: Pt was scheduled for a OP VBS, the week after his discharge on 9/8/20 however he was a No Show        Chest Xray 9/18/20: Right greater than left pneumonia, new on the right and improved on the left  Cognition: WFL    Pain: None verbally reported    Food Allergies: Shellfish derived products    Speech/Swallow Mechanism:   Oral movements: WFL  Dentition: natural- upper, lower- edentulous  Volitional cough: WFL  Respiratory status: NC   Current diet: regular/thin liquids    Pt  Was seen in radiology for a Video Barium Swallow Study, seated in the upright position and viewed laterally  Consistencies administered for this assessment: puree, soft solid, mixed consistency, hard solid, honey thick liquid, nectar thick liquid, thin liquid, and barium pill  Oral stage:  Pt presented with mild oral stage dysphagia  Mastication was timely and grossly effective with materials administered today, with the exception of the hard solid, which he was unable to fully mastication and spit half back into a cup  Bolus formation and transfer were functional   Oral control appeared adequate  Pharyngeal stage:  Pt presented with severe pharyngeal dysphagia  On the puree, honey thick liquids via cup, barium pill, and soft solid:    Swallowing initiation was prompt  Hyolaryngeal rise and anterior displacement were adequate  AIrway closure/protection appeared complete  Tongue base retraction appeared to be of adequate strength  Pharyngeal constriction also appeared adequate  No penetration, aspiration or significant pharyngeal residue noted with these materials  With nectar thick liquids- single sip: noted residue post swallow in valleculae, arytenoids, and on UES and in pyriforms  Pt  Was unable to clear residuals with second swallow and throat clear  With thin liquids via cup x 1 sip: although no penetration or aspiration was noted, noted residuals post swallow with trace of barium in larynx and unclear if he aspirated residuals from the NTL prior or if the material was new from the thin liquid      With thin liquids via straw: Nanine Presser aspiration occurred during the swallow with no cough or sensation response  With mixed consistency: Pt  Was noted to aspirate the ambient fluid during the swallow with no cough or sensation response  With hard solid: Pt  Had premature spillage to valleculae with residuals throughout pharyngeal cavity  Utilized Nectar thick liquid wash via cup which resulted in aspiration of residuals with no cough or sensation response  Aspiration Response and Efficacy: None- all aspirated material was silent  Esophageal stage:  Esophageal screening was completed  Noted status of material in mid/lower esophagus  Pt  Has an order for a barium esophagram     Assessment Summary:    Pt presents with moderate-severe oropharyngeal dysphagia characterized by silent aspiration on nectar thick liquids, thin liquids, ambient fluid from mixed consistencies  Note: Images are available for review in PACS as desired  Recommendations:   Recommended Diet:  puree/level 1 diet and honey thick liquids   Recommended Form of Medications: crushed with puree   Aspiration precautions and compensatory swallowing strategies: upright posture, only feed when fully alert, slow rate of feeding, small bites/sips, no straws, liquids by teaspoon only and cough every 2 bites/sips  Consider referral to:    SLP Dysphagia therapy recommended:     Results Reviewed with: patient and MD   Pt/Family Education: initiated  Pt and caregivers would benefit from/require continued education        Dysphagia Goals per SLP: pt will tolerate puree with honey thick liquids via spoon without s/s of aspiration x3-4 sessions      Brittany Underwood MS CCC-SLP  Speech Language Pathologist  Available via [facility] License # [id number]  [address] # [id number]- [id number]

## 2020-09-21 NOTE — PROGRESS NOTES
[address] [facility] [address] [facility] Practice Progress Note - Walker Echols 77 y o  male MRN: [id number]    Unit/Bed#: [address] [place] [address]- Encounter: [id number]      Assessment/Plan:  * Pneumonia  Assessment & Plan  Patient with recent hospitalization 2/2 left lobe pneumonia and septic shock  Had recurrent SOB and episode of hypotension and hypoxia on 9/18 and was refered to ED by primary care physician after office visit  On ED placed on high flow supplemental O2 due to hypoxia  Chest Xray shows new right middle lung pneumonia with improved aspect of left consolidation where prior pneumonia had been  Currently on Vanc + flagyl + cefepime  - continue antibiotics and monitor vitals  - sputum culture preliminary results grew mixed respiratory bobby and gram stain showed Gram-positive cocci  - No fever in past 24hr continue to monitor for improvement clinically  - nasal cannula being continued 6 L  - trend sodium  - Vanco trough pending    Other dysphagia  Assessment & Plan  Hx of dysphagia 2/2 esophageal cancer; may have potentially resulted in aspiration pneumonia  - As per speech therapist:   Agree with esophagram and VBS for further analysis: To be performed 9/20  Recommended Diet: consider clear liquid diet until esophagram and Video Swallow study   Recommended Form of Meds: IVs or as per MD  Precautions and swallowing strategies:   1  upright posture, maintain full upright position during and for a minof 1 hour after po intake and 30 degree minimum at all times   2   slow rate of feeding, small bites/sips   - moderate-severe oropharyngeal dysphagia characterized by silent aspiration on nectar thick liquids, thin liquids, ambient fluid from mixed consistencies  Recommended is puree/level 1 diet and honey thick liquids and aspiration precautions      Chronic combined systolic and diastolic CHF (congestive heart failure) (HCC)  Assessment & Plan  Wt Readings from Last 3 Encounters:   09/18/20 79 1 kg (174 lb 6 1 oz)   09/18/20 77 1 kg (170 lb)   09/08/20 76 9 kg (169 lb 8 5 oz)     Echo 9/2/2020:     EF 35-40%  · There was moderate diffuse hypokinesis  · Features were consistent with a pseudonormal left ventricular filling pattern, with concomitant abnormal relaxation and increased filling pressure (grade 2 diastolic dysfunction)  Patient arrived with elevated BNP of 5950  - monitor daily fluid status with I/O and daily weights, diurese as needed   - avoid excessive IV hydration  PAF (paroxysmal atrial fibrillation) ([facility] )  Assessment & Plan  Has a hx of New onset paroxysmal Afib suspected to be 2/2 to PNA  Cardioconverted overnight on 9/2 with amiodarone  Currently on amiodarone po     continue taking Amiodarone 200 mg PO BID and Eliquis 5 mg PO BID     Gastroesophageal cancer [facility])  Assessment & Plan  Patient has Active gastroesophageal carcinoma with peritoneal metastasis being treated with chemotherapy (Last chemo 8/20)  Major depression  Assessment & Plan  Stable  Continue taking Effexor- mg PO daily     Acute respiratory failure with hypoxemia (HCC)resolved as of 9/19/2020  Assessment & Plan  9/18/20 episode of hypotension and hypoxia and was refered to ED by primary care physician after office visit  On ED placed on high flow supplemental O2 due to hypoxia  Chest Xray shows Right greater than left pneumonia     -Admit to critical care unit (9/18-)  -DNR/DNI  -high flow nasal cannula initially, required 20-25 LPM of O2 on vapotherm eventually   -following day, patient was successfully weaned down to 3 L NC    JENNIFER (acute kidney injury) (HCC)resolved as of 9/19/2020  Assessment & Plan  Patient with JENNIFER, creatinine on admission 1 66  Last Cr level was 0 95 on 9/8/20  Probably prerenal in nature 2/2 dehydration vs septic shock  - patient rehydrated with IV NS bolus and isolyte 30cc/kg initial resusitation   Further IVF hydration held for now due to his poor CHF   -follow BMP    Septic Shock (HCC)resolved as of 9/20/2020  Assessment & Plan  Patient presenting with septic shock 2/2 to new onset right lung pneumonia  hypotension present on admission, Lactic acid 4 7, repeat 2 1  B/P stable with IVF management       - IVF resuscitated at 3 5 L   - stress dose steroids x1  - No additional pressure support required after IV fluid resuscitation    - Continue empiric Antibiotic treatment of vanco + cefepime + flagyl (9/18-)      Subjective:   Patient was seen at bedside found in no acute distress  Refers feeling tired and fatigued and not being able to sleep because of all distractions and noises  Refers mild MOHAN when getting out of bed and going to the bathroom and coughing  Denies chest pain abdominal pain shortness of breath at rest fever chills or vomiting  Voiding and passing stool well  Objective:     Vitals: Blood pressure 122/65, pulse 89, temperature 97 6 °F (36 4 °C), resp  rate 21, height 6' (1 829 m), weight 75 3 kg (166 lb 0 1 oz), SpO2 96 %  ,Body mass index is 22 51 kg/m²  Wt Readings from Last 3 Encounters:   09/21/20 75 3 kg (166 lb 0 1 oz)   09/18/20 77 1 kg (170 lb)   09/08/20 76 9 kg (169 lb 8 5 oz)       Intake/Output Summary (Last 24 hours) at 9/21/2020 1626  Last data filed at 9/21/2020 0400  Gross per 24 hour   Intake 50 ml   Output 1100 ml   Net -1050 ml       Physical Exam: /65   Pulse 89   Temp 97 6 °F (36 4 °C)   Resp 21   Ht 6' (1 829 m)   Wt 75 3 kg (166 lb 0 1 oz)   SpO2 96%   BMI 22 51 kg/m²   General appearance: alert and oriented, in no acute distress  Lungs: crackles and decreased breath sounds at right lower lobe and left lung base    Heart: regular rate and rhythm, S1, S2 normal, no murmur, click, rub or gallop  Abdomen: soft, non-tender; bowel sounds normal; no masses,  no organomegalyExt: no pitting edema     Recent Results (from the past 24 hour(s))   Fingerstick Glucose (POCT)    Collection Time: 09/20/20  8:59 PM   Result Value Ref Range    POC Glucose 81 65 - 140 mg/dl Fingerstick Glucose (POCT)    Collection Time: 09/20/20 11:58 PM   Result Value Ref Range    POC Glucose 74 65 - 140 mg/dl   Procalcitonin Reflex    Collection Time: 09/21/20  4:42 AM   Result Value Ref Range    Procalcitonin 6 11 (H) <=0 25 ng/ml   CBC and differential    Collection Time: 09/21/20  4:42 AM   Result Value Ref Range    WBC 10 42 (H) 4 31 - 10 16 Thousand/uL    RBC 3 13 (L) 3 88 - 5 62 Million/uL    Hemoglobin 9 5 (L) 12 0 - 17 0 g/dL    Hematocrit 32 6 (L) 36 5 - 49 3 %     (H) 82 - 98 fL    MCH 30 4 26 8 - 34 3 pg    MCHC 29 1 (L) 31 4 - 37 4 g/dL    RDW 18 1 (H) 11 6 - 15 1 %    MPV 11 7 8 9 - 12 7 fL    Platelets 089 787 - 024 Thousands/uL    nRBC 0 /100 WBCs    Neutrophils Relative 68 43 - 75 %    Immat GRANS % 1 0 - 2 %    Lymphocytes Relative 19 14 - 44 %    Monocytes Relative 8 4 - 12 %    Eosinophils Relative 3 0 - 6 %    Basophils Relative 1 0 - 1 %    Neutrophils Absolute 7 12 1 85 - 7 62 Thousands/µL    Immature Grans Absolute 0 07 0 00 - 0 20 Thousand/uL    Lymphocytes Absolute 2 00 0 60 - 4 47 Thousands/µL    Monocytes Absolute 0 83 0 17 - 1 22 Thousand/µL    Eosinophils Absolute 0 33 0 00 - 0 61 Thousand/µL    Basophils Absolute 0 07 0 00 - 0 10 Thousands/µL   Magnesium    Collection Time: 09/21/20  4:42 AM   Result Value Ref Range    Magnesium 1 7 1 6 - 2 6 mg/dL   Phosphorus    Collection Time: 09/21/20  4:42 AM   Result Value Ref Range    Phosphorus 3 0 2 3 - 4 1 mg/dL   Basic metabolic panel    Collection Time: 09/21/20  4:42 AM   Result Value Ref Range    Sodium 133 (L) 136 - 145 mmol/L    Potassium 3 8 3 5 - 5 3 mmol/L    Chloride 101 100 - 108 mmol/L    CO2 21 21 - 32 mmol/L    ANION GAP 11 4 - 13 mmol/L    BUN 6 5 - 25 mg/dL    Creatinine 0 84 0 60 - 1 30 mg/dL    Glucose 71 65 - 140 mg/dL    Calcium 8 0 (L) 8 3 - 10 1 mg/dL    eGFR 91 ml/min/1 73sq m   Fingerstick Glucose (POCT)    Collection Time: 09/21/20  6:17 AM   Result Value Ref Range    POC Glucose 73 65 - 140 mg/dl   Fingerstick Glucose (POCT)    Collection Time: 09/21/20  7:17 AM   Result Value Ref Range    POC Glucose 74 65 - 140 mg/dl   Fingerstick Glucose (POCT)    Collection Time: 09/21/20 11:30 AM   Result Value Ref Range    POC Glucose 69 65 - 140 mg/dl   Fingerstick Glucose (POCT)    Collection Time: 09/21/20  4:00 PM   Result Value Ref Range    POC Glucose 72 65 - 140 mg/dl       Current Facility-Administered Medications   Medication Dose Route Frequency Provider Last Rate Last Dose    acetaminophen (TYLENOL) tablet 650 mg  650 mg Oral Q6H PRN Bhargav Brand, PA-C        albuterol inhalation solution 2 5 mg  2 5 mg Nebulization Q4H PRN Bhargav Brand, PA-C   2 5 mg at 09/19/20 2303    amiodarone tablet 200 mg  200 mg Oral Daily With Breakfast Bhargav Brand, PA-C   200 mg at 09/21/20 1323    apixaban (ELIQUIS) tablet 5 mg  5 mg Oral BID Bhargavpalmer Brand PA-C   5 mg at 09/20/20 1805    atorvastatin (LIPITOR) tablet 40 mg  40 mg Oral Daily With Cortez's PA-C   40 mg at 09/20/20 1531    cefepime (MAXIPIME) 2 g/50 mL dextrose IVPB  2,000 mg Intravenous Q12H Bhargav Brand, PA-C 100 mL/hr at 09/21/20 0425 2,000 mg at 09/21/20 0425    dextrose 50 % IV solution 25 mL  25 mL Intravenous Q6H PRN Bhargav Brand, PA-C   25 mL at 09/19/20 0725    metoclopramide (REGLAN) tablet 5 mg  5 mg Oral TID [facility] Bhargavpalmer Brand, PA-C   5 mg at 09/21/20 1323    metroNIDAZOLE (FLAGYL) IVPB (premix) 500 mg 100 mL  500 mg Intravenous Q8H Bhargav Brand, PA-C 200 mL/hr at 09/21/20 0852 500 mg at 09/21/20 0852    pantoprazole (PROTONIX) EC tablet 40 mg  40 mg Oral Daily Before Breakfast Bhargav Brand PA-C   40 mg at 09/21/20 1323    saccharomyces boulardii (FLORASTOR) capsule 250 mg  250 mg Oral BID Tanya Slaughter MD   250 mg at 09/20/20 2157    senna-docusate sodium (SENOKOT S) 8 6-50 mg per tablet 1 tablet  1 tablet Oral HS Bhargav Brand PA-C   1 tablet at 09/20/20 2157    tamsulosin (FLOMAX) capsule 0 4 mg  0 4 mg Oral Daily With Platiza Madelyn Chaidez PA-C   0 4 mg at 09/20/20 1530    vancomycin (VANCOCIN) 1,500 mg in sodium chloride 0 9 % 250 mL IVPB  17 5 mg/kg Intravenous Q12H Tildon Kaylynn PA-C 166 7 mL/hr at 09/21/20 1239 1,500 mg at 09/21/20 1239    venlafaxine (EFFEXOR-XR) 24 hr capsule 150 mg  150 mg Oral Daily YESENIA Higginbotham-C   150 mg at 09/20/20 0850       Invasive Devices     Central Venous Catheter Line            Port A Cath Right Chest -- days    Port A Cath 07/31/19 Right Chest 418 days          Peripheral Intravenous Line            Peripheral IV 09/18/20 Right Antecubital 3 days    Peripheral IV 09/18/20 Dorsal (posterior); Left Forearm 2 days    Peripheral IV 09/18/20 Dorsal (posterior); Left Hand 2 days                Lab, Imaging and other studies: I have personally reviewed pertinent reports      VTE Pharmacologic Prophylaxis: Eliquis  VTE Mechanical Prophylaxis: sequential compression device    Fabian Baez MD

## 2020-09-21 NOTE — PLAN OF CARE
Pt  Will tolerate puree or least restrictive diet with honey thick liquids without s/s of aspiration over 3-4 sessions or as indicted by treating SLP  Pt  Will trial advanced materials with SLP for possible diet upgrade  SLP to determine if VBS with speech is indicated

## 2020-09-21 NOTE — CASE MANAGEMENT
Radha confirmed for [facility] services upon discharge  AVS updated

## 2020-09-21 NOTE — PLAN OF CARE
Problem: Potential for Falls  Goal: Patient will remain free of falls  Description: INTERVENTIONS:  - Assess patient frequently for physical needs  -  Identify cognitive and physical deficits and behaviors that affect risk of falls    -  [place] fall precautions as indicated by assessment   - Educate patient/family on patient safety including physical limitations  - Instruct patient to call for assistance with activity based on assessment  - Modify environment to reduce risk of injury  - Consider OT/PT consult to assist with strengthening/mobility  Outcome: Progressing     Problem: RESPIRATORY - ADULT  Goal: Achieves optimal ventilation and oxygenation  Description: INTERVENTIONS:  - Assess for changes in respiratory status  - Assess for changes in mentation and behavior  - Position to facilitate oxygenation and minimize respiratory effort  - Oxygen administered by appropriate delivery if ordered  - Initiate smoking cessation education as indicated  - Encourage broncho-pulmonary hygiene including cough, deep breathe, Incentive Spirometry  - Assess the need for suctioning and aspirate as needed  - Assess and instruct to report SOB or any respiratory difficulty  - Respiratory Therapy support as indicated  - Vapotherm  Outcome: Progressing     Problem: Prexisting or High Potential for Compromised Skin Integrity  Goal: Skin integrity is maintained or improved  Description: INTERVENTIONS:  - Identify patients at risk for skin breakdown  - Assess and monitor skin integrity  - Assess and monitor nutrition and hydration status  - Monitor labs   - Assess for incontinence   - Turn and reposition patient  - Assist with mobility/ambulation  - Relieve pressure over bony prominences  - Avoid friction and shearing  - Provide appropriate hygiene as needed including keeping skin clean and dry  - Evaluate need for skin moisturizer/barrier cream  - Collaborate with interdisciplinary team   - Patient/family teaching  - Consider wound care consult   Outcome: Progressing     Problem: Nutrition/Hydration-ADULT  Goal: Nutrient/Hydration intake appropriate for improving, restoring or maintaining nutritional needs  Description: Monitor and assess patient's nutrition/hydration status for malnutrition  Collaborate with interdisciplinary team and initiate plan and interventions as ordered  Monitor patient's weight and dietary intake as ordered or per policy  Utilize nutrition screening tool and intervene as necessary  Determine patient's food preferences and provide high-protein, high-caloric foods as appropriate       INTERVENTIONS:  - Monitor oral intake, urinary output, labs, and treatment plans  - Assess nutrition and hydration status and recommend course of action  - Evaluate amount of meals eaten  - Assist patient with eating if necessary   - Allow adequate time for meals  - Recommend/ encourage appropriate diets, oral nutritional supplements, and vitamin/mineral supplements  - Order, calculate, and assess calorie counts as needed  - Recommend, monitor, and adjust tube feedings and TPN/PPN based on assessed needs  - Assess need for intravenous fluids  - Provide specific nutrition/hydration education as appropriate  - Include patient/family/caregiver in decisions related to nutrition  Outcome: Progressing

## 2020-09-21 NOTE — OCCUPATIONAL THERAPY NOTE
OT EVALUATION       09/21/20 0928   OT Last Visit   OT Visit Date 09/21/20   Note Type   Note type Eval only   Restrictions/Precautions   Other Precautions Fall Risk;O2   Pain Assessment   Pain Assessment Tool Pain Assessment not indicated - pt denies pain   Home Living   Type of [address] One level  (4 ALEJANDRO )   Bathroom Shower/Tub Tub/shower unit   Bathroom Equipment Shower chair   Home Equipment Cane;Walker   Prior Function   Level of [place] Needs assistance with ADLs and functional mobility  (uses RW, assist with ADLS )   Lives With Spouse   Receives Help From Family   ADL Assistance Needs assistance   IADLs Needs assistance   ADL   Eating Assistance 7  Independent   Grooming Assistance 5  Supervision/Setup   UB Bathing Assistance 5  Supervision/Setup   LB Bathing Assistance 4  Minimal Assistance   UB Dressing Assistance 5  Supervision/Setup   LB Dressing Assistance 4  Minimal Assistance   Toileting Assistance  4  Minimal Assistance   Bed Mobility   Supine to Sit 4  Minimal assistance   Transfers   Sit to Stand 4  Minimal assistance   Stand to Sit 4  Minimal assistance   Stand pivot 4  Minimal assistance   Functional Mobility   Functional Mobility 4  Minimal assistance   Additional Comments 15 feet   Additional items Rolling walker   Balance   Static Sitting Fair +   Dynamic Sitting Fair +   Static Standing Fair   Dynamic Standing Fair -   Activity Tolerance   Activity Tolerance Patient limited by fatigue   Nurse Made Aware yes   RUE Assessment   RUE Assessment WFL   LUE Assessment   LUE Assessment WFL   Cognition   Overall Cognitive Status WFL   Arousal/Participation Cooperative   Attention Within functional limits   Orientation Level Oriented X4   Following Commands Follows one step commands without difficulty   Assessment   Limitation Decreased ADL status; Decreased UE strength;Decreased Safe judgement during ADL;Decreased endurance;Decreased self-care trans;Decreased high-level ADLs  (decreased balance and mobility )   Prognosis Good   Assessment Patient evaluated by Occupational Therapy  Patient admitted with Pneumonia  The patients occupational profile, medical and therapy history includes a extensive additional review of physical, cognitive, or psychosocial history related to current functional performance  Comorbidities affecting functional mobility and ADLS include: hypertension and cancer  Prior to admission, patient was independent with functional mobility with walker, requiring assist for ADLS and requiring assist for IADLS  The evaluation identifies the following performance deficits: weakness, impaired balance, decreased endurance, increased fall risk, new onset of impairment of functional mobility, decreased ADLS, decreased IADLS, decreased activity tolerance, decreased safety awareness, impaired judgement and decreased strength, that result in activity limitations and/or participation restrictions  This evaluation requires clinical decision making of high complexity, because the patient presents with comorbidites that affect occupational performance and required significant modification of tasks or assistance with consideration of multiple treatment options  The Barthel Index was used as a functional outcome tool presenting with a score of 55, indicating marked limitations of functional mobility and ADLS  Patient will benefit from skilled Occupational Therapy services to address above deficits and facilitate a safe return to prior level of function  Goals   Patient Goals go home   STG Time Frame   (1-7 days)   Short Term Goal  Goals established to promote patient goal of going home:  Patient will increase standing tolerance to 3 minutes during ADL task to decrease assistance level and decrease fall risk; Patient will increase bed mobility to supervision in preparation for ADLS and transfers;  Patient will increase functional mobility to and from bathroom with rolling walker with supervision to increase performance with ADLS and to use a toilet; Patient will tolerate 10 minutes of UE ROM/strengthening to increase general activity tolerance and performance in ADLS/IADLS; Patient will improve functional activity tolerance to 10 minutes of sustained functional tasks to increase participation in basic self-care and decrease assistance level;  Patient will be able to to verbalize understanding and perform energy conservation/proper body mechanics during ADLS and functional mobility at least 75% of the time with minimal cueing to decrease signs of fatigue and increase stamina to return to prior level of function; Patient will increase dynamic sitting balance to fair+ to improve the ability to sit at edge of bed or on a chair for ADLS;  Patient will increase dynamic standing balance to fair to improve postural stability and decrease fall risk during standing ADLS and transfers  LTG Time Frame   (8-14 days)   Long Term Goal Goals established to promote patient goal of going home:  Patient will increase standing tolerance to 6 minutes during ADL task to decrease assistance level and decrease fall risk; Patient will increase bed mobility to independent in preparation for ADLS and transfers;  Patient will increase functional mobility to and from bathroom with rolling walker independently to increase performance with ADLS and to use a toilet; Patient will tolerate 20 minutes of UE ROM/strengthening to increase general activity tolerance and performance in ADLS/IADLS; Patient will improve functional activity tolerance to 20 minutes of sustained functional tasks to increase participation in basic self-care and decrease assistance level;  Patient will be able to to verbalize understanding and perform energy conservation/proper body mechanics during ADLS and functional mobility at least 90% of the time without cueing to decrease signs of fatigue and increase stamina to return to prior level of function; Patient will increase dynamic sitting balance to good to improve the ability to sit at edge of bed or on a chair for ADLS;  Patient will increase dynamic standing balance to fair+ to improve postural stability and decrease fall risk during standing ADLS and transfers  Functional Transfer Goals   Pt Will Perform All Functional Transfers   (STG supervision LTG Independent )   ADL Goals   Pt Will Perform Grooming Standing at sink  (STG supervision LTG Independent )   Pt Will Perform Bathing   (STG supervision LTG Independent )   Pt Will Perform UE Dressing   (STG supervision LTG independent )   Pt Will Perform LE Dressing   (STG supervision LTG Independent )   Pt Will Perform Toileting   (STG supervision LTG Independent )   Plan   Treatment Interventions ADL retraining;Functional transfer training;UE strengthening/ROM; Endurance training;Patient/family training;Equipment evaluation/education; Activityengagement; Compensatory technique education   Goal Expiration Date 10/05/20   OT Frequency 3-5x/wk   Recommendation   OT Discharge Recommendation Home with skilled therapy  (home OT)   Barthel Index   Feeding 10   Bathing 0   Grooming Score 0   Dressing Score 5   Bladder Score 10   Bowels Score 10   Toilet Use Score 5   Transfers (Bed/Chair) Score 10   Mobility (Level Surface) Score 0   Stairs Score 5   Barthel Index Score 54   Licensure   [place] License Number  Gabby Haley Tony 85 OTR/L [id number]

## 2020-09-21 NOTE — PLAN OF CARE
Problem: RESPIRATORY - ADULT  Goal: Achieves optimal ventilation and oxygenation  Description: INTERVENTIONS:  - Assess for changes in respiratory status  - Assess for changes in mentation and behavior  - Position to facilitate oxygenation and minimize respiratory effort  - Oxygen administered by appropriate delivery if ordered  - Initiate smoking cessation education as indicated  - Encourage broncho-pulmonary hygiene including cough, deep breathe, Incentive Spirometry  - Assess the need for suctioning and aspirate as needed  - Assess and instruct to report SOB or any respiratory difficulty  - Respiratory Therapy support as indicated  - Vapotherm  Outcome: Progressing

## 2020-09-22 LAB
ANION GAP SERPL CALCULATED.3IONS-SCNC: 8 MMOL/L (ref 4–13)
BACTERIA SPT RESP CULT: ABNORMAL
BACTERIA SPT RESP CULT: ABNORMAL
BASOPHILS # BLD AUTO: 0.07 THOUSANDS/ΜL (ref 0–0.1)
BASOPHILS NFR BLD AUTO: 1 % (ref 0–1)
BUN SERPL-MCNC: 7 MG/DL (ref 5–25)
CALCIUM SERPL-MCNC: 7.6 MG/DL (ref 8.3–10.1)
CHLORIDE SERPL-SCNC: 102 MMOL/L (ref 100–108)
CO2 SERPL-SCNC: 24 MMOL/L (ref 21–32)
CREAT SERPL-MCNC: 0.85 MG/DL (ref 0.6–1.3)
EOSINOPHIL # BLD AUTO: 0.32 THOUSAND/ΜL (ref 0–0.61)
EOSINOPHIL NFR BLD AUTO: 3 % (ref 0–6)
ERYTHROCYTE [DISTWIDTH] IN BLOOD BY AUTOMATED COUNT: 18.3 % (ref 11.6–15.1)
GFR SERPL CREATININE-BSD FRML MDRD: 90 ML/MIN/1.73SQ M
GLUCOSE SERPL-MCNC: 102 MG/DL (ref 65–140)
GLUCOSE SERPL-MCNC: 113 MG/DL (ref 65–140)
GLUCOSE SERPL-MCNC: 120 MG/DL (ref 65–140)
GLUCOSE SERPL-MCNC: 90 MG/DL (ref 65–140)
GLUCOSE SERPL-MCNC: 96 MG/DL (ref 65–140)
GRAM STN SPEC: ABNORMAL
HCT VFR BLD AUTO: 28 % (ref 36.5–49.3)
HGB BLD-MCNC: 8.5 G/DL (ref 12–17)
IMM GRANULOCYTES # BLD AUTO: 0.11 THOUSAND/UL (ref 0–0.2)
IMM GRANULOCYTES NFR BLD AUTO: 1 % (ref 0–2)
LYMPHOCYTES # BLD AUTO: 1.68 THOUSANDS/ΜL (ref 0.6–4.47)
LYMPHOCYTES NFR BLD AUTO: 18 % (ref 14–44)
MAGNESIUM SERPL-MCNC: 1.4 MG/DL (ref 1.6–2.6)
MCH RBC QN AUTO: 29.9 PG (ref 26.8–34.3)
MCHC RBC AUTO-ENTMCNC: 30.4 G/DL (ref 31.4–37.4)
MCV RBC AUTO: 99 FL (ref 82–98)
MONOCYTES # BLD AUTO: 0.93 THOUSAND/ΜL (ref 0.17–1.22)
MONOCYTES NFR BLD AUTO: 10 % (ref 4–12)
NEUTROPHILS # BLD AUTO: 6.21 THOUSANDS/ΜL (ref 1.85–7.62)
NEUTS SEG NFR BLD AUTO: 67 % (ref 43–75)
NRBC BLD AUTO-RTO: 0 /100 WBCS
PHOSPHATE SERPL-MCNC: 2.9 MG/DL (ref 2.3–4.1)
PLATELET # BLD AUTO: 238 THOUSANDS/UL (ref 149–390)
PMV BLD AUTO: 10.7 FL (ref 8.9–12.7)
POTASSIUM SERPL-SCNC: 3.4 MMOL/L (ref 3.5–5.3)
RBC # BLD AUTO: 2.84 MILLION/UL (ref 3.88–5.62)
SODIUM SERPL-SCNC: 134 MMOL/L (ref 136–145)
WBC # BLD AUTO: 9.32 THOUSAND/UL (ref 4.31–10.16)

## 2020-09-22 PROCEDURE — 83735 ASSAY OF MAGNESIUM: CPT | Performed by: STUDENT IN AN ORGANIZED HEALTH CARE EDUCATION/TRAINING PROGRAM

## 2020-09-22 PROCEDURE — 94664 DEMO&/EVAL PT USE INHALER: CPT

## 2020-09-22 PROCEDURE — 94760 N-INVAS EAR/PLS OXIMETRY 1: CPT

## 2020-09-22 PROCEDURE — 99232 SBSQ HOSP IP/OBS MODERATE 35: CPT | Performed by: FAMILY MEDICINE

## 2020-09-22 PROCEDURE — 80048 BASIC METABOLIC PNL TOTAL CA: CPT | Performed by: STUDENT IN AN ORGANIZED HEALTH CARE EDUCATION/TRAINING PROGRAM

## 2020-09-22 PROCEDURE — 85025 COMPLETE CBC W/AUTO DIFF WBC: CPT | Performed by: STUDENT IN AN ORGANIZED HEALTH CARE EDUCATION/TRAINING PROGRAM

## 2020-09-22 PROCEDURE — 84100 ASSAY OF PHOSPHORUS: CPT | Performed by: STUDENT IN AN ORGANIZED HEALTH CARE EDUCATION/TRAINING PROGRAM

## 2020-09-22 PROCEDURE — 82948 REAGENT STRIP/BLOOD GLUCOSE: CPT

## 2020-09-22 PROCEDURE — 94668 MNPJ CHEST WALL SBSQ: CPT

## 2020-09-22 PROCEDURE — 94669 MECHANICAL CHEST WALL OSCILL: CPT

## 2020-09-22 PROCEDURE — 92526 ORAL FUNCTION THERAPY: CPT

## 2020-09-22 RX ORDER — MAGNESIUM SULFATE HEPTAHYDRATE 40 MG/ML
2 INJECTION, SOLUTION INTRAVENOUS ONCE
Status: COMPLETED | OUTPATIENT
Start: 2020-09-22 | End: 2020-09-22

## 2020-09-22 RX ORDER — POTASSIUM CHLORIDE 20 MEQ/1
40 TABLET, EXTENDED RELEASE ORAL ONCE
Status: COMPLETED | OUTPATIENT
Start: 2020-09-22 | End: 2020-09-22

## 2020-09-22 RX ADMIN — VANCOMYCIN HYDROCHLORIDE 1500 MG: 10 INJECTION, POWDER, LYOPHILIZED, FOR SOLUTION INTRAVENOUS at 11:47

## 2020-09-22 RX ADMIN — POTASSIUM CHLORIDE 40 MEQ: 1500 TABLET, EXTENDED RELEASE ORAL at 20:42

## 2020-09-22 RX ADMIN — VANCOMYCIN HYDROCHLORIDE 1500 MG: 10 INJECTION, POWDER, LYOPHILIZED, FOR SOLUTION INTRAVENOUS at 22:33

## 2020-09-22 RX ADMIN — Medication 250 MG: at 22:33

## 2020-09-22 RX ADMIN — METRONIDAZOLE 500 MG: 500 INJECTION, SOLUTION INTRAVENOUS at 16:51

## 2020-09-22 RX ADMIN — DOCUSATE SODIUM AND SENNOSIDES 1 TABLET: 8.6; 5 TABLET, FILM COATED ORAL at 22:33

## 2020-09-22 RX ADMIN — AMIODARONE HYDROCHLORIDE 200 MG: 200 TABLET ORAL at 08:44

## 2020-09-22 RX ADMIN — PANTOPRAZOLE SODIUM 40 MG: 40 TABLET, DELAYED RELEASE ORAL at 08:45

## 2020-09-22 RX ADMIN — APIXABAN 5 MG: 5 TABLET, FILM COATED ORAL at 17:00

## 2020-09-22 RX ADMIN — METOCLOPRAMIDE 5 MG: 10 TABLET ORAL at 16:50

## 2020-09-22 RX ADMIN — METRONIDAZOLE 500 MG: 500 INJECTION, SOLUTION INTRAVENOUS at 01:41

## 2020-09-22 RX ADMIN — METOCLOPRAMIDE 5 MG: 10 TABLET ORAL at 08:44

## 2020-09-22 RX ADMIN — MAGNESIUM SULFATE HEPTAHYDRATE 2 G: 40 INJECTION, SOLUTION INTRAVENOUS at 11:33

## 2020-09-22 RX ADMIN — METOCLOPRAMIDE 5 MG: 10 TABLET ORAL at 11:46

## 2020-09-22 RX ADMIN — ATORVASTATIN CALCIUM 40 MG: 40 TABLET, FILM COATED ORAL at 16:50

## 2020-09-22 RX ADMIN — ACETAMINOPHEN 650 MG: 325 TABLET, FILM COATED ORAL at 18:48

## 2020-09-22 RX ADMIN — CEFEPIME HYDROCHLORIDE 2000 MG: 2 INJECTION, POWDER, FOR SOLUTION INTRAVENOUS at 04:44

## 2020-09-22 RX ADMIN — Medication 250 MG: at 08:43

## 2020-09-22 RX ADMIN — APIXABAN 5 MG: 5 TABLET, FILM COATED ORAL at 08:44

## 2020-09-22 RX ADMIN — CEFEPIME HYDROCHLORIDE 2000 MG: 2 INJECTION, POWDER, FOR SOLUTION INTRAVENOUS at 16:49

## 2020-09-22 RX ADMIN — VENLAFAXINE HYDROCHLORIDE 150 MG: 150 CAPSULE, EXTENDED RELEASE ORAL at 11:34

## 2020-09-22 RX ADMIN — TAMSULOSIN HYDROCHLORIDE 0.4 MG: 0.4 CAPSULE ORAL at 16:52

## 2020-09-22 RX ADMIN — METRONIDAZOLE 500 MG: 500 INJECTION, SOLUTION INTRAVENOUS at 08:44

## 2020-09-22 NOTE — PLAN OF CARE
Problem: Potential for Falls  Goal: Patient will remain free of falls  Description: INTERVENTIONS:  - Assess patient frequently for physical needs  -  Identify cognitive and physical deficits and behaviors that affect risk of falls    -  [place] fall precautions as indicated by assessment   - Educate patient/family on patient safety including physical limitations  - Instruct patient to call for assistance with activity based on assessment  - Modify environment to reduce risk of injury  - Consider OT/PT consult to assist with strengthening/mobility  Outcome: Progressing     Problem: RESPIRATORY - ADULT  Goal: Achieves optimal ventilation and oxygenation  Description: INTERVENTIONS:  - Assess for changes in respiratory status  - Assess for changes in mentation and behavior  - Position to facilitate oxygenation and minimize respiratory effort  - Oxygen administered by appropriate delivery if ordered  - Initiate smoking cessation education as indicated  - Encourage broncho-pulmonary hygiene including cough, deep breathe, Incentive Spirometry  - Assess the need for suctioning and aspirate as needed  - Assess and instruct to report SOB or any respiratory difficulty  - Respiratory Therapy support as indicated  - Vapotherm  Outcome: Progressing     Problem: Prexisting or High Potential for Compromised Skin Integrity  Goal: Skin integrity is maintained or improved  Description: INTERVENTIONS:  - Identify patients at risk for skin breakdown  - Assess and monitor skin integrity  - Assess and monitor nutrition and hydration status  - Monitor labs   - Assess for incontinence   - Turn and reposition patient  - Assist with mobility/ambulation  - Relieve pressure over bony prominences  - Avoid friction and shearing  - Provide appropriate hygiene as needed including keeping skin clean and dry  - Evaluate need for skin moisturizer/barrier cream  - Collaborate with interdisciplinary team   - Patient/family teaching  - Consider wound care consult   Outcome: Progressing     Problem: Nutrition/Hydration-ADULT  Goal: Nutrient/Hydration intake appropriate for improving, restoring or maintaining nutritional needs  Description: Monitor and assess patient's nutrition/hydration status for malnutrition  Collaborate with interdisciplinary team and initiate plan and interventions as ordered  Monitor patient's weight and dietary intake as ordered or per policy  Utilize nutrition screening tool and intervene as necessary  Determine patient's food preferences and provide high-protein, high-caloric foods as appropriate       INTERVENTIONS:  - Monitor oral intake, urinary output, labs, and treatment plans  - Assess nutrition and hydration status and recommend course of action  - Evaluate amount of meals eaten  - Assist patient with eating if necessary   - Allow adequate time for meals  - Recommend/ encourage appropriate diets, oral nutritional supplements, and vitamin/mineral supplements  - Order, calculate, and assess calorie counts as needed  - Assess need for intravenous fluids  - Provide specific nutrition/hydration education as appropriate  - Include patient/family/caregiver in decisions related to nutrition  Outcome: Progressing

## 2020-09-22 NOTE — PROGRESS NOTES
[address] [facility] 63 [address] 80 [facility] Practice Progress Note - Yahaira Garcia 77 y o  male MRN: [id number]    Unit/Bed#: [address] [place] [address] Encounter: [id number]      Assessment/Plan:  * Aspiration pneumonia [facility])  Assessment & Plan  Patient with recent hospitalization 2/2 left lobe pneumonia and septic shock  Had recurrent SOB and episode of hypotension and hypoxia on 9/18 and was refered to ED by primary care physician after office visit  On ED placed on high flow supplemental O2 due to hypoxia  Chest Xray shows new right middle lung pneumonia with improved aspect of left consolidation where prior pneumonia had been  Currently on Vanc + flagyl + cefepime  - since patient is most likely HCAP will need 6-7 days of IV abx  - monitor vitals  - sputum culture preliminary results grew mixed respiratory bobby and gram stain showed Gram-positive cocci  - No fever in past 24hr continue to monitor for improvement clinically  - nasal cannula being continued 6 L  - trend sodium  - Vanco trough 16 8    Other dysphagia  Assessment & Plan  Hx of dysphagia 2/2 esophageal cancer; may have potentially resulted in aspiration pneumonia  - As per speech therapist:   Agree with esophagram and VBS for further analysis: To be performed 9/20  Recommended Diet: consider clear liquid diet until esophagram and Video Swallow study   Recommended Form of Meds: IVs or as per MD  Precautions and swallowing strategies:   1  upright posture, maintain full upright position during and for a minof 1 hour after po intake and 30 degree minimum at all times   2   slow rate of feeding, small bites/sips   - moderate-severe oropharyngeal dysphagia characterized by silent aspiration on nectar thick liquids, thin liquids, ambient fluid from mixed consistencies  Recommended is puree/level 1 diet and honey thick liquids and aspiration precautions      Chronic combined systolic and diastolic CHF (congestive heart failure) (HCC)  Assessment & Plan  Wt Readings from Last 3 Encounters: 09/21/20 75 3 kg (166 lb 0 1 oz)   09/18/20 77 1 kg (170 lb)   09/08/20 76 9 kg (169 lb 8 5 oz)     Echo 9/2/2020:     EF 35-40%  · There was moderate diffuse hypokinesis  · Features were consistent with a pseudonormal left ventricular filling pattern, with concomitant abnormal relaxation and increased filling pressure (grade 2 diastolic dysfunction)  Patient arrived with elevated BNP of 5950  - monitor daily fluid status with I/O and daily weights, diurese as needed   - avoid excessive IV hydration  PAF (paroxysmal atrial fibrillation) ([facility] )  Assessment & Plan  Has a hx of New onset paroxysmal Afib suspected to be 2/2 to PNA  Cardioconverted overnight on 9/2 with amiodarone  Currently on amiodarone po     continue taking Amiodarone 200 mg PO BID and Eliquis 5 mg PO BID     Gastroesophageal cancer [facility])  Assessment & Plan  Patient has Active gastroesophageal carcinoma with peritoneal metastasis being treated with chemotherapy (Last chemo 8/20)  Major depression  Assessment & Plan  Stable  Continue taking Effexor- mg PO daily     Acute respiratory failure with hypoxemia (HCC)resolved as of 9/19/2020  Assessment & Plan  9/18/20 episode of hypotension and hypoxia and was refered to ED by primary care physician after office visit  On ED placed on high flow supplemental O2 due to hypoxia  Chest Xray shows Right greater than left pneumonia     -Admit to critical care unit (9/18-)  -DNR/DNI  -high flow nasal cannula initially, required 20-25 LPM of O2 on vapotherm eventually   -following day, patient was successfully weaned down to 3 L NC    JENNIFER (acute kidney injury) (HCC)resolved as of 9/19/2020  Assessment & Plan  Patient with JENNIFER, creatinine on admission 1 66  Last Cr level was 0 95 on 9/8/20  Probably prerenal in nature 2/2 dehydration vs septic shock  - patient rehydrated with IV NS bolus and isolyte 30cc/kg initial resusitation   Further IVF hydration held for now due to his poor CHF -follow BMP    Septic Shock (HCC)resolved as of 9/20/2020  Assessment & Plan  Patient presenting with septic shock 2/2 to new onset right lung pneumonia  hypotension present on admission, Lactic acid 4 7, repeat 2 1  B/P stable with IVF management       - IVF resuscitated at 3 5 L   - stress dose steroids x1  - No additional pressure support required after IV fluid resuscitation    - Continue empiric Antibiotic treatment of vanco + cefepime + flagyl (9/18-)          Subjective:   Patient is seen at bedside  Patient refers not feeling well  Denies shortness of breath at rest, orthopnea, chest pain, abdominal pain, vomiting  Defer he has had some nausea  Also some MOHAN and mild dizziness when he is walking  He is on the recommended diet and reports that his dysphagia is better overall on the diet  Objective:     Vitals: Blood pressure 120/74, pulse 86, temperature (!) 97 3 °F (36 3 °C), resp  rate 19, height 6' (1 829 m), weight 75 3 kg (166 lb 0 1 oz), SpO2 98 %  ,Body mass index is 22 51 kg/m²    Wt Readings from Last 3 Encounters:   09/21/20 75 3 kg (166 lb 0 1 oz)   09/18/20 77 1 kg (170 lb)   09/08/20 76 9 kg (169 lb 8 5 oz)       Intake/Output Summary (Last 24 hours) at 9/22/2020 1532  Last data filed at 9/22/2020 0842  Gross per 24 hour   Intake    Output 500 ml   Net -500 ml       Physical Exam: /74   Pulse 86   Temp (!) 97 3 °F (36 3 °C)   Resp 19   Ht 6' (1 829 m)   Wt 75 3 kg (166 lb 0 1 oz)   SpO2 98%   BMI 22 51 kg/m²   General appearance: alert and oriented, in no acute distress  Lungs: rhonchi heard over left lung lower lobe, rhonchi heard over right lung base  Heart: regular rate and rhythm, S1, S2 normal, no murmur, click, rub or gallop  Abdomen: soft, non-tender; bowel sounds normal; no masses,  no organomegaly     Recent Results (from the past 24 hour(s))   Fingerstick Glucose (POCT)    Collection Time: 09/21/20  4:00 PM   Result Value Ref Range    POC Glucose 72 65 - 140 mg/dl   Vancomycin, trough Please call the pharmacy with the results and/or any questions      Collection Time: 09/21/20 10:18 PM   Result Value Ref Range    Vancomycin Tr 16 8 10 0 - 20 0 ug/mL   Fingerstick Glucose (POCT)    Collection Time: 09/22/20  1:48 AM   Result Value Ref Range    POC Glucose 102 65 - 140 mg/dl   Basic metabolic panel    Collection Time: 09/22/20  5:01 AM   Result Value Ref Range    Sodium 134 (L) 136 - 145 mmol/L    Potassium 3 4 (L) 3 5 - 5 3 mmol/L    Chloride 102 100 - 108 mmol/L    CO2 24 21 - 32 mmol/L    ANION GAP 8 4 - 13 mmol/L    BUN 7 5 - 25 mg/dL    Creatinine 0 85 0 60 - 1 30 mg/dL    Glucose 96 65 - 140 mg/dL    Calcium 7 6 (L) 8 3 - 10 1 mg/dL    eGFR 90 ml/min/1 73sq m   CBC and differential    Collection Time: 09/22/20  5:01 AM   Result Value Ref Range    WBC 9 32 4 31 - 10 16 Thousand/uL    RBC 2 84 (L) 3 88 - 5 62 Million/uL    Hemoglobin 8 5 (L) 12 0 - 17 0 g/dL    Hematocrit 28 0 (L) 36 5 - 49 3 %    MCV 99 (H) 82 - 98 fL    MCH 29 9 26 8 - 34 3 pg    MCHC 30 4 (L) 31 4 - 37 4 g/dL    RDW 18 3 (H) 11 6 - 15 1 %    MPV 10 7 8 9 - 12 7 fL    Platelets 106 476 - 446 Thousands/uL    nRBC 0 /100 WBCs    Neutrophils Relative 67 43 - 75 %    Immat GRANS % 1 0 - 2 %    Lymphocytes Relative 18 14 - 44 %    Monocytes Relative 10 4 - 12 %    Eosinophils Relative 3 0 - 6 %    Basophils Relative 1 0 - 1 %    Neutrophils Absolute 6 21 1 85 - 7 62 Thousands/µL    Immature Grans Absolute 0 11 0 00 - 0 20 Thousand/uL    Lymphocytes Absolute 1 68 0 60 - 4 47 Thousands/µL    Monocytes Absolute 0 93 0 17 - 1 22 Thousand/µL    Eosinophils Absolute 0 32 0 00 - 0 61 Thousand/µL    Basophils Absolute 0 07 0 00 - 0 10 Thousands/µL   Magnesium    Collection Time: 09/22/20  5:01 AM   Result Value Ref Range    Magnesium 1 4 (L) 1 6 - 2 6 mg/dL   Phosphorus    Collection Time: 09/22/20  5:01 AM   Result Value Ref Range    Phosphorus 2 9 2 3 - 4 1 mg/dL   Fingerstick Glucose (POCT)    Collection Time: 09/22/20  7:49 AM   Result Value Ref Range    POC Glucose 90 65 - 140 mg/dl   Fingerstick Glucose (POCT)    Collection Time: 09/22/20 11:25 AM   Result Value Ref Range    POC Glucose 113 65 - 140 mg/dl       Current Facility-Administered Medications   Medication Dose Route Frequency Provider Last Rate Last Dose    acetaminophen (TYLENOL) tablet 650 mg  650 mg Oral Q6H PRN Linette March, PA-C        albuterol inhalation solution 2 5 mg  2 5 mg Nebulization Q4H PRN Linette March, PA-C   2 5 mg at 09/19/20 2303    amiodarone tablet 200 mg  200 mg Oral Daily With [employer], PA-C   200 mg at 09/22/20 4402    apixaban (ELIQUIS) tablet 5 mg  5 mg Oral BID Linette March, PA-C   5 mg at 09/22/20 0844    atorvastatin (LIPITOR) tablet 40 mg  40 mg Oral Daily With Dana's, PA-C   40 mg at 09/21/20 1702    cefepime (MAXIPIME) 2 g/50 mL dextrose IVPB  2,000 mg Intravenous Q12H Linette March, PA-C   Stopped at 09/22/20 0515    dextrose 50 % IV solution 25 mL  25 mL Intravenous Q6H PRN Linette March, PA-C   25 mL at 09/19/20 0725    magnesium sulfate 2 g/50 mL IVPB (premix) 2 g  2 g Intravenous Once Arnel Raymond MD   2 g at 09/22/20 1133    metoclopramide (REGLAN) tablet 5 mg  5 mg Oral TID [facility] Linette March, PA-C   5 mg at 09/22/20 1146    metroNIDAZOLE (FLAGYL) IVPB (premix) 500 mg 100 mL  500 mg Intravenous Q8H Linette March, PA-C 200 mL/hr at 09/22/20 0844 500 mg at 09/22/20 0844    pantoprazole (PROTONIX) EC tablet 40 mg  40 mg Oral Daily Before Breakfast Linette March, PA-C   40 mg at 09/22/20 0845    saccharomyces boulardii (FLORASTOR) capsule 250 mg  250 mg Oral BID Berna Donovan MD   250 mg at 09/22/20 0843    senna-docusate sodium (SENOKOT S) 8 6-50 mg per tablet 1 tablet  1 tablet Oral HS Laurence Mendoza PA-C   1 tablet at 09/21/20 2219    tamsulosin (FLOMAX) capsule 0 4 mg  0 4 mg Oral Daily With Deysi Knight PA-C   0 4 mg at 09/21/20 1702    vancomycin (VANCOCIN) 1,500 mg in sodium chloride 0 9 % 250 mL IVPB  17 5 mg/kg Intravenous Q12H Shanna Verduzco PA-C 166 7 mL/hr at 09/22/20 1147 1,500 mg at 09/22/20 1147    venlafaxine (EFFEXOR-XR) 24 hr capsule 150 mg  150 mg Oral Daily Shanna Verduzco PA-C   150 mg at 09/22/20 1134       Invasive Devices     Central Venous Catheter Line            Port A Cath Right Chest -- days    Port A Cath 07/31/19 Right Chest 419 days          Peripheral Intravenous Line            Peripheral IV 09/18/20 Dorsal (posterior); Left Forearm 3 days    Peripheral IV 09/18/20 Dorsal (posterior); Left Hand 3 days    Peripheral IV 09/22/20 Dorsal (posterior); Right Forearm less than 1 day                Lab, Imaging and other studies: I have personally reviewed pertinent reports      VTE Pharmacologic Prophylaxis: Eliquis  VTE Mechanical Prophylaxis: sequential compression device    Randy Whatley MD

## 2020-09-22 NOTE — PROGRESS NOTES
Vancomycin IV Pharmacy-to-Dose Consultation    Salvador Sacks is a 77 y o  male who is currently receiving Vancomycin IV with management by the Pharmacy Consult service  Assessment/Plan:  The patient was reviewed  Renal function is stable and no signs or symptoms of nephrotoxicity and/or infusion reactions were documented in the chart  Based on todays assessment, continue current vancomycin (day # 4) dosing of 1500mg IV q12h, with a plan for trough to be drawn at 1000 on 9/28/20  We will continue to follow the patients culture results and clinical progress daily      Delfina Mercado, Pharmacist

## 2020-09-22 NOTE — PROGRESS NOTES
Vancomycin IV Pharmacy-to-Dose Consultation    Renetta Yanez is a 77 y o  male who is currently receiving Vancomycin IV with management by the Pharmacy Consult service  Assessment/Plan:  The patient was reviewed  Renal function is stable and no signs or symptoms of nephrotoxicity and/or infusion reactions were documented in the chart  Based on todays assessment, continue current vancomycin (day # 5) dosing of 1500 mg IV q12h, with a plan for trough to be drawn at 1000 on 09/28/2020  We will continue to follow the patients culture results and clinical progress daily      Tanner Coleman, Pharmacist

## 2020-09-22 NOTE — SPEECH THERAPY NOTE
Speech Language/Pathology    Speech/Language Pathology Progress Note    Patient Name: Nando Nevarez  BXGRA'X Date: 9/22/2020     Problem List  Principal Problem:    Aspiration [facility] ([facility] )  Active Problems:    Major depression    Anxiety    Gastroesophageal cancer ([facility] )    Severe protein-calorie malnutrition Erik Ovando: less than 60% of standard weight) ([facility] )    Other dysphagia    PAF (paroxysmal atrial fibrillation) ([facility] )    Chronic combined systolic and diastolic CHF (congestive heart failure) ([facility] )    Port-A-Cath in place    Hematuria    Hyponatremia    Hypoglycemia    Severe protein-calorie malnutrition ([facility] )       Past Medical History  Past Medical History:   Diagnosis Date    Dehydration 8/8/2019    Esophageal cancer ([place] [address] )     Hypertension     Nausea 8/8/2019    Psychiatric disorder     Recovering alcoholic ([place] [address] )         Past Surgical History  Past Surgical History:   Procedure Laterality Date    APPENDECTOMY      IR PORT PLACEMENT  7/31/2019         Subjective:  "I'm not really feeling any better"  Pt was laying in bed watching TV  Had lunch meal tray at his side  Objective:  Pt  Was seen for dysphagia treatment for follow up on diet tolerance  He at 1/4 of his pureed lunch meal  Stated it was "too much food"  He had a collection of unopened drinks and pureed fruits at his side  Also noted an empty seltzer can and thin water  When asked where he got the seltzer he stated that his girlfriend brought it in for him  Also noted straws at his bedside  Assessment:  Reminded and reeducated pt, on current diet recommendation which include honey thick liquids and no straws  Reminded him of yesterday's VBS results of silent aspiration and he verbally agreed to stay with no straws and no thin liquids       Plan/Recommendations:  Continue with diet recommendations,  Speech to follow up     Andreina Leal  Speech Language Pathologist  Available via [facility] License # [id number]  [address] [place] [address] License # [facility]- [id number]

## 2020-09-23 LAB
ANION GAP SERPL CALCULATED.3IONS-SCNC: 10 MMOL/L (ref 4–13)
BACTERIA BLD CULT: NORMAL
BACTERIA BLD CULT: NORMAL
BASOPHILS # BLD AUTO: 0.08 THOUSANDS/ΜL (ref 0–0.1)
BASOPHILS NFR BLD AUTO: 1 % (ref 0–1)
BUN SERPL-MCNC: 7 MG/DL (ref 5–25)
CALCIUM SERPL-MCNC: 7.8 MG/DL (ref 8.3–10.1)
CHLORIDE SERPL-SCNC: 107 MMOL/L (ref 100–108)
CO2 SERPL-SCNC: 22 MMOL/L (ref 21–32)
CREAT SERPL-MCNC: 0.82 MG/DL (ref 0.6–1.3)
EOSINOPHIL # BLD AUTO: 0.46 THOUSAND/ΜL (ref 0–0.61)
EOSINOPHIL NFR BLD AUTO: 5 % (ref 0–6)
ERYTHROCYTE [DISTWIDTH] IN BLOOD BY AUTOMATED COUNT: 18.2 % (ref 11.6–15.1)
GFR SERPL CREATININE-BSD FRML MDRD: 92 ML/MIN/1.73SQ M
GLUCOSE SERPL-MCNC: 103 MG/DL (ref 65–140)
GLUCOSE SERPL-MCNC: 123 MG/DL (ref 65–140)
GLUCOSE SERPL-MCNC: 129 MG/DL (ref 65–140)
GLUCOSE SERPL-MCNC: 64 MG/DL (ref 65–140)
GLUCOSE SERPL-MCNC: 84 MG/DL (ref 65–140)
GLUCOSE SERPL-MCNC: 95 MG/DL (ref 65–140)
HCT VFR BLD AUTO: 29.2 % (ref 36.5–49.3)
HGB BLD-MCNC: 8.8 G/DL (ref 12–17)
IMM GRANULOCYTES # BLD AUTO: 0.1 THOUSAND/UL (ref 0–0.2)
IMM GRANULOCYTES NFR BLD AUTO: 1 % (ref 0–2)
LYMPHOCYTES # BLD AUTO: 1.55 THOUSANDS/ΜL (ref 0.6–4.47)
LYMPHOCYTES NFR BLD AUTO: 18 % (ref 14–44)
MAGNESIUM SERPL-MCNC: 1.9 MG/DL (ref 1.6–2.6)
MCH RBC QN AUTO: 30.3 PG (ref 26.8–34.3)
MCHC RBC AUTO-ENTMCNC: 30.1 G/DL (ref 31.4–37.4)
MCV RBC AUTO: 101 FL (ref 82–98)
MONOCYTES # BLD AUTO: 0.81 THOUSAND/ΜL (ref 0.17–1.22)
MONOCYTES NFR BLD AUTO: 10 % (ref 4–12)
NEUTROPHILS # BLD AUTO: 5.49 THOUSANDS/ΜL (ref 1.85–7.62)
NEUTS SEG NFR BLD AUTO: 65 % (ref 43–75)
NRBC BLD AUTO-RTO: 0 /100 WBCS
PHOSPHATE SERPL-MCNC: 3.1 MG/DL (ref 2.3–4.1)
PLATELET # BLD AUTO: 248 THOUSANDS/UL (ref 149–390)
PMV BLD AUTO: 10.2 FL (ref 8.9–12.7)
POTASSIUM SERPL-SCNC: 3.9 MMOL/L (ref 3.5–5.3)
RBC # BLD AUTO: 2.9 MILLION/UL (ref 3.88–5.62)
SODIUM SERPL-SCNC: 139 MMOL/L (ref 136–145)
WBC # BLD AUTO: 8.49 THOUSAND/UL (ref 4.31–10.16)

## 2020-09-23 PROCEDURE — 99232 SBSQ HOSP IP/OBS MODERATE 35: CPT | Performed by: FAMILY MEDICINE

## 2020-09-23 PROCEDURE — 82948 REAGENT STRIP/BLOOD GLUCOSE: CPT

## 2020-09-23 PROCEDURE — 83735 ASSAY OF MAGNESIUM: CPT | Performed by: STUDENT IN AN ORGANIZED HEALTH CARE EDUCATION/TRAINING PROGRAM

## 2020-09-23 PROCEDURE — 85025 COMPLETE CBC W/AUTO DIFF WBC: CPT | Performed by: STUDENT IN AN ORGANIZED HEALTH CARE EDUCATION/TRAINING PROGRAM

## 2020-09-23 PROCEDURE — 94668 MNPJ CHEST WALL SBSQ: CPT

## 2020-09-23 PROCEDURE — 94760 N-INVAS EAR/PLS OXIMETRY 1: CPT

## 2020-09-23 PROCEDURE — 84100 ASSAY OF PHOSPHORUS: CPT | Performed by: STUDENT IN AN ORGANIZED HEALTH CARE EDUCATION/TRAINING PROGRAM

## 2020-09-23 PROCEDURE — 80048 BASIC METABOLIC PNL TOTAL CA: CPT | Performed by: STUDENT IN AN ORGANIZED HEALTH CARE EDUCATION/TRAINING PROGRAM

## 2020-09-23 PROCEDURE — 92526 ORAL FUNCTION THERAPY: CPT

## 2020-09-23 PROCEDURE — 97110 THERAPEUTIC EXERCISES: CPT

## 2020-09-23 PROCEDURE — 94669 MECHANICAL CHEST WALL OSCILL: CPT

## 2020-09-23 RX ADMIN — METOCLOPRAMIDE 5 MG: 10 TABLET ORAL at 16:45

## 2020-09-23 RX ADMIN — TAMSULOSIN HYDROCHLORIDE 0.4 MG: 0.4 CAPSULE ORAL at 16:46

## 2020-09-23 RX ADMIN — CEFEPIME HYDROCHLORIDE 2000 MG: 2 INJECTION, POWDER, FOR SOLUTION INTRAVENOUS at 04:34

## 2020-09-23 RX ADMIN — PANTOPRAZOLE SODIUM 40 MG: 40 TABLET, DELAYED RELEASE ORAL at 06:41

## 2020-09-23 RX ADMIN — CEFEPIME HYDROCHLORIDE 2000 MG: 2 INJECTION, POWDER, FOR SOLUTION INTRAVENOUS at 17:20

## 2020-09-23 RX ADMIN — METRONIDAZOLE 500 MG: 500 INJECTION, SOLUTION INTRAVENOUS at 07:58

## 2020-09-23 RX ADMIN — Medication 250 MG: at 22:20

## 2020-09-23 RX ADMIN — AMIODARONE HYDROCHLORIDE 200 MG: 200 TABLET ORAL at 09:22

## 2020-09-23 RX ADMIN — METRONIDAZOLE 500 MG: 500 INJECTION, SOLUTION INTRAVENOUS at 00:35

## 2020-09-23 RX ADMIN — METOCLOPRAMIDE 5 MG: 10 TABLET ORAL at 11:32

## 2020-09-23 RX ADMIN — METRONIDAZOLE 500 MG: 500 INJECTION, SOLUTION INTRAVENOUS at 16:41

## 2020-09-23 RX ADMIN — APIXABAN 5 MG: 5 TABLET, FILM COATED ORAL at 18:55

## 2020-09-23 RX ADMIN — ATORVASTATIN CALCIUM 40 MG: 40 TABLET, FILM COATED ORAL at 16:45

## 2020-09-23 RX ADMIN — METOCLOPRAMIDE 5 MG: 10 TABLET ORAL at 06:41

## 2020-09-23 RX ADMIN — Medication 250 MG: at 09:22

## 2020-09-23 RX ADMIN — DOCUSATE SODIUM AND SENNOSIDES 1 TABLET: 8.6; 5 TABLET, FILM COATED ORAL at 22:20

## 2020-09-23 RX ADMIN — VANCOMYCIN HYDROCHLORIDE 1500 MG: 10 INJECTION, POWDER, LYOPHILIZED, FOR SOLUTION INTRAVENOUS at 22:24

## 2020-09-23 RX ADMIN — ACETAMINOPHEN 650 MG: 325 TABLET, FILM COATED ORAL at 20:02

## 2020-09-23 RX ADMIN — VANCOMYCIN HYDROCHLORIDE 1500 MG: 10 INJECTION, POWDER, LYOPHILIZED, FOR SOLUTION INTRAVENOUS at 12:08

## 2020-09-23 RX ADMIN — APIXABAN 5 MG: 5 TABLET, FILM COATED ORAL at 09:23

## 2020-09-23 RX ADMIN — VENLAFAXINE HYDROCHLORIDE 150 MG: 150 CAPSULE, EXTENDED RELEASE ORAL at 10:14

## 2020-09-23 NOTE — PLAN OF CARE
Problem: Potential for Falls  Goal: Patient will remain free of falls  Description: INTERVENTIONS:  - Assess patient frequently for physical needs  -  Identify cognitive and physical deficits and behaviors that affect risk of falls    -  [place] fall precautions as indicated by assessment   - Educate patient/family on patient safety including physical limitations  - Instruct patient to call for assistance with activity based on assessment  - Modify environment to reduce risk of injury  - Consider OT/PT consult to assist with strengthening/mobility  Outcome: Progressing     Problem: RESPIRATORY - ADULT  Goal: Achieves optimal ventilation and oxygenation  Description: INTERVENTIONS:  - Assess for changes in respiratory status  - Assess for changes in mentation and behavior  - Position to facilitate oxygenation and minimize respiratory effort  - Oxygen administered by appropriate delivery if ordered  - Initiate smoking cessation education as indicated  - Encourage broncho-pulmonary hygiene including cough, deep breathe, Incentive Spirometry  - Assess the need for suctioning and aspirate as needed  - Assess and instruct to report SOB or any respiratory difficulty  - Respiratory Therapy support as indicated  - Vapotherm  Outcome: Progressing     Problem: Prexisting or High Potential for Compromised Skin Integrity  Goal: Skin integrity is maintained or improved  Description: INTERVENTIONS:  - Identify patients at risk for skin breakdown  - Assess and monitor skin integrity  - Assess and monitor nutrition and hydration status  - Monitor labs   - Assess for incontinence   - Turn and reposition patient  - Assist with mobility/ambulation  - Relieve pressure over bony prominences  - Avoid friction and shearing  - Provide appropriate hygiene as needed including keeping skin clean and dry  - Evaluate need for skin moisturizer/barrier cream  - Collaborate with interdisciplinary team   - Patient/family teaching  - Consider wound care consult   Outcome: Progressing     Problem: Nutrition/Hydration-ADULT  Goal: Nutrient/Hydration intake appropriate for improving, restoring or maintaining nutritional needs  Description: Monitor and assess patient's nutrition/hydration status for malnutrition  Collaborate with interdisciplinary team and initiate plan and interventions as ordered  Monitor patient's weight and dietary intake as ordered or per policy  Utilize nutrition screening tool and intervene as necessary  Determine patient's food preferences and provide high-protein, high-caloric foods as appropriate       INTERVENTIONS:  - Monitor oral intake, urinary output, labs, and treatment plans  - Assess nutrition and hydration status and recommend course of action  - Evaluate amount of meals eaten  - Assist patient with eating if necessary   - Allow adequate time for meals  - Recommend/ encourage appropriate diets, oral nutritional supplements, and vitamin/mineral supplements  - Order, calculate, and assess calorie counts as needed  - Assess need for intravenous fluids  - Provide specific nutrition/hydration education as appropriate  - Include patient/family/caregiver in decisions related to nutrition  Outcome: Progressing

## 2020-09-23 NOTE — PHYSICAL THERAPY NOTE
PT TREATMENT     09/23/20 1530   Pain Assessment   Pain Assessment Tool Pain Assessment not indicated - pt denies pain   Restrictions/Precautions   Other Precautions Chair Alarm; Bed Alarm; Fall Risk;O2   General   Chart Reviewed Yes   Family/Caregiver Present No   Cognition   Arousal/Participation Cooperative   Subjective   Subjective patient states sitting up in chair earlier and now fatigued in the PM, agreeable to LE exercise at this time in bed and would like to walk in the morning   Bed Mobility   Supine to Sit Unable to assess   Exercises   Hamstring Stretch Supine;5 reps;PROM; Bilateral   Quad Sets Supine;Bilateral;15 reps   Heelslides Supine;15 reps;Bilateral   Glute Sets Supine;15 reps;Bilateral   Hip Flexion   (SLR, 10 reps B LEs)   Hip Abduction Supine;15 reps;Bilateral   Knee AROM Short Arc Quad Supine;15 reps;Bilateral   Ankle Pumps Supine;15 reps;Bilateral   Heel Cord Stretch Supine;5 reps;PROM; Bilateral   Bridging Supine;5 reps   Assessment   Assessment patient cooperative and requiring rest periods at times with exercise  Patient will benefit from continued PT with progression as tolerated  Plan   Treatment/Interventions ADL retraining;Functional transfer training;LE strengthening/ROM; Therapeutic exercise; Endurance training;Patient/family training;Equipment eval/education; Bed mobility;Gait training; Compensatory technique education   PT Frequency 5x/wk   Recommendation   PT Discharge Recommendation [address] License Number  Geo Tolentino PT [id number]

## 2020-09-23 NOTE — PROGRESS NOTES
Vancomycin IV Pharmacy-to-Dose Consultation    Yahaira Garcia is a 77 y o  male who is currently receiving Vancomycin IV with management by the Pharmacy Consult service  Assessment/Plan:  The patient was reviewed  Renal function is stable and no signs or symptoms of nephrotoxicity and/or infusion reactions were documented in the chart  Based on todays assessment, continue current vancomycin (day # 6) dosing of 1500 mg IV q12h, with a plan for trough to be drawn at 1000 on 09/28/2020  We will continue to follow the patients culture results and clinical progress daily      Lori Franks, Pharmacist

## 2020-09-23 NOTE — SPEECH THERAPY NOTE
Speech Language/Pathology    Speech/Language Pathology Progress Note    Patient Name: Nicole Henderson  DVTRINA Date: 9/23/2020     Problem List  Principal Problem:    Aspiration pneumonia ([place] [address] )  Active Problems:    Major depression    Anxiety    Gastroesophageal cancer ([place] [address] )    Severe protein-calorie malnutrition Willadean Seed: less than 60% of standard weight) ([place] [address] )    Other dysphagia    PAF (paroxysmal atrial fibrillation) ([place] [address] )    Chronic combined systolic and diastolic CHF (congestive heart failure) ([place] [address] )    Port-A-Cath in place    Hematuria    Hyponatremia    Hypoglycemia    Severe protein-calorie malnutrition ([place] [address] )        Subjective:  "Hey there how are you? Have a seat"  Pt had just started breakfast tray and was "caught" sipping out of his juice container  Objective:  Pt  Was seen for dysphagia treatment to assess current tolerance of diet  Pt  Was reminded to "drink" his liquids with a spoon and why  After he was reminded he carried over for the rest of the meal  Had cream of wheat and pureed fruit-banana  At a a slowed rate and alternated liquids and solids with min cues  Assessment:  Overall carrying over strategies  RN was also present to help remind pt  If needed  Will provide suggestions for pt  For meal prep when he is discharged  Plan/Recommendations:  Speech to follow up  Continue with current diet      Lloyd Gibbs MS CCC-SLP  Speech Language Pathologist  Available via [facility] License # [id number]  [address] [place] [address] St # [id number]

## 2020-09-23 NOTE — PROGRESS NOTES
[address] [facility] Practice Progress Note - Clyde Ya 77 y o  male MRN: [id number]    Unit/Bed#: [address] [place] [address] Encounter: [id number]      Assessment/Plan:  * Aspiration pneumonia [facility])  Assessment & Plan  Patient with recent hospitalization 2/2 left lobe pneumonia and septic shock  Had recurrent SOB and episode of hypotension and hypoxia on 9/18 and was refered to ED by primary care physician after office visit  On ED placed on high flow supplemental O2 due to hypoxia  Chest Xray shows new right middle lung pneumonia with improved aspect of left consolidation where prior pneumonia had been  - Currently on vancomycin 1500 mg IV q 12 hours day 5, Flagyl 500 mg IV Q 8 day 5, cefepime 2 g IV q 12 day 5   - since patient is most likely HAP will need 7 days of IV abx  - monitor vitals  - sputum culture final results Candida lipolytica and mixed respiratory bobby and gram stain showed Gram-positive cocci  - No fever in past 24hr continue to monitor for improvement clinically  - nasal cannula being continued 3 L  - trend sodium  - Vanco trough 16 8    Other dysphagia  Assessment & Plan  Hx of dysphagia 2/2 esophageal cancer; may have potentially resulted in aspiration pneumonia  - As per speech therapist:   Agree with esophagram and VBS for further analysis: To be performed 9/20  Recommended Diet: consider clear liquid diet until esophagram and Video Swallow study   Recommended Form of Meds: IVs or as per MD  Precautions and swallowing strategies:   1  upright posture, maintain full upright position during and for a minof 1 hour after po intake and 30 degree minimum at all times   2   slow rate of feeding, small bites/sips   - moderate-severe oropharyngeal dysphagia characterized by silent aspiration on nectar thick liquids, thin liquids, ambient fluid from mixed consistencies  Recommended is puree/level 1 diet and honey thick liquids and aspiration precautions      Chronic combined systolic and diastolic CHF (congestive heart failure) (HCC)  Assessment & Plan  Wt Readings from Last 3 Encounters:   09/23/20 76 kg (167 lb 8 8 oz)   09/18/20 77 1 kg (170 lb)   09/08/20 76 9 kg (169 lb 8 5 oz)     Echo 9/2/2020:     EF 35-40%  · There was moderate diffuse hypokinesis  · Features were consistent with a pseudonormal left ventricular filling pattern, with concomitant abnormal relaxation and increased filling pressure (grade 2 diastolic dysfunction)  Patient arrived with elevated BNP of 5950  - monitor daily fluid status with I/O and daily weights, diurese as needed   - avoid excessive IV hydration  PAF (paroxysmal atrial fibrillation) ([facility] [address] )  Assessment & Plan  Has a hx of New onset paroxysmal Afib suspected to be 2/2 to PNA  Cardioconverted overnight on 9/2 with amiodarone  Currently on amiodarone po     continue taking Amiodarone 200 mg PO BID and Eliquis 5 mg PO BID     Gastroesophageal cancer [facility])  Assessment & Plan  Patient has Active gastroesophageal carcinoma with peritoneal metastasis being treated with chemotherapy (Last chemo 8/20)  Major depression  Assessment & Plan  Stable  Continue taking Effexor- mg PO daily     Acute respiratory failure with hypoxemia (HCC)resolved as of 9/19/2020  Assessment & Plan  9/18/20 episode of hypotension and hypoxia and was refered to ED by primary care physician after office visit  On ED placed on high flow supplemental O2 due to hypoxia  Chest Xray shows Right greater than left pneumonia     -Admit to critical care unit (9/18-)  -DNR/DNI  -high flow nasal cannula initially, required 20-25 LPM of O2 on vapotherm eventually   -following day, patient was successfully weaned down to 3 L NC    JENNIFER (acute kidney injury) (HCC)resolved as of 9/19/2020  Assessment & Plan  Patient with JENNIFER, creatinine on admission 1 66  Last Cr level was 0 95 on 9/8/20  Probably prerenal in nature 2/2 dehydration vs septic shock     - patient rehydrated with IV NS bolus and isolyte 30cc/kg initial resusitation  Further IVF hydration held for now due to his poor CHF   -follow BMP    Septic Shock (HCC)resolved as of 9/20/2020  Assessment & Plan  Patient presenting with septic shock 2/2 to new onset right lung pneumonia  hypotension present on admission, Lactic acid 4 7, repeat 2 1  B/P stable with IVF management       - IVF resuscitated at 3 5 L   - stress dose steroids x1  - No additional pressure support required after IV fluid resuscitation    - Continue empiric Antibiotic treatment of vanco + cefepime + flagyl (9/18-)        Subjective:   Patient seen at bedside and found in no acute distress  He reports feeling well better than yesterday  He continues to have mild MOHAN but denies chest pain SOB abdominal pain  Reports voiding well and passing stool and he is tolerating diet  Objective:     Vitals: Blood pressure 124/78, pulse 81, temperature 98 °F (36 7 °C), temperature source Oral, resp  rate 18, height 6' (1 829 m), weight 76 kg (167 lb 8 8 oz), SpO2 98 %  ,Body mass index is 22 72 kg/m²    Wt Readings from Last 3 Encounters:   09/23/20 76 kg (167 lb 8 8 oz)   09/18/20 77 1 kg (170 lb)   09/08/20 76 9 kg (169 lb 8 5 oz)       Intake/Output Summary (Last 24 hours) at 9/23/2020 1514  Last data filed at 9/23/2020 0930  Gross per 24 hour   Intake 350 ml   Output 700 ml   Net -350 ml       Physical Exam: /78   Pulse 81   Temp 98 °F (36 7 °C) (Oral)   Resp 18   Ht 6' (1 829 m)   Wt 76 kg (167 lb 8 8 oz)   SpO2 98%   BMI 22 72 kg/m²   General appearance: alert and oriented, in no acute distress  Lungs: Rhonchi at the right lung base, left lung CTA  Heart: regular rate and rhythm, S1, S2 normal, no murmur, click, rub or gallop  Abdomen: normal findings: bowel sounds normal and no masses palpable and abnormal findings:  mild tenderness in the LUQ     Recent Results (from the past 24 hour(s))   Fingerstick Glucose (POCT)    Collection Time: 09/22/20  3:59 PM   Result Value Ref Range    POC Glucose 120 65 - 140 mg/dl   Fingerstick Glucose (POCT)    Collection Time: 09/23/20 12:33 AM   Result Value Ref Range    POC Glucose 103 65 - 140 mg/dl   Fingerstick Glucose (POCT)    Collection Time: 09/23/20  5:12 AM   Result Value Ref Range    POC Glucose 129 65 - 140 mg/dl   CBC and differential    Collection Time: 09/23/20  5:23 AM   Result Value Ref Range    WBC 8 49 4 31 - 10 16 Thousand/uL    RBC 2 90 (L) 3 88 - 5 62 Million/uL    Hemoglobin 8 8 (L) 12 0 - 17 0 g/dL    Hematocrit 29 2 (L) 36 5 - 49 3 %     (H) 82 - 98 fL    MCH 30 3 26 8 - 34 3 pg    MCHC 30 1 (L) 31 4 - 37 4 g/dL    RDW 18 2 (H) 11 6 - 15 1 %    MPV 10 2 8 9 - 12 7 fL    Platelets 434 682 - 141 Thousands/uL    nRBC 0 /100 WBCs    Neutrophils Relative 65 43 - 75 %    Immat GRANS % 1 0 - 2 %    Lymphocytes Relative 18 14 - 44 %    Monocytes Relative 10 4 - 12 %    Eosinophils Relative 5 0 - 6 %    Basophils Relative 1 0 - 1 %    Neutrophils Absolute 5 49 1 85 - 7 62 Thousands/µL    Immature Grans Absolute 0 10 0 00 - 0 20 Thousand/uL    Lymphocytes Absolute 1 55 0 60 - 4 47 Thousands/µL    Monocytes Absolute 0 81 0 17 - 1 22 Thousand/µL    Eosinophils Absolute 0 46 0 00 - 0 61 Thousand/µL    Basophils Absolute 0 08 0 00 - 0 10 Thousands/µL   Magnesium    Collection Time: 09/23/20  5:23 AM   Result Value Ref Range    Magnesium 1 9 1 6 - 2 6 mg/dL   Phosphorus    Collection Time: 09/23/20  5:23 AM   Result Value Ref Range    Phosphorus 3 1 2 3 - 4 1 mg/dL   Basic metabolic panel    Collection Time: 09/23/20  5:23 AM   Result Value Ref Range    Sodium 139 136 - 145 mmol/L    Potassium 3 9 3 5 - 5 3 mmol/L    Chloride 107 100 - 108 mmol/L    CO2 22 21 - 32 mmol/L    ANION GAP 10 4 - 13 mmol/L    BUN 7 5 - 25 mg/dL    Creatinine 0 82 0 60 - 1 30 mg/dL    Glucose 123 65 - 140 mg/dL    Calcium 7 8 (L) 8 3 - 10 1 mg/dL    eGFR 92 ml/min/1 73sq m       Current Facility-Administered Medications   Medication Dose Route Frequency Provider Last Rate Last Dose    acetaminophen (TYLENOL) tablet 650 mg  650 mg Oral Q6H PRN Didier Arturos, PA-C   650 mg at 09/22/20 1848    albuterol inhalation solution 2 5 mg  2 5 mg Nebulization Q4H PRN Didier Morriss, PA-C   2 5 mg at 09/19/20 2303    amiodarone tablet 200 mg  200 mg Oral Daily With Breakfast Didier Carla, PA-C   200 mg at 09/23/20 1561    apixaban (ELIQUIS) tablet 5 mg  5 mg Oral BID Didier Morriss, PA-C   5 mg at 09/23/20 4276    atorvastatin (LIPITOR) tablet 40 mg  40 mg Oral Daily With YESENIA Gutierrez-C   40 mg at 09/22/20 1650    cefepime (MAXIPIME) 2 g/50 mL dextrose IVPB  2,000 mg Intravenous Q12H Didier Aguirre, PA-C 100 mL/hr at 09/23/20 0434 2,000 mg at 09/23/20 0434    dextrose 50 % IV solution 25 mL  25 mL Intravenous Q6H PRN Didier Aguirre, PA-C   25 mL at 09/19/20 0725    metoclopramide (REGLAN) tablet 5 mg  5 mg Oral TID [facility] Didier Aguirre, PA-C   5 mg at 09/23/20 1132    metroNIDAZOLE (FLAGYL) IVPB (premix) 500 mg 100 mL  500 mg Intravenous Q8H Didier Aguirre, PA-C 200 mL/hr at 09/23/20 0758 500 mg at 09/23/20 0758    pantoprazole (PROTONIX) EC tablet 40 mg  40 mg Oral Daily Before Breakfast Didier Aguirre, PA-C   40 mg at 09/23/20 0778    saccharomyces boulardii (FLORASTOR) capsule 250 mg  250 mg Oral BID Macario Walker MD   250 mg at 09/23/20 7679    senna-docusate sodium (SENOKOT S) 8 6-50 mg per tablet 1 tablet  1 tablet Oral HS Didier Aguirre, PA-C   1 tablet at 09/22/20 2233    tamsulosin (FLOMAX) capsule 0 4 mg  0 4 mg Oral Daily With YESENIA Barton-ALEJANDRA   0 4 mg at 09/22/20 1652    vancomycin (VANCOCIN) 1,500 mg in sodium chloride 0 9 % 250 mL IVPB  17 5 mg/kg Intravenous Q12H Didier Aguirre PA-C 166 7 mL/hr at 09/23/20 1208 1,500 mg at 09/23/20 1208    venlafaxine (EFFEXOR-XR) 24 hr capsule 150 mg  150 mg Oral Daily Didier Aguirre PA-C   150 mg at 09/23/20 1014       Invasive Devices     Central Venous Catheter Line            Port A Cath Right Chest -- days    Port A Cath 07/31/19 Right Chest 420 days          Peripheral Intravenous Line            Peripheral IV 09/22/20 Left;Ventral (anterior) Forearm less than 1 day                Lab, Imaging and other studies: I have personally reviewed pertinent reports      VTE Pharmacologic Prophylaxis: Eliquis  VTE Mechanical Prophylaxis: sequential compression device    Dalila Islas MD

## 2020-09-24 LAB
ANION GAP SERPL CALCULATED.3IONS-SCNC: 9 MMOL/L (ref 4–13)
BASOPHILS # BLD AUTO: 0.07 THOUSANDS/ΜL (ref 0–0.1)
BASOPHILS NFR BLD AUTO: 1 % (ref 0–1)
BUN SERPL-MCNC: 7 MG/DL (ref 5–25)
CALCIUM SERPL-MCNC: 7.8 MG/DL (ref 8.3–10.1)
CHLORIDE SERPL-SCNC: 104 MMOL/L (ref 100–108)
CO2 SERPL-SCNC: 24 MMOL/L (ref 21–32)
CREAT SERPL-MCNC: 0.79 MG/DL (ref 0.6–1.3)
EOSINOPHIL # BLD AUTO: 0.43 THOUSAND/ΜL (ref 0–0.61)
EOSINOPHIL NFR BLD AUTO: 6 % (ref 0–6)
ERYTHROCYTE [DISTWIDTH] IN BLOOD BY AUTOMATED COUNT: 18.4 % (ref 11.6–15.1)
GFR SERPL CREATININE-BSD FRML MDRD: 93 ML/MIN/1.73SQ M
GLUCOSE SERPL-MCNC: 111 MG/DL (ref 65–140)
GLUCOSE SERPL-MCNC: 73 MG/DL (ref 65–140)
GLUCOSE SERPL-MCNC: 81 MG/DL (ref 65–140)
GLUCOSE SERPL-MCNC: 82 MG/DL (ref 65–140)
HCT VFR BLD AUTO: 29 % (ref 36.5–49.3)
HGB BLD-MCNC: 8.6 G/DL (ref 12–17)
IMM GRANULOCYTES # BLD AUTO: 0.09 THOUSAND/UL (ref 0–0.2)
IMM GRANULOCYTES NFR BLD AUTO: 1 % (ref 0–2)
LYMPHOCYTES # BLD AUTO: 1.71 THOUSANDS/ΜL (ref 0.6–4.47)
LYMPHOCYTES NFR BLD AUTO: 25 % (ref 14–44)
MAGNESIUM SERPL-MCNC: 1.6 MG/DL (ref 1.6–2.6)
MCH RBC QN AUTO: 29.6 PG (ref 26.8–34.3)
MCHC RBC AUTO-ENTMCNC: 29.7 G/DL (ref 31.4–37.4)
MCV RBC AUTO: 100 FL (ref 82–98)
MONOCYTES # BLD AUTO: 0.73 THOUSAND/ΜL (ref 0.17–1.22)
MONOCYTES NFR BLD AUTO: 11 % (ref 4–12)
NEUTROPHILS # BLD AUTO: 3.82 THOUSANDS/ΜL (ref 1.85–7.62)
NEUTS SEG NFR BLD AUTO: 56 % (ref 43–75)
NRBC BLD AUTO-RTO: 0 /100 WBCS
PHOSPHATE SERPL-MCNC: 3 MG/DL (ref 2.3–4.1)
PLATELET # BLD AUTO: 269 THOUSANDS/UL (ref 149–390)
PMV BLD AUTO: 10.5 FL (ref 8.9–12.7)
POTASSIUM SERPL-SCNC: 3.4 MMOL/L (ref 3.5–5.3)
RBC # BLD AUTO: 2.91 MILLION/UL (ref 3.88–5.62)
SODIUM SERPL-SCNC: 137 MMOL/L (ref 136–145)
WBC # BLD AUTO: 6.85 THOUSAND/UL (ref 4.31–10.16)

## 2020-09-24 PROCEDURE — 83735 ASSAY OF MAGNESIUM: CPT | Performed by: STUDENT IN AN ORGANIZED HEALTH CARE EDUCATION/TRAINING PROGRAM

## 2020-09-24 PROCEDURE — 97535 SELF CARE MNGMENT TRAINING: CPT

## 2020-09-24 PROCEDURE — 94664 DEMO&/EVAL PT USE INHALER: CPT

## 2020-09-24 PROCEDURE — 82948 REAGENT STRIP/BLOOD GLUCOSE: CPT

## 2020-09-24 PROCEDURE — 85025 COMPLETE CBC W/AUTO DIFF WBC: CPT | Performed by: STUDENT IN AN ORGANIZED HEALTH CARE EDUCATION/TRAINING PROGRAM

## 2020-09-24 PROCEDURE — 94760 N-INVAS EAR/PLS OXIMETRY 1: CPT

## 2020-09-24 PROCEDURE — 80048 BASIC METABOLIC PNL TOTAL CA: CPT | Performed by: STUDENT IN AN ORGANIZED HEALTH CARE EDUCATION/TRAINING PROGRAM

## 2020-09-24 PROCEDURE — 84100 ASSAY OF PHOSPHORUS: CPT | Performed by: STUDENT IN AN ORGANIZED HEALTH CARE EDUCATION/TRAINING PROGRAM

## 2020-09-24 PROCEDURE — 97110 THERAPEUTIC EXERCISES: CPT

## 2020-09-24 PROCEDURE — 94669 MECHANICAL CHEST WALL OSCILL: CPT

## 2020-09-24 PROCEDURE — 84145 PROCALCITONIN (PCT): CPT | Performed by: STUDENT IN AN ORGANIZED HEALTH CARE EDUCATION/TRAINING PROGRAM

## 2020-09-24 PROCEDURE — 99232 SBSQ HOSP IP/OBS MODERATE 35: CPT | Performed by: FAMILY MEDICINE

## 2020-09-24 PROCEDURE — 92526 ORAL FUNCTION THERAPY: CPT

## 2020-09-24 PROCEDURE — 94668 MNPJ CHEST WALL SBSQ: CPT

## 2020-09-24 RX ORDER — POTASSIUM CHLORIDE 20 MEQ/1
40 TABLET, EXTENDED RELEASE ORAL ONCE
Status: COMPLETED | OUTPATIENT
Start: 2020-09-24 | End: 2020-09-24

## 2020-09-24 RX ADMIN — APIXABAN 5 MG: 5 TABLET, FILM COATED ORAL at 08:44

## 2020-09-24 RX ADMIN — METOCLOPRAMIDE 5 MG: 10 TABLET ORAL at 11:13

## 2020-09-24 RX ADMIN — ACETAMINOPHEN 650 MG: 325 TABLET, FILM COATED ORAL at 23:59

## 2020-09-24 RX ADMIN — ACETAMINOPHEN 650 MG: 325 TABLET, FILM COATED ORAL at 03:09

## 2020-09-24 RX ADMIN — METRONIDAZOLE 500 MG: 500 INJECTION, SOLUTION INTRAVENOUS at 16:47

## 2020-09-24 RX ADMIN — ATORVASTATIN CALCIUM 40 MG: 40 TABLET, FILM COATED ORAL at 16:59

## 2020-09-24 RX ADMIN — TAMSULOSIN HYDROCHLORIDE 0.4 MG: 0.4 CAPSULE ORAL at 16:59

## 2020-09-24 RX ADMIN — VENLAFAXINE HYDROCHLORIDE 150 MG: 150 CAPSULE, EXTENDED RELEASE ORAL at 11:14

## 2020-09-24 RX ADMIN — Medication 250 MG: at 22:00

## 2020-09-24 RX ADMIN — CEFEPIME HYDROCHLORIDE 2000 MG: 2 INJECTION, POWDER, FOR SOLUTION INTRAVENOUS at 16:47

## 2020-09-24 RX ADMIN — METRONIDAZOLE 500 MG: 500 INJECTION, SOLUTION INTRAVENOUS at 08:44

## 2020-09-24 RX ADMIN — MAGNESIUM OXIDE TAB 400 MG (241.3 MG ELEMENTAL MG) 800 MG: 400 (241.3 MG) TAB at 11:14

## 2020-09-24 RX ADMIN — METOCLOPRAMIDE 5 MG: 10 TABLET ORAL at 06:27

## 2020-09-24 RX ADMIN — VANCOMYCIN HYDROCHLORIDE 1500 MG: 10 INJECTION, POWDER, LYOPHILIZED, FOR SOLUTION INTRAVENOUS at 22:02

## 2020-09-24 RX ADMIN — METRONIDAZOLE 500 MG: 500 INJECTION, SOLUTION INTRAVENOUS at 01:23

## 2020-09-24 RX ADMIN — METOCLOPRAMIDE 5 MG: 10 TABLET ORAL at 16:59

## 2020-09-24 RX ADMIN — DOCUSATE SODIUM AND SENNOSIDES 1 TABLET: 8.6; 5 TABLET, FILM COATED ORAL at 22:01

## 2020-09-24 RX ADMIN — POTASSIUM CHLORIDE 40 MEQ: 1500 TABLET, EXTENDED RELEASE ORAL at 11:14

## 2020-09-24 RX ADMIN — APIXABAN 5 MG: 5 TABLET, FILM COATED ORAL at 17:01

## 2020-09-24 RX ADMIN — Medication 250 MG: at 08:43

## 2020-09-24 RX ADMIN — VANCOMYCIN HYDROCHLORIDE 1500 MG: 10 INJECTION, POWDER, LYOPHILIZED, FOR SOLUTION INTRAVENOUS at 11:26

## 2020-09-24 RX ADMIN — PANTOPRAZOLE SODIUM 40 MG: 40 TABLET, DELAYED RELEASE ORAL at 06:26

## 2020-09-24 RX ADMIN — AMIODARONE HYDROCHLORIDE 200 MG: 200 TABLET ORAL at 08:44

## 2020-09-24 RX ADMIN — CEFEPIME HYDROCHLORIDE 2000 MG: 2 INJECTION, POWDER, FOR SOLUTION INTRAVENOUS at 03:59

## 2020-09-24 NOTE — SPEECH THERAPY NOTE
Speech Language/Pathology    Speech/Language Pathology Progress Note    Patient Name: Malu Paul  IHSAUMYAHFAISAL Date: 9/24/2020     Problem List  Principal Problem:    Aspiration pneumonia ([facility] )  Active Problems:    Major depression    Anxiety    Gastroesophageal cancer ([facility] )    Severe protein-calorie malnutrition [place]: less than 60% of standard weight) ([facility] )    Other dysphagia    PAF (paroxysmal atrial fibrillation) ([place] [id number] )    Chronic combined systolic and diastolic CHF (congestive heart failure) ([place] [address] )    Port-A-Cath in place    Hematuria    Hyponatremia    Hypoglycemia    Severe protein-calorie malnutrition ([facility] )         Subjective:  Pt  Stated that he isn't feeling too well today because he did not sleep well last night  Objective:  Pt  Seen for dysphagia treatment as follow up to assess current diet tolerance and carryover of swallowing strategies  Pt  Stated that he did not "drink" any of his liquids this morning  He did eat his cream of wheat for breakfast      Assessment: At this time, pt  Is aware of the strategies recommended for eating/drinking and is able to carry them over with only occasional reminders  Plan/Recommendations:  Due to pt 's medical status and results of VBS an upgraded diet is not warranted at this time  Continue current diet recommendations and stratgies  D/C speech therapy at this time  Reconsult if warranted      Dionne James MS CCC-SLP  Speech Language Pathologist  Available via [facility] License # [id number]  [facility] # [id number]

## 2020-09-24 NOTE — PHYSICAL THERAPY NOTE
PT TREATMENT     09/24/20 1045   Pain Assessment   Pain Assessment Tool Pain Assessment not indicated - pt denies pain   Restrictions/Precautions   Other Precautions Chair Alarm; Bed Alarm; Fall Risk;O2   General   Chart Reviewed Yes   Family/Caregiver Present No   Cognition   Arousal/Participation Cooperative   Subjective   Subjective patient states that it feels good to get up and walk   Bed Mobility   Supine to Sit 5  Supervision   Transfers   Sit to Stand 4  Minimal assistance   Additional items Assist x 1;Verbal cues   Stand to Sit 5  Supervision   Additional items Verbal cues  (cuing for safety with hand placement)   Ambulation/Elevation   Gait Assistance 4  Minimal assist   Additional items Assist x 1; Tactile cues; Verbal cues   Assistive Device Rolling walker   Distance 80 feet with change in direction, standing rest period and slow gait speed  Balance   Static Sitting Fair +   Dynamic Sitting Fair   Static Standing Fair   Dynamic Standing Fair -   Ambulatory Fair -   Activity Tolerance   Activity Tolerance Patient tolerated treatment well;Patient limited by fatigue   Nurse Made Aware yes   Exercises   Heelslides Sitting;20 reps;Bilateral   Hip Flexion Sitting;20 reps;Bilateral   Hip Abduction Sitting;20 reps;Bilateral   Knee AROM Long Arc Quad Sitting;20 reps;Bilateral   Ankle Pumps Sitting;20 reps;Bilateral   Balance training  sidestepping and backward walking completed for strengthening and balance   Assessment   Assessment patient cooperative and OOB in chair at end of session and will benfit from continued PT with progression as tolerated   Plan   Treatment/Interventions ADL retraining;Functional transfer training;LE strengthening/ROM; Therapeutic exercise; Endurance training;Patient/family training;Equipment eval/education; Bed mobility;Gait training; Compensatory technique education   PT Frequency 5x/wk   Recommendation   PT Discharge Recommendation Home with skilled therapy; Other (Comment)  (home PT) Licensure   [place] License Number  Mars [place] PT [id number]

## 2020-09-24 NOTE — PROGRESS NOTES
[address] [facility] [address] [facility] Progress Note - Felix Ao 77 y o  male MRN: [id number]    Unit/Bed#: [address] [place] [address] Encounter: [id number]      Assessment/Plan:  * Aspiration pneumonia [facility])  Assessment & Plan  Patient with recent hospitalization 2/2 left lobe pneumonia and septic shock  Had recurrent SOB and episode of hypotension and hypoxia on 9/18 and was refered to ED by primary care physician after office visit  On ED placed on high flow supplemental O2 due to hypoxia  Chest Xray shows new right middle lung pneumonia with improved aspect of left consolidation where prior pneumonia had been  - Currently on vancomycin 1500 mg IV q 12 hours day 6, Flagyl 500 mg IV Q 8 day 6, cefepime 2 g IV q 12 day 6   - since patient is most likely HAP will need 7 days of IV abx and discharged most likely on PO antibiotics  - Procalcitonin tonight   - monitor vitals  - sputum culture final results Candida lipolytica and mixed respiratory bobby and gram stain showed Gram-positive cocci  - No fever in past 24hr continue to monitor for improvement clinically  - nasal cannula at 3L, will begin to wean off NC and will monitor for SOB  - trend sodium  - Vanco trough 16 8    Other dysphagia  Assessment & Plan  Hx of dysphagia 2/2 esophageal cancer; may have potentially resulted in aspiration pneumonia  - As per speech therapist:   Agree with esophagram and VBS for further analysis: To be performed 9/20  Recommended Diet: consider clear liquid diet until esophagram and Video Swallow study   Recommended Form of Meds: IVs or as per MD  Precautions and swallowing strategies:   1  upright posture, maintain full upright position during and for a minof 1 hour after po intake and 30 degree minimum at all times   2   slow rate of feeding, small bites/sips   - moderate-severe oropharyngeal dysphagia characterized by silent aspiration on nectar thick liquids, thin liquids, ambient fluid from mixed consistencies    Recommended is puree/level 1 diet and honey thick liquids and aspiration precautions  Chronic combined systolic and diastolic CHF (congestive heart failure) (HCC)  Assessment & Plan  Wt Readings from Last 3 Encounters:   09/23/20 76 kg (167 lb 8 8 oz)   09/18/20 77 1 kg (170 lb)   09/08/20 76 9 kg (169 lb 8 5 oz)     Echo 9/2/2020:     EF 35-40%  · There was moderate diffuse hypokinesis  · Features were consistent with a pseudonormal left ventricular filling pattern, with concomitant abnormal relaxation and increased filling pressure (grade 2 diastolic dysfunction)  Patient arrived with elevated BNP of 5950  - monitor daily fluid status with I/O and daily weights, diurese as needed   - avoid excessive IV hydration  PAF (paroxysmal atrial fibrillation) ([facility] [address] )  Assessment & Plan  Has a hx of New onset paroxysmal Afib suspected to be 2/2 to PNA  Cardioconverted overnight on 9/2 with amiodarone  Currently on amiodarone po     continue taking Amiodarone 200 mg PO BID and Eliquis 5 mg PO BID     Gastroesophageal cancer [facility])  Assessment & Plan  Patient has Active gastroesophageal carcinoma with peritoneal metastasis being treated with chemotherapy (Last chemo 8/20)  Major depression  Assessment & Plan  Stable  Continue taking Effexor- mg PO daily     Acute respiratory failure with hypoxemia (HCC)resolved as of 9/19/2020  Assessment & Plan  9/18/20 episode of hypotension and hypoxia and was refered to ED by primary care physician after office visit  On ED placed on high flow supplemental O2 due to hypoxia  Chest Xray shows Right greater than left pneumonia     -Admit to critical care unit (9/18-)  -DNR/DNI  -high flow nasal cannula initially, required 20-25 LPM of O2 on vapotherm eventually   -following day, patient was successfully weaned down to 3 L NC    JENNIFER (acute kidney injury) (HCC)resolved as of 9/19/2020  Assessment & Plan  Patient with JENNIFER, creatinine on admission 1 66  Last Cr level was 0 95 on 9/8/20     Probably prerenal in nature 2/2 dehydration vs septic shock  - patient rehydrated with IV NS bolus and isolyte 30cc/kg initial resusitation  Further IVF hydration held for now due to his poor CHF   -follow BMP    Septic Shock (HCC)resolved as of 9/20/2020  Assessment & Plan  Patient presenting with septic shock 2/2 to new onset right lung pneumonia  hypotension present on admission, Lactic acid 4 7, repeat 2 1  B/P stable with IVF management       - IVF resuscitated at 3 5 L   - stress dose steroids x1  - No additional pressure support required after IV fluid resuscitation    - Continue empiric Antibiotic treatment of vanco + cefepime + flagyl (9/18-)      Subjective:   Patient reports feeling tired otherwise good  He reports mild MOHAN that has been improving over the past few days  Denies shortness of breath chest pain abdominal pain  He is voiding well and passing stool and tolerating p o  intake  Objective:     Vitals: Blood pressure 133/79, pulse 92, temperature 98 °F (36 7 °C), resp  rate 18, height 6' (1 829 m), weight 74 2 kg (163 lb 9 3 oz), SpO2 94 %  ,Body mass index is 22 19 kg/m²  Wt Readings from Last 3 Encounters:   09/24/20 74 2 kg (163 lb 9 3 oz)   09/18/20 77 1 kg (170 lb)   09/08/20 76 9 kg (169 lb 8 5 oz)       Intake/Output Summary (Last 24 hours) at 9/24/2020 1328  Last data filed at 9/24/2020 0700  Gross per 24 hour   Intake 180 ml   Output 700 ml   Net -520 ml       Physical Exam: /79   Pulse 92   Temp 98 °F (36 7 °C)   Resp 18   Ht 6' (1 829 m)   Wt 74 2 kg (163 lb 9 3 oz)   SpO2 94%   BMI 22 19 kg/m²   General appearance: alert and oriented, in no acute distress  Lungs:  Few rhonchi heard at the right lung base left lungs clear to auscultation  Heart: regular rate and rhythm, S1, S2 normal, no murmur, click, rub or gallop  Abdomen: soft, non-tender; bowel sounds normal; no masses,  no organomegaly     Recent Results (from the past 24 hour(s))   Fingerstick Glucose (POCT) Collection Time: 09/23/20  4:01 PM   Result Value Ref Range    POC Glucose 84 65 - 140 mg/dl   Fingerstick Glucose (POCT)    Collection Time: 09/23/20  8:12 PM   Result Value Ref Range    POC Glucose 95 65 - 140 mg/dl   Fingerstick Glucose (POCT)    Collection Time: 09/24/20 12:44 AM   Result Value Ref Range    POC Glucose 82 65 - 140 mg/dl   Basic metabolic panel    Collection Time: 09/24/20  6:27 AM   Result Value Ref Range    Sodium 137 136 - 145 mmol/L    Potassium 3 4 (L) 3 5 - 5 3 mmol/L    Chloride 104 100 - 108 mmol/L    CO2 24 21 - 32 mmol/L    ANION GAP 9 4 - 13 mmol/L    BUN 7 5 - 25 mg/dL    Creatinine 0 79 0 60 - 1 30 mg/dL    Glucose 81 65 - 140 mg/dL    Calcium 7 8 (L) 8 3 - 10 1 mg/dL    eGFR 93 ml/min/1 73sq m   CBC and differential    Collection Time: 09/24/20  6:27 AM   Result Value Ref Range    WBC 6 85 4 31 - 10 16 Thousand/uL    RBC 2 91 (L) 3 88 - 5 62 Million/uL    Hemoglobin 8 6 (L) 12 0 - 17 0 g/dL    Hematocrit 29 0 (L) 36 5 - 49 3 %     (H) 82 - 98 fL    MCH 29 6 26 8 - 34 3 pg    MCHC 29 7 (L) 31 4 - 37 4 g/dL    RDW 18 4 (H) 11 6 - 15 1 %    MPV 10 5 8 9 - 12 7 fL    Platelets 560 553 - 070 Thousands/uL    nRBC 0 /100 WBCs    Neutrophils Relative 56 43 - 75 %    Immat GRANS % 1 0 - 2 %    Lymphocytes Relative 25 14 - 44 %    Monocytes Relative 11 4 - 12 %    Eosinophils Relative 6 0 - 6 %    Basophils Relative 1 0 - 1 %    Neutrophils Absolute 3 82 1 85 - 7 62 Thousands/µL    Immature Grans Absolute 0 09 0 00 - 0 20 Thousand/uL    Lymphocytes Absolute 1 71 0 60 - 4 47 Thousands/µL    Monocytes Absolute 0 73 0 17 - 1 22 Thousand/µL    Eosinophils Absolute 0 43 0 00 - 0 61 Thousand/µL    Basophils Absolute 0 07 0 00 - 0 10 Thousands/µL   Magnesium    Collection Time: 09/24/20  6:27 AM   Result Value Ref Range    Magnesium 1 6 1 6 - 2 6 mg/dL   Phosphorus    Collection Time: 09/24/20  6:27 AM   Result Value Ref Range    Phosphorus 3 0 2 3 - 4 1 mg/dL   Fingerstick Glucose (POCT) Collection Time: 09/24/20  6:59 AM   Result Value Ref Range    POC Glucose 73 65 - 140 mg/dl   Fingerstick Glucose (POCT)    Collection Time: 09/24/20 11:02 AM   Result Value Ref Range    POC Glucose 111 65 - 140 mg/dl       Current Facility-Administered Medications   Medication Dose Route Frequency Provider Last Rate Last Dose    acetaminophen (TYLENOL) tablet 650 mg  650 mg Oral Q6H PRN Will YESENIA Kee-C   650 mg at 09/24/20 0309    albuterol inhalation solution 2 5 mg  2 5 mg Nebulization Q4H PRN Will YESENIA Kee-C   2 5 mg at 09/19/20 2303    amiodarone tablet 200 mg  200 mg Oral Daily With [employer] YESENIA Norton-C   200 mg at 09/24/20 3562    apixaban (ELIQUIS) tablet 5 mg  5 mg Oral BID Will KATELYN KeeC   5 mg at 09/24/20 1970    atorvastatin (LIPITOR) tablet 40 mg  40 mg Oral Daily With KATELYN GutierrezC   40 mg at 09/23/20 1645    cefepime (MAXIPIME) 2 g/50 mL dextrose IVPB  2,000 mg Intravenous Q12H Will YESENIA Kee-C 100 mL/hr at 09/24/20 0359 2,000 mg at 09/24/20 0359    dextrose 50 % IV solution 25 mL  25 mL Intravenous Q6H PRN Will YESENIA Kee-C   25 mL at 09/19/20 0725    metoclopramide (REGLAN) tablet 5 mg  5 mg Oral TID [facility] Will EDVIN Kee   5 mg at 09/24/20 1113    metroNIDAZOLE (FLAGYL) IVPB (premix) 500 mg 100 mL  500 mg Intravenous Q8H Will YESENIA Kee-C 200 mL/hr at 09/24/20 0844 500 mg at 09/24/20 0844    pantoprazole (PROTONIX) EC tablet 40 mg  40 mg Oral Daily Before Breakfast Will KATELYN KeeC   40 mg at 09/24/20 5910    saccharomyces boulardii (FLORASTOR) capsule 250 mg  250 mg Oral BID Zulema Pool MD   250 mg at 09/24/20 0843    senna-docusate sodium (SENOKOT S) 8 6-50 mg per tablet 1 tablet  1 tablet Oral HS Will Hu, PA-C   1 tablet at 09/23/20 2220    tamsulosin (FLOMAX) capsule 0 4 mg  0 4 mg Oral Daily With [place] [employer] EDVIN Knight   0 4 mg at 09/23/20 1646    vancomycin (VANCOCIN) 1,500 mg in sodium chloride 0 9 % 250 mL IVPB  17 5 mg/kg Intravenous Q12H Angelo Christianson PA-C 166 7 mL/hr at 09/24/20 1126 1,500 mg at 09/24/20 1126    venlafaxine (EFFEXOR-XR) 24 hr capsule 150 mg  150 mg Oral Daily Angelo Christianson PA-C   150 mg at 09/24/20 1114       Invasive Devices     Central Venous Catheter Line            Port A Cath Right Chest -- days    Port A Cath 07/31/19 Right Chest 421 days          Peripheral Intravenous Line            Peripheral IV 09/22/20 Left;Ventral (anterior) Forearm 1 day                Lab, Imaging and other studies: I have personally reviewed pertinent reports      VTE Pharmacologic Prophylaxis: Eliquis  VTE Mechanical Prophylaxis: sequential compression device    Gray Arreola MD

## 2020-09-24 NOTE — DISCHARGE INSTR - DIET
Puree (dys level 1) with honey thick liquids  NO STRAWS, NO CUPS  Liquids by tsp only  Medications crushed in puree

## 2020-09-24 NOTE — OCCUPATIONAL THERAPY NOTE
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[place] his own grill   Pain Assessment   Pain Assessment Tool Pain Assessment not indicated - pt denies pain   ADL   Grooming Assistance 5  Supervision/Setup   Grooming Deficit Wash/dry face; Teeth care;Standing with assistive device   Grooming Comments Pt stood at sink in bathroom with RW   LB Dressing Assistance 5  Supervision/Setup   LB Dressing Deficit Don/doff R sock; Don/doff L sock   LB Dressing Comments long sit in bed   Functional Standing Tolerance   Time 10 mins dynamic standing with supervision   Activity completing grooming activities standing at sink with RW   Comments Pt with little fatigue from acitivity   Bed Mobility   Supine to Sit 5  Supervision   Additional items Increased time required   Transfers   Sit to Stand 4  Minimal assistance   Additional items Assist x 1;Verbal cues; Increased time required  (cueing for safe hand placement pushing from bed )   Stand to Sit 5  Supervision   Additional items Verbal cues   Functional Mobility   Functional Mobility 5  Supervision   Additional Comments short household distance to bathroom with RW; Pt on 3L O2   Additional items Rolling walker   Cognition   Overall Cognitive Status WFL   Arousal/Participation Cooperative   Attention Within functional limits   Orientation Level Oriented X4   Following Commands Follows multistep commands with increased time or repetition   Activity Tolerance   Activity Tolerance Patient tolerated treatment well   Assessment   Assessment Pt seen for OT treatment  Pt alert and willing to participate in therapy today  Pt demonstrated increased endurance today, tolerating ambulating to the bathroom and standing at sink for 10 minutes to complete grooming  Pt showed no signs of breathing difficulty while engaging in therapy   Pt reports wife usually brushes his teeth and washes his face in bed for him and that it has been over a year since he last stood to complete this task  Pt expressed that it felt good to do it himself  Pt demonstrated good hygiene/grooming skills at sink  Pt left seated in chair with all needs within reach  Pt will benefit from continued OT services to improve engagement in ADL's and increase endurance and functional mobility  Plan   Treatment Interventions ADL retraining;Functional transfer training;UE strengthening/ROM; Endurance training;Patient/family training;Equipment evaluation/education;Continued evaluation; Energy conservation; Activityengagement   Goal Expiration Date 10/05/20   OT Frequency 3-5x/wk   Recommendation   OT Discharge Recommendation Home with skilled therapy  (home OT)   Licensure   [place] License Number  Arlyce Current, OTS

## 2020-09-24 NOTE — PROGRESS NOTES
Self Discharge    Patient cancelled all future appts due to COVID-19 concerns and will be considered self-discharge at this time due to inability to perform PT via virtual visits

## 2020-09-24 NOTE — PLAN OF CARE
Problem: OCCUPATIONAL THERAPY ADULT  Goal: Performs self-care activities at highest level of function for planned discharge setting  See evaluation for individualized goals  Outcome: Progressing  Note: Limitation: Decreased ADL status, Decreased UE strength, Decreased Safe judgement during ADL, Decreased endurance, Decreased self-care trans, Decreased high-level ADLs(decreased balance and mobility )  Prognosis: Good  Assessment: Pt seen for OT treatment  Pt alert and willing to participate in therapy today  Pt demonstrated increased endurance today, tolerating ambulating to the bathroom and standing at sink for 10 minutes to complete grooming  Pt showed no signs of breathing difficulty while engaging in therapy  Pt reports wife usually brushes his teeth and washes his face in bed for him and that it has been over a year since he last stood to complete this task  Pt expressed that it felt good to do it himself  Pt demonstrated good hygiene/grooming skills at sink  Pt left seated in chair with all needs within reach  Pt will benefit from continued OT services to improve engagement in ADL's and increase endurance and functional mobility        OT Discharge Recommendation: Home with skilled therapy(home OT)

## 2020-09-24 NOTE — PROGRESS NOTES
Vancomycin IV Pharmacy-to-Dose Consultation    Belkys Sheets is a 77 y o  male who is currently receiving Vancomycin IV with management by the Pharmacy Consult service  Assessment/Plan:  The patient was reviewed  Renal function is stable and no signs or symptoms of nephrotoxicity and/or infusion reactions were documented in the chart  Based on todays assessment, continue current vancomycin (day # 7) dosing of 1500 mg IV q12h, with a plan for trough to be drawn at 1000 on 9/28/20  We will continue to follow the patients culture results and clinical progress daily      Oscar Chaudhary, Pharmacist

## 2020-09-25 ENCOUNTER — TELEPHONE (OUTPATIENT)
Dept: FAMILY MEDICINE CLINIC | Facility: CLINIC | Age: 65
End: 2020-09-25

## 2020-09-25 VITALS
HEART RATE: 79 BPM | RESPIRATION RATE: 16 BRPM | DIASTOLIC BLOOD PRESSURE: 79 MMHG | BODY MASS INDEX: 22.16 KG/M2 | SYSTOLIC BLOOD PRESSURE: 135 MMHG | OXYGEN SATURATION: 95 % | TEMPERATURE: 97.6 F | WEIGHT: 163.58 LBS | HEIGHT: 72 IN

## 2020-09-25 LAB
ANION GAP SERPL CALCULATED.3IONS-SCNC: 6 MMOL/L (ref 4–13)
BASOPHILS # BLD AUTO: 0.09 THOUSANDS/ΜL (ref 0–0.1)
BASOPHILS NFR BLD AUTO: 1 % (ref 0–1)
BUN SERPL-MCNC: 6 MG/DL (ref 5–25)
CALCIUM SERPL-MCNC: 8 MG/DL (ref 8.3–10.1)
CHLORIDE SERPL-SCNC: 105 MMOL/L (ref 100–108)
CO2 SERPL-SCNC: 24 MMOL/L (ref 21–32)
CREAT SERPL-MCNC: 0.83 MG/DL (ref 0.6–1.3)
EOSINOPHIL # BLD AUTO: 0.39 THOUSAND/ΜL (ref 0–0.61)
EOSINOPHIL NFR BLD AUTO: 5 % (ref 0–6)
ERYTHROCYTE [DISTWIDTH] IN BLOOD BY AUTOMATED COUNT: 18.3 % (ref 11.6–15.1)
GFR SERPL CREATININE-BSD FRML MDRD: 91 ML/MIN/1.73SQ M
GLUCOSE SERPL-MCNC: 104 MG/DL (ref 65–140)
GLUCOSE SERPL-MCNC: 69 MG/DL (ref 65–140)
GLUCOSE SERPL-MCNC: 83 MG/DL (ref 65–140)
GLUCOSE SERPL-MCNC: 88 MG/DL (ref 65–140)
GLUCOSE SERPL-MCNC: 94 MG/DL (ref 65–140)
HCT VFR BLD AUTO: 31.1 % (ref 36.5–49.3)
HGB BLD-MCNC: 9.3 G/DL (ref 12–17)
IMM GRANULOCYTES # BLD AUTO: 0.1 THOUSAND/UL (ref 0–0.2)
IMM GRANULOCYTES NFR BLD AUTO: 1 % (ref 0–2)
LYMPHOCYTES # BLD AUTO: 2.1 THOUSANDS/ΜL (ref 0.6–4.47)
LYMPHOCYTES NFR BLD AUTO: 26 % (ref 14–44)
MAGNESIUM SERPL-MCNC: 1.7 MG/DL (ref 1.6–2.6)
MCH RBC QN AUTO: 29.6 PG (ref 26.8–34.3)
MCHC RBC AUTO-ENTMCNC: 29.9 G/DL (ref 31.4–37.4)
MCV RBC AUTO: 99 FL (ref 82–98)
MONOCYTES # BLD AUTO: 0.72 THOUSAND/ΜL (ref 0.17–1.22)
MONOCYTES NFR BLD AUTO: 9 % (ref 4–12)
NEUTROPHILS # BLD AUTO: 4.7 THOUSANDS/ΜL (ref 1.85–7.62)
NEUTS SEG NFR BLD AUTO: 58 % (ref 43–75)
NRBC BLD AUTO-RTO: 0 /100 WBCS
PHOSPHATE SERPL-MCNC: 3 MG/DL (ref 2.3–4.1)
PLATELET # BLD AUTO: 315 THOUSANDS/UL (ref 149–390)
PMV BLD AUTO: 10.2 FL (ref 8.9–12.7)
POTASSIUM SERPL-SCNC: 3.8 MMOL/L (ref 3.5–5.3)
PROCALCITONIN SERPL-MCNC: 0.39 NG/ML
RBC # BLD AUTO: 3.14 MILLION/UL (ref 3.88–5.62)
SODIUM SERPL-SCNC: 135 MMOL/L (ref 136–145)
WBC # BLD AUTO: 8.1 THOUSAND/UL (ref 4.31–10.16)

## 2020-09-25 PROCEDURE — 94669 MECHANICAL CHEST WALL OSCILL: CPT

## 2020-09-25 PROCEDURE — 83735 ASSAY OF MAGNESIUM: CPT | Performed by: STUDENT IN AN ORGANIZED HEALTH CARE EDUCATION/TRAINING PROGRAM

## 2020-09-25 PROCEDURE — 94640 AIRWAY INHALATION TREATMENT: CPT

## 2020-09-25 PROCEDURE — 84100 ASSAY OF PHOSPHORUS: CPT | Performed by: STUDENT IN AN ORGANIZED HEALTH CARE EDUCATION/TRAINING PROGRAM

## 2020-09-25 PROCEDURE — 80048 BASIC METABOLIC PNL TOTAL CA: CPT | Performed by: STUDENT IN AN ORGANIZED HEALTH CARE EDUCATION/TRAINING PROGRAM

## 2020-09-25 PROCEDURE — 94668 MNPJ CHEST WALL SBSQ: CPT

## 2020-09-25 PROCEDURE — 99238 HOSP IP/OBS DSCHRG MGMT 30/<: CPT | Performed by: FAMILY MEDICINE

## 2020-09-25 PROCEDURE — 85025 COMPLETE CBC W/AUTO DIFF WBC: CPT | Performed by: STUDENT IN AN ORGANIZED HEALTH CARE EDUCATION/TRAINING PROGRAM

## 2020-09-25 PROCEDURE — 94760 N-INVAS EAR/PLS OXIMETRY 1: CPT

## 2020-09-25 PROCEDURE — 82948 REAGENT STRIP/BLOOD GLUCOSE: CPT

## 2020-09-25 RX ORDER — SACCHAROMYCES BOULARDII 250 MG
250 CAPSULE ORAL 2 TIMES DAILY
Qty: 30 CAPSULE | Refills: 1 | Status: SHIPPED | OUTPATIENT
Start: 2020-09-25 | End: 2020-11-19

## 2020-09-25 RX ORDER — MAGNESIUM SULFATE 1 G/100ML
1 INJECTION INTRAVENOUS ONCE
Status: COMPLETED | OUTPATIENT
Start: 2020-09-25 | End: 2020-09-25

## 2020-09-25 RX ORDER — LEVOFLOXACIN 750 MG/1
750 TABLET ORAL EVERY 24 HOURS
Qty: 5 TABLET | Refills: 0 | Status: SHIPPED | OUTPATIENT
Start: 2020-09-25 | End: 2020-09-28

## 2020-09-25 RX ADMIN — VENLAFAXINE HYDROCHLORIDE 150 MG: 150 CAPSULE, EXTENDED RELEASE ORAL at 08:28

## 2020-09-25 RX ADMIN — CEFEPIME HYDROCHLORIDE 2000 MG: 2 INJECTION, POWDER, FOR SOLUTION INTRAVENOUS at 04:49

## 2020-09-25 RX ADMIN — ALBUTEROL SULFATE 2.5 MG: 2.5 SOLUTION RESPIRATORY (INHALATION) at 08:40

## 2020-09-25 RX ADMIN — MAGNESIUM SULFATE HEPTAHYDRATE 1 G: 1 INJECTION, SOLUTION INTRAVENOUS at 09:51

## 2020-09-25 RX ADMIN — METRONIDAZOLE 500 MG: 500 INJECTION, SOLUTION INTRAVENOUS at 01:00

## 2020-09-25 RX ADMIN — VANCOMYCIN HYDROCHLORIDE 1500 MG: 10 INJECTION, POWDER, LYOPHILIZED, FOR SOLUTION INTRAVENOUS at 10:32

## 2020-09-25 RX ADMIN — METOCLOPRAMIDE 5 MG: 10 TABLET ORAL at 06:51

## 2020-09-25 RX ADMIN — ALBUTEROL SULFATE 2.5 MG: 2.5 SOLUTION RESPIRATORY (INHALATION) at 14:27

## 2020-09-25 RX ADMIN — METOCLOPRAMIDE 5 MG: 10 TABLET ORAL at 11:58

## 2020-09-25 RX ADMIN — AMIODARONE HYDROCHLORIDE 200 MG: 200 TABLET ORAL at 08:28

## 2020-09-25 RX ADMIN — APIXABAN 5 MG: 5 TABLET, FILM COATED ORAL at 08:28

## 2020-09-25 RX ADMIN — METRONIDAZOLE 500 MG: 500 INJECTION, SOLUTION INTRAVENOUS at 08:28

## 2020-09-25 RX ADMIN — Medication 250 MG: at 08:28

## 2020-09-25 NOTE — NURSING NOTE
Pt requests removal of mayelin for so "he can sleep"  Isreal Day removed- agrees to have device applied in am before change of shift   Charge nurse Lorena benavides

## 2020-09-25 NOTE — PROGRESS NOTES
Vancomycin IV Pharmacy-to-Dose Consultation    Jose Grace is a 77 y o  male who is currently receiving Vancomycin IV with management by the Pharmacy Consult service  Assessment/Plan:  The patient was reviewed  Renal function is stable and no signs or symptoms of nephrotoxicity and/or infusion reactions were documented in the chart  Based on todays assessment, continue current vancomycin (day # 8) dosing of 1500 mg IV q12h, with a plan for trough to be drawn at 1000 on 9/28/20  We will continue to follow the patients culture results and clinical progress daily      Rosey Mckinley, Pharmacist

## 2020-09-25 NOTE — DISCHARGE INSTR - AVS FIRST PAGE
- take levaquin 750 mg one tablet PO daily  - follow up with [facility] in one week  - continue puree level 1 diet

## 2020-09-25 NOTE — PLAN OF CARE
Problem: Potential for Falls  Goal: Patient will remain free of falls  Description: INTERVENTIONS:  - Assess patient frequently for physical needs  -  Identify cognitive and physical deficits and behaviors that affect risk of falls    -  [place] fall precautions as indicated by assessment   - Educate patient/family on patient safety including physical limitations  - Instruct patient to call for assistance with activity based on assessment  - Modify environment to reduce risk of injury  - Consider OT/PT consult to assist with strengthening/mobility  Outcome: Progressing     Problem: RESPIRATORY - ADULT  Goal: Achieves optimal ventilation and oxygenation  Description: INTERVENTIONS:  - Assess for changes in respiratory status  - Assess for changes in mentation and behavior  - Position to facilitate oxygenation and minimize respiratory effort  - Oxygen administered by appropriate delivery if ordered  - Initiate smoking cessation education as indicated  - Encourage broncho-pulmonary hygiene including cough, deep breathe, Incentive Spirometry  - Assess the need for suctioning and aspirate as needed  - Assess and instruct to report SOB or any respiratory difficulty  - Respiratory Therapy support as indicated  - Vapotherm  Outcome: Progressing     Problem: Prexisting or High Potential for Compromised Skin Integrity  Goal: Skin integrity is maintained or improved  Description: INTERVENTIONS:  - Identify patients at risk for skin breakdown  - Assess and monitor skin integrity  - Assess and monitor nutrition and hydration status  - Monitor labs   - Assess for incontinence   - Turn and reposition patient  - Assist with mobility/ambulation  - Relieve pressure over bony prominences  - Avoid friction and shearing  - Provide appropriate hygiene as needed including keeping skin clean and dry  - Evaluate need for skin moisturizer/barrier cream  - Collaborate with interdisciplinary team   - Patient/family teaching  - Consider wound care consult   Outcome: Progressing     Problem: Nutrition/Hydration-ADULT  Goal: Nutrient/Hydration intake appropriate for improving, restoring or maintaining nutritional needs  Description: Monitor and assess patient's nutrition/hydration status for malnutrition  Collaborate with interdisciplinary team and initiate plan and interventions as ordered  Monitor patient's weight and dietary intake as ordered or per policy  Utilize nutrition screening tool and intervene as necessary  Determine patient's food preferences and provide high-protein, high-caloric foods as appropriate       INTERVENTIONS:  - Monitor oral intake, urinary output, labs, and treatment plans  - Assess nutrition and hydration status and recommend course of action  - Evaluate amount of meals eaten  - Assist patient with eating if necessary   - Allow adequate time for meals  - Recommend/ encourage appropriate diets, oral nutritional supplements, and vitamin/mineral supplements  - Order, calculate, and assess calorie counts as needed  - Assess need for intravenous fluids  - Provide specific nutrition/hydration education as appropriate  - Include patient/family/caregiver in decisions related to nutrition  Outcome: Progressing

## 2020-09-25 NOTE — DISCHARGE SUMMARY
[facility] Discharge Summary - Medical Sarbjit Lang 77 y o  male MRN: [id number]    [address] Room / Bed: [facility]0 [facility] Encounter: [id number]    BRIEF OVERVIEW  Admitting Provider: Alfred Ray MD  Discharge Provider: Vickey Hdz MD  Discharge To:  home  Outpatient Follow-Up: [facility]  Things to address at first follow up visit:  Completion of p o  antibiotics, puree level 1 diet compliance, symptoms of dysphagia, aspiration precautions  Labs and results pending at discharge: none    Admission Date: 9/18/2020     Discharge Date: 9/25/2020    Primary Discharge Diagnosis  Principal Problem:    Aspiration pneumonia ([facility] )  Active Problems:    Other dysphagia    Major depression    Anxiety    Gastroesophageal cancer ([facility] )    Severe protein-calorie malnutrition Issa Rumple: less than 60% of standard weight) ([facility])    PAF (paroxysmal atrial fibrillation) ([facility])    Chronic combined systolic and diastolic CHF (congestive heart failure) ([facility] )    Port-A-Cath in place    Hematuria    Hyponatremia    Hypoglycemia    Severe protein-calorie malnutrition ([facility] )  Resolved Problems:    Septic Shock ([facility] )    JENNIFER (acute kidney injury) ([facility] )    Lactic acidosis    Acute respiratory failure with hypoxemia ([facility] )        [address] STAY    Procedures Performed/Pertinent Test results  9/25 Hgb 9 3, WBC 8 1, Na 135, K 3 8, Cr 0 83, Glu 69-88, Mg 1 7, Phos 3 0  9/24:Hgb 8 6, WBC 6 85, Na 137, K 3 4, Cr 0 79, Glu 81 Mg 1 6, Phos 3 0  9/23: Hgb 8 8, WBC 8 49, Mg 1 9, Phos 3 1, Na 139, K 3 9, Cr 0 82, Glu 123  9/22: Na 134, K 3 4, Cr 0 85, Ca 7 6, WBC 9 32, RBC 2 84, Hb 8 5, Hct 28, RDW 18 3, Mag 1 4, Phos 2 9, POC Glu 90  9/21: PCT 6 11 Hgb 9 5, K 3 8, WBC 10 42, Cr, 0 84, Glu 74, barium swallow- moderate-severe oropharyngeal dysphagia characterized by silent aspiration on nectar thick liquids, thin liquids, ambient fluid from mixed consistencies    Recommended is puree/level 1 diet and honey thick liquids   9/20: Procal 14 07, K 3 5, Cr 0 87, Glu 84-73, WBC 7 33, Hg 8 4, Ca ionized 1 11  9/19: Procal 32 31, K 3 2, Cr 0 97, Glu , WBC 8 13, Hgb 7 8, Urine Strep/Legionella neg x2  9/18: Procal 58 27 UA large blood, 1+ protein, COVID/Flu A/B/RSV (-) BNP 5950 Na 131 K 3 8 BUN 32 Cr 1 66 Alb 2 3 WBC 11 95 Hb 10 6 () LA 4 7 > 2 1->1 5 Trop (-) INR 1 92, PROCAL 58 27  - CXR: Pneumonia R>L, new on the right and improved on the left   - EKG: NSR, normal EKG       HPI  Nando Nevarez is a 77 y o  male with Recent hospitalization 8/30/20-9/8/20 for septic shock secondary to left lung pneumonia with subsequent rehab until 9/14  Patient refers being well at home until yesterday when he developed significant generalized weakness and shortness of breath  Today had episode of hypotension and hypoxia and was refered to ED by primary care physician after office visit  On ED placed on high flow supplemental O2 due to hypoxia  Chest Xray shows new onset right lung pneumonia  Transferred to ED 2a to hypotension/respiratory failure and concern for septic shock  during his stay he also had a FL Barium swallow video done, see results above  On the day of discharge the patient had completed all of his IV antibiotics and was successfully weaned off the nasal cannula  Hospital Course  68-year-old male who was admitted for aspiration pneumonia  He was sent to the ED by his PCP because he was hypotensive and hypoxic  On imaging he was found to have new onset right lung pneumonia new started on empiric treatment of cefepime Flagyl and vancomycin  Patient was septic and hypoxic and was admitted to the ICU  His hypotension resolved with IV fluid resuscitation  He was placed on Vapotherm  He was later step-down from the ICU after being successfully weaned down to 3 L nasal cannula  He eventually had to go back to 6 L nasal cannula  But was later transitioned back to 3 L   Patient was continued empirically on cefepime, Flagyl, and vancomycin since no specific microorganism could be identified  He was also being evaluated for his dysphagia and was found to have moderate to severe oropharyngeal dysphagia and was transitioned to puree level 1 diet and honey thick liquids  He was successfully weaned off nasal cannula and transitioned to room air  He completed 7 days of IV antibiotics of Flagyl, vancomycin and cefepime and was discharged with Levaquin 750 mg p o  daily  On the day of discharge patient was stable, breathing comfortably, tolerating p o  intake and understood the discharge instructions  Physical Exam at Discharge  /79   Pulse 79   Temp 97 6 °F (36 4 °C)   Resp 16   Ht 6' (1 829 m)   Wt 74 2 kg (163 lb 9 3 oz)   SpO2 95%   BMI 22 19 kg/m²   General appearance: alert and oriented, in no acute distress and alert  Lungs: clear to auscultation bilaterally  Heart: regular rate and rhythm, S1, S2 normal, no murmur, click, rub or gallop    Medications   See AVS    Allergies  Allergies   Allergen Reactions    Bee Venom Anaphylaxis    Shellfish-Derived Products      Develops GOUT       Diet restrictions:   A level 1 diet and honey thick liquids due to moderate to severe oropharyngeal dysphagia  Activity restrictions: none  Code Status: Level 3 - DNAR and DNI  Discharge Condition: good      Discharge  Statement   I spent 15 minutes discharging the patient  This time was spent on the day of discharge  I had direct contact with the patient on the day of discharge  Additional documentation is required if more than 30 minutes were spent on discharge

## 2020-09-25 NOTE — DISCHARGE INSTRUCTIONS
Dysphagia   WHAT YOU NEED TO KNOW:   Dysphagia is trouble swallowing  It occurs when you have trouble moving food or liquid from your mouth to your esophagus or down to your stomach  It may occur when you eat, drink, or any time you try to swallow  DISCHARGE INSTRUCTIONS:   Return to the emergency department if:   · You choke on your own saliva  · You have chest pain  · You have shortness of breath  · You cannot eat or drink liquids at all  Contact your healthcare provider if:   · You lose weight without trying  · Your signs and symptoms get worse, or you have new signs or symptoms  · You have signs or symptoms of dehydration, such as increased thirst, dark yellow urine, or little or no urine  · You get colds often  · You have questions or concerns about your condition or care  Nutrition:  You may need to change the texture of the foods you eat to help reduce choking problems  Your healthcare provider may show you how to thicken liquids or soften foods to make them easier to swallow  Follow up with your healthcare provider as directed:  Write down your questions so you remember to ask them during your visits  © 2017 [employer] [employer] Information is for End User's use only and may not be sold, redistributed or otherwise used for commercial purposes  All illustrations and images included in CareNotes® are the copyrighted property of A D A M [employer]  or [employer] Howell  The above information is an  only  It is not intended as medical advice for individual conditions or treatments  Talk to your doctor, nurse or pharmacist before following any medical regimen to see if it is safe and effective for you  Pneumonia   WHAT YOU NEED TO KNOW:   Pneumonia is an infection in your lungs caused by bacteria, viruses, fungi, or parasites  You can become infected if you come in contact with someone who is sick   You can get pneumonia if you recently had surgery or needed a ventilator to help you breathe  Pneumonia can also be caused by accidentally inhaling saliva or small pieces of food  Pneumonia may cause mild symptoms, or it can be severe and life-threatening  DISCHARGE INSTRUCTIONS:   Return to the emergency department if:   · You cough up blood  · Your heart beats more than 100 beats in 1 minute  · You are very tired, confused, and cannot think clearly  · You have chest pain or trouble breathing  · Your lips or fingernails turn gray or blue  Contact your healthcare provider if:   · Your symptoms are the same or get worse 48 hours after you start antibiotics  · Your fever is not below 99°F (37 2°C) 48 hours after you start antibiotics  · You have a fever higher than 101°F (38 3°C)  · You cannot eat, or you have loss of appetite, nausea, or are vomiting  · You have questions or concerns about your condition or care  Medicines:   · Antibiotics  treat pneumonia caused by bacteria  · Acetaminophen  decreases pain and fever  It is available without a doctor's order  Ask how much to take and how often to take it  Follow directions  Read the labels of all other medicines you are using to see if they also contain acetaminophen, or ask your doctor or pharmacist  Acetaminophen can cause liver damage if not taken correctly  Do not use more than 4 grams (4,000 milligrams) total of acetaminophen in one day  · NSAIDs , such as ibuprofen, help decrease swelling, pain, and fever  This medicine is available with or without a doctor's order  NSAIDs can cause stomach bleeding or kidney problems in certain people  If you take blood thinner medicine, always ask your healthcare provider if NSAIDs are safe for you  Always read the medicine label and follow directions  · Take your medicine as directed  Contact your healthcare provider if you think your medicine is not helping or if you have side effects   Tell him or her if you are allergic to any medicine  Keep a list of the medicines, vitamins, and herbs you take  Include the amounts, and when and why you take them  Bring the list or the pill bottles to follow-up visits  Carry your medicine list with you in case of an emergency  Follow up with your healthcare provider as directed: You will need to return for more tests  Write down your questions so you remember to ask them during your visits  Manage your symptoms:   · Rest as needed  Rest often throughout the day  Alternate times of activity with times of rest     · Drink liquids as directed  Ask how much liquid to drink each day and which liquids are best for you  Liquids help thin your mucus, which may make it easier for you to cough it up  · Do not smoke  Avoid secondhand smoke  Smoking increases your risk for pneumonia  Smoking also makes it harder for you to get better after you have had pneumonia  Ask your healthcare provider for information if you need help to quit smoking  · Use a cool mist humidifier  A humidifier will help increase air moisture in your home  This may make it easier for you to breathe and help decrease your cough  · Keep your head elevated  You may be able to breathe better if you lie down with the head of your bed up  Prevent pneumonia:   · Prevent the spread of germs  Wash your hands often with soap and water  Use gel hand cleanser when there is no soap and water available  Do not touch your eyes, nose, or mouth unless you have washed your hands first  Cover your mouth when you cough  Cough into a tissue or your shirtsleeve so you do not spread germs from your hands  If you are sick, stay away from others as much as possible  · Limit alcohol  Women should limit alcohol to 1 drink a day  Men should limit alcohol to 2 drinks a day  A drink of alcohol is 12 ounces of beer, 5 ounces of wine, or 1½ ounces of liquor  · Ask about vaccines  You may need a vaccine to help prevent pneumonia   Get an influenza (flu) vaccine every year as soon as it becomes available  © 2017 [employer] Information is for End User's use only and may not be sold, redistributed or otherwise used for commercial purposes  All illustrations and images included in CareNotes® are the copyrighted property of [employer] Inc  or [employer] Howell  The above information is an  only  It is not intended as medical advice for individual conditions or treatments  Talk to your doctor, nurse or pharmacist before following any medical regimen to see if it is safe and effective for you

## 2020-09-28 ENCOUNTER — TELEMEDICINE (OUTPATIENT)
Dept: PALLIATIVE MEDICINE | Facility: CLINIC | Age: 65
End: 2020-09-28
Payer: MEDICARE

## 2020-09-28 ENCOUNTER — TELEPHONE (OUTPATIENT)
Dept: HEMATOLOGY ONCOLOGY | Facility: CLINIC | Age: 65
End: 2020-09-28

## 2020-09-28 ENCOUNTER — EPISODE CHANGES (OUTPATIENT)
Dept: CASE MANAGEMENT | Facility: HOSPITAL | Age: 65
End: 2020-09-28

## 2020-09-28 DIAGNOSIS — Z51.5 PALLIATIVE CARE PATIENT: ICD-10-CM

## 2020-09-28 DIAGNOSIS — G47.09 OTHER INSOMNIA: Chronic | ICD-10-CM

## 2020-09-28 DIAGNOSIS — R11.0 CHEMOTHERAPY-INDUCED NAUSEA: ICD-10-CM

## 2020-09-28 DIAGNOSIS — C16.0 MALIGNANT NEOPLASM OF CARDIA OF STOMACH (HCC): ICD-10-CM

## 2020-09-28 DIAGNOSIS — R53.83 OTHER FATIGUE: ICD-10-CM

## 2020-09-28 DIAGNOSIS — C16.0 GASTROESOPHAGEAL CANCER (HCC): Primary | ICD-10-CM

## 2020-09-28 DIAGNOSIS — T45.1X5A CHEMOTHERAPY-INDUCED NAUSEA: ICD-10-CM

## 2020-09-28 DIAGNOSIS — C15.5 MALIGNANT NEOPLASM OF LOWER THIRD OF ESOPHAGUS (HCC): ICD-10-CM

## 2020-09-28 PROCEDURE — 99214 OFFICE O/P EST MOD 30 MIN: CPT | Performed by: FAMILY MEDICINE

## 2020-09-28 RX ORDER — SUCRALFATE 1 G/1
1 TABLET ORAL
Qty: 120 TABLET | Refills: 1 | Status: SHIPPED | OUTPATIENT
Start: 2020-09-28 | End: 2020-11-19
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
## 2020-09-29 ENCOUNTER — PATIENT OUTREACH (OUTPATIENT)
Dept: FAMILY MEDICINE CLINIC | Facility: CLINIC | Age: 65
End: 2020-09-29

## 2020-09-29 ENCOUNTER — OFFICE VISIT (OUTPATIENT)
Dept: HEMATOLOGY ONCOLOGY | Facility: CLINIC | Age: 65
End: 2020-09-29
Payer: MEDICARE

## 2020-09-29 VITALS
RESPIRATION RATE: 16 BRPM | TEMPERATURE: 96.5 F | BODY MASS INDEX: 23.4 KG/M2 | HEIGHT: 69 IN | WEIGHT: 158 LBS | SYSTOLIC BLOOD PRESSURE: 90 MMHG | OXYGEN SATURATION: 100 % | HEART RATE: 64 BPM | DIASTOLIC BLOOD PRESSURE: 62 MMHG

## 2020-09-29 DIAGNOSIS — C15.5 MALIGNANT NEOPLASM OF LOWER THIRD OF ESOPHAGUS (HCC): ICD-10-CM

## 2020-09-29 DIAGNOSIS — C16.0 MALIGNANT NEOPLASM OF CARDIA OF STOMACH (HCC): ICD-10-CM

## 2020-09-29 DIAGNOSIS — E86.0 DEHYDRATION: ICD-10-CM

## 2020-09-29 DIAGNOSIS — R11.0 NAUSEA: Primary | ICD-10-CM

## 2020-09-29 DIAGNOSIS — C16.0 GASTROESOPHAGEAL CANCER (HCC): ICD-10-CM

## 2020-09-29 DIAGNOSIS — C15.5 MALIGNANT NEOPLASM OF LOWER THIRD OF ESOPHAGUS (HCC): Primary | ICD-10-CM

## 2020-09-29 DIAGNOSIS — R11.0 NAUSEA: ICD-10-CM

## 2020-09-29 PROCEDURE — 99214 OFFICE O/P EST MOD 30 MIN: CPT | Performed by: INTERNAL MEDICINE

## 2020-09-29 RX ORDER — SODIUM CHLORIDE 9 MG/ML
20 INJECTION, SOLUTION INTRAVENOUS ONCE AS NEEDED
Status: CANCELLED | OUTPATIENT
Start: 2020-10-07

## 2020-09-29 RX ORDER — DEXTROSE MONOHYDRATE 50 MG/ML
20 INJECTION, SOLUTION INTRAVENOUS ONCE
Status: CANCELLED | OUTPATIENT
Start: 2020-10-07

## 2020-09-29 RX ORDER — FLUOROURACIL 50 MG/ML
400 INJECTION, SOLUTION INTRAVENOUS ONCE
Status: CANCELLED | OUTPATIENT
Start: 2020-10-07

## 2020-09-29 NOTE — PROGRESS NOTES
Hematology/Oncology Outpatient Follow- up Note  Chanelle Amin 77 y o  male MRN: @ Encounter: [id number]        Date:  9/29/2020    Presenting Complaint/Diagnosis :Metastatic gastroesophageal cancer  HPI:    Keyonna Lucero seen for initial consultation 7/26/2019 regarding Newly diagnosed metastatic gastroesophageal cancer  The patient has biopsy-proven adenocarcinoma of the esophagus  PET/CT scan was done which shows omental nodularity that is FDG avid indicating stage IV disease      Previous Hematologic/ Oncologic History:    Oncology History   Malignant neoplasm of lower third of esophagus ([facility] )   7/26/2019 Initial Diagnosis    Malignant neoplasm of lower third of esophagus ([facility] )     8/1/2019 -  Chemotherapy    fluorouracil (ADRUCIL) injection 750 mg, 400 mg/m2 = 750 mg, Intravenous, Once, 28 of 34 cycles  Administration: 750 mg (8/1/2019), 750 mg (8/13/2019), 750 mg (8/27/2019), 750 mg (9/10/2019), 750 mg (9/24/2019), 750 mg (10/8/2019), 795 mg (10/22/2019), 795 mg (11/5/2019), 795 mg (11/19/2019), 795 mg (12/3/2019), 795 mg (12/17/2019), 795 mg (12/31/2019), 795 mg (1/14/2020), 795 mg (1/28/2020), 795 mg (2/11/2020), 795 mg (2/25/2020), 795 mg (3/10/2020), 795 mg (3/24/2020), 795 mg (4/7/2020), 795 mg (5/13/2020), 795 mg (5/27/2020), 795 mg (6/10/2020), 795 mg (6/24/2020), 795 mg (7/22/2020), 795 mg (8/5/2020), 790 mg (8/19/2020), 795 mg (4/29/2020)  pegfilgrastim (NEULASTA ONPRO) subcutaneous injection kit 6 mg, 6 mg, Subcutaneous, Once, 2 of 2 cycles  Administration: 6 mg (8/3/2019), 6 mg (8/15/2019)  leucovorin 750 mg in dextrose 5 % 250 mL IVPB, 748 mg, Intravenous, Once, 21 of 21 cycles  Administration: 750 mg (8/1/2019), 750 mg (8/13/2019), 750 mg (8/27/2019), 750 mg (9/10/2019), 750 mg (9/24/2019), 750 mg (10/8/2019), 800 mg (10/22/2019), 800 mg (11/5/2019), 800 mg (11/19/2019), 800 mg (12/3/2019), 800 mg (12/17/2019), 800 mg (12/31/2019), 800 mg (1/14/2020), 800 mg (1/28/2020), 800 mg (2/11/2020), 800 mg (2/25/2020), 800 mg (3/10/2020), 800 mg (3/24/2020), 800 mg (4/7/2020), 800 mg (5/13/2020), 800 mg (4/29/2020)  oxaliplatin (ELOXATIN) 158 95 mg in dextrose 5 % 250 mL chemo infusion, 85 mg/m2 = 158 95 mg, Intravenous, Once, 6 of 6 cycles  Administration: 158 95 mg (8/1/2019), 158 95 mg (8/13/2019), 158 95 mg (8/27/2019), 158 95 mg (9/10/2019), 158 95 mg (9/24/2019), 158 95 mg (10/8/2019)     Gastroesophageal cancer ([facility] )   7/26/2019 Initial Diagnosis    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[facility] )     8/1/2019 -  Chemotherapy    fluorouracil (ADRUCIL) injection 750 mg, 400 mg/m2 = 750 mg, Intravenous, Once, 28 of 34 cycles  Administration: 750 mg (8/1/2019), 750 mg (8/13/2019), 750 mg (8/27/2019), 750 mg (9/10/2019), 750 mg (9/24/2019), 750 mg (10/8/2019), 795 mg (10/22/2019), 795 mg (11/5/2019), 795 mg (11/19/2019), 795 mg (12/3/2019), 795 mg (12/17/2019), 795 mg (12/31/2019), 795 mg (1/14/2020), 795 mg (1/28/2020), 795 mg (2/11/2020), 795 mg (2/25/2020), 795 mg (3/10/2020), 795 mg (3/24/2020), 795 mg (4/7/2020), 795 mg (5/13/2020), 795 mg (5/27/2020), 795 mg (6/10/2020), 795 mg (6/24/2020), 795 mg (7/22/2020), 795 mg (8/5/2020), 790 mg (8/19/2020), 795 mg (4/29/2020)  pegfilgrastim (NEULASTA ONPRO) subcutaneous injection kit 6 mg, 6 mg, Subcutaneous, Once, 2 of 2 cycles  Administration: 6 mg (8/3/2019), 6 mg (8/15/2019)  leucovorin 750 mg in dextrose 5 % 250 mL IVPB, 748 mg, Intravenous, Once, 21 of 21 cycles  Administration: 750 mg (8/1/2019), 750 mg (8/13/2019), 750 mg (8/27/2019), 750 mg (9/10/2019), 750 mg (9/24/2019), 750 mg (10/8/2019), 800 mg (10/22/2019), 800 mg (11/5/2019), 800 mg (11/19/2019), 800 mg (12/3/2019), 800 mg (12/17/2019), 800 mg (12/31/2019), 800 mg (1/14/2020), 800 mg (1/28/2020), 800 mg (2/11/2020), 800 mg (2/25/2020), 800 mg (3/10/2020), 800 mg (3/24/2020), 800 mg (4/7/2020), 800 mg (5/13/2020), 800 mg (4/29/2020)  oxaliplatin (ELOXATIN) 158 95 mg in dextrose 5 % 250 mL chemo infusion, 85 mg/m2 = 158 95 mg, Intravenous, Once, 6 of 6 cycles  Administration: 158 95 mg (8/1/2019), 158 95 mg (8/13/2019), 158 95 mg (8/27/2019), 158 95 mg (9/10/2019), 158 95 mg (9/24/2019), 158 95 mg (10/8/2019)         Current Hematologic/ Oncologic Treatment:      Modified FOLFOX 6  Oxaliplatin discontinued after cycle 6     Leucovorin 400 milligrams/meters squared which will now be discontinued  5FU 400 milligrams/meters squared  5FU 2400 milligrams/meter squared CIVI over 46 hours    Interval History:      The patient returns for follow-up visit  He is now on 5 FU alone  Leucovorin has been discontinued  He was in the hospital   This was for aspiration pneumonia  His most recent imaging shows stable disease  He has esophageal area which is involved which is stable  He has no progression elsewhere  Does have the nodularity in the omentum which is stable also  Denies any nausea denies any vomiting denies any diarrhea  The rest of his 14 point review of systems today was negative  He has been advised to take thickened liquids and mashed potatoes and pureed food  This is to help with aspiration  Test Results:    Imaging: Xr Chest Portable    Result Date: 9/18/2020  Narrative: CHEST INDICATION:   Hypoxia/shortness of breath  Gastroesophageal cancer  COMPARISON:  Chest radiograph from 8/30/2020 and chest CT from 8/30/2020  EXAM PERFORMED/VIEWS:  XR CHEST PORTABLE FINDINGS:  Right port at cavoatrial junction  Cardiomediastinal silhouette appears unremarkable  Improved left pneumonia  Extensive new right pneumonia  No effusion or pneumothorax  Osseous structures appear within normal limits for patient age  Impression: Right greater than left pneumonia, new on the right and improved on the left  Workstation performed: [id number]     Fl Barium Swallow Video W Speech    Result Date: 9/21/2020  Narrative: A video barium swallow study was performed by the Department of Speech Pathology  Please refer to the report for the official interpretation  The images are stored for archival purposes only  Study images were not formally reviewed by the Radiology Department  Us Bladder With Post Void Residual    Result Date: 9/4/2020  Narrative: BLADDER ULTRASOUND INDICATION:   urge incont  COMPARISON: Compared with CT of 8/30/2022 TECHNIQUE:  Ultrasound of the bladder was performed with a curvilinear transducer utilizing volumetric sweeps and still imaging techniques  FINDINGS: BLADDER: Normally distended  No focal thickening or mass lesions  Bilateral ureteral jets detected  Pre-void volume = 184 cc Post-void volume = 15 cc Prostate measures 4 1 x 3 9 x 4 1 cm  Focal lobulated area arising from the superior aspect of the prostate indenting the base of the bladder measuring 1 9 x 1 6 x 1 7 cm  Impression: Post void residual of 15 mL  Prostate indents the base of the bladder with the focal lobulation measuring 1 9 cm suggesting prominent middle lobe  This is similar to CT finding   Workstation performed: [id number]       Labs:   Lab Results   Component Value Date    WBC 8 10 09/25/2020    HGB 9 3 (L) 09/25/2020    HCT 31 1 (L) 09/25/2020    MCV 99 (H) 09/25/2020     09/25/2020     Lab Results   Component Value Date    K 3 8 09/25/2020     09/25/2020    CO2 24 09/25/2020    BUN 6 09/25/2020    CREATININE 0 83 09/25/2020    GLUF 131 (H) 06/22/2020    CALCIUM 8 0 (L) 09/25/2020    CORRECTEDCA 10 2 (H) 09/18/2020    AST 18 09/18/2020    ALT 18 09/18/2020    ALKPHOS 95 09/18/2020    EGFR 91 09/25/2020       Lab Results   Component Value Date    PSA 8 5 (H) 05/15/2020    PSA 8 0 (H) 01/24/2020       Lab Results   Component Value Date    IRON 13 (L) 09/01/2020    TIBC 341 09/01/2020    FERRITIN 175 09/01/2020       Lab Results   Component Value Date    LSHZILDA97 085 09/01/2020         ROS: As stated in the history of present illness otherwise his 14 point review of systems today was negative  Active Problems:   Patient Active Problem List   Diagnosis    PTSD (post-traumatic stress disorder)    Essential hypertension    Major depression    OCD (obsessive compulsive disorder)    Anxiety    Malignant neoplasm of lower third of esophagus ([facility] )    [facility]    Cancer-related pain    Chemotherapy-induced nausea    Severe protein-calorie malnutrition (Maryann Prim: less than 60% of standard weight) (HCC)    Malignant ascites    Recovering alcoholic ([facility] )    Other fatigue    Other dysphagia    Nausea    Dehydration    Skin fissures    Goals of care, counseling/discussion    Palliative care patient    Other insomnia    Counseling on health promotion and disease prevention    Elevated PSA    Memory loss    Regurgitation of stomach contents    Falls    Superficial thrombophlebitis of lower extremity    Pneumonia of left lung due to infectious organism    PAF (paroxysmal atrial fibrillation) (HCC)    Moderate protein-calorie malnutrition (HCC)    Dilated cardiomyopathy (HCC)    Urge urinary incontinence    Aspiration pneumonia (HCC)    Chronic combined systolic and diastolic CHF (congestive heart failure) ([facility] )    Port-A-Cath in place    Hematuria    Hyponatremia    Hypoglycemia    Severe protein-calorie malnutrition (HCC)       Past Medical History:   Past Medical History:   Diagnosis Date    Dehydration 8/8/2019    Es[facility] ([facility] )     Hypertension     Nausea 8/8/2019    Psychiatric disorder     Recovering alcoholic ([facility] )        Surgical History:   Past Surgical History:   Procedure Laterality Date    APPENDECTOMY      IR PORT PLACEMENT  7/31/2019       Family History:    Family History   Problem Relation Age of Onset    Colon cancer Father        Cancer-related family history includes Colon cancer in his father      Social History:   Social History     Socioeconomic History    Marital status: /Civil Union     Spouse name: Not on file    Number of children: 2    Years of education: Not on file    Highest education level: Not on file   Occupational History    Occupation: retired    Social Needs    Financial resource strain: Not on file    Food insecurity     Worry: Not on file     Inability: Not on file   [employer] needs     Medical: Not on file     Non-medical: Not on file   Tobacco Use    Smoking status: Former Smoker     Types: Cigars     Last attempt to quit: 5/15/2019     Years since quittin 3    Smokeless tobacco: Never Used   Substance and Sexual Activity    Alcohol use: Not Currently     Frequency: Never     Binge frequency: Never    Drug use: Yes     Types: Marijuana    Sexual activity: Not Currently   Lifestyle    Physical activity     Days per week: Not on file     Minutes per session: Not on file    Stress: Not on file   Relationships    Social connections     Talks on phone: Not on file     Gets together: Not on file     Attends Zoroastrian service: Not on file     Active member of club or organization: Not on file     Attends meetings of clubs or organizations: Not on file     Relationship status: Not on file    Intimate partner violence     Fear of current or ex partner: Not on file     Emotionally abused: Not on file     Physically abused: Not on file     Forced sexual activity: Not on file   Other Topics Concern    Not on file   Social History Narrative    Not on file       Current Medications:   Current Outpatient Medications   Medication Sig Dispense Refill    amiodarone 200 mg tablet Take 1 tablet (200 mg total) by mouth daily with breakfast 30 tablet 2    ammonium lactate (LAC-HYDRIN) 12 % cream Apply topically as needed for dry skin On hands and feet 385 g 2    dexamethasone (DECADRON) 2 mg tablet Take 1 tablet (2 mg total) by mouth daily after lunch (Patient taking differently: Take 4 mg by mouth daily after lunch ) 30 tablet 0    diazepam (VALIUM) 5 mg tablet Take 1 tablet (5 mg total) by mouth 2 (two) times a day 60 tablet 5    Eliquis 5 MG TAKE ONE TABLET BY MOUTH TWICE A DAY 60 tablet 1    losartan (COZAAR) 50 mg tablet Take 1 tablet (50 mg total) by mouth daily 30 tablet 2    metoprolol tartrate (LOPRESSOR) 25 mg tablet Take 1 tablet (25 mg total) by mouth daily at bedtime 30 tablet 2    ondansetron (ZOFRAN) 4 mg tablet Take 2 tablets (8 mg total) by mouth every 8 (eight) hours as needed for nausea or vomiting 75 tablet 0    oxyCODONE (ROXICODONE) 5 mg immediate release tablet Take 1 tablet (5 mg total) by mouth every 4 (four) hours as needed for severe pain May crush and give in slurryMax Daily Amount: 30 mg 60 tablet 0    pantoprazole (PROTONIX) 40 mg tablet Take 1 tablet (40 mg total) by mouth daily before breakfast 60 tablet 0    saccharomyces boulardii (FLORASTOR) 250 mg capsule Take 1 capsule (250 mg total) by mouth 2 (two) times a day 30 capsule 1    sucralfate (CARAFATE) 1 g tablet Take 1 tablet (1 g total) by mouth 4 (four) times a day (before meals and at bedtime) 120 tablet 1    tamsulosin (FLOMAX) 0 4 mg Take 1 capsule (0 4 mg total) by mouth daily with dinner 30 capsule 2    venlafaxine (EFFEXOR-XR) 150 mg 24 hr capsule Take 1 capsule (150 mg total) by mouth daily 30 capsule 0     No current facility-administered medications for this visit  Allergies: Allergies   Allergen Reactions    Bee Venom Anaphylaxis    Shellfish-Derived Products      Develops GOUT       Physical Exam:    Body surface area is 1 87 meters squared      Wt Readings from Last 3 Encounters:   09/29/20 71 7 kg (158 lb)   09/25/20 74 2 kg (163 lb 9 3 oz)   09/18/20 77 1 kg (170 lb)        Temp Readings from Last 3 Encounters:   09/29/20 (!) 96 5 °F (35 8 °C) (Temporal)   09/25/20 97 6 °F (36 4 °C)   09/18/20 (!) 94 5 °F (34 7 °C) (Tympanic)        BP Readings from Last 3 Encounters:   09/29/20 90/62   09/25/20 135/79   09/18/20 (!) 78/50         Pulse Readings from Last 3 Encounters:   09/29/20 64   09/25/20 79   09/18/20 79           Physical Exam     Constitutional   General appearance: No acute distress, well appearing and well nourished  Eyes   Conjunctiva and lids: No swelling, erythema or discharge  Pupils and irises: Equal, round and reactive to light  Ears, Nose, Mouth, and Throat   External inspection of ears and nose: Normal     Nasal mucosa, septum, and turbinates: Normal without edema or erythema  Oropharynx: Normal with no erythema, edema, exudate or lesions  Pulmonary   Respiratory effort: No increased work of breathing or signs of respiratory distress  Auscultation of lungs: Clear to auscultation  Cardiovascular   Palpation of heart: Normal PMI, no thrills  Auscultation of heart: Normal rate and rhythm, normal S1 and S2, without murmurs  Examination of extremities for edema and/or varicosities: Normal     Carotid pulses: Normal     Abdomen   Abdomen: Non-tender, no masses  Liver and spleen: No hepatomegaly or splenomegaly  Lymphatic   Palpation of lymph nodes in neck: No lymphadenopathy  Musculoskeletal   Gait and station: Normal     Digits and nails: Normal without clubbing or cyanosis  Inspection/palpation of joints, bones, and muscles: Normal     Skin   Skin and subcutaneous tissue: Normal without rashes or lesions  Neurologic   Cranial nerves: Cranial nerves 2-12 intact  Sensation: No sensory loss  Psychiatric   Orientation to person, place, and time: Normal     Mood and affect: Normal         Assessment / Plan: This is a pleasant 49-year-old male with a past medical history of metastatic gastroesophageal cancer  He received 4 cycles of modified FOLFOX 6 chemotherapy After which his imaging showed a very nice response  We discontinued the oxaliplatin  He was having some nausea vomiting and side effects of Leucovorin was also discontinued  He is now doing well on 5 FU  His most recent imaging was stable    He was in the hospital for aspiration pneumonia and is improving now that he has at home  Still feels fatigued  ECOG performance status is a 2  Does have a slight tremor which may be exacerbated by the steroids he is on and his palliative care doctor has decreased the dose a little  I advised him to aggressively work on his nutrition  He will speak with the nutritionist also  I will see him back in a month  He will stay on his current treatment with no changes  Goals and Barriers:  Current Goal:  Prolong Survival from gastroesophageal cancer  Barriers: None  Patient's Capacity to Self Care:  Patient able to self care  Portions of the record may have been created with voice recognition software   Occasional wrong word or "sound a like" substitutions may have occurred due to the inherent limitations of voice recognition software   Read the chart carefully and recognize, using context, where substitutions have occurred

## 2020-09-30 ENCOUNTER — TELEPHONE (OUTPATIENT)
Dept: HEMATOLOGY ONCOLOGY | Facility: CLINIC | Age: 65
End: 2020-09-30

## 2020-09-30 ENCOUNTER — HOSPITAL ENCOUNTER (OUTPATIENT)
Dept: INFUSION CENTER | Facility: HOSPITAL | Age: 65
Discharge: HOME | End: 2020-09-30
Attending: INTERNAL MEDICINE

## 2020-09-30 DIAGNOSIS — C16.0 MALIGNANT NEOPLASM OF CARDIA OF STOMACH (HCC): ICD-10-CM

## 2020-09-30 DIAGNOSIS — C16.0 GASTROESOPHAGEAL CANCER (HCC): ICD-10-CM

## 2020-09-30 DIAGNOSIS — R11.0 NAUSEA: Primary | ICD-10-CM

## 2020-09-30 DIAGNOSIS — E86.0 DEHYDRATION: ICD-10-CM

## 2020-09-30 DIAGNOSIS — C15.5 MALIGNANT NEOPLASM OF LOWER THIRD OF ESOPHAGUS (HCC): ICD-10-CM

## 2020-09-30 NOTE — TELEPHONE ENCOUNTER
Did you discuss this with the patient and or his wife? They are calling back and asking about his prognosis

## 2020-09-30 NOTE — TELEPHONE ENCOUNTER
I did return the phone call and left a message on the patients machine discussing prognosis and if he has any questions to please give us a call to discuss further

## 2020-09-30 NOTE — TELEPHONE ENCOUNTER
I do not usually give patient's timelines as it is impossible to say  I think he has months to live  If his disease stay stable this could be up to a year  If he continues to decline or his disease progresses it could be less

## 2020-09-30 NOTE — TELEPHONE ENCOUNTER
Patient seen by Dr Emiliana Morales yesterday, forgot to ask about prognosis  When he was in hospital  patient states the palliative care MD advised him that he had 2 months to live    Will pass on to Dr Rafy Alejandro RN to discuss with MD

## 2020-10-02 ENCOUNTER — HOSPITAL ENCOUNTER (OUTPATIENT)
Dept: INFUSION CENTER | Facility: HOSPITAL | Age: 65
End: 2020-10-02
Attending: INTERNAL MEDICINE

## 2020-10-02 ENCOUNTER — TELEPHONE (OUTPATIENT)
Dept: INPATIENT UNIT | Facility: HOSPITAL | Age: 65
End: 2020-10-02

## 2020-10-05 ENCOUNTER — TELEPHONE (OUTPATIENT)
Dept: NUTRITION | Facility: CLINIC | Age: 65
End: 2020-10-05

## 2020-10-06 ENCOUNTER — APPOINTMENT (OUTPATIENT)
Dept: LAB | Facility: CLINIC | Age: 65
End: 2020-10-06
Payer: MEDICARE

## 2020-10-06 ENCOUNTER — TELEPHONE (OUTPATIENT)
Dept: HEMATOLOGY ONCOLOGY | Facility: CLINIC | Age: 65
End: 2020-10-06

## 2020-10-06 DIAGNOSIS — C15.5 MALIGNANT NEOPLASM OF LOWER THIRD OF ESOPHAGUS (HCC): ICD-10-CM

## 2020-10-06 DIAGNOSIS — E86.0 DEHYDRATION: ICD-10-CM

## 2020-10-06 DIAGNOSIS — R11.0 NAUSEA: ICD-10-CM

## 2020-10-06 DIAGNOSIS — C16.0 GASTROESOPHAGEAL CANCER (HCC): ICD-10-CM

## 2020-10-06 LAB
ALBUMIN SERPL BCP-MCNC: 2.7 G/DL (ref 3.5–5)
ALP SERPL-CCNC: 133 U/L (ref 46–116)
ALT SERPL W P-5'-P-CCNC: 15 U/L (ref 12–78)
ANION GAP SERPL CALCULATED.3IONS-SCNC: 6 MMOL/L (ref 4–13)
AST SERPL W P-5'-P-CCNC: 31 U/L (ref 5–45)
BASOPHILS # BLD AUTO: 0.13 THOUSANDS/ΜL (ref 0–0.1)
BASOPHILS NFR BLD AUTO: 1 % (ref 0–1)
BILIRUB SERPL-MCNC: 0.39 MG/DL (ref 0.2–1)
BUN SERPL-MCNC: 10 MG/DL (ref 5–25)
CALCIUM ALBUM COR SERPL-MCNC: 10.9 MG/DL (ref 8.3–10.1)
CALCIUM SERPL-MCNC: 9.9 MG/DL (ref 8.3–10.1)
CHLORIDE SERPL-SCNC: 106 MMOL/L (ref 100–108)
CO2 SERPL-SCNC: 26 MMOL/L (ref 21–32)
CREAT SERPL-MCNC: 1.04 MG/DL (ref 0.6–1.3)
EOSINOPHIL # BLD AUTO: 0.28 THOUSAND/ΜL (ref 0–0.61)
EOSINOPHIL NFR BLD AUTO: 3 % (ref 0–6)
ERYTHROCYTE [DISTWIDTH] IN BLOOD BY AUTOMATED COUNT: 18.1 % (ref 11.6–15.1)
GFR SERPL CREATININE-BSD FRML MDRD: 74 ML/MIN/1.73SQ M
GLUCOSE SERPL-MCNC: 84 MG/DL (ref 65–140)
HCT VFR BLD AUTO: 38.1 % (ref 36.5–49.3)
HGB BLD-MCNC: 11 G/DL (ref 12–17)
IMM GRANULOCYTES # BLD AUTO: 0.07 THOUSAND/UL (ref 0–0.2)
IMM GRANULOCYTES NFR BLD AUTO: 1 % (ref 0–2)
LYMPHOCYTES # BLD AUTO: 4.07 THOUSANDS/ΜL (ref 0.6–4.47)
LYMPHOCYTES NFR BLD AUTO: 41 % (ref 14–44)
MCH RBC QN AUTO: 28.4 PG (ref 26.8–34.3)
MCHC RBC AUTO-ENTMCNC: 28.9 G/DL (ref 31.4–37.4)
MCV RBC AUTO: 98 FL (ref 82–98)
MONOCYTES # BLD AUTO: 1.16 THOUSAND/ΜL (ref 0.17–1.22)
MONOCYTES NFR BLD AUTO: 12 % (ref 4–12)
NEUTROPHILS # BLD AUTO: 4.22 THOUSANDS/ΜL (ref 1.85–7.62)
NEUTS SEG NFR BLD AUTO: 42 % (ref 43–75)
NRBC BLD AUTO-RTO: 0 /100 WBCS
PLATELET # BLD AUTO: 463 THOUSANDS/UL (ref 149–390)
PMV BLD AUTO: 11.4 FL (ref 8.9–12.7)
POTASSIUM SERPL-SCNC: 4.7 MMOL/L (ref 3.5–5.3)
PROT SERPL-MCNC: 7.1 G/DL (ref 6.4–8.2)
RBC # BLD AUTO: 3.87 MILLION/UL (ref 3.88–5.62)
SODIUM SERPL-SCNC: 138 MMOL/L (ref 136–145)
WBC # BLD AUTO: 9.93 THOUSAND/UL (ref 4.31–10.16)

## 2020-10-06 PROCEDURE — 80053 COMPREHEN METABOLIC PANEL: CPT

## 2020-10-06 PROCEDURE — 36415 COLL VENOUS BLD VENIPUNCTURE: CPT

## 2020-10-06 PROCEDURE — 85025 COMPLETE CBC W/AUTO DIFF WBC: CPT

## 2020-10-07 ENCOUNTER — HOSPITAL ENCOUNTER (OUTPATIENT)
Dept: INFUSION CENTER | Facility: HOSPITAL | Age: 65
Discharge: HOME | End: 2020-10-07
Attending: INTERNAL MEDICINE
Payer: MEDICARE

## 2020-10-07 ENCOUNTER — TELEPHONE (OUTPATIENT)
Dept: FAMILY MEDICINE CLINIC | Facility: CLINIC | Age: 65
End: 2020-10-07

## 2020-10-07 VITALS
SYSTOLIC BLOOD PRESSURE: 128 MMHG | DIASTOLIC BLOOD PRESSURE: 75 MMHG | HEIGHT: 69 IN | OXYGEN SATURATION: 98 % | WEIGHT: 164.02 LBS | TEMPERATURE: 96.8 F | HEART RATE: 64 BPM | RESPIRATION RATE: 20 BRPM | BODY MASS INDEX: 24.29 KG/M2

## 2020-10-07 DIAGNOSIS — C15.5 MALIGNANT NEOPLASM OF LOWER THIRD OF ESOPHAGUS (HCC): Primary | ICD-10-CM

## 2020-10-07 DIAGNOSIS — E86.0 DEHYDRATION: ICD-10-CM

## 2020-10-07 DIAGNOSIS — R11.0 NAUSEA: ICD-10-CM

## 2020-10-07 DIAGNOSIS — C16.0 GASTROESOPHAGEAL CANCER (HCC): ICD-10-CM

## 2020-10-07 PROCEDURE — 96367 TX/PROPH/DG ADDL SEQ IV INF: CPT

## 2020-10-07 PROCEDURE — G0498 CHEMO EXTEND IV INFUS W/PUMP: HCPCS

## 2020-10-07 PROCEDURE — 96409 CHEMO IV PUSH SNGL DRUG: CPT

## 2020-10-07 RX ORDER — SODIUM CHLORIDE 9 MG/ML
20 INJECTION, SOLUTION INTRAVENOUS ONCE AS NEEDED
Status: DISCONTINUED | OUTPATIENT
Start: 2020-10-07 | End: 2020-10-10 | Stop reason: HOSPADM

## 2020-10-07 RX ORDER — FLUOROURACIL 50 MG/ML
400 INJECTION, SOLUTION INTRAVENOUS ONCE
Status: COMPLETED | OUTPATIENT
Start: 2020-10-07 | End: 2020-10-07

## 2020-10-07 RX ORDER — DEXTROSE MONOHYDRATE 50 MG/ML
20 INJECTION, SOLUTION INTRAVENOUS ONCE
Status: DISCONTINUED | OUTPATIENT
Start: 2020-10-07 | End: 2020-10-10 | Stop reason: HOSPADM

## 2020-10-07 RX ADMIN — SODIUM CHLORIDE 20 ML/HR: 0.9 INJECTION, SOLUTION INTRAVENOUS at 10:45

## 2020-10-07 RX ADMIN — DEXAMETHASONE SODIUM PHOSPHATE: 10 INJECTION, SOLUTION INTRAMUSCULAR; INTRAVENOUS at 10:46

## 2020-10-07 RX ADMIN — FLUOROURACIL 750 MG: 50 INJECTION, SOLUTION INTRAVENOUS at 11:35

## 2020-10-09 ENCOUNTER — HOSPITAL ENCOUNTER (OUTPATIENT)
Dept: INFUSION CENTER | Facility: HOSPITAL | Age: 65
Discharge: HOME | End: 2020-10-09
Attending: INTERNAL MEDICINE

## 2020-10-09 ENCOUNTER — TELEPHONE (OUTPATIENT)
Dept: INPATIENT UNIT | Facility: HOSPITAL | Age: 65
End: 2020-10-09

## 2020-10-09 VITALS — TEMPERATURE: 97.5 F

## 2020-10-09 DIAGNOSIS — R11.0 NAUSEA: Primary | ICD-10-CM

## 2020-10-09 DIAGNOSIS — C16.0 GASTROESOPHAGEAL CANCER (HCC): ICD-10-CM

## 2020-10-09 DIAGNOSIS — C15.5 MALIGNANT NEOPLASM OF LOWER THIRD OF ESOPHAGUS (HCC): ICD-10-CM

## 2020-10-09 DIAGNOSIS — E86.0 DEHYDRATION: ICD-10-CM

## 2020-10-13 DIAGNOSIS — C15.5 MALIGNANT NEOPLASM OF LOWER THIRD OF ESOPHAGUS (HCC): ICD-10-CM

## 2020-10-13 DIAGNOSIS — R11.0 NAUSEA: Primary | ICD-10-CM

## 2020-10-13 DIAGNOSIS — E86.0 DEHYDRATION: ICD-10-CM

## 2020-10-13 DIAGNOSIS — C16.0 MALIGNANT NEOPLASM OF CARDIA OF STOMACH (HCC): ICD-10-CM

## 2020-10-13 RX ORDER — FLUOROURACIL 50 MG/ML
400 INJECTION, SOLUTION INTRAVENOUS ONCE
Status: CANCELLED | OUTPATIENT
Start: 2020-10-21

## 2020-10-13 RX ORDER — SODIUM CHLORIDE 9 MG/ML
20 INJECTION, SOLUTION INTRAVENOUS ONCE AS NEEDED
Status: CANCELLED | OUTPATIENT
Start: 2020-10-21

## 2020-10-13 RX ORDER — DEXTROSE MONOHYDRATE 50 MG/ML
20 INJECTION, SOLUTION INTRAVENOUS ONCE
Status: CANCELLED | OUTPATIENT
Start: 2020-10-21

## 2020-10-14 ENCOUNTER — HOSPITAL ENCOUNTER (OUTPATIENT)
Dept: INFUSION CENTER | Facility: HOSPITAL | Age: 65
Discharge: HOME | End: 2020-10-14
Attending: INTERNAL MEDICINE

## 2020-10-15 ENCOUNTER — NUTRITION (OUTPATIENT)
Dept: NUTRITION | Facility: CLINIC | Age: 65
End: 2020-10-15

## 2020-10-15 ENCOUNTER — TELEPHONE (OUTPATIENT)
Dept: FAMILY MEDICINE CLINIC | Facility: CLINIC | Age: 65
End: 2020-10-15

## 2020-10-15 DIAGNOSIS — Z71.3 NUTRITIONAL COUNSELING: Primary | ICD-10-CM

## 2020-10-19 ENCOUNTER — APPOINTMENT (OUTPATIENT)
Dept: LAB | Facility: CLINIC | Age: 65
End: 2020-10-19
Payer: MEDICARE

## 2020-10-19 DIAGNOSIS — C16.0 GASTROESOPHAGEAL CANCER (HCC): ICD-10-CM

## 2020-10-19 DIAGNOSIS — E86.0 DEHYDRATION: ICD-10-CM

## 2020-10-19 DIAGNOSIS — C15.5 MALIGNANT NEOPLASM OF LOWER THIRD OF ESOPHAGUS (HCC): ICD-10-CM

## 2020-10-19 DIAGNOSIS — R11.0 NAUSEA: ICD-10-CM

## 2020-10-19 LAB
ALBUMIN SERPL BCP-MCNC: 3.1 G/DL (ref 3.5–5)
ALP SERPL-CCNC: 109 U/L (ref 46–116)
ALT SERPL W P-5'-P-CCNC: 12 U/L (ref 12–78)
ANION GAP SERPL CALCULATED.3IONS-SCNC: 4 MMOL/L (ref 4–13)
AST SERPL W P-5'-P-CCNC: 12 U/L (ref 5–45)
BASOPHILS # BLD AUTO: 0.09 THOUSANDS/ΜL (ref 0–0.1)
BASOPHILS NFR BLD AUTO: 1 % (ref 0–1)
BILIRUB SERPL-MCNC: 0.48 MG/DL (ref 0.2–1)
BUN SERPL-MCNC: 21 MG/DL (ref 5–25)
CALCIUM ALBUM COR SERPL-MCNC: 10.1 MG/DL (ref 8.3–10.1)
CALCIUM SERPL-MCNC: 9.4 MG/DL (ref 8.3–10.1)
CHLORIDE SERPL-SCNC: 107 MMOL/L (ref 100–108)
CO2 SERPL-SCNC: 31 MMOL/L (ref 21–32)
CREAT SERPL-MCNC: 1.13 MG/DL (ref 0.6–1.3)
EOSINOPHIL # BLD AUTO: 0.16 THOUSAND/ΜL (ref 0–0.61)
EOSINOPHIL NFR BLD AUTO: 2 % (ref 0–6)
ERYTHROCYTE [DISTWIDTH] IN BLOOD BY AUTOMATED COUNT: 17.9 % (ref 11.6–15.1)
GFR SERPL CREATININE-BSD FRML MDRD: 67 ML/MIN/1.73SQ M
GLUCOSE P FAST SERPL-MCNC: 115 MG/DL (ref 65–99)
HCT VFR BLD AUTO: 37.9 % (ref 36.5–49.3)
HGB BLD-MCNC: 11.6 G/DL (ref 12–17)
IMM GRANULOCYTES # BLD AUTO: 0.01 THOUSAND/UL (ref 0–0.2)
IMM GRANULOCYTES NFR BLD AUTO: 0 % (ref 0–2)
LYMPHOCYTES # BLD AUTO: 2.54 THOUSANDS/ΜL (ref 0.6–4.47)
LYMPHOCYTES NFR BLD AUTO: 35 % (ref 14–44)
MCH RBC QN AUTO: 29.1 PG (ref 26.8–34.3)
MCHC RBC AUTO-ENTMCNC: 30.6 G/DL (ref 31.4–37.4)
MCV RBC AUTO: 95 FL (ref 82–98)
MONOCYTES # BLD AUTO: 0.67 THOUSAND/ΜL (ref 0.17–1.22)
MONOCYTES NFR BLD AUTO: 9 % (ref 4–12)
NEUTROPHILS # BLD AUTO: 3.82 THOUSANDS/ΜL (ref 1.85–7.62)
NEUTS SEG NFR BLD AUTO: 53 % (ref 43–75)
NRBC BLD AUTO-RTO: 0 /100 WBCS
PLATELET # BLD AUTO: 326 THOUSANDS/UL (ref 149–390)
PMV BLD AUTO: 11.4 FL (ref 8.9–12.7)
POTASSIUM SERPL-SCNC: 3.5 MMOL/L (ref 3.5–5.3)
PROT SERPL-MCNC: 7.2 G/DL (ref 6.4–8.2)
RBC # BLD AUTO: 3.99 MILLION/UL (ref 3.88–5.62)
SODIUM SERPL-SCNC: 142 MMOL/L (ref 136–145)
WBC # BLD AUTO: 7.29 THOUSAND/UL (ref 4.31–10.16)

## 2020-10-19 PROCEDURE — 85025 COMPLETE CBC W/AUTO DIFF WBC: CPT

## 2020-10-19 PROCEDURE — 80053 COMPREHEN METABOLIC PANEL: CPT

## 2020-10-19 PROCEDURE — 36415 COLL VENOUS BLD VENIPUNCTURE: CPT

## 2020-10-21 ENCOUNTER — HOSPITAL ENCOUNTER (OUTPATIENT)
Dept: INFUSION CENTER | Facility: HOSPITAL | Age: 65
Discharge: HOME | End: 2020-10-21
Attending: INTERNAL MEDICINE
Payer: MEDICARE

## 2020-10-21 VITALS
HEART RATE: 54 BPM | WEIGHT: 160.5 LBS | OXYGEN SATURATION: 97 % | HEIGHT: 69 IN | DIASTOLIC BLOOD PRESSURE: 78 MMHG | BODY MASS INDEX: 23.77 KG/M2 | TEMPERATURE: 96.8 F | RESPIRATION RATE: 18 BRPM | SYSTOLIC BLOOD PRESSURE: 164 MMHG

## 2020-10-21 DIAGNOSIS — C16.0 GASTROESOPHAGEAL CANCER (HCC): ICD-10-CM

## 2020-10-21 DIAGNOSIS — C15.5 MALIGNANT NEOPLASM OF LOWER THIRD OF ESOPHAGUS (HCC): Primary | ICD-10-CM

## 2020-10-21 DIAGNOSIS — R11.0 NAUSEA: ICD-10-CM

## 2020-10-21 DIAGNOSIS — E86.0 DEHYDRATION: ICD-10-CM

## 2020-10-21 PROCEDURE — 96367 TX/PROPH/DG ADDL SEQ IV INF: CPT

## 2020-10-21 PROCEDURE — 96409 CHEMO IV PUSH SNGL DRUG: CPT

## 2020-10-21 PROCEDURE — G0498 CHEMO EXTEND IV INFUS W/PUMP: HCPCS

## 2020-10-21 RX ORDER — DEXTROSE MONOHYDRATE 50 MG/ML
20 INJECTION, SOLUTION INTRAVENOUS ONCE
Status: DISCONTINUED | OUTPATIENT
Start: 2020-10-21 | End: 2020-10-24 | Stop reason: HOSPADM

## 2020-10-21 RX ORDER — SODIUM CHLORIDE 9 MG/ML
20 INJECTION, SOLUTION INTRAVENOUS ONCE AS NEEDED
Status: DISCONTINUED | OUTPATIENT
Start: 2020-10-21 | End: 2020-10-24 | Stop reason: HOSPADM

## 2020-10-21 RX ORDER — FLUOROURACIL 50 MG/ML
400 INJECTION, SOLUTION INTRAVENOUS ONCE
Status: COMPLETED | OUTPATIENT
Start: 2020-10-21 | End: 2020-10-21

## 2020-10-21 RX ADMIN — FLUOROURACIL 750 MG: 50 INJECTION, SOLUTION INTRAVENOUS at 11:18

## 2020-10-21 RX ADMIN — SODIUM CHLORIDE 20 ML/HR: 0.9 INJECTION, SOLUTION INTRAVENOUS at 10:31

## 2020-10-21 RX ADMIN — DEXAMETHASONE SODIUM PHOSPHATE: 10 INJECTION, SOLUTION INTRAMUSCULAR; INTRAVENOUS at 10:32

## 2020-10-23 ENCOUNTER — HOSPITAL ENCOUNTER (OUTPATIENT)
Dept: INFUSION CENTER | Facility: HOSPITAL | Age: 65
Discharge: HOME | End: 2020-10-23
Attending: INTERNAL MEDICINE

## 2020-10-23 VITALS — TEMPERATURE: 96.5 F

## 2020-10-23 DIAGNOSIS — C16.0 GASTROESOPHAGEAL CANCER (HCC): ICD-10-CM

## 2020-10-23 DIAGNOSIS — E86.0 DEHYDRATION: ICD-10-CM

## 2020-10-23 DIAGNOSIS — R11.0 NAUSEA: ICD-10-CM

## 2020-10-23 DIAGNOSIS — C15.5 MALIGNANT NEOPLASM OF LOWER THIRD OF ESOPHAGUS (HCC): Primary | ICD-10-CM

## 2020-10-27 ENCOUNTER — TELEPHONE (OUTPATIENT)
Dept: FAMILY MEDICINE CLINIC | Facility: CLINIC | Age: 65
End: 2020-10-27

## 2020-10-27 DIAGNOSIS — R64 CANCER CACHEXIA (HCC): Primary | ICD-10-CM

## 2020-10-27 RX ORDER — SODIUM CHLORIDE 9 MG/ML
20 INJECTION, SOLUTION INTRAVENOUS ONCE AS NEEDED
Status: CANCELLED | OUTPATIENT
Start: 2020-11-04

## 2020-10-27 RX ORDER — FLUOROURACIL 50 MG/ML
400 INJECTION, SOLUTION INTRAVENOUS ONCE
Status: CANCELLED | OUTPATIENT
Start: 2020-11-04

## 2020-10-27 RX ORDER — MEGESTROL ACETATE 40 MG/ML
400 SUSPENSION ORAL DAILY
Qty: 240 ML | Refills: 6 | Status: SHIPPED | OUTPATIENT
Start: 2020-10-27 | End: 2020-12-22

## 2020-10-27 RX ORDER — DEXTROSE MONOHYDRATE 50 MG/ML
20 INJECTION, SOLUTION INTRAVENOUS ONCE
Status: CANCELLED | OUTPATIENT
Start: 2020-11-04

## 2020-10-28 ENCOUNTER — TELEPHONE (OUTPATIENT)
Dept: FAMILY MEDICINE CLINIC | Facility: CLINIC | Age: 65
End: 2020-10-28

## 2020-10-28 DIAGNOSIS — C15.5 MALIGNANT NEOPLASM OF LOWER THIRD OF ESOPHAGUS (HCC): ICD-10-CM

## 2020-10-28 DIAGNOSIS — E86.0 DEHYDRATION: ICD-10-CM

## 2020-10-28 DIAGNOSIS — R11.0 NAUSEA: Primary | ICD-10-CM

## 2020-10-28 DIAGNOSIS — C16.0 MALIGNANT NEOPLASM OF CARDIA OF STOMACH (HCC): ICD-10-CM

## 2020-10-29 DIAGNOSIS — R13.14 PHARYNGOESOPHAGEAL DYSPHAGIA: Primary | ICD-10-CM

## 2020-11-02 ENCOUNTER — TELEPHONE (OUTPATIENT)
Dept: FAMILY MEDICINE CLINIC | Facility: CLINIC | Age: 65
End: 2020-11-02

## 2020-11-03 ENCOUNTER — TELEPHONE (OUTPATIENT)
Dept: FAMILY MEDICINE CLINIC | Facility: CLINIC | Age: 65
End: 2020-11-03

## 2020-11-03 ENCOUNTER — LAB (OUTPATIENT)
Dept: LAB | Facility: CLINIC | Age: 65
End: 2020-11-03
Payer: MEDICARE

## 2020-11-03 DIAGNOSIS — C15.5 MALIGNANT NEOPLASM OF LOWER THIRD OF ESOPHAGUS (HCC): ICD-10-CM

## 2020-11-03 DIAGNOSIS — C16.0 GASTROESOPHAGEAL CANCER (HCC): Primary | ICD-10-CM

## 2020-11-03 DIAGNOSIS — E86.0 DEHYDRATION: ICD-10-CM

## 2020-11-03 DIAGNOSIS — R13.19 OTHER DYSPHAGIA: Chronic | ICD-10-CM

## 2020-11-03 DIAGNOSIS — R11.0 NAUSEA: ICD-10-CM

## 2020-11-03 DIAGNOSIS — C16.0 GASTROESOPHAGEAL CANCER (HCC): ICD-10-CM

## 2020-11-03 LAB
ALBUMIN SERPL BCP-MCNC: 3.2 G/DL (ref 3.5–5)
ALP SERPL-CCNC: 111 U/L (ref 46–116)
ALT SERPL W P-5'-P-CCNC: 15 U/L (ref 12–78)
ANION GAP SERPL CALCULATED.3IONS-SCNC: 8 MMOL/L (ref 4–13)
AST SERPL W P-5'-P-CCNC: 15 U/L (ref 5–45)
BASOPHILS # BLD AUTO: 0.11 THOUSANDS/ΜL (ref 0–0.1)
BASOPHILS NFR BLD AUTO: 1 % (ref 0–1)
BILIRUB SERPL-MCNC: 0.86 MG/DL (ref 0.2–1)
BUN SERPL-MCNC: 16 MG/DL (ref 5–25)
CALCIUM ALBUM COR SERPL-MCNC: 9.8 MG/DL (ref 8.3–10.1)
CALCIUM SERPL-MCNC: 9.2 MG/DL (ref 8.3–10.1)
CHLORIDE SERPL-SCNC: 107 MMOL/L (ref 100–108)
CO2 SERPL-SCNC: 23 MMOL/L (ref 21–32)
CREAT SERPL-MCNC: 1.5 MG/DL (ref 0.6–1.3)
EOSINOPHIL # BLD AUTO: 0.19 THOUSAND/ΜL (ref 0–0.61)
EOSINOPHIL NFR BLD AUTO: 2 % (ref 0–6)
ERYTHROCYTE [DISTWIDTH] IN BLOOD BY AUTOMATED COUNT: 18.6 % (ref 11.6–15.1)
GFR SERPL CREATININE-BSD FRML MDRD: 47 ML/MIN/1.73SQ M
GLUCOSE SERPL-MCNC: 149 MG/DL (ref 65–140)
HCT VFR BLD AUTO: 41.4 % (ref 36.5–49.3)
HGB BLD-MCNC: 12.6 G/DL (ref 12–17)
IMM GRANULOCYTES # BLD AUTO: 0.03 THOUSAND/UL (ref 0–0.2)
IMM GRANULOCYTES NFR BLD AUTO: 0 % (ref 0–2)
LYMPHOCYTES # BLD AUTO: 3.49 THOUSANDS/ΜL (ref 0.6–4.47)
LYMPHOCYTES NFR BLD AUTO: 35 % (ref 14–44)
MCH RBC QN AUTO: 28.1 PG (ref 26.8–34.3)
MCHC RBC AUTO-ENTMCNC: 30.4 G/DL (ref 31.4–37.4)
MCV RBC AUTO: 92 FL (ref 82–98)
MONOCYTES # BLD AUTO: 0.93 THOUSAND/ΜL (ref 0.17–1.22)
MONOCYTES NFR BLD AUTO: 9 % (ref 4–12)
NEUTROPHILS # BLD AUTO: 5.31 THOUSANDS/ΜL (ref 1.85–7.62)
NEUTS SEG NFR BLD AUTO: 53 % (ref 43–75)
NRBC BLD AUTO-RTO: 0 /100 WBCS
PLATELET # BLD AUTO: 357 THOUSANDS/UL (ref 149–390)
PMV BLD AUTO: 12 FL (ref 8.9–12.7)
POTASSIUM SERPL-SCNC: 3.4 MMOL/L (ref 3.5–5.3)
PROT SERPL-MCNC: 7.3 G/DL (ref 6.4–8.2)
RBC # BLD AUTO: 4.48 MILLION/UL (ref 3.88–5.62)
SODIUM SERPL-SCNC: 138 MMOL/L (ref 136–145)
WBC # BLD AUTO: 10.06 THOUSAND/UL (ref 4.31–10.16)

## 2020-11-03 PROCEDURE — 85025 COMPLETE CBC W/AUTO DIFF WBC: CPT

## 2020-11-03 PROCEDURE — 36415 COLL VENOUS BLD VENIPUNCTURE: CPT

## 2020-11-03 PROCEDURE — 80053 COMPREHEN METABOLIC PANEL: CPT

## 2020-11-04 ENCOUNTER — HOSPITAL ENCOUNTER (OUTPATIENT)
Dept: INFUSION CENTER | Facility: HOSPITAL | Age: 65
Discharge: HOME | End: 2020-11-04
Attending: INTERNAL MEDICINE
Payer: MEDICARE

## 2020-11-04 ENCOUNTER — TELEPHONE (OUTPATIENT)
Dept: FAMILY MEDICINE CLINIC | Facility: CLINIC | Age: 65
End: 2020-11-04

## 2020-11-04 VITALS
SYSTOLIC BLOOD PRESSURE: 138 MMHG | HEIGHT: 69 IN | TEMPERATURE: 96.7 F | RESPIRATION RATE: 16 BRPM | HEART RATE: 61 BPM | BODY MASS INDEX: 22.99 KG/M2 | OXYGEN SATURATION: 99 % | DIASTOLIC BLOOD PRESSURE: 72 MMHG | WEIGHT: 155.2 LBS

## 2020-11-04 DIAGNOSIS — R11.0 NAUSEA: ICD-10-CM

## 2020-11-04 DIAGNOSIS — E86.0 DEHYDRATION: ICD-10-CM

## 2020-11-04 DIAGNOSIS — C15.5 MALIGNANT NEOPLASM OF LOWER THIRD OF ESOPHAGUS (HCC): Primary | ICD-10-CM

## 2020-11-04 DIAGNOSIS — C16.0 GASTROESOPHAGEAL CANCER (HCC): ICD-10-CM

## 2020-11-04 PROCEDURE — G0498 CHEMO EXTEND IV INFUS W/PUMP: HCPCS

## 2020-11-04 PROCEDURE — 96409 CHEMO IV PUSH SNGL DRUG: CPT

## 2020-11-04 RX ORDER — DEXTROSE MONOHYDRATE 50 MG/ML
20 INJECTION, SOLUTION INTRAVENOUS ONCE
Status: DISCONTINUED | OUTPATIENT
Start: 2020-11-04 | End: 2020-11-07 | Stop reason: HOSPADM

## 2020-11-04 RX ORDER — SODIUM CHLORIDE 9 MG/ML
20 INJECTION, SOLUTION INTRAVENOUS ONCE AS NEEDED
Status: DISCONTINUED | OUTPATIENT
Start: 2020-11-04 | End: 2020-11-07 | Stop reason: HOSPADM

## 2020-11-04 RX ORDER — FLUOROURACIL 50 MG/ML
400 INJECTION, SOLUTION INTRAVENOUS ONCE
Status: COMPLETED | OUTPATIENT
Start: 2020-11-04 | End: 2020-11-04

## 2020-11-04 RX ADMIN — DEXAMETHASONE SODIUM PHOSPHATE: 10 INJECTION, SOLUTION INTRAMUSCULAR; INTRAVENOUS at 11:35

## 2020-11-04 RX ADMIN — FLUOROURACIL 750 MG: 50 INJECTION, SOLUTION INTRAVENOUS at 12:23

## 2020-11-04 RX ADMIN — SODIUM CHLORIDE 20 ML/HR: 0.9 INJECTION, SOLUTION INTRAVENOUS at 11:34

## 2020-11-05 ENCOUNTER — OFFICE VISIT (OUTPATIENT)
Dept: HEMATOLOGY ONCOLOGY | Facility: CLINIC | Age: 65
End: 2020-11-05
Payer: MEDICARE

## 2020-11-05 VITALS
TEMPERATURE: 97.1 F | DIASTOLIC BLOOD PRESSURE: 78 MMHG | BODY MASS INDEX: 24.29 KG/M2 | OXYGEN SATURATION: 98 % | WEIGHT: 164 LBS | RESPIRATION RATE: 16 BRPM | HEIGHT: 69 IN | SYSTOLIC BLOOD PRESSURE: 132 MMHG | HEART RATE: 65 BPM

## 2020-11-05 DIAGNOSIS — E86.0 DEHYDRATION: ICD-10-CM

## 2020-11-05 DIAGNOSIS — C15.5 MALIGNANT NEOPLASM OF LOWER THIRD OF ESOPHAGUS (HCC): Primary | ICD-10-CM

## 2020-11-05 DIAGNOSIS — C15.5 MALIGNANT NEOPLASM OF LOWER THIRD OF ESOPHAGUS (HCC): ICD-10-CM

## 2020-11-05 DIAGNOSIS — R26.81 GAIT INSTABILITY: Primary | ICD-10-CM

## 2020-11-05 DIAGNOSIS — R11.0 NAUSEA: ICD-10-CM

## 2020-11-05 DIAGNOSIS — E44.0 MODERATE PROTEIN-CALORIE MALNUTRITION (HCC): ICD-10-CM

## 2020-11-05 DIAGNOSIS — R29.6 FREQUENT FALLS: ICD-10-CM

## 2020-11-05 DIAGNOSIS — C16.0 GASTROESOPHAGEAL CANCER (HCC): ICD-10-CM

## 2020-11-05 PROCEDURE — 99214 OFFICE O/P EST MOD 30 MIN: CPT | Performed by: INTERNAL MEDICINE

## 2020-11-06 ENCOUNTER — HOSPITAL ENCOUNTER (OUTPATIENT)
Dept: INFUSION CENTER | Facility: HOSPITAL | Age: 65
Discharge: HOME | End: 2020-11-06
Attending: INTERNAL MEDICINE

## 2020-11-06 VITALS — TEMPERATURE: 96.5 F

## 2020-11-06 DIAGNOSIS — E86.0 DEHYDRATION: ICD-10-CM

## 2020-11-06 DIAGNOSIS — C16.0 GASTROESOPHAGEAL CANCER (HCC): ICD-10-CM

## 2020-11-06 DIAGNOSIS — R11.0 NAUSEA: ICD-10-CM

## 2020-11-06 DIAGNOSIS — C15.5 MALIGNANT NEOPLASM OF LOWER THIRD OF ESOPHAGUS (HCC): Primary | ICD-10-CM

## 2020-11-10 DIAGNOSIS — C16.0 MALIGNANT NEOPLASM OF CARDIA OF STOMACH (HCC): ICD-10-CM

## 2020-11-10 DIAGNOSIS — C15.5 MALIGNANT NEOPLASM OF LOWER THIRD OF ESOPHAGUS (HCC): ICD-10-CM

## 2020-11-10 DIAGNOSIS — E86.0 DEHYDRATION: ICD-10-CM

## 2020-11-10 DIAGNOSIS — R11.0 NAUSEA: Primary | ICD-10-CM

## 2020-11-10 RX ORDER — SODIUM CHLORIDE 9 MG/ML
20 INJECTION, SOLUTION INTRAVENOUS ONCE AS NEEDED
Status: CANCELLED | OUTPATIENT
Start: 2020-11-18

## 2020-11-10 RX ORDER — FLUOROURACIL 50 MG/ML
400 INJECTION, SOLUTION INTRAVENOUS ONCE
Status: CANCELLED | OUTPATIENT
Start: 2020-11-18

## 2020-11-10 RX ORDER — DEXTROSE MONOHYDRATE 50 MG/ML
20 INJECTION, SOLUTION INTRAVENOUS ONCE
Status: CANCELLED | OUTPATIENT
Start: 2020-11-18

## 2020-11-11 ENCOUNTER — HOSPITAL ENCOUNTER (OUTPATIENT)
Dept: RADIOLOGY | Facility: HOSPITAL | Age: 65
Discharge: HOME | End: 2020-11-11
Attending: FAMILY MEDICINE
Payer: MEDICARE

## 2020-11-11 DIAGNOSIS — C16.0 GASTROESOPHAGEAL CANCER (HCC): ICD-10-CM

## 2020-11-11 DIAGNOSIS — R13.19 OTHER DYSPHAGIA: ICD-10-CM

## 2020-11-11 DIAGNOSIS — R13.19 OTHER DYSPHAGIA: Chronic | ICD-10-CM

## 2020-11-11 PROCEDURE — 74230 X-RAY XM SWLNG FUNCJ C+: CPT

## 2020-11-11 PROCEDURE — 92611 MOTION FLUOROSCOPY/SWALLOW: CPT

## 2020-11-12 ENCOUNTER — NUTRITION (OUTPATIENT)
Dept: NUTRITION | Facility: CLINIC | Age: 65
End: 2020-11-12

## 2020-11-12 DIAGNOSIS — Z71.3 NUTRITIONAL COUNSELING: Primary | ICD-10-CM

## 2020-11-16 ENCOUNTER — TELEPHONE (OUTPATIENT)
Dept: FAMILY MEDICINE CLINIC | Facility: CLINIC | Age: 65
End: 2020-11-16

## 2020-11-16 ENCOUNTER — LAB (OUTPATIENT)
Dept: LAB | Facility: CLINIC | Age: 65
End: 2020-11-16
Payer: MEDICARE

## 2020-11-16 DIAGNOSIS — C15.5 MALIGNANT NEOPLASM OF LOWER THIRD OF ESOPHAGUS (HCC): ICD-10-CM

## 2020-11-16 DIAGNOSIS — E86.0 DEHYDRATION: ICD-10-CM

## 2020-11-16 DIAGNOSIS — C16.0 GASTROESOPHAGEAL CANCER (HCC): ICD-10-CM

## 2020-11-16 DIAGNOSIS — R11.0 NAUSEA: ICD-10-CM

## 2020-11-16 LAB
ALBUMIN SERPL BCP-MCNC: 3.3 G/DL (ref 3.5–5)
ALP SERPL-CCNC: 113 U/L (ref 46–116)
ALT SERPL W P-5'-P-CCNC: 14 U/L (ref 12–78)
ANION GAP SERPL CALCULATED.3IONS-SCNC: 10 MMOL/L (ref 4–13)
AST SERPL W P-5'-P-CCNC: 16 U/L (ref 5–45)
BASOPHILS # BLD AUTO: 0.06 THOUSANDS/ΜL (ref 0–0.1)
BASOPHILS NFR BLD AUTO: 1 % (ref 0–1)
BILIRUB SERPL-MCNC: 0.87 MG/DL (ref 0.2–1)
BUN SERPL-MCNC: 13 MG/DL (ref 5–25)
CALCIUM ALBUM COR SERPL-MCNC: 10.2 MG/DL (ref 8.3–10.1)
CALCIUM SERPL-MCNC: 9.6 MG/DL (ref 8.3–10.1)
CHLORIDE SERPL-SCNC: 108 MMOL/L (ref 100–108)
CO2 SERPL-SCNC: 23 MMOL/L (ref 21–32)
CREAT SERPL-MCNC: 1.28 MG/DL (ref 0.6–1.3)
EOSINOPHIL # BLD AUTO: 0.13 THOUSAND/ΜL (ref 0–0.61)
EOSINOPHIL NFR BLD AUTO: 2 % (ref 0–6)
ERYTHROCYTE [DISTWIDTH] IN BLOOD BY AUTOMATED COUNT: 18.1 % (ref 11.6–15.1)
GFR SERPL CREATININE-BSD FRML MDRD: 58 ML/MIN/1.73SQ M
GLUCOSE SERPL-MCNC: 131 MG/DL (ref 65–140)
HCT VFR BLD AUTO: 40.7 % (ref 36.5–49.3)
HGB BLD-MCNC: 12.3 G/DL (ref 12–17)
IMM GRANULOCYTES # BLD AUTO: 0.01 THOUSAND/UL (ref 0–0.2)
IMM GRANULOCYTES NFR BLD AUTO: 0 % (ref 0–2)
LYMPHOCYTES # BLD AUTO: 4.51 THOUSANDS/ΜL (ref 0.6–4.47)
LYMPHOCYTES NFR BLD AUTO: 51 % (ref 14–44)
MCH RBC QN AUTO: 27.3 PG (ref 26.8–34.3)
MCHC RBC AUTO-ENTMCNC: 30.2 G/DL (ref 31.4–37.4)
MCV RBC AUTO: 90 FL (ref 82–98)
MONOCYTES # BLD AUTO: 0.53 THOUSAND/ΜL (ref 0.17–1.22)
MONOCYTES NFR BLD AUTO: 6 % (ref 4–12)
NEUTROPHILS # BLD AUTO: 3.47 THOUSANDS/ΜL (ref 1.85–7.62)
NEUTS SEG NFR BLD AUTO: 40 % (ref 43–75)
NRBC BLD AUTO-RTO: 0 /100 WBCS
PLATELET # BLD AUTO: 326 THOUSANDS/UL (ref 149–390)
PMV BLD AUTO: 11.4 FL (ref 8.9–12.7)
POTASSIUM SERPL-SCNC: 3.4 MMOL/L (ref 3.5–5.3)
PROT SERPL-MCNC: 7 G/DL (ref 6.4–8.2)
RBC # BLD AUTO: 4.51 MILLION/UL (ref 3.88–5.62)
SODIUM SERPL-SCNC: 141 MMOL/L (ref 136–145)
WBC # BLD AUTO: 8.71 THOUSAND/UL (ref 4.31–10.16)

## 2020-11-16 PROCEDURE — 80053 COMPREHEN METABOLIC PANEL: CPT

## 2020-11-16 PROCEDURE — 85025 COMPLETE CBC W/AUTO DIFF WBC: CPT

## 2020-11-16 PROCEDURE — 36415 COLL VENOUS BLD VENIPUNCTURE: CPT

## 2020-11-17 ENCOUNTER — OFFICE VISIT (OUTPATIENT)
Dept: CARDIOLOGY CLINIC | Facility: CLINIC | Age: 65
End: 2020-11-17
Payer: MEDICARE

## 2020-11-17 VITALS
WEIGHT: 152 LBS | HEIGHT: 69 IN | DIASTOLIC BLOOD PRESSURE: 80 MMHG | OXYGEN SATURATION: 98 % | HEART RATE: 70 BPM | BODY MASS INDEX: 22.51 KG/M2 | SYSTOLIC BLOOD PRESSURE: 118 MMHG | TEMPERATURE: 98.3 F

## 2020-11-17 DIAGNOSIS — I50.42 CHRONIC COMBINED SYSTOLIC AND DIASTOLIC CHF (CONGESTIVE HEART FAILURE) (HCC): Primary | Chronic | ICD-10-CM

## 2020-11-17 DIAGNOSIS — I42.0 DILATED CARDIOMYOPATHY (HCC): ICD-10-CM

## 2020-11-17 DIAGNOSIS — I48.0 PAF (PAROXYSMAL ATRIAL FIBRILLATION) (HCC): ICD-10-CM

## 2020-11-17 PROCEDURE — 99215 OFFICE O/P EST HI 40 MIN: CPT | Performed by: INTERNAL MEDICINE

## 2020-11-17 PROCEDURE — 93000 ELECTROCARDIOGRAM COMPLETE: CPT | Performed by: INTERNAL MEDICINE

## 2020-11-17 RX ORDER — BISOPROLOL FUMARATE AND HYDROCHLOROTHIAZIDE 10; 6.25 MG/1; MG/1
1 TABLET ORAL DAILY
COMMUNITY
End: 2020-11-17 | Stop reason: SDUPTHER

## 2020-11-17 RX ORDER — BISOPROLOL FUMARATE AND HYDROCHLOROTHIAZIDE 10; 6.25 MG/1; MG/1
TABLET ORAL
Start: 2020-11-17 | End: 2020-12-18 | Stop reason: SDUPTHER

## 2020-11-18 ENCOUNTER — HOSPITAL ENCOUNTER (OUTPATIENT)
Dept: INFUSION CENTER | Facility: HOSPITAL | Age: 65
Discharge: HOME | End: 2020-11-18
Attending: INTERNAL MEDICINE
Payer: MEDICARE

## 2020-11-18 VITALS
WEIGHT: 156.75 LBS | HEIGHT: 69 IN | SYSTOLIC BLOOD PRESSURE: 146 MMHG | RESPIRATION RATE: 16 BRPM | DIASTOLIC BLOOD PRESSURE: 74 MMHG | HEART RATE: 57 BPM | TEMPERATURE: 96.3 F | OXYGEN SATURATION: 98 % | BODY MASS INDEX: 23.22 KG/M2

## 2020-11-18 DIAGNOSIS — R11.0 NAUSEA: ICD-10-CM

## 2020-11-18 DIAGNOSIS — C15.5 MALIGNANT NEOPLASM OF LOWER THIRD OF ESOPHAGUS (HCC): Primary | ICD-10-CM

## 2020-11-18 DIAGNOSIS — E86.0 DEHYDRATION: ICD-10-CM

## 2020-11-18 DIAGNOSIS — C16.0 GASTROESOPHAGEAL CANCER (HCC): ICD-10-CM

## 2020-11-18 PROCEDURE — 96367 TX/PROPH/DG ADDL SEQ IV INF: CPT

## 2020-11-18 PROCEDURE — G0498 CHEMO EXTEND IV INFUS W/PUMP: HCPCS

## 2020-11-18 PROCEDURE — 96409 CHEMO IV PUSH SNGL DRUG: CPT

## 2020-11-18 RX ORDER — DEXTROSE MONOHYDRATE 50 MG/ML
20 INJECTION, SOLUTION INTRAVENOUS ONCE
Status: CANCELLED | OUTPATIENT
Start: 2020-12-02

## 2020-11-18 RX ORDER — FLUOROURACIL 50 MG/ML
400 INJECTION, SOLUTION INTRAVENOUS ONCE
Status: COMPLETED | OUTPATIENT
Start: 2020-11-18 | End: 2020-11-18

## 2020-11-18 RX ORDER — FLUOROURACIL 50 MG/ML
400 INJECTION, SOLUTION INTRAVENOUS ONCE
Status: CANCELLED | OUTPATIENT
Start: 2020-12-02

## 2020-11-18 RX ORDER — SODIUM CHLORIDE 9 MG/ML
20 INJECTION, SOLUTION INTRAVENOUS ONCE AS NEEDED
Status: DISCONTINUED | OUTPATIENT
Start: 2020-11-18 | End: 2020-11-21 | Stop reason: HOSPADM

## 2020-11-18 RX ORDER — SODIUM CHLORIDE 9 MG/ML
20 INJECTION, SOLUTION INTRAVENOUS ONCE AS NEEDED
Status: CANCELLED | OUTPATIENT
Start: 2020-12-02

## 2020-11-18 RX ORDER — DEXTROSE MONOHYDRATE 50 MG/ML
20 INJECTION, SOLUTION INTRAVENOUS ONCE
Status: DISCONTINUED | OUTPATIENT
Start: 2020-11-18 | End: 2020-11-21 | Stop reason: HOSPADM

## 2020-11-18 RX ADMIN — DEXAMETHASONE SODIUM PHOSPHATE: 10 INJECTION, SOLUTION INTRAMUSCULAR; INTRAVENOUS at 10:58

## 2020-11-18 RX ADMIN — SODIUM CHLORIDE 20 ML/HR: 0.9 INJECTION, SOLUTION INTRAVENOUS at 10:57

## 2020-11-18 RX ADMIN — FLUOROURACIL 750 MG: 50 INJECTION, SOLUTION INTRAVENOUS at 11:49

## 2020-11-19 ENCOUNTER — OFFICE VISIT (OUTPATIENT)
Dept: PALLIATIVE MEDICINE | Facility: CLINIC | Age: 65
End: 2020-11-19
Payer: MEDICARE

## 2020-11-19 VITALS
BODY MASS INDEX: 23.64 KG/M2 | OXYGEN SATURATION: 99 % | HEART RATE: 64 BPM | WEIGHT: 159.61 LBS | HEIGHT: 69 IN | SYSTOLIC BLOOD PRESSURE: 124 MMHG | DIASTOLIC BLOOD PRESSURE: 80 MMHG | TEMPERATURE: 96.5 F | RESPIRATION RATE: 16 BRPM

## 2020-11-19 DIAGNOSIS — E43 SEVERE PROTEIN-CALORIE MALNUTRITION (HCC): ICD-10-CM

## 2020-11-19 DIAGNOSIS — R64 MALIGNANT CACHEXIA (HCC): Primary | ICD-10-CM

## 2020-11-19 PROCEDURE — 99214 OFFICE O/P EST MOD 30 MIN: CPT | Performed by: FAMILY MEDICINE

## 2020-11-19 RX ORDER — MIRTAZAPINE 7.5 MG/1
7.5 TABLET, FILM COATED ORAL
Qty: 30 TABLET | Refills: 0 | Status: ON HOLD | OUTPATIENT
Start: 2020-11-19 | End: 2021-01-13

## 2020-11-20 ENCOUNTER — HOSPITAL ENCOUNTER (OUTPATIENT)
Dept: INFUSION CENTER | Facility: HOSPITAL | Age: 65
Discharge: HOME | End: 2020-11-20
Attending: INTERNAL MEDICINE

## 2020-11-20 VITALS — TEMPERATURE: 96.9 F

## 2020-11-20 DIAGNOSIS — C16.0 GASTROESOPHAGEAL CANCER (HCC): ICD-10-CM

## 2020-11-20 DIAGNOSIS — C16.0 MALIGNANT NEOPLASM OF CARDIA OF STOMACH (HCC): ICD-10-CM

## 2020-11-20 DIAGNOSIS — R11.0 NAUSEA: Primary | ICD-10-CM

## 2020-11-20 DIAGNOSIS — E86.0 DEHYDRATION: ICD-10-CM

## 2020-11-20 DIAGNOSIS — C15.5 MALIGNANT NEOPLASM OF LOWER THIRD OF ESOPHAGUS (HCC): ICD-10-CM

## 2020-12-01 ENCOUNTER — LAB (OUTPATIENT)
Dept: LAB | Facility: CLINIC | Age: 65
End: 2020-12-01
Payer: MEDICARE

## 2020-12-01 DIAGNOSIS — C15.5 MALIGNANT NEOPLASM OF LOWER THIRD OF ESOPHAGUS (HCC): ICD-10-CM

## 2020-12-01 DIAGNOSIS — C16.0 GASTROESOPHAGEAL CANCER (HCC): ICD-10-CM

## 2020-12-01 DIAGNOSIS — R11.0 NAUSEA: ICD-10-CM

## 2020-12-01 DIAGNOSIS — E86.0 DEHYDRATION: ICD-10-CM

## 2020-12-01 LAB
ALBUMIN SERPL BCP-MCNC: 3 G/DL (ref 3.5–5)
ALP SERPL-CCNC: 96 U/L (ref 46–116)
ALT SERPL W P-5'-P-CCNC: 14 U/L (ref 12–78)
ANION GAP SERPL CALCULATED.3IONS-SCNC: 8 MMOL/L (ref 4–13)
AST SERPL W P-5'-P-CCNC: 10 U/L (ref 5–45)
BASOPHILS # BLD AUTO: 0.11 THOUSANDS/ΜL (ref 0–0.1)
BASOPHILS NFR BLD AUTO: 2 % (ref 0–1)
BILIRUB SERPL-MCNC: 1.04 MG/DL (ref 0.2–1)
BUN SERPL-MCNC: 16 MG/DL (ref 5–25)
CALCIUM ALBUM COR SERPL-MCNC: 10.2 MG/DL (ref 8.3–10.1)
CALCIUM SERPL-MCNC: 9.4 MG/DL (ref 8.3–10.1)
CHLORIDE SERPL-SCNC: 108 MMOL/L (ref 100–108)
CO2 SERPL-SCNC: 24 MMOL/L (ref 21–32)
CREAT SERPL-MCNC: 1.36 MG/DL (ref 0.6–1.3)
EOSINOPHIL # BLD AUTO: 0.1 THOUSAND/ΜL (ref 0–0.61)
EOSINOPHIL NFR BLD AUTO: 2 % (ref 0–6)
ERYTHROCYTE [DISTWIDTH] IN BLOOD BY AUTOMATED COUNT: 18.3 % (ref 11.6–15.1)
GFR SERPL CREATININE-BSD FRML MDRD: 53 ML/MIN/1.73SQ M
GLUCOSE SERPL-MCNC: 113 MG/DL (ref 65–140)
HCT VFR BLD AUTO: 38.3 % (ref 36.5–49.3)
HGB BLD-MCNC: 11.7 G/DL (ref 12–17)
IMM GRANULOCYTES # BLD AUTO: 0.01 THOUSAND/UL (ref 0–0.2)
IMM GRANULOCYTES NFR BLD AUTO: 0 % (ref 0–2)
LYMPHOCYTES # BLD AUTO: 4.18 THOUSANDS/ΜL (ref 0.6–4.47)
LYMPHOCYTES NFR BLD AUTO: 80 % (ref 14–44)
MCH RBC QN AUTO: 27.3 PG (ref 26.8–34.3)
MCHC RBC AUTO-ENTMCNC: 30.5 G/DL (ref 31.4–37.4)
MCV RBC AUTO: 89 FL (ref 82–98)
MONOCYTES # BLD AUTO: 0.32 THOUSAND/ΜL (ref 0.17–1.22)
MONOCYTES NFR BLD AUTO: 6 % (ref 4–12)
NEUTROPHILS # BLD AUTO: 0.52 THOUSANDS/ΜL (ref 1.85–7.62)
NEUTS SEG NFR BLD AUTO: 10 % (ref 43–75)
NRBC BLD AUTO-RTO: 0 /100 WBCS
PLATELET # BLD AUTO: 337 THOUSANDS/UL (ref 149–390)
PMV BLD AUTO: 11 FL (ref 8.9–12.7)
POTASSIUM SERPL-SCNC: 3 MMOL/L (ref 3.5–5.3)
PROT SERPL-MCNC: 6.9 G/DL (ref 6.4–8.2)
RBC # BLD AUTO: 4.29 MILLION/UL (ref 3.88–5.62)
SODIUM SERPL-SCNC: 140 MMOL/L (ref 136–145)
WBC # BLD AUTO: 5.24 THOUSAND/UL (ref 4.31–10.16)

## 2020-12-01 PROCEDURE — 36415 COLL VENOUS BLD VENIPUNCTURE: CPT

## 2020-12-01 PROCEDURE — 80053 COMPREHEN METABOLIC PANEL: CPT

## 2020-12-01 PROCEDURE — 85025 COMPLETE CBC W/AUTO DIFF WBC: CPT

## 2020-12-02 ENCOUNTER — HOSPITAL ENCOUNTER (OUTPATIENT)
Dept: INFUSION CENTER | Facility: HOSPITAL | Age: 65
Discharge: HOME | End: 2020-12-02
Attending: INTERNAL MEDICINE

## 2020-12-07 ENCOUNTER — TELEPHONE (OUTPATIENT)
Dept: GASTROENTEROLOGY | Facility: CLINIC | Age: 65
End: 2020-12-07

## 2020-12-07 ENCOUNTER — TELEMEDICINE (OUTPATIENT)
Dept: FAMILY MEDICINE CLINIC | Facility: CLINIC | Age: 65
End: 2020-12-07
Payer: MEDICARE

## 2020-12-07 DIAGNOSIS — I42.0 DILATED CARDIOMYOPATHY (HCC): ICD-10-CM

## 2020-12-07 DIAGNOSIS — R13.19 OTHER DYSPHAGIA: Chronic | ICD-10-CM

## 2020-12-07 DIAGNOSIS — C15.5 MALIGNANT NEOPLASM OF LOWER THIRD OF ESOPHAGUS (HCC): Primary | ICD-10-CM

## 2020-12-07 DIAGNOSIS — E43 SEVERE PROTEIN-CALORIE MALNUTRITION (GOMEZ: LESS THAN 60% OF STANDARD WEIGHT) (HCC): ICD-10-CM

## 2020-12-07 DIAGNOSIS — C15.5 MALIGNANT NEOPLASM OF LOWER THIRD OF ESOPHAGUS (HCC): ICD-10-CM

## 2020-12-07 DIAGNOSIS — C16.0 GASTROESOPHAGEAL CANCER (HCC): Primary | ICD-10-CM

## 2020-12-07 DIAGNOSIS — E43 SEVERE PROTEIN-CALORIE MALNUTRITION (HCC): ICD-10-CM

## 2020-12-07 PROCEDURE — 99442 PR PHYS/QHP TELEPHONE EVALUATION 11-20 MIN: CPT | Performed by: FAMILY MEDICINE

## 2020-12-08 DIAGNOSIS — E86.0 DEHYDRATION: ICD-10-CM

## 2020-12-08 DIAGNOSIS — C16.0 GASTROESOPHAGEAL CANCER (HCC): ICD-10-CM

## 2020-12-08 DIAGNOSIS — C15.5 MALIGNANT NEOPLASM OF LOWER THIRD OF ESOPHAGUS (HCC): ICD-10-CM

## 2020-12-08 DIAGNOSIS — R11.0 NAUSEA: Primary | ICD-10-CM

## 2020-12-08 RX ORDER — SODIUM CHLORIDE 9 MG/ML
20 INJECTION, SOLUTION INTRAVENOUS ONCE AS NEEDED
Status: CANCELLED | OUTPATIENT
Start: 2020-12-21

## 2020-12-08 RX ORDER — FLUOROURACIL 50 MG/ML
400 INJECTION, SOLUTION INTRAVENOUS ONCE
Status: CANCELLED | OUTPATIENT
Start: 2020-12-21

## 2020-12-08 RX ORDER — DEXTROSE MONOHYDRATE 50 MG/ML
20 INJECTION, SOLUTION INTRAVENOUS ONCE
Status: CANCELLED | OUTPATIENT
Start: 2020-12-21

## 2020-12-09 DIAGNOSIS — C15.5 MALIGNANT NEOPLASM OF LOWER THIRD OF ESOPHAGUS (HCC): ICD-10-CM

## 2020-12-09 DIAGNOSIS — E86.0 DEHYDRATION: ICD-10-CM

## 2020-12-09 DIAGNOSIS — R11.0 NAUSEA: Primary | ICD-10-CM

## 2020-12-09 DIAGNOSIS — C16.0 MALIGNANT NEOPLASM OF CARDIA OF STOMACH (HCC): ICD-10-CM

## 2020-12-10 ENCOUNTER — TELEPHONE (OUTPATIENT)
Dept: HEMATOLOGY ONCOLOGY | Facility: CLINIC | Age: 65
End: 2020-12-10

## 2020-12-10 ENCOUNTER — HOSPITAL ENCOUNTER (OUTPATIENT)
Dept: RADIOLOGY | Facility: HOSPITAL | Age: 65
Discharge: HOME | End: 2020-12-10
Attending: INTERNAL MEDICINE
Payer: MEDICARE

## 2020-12-10 DIAGNOSIS — R11.0 NAUSEA: ICD-10-CM

## 2020-12-10 DIAGNOSIS — C16.0 GASTROESOPHAGEAL CANCER (HCC): ICD-10-CM

## 2020-12-10 DIAGNOSIS — E86.0 DEHYDRATION: ICD-10-CM

## 2020-12-10 DIAGNOSIS — C15.5 MALIGNANT NEOPLASM OF LOWER THIRD OF ESOPHAGUS (HCC): ICD-10-CM

## 2020-12-10 PROCEDURE — 74177 CT ABD & PELVIS W/CONTRAST: CPT

## 2020-12-10 PROCEDURE — 71260 CT THORAX DX C+: CPT

## 2020-12-10 PROCEDURE — G1004 CDSM NDSC: HCPCS

## 2020-12-10 RX ADMIN — IOHEXOL 100 ML: 350 INJECTION, SOLUTION INTRAVENOUS at 14:24

## 2020-12-11 ENCOUNTER — TELEPHONE (OUTPATIENT)
Dept: NUTRITION | Facility: CLINIC | Age: 65
End: 2020-12-11

## 2020-12-11 ENCOUNTER — TELEPHONE (OUTPATIENT)
Dept: GASTROENTEROLOGY | Facility: AMBULARY SURGERY CENTER | Age: 65
End: 2020-12-11

## 2020-12-11 ENCOUNTER — OFFICE VISIT (OUTPATIENT)
Dept: GASTROENTEROLOGY | Facility: CLINIC | Age: 65
End: 2020-12-11
Payer: MEDICARE

## 2020-12-11 VITALS — HEIGHT: 69 IN | WEIGHT: 153 LBS | BODY MASS INDEX: 22.66 KG/M2

## 2020-12-11 DIAGNOSIS — E43 SEVERE PROTEIN-CALORIE MALNUTRITION (HCC): ICD-10-CM

## 2020-12-11 DIAGNOSIS — C15.5 MALIGNANT NEOPLASM OF LOWER THIRD OF ESOPHAGUS (HCC): Primary | ICD-10-CM

## 2020-12-11 DIAGNOSIS — C16.0 GASTROESOPHAGEAL CANCER (HCC): ICD-10-CM

## 2020-12-11 DIAGNOSIS — R13.19 OTHER DYSPHAGIA: Chronic | ICD-10-CM

## 2020-12-11 PROCEDURE — 99203 OFFICE O/P NEW LOW 30 MIN: CPT | Performed by: INTERNAL MEDICINE

## 2020-12-14 ENCOUNTER — TELEPHONE (OUTPATIENT)
Dept: HEMATOLOGY ONCOLOGY | Facility: CLINIC | Age: 65
End: 2020-12-14

## 2020-12-14 ENCOUNTER — DOCUMENTATION (OUTPATIENT)
Dept: INFUSION CENTER | Facility: HOSPITAL | Age: 65
End: 2020-12-14

## 2020-12-14 DIAGNOSIS — E86.0 DEHYDRATION: ICD-10-CM

## 2020-12-14 DIAGNOSIS — R11.0 NAUSEA: ICD-10-CM

## 2020-12-14 DIAGNOSIS — C15.5 MALIGNANT NEOPLASM OF LOWER THIRD OF ESOPHAGUS (HCC): ICD-10-CM

## 2020-12-14 DIAGNOSIS — R93.89 ABNORMAL CT OF THE CHEST: Primary | ICD-10-CM

## 2020-12-14 DIAGNOSIS — C16.0 GASTROESOPHAGEAL CANCER (HCC): ICD-10-CM

## 2020-12-15 DIAGNOSIS — R11.0 NAUSEA: Primary | ICD-10-CM

## 2020-12-15 DIAGNOSIS — E86.0 DEHYDRATION: ICD-10-CM

## 2020-12-15 DIAGNOSIS — C15.5 MALIGNANT NEOPLASM OF LOWER THIRD OF ESOPHAGUS (HCC): ICD-10-CM

## 2020-12-15 DIAGNOSIS — C16.0 MALIGNANT NEOPLASM OF CARDIA OF STOMACH (HCC): ICD-10-CM

## 2020-12-15 RX ORDER — AMOXICILLIN AND CLAVULANATE POTASSIUM 875; 125 MG/1; MG/1
1 TABLET, FILM COATED ORAL EVERY 12 HOURS SCHEDULED
Qty: 14 TABLET | Refills: 0 | OUTPATIENT
Start: 2020-12-15 | End: 2020-12-22

## 2020-12-16 ENCOUNTER — HOSPITAL ENCOUNTER (OUTPATIENT)
Dept: INFUSION CENTER | Facility: HOSPITAL | Age: 65
Discharge: HOME | End: 2020-12-16
Attending: INTERNAL MEDICINE

## 2020-12-16 ENCOUNTER — TELEPHONE (OUTPATIENT)
Dept: HEMATOLOGY ONCOLOGY | Facility: CLINIC | Age: 65
End: 2020-12-16

## 2020-12-17 ENCOUNTER — TELEMEDICINE (OUTPATIENT)
Dept: HEMATOLOGY ONCOLOGY | Facility: CLINIC | Age: 65
End: 2020-12-17
Payer: MEDICARE

## 2020-12-17 DIAGNOSIS — R11.0 NAUSEA: ICD-10-CM

## 2020-12-17 DIAGNOSIS — C15.5 MALIGNANT NEOPLASM OF LOWER THIRD OF ESOPHAGUS (HCC): Primary | ICD-10-CM

## 2020-12-17 DIAGNOSIS — C16.0 GASTROESOPHAGEAL CANCER (HCC): ICD-10-CM

## 2020-12-17 DIAGNOSIS — E86.0 DEHYDRATION: ICD-10-CM

## 2020-12-17 PROCEDURE — 99214 OFFICE O/P EST MOD 30 MIN: CPT | Performed by: INTERNAL MEDICINE

## 2020-12-17 RX ORDER — SODIUM CHLORIDE 9 MG/ML
20 INJECTION, SOLUTION INTRAVENOUS ONCE AS NEEDED
Status: CANCELLED | OUTPATIENT
Start: 2021-02-24

## 2020-12-17 RX ORDER — FLUOROURACIL 50 MG/ML
400 INJECTION, SOLUTION INTRAVENOUS ONCE
Status: CANCELLED | OUTPATIENT
Start: 2021-02-22

## 2020-12-17 RX ORDER — DEXTROSE MONOHYDRATE 50 MG/ML
20 INJECTION, SOLUTION INTRAVENOUS ONCE
Status: CANCELLED | OUTPATIENT
Start: 2021-02-24

## 2020-12-18 ENCOUNTER — TELEPHONE (OUTPATIENT)
Dept: FAMILY MEDICINE CLINIC | Facility: CLINIC | Age: 65
End: 2020-12-18

## 2020-12-18 ENCOUNTER — HOSPITAL ENCOUNTER (OUTPATIENT)
Dept: INFUSION CENTER | Facility: HOSPITAL | Age: 65
End: 2020-12-18
Attending: INTERNAL MEDICINE

## 2020-12-18 DIAGNOSIS — I48.0 PAF (PAROXYSMAL ATRIAL FIBRILLATION) (HCC): ICD-10-CM

## 2020-12-18 DIAGNOSIS — I42.0 DILATED CARDIOMYOPATHY (HCC): ICD-10-CM

## 2020-12-18 DIAGNOSIS — I50.42 CHRONIC COMBINED SYSTOLIC AND DIASTOLIC CHF (CONGESTIVE HEART FAILURE) (HCC): Chronic | ICD-10-CM

## 2020-12-18 RX ORDER — BISOPROLOL FUMARATE AND HYDROCHLOROTHIAZIDE 10; 6.25 MG/1; MG/1
TABLET ORAL
Qty: 90 TABLET | Refills: 3 | Status: SHIPPED | OUTPATIENT
Start: 2020-12-18 | End: 2020-12-24 | Stop reason: SDUPTHER

## 2020-12-18 NOTE — PROGRESS NOTES
To: Mai Lugo RN, [facility]      Patient was seen yesterday and Dr Elo Choudhury postponed until 1/4  Someone should be calling to change  At this point are you able to cancel for 12/22?  Im not sure who the information went to yesterday as we were virtual     ----- Message -----   From: Mai Lugo RN   Sent: 12/18/2020  11:53 AM EST   To: John Bolaños RN, [facility]

## 2020-12-20 DIAGNOSIS — I48.91 A-FIB (HCC): ICD-10-CM

## 2020-12-21 ENCOUNTER — TELEPHONE (OUTPATIENT)
Dept: CARDIOLOGY CLINIC | Facility: CLINIC | Age: 65
End: 2020-12-21

## 2020-12-21 ENCOUNTER — TELEPHONE (OUTPATIENT)
Dept: INFECTIOUS DISEASES | Facility: CLINIC | Age: 65
End: 2020-12-21

## 2020-12-21 ENCOUNTER — TELEPHONE (OUTPATIENT)
Dept: INFUSION CENTER | Facility: HOSPITAL | Age: 65
End: 2020-12-21

## 2020-12-21 ENCOUNTER — HOSPITAL ENCOUNTER (OUTPATIENT)
Dept: INFUSION CENTER | Facility: HOSPITAL | Age: 65
Discharge: HOME | End: 2020-12-21
Attending: INTERNAL MEDICINE

## 2020-12-21 DIAGNOSIS — I50.42 CHRONIC COMBINED SYSTOLIC AND DIASTOLIC CHF (CONGESTIVE HEART FAILURE) (HCC): Chronic | ICD-10-CM

## 2020-12-21 DIAGNOSIS — I42.0 DILATED CARDIOMYOPATHY (HCC): ICD-10-CM

## 2020-12-21 DIAGNOSIS — I48.0 PAF (PAROXYSMAL ATRIAL FIBRILLATION) (HCC): ICD-10-CM

## 2020-12-21 RX ORDER — AMIODARONE HYDROCHLORIDE 200 MG/1
TABLET ORAL
Qty: 30 TABLET | Refills: 2 | Status: SHIPPED | OUTPATIENT
Start: 2020-12-21 | End: 2021-03-24

## 2020-12-22 ENCOUNTER — OFFICE VISIT (OUTPATIENT)
Dept: INFECTIOUS DISEASES | Facility: CLINIC | Age: 65
End: 2020-12-22
Payer: MEDICARE

## 2020-12-22 ENCOUNTER — APPOINTMENT (OUTPATIENT)
Dept: LAB | Facility: CLINIC | Age: 65
End: 2020-12-22
Payer: MEDICARE

## 2020-12-22 VITALS
WEIGHT: 150 LBS | HEART RATE: 66 BPM | HEIGHT: 69 IN | BODY MASS INDEX: 22.22 KG/M2 | TEMPERATURE: 96.7 F | DIASTOLIC BLOOD PRESSURE: 72 MMHG | SYSTOLIC BLOOD PRESSURE: 108 MMHG

## 2020-12-22 DIAGNOSIS — E43 SEVERE PROTEIN-CALORIE MALNUTRITION (HCC): ICD-10-CM

## 2020-12-22 DIAGNOSIS — R78.81 E. COLI BACTEREMIA: ICD-10-CM

## 2020-12-22 DIAGNOSIS — C15.5 MALIGNANT NEOPLASM OF LOWER THIRD OF ESOPHAGUS (HCC): ICD-10-CM

## 2020-12-22 DIAGNOSIS — R93.89 ABNORMAL CT OF THE CHEST: Primary | ICD-10-CM

## 2020-12-22 DIAGNOSIS — J98.4 CAVITATING MASS IN LEFT UPPER LUNG LOBE: Primary | ICD-10-CM

## 2020-12-22 DIAGNOSIS — D84.9 IMMUNOCOMPROMISED PATIENT (HCC): ICD-10-CM

## 2020-12-22 DIAGNOSIS — J98.4 CAVITATING MASS IN LEFT UPPER LUNG LOBE: ICD-10-CM

## 2020-12-22 DIAGNOSIS — R11.0 NAUSEA: ICD-10-CM

## 2020-12-22 DIAGNOSIS — R93.89 ABNORMAL CT OF THE CHEST: ICD-10-CM

## 2020-12-22 DIAGNOSIS — B96.20 E. COLI BACTEREMIA: ICD-10-CM

## 2020-12-22 DIAGNOSIS — E86.0 DEHYDRATION: ICD-10-CM

## 2020-12-22 DIAGNOSIS — C16.0 GASTROESOPHAGEAL CANCER (HCC): ICD-10-CM

## 2020-12-22 LAB
ALBUMIN SERPL BCP-MCNC: 3.1 G/DL (ref 3.5–5)
ALP SERPL-CCNC: 111 U/L (ref 46–116)
ALT SERPL W P-5'-P-CCNC: 14 U/L (ref 12–78)
ANION GAP SERPL CALCULATED.3IONS-SCNC: 11 MMOL/L (ref 4–13)
AST SERPL W P-5'-P-CCNC: 13 U/L (ref 5–45)
BASOPHILS # BLD AUTO: 0.18 THOUSANDS/ΜL (ref 0–0.1)
BASOPHILS NFR BLD AUTO: 1 % (ref 0–1)
BILIRUB SERPL-MCNC: 0.49 MG/DL (ref 0.2–1)
BUN SERPL-MCNC: 23 MG/DL (ref 5–25)
CALCIUM ALBUM COR SERPL-MCNC: 10.8 MG/DL (ref 8.3–10.1)
CALCIUM SERPL-MCNC: 10.1 MG/DL (ref 8.3–10.1)
CHLORIDE SERPL-SCNC: 109 MMOL/L (ref 100–108)
CO2 SERPL-SCNC: 22 MMOL/L (ref 21–32)
CREAT SERPL-MCNC: 1.43 MG/DL (ref 0.6–1.3)
EOSINOPHIL # BLD AUTO: 0.39 THOUSAND/ΜL (ref 0–0.61)
EOSINOPHIL NFR BLD AUTO: 2 % (ref 0–6)
ERYTHROCYTE [DISTWIDTH] IN BLOOD BY AUTOMATED COUNT: 19.9 % (ref 11.6–15.1)
GFR SERPL CREATININE-BSD FRML MDRD: 50 ML/MIN/1.73SQ M
GLUCOSE P FAST SERPL-MCNC: 107 MG/DL (ref 65–99)
HCT VFR BLD AUTO: 39.6 % (ref 36.5–49.3)
HGB BLD-MCNC: 11.7 G/DL (ref 12–17)
IMM GRANULOCYTES # BLD AUTO: 0.1 THOUSAND/UL (ref 0–0.2)
IMM GRANULOCYTES NFR BLD AUTO: 1 % (ref 0–2)
LYMPHOCYTES # BLD AUTO: 5.76 THOUSANDS/ΜL (ref 0.6–4.47)
LYMPHOCYTES NFR BLD AUTO: 31 % (ref 14–44)
MCH RBC QN AUTO: 27.3 PG (ref 26.8–34.3)
MCHC RBC AUTO-ENTMCNC: 29.5 G/DL (ref 31.4–37.4)
MCV RBC AUTO: 92 FL (ref 82–98)
MONOCYTES # BLD AUTO: 1.61 THOUSAND/ΜL (ref 0.17–1.22)
MONOCYTES NFR BLD AUTO: 9 % (ref 4–12)
NEUTROPHILS # BLD AUTO: 10.38 THOUSANDS/ΜL (ref 1.85–7.62)
NEUTS SEG NFR BLD AUTO: 56 % (ref 43–75)
NRBC BLD AUTO-RTO: 0 /100 WBCS
PLATELET # BLD AUTO: 506 THOUSANDS/UL (ref 149–390)
PMV BLD AUTO: 12.2 FL (ref 8.9–12.7)
POTASSIUM SERPL-SCNC: 4 MMOL/L (ref 3.5–5.3)
PROT SERPL-MCNC: 8.1 G/DL (ref 6.4–8.2)
RBC # BLD AUTO: 4.29 MILLION/UL (ref 3.88–5.62)
SODIUM SERPL-SCNC: 142 MMOL/L (ref 136–145)
WBC # BLD AUTO: 18.42 THOUSAND/UL (ref 4.31–10.16)

## 2020-12-22 PROCEDURE — 99204 OFFICE O/P NEW MOD 45 MIN: CPT | Performed by: INTERNAL MEDICINE

## 2020-12-22 PROCEDURE — 85025 COMPLETE CBC W/AUTO DIFF WBC: CPT

## 2020-12-22 PROCEDURE — 36415 COLL VENOUS BLD VENIPUNCTURE: CPT

## 2020-12-22 PROCEDURE — 80053 COMPREHEN METABOLIC PANEL: CPT

## 2020-12-22 NOTE — PRE-PROCEDURE INSTRUCTIONS
Pre-Surgery Instructions:   Medication Instructions    amiodarone 200 mg tablet Instructed patient per Anesthesia Guidelines   ammonium lactate (LAC-HYDRIN) 12 % cream Instructed patient per Anesthesia Guidelines   bisoprolol-hydrochlorothiazide (ZIAC) 10-6 25 MG per tablet Instructed patient per Anesthesia Guidelines   diazepam (VALIUM) 5 mg tablet Instructed patient per Anesthesia Guidelines   Eliquis 5 MG Patient was instructed to contact Physician for medication instruction   fluorouracil 4,490 mg in CADD infusion pump Instructed patient per Anesthesia Guidelines   mirtazapine (REMERON) 7 5 MG tablet Instructed patient per Anesthesia Guidelines   ondansetron (ZOFRAN) 4 mg tablet Instructed patient per Anesthesia Guidelines   pantoprazole (PROTONIX) 40 mg tablet Instructed patient per Anesthesia Guidelines   venlafaxine (EFFEXOR-XR) 150 mg 24 hr capsule Instructed patient per Anesthesia Guidelines  Pt to follow Dr Butch Boyce instructions  Pt to have covid test on 12/24/2020 at the [facility]  Interview and instructions given to wife Rise Credit  Pt to take amiodarone the am of the procedure  (ziac needs a re-order)    wife Rise Credit [phone]

## 2020-12-23 ENCOUNTER — ANESTHESIA EVENT (OUTPATIENT)
Dept: GASTROENTEROLOGY | Facility: AMBULARY SURGERY CENTER | Age: 65
End: 2020-12-23

## 2020-12-23 DIAGNOSIS — C16.0 MALIGNANT NEOPLASM OF CARDIA OF STOMACH (HCC): ICD-10-CM

## 2020-12-23 DIAGNOSIS — C15.5 MALIGNANT NEOPLASM OF LOWER THIRD OF ESOPHAGUS (HCC): ICD-10-CM

## 2020-12-23 DIAGNOSIS — E86.0 DEHYDRATION: ICD-10-CM

## 2020-12-23 DIAGNOSIS — R11.0 NAUSEA: Primary | ICD-10-CM

## 2020-12-24 ENCOUNTER — TELEPHONE (OUTPATIENT)
Dept: GASTROENTEROLOGY | Facility: AMBULARY SURGERY CENTER | Age: 65
End: 2020-12-24

## 2020-12-24 DIAGNOSIS — I48.0 PAF (PAROXYSMAL ATRIAL FIBRILLATION) (HCC): ICD-10-CM

## 2020-12-24 DIAGNOSIS — Z11.59 SCREENING FOR VIRAL DISEASE: ICD-10-CM

## 2020-12-24 DIAGNOSIS — I50.42 CHRONIC COMBINED SYSTOLIC AND DIASTOLIC CHF (CONGESTIVE HEART FAILURE) (HCC): Chronic | ICD-10-CM

## 2020-12-24 DIAGNOSIS — I42.0 DILATED CARDIOMYOPATHY (HCC): ICD-10-CM

## 2020-12-24 RX ORDER — BISOPROLOL FUMARATE AND HYDROCHLOROTHIAZIDE 10; 6.25 MG/1; MG/1
TABLET ORAL
Qty: 30 TABLET | Refills: 1 | Status: SHIPPED | OUTPATIENT
Start: 2020-12-24 | End: 2021-01-13

## 2020-12-25 PROCEDURE — U0003 INFECTIOUS AGENT DETECTION BY NUCLEIC ACID (DNA OR RNA); SEVERE ACUTE RESPIRATORY SYNDROME CORONAVIRUS 2 (SARS-COV-2) (CORONAVIRUS DISEASE [COVID-19]), AMPLIFIED PROBE TECHNIQUE, MAKING USE OF HIGH THROUGHPUT TECHNOLOGIES AS DESCRIBED BY CMS-2020-01-R: HCPCS | Performed by: INTERNAL MEDICINE

## 2020-12-26 LAB — SARS-COV-2 RNA SPEC QL NAA+PROBE: NOT DETECTED

## 2020-12-28 ENCOUNTER — TELEPHONE (OUTPATIENT)
Dept: HEMATOLOGY ONCOLOGY | Facility: CLINIC | Age: 65
End: 2020-12-28

## 2020-12-28 ENCOUNTER — TELEPHONE (OUTPATIENT)
Dept: PULMONOLOGY | Facility: CLINIC | Age: 65
End: 2020-12-28

## 2020-12-29 ENCOUNTER — HOSPITAL ENCOUNTER (OUTPATIENT)
Dept: INFUSION CENTER | Facility: HOSPITAL | Age: 65
End: 2020-12-29
Attending: INTERNAL MEDICINE

## 2020-12-29 ENCOUNTER — TELEPHONE (OUTPATIENT)
Dept: CARDIOLOGY CLINIC | Facility: CLINIC | Age: 65
End: 2020-12-29

## 2020-12-29 ENCOUNTER — TELEPHONE (OUTPATIENT)
Dept: PALLIATIVE MEDICINE | Facility: CLINIC | Age: 65
End: 2020-12-29

## 2020-12-29 DIAGNOSIS — I50.42 CHRONIC COMBINED SYSTOLIC AND DIASTOLIC CHF (CONGESTIVE HEART FAILURE) (HCC): Primary | ICD-10-CM

## 2020-12-29 DIAGNOSIS — I48.0 PAF (PAROXYSMAL ATRIAL FIBRILLATION) (HCC): ICD-10-CM

## 2020-12-29 RX ORDER — HYDROCHLOROTHIAZIDE 12.5 MG/1
TABLET ORAL
Qty: 30 TABLET | Refills: 1 | Status: SHIPPED | OUTPATIENT
Start: 2020-12-29 | End: 2021-02-22 | Stop reason: SDUPTHER

## 2020-12-29 NOTE — TELEPHONE ENCOUNTER
Reached out to patient, no answer left message to call office back to  re schedule appointment that patient did not no show on 12/28/2020

## 2020-12-30 ENCOUNTER — TELEPHONE (OUTPATIENT)
Dept: PALLIATIVE MEDICINE | Facility: CLINIC | Age: 65
End: 2020-12-30

## 2020-12-30 ENCOUNTER — ANESTHESIA (OUTPATIENT)
Dept: GASTROENTEROLOGY | Facility: AMBULARY SURGERY CENTER | Age: 65
End: 2020-12-30
Payer: MEDICARE

## 2020-12-30 ENCOUNTER — HOSPITAL ENCOUNTER (OUTPATIENT)
Dept: PERIOP | Facility: HOSPITAL | Age: 65
Setting detail: OUTPATIENT SURGERY
Discharge: HOME | End: 2020-12-30
Attending: INTERNAL MEDICINE
Payer: MEDICARE

## 2020-12-30 VITALS
OXYGEN SATURATION: 97 % | BODY MASS INDEX: 20.3 KG/M2 | WEIGHT: 145 LBS | HEIGHT: 71 IN | HEART RATE: 85 BPM | RESPIRATION RATE: 17 BRPM | DIASTOLIC BLOOD PRESSURE: 88 MMHG | SYSTOLIC BLOOD PRESSURE: 137 MMHG | TEMPERATURE: 96.2 F

## 2020-12-30 VITALS — HEART RATE: 100 BPM

## 2020-12-30 DIAGNOSIS — E43 SEVERE PROTEIN-CALORIE MALNUTRITION (HCC): ICD-10-CM

## 2020-12-30 DIAGNOSIS — C15.5 MALIGNANT NEOPLASM OF LOWER THIRD OF ESOPHAGUS (HCC): ICD-10-CM

## 2020-12-30 DIAGNOSIS — R13.19 OTHER DYSPHAGIA: ICD-10-CM

## 2020-12-30 DIAGNOSIS — C16.0 GASTROESOPHAGEAL CANCER (HCC): ICD-10-CM

## 2020-12-30 PROCEDURE — 88313 SPECIAL STAINS GROUP 2: CPT | Performed by: PATHOLOGY

## 2020-12-30 PROCEDURE — 88305 TISSUE EXAM BY PATHOLOGIST: CPT | Performed by: PATHOLOGY

## 2020-12-30 PROCEDURE — 43239 EGD BIOPSY SINGLE/MULTIPLE: CPT | Performed by: INTERNAL MEDICINE

## 2020-12-30 PROCEDURE — 88341 IMHCHEM/IMCYTCHM EA ADD ANTB: CPT | Performed by: PATHOLOGY

## 2020-12-30 PROCEDURE — 88360 TUMOR IMMUNOHISTOCHEM/MANUAL: CPT | Performed by: PATHOLOGY

## 2020-12-30 PROCEDURE — 88342 IMHCHEM/IMCYTCHM 1ST ANTB: CPT | Performed by: PATHOLOGY

## 2020-12-30 RX ORDER — SODIUM CHLORIDE, SODIUM LACTATE, POTASSIUM CHLORIDE, CALCIUM CHLORIDE 600; 310; 30; 20 MG/100ML; MG/100ML; MG/100ML; MG/100ML
INJECTION, SOLUTION INTRAVENOUS CONTINUOUS PRN
Status: DISCONTINUED | OUTPATIENT
Start: 2020-12-30 | End: 2020-12-30

## 2020-12-30 RX ORDER — ONDANSETRON 2 MG/ML
4 INJECTION INTRAMUSCULAR; INTRAVENOUS ONCE AS NEEDED
Status: DISCONTINUED | OUTPATIENT
Start: 2020-12-30 | End: 2021-01-03 | Stop reason: HOSPADM

## 2020-12-30 RX ORDER — PROPOFOL 10 MG/ML
INJECTION, EMULSION INTRAVENOUS AS NEEDED
Status: DISCONTINUED | OUTPATIENT
Start: 2020-12-30 | End: 2020-12-30

## 2020-12-30 RX ORDER — GLYCOPYRROLATE 0.2 MG/ML
INJECTION INTRAMUSCULAR; INTRAVENOUS AS NEEDED
Status: DISCONTINUED | OUTPATIENT
Start: 2020-12-30 | End: 2020-12-30

## 2020-12-30 RX ORDER — LIDOCAINE HYDROCHLORIDE 20 MG/ML
INJECTION, SOLUTION INFILTRATION; PERINEURAL AS NEEDED
Status: DISCONTINUED | OUTPATIENT
Start: 2020-12-30 | End: 2020-12-30

## 2020-12-30 RX ORDER — METOCLOPRAMIDE HYDROCHLORIDE 5 MG/ML
INJECTION INTRAMUSCULAR; INTRAVENOUS AS NEEDED
Status: DISCONTINUED | OUTPATIENT
Start: 2020-12-30 | End: 2020-12-30

## 2020-12-30 RX ADMIN — PROPOFOL 20 MG: 10 INJECTION, EMULSION INTRAVENOUS at 11:21

## 2020-12-30 RX ADMIN — PROPOFOL 20 MG: 10 INJECTION, EMULSION INTRAVENOUS at 11:20

## 2020-12-30 RX ADMIN — PROPOFOL 30 MG: 10 INJECTION, EMULSION INTRAVENOUS at 11:23

## 2020-12-30 RX ADMIN — METOCLOPRAMIDE HYDROCHLORIDE 10 MG: 5 INJECTION INTRAMUSCULAR; INTRAVENOUS at 11:45

## 2020-12-30 RX ADMIN — GLYCOPYRROLATE 0.2 MG: 0.2 INJECTION, SOLUTION INTRAMUSCULAR; INTRAVENOUS at 11:18

## 2020-12-30 RX ADMIN — PROPOFOL 30 MG: 10 INJECTION, EMULSION INTRAVENOUS at 11:37

## 2020-12-30 RX ADMIN — PROPOFOL 20 MG: 10 INJECTION, EMULSION INTRAVENOUS at 11:41

## 2020-12-30 RX ADMIN — PROPOFOL 30 MG: 10 INJECTION, EMULSION INTRAVENOUS at 11:26

## 2020-12-30 RX ADMIN — SODIUM CHLORIDE, SODIUM LACTATE, POTASSIUM CHLORIDE, AND CALCIUM CHLORIDE: .6; .31; .03; .02 INJECTION, SOLUTION INTRAVENOUS at 10:37

## 2020-12-30 RX ADMIN — PROPOFOL 30 MG: 10 INJECTION, EMULSION INTRAVENOUS at 11:33

## 2020-12-30 RX ADMIN — PROPOFOL 30 MG: 10 INJECTION, EMULSION INTRAVENOUS at 11:30

## 2020-12-30 RX ADMIN — PROPOFOL 50 MG: 10 INJECTION, EMULSION INTRAVENOUS at 11:19

## 2020-12-30 RX ADMIN — LIDOCAINE HYDROCHLORIDE 3 ML: 20 INJECTION, SOLUTION INFILTRATION; PERINEURAL at 11:19

## 2020-12-30 NOTE — H&P
History and Physical - [facility]  María Flores 77 y o  male MRN: [id number]                  HPI: María Flores is a 77y o  year old male who presents for EGD for follow up of esophageal cancer, dysphagia  REVIEW OF SYSTEMS: Per the HPI, and otherwise unremarkable      Historical Information   Past Medical History:   Diagnosis Date    Anxiety     Cancer ([facility] ) 2019    metastatic gastroesophageal cancer    Dehydration     Depression     Esophageal cancer (HCC)     Fatigue     History of chemotherapy     currently started 2019    History of DVT (deep vein thrombosis)     Hypertension     currently is normal and may not need medication    Lung infection     chemo currently on hold because of the infection 2020    Nausea     Pneumonia     x2    Port-A-Cath in place     right    Psychiatric disorder     Recovering alcoholic ([facility] )     sober since     Varicose veins of left lower extremity      Past Surgical History:   Procedure Laterality Date    APPENDECTOMY      COLONOSCOPY      IR PORT PLACEMENT  2019    UPPER GASTROINTESTINAL ENDOSCOPY       Social History   Social History     Substance and Sexual Activity   Alcohol Use Not Currently    Frequency: Never    Binge frequency: Never     Social History     Substance and Sexual Activity   Drug Use Yes    Types: Marijuana    Comment: couple x week     Social History     Tobacco Use   Smoking Status Former Smoker    Types: Cigars    Quit date: 5/15/2019    Years since quittin 6   Smokeless Tobacco Never Used     Family History   Problem Relation Age of Onset    Colon cancer Father     Cancer Father         colon    Cancer Brother         Sinus-neuroblastoma     Heart disease Mother        Meds/Allergies     (Not in a hospital admission)      Allergies   Allergen Reactions    Bee Venom Anaphylaxis     Throat swelling    Shellfish-Derived Products      Develops GOUT       Objective     BP 135/74   Pulse 89   Temp (!) 96 2 °F (35 7 °C) (Temporal)   Resp 16   Ht 5' 11" (1 803 m)   Wt 65 8 kg (145 lb)   SpO2 99%   BMI 20 22 kg/m²       PHYSICAL EXAM    Gen: NAD  CV: RRR  CHEST: Clear  ABD: soft, NT/ND  EXT: no edema      ASSESSMENT/PLAN:  This is a 77y o  year old male here for EGD for follow up of esophageal cancer, dysphagia, and he is stable and optimized for his procedure      Bertha Hunt MD

## 2021-01-04 ENCOUNTER — TELEPHONE (OUTPATIENT)
Dept: GASTROENTEROLOGY | Facility: CLINIC | Age: 66
End: 2021-01-04

## 2021-01-04 ENCOUNTER — HOSPITAL ENCOUNTER (OUTPATIENT)
Dept: INFUSION CENTER | Facility: HOSPITAL | Age: 66
Discharge: HOME | End: 2021-01-04
Attending: INTERNAL MEDICINE

## 2021-01-04 NOTE — TELEPHONE ENCOUNTER
----- Message from Dyan Shah MD sent at 1/4/2021  1:27 PM EST -----  Tristen Romero  Happy New Year! I was hoping you could help me with above pt  He has known esophageal cancer diagnosed about  1 year ago and on chemotherapy who presented with dysphagia  He had an EGD with me last week that showed narrowed lower esophagus and GE junction  I ordered a barium swallow to further characterize this area before he was scheduled for EGD with stent placement by Dr Ashwini Damon or Dr Sarthak Edmonds  Unfortunately he was a no-show for scheduled barium swallow study today  Can you please give him a call and see what happened  He needs the barium swallow prior to the next EGD for stent placement       Thank you,Ama

## 2021-01-07 ENCOUNTER — TELEPHONE (OUTPATIENT)
Dept: GASTROENTEROLOGY | Facility: AMBULARY SURGERY CENTER | Age: 66
End: 2021-01-07

## 2021-01-07 ENCOUNTER — CONSULT (OUTPATIENT)
Dept: PULMONOLOGY | Facility: MEDICAL CENTER | Age: 66
End: 2021-01-07
Payer: MEDICARE

## 2021-01-07 ENCOUNTER — PREP FOR PROCEDURE (OUTPATIENT)
Dept: INTERVENTIONAL RADIOLOGY/VASCULAR | Facility: CLINIC | Age: 66
End: 2021-01-07

## 2021-01-07 VITALS
DIASTOLIC BLOOD PRESSURE: 84 MMHG | BODY MASS INDEX: 20.44 KG/M2 | HEART RATE: 64 BPM | TEMPERATURE: 97.4 F | OXYGEN SATURATION: 99 % | SYSTOLIC BLOOD PRESSURE: 122 MMHG | WEIGHT: 146 LBS | HEIGHT: 71 IN | RESPIRATION RATE: 12 BRPM

## 2021-01-07 DIAGNOSIS — J43.2 CENTRILOBULAR EMPHYSEMA (HCC): ICD-10-CM

## 2021-01-07 DIAGNOSIS — R91.1 LUNG NODULE: ICD-10-CM

## 2021-01-07 DIAGNOSIS — Z11.59 SCREENING FOR VIRAL DISEASE: ICD-10-CM

## 2021-01-07 DIAGNOSIS — C15.5 MALIGNANT NEOPLASM OF LOWER THIRD OF ESOPHAGUS (HCC): Primary | ICD-10-CM

## 2021-01-07 DIAGNOSIS — J98.4 CAVITATING MASS IN LEFT UPPER LUNG LOBE: Primary | ICD-10-CM

## 2021-01-07 PROBLEM — J43.8 OTHER EMPHYSEMA (HCC): Status: ACTIVE | Noted: 2021-01-07

## 2021-01-07 PROCEDURE — U0003 INFECTIOUS AGENT DETECTION BY NUCLEIC ACID (DNA OR RNA); SEVERE ACUTE RESPIRATORY SYNDROME CORONAVIRUS 2 (SARS-COV-2) (CORONAVIRUS DISEASE [COVID-19]), AMPLIFIED PROBE TECHNIQUE, MAKING USE OF HIGH THROUGHPUT TECHNOLOGIES AS DESCRIBED BY CMS-2020-01-R: HCPCS | Performed by: INTERNAL MEDICINE

## 2021-01-07 PROCEDURE — 99214 OFFICE O/P EST MOD 30 MIN: CPT | Performed by: INTERNAL MEDICINE

## 2021-01-07 PROCEDURE — U0005 INFEC AGEN DETEC AMPLI PROBE: HCPCS | Performed by: INTERNAL MEDICINE

## 2021-01-07 NOTE — TELEPHONE ENCOUNTER
Spoke to pt's wife  Pt is scheduled for EGD w/ esophageal stent placement at [facility]ata aware of the case and need for pediatric scope/cristal from [employer] aware of 1pm OR time for stent placement/COVID TEST ORDERED FOR 1/7/2021/PT AWARE/PRT WILL HOLD ELIQUIS 2 DAYS PRIOR PER PT'S WIFE

## 2021-01-07 NOTE — TELEPHONE ENCOUNTER
Called patient and wife but could not reach  Will have patient scheduled for EGD with slim/peds scope to have stent placed  (Preferably barium swallow prior to EGD would be helpful but as patient was a no-show to this appointment would recommend just proceeding with EGD with limb scope )    Will message  to schedule at [facility] with slim scope      Dyan Shah MD

## 2021-01-07 NOTE — ASSESSMENT & PLAN NOTE
CT scan showing diffuse emphysema  Patient has SOB on exertion which is multifactorial  Patient has stage IV esophageal cancer  He has 30 pack year smoking history, quit 1-2 years ago  Denies using any inhalers or oxygen  His ET is severely restricted  Will defer to do any PFT at this time

## 2021-01-07 NOTE — ASSESSMENT & PLAN NOTE
Recent CT scan showing  New 2 0 x 2 2 cm consolidative mass with central cavitation and surrounding groundglass within the left upper lobe  Given patient's immunocompromised state, appearances suspicious for fungal infection such as  Aspergillus  Malignancy can have a similar appearance but felt to be less likely  He was also evaluated by Dr Cheikh Cabrera for possible infection  Recommended to have TB gold test, Aspergillus serology and LDH  Unclear at this point  ? Fungal infection vs primary lung cancer  Patient is immunocompromised secondary to stage IV esophageal cancer  He has SOB on exertion, LOW, GABRIEL, chills  Denies any fever, hemoptysis   Denies any hx of TB or exposure to TB   - IR referral for biopsy- cytology, culture- aerobic, anaerobic, fungal and AFB  - will follow up in 4 weeks with biopsy result

## 2021-01-07 NOTE — TELEPHONE ENCOUNTER
I was unable to reach patient by phone  I mailed letter today requesting he call us to reschedule the barium swallow study as this is necessary prior to his next EGD for stent placement

## 2021-01-07 NOTE — PROGRESS NOTES
Assessment/Plan:    Centrilobular emphysema (HCC)  CT scan showing diffuse emphysema  Patient has SOB on exertion which is multifactorial  Patient has stage IV esophageal cancer  He has 30 pack year smoking history, quit 1-2 years ago  Denies using any inhalers or oxygen  His ET is severely restricted  Will defer to do any PFT at this time  Cavitating mass in left upper lung lobe  Recent CT scan showing  New 2 0 x 2 2 cm consolidative mass with central cavitation and surrounding groundglass within the left upper lobe  Given patient's immunocompromised state, appearances suspicious for fungal infection such as  Aspergillus  Malignancy can have a similar appearance but felt to be less likely  He was also evaluated by Dr Morgan Zepeda for possible infection  Recommended to have TB gold test, Aspergillus serology and LDH  Unclear at this point  ? Fungal infection vs primary lung cancer  Patient is immunocompromised secondary to stage IV esophageal cancer  He has SOB on exertion, LOW, GABRIEL, chills  Denies any fever, hemoptysis  Denies any hx of TB or exposure to TB   - IR referral for biopsy- cytology, culture- aerobic, anaerobic, fungal and AFB  - will follow up in 4 weeks with biopsy result       Diagnoses and all orders for this visit:    Cavitating mass in left upper lung lobe  -     Ambulatory referral to Interventional Radiology; Future    Centrilobular emphysema (HCC)          Subjective:      Patient ID: Narcisa Ashraf is a 77 y o  male  He is here to evaluate his new lung nodule in left upper lung  He has stage IV esophageal lung cancer on chemotherapy  He has 30 pack year smoking history and he quit smoking in 2019  His wife reports he has pneumonia in August 2020 and repeat CT scan in December 2020 showing new lung nodule in left upper lung  He was referred to ID and pulmonology to evaluate for new lung nodule   He is complaining of chills, fatigue, SOB on exertion, LOW about more than 40 lbs over a year, GABRIEL, regurgitates after eating  He denies any exposure to or history of TB  He can eat only pureed food  He has speech and swallow evaluation appointment in next week  Overall he is not feeling well  He has occasional dry cough  He denies hemoptysis  He has never been diagnosed with COPD in the past   He does not use any inhalers or oxygen at home  The following portions of the patient's history were reviewed and updated as appropriate: allergies, current medications, past family history, past medical history, past social history, past surgical history and problem list     Review of Systems   Constitutional: Positive for activity change, appetite change, chills, fatigue and unexpected weight change  Negative for fever  HENT: Positive for trouble swallowing  Negative for congestion, sinus pressure, sinus pain, sneezing and sore throat  Eyes: Negative  Respiratory: Positive for choking and shortness of breath  Negative for cough, chest tightness, wheezing and stridor  Cardiovascular: Negative for chest pain, palpitations and leg swelling  Gastrointestinal: Positive for abdominal distention, nausea and vomiting  Negative for abdominal pain, constipation and diarrhea  Endocrine: Negative  Genitourinary: Negative  Musculoskeletal: Positive for arthralgias  Negative for joint swelling  Neurological: Positive for dizziness, weakness and light-headedness  Negative for speech difficulty and numbness  Psychiatric/Behavioral: Negative for confusion, decreased concentration and sleep disturbance  Objective:      /84   Pulse 64   Temp (!) 97 4 °F (36 3 °C) (Temporal)   Resp 12   Ht 5' 11" (1 803 m)   Wt 66 2 kg (146 lb)   SpO2 99%   BMI 20 36 kg/m²          Physical Exam  Vitals signs and nursing note reviewed  Constitutional:       General: He is not in acute distress  Appearance: He is ill-appearing  HENT:      Head: Normocephalic and atraumatic        Nose: Nose normal       Mouth/Throat:      Mouth: Mucous membranes are moist       Pharynx: Oropharynx is clear  Eyes:      General: No scleral icterus  Conjunctiva/sclera: Conjunctivae normal    Cardiovascular:      Rate and Rhythm: Normal rate and regular rhythm  Pulses: Normal pulses  Heart sounds: No murmur  Pulmonary:      Effort: Pulmonary effort is normal  No respiratory distress  Breath sounds: Normal breath sounds  No stridor  No wheezing or rhonchi  Abdominal:      General: Abdomen is flat  Bowel sounds are normal  There is no distension  Tenderness: There is no abdominal tenderness  Musculoskeletal:      Right lower leg: No edema  Left lower leg: No edema  Lymphadenopathy:      Cervical: No cervical adenopathy  Skin:     General: Skin is warm and dry  Capillary Refill: Capillary refill takes less than 2 seconds  Neurological:      General: No focal deficit present  Mental Status: He is alert     Psychiatric:         Behavior: Behavior normal

## 2021-01-07 NOTE — LETTER
January 7, 2021     Damian Rapp MD  [id number]  [address] [address]    Patient: Andre Stokes   YOB: 1955   Date of Visit: 1/7/2021       Dear Dr Angel Many:    Thank you for referring Andre Stokes to me for evaluation  Below are my notes for this consultation  If you have questions, please do not hesitate to call me  I look forward to following your patient along with you  Sincerely,        Deonte Henley MD        CC: Candance Cast, DO Lynette Heaps, MD Judy Slider, MD  1/7/2021  9:54 PM  Attested  Assessment/Plan:    Centrilobular emphysema ([address] )  CT scan showing diffuse emphysema  Patient has SOB on exertion which is multifactorial  Patient has stage IV esophageal cancer  He has 30 pack year smoking history, quit 1-2 years ago  Denies using any inhalers or oxygen  His ET is severely restricted  Will defer to do any PFT at this time  Cavitating mass in left upper lung lobe  Recent CT scan showing  New 2 0 x 2 2 cm consolidative mass with central cavitation and surrounding groundglass within the left upper lobe  Given patient's immunocompromised state, appearances suspicious for fungal infection such as  Aspergillus  Malignancy can have a similar appearance but felt to be less likely  He was also evaluated by Dr Cuco Wilks for possible infection  Recommended to have TB gold test, Aspergillus serology and LDH  Unclear at this point  ? Fungal infection vs primary lung cancer  Patient is immunocompromised secondary to stage IV esophageal cancer  He has SOB on exertion, LOW, GABRIEL, chills  Denies any fever, hemoptysis  Denies any hx of TB or exposure to TB   - IR referral for biopsy- cytology, culture- aerobic, anaerobic, fungal and AFB  - will follow up in 4 weeks with biopsy result       Diagnoses and all orders for this visit:    Cavitating mass in left upper lung lobe  -     Ambulatory referral to Interventional Radiology;  Future    Centrilobular emphysema ([address] ) Subjective:      Patient ID: Regla Zeng is a 77 y o  male  He is here to evaluate his new lung nodule in left upper lung  He has stage IV esophageal lung cancer on chemotherapy  He has 30 pack year smoking history and he quit smoking in 2019  His wife reports he has pneumonia in August 2020 and repeat CT scan in December 2020 showing new lung nodule in left upper lung  He was referred to ID and pulmonology to evaluate for new lung nodule  He is complaining of chills, fatigue, SOB on exertion, LOW about more than 40 lbs over a year, GABRIEL, regurgitates after eating  He denies any exposure to or history of TB  He can eat only pureed food  He has speech and swallow evaluation appointment in next week  Overall he is not feeling well  He has occasional dry cough  He denies hemoptysis  He has never been diagnosed with COPD in the past   He does not use any inhalers or oxygen at home  The following portions of the patient's history were reviewed and updated as appropriate: allergies, current medications, past family history, past medical history, past social history, past surgical history and problem list     Review of Systems   Constitutional: Positive for activity change, appetite change, chills, fatigue and unexpected weight change  Negative for fever  HENT: Positive for trouble swallowing  Negative for congestion, sinus pressure, sinus pain, sneezing and sore throat  Eyes: Negative  Respiratory: Positive for choking and shortness of breath  Negative for cough, chest tightness, wheezing and stridor  Cardiovascular: Negative for chest pain, palpitations and leg swelling  Gastrointestinal: Positive for abdominal distention, nausea and vomiting  Negative for abdominal pain, constipation and diarrhea  Endocrine: Negative  Genitourinary: Negative  Musculoskeletal: Positive for arthralgias  Negative for joint swelling  Neurological: Positive for dizziness, weakness and light-headedness  Negative for speech difficulty and numbness  Psychiatric/Behavioral: Negative for confusion, decreased concentration and sleep disturbance  Objective:      /84   Pulse 64   Temp (!) 97 4 °F (36 3 °C) (Temporal)   Resp 12   Ht 5' 11" (1 803 m)   Wt 66 2 kg (146 lb)   SpO2 99%   BMI 20 36 kg/m²          Physical Exam  Vitals signs and nursing note reviewed  Constitutional:       General: He is not in acute distress  Appearance: He is ill-appearing  HENT:      Head: Normocephalic and atraumatic  Nose: Nose normal       Mouth/Throat:      Mouth: Mucous membranes are moist       Pharynx: Oropharynx is clear  Eyes:      General: No scleral icterus  Conjunctiva/sclera: Conjunctivae normal    Cardiovascular:      Rate and Rhythm: Normal rate and regular rhythm  Pulses: Normal pulses  Heart sounds: No murmur  Pulmonary:      Effort: Pulmonary effort is normal  No respiratory distress  Breath sounds: Normal breath sounds  No stridor  No wheezing or rhonchi  Abdominal:      General: Abdomen is flat  Bowel sounds are normal  There is no distension  Tenderness: There is no abdominal tenderness  Musculoskeletal:      Right lower leg: No edema  Left lower leg: No edema  Lymphadenopathy:      Cervical: No cervical adenopathy  Skin:     General: Skin is warm and dry  Capillary Refill: Capillary refill takes less than 2 seconds  Neurological:      General: No focal deficit present  Mental Status: He is alert  Psychiatric:         Behavior: Behavior normal          Attestation signed by Gary Mercado MD at 1/7/2021  9:54 PM:  The patient examined, plan discussed with resident, reviewed and agree with notes of Dr Burrows with corrections  The patient is a 78-year-old  male was diagnosed with esophageal cancer about a year back and is currently on chemotherapy    He had a recent CT scan which showed a 2 x 2 2 cm consolidative area with surrounding ground-glass opacity, in the left upper lobe  There is a radiolucent area in the middle of the lesion and cavitation is suspected  This could also be a emphysematous bulla  He has bilateral emphysematous changes from his previous smoking  I reviewed his previous records as well as lab work and imaging  He has been already treated with a course of admitted  He denies any fever or chills  His appetite is poor and has lost weight  He has been referred for further evaluation of this lung nodule  Clinical examination was unremarkable  He did not have any significant neck or supraclavicular or axillary lymphadenopathy  His chest was otherwise clear to auscultation  Heart sounds were normal   Extremity showed no edema or clubbing  Our plan is to get a CT-guided biopsy with pathology and cultures to further evaluate this lung nodule  This is being arranged  He is on systemic anticoagulation with Eliquis  He had bilateral extensive emphysema but is currently asymptomatic  He is currently not on any inhaler  He follows with Dr Franca Daniels for his esophageal cancer and Dr Luis Jurado he has his ID physician  We had a long discussion with the patient as well as his wife  We answered all their questions

## 2021-01-09 LAB — SARS-COV-2 RNA SPEC QL NAA+PROBE: NOT DETECTED

## 2021-01-11 ENCOUNTER — HOSPITAL ENCOUNTER (OUTPATIENT)
Dept: RADIOLOGY | Facility: HOSPITAL | Age: 66
Discharge: HOME | End: 2021-01-11
Attending: FAMILY MEDICINE
Payer: MEDICARE

## 2021-01-11 PROCEDURE — 74220 X-RAY XM ESOPHAGUS 1CNTRST: CPT

## 2021-01-12 ENCOUNTER — ANESTHESIA EVENT (OUTPATIENT)
Dept: PERIOP | Facility: HOSPITAL | Age: 66
End: 2021-01-12

## 2021-01-12 ENCOUNTER — TELEPHONE (OUTPATIENT)
Dept: GASTROENTEROLOGY | Facility: AMBULARY SURGERY CENTER | Age: 66
End: 2021-01-12

## 2021-01-12 NOTE — TELEPHONE ENCOUNTER
Pt's wife, soha, aware of 23 hr observation post esophageal stent placement w/ dr Marcin Alfonso tomorrow 1/13/2021 at [facility] [address] [place] [address] also aware of 1230pm OR time/Chantel Hoover aware of 23 hr observation post op

## 2021-01-13 ENCOUNTER — HOSPITAL ENCOUNTER (OUTPATIENT)
Dept: RADIOLOGY | Facility: HOSPITAL | Age: 66
Setting detail: OBSERVATION
Discharge: HOME | End: 2021-01-14
Attending: INTERNAL MEDICINE | Admitting: INTERNAL MEDICINE
Payer: MEDICARE

## 2021-01-13 ENCOUNTER — HOSPITAL ENCOUNTER (OUTPATIENT)
Dept: PERIOP | Facility: HOSPITAL | Age: 66
Setting detail: OUTPATIENT SURGERY
Discharge: HOME | End: 2021-01-13
Attending: INTERNAL MEDICINE | Admitting: INTERNAL MEDICINE
Payer: MEDICARE

## 2021-01-13 ENCOUNTER — ANESTHESIA (OUTPATIENT)
Dept: PERIOP | Facility: HOSPITAL | Age: 66
End: 2021-01-13

## 2021-01-13 VITALS
OXYGEN SATURATION: 96 % | SYSTOLIC BLOOD PRESSURE: 150 MMHG | TEMPERATURE: 98.8 F | RESPIRATION RATE: 20 BRPM | DIASTOLIC BLOOD PRESSURE: 87 MMHG | HEART RATE: 63 BPM

## 2021-01-13 VITALS — HEART RATE: 93 BPM

## 2021-01-13 DIAGNOSIS — I80.01 SUPERFICIAL THROMBOPHLEBITIS OF RIGHT LEG: ICD-10-CM

## 2021-01-13 DIAGNOSIS — C16.0 GASTROESOPHAGEAL CANCER (HCC): ICD-10-CM

## 2021-01-13 DIAGNOSIS — C15.5 MALIGNANT NEOPLASM OF LOWER THIRD OF ESOPHAGUS (HCC): Primary | ICD-10-CM

## 2021-01-13 DIAGNOSIS — R11.10 REGURGITATION OF STOMACH CONTENTS: ICD-10-CM

## 2021-01-13 DIAGNOSIS — C15.5 MALIGNANT NEOPLASM OF LOWER THIRD OF ESOPHAGUS (HCC): ICD-10-CM

## 2021-01-13 DIAGNOSIS — E43 SEVERE PROTEIN-CALORIE MALNUTRITION (HCC): ICD-10-CM

## 2021-01-13 DIAGNOSIS — R13.19 OTHER DYSPHAGIA: ICD-10-CM

## 2021-01-13 PROBLEM — D72.829 LEUKOCYTOSIS: Status: ACTIVE | Noted: 2021-01-13

## 2021-01-13 LAB
ALBUMIN SERPL BCP-MCNC: 2.6 G/DL (ref 3.5–5)
ALP SERPL-CCNC: 116 U/L (ref 46–116)
ALT SERPL W P-5'-P-CCNC: 18 U/L (ref 12–78)
ANION GAP SERPL CALCULATED.3IONS-SCNC: 6 MMOL/L (ref 4–13)
AST SERPL W P-5'-P-CCNC: 13 U/L (ref 5–45)
BILIRUB SERPL-MCNC: 0.3 MG/DL (ref 0.2–1)
BUN SERPL-MCNC: 15 MG/DL (ref 5–25)
CALCIUM ALBUM COR SERPL-MCNC: 10.4 MG/DL (ref 8.3–10.1)
CALCIUM SERPL-MCNC: 9.3 MG/DL (ref 8.3–10.1)
CHLORIDE SERPL-SCNC: 102 MMOL/L (ref 100–108)
CO2 SERPL-SCNC: 28 MMOL/L (ref 21–32)
CREAT SERPL-MCNC: 1.06 MG/DL (ref 0.6–1.3)
ERYTHROCYTE [DISTWIDTH] IN BLOOD BY AUTOMATED COUNT: 17.2 % (ref 11.6–15.1)
GFR SERPL CREATININE-BSD FRML MDRD: 72 ML/MIN/1.73SQ M
GLUCOSE P FAST SERPL-MCNC: 122 MG/DL (ref 65–99)
GLUCOSE SERPL-MCNC: 122 MG/DL (ref 65–140)
HCT VFR BLD AUTO: 33.3 % (ref 36.5–49.3)
HGB BLD-MCNC: 9.6 G/DL (ref 12–17)
INR PPP: 1.09 (ref 0.84–1.19)
MCH RBC QN AUTO: 26.5 PG (ref 26.8–34.3)
MCHC RBC AUTO-ENTMCNC: 28.8 G/DL (ref 31.4–37.4)
MCV RBC AUTO: 92 FL (ref 82–98)
PLATELET # BLD AUTO: 446 THOUSANDS/UL (ref 149–390)
PMV BLD AUTO: 10.4 FL (ref 8.9–12.7)
POTASSIUM SERPL-SCNC: 3.9 MMOL/L (ref 3.5–5.3)
PROT SERPL-MCNC: 7.2 G/DL (ref 6.4–8.2)
PROTHROMBIN TIME: 14 SECONDS (ref 11.6–14.5)
RBC # BLD AUTO: 3.62 MILLION/UL (ref 3.88–5.62)
SODIUM SERPL-SCNC: 136 MMOL/L (ref 136–145)
WBC # BLD AUTO: 11.93 THOUSAND/UL (ref 4.31–10.16)

## 2021-01-13 PROCEDURE — 85610 PROTHROMBIN TIME: CPT | Performed by: PHYSICIAN ASSISTANT

## 2021-01-13 PROCEDURE — 85027 COMPLETE CBC AUTOMATED: CPT | Performed by: PHYSICIAN ASSISTANT

## 2021-01-13 PROCEDURE — 71045 X-RAY EXAM CHEST 1 VIEW: CPT

## 2021-01-13 PROCEDURE — C1769 GUIDE WIRE: HCPCS

## 2021-01-13 PROCEDURE — C9113 INJ PANTOPRAZOLE SODIUM, VIA: HCPCS | Performed by: INTERNAL MEDICINE

## 2021-01-13 PROCEDURE — 99220 PR INITIAL OBSERVATION CARE/DAY 70 MINUTES: CPT | Performed by: INTERNAL MEDICINE

## 2021-01-13 PROCEDURE — 80053 COMPREHEN METABOLIC PANEL: CPT | Performed by: PHYSICIAN ASSISTANT

## 2021-01-13 PROCEDURE — 43248 EGD GUIDE WIRE INSERTION: CPT | Performed by: INTERNAL MEDICINE

## 2021-01-13 PROCEDURE — G0008 ADMIN INFLUENZA VIRUS VAC: HCPCS | Performed by: INTERNAL MEDICINE

## 2021-01-13 PROCEDURE — G0379 DIRECT REFER HOSPITAL OBSERV: HCPCS

## 2021-01-13 PROCEDURE — 90662 IIV NO PRSV INCREASED AG IM: CPT | Performed by: INTERNAL MEDICINE

## 2021-01-13 PROCEDURE — 43247 EGD REMOVE FOREIGN BODY: CPT | Performed by: INTERNAL MEDICINE

## 2021-01-13 RX ORDER — VENLAFAXINE HYDROCHLORIDE 150 MG/1
150 CAPSULE, EXTENDED RELEASE ORAL DAILY
Status: DISCONTINUED | OUTPATIENT
Start: 2021-01-14 | End: 2021-01-14 | Stop reason: HOSPADM

## 2021-01-13 RX ORDER — ONDANSETRON 2 MG/ML
4 INJECTION INTRAMUSCULAR; INTRAVENOUS EVERY 6 HOURS PRN
Status: DISCONTINUED | OUTPATIENT
Start: 2021-01-13 | End: 2021-01-14 | Stop reason: HOSPADM

## 2021-01-13 RX ORDER — LABETALOL 20 MG/4 ML (5 MG/ML) INTRAVENOUS SYRINGE
10 ONCE
Status: DISCONTINUED | OUTPATIENT
Start: 2021-01-13 | End: 2021-01-17 | Stop reason: HOSPADM

## 2021-01-13 RX ORDER — MIDAZOLAM HYDROCHLORIDE 2 MG/2ML
INJECTION, SOLUTION INTRAMUSCULAR; INTRAVENOUS AS NEEDED
Status: DISCONTINUED | OUTPATIENT
Start: 2021-01-13 | End: 2021-01-13

## 2021-01-13 RX ORDER — GLYCOPYRROLATE 0.2 MG/ML
INJECTION INTRAMUSCULAR; INTRAVENOUS AS NEEDED
Status: DISCONTINUED | OUTPATIENT
Start: 2021-01-13 | End: 2021-01-13

## 2021-01-13 RX ORDER — PANTOPRAZOLE SODIUM 40 MG/1
40 INJECTION, POWDER, FOR SOLUTION INTRAVENOUS EVERY 12 HOURS SCHEDULED
Status: DISCONTINUED | OUTPATIENT
Start: 2021-01-13 | End: 2021-01-14 | Stop reason: HOSPADM

## 2021-01-13 RX ORDER — LIDOCAINE HYDROCHLORIDE 10 MG/ML
INJECTION, SOLUTION EPIDURAL; INFILTRATION; INTRACAUDAL; PERINEURAL AS NEEDED
Status: DISCONTINUED | OUTPATIENT
Start: 2021-01-13 | End: 2021-01-13

## 2021-01-13 RX ORDER — SODIUM CHLORIDE, SODIUM LACTATE, POTASSIUM CHLORIDE, CALCIUM CHLORIDE 600; 310; 30; 20 MG/100ML; MG/100ML; MG/100ML; MG/100ML
20 INJECTION, SOLUTION INTRAVENOUS CONTINUOUS
Status: DISCONTINUED | OUTPATIENT
Start: 2021-01-13 | End: 2021-01-17 | Stop reason: HOSPADM

## 2021-01-13 RX ORDER — ROCURONIUM BROMIDE 10 MG/ML
INJECTION, SOLUTION INTRAVENOUS AS NEEDED
Status: DISCONTINUED | OUTPATIENT
Start: 2021-01-13 | End: 2021-01-13

## 2021-01-13 RX ORDER — SODIUM CHLORIDE, SODIUM LACTATE, POTASSIUM CHLORIDE, CALCIUM CHLORIDE 600; 310; 30; 20 MG/100ML; MG/100ML; MG/100ML; MG/100ML
125 INJECTION, SOLUTION INTRAVENOUS CONTINUOUS
Status: DISCONTINUED | OUTPATIENT
Start: 2021-01-13 | End: 2021-01-13 | Stop reason: SDUPTHER

## 2021-01-13 RX ORDER — DIAZEPAM 2 MG/1
2 TABLET ORAL 2 TIMES DAILY
Status: DISCONTINUED | OUTPATIENT
Start: 2021-01-13 | End: 2021-01-14 | Stop reason: HOSPADM

## 2021-01-13 RX ORDER — DEXAMETHASONE SODIUM PHOSPHATE 4 MG/ML
INJECTION, SOLUTION INTRA-ARTICULAR; INTRALESIONAL; INTRAMUSCULAR; INTRAVENOUS; SOFT TISSUE AS NEEDED
Status: DISCONTINUED | OUTPATIENT
Start: 2021-01-13 | End: 2021-01-13

## 2021-01-13 RX ORDER — ONDANSETRON 2 MG/ML
INJECTION INTRAMUSCULAR; INTRAVENOUS AS NEEDED
Status: DISCONTINUED | OUTPATIENT
Start: 2021-01-13 | End: 2021-01-13

## 2021-01-13 RX ORDER — PROPOFOL 10 MG/ML
INJECTION, EMULSION INTRAVENOUS AS NEEDED
Status: DISCONTINUED | OUTPATIENT
Start: 2021-01-13 | End: 2021-01-13

## 2021-01-13 RX ORDER — AMIODARONE HYDROCHLORIDE 200 MG/1
200 TABLET ORAL
Status: DISCONTINUED | OUTPATIENT
Start: 2021-01-14 | End: 2021-01-14 | Stop reason: HOSPADM

## 2021-01-13 RX ORDER — SUCCINYLCHOLINE/SOD CL,ISO/PF 100 MG/5ML
SYRINGE (ML) INTRAVENOUS AS NEEDED
Status: DISCONTINUED | OUTPATIENT
Start: 2021-01-13 | End: 2021-01-13

## 2021-01-13 RX ADMIN — SODIUM CHLORIDE, SODIUM LACTATE, POTASSIUM CHLORIDE, AND CALCIUM CHLORIDE 125 ML/HR: .6; .31; .03; .02 INJECTION, SOLUTION INTRAVENOUS at 12:04

## 2021-01-13 RX ADMIN — DIAZEPAM 2 MG: 2 TABLET ORAL at 21:12

## 2021-01-13 RX ADMIN — ONDANSETRON 4 MG: 2 INJECTION INTRAMUSCULAR; INTRAVENOUS at 13:11

## 2021-01-13 RX ADMIN — Medication 100 MG: at 12:58

## 2021-01-13 RX ADMIN — GLYCOPYRROLATE 0.2 MG: 0.2 INJECTION, SOLUTION INTRAMUSCULAR; INTRAVENOUS at 14:13

## 2021-01-13 RX ADMIN — LIDOCAINE HYDROCHLORIDE 50 MG: 10 INJECTION, SOLUTION EPIDURAL; INFILTRATION; INTRACAUDAL; PERINEURAL at 12:58

## 2021-01-13 RX ADMIN — PANTOPRAZOLE SODIUM 40 MG: 40 INJECTION, POWDER, FOR SOLUTION INTRAVENOUS at 17:35

## 2021-01-13 RX ADMIN — DEXAMETHASONE SODIUM PHOSPHATE 4 MG: 4 INJECTION, SOLUTION INTRA-ARTICULAR; INTRALESIONAL; INTRAMUSCULAR; INTRAVENOUS; SOFT TISSUE at 13:10

## 2021-01-13 RX ADMIN — ROCURONIUM BROMIDE 5 MG: 10 INJECTION, SOLUTION INTRAVENOUS at 12:58

## 2021-01-13 RX ADMIN — SODIUM CHLORIDE, SODIUM LACTATE, POTASSIUM CHLORIDE, AND CALCIUM CHLORIDE: .6; .31; .03; .02 INJECTION, SOLUTION INTRAVENOUS at 12:50

## 2021-01-13 RX ADMIN — PROPOFOL 150 MG: 10 INJECTION, EMULSION INTRAVENOUS at 12:58

## 2021-01-13 RX ADMIN — MIDAZOLAM 2 MG: 1 INJECTION INTRAMUSCULAR; INTRAVENOUS at 12:53

## 2021-01-13 RX ADMIN — INFLUENZA A VIRUS A/MICHIGAN/45/2015 X-275 (H1N1) ANTIGEN (FORMALDEHYDE INACTIVATED), INFLUENZA A VIRUS A/SINGAPORE/INFIMH-16-0019/2016 IVR-186 (H3N2) ANTIGEN (FORMALDEHYDE INACTIVATED), INFLUENZA B VIRUS B/PHUKET/3073/2013 ANTIGEN (FORMALDEHYDE INACTIVATED), AND INFLUENZA B VIRUS B/MARYLAND/15/2016 BX-69A ANTIGEN (FORMALDEHYDE INACTIVATED) 0.7 ML: 60; 60; 60; 60 INJECTION, SUSPENSION INTRAMUSCULAR at 17:35

## 2021-01-13 RX ADMIN — PHENYLEPHRINE HYDROCHLORIDE 100 MCG: 10 INJECTION INTRAVENOUS at 13:37

## 2021-01-13 RX ADMIN — PHENYLEPHRINE HYDROCHLORIDE 100 MCG: 10 INJECTION INTRAVENOUS at 14:10

## 2021-01-13 NOTE — SEDATION DOCUMENTATION
Attempted placement [facility] Scientific Wallflex Esophageal 18 mm x 123 mm, GTIN [id number], REF [id number], LOT [id number], Exp 5-25-22

## 2021-01-13 NOTE — H&P
History and Physical - [facility]  Carloz Leblanc 77 y o  male MRN: [id number]        HPI:  49-year-old male with history of metastatic adenocarcinoma in the distal esophagus/GE junction on chemotherapy has been having difficulty with swallowing  He had upper endoscopy by Dr Ramone Lehman couple of weeks ago and was referred for esophageal Wallstent placement      Historical Information   Past Medical History:   Diagnosis Date    Anxiety     Cancer ([facility] [address] ) 2019    metastatic gastroesophageal cancer    Dehydration     Depression     Esophageal cancer (HCC)     Fatigue     History of chemotherapy     currently started 2019    History of DVT (deep vein thrombosis)     Hypertension     currently is normal and may not need medication    Lung infection     chemo currently on hold because of the infection 2020    Nausea     Pneumonia     x2    Port-A-Cath in place     right    Psychiatric disorder     [facility]     sober since     Varicose veins of left lower extremity      Past Surgical History:   Procedure Laterality Date    APPENDECTOMY      COLONOSCOPY      IR PORT PLACEMENT  2019    UPPER GASTROINTESTINAL ENDOSCOPY       Social History   Social History     Substance and Sexual Activity   Alcohol Use Not Currently    Frequency: Never    Binge frequency: Never    Comment: quit 30 yrs ago     Social History     Substance and Sexual Activity   Drug Use Yes    Types: Marijuana    Comment: couple x week     Social History     Tobacco Use   Smoking Status Former Smoker    Packs/day: 2 00    Years: 15 00    Pack years: 30 00    Types: Cigars    Quit date: 5/15/1998    Years since quittin 6   Smokeless Tobacco Former User    Quit date: 2006     Family History   Problem Relation Age of Onset    Colon cancer Father     Cancer Father         colon    Cancer Brother         Sinus-neuroblastoma     Heart disease Mother Meds/Allergies     (Not in a hospital admission)      Allergies   Allergen Reactions    Bee Venom Anaphylaxis     Throat swelling    Shellfish-Derived Products      Develops GOUT       Objective     Blood pressure 158/91, pulse 82, temperature (!) 97 3 °F (36 3 °C), temperature source Temporal, resp  rate 20, SpO2 98 %      PHYSICAL EXAM:    Gen: NAD  CV: S1 & S2 normal, RRR  CHEST: Clear to auscultate  ABD: soft, NT/ND, good bowel sounds  EXT: no edema    ASSESSMENT:     Esophageal/GE junction adenocarcinoma; dysphagia    PLAN:    EGD with esophageal Wallstent placement

## 2021-01-13 NOTE — PERIOPERATIVE NURSING NOTE
Pt sent to PACU, report given to [facility], all personal belongings brought to PACU,

## 2021-01-13 NOTE — PERIOPERATIVE NURSING NOTE
Patient awaiting bed on floor at this time  No distress noted  Patient took some sips of water without difficulty or any outwards effects  Vital signs within normal limits

## 2021-01-13 NOTE — ANESTHESIA PREPROCEDURE EVALUATION
Procedure:  EGD    Relevant Problems   CARDIO   (+) Essential hypertension   (+) PAF (paroxysmal atrial fibrillation) (HCC)      GI/HEPATIC   (+) Gastroesophageal cancer (HCC)   (+) Malignant neoplasm of lower third of esophagus (HCC)   (+) Other dysphagia      HEMATOLOGY   (+) Immunocompromised patient ([facility] [address] )      NEURO/PSYCH   (+) Anxiety   (+) Major depression   (+) OCD (obsessive compulsive disorder)   (+) PTSD (post-traumatic stress disorder)      PULMONARY   (+) Aspiration pneumonia (HCC)   (+) Centrilobular emphysema (HCC)   (+) Pneumonia of left lung due to infectious organism         LEFT VENTRICLE:  The ventricle was mildly dilated  Systolic function was moderately to markedly reduced by Teichholz  Ejection fraction was estimated in the range of 35 % to 40 % to be 37 %  There was moderate diffuse hypokinesis  Features were consistent with a pseudonormal left ventricular filling pattern, with concomitant abnormal relaxation and increased filling pressure (grade 2 diastolic dysfunction)      LEFT ATRIUM:  The atrium was mildly dilated      MITRAL VALVE:  There was mild annular calcification  There was trace regurgitation      TRICUSPID VALVE:  There was mild regurgitation      PULMONIC VALVE:  There was mild regurgitation      IVC, HEPATIC VEINS:  The inferior vena cava was dilated      HISTORY: PRIOR HISTORY: Gastroesophageal Cancer,Chemotherapy,Neutropenic,Septic shock,pneumonia,HTN      Physical Exam    Airway    Mallampati score: II  TM Distance: >3 FB  Neck ROM: full     Dental   No notable dental hx     Cardiovascular  Cardiovascular exam normal    Pulmonary  Pulmonary exam normal     Other Findings        Anesthesia Plan  ASA Score- 4     Anesthesia Type- general with ASA Monitors  Additional Monitors:   Airway Plan:           Plan Factors-Exercise tolerance (METS): >4 METS  Chart reviewed  EKG reviewed  Imaging results reviewed  Existing labs reviewed   Patient summary reviewed  Patient is not a current smoker  Induction- intravenous  Postoperative Plan- Plan for postoperative opioid use  Informed Consent- Anesthetic plan and risks discussed with patient  I personally reviewed this patient with the CRNA  Discussed and agreed on the Anesthesia Plan with the JHON Singh

## 2021-01-13 NOTE — H&P
History and Physical - [facility]    Patient Information: Ladonna Fry 77 y o  male MRN: [id number]  Unit/Bed#: Daniella Brewer Encounter: [id number]  Admitting Physician: Brandt rUbina MD  PCP: Benjie Xiao MD  Date of Admission:  01/13/21        Hospital Problem List:     Principal Problem:    Malignant neoplasm of lower third of esophagus ([facility] )  Active Problems:    PAF (paroxysmal atrial fibrillation) (HCC)    Chronic combined systolic and diastolic CHF (congestive heart failure) ([facility] )    Centrilobular emphysema (HCC)    Leukocytosis    Major depression    Anxiety      Assessment/Plan:    * Malignant neoplasm of lower third of esophagus [facility])  Assessment & Plan  Patient with history of metastatic esophageal cancer on chemotherapy, presented today for esophageal wallstent placement  On EGD, patient was noted to have large long semi circumferential friable mass in distal esophagus extending down to gastric cardia, stent placement was attempted but unsuccessful as guidewire was sliding into this tumor causing standing getting caught in the tumor  Patient was subsequently noted to have presents of blood clot at the proximal and of the tumor on endoscopy, clot was removed and no active bleeding was noted  Patient was subsequently advised admission for observation  Postprocedure hemoglobin was 9 6 grams/deciliters  Patient denies any abdominal pain nausea vomiting  · Clear liquid diet  · Monitor symptoms  · PPI  · Monitor hemoglobin  · GI evaluation    Chronic combined systolic and diastolic CHF (congestive heart failure) (HCC)  Assessment & Plan  Wt Readings from Last 3 Encounters:   01/13/21 68 5 kg (151 lb 0 2 oz)   01/07/21 66 2 kg (146 lb)   12/30/20 65 8 kg (145 lb)     Appears euvolemic  Monitor weight        PAF (paroxysmal atrial fibrillation) (HCC)  Assessment & Plan  Controlled    Continue amiodarone    Holding Eliquis for now    Leukocytosis  Assessment & Plan  Likely reactive  Monitor    Centrilobular emphysema (HCC)  Assessment & Plan  Stable    Anxiety  Assessment & Plan  Reports that he takes Valium 2 5 mg b i d     Major depression  Assessment & Plan  Continue venlafaxine          VTE Prophylaxis: Pharmacologic VTE Prophylaxis contraindicated due to With concerns of recurrence of GI bleed  / sequential compression device   Code Status: Level 3 - DNAR and DNI    Anticipated Length of Stay:  Patient will be admitted on an Outpatient Surgery basis with an anticipated length of stay of  < 2 midnights  Justification for Hospital Stay:  Monitoring of GI bleeding after attempt of EGD with stent placement    Total Time for Visit, including Counseling / Coordination of Care: 45 minutes  Greater than 50% of this total time spent on direct patient counseling and coordination of care  History of Present Illness:    Lino Born is a 77 y o  male with history of 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, paroxysmal AFib, dilated cardiomyopathy (EF 35-40%), hypertension, history of DVT, depression/anxiety who was presented initially today for elective esophageal Wallstent placement  On EGD patient was noted to have large long semi circumferential friable mass, wall stent placement was attempted but was unsuccessful as guidewire was sliding off the tumor and stent was getting caught in the tumor  Subsequently, patient was noted to have blood clot present at the proximal end of the tumor  Clot was removed  No active bleeding was noted  Patient was subsequently referred for observation  Patient is currently lying comfortably in bed, reports mild throat discomfort but denies any chest pain shortness of breath or abdominal pain  Review of Systems:    Review of Systems   Constitutional: Negative for activity change and fever  HENT: Negative for congestion, trouble swallowing and voice change  Eyes: Negative for visual disturbance     Respiratory: Negative for chest tightness and shortness of breath  Cardiovascular: Negative for chest pain  Gastrointestinal: Negative for abdominal distention, abdominal pain, constipation, diarrhea, nausea and vomiting  Genitourinary: Negative for difficulty urinating  Neurological: Negative for dizziness and light-headedness  Psychiatric/Behavioral: Negative for behavioral problems  Past Medical and Surgical History:     Past Medical History:   Diagnosis Date    Anxiety     [facility] ([facility] ) 08/2019    metastatic gastroesophageal cancer    Dehydration     Depression     Esophageal cancer (HCC)     Fatigue     History of chemotherapy     currently started 8/2019    History of DVT (deep vein thrombosis)     Hypertension     currently is normal and may not need medication    Lung infection     chemo currently on hold because of the infection 12/22/2020    Nausea     Pneumonia     x2    Port-A-Cath in place     right    Psychiatric disorder     Recovering alcoholic ([facility] )     sober since 1990    Varicose veins of left lower extremity        Past Surgical History:   Procedure Laterality Date    APPENDECTOMY      COLONOSCOPY      IR PORT PLACEMENT  7/31/2019    UPPER GASTROINTESTINAL ENDOSCOPY         Meds/Allergies:    PTA meds:   Prior to Admission Medications   Prescriptions Last Dose Informant Patient Reported? Taking?    Eliquis 5 MG  Spouse/Significant Other No No   Sig: TAKE ONE TABLET BY MOUTH TWICE A DAY   Patient taking differently: 5 mg 2 (two) times a day    amiodarone 200 mg tablet   No No   Sig: TAKE ONE TABLET BY MOUTH EVERY DAY WITH BREAKFAST   Patient taking differently: 200 mg every morning    ammonium lactate (LAC-HYDRIN) 12 % cream  Spouse/Significant Other No No   Sig: Apply topically as needed for dry skin On hands and feet   diazepam (VALIUM) 5 mg tablet  Spouse/Significant Other No No   Sig: Take 1 tablet (5 mg total) by mouth 2 (two) times a day   Patient taking differently: Take 10 mg by mouth daily at bedtime    hydrochlorothiazide (HYDRODIURIL) 12 5 mg tablet   No No   Sig: Take one tablet daily as needed for SBP> 160/90   mirtazapine (REMERON) 7 5 MG tablet  Spouse/Significant Other No No   Sig: Take 1 tablet (7 5 mg total) by mouth daily at bedtime   Patient not taking: Reported on 2021   ondansetron (ZOFRAN) 4 mg tablet  Spouse/Significant Other No No   Sig: Take 2 tablets (8 mg total) by mouth every 8 (eight) hours as needed for nausea or vomiting   pantoprazole (PROTONIX) 40 mg tablet  Spouse/Significant Other No No   Sig: Take 1 tablet (40 mg total) by mouth daily before breakfast   venlafaxine (EFFEXOR-XR) 150 mg 24 hr capsule  Spouse/Significant Other No No   Sig: Take 1 capsule (150 mg total) by mouth daily      Facility-Administered Medications: None       Allergies: Allergies   Allergen Reactions    Bee Venom Anaphylaxis     Throat swelling    Shellfish-Derived Products      Develops GOUT     History:     Marital Status: /Civil Union     Substance Use History:   Social History     Substance and Sexual Activity   Alcohol Use Not Currently    Frequency: Never    Binge frequency: Never    Comment: quit 30 yrs ago     Social History     Tobacco Use   Smoking Status Former Smoker    Packs/day: 2 00    Years: 15 00    Pack years: 30 00    Types: Cigars    Quit date: 5/15/1998    Years since quittin 6   Smokeless Tobacco Former User    Quit date: 2006     Social History     Substance and Sexual Activity   Drug Use Yes    Types: Marijuana    Comment: couple x week       Family History:    Family History   Problem Relation Age of Onset    Colon cancer Father     Cancer Father         colon    Cancer Brother         Sinus-neuroblastoma     Heart disease Mother        Physical Exam:     Vitals:        Physical Exam  Constitutional:       General: He is not in acute distress  Comments: Appears chronically ill   HENT:      Head: Atraumatic     Neck: Musculoskeletal: Neck supple  Cardiovascular:      Rate and Rhythm: Normal rate  Pulmonary:      Effort: Pulmonary effort is normal  No respiratory distress  Breath sounds: No wheezing, rhonchi or rales  Chest:      Chest wall: No tenderness  Abdominal:      General: Bowel sounds are normal  There is no distension  Palpations: Abdomen is soft  Tenderness: There is no abdominal tenderness  There is no guarding or rebound  Musculoskeletal:      Right lower leg: No edema  Left lower leg: No edema  Skin:     General: Skin is warm and dry  Findings: No rash  Neurological:      Mental Status: He is alert  Mental status is at baseline  Cranial Nerves: No cranial nerve deficit  Lab Results: I have personally reviewed pertinent reports  Results from last 7 days   Lab Units 01/13/21  1523   WBC Thousand/uL 11 93*   HEMOGLOBIN g/dL 9 6*   HEMATOCRIT % 33 3*   PLATELETS Thousands/uL 446*     Results from last 7 days   Lab Units 01/13/21  1523   POTASSIUM mmol/L 3 9   CHLORIDE mmol/L 102   CO2 mmol/L 28   BUN mg/dL 15   CREATININE mg/dL 1 06   CALCIUM mg/dL 9 3   ALK PHOS U/L 116   ALT U/L 18   AST U/L 13     Results from last 7 days   Lab Units 01/13/21  1523   INR  1 09       Imaging: I have personally reviewed pertinent reports  Fl Barium Swallow    Result Date: 1/11/2021  Narrative: BARIUM SWALLOW-ESOPHAGRAM INDICATION:   C16 0: Malignant neoplasm of cardia R13 19: Other dysphagia  COMPARISON:  None IMAGES:  17 FLUOROSCOPY TIME:   2 2 MIN  TECHNIQUE: The patient was given thin barium by mouth and images of the esophagus were obtained  FINDINGS: The proximal and mid esophagus is normal in caliber without evidence for stricture  There is significant irregularity at the distal aspect of the esophagus with filling defect extending to the gastroesophageal junction consistent with known neoplasm    There is markedly delayed emptying of contrast from the esophagus, mildly improved with administration of water  Gastroesophageal reflux was not observed  There is no hiatal hernia  Impression: Significant irregularity at the distal aspect of the esophagus with filling defect extending to the gastroesophageal junction consistent with known neoplasm  Markedly delayed emptying of contrast from the esophagus consistent with partial obstruction  Workstation performed: [id number]       XR chest 1 view    (Results Pending)       EKG, Pathology, and Other Studies Reviewed on Admission:   · Recent pathology    Allscripts/EPIC Records Reviewed: Yes     ** Please Note: "This note has been constructed using a voice recognition system  Therefore there may be syntax, spelling, and/or grammatical errors   Please call if you have any questions  "**

## 2021-01-13 NOTE — TELEPHONE ENCOUNTER
I reached out to patient, no answer left message to give the office a call back to schedule a follow up appointment

## 2021-01-13 NOTE — PERIOPERATIVE NURSING NOTE
Patient resting in PACU  No signs of distress noted,no coughing of blood noted  Patient BP was elevated upon arrival to   PACU and labetalol 5mgs IVP given as per anesthesia with effective results  Tory Fernandez came to bedside to speak with patient and inform him of procedure and results  Patient was informed he will be admitted for 23 hour observation  Lab work obtained and sent to lab  Awaiting bed assignment at this time

## 2021-01-13 NOTE — ANESTHESIA POSTPROCEDURE EVALUATION
Post-Op Assessment Note    CV Status:  Stable    Pain management: adequate     Mental Status:  Alert and awake   Hydration Status:  Euvolemic   PONV Controlled:  Controlled   Airway Patency:  Patent      Post Op Vitals Reviewed: Yes            No complications documented      BP   212/118   Temp     Pulse  85   Resp   24   SpO2   94 Mask 6L

## 2021-01-14 ENCOUNTER — EPISODE CHANGES (OUTPATIENT)
Dept: FAMILY MEDICINE CLINIC | Facility: CLINIC | Age: 66
End: 2021-01-14

## 2021-01-14 VITALS
RESPIRATION RATE: 18 BRPM | HEART RATE: 71 BPM | WEIGHT: 151.01 LBS | SYSTOLIC BLOOD PRESSURE: 153 MMHG | OXYGEN SATURATION: 98 % | HEIGHT: 71 IN | DIASTOLIC BLOOD PRESSURE: 82 MMHG | BODY MASS INDEX: 21.14 KG/M2 | TEMPERATURE: 98.3 F

## 2021-01-14 LAB
ANION GAP SERPL CALCULATED.3IONS-SCNC: 10 MMOL/L (ref 4–13)
BUN SERPL-MCNC: 13 MG/DL (ref 5–25)
CALCIUM SERPL-MCNC: 9.2 MG/DL (ref 8.3–10.1)
CHLORIDE SERPL-SCNC: 101 MMOL/L (ref 100–108)
CO2 SERPL-SCNC: 25 MMOL/L (ref 21–32)
CREAT SERPL-MCNC: 0.96 MG/DL (ref 0.6–1.3)
ERYTHROCYTE [DISTWIDTH] IN BLOOD BY AUTOMATED COUNT: 17.2 % (ref 11.6–15.1)
GFR SERPL CREATININE-BSD FRML MDRD: 81 ML/MIN/1.73SQ M
GLUCOSE P FAST SERPL-MCNC: 99 MG/DL (ref 65–99)
GLUCOSE SERPL-MCNC: 99 MG/DL (ref 65–140)
HCT VFR BLD AUTO: 32.7 % (ref 36.5–49.3)
HGB BLD-MCNC: 9.2 G/DL (ref 12–17)
MCH RBC QN AUTO: 25.6 PG (ref 26.8–34.3)
MCHC RBC AUTO-ENTMCNC: 28.1 G/DL (ref 31.4–37.4)
MCV RBC AUTO: 91 FL (ref 82–98)
PLATELET # BLD AUTO: 482 THOUSANDS/UL (ref 149–390)
PMV BLD AUTO: 11.3 FL (ref 8.9–12.7)
POTASSIUM SERPL-SCNC: 3.6 MMOL/L (ref 3.5–5.3)
RBC # BLD AUTO: 3.59 MILLION/UL (ref 3.88–5.62)
SODIUM SERPL-SCNC: 136 MMOL/L (ref 136–145)
WBC # BLD AUTO: 13.59 THOUSAND/UL (ref 4.31–10.16)

## 2021-01-14 PROCEDURE — 99217 PR OBSERVATION CARE DISCHARGE MANAGEMENT: CPT | Performed by: INTERNAL MEDICINE

## 2021-01-14 PROCEDURE — 80048 BASIC METABOLIC PNL TOTAL CA: CPT | Performed by: INTERNAL MEDICINE

## 2021-01-14 PROCEDURE — 99215 OFFICE O/P EST HI 40 MIN: CPT | Performed by: PHYSICIAN ASSISTANT

## 2021-01-14 PROCEDURE — C9113 INJ PANTOPRAZOLE SODIUM, VIA: HCPCS | Performed by: INTERNAL MEDICINE

## 2021-01-14 PROCEDURE — 85027 COMPLETE CBC AUTOMATED: CPT | Performed by: INTERNAL MEDICINE

## 2021-01-14 RX ORDER — ACETAMINOPHEN 325 MG/1
650 TABLET ORAL EVERY 6 HOURS PRN
Status: DISCONTINUED | OUTPATIENT
Start: 2021-01-14 | End: 2021-01-14 | Stop reason: HOSPADM

## 2021-01-14 RX ORDER — PANTOPRAZOLE SODIUM 40 MG/1
40 TABLET, DELAYED RELEASE ORAL 2 TIMES DAILY
Qty: 60 TABLET | Refills: 0 | Status: SHIPPED | OUTPATIENT
Start: 2021-01-14 | End: 2021-02-25 | Stop reason: SDUPTHER

## 2021-01-14 RX ADMIN — AMIODARONE HYDROCHLORIDE 200 MG: 200 TABLET ORAL at 08:12

## 2021-01-14 RX ADMIN — DIAZEPAM 2 MG: 2 TABLET ORAL at 08:12

## 2021-01-14 RX ADMIN — PANTOPRAZOLE SODIUM 40 MG: 40 INJECTION, POWDER, FOR SOLUTION INTRAVENOUS at 08:12

## 2021-01-14 RX ADMIN — ACETAMINOPHEN 650 MG: 325 TABLET, FILM COATED ORAL at 01:41

## 2021-01-14 RX ADMIN — VENLAFAXINE HYDROCHLORIDE 150 MG: 150 CAPSULE, EXTENDED RELEASE ORAL at 08:12

## 2021-01-14 NOTE — NURSING NOTE
Pt discharged from [address] [place] [address]  IV removed prior to discharge  Pt left with all their belongings  Pt left via wheelchair accompanied by PCA  Discharge instructions gone over with patient  No prescriptions written  Prescription sent over electronically to pt's pharmacy  All questions answered

## 2021-01-14 NOTE — ASSESSMENT & PLAN NOTE
Wt Readings from Last 3 Encounters:   01/13/21 68 5 kg (151 lb 0 2 oz)   01/07/21 66 2 kg (146 lb)   12/30/20 65 8 kg (145 lb)     Appears euvolemic  Monitor weight, remains stable  Continue home medications

## 2021-01-14 NOTE — CONSULTS
Consultation - [facility]  Cathi Diaz 77 y o  male MRN: [id number]  Unit/Bed#: [address] [place] [address] Encounter: [id number]        Inpatient consult to gastroenterology  Consult performed by: Mookie Guerrero PA-C  Consult ordered by: Rose Blackburn MD          Reason for Consult / Principal Problem: esophageal cancer     ASSESSMENT and PLAN:    Principal Problem:    Malignant neoplasm of lower third of esophagus ([facility] )  Active Problems:    Major depression    Anxiety    PAF (paroxysmal atrial fibrillation) (HCC)    Chronic combined systolic and diastolic CHF (congestive heart failure) ([facility] )    Centrilobular emphysema ([facility] )    Leukocytosis    #1  Esophageal cancer with solid food dysphagia: s/p attempt at stent placement yesterday which failed, patient had some bleeding due to trauma from procedure  hgb stable    -patient was originally agreeable to PEG tube placement today however, he then decided he did not want to stay in the hospital for this and would rather do this at a later time  I had a long discussion with both patient and his wife via telephone  Discussed that patient can only be on liquid diet with ensure  Discussed feeding tube placement procedure, how feedings will work, and that he will need to be admitted overnight for case management, nutrition, and monitoring if he comes back  discussed risk of not being able to place endoscopically if the tumor grows which would require a surgical procedure for G tube placement    -they have decided that patient will go home today and then discuss with family   He will call our office if he decides he would like feeding tube and we can have this scheduled outpatient with 23 hour observation    -can resume Eliquis tomorrow  -discussed plan with patient's primary GI Dr Chencho Marrufo as well        -------------------------------------------------------------------------------------------------------------------    HPI:  This is a 47-year-old male with a history of metastatic esophageal cancer, history of DVT, a fib on Eliquis, hypertension who presented yesterday for an outpatient esophageal stent placement which unfortunately was not successful  After multiple attempts the stent could not be deployed in the correct location and there was significant trauma to the mass causing bleeding  Patient was admitted overnight for monitoring  He has been doing a little full liquid diet at home for quite some time  He will sometimes have some small amount of regurgitation  He is not able to tolerate solid foods  He has been losing a small amount of weight  He is still undergoing chemotherapy since 2019  Today he denies any chest or throat pain  Denies any melena or vomiting of blood  Patient is eager to be discharged and go home  REVIEW OF SYSTEMS:    CONSTITUTIONAL: Denies any fever, chills, or rigors  Good appetite, + recent weight loss  HEENT: No earache or tinnitus  Denies hearing loss or visual disturbances  CARDIOVASCULAR: No chest pain or palpitations  RESPIRATORY: Denies any cough, hemoptysis, shortness of breath or dyspnea on exertion  GASTROINTESTINAL: As noted in the History of Present Illness  GENITOURINARY: No problems with urination  Denies any hematuria or dysuria  NEUROLOGIC: No dizziness or vertigo, denies headaches  MUSCULOSKELETAL: Denies any muscle or joint pain  SKIN: Denies skin rashes or itching  ENDOCRINE: Denies excessive thirst  Denies intolerance to heat or cold  PSYCHOSOCIAL: Denies depression or anxiety  Denies any recent memory loss         Historical Information   Past Medical History:   Diagnosis Date    Anxiety     [facility] ) 08/2019    metastatic gastroesophageal cancer    Dehydration     Depression     Esophageal cancer (HCC)     Fatigue     History of chemotherapy     currently started 8/2019    History of DVT (deep vein thrombosis)     Hypertension     currently is normal and may not need medication    Lung infection     chemo currently on hold because of the infection 2020    Nausea     Pneumonia     x2    Port-A-Cath in place     right    Psychiatric disorder     Recovering alcoholic ([address] )     sober since     Varicose veins of left lower extremity      Past Surgical History:   Procedure Laterality Date    APPENDECTOMY      COLONOSCOPY      IR PORT PLACEMENT  2019    UPPER GASTROINTESTINAL ENDOSCOPY       Social History   Social History     Substance and Sexual Activity   Alcohol Use Not Currently    Frequency: Never    Drinks per session: Patient refused    Binge frequency: Never    Comment: quit 30 yrs ago     Social History     Substance and Sexual Activity   Drug Use Yes    Types: Marijuana    Comment: couple x week     Social History     Tobacco Use   Smoking Status Former Smoker    Packs/day: 2 00    Years: 15 00    Pack years: 30     Types: Cigars    Quit date: 5/15/1998    Years since quittin 6   Smokeless Tobacco Former User    Quit date: 2006     Family History   Problem Relation Age of Onset    Colon cancer Father     Cancer Father         colon    Cancer Brother         Sinus-neuroblastoma     Heart disease Mother        Meds/Allergies     Medications Prior to Admission   Medication    amiodarone 200 mg tablet    ammonium lactate (LAC-HYDRIN) 12 % cream    diazepam (VALIUM) 5 mg tablet    Eliquis 5 MG    hydrochlorothiazide (HYDRODIURIL) 12 5 mg tablet    venlafaxine (EFFEXOR-XR) 150 mg 24 hr capsule    ondansetron (ZOFRAN) 4 mg tablet    pantoprazole (PROTONIX) 40 mg tablet     Current Facility-Administered Medications   Medication Dose Route Frequency    acetaminophen (TYLENOL) tablet 650 mg  650 mg Oral Q6H PRN    amiodarone tablet 200 mg  200 mg Oral Daily With Breakfast    diazepam (VALIUM) tablet 2 mg  2 mg Oral BID    ondansetron (ZOFRAN) injection 4 mg  4 mg Intravenous Q6H PRN    pantoprazole (PROTONIX) injection 40 mg  40 mg Intravenous Q12H [address]    venlafaxine (EFFEXOR-XR) 24 hr capsule 150 mg  150 mg Oral Daily     Facility-Administered Medications Ordered in Other Encounters   Medication Dose Route Frequency    Labetalol HCl (NORMODYNE) injection 10 mg  10 mg Intravenous Once    lactated ringers infusion  20 mL/hr Intravenous Continuous       Allergies   Allergen Reactions    Bee Venom Anaphylaxis     Throat swelling    Shellfish-Derived Products      Develops GOUT           Objective     Blood pressure 153/82, pulse 71, temperature 98 3 °F (36 8 °C), resp  rate 17, height 5' 11" (1 803 m), weight 68 5 kg (151 lb 0 2 oz), SpO2 98 %  No intake or output data in the 24 hours ending 01/14/21 0903      PHYSICAL EXAM:      General Appearance:   Alert, cooperative, no distress, appears stated age; chronically ill appearing     HEENT:   Normocephalic, atraumatic, anicteric, no oropharyngeal thrush present      Neck:  Supple, symmetrical, trachea midline, no adenopathy;    thyroid: no enlargement/tenderness/nodules; no carotid  bruit or JVD    Lungs:   Clear to auscultation bilaterally; no rales, rhonchi or wheezing; respirations unlabored    Heart[de-identified]   S1 and S2 normal; regular rate and rhythm; no murmur, rub, or gallop     Abdomen:   Soft, non-tender, non-distended; normal bowel sounds; no masses, no organomegaly    Genitalia:   Deferred    Rectal:   Deferred    Extremities:  No cyanosis, clubbing or edema    Pulses:  2+ and symmetric all extremities    Skin:  Skin color, texture, turgor normal, no rashes or lesions    Lymph nodes:  No palpable cervical, axillary or inguinal lymphadenopathy        Lab Results:   Results from last 7 days   Lab Units 01/14/21  0506   WBC Thousand/uL 13 59*   HEMOGLOBIN g/dL 9 2*   HEMATOCRIT % 32 7*   PLATELETS Thousands/uL 482*     Results from last 7 days   Lab Units 01/14/21  0506 01/13/21  1523   POTASSIUM mmol/L 3 6 3 9   CHLORIDE mmol/L 101 102   CO2 mmol/L 25 28   BUN mg/dL 13 15   CREATININE mg/dL 0 96 1 06   CALCIUM mg/dL 9 2 9 3   ALK PHOS U/L  --  116   ALT U/L  --  18   AST U/L  --  13     Results from last 7 days   Lab Units 01/13/21  1523   INR  1 09           Imaging Studies: I have personally reviewed pertinent imaging studies  No results found  Patient was seen and examined by Dr Ryan Ryan  All trujillo medical decisions were made by Dr Ryan Ryan  Thank you for allowing us to participate in the care of this present patient  We will follow-up with you closely

## 2021-01-14 NOTE — ASSESSMENT & PLAN NOTE
Patient with history of metastatic esophageal cancer on chemotherapy, presented today for esophageal wallstent placement  On EGD, patient was noted to have large long semi circumferential friable mass in distal esophagus extending down to gastric cardia, stent placement was attempted but unsuccessful as guidewire was sliding into this tumor causing standing getting caught in the tumor  Patient was subsequently noted to have presents of blood clot at the proximal and of the tumor on endoscopy, clot was removed and no active bleeding was noted  Patient was subsequently advised admission for observation  Postprocedure hemoglobin was 9 6 grams/deciliters  Patient denies any abdominal pain nausea vomiting  No further evidence of overt bleeding  Hemoglobin remains stable  Seen by GI, input appreciated  · Extensive discussion between GI TPN patient/family regarding aspiration PEG tube placement but patient wants to wait before making decision and discussed with family further  Patient will follow-up with GI regarding decision after discharge    Will require admission for observation if patient decides for PEG tube placement  · Patient will continue on PPI b i d   · Can resume Eliquis tomorrow

## 2021-01-14 NOTE — UTILIZATION REVIEW
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, paroxysmal AFib, dilated cardiomyopathy (EF 35-40%), hypertension, history of DVT, depression/anxiety who was presented initially today for elective esophageal Wallstent placement  On EGD patient was noted to have large long semi circumferential friable mass, wall stent placement was attempted but was unsuccessful as guidewire was sliding off the tumor and stent was getting caught in the tumor  Subsequently, patient was noted to have blood clot present at the proximal end of the tumor  Clot was removed  No active bleeding was noted  Patient was subsequently referred for observation  Patient is currently lying comfortably in bed, reports mild throat discomfort but denies any chest pain shortness of breath or abdominal pain  Malignant neoplasm of lower third of esophagus [facility])  Assessment & Plan  Patient with history of metastatic esophageal cancer on chemotherapy, presented today for esophageal wallstent placement  On EGD, patient was noted to have large long semi circumferential friable mass in distal esophagus extending down to gastric cardia, stent placement was attempted but unsuccessful as guidewire was sliding into this tumor causing standing getting caught in the tumor  Patient was subsequently noted to have presents of blood clot at the proximal and of the tumor on endoscopy, clot was removed and no active bleeding was noted    Patient was subsequently advised admission for observation  Postprocedure hemoglobin was 9 6 grams/deciliters  Patient denies any abdominal pain nausea vomiting  · Clear liquid diet  · Monitor symptoms  · PPI  · Monitor hemoglobin  · GI evaluation  Chronic combined systolic and diastolic CHF (congestive heart failure) ([facility])  Assessment & Plan      Wt Readings from Last 3 Encounters:   01/13/21 68 5 kg (151 lb 0 2 oz)   01/07/21 66 2 kg (146 lb)   12/30/20 65 8 kg (145 lb)      Appears euvolemic  Monitor weight  PAF (paroxysmal atrial fibrillation) ([facility])  Assessment & Plan  Controlled  Continue amiodarone    Holding Eliquis for now     Leukocytosis  Assessment & Plan  Likely reactive  Monitor     Centrilobular emphysema ([facility])  Assessment & Plan  Stable     Anxiety  Assessment & Plan  Reports that he takes Valium 2 5 mg b i d      Major depression  Assessment & Plan  Continue venlafaxine     EGD   FINDINGS:  · Esophagus-large long semi circumferential friable mass in the distal esophagus from 38 to 45 cm (GEJ) up and also extending down into the gastric cardia  Regular gastroscope was passed down without any difficulty  Guidewire was passed through the scope and then attempted to pass 18mm x 12cm wallstent over the guidewire under fluoroscopy guidance  The stent was getting stuck at the GE junction despite multiple attempts with different maneuvers and also passing the Slim gastroscope along the stent  It appears that the guidewire was sliding and of the tumor and the stent was getting caught in the tumor  Guidewire and the stent were pulled out and then passed the gastroscope down the esophagus and noted organized blood clot extending upwards from the proximal end of the tumor  Scope was passed down the esophagus into the stomach and there was no active bleeding  The clot was removed with the help of a net and confirmed no bleeding  · Stomach-noted infiltrating tumor extending for few cm into the cardia on retroflexion view    The duodenum appeared normal        ED Triage Vitals   Temperature Pulse Respirations Blood Pressure SpO2   01/13/21 1703 01/13/21 1703 01/13/21 1703 01/13/21 1703 01/13/21 1703   97 7 °F (36 5 °C) 65 16 153/87 95 %      Temp Source Heart Rate Source Patient Position - Orthostatic VS BP Location FiO2 (%)   01/13/21 1703 -- 01/13/21 1703 01/13/21 1703 --   Oral  Lying Left arm       Pain Score       01/13/21 1705       7          Wt Readings from Last 1 Encounters:   01/13/21 68 5 kg (151 lb 0 2 oz)     Additional Vital Signs:   01/14/21 07:51:52  98 3 °F (36 8 °C)  71    153/82  106  98 %       01/13/21 22:35:54  98 °F (36 7 °C)  78  17  136/82  100  94 %       01/13/21 19:32:09  98 °F (36 7 °C)  68  18  152/84  107  97 %       01/13/21 17:03:17  97 7 °F (36 5 °C)  65  16  153/87  109  95 %         Pertinent Labs/Diagnostic Test Results:   Results from last 7 days   Lab Units 01/07/21  1647   SARS-COV-2  Not Detected     Results from last 7 days   Lab Units 01/14/21  0506 01/13/21  1523   WBC Thousand/uL 13 59* 11 93*   HEMOGLOBIN g/dL 9 2* 9 6*   HEMATOCRIT % 32 7* 33 3*   PLATELETS Thousands/uL 482* 446*     Results from last 7 days   Lab Units 01/14/21  0506 01/13/21  1523   SODIUM mmol/L 136 136   POTASSIUM mmol/L 3 6 3 9   CHLORIDE mmol/L 101 102   CO2 mmol/L 25 28   ANION GAP mmol/L 10 6   BUN mg/dL 13 15   CREATININE mg/dL 0 96 1 06   EGFR ml/min/1 73sq m 81 72   CALCIUM mg/dL 9 2 9 3     Results from last 7 days   Lab Units 01/13/21  1523   AST U/L 13   ALT U/L 18   ALK PHOS U/L 116   TOTAL PROTEIN g/dL 7 2   ALBUMIN g/dL 2 6*   TOTAL BILIRUBIN mg/dL 0 30         Results from last 7 days   Lab Units 01/14/21  0506 01/13/21  1523   GLUCOSE RANDOM mg/dL 99 122     Results from last 7 days   Lab Units 01/13/21  1523   PROTIME seconds 14 0   INR  1 09     ED Treatment:   Medication Administration - No Administrations Displayed (No Start Event Found)     None        Past Medical History:   Diagnosis Date    Anxiety     Cancer ([facility] ) 08/2019    metastatic gastroesophageal cancer    Dehydration     Depression     Esophageal cancer (HCC)     Fatigue     History of chemotherapy     currently started 8/2019    History of DVT (deep vein thrombosis)     Hypertension     currently is normal and may not need medication    Lung infection     chemo currently on hold because of the infection 12/22/2020    Nausea     Pneumonia     x2    Port-A-Cath in place     right    Psychiatric disorder     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[facility] )     sober since 1990    Varicose veins of left lower extremity      Present on Admission:   Malignant neoplasm of lower third of esophagus ([facility] )   Chronic combined systolic and diastolic CHF (congestive heart failure) (HCC)   Major depression   PAF (paroxysmal atrial fibrillation) (HCC)   Centrilobular emphysema (HCC)   Anxiety   Leukocytosis      Admitting Diagnosis: Gastroesophageal cancer ([facility] ) [C16 0]  Age/Sex: 77 y o  male  Admission Orders:  gmf  Is    Scheduled Medications:  amiodarone, 200 mg, Oral, Daily With Breakfast  diazepam, 2 mg, Oral, BID  pantoprazole, 40 mg, Intravenous, Q12H GUERO  venlafaxine, 150 mg, Oral, Daily      Continuous IV Infusions:     PRN Meds:  acetaminophen, 650 mg, Oral, Q6H PRN  ondansetron, 4 mg, Intravenous, Q6H PRN        IP CONSULT TO GASTROENTEROLOGY    Network Utilization Review Department  ATTENTION: Please call with any questions or concerns to [phone] and carefully listen to the prompts so that you are directed to the right person  All voicemails are confidential   Loretta Preston all requests for admission clinical reviews, approved or denied determinations and any other requests to dedicated fax number below belonging to the campus where the patient is receiving treatment   List of dedicated fax numbers for the Facilities:  [address] DENIALS (Administrative/Medical Necessity) [phone]   [address] [place] [address] (Maternity/NICU/Pediatrics) 261 [facility] [address] [address] [facility] [address] [address] [place] [address] [address] [place] [address] [phone]

## 2021-01-14 NOTE — DISCHARGE SUMMARY
[facility]    Patient Information: Lino Born 77 y o  male MRN: [id number]  Unit/Bed#: Daniella Brewer Encounter: [id number]    Discharging Physician / Practitioner: Arielle Goss MD  PCP: Cecelia Guy MD  Admission Date: 1/13/2021  Discharge Date: 01/14/21    Reason for Admission: No chief complaint on file  Discharge Diagnoses:     Principal Problem:    Malignant neoplasm of lower third of esophagus (HCC)  Active Problems:    PAF (paroxysmal atrial fibrillation) (HCC)    Chronic combined systolic and diastolic CHF (congestive heart failure) (HCC)    Centrilobular emphysema (HCC)    Leukocytosis    Major depression    Anxiety  Resolved Problems:    * No resolved hospital problems  *        * Malignant neoplasm of lower third of esophagus [facility])  Assessment & Plan  Patient with history of metastatic esophageal cancer on chemotherapy, presented today for esophageal wallstent placement  On EGD, patient was noted to have large long semi circumferential friable mass in distal esophagus extending down to gastric cardia, stent placement was attempted but unsuccessful as guidewire was sliding into this tumor causing standing getting caught in the tumor  Patient was subsequently noted to have presents of blood clot at the proximal and of the tumor on endoscopy, clot was removed and no active bleeding was noted  Patient was subsequently advised admission for observation  Postprocedure hemoglobin was 9 6 grams/deciliters  Patient denies any abdominal pain nausea vomiting  No further evidence of overt bleeding  Hemoglobin remains stable  Seen by GI, input appreciated  · Extensive discussion between GI TPN patient/family regarding aspiration PEG tube placement but patient wants to wait before making decision and discussed with family further  Patient will follow-up with GI regarding decision after discharge    Will require admission for observation if patient decides for PEG tube placement  · Patient will continue on PPI b i d   · Can resume Eliquis tomorrow    Chronic combined systolic and diastolic CHF (congestive heart failure) ([facility])  Assessment & Plan  Wt Readings from Last 3 Encounters:   01/13/21 68 5 kg (151 lb 0 2 oz)   01/07/21 66 2 kg (146 lb)   12/30/20 65 8 kg (145 lb)     Appears euvolemic  Monitor weight, remains stable  Continue home medications        PAF (paroxysmal atrial fibrillation) ([facility] )  Assessment & Plan  Controlled  Continue amiodarone   Discussed with GI, patient can resume  Eliquis tomorrow    Leukocytosis  Assessment & Plan  Likely reactive  No clinical evidence of infection  Monitor    Centrilobular emphysema ([facility] )  Assessment & Plan  Stable    Anxiety  Assessment & Plan  Continue home meds    Major depression  Assessment & Plan  Continue venlafaxine      Consultations During Hospital Stay:  IP CONSULT TO GASTROENTEROLOGY    Procedures Performed:     · EGD with attempt of stent placement    Significant Findings:     · Refer to hospital course and above listed diagnosis related plan for details    Imaging while in hospital:    Fl Barium Swallow    Result Date: 1/11/2021  Narrative: BARIUM SWALLOW-ESOPHAGRAM INDICATION:   C16 0: Malignant neoplasm of cardia R13 19: Other dysphagia  COMPARISON:  None IMAGES:  17 FLUOROSCOPY TIME:   2 2 MIN  TECHNIQUE: The patient was given thin barium by mouth and images of the esophagus were obtained  FINDINGS: The proximal and mid esophagus is normal in caliber without evidence for stricture  There is significant irregularity at the distal aspect of the esophagus with filling defect extending to the gastroesophageal junction consistent with known neoplasm  There is markedly delayed emptying of contrast from the esophagus, mildly improved with administration of water  Gastroesophageal reflux was not observed  There is no hiatal hernia       Impression: Significant irregularity at the distal aspect of the esophagus with filling defect extending to the gastroesophageal junction consistent with known neoplasm  Markedly delayed emptying of contrast from the esophagus consistent with partial obstruction  Workstation performed: [id number]       Incidental Findings:   · None    Test Results Pending at Discharge (will require follow up):   · As per After Visit Summary     Outpatient Tests Requested:  · None    Complications:  Refer to hospital course and above listed diagnosis related plan, if any    Hospital Course: As per HPI  Vannessa Garcia is a 77 y o  male patient with history of 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, paroxysmal AFib, dilated cardiomyopathy, hypertension, DVT, anxiety, depression who originally presented to the hospital on 1/13/2021 for elective esophageal Wallstent placement  On EGD patient was noted to have large long semi circumferential friable mass, wall stent placement was attempted but was unsuccessful as guidewire was sliding off the tumor and stent was getting caught in the tumor  Subsequently, patient was noted to have blood clot present at the proximal end of the tumor  Clot was removed  No active bleeding was noted  Patient was subsequently referred for observation      Please see above list of diagnoses and related plan for additional information  Condition at Discharge: stable     Discharge Day Visit / Exam:     Subjective:  Ambulating in the hallway  Denies any chest pain shortness of breath abdominal pain  Sore throat has improved  Tolerating liquid diet      Vitals: Blood Pressure: 153/82 (01/14/21 0751)  Pulse: 71 (01/14/21 0751)  Temperature: 98 3 °F (36 8 °C) (01/14/21 0751)  Temp Source: Oral (01/14/21 0751)  Respirations: 18 (01/14/21 0751)  Height: 5' 11" (180 3 cm) (01/13/21 1703)  Weight - Scale: 68 5 kg (151 lb 0 2 oz) (01/13/21 1703)  SpO2: 98 % (01/14/21 0751)  Exam:   Physical Exam  Constitutional:       General: He is not in acute distress    HENT: Head: Normocephalic and atraumatic  Neck:      Musculoskeletal: Normal range of motion and neck supple  Cardiovascular:      Rate and Rhythm: Normal rate  Pulmonary:      Effort: Pulmonary effort is normal  No respiratory distress  Breath sounds: No wheezing, rhonchi or rales  Chest:      Chest wall: No tenderness  Abdominal:      General: Bowel sounds are normal  There is no distension  Palpations: Abdomen is soft  Tenderness: There is no abdominal tenderness  There is no guarding or rebound  Skin:     General: Skin is warm and dry  Findings: No rash  Neurological:      Mental Status: He is alert  Mental status is at baseline  Cranial Nerves: No cranial nerve deficit  Discharge instructions/Information to patient and family:(Discharge Medications and Follow up):   See after visit summary for information provided to patient and family  Provisions for Follow-Up Care:  See after visit summary for information related to follow-up care and any pertinent home health orders  Disposition: Home    Planned Readmission:  No     Discharge Statement:  I spent 45 minutes discharging the patient  This time was spent on the day of discharge  I had direct contact with the patient on the day of discharge  Greater than 50% of the total time was spent examining patient, answering all patient questions, arranging and discussing plan of care with patient as well as directly providing post-discharge instructions  Additional time then spent on discharge activities  Discharge Medications:  See after visit summary for reconciled discharge medications provided to patient and family  ** Please Note: "This note has been constructed using a voice recognition system  Therefore there may be syntax, spelling, and/or grammatical errors   Please call if you have any questions  "**

## 2021-01-14 NOTE — PLAN OF CARE
Problem: Potential for Falls  Goal: Patient will remain free of falls  Description: INTERVENTIONS:  - Assess patient frequently for physical needs  -  Identify cognitive and physical deficits and behaviors that affect risk of falls  -  [place] fall precautions as indicated by assessment   - Educate patient/family on patient safety including physical limitations  - Instruct patient to call for assistance with activity based on assessment  - Modify environment to reduce risk of injury  - Consider OT/PT consult to assist with strengthening/mobility  Outcome: Progressing  Note: Fall prevention protocol in place     Problem: Nutrition/Hydration-ADULT  Goal: Nutrient/Hydration intake appropriate for improving, restoring or maintaining nutritional needs  Description: Monitor and assess patient's nutrition/hydration status for malnutrition  Collaborate with interdisciplinary team and initiate plan and interventions as ordered  Monitor patient's weight and dietary intake as ordered or per policy  Utilize nutrition screening tool and intervene as necessary  Determine patient's food preferences and provide high-protein, high-caloric foods as appropriate       INTERVENTIONS:  - Monitor oral intake, urinary output, labs, and treatment plans  - Assess nutrition and hydration status and recommend course of action  - Evaluate amount of meals eaten  - Assist patient with eating if necessary   - Allow adequate time for meals  - Recommend/ encourage appropriate diets, oral nutritional supplements, and vitamin/mineral supplements  - Order, calculate, and assess calorie counts as needed  - Recommend, monitor, and adjust tube feedings and TPN/PPN based on assessed needs  - Assess need for intravenous fluids  - Provide specific nutrition/hydration education as appropriate  - Include patient/family/caregiver in decisions related to nutrition  Outcome: Progressing  Note: Pt actively is encouraged to eat and drink fluids     Problem: Prexisting or High Potential for Compromised Skin Integrity  Goal: Skin integrity is maintained or improved  Description: INTERVENTIONS:  - Identify patients at risk for skin breakdown  - Assess and monitor skin integrity  - Assess and monitor nutrition and hydration status  - Monitor labs   - Assess for incontinence   - Turn and reposition patient  - Assist with mobility/ambulation  - Relieve pressure over bony prominences  - Avoid friction and shearing  - Provide appropriate hygiene as needed including keeping skin clean and dry  - Evaluate need for skin moisturizer/barrier cream  - Collaborate with interdisciplinary team   - Patient/family teaching  - Consider wound care consult   Outcome: Progressing

## 2021-01-14 NOTE — PLAN OF CARE
Problem: Potential for Falls  Goal: Patient will remain free of falls  Description: INTERVENTIONS:  - Assess patient frequently for physical needs  -  Identify cognitive and physical deficits and behaviors that affect risk of falls  -  [place] fall precautions as indicated by assessment   - Educate patient/family on patient safety including physical limitations  - Instruct patient to call for assistance with activity based on assessment  - Modify environment to reduce risk of injury  - Consider OT/PT consult to assist with strengthening/mobility  Outcome: Progressing     Problem: Nutrition/Hydration-ADULT  Goal: Nutrient/Hydration intake appropriate for improving, restoring or maintaining nutritional needs  Description: Monitor and assess patient's nutrition/hydration status for malnutrition  Collaborate with interdisciplinary team and initiate plan and interventions as ordered  Monitor patient's weight and dietary intake as ordered or per policy  Utilize nutrition screening tool and intervene as necessary  Determine patient's food preferences and provide high-protein, high-caloric foods as appropriate       INTERVENTIONS:  - Monitor oral intake, urinary output, labs, and treatment plans  - Assess nutrition and hydration status and recommend course of action  - Evaluate amount of meals eaten  - Assist patient with eating if necessary   - Allow adequate time for meals  - Recommend/ encourage appropriate diets, oral nutritional supplements, and vitamin/mineral supplements  - Order, calculate, and assess calorie counts as needed  - Recommend, monitor, and adjust tube feedings and TPN/PPN based on assessed needs  - Assess need for intravenous fluids  - Provide specific nutrition/hydration education as appropriate  - Include patient/family/caregiver in decisions related to nutrition  Outcome: Progressing     Problem: Prexisting or High Potential for Compromised Skin Integrity  Goal: Skin integrity is maintained or improved  Description: INTERVENTIONS:  - Identify patients at risk for skin breakdown  - Assess and monitor skin integrity  - Assess and monitor nutrition and hydration status  - Monitor labs   - Assess for incontinence   - Turn and reposition patient  - Assist with mobility/ambulation  - Relieve pressure over bony prominences  - Avoid friction and shearing  - Provide appropriate hygiene as needed including keeping skin clean and dry  - Evaluate need for skin moisturizer/barrier cream  - Collaborate with interdisciplinary team   - Patient/family teaching  - Consider wound care consult   Outcome: Progressing

## 2021-01-14 NOTE — CASE MANAGEMENT
---LOS: 1 DAY  UNPLANNED RA RISK SCORE: N/A (OBS)  RA: NO  BUNDLE: NO    CM met with patient and discussed dc planning and the role of case management  Patient lives with his wife in a house  There are 2 steps to enter and then all one floor  He states he is independent of all ADL's and usually ambulates without assistance but sometimes uses a cane  He has a walker he does not use  He states he is not driving at this time and his wife Leighstephanie Ganr provides all transportation to appointments and to the store  He declines the need for any services at this time  Patient has medication coverage and uses the [facility] in [place]  His PCP is Dr Langley Alaina     He states he has a h/o anxiety  Depression and PTSD  He is on medication and follows with a psychiatrist  His psychiatrist is Dr Rea Valdez in [facility]  He states he stopped drinking 30 years ago  Admits to smoking marijuana weekly  Patient does not have a POA or LW and information was provided  Patient will have a ride home with his wife Leighstephanie Ganr at [facility]  Patients wife Leigh Sizer requested Leigh Elviar give her a call  CM attempted to call patients wife at [phone]  No barriers to discharge noted  CM to follow

## 2021-01-15 ENCOUNTER — TRANSITIONAL CARE MANAGEMENT (OUTPATIENT)
Dept: FAMILY MEDICINE CLINIC | Facility: CLINIC | Age: 66
End: 2021-01-15

## 2021-01-18 ENCOUNTER — TELEPHONE (OUTPATIENT)
Dept: HEMATOLOGY ONCOLOGY | Facility: CLINIC | Age: 66
End: 2021-01-18

## 2021-01-18 ENCOUNTER — TELEPHONE (OUTPATIENT)
Dept: GASTROENTEROLOGY | Facility: CLINIC | Age: 66
End: 2021-01-18

## 2021-01-18 NOTE — TELEPHONE ENCOUNTER
Reschedule Appointment     Who is calling in [employer]- Wife   Doctor Appointment Scheduled with Elvis Ayala date and time 1- @ 11:40am   New date and time 1- @ 3:20pm   Location [place]    Patient verbalized understanding

## 2021-01-18 NOTE — TELEPHONE ENCOUNTER
----- Message from Jorje Wells sent at 1/18/2021 10:59 AM EST -----    ----- Message -----  From: Aroldo Bajwa MD  Sent: 1/5/2021   1:10 PM EST  To: Francisca Jhaveri MA    Please let patient know that esophageal biopsies returned as known esophageal cancer  He needs to schedule barium esophagram which is already ordered  Please call central scheduling at [phone] to schedule your radiology exam   Barium esophagram needs to be done prior to EGD with stent placement      Thank you

## 2021-01-20 ENCOUNTER — TELEPHONE (OUTPATIENT)
Dept: RADIOLOGY | Facility: HOSPITAL | Age: 66
End: 2021-01-20

## 2021-01-20 NOTE — PROGRESS NOTES
IR consulted for lung biopsy on patient, patient called several times, last being today 1/20 with no answer  Left message for patient to call back [facility] Interventional radiology to schedule procedure

## 2021-01-21 ENCOUNTER — TELEPHONE (OUTPATIENT)
Dept: PULMONOLOGY | Facility: CLINIC | Age: 66
End: 2021-01-21

## 2021-01-21 NOTE — TELEPHONE ENCOUNTER
Patient's wife calling stating he was supposed to have a biopsy arranged but was never notified  I informed her I didn't see an appointment scheduled   Please advise

## 2021-01-22 ENCOUNTER — TELEPHONE (OUTPATIENT)
Dept: GASTROENTEROLOGY | Facility: CLINIC | Age: 66
End: 2021-01-22

## 2021-01-22 NOTE — TELEPHONE ENCOUNTER
I contacted Marlon Lazar at the IR Dept and was told that they have left messages 3 times with the most recent being 01/20/21 at 10 am to schedule  She stated that they would try to reach out to them again today

## 2021-01-22 NOTE — TELEPHONE ENCOUNTER
I spoke to patient's wife, Marina Robb (phone [phone])  She would like patient to see you in office to discuss options as EGD with stent not successful  She said patient is tolerating only very small amounts by mouth and may be receptive to feeding tube at this time but has questions  Would you be able to squeeze him on your schedule next Friday? They prefer to see you in person if possible  Thanks for your help

## 2021-01-25 ENCOUNTER — TELEPHONE (OUTPATIENT)
Dept: RADIOLOGY | Facility: HOSPITAL | Age: 66
End: 2021-01-25

## 2021-01-25 DIAGNOSIS — J98.4 CAVITATING MASS IN LEFT UPPER LUNG LOBE: ICD-10-CM

## 2021-01-25 DIAGNOSIS — C15.5 MALIGNANT NEOPLASM OF LOWER THIRD OF ESOPHAGUS (HCC): Primary | ICD-10-CM

## 2021-01-25 NOTE — PROGRESS NOTES
Spoke with patient's wife, scheduled lung biopsy for 2/3  Pt to arrive in [facility] at 0930, NPO after midnight  May take meds with sips of water  Pt to hold Eliquis for 2 days, last dose 1/31  Pt will need  home  Reviewed procedure with wife and possibility of chest tube if pneumothorax  Wife verbalized understanding, questions answered as offered

## 2021-01-26 ENCOUNTER — TELEPHONE (OUTPATIENT)
Dept: PALLIATIVE MEDICINE | Facility: CLINIC | Age: 66
End: 2021-01-26

## 2021-01-26 ENCOUNTER — OFFICE VISIT (OUTPATIENT)
Dept: HEMATOLOGY ONCOLOGY | Facility: CLINIC | Age: 66
End: 2021-01-26
Payer: MEDICARE

## 2021-01-26 VITALS
BODY MASS INDEX: 20.23 KG/M2 | RESPIRATION RATE: 12 BRPM | WEIGHT: 144.5 LBS | DIASTOLIC BLOOD PRESSURE: 68 MMHG | TEMPERATURE: 97.1 F | HEART RATE: 73 BPM | SYSTOLIC BLOOD PRESSURE: 130 MMHG | OXYGEN SATURATION: 100 % | HEIGHT: 71 IN

## 2021-01-26 DIAGNOSIS — R11.0 NAUSEA: ICD-10-CM

## 2021-01-26 DIAGNOSIS — Z78.9 NEED FOR FOLLOW-UP BY SOCIAL WORKER: ICD-10-CM

## 2021-01-26 DIAGNOSIS — C15.5 MALIGNANT NEOPLASM OF LOWER THIRD OF ESOPHAGUS (HCC): Primary | ICD-10-CM

## 2021-01-26 PROCEDURE — 99214 OFFICE O/P EST MOD 30 MIN: CPT | Performed by: INTERNAL MEDICINE

## 2021-01-26 NOTE — TELEPHONE ENCOUNTER
This patient is on the no show list  I left him a message to call office to schedule a follow up with Dr Lydia Houston

## 2021-01-26 NOTE — TELEPHONE ENCOUNTER
Patients GI provider:  Dr Sandra Cobb    Number to return call: [phone]    Reason for call: Pt's wife Rise Credit called requesting to speak with you to follow up on the discussion from last week       Scheduled procedure/appointment date if applicable: Apt/procedure

## 2021-01-26 NOTE — PROGRESS NOTES
Hematology/Oncology Outpatient Follow- up Note  Osbaldo Guerrero 77 y o  male MRN: @ Encounter: [id number]        Date:  1/26/2021    Presenting Complaint/Diagnosis : Osbaldo Guerrero is a 77 y o  male with metastatic gastroesophageal cancer        Previous Hematologic/ Oncologic History:    Oncology History   Malignant neoplasm of lower third of esophagus ([facility] [place] [facility] )   7/26/2019 Initial Diagnosis    Malignant neoplasm of lower third of esophagus ([facility] [place] [facility] )     8/1/2019 -  Chemotherapy    fluorouracil (ADRUCIL) injection 750 mg, 400 mg/m2 = 750 mg, Intravenous, Once, 32 of 38 cycles  Administration: 750 mg (8/1/2019), 750 mg (8/13/2019), 750 mg (8/27/2019), 750 mg (9/10/2019), 750 mg (9/24/2019), 750 mg (10/8/2019), 795 mg (10/22/2019), 795 mg (11/5/2019), 795 mg (11/19/2019), 795 mg (12/3/2019), 795 mg (12/17/2019), 795 mg (12/31/2019), 795 mg (1/14/2020), 795 mg (1/28/2020), 795 mg (2/11/2020), 795 mg (2/25/2020), 795 mg (3/10/2020), 795 mg (3/24/2020), 795 mg (4/7/2020), 795 mg (5/13/2020), 795 mg (5/27/2020), 795 mg (6/10/2020), 795 mg (6/24/2020), 795 mg (7/22/2020), 795 mg (8/5/2020), 790 mg (8/19/2020), 750 mg (10/7/2020), 795 mg (4/29/2020), 750 mg (10/21/2020), 750 mg (11/4/2020), 750 mg (11/18/2020)  pegfilgrastim (NEULASTA ONPRO) subcutaneous injection kit 6 mg, 6 mg, Subcutaneous, Once, 2 of 2 cycles  Administration: 6 mg (8/3/2019), 6 mg (8/15/2019)  leucovorin 750 mg in dextrose 5 % 250 mL IVPB, 748 mg, Intravenous, Once, 21 of 21 cycles  Administration: 750 mg (8/1/2019), 750 mg (8/13/2019), 750 mg (8/27/2019), 750 mg (9/10/2019), 750 mg (9/24/2019), 750 mg (10/8/2019), 800 mg (10/22/2019), 800 mg (11/5/2019), 800 mg (11/19/2019), 800 mg (12/3/2019), 800 mg (12/17/2019), 800 mg (12/31/2019), 800 mg (1/14/2020), 800 mg (1/28/2020), 800 mg (2/11/2020), 800 mg (2/25/2020), 800 mg (3/10/2020), 800 mg (3/24/2020), 800 mg (4/7/2020), 800 mg (5/13/2020), 800 mg (4/29/2020)  oxaliplatin (ELOXATIN) 158 95 mg in dextrose 5 % 250 mL chemo infusion, 85 mg/m2 = 158 95 mg, Intravenous, Once, 6 of 6 cycles  Administration: 158 95 mg (8/1/2019), 158 95 mg (8/13/2019), 158 95 mg (8/27/2019), 158 95 mg (9/10/2019), 158 95 mg (9/24/2019), 158 95 mg (10/8/2019)     Gastroesophageal cancer ([facility] [address] )   7/26/2019 Initial Diagnosis    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[facility] [address] )     8/1/2019 -  Chemotherapy    fluorouracil (ADRUCIL) injection 750 mg, 400 mg/m2 = 750 mg, Intravenous, Once, 32 of 38 cycles  Administration: 750 mg (8/1/2019), 750 mg (8/13/2019), 750 mg (8/27/2019), 750 mg (9/10/2019), 750 mg (9/24/2019), 750 mg (10/8/2019), 795 mg (10/22/2019), 795 mg (11/5/2019), 795 mg (11/19/2019), 795 mg (12/3/2019), 795 mg (12/17/2019), 795 mg (12/31/2019), 795 mg (1/14/2020), 795 mg (1/28/2020), 795 mg (2/11/2020), 795 mg (2/25/2020), 795 mg (3/10/2020), 795 mg (3/24/2020), 795 mg (4/7/2020), 795 mg (5/13/2020), 795 mg (5/27/2020), 795 mg (6/10/2020), 795 mg (6/24/2020), 795 mg (7/22/2020), 795 mg (8/5/2020), 790 mg (8/19/2020), 750 mg (10/7/2020), 795 mg (4/29/2020), 750 mg (10/21/2020), 750 mg (11/4/2020), 750 mg (11/18/2020)  pegfilgrastim (NEULASTA ONPRO) subcutaneous injection kit 6 mg, 6 mg, Subcutaneous, Once, 2 of 2 cycles  Administration: 6 mg (8/3/2019), 6 mg (8/15/2019)  leucovorin 750 mg in dextrose 5 % 250 mL IVPB, 748 mg, Intravenous, Once, 21 of 21 cycles  Administration: 750 mg (8/1/2019), 750 mg (8/13/2019), 750 mg (8/27/2019), 750 mg (9/10/2019), 750 mg (9/24/2019), 750 mg (10/8/2019), 800 mg (10/22/2019), 800 mg (11/5/2019), 800 mg (11/19/2019), 800 mg (12/3/2019), 800 mg (12/17/2019), 800 mg (12/31/2019), 800 mg (1/14/2020), 800 mg (1/28/2020), 800 mg (2/11/2020), 800 mg (2/25/2020), 800 mg (3/10/2020), 800 mg (3/24/2020), 800 mg (4/7/2020), 800 mg (5/13/2020), 800 mg (4/29/2020)  oxaliplatin (ELOXATIN) 158 95 mg in dextrose 5 % 250 mL chemo infusion, 85 mg/m2 = 158 95 mg, Intravenous, Once, 6 of 6 cycles  Administration: 158 95 mg (8/1/2019), 158 95 mg (8/13/2019), 158 95 mg (8/27/2019), 158 95 mg (9/10/2019), 158 95 mg (9/24/2019), 158 95 mg (10/8/2019)         Current Hematologic/ Oncologic Treatment:      Modified FOLFOX 6 with oxaliplatin discontinued after cycle 6  Leucovorin was also discontinued  The patient was on 5 FU bolus and 5 FU infusion pump at home  Everything is on hold because of a question of a fungal infection in the lung versus some other finding  He is seeing our colleagues in Infectious Disease and Pulmonary and treatment is on hold until they decide etiology behind radiologic findings  Interval History:      The patient returns for follow-up visit  He states he is doing reasonably well  He has lost a lot a weight  He was in the hospital for a stent but this could not be placed  He cannot get a lot of food down  He is contemplating tube feeds at this point with the feeding tube  Denies any nausea or vomiting  Is fatigued  Is cachectic  Has lost weight  ECOG performance status is a 1-2  The rest of his 14 point review of systems today was negative  Both the patient and his wife are adamant they do wish to proceed with treatment once they are cleared through ID and Pulmonary and based on his wishes that would entail the possibility of a feeding tube to help get his nutritional status to where he can tolerate treatment  Test Results:    Imaging: Fl Barium Swallow    Result Date: 1/11/2021  Narrative: BARIUM SWALLOW-ESOPHAGRAM INDICATION:   C16 0: Malignant neoplasm of cardia R13 19: Other dysphagia  COMPARISON:  None IMAGES:  17 FLUOROSCOPY TIME:   2 2 MIN  TECHNIQUE: The patient was given thin barium by mouth and images of the esophagus were obtained  FINDINGS: The proximal and mid esophagus is normal in caliber without evidence for stricture    There is significant irregularity at the distal aspect of the esophagus with filling defect extending to the gastroesophageal junction consistent with known neoplasm  There is markedly delayed emptying of contrast from the esophagus, mildly improved with administration of water  Gastroesophageal reflux was not observed  There is no hiatal hernia  Impression: Significant irregularity at the distal aspect of the esophagus with filling defect extending to the gastroesophageal junction consistent with known neoplasm  Markedly delayed emptying of contrast from the esophagus consistent with partial obstruction  Workstation performed: [id number]     Xr Chest 1 View    Result Date: 1/18/2021  Narrative: CHEST INDICATION:   C16 0: Malignant neoplasm of cardia  EGD for esophageal stent placement attempted but unable to place stent due to blockage by a esophageal tumor  COMPARISON:  Chest radiograph from 10/18/2020 and chest CT from 12/10/2020  EXAM PERFORMED/VIEWS: Single fluoroscopic image of the left lower chest  FINDINGS: One fluoroscopic image image shows an endoscope at gastroesophageal junction and a guidewire in the stomach  Impression: 122 8 seconds of fluoroscopy utilized   Workstation performed: [id number]       Labs:   Lab Results   Component Value Date    WBC 13 59 (H) 01/14/2021    HGB 9 2 (L) 01/14/2021    HCT 32 7 (L) 01/14/2021    MCV 91 01/14/2021     (H) 01/14/2021     Lab Results   Component Value Date    K 3 6 01/14/2021     01/14/2021    CO2 25 01/14/2021    BUN 13 01/14/2021    CREATININE 0 96 01/14/2021    GLUF 99 01/14/2021    CALCIUM 9 2 01/14/2021    CORRECTEDCA 10 4 (H) 01/13/2021    AST 13 01/13/2021    ALT 18 01/13/2021    ALKPHOS 116 01/13/2021    EGFR 81 01/14/2021       Lab Results   Component Value Date    PSA 8 5 (H) 05/15/2020    PSA 8 0 (H) 01/24/2020       Lab Results   Component Value Date    IRON 13 (L) 09/01/2020    TIBC 341 09/01/2020    FERRITIN 175 09/01/2020       Lab Results   Component Value Date    [id number] 845 09/01/2020         ROS: As stated in the history of present illness otherwise his 14 point review of systems today was negative        Active Problems:   Patient Active Problem List   Diagnosis    PTSD (post-traumatic stress disorder)    Essential hypertension    Major depression    OCD (obsessive compulsive disorder)    Anxiety    Malignant neoplasm of lower third of esophagus ([facility] )    Gastroesophageal cancer ([place] [address] )    Cancer-related pain    Chemotherapy-induced nausea    Severe protein-calorie malnutrition ([place]: less than 60% of standard weight) (HCC)    Malignant ascites    Recovering alcoholic ([facility] )    Other fatigue    Other dysphagia    Nausea    Dehydration    Skin fissures    Goals of care, counseling/discussion    Palliative care patient    Other insomnia    Counseling on health promotion and disease prevention    Elevated PSA    Memory loss    Regurgitation of stomach contents    Falls    Superficial thrombophlebitis of lower extremity    Pneumonia of left lung due to infectious organism    PAF (paroxysmal atrial fibrillation) (HCC)    Moderate protein-calorie malnutrition (HCC)    Dilated cardiomyopathy (HCC)    Urge urinary incontinence    Aspiration pneumonia (HCC)    Chronic combined systolic and diastolic CHF (congestive heart failure) ([place] [address] )    Port-A-Cath in place    Hematuria    Hyponatremia    Hypoglycemia    Severe protein-calorie malnutrition (HCC)    Cavitating mass in left upper lung lobe    Immunocompromised patient ([place] [address] )    Centrilobular emphysema (HCC)    Leukocytosis       Past Medical History:   Past Medical History:   Diagnosis Date    Anxiety     Cancer ([place] [address] ) 08/2019    metastatic gastroesophageal cancer    Dehydration     Depression     Esophageal cancer (HCC)     Fatigue     History of chemotherapy     currently started 8/2019    History of DVT (deep vein thrombosis)     Hypertension     currently is normal and may not need medication    Lung infection     chemo currently on hold because of the infection 12/22/2020    Nausea     Pneumonia     x2    Port-A-Cath in place     right    Psychiatric disorder     Recovering alcoholic ([address] )     sober since     Varicose veins of left lower extremity        Surgical History:   Past Surgical History:   Procedure Laterality Date    APPENDECTOMY      COLONOSCOPY      IR PORT PLACEMENT  2019    UPPER GASTROINTESTINAL ENDOSCOPY         Family History:    Family History   Problem Relation Age of Onset    Colon cancer Father     Cancer Father         colon    Cancer Brother         Sinus-neuroblastoma     Heart disease Mother        Cancer-related family history includes Cancer in his brother and father; Colon cancer in his father      Social History:   Social History     Socioeconomic History    Marital status: /Civil Union     Spouse name: Not on file    Number of children: 2    Years of education: Not on file    Highest education level: Not on file   Occupational History    Occupation: retired    Social Needs    Financial resource strain: Not on file    Food insecurity     Worry: Not on file     Inability: Not on file   [employer] needs     Medical: Not on file     Non-medical: Not on file   Tobacco Use    Smoking status: Former Smoker     Packs/day: 2 00     Years: 15 00     Pack years: 30 00     Types: Cigars     Quit date: 5/15/1998     Years since quittin 7    Smokeless tobacco: Former User     Quit date: 2006   Substance and Sexual Activity    Alcohol use: Not Currently     Frequency: Never     Drinks per session: Patient refused     Binge frequency: Never     Comment: quit 30 yrs ago    Drug use: Yes     Types: Marijuana     Comment: couple x week    Sexual activity: Not Currently   Lifestyle    Physical activity     Days per week: Not on file     Minutes per session: Not on file    Stress: Not on file   Relationships    Social connections     Talks on phone: Not on file     Gets together: Not on file     Attends [employer] service: Not on file     Active member of club or organization: Not on file     Attends meetings of clubs or organizations: Not on file     Relationship status: Not on file    Intimate partner violence     Fear of current or ex partner: Not on file     Emotionally abused: Not on file     Physically abused: Not on file     Forced sexual activity: Not on file   Other Topics Concern    Not on file   Social History Narrative    Not on file       Current Medications:   Current Outpatient Medications   Medication Sig Dispense Refill    amiodarone 200 mg tablet TAKE ONE TABLET BY MOUTH EVERY [place] (Patient taking differently: 200 mg every morning ) 30 tablet 2    ammonium lactate (LAC-HYDRIN) 12 % cream Apply topically as needed for dry skin On hands and feet 385 g 2    apixaban (Eliquis) 5 mg Take 1 tablet (5 mg total) by mouth 2 (two) times a day 60 tablet 0    diazepam (VALIUM) 5 mg tablet Take 1 tablet (5 mg total) by mouth 2 (two) times a day (Patient taking differently: Take 10 mg by mouth daily at bedtime ) 60 tablet 5    hydrochlorothiazide (HYDRODIURIL) 12 5 mg tablet Take one tablet daily as needed for SBP> 160/90 30 tablet 1    ondansetron (ZOFRAN) 4 mg tablet Take 2 tablets (8 mg total) by mouth every 8 (eight) hours as needed for nausea or vomiting 75 tablet 0    pantoprazole (PROTONIX) 40 mg tablet Take 1 tablet (40 mg total) by mouth 2 (two) times a day 60 tablet 0    venlafaxine (EFFEXOR-XR) 150 mg 24 hr capsule Take 1 capsule (150 mg total) by mouth daily 30 capsule 0     No current facility-administered medications for this visit  Allergies: Allergies   Allergen Reactions    Bee Venom Anaphylaxis     Throat swelling    Shellfish-Derived Products      Develops GOUT       Physical Exam:    Body surface area is 1 84 meters squared      Wt Readings from Last 3 Encounters:   01/26/21 65 5 kg (144 lb 8 oz)   01/13/21 68 5 kg (151 lb 0 2 oz)   01/07/21 66 2 kg (146 lb) Temp Readings from Last 3 Encounters:   01/26/21 (!) 97 1 °F (36 2 °C) (Temporal)   01/14/21 98 3 °F (36 8 °C) (Oral)   01/13/21 98 8 °F (37 1 °C)        BP Readings from Last 3 Encounters:   01/26/21 130/68   01/14/21 153/82   01/13/21 150/87         Pulse Readings from Last 3 Encounters:   01/26/21 73   01/14/21 71   01/13/21 93       Physical Exam     Constitutional   General appearance: No acute distress, cachectic   Eyes   Conjunctiva and lids: No swelling, erythema or discharge  Pupils and irises: Equal, round and reactive to light  Ears, Nose, Mouth, and Throat   External inspection of ears and nose: Normal     Nasal mucosa, septum, and turbinates: Normal without edema or erythema  Oropharynx: Normal with no erythema, edema, exudate or lesions  Pulmonary   Respiratory effort: No increased work of breathing or signs of respiratory distress  Auscultation of lungs: Clear to auscultation  Cardiovascular   Palpation of heart: Normal PMI, no thrills  Auscultation of heart: Normal rate and rhythm, normal S1 and S2, without murmurs  Examination of extremities for edema and/or varicosities: Normal     Carotid pulses: Normal     Abdomen   Abdomen: Non-tender, no masses  Liver and spleen: No hepatomegaly or splenomegaly  Lymphatic   Palpation of lymph nodes in neck: No lymphadenopathy  Musculoskeletal   Gait and station:   Walks with a cane  Digits and nails: Normal without clubbing or cyanosis  Inspection/palpation of joints, bones, and muscles: Normal     Skin   Skin and subcutaneous tissue: Normal without rashes or lesions  Neurologic   Cranial nerves: Cranial nerves 2-12 intact  Sensation: No sensory loss  Psychiatric   Orientation to person, place, and time: Normal     Mood and affect: Normal         Assessment / Plan: This is a pleasant 78-year-old male with a past medical history of metastatic gastroesophageal cancer   He received 4 cycles of modified FOLFOX 6 chemotherapy After which his imaging showed a very nice response  We discontinued the oxaliplatin   He was having some nausea vomiting and side effects of Leucovorin was also discontinued  Jaun Yancey is now doing reasonably well on 5 FU   His most recent imaging was actually improved  He did have an area which is suspicious for an infection possibly a fungal infection  He was seen by both Pulmonary Medicine and Infectious Disease and has a biopsy scheduled in the 1st week of February  We are waiting the results and clearance from ID prior to proceeding with his chemotherapy and they are aware it is on hold until we are told that is safe to start by our colleagues in Infectious Disease and Pulmonary  I will see him back in 2-3 weeks  Hopefully will have an answer and then we can start him on chemotherapy  He is considering tube feeds which I think is reasonable if he wishes to proceed with an aggressive course as his nutritional status is declining  I will see him back in 2-3 weeks  He will get his biopsy and see Infectious Disease and Pulmonary for these questionable findings in the lung  Goals and Barriers:  Current Goal:  Prolong Survival from esophageal cancer  Barriers: None  Patient's Capacity to Self Care:  Patient able to self care  Portions of the record may have been created with voice recognition software   Occasional wrong word or "sound a like" substitutions may have occurred due to the inherent limitations of voice recognition software   Read the chart carefully and recognize, using context, where substitutions have occurred

## 2021-01-27 ENCOUNTER — PATIENT OUTREACH (OUTPATIENT)
Dept: CASE MANAGEMENT | Facility: HOSPITAL | Age: 66
End: 2021-01-27

## 2021-01-27 NOTE — PROGRESS NOTES
LSW received DT and problem list via referral process  Pt self scored 7/10 and noted concerns with depression, sadness and physical issues  LSW attempted to contact pt via telephone, no answer, phone was busy  LSW was unable to leave a message, will call again in a few days

## 2021-01-27 NOTE — TELEPHONE ENCOUNTER
I spoke to patient's wife, Enrico Hinton  Office visit scheduled Feb 2 with Dr Chanell Ortiz to discuss feeding tube placement

## 2021-01-28 ENCOUNTER — TELEPHONE (OUTPATIENT)
Dept: GASTROENTEROLOGY | Facility: AMBULARY SURGERY CENTER | Age: 66
End: 2021-01-28

## 2021-01-28 NOTE — TELEPHONE ENCOUNTER
Called patient but could not reach  Left message that we will follow up in clinic with him soon  Will also try calling them back in the next 1-2 days

## 2021-01-30 DIAGNOSIS — J98.4 CAVITATING MASS IN LEFT UPPER LUNG LOBE: ICD-10-CM

## 2021-01-30 PROCEDURE — U0003 INFECTIOUS AGENT DETECTION BY NUCLEIC ACID (DNA OR RNA); SEVERE ACUTE RESPIRATORY SYNDROME CORONAVIRUS 2 (SARS-COV-2) (CORONAVIRUS DISEASE [COVID-19]), AMPLIFIED PROBE TECHNIQUE, MAKING USE OF HIGH THROUGHPUT TECHNOLOGIES AS DESCRIBED BY CMS-2020-01-R: HCPCS | Performed by: PHYSICIAN ASSISTANT

## 2021-01-30 PROCEDURE — U0005 INFEC AGEN DETEC AMPLI PROBE: HCPCS | Performed by: PHYSICIAN ASSISTANT

## 2021-01-31 LAB — SARS-COV-2 RNA RESP QL NAA+PROBE: NEGATIVE

## 2021-02-01 ENCOUNTER — PATIENT OUTREACH (OUTPATIENT)
Dept: CASE MANAGEMENT | Facility: HOSPITAL | Age: 66
End: 2021-02-01

## 2021-02-01 NOTE — PROGRESS NOTES
LSW attempted 2nd outreach, no answer, unable to leave message  LSW will attempt another outreach at a later date

## 2021-02-02 ENCOUNTER — TELEMEDICINE (OUTPATIENT)
Dept: GASTROENTEROLOGY | Facility: AMBULARY SURGERY CENTER | Age: 66
End: 2021-02-02
Payer: MEDICARE

## 2021-02-02 ENCOUNTER — PREP FOR PROCEDURE (OUTPATIENT)
Dept: INTERVENTIONAL RADIOLOGY/VASCULAR | Facility: CLINIC | Age: 66
End: 2021-02-02

## 2021-02-02 ENCOUNTER — TELEMEDICINE (OUTPATIENT)
Dept: INTERVENTIONAL RADIOLOGY/VASCULAR | Facility: CLINIC | Age: 66
End: 2021-02-02
Payer: MEDICARE

## 2021-02-02 DIAGNOSIS — C15.5 MALIGNANT NEOPLASM OF LOWER THIRD OF ESOPHAGUS (HCC): Primary | ICD-10-CM

## 2021-02-02 DIAGNOSIS — R13.19 OTHER DYSPHAGIA: Primary | Chronic | ICD-10-CM

## 2021-02-02 DIAGNOSIS — C15.5 MALIGNANT NEOPLASM OF LOWER THIRD OF ESOPHAGUS (HCC): ICD-10-CM

## 2021-02-02 PROCEDURE — 99212 OFFICE O/P EST SF 10 MIN: CPT | Performed by: RADIOLOGY

## 2021-02-02 PROCEDURE — G2012 BRIEF CHECK IN BY MD/QHP: HCPCS | Performed by: INTERNAL MEDICINE

## 2021-02-02 NOTE — PROGRESS NOTES
[facility] - Outpatient Follow-up Note  Telemedicine visit  Rohini Jackson 77 y o  male MRN: [id number]  Encounter: [id number]          ASSESSMENT AND PLAN:      1  Gastroesophageal cancer ([facility] [address] )  2  Malignant neoplasm of lower third of esophagus (HCC)  3  Other dysphagia  4  Severe protein-calorie malnutrition ([facility] [address] )  5  Co-morbidities: CHF, HTN, dilated cardiomyopathy, pAfib on Eliquis, depression and anxiety, PTSD, OCD     - Could not place  Esophageal stent during EGD  As wire kept hitting gastric portion of mass  - needs G-tube but endoscopic approach will be more traumatic due to esophageal mass  - will refer to IR for gastrostomy tube (did speak with radiologist and placed consult)  - will need to hold Eliquis for 2 days prior to procedure     follow-up as needed     ______________________________________________________________________    SUBJECTIVE:  42-year-old man with esophageal cancer who presents for follow up dysphagia, regurgitation and poor weight loss  Co-morbidities: CHF, HTN, dilated cardiomyopathy, pAfib on Eliquis, depression and anxiety, PTSD, OCD    Telephone visit  Esophageal cancer  -Diagnosed August 2019 on EGD  No EGD since then   -following with Dr Buster Bhatia (hematologist oncologist)  - does report dysphagia and regurgitation at times  - barium swallow shows lower esophageal mass and delayed  esophageal emptying  -initially lost a lot of weight after esophageal cancer diagnosis but then has regained some of that back  More recently over last 4-6 months has lost about 40 lb  - 2 egds after clinic visit 12/30/20, 1/13/21  Stent placement attempted during 2nd EGD but stent could not be placed as wire was bending due to gastric mass     Interesting in PEG  REVIEW OF SYSTEMS IS OTHERWISE NEGATIVE        Historical Information   Past Medical History:   Diagnosis Date    Anxiety     Cancer [facility]) 08/2019    metastatic gastroesophageal cancer    Dehydration     Depression     Esophageal cancer (HCC)     Fatigue     History of chemotherapy     currently started 2019    History of DVT (deep vein thrombosis)     Hypertension     currently is normal and may not need medication    Lung infection     chemo currently on hold because of the infection 2020    Nausea     Pneumonia     x2    Port-A-Cath in place     right    Psychiatric disorder     Recovering alcoholic ([address] )     sober since     Varicose veins of left lower extremity      Past Surgical History:   Procedure Laterality Date    APPENDECTOMY      COLONOSCOPY      IR PORT PLACEMENT  2019    UPPER GASTROINTESTINAL ENDOSCOPY       Social History   Social History     Substance and Sexual Activity   Alcohol Use Not Currently    Frequency: Never    Drinks per session: Patient refused    Binge frequency: Never    Comment: quit 30 yrs ago     Social History     Substance and Sexual Activity   Drug Use Yes    Types: Marijuana    Comment: couple x week     Social History     Tobacco Use   Smoking Status Former Smoker    Packs/day: 2 00    Years: 15 00    Pack years:     Types: Cigars    Quit date: 5/15/1998    Years since quittin 7   Smokeless Tobacco Former User    Quit date: 2006     Family History   Problem Relation Age of Onset    Colon cancer Father     Cancer Father         colon    Cancer Brother         Sinus-neuroblastoma     Heart disease Mother        Meds/Allergies       Current Outpatient Medications:     amiodarone 200 mg tablet    ammonium lactate (LAC-HYDRIN) 12 % cream    apixaban (Eliquis) 5 mg    diazepam (VALIUM) 5 mg tablet    hydrochlorothiazide (HYDRODIURIL) 12 5 mg tablet    ondansetron (ZOFRAN) 4 mg tablet    pantoprazole (PROTONIX) 40 mg tablet    venlafaxine (EFFEXOR-XR) 150 mg 24 hr capsule    Allergies   Allergen Reactions    Bee Venom Anaphylaxis     Throat swelling    Shellfish-Derived Products      Develops GOUT Objective     There were no vitals taken for this visit  There is no height or weight on file to calculate BMI  PHYSICAL EXAM:      Telephone visit      Lab Results:   No visits with results within 1 Day(s) from this visit  Latest known visit with results is:   Orders Only on 01/30/2021   Component Date Value    SARS-CoV-2 01/30/2021 Negative          Radiology Results:   Fl Barium Swallow    Result Date: 1/11/2021  Narrative: BARIUM SWALLOW-ESOPHAGRAM INDICATION:   C16 0: Malignant neoplasm of cardia R13 19: Other dysphagia  COMPARISON:  None IMAGES:  17 FLUOROSCOPY TIME:   2 2 MIN  TECHNIQUE: The patient was given thin barium by mouth and images of the esophagus were obtained  FINDINGS: The proximal and mid esophagus is normal in caliber without evidence for stricture  There is significant irregularity at the distal aspect of the esophagus with filling defect extending to the gastroesophageal junction consistent with known neoplasm  There is markedly delayed emptying of contrast from the esophagus, mildly improved with administration of water  Gastroesophageal reflux was not observed  There is no hiatal hernia  Impression: Significant irregularity at the distal aspect of the esophagus with filling defect extending to the gastroesophageal junction consistent with known neoplasm  Markedly delayed emptying of contrast from the esophagus consistent with partial obstruction  Workstation performed: [id number]     Xr Chest 1 View    Result Date: 1/18/2021  Narrative: CHEST INDICATION:   C16 0: Malignant neoplasm of cardia  EGD for esophageal stent placement attempted but unable to place stent due to blockage by a esophageal tumor  COMPARISON:  Chest radiograph from 10/18/2020 and chest CT from 12/10/2020   EXAM PERFORMED/VIEWS: Single fluoroscopic image of the left lower chest  FINDINGS: One fluoroscopic image image shows an endoscope at gastroesophageal junction and a guidewire in the stomach  Impression: 122 8 seconds of fluoroscopy utilized   Workstation performed: [id number]

## 2021-02-02 NOTE — PROGRESS NOTES
Inter professional consultation note    Consulted by Dr Jagruti Mora of GI regarding suitability of this patient to have a gastrostomy tube placed, possibly at the same time as his scheduled lung biopsy tomorrow  I reviewed the chart  Briefly as he is losing weight rapidly from his esophageal cancer he will require enteral feeding  Gastrostomy placement through the esophageal tumor may be risky  Prior attempts at EGD stenting noted  Since an endoscope could be passed into the stomach I do believe that we would have access for a 5 Maori catheter to insufflate the stomach  I reviewed prior imaging and the stomach is anterior and appears to be accessible  He is a candidate for image guided gastrostomy placement via IR  Unfortunately I would advise against placing this at the same time as the lung biopsy given that a lung biopsy carries up to 20% risk of pneumothorax  Any increased nausea/positive pressure in the abdomen/thorax would increase this risk and if he requires a chest tube will be difficult to manage both procedures at the same time  They can however be performed in close proximity  I have placed an order for image guided gastrostomy  The patient may require anesthesia or sedation, this can be assessed at the time of lung biopsy      Total time 20 minutes

## 2021-02-03 ENCOUNTER — HOSPITAL ENCOUNTER (OUTPATIENT)
Dept: RADIOLOGY | Facility: HOSPITAL | Age: 66
Discharge: HOME | End: 2021-02-03
Attending: RADIOLOGY | Admitting: RADIOLOGY
Payer: MEDICARE

## 2021-02-03 ENCOUNTER — TELEPHONE (OUTPATIENT)
Dept: HEMATOLOGY ONCOLOGY | Facility: CLINIC | Age: 66
End: 2021-02-03

## 2021-02-03 ENCOUNTER — TELEPHONE (OUTPATIENT)
Dept: NUTRITION | Facility: CLINIC | Age: 66
End: 2021-02-03

## 2021-02-03 ENCOUNTER — TELEPHONE (OUTPATIENT)
Dept: GASTROENTEROLOGY | Facility: CLINIC | Age: 66
End: 2021-02-03

## 2021-02-03 VITALS
DIASTOLIC BLOOD PRESSURE: 85 MMHG | TEMPERATURE: 98.5 F | SYSTOLIC BLOOD PRESSURE: 138 MMHG | RESPIRATION RATE: 18 BRPM | HEART RATE: 81 BPM | OXYGEN SATURATION: 98 %

## 2021-02-03 DIAGNOSIS — C15.5 MALIGNANT NEOPLASM OF LOWER THIRD OF ESOPHAGUS (HCC): ICD-10-CM

## 2021-02-03 DIAGNOSIS — J98.4 CAVITATING MASS IN LEFT UPPER LUNG LOBE: Primary | ICD-10-CM

## 2021-02-03 DIAGNOSIS — R91.1 LUNG NODULE: ICD-10-CM

## 2021-02-03 PROCEDURE — 71250 CT THORAX DX C-: CPT

## 2021-02-03 NOTE — TELEPHONE ENCOUNTER
Patient wife called to advise the biopsy scheduled to be done today was not due to the spot on the lung being gone

## 2021-02-03 NOTE — TELEPHONE ENCOUNTER
Patients GI provider:  Dr Carlson Sensing    Number to return call: [phone]  Reason for call: Pt wife calling re ir peg placement and instructions on how to access feeding tub after placement     Scheduled procedure/appointment date if applicable: 4/5/47

## 2021-02-03 NOTE — H&P
Interventional Radiology  History and Physical 2/3/2021     Derrick Hood   1955   [id number]    Assessment/Plan:  58-year-old male with history of gastroesophageal cancer was found to have a cavitating left upper lobe lung mass concerning for fungal infection  Patient is referred for biopsy of the cavitating lung mass  Problem List Items Addressed This Visit        Digestive    Malignant neoplasm of lower third of esophagus ([facility] [address] )    Relevant Orders    IR biopsy lung       Other    Cavitating mass in left upper lung lobe - Primary      Other Visit Diagnoses     Lung nodule        Relevant Orders    IR biopsy lung             Subjective:     Patient ID: Derrick Hood is a 77 y o  male  History of Present Illness  Patient with history of gastroesophageal cancer was found to have a cavitating left upper lobe lung mass concerning for fungal infection  Patient is referred for biopsy of the cavitating lung mass  Review of Systems   Constitutional: Negative for chills and fever           Past Medical History:   Diagnosis Date    Anxiety     Cancer ([facility] [address] ) 08/2019    metastatic gastroesophageal cancer    Dehydration     Depression     Esophageal cancer (HCC)     Fatigue     History of chemotherapy     currently started 8/2019    History of DVT (deep vein thrombosis)     Hypertension     currently is normal and may not need medication    Lung infection     chemo currently on hold because of the infection 12/22/2020    Nausea     Pneumonia     x2    Port-A-Cath in place     right    Psychiatric disorder     Recovering alcoholic ([facility] [address] )     sober since 1990    Varicose veins of left lower extremity         Past Surgical History:   Procedure Laterality Date    APPENDECTOMY      COLONOSCOPY      IR PORT PLACEMENT  7/31/2019    UPPER GASTROINTESTINAL ENDOSCOPY          Social History     Tobacco Use   Smoking Status Former Smoker    Packs/day: 2 00    Years: 15 00    Pack years: 30 00    Types: Cigars    Quit date: 5/15/1998    Years since quittin 7   Smokeless Tobacco Former User    Quit date: 2006        Social History     Substance and Sexual Activity   Alcohol Use Not Currently    Frequency: Never    Drinks per session: Patient refused    Binge frequency: Never    Comment: quit 30 yrs ago        Social History     Substance and Sexual Activity   Drug Use Yes    Types: Marijuana    Comment: couple x week        Allergies   Allergen Reactions    Bee Venom Anaphylaxis     Throat swelling    Shellfish-Derived Products      Develops GOUT       Current Outpatient Medications   Medication Sig Dispense Refill    amiodarone 200 mg tablet TAKE ONE TABLET BY MOUTH EVERY DAY WITH BREAKFAST (Patient taking differently: 200 mg every morning ) 30 tablet 2    ammonium lactate (LAC-HYDRIN) 12 % cream Apply topically as needed for dry skin On hands and feet 385 g 2    apixaban (Eliquis) 5 mg Take 1 tablet (5 mg total) by mouth 2 (two) times a day 60 tablet 0    diazepam (VALIUM) 5 mg tablet Take 1 tablet (5 mg total) by mouth 2 (two) times a day (Patient taking differently: Take 10 mg by mouth daily at bedtime ) 60 tablet 5    hydrochlorothiazide (HYDRODIURIL) 12 5 mg tablet Take one tablet daily as needed for SBP> 160/90 30 tablet 1    pantoprazole (PROTONIX) 40 mg tablet Take 1 tablet (40 mg total) by mouth 2 (two) times a day 60 tablet 0    venlafaxine (EFFEXOR-XR) 150 mg 24 hr capsule Take 1 capsule (150 mg total) by mouth daily 30 capsule 0    ondansetron (ZOFRAN) 4 mg tablet Take 2 tablets (8 mg total) by mouth every 8 (eight) hours as needed for nausea or vomiting 75 tablet 0     No current facility-administered medications for this encounter  Objective:    Vitals:    21 0935   BP: 138/85   Pulse: 81   Resp: 18   Temp: 98 5 °F (36 9 °C)   TempSrc: Tympanic   SpO2: 98%        Physical Exam  Constitutional:       Appearance: Normal appearance     Pulmonary: Effort: Pulmonary effort is normal            No results found for: BNP   Lab Results   Component Value Date    WBC 13 59 (H) 01/14/2021    HGB 9 2 (L) 01/14/2021    HCT 32 7 (L) 01/14/2021    MCV 91 01/14/2021     (H) 01/14/2021     Lab Results   Component Value Date    INR 1 09 01/13/2021    INR 1 92 (H) 09/18/2020    PROTIME 14 0 01/13/2021    PROTIME 21 7 (H) 09/18/2020     Lab Results   Component Value Date    PTT 40 (H) 09/18/2020         I have personally reviewed pertinent imaging and laboratory results  Code Status: Prior  Advance Directive and Living Will:      Power of :    POLST:      This text is generated with voice recognition software  There may be translation, syntax,  or grammatical errors  If you have any questions, please contact the dictating provider

## 2021-02-03 NOTE — SEDATION DOCUMENTATION
After initial scan, there was no mass to biopsy  It has resolved  Pt to APU, then may be discharged

## 2021-02-03 NOTE — BRIEF OP NOTE (RAD/CATH)
INTERVENTIONAL RADIOLOGY PROCEDURE NOTE    Date: 2/3/2021    Procedure: CT CHEST WO CONTRAST    Preoperative diagnosis:   1  Cavitating mass in left upper lung lobe    2  Malignant neoplasm of lower third of esophagus (HCC)    3  Lung nodule         Postoperative diagnosis: Same  Surgeon: Latasha Trujillo MD     Assistant: None  No qualified resident was available  Blood loss: None    Specimens: None     Findings: The TANNER lung lesion showed near complete resolution  Lung biopsy was not performed  Complications: None immediate      Anesthesia: none

## 2021-02-03 NOTE — H&P (VIEW-ONLY)
Interventional Radiology  History and Physical 2/3/2021     Twyla Shirley   1955   [id number]    Assessment/Plan:  69-year-old male with history of gastroesophageal cancer was found to have a cavitating left upper lobe lung mass concerning for fungal infection  Patient is referred for biopsy of the cavitating lung mass  Problem List Items Addressed This Visit        Digestive    Malignant neoplasm of lower third of esophagus ([facility] [address] )    Relevant Orders    IR biopsy lung       Other    Cavitating mass in left upper lung lobe - Primary      Other Visit Diagnoses     Lung nodule        Relevant Orders    IR biopsy lung             Subjective:     Patient ID: Twyla Shirley is a 77 y o  male  History of Present Illness  Patient with history of gastroesophageal cancer was found to have a cavitating left upper lobe lung mass concerning for fungal infection  Patient is referred for biopsy of the cavitating lung mass  Review of Systems   Constitutional: Negative for chills and fever           Past Medical History:   Diagnosis Date    Anxiety     Cancer ([facility] [address] ) 08/2019    metastatic gastroesophageal cancer    Dehydration     Depression     Esophageal cancer (HCC)     Fatigue     History of chemotherapy     currently started 8/2019    History of DVT (deep vein thrombosis)     Hypertension     currently is normal and may not need medication    Lung infection     chemo currently on hold because of the infection 12/22/2020    Nausea     Pneumonia     x2    Port-A-Cath in place     right    Psychiatric disorder     Recovering alcoholic ([facility] [address] )     sober since 1990    Varicose veins of left lower extremity         Past Surgical History:   Procedure Laterality Date    APPENDECTOMY      COLONOSCOPY      IR PORT PLACEMENT  7/31/2019    UPPER GASTROINTESTINAL ENDOSCOPY          Social History     Tobacco Use   Smoking Status Former Smoker    Packs/day: 2 00    Years: 15 00    Pack years: 30 00    Types: Cigars    Quit date: 5/15/1998    Years since quittin 7   Smokeless Tobacco Former User    Quit date: 2006        Social History     Substance and Sexual Activity   Alcohol Use Not Currently    Frequency: Never    Drinks per session: Patient refused    Binge frequency: Never    Comment: quit 30 yrs ago        Social History     Substance and Sexual Activity   Drug Use Yes    Types: Marijuana    Comment: couple x week        Allergies   Allergen Reactions    Bee Venom Anaphylaxis     Throat swelling    Shellfish-Derived Products      Develops GOUT       Current Outpatient Medications   Medication Sig Dispense Refill    amiodarone 200 mg tablet TAKE ONE TABLET BY MOUTH EVERY DAY WITH BREAKFAST (Patient taking differently: 200 mg every morning ) 30 tablet 2    ammonium lactate (LAC-HYDRIN) 12 % cream Apply topically as needed for dry skin On hands and feet 385 g 2    apixaban (Eliquis) 5 mg Take 1 tablet (5 mg total) by mouth 2 (two) times a day 60 tablet 0    diazepam (VALIUM) 5 mg tablet Take 1 tablet (5 mg total) by mouth 2 (two) times a day (Patient taking differently: Take 10 mg by mouth daily at bedtime ) 60 tablet 5    hydrochlorothiazide (HYDRODIURIL) 12 5 mg tablet Take one tablet daily as needed for SBP> 160/90 30 tablet 1    pantoprazole (PROTONIX) 40 mg tablet Take 1 tablet (40 mg total) by mouth 2 (two) times a day 60 tablet 0    venlafaxine (EFFEXOR-XR) 150 mg 24 hr capsule Take 1 capsule (150 mg total) by mouth daily 30 capsule 0    ondansetron (ZOFRAN) 4 mg tablet Take 2 tablets (8 mg total) by mouth every 8 (eight) hours as needed for nausea or vomiting 75 tablet 0     No current facility-administered medications for this encounter  Objective:    Vitals:    21 0935   BP: 138/85   Pulse: 81   Resp: 18   Temp: 98 5 °F (36 9 °C)   TempSrc: Tympanic   SpO2: 98%        Physical Exam  Constitutional:       Appearance: Normal appearance     Pulmonary: Effort: Pulmonary effort is normal            No results found for: BNP   Lab Results   Component Value Date    WBC 13 59 (H) 01/14/2021    HGB 9 2 (L) 01/14/2021    HCT 32 7 (L) 01/14/2021    MCV 91 01/14/2021     (H) 01/14/2021     Lab Results   Component Value Date    INR 1 09 01/13/2021    INR 1 92 (H) 09/18/2020    PROTIME 14 0 01/13/2021    PROTIME 21 7 (H) 09/18/2020     Lab Results   Component Value Date    PTT 40 (H) 09/18/2020         I have personally reviewed pertinent imaging and laboratory results  Code Status: Prior  Advance Directive and Living Will:      Power of :    POLST:      This text is generated with voice recognition software  There may be translation, syntax,  or grammatical errors  If you have any questions, please contact the dictating provider

## 2021-02-03 NOTE — TELEPHONE ENCOUNTER
Received call from wife stating that pt has lost ~30# since the end of 2020 (CBW: 152#)  She stated that pt cannot have a stent placed and is now considering a having a feeding tube placed with GI ( Dr Talmadge Castleman) on Friday, 21, to help with wt regain and to supplement po intake  Wife unaware of type of feeding tube being placed (per IR notes, pt to have gastrostomy tube placed)  He is currently on a pureed diet and eating fair but has regurgitation when eating and gets frustrated so is not eating as well as he knows he should  Reviewed the typical process for coordinating TF orders: feeding tube is placed, RD makes formula recommendations, doctor signs orders and sends to DME company, DME coordinates insurance benefits and delivery of supplies, homecare nurse teaches pt in home to carry out TF orders  Wife's questions were answered  Discussed that Nicolette Duque did not answer the phone for his last scheduled RD follow up on 20  RD phone follow up schedule for Monday at 11am   Wife confirmed appt with pt during our call today  TF rec's to follow  Wife to call GI to ask for home care order and to let them know that this RD will be sending over TF rec's on Monday after visit        Oncology Nutrition-Estimated Needs      Nutrition from 10/15/2020 in [facility]   Weight type used  Actual weight   Weight in pounds (lbs) used for estimated needs  166 lbs   Weight in kilograms (kg) used for estimated needs  75 5 kg   Energy needs based on 35 kcal/k kcal   Energy needs based on 40 kcal/k kcal   Protein needs based on 1 5 g/kg  113 g   Protein needs based on 2 g/kg  151 g   Fluid needs based on 25 mL/kg  1875 mL   Fluid needs in ounces  63 oz   Fluid needs based on 30 mL/kg  2250 mL   Fluid needs in ounces  76 oz        Tentative TF recommendations for sole source of nutrition:     In the event enteral nutrition is needed for Ry's sole source of nutrition, and pt has PEG access placed, recommend:   o Initiation: Recommend initiating bolus TF with a 120 mL bolus 6 times per day (every 2-3 hours, as tolerated)  Once tolerance is established, begin to slowly advance bolus volume (as tolerated) until TF goal is achieved  o TF Goal: 1 container (237 mL) 6 times per day of TwoCal HN via PEG (push syringe or gravity syringe method to administer boluses; 1 container to infuse over ~15-20 minutes)  o Water Flushin mL free water flush before and after each bolus (total of 1200 mL/day in flushes; flushes spread throughout the day; will need to adjust flushes prn for IV fluids)  o Enteral Nutrition goal to provide: 1422 mL volume (6 containers/day), 2844 kcal, 119 grams protein, 995 mL free water, 2195 mL total water daily

## 2021-02-04 ENCOUNTER — TELEPHONE (OUTPATIENT)
Dept: PULMONOLOGY | Facility: MEDICAL CENTER | Age: 66
End: 2021-02-04

## 2021-02-04 ENCOUNTER — TELEPHONE (OUTPATIENT)
Dept: SURGERY | Facility: HOSPITAL | Age: 66
End: 2021-02-04

## 2021-02-04 NOTE — TELEPHONE ENCOUNTER
Spoke with patient's wife Fabiola Kunz, to reschedule appointment for patient  Various appointments were offered by Dr Nora Gant   Patient's wife refused appointments and stated she does not want to follow up with pulmonologist

## 2021-02-05 ENCOUNTER — ANESTHESIA EVENT (OUTPATIENT)
Dept: NON INVASIVE DIAGNOSTICS | Facility: HOSPITAL | Age: 66
End: 2021-02-05

## 2021-02-05 ENCOUNTER — TELEPHONE (OUTPATIENT)
Dept: FAMILY MEDICINE CLINIC | Facility: CLINIC | Age: 66
End: 2021-02-05

## 2021-02-05 ENCOUNTER — TELEPHONE (OUTPATIENT)
Dept: HEMATOLOGY ONCOLOGY | Facility: CLINIC | Age: 66
End: 2021-02-05

## 2021-02-05 ENCOUNTER — HOSPITAL ENCOUNTER (OUTPATIENT)
Dept: NON INVASIVE DIAGNOSTICS | Facility: HOSPITAL | Age: 66
Discharge: HOME | End: 2021-02-05
Attending: RADIOLOGY | Admitting: RADIOLOGY
Payer: MEDICARE

## 2021-02-05 ENCOUNTER — ANESTHESIA (OUTPATIENT)
Dept: NON INVASIVE DIAGNOSTICS | Facility: HOSPITAL | Age: 66
End: 2021-02-05

## 2021-02-05 VITALS
HEART RATE: 71 BPM | SYSTOLIC BLOOD PRESSURE: 165 MMHG | OXYGEN SATURATION: 99 % | TEMPERATURE: 98.6 F | RESPIRATION RATE: 20 BRPM | DIASTOLIC BLOOD PRESSURE: 95 MMHG

## 2021-02-05 VITALS — HEART RATE: 91 BPM

## 2021-02-05 DIAGNOSIS — C15.5 MALIGNANT NEOPLASM OF LOWER THIRD OF ESOPHAGUS (HCC): ICD-10-CM

## 2021-02-05 DIAGNOSIS — R13.19 OTHER DYSPHAGIA: Primary | Chronic | ICD-10-CM

## 2021-02-05 DIAGNOSIS — R13.19 OTHER DYSPHAGIA: Chronic | ICD-10-CM

## 2021-02-05 DIAGNOSIS — C16.0 GASTROESOPHAGEAL CANCER (HCC): ICD-10-CM

## 2021-02-05 PROCEDURE — C1769 GUIDE WIRE: HCPCS

## 2021-02-05 PROCEDURE — 49440 PLACE GASTROSTOMY TUBE PERC: CPT

## 2021-02-05 PROCEDURE — 49440 PLACE GASTROSTOMY TUBE PERC: CPT | Performed by: RADIOLOGY

## 2021-02-05 RX ORDER — CEFAZOLIN SODIUM 1 G/50ML
1000 SOLUTION INTRAVENOUS ONCE
Status: COMPLETED | OUTPATIENT
Start: 2021-02-05 | End: 2021-02-05

## 2021-02-05 RX ORDER — PROPOFOL 10 MG/ML
INJECTION, EMULSION INTRAVENOUS CONTINUOUS PRN
Status: DISCONTINUED | OUTPATIENT
Start: 2021-02-05 | End: 2021-02-05

## 2021-02-05 RX ORDER — LIDOCAINE HYDROCHLORIDE 10 MG/ML
INJECTION, SOLUTION EPIDURAL; INFILTRATION; INTRACAUDAL; PERINEURAL AS NEEDED
Status: DISCONTINUED | OUTPATIENT
Start: 2021-02-05 | End: 2021-02-05

## 2021-02-05 RX ORDER — FENTANYL CITRATE 50 UG/ML
INJECTION, SOLUTION INTRAMUSCULAR; INTRAVENOUS AS NEEDED
Status: DISCONTINUED | OUTPATIENT
Start: 2021-02-05 | End: 2021-02-05

## 2021-02-05 RX ORDER — LIDOCAINE WITH 8.4% SOD BICARB 0.9%(10ML)
SYRINGE (ML) INJECTION CODE/TRAUMA/SEDATION MEDICATION
Status: COMPLETED | OUTPATIENT
Start: 2021-02-05 | End: 2021-02-05

## 2021-02-05 RX ORDER — PROPOFOL 10 MG/ML
INJECTION, EMULSION INTRAVENOUS AS NEEDED
Status: DISCONTINUED | OUTPATIENT
Start: 2021-02-05 | End: 2021-02-05

## 2021-02-05 RX ORDER — SODIUM CHLORIDE 9 MG/ML
75 INJECTION, SOLUTION INTRAVENOUS CONTINUOUS
Status: CANCELLED | OUTPATIENT
Start: 2021-02-05

## 2021-02-05 RX ORDER — SODIUM CHLORIDE 9 MG/ML
INJECTION, SOLUTION INTRAVENOUS CONTINUOUS PRN
Status: DISCONTINUED | OUTPATIENT
Start: 2021-02-05 | End: 2021-02-05

## 2021-02-05 RX ADMIN — GLUCAGON HYDROCHLORIDE 1 MG: KIT at 12:03

## 2021-02-05 RX ADMIN — Medication 8 ML: at 12:23

## 2021-02-05 RX ADMIN — CEFAZOLIN SODIUM 1000 MG: 1 SOLUTION INTRAVENOUS at 11:31

## 2021-02-05 RX ADMIN — PROPOFOL 120 MCG/KG/MIN: 10 INJECTION, EMULSION INTRAVENOUS at 11:37

## 2021-02-05 RX ADMIN — PROPOFOL 30 MG: 10 INJECTION, EMULSION INTRAVENOUS at 11:44

## 2021-02-05 RX ADMIN — LIDOCAINE HYDROCHLORIDE 50 MG: 10 INJECTION, SOLUTION EPIDURAL; INFILTRATION; INTRACAUDAL; PERINEURAL at 11:37

## 2021-02-05 RX ADMIN — FENTANYL CITRATE 25 MCG: 50 INJECTION, SOLUTION INTRAMUSCULAR; INTRAVENOUS at 11:51

## 2021-02-05 RX ADMIN — SODIUM CHLORIDE: 9 INJECTION, SOLUTION INTRAVENOUS at 11:27

## 2021-02-05 RX ADMIN — FENTANYL CITRATE 25 MCG: 50 INJECTION, SOLUTION INTRAMUSCULAR; INTRAVENOUS at 11:44

## 2021-02-05 RX ADMIN — FENTANYL CITRATE 50 MCG: 50 INJECTION, SOLUTION INTRAMUSCULAR; INTRAVENOUS at 11:37

## 2021-02-05 RX ADMIN — PROPOFOL 30 MG: 10 INJECTION, EMULSION INTRAVENOUS at 12:11

## 2021-02-05 RX ADMIN — PROPOFOL 50 MG: 10 INJECTION, EMULSION INTRAVENOUS at 11:37

## 2021-02-05 RX ADMIN — PROPOFOL 30 MG: 10 INJECTION, EMULSION INTRAVENOUS at 12:17

## 2021-02-05 NOTE — TELEPHONE ENCOUNTER
Spoke to patient's wife -patient is in IR for gastrostomy tube placement  She called to inquire about instruction and support following placement  Scheduled with Mckayla Olivarez -nutritionist on Monday for phone consult  I called and left a message for Mila Damon to return call to discuss  Home health referral?   How will patient receive nutrition until evaluated by nutritionist on Monday? Patient's wife reports he is only tolerating a minimal amount by mouth

## 2021-02-05 NOTE — INTERVAL H&P NOTE
Patient arrived to IR for percutaneous gastrostomy placement    The procedure and risks were discussed with the patient  All questions were answered  Informed consent was obtained  H & P reviewed after examining the patient and I find no changes in the patient condition since the H & P has been written  /84   Pulse 84   Temp (!) 97 3 °F (36 3 °C) (Temporal)   Resp 18   SpO2 99%     Patient re-evaluated   Accept as history and physical     Cherry Killian, DO/February 5, 2021/10:33 AM

## 2021-02-05 NOTE — TELEPHONE ENCOUNTER
IR told the patient's wife they are a procedural unit and do not provide education/ support  She said nutrition is scheduled on Monday  I am waiting for return call to discuss  I see nutrition documentation from 2/3/21- I will try to fill out form according to this information  I do not think case management is involved for out patient  Can you place [facility] order? I am unsure how to fill out  Since it is Friday afternoon I do not want the patient without referral   Thanks

## 2021-02-05 NOTE — TELEPHONE ENCOUNTER
They should instruct her on at least the basics in hospital  And connect with [facility] for home follow up  We can have Ketty reach out Monday to help coordinate   I'm not sure what else we can do

## 2021-02-05 NOTE — SEDATION DOCUMENTATION
Procedure ended, 16 fr gtube placed, tacks in place  Gauze dressing covering  Pt to PACU then APU for 2 hours  Report given to Shanon Gonzalez, RN inAPU

## 2021-02-05 NOTE — DISCHARGE INSTRUCTIONS
Feeding Tube Use & Care     This handout explains how to properly care for feeding tubes and prevent problems like   irritation, infection, or clogging  Basic Tips for Using a Feeding Tube      During feedings and when giving medications through the tube, sit up or position yourself so   your shoulders are higher than your stomach  Do not lie flat on your back  Sitting upright and   having your shoulders higher than your stomach can help reduce the risk for food accidentally   getting into the lungs (aspiration) and stomach acid and food backing up from the stomach   (reflux)   Store unopened formula for tube feeding at room temperature  Do not let it freeze   Opened cans or containers of formula should be labeled with the date that it was opened,   covered and stored in the refrigerator  Proper storage will reduce the risk for contamination   and prevent spoilage  Throw away all unused formula from opened cans or containers after 24   hours   If the formula is stored in the refrigerator, allow the formula to stand at room temperature   for 15 to 20 minutes before a feeding  Very cold liquids may upset the stomach   Always wash your hands with mild soap and warm water before touching the feeding   equipment, the formula, or giving medications   Keep the tube and the area around the tube clean   Always flush the feeding tube with water before and after each feeding, and before and after   administering medications  Flushing the tube keeps it clean and prevents the tube from   clogging   Call your health care team if you have diarrhea, constipation, nausea, vomiting, or skin   irritation  How to Clean Around Your Feeding Tube     Follow these directions every day to clean around your feeding tube:     1  Wash your hands with mild soap and water and dry them with a clean towel  2  Wet a soft, clean cloth or gauze with warm, soapy water       3  Gently wipe the skin around the tube with the cloth  Also clean the base of the tube  4  Carefully clean the skin under the bumper with the cloth  Do not pull on the feeding   tube  5  Look for redness, swelling, bleeding, or leakage around the tube  If you notice any of   these things, report them to your health care team immediately  Do not wait another   day  6  Rinse the area you cleaned with clear, warm water  (It is also safe to rinse off in the   shower )     7  Pat the area dry with another clean, soft cloth  8  Apply a protective skin barrier or antibacterial ointment to the area around tube if   you have been told to do so by your health care team      9  For PEG tubes or G-Tubes Only - Gently push the base of the tube against the skin and   twist the tube in a [place]  This step keeps the tube from sticking to the inside of the   stomach  Never twist a J-Tube or Jejunostomy tube  10  When you are finished, wash your hands again with mild soap and water and dry   them with a clean towel  How to Flush Your Feeding Tube     To keep your tube clean and unclogged, you must flush it with warm water before and after   feedings, and before and after medications are administered  Flushes also help you stay   hydrated  If you are not currently using your feeding tube for feedings or medications, you will   need to flush your feeding tube with 60 mL water at least once a day to keep the feeding tube   clean and working  Use the following steps to flush your feeding tube before and after feedings  (Instructions for   flushing your tube when administering medications are covered in the next section )     1  Wash your hands with mild soap and water and dry them with a clean towel  2  Fill a clean container with warm water  3  Place the tip of a large feeding syringe in the water  4  Draw 60 milliliters (mL) of water into the syringe       5  Pinch or bend the end of the feeding tube to keep the contents from leaking out  6  Open the cap on the feeding tube  7  Insert the tip of the syringe into the feeding tube  8  Unbend the tube to allow the water to flow in      9  Gently and slowly push the plunger of the syringe down  10  When the syringe is empty, pinch or bend tube to keep contents from leaking out  Then remove the syringe from the tube and close the cap on the feeding tube  11  Tape the tube to your stomach with medical paper tape  12  Wash your hands with mild soap and water and dry them with a clean towel  How to Administer Medications Through a Feeding   Tube     General Tips      Check with your health care team before you take any medication through a feeding tube  Some medications should not be crushed, and a different prescription or form of the drug may   be needed for use in a feeding tube   Do not mix medications together  Give each medication separately through the feeding tube   and flush the tube with 30 milliliters (mL) of warm water before and after each medication   Solid medications must be crushed before given through your feeding tube  Thoroughly crush   the medication and dissolve it in 60 mL of water (see step 2 in the instructions below)  Never   use tube feeding formula to dissolve a medication    Some medications should not be crushed, and a different prescription or form of   the drug may be needed for use in a feeding tube    Do not use the feeding tube for medications that should not be crushed (such as   time-release medications)   If you need to take medications at the same time as your tube feeding, give the medications   half-way through the feeding  Make sure your hands are clean before administering   medications  Remember to never mix medications with tube feeding formula            Step-by-Step Instructions     Follow these directions for taking medications through a feeding tube:     1  Prepare each liquid medication, and put it in a reachable but safe place  2  Separately prepare each medication that needs to be crushed before it is   administered  Crush the medication and place it in 60 milliliters (mL) of warm water for   5 minutes before giving it through your feeding tube  Make sure the medication is   thoroughly dissolved in the water before giving it  3  In a clean container, put enough warm water to flush the tube before and after each   medicine is given  You will need about ¼ cup of water for each medication you give   yourself  4  To flush the feeding tube:     a  Pull the plunger out of your feeding syringe  b  [place] Porfirio or pinch the end of the feeding tube to prevent the contents from   leaking out      c  Open the cap on the feeding tube  d  Put the tip of the syringe into the tube  e  Aminah  the feeding syringe with 30 mL of warm water  Erica Lebec the feeding tube and let the water run into your feeding tube  5  After the tube is flushed, pour the liquid or crushed-and-diluted medication into the   feeding syringe  6  Hold the syringe straight up and let the medication run into the feeding tube  7  Repeat step 4 to flush your tube again  8  Repeat steps 2 through 7 for each medication, giving water, then the medication,   then more water  9  Remove the feeding syringe and close the feeding tube cap  How to Care for Your PEG Tube     AMBULATORY CARE:   A percutaneous endoscopic gastrostomy (PEG) tube is a plastic tube that is put into your stomach through your skin  PEG tubes are most often used to give you food or liquids if you are not able to eat or drink  Medical formula, medicines, and water can be given through a PEG tube  After your procedure you may resume your regular diet  Start with clear liquids and advance as tolerated  Small sips of flat soda will help with nausea   Your tube can be used immediately  Contact Interventional Radiology at [phone] [place] PATIENTS: Contact Interventional Radiology at [phone]) Darrell Gomez PATIENTS: Contact Interventional Radiology at [phone]) if any of the following occur:  · You have severe abdominal pain  · You have persistent nausea or vomiting  · Blood or tube feeding fluid leaks from the PEG tube site  · Your PEG tube is shorter than it was when it was put in    · Your PEG tube comes out  · You have discomfort or pain around your PEG tube site  · The skin around your PEG tube is red, swollen, or draining pus  · Your T tacks do not fall off  T tacks should fall off within four weeks of the peg tube placement  How to use your PEG tube: Your healthcare provider will tell you when and how often to use your PEG tube for feedings  How to care for the skin around your PEG tube:   · Do not remove the T tacks they will fall off in 3 weeks time, they hold your PEG tube in place when you first get it  Leave clean bandages over the tube area for the first 24 hours after the tube is put in  You may not need to use bandages after 24 hours if the skin around the tube looks dry  Ask when you can shower or bathe  · Routine skin care:    ¨ Clean the skin around your tube 1 to 2 times each day  Ask your healthcare provider what you should use to clean your skin  Check for redness and swelling in the area where the tube goes into your body  Check for fluid draining from your stoma (the hole where the tube was put in)  ¨ Keep the skin around your PEG tube dry  This will help prevent skin irritation and infection  Deep Sedation   WHAT YOU NEED TO KNOW:   Deep sedation is medicine given during procedures or treatments to keep you asleep and comfortable  It will also prevent you from remembering the procedure or treatment  You cannot be easily woken up during deep sedation, and you may need help to breathe   You may feel tired, weak, or unsteady on your feet after you get sedation  You may also have trouble concentrating or short-term memory loss  These symptoms should go away in 24 hours or less  DISCHARGE INSTRUCTIONS:   Call 911 or have someone else call for any of the following:   · You have sudden trouble breathing  · You cannot be woken  Seek care immediately if:   · You have a severe headache or dizziness  · Your heart is beating faster than usual     Contact your healthcare provider if:   · You have a fever  · You have nausea or are vomiting for more than 8 hours after the procedure  · Your skin is itchy, swollen, or you have a rash  · You have questions or concerns about your condition or care  Self-care:   · Have someone stay with you for 24 hours  This person can drive you to errands and help you do things around the house  This person can also watch for problems  · Rest and do quiet activities for 24 hours  Do not exercise, ride a bike, or play sports  Stand up slowly to prevent dizziness and falls  Take short walks around the house with another person  Slowly return to your usual activities the next day  · Do not drive or use dangerous machines or tools for 24 hours  You may injure yourself or others  Examples include a lawnmower, saw, or drill  Do not return to work for 24 hours if you use dangerous machines or tools for work  · Do not make important decisions for 24 hours  For example, do not sign important papers or invest money  · Drink liquids as directed  Liquids help flush the sedation medicine out of your body  Ask how much liquid to drink each day and which liquids are best for you  · Eat small, frequent meals to prevent nausea and vomiting  Start with clear liquids such as juice or broth  If you do not vomit after clear liquids, you can eat your usual foods  · Do not drink alcohol or take medicines that make you drowsy    This includes medicines that help you sleep and anxiety medicines  Ask your healthcare provider if it is safe for you to take pain medicine  Follow up with your healthcare provider as directed:  Write down your questions so you remember to ask them during your visits  © Copyright [address] Information is for End User's use only and may not be sold, redistributed or otherwise used for commercial purposes  All illustrations and images included in CareNotes® are the copyrighted property of A D A M , Inc  or [facility] Xuan Olivier   The above information is an  only  It is not intended as medical advice for individual conditions or treatments  Talk to your doctor, nurse or pharmacist before following any medical regimen to see if it is safe and effective for you

## 2021-02-05 NOTE — TELEPHONE ENCOUNTER
Spoke to [address] regarding feeding tube supplies  Order form completed and sent to Nasreen Jimenez office for Dr Vernon Mcmahon to sign / fax  Patient's wife is aware we are trying to assist in setting up VNA but may not be able to get coverage until Monday  [facility] not [facility] patient's area  [facility] patient's area  Contacted [facility]  we visit and left message to return call  Tiger text sent to Dr Ritesh Cortes for recommendation

## 2021-02-05 NOTE — TELEPHONE ENCOUNTER
Ghanshyam Mensah from [place] called to update patient's phone number   Patient's  new contact number is [phone]

## 2021-02-05 NOTE — ANESTHESIA PREPROCEDURE EVALUATION
Procedure:  IR GASTROSTOMY TUBE PLACEMENT    Relevant Problems   CARDIO   (+) Essential hypertension   (+) PAF (paroxysmal atrial fibrillation) (HCC)      GI/HEPATIC   (+) Gastroesophageal cancer (HCC)   (+) Malignant neoplasm of lower third of esophagus (HCC)   (+) Other dysphagia      HEMATOLOGY   (+) Immunocompromised patient ([facility] [address] )      NEURO/PSYCH   (+) Anxiety   (+) Major depression   (+) OCD (obsessive compulsive disorder)   (+) PTSD (post-traumatic stress disorder)      PULMONARY   (+) Aspiration pneumonia (HCC)   (+) Centrilobular emphysema (HCC)   (+) Pneumonia of left lung due to infectious organism      LEFT VENTRICLE:  The ventricle was mildly dilated  Systolic function was moderately to markedly reduced by Teichholz  Ejection fraction was estimated in the range of 35 % to 40 % to be 37 %  There was moderate diffuse hypokinesis  Features were consistent with a pseudonormal left ventricular filling pattern, with concomitant abnormal relaxation and increased filling pressure (grade 2 diastolic dysfunction)      LEFT ATRIUM:  The atrium was mildly dilated      MITRAL VALVE:  There was mild annular calcification  There was trace regurgitation      TRICUSPID VALVE:  There was mild regurgitation      PULMONIC VALVE:  There was mild regurgitation      IVC, HEPATIC VEINS:  The inferior vena cava was dilated  Physical Exam    Airway    Mallampati score: II  TM Distance: >3 FB  Neck ROM: full     Dental   lower dentures,     Cardiovascular  Cardiovascular exam normal    Pulmonary  Pulmonary exam normal     Other Findings        Anesthesia Plan  ASA Score- 4     Anesthesia Type- IV sedation with anesthesia with ASA Monitors  Additional Monitors:   Airway Plan:           Plan Factors-    Chart reviewed  EKG reviewed  Imaging results reviewed  Existing labs reviewed  Patient summary reviewed  Patient is not a current smoker           Induction-     Postoperative Plan-     Informed Consent- Anesthetic plan and risks discussed with patient  I personally reviewed this patient with the CRNA  Discussed and agreed on the Anesthesia Plan with the JHON Nixon

## 2021-02-05 NOTE — PERIOPERATIVE NURSING NOTE
Pt d/c to home at this time w/ Wife [facility]  Via: Wheelchair  Pt left with all belongings  Iv was D/C intact with dry sterile dressing  Encouraged to keep follow up appointments, Verbalized understanding  D/C instructions reviewed and explained  Verbalized understanding

## 2021-02-05 NOTE — TELEPHONE ENCOUNTER
IR nurses should show them how to use after placement and how to flush  If he is not being admitted, will need outpatient nutrition recs for tube feed recommendations so these can be ordered and will need to be set up with VNA and home health to order supplies and feeds  Normally this is all done at the hospital with case mangement when we place PEGs  It looks like patient also called his PCP who recommended VNA as well, it looks like maybe he was going to put the order in? We can too if needed

## 2021-02-05 NOTE — BRIEF OP NOTE (RAD/CATH)
INTERVENTIONAL RADIOLOGY PROCEDURE NOTE    Date: 2/5/2021    Procedure: IR GASTROSTOMY TUBE PLACEMENT    Preoperative diagnosis:   1  Other dysphagia    2  Malignant neoplasm of lower third of esophagus (HCC)         Postoperative diagnosis: Same  Surgeon: Nidia Dodson DO     Assistant: None  No qualified resident was available  Blood loss: minimal    Specimens: none     Findings: 12 [place] balloon retained gastrostomy placed    Complications: None immediate      Anesthesia: local and MAC sedation

## 2021-02-05 NOTE — TELEPHONE ENCOUNTER
Didier Romero from [facility] is calling for follow up on patient's g-tube today  She is questioning if patient is to start tube flushes and tube feed this weekend? Will forward to Dr Peggy William RN  Please call patient with further instructions  Call back # [phone] (G)

## 2021-02-05 NOTE — TELEPHONE ENCOUNTER
Discussed with Dr Chanell Ortiz -patient should continue pre feeding tube diet  Try to tolerate po intake to maintain hydration /nutrition until VNA can be arranged to follow up with patient  Spoke to patient's wife  She is aware we will follow up with VNA on Monday  She will follow attached recommendations  If unable to tolerate po fluids for hydration go to ED for evaluation

## 2021-02-05 NOTE — TELEPHONE ENCOUNTER
Patient's wife is in the office asking if the doctor will please call her in regards to her  getting put on a feeding tube  He will be going home today but the patient would like to get at home health care  So someone can help them learn how to use the feeding tube  [facility] told her to asking the PCP for help  Thank you

## 2021-02-05 NOTE — ANESTHESIA POSTPROCEDURE EVALUATION
Post-Op Assessment Note    CV Status:  Stable  Pain Score: 0    Pain management: adequate     Mental Status:  Sleepy and arousable   Hydration Status:  Stable   PONV Controlled:  Controlled   Airway Patency:  Patent and adequate      Post Op Vitals Reviewed: Yes      Staff: CRNA         No complications documented      BP      Temp     Pulse     Resp      SpO2

## 2021-02-05 NOTE — TELEPHONE ENCOUNTER
PT HAD A GASTROSTOMY TUBE PLACED,NEEDS ASSISTANCE    PLEASE ORDER [facility] NURSE [facility]

## 2021-02-05 NOTE — TELEPHONE ENCOUNTER
Returned a call to pt and spoke to his wife  She is waiting to hear form her contact at [facility] does not provide service to their area  Pt does live in [place] and I changed that in demographics and [address] was listed  Wife will call back on Mon if [address] services not able to service them  Wife knows how to flush tube but needs assistance with how to do the feedings

## 2021-02-05 NOTE — PERIOPERATIVE NURSING NOTE
Patient received from PACU  Patient reports no pain  Drg is Clean, dry, and intact  Vital Signs are stable

## 2021-02-08 ENCOUNTER — NUTRITION (OUTPATIENT)
Dept: NUTRITION | Facility: CLINIC | Age: 66
End: 2021-02-08

## 2021-02-08 ENCOUNTER — TELEPHONE (OUTPATIENT)
Dept: NUTRITION | Facility: CLINIC | Age: 66
End: 2021-02-08

## 2021-02-08 ENCOUNTER — TELEPHONE (OUTPATIENT)
Dept: GASTROENTEROLOGY | Facility: AMBULARY SURGERY CENTER | Age: 66
End: 2021-02-08

## 2021-02-08 ENCOUNTER — TELEPHONE (OUTPATIENT)
Dept: NUTRITION | Facility: HOSPITAL | Age: 66
End: 2021-02-08

## 2021-02-08 DIAGNOSIS — C16.0 GASTROESOPHAGEAL CANCER (HCC): Primary | ICD-10-CM

## 2021-02-08 DIAGNOSIS — Z71.3 NUTRITIONAL COUNSELING: Primary | ICD-10-CM

## 2021-02-08 NOTE — PATIENT INSTRUCTIONS
Nutrition Rx & Recommendations:  · Diet: Full Liquids as tolerated OR per physician  · Follow proper oral care; Try baking soda/salt water rinse recipe (mix 3/4 tsp salt + 1 tsp baking soda + 1 qt water; rinse with plain water after using) in Eating Hints book (pg 18)  Brush your teeth before/after meals & before bed  · Weigh yourself regularly  If you notice weight loss, make an effort to increase your daily food/calorie intake  If you continue to notice loss after these efforts, reach out to your dietitian to establish a plan to stabilize weight  · Daily Fluid Goal: 60-70 oz per day  · Choose fluids with calories: juice, smoothies, milk, chocolate milk, protein shakes  · Tube Feeding Plan:  · Initiation: Recommend initiating bolus tube feeding (TF) with a 120 mL bolus 5 times per day (every 2-3 hours, as tolerated)  Once tolerance is established, begin to slowly advance bolus volume (as tolerated) until TF goal is achieved  · TF Goal: 1 container (237 mL) 5 times per day of TwoCal HN via G-tube (push syringe or gravity syringe method to administer boluses; 1 container to infuse over ~15-20 minutes)  · All formula must be room temperature before putting it into your feeding tube  · Any unused formula has to be refrigerated and discarded after 48 hrs  · You may use Boost VHC in your feeding tube until your TwoCal arrives in the mail  · Water Flushing:   · You must always flush with water before and after anything you put in your feeding tube  · Total fluid intake to be 60-70 oz per day (this includes your flushing, formula, and anything you drink by mouth)  · Each carton of TwoCal contains 5 5 oz of water  · If you are drinking plenty of fluids by mouth, the minimum flush you should do is 30 mL water before a feeding and 60 mL water after a feeding      · If you are not drinking anything by mouth flush with 100 mL water flush before and after each bolus feeding (total of 1000 mL/day in flushes; will need to adjust flushes prn for IV fluids and oral intake)  · Enteral Nutrition goal to provide: 1185 mL volume (5 containers/day), 2370 kcal, 99 grams protein, 830 mL free water, 1830 mL total water daily (depending on oral intake)  · Call [facility] when you have 10 days left of TF supplies: [phone], option 3 (customer support)  [facility] should be calling your to coordinate delivery of supplies and formula  You can call them to check on your order  · Call me at [phone] with any intolerance to your tube feeding (nausea, vomiting, cramping, diarrhea, coughing, etc  That is associated with your tube feeding)      Follow Up Plan: 2/10/21 at 11am for a phone follow up   Recommend Referral to Other Providers: Dr Sugey Cornell to help coordinate TF orders with [facility] company

## 2021-02-08 NOTE — TELEPHONE ENCOUNTER
Received VM from [phone]  Venus [place]) on Friday 2/5/21 at 1350  VM described patient was receiving G-tube placement on that date and guidance was requested  The phone number that Jas Mahmood called was the B OP MNT office ([phone]), in which no one is present on Fridays    Return call was made to follow up on patient needs  No answer at [phone]  Per chart review it appears that GAVI Mendoza is following patient, has been in contact with wife about TF needs, and is to see patient in her office today, 2/8/21

## 2021-02-08 NOTE — TELEPHONE ENCOUNTER
Spoke to [address]   She spoke to Rivera Levi nutritionist Lynn  Plan to substitute with Boost VHC (patient has in his home) until 2 miguel HN can be delivered from supply company  See today's note regarding nutrition's detail of plan of care

## 2021-02-08 NOTE — TELEPHONE ENCOUNTER
----- Message from Tramaine Zarate RD sent at 2/8/2021 12:17 PM EST -----  Wang Patel,-Pt states a homecare nurse is coming tomorrow to teach him how to use his tube  He is aware that if they do not come to call your office  I also verbally taught him what to do today -He has not gotten a call from [email or web address] yet to coordinate TF supplies and formula delivery  I gave him their number and told him to check his voice messages -I recommend changing his TF order to 5 cartons per day of TwoCal HN via G-tube  Please see my note for specifics  Let me know if you have any questions or concerns! Paige Whitman

## 2021-02-08 NOTE — TELEPHONE ENCOUNTER
Spoke with Patience Donaldson -nutritionist   She will follow up with patient regarding nutrition recommendations and VNA  If any further assistance is needed she will contact us

## 2021-02-08 NOTE — PROGRESS NOTES
Outpatient Oncology Nutrition Consult  Type of Consult:  Follow Up  Care Location: Telephone Call Harrison Alcocer goes by Lexx Chaparro; after visit, called wife to review plan  Nutrition Assessment:    Oncology Diagnosis & Treatments: Stage IV metastatic gastroesophageal cancer      -Undergoing chemotherapy in the palliative setting (5FU) - currently on hold  Oncology History   Malignant neoplasm of lower third of esophagus ([facility] [address] )   7/26/2019 Initial Diagnosis    Malignant neoplasm of lower third of esophagus ([facility] [address] )     8/1/2019 -  Chemotherapy    fluorouracil (ADRUCIL) injection 750 mg, 400 mg/m2 = 750 mg, Intravenous, Once, 32 of 38 cycles  Administration: 750 mg (8/1/2019), 750 mg (8/13/2019), 750 mg (8/27/2019), 750 mg (9/10/2019), 750 mg (9/24/2019), 750 mg (10/8/2019), 795 mg (10/22/2019), 795 mg (11/5/2019), 795 mg (11/19/2019), 795 mg (12/3/2019), 795 mg (12/17/2019), 795 mg (12/31/2019), 795 mg (1/14/2020), 795 mg (1/28/2020), 795 mg (2/11/2020), 795 mg (2/25/2020), 795 mg (3/10/2020), 795 mg (3/24/2020), 795 mg (4/7/2020), 795 mg (5/13/2020), 795 mg (5/27/2020), 795 mg (6/10/2020), 795 mg (6/24/2020), 795 mg (7/22/2020), 795 mg (8/5/2020), 790 mg (8/19/2020), 750 mg (10/7/2020), 795 mg (4/29/2020), 750 mg (10/21/2020), 750 mg (11/4/2020), 750 mg (11/18/2020)  pegfilgrastim (NEULASTA ONPRO) subcutaneous injection kit 6 mg, 6 mg, Subcutaneous, Once, 2 of 2 cycles  Administration: 6 mg (8/3/2019), 6 mg (8/15/2019)  leucovorin 750 mg in dextrose 5 % 250 mL IVPB, 748 mg, Intravenous, Once, 21 of 21 cycles  Administration: 750 mg (8/1/2019), 750 mg (8/13/2019), 750 mg (8/27/2019), 750 mg (9/10/2019), 750 mg (9/24/2019), 750 mg (10/8/2019), 800 mg (10/22/2019), 800 mg (11/5/2019), 800 mg (11/19/2019), 800 mg (12/3/2019), 800 mg (12/17/2019), 800 mg (12/31/2019), 800 mg (1/14/2020), 800 mg (1/28/2020), 800 mg (2/11/2020), 800 mg (2/25/2020), 800 mg (3/10/2020), 800 mg (3/24/2020), 800 mg (4/7/2020), 800 mg (5/13/2020), 800 mg (4/29/2020)  oxaliplatin (ELOXATIN) 158 95 mg in dextrose 5 % 250 mL chemo infusion, 85 mg/m2 = 158 95 mg, Intravenous, Once, 6 of 6 cycles  Administration: 158 95 mg (8/1/2019), 158 95 mg (8/13/2019), 158 95 mg (8/27/2019), 158 95 mg (9/10/2019), 158 95 mg (9/24/2019), 158 95 mg (10/8/2019)     Gastroesophageal cancer ([facility] [address] )   7/26/2019 Initial Diagnosis    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[facility] [address] )     8/1/2019 -  Chemotherapy    fluorouracil (ADRUCIL) injection 750 mg, 400 mg/m2 = 750 mg, Intravenous, Once, 32 of 38 cycles  Administration: 750 mg (8/1/2019), 750 mg (8/13/2019), 750 mg (8/27/2019), 750 mg (9/10/2019), 750 mg (9/24/2019), 750 mg (10/8/2019), 795 mg (10/22/2019), 795 mg (11/5/2019), 795 mg (11/19/2019), 795 mg (12/3/2019), 795 mg (12/17/2019), 795 mg (12/31/2019), 795 mg (1/14/2020), 795 mg (1/28/2020), 795 mg (2/11/2020), 795 mg (2/25/2020), 795 mg (3/10/2020), 795 mg (3/24/2020), 795 mg (4/7/2020), 795 mg (5/13/2020), 795 mg (5/27/2020), 795 mg (6/10/2020), 795 mg (6/24/2020), 795 mg (7/22/2020), 795 mg (8/5/2020), 790 mg (8/19/2020), 750 mg (10/7/2020), 795 mg (4/29/2020), 750 mg (10/21/2020), 750 mg (11/4/2020), 750 mg (11/18/2020)  pegfilgrastim (NEULASTA ONPRO) subcutaneous injection kit 6 mg, 6 mg, Subcutaneous, Once, 2 of 2 cycles  Administration: 6 mg (8/3/2019), 6 mg (8/15/2019)  leucovorin 750 mg in dextrose 5 % 250 mL IVPB, 748 mg, Intravenous, Once, 21 of 21 cycles  Administration: 750 mg (8/1/2019), 750 mg (8/13/2019), 750 mg (8/27/2019), 750 mg (9/10/2019), 750 mg (9/24/2019), 750 mg (10/8/2019), 800 mg (10/22/2019), 800 mg (11/5/2019), 800 mg (11/19/2019), 800 mg (12/3/2019), 800 mg (12/17/2019), 800 mg (12/31/2019), 800 mg (1/14/2020), 800 mg (1/28/2020), 800 mg (2/11/2020), 800 mg (2/25/2020), 800 mg (3/10/2020), 800 mg (3/24/2020), 800 mg (4/7/2020), 800 mg (5/13/2020), 800 mg (4/29/2020)  oxaliplatin (ELOXATIN) 158 95 mg in dextrose 5 % 250 mL chemo infusion, 85 mg/m2 = 158 95 mg, Intravenous, Once, 6 of 6 cycles  Administration: 158 95 mg (8/1/2019), 158 95 mg (8/13/2019), 158 95 mg (8/27/2019), 158 95 mg (9/10/2019), 158 95 mg (9/24/2019), 158 95 mg (10/8/2019)       Patient Active Problem List   Diagnosis    PTSD (post-traumatic stress disorder)    Essential hypertension    Major depression    OCD (obsessive compulsive disorder)    Anxiety    Malignant neoplasm of lower third of esophagus ([facility] )    Gastroesophageal cancer ([facility] )    Cancer-related pain    Chemotherapy-induced nausea    Severe protein-calorie malnutrition ([place] : less than 60% of standard weight) ([facility] )    Malignant ascites    Recovering alcoholic (HCC)    Other fatigue    Other dysphagia    Nausea    Dehydration    Skin fissures    Goals of care, counseling/discussion    Palliative care patient    Other insomnia    Counseling on health promotion and disease prevention    Elevated PSA    Memory loss    Regurgitation of stomach contents    Falls    Superficial thrombophlebitis of lower extremity    Pneumonia of left lung due to infectious organism    PAF (paroxysmal atrial fibrillation) (HCC)    Moderate protein-calorie malnutrition (HCC)    Dilated cardiomyopathy (HCC)    Urge urinary incontinence    Aspiration pneumonia (HCC)    Chronic combined systolic and diastolic CHF (congestive heart failure) ([facility] [address] )    Port-A-Cath in place    Hematuria    Hyponatremia    Hypoglycemia    Severe protein-calorie malnutrition (HCC)    Cavitating mass in left upper lung lobe    Immunocompromised patient ([facility] [address] )    Centrilobular emphysema (HCC)    Leukocytosis     Past Medical History:   Diagnosis Date    Anxiety     Cancer ([facility] ) 08/2019    metastatic gastroesophageal cancer    Dehydration     Depression     Esophageal cancer (HCC)     Fatigue     History of chemotherapy     currently started 8/2019    History of DVT (deep vein thrombosis)     Hypertension     currently is normal and may not need medication    Lung infection     chemo currently on hold because of the infection 12/22/2020    Nausea     Pneumonia     x2    Port-A-Cath in place     right    Psychiatric disorder     Recovering alcoholic ([address] )     sober since 1990    Varicose veins of left lower extremity      Past Surgical History:   Procedure Laterality Date    APPENDECTOMY      COLONOSCOPY      IR GASTROSTOMY TUBE PLACEMENT  2/5/2021    IR PORT PLACEMENT  7/31/2019    UPPER GASTROINTESTINAL ENDOSCOPY         Review of Medications:   Vitamins, Supplements and Herbals: No, pt denies taking supplements     Current Outpatient Medications:     amiodarone 200 mg tablet, TAKE ONE TABLET BY MOUTH EVERY DAY WITH BREAKFAST (Patient taking differently: 200 mg every morning ), Disp: 30 tablet, Rfl: 2    ammonium lactate (LAC-HYDRIN) 12 % cream, Apply topically as needed for dry skin On hands and feet, Disp: 385 g, Rfl: 2    apixaban (Eliquis) 5 mg, Take 1 tablet (5 mg total) by mouth 2 (two) times a day, Disp: 60 tablet, Rfl: 0    diazepam (VALIUM) 5 mg tablet, Take 1 tablet (5 mg total) by mouth 2 (two) times a day (Patient taking differently: Take 10 mg by mouth daily at bedtime ), Disp: 60 tablet, Rfl: 5    hydrochlorothiazide (HYDRODIURIL) 12 5 mg tablet, Take one tablet daily as needed for SBP> 160/90, Disp: 30 tablet, Rfl: 1    ondansetron (ZOFRAN) 4 mg tablet, Take 2 tablets (8 mg total) by mouth every 8 (eight) hours as needed for nausea or vomiting, Disp: 75 tablet, Rfl: 0    pantoprazole (PROTONIX) 40 mg tablet, Take 1 tablet (40 mg total) by mouth 2 (two) times a day, Disp: 60 tablet, Rfl: 0    venlafaxine (EFFEXOR-XR) 150 mg 24 hr capsule, Take 1 capsule (150 mg total) by mouth daily, Disp: 30 capsule, Rfl: 0    Most Recent Lab Results:   Lab Results   Component Value Date    WBC 13 59 (H) 01/14/2021    IRON 13 (L) 09/01/2020    TIBC 341 09/01/2020    FERRITIN 175 09/01/2020    ALT 18 01/13/2021    AST 13 01/13/2021 ALB 2 6 (L) 01/13/2021    SODIUM 136 01/14/2021    SODIUM 136 01/13/2021    K 3 6 01/14/2021    K 3 9 01/13/2021     01/14/2021    BUN 13 01/14/2021    BUN 15 01/13/2021    CREATININE 0 96 01/14/2021    CREATININE 1 06 01/13/2021    EGFR 81 01/14/2021    PHOS 3 0 09/25/2020    PHOS 3 0 09/24/2020    POCGLU 104 09/25/2020    GLUF 99 01/14/2021    GLUF 122 (H) 01/13/2021    GLUC 99 01/14/2021    CALCIUM 9 2 01/14/2021    FOLATE 10 6 09/01/2020    MG 1 7 09/25/2020     Anthropometric Measurements:   Height: 69"  Ht Readings from Last 1 Encounters:   01/26/21 5' 11" (1 803 m)     Wt Readings from Last 20 Encounters:   01/26/21 65 5 kg (144 lb 8 oz)   01/13/21 68 5 kg (151 lb 0 2 oz)   01/07/21 66 2 kg (146 lb)   12/30/20 65 8 kg (145 lb)   12/22/20 68 kg (150 lb)   12/11/20 69 4 kg (153 lb)   11/19/20 72 4 kg (159 lb 9 8 oz)   11/18/20 71 1 kg (156 lb 12 oz)   11/17/20 68 9 kg (152 lb)   11/05/20 74 4 kg (164 lb)   11/04/20 70 4 kg (155 lb 3 3 oz)   10/21/20 72 8 kg (160 lb 7 9 oz)   10/07/20 74 4 kg (164 lb 0 4 oz)   09/29/20 71 7 kg (158 lb)   09/25/20 74 2 kg (163 lb 9 3 oz)   09/18/20 77 1 kg (170 lb)   09/08/20 76 9 kg (169 lb 8 5 oz)   08/19/20 81 6 kg (179 lb 14 3 oz)   08/18/20 82 3 kg (181 lb 8 oz)   08/11/20 81 2 kg (179 lb)   Estimated body mass index is 20 15 kg/m² as calculated from the following:    Height as of 1/26/21: 5' 11" (1 803 m)  Weight as of 1/26/21: 65 5 kg (144 lb 8 oz)  -Home Wts: (10/15/20) 166#, (2/8/21) 150#, (2/10/21) 148#  -UBW: 198-212#  -Comments: Wife reports that pt has lost ~30# since the end of August 2020      Oncology Nutrition-Anthropometrics      Most Recent Value   Patient age (years):  77 years   Patient (male) height (in):  71 in   Current weight to be used for anthropometric calculations (lbs)  148 lbs   Current weight to be used for anthropometric calculations (kg)  67 kg   BMI:  21 85   IBW male  160 lb   IBW (kg) male  72 7 kg   IBW % (male)  92 5 %   Adjusted BW (male):  157 lbs   % weight change after 1 month:  1 4 %   Weight change after 1 month (lbs)  2 lbs        Nutrition-Focused Physical Findings: n/a due to telephone call       Food/Nutrition-Related History & Client/Social History:    Current Nutrition Impact Symptoms:  [x] Nausea - occasional dry heaves [x] Reduced Appetite - "none" [] Acid Reflux    [x] Vomiting + Regurgitation after eating  -can only tolerate full liquids and min amounts of pureed foods [x] Unintended Wt Loss - wt loss picked up again in August 2020 d/t vomiting with meals [] Malabsorption    [] Diarrhea  [] Unintended Wt Gain  [] Dumping Syndrome    [] Constipation  [] Thick Mucous/Secretions  [] Abdominal Pain    [x] Dysgeusia (Altered Taste) - says foods don't taste the same, "everything tastes like cardboard"  -practicing good oral care [] Xerostomia (Dry Mouth)  [] Gas    [] Dysosmia (Altered Smell)  [] Thrush  [] Difficulty Chewing    [] Oral Mucositis (Sore Mouth)  [x] Fatigue - significant [] Other:   [] Odynophagia  [] Esophagitis  [] Other:    [x] Dysphagia -   -Pt reports esophageal dysphagia and regurgitation has progressively gotten worse over the past couple of months   -Has had VBS and has been seen by ST in the past   -Per GI 2/2/21: "barium swallow shows lower esophageal mass and delayed  esophageal emptying"  -Wife stated 2/3/21 that pt cannot have a stent placed  -S/p PEG placed by IR 2/5/21 (ordered by Dr Bethany Wise) [x] Early Satiety - can only tolerate ~5 oz of a pureed food at a time, no issue with full liquids per pt [] No Problems Eating      Food Allergies: no  Food Intolerances: no   -For Gout, avoids: organ meats, shellfish  Last gout flare was 2 years ago after a liverwurst sandwich  Current Diet: Pureed, Full Liquids and Tube Feeding - mainly soups, ice cream, and liquids      -Limited to 5 oz of pureed consistencies at a time d/t fullness  Can tolerate Full Liquids but not all of the time    Current Nutrition Intake: Increased since last visit (via EN); Decreased since last visit (via po)  Appetite: None  Nutrition Route: PO and G-tube  Meal planning/preparation mainly done by: Self and Spouse/Partner   Oral Care: Brushing BID, Flossing QD, Biotene Mouthwash QD  Activity level: Doing home PT exercises daily  24 Hr Diet Recall: Today so far: nothing, just woke up 1 hr ago  Yesterday: Boost VHC x2 (per pt, however, wife stated he did not drink these po)    Beverages: water (6 oz), Boost VHC (8 oz x2)    -Averaging 22 oz per day (39% of est needs), sometimes a little bit more  Supplements:    Boost Very High Calorie (8 oz, 530 kcal, 22 g pro) - BID     EN Recall:  DME: [employer]  Had TF administration education by [facility]  Access Type: 16F G-tube placed 21 (ordered by GI - Dr Ramone Lehman)  Formula: Savannah Brownie - order for 5 cartons/day sent by [facility]  Pt states he has not gotten his delivery yet so is using Boost VHC instead for now  Method: Bolus  Regimen: 6 oz Boost VHC 3 times per day per pt- tolerating well, feedings took ~15- (per wife pt may have done 8 oz at each feeding, not 6 oz)  Flush: 60 mL free water flush pre/post each feeding (360 mL total)  Additions: n/a  EN providin mL volume (2 25 containers/day), 1193 kcal (50% est needs), 50 grams protein (49% est needs), 360 mL free water, and 720 mL total water (35% est needs)  Per pt report: Total Fluid Intake: 22 oz PO + 24 oz via EN = 46 oz/day (80% est needs)  Total of 4 25 cartons of Boost VHC yesterday = 2253 kcal, 94 grams protein    Est Needs Notes:  Fluids needs for hx of CHF    Oncology Nutrition-Estimated Needs      Nutrition from 2021 in [facility] Nutrition from 10/15/2020 in [facility]   Weight type used  Actual weight  Actual weight   Weight in pounds (lbs) used for estimated needs  150 lbs  166 lbs   Weight in kilograms (kg) used for estimated needs  68 2 kg  75 5 kg   Energy needs based on 35 kcal/k kcal  2641 kcal   Energy needs based on 40 kcal/k kcal  3018 kcal   Protein needs based on 1 5 g/kg  102 g  113 g   Protein needs based on 2 g/kg  136 g  151 g   Fluid needs based on 25 mL/kg  1700 mL  1875 mL   Fluid needs in ounces  57 oz  63 oz   Fluid needs based on 30 mL/kg  2040 mL  2250 mL   Fluid needs in ounces  69 oz  76 oz          Discussion & Intervention:   Abdiaziz Giang was evaluated today for an RD follow up regarding wt loss, poor po intake, enteral nutrition and nutrition impact sx management  Abdiaziz Giang is currently undergoing tx for gastroesophageal cancer (chemotherapy) but his tx is currently on hold at this time  Since last visit, Abdiaziz Giang has started to use his G-tube for nutrition  He received hands on instruction on how to use feeding tube by the home care nurse yesterday  He has not received his order from the [employer] yet so he is using Boost VHC via tube per plan  He is tolerating his TF well  He says he is up to 6 oz volume/feeding TID  He says he also drank 2 Boost VHC yesterday po  I spoke with pts wife after our visit today who reported yesterday's nutrition intake differently: did not take Boost VHC po, only had a small quantity of water po, and took 8 oz Boost VHC TID via G-tube  He has lost 2# per his home scale since Monday  At this time, it appears that pt needs enteral nutrition as his primary source of nutrition, especially due to his declining ability and tolerance for a po diet  Reviewed the importance of wt management throughout the tx process and the role of enteral nutrition and a full liquid diet (as tolerated) in managing wt and overall health  Discussed ways to optimize nutrient intake, suggestions include: sipping on calorie containing beverages, utilizing oral nutrition supplements and adherence to enteral nutrition plan of care    Reviewed 24 hour recall, which revealed an inadequate po intake and enteral intake, and discussed ways to increase kcal, protein, and fluid intakes, suggestions include: advancing TF to goal, adequate flushing and consuming liquids with calories  Also discussed: weight management, indications & use of oral nutrition supplements, proper oral care, adequate hydation & tips to increase overall fluid intake, MNT for: hydration, N/V, esophageal dysphagia, wt loss, poor appetite, enteral nutrition, Feeding Tube Use & Care and individualized enteral nutrition recommendations & plan: (see below)  · Enteral nutrition is needed for Ry's sole source of nutrition, recommend:   · Initiation:   · Until TwoCal Arrives, Use Boost VHC via G-Tube  Starting Today: increase Boost VHC to 8 oz 4 times per day via G-tube  · Once TwoCal arrives: Recommend initiating bolus tube feeding (TF) with a 120 mL bolus 5 times per day (every 2-3 hours, as tolerated)  Once tolerance is established, begin to slowly advance bolus volume (as tolerated) until TF goal is achieved  · TF Goal:   · Boost VHC Goal (until TwoCal arrives in mail): Aim for 5 cartons of Boost VHC per day to meet all of your protein and calorie needs  · Example: 3-4 cartons via G-tube and 1-2 cartons by mouth as tolerated  · Discontinue Boost VHC via G-tube once TwoCal starts  · TwoCal HN Goal: 1 container (237 mL) 5 times per day of TwoCal HN via G-tube (push syringe or gravity syringe method to administer boluses; 1 container to infuse over ~15-20 minutes)  · Water Flushing:   · You must always flush with water before and after anything you put in your feeding tube  · Total fluid intake to be 60-70 oz per day (this includes your flushing, formula, and anything you drink by mouth)  · Each carton of TwoCal contains 5 5 oz of water  · If you are drinking plenty of fluids by mouth, the minimum flush you should do is 30 mL water before a feeding and 60 mL water after a feeding      · If you are not drinking anything by mouth flush with 100 mL water flush before and after each bolus feeding (total of 1000 mL/day in flushes; will need to adjust flushes prn for IV fluids and oral intake)  · Enteral Nutrition goal to provide: 1185 mL volume (5 containers/day), 2370 kcal, 99 grams protein, 830 mL free water, 1830 mL total water daily (depending on oral intake)  Moving forward, Ronald Blackmon will make efforts to consume liquids with calories, monitor his wt, advance his TF to goal slowly over the coming week, use Boost VHC via G-tube until TwoCal arrives, and contact me with any TF intolerance  We will follow up in 1 week  Materials Provided: e-mailed to pt (@ '[email or web address]'): Nutrition Rx & Recommendations  All questions and concerns addressed during todays visit  Ronald Blackmon has RD contact information  Nutrition Diagnosis:  · Inadequate Energy Intake related to physiological causes, disease state and treatment related issues as evidenced by food recall, wt loss and discussion with pt and/or family  · Increased Nutrient Needs (kcal & pro) related to increased demand for nutrients and disease state as evidenced by cancer dx and pt undergoing tx for cancer  · Swallowing Difficulty related to diagnosis/treatment related causes as evidenced by vomiting with pureed or thicker consistencies, only tolerating full liquids  · Patient has clinical indicators (or ASPEN criteria) consistent with severe protein-calorie malnutrition in the context of Chronic Illness as evidenced by >7 5% wt loss in 3 months, >10% wt loss in 6 months and </=75% energy intake vs  Estimated needs for >/=1 month        Monitoring & Evaluation:   Goals:  · adequate nutrition impact symptom management  · pt to meet >/=75% estimated nutrition needs daily  · weight gain of 1-2# per week  · achieve tube feeding goal rate    · Progress Towards Goals: Not Met and New Goal(s) Added    Barriers: None  Readiness to change: action  Comprehension: verbalizes understanding  Expected Compliance: good    Nutrition Rx & Recommendations:  · Diet: Full Liquids as tolerated OR per physician  · Follow proper oral care; Try baking soda/salt water rinse recipe (mix 3/4 tsp salt + 1 tsp baking soda + 1 qt water; rinse with plain water after using) in Eating Hints book (pg 18)  Brush your teeth before/after meals & before bed  · Weigh yourself regularly  If you notice weight loss, make an effort to increase your daily food/calorie intake  If you continue to notice loss after these efforts, reach out to your dietitian to establish a plan to stabilize weight  · Daily Fluid Goal: 60-70 oz per day  · Choose fluids with calories: juice, smoothies, milk, chocolate milk, protein shakes  · Tube Feeding Plan:  · Initiation:   · Until TwoCal Arrives, Use Boost VHC via G-Tube  Starting Today: increase Boost VHC to 8 oz 4 times per day via G-tube  · Once TwoCal arrives: Recommend initiating bolus tube feeding (TF) with a 120 mL bolus 5 times per day (every 2-3 hours, as tolerated)  Once tolerance is established, begin to slowly advance bolus volume (as tolerated) until TF goal is achieved  · TF Goal:   · Boost VHC Goal (until TwoCal arrives in mail): Aim for 5 cartons of Boost VHC per day to meet all of your protein and calorie needs  · Example: 3-4 cartons via G-tube and 1-2 cartons by mouth as tolerated  · Discontinue Boost VHC via G-tube once TwoCal starts  · TwoCal HN Goal: 1 container (237 mL) 5 times per day of TwoCal HN via G-tube (push syringe or gravity syringe method to administer boluses; 1 container to infuse over ~15-20 minutes)  · Water Flushing:   · You must always flush with water before and after anything you put in your feeding tube  · Total fluid intake to be 60-70 oz per day (this includes your flushing, formula, and anything you drink by mouth)  · Each carton of TwoCal contains 5 5 oz of water    · If you are drinking plenty of fluids by mouth, the minimum flush you should do is 30 mL water before a feeding and 60 mL water after a feeding  · If you are not drinking anything by mouth flush with 100 mL water flush before and after each bolus feeding (total of 1000 mL/day in flushes; will need to adjust flushes prn for IV fluids and oral intake)  · Enteral Nutrition goal to provide: 1185 mL volume (5 containers/day), 2370 kcal, 99 grams protein, 830 mL free water, 1830 mL total water daily (depending on oral intake)  · Call [facility] when you have 10 days left of TF supplies: [phone], option 3 (customer support)  [facility] should be calling your to coordinate delivery of supplies and formula  You can call them to check on your order  · Call me at [phone] with any intolerance to your tube feeding (nausea, vomiting, cramping, diarrhea, coughing, etc  That is associated with your tube feeding)      Follow Up Plan: 2/17/21 at 1pm for a phone follow up   Recommend Referral to Other Providers: none at this time

## 2021-02-08 NOTE — Clinical Note
Wang May,  -Pt states a homecare nurse is coming tomorrow to teach him how to use his tube  He is aware that if they do not come to call your office  I also verbally taught him what to do today   -He has not gotten a call from [facility] yet to coordinate TF supplies and formula delivery  I gave him their number and told him to check his voice messages   -I recommend changing his TF order to 5 cartons per day of TwoCal HN via G-tube  Please see my note for specifics  Let me know if you have any questions or concerns!   David Naylor

## 2021-02-08 NOTE — TELEPHONE ENCOUNTER
from [facility] called to inquire about TF plan so that a nurse can make their visit tomorrow to the pts home  · Reviewed that the DME is [employer] and provided their phone number [phone]  · Reviewed TF plan and all teaching done today  · Reviewed TF progression plan  · Reviewed hydration goals for TF and PO   · Discussed that he can use Boost VHC via G-tube until TwoCal shipment arrives  · Requested that nurse still come out tomorrow to show pt what to do using Boost if TwoCal has still not arrived  · E-mailed  Nutrition "Rx & Recommendations" to ''[email or web address]' to ensure continuity of care  Wife then called this RD  Reiterated the above and reviewed what happened during today's assessment  All questions/concerns addressed at this time

## 2021-02-09 ENCOUNTER — PATIENT OUTREACH (OUTPATIENT)
Dept: FAMILY MEDICINE CLINIC | Facility: CLINIC | Age: 66
End: 2021-02-09

## 2021-02-09 ENCOUNTER — TELEPHONE (OUTPATIENT)
Dept: NUTRITION | Facility: CLINIC | Age: 66
End: 2021-02-09

## 2021-02-09 ENCOUNTER — TELEPHONE (OUTPATIENT)
Dept: GASTROENTEROLOGY | Facility: AMBULARY SURGERY CENTER | Age: 66
End: 2021-02-09

## 2021-02-09 ENCOUNTER — TELEPHONE (OUTPATIENT)
Dept: HEMATOLOGY ONCOLOGY | Facility: CLINIC | Age: 66
End: 2021-02-09

## 2021-02-09 NOTE — TELEPHONE ENCOUNTER
Patient states that he would like to resume chemo treatment, states that the infection in his lungs is gone which put it on hold    Best call back [phone]

## 2021-02-09 NOTE — PROGRESS NOTES
Outreach to patient regarding concerns about obtaining VNA services  Chart reviewed  [facility] has started services  Ritika Cash was out to see patient today  Instructions were provided as to how to administer tube feeding  Reviewed upcoming appointments  Wife reports that Ritika Cash will be coming back on Friday  CM contact information provided for future questions or concerns

## 2021-02-09 NOTE — TELEPHONE ENCOUNTER
Dr Luz Estrada will discuss this with the patient in the office  No further recommendations at this time

## 2021-02-09 NOTE — TELEPHONE ENCOUNTER
Received incoming call from patient's wife Herbie Peabody  She was in contact with [address] [place] [address] regarding delivery of supplies  Requesting documentation from provider to support needs of feeding tube supplies for approval of delivery  Spoke to [address] [place] [address]  Requesting office and procedure notes for documentation to support need        Faxed [phone]  2/5/21- IR gastrostomy tube placement   2/2/21 Telemedicine  1/31/21 EGD flouro  12/30/20 EGD  2/3, 2/8/21 Nutrition

## 2021-02-09 NOTE — TELEPHONE ENCOUNTER
Phone call received from wife, Caryle Deems, stating they still have not received supplies from [facility] so she called to find out what is happening with the order  Caryle Deems states that [address] says they are waiting on medical documentation to process the order  Recommended that Caryle Deems call Dr Kurt Echevarria office to have this RD's note and the MD note faxed with new TF rec's from yesterday (5 cartons/day of TwoCal)  Provided GI Office phone number  All questions/concerns addressed at this time

## 2021-02-09 NOTE — TELEPHONE ENCOUNTER
Returned call to patient  He is asking about resuming chemo treatments   Per patient the infection in his lung has "cleared up"  Patient is scheduled to see Dr Ratna Padilla 2/16/21    Reviewed with patient that Dr Ratna Padilla will assess him at that time and future treatment plan can be made  Patient was agreeable and verbalized understanding  Patient did have repeat CT chest last week    Will send to covering RN to update   Patient only requesting a call back if there is further recommendation

## 2021-02-10 ENCOUNTER — NUTRITION (OUTPATIENT)
Dept: NUTRITION | Facility: CLINIC | Age: 66
End: 2021-02-10

## 2021-02-10 ENCOUNTER — PATIENT OUTREACH (OUTPATIENT)
Dept: CASE MANAGEMENT | Facility: HOSPITAL | Age: 66
End: 2021-02-10

## 2021-02-10 DIAGNOSIS — Z71.3 NUTRITIONAL COUNSELING: Primary | ICD-10-CM

## 2021-02-10 NOTE — PATIENT INSTRUCTIONS
Nutrition Rx & Recommendations:  · Diet: Full Liquids as tolerated OR per physician  · Follow proper oral care; Try baking soda/salt water rinse recipe (mix 3/4 tsp salt + 1 tsp baking soda + 1 qt water; rinse with plain water after using) in Eating Hints book (pg 18)  Brush your teeth before/after meals & before bed  · Weigh yourself regularly  If you notice weight loss, make an effort to increase your daily food/calorie intake  If you continue to notice loss after these efforts, reach out to your dietitian to establish a plan to stabilize weight  · Daily Fluid Goal: 60-70 oz per day  · Choose fluids with calories: juice, smoothies, milk, chocolate milk, protein shakes  · Tube Feeding Plan:  · Initiation:   · Until TwoCal Arrives, Use Boost VHC via G-Tube  Starting Today: increase Boost VHC to 8 oz 4 times per day via G-tube  · Once TwoCal arrives: Recommend initiating bolus tube feeding (TF) with a 120 mL bolus 5 times per day (every 2-3 hours, as tolerated)  Once tolerance is established, begin to slowly advance bolus volume (as tolerated) until TF goal is achieved  · TF Goal:   · Boost VHC Goal (until TwoCal arrives in mail): Aim for 5 cartons of Boost VHC per day to meet all of your protein and calorie needs  · Example: 3-4 cartons via G-tube and 1-2 cartons by mouth as tolerated  · Discontinue Boost VHC via G-tube once TwoCal starts  · TwoCal HN Goal: 1 container (237 mL) 5 times per day of TwoCal HN via G-tube (push syringe or gravity syringe method to administer boluses; 1 container to infuse over ~15-20 minutes)  · Water Flushing:   · You must always flush with water before and after anything you put in your feeding tube  · Total fluid intake to be 60-70 oz per day (this includes your flushing, formula, and anything you drink by mouth)  · Each carton of TwoCal contains 5 5 oz of water    · If you are drinking plenty of fluids by mouth, the minimum flush you should do is 30 mL water before a feeding and 60 mL water after a feeding  · If you are not drinking anything by mouth flush with 100 mL water flush before and after each bolus feeding (total of 1000 mL/day in flushes; will need to adjust flushes prn for IV fluids and oral intake)  · Enteral Nutrition goal to provide: 1185 mL volume (5 containers/day), 2370 kcal, 99 grams protein, 830 mL free water, 1830 mL total water daily (depending on oral intake)  · Call [employer] when you have 10 days left of TF supplies: [phone], option 3 (customer support)  [employer] should be calling your to coordinate delivery of supplies and formula  You can call them to check on your order  · Call me at [phone] with any intolerance to your tube feeding (nausea, vomiting, cramping, diarrhea, coughing, etc  That is associated with your tube feeding)      Follow Up Plan: 2/17/21 at 1pm for a phone follow up   Recommend Referral to Other Providers: none at this time

## 2021-02-10 NOTE — PROGRESS NOTES
Outpatient Oncology Nutrition Consult  Type of Consult:  Follow Up  Care Location: Telephone Call  after visit, called wife to discuss and review plan  Brennen Jaquez goes by [employer]  Nutrition Assessment:    Oncology Diagnosis & Treatments: Stage IV metastatic gastroesophageal cancer      -Undergoing chemotherapy in the palliative setting (5FU) - planned to restart tx on 2/24/21  Oncology History   Malignant neoplasm of lower third of esophagus ([facility] [address] )   7/26/2019 Initial Diagnosis    Malignant neoplasm of lower third of esophagus ([facility] [address] )     8/1/2019 -  Chemotherapy    fluorouracil (ADRUCIL) injection 750 mg, 400 mg/m2 = 750 mg, Intravenous, Once, 32 of 38 cycles  Administration: 750 mg (8/1/2019), 750 mg (8/13/2019), 750 mg (8/27/2019), 750 mg (9/10/2019), 750 mg (9/24/2019), 750 mg (10/8/2019), 795 mg (10/22/2019), 795 mg (11/5/2019), 795 mg (11/19/2019), 795 mg (12/3/2019), 795 mg (12/17/2019), 795 mg (12/31/2019), 795 mg (1/14/2020), 795 mg (1/28/2020), 795 mg (2/11/2020), 795 mg (2/25/2020), 795 mg (3/10/2020), 795 mg (3/24/2020), 795 mg (4/7/2020), 795 mg (5/13/2020), 795 mg (5/27/2020), 795 mg (6/10/2020), 795 mg (6/24/2020), 795 mg (7/22/2020), 795 mg (8/5/2020), 790 mg (8/19/2020), 750 mg (10/7/2020), 795 mg (4/29/2020), 750 mg (10/21/2020), 750 mg (11/4/2020), 750 mg (11/18/2020)  pegfilgrastim (NEULASTA ONPRO) subcutaneous injection kit 6 mg, 6 mg, Subcutaneous, Once, 2 of 2 cycles  Administration: 6 mg (8/3/2019), 6 mg (8/15/2019)  leucovorin 750 mg in dextrose 5 % 250 mL IVPB, 748 mg, Intravenous, Once, 21 of 21 cycles  Administration: 750 mg (8/1/2019), 750 mg (8/13/2019), 750 mg (8/27/2019), 750 mg (9/10/2019), 750 mg (9/24/2019), 750 mg (10/8/2019), 800 mg (10/22/2019), 800 mg (11/5/2019), 800 mg (11/19/2019), 800 mg (12/3/2019), 800 mg (12/17/2019), 800 mg (12/31/2019), 800 mg (1/14/2020), 800 mg (1/28/2020), 800 mg (2/11/2020), 800 mg (2/25/2020), 800 mg (3/10/2020), 800 mg (3/24/2020), 800 mg (4/7/2020), 800 mg (5/13/2020), 800 mg (4/29/2020)  oxaliplatin (ELOXATIN) 158 95 mg in dextrose 5 % 250 mL chemo infusion, 85 mg/m2 = 158 95 mg, Intravenous, Once, 6 of 6 cycles  Administration: 158 95 mg (8/1/2019), 158 95 mg (8/13/2019), 158 95 mg (8/27/2019), 158 95 mg (9/10/2019), 158 95 mg (9/24/2019), 158 95 mg (10/8/2019)     Gastroesophageal cancer ([facility] [address] )   7/26/2019 Initial Diagnosis    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[facility] [address] )     8/1/2019 -  Chemotherapy    fluorouracil (ADRUCIL) injection 750 mg, 400 mg/m2 = 750 mg, Intravenous, Once, 32 of 38 cycles  Administration: 750 mg (8/1/2019), 750 mg (8/13/2019), 750 mg (8/27/2019), 750 mg (9/10/2019), 750 mg (9/24/2019), 750 mg (10/8/2019), 795 mg (10/22/2019), 795 mg (11/5/2019), 795 mg (11/19/2019), 795 mg (12/3/2019), 795 mg (12/17/2019), 795 mg (12/31/2019), 795 mg (1/14/2020), 795 mg (1/28/2020), 795 mg (2/11/2020), 795 mg (2/25/2020), 795 mg (3/10/2020), 795 mg (3/24/2020), 795 mg (4/7/2020), 795 mg (5/13/2020), 795 mg (5/27/2020), 795 mg (6/10/2020), 795 mg (6/24/2020), 795 mg (7/22/2020), 795 mg (8/5/2020), 790 mg (8/19/2020), 750 mg (10/7/2020), 795 mg (4/29/2020), 750 mg (10/21/2020), 750 mg (11/4/2020), 750 mg (11/18/2020)  pegfilgrastim (NEULASTA ONPRO) subcutaneous injection kit 6 mg, 6 mg, Subcutaneous, Once, 2 of 2 cycles  Administration: 6 mg (8/3/2019), 6 mg (8/15/2019)  leucovorin 750 mg in dextrose 5 % 250 mL IVPB, 748 mg, Intravenous, Once, 21 of 21 cycles  Administration: 750 mg (8/1/2019), 750 mg (8/13/2019), 750 mg (8/27/2019), 750 mg (9/10/2019), 750 mg (9/24/2019), 750 mg (10/8/2019), 800 mg (10/22/2019), 800 mg (11/5/2019), 800 mg (11/19/2019), 800 mg (12/3/2019), 800 mg (12/17/2019), 800 mg (12/31/2019), 800 mg (1/14/2020), 800 mg (1/28/2020), 800 mg (2/11/2020), 800 mg (2/25/2020), 800 mg (3/10/2020), 800 mg (3/24/2020), 800 mg (4/7/2020), 800 mg (5/13/2020), 800 mg (4/29/2020)  oxaliplatin (ELOXATIN) 158 95 mg in dextrose 5 % 250 mL chemo infusion, 85 mg/m2 = 158 95 mg, Intravenous, Once, 6 of 6 cycles  Administration: 158 95 mg (8/1/2019), 158 95 mg (8/13/2019), 158 95 mg (8/27/2019), 158 95 mg (9/10/2019), 158 95 mg (9/24/2019), 158 95 mg (10/8/2019)       Patient Active Problem List   Diagnosis    PTSD (post-traumatic stress disorder)    Essential hypertension    Major depression    OCD (obsessive compulsive disorder)    Anxiety    Malignant neoplasm of lower third of esophagus (HCC)    [facility]    Cancer-related pain    Chemotherapy-induced nausea    Severe protein-calorie malnutrition (Gudelia Po: less than 60% of standard weight) ([facility] )    Malignant ascites    Recovering alcoholic (HCC)    Other fatigue    Other dysphagia    Nausea    Dehydration    Skin fissures    Goals of care, counseling/discussion    Palliative care patient    Other insomnia    Counseling on health promotion and disease prevention    Elevated PSA    Memory loss    Regurgitation of stomach contents    Falls    Superficial thrombophlebitis of lower extremity    Pneumonia of left lung due to infectious organism    PAF (paroxysmal atrial fibrillation) (HCC)    Moderate protein-calorie malnutrition (HCC)    Dilated cardiomyopathy (HCC)    Urge urinary incontinence    Aspiration pneumonia (HCC)    Chronic combined systolic and diastolic CHF (congestive heart failure) ([facility] )    Port-A-Cath in place    Hematuria    Hyponatremia    Hypoglycemia    Severe protein-calorie malnutrition (HCC)    Cavitating mass in left upper lung lobe    Immunocompromised patient ([facility] [address] )    Centrilobular emphysema (HCC)    Leukocytosis     Past Medical History:   Diagnosis Date    Anxiety     Cancer ([facility] ) 08/2019    metastatic gastroesophageal cancer    Dehydration     Depression     Esophageal cancer (HCC)     Fatigue     History of chemotherapy     currently started 8/2019    History of DVT (deep vein thrombosis)     Hypertension currently is normal and may not need medication    Lung infection     chemo currently on hold because of the infection 12/22/2020    Nausea     Pneumonia     x2    Port-A-Cath in place     right    Psychiatric disorder     Recovering alcoholic ([address] )     sober since 1990    Varicose veins of left lower extremity      Past Surgical History:   Procedure Laterality Date    APPENDECTOMY      COLONOSCOPY      IR GASTROSTOMY TUBE PLACEMENT  2/5/2021    IR PORT PLACEMENT  7/31/2019    UPPER GASTROINTESTINAL ENDOSCOPY         Review of Medications:   Vitamins, Supplements and Herbals: No, pt denies taking supplements     Current Outpatient Medications:     amiodarone 200 mg tablet, TAKE ONE TABLET BY MOUTH EVERY DAY WITH BREAKFAST (Patient taking differently: 200 mg every morning ), Disp: 30 tablet, Rfl: 2    ammonium lactate (LAC-HYDRIN) 12 % cream, Apply topically as needed for dry skin On hands and feet, Disp: 385 g, Rfl: 2    apixaban (Eliquis) 5 mg, Take 1 tablet (5 mg total) by mouth 2 (two) times a day, Disp: 60 tablet, Rfl: 0    diazepam (VALIUM) 5 mg tablet, Take 1 tablet (5 mg total) by mouth 2 (two) times a day (Patient taking differently: Take 10 mg by mouth daily at bedtime ), Disp: 60 tablet, Rfl: 5    hydrochlorothiazide (HYDRODIURIL) 12 5 mg tablet, Take one tablet daily as needed for SBP> 160/90, Disp: 30 tablet, Rfl: 1    ondansetron (ZOFRAN) 4 mg tablet, Take 2 tablets (8 mg total) by mouth every 8 (eight) hours as needed for nausea or vomiting, Disp: 75 tablet, Rfl: 0    pantoprazole (PROTONIX) 40 mg tablet, Take 1 tablet (40 mg total) by mouth 2 (two) times a day, Disp: 60 tablet, Rfl: 0    venlafaxine (EFFEXOR-XR) 150 mg 24 hr capsule, Take 1 capsule (150 mg total) by mouth daily, Disp: 30 capsule, Rfl: 0    Most Recent Lab Results:   Lab Results   Component Value Date    WBC 13 59 (H) 01/14/2021    IRON 13 (L) 09/01/2020    TIBC 341 09/01/2020    FERRITIN 175 09/01/2020    ALT 18 01/13/2021    AST 13 01/13/2021    ALB 2 6 (L) 01/13/2021    SODIUM 136 01/14/2021    SODIUM 136 01/13/2021    K 3 6 01/14/2021    K 3 9 01/13/2021     01/14/2021    BUN 13 01/14/2021    BUN 15 01/13/2021    CREATININE 0 96 01/14/2021    CREATININE 1 06 01/13/2021    EGFR 81 01/14/2021    PHOS 3 0 09/25/2020    PHOS 3 0 09/24/2020    POCGLU 104 09/25/2020    GLUF 99 01/14/2021    GLUF 122 (H) 01/13/2021    GLUC 99 01/14/2021    CALCIUM 9 2 01/14/2021    FOLATE 10 6 09/01/2020    MG 1 7 09/25/2020     Anthropometric Measurements:   Height: 69"  Ht Readings from Last 1 Encounters:   01/26/21 5' 11" (1 803 m)     Wt Readings from Last 20 Encounters:   01/26/21 65 5 kg (144 lb 8 oz)   01/13/21 68 5 kg (151 lb 0 2 oz)   01/07/21 66 2 kg (146 lb)   12/30/20 65 8 kg (145 lb)   12/22/20 68 kg (150 lb)   12/11/20 69 4 kg (153 lb)   11/19/20 72 4 kg (159 lb 9 8 oz)   11/18/20 71 1 kg (156 lb 12 oz)   11/17/20 68 9 kg (152 lb)   11/05/20 74 4 kg (164 lb)   11/04/20 70 4 kg (155 lb 3 3 oz)   10/21/20 72 8 kg (160 lb 7 9 oz)   10/07/20 74 4 kg (164 lb 0 4 oz)   09/29/20 71 7 kg (158 lb)   09/25/20 74 2 kg (163 lb 9 3 oz)   09/18/20 77 1 kg (170 lb)   09/08/20 76 9 kg (169 lb 8 5 oz)   08/19/20 81 6 kg (179 lb 14 3 oz)   08/18/20 82 3 kg (181 lb 8 oz)   08/11/20 81 2 kg (179 lb)   Estimated body mass index is 20 15 kg/m² as calculated from the following:    Height as of 1/26/21: 5' 11" (1 803 m)  Weight as of 1/26/21: 65 5 kg (144 lb 8 oz)  -Home Wts: (10/15/20) 166#, (2/8/21) 150#, (2/10/21) 148#, (2/17/21) 143# (states he was 140# 6 days ago so he gained 3# since then)  -UBW: 198-212#  -Comments: Wife reports that pt has lost ~30# since the end of August 2020      Oncology Nutrition-Anthropometrics      Most Recent Value   Patient age (years):  77 years   Patient (male) height (in):  71 in   Current weight to be used for anthropometric calculations (lbs)  143 lbs   Current weight to be used for anthropometric calculations (kg)  65 kg   BMI:  21 12   IBW male  160 lb   IBW (kg) male  72 7 kg   IBW % (male)  89 4 %   Adjusted BW (male):  156 lbs   % weight change after 1 week:  (!) -3 38 %   Weight change after 1 week (lbs)  -5 lbs   % weight change after 1 month:  (!) -5 4 %   Weight change after 1 month (lbs)  -8 lbs   % weight change after 3 months:  -5 9 %   Weight change after 3 months (lbs)  -9 lbs   % weight change after 6 months:  (!) -21 2 %   Weight change after 6 months (lbs)  -39 lbs        Nutrition-Focused Physical Findings: n/a due to telephone call       Food/Nutrition-Related History & Client/Social History:    Current Nutrition Impact Symptoms:  [x] Nausea - dry heaves in AM sometimes [x] Reduced Appetite - "none" [] Acid Reflux    [x] Vomiting + Regurgitation after eating  -can only tolerate full liquids and min amounts of pureed foods [x] Unintended Wt Loss - wt loss picked up again in August 2020 d/t vomiting with meals [] Malabsorption    [] Diarrhea  [] Unintended Wt Gain  [] Dumping Syndrome    [] Constipation  [] Thick Mucous/Secretions  [] Abdominal Pain    [x] Dysgeusia (Altered Taste) - says foods don't taste the same, "everything tastes like cardboard"  -practicing good oral care [] Xerostomia (Dry Mouth)  [] Gas    [] Dysosmia (Altered Smell)  [] Thrush  [] Difficulty Chewing    [] Oral Mucositis (Sore Mouth)  [x] Fatigue - significant [] Other:   [] Odynophagia  [] Esophagitis  [] Other:    [x] Dysphagia -   -Pt reports esophageal dysphagia and regurgitation has progressively gotten worse over the past couple of months   -Has had VBS and has been seen by ST in the past   -Per GI 2/2/21: "barium swallow shows lower esophageal mass and delayed  esophageal emptying"  -Wife stated 2/3/21 that pt cannot have a stent placed  -S/p PEG placed by IR 2/5/21 (ordered by Dr Romaine Servin)  -Pt says SLP told him to eat as much as he can po, mainly liquids   [x] Early Satiety  [] No Problems Eating      Food Allergies: no  Food Intolerances: no   -For Gout, avoids: organ meats, shellfish  Last gout flare was 2 years ago after a liverwurst sandwich  Current Diet: Pureed, Full Liquids and Tube Feeding - mainly soups, ice cream, and liquids      -Can tolerate Full Liquids but not all of the time  Current Nutrition Intake: Increased since last visit (via EN); Decreased since last visit (via po)  Appetite: None  Nutrition Route: PO and G-tube  Meal planning/preparation mainly done by: Self and Spouse/Partner   Oral Care: Brushing BID, Flossing QD, Biotene Mouthwash QD  Activity level: Doing home PT exercises daily  24 Hr Diet Recall: Today so far: coffee with milk (vomited)  Yesterday: Boost VHC x2 and water, 3 tbsp applesauce (then vomited)    Beverages: water (24 oz - sometimes vomits), Boost VHC (8 oz x2 - takes sips every 10 min, sometimes vomits)    -Averaging 40 oz per day of po fluids  Supplements:    Boost Very High Calorie (8 oz, 530 kcal, 22 g pro) - BID     EN Recall:  DME: [employer]  Access Type: 16F G-tube placed 21 (ordered by  - Dr Abelardo Mijares)  Formula: Manjarrez Glance - order for 5 cartons/day sent by  21  Started using TwoCal late last week  Method: Bolus - wife states push syringe feedings are too difficult for pt to do on his own d/t strength and TwoCal container does not recommend gravity syringe feedings  VNA nurse to help with feedings while wife is at work  Regimen: 8 oz TwoCal QID (1 in AM, 1 in afternoon, 2 in PM spaced out by 2 hrs) - felt too bloated so will go back to 3 cans today  Flush: 60 mL free water flush pre and 120 mL post each feeding (720 mL total)  Additions: n/a  EN providin mL volume (4 cans TwoCal), 1896 kcal, 79 g protein, 664 mL free water, and 1384 mL total water via EN    Per pt report:   Total Fluid Intake: 40 oz PO + 46 oz via EN = 86 oz/day (125% est needs)    Total kcal Yesterday with TwoCal and Boost VHC: 2956 kcal, 123 g protein    Est Needs Notes:  Fluids needs for hx of CHF  Oncology Nutrition-Estimated Needs      Nutrition from 2021 in [place] [address] [facility] Nutrition from 10/15/2020 in [facility]   Weight type used  Actual weight  Actual weight   Weight in pounds (lbs) used for estimated needs  150 lbs  166 lbs   Weight in kilograms (kg) used for estimated needs  68 2 kg  75 5 kg   Energy needs based on 35 kcal/k kcal  2641 kcal   Energy needs based on 40 kcal/k kcal  3018 kcal   Protein needs based on 1 5 g/kg  102 g  113 g   Protein needs based on 2 g/kg  136 g  151 g   Fluid needs based on 25 mL/kg  1700 mL  1875 mL   Fluid needs in ounces  57 oz  63 oz   Fluid needs based on 30 mL/kg  2040 mL  2250 mL   Fluid needs in ounces  69 oz  76 oz          Discussion & Intervention:   Brittani Longo was evaluated today for an RD follow up regarding wt loss, poor po intake, enteral nutrition and nutrition impact sx management  Brittani Longo is currently undergoing tx for gastroesophageal cancer (chemotherapy) but his tx is currently on hold at this time  He is supposed to restart chemo next week  Brittani Longo has lost a total of 5# in the past week (signfiicant); his wt today was 143# and he says he was 140# ~6 days ago  Despite this 5# total wt loss, it appears that he may be starting to gain wt more recently  His po intake has declined and he reports that he typically vomits after all attempts at po intake (all consistencies/textures now)  He says he is drinking 2 Boost VHC/day po, but sometimes he will vomit these as well  He is being followed by a ST at home  He is making progress with his TF and was able to get up to 4 cans of TwoCal/day, but has mainly been at 3 cans/day per wife  Based on pt report, he appears to be hydrated  He denies issues with his bowels    He says that he was too bloated with 4 cans of TwoCal so he plans to decrease to 3 cans again today and slowly progress up from there  His wife reports that the push syringe bolus feeding method is too difficult for pt to do by himself d/t weakness and the TwoCal cans do not recommend the gravity syringe bolus feeding method d/t thickness of formula, therefore, pt goes most of the day without a bolus feeding as his wife works and he does not have anyone to help him with his feedings during the day  Wife stated that the VNA nurse is supposed to start helping him with his feedings while she is at work  Reviewed the importance of wt management throughout the tx process and the role of enteral nutrition and a full liquid diet (as tolerated) in managing wt and overall health  Discussed ways to optimize nutrient intake, suggestions include: sipping on calorie containing beverages, utilizing oral nutrition supplements and adherence to enteral nutrition plan of care  Reviewed 24 hour recall, which revealed an adequate po intake and enteral intake, and discussed ways to increase kcal, protein, and fluid intakes, suggestions include: advancing TF to goal, adequate flushing and consuming liquids with calories  Also discussed: weight management, indications & use of oral nutrition supplements, adequate hydation & tips to increase overall fluid intake, MNT for: hydration, N/V, esophageal dysphagia, wt loss, poor appetite, enteral nutrition, Feeding Tube Use & Care and individualized enteral nutrition recommendations & plan: (see below)  · Enteral nutrition is needed for Ry's sole source of nutrition, recommend:   · 8 oz TwoCal TID today  · 8 oz TwoCal QID tomorrow  · 8 oz TwoCal 5x/day Friday (Final TF Goal)  · You could do 4 oz of TwoCal at a time more frequently if this is tolerated better than a full 8 oz can at once  · Bolus feedings should be every 2-3 hours, as tolerated  · Infuse formula slowly over the course of 15-20 min, as tolerated     · You may add a small amount of water to the formula to help thin it out  · Water Flushing:   · You must always flush with water before and after anything you put in your feeding tube  · Total fluid intake to be 60-70 oz per day (this includes your flushing, formula, and anything you drink by mouth)  · Each carton of TwoCal contains 5 5 oz of water  · If you are drinking plenty of fluids by mouth, the minimum flush you should do is 30 mL water before a feeding and 60 mL water after a feeding  · If you are not drinking anything by mouth flush with 100 mL water flush before and after each bolus feeding (total of 1000 mL/day in flushes; will need to adjust flushes prn for IV fluids and oral intake)  · Enteral Nutrition goal to provide: 1185 mL volume (5 containers/day), 2370 kcal, 99 grams protein, 830 mL free water, 1830 mL total water daily (depending on oral intake)  Moving forward, Gregor Sahu will make efforts to advance his TF to goal slowly over the coming week, use Boost VHC po as tolerated, consume a po diet as tolerated, monitor his wt closely, ensure proper hydration, and contact me with any TF intolerance  We will follow up in 1 week  Materials Provided: e-mailed to pt (@ '[email or web address]'): Nutrition Rx & Recommendations  All questions and concerns addressed during todays visit  Gregor Sahu has RD contact information  Nutrition Diagnosis:  · Inadequate Energy Intake related to physiological causes, disease state and treatment related issues as evidenced by food recall, wt loss and discussion with pt and/or family  · Increased Nutrient Needs (kcal & pro) related to increased demand for nutrients and disease state as evidenced by cancer dx and pt undergoing tx for cancer  · Swallowing Difficulty related to diagnosis/treatment related causes as evidenced by vomiting with all textures and consistencies po    · Patient has clinical indicators (or ASPEN criteria) consistent with severe protein-calorie malnutrition in the context of Chronic Illness as evidenced by >5% wt loss in 1 month, >10% wt loss in 6 months and </=75% energy intake vs  Estimated needs for >/=1 month  Monitoring & Evaluation:   Goals:  · adequate nutrition impact symptom management  · pt to meet >/=75% estimated nutrition needs daily  · weight gain of 1-2# per week  · achieve tube feeding goal rate    · Progress Towards Goals: Progressing and Not Met    Barriers: None  Readiness to change: action  Comprehension: verbalizes understanding  Expected Compliance: good    Nutrition Rx & Recommendations:  · Diet: Full Liquids as tolerated OR per physician  · Follow proper oral care; Try baking soda/salt water rinse recipe (mix 3/4 tsp salt + 1 tsp baking soda + 1 qt water; rinse with plain water after using) in Eating Hints book (pg 18)  Brush your teeth before/after meals & before bed  · Weigh yourself regularly  If you notice weight loss, make an effort to increase your daily food/calorie intake  If you continue to notice loss after these efforts, reach out to your dietitian to establish a plan to stabilize weight  · Daily Fluid Goal: 60-70 oz per day  · Choose fluids with calories: juice, smoothies, milk, chocolate milk, protein shakes  · Tube Feeding Plan:  · 8 oz TwoCal TID today  · 8 oz TwoCal QID tomorrow  · 8 oz TwoCal 5x/day Friday (Final TF Goal)  · You could do 4 oz of TwoCal at a time more frequently if this is tolerated better than a full 8 oz can at once  · Bolus feedings should be every 2-3 hours, as tolerated  · Infuse formula slowly over the course of 15-20 min, as tolerated  · You may add a small amount of water to the formula to help thin it out  · Water Flushing:   · You must always flush with water before and after anything you put in your feeding tube  · Total fluid intake to be 60-70 oz per day (this includes your flushing, formula, and anything you drink by mouth)  · Each carton of TwoCal contains 5 5 oz of water    · If you are drinking plenty of fluids by mouth, the minimum flush you should do is 30 mL water before a feeding and 60 mL water after a feeding  · If you are not drinking anything by mouth flush with 100 mL water flush before and after each bolus feeding (total of 1000 mL/day in flushes; will need to adjust flushes prn for IV fluids and oral intake)  · Enteral Nutrition goal to provide: 1185 mL volume (5 containers/day), 2370 kcal, 99 grams protein, 830 mL free water, 1830 mL total water daily (depending on oral intake)  · Call [facility] when you have 10 days left of TF supplies: [phone], option 3 (customer support)  · Call me at [phone] with any intolerance to your tube feeding (nausea, vomiting, cramping, diarrhea, coughing, etc  That is associated with your tube feeding)      Follow Up Plan: 2/25/21 at 11am for a phone follow up   Recommend Referral to Other Providers: none at this time

## 2021-02-11 NOTE — TELEPHONE ENCOUNTER
I spoke to his wife she stated he isn't in need of palliative care at this time for patient  He just got a feeding tube in and is doing well at this time  She will reach out once we are needed again to schedule

## 2021-02-15 ENCOUNTER — TELEPHONE (OUTPATIENT)
Dept: HEMATOLOGY ONCOLOGY | Facility: HOSPITAL | Age: 66
End: 2021-02-15

## 2021-02-15 ENCOUNTER — TELEPHONE (OUTPATIENT)
Dept: FAMILY MEDICINE CLINIC | Facility: CLINIC | Age: 66
End: 2021-02-15

## 2021-02-15 NOTE — TELEPHONE ENCOUNTER
We got a plan of care to be signed from [facility]    Copy scanned into chart    Original left in blue bin

## 2021-02-15 NOTE — TELEPHONE ENCOUNTER
Patients wife is returning Princess's call for her 's appt to be switched to a virtual, I have changed appt to [employer]

## 2021-02-15 NOTE — TELEPHONE ENCOUNTER
LVM FOR Remyny Joanjeffrey patients wife regarding the office visit 2/16 being changed to virtual or reschedule    Requested a return call regarding same

## 2021-02-16 ENCOUNTER — TELEPHONE (OUTPATIENT)
Dept: HEMATOLOGY ONCOLOGY | Facility: CLINIC | Age: 66
End: 2021-02-16

## 2021-02-16 ENCOUNTER — TELEMEDICINE (OUTPATIENT)
Dept: HEMATOLOGY ONCOLOGY | Facility: CLINIC | Age: 66
End: 2021-02-16
Payer: MEDICARE

## 2021-02-16 ENCOUNTER — PATIENT OUTREACH (OUTPATIENT)
Dept: CASE MANAGEMENT | Facility: HOSPITAL | Age: 66
End: 2021-02-16

## 2021-02-16 DIAGNOSIS — C16.0 MALIGNANT NEOPLASM OF CARDIA OF STOMACH (HCC): Primary | ICD-10-CM

## 2021-02-16 DIAGNOSIS — C16.0 GASTROESOPHAGEAL CANCER (HCC): ICD-10-CM

## 2021-02-16 PROCEDURE — 99214 OFFICE O/P EST MOD 30 MIN: CPT | Performed by: INTERNAL MEDICINE

## 2021-02-16 NOTE — TELEPHONE ENCOUNTER
Patient called stating he has not heard from Dr Franca Daniels for his 3:40pm appt  Contact number was incorrect  Contact number updated in appt desk  Juanita Pierre will retry to contact patient

## 2021-02-16 NOTE — PROGRESS NOTES
LSW will close this referral due to no contact  LSW will remain available for future referrals as needed

## 2021-02-16 NOTE — PROGRESS NOTES
Virtual Regular Visit      Assessment/Plan: This is a pleasant 57-year-old male with a past medical history of metastatic gastroesophageal cancer  He received 4 cycles of modified FOLFOX 6 chemotherapy After which his imaging showed a very nice response  We discontinued the oxaliplatin   He was having some nausea vomiting and side effects of Leucovorin was also discontinued  [facility] is now doing reasonably well on 5 FU   His most recent imaging was actually improved   He did have an area which is suspicious for an infection possibly a fungal infection  He was seen by both Pulmonary Medicine and Infectious Disease and has a biopsy scheduled in the 1st week of February  When his CT scan was done it was discovered that is nodule improved so the intervention radiologist chose not to do a biopsy  At this point I think he was treated with medication for 7 days by the Infectious Disease physician so his infection has resolved  We will get him restarted on chemotherapy  The family will confirm this with our colleagues in Infectious Disease  I will see him back in a month  He will restart his chemotherapy  If he has any questions he will call our office  Reason for visit is metastatic gastroesophageal cancer  Encounter provider Claude Candela, MD    Provider located at [address] [place] [id number]       The patient was identified by name and date of birth  Addis Bergeron was informed that this is a telemedicine visit and that the visit is being conducted through [facility] and patient was informed that this is a secure, HIPAA-compliant platform  He agrees to proceed     My office door was closed  No one else was in the room  He acknowledged consent and understanding of privacy and security of the video platform  The patient has agreed to participate and understands they can discontinue the visit at any time      Patient is aware this is a billable service  Subjective  Jennifer Love is a 77 y o  male  metastatic gastroesophageal cancer  Laura Ludwin HPI     The patient was called for follow-up visit  He was having technical difficulties but I was able to connect via voice with his wife and see the patient on his phone  He was having trouble with his microphone  Regardless from what was discussed he seems to be doing better  Tube feeds have been started any is up to 4 cans a day  He had a CT scan done prior to his planned lung biopsy which revealed resolution of the lesion in his lung so biopsy was not done  He has been afebrile and doing well  We will restart him on chemotherapy at this point  As far symptoms are concerned he is slowly increasing his tube feeds  Denies any nausea or vomiting  Is very interested in resuming chemotherapy as soon as possible  Rest of his 14 point review of systems today was negative      Past Medical History:   Diagnosis Date    Anxiety     Cancer ([facility] [address] ) 08/2019    metastatic gastroesophageal cancer    Dehydration     Depression     Esophageal cancer (HCC)     Fatigue     History of chemotherapy     currently started 8/2019    History of DVT (deep vein thrombosis)     Hypertension     currently is normal and may not need medication    Lung infection     chemo currently on hold because of the infection 12/22/2020    Nausea     Pneumonia     x2    Port-A-Cath in place     right    Psychiatric disorder     Recovering alcoholic ([facility] [address] )     sober since 1990    Varicose veins of left lower extremity        Past Surgical History:   Procedure Laterality Date    APPENDECTOMY      COLONOSCOPY      IR GASTROSTOMY TUBE PLACEMENT  2/5/2021    IR PORT PLACEMENT  7/31/2019    UPPER GASTROINTESTINAL ENDOSCOPY         Current Outpatient Medications   Medication Sig Dispense Refill    amiodarone 200 mg tablet TAKE ONE TABLET BY MOUTH EVERY DAY WITH BREAKFAST (Patient taking differently: 200 mg every morning ) 30 tablet 2    ammonium lactate (LAC-HYDRIN) 12 % cream Apply topically as needed for dry skin On hands and feet 385 g 2    apixaban (Eliquis) 5 mg Take 1 tablet (5 mg total) by mouth 2 (two) times a day 60 tablet 0    diazepam (VALIUM) 5 mg tablet Take 1 tablet (5 mg total) by mouth 2 (two) times a day (Patient taking differently: Take 10 mg by mouth daily at bedtime ) 60 tablet 5    hydrochlorothiazide (HYDRODIURIL) 12 5 mg tablet Take one tablet daily as needed for SBP> 160/90 30 tablet 1    ondansetron (ZOFRAN) 4 mg tablet Take 2 tablets (8 mg total) by mouth every 8 (eight) hours as needed for nausea or vomiting 75 tablet 0    pantoprazole (PROTONIX) 40 mg tablet Take 1 tablet (40 mg total) by mouth 2 (two) times a day 60 tablet 0    venlafaxine (EFFEXOR-XR) 150 mg 24 hr capsule Take 1 capsule (150 mg total) by mouth daily 30 capsule 0     No current facility-administered medications for this visit  Allergies   Allergen Reactions    Bee Venom Anaphylaxis     Throat swelling    Shellfish-Derived Products      Develops GOUT       Review of Systems   All other systems reviewed and are negative  Video Exam    There were no vitals filed for this visit  Physical Exam  Constitutional:       Appearance: He is well-developed  HENT:      Head: Normocephalic and atraumatic  Right Ear: External ear normal       Left Ear: External ear normal       Nose: Nose normal    Eyes:      Conjunctiva/sclera: Conjunctivae normal    Neck:      Musculoskeletal: Normal range of motion  Pulmonary:      Effort: Pulmonary effort is normal    Musculoskeletal: Normal range of motion  Neurological:      Mental Status: He is alert and oriented to person, place, and time  Psychiatric:         Behavior: Behavior normal          Thought Content:  Thought content normal          Judgment: Judgment normal           I spent 25 minutes with patient today in which greater than 50% of the time was spent in counseling/coordination of care regarding Metastatic esophageal cancer      VIRTUAL VISIT DISCLAIMER    Carson Lerner acknowledges that he has consented to an online visit or consultation  He understands that the online visit is based solely on information provided by him, and that, in the absence of a face-to-face physical evaluation by the physician, the diagnosis he receives is both limited and provisional in terms of accuracy and completeness  This is not intended to replace a full medical face-to-face evaluation by the physician  Carson Lerner understands and accepts these terms

## 2021-02-17 ENCOUNTER — NUTRITION (OUTPATIENT)
Dept: NUTRITION | Facility: CLINIC | Age: 66
End: 2021-02-17

## 2021-02-17 DIAGNOSIS — Z71.3 NUTRITIONAL COUNSELING: Primary | ICD-10-CM

## 2021-02-17 RX ORDER — FLUOROURACIL 50 MG/ML
400 INJECTION, SOLUTION INTRAVENOUS ONCE
Status: CANCELLED | OUTPATIENT
Start: 2021-02-24

## 2021-02-17 NOTE — PATIENT INSTRUCTIONS
Nutrition Rx & Recommendations:  · Diet: Full Liquids as tolerated OR per physician  · Follow proper oral care; Try baking soda/salt water rinse recipe (mix 3/4 tsp salt + 1 tsp baking soda + 1 qt water; rinse with plain water after using) in Eating Hints book (pg 18)  Brush your teeth before/after meals & before bed  · Weigh yourself regularly  If you notice weight loss, make an effort to increase your daily food/calorie intake  If you continue to notice loss after these efforts, reach out to your dietitian to establish a plan to stabilize weight  · Daily Fluid Goal: 60-70 oz per day  · Choose fluids with calories: juice, smoothies, milk, chocolate milk, protein shakes  · Tube Feeding Plan:  · 8 oz TwoCal TID today  · 8 oz TwoCal QID tomorrow  · 8 oz TwoCal 5x/day Friday (Final TF Goal)  · You could do 4 oz of TwoCal at a time more frequently if this is tolerated better than a full 8 oz can at once  · Bolus feedings should be every 2-3 hours, as tolerated  · Infuse formula slowly over the course of 15-20 min, as tolerated  · You may add a small amount of water to the formula to help thin it out  · Water Flushing:   · You must always flush with water before and after anything you put in your feeding tube  · Total fluid intake to be 60-70 oz per day (this includes your flushing, formula, and anything you drink by mouth)  · Each carton of TwoCal contains 5 5 oz of water  · If you are drinking plenty of fluids by mouth, the minimum flush you should do is 30 mL water before a feeding and 60 mL water after a feeding  · If you are not drinking anything by mouth flush with 100 mL water flush before and after each bolus feeding (total of 1000 mL/day in flushes; will need to adjust flushes prn for IV fluids and oral intake)      · Enteral Nutrition goal to provide: 1185 mL volume (5 containers/day), 2370 kcal, 99 grams protein, 830 mL free water, 1830 mL total water daily (depending on oral intake)  · [facility] when you have 10 days left of TF supplies: [phone], option 3 (customer support)  · Call me at [phone] with any intolerance to your tube feeding (nausea, vomiting, cramping, diarrhea, coughing, etc  That is associated with your tube feeding)      Follow Up Plan: 2/25/21 at 11am for a phone follow up   Recommend Referral to Other Providers: none at this time

## 2021-02-17 NOTE — PROGRESS NOTES
Initial Anesthesia Post-op Note    Patient: Sara Esteves  Procedure(s) Performed: PRIMARY  SECTION FOR ABRUPTION  Anesthesia type: General    Vital Last Value   Temperature 34.4 °C (93.9 °F) (01/10/17 0740)   Pulse 102 (01/10/17 0730)   Respiratory Rate 18 (01/10/17 0730)   Non-Invasive   Blood Pressure 131/71 (01/10/17 0730)   Arterial  Blood Pressure     Pulse Oximetry 100 % (01/10/17 0730)     Last 24 I/O:   Intake/Output Summary (Last 24 hours) at 01/10/17 0748  Last data filed at 01/10/17 0730   Gross per 24 hour   Intake             1800 ml   Output              800 ml   Net             1000 ml       PATIENT LOCATION: PACU Phase 1  POST-OP VITAL SIGNS: stable  LEVEL OF CONSCIOUSNESS: participates in exam, awake, alert, oriented and answers questions appropriately  RESPIRATORY STATUS: spontaneous ventilation  CARDIOVASCULAR: stable  HYDRATION: euvolemic    PAIN MANAGEMENT: adequately controlled  NAUSEA: None (Nausea and vomiting control satisfactory.)  AIRWAY PATENCY: patent  POST-OP ASSESSMENT: no complications, patient tolerated procedure well with no complications and sufficiently recovered from acute administration of anesthesia effects and able to participate in evaluation  COMPLICATIONS: none  HANDOFF:  Handoff to receiving nurse was performed and questions were answered       Outpatient Oncology Nutrition Consult  Type of Consult: Follow Up  Care Location: Telephone Call - spoke with wife Graeme Mckeon) because pt was not available (did not answer the phone)    Harrison Alcocer goes by [employer]  Nutrition Assessment:    Oncology Diagnosis & Treatments: Stage IV metastatic gastroesophageal cancer      -Undergoing chemotherapy in the palliative setting (5FU) - restarted tx on 2/24/21     Oncology History   Malignant neoplasm of lower third of esophagus ([facility] [facility] )   7/26/2019 Initial Diagnosis    Malignant neoplasm of lower third of esophagus ([facility] [address] )     8/1/2019 -  Chemotherapy    fluorouracil (ADRUCIL) injection 750 mg, 400 mg/m2 = 750 mg, Intravenous, Once, 32 of 38 cycles  Administration: 750 mg (8/1/2019), 750 mg (8/13/2019), 750 mg (8/27/2019), 750 mg (9/10/2019), 750 mg (9/24/2019), 750 mg (10/8/2019), 795 mg (10/22/2019), 795 mg (11/5/2019), 795 mg (11/19/2019), 795 mg (12/3/2019), 795 mg (12/17/2019), 795 mg (12/31/2019), 795 mg (1/14/2020), 795 mg (1/28/2020), 795 mg (2/11/2020), 795 mg (2/25/2020), 795 mg (3/10/2020), 795 mg (3/24/2020), 795 mg (4/7/2020), 795 mg (5/13/2020), 795 mg (5/27/2020), 795 mg (6/10/2020), 795 mg (6/24/2020), 795 mg (7/22/2020), 795 mg (8/5/2020), 790 mg (8/19/2020), 750 mg (10/7/2020), 795 mg (4/29/2020), 750 mg (10/21/2020), 750 mg (11/4/2020), 750 mg (11/18/2020), 735 mg (2/24/2021)  pegfilgrastim (NEULASTA ONPRO) subcutaneous injection kit 6 mg, 6 mg, Subcutaneous, Once, 2 of 2 cycles  Administration: 6 mg (8/3/2019), 6 mg (8/15/2019)  leucovorin 750 mg in dextrose 5 % 250 mL IVPB, 748 mg, Intravenous, Once, 21 of 21 cycles  Administration: 750 mg (8/1/2019), 750 mg (8/13/2019), 750 mg (8/27/2019), 750 mg (9/10/2019), 750 mg (9/24/2019), 750 mg (10/8/2019), 800 mg (10/22/2019), 800 mg (11/5/2019), 800 mg (11/19/2019), 800 mg (12/3/2019), 800 mg (12/17/2019), 800 mg (12/31/2019), 800 mg (1/14/2020), 800 mg (1/28/2020), 800 mg (2/11/2020), 800 mg (2/25/2020), 800 mg (3/10/2020), 800 mg (3/24/2020), 800 mg (4/7/2020), 800 mg (5/13/2020), 800 mg (4/29/2020)  oxaliplatin (ELOXATIN) 158 95 mg in dextrose 5 % 250 mL chemo infusion, 85 mg/m2 = 158 95 mg, Intravenous, Once, 6 of 6 cycles  Administration: 158 95 mg (8/1/2019), 158 95 mg (8/13/2019), 158 95 mg (8/27/2019), 158 95 mg (9/10/2019), 158 95 mg (9/24/2019), 158 95 mg (10/8/2019)     Gastroesophageal cancer ([facility] [address] )   7/26/2019 Initial Diagnosis    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[facility] [address] )     8/1/2019 -  Chemotherapy    fluorouracil (ADRUCIL) injection 750 mg, 400 mg/m2 = 750 mg, Intravenous, Once, 32 of 38 cycles  Administration: 750 mg (8/1/2019), 750 mg (8/13/2019), 750 mg (8/27/2019), 750 mg (9/10/2019), 750 mg (9/24/2019), 750 mg (10/8/2019), 795 mg (10/22/2019), 795 mg (11/5/2019), 795 mg (11/19/2019), 795 mg (12/3/2019), 795 mg (12/17/2019), 795 mg (12/31/2019), 795 mg (1/14/2020), 795 mg (1/28/2020), 795 mg (2/11/2020), 795 mg (2/25/2020), 795 mg (3/10/2020), 795 mg (3/24/2020), 795 mg (4/7/2020), 795 mg (5/13/2020), 795 mg (5/27/2020), 795 mg (6/10/2020), 795 mg (6/24/2020), 795 mg (7/22/2020), 795 mg (8/5/2020), 790 mg (8/19/2020), 750 mg (10/7/2020), 795 mg (4/29/2020), 750 mg (10/21/2020), 750 mg (11/4/2020), 750 mg (11/18/2020), 735 mg (2/24/2021)  pegfilgrastim (NEULASTA ONPRO) subcutaneous injection kit 6 mg, 6 mg, Subcutaneous, Once, 2 of 2 cycles  Administration: 6 mg (8/3/2019), 6 mg (8/15/2019)  leucovorin 750 mg in dextrose 5 % 250 mL IVPB, 748 mg, Intravenous, Once, 21 of 21 cycles  Administration: 750 mg (8/1/2019), 750 mg (8/13/2019), 750 mg (8/27/2019), 750 mg (9/10/2019), 750 mg (9/24/2019), 750 mg (10/8/2019), 800 mg (10/22/2019), 800 mg (11/5/2019), 800 mg (11/19/2019), 800 mg (12/3/2019), 800 mg (12/17/2019), 800 mg (12/31/2019), 800 mg (1/14/2020), 800 mg (1/28/2020), 800 mg (2/11/2020), 800 mg (2/25/2020), 800 mg (3/10/2020), 800 mg (3/24/2020), 800 mg (4/7/2020), 800 mg (5/13/2020), 800 mg (4/29/2020)  oxaliplatin (ELOXATIN) 158 95 mg in dextrose 5 % 250 mL chemo infusion, 85 mg/m2 = 158 95 mg, Intravenous, Once, 6 of 6 cycles  Administration: 158 95 mg (8/1/2019), 158 95 mg (8/13/2019), 158 95 mg (8/27/2019), 158 95 mg (9/10/2019), 158 95 mg (9/24/2019), 158 95 mg (10/8/2019)       Patient Active Problem List   Diagnosis    PTSD (post-traumatic stress disorder)    Essential hypertension    Major depression    OCD (obsessive compulsive disorder)    Anxiety    Mal[facility] lower third [facility]    Gastroesophageal cancer ([facility]    Cancer-related pain    Chemotherapy-induced nausea    Severe protein-calorie malnutrition ([place] : less than 60% of standard weight) ([facility] )    Malignant ascites    Recovering alcoholic (HCC)    Other fatigue    Other dysphagia    Nausea    Dehydration    Skin fissures    Goals of care, counseling/discussion    Palliative care patient    Other insomnia    Counseling on health promotion and disease prevention    Elevated PSA    Memory loss    Regurgitation of stomach contents    Falls    Superficial thrombophlebitis of lower extremity    Pneumonia of left lung due to infectious organism    PAF (paroxysmal atrial fibrillation) (HCC)    Moderate protein-calorie malnutrition (HCC)    Dilated cardiomyopathy (HCC)    Urge urinary incontinence    Aspiration pneumonia (HCC)    Chronic combined systolic and diastolic CHF (congestive heart failure) ([facility] [address] )    Port-A-Cath in place    Hematuria    Hyponatremia    Hypoglycemia    Severe protein-calorie malnutrition (HCC)    Cavitating mass in left upper lung lobe    Immunocompromised patient ([facility] )    Centrilobular emphysema (HCC)    Leukocytosis    Iron deficiency anemia secondary to inadequate dietary iron intake     Past Medical History:   Diagnosis Date    Anxiety     Cancer ([facility] ) 08/2019    metastatic gastroesophageal cancer    Dehydration     Depression     [facility]     Fatigue     History of chemotherapy     currently started 8/2019    History of DVT (deep vein thrombosis)     Hypertension     currently is normal and may not need medication    Lung infection     chemo currently on hold because of the infection 12/22/2020    Nausea     Pneumonia     x2    Port-A-Cath in place     right    Psychiatric disorder     Recovering alcoholic ([facility] [address] )     sober since 1990    Varicose veins of left lower extremity      Past Surgical History:   Procedure Laterality Date    APPENDECTOMY      COLONOSCOPY      IR GASTROSTOMY TUBE PLACEMENT  2/5/2021    IR PORT PLACEMENT  7/31/2019    UPPER GASTROINTESTINAL ENDOSCOPY         Review of Medications:   Vitamins, Supplements and Herbals: No, pt denies taking supplements     Current Outpatient Medications:     amiodarone 200 mg tablet, TAKE ONE TABLET BY MOUTH EVERY DAY WITH BREAKFAST (Patient taking differently: 200 mg every morning ), Disp: 30 tablet, Rfl: 2    ammonium lactate (LAC-HYDRIN) 12 % cream, Apply topically as needed for dry skin On hands and feet, Disp: 385 g, Rfl: 2    apixaban (Eliquis) 5 mg, Take 1 tablet (5 mg total) by mouth 2 (two) times a day, Disp: 60 tablet, Rfl: 0    diazepam (VALIUM) 5 mg tablet, Take 1 tablet (5 mg total) by mouth 2 (two) times a day (Patient taking differently: Take 10 mg by mouth daily at bedtime ), Disp: 60 tablet, Rfl: 5    fluorouracil 4,415 mg in CADD infusion pump, Infuse 4,415 mg (1,200 mg/m2/day x 1 84 m2 (Treatment plan recorded BSA)) into a venous catheter over 46 hours for 2 days [employer] to be administered on 1/4/21, Disp: 1 Device, Rfl: 0    hydrochlorothiazide (HYDRODIURIL) 12 5 mg tablet, Take one tablet daily as needed for SBP> 160/90, Disp: 90 tablet, Rfl: 3    ondansetron (ZOFRAN) 4 mg tablet, Take 2 tablets (8 mg total) by mouth every 8 (eight) hours as needed for nausea or vomiting, Disp: 75 tablet, Rfl: 0    pantoprazole (PROTONIX) 40 mg tablet, Take 1 tablet (40 mg total) by mouth 2 (two) times a day, Disp: 60 tablet, Rfl: 2    venlafaxine (EFFEXOR-XR) 150 mg 24 hr capsule, Take 1 capsule (150 mg total) by mouth daily, Disp: 30 capsule, Rfl: 0  No current facility-administered medications for this visit       Facility-Administered Medications Ordered in Other Visits:     sodium chloride 0 9 % infusion, 20 mL/hr, Intravenous, Once PRN, Jonatan Jarrett MD, Stopped at 02/24/21 1503    Most Recent Lab Results:   Lab Results   Component Value Date    WBC 12 70 (H) 02/23/2021    IRON 13 (L) 09/01/2020    TIBC 341 09/01/2020    FERRITIN 175 09/01/2020    ALT 12 02/23/2021    AST 13 02/23/2021    ALB 2 6 (L) 02/23/2021    SODIUM 137 02/23/2021    SODIUM 136 01/14/2021    K 4 0 02/23/2021    K 3 6 01/14/2021     02/23/2021    BUN 20 02/23/2021    BUN 13 01/14/2021    CREATININE 1 17 02/23/2021    CREATININE 0 96 01/14/2021    EGFR 64 02/23/2021    PHOS 3 0 09/25/2020    PHOS 3 0 09/24/2020    POCGLU 104 09/25/2020    GLUF 184 (H) 02/23/2021    GLUF 99 01/14/2021    GLUC 99 01/14/2021    CALCIUM 10 0 02/23/2021    FOLATE 10 6 09/01/2020    MG 1 7 09/25/2020     Anthropometric Measurements:   Height: 69"  Ht Readings from Last 1 Encounters:   02/24/21 5' 10 98" (1 803 m)     Wt Readings from Last 20 Encounters:   02/24/21 65 7 kg (144 lb 13 5 oz)   01/26/21 65 5 kg (144 lb 8 oz)   01/13/21 68 5 kg (151 lb 0 2 oz)   01/07/21 66 2 kg (146 lb)   12/30/20 65 8 kg (145 lb)   12/22/20 68 kg (150 lb)   12/11/20 69 4 kg (153 lb)   11/19/20 72 4 kg (159 lb 9 8 oz)   11/18/20 71 1 kg (156 lb 12 oz)   11/17/20 68 9 kg (152 lb)   11/05/20 74 4 kg (164 lb)   11/04/20 70 4 kg (155 lb 3 3 oz)   10/21/20 72 8 kg (160 lb 7 9 oz)   10/07/20 74 4 kg (164 lb 0 4 oz)   09/29/20 71 7 kg (158 lb)   09/25/20 74 2 kg (163 lb 9 3 oz)   09/18/20 77 1 kg (170 lb)   09/08/20 76 9 kg (169 lb 8 5 oz)   08/19/20 81 6 kg (179 lb 14 3 oz)   08/18/20 82 3 kg (181 lb 8 oz)     -Home Wts: (10/15/20) 166#, (2/8/21) 150#, (2/10/21) 148#, (2/17/21) 143# (states he was 140# 6 days prior so he gained 3# since then, (2/22/21) 143#  -UBW: 198-212#  -Comments: Wt loss since August 2020      Oncology Nutrition-Anthropometrics      Nutrition from 2/25/2021 in [place] [address] [facility] Nutrition from 2/17/2021 in [place] [address] [facility]   Patient age (years):  77 years  77 years   Patient (male) height (in):  71 in  71 in   Current weight (lbs):  144 8 lbs  143 lbs   Current weight to be used for anthropometric calculations (kg)  65 8 kg  65 kg   BMI:  21 4  21 12   IBW male  160 lb  160 lb   IBW (kg) male  72 7 kg  72 7 kg   IBW % (male)  90 5 %  89 4 %   Adjusted BW (male):  156 2 lbs  156 lbs   Adjusted BW in kg (male):  71 kg  --   % weight change after 1 week:  --  (!) -3 38 %   Weight change after 1 week (lbs)  --  -5 lbs   % weight change after 1 month:  0 2 %  (!) -5 4 %   Weight change after 1 month (lbs)  0 3 lbs  -8 lbs   % weight change after 3 months:  (!) -9 3 %  -5 9 %   Weight change after 3 months (lbs)  -14 8 lbs  -9 lbs   % weight change after 6 months:  (!) -19 5 %  (!) -21 2 %   Weight change after 6 months (lbs)  -35 1 lbs  -39 lbs        Nutrition-Focused Physical Findings: n/a due to telephone call       Food/Nutrition-Related History & Client/Social History:    Current Nutrition Impact Symptoms:  [x] Nausea - plans to restart antiemetic [x] Reduced Appetite - "none" [x] Acid Reflux & Regurgitation - has not had pantoprazole for a couple weeks d/t lack of refills, will  refill today   [x] Vomiting + Regurgitation [x] Unintended Wt Loss - wt loss picked up again in August 2020 d/t vomiting with meals [] Malabsorption    [] Diarrhea  [] Unintended Wt Gain  [] Dumping Syndrome    [] Constipation   -BM's WNL, soft BM's daily, Miralax prn [] Thick Mucous/Secretions  [] Abdominal Pain    [x] Dysgeusia (Altered Taste) - says foods don't taste the same, "everything tastes like cardboard"  -practicing good oral care [] Xerostomia (Dry Mouth)  [x] Bloating    [] Dysosmia (Altered Smell)  [] Tyron Montielaver  [] Difficulty Chewing    [] Oral Mucositis (Sore Mouth)  [x] Fatigue - significant [] Other:   [] Odynophagia  [] Esophagitis  [] Other:    [x] Dysphagia -   -Pt reports esophageal dysphagia and regurgitation has progressively gotten worse over the past couple of months   -Has had VBS and has been seen by ST in the past   -Per GI 2/2/21: "barium swallow shows lower esophageal mass and delayed  esophageal emptying"  -Wife stated 2/3/21 that pt cannot have a stent placed  -S/p PEG placed by IR 2/5/21 (ordered by Dr Abelardo Mijares)  -Receiving home ST who is recommending a soft diet per wife [x] Early Satiety [] No Problems Eating      Food Allergies: no  Food Intolerances: no   -For Gout, avoids: organ meats, shellfish  Last gout flare was 2 years ago after a liverwurst sandwich  Current Diet: Full Liquids and Tube Feeding (ocasional soft foods)     -Can tolerate Full Liquids but not all of the time  Current Nutrition Intake: Unchanged from last visit   Appetite: None  Nutrition Route: PO and G-tube  Meal planning/preparation mainly done by: Self and Spouse/Partner   Oral Care: Brushing BID, Flossing QD, Biotene Mouthwash QD  Activity level: Home PT    24 Hr Diet Recall:   Yesterday: cheese during infusion (tolerated), later on had 1/2 mug of thin tomato soup (vomitted 30 min later)    Beverages: water (6 oz x1), Boost VHC (0-4 oz x1), gingerale at infusion center (8 oz x1)    -Averaging 18 oz per day of po fluids, afraid drinking/eating will trigger vomiting  Supplements:    Boost Very High Calorie (8 oz, 530 kcal, 22 g pro) - 0-1/2 per day     EN Recall:  DME: [employer]  Access Type: 16F G-tube placed 2/5/21 (ordered by GI - Dr Abelardo Mijares)  Formula: Manjarrez Glance - order for 5 cartons/day sent by GI 2/9/21    Method: Bolus - pt cannot administer bolus feedings independently  Regimen: 8 oz TwoCal TID (spaced out by at least 2 hrs, each bolus takes ~15 min)  Flush: 60 mL free water flush pre and 120 mL post each feeding (540 mL total)  Additions: n/a  EN providin mL volume (3 cans TwoCal), 1422 kcal (60% est needs), 59 g protein (58% est needs), 498 mL free water, and 1038 mL total water via EN  Sometimes able to get 4 cans/day  Total Fluid Intake: 18 oz PO + 35 oz via EN = 53 oz/day (93% est needs)    Est Needs Notes:  Fluids needs for hx of CHF  Oncology Nutrition-Estimated Needs      Nutrition from 2021 in [place] [address] [facility] Nutrition from 10/15/2020 in [place] [address] [facility]   Weight type used  Actual weight  Actual weight   Weight in kilograms (kg) used for estimated needs  68 2 kg  75 5 kg   Energy needs formula:   35-40 kcal/kg  35-40 kcal/kg   Energy needs based on 35 kcal/k kcal  2641 kcal   Energy needs based on 40 kcal/k kcal  3018 kcal   Protein needs formula:  1 5-2 g/kg  1 5-2 g/kg   Protein needs based on 1 5 g/kg  102 g  113 g   Protein needs based on 2 g/kg  136 g  151 g   Fluid needs formula:  25-30 mL/kg  25-30 mL/kg [hx CHF]   Fluid needs based on 25 mL/kg  1700 mL  1875 mL   Fluid needs in ounces  57 oz  63 oz   Fluid needs based on 30 mL/kg  2040 mL  2250 mL   Fluid needs in ounces  69 oz  76 oz          Discussion & Intervention:   Charly Moyer was evaluated today for an RD follow up regarding wt loss, poor po intake, enteral nutrition and nutrition impact sx management  Charly Moyer is currently undergoing tx for gastroesophageal cancer (chemotherapy)  He restarted chemo yesterday  He has maintained his wt since last week  His wife reports very minimal po intake and that pt is afraid to eat/drink because he thinks he will vomit  He is receiving home ST services and wife states he is being instructed to eat a soft diet    He is drinking 0-1/2 of a Boost VHC by mouth on average each day and very minimal water  He is averaging ~3-4 cans of TwoCal per day via G-tube  He is struggling to achieve his TF goal due to fullness, bloating, and regurgitation  Wife states that the VNA nurse is going to come back out on Saturday to do a re-teach for administering TF and technique  Wife spoke with GI office for med management: he has not been taking his pantoprazole for a couple of weeks d/t lack of refills but will start taking it again tonight and wife also says he will start taking his antiemetic as well  He and his wife are not interested in switching formulas or trying a pump feeding at this time  He is meeting ~93% of his estimated fluid needs via EN + PO  He is meeting 60% of his estimated calorie needs via EN  Reviewed the importance of wt management throughout the tx process and the role of enteral nutrition and a full liquid diet (as tolerated) in managing wt and overall health  Discussed ways to optimize nutrient intake, suggestions include: sipping on calorie containing beverages, utilizing oral nutrition supplements and adherence to enteral nutrition plan of care  Reviewed 24 hour recall, which revealed an inadequate po intake and enteral intake, and discussed ways to increase kcal, protein, and fluid intakes, suggestions include: advancing TF to goal, adequate flushing and consuming liquids with calories  Also discussed: weight management, indications & use of oral nutrition supplements, adequate hydation & tips to increase overall fluid intake, MNT for: bloating, fullness, regurgitation, hydration, N/V, esophageal dysphagia, wt loss, poor appetite, enteral nutrition, Feeding Tube Use & Care and individualized enteral nutrition recommendations & plan: (see below)  · Enteral nutrition is needed for Ry's sole source of nutrition, recommend:   · Slowly advance TwoCal to a goal of 5 cans/day via G-tube    · To improve tolerance:  · Administer 1/2 can at one time  Do smaller more frequent feedings throughout the day  · Bolus feedings should be every 2-3 hours, as tolerated  · You may add a small amount of water to the formula to help thin it out  · Stay upright during and for 60 min following each feeding  · Push formula in very slowly  1 can should take 15-20 min (or more if having issues) to infuse  · Let me know if you would like to switch formulas or try a pump feeding to improve tube feeding tolerance  · You can try infusing Boost Plus or Ensure Plus via G-tube to see if this is tolerated better  If this is tolerated much better, you could benefit from a different formula such as Jevity 1 5 or Osmolite 1 5  · Water Flushing:   · You must always flush with water before and after anything you put in your feeding tube  · Total fluid intake to be 60-70 oz per day (this includes your flushing, formula, and anything you drink by mouth)  · Each carton of TwoCal contains 5 5 oz of water  · If you are only drinking 18 oz of fluid by mouth and infusing 3 cans/day of TwoCal: Increase flushing by 5-10 more oz/day to meet fluid needs  · If you are drinking plenty of fluids by mouth, the minimum flush you should do is 30 mL water before a feeding and 60 mL water after a feeding  · If you are not drinking anything by mouth flush with 100 mL water flush before and after each bolus feeding (total of 1000 mL/day in flushes; will need to adjust flushes prn for IV fluids and oral intake)  · Enteral Nutrition goal to provide: 1185 mL volume (5 containers/day), 2370 kcal, 99 grams protein, 830 mL free water, 1830 mL total water daily (depending on oral intake)       Moving forward, Deyanira Phillips will make efforts to try different techniques to improve tolerance to TF, advance his TF to goal slowly over the coming week, use Boost VHC po as tolerated, consume a po diet as tolerated, monitor his wt closely, ensure proper hydration, and contact me with any TF intolerance  We will follow up in 1 week  Materials Provided: e-mailed to pt (@ [email or web address]'): Nutrition Rx & Recommendations  All questions and concerns addressed during todays visit  Angie Patricia has RD contact information  Nutrition Diagnosis:  · Inadequate Energy Intake related to physiological causes, disease state and treatment related issues as evidenced by food recall, wt loss and discussion with pt and/or family  · Increased Nutrient Needs (kcal & pro) related to increased demand for nutrients and disease state as evidenced by cancer dx and pt undergoing tx for cancer  · Swallowing Difficulty related to diagnosis/treatment related causes as evidenced by vomiting with all textures and consistencies po  · Patient has clinical indicators (or ASPEN criteria) consistent with severe protein-calorie malnutrition in the context of Chronic Illness as evidenced by >7 5% wt loss in 3 months, >10% wt loss in 6 months and </=75% energy intake vs  Estimated needs for >/=1 month  Monitoring & Evaluation:   Goals:  · adequate nutrition impact symptom management  · pt to meet >/=75% estimated nutrition needs daily  · weight gain of 1-2# per week  · achieve tube feeding goal rate   · tolerate tube feeding goal    · Progress Towards Goals: Progressing and Not Met    Barriers: None  Readiness to change: action  Comprehension: verbalizes understanding  Expected Compliance: good    Nutrition Rx & Recommendations:  · Diet: Full Liquids as tolerated OR per physician  · Follow proper oral care; Try baking soda/salt water rinse recipe (mix 3/4 tsp salt + 1 tsp baking soda + 1 qt water; rinse with plain water after using) in Eating Hints book (pg 18)  Brush your teeth before/after meals & before bed  · Weigh yourself regularly  If you notice weight loss, make an effort to increase your daily food/calorie intake   If you continue to notice loss after these efforts, reach out to your dietitian to establish a plan to stabilize weight  · Daily Fluid Goal: 60-70 oz per day  · Choose fluids with calories: juice, smoothies, milk, chocolate milk, protein shakes    · Tube Feeding Plan:  · Slowly advance TwoCal to a goal of 5 cans/day via G-tube  · To improve tolerance:  · Administer 1/2 can at one time  Do smaller more frequent feedings throughout the day  · Bolus feedings should be every 2-3 hours, as tolerated  · You may add a small amount of water to the formula to help thin it out  · Stay upright during and for 60 min following each feeding  · Push formula in very slowly  1 can should take 15-20 min (or more if having issues) to infuse  · Let me know if you would like to switch formulas or try a pump feeding to improve tube feeding tolerance  · You can try infusing Boost Plus or Ensure Plus via G-tube to see if this is tolerated better  If this is tolerated much better, you could benefit from a different formula such as Jevity 1 5 or Osmolite 1 5  · Water Flushing:   · You must always flush with water before and after anything you put in your feeding tube  · Total fluid intake to be 60-70 oz per day (this includes your flushing, formula, and anything you drink by mouth)  · Each carton of TwoCal contains 5 5 oz of water  · If you are only drinking 18 oz of fluid by mouth and infusing 3 cans/day of TwoCal: Increase flushing by 5-10 more oz/day to meet fluid needs  · If you are drinking plenty of fluids by mouth, the minimum flush you should do is 30 mL water before a feeding and 60 mL water after a feeding  · If you are not drinking anything by mouth flush with 100 mL water flush before and after each bolus feeding (total of 1000 mL/day in flushes; will need to adjust flushes prn for IV fluids and oral intake)        · Enteral Nutrition goal to provide: 1185 mL volume (5 containers/day), 2370 kcal, 99 grams protein, 830 mL free water, 1830 mL total water daily (depending on oral intake)  · [facility] days [facility] TF supplies: [phone], option [phone] (customer support)  · Call me at [phone] with any intolerance to your tube feeding (nausea, vomiting, cramping, diarrhea, coughing, etc  That is associated with your tube feeding)      Follow Up Plan: 3/4 at 1pm for a phone follow up   Recommend Referral to Other Providers: none at this time

## 2021-02-22 DIAGNOSIS — I50.42 CHRONIC COMBINED SYSTOLIC AND DIASTOLIC CHF (CONGESTIVE HEART FAILURE) (HCC): ICD-10-CM

## 2021-02-22 DIAGNOSIS — I48.0 PAF (PAROXYSMAL ATRIAL FIBRILLATION) (HCC): ICD-10-CM

## 2021-02-22 RX ORDER — HYDROCHLOROTHIAZIDE 12.5 MG/1
TABLET ORAL
Qty: 90 TABLET | Refills: 3 | Status: SHIPPED | OUTPATIENT
Start: 2021-02-22 | End: 2021-04-19 | Stop reason: SDUPTHER

## 2021-02-23 ENCOUNTER — LAB (OUTPATIENT)
Dept: LAB | Facility: CLINIC | Age: 66
End: 2021-02-23
Payer: MEDICARE

## 2021-02-23 DIAGNOSIS — C15.5 MALIGNANT NEOPLASM OF LOWER THIRD OF ESOPHAGUS (HCC): ICD-10-CM

## 2021-02-23 DIAGNOSIS — C16.0 MALIGNANT NEOPLASM OF CARDIA OF STOMACH (HCC): ICD-10-CM

## 2021-02-23 DIAGNOSIS — R11.0 NAUSEA: ICD-10-CM

## 2021-02-23 DIAGNOSIS — Z78.9 NEED FOR FOLLOW-UP BY SOCIAL WORKER: ICD-10-CM

## 2021-02-23 DIAGNOSIS — E86.0 DEHYDRATION: ICD-10-CM

## 2021-02-23 DIAGNOSIS — R11.0 NAUSEA: Primary | ICD-10-CM

## 2021-02-23 DIAGNOSIS — C16.0 GASTROESOPHAGEAL CANCER (HCC): ICD-10-CM

## 2021-02-23 LAB
ALBUMIN SERPL BCP-MCNC: 2.6 G/DL (ref 3.5–5)
ALP SERPL-CCNC: 97 U/L (ref 46–116)
ALT SERPL W P-5'-P-CCNC: 12 U/L (ref 12–78)
ANION GAP SERPL CALCULATED.3IONS-SCNC: 10 MMOL/L (ref 4–13)
AST SERPL W P-5'-P-CCNC: 13 U/L (ref 5–45)
BASOPHILS # BLD AUTO: 0.13 THOUSANDS/ΜL (ref 0–0.1)
BASOPHILS NFR BLD AUTO: 1 % (ref 0–1)
BILIRUB SERPL-MCNC: 0.36 MG/DL (ref 0.2–1)
BUN SERPL-MCNC: 20 MG/DL (ref 5–25)
CALCIUM ALBUM COR SERPL-MCNC: 11.1 MG/DL (ref 8.3–10.1)
CALCIUM SERPL-MCNC: 10 MG/DL (ref 8.3–10.1)
CHLORIDE SERPL-SCNC: 102 MMOL/L (ref 100–108)
CO2 SERPL-SCNC: 25 MMOL/L (ref 21–32)
CREAT SERPL-MCNC: 1.17 MG/DL (ref 0.6–1.3)
EOSINOPHIL # BLD AUTO: 0.24 THOUSAND/ΜL (ref 0–0.61)
EOSINOPHIL NFR BLD AUTO: 2 % (ref 0–6)
ERYTHROCYTE [DISTWIDTH] IN BLOOD BY AUTOMATED COUNT: 17.2 % (ref 11.6–15.1)
GFR SERPL CREATININE-BSD FRML MDRD: 64 ML/MIN/1.73SQ M
GLUCOSE P FAST SERPL-MCNC: 184 MG/DL (ref 65–99)
HCT VFR BLD AUTO: 26.4 % (ref 36.5–49.3)
HGB BLD-MCNC: 7 G/DL (ref 12–17)
IMM GRANULOCYTES # BLD AUTO: 0.07 THOUSAND/UL (ref 0–0.2)
IMM GRANULOCYTES NFR BLD AUTO: 1 % (ref 0–2)
LYMPHOCYTES # BLD AUTO: 3.16 THOUSANDS/ΜL (ref 0.6–4.47)
LYMPHOCYTES NFR BLD AUTO: 25 % (ref 14–44)
MCH RBC QN AUTO: 22.6 PG (ref 26.8–34.3)
MCHC RBC AUTO-ENTMCNC: 26.5 G/DL (ref 31.4–37.4)
MCV RBC AUTO: 85 FL (ref 82–98)
MONOCYTES # BLD AUTO: 1.21 THOUSAND/ΜL (ref 0.17–1.22)
MONOCYTES NFR BLD AUTO: 10 % (ref 4–12)
NEUTROPHILS # BLD AUTO: 7.89 THOUSANDS/ΜL (ref 1.85–7.62)
NEUTS SEG NFR BLD AUTO: 61 % (ref 43–75)
NRBC BLD AUTO-RTO: 0 /100 WBCS
PLATELET # BLD AUTO: 724 THOUSANDS/UL (ref 149–390)
PMV BLD AUTO: 11.9 FL (ref 8.9–12.7)
POTASSIUM SERPL-SCNC: 4 MMOL/L (ref 3.5–5.3)
PROT SERPL-MCNC: 7.7 G/DL (ref 6.4–8.2)
RBC # BLD AUTO: 3.1 MILLION/UL (ref 3.88–5.62)
SODIUM SERPL-SCNC: 137 MMOL/L (ref 136–145)
WBC # BLD AUTO: 12.7 THOUSAND/UL (ref 4.31–10.16)

## 2021-02-23 PROCEDURE — 36415 COLL VENOUS BLD VENIPUNCTURE: CPT

## 2021-02-23 PROCEDURE — 85025 COMPLETE CBC W/AUTO DIFF WBC: CPT

## 2021-02-23 PROCEDURE — 80053 COMPREHEN METABOLIC PANEL: CPT

## 2021-02-23 RX ORDER — DEXTROSE MONOHYDRATE 50 MG/ML
20 INJECTION, SOLUTION INTRAVENOUS ONCE
Status: CANCELLED | OUTPATIENT
Start: 2021-02-24

## 2021-02-23 RX ORDER — FLUOROURACIL 50 MG/ML
400 INJECTION, SOLUTION INTRAVENOUS ONCE
Status: CANCELLED | OUTPATIENT
Start: 2021-02-24

## 2021-02-23 RX ORDER — SODIUM CHLORIDE 9 MG/ML
20 INJECTION, SOLUTION INTRAVENOUS ONCE AS NEEDED
Status: CANCELLED | OUTPATIENT
Start: 2021-02-24

## 2021-02-24 ENCOUNTER — HOSPITAL ENCOUNTER (OUTPATIENT)
Dept: INFUSION CENTER | Facility: HOSPITAL | Age: 66
Discharge: HOME | End: 2021-02-24
Attending: INTERNAL MEDICINE
Payer: MEDICARE

## 2021-02-24 ENCOUNTER — TELEPHONE (OUTPATIENT)
Dept: NUTRITION | Facility: CLINIC | Age: 66
End: 2021-02-24

## 2021-02-24 VITALS
BODY MASS INDEX: 20.28 KG/M2 | HEART RATE: 73 BPM | TEMPERATURE: 96.1 F | RESPIRATION RATE: 18 BRPM | OXYGEN SATURATION: 99 % | HEIGHT: 71 IN | DIASTOLIC BLOOD PRESSURE: 75 MMHG | SYSTOLIC BLOOD PRESSURE: 129 MMHG | WEIGHT: 144.84 LBS

## 2021-02-24 DIAGNOSIS — C16.0 GASTROESOPHAGEAL CANCER (HCC): ICD-10-CM

## 2021-02-24 DIAGNOSIS — C15.9 MALIGNANT NEOPLASM OF ESOPHAGUS, UNSPECIFIED LOCATION (HCC): Primary | ICD-10-CM

## 2021-02-24 DIAGNOSIS — D50.8 IRON DEFICIENCY ANEMIA SECONDARY TO INADEQUATE DIETARY IRON INTAKE: Primary | ICD-10-CM

## 2021-02-24 DIAGNOSIS — E86.0 DEHYDRATION: ICD-10-CM

## 2021-02-24 DIAGNOSIS — R11.0 NAUSEA: ICD-10-CM

## 2021-02-24 DIAGNOSIS — R11.10 REGURGITATION OF STOMACH CONTENTS: ICD-10-CM

## 2021-02-24 DIAGNOSIS — C15.5 MALIGNANT NEOPLASM OF LOWER THIRD OF ESOPHAGUS (HCC): ICD-10-CM

## 2021-02-24 LAB
ABO GROUP BLD: NORMAL
ABO GROUP BLD: NORMAL
BLD GP AB SCN SERPL QL: NEGATIVE
RH BLD: POSITIVE
RH BLD: POSITIVE
SPECIMEN EXPIRATION DATE: NORMAL

## 2021-02-24 PROCEDURE — P9016 RBC LEUKOCYTES REDUCED: HCPCS

## 2021-02-24 PROCEDURE — 96409 CHEMO IV PUSH SNGL DRUG: CPT

## 2021-02-24 PROCEDURE — 86850 RBC ANTIBODY SCREEN: CPT | Performed by: INTERNAL MEDICINE

## 2021-02-24 PROCEDURE — 36430 TRANSFUSION BLD/BLD COMPNT: CPT

## 2021-02-24 PROCEDURE — 96367 TX/PROPH/DG ADDL SEQ IV INF: CPT

## 2021-02-24 PROCEDURE — 86920 COMPATIBILITY TEST SPIN: CPT

## 2021-02-24 PROCEDURE — 86901 BLOOD TYPING SEROLOGIC RH(D): CPT | Performed by: INTERNAL MEDICINE

## 2021-02-24 PROCEDURE — 86900 BLOOD TYPING SEROLOGIC ABO: CPT | Performed by: INTERNAL MEDICINE

## 2021-02-24 RX ORDER — ACETAMINOPHEN 325 MG/1
650 TABLET ORAL ONCE
Status: CANCELLED | OUTPATIENT
Start: 2021-02-24

## 2021-02-24 RX ORDER — FLUOROURACIL 50 MG/ML
400 INJECTION, SOLUTION INTRAVENOUS ONCE
Status: CANCELLED | OUTPATIENT
Start: 2021-03-10

## 2021-02-24 RX ORDER — SODIUM CHLORIDE 9 MG/ML
20 INJECTION, SOLUTION INTRAVENOUS ONCE AS NEEDED
Status: CANCELLED | OUTPATIENT
Start: 2021-03-10

## 2021-02-24 RX ORDER — SODIUM CHLORIDE 9 MG/ML
20 INJECTION, SOLUTION INTRAVENOUS ONCE
Status: CANCELLED | OUTPATIENT
Start: 2021-02-26

## 2021-02-24 RX ORDER — SODIUM CHLORIDE 9 MG/ML
20 INJECTION, SOLUTION INTRAVENOUS ONCE AS NEEDED
Status: DISCONTINUED | OUTPATIENT
Start: 2021-02-24 | End: 2021-02-27 | Stop reason: HOSPADM

## 2021-02-24 RX ORDER — SODIUM CHLORIDE 9 MG/ML
20 INJECTION, SOLUTION INTRAVENOUS ONCE
Status: CANCELLED | OUTPATIENT
Start: 2021-02-24

## 2021-02-24 RX ORDER — SODIUM CHLORIDE 9 MG/ML
20 INJECTION, SOLUTION INTRAVENOUS ONCE
Status: COMPLETED | OUTPATIENT
Start: 2021-02-24 | End: 2021-02-24

## 2021-02-24 RX ORDER — ACETAMINOPHEN 325 MG/1
650 TABLET ORAL ONCE
Status: CANCELLED | OUTPATIENT
Start: 2021-02-26

## 2021-02-24 RX ORDER — FLUOROURACIL 50 MG/ML
400 INJECTION, SOLUTION INTRAVENOUS ONCE
Status: COMPLETED | OUTPATIENT
Start: 2021-02-24 | End: 2021-02-24

## 2021-02-24 RX ORDER — ACETAMINOPHEN 325 MG/1
650 TABLET ORAL ONCE
Status: COMPLETED | OUTPATIENT
Start: 2021-02-24 | End: 2021-02-24

## 2021-02-24 RX ORDER — DEXTROSE MONOHYDRATE 50 MG/ML
20 INJECTION, SOLUTION INTRAVENOUS ONCE
Status: CANCELLED | OUTPATIENT
Start: 2021-03-10

## 2021-02-24 RX ADMIN — FLUOROURACIL 735 MG: 50 INJECTION, SOLUTION INTRAVENOUS at 14:56

## 2021-02-24 RX ADMIN — DEXAMETHASONE SODIUM PHOSPHATE: 10 INJECTION, SOLUTION INTRAMUSCULAR; INTRAVENOUS at 14:31

## 2021-02-24 RX ADMIN — SODIUM CHLORIDE 20 ML/HR: 9 INJECTION, SOLUTION INTRAVENOUS at 14:31

## 2021-02-24 RX ADMIN — SODIUM CHLORIDE 20 ML/HR: 9 INJECTION, SOLUTION INTRAVENOUS at 11:44

## 2021-02-24 RX ADMIN — DIPHENHYDRAMINE HYDROCHLORIDE 25 MG: 50 INJECTION, SOLUTION INTRAMUSCULAR; INTRAVENOUS at 11:45

## 2021-02-24 RX ADMIN — IRON SUCROSE 200 MG: 20 INJECTION, SOLUTION INTRAVENOUS at 10:38

## 2021-02-24 RX ADMIN — ACETAMINOPHEN 650 MG: 325 TABLET, FILM COATED ORAL at 11:45

## 2021-02-24 NOTE — TELEPHONE ENCOUNTER
Wife called to report that pt is experiencing regurgitation, spitting up, bloating, and fullness with most of his bolus feedings  Wife is trying to infuse his bolus' slowly stating that each can of formula takes ~15 min to bolus, which is an appropriate amount of time  They have tried doing half a can at once without success d/t scheduling conflicts and wife reports that she cannot remember if he tolerated a half can better  She reports that his BM's are WNL and daily  Wife states he gained 5# recently  He restarted chemo today  Pt only infusing 3-4 cans of TwoCal HN per day due to the above sx  Wife wondering if [facility] should do another teach on how to administer TF and will contact them for further support and education in the home setting  Discussed options to improve TF tolerance after reviewing proper technique and eventual TF goal:  -Recommend wife discuss medication management with GI doctor, especially since pt has not had any of his pantoprazole since last Thursday  -Recommend re-teach by [facility] of TF administration and technique   -We talked about how we could switch enteral formulas to see if he has improved tolerance to something different, but this will increase his daily formula goal and they are already having trouble with getting in 5 cans/day d/t scheduling conflicts and pts inability to independently give himself a bolus feeding   -We then talked about the pros/cons of changing to a continuous feeding via enteral pump  After hearing the logistics of how a pump feeding would work, wife stated this is not an option at this time and she does not think pt will agree to this  Wife interested in med management and [facility] re-teach at this time  She will consider the other options and discuss with pt  I will notify the pts GI team to please follow up with pt/wife re: above      Reviewed RD follow up plan for tomorrow at 11am

## 2021-02-24 NOTE — PLAN OF CARE
Problem: Potential for Falls  Goal: Patient will remain free of falls  Description: INTERVENTIONS:  - Assess patient frequently for physical needs  -  Identify cognitive and physical deficits and behaviors that affect risk of falls    -  [place] fall precautions as indicated by assessment   - Educate patient/family on patient safety including physical limitations  - Instruct patient to call for assistance with activity based on assessment  - Modify environment to reduce risk of injury  - Consider OT/PT consult to assist with strengthening/mobility  Outcome: Progressing

## 2021-02-24 NOTE — TELEPHONE ENCOUNTER
Ricardo Quiroz RD  You; Ching Devlin MD 45 minutes ago (12:50 PM)     JOVANNI please see my note  Jeremie Smith someone from your office please follow up with Franklin's wife regarding med management for TF intolerance (regurgitation, bloating, spitting up, fullness)? Kamila Mason are not interested in changing formulas or pump feedings at this time  Thank you!    Monse Mckeon

## 2021-02-24 NOTE — PROGRESS NOTES
Pt had a snack of crackers and cheese - pt vomited appt 30 minutes after snack - states this happens after he eats - went to IR and they were unable to place a stent

## 2021-02-25 ENCOUNTER — NUTRITION (OUTPATIENT)
Dept: NUTRITION | Facility: CLINIC | Age: 66
End: 2021-02-25

## 2021-02-25 DIAGNOSIS — Z71.3 NUTRITIONAL COUNSELING: Primary | ICD-10-CM

## 2021-02-25 LAB
ABO GROUP BLD BPU: NORMAL
BPU ID: NORMAL
CROSSMATCH: NORMAL
UNIT DISPENSE STATUS: NORMAL
UNIT PRODUCT CODE: NORMAL
UNIT RH: NORMAL

## 2021-02-25 RX ORDER — PANTOPRAZOLE SODIUM 40 MG/1
40 TABLET, DELAYED RELEASE ORAL 2 TIMES DAILY
Qty: 60 TABLET | Refills: 2 | Status: SHIPPED | OUTPATIENT
Start: 2021-02-25 | End: 2021-04-13 | Stop reason: DRUGHIGH

## 2021-02-25 NOTE — PATIENT INSTRUCTIONS
Nutrition Rx & Recommendations:  · Diet: Full Liquids as tolerated OR per physician  · Follow proper oral care; Try baking soda/salt water rinse recipe (mix 3/4 tsp salt + 1 tsp baking soda + 1 qt water; rinse with plain water after using) in Eating Hints book (pg 18)  Brush your teeth before/after meals & before bed  · Weigh yourself regularly  If you notice weight loss, make an effort to increase your daily food/calorie intake  If you continue to notice loss after these efforts, reach out to your dietitian to establish a plan to stabilize weight  · Daily Fluid Goal: 60-70 oz per day  · Choose fluids with calories: juice, smoothies, milk, chocolate milk, protein shakes    · Tube Feeding Plan:  · Slowly advance TwoCal to a goal of 5 cans/day via G-tube  · To improve tolerance:  · Administer 1/2 can at one time  Do smaller more frequent feedings throughout the day  · Bolus feedings should be every 2-3 hours, as tolerated  · You may add a small amount of water to the formula to help thin it out  · Stay upright during and for 60 min following each feeding  · Push formula in very slowly  1 can should take 15-20 min (or more if having issues) to infuse  · Let me know if you would like to switch formulas or try a pump feeding to improve tube feeding tolerance  · You can try infusing Boost Plus or Ensure Plus via G-tube to see if this is tolerated better  If this is tolerated much better, you could benefit from a different formula such as Jevity 1 5 or Osmolite 1 5  · Water Flushing:   · You must always flush with water before and after anything you put in your feeding tube  · Total fluid intake to be 60-70 oz per day (this includes your flushing, formula, and anything you drink by mouth)  · Each carton of TwoCal contains 5 5 oz of water  · If you are only drinking 18 oz of fluid by mouth and infusing 3 cans/day of TwoCal: Increase flushing by 5-10 more oz/day to meet fluid needs    · If you are drinking plenty of fluids by mouth, the minimum flush you should do is 30 mL water before a feeding and 60 mL water after a feeding  · If you are not drinking anything by mouth flush with 100 mL water flush before and after each bolus feeding (total of 1000 mL/day in flushes; will need to adjust flushes prn for IV fluids and oral intake)  · Enteral Nutrition goal to provide: 1185 mL volume (5 containers/day), 2370 kcal, 99 grams protein, 830 mL free water, 1830 mL total water daily (depending on oral intake)  · Call [facility] when you have 10 days left of TF supplies: [phone], option [phone] (customer support)  · Call me at [phone] with any intolerance to your tube feeding (nausea, vomiting, cramping, diarrhea, coughing, etc  That is associated with your tube feeding)      Follow Up Plan: 3/4 at 1pm for a phone follow up   Recommend Referral to Other Providers: none at this time

## 2021-02-25 NOTE — PROGRESS NOTES
Outpatient Oncology Nutrition Consult  Type of Consult: Follow Up  Care Location: Telephone Call - Ada walker by Lexx Chaparro  Met with pt and wife by phone separately as wife was at work  Nutrition Assessment:    Oncology Diagnosis & Treatments: Stage IV metastatic gastroesophageal cancer      -Undergoing chemotherapy in the palliative setting (5FU) - restarted tx on 2/24/21     Oncology History   Malignant neoplasm of lower third of esophagus ([facility] [address] )   7/26/2019 Initial Diagnosis    Malignant neoplasm of lower third of esophagus ([facility] [address] )     8/1/2019 -  Chemotherapy    fluorouracil (ADRUCIL) injection 750 mg, 400 mg/m2 = 750 mg, Intravenous, Once, 32 of 38 cycles  Administration: 750 mg (8/1/2019), 750 mg (8/13/2019), 750 mg (8/27/2019), 750 mg (9/10/2019), 750 mg (9/24/2019), 750 mg (10/8/2019), 795 mg (10/22/2019), 795 mg (11/5/2019), 795 mg (11/19/2019), 795 mg (12/3/2019), 795 mg (12/17/2019), 795 mg (12/31/2019), 795 mg (1/14/2020), 795 mg (1/28/2020), 795 mg (2/11/2020), 795 mg (2/25/2020), 795 mg (3/10/2020), 795 mg (3/24/2020), 795 mg (4/7/2020), 795 mg (5/13/2020), 795 mg (5/27/2020), 795 mg (6/10/2020), 795 mg (6/24/2020), 795 mg (7/22/2020), 795 mg (8/5/2020), 790 mg (8/19/2020), 750 mg (10/7/2020), 795 mg (4/29/2020), 750 mg (10/21/2020), 750 mg (11/4/2020), 750 mg (11/18/2020), 735 mg (2/24/2021)  pegfilgrastim (NEULASTA ONPRO) subcutaneous injection kit 6 mg, 6 mg, Subcutaneous, Once, 2 of 2 cycles  Administration: 6 mg (8/3/2019), 6 mg (8/15/2019)  leucovorin 750 mg in dextrose 5 % 250 mL IVPB, 748 mg, Intravenous, Once, 21 of 21 cycles  Administration: 750 mg (8/1/2019), 750 mg (8/13/2019), 750 mg (8/27/2019), 750 mg (9/10/2019), 750 mg (9/24/2019), 750 mg (10/8/2019), 800 mg (10/22/2019), 800 mg (11/5/2019), 800 mg (11/19/2019), 800 mg (12/3/2019), 800 mg (12/17/2019), 800 mg (12/31/2019), 800 mg (1/14/2020), 800 mg (1/28/2020), 800 mg (2/11/2020), 800 mg (2/25/2020), 800 mg (3/10/2020), 800 mg (3/24/2020), 800 mg (4/7/2020), 800 mg (5/13/2020), 800 mg (4/29/2020)  oxaliplatin (ELOXATIN) 158 95 mg in dextrose 5 % 250 mL chemo infusion, 85 mg/m2 = 158 95 mg, Intravenous, Once, 6 of 6 cycles  Administration: 158 95 mg (8/1/2019), 158 95 mg (8/13/2019), 158 95 mg (8/27/2019), 158 95 mg (9/10/2019), 158 95 mg (9/24/2019), 158 95 mg (10/8/2019)     Gastroesophageal cancer ([facility] [address] )   7/26/2019 Initial Diagnosis    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[facility] [address] )     8/1/2019 -  Chemotherapy    fluorouracil (ADRUCIL) injection 750 mg, 400 mg/m2 = 750 mg, Intravenous, Once, 32 of 38 cycles  Administration: 750 mg (8/1/2019), 750 mg (8/13/2019), 750 mg (8/27/2019), 750 mg (9/10/2019), 750 mg (9/24/2019), 750 mg (10/8/2019), 795 mg (10/22/2019), 795 mg (11/5/2019), 795 mg (11/19/2019), 795 mg (12/3/2019), 795 mg (12/17/2019), 795 mg (12/31/2019), 795 mg (1/14/2020), 795 mg (1/28/2020), 795 mg (2/11/2020), 795 mg (2/25/2020), 795 mg (3/10/2020), 795 mg (3/24/2020), 795 mg (4/7/2020), 795 mg (5/13/2020), 795 mg (5/27/2020), 795 mg (6/10/2020), 795 mg (6/24/2020), 795 mg (7/22/2020), 795 mg (8/5/2020), 790 mg (8/19/2020), 750 mg (10/7/2020), 795 mg (4/29/2020), 750 mg (10/21/2020), 750 mg (11/4/2020), 750 mg (11/18/2020), 735 mg (2/24/2021)  pegfilgrastim (NEULASTA ONPRO) subcutaneous injection kit 6 mg, 6 mg, Subcutaneous, Once, 2 of 2 cycles  Administration: 6 mg (8/3/2019), 6 mg (8/15/2019)  leucovorin 750 mg in dextrose 5 % 250 mL IVPB, 748 mg, Intravenous, Once, 21 of 21 cycles  Administration: 750 mg (8/1/2019), 750 mg (8/13/2019), 750 mg (8/27/2019), 750 mg (9/10/2019), 750 mg (9/24/2019), 750 mg (10/8/2019), 800 mg (10/22/2019), 800 mg (11/5/2019), 800 mg (11/19/2019), 800 mg (12/3/2019), 800 mg (12/17/2019), 800 mg (12/31/2019), 800 mg (1/14/2020), 800 mg (1/28/2020), 800 mg (2/11/2020), 800 mg (2/25/2020), 800 mg (3/10/2020), 800 mg (3/24/2020), 800 mg (4/7/2020), 800 mg (5/13/2020), 800 mg (4/29/2020)  oxaliplatin (Jodene Asters) 158 95 mg in dextrose 5 % 250 mL chemo infusion, 85 mg/m2 = 158 95 mg, Intravenous, Once, 6 of 6 cycles  Administration: 158 95 mg (8/1/2019), 158 95 mg (8/13/2019), 158 95 mg (8/27/2019), 158 95 mg (9/10/2019), 158 95 mg (9/24/2019), 158 95 mg (10/8/2019)       Patient Active Problem List   Diagnosis    PTSD (post-traumatic stress disorder)    Essential hypertension    Major depression    OCD (obsessive compulsive disorder)    Anxiety    Malignant neoplasm of lower third of esophagus (HCC)    [facility]    Cancer-related pain    Chemotherapy-induced nausea    Severe protein-calorie malnutrition (Horace Angry: less than 60% of standard weight) ([facility] )    Malignant ascites    Recovering alcoholic (HCC)    Other fatigue    Other dysphagia    Nausea    Dehydration    Skin fissures    Goals of care, counseling/discussion    Palliative care patient    Other insomnia    Counseling on health promotion and disease prevention    Elevated PSA    Memory loss    Regurgitation of stomach contents    Falls    Superficial thrombophlebitis of lower extremity    Pneumonia of left lung due to infectious organism    PAF (paroxysmal atrial fibrillation) (HCC)    Moderate protein-calorie malnutrition (HCC)    Dilated cardiomyopathy (HCC)    Urge urinary incontinence    Aspiration pneumonia (HCC)    Chronic combined systolic and diastolic CHF (congestive heart failure) ([facility] )    Port-A-Cath in place    Hematuria    Hyponatremia    Hypoglycemia    Severe protein-calorie malnutrition (HCC)    Cavitating mass in left upper lung lobe    Immunocompromised patient ([facility] )    Centrilobular emphysema (HCC)    Leukocytosis    Iron deficiency anemia secondary to inadequate dietary iron intake     Past Medical History:   Diagnosis Date    Anxiety     Cancer ([facility] ) 08/2019    metastatic gastroesophageal cancer    Dehydration     Depression     Esophageal cancer (HCC)     Fatigue     History of chemotherapy     currently started 8/2019    History of DVT (deep vein thrombosis)     Hypertension     currently is normal and may not need medication    Lung infection     chemo currently on hold because of the infection 12/22/2020    Nausea     Pneumonia     x2    Port-A-Cath in place     right    Psychiatric disorder     Recovering alcoholic ([facility] [address] )     sober since 1990    Varicose veins of left lower extremity      Past Surgical History:   Procedure Laterality Date    APPENDECTOMY      COLONOSCOPY      IR GASTROSTOMY TUBE PLACEMENT  2/5/2021    IR PORT PLACEMENT  7/31/2019    UPPER GASTROINTESTINAL ENDOSCOPY         Review of Medications:   Vitamins, Supplements and Herbals: No, pt denies taking supplements     Current Outpatient Medications:     amiodarone 200 mg tablet, TAKE ONE TABLET BY MOUTH EVERY DAY WITH BREAKFAST (Patient taking differently: 200 mg every morning ), Disp: 30 tablet, Rfl: 2    ammonium lactate (LAC-HYDRIN) 12 % cream, Apply topically as needed for dry skin On hands and feet, Disp: 385 g, Rfl: 2    apixaban (Eliquis) 5 mg, Take 1 tablet (5 mg total) by mouth 2 (two) times a day, Disp: 60 tablet, Rfl: 0    diazepam (VALIUM) 5 mg tablet, Take 1 tablet (5 mg total) by mouth 2 (two) times a day (Patient taking differently: Take 10 mg by mouth daily at bedtime ), Disp: 60 tablet, Rfl: 5    [START ON 3/10/2021] fluorouracil 4,415 mg in CADD infusion pump, Infuse 4,415 mg (1,200 mg/m2/day x 1 84 m2 (Treatment plan recorded BSA)) into a venous catheter over 46 hours for 2 days [employer] to be administered on, Disp: 1 Device, Rfl: 0    hydrochlorothiazide (HYDRODIURIL) 12 5 mg tablet, Take one tablet daily as needed for SBP> 160/90, Disp: 90 tablet, Rfl: 3    ondansetron (ZOFRAN) 4 mg tablet, Take 2 tablets (8 mg total) by mouth every 8 (eight) hours as needed for nausea or vomiting, Disp: 75 tablet, Rfl: 0    pantoprazole (PROTONIX) 40 mg tablet, Take 1 tablet (40 mg total) by mouth 2 (two) times a day, Disp: 60 tablet, Rfl: 2    venlafaxine (EFFEXOR-XR) 150 mg 24 hr capsule, Take 1 capsule (150 mg total) by mouth daily, Disp: 30 capsule, Rfl: 0    Most Recent Lab Results:   Lab Results   Component Value Date    WBC 12 70 (H) 02/23/2021    IRON 13 (L) 09/01/2020    TIBC 341 09/01/2020    FERRITIN 175 09/01/2020    ALT 12 02/23/2021    AST 13 02/23/2021    ALB 2 6 (L) 02/23/2021    SODIUM 137 02/23/2021    SODIUM 136 01/14/2021    K 4 0 02/23/2021    K 3 6 01/14/2021     02/23/2021    BUN 20 02/23/2021    BUN 13 01/14/2021    CREATININE 1 17 02/23/2021    CREATININE 0 96 01/14/2021    EGFR 64 02/23/2021    PHOS 3 0 09/25/2020    PHOS 3 0 09/24/2020    POCGLU 104 09/25/2020    GLUF 184 (H) 02/23/2021    GLUF 99 01/14/2021    GLUC 99 01/14/2021    CALCIUM 10 0 02/23/2021    FOLATE 10 6 09/01/2020    MG 1 7 09/25/2020     Anthropometric Measurements:   Height: 69"  Ht Readings from Last 1 Encounters:   02/24/21 5' 10 98" (1 803 m)     Wt Readings from Last 20 Encounters:   02/24/21 65 7 kg (144 lb 13 5 oz)   01/26/21 65 5 kg (144 lb 8 oz)   01/13/21 68 5 kg (151 lb 0 2 oz)   01/07/21 66 2 kg (146 lb)   12/30/20 65 8 kg (145 lb)   12/22/20 68 kg (150 lb)   12/11/20 69 4 kg (153 lb)   11/19/20 72 4 kg (159 lb 9 8 oz)   11/18/20 71 1 kg (156 lb 12 oz)   11/17/20 68 9 kg (152 lb)   11/05/20 74 4 kg (164 lb)   11/04/20 70 4 kg (155 lb 3 3 oz)   10/21/20 72 8 kg (160 lb 7 9 oz)   10/07/20 74 4 kg (164 lb 0 4 oz)   09/29/20 71 7 kg (158 lb)   09/25/20 74 2 kg (163 lb 9 3 oz)   09/18/20 77 1 kg (170 lb)   09/08/20 76 9 kg (169 lb 8 5 oz)   08/19/20 81 6 kg (179 lb 14 3 oz)   08/18/20 82 3 kg (181 lb 8 oz)     · Home Wts: (10/15/20) 166#, (2/8/21) 150#, (2/10/21) 148#, (2/17/21) 143# (states he was 140# 6 days prior so he gained 3# since then, (2/22/21) 143#, (3/4/21) 144# (per pt report, scale was not functioning properly during visit)   · UBW: 198-212#    Oncology Nutrition-Anthropometrics Nutrition from 3/4/2021 in [place] [address] [facility] Nutrition from 2/25/2021 in [place] [address] [facility]   Patient age (years):  77 years  77 years   Patient (male) height (in):  71 in  71 in   Current weight (lbs):  144 lbs  144 8 lbs   Current weight to be used for anthropometric calculations (kg)  65 5 kg  65 8 kg   BMI:  21 3  21 4   IBW male  160 lb  160 lb   IBW (kg) male  72 7 kg  72 7 kg   IBW % (male)  90 %  90 5 %   Adjusted BW (male):  156 lbs  156 2 lbs   Adjusted BW in kg (male):  70 9 kg  71 kg   % weight change after 1 week:  -0 6 %  --   Weight change after 1 week (lbs)  -0 8 lbs  --   % weight change after 1 month:  -4 %  0 2 %   Weight change after 1 month (lbs)  -6 lbs  0 3 lbs   % weight change after 3 months:  (!) -15 %  (!) -9 3 %   Weight change after 3 months (lbs)  -25 5 lbs  -14 8 lbs   % weight change after 6 months:  --  (!) -19 5 %   Weight change after 6 months (lbs)  --  -35 1 lbs        Nutrition-Focused Physical Findings: n/a due to telephone call       Food/Nutrition-Related History & Client/Social History:    Current Nutrition Impact Symptoms:  [x] Nausea - "all the time", significant [x] Reduced Appetite - "none" [x] Acid Reflux & Regurgitation - restarted pantoprazole, "it's been alright" per pt, wife reports ongoing & significant regurgitation    [x] Vomiting - significant, almost each time he eats po, vomiting and regurgitation after bolus feedings as well per wife [x] Unintended Wt Loss - wt loss picked up again in August 2020 d/t vomiting with meals [] Malabsorption    [] Diarrhea  [] Unintended Wt Gain  [] Dumping Syndrome    [] Constipation   +BM EOD, soft BM's, Miralax prn [] Thick Mucous/Secretions  [] Abdominal Pain    [x] Dysgeusia (Altered Taste) - says foods don't taste the same, "everything tastes like cardboard"  -practicing good oral care [] Xerostomia (Dry Mouth)  [x] Bloating - all the time, sometimes better first thing in the morning   [] Dysosmia (Altered Smell)  [] Jonas Marina  [] Difficulty Chewing    [] Oral Mucositis (Sore Mouth)  [x] Fatigue - significant [] Other:   [] Odynophagia  [] Esophagitis  [] Other:    [x] Dysphagia -   -Pt reports esophageal dysphagia and regurgitation has progressively gotten worse over the past couple of months   -Has had VBS and has been seen by ST in the past   -Per GI 2/2/21: "barium swallow shows lower esophageal mass and delayed  esophageal emptying"  -Wife stated 2/3/21 that pt cannot have a stent placed  -S/p PEG placed by IR 2/5/21 (ordered by Dr Joanne Ervin)  -Receiving home ST who is recommending a soft diet per wife [x] Early Satiety [] No Problems Eating      Food Allergies & Intolerances: no  -For Gout, avoids: organ meats, shellfish  Last gout flare was 2 years ago after a liverwurst sandwich  Current Diet: Full Liquids and Tube Feeding   Current Nutrition Intake: Unchanged from last visit   Appetite: None  Nutrition Route: PO and G-tube  Oral Care: Brushing BID, Flossing QD, Biotene Mouthwash QD  Activity level: Home PT    24 Hr Diet Recall:   Yesterday: nothing  Today: nothing    Beverages: water (12 oz x1), gingerale (10 oz every 2-3 days), Boost VHC (8 oz x1 - per wife), small amounts of coffee    -Averaging 20 oz per day of po fluids  Supplements:    Boost Very High Calorie (8 oz, 530 kcal, 22 g pro) - QD     EN Recall:  DME: [employer]  Access Type: 16F G-tube placed 2/5/21 (ordered by GI - Dr Joanne Ervin)   Formula: [employer] - order for 5 cartons/day sent by GI 2/9/21  Method: Bolus - pt cannot administer bolus feedings independently  Regimen: 8-10 6 oz TwoCal per feeding for a total of 2 cans/day - per wife      -Has not tried Boost Plus or Ensure Plus through tube but will do so tonight    -Feedings are spaced out by at least 2 5-3 hrs, each bolus takes ~15 min  -VNA verified adequate TF technique over the weekend     Tolerance: +N/V and regurgitation after feedings despite proper technique  Flushin mL free water flush pre and 120 mL post each feeding + 240 mL QD (780 mL total)  Additions: n/a  EN providing:    -Per wife report: 474 mL volume (2 cans TwoCal), 948 kcal, 40 g protein, 332 mL free water, 1112 mL total water  Total Fluid Intake: 20 oz PO + 37 oz via EN = 57 oz/day (100% est needs)    Est Needs Notes:  Fluids needs for hx of CHF  Oncology Nutrition-Estimated Needs      Nutrition from 2021 in [place] [address] [facility] Nutrition from 10/15/2020 in [place] [address] On[facility] Services   Weight type used  Actual weight  Actual weight   Weight in kilograms (kg) used for estimated needs  68 2 kg  75 5 kg   Energy needs formula:   35-40 kcal/kg  35-40 kcal/kg   Energy needs based on 35 kcal/k kcal  2641 kcal   Energy needs based on 40 kcal/k kcal  3018 kcal   Protein needs formula:  1 5-2 g/kg  1 5-2 g/kg   Protein needs based on 1 5 g/kg  102 g  113 g   Protein needs based on 2 g/kg  136 g  151 g   Fluid needs formula:  25-30 mL/kg  25-30 mL/kg [hx CHF]   Fluid needs based on 25 mL/kg  1700 mL  1875 mL   Fluid needs in ounces  57 oz  63 oz   Fluid needs based on 30 mL/kg  2040 mL  2250 mL   Fluid needs in ounces  69 oz  76 oz          Discussion & Intervention:   Ada Bonner was evaluated today for an RD follow up regarding wt loss, poor po intake, enteral nutrition and nutrition impact sx management  Ada Bonner is currently undergoing tx for gastroesophageal cancer (chemotherapy)  Spoke with pt and wife separately today  Ada Bonner states he has been having constant nausea and vomits after eating po and his TF is administered  VNA verified adequate TF administration technique over the weekend  Wife reports pt is only able to infuse ~2 cans per day of TwoCal due to intolerance to bolus TF and pts inability to independently administer bolus feedings    Ada Bonner is at high risk for worsening malnutrition, dehydration, and hospital admission due to ongoing poor nutritional status  Recommend switching to pump feeding at this time  Delicia Lamar says that she is not sure that pt will accept pump feedings d/t decreased QoL while connected to pump but is willing to give it a try  Supportive listening and care provided  Reviewed 24 hour recall, which revealed an inadequate po intake and enteral intake, and discussed ways to increase kcal, protein, and fluid intakes  Also reviewed the importance of wt management throughout the tx process and the role of enteral nutrition and po diet as tolerated in managing wt and overall health  Based on today's assessment, discussion included: MNT for: bloating, fullness, regurgitation, hydration, N/V, esophageal dysphagia, wt loss, poor appetite, enteral nutrition, practicing proper feeding tube use & care and individualized enteral nutrition recommendations & plan: initiate pump feeding as per below  · Enteral nutrition is needed for Ry's sole source of nutrition, Recommend initiating nocturnal pump feeding due to pts intolerance to bolus feedings (nausea, vomiting, regurgitation, and inability to consume adequate calories via bolus feedings): Initiation: Recommend initiating TwoCal HN at 20 mL/hr via G-tube cycled over 15 hours (as tolerated) and advance rate by 10 mL every 8 hours (as tolerated) until goal rate is achieved  TF Goal: TwoCal HN at 80 mL/hr via G-tube cycled over ~15 hours daily (or until 5 cartons is infused)  Water Flushin mL free water flush at the beginning and end of TF infusion plus 125 mL free water flush 6 times per day  (total of 1000 mL/day in flushes; flushes should be spread throughout the day and every 2-3 hours during TF infusion; will need to adjust flushes prn for IV and PO fluids)     Enteral Nutrition goal to provide: 1185 mL volume (5 cartons day), 2370 kcal, 99 grams protein, 830 mL free water, 1830 mL total water daily  *Pt requires an enteral feeding pump to administer TF due to intolerance to bolus tube feeding  Recommend enteral backpack for pump transport  Stay upright at least 30 degrees while pump is running  Patient will need home teaching on how to use enteral pump  · In the meantime, you can try infusing Boost Plus or Ensure Plus via G-tube to see if this is tolerated better  If this is tolerated much better, you could benefit from a different formula such as Jevity 1 5 or Osmolite 1 5  Moving forward, Nicolette Duque was encouraged to switch to a pump feeding to improve tolerance to TF and improve nutritional status  JuanL uis Zegn know that I will reach out to pts GI physician with my new TF recommendations for coordination with [employer] company  Materials Provided: e-mailed to pt (@ '[email or web address]'): Nutrition Rx & Recommendations  All questions and concerns addressed during todays visit  Nicolette Duque has RD contact information  Nutrition Diagnosis:  · Inadequate Energy Intake related to physiological causes, disease state and treatment related issues as evidenced by food recall, wt loss and discussion with pt and/or family  · Increased Nutrient Needs (kcal & pro) related to increased demand for nutrients and disease state as evidenced by cancer dx and pt undergoing tx for cancer  · Swallowing Difficulty related to diagnosis/treatment related causes as evidenced by N/V and regurgitation with po feedings  · Intake- Oral or Nutritional Supplement Intake: Inadequate enteral nutrition infusion  related to EN intolerance, GI distress/pain, Inadequate EN/PN as evidenced by nausea, vomiting, and regurgitation related to bolus TF  · Patient has clinical indicators (or ASPEN criteria) consistent with severe protein-calorie malnutrition in the context of Chronic Illness as evidenced by >7 5% wt loss in 3 months and </=75% energy intake vs  Estimated needs for >/=1 month  Monitoring & Evaluation:   Goals:  · adequate nutrition impact symptom management  · pt to meet >/=75% estimated nutrition needs daily  · weight gain of 1-2# per week  · achieve tube feeding goal rate   · tolerate tube feeding goal    · Progress Towards Goals: Not Progressing and Not Met    Nutrition Rx & Recommendations:  · Follow proper oral care; Try baking soda/salt water rinse recipe (mix 3/4 tsp salt + 1 tsp baking soda + 1 qt water; rinse with plain water after using) in Eating Hints book (pg 18)  Brush your teeth before/after meals & before bed  · Weigh yourself regularly  If you notice weight loss, make an effort to increase your daily food/calorie intake  If you continue to notice loss after these efforts, reach out to your dietitian to establish a plan to stabilize weight  · Tube Feeding Plan:  · Recommend initiating nocturnal pump feeding due to pts intolerance to bolus feedings (nausea, vomiting, regurgitation, and inability to consume adequate calories via bolus feedings): Initiation: Recommend initiating TwoCal HN at 20 mL/hr via G-tube cycled over 15 hours (as tolerated) and advance rate by 10 mL every 8 hours (as tolerated) until goal rate is achieved  TF Goal: TwoCal HN at 80 mL/hr via G-tube cycled over ~15 hours daily (or until 5 cartons is infused)  Water Flushin mL free water flush at the beginning and end of TF infusion plus 125 mL free water flush 6 times per day  (total of 1000 mL/day in flushes; flushes should be spread throughout the day and every 2-3 hours during TF infusion; will need to adjust flushes prn for IV and PO fluids)  Enteral Nutrition goal to provide: 1185 mL volume (5 cartons day), 2370 kcal, 99 grams protein, 830 mL free water, 1830 mL total water daily  *Pt requires an enteral feeding pump to administer TF due to intolerance to bolus tube feeding  Recommend enteral backpack for pump transport    Stay upright at least 30 degrees while pump is running  Patient will need home teaching on how to use enteral pump  · In the meantime, you can try infusing Boost Plus or Ensure Plus via G-tube to see if this is tolerated better  If this is tolerated much better, you could benefit from a different formula such as Jevity 1 5 or Osmolite 1 5  · Call [facility] when you have 10 days left of TF supplies: [phone], option 3 (customer support)  · Call me at [phone] with any intolerance to your tube feeding (nausea, vomiting, cramping, diarrhea, coughing, etc  That is associated with your tube feeding)      Follow Up Plan: 3/17 at 1pm for a phone follow up   Recommend Referral to Other Providers: none at this time

## 2021-02-25 NOTE — TELEPHONE ENCOUNTER
Please see communication thread:    Patient has hx of esophageal cancer with new feeding tube  His wife is reporting regurgitation of food after small po intake  She said they are receptive to possible formula change as well but waiting for [facility] to provide education this weekend to assure their TF administration technique is correct first     I suggested he try zofran prior to meals  She also asked if Okay to renew pantoprazole 40 mg bid  She said he is swallowing pills fine at this time  Enrico Hinton, patient's wife is requesting script be sent to [place] this month, and thereafter, she will request script be sent to [facility]  Please sign attached script if in agreement

## 2021-02-26 ENCOUNTER — HOSPITAL ENCOUNTER (OUTPATIENT)
Dept: INFUSION CENTER | Facility: HOSPITAL | Age: 66
Discharge: HOME | End: 2021-02-26
Attending: INTERNAL MEDICINE
Payer: MEDICARE

## 2021-02-26 ENCOUNTER — HOSPITAL ENCOUNTER (OUTPATIENT)
Dept: INFUSION CENTER | Facility: HOSPITAL | Age: 66
End: 2021-02-26
Attending: INTERNAL MEDICINE

## 2021-02-26 VITALS
OXYGEN SATURATION: 96 % | HEART RATE: 78 BPM | RESPIRATION RATE: 18 BRPM | TEMPERATURE: 98 F | DIASTOLIC BLOOD PRESSURE: 72 MMHG | SYSTOLIC BLOOD PRESSURE: 128 MMHG

## 2021-02-26 DIAGNOSIS — C16.0 GASTROESOPHAGEAL CANCER (HCC): ICD-10-CM

## 2021-02-26 DIAGNOSIS — R11.0 NAUSEA: ICD-10-CM

## 2021-02-26 DIAGNOSIS — D50.8 IRON DEFICIENCY ANEMIA SECONDARY TO INADEQUATE DIETARY IRON INTAKE: ICD-10-CM

## 2021-02-26 DIAGNOSIS — C15.5 MALIGNANT NEOPLASM OF LOWER THIRD OF ESOPHAGUS (HCC): Primary | ICD-10-CM

## 2021-02-26 DIAGNOSIS — E86.0 DEHYDRATION: ICD-10-CM

## 2021-02-26 PROCEDURE — 36430 TRANSFUSION BLD/BLD COMPNT: CPT

## 2021-02-26 PROCEDURE — 96365 THER/PROPH/DIAG IV INF INIT: CPT

## 2021-02-26 PROCEDURE — P9016 RBC LEUKOCYTES REDUCED: HCPCS

## 2021-02-26 PROCEDURE — 96375 TX/PRO/DX INJ NEW DRUG ADDON: CPT

## 2021-02-26 RX ORDER — SODIUM CHLORIDE 9 MG/ML
20 INJECTION, SOLUTION INTRAVENOUS ONCE
Status: COMPLETED | OUTPATIENT
Start: 2021-02-26 | End: 2021-02-26

## 2021-02-26 RX ORDER — ACETAMINOPHEN 325 MG/1
650 TABLET ORAL ONCE
Status: COMPLETED | OUTPATIENT
Start: 2021-02-26 | End: 2021-02-26

## 2021-02-26 RX ORDER — ACETAMINOPHEN 325 MG/1
650 TABLET ORAL ONCE
Status: CANCELLED | OUTPATIENT
Start: 2021-02-26

## 2021-02-26 RX ORDER — SODIUM CHLORIDE 9 MG/ML
20 INJECTION, SOLUTION INTRAVENOUS ONCE
Status: CANCELLED | OUTPATIENT
Start: 2021-02-26

## 2021-02-26 RX ADMIN — SODIUM CHLORIDE 20 ML/HR: 0.9 INJECTION, SOLUTION INTRAVENOUS at 13:18

## 2021-02-26 RX ADMIN — HYDROCORTISONE SODIUM SUCCINATE 100 MG: 100 INJECTION, POWDER, FOR SOLUTION INTRAMUSCULAR; INTRAVENOUS at 13:16

## 2021-02-26 RX ADMIN — ACETAMINOPHEN 650 MG: 325 TABLET, FILM COATED ORAL at 13:14

## 2021-02-26 RX ADMIN — DIPHENHYDRAMINE HYDROCHLORIDE 25 MG: 50 INJECTION, SOLUTION INTRAMUSCULAR; INTRAVENOUS at 13:25

## 2021-02-26 NOTE — PLAN OF CARE
Problem: Potential for Falls  Goal: Patient will remain free of falls  Description: INTERVENTIONS:  - Assess patient frequently for physical needs  -  Identify cognitive and physical deficits and behaviors that affect risk of falls  -  [place] fall precautions as indicated by assessment   - Educate patient/family on patient safety including physical limitations  - Instruct patient to call for assistance with activity based on assessment  - Modify environment to reduce risk of injury  - Consider OT/PT consult to assist with strengthening/mobility  Outcome: Progressing     Problem: Potential for Falls  Goal: Patient will remain free of falls  Description: INTERVENTIONS:  - Assess patient frequently for physical needs  -  Identify cognitive and physical deficits and behaviors that affect risk of falls    -  [place] fall precautions as indicated by assessment   - Educate patient/family on patient safety including physical limitations  - Instruct patient to call for assistance with activity based on assessment  - Modify environment to reduce risk of injury  - Consider OT/PT consult to assist with strengthening/mobility  Outcome: Progressing     Problem: INFECTION - ADULT  Goal: Absence or prevention of progression during hospitalization  Description: INTERVENTIONS:  - Assess and monitor for signs and symptoms of infection  - Monitor lab/diagnostic results  - Monitor all insertion sites, i e  indwelling lines, tubes, and drains  - Monitor endotracheal (as able) and nasal secretions for changes in amount and color  - [place] appropriate cooling/warming therapies per order  - Administer medications as ordered  - Instruct and encourage patient and family to use good hand hygiene technique  - Identify and instruct in appropriate isolation precautions for identified infection/condition  Outcome: Progressing  Goal: Absence of fever/infection during neutropenic period  Description: INTERVENTIONS:  - Monitor WBC  - Implement neutropenic guidelines  Outcome: Progressing     Problem: DISCHARGE PLANNING  Goal: Discharge to home or other facility with appropriate resources  Description: INTERVENTIONS:  - Identify barriers to discharge w/patient and caregiver  - Arrange for needed discharge resources and transportation as appropriate  - Identify discharge learning needs (meds, wound care, etc )  - Arrange for interpretive services to assist at discharge as needed  - Refer to Case Management Department for coordinating discharge planning if the patient needs post-hospital services based on physician/advanced practitioner order or complex needs related to functional status, cognitive ability, or social support system  Outcome: Progressing

## 2021-03-02 DIAGNOSIS — E86.0 DEHYDRATION: ICD-10-CM

## 2021-03-02 DIAGNOSIS — C15.5 MALIGNANT NEOPLASM OF LOWER THIRD OF ESOPHAGUS (HCC): ICD-10-CM

## 2021-03-02 DIAGNOSIS — R11.0 NAUSEA: Primary | ICD-10-CM

## 2021-03-02 DIAGNOSIS — C16.0 MALIGNANT NEOPLASM OF CARDIA OF STOMACH (HCC): ICD-10-CM

## 2021-03-03 ENCOUNTER — TELEPHONE (OUTPATIENT)
Dept: FAMILY MEDICINE CLINIC | Facility: CLINIC | Age: 66
End: 2021-03-03

## 2021-03-03 NOTE — TELEPHONE ENCOUNTER
We got a from to be signed from [facility]    Copy scanned into chart    Original left in blue bin

## 2021-03-04 ENCOUNTER — TELEPHONE (OUTPATIENT)
Dept: FAMILY MEDICINE CLINIC | Facility: CLINIC | Age: 66
End: 2021-03-04

## 2021-03-04 ENCOUNTER — NUTRITION (OUTPATIENT)
Dept: NUTRITION | Facility: CLINIC | Age: 66
End: 2021-03-04

## 2021-03-04 ENCOUNTER — TELEPHONE (OUTPATIENT)
Dept: GASTROENTEROLOGY | Facility: AMBULARY SURGERY CENTER | Age: 66
End: 2021-03-04

## 2021-03-04 DIAGNOSIS — Z71.3 NUTRITIONAL COUNSELING: Primary | ICD-10-CM

## 2021-03-04 NOTE — TELEPHONE ENCOUNTER
----- Message from Estuardo Newton RD sent at 3/4/2021  1:59 PM EST -----  Wang Marquez see my note dated today  I am recommending Laureano Pages switch to pump feedings d/t ongoing intolerance to bolus feedings  My note details my recommendations for progression and goal   He will need new orders sent to [facility]  He and his wife will also need home enteral pump teaching  Please let me know if you have any questions or concerns  Thank you,Kasey

## 2021-03-04 NOTE — PATIENT INSTRUCTIONS
Nutrition Rx & Recommendations:  · Follow proper oral care; Try baking soda/salt water rinse recipe (mix 3/4 tsp salt + 1 tsp baking soda + 1 qt water; rinse with plain water after using) in Eating Hints book (pg 18)  Brush your teeth before/after meals & before bed  · Weigh yourself regularly  If you notice weight loss, make an effort to increase your daily food/calorie intake  If you continue to notice loss after these efforts, reach out to your dietitian to establish a plan to stabilize weight  · Tube Feeding Plan:  · Recommend initiating nocturnal pump feeding due to pts intolerance to bolus feedings (nausea, vomiting, regurgitation, and inability to consume adequate calories via bolus feedings): Initiation: Recommend initiating TwoCal HN at 20 mL/hr via G-tube cycled over 15 hours (as tolerated) and advance rate by 10 mL every 8 hours (as tolerated) until goal rate is achieved  TF Goal: TwoCal HN at 80 mL/hr via G-tube cycled over ~15 hours daily (or until 5 cartons is infused)  Water Flushin mL free water flush at the beginning and end of TF infusion plus 125 mL free water flush 6 times per day  (total of 1000 mL/day in flushes; flushes should be spread throughout the day and every 2-3 hours during TF infusion; will need to adjust flushes prn for IV and PO fluids)  Enteral Nutrition goal to provide: 1185 mL volume (5 cartons day), 2370 kcal, 99 grams protein, 830 mL free water, 1830 mL total water daily  *Pt requires an enteral feeding pump to administer TF due to intolerance to bolus tube feeding  Recommend enteral backpack for pump transport  Stay upright at least 30 degrees while pump is running  Patient will need home teaching on how to use enteral pump  · In the meantime, you can try infusing Boost Plus or Ensure Plus via G-tube to see if this is tolerated better    If this is tolerated much better, you could benefit from a different formula such as Jevity 1 5 or Osmolite 1 5     · [facility] when you have 10 days left of TF supplies: [phone], option 3 (customer support)  · Call me at [phone] with any intolerance to your tube feeding (nausea, vomiting, cramping, diarrhea, coughing, etc  That is associated with your tube feeding)      Follow Up Plan: 3/17 at 1pm for a phone follow up   Recommend Referral to Other Providers: none at this time

## 2021-03-04 NOTE — Clinical Note
Wang Ortiz,  Please see my note dated today  I am recommending Jessicapalmer Olson switch to pump feedings d/t ongoing intolerance to bolus feedings  My note details my recommendations for progression and goal   He will need new orders sent to [facility]  He and his wife will also need home enteral pump teaching  Please let me know if you have any questions or concerns    Thank you,  David Naylor

## 2021-03-05 ENCOUNTER — TELEPHONE (OUTPATIENT)
Dept: NUTRITION | Facility: CLINIC | Age: 66
End: 2021-03-05

## 2021-03-05 ENCOUNTER — TELEPHONE (OUTPATIENT)
Dept: GASTROENTEROLOGY | Facility: CLINIC | Age: 66
End: 2021-03-05

## 2021-03-05 NOTE — TELEPHONE ENCOUNTER
I spoke to patient's wife, Beth Smith ([phone])  She and Angie Gomez are aware of nutrition consult recommendations which include tube feed via pump or try infusing boost plus or ensure plus to see if tolerated better, also increase free water flush  Beth Smith said [facility] is helping them in the home with technique and tube assessment  They wish to try bolus feeds with tips provided by nutrition and help from [facility] for another week  If no improvement on how Angie Gomez is tolerating, they will consider infusing feed via pump  She will call back with update

## 2021-03-05 NOTE — TELEPHONE ENCOUNTER
Call back received from Magnus Barone  RD stated that she was working with pt on getting in more free water flushes throughout the day and drinking Boost VHC po daily  She reported that pt is only getting in 1 can of TwoCal per day via G-tube  RD also suggesting to pt and wife that he can mix his TwoCal with water to help thin it out so pt can self administer gravity bolus feedings during the day while wife is at work  RD suggested that wife could even pre-mix TwoCal with water to have it ready to go for pt  Reviewed case with Magnus Barone and discussed my findings from yesterdays assessment  Discussed that our messaging to pt and wife seems to be consistent and that we are working towards the same goals  Informed her that I am recommending pt consider a pump feeding or formula change at this time since he is unable to tolerate the TwoCal bolus feedings  Discussed that GI spoke with wife and they want to give bolus feedings one more week before considering a pump feeding  Both Magnus Barone and I will continue to follow pt and coordinate care

## 2021-03-05 NOTE — TELEPHONE ENCOUNTER
Received a voice message from [facility] ([id number]) requesting a call back to discuss Ry's case  Called [facility] back, no answer, left a voice message with my name, number, and availability to talk  Will await call back

## 2021-03-05 NOTE — TELEPHONE ENCOUNTER
----- Message from Sugar Lou RD sent at 3/4/2021  1:59 PM EST -----  Wang Ramírez see my note dated today  I am recommending Avery Smith switch to pump feedings d/t ongoing intolerance to bolus feedings  My note details my recommendations for progression and goal   He will need new orders sent to [facility]  He and his wife will also need home enteral pump teaching  Please let me know if you have any questions or concerns  Thank you,Kasey

## 2021-03-08 ENCOUNTER — LAB (OUTPATIENT)
Dept: LAB | Facility: CLINIC | Age: 66
End: 2021-03-08
Payer: MEDICARE

## 2021-03-08 ENCOUNTER — TELEPHONE (OUTPATIENT)
Dept: NUTRITION | Facility: CLINIC | Age: 66
End: 2021-03-08

## 2021-03-08 DIAGNOSIS — R11.0 NAUSEA: ICD-10-CM

## 2021-03-08 DIAGNOSIS — C16.0 GASTROESOPHAGEAL CANCER (HCC): ICD-10-CM

## 2021-03-08 DIAGNOSIS — C15.5 MALIGNANT NEOPLASM OF LOWER THIRD OF ESOPHAGUS (HCC): ICD-10-CM

## 2021-03-08 DIAGNOSIS — E86.0 DEHYDRATION: ICD-10-CM

## 2021-03-08 LAB
ALBUMIN SERPL BCP-MCNC: 2.7 G/DL (ref 3.5–5)
ALP SERPL-CCNC: 103 U/L (ref 46–116)
ALT SERPL W P-5'-P-CCNC: 10 U/L (ref 12–78)
ANION GAP SERPL CALCULATED.3IONS-SCNC: 8 MMOL/L (ref 4–13)
AST SERPL W P-5'-P-CCNC: 12 U/L (ref 5–45)
BASOPHILS # BLD AUTO: 0.05 THOUSANDS/ΜL (ref 0–0.1)
BASOPHILS NFR BLD AUTO: 1 % (ref 0–1)
BILIRUB SERPL-MCNC: 0.49 MG/DL (ref 0.2–1)
BUN SERPL-MCNC: 16 MG/DL (ref 5–25)
CALCIUM ALBUM COR SERPL-MCNC: 10.4 MG/DL (ref 8.3–10.1)
CALCIUM SERPL-MCNC: 9.4 MG/DL (ref 8.3–10.1)
CHLORIDE SERPL-SCNC: 101 MMOL/L (ref 100–108)
CO2 SERPL-SCNC: 28 MMOL/L (ref 21–32)
CREAT SERPL-MCNC: 1.03 MG/DL (ref 0.6–1.3)
EOSINOPHIL # BLD AUTO: 0.12 THOUSAND/ΜL (ref 0–0.61)
EOSINOPHIL NFR BLD AUTO: 1 % (ref 0–6)
ERYTHROCYTE [DISTWIDTH] IN BLOOD BY AUTOMATED COUNT: 18.9 % (ref 11.6–15.1)
GFR SERPL CREATININE-BSD FRML MDRD: 75 ML/MIN/1.73SQ M
GLUCOSE SERPL-MCNC: 105 MG/DL (ref 65–140)
HCT VFR BLD AUTO: 28 % (ref 36.5–49.3)
HGB BLD-MCNC: 8.2 G/DL (ref 12–17)
IMM GRANULOCYTES # BLD AUTO: 0.03 THOUSAND/UL (ref 0–0.2)
IMM GRANULOCYTES NFR BLD AUTO: 0 % (ref 0–2)
LYMPHOCYTES # BLD AUTO: 1.89 THOUSANDS/ΜL (ref 0.6–4.47)
LYMPHOCYTES NFR BLD AUTO: 21 % (ref 14–44)
MCH RBC QN AUTO: 25.3 PG (ref 26.8–34.3)
MCHC RBC AUTO-ENTMCNC: 29.3 G/DL (ref 31.4–37.4)
MCV RBC AUTO: 86 FL (ref 82–98)
MONOCYTES # BLD AUTO: 0.77 THOUSAND/ΜL (ref 0.17–1.22)
MONOCYTES NFR BLD AUTO: 9 % (ref 4–12)
NEUTROPHILS # BLD AUTO: 5.97 THOUSANDS/ΜL (ref 1.85–7.62)
NEUTS SEG NFR BLD AUTO: 68 % (ref 43–75)
NRBC BLD AUTO-RTO: 0 /100 WBCS
PLATELET # BLD AUTO: 531 THOUSANDS/UL (ref 149–390)
PMV BLD AUTO: 11 FL (ref 8.9–12.7)
POTASSIUM SERPL-SCNC: 3.8 MMOL/L (ref 3.5–5.3)
PROT SERPL-MCNC: 7.6 G/DL (ref 6.4–8.2)
RBC # BLD AUTO: 3.24 MILLION/UL (ref 3.88–5.62)
SODIUM SERPL-SCNC: 137 MMOL/L (ref 136–145)
WBC # BLD AUTO: 8.83 THOUSAND/UL (ref 4.31–10.16)

## 2021-03-08 PROCEDURE — 80053 COMPREHEN METABOLIC PANEL: CPT

## 2021-03-08 PROCEDURE — 36415 COLL VENOUS BLD VENIPUNCTURE: CPT

## 2021-03-08 PROCEDURE — 85025 COMPLETE CBC W/AUTO DIFF WBC: CPT

## 2021-03-08 NOTE — TELEPHONE ENCOUNTER
Phone call placed to wife to check in to see how the weekend went with Franklin's TF  [employer] reports the weekend "went horrible "    Wife verified pts wt: (3/5/21) 140 4#  They tried 1 can TwoCal mixed with 6 oz water - pt could self administer bolus feeding, but very slowly  Pt and wife want to give bolus feedings 1 more week before considering pump feeding  They have not tried Boost Plus or Ensure Plus via G-tube per this RD's recommendations because pt only wanted to use TwoCal because he thinks it is better  Reviewed why a change in formula may be beneficial, wife agrees to try Boost Plus tonight  Yesterday:  -Pt had 1 can of TwoCal at 11am and kept it down  -Then tried to drink half of a Boost VHC a little bit later, kept it down  -Then later in the afternoon he infused 1/2 can of TwoCal and regurgitated it  Wife wondering if Franklin's inconsistency tolerating his TF could be r/t G-tube malpositioning  Encouraged her to reach out to pts GI doctor to discuss further  All questions/concerns addressed at this time

## 2021-03-08 NOTE — PROGRESS NOTES
Outpatient Oncology Nutrition Consult  Type of Consult: Follow Up  Care Location: Telephone Call - Eddie Fanny goes by Lexx Chaparro  Met with pt and wife by phone separately as wife was at work  Nutrition Assessment:    Oncology Diagnosis & Treatments: Stage IV metastatic gastroesophageal cancer      -Undergoing chemotherapy in the palliative setting (5FU) - restarted tx on 2/24/21     Oncology History   Malignant neoplasm of lower third of esophagus ([facility] [address] )   7/26/2019 Initial Diagnosis    Malignant neoplasm of lower third of esophagus ([facility] [address] )     8/1/2019 -  Chemotherapy    fluorouracil (ADRUCIL) injection 750 mg, 400 mg/m2 = 750 mg, Intravenous, Once, 33 of 38 cycles  Administration: 750 mg (8/1/2019), 750 mg (8/13/2019), 750 mg (8/27/2019), 750 mg (9/10/2019), 750 mg (9/24/2019), 750 mg (10/8/2019), 795 mg (10/22/2019), 795 mg (11/5/2019), 795 mg (11/19/2019), 795 mg (12/3/2019), 795 mg (12/17/2019), 795 mg (12/31/2019), 795 mg (1/14/2020), 795 mg (1/28/2020), 795 mg (2/11/2020), 795 mg (2/25/2020), 795 mg (3/10/2020), 795 mg (3/24/2020), 795 mg (4/7/2020), 795 mg (5/13/2020), 795 mg (5/27/2020), 795 mg (6/10/2020), 795 mg (6/24/2020), 795 mg (7/22/2020), 795 mg (8/5/2020), 790 mg (8/19/2020), 750 mg (10/7/2020), 795 mg (4/29/2020), 750 mg (10/21/2020), 750 mg (11/4/2020), 750 mg (11/18/2020), 735 mg (2/24/2021), 735 mg (3/10/2021)  pegfilgrastim (NEULASTA ONPRO) subcutaneous injection kit 6 mg, 6 mg, Subcutaneous, Once, 2 of 2 cycles  Administration: 6 mg (8/3/2019), 6 mg (8/15/2019)  leucovorin 750 mg in dextrose 5 % 250 mL IVPB, 748 mg, Intravenous, Once, 21 of 21 cycles  Administration: 750 mg (8/1/2019), 750 mg (8/13/2019), 750 mg (8/27/2019), 750 mg (9/10/2019), 750 mg (9/24/2019), 750 mg (10/8/2019), 800 mg (10/22/2019), 800 mg (11/5/2019), 800 mg (11/19/2019), 800 mg (12/3/2019), 800 mg (12/17/2019), 800 mg (12/31/2019), 800 mg (1/14/2020), 800 mg (1/28/2020), 800 mg (2/11/2020), 800 mg (2/25/2020), 800 mg (3/10/2020), 800 mg (3/24/2020), 800 mg (4/7/2020), 800 mg (5/13/2020), 800 mg (4/29/2020)  oxaliplatin (ELOXATIN) 158 95 mg in dextrose 5 % 250 mL chemo infusion, 85 mg/m2 = 158 95 mg, Intravenous, Once, 6 of 6 cycles  Administration: 158 95 mg (8/1/2019), 158 95 mg (8/13/2019), 158 95 mg (8/27/2019), 158 95 mg (9/10/2019), 158 95 mg (9/24/2019), 158 95 mg (10/8/2019)     Gastroesophageal cancer ([facility] [address] )   7/26/2019 Initial Diagnosis    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[facility] [address] )     8/1/2019 -  Chemotherapy    fluorouracil (ADRUCIL) injection 750 mg, 400 mg/m2 = 750 mg, Intravenous, Once, 33 of 38 cycles  Administration: 750 mg (8/1/2019), 750 mg (8/13/2019), 750 mg (8/27/2019), 750 mg (9/10/2019), 750 mg (9/24/2019), 750 mg (10/8/2019), 795 mg (10/22/2019), 795 mg (11/5/2019), 795 mg (11/19/2019), 795 mg (12/3/2019), 795 mg (12/17/2019), 795 mg (12/31/2019), 795 mg (1/14/2020), 795 mg (1/28/2020), 795 mg (2/11/2020), 795 mg (2/25/2020), 795 mg (3/10/2020), 795 mg (3/24/2020), 795 mg (4/7/2020), 795 mg (5/13/2020), 795 mg (5/27/2020), 795 mg (6/10/2020), 795 mg (6/24/2020), 795 mg (7/22/2020), 795 mg (8/5/2020), 790 mg (8/19/2020), 750 mg (10/7/2020), 795 mg (4/29/2020), 750 mg (10/21/2020), 750 mg (11/4/2020), 750 mg (11/18/2020), 735 mg (2/24/2021), 735 mg (3/10/2021)  pegfilgrastim (NEULASTA ONPRO) subcutaneous injection kit 6 mg, 6 mg, Subcutaneous, Once, 2 of 2 cycles  Administration: 6 mg (8/3/2019), 6 mg (8/15/2019)  leucovorin 750 mg in dextrose 5 % 250 mL IVPB, 748 mg, Intravenous, Once, 21 of 21 cycles  Administration: 750 mg (8/1/2019), 750 mg (8/13/2019), 750 mg (8/27/2019), 750 mg (9/10/2019), 750 mg (9/24/2019), 750 mg (10/8/2019), 800 mg (10/22/2019), 800 mg (11/5/2019), 800 mg (11/19/2019), 800 mg (12/3/2019), 800 mg (12/17/2019), 800 mg (12/31/2019), 800 mg (1/14/2020), 800 mg (1/28/2020), 800 mg (2/11/2020), 800 mg (2/25/2020), 800 mg (3/10/2020), 800 mg (3/24/2020), 800 mg (4/7/2020), 800 mg (5/13/2020), 800 mg (4/29/2020)  oxaliplatin (ELOXATIN) 158 95 mg in dextrose 5 % 250 mL chemo infusion, 85 mg/m2 = 158 95 mg, Intravenous, Once, 6 of 6 cycles  Administration: 158 95 mg (8/1/2019), 158 95 mg (8/13/2019), 158 95 mg (8/27/2019), 158 95 mg (9/10/2019), 158 95 mg (9/24/2019), 158 95 mg (10/8/2019)       Patient Active Problem List   Diagnosis    PTSD (post-traumatic stress disorder)    Essential hypertension    Major depression    OCD (obsessive compulsive disorder)    Anxiety    [facility]    Gastro[facility]ophageal cancer ([facility] )    Cancer-related pain    Chemotherapy-induced nausea    Severe protein-calorie malnutrition (Horace Angry: less than 60% of standard weight) ([facility] )    Malignant ascites    Recovering alcoholic (HCC)    Other fatigue    Other dysphagia    Nausea    Dehydration    Skin fissures    Goals of care, counseling/discussion    Palliative care patient    Other insomnia    Counseling on health promotion and disease prevention    Elevated PSA    Memory loss    Regurgitation of stomach contents    Falls    Superficial thrombophlebitis of lower extremity    Pneumonia of left lung due to infectious organism    PAF (paroxysmal atrial fibrillation) (HCC)    Moderate protein-calorie malnutrition (HCC)    Dilated cardiomyopathy (HCC)    Urge urinary incontinence    Aspiration pneumonia (HCC)    Chronic combined systolic and diastolic CHF (congestive heart failure) ([facility] [address] )    Port-A-Cath in place    Hematuria    Hyponatremia    Hypoglycemia    Severe protein-calorie malnutrition (HCC)    Cavitating mass in left upper lung lobe    Immunocompromised patient ([facility] )    Centrilobular emphysema (HCC)    Leukocytosis    Iron deficiency anemia secondary to inadequate dietary iron intake     Past Medical History:   Diagnosis Date    Anxiety     Cancer ([facility] ) 08/2019    metastatic gastroesophageal cancer    Dehydration     Depression  [facility]     Fatigue     History of chemotherapy     currently started 8/2019    History of DVT (deep vein thrombosis)     Hypertension     currently is normal and may not need medication    Lung infection     chemo currently on hold because of the infection 12/22/2020    Nausea     Pneumonia     x2    Port-A-Cath in place     right    Psychiatric disorder     Recovering alcoholic ([facility] )     sober since 1990    Varicose veins of left lower extremity      Past Surgical History:   Procedure Laterality Date    APPENDECTOMY      COLONOSCOPY      IR GASTROSTOMY TUBE PLACEMENT  2/5/2021    IR PORT PLACEMENT  7/31/2019    UPPER GASTROINTESTINAL ENDOSCOPY         Review of Medications:   Vitamins, Supplements and Herbals: No, pt denies taking supplements     Current Outpatient Medications:     amiodarone 200 mg tablet, TAKE ONE TABLET BY MOUTH EVERY DAY WITH BREAKFAST (Patient taking differently: 200 mg every morning ), Disp: 30 tablet, Rfl: 2    ammonium lactate (LAC-HYDRIN) 12 % cream, Apply topically as needed for dry skin On hands and feet, Disp: 385 g, Rfl: 2    apixaban (Eliquis) 5 mg, Take 1 tablet (5 mg total) by mouth 2 (two) times a day, Disp: 60 tablet, Rfl: 0    diazepam (VALIUM) 5 mg tablet, Take 1 tablet (5 mg total) by mouth 2 (two) times a day (Patient taking differently: Take 10 mg by mouth daily at bedtime ), Disp: 60 tablet, Rfl: 5    [START ON 3/24/2021] fluorouracil 4,415 mg in CADD infusion pump, Infuse 4,415 mg (1,200 mg/m2/day x 1 84 m2 (Treatment plan recorded BSA)) into a venous catheter over 46 hours for 2 days [facility] to be administered on, Disp: 1 Device, Rfl: 0    hydrochlorothiazide (HYDRODIURIL) 12 5 mg tablet, Take one tablet daily as needed for SBP> 160/90, Disp: 90 tablet, Rfl: 3    ondansetron (ZOFRAN) 4 mg tablet, Take 2 tablets (8 mg total) by mouth every 8 (eight) hours as needed for nausea or vomiting, Disp: 75 tablet, Rfl: 0    pantoprazole (PROTONIX) 40 mg tablet, Take 1 tablet (40 mg total) by mouth 2 (two) times a day, Disp: 60 tablet, Rfl: 2    venlafaxine (EFFEXOR-XR) 150 mg 24 hr capsule, Take 1 capsule (150 mg total) by mouth daily, Disp: 30 capsule, Rfl: 0    Most Recent Lab Results:   Lab Results   Component Value Date    WBC 8 83 03/08/2021    IRON 13 (L) 09/01/2020    TIBC 341 09/01/2020    FERRITIN 175 09/01/2020    ALT 10 (L) 03/08/2021    AST 12 03/08/2021    ALB 2 7 (L) 03/08/2021    SODIUM 137 03/08/2021    SODIUM 137 02/23/2021    K 3 8 03/08/2021    K 4 0 02/23/2021     03/08/2021    BUN 16 03/08/2021    BUN 20 02/23/2021    CREATININE 1 03 03/08/2021    CREATININE 1 17 02/23/2021    EGFR 75 03/08/2021    PHOS 3 0 09/25/2020    PHOS 3 0 09/24/2020    POCGLU 104 09/25/2020    GLUF 184 (H) 02/23/2021    GLUF 99 01/14/2021    GLUC 105 03/08/2021    CALCIUM 9 4 03/08/2021    FOLATE 10 6 09/01/2020    MG 1 7 09/25/2020     Anthropometric Measurements:   Height: 69"  Ht Readings from Last 1 Encounters:   03/16/21 5' 10" (1 778 m)     Wt Readings from Last 20 Encounters:   03/16/21 62 6 kg (138 lb)   03/10/21 64 4 kg (141 lb 15 6 oz)   02/24/21 65 7 kg (144 lb 13 5 oz)   01/26/21 65 5 kg (144 lb 8 oz)   01/13/21 68 5 kg (151 lb 0 2 oz)   01/07/21 66 2 kg (146 lb)   12/30/20 65 8 kg (145 lb)   12/22/20 68 kg (150 lb)   12/11/20 69 4 kg (153 lb)   11/19/20 72 4 kg (159 lb 9 8 oz)   11/18/20 71 1 kg (156 lb 12 oz)   11/17/20 68 9 kg (152 lb)   11/05/20 74 4 kg (164 lb)   11/04/20 70 4 kg (155 lb 3 3 oz)   10/21/20 72 8 kg (160 lb 7 9 oz)   10/07/20 74 4 kg (164 lb 0 4 oz)   09/29/20 71 7 kg (158 lb)   09/25/20 74 2 kg (163 lb 9 3 oz)   09/18/20 77 1 kg (170 lb)   09/08/20 76 9 kg (169 lb 8 5 oz)     · Home Wts: (10/15/20) 166#, (2/8/21) 150#, (2/10/21) 148#, (2/17/21) 143# (states he was 140# 6 days prior so he gained 3# since then, (2/22/21) 143#, (3/4/21) 144# (?accuracy of this wt, per pt report, scale was not functioning properly during visit) , (3/5/21) 140 4#, (3/17/21) 138#  · UBW: 198-212#    Oncology Nutrition-Anthropometrics      Nutrition from 3/17/2021 in [facility]   Patient age (years):  77 years  77 years   Patient (male) height (in):  71 in  71 in   Current weight (lbs):  138 lbs  144 lbs   Current weight to be used for anthropometric calculations (kg)  62 7 kg  65 5 kg   BMI:  20 4  21 3   IBW male  160 lb  160 lb   IBW (kg) male  72 7 kg  72 7 kg   IBW % (male)  86 2 %  90 %   Adjusted BW (male):  154 5 lbs  156 lbs   Adjusted BW in kg (male):  70 2 kg  70 9 kg   % weight change after 1 week:  -1 7 %  -0 6 %   Weight change after 1 week (lbs)  -2 4 lbs  -0 8 lbs   % weight change after 1 month:  -3 5 %  -4 %   Weight change after 1 month (lbs)  -5 lbs  -6 lbs   % weight change after 3 months:  (!) -9 8 %  (!) -15 %   Weight change after 3 months (lbs)  -15 lbs  -25 5 lbs   % weight change after 6 months:  (!) -18 8 %  --   Weight change after 6 months (lbs)  -32 lbs  --        Nutrition-Focused Physical Findings: n/a due to telephone call       Food/Nutrition-Related History & Client/Social History:    Current Nutrition Impact Symptoms:  [x] Nausea - "all the time", significant [x] Reduced Appetite - "none" [x] Acid Reflux & Regurgitation    [x] Vomiting - significant, almost each time he eats po, vomiting and regurgitation after bolus feedings as well per wife [x] Unintended Wt Loss - wt loss picked up again in August 2020 d/t vomiting with meals [] Malabsorption    [] Diarrhea  [] Unintended Wt Gain  [] Dumping Syndrome    [] Constipation   +BM EOD, soft BM's, Miralax prn [] Thick Mucous/Secretions  [] Abdominal Pain    [x] Dysgeusia (Altered Taste) - says foods don't taste the same, "everything tastes like cardboard"  -practicing good oral care [] Xerostomia (Dry Mouth)  [x] Bloating - all the time, sometimes better first thing in the morning   [] Dysosmia (Altered Smell)  [] Thrush  [] Difficulty Chewing    [] Oral Mucositis (Sore Mouth)  [x] Fatigue - significant [] Other:   [] Odynophagia  [] Esophagitis  [] Other:    [x] Dysphagia -   -Pt reports esophageal dysphagia and regurgitation has progressively gotten worse over the past couple of months   -Has had VBS and has been seen by ST in the past   -Per GI 2/2/21: "barium swallow shows lower esophageal mass and delayed  esophageal emptying"  -Wife stated 2/3/21 that pt cannot have a stent placed  -S/p PEG placed by IR 2/5/21 (ordered by Dr Ramone Lehman)  -Receiving home ST who is recommending a soft diet per wife [x] Early Satiety [] No Problems Eating      Food Allergies & Intolerances: no  -For Gout, avoids: organ meats, shellfish  Last gout flare was 2 years ago after a liverwurst sandwich  Current Diet: Full Liquids and Tube Feeding   Current Nutrition Intake: Unchanged from last visit   Appetite: None  Nutrition Route: PO and G-tube  Oral Care: Brushing BID, Flossing QD, Biotene Mouthwash QD  Activity level: Home PT    24 Hr Diet Recall: only some fluids po for the past 3 days d/t N/V    Beverages: water, V8, Boost VHC    -Averaging 12-20 oz per day of po fluids  Supplements:    Boost Very High Calorie (8 oz, 530 kcal, 22 g pro) - 4-8 oz per day     EN Recall:  DME: [employer]  Access Type: 16F G-tube placed 2/5/21 (ordered by GI - Dr Ramone Lehman)   Formula: Savannah Brownie - order for 5 cartons/day sent by GI 2/9/21  Method: Bolus - pt cannot administer bolus feedings independently  Regimen: no bolus feedings for 4-5 days per wife, was infusing 1-2 cans of TwoCal/day prior to this  -Tried Boost Plus a couple times via G-tube - tolerated better, less regurgitation and fullness    -Tolerance to TwoCal HN: +N/V, bloating, & regurgitation after feedings despite proper technique      Flushing: n/a for 4-5 days  Additions: n/a  EN providing: n/a    Total Fluid Intake: 12-20 oz PO + 0 oz via EN = 12-20 oz/day (21% est needs)    Est Needs Notes:  Fluids needs for hx of CHF  Oncology Nutrition-Estimated Needs      Nutrition from 3/17/2021 in [facility] from 2021 in [facility]   Weight type used  Actual weight  Actual weight   Weight in kilograms (kg) used for estimated needs  62 7 kg  68 2 kg   Energy needs formula:   35-40 kcal/kg  35-40 kcal/kg   Energy needs based on 35 kcal/k kcal  2386 kcal   Energy needs based on 40 kcal/k kcal  2727 kcal   Protein needs formula:  1 5-2 g/kg  1 5-2 g/kg   Protein needs based on 1 5 g/kg  94 g  102 g   Protein needs based on 2 g/kg  125 g  136 g   Fluid needs formula:  25-30 mL/kg  25-30 mL/kg   Fluid needs based on 25 mL/kg  1575 mL  1700 mL   Fluid needs in ounces  53 oz  57 oz   Fluid needs based on 30 mL/kg  1890 mL  2040 mL   Fluid needs in ounces  64 oz  69 oz          Discussion & Intervention:    Star Encarnacion was evaluated today for an RD follow up regarding wt loss, poor po intake, enteral nutrition and nutrition impact sx management  Star Encarnacion is currently undergoing tx for gastroesophageal cancer (chemotherapy)  Spoke with pt and wife separately today  Star Encarnacion and Elisa Moore stated that he has not received anything via G-tube for 4-5 days now  He is only drinking 12-20 oz per day of fluids and through this only consuming minimal sources of calories and nutrients  He did try Boost Plus via G-tube a few times since last visit which was tolerated better but not well enough to continued  Star Encarnacion is at high risk for worsening malnutrition, dehydration, and hospital admission due to ongoing poor nutritional status  He is now at risk for refeeding syndrome d/t prolonged malnutrition  Reviewed my recommendations for switching to a pump feeding at this time  Shaheed Saleem and Elisa Moore are now willing to try a pump feeding    Will reach out to [place]'s GI doctor to help coordinate pump feeding  Reviewed 24 hour recall, which revealed an inadequate po intake and enteral intake, and discussed ways to increase kcal, protein, and fluid intakes  Also reviewed the importance of wt management throughout the tx process and the role of enteral nutrition in managing wt and overall health  Based on today's assessment, discussion included: MNT for: bloating, fullness, regurgitation, hydration, N/V, esophageal dysphagia, wt loss, poor appetite, enteral nutrition, practicing proper feeding tube use & care and individualized enteral nutrition recommendations & plan: initiate pump feeding as per below  · Enteral nutrition is needed for Ry's sole source of nutrition, Recommend initiating nocturnal pump feeding due to pts intolerance to bolus feedings (nausea, vomiting, regurgitation, bloating, and inability to consume adequate calories via bolus feedings): Initiation: Recommend initiating TwoCal HN at 20 mL/hr via G-tube cycled over 15 hours (as tolerated) and advance rate by 10 mL every 12 hours (as tolerated) until goal rate is achieved  TF Goal Rate: TwoCal HN at 80 mL/hr via G-tube cycled over ~15 hours daily (or until 5 cartons is infused)  Water Flushin mL free water flush at the beginning and end of TF infusion plus 125 mL free water flush 6 times per day  (total of 1000 mL/day in flushes; flushes should be spread throughout the day and every 2-3 hours during TF infusion; will need to adjust flushes prn for IV and PO fluids)  Enteral Nutrition goal to provide: 1185 mL volume (5 cartons day), 2370 kcal, 99 grams protein, 830 mL free water, 1830 mL total water daily  *Pt requires an enteral feeding pump to administer TF due to intolerance to bolus tube feeding  Recommend enteral backpack for pump transport  Stay upright at least 30-45 degrees while pump is running  Patient will need home teaching on how to use enteral pump      · In the meantime:  · Ensure adequate hydration by increasing water flushing  Aim for a total of ~60 oz per day of fluids (by mouth and by feeding tube)  · You can continue infusing Boost Plus via G-tube as tolerated  Pt is at high risk for refeeding syndrome, recommend:  Starting TF at 20 mL/hr and advance rate by 10 mL every 12 hrs (as tolerated) until goal rate is achieved  Monitor labs closely (glucose, sodium, potassium, magnesium, phosphorus, thiamine, etc )  Monitor for rapid weight changes and edema  Clinical features of refeeding syndrome are fluid-balance abnormalities, abnormal glucose metabolism, hypophosphatemia, hypomagnesemia, and hypokalemia  In addition, thiamine deficiency can occur  Moving forward, Harrison Alcocer was encouraged to remain adequately hydrated as best possible and switch to a pump feeding to improve tolerance to TF and improve nutritional status  Marcus Goldsmith know that I will reach out to pts GI physician with my TF recommendations for coordination with [email or web address]  Materials Provided: e-mailed to pt (@ [email or web address]'): Nutrition Rx & Recommendations  All questions and concerns addressed during todays visit  Harrison Alcocer has RD contact information  Nutrition Diagnosis:  · Inadequate Energy Intake related to physiological causes, disease state and treatment related issues as evidenced by food recall, wt loss and discussion with pt and/or family  · Increased Nutrient Needs (kcal & pro) related to increased demand for nutrients and disease state as evidenced by cancer dx and pt undergoing tx for cancer  · Swallowing Difficulty related to diagnosis/treatment related causes as evidenced by N/V and regurgitation with po feedings  · Intake- Oral or Nutritional Supplement Intake: Inadequate enteral nutrition infusion  related to EN intolerance, GI distress/pain, Inadequate EN/PN as evidenced by nausea, vomiting, bloating, and regurgitation related to bolus TF    · Patient has clinical indicators (or ASPEN criteria) consistent with severe protein-calorie malnutrition in the context of Chronic Illness as evidenced by >7 5% wt loss in 3 months, >10% wt loss in 6 months and </=75% energy intake vs  Estimated needs for >/=1 month  Monitoring & Evaluation:   Goals:  · adequate nutrition impact symptom management  · pt to meet >/=75% estimated nutrition needs daily  · weight gain of 1-2# per week  · achieve tube feeding goal rate   · tolerate tube feeding goal    · Progress Towards Goals: Not Progressing and Not Met    Nutrition Rx & Recommendations:  · Follow proper oral care; Try baking soda/salt water rinse recipe (mix 3/4 tsp salt + 1 tsp baking soda + 1 qt water; rinse with plain water after using) in Eating Hints book (pg 18)  Brush your teeth before/after meals & before bed  · Weigh yourself regularly  If you notice weight loss, make an effort to increase your daily food/calorie intake  If you continue to notice loss after these efforts, reach out to your dietitian to establish a plan to stabilize weight  · Tube Feeding Plan:  Initiation: Recommend initiating TwoCal HN at 20 mL/hr via G-tube cycled over 15 hours (as tolerated) and advance rate by 10 mL every 12 hours (as tolerated) until goal rate is achieved  TF Goal Rate: TwoCal HN at 80 mL/hr via G-tube cycled over ~15 hours daily (or until 5 cartons is infused)  Water Flushin mL free water flush at the beginning and end of TF infusion plus 125 mL free water flush 6 times per day  (total of 1000 mL/day in flushes; flushes should be spread throughout the day and every 2-3 hours during TF infusion; will need to adjust flushes prn for IV and PO fluids)  Enteral Nutrition goal to provide: 1185 mL volume (5 cartons day), 2370 kcal, 99 grams protein, 830 mL free water, 1830 mL total water daily  *Pt requires an enteral feeding pump to administer TF due to intolerance to bolus tube feeding    Recommend enteral backpack for pump transport  Stay upright at least 30-45 degrees while pump is running  Patient will need home teaching on how to use enteral pump  · In the meantime:  · Ensure adequate hydration by increasing water flushing  Aim for a total of ~60 oz per day of fluids (by mouth and by feeding tube)  · You can continue infusing Boost Plus via G-tube as tolerated  · Call [facility] when you have 10 days left of TF supplies: [phone], option 3 (customer support)  · Call me at [phone] with any intolerance to your tube feeding (nausea, vomiting, cramping, diarrhea, coughing, etc  That is associated with your tube feeding)      Follow Up Plan: 3/22 at 11am   Recommend Referral to Other Providers: GI office to coordinate orders for pump feeding and home care services

## 2021-03-10 ENCOUNTER — TELEPHONE (OUTPATIENT)
Dept: NUTRITION | Facility: CLINIC | Age: 66
End: 2021-03-10

## 2021-03-10 ENCOUNTER — HOSPITAL ENCOUNTER (OUTPATIENT)
Dept: INFUSION CENTER | Facility: HOSPITAL | Age: 66
Discharge: HOME | End: 2021-03-10
Attending: INTERNAL MEDICINE
Payer: MEDICARE

## 2021-03-10 VITALS
RESPIRATION RATE: 16 BRPM | BODY MASS INDEX: 19.88 KG/M2 | OXYGEN SATURATION: 100 % | TEMPERATURE: 97.6 F | HEIGHT: 71 IN | HEART RATE: 85 BPM | SYSTOLIC BLOOD PRESSURE: 153 MMHG | WEIGHT: 141.98 LBS | DIASTOLIC BLOOD PRESSURE: 78 MMHG

## 2021-03-10 DIAGNOSIS — C15.5 MALIGNANT NEOPLASM OF LOWER THIRD OF ESOPHAGUS (HCC): Primary | ICD-10-CM

## 2021-03-10 DIAGNOSIS — R11.0 NAUSEA: ICD-10-CM

## 2021-03-10 DIAGNOSIS — E86.0 DEHYDRATION: ICD-10-CM

## 2021-03-10 DIAGNOSIS — C16.0 GASTROESOPHAGEAL CANCER (HCC): ICD-10-CM

## 2021-03-10 PROCEDURE — 96409 CHEMO IV PUSH SNGL DRUG: CPT

## 2021-03-10 PROCEDURE — G0498 CHEMO EXTEND IV INFUS W/PUMP: HCPCS

## 2021-03-10 PROCEDURE — 96367 TX/PROPH/DG ADDL SEQ IV INF: CPT

## 2021-03-10 RX ORDER — FLUOROURACIL 50 MG/ML
400 INJECTION, SOLUTION INTRAVENOUS ONCE
Status: COMPLETED | OUTPATIENT
Start: 2021-03-10 | End: 2021-03-10

## 2021-03-10 RX ORDER — SODIUM CHLORIDE 9 MG/ML
20 INJECTION, SOLUTION INTRAVENOUS ONCE AS NEEDED
Status: DISCONTINUED | OUTPATIENT
Start: 2021-03-10 | End: 2021-03-13 | Stop reason: HOSPADM

## 2021-03-10 RX ORDER — DEXTROSE MONOHYDRATE 50 MG/ML
20 INJECTION, SOLUTION INTRAVENOUS ONCE
Status: DISCONTINUED | OUTPATIENT
Start: 2021-03-10 | End: 2021-03-13 | Stop reason: HOSPADM

## 2021-03-10 RX ADMIN — FLUOROURACIL 735 MG: 50 INJECTION, SOLUTION INTRAVENOUS at 12:13

## 2021-03-10 RX ADMIN — SODIUM CHLORIDE 20 ML/HR: 0.9 INJECTION, SOLUTION INTRAVENOUS at 10:26

## 2021-03-10 RX ADMIN — IRON SUCROSE 200 MG: 20 INJECTION, SOLUTION INTRAVENOUS at 10:26

## 2021-03-10 RX ADMIN — DEXAMETHASONE SODIUM PHOSPHATE: 10 INJECTION, SOLUTION INTRAMUSCULAR; INTRAVENOUS at 11:38

## 2021-03-10 NOTE — PLAN OF CARE
Problem: Potential for Falls  Goal: Patient will remain free of falls  Description: INTERVENTIONS:  - Assess patient frequently for physical needs  -  Identify cognitive and physical deficits and behaviors that affect risk of falls    -  Katy fall precautions as indicated by assessment   - Educate patient/family on patient safety including physical limitations  - Instruct patient to call for assistance with activity based on assessment  - Modify environment to reduce risk of injury  - Consider OT/PT consult to assist with strengthening/mobility  Outcome: Progressing

## 2021-03-10 NOTE — TELEPHONE ENCOUNTER
Phone call placed to wife to check in to see how Sugar Tapia is doing with his TF  No answer  Left a voice message asking for a call back with an update

## 2021-03-12 ENCOUNTER — HOSPITAL ENCOUNTER (OUTPATIENT)
Dept: INFUSION CENTER | Facility: HOSPITAL | Age: 66
Discharge: HOME | End: 2021-03-12
Attending: INTERNAL MEDICINE

## 2021-03-12 VITALS — TEMPERATURE: 96.8 F

## 2021-03-12 DIAGNOSIS — E86.0 DEHYDRATION: ICD-10-CM

## 2021-03-12 DIAGNOSIS — C15.5 MALIGNANT NEOPLASM OF LOWER THIRD OF ESOPHAGUS (HCC): Primary | ICD-10-CM

## 2021-03-12 DIAGNOSIS — C16.0 GASTROESOPHAGEAL CANCER (HCC): ICD-10-CM

## 2021-03-12 DIAGNOSIS — R11.0 NAUSEA: ICD-10-CM

## 2021-03-12 NOTE — PROGRESS NOTES
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 2019  He has biopsy-proven adenocarcinoma of the esophagus    PET/CT scan was done which shows omental nodularity that is FDG avid indicating stage IV disease    Due to symptoms of worsening dysphagia, regurgitation and weight loss he had a gastrostomy tube placed on 02/05/2021 for nutritional support      Previous Hematologic/ Oncologic History:    Oncology History   Malignant neoplasm of lower third of esophagus ([facility] )   7/26/2019 Initial Diagnosis    Malignant neoplasm of lower third of esophagus ([facility] )     8/1/2019 -  Chemotherapy    fluorouracil (ADRUCIL) injection 750 mg, 400 mg/m2 = 750 mg, Intravenous, Once, 33 of 38 cycles  Administration: 750 mg (8/1/2019), 750 mg (8/13/2019), 750 mg (8/27/2019), 750 mg (9/10/2019), 750 mg (9/24/2019), 750 mg (10/8/2019), 795 mg (10/22/2019), 795 mg (11/5/2019), 795 mg (11/19/2019), 795 mg (12/3/2019), 795 mg (12/17/2019), 795 mg (12/31/2019), 795 mg (1/14/2020), 795 mg (1/28/2020), 795 mg (2/11/2020), 795 mg (2/25/2020), 795 mg (3/10/2020), 795 mg (3/24/2020), 795 mg (4/7/2020), 795 mg (5/13/2020), 795 mg (5/27/2020), 795 mg (6/10/2020), 795 mg (6/24/2020), 795 mg (7/22/2020), 795 mg (8/5/2020), 790 mg (8/19/2020), 750 mg (10/7/2020), 795 mg (4/29/2020), 750 mg (10/21/2020), 750 mg (11/4/2020), 750 mg (11/18/2020), 735 mg (2/24/2021), 735 mg (3/10/2021)  pegfilgrastim (NEULASTA ONPRO) subcutaneous injection kit 6 mg, 6 mg, Subcutaneous, Once, 2 of 2 cycles  Administration: 6 mg (8/3/2019), 6 mg (8/15/2019)  leucovorin 750 mg in dextrose 5 % 250 mL IVPB, 748 mg, Intravenous, Once, 21 of 21 cycles  Administration: 750 mg (8/1/2019), 750 mg (8/13/2019), 750 mg (8/27/2019), 750 mg (9/10/2019), 750 mg (9/24/2019), 750 mg (10/8/2019), 800 mg (10/22/2019), 800 mg (11/5/2019), 800 mg (11/19/2019), 800 mg (12/3/2019), 800 mg (12/17/2019), 800 mg (12/31/2019), 800 mg (1/14/2020), 800 mg (1/28/2020), 800 mg (2/11/2020), 800 mg (2/25/2020), 800 mg (3/10/2020), 800 mg (3/24/2020), 800 mg (4/7/2020), 800 mg (5/13/2020), 800 mg (4/29/2020)  oxaliplatin (ELOXATIN) 158 95 mg in dextrose 5 % 250 mL chemo infusion, 85 mg/m2 = 158 95 mg, Intravenous, Once, 6 of 6 cycles  Administration: 158 95 mg (8/1/2019), 158 95 mg (8/13/2019), 158 95 mg (8/27/2019), 158 95 mg (9/10/2019), 158 95 mg (9/24/2019), 158 95 mg (10/8/2019)     Gastroesophageal cancer ([facility] )   7/26/2019 Initial Diagnosis    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[facility] )     8/1/2019 -  Chemotherapy    fluorouracil (ADRUCIL) injection 750 mg, 400 mg/m2 = 750 mg, Intravenous, Once, 33 of 38 cycles  Administration: 750 mg (8/1/2019), 750 mg (8/13/2019), 750 mg (8/27/2019), 750 mg (9/10/2019), 750 mg (9/24/2019), 750 mg (10/8/2019), 795 mg (10/22/2019), 795 mg (11/5/2019), 795 mg (11/19/2019), 795 mg (12/3/2019), 795 mg (12/17/2019), 795 mg (12/31/2019), 795 mg (1/14/2020), 795 mg (1/28/2020), 795 mg (2/11/2020), 795 mg (2/25/2020), 795 mg (3/10/2020), 795 mg (3/24/2020), 795 mg (4/7/2020), 795 mg (5/13/2020), 795 mg (5/27/2020), 795 mg (6/10/2020), 795 mg (6/24/2020), 795 mg (7/22/2020), 795 mg (8/5/2020), 790 mg (8/19/2020), 750 mg (10/7/2020), 795 mg (4/29/2020), 750 mg (10/21/2020), 750 mg (11/4/2020), 750 mg (11/18/2020), 735 mg (2/24/2021), 735 mg (3/10/2021)  pegfilgrastim (NEULASTA ONPRO) subcutaneous injection kit 6 mg, 6 mg, Subcutaneous, Once, 2 of 2 cycles  Administration: 6 mg (8/3/2019), 6 mg (8/15/2019)  leucovorin 750 mg in dextrose 5 % 250 mL IVPB, 748 mg, Intravenous, Once, 21 of 21 cycles  Administration: 750 mg (8/1/2019), 750 mg (8/13/2019), 750 mg (8/27/2019), 750 mg (9/10/2019), 750 mg (9/24/2019), 750 mg (10/8/2019), 800 mg (10/22/2019), 800 mg (11/5/2019), 800 mg (2019), 800 mg (12/3/2019), 800 mg (2019), 800 mg (2019), 800 mg (2020), 800 mg (2020), 800 mg (2020), 800 mg (2020), 800 mg (3/10/2020), 800 mg (3/24/2020), 800 mg (2020), 800 mg (2020), 800 mg (2020)  oxaliplatin (ELOXATIN) 158 95 mg in dextrose 5 % 250 mL chemo infusion, 85 mg/m2 = 158 95 mg, Intravenous, Once, 6 of 6 cycles  Administration: 158 95 mg (2019), 158 95 mg (2019), 158 95 mg (2019), 158 95 mg (9/10/2019), 158 95 mg (2019), 158 95 mg (10/8/2019)         Current Hematologic/ Oncologic Treatment:    Modified FOLFOX 6  Oxaliplatin discontinued after cycle 6  Leucovorin has been discontinued     5FU 400 milligrams/meters squared  5FU 2400 milligrams/meter squared CIVI over 46 hours    ECO - Symptomatic but completely ambulatory    Interval History:  The patient returns for a follow up visit  He has completed 2 cycles of treatment since restarting chemotherapy last month  He appears to have lost weight,  His weight is down to 138 lb  He reports he has no appetite  He is supposed to be doing bolus feedings up to 5 cans per day but has been unable to tolerate this  Per his wife he is taking an average he has been taking an average 1-2 cans per day  He is following closely with nutrition and will be transition pump feedings  Hopefully, he will be able to tolerate this better  Cancer Staging:  Cancer Staging  No matching staging information was found for the patient  Molecular Testing:       Test Results:    Imaging: Mri Brain W Wo Contrast    Result Date: 2020  Narrative: MRI BRAIN WITH AND WITHOUT CONTRAST INDICATION: C15 5: Malignant neoplasm of lower third of esophagus C16 0: Malignant neoplasm of cardia G89 3: Neoplasm related pain (acute) (chronic) R11 0: Nausea  COMPARISON:  None  TECHNIQUE: Sagittal T1, axial T2, axial FLAIR, axial T1, axial [place], axial diffusion   Sagittal, axial T1 postcontrast   Axial bravo postcontrast with coronal reconstructions  IV Contrast:  8 mL of gadobutrol injection (MULTI-DOSE)  IMAGE QUALITY:   Diagnostic  FINDINGS: BRAIN PARENCHYMA:  There is no discrete mass, mass effect or midline shift  There is no intracranial hemorrhage  Normal posterior fossa  Diffusion imaging is unremarkable  Small scattered hyperintensities on T2/FLAIR imaging are noted in the periventricular and subcortical white matter demonstrating an appearance that is statistically most likely to represent moderate microangiopathic change  Postcontrast imaging of the brain demonstrates no abnormal enhancement  VENTRICLES:  Diffuse brain atrophy, most severe in the frontotemporal lobes  SELLA AND PITUITARY GLAND:  Normal  ORBITS:  Normal  PARANASAL SINUSES:  Normal  VASCULATURE:  Evaluation of the major intracranial vasculature demonstrates appropriate flow voids  CALVARIUM AND SKULL BASE:  Normal  EXTRACRANIAL SOFT TISSUES:  Normal      Impression: 1  No MR evidence of acute ischemia  2  Brain volume loss/atrophy and microangiopathy as described above  3  No enhancing lesions to suggest brain metastatic disease  Workstation performed: [id number]     Ct Chest Abdomen Pelvis W Contrast    Result Date: 7/15/2020  Narrative: CT CHEST, ABDOMEN AND PELVIS WITH IV CONTRAST INDICATION:   C15 5: Malignant neoplasm of lower third of esophagus C16 0: Malignant neoplasm of cardia G89 3: Neoplasm related pain (acute) (chronic) R11 0: Nausea  Follow-up metastatic gastroesophageal carcinoma COMPARISON:  5/20/2020; 1/13/2020; 7/15/2019 TECHNIQUE: CT examination of the chest, abdomen and pelvis was performed  Axial, sagittal, and coronal 2D reformatted images were created from the source data and submitted for interpretation  Radiation dose length product (DLP) for this visit:  595 18 mGy-cm     This examination, like all CT scans performed in the [facility], was performed utilizing techniques to minimize radiation dose exposure, including the use of iterative  reconstruction and automated exposure control  IV Contrast:  100 mL of iohexol (OMNIPAQUE) Enteric Contrast: Enteric contrast was administered  FINDINGS: CHEST LUNGS:  Bullous emphysema is present  There is a stable 4 mm pleural-based nodule seen in the right middle lobe on image 64  6 mm nodule stable in the right middle lobe on image 71  3 mm nodule in the lingula is stable  2 mm nodule is stable on image 81 in the right middle lobe  There is no tracheal or endobronchial lesion  PLEURA:  Unremarkable  HEART/GREAT VESSELS:  Unremarkable for patient's age  MEDIASTINUM AND NEVAEH:  Diffuse esophageal thickening is similar to the prior study  CHEST WALL AND LOWER NECK:   Port-A-Cath is seen with tip in SVC  ABDOMEN LIVER/BILIARY TREE:  Small hypodensity is noted on image 61, series 2 measuring 5 mm without change  5 mm hypodensity on image 56 is stable  9 mm hypodensity is stable on image 54 and segment 3  A few other small hypodensities are stable and are small  GALLBLADDER:  No calcified gallstones  No pericholecystic inflammatory change  SPLEEN:  Unremarkable  PANCREAS:  Unremarkable  ADRENAL GLANDS:  Unremarkable  KIDNEYS/URETERS:  Multiple renal cysts are seen bilaterally    No hydronephrosis  There is focal thickening at the gastric cardia near the GE junction  This may be slightly thicker than the study of May although may be related to differences in distention  The superior wall of the coronal sequence measures 2 2 cm as compared to 1 5  The inferior wall measures 9 mm without change  STOMACH AND BOWEL:  Left colon diverticulosis is seen without CT evidence of diverticulitis  APPENDIX:  The patient is status post appendectomy  ABDOMINOPELVIC CAVITY:  No ascites  No pneumoperitoneum  No lymphadenopathy  Small mesenteric nodules near the left lobe of the liver appears to have resolved  An 8 mm nodule on image 62 lateral to the stomach is stable    Nodule on image 64 adjacent pancreas is also stable  A 1 cm nodule on image 79 in the transverse colon mesentery was not well seen on the prior study due to streak artifact but stable since January  Small gastrohepatic node is stable on coronal image 63  VESSELS:  Unremarkable for patient's age  PELVIS REPRODUCTIVE ORGANS:  Unremarkable for patient's age  URINARY BLADDER:  Unremarkable  ABDOMINAL WALL/INGUINAL REGIONS:  Unremarkable  OSSEOUS STRUCTURES: There are fractures of the 4th 5th and 6th ribs on the right with callus formation although appear new since the study of May  No lytic or blastic abnormality is appreciated  Impression: Focal thickening at the gastric esophageal junction extending into the cardia may represent primary tumor  Thickening may be slightly worse than the study of May  More diffuse esophageal thickening is also demonstrated which could be related to treatment  Small liver lesions are unchanged  Small mesenteric nodes are also not significantly changed  Stable lung nodules Right-sided rib fractures with callus formation should be correlated with any recent injury  Workstation performed: [id number]       Labs:   Lab Results   Component Value Date    WBC 8 83 03/08/2021    HGB 8 2 (L) 03/08/2021    HCT 28 0 (L) 03/08/2021    MCV 86 03/08/2021     (H) 03/08/2021     Lab Results   Component Value Date    K 3 8 03/08/2021     03/08/2021    CO2 28 03/08/2021    BUN 16 03/08/2021    CREATININE 1 03 03/08/2021    GLUF 184 (H) 02/23/2021    CALCIUM 9 4 03/08/2021    CORRECTEDCA 10 4 (H) 03/08/2021    AST 12 03/08/2021    ALT 10 (L) 03/08/2021    ALKPHOS 103 03/08/2021    EGFR 75 03/08/2021         Review of Systems   Constitutional: Positive for appetite change, fatigue and unexpected weight change  Gastrointestinal: Positive for nausea  Negative for diarrhea  All other systems reviewed and are negative          Active Problems:   Patient Active Problem List   Diagnosis    PTSD (post-traumatic stress disorder)    Essential hypertension    Major depression    OCD (obsessive compulsive disorder)    Anxiety    Malignant neoplasm of lower third of esophagus (HCC)    Gastroesophageal cancer (HCC)    Cancer-related pain    Chemotherapy-induced nausea    Severe protein-calorie malnutrition (Roberta : less than 60% of standard weight) (HCC)    Malignant ascites    Recovering alcoholic (HCC)    Other fatigue    Other dysphagia    Nausea    Dehydration    Skin fissures    Goals of care, counseling/discussion    Palliative care patient    Other insomnia    Counseling on health promotion and disease prevention    Elevated PSA    Memory loss    Regurgitation of stomach contents    Falls    Superficial thrombophlebitis of lower extremity    Pneumonia of left lung due to infectious organism    PAF (paroxysmal atrial fibrillation) (HCC)    Moderate protein-calorie malnutrition (HCC)    Dilated cardiomyopathy (HCC)    Urge urinary incontinence    Aspiration pneumonia (HCC)    Chronic combined systolic and diastolic CHF (congestive heart failure) ([facility] )    Port-A-Cath in place    Hematuria    Hyponatremia    Hypoglycemia    Severe protein-calorie malnutrition (HCC)    Cavitating mass in left upper lung lobe    Immunocompromised patient ([facility] )    Centrilobular emphysema (HCC)    Leukocytosis    Iron deficiency anemia secondary to inadequate dietary iron intake       Past Medical History:   Past Medical History:   Diagnosis Date    Anxiety     [facility] ) 08/2019    metastatic gastroesophageal cancer    Dehydration     Depression     Esophageal cancer (HCC)     Fatigue     History of chemotherapy     currently started 8/2019    History of DVT (deep vein thrombosis)     Hypertension     currently is normal and may not need medication    Lung infection     chemo currently on hold because of the infection 12/22/2020    Nausea     Pneumonia     x2    Port-A-Cath in place     right    Psychiatric disorder     Recovering alcoholic ([facility] )     sober since     Varicose veins of left lower extremity        Surgical History:   Past Surgical History:   Procedure Laterality Date    APPENDECTOMY      COLONOSCOPY      IR GASTROSTOMY TUBE PLACEMENT  2021    IR PORT PLACEMENT  2019    UPPER GASTROINTESTINAL ENDOSCOPY         Family History:    Family History   Problem Relation Age of Onset    Colon cancer Father     Cancer Father         colon    Cancer Brother         Sinus-neuroblastoma     Heart disease Mother        Cancer-related family history includes Cancer in his brother and father; Colon cancer in his father      Social History:   Social History     Socioeconomic History    Marital status: /Civil Union     Spouse name: Not on file    Number of children: 2    Years of education: Not on file    Highest education level: Not on file   Occupational History    Occupation: retired    Social Needs    Financial resource strain: Not on file    Food insecurity     Worry: Not on file     Inability: Not on file   [employer] needs     Medical: Not on file     Non-medical: Not on file   Tobacco Use    Smoking status: Former Smoker     Packs/day: 2 00     Years: 15 00     Pack years: 30 00     Types: Cigars     Quit date: 5/15/1998     Years since quittin 8    Smokeless tobacco: Former User     Quit date: 2006   Substance and Sexual Activity    Alcohol use: Not Currently     Frequency: Never     Drinks per session: Patient refused     Binge frequency: Never     Comment: quit 30 yrs ago    Drug use: Yes     Types: Marijuana     Comment: couple x week    Sexual activity: Not Currently   Lifestyle    Physical activity     Days per week: Not on file     Minutes per session: Not on file    Stress: Not on file   Relationships    Social connections     Talks on phone: Not on file     Gets together: Not on file     Attends Yazidi service: Not on file     Active member of club or organization: Not on file     Attends meetings of clubs or organizations: Not on file     Relationship status: Not on file    Intimate partner violence     Fear of current or ex partner: Not on file     Emotionally abused: Not on file     Physically abused: Not on file     Forced sexual activity: Not on file   Other Topics Concern    Not on file   Social History Narrative    Not on file       Current Medications:   Current Outpatient Medications   Medication Sig Dispense Refill    amiodarone 200 mg tablet TAKE ONE TABLET BY MOUTH EVERY [place] (Patient taking differently: 200 mg every morning ) 30 tablet 2    ammonium lactate (LAC-HYDRIN) 12 % cream Apply topically as needed for dry skin On hands and feet 385 g 2    apixaban (Eliquis) 5 mg Take 1 tablet (5 mg total) by mouth 2 (two) times a day 60 tablet 0    diazepam (VALIUM) 5 mg tablet Take 1 tablet (5 mg total) by mouth 2 (two) times a day (Patient taking differently: Take 10 mg by mouth daily at bedtime ) 60 tablet 5    hydrochlorothiazide (HYDRODIURIL) 12 5 mg tablet Take one tablet daily as needed for SBP> 160/90 90 tablet 3    ondansetron (ZOFRAN) 4 mg tablet Take 2 tablets (8 mg total) by mouth every 8 (eight) hours as needed for nausea or vomiting 75 tablet 0    pantoprazole (PROTONIX) 40 mg tablet Take 1 tablet (40 mg total) by mouth 2 (two) times a day 60 tablet 2    venlafaxine (EFFEXOR-XR) 150 mg 24 hr capsule Take 1 capsule (150 mg total) by mouth daily 30 capsule 0     No current facility-administered medications for this visit  Allergies:    Allergies   Allergen Reactions    Bee Venom Anaphylaxis     Throat swelling    Shellfish-Derived Products      Develops GOUT       Physical Exam:  /91 (BP Location: Right arm, Patient Position: Sitting, Cuff Size: Adult)   Pulse 83   Temp (!) 97 3 °F (36 3 °C)   Resp 17   Ht 5' 10" (1 778 m)   Wt 62 6 kg (138 lb) SpO2 100%   BMI 19 80 kg/m²   Body surface area is 1 78 meters squared  Wt Readings from Last 3 Encounters:   03/16/21 62 6 kg (138 lb)   03/10/21 64 4 kg (141 lb 15 6 oz)   02/24/21 65 7 kg (144 lb 13 5 oz)           Physical Exam    Assessment / Plan:    1  Gastroesophageal cancer [facility])    The patient is a pleasant 33-year-old male with a history of metastatic gastroesophageal cancer  He has been treated with modified FOLFOX 6  Oxaliplatin and leucovorin have been discontinued from his treatment regimen  Most recent imaging shows stable disease  His blood work remains in acceptable treatment range  He will continue on his current regimen of 5  milligrams/meter squared, 5 FU 2400 milligrams/meter squared continuous IV infusion over 46 hours  He will have repeat blood work done prior to his next infusion that is scheduled for 8/19/20  Patient and his spouse verbalized understanding and agreement with plan of care  Patient will return for a follow-up visit in 4 weeks  He was instructed to call with any questions or concerns prior to his next office visit    Goals and Barriers:  Current Goal:  Prolong Survival from esophageal cancer  Barriers: None  Patient's Capacity to Self Care:  Patient is  able to self care  Portions of the record may have been created with voice recognition software  Occasional wrong word or "sound a like" substitutions may have occurred due to the inherent limitations of voice recognition software  Read the chart carefully and recognize, using context, where substitutions have occurred

## 2021-03-12 NOTE — PLAN OF CARE
Problem: Potential for Falls  Goal: Patient will remain free of falls  Description: INTERVENTIONS:  - Assess patient frequently for physical needs  -  Identify cognitive and physical deficits and behaviors that affect risk of falls  -  Staley fall precautions as indicated by assessment   - Educate patient/family on patient safety including physical limitations  - Instruct patient to call for assistance with activity based on assessment  - Modify environment to reduce risk of injury  - Consider OT/PT consult to assist with strengthening/mobility  Outcome: Progressing     Problem: Potential for Falls  Goal: Patient will remain free of falls  Description: INTERVENTIONS:  - Assess patient frequently for physical needs  -  Identify cognitive and physical deficits and behaviors that affect risk of falls    -  Staley fall precautions as indicated by assessment   - Educate patient/family on patient safety including physical limitations  - Instruct patient to call for assistance with activity based on assessment  - Modify environment to reduce risk of injury  - Consider OT/PT consult to assist with strengthening/mobility  Outcome: Progressing     Problem: INFECTION - ADULT  Goal: Absence or prevention of progression during hospitalization  Description: INTERVENTIONS:  - Assess and monitor for signs and symptoms of infection  - Monitor lab/diagnostic results  - Monitor all insertion sites, i e  indwelling lines, tubes, and drains  - Monitor endotracheal (as able) and nasal secretions for changes in amount and color  - Staley appropriate cooling/warming therapies per order  - Administer medications as ordered  - Instruct and encourage patient and family to use good hand hygiene technique  - Identify and instruct in appropriate isolation precautions for identified infection/condition  Outcome: Progressing  Goal: Absence of fever/infection during neutropenic period  Description: INTERVENTIONS:  - Monitor WBC  - Implement neutropenic guidelines  Outcome: Progressing     Problem: DISCHARGE PLANNING  Goal: Discharge to home or other facility with appropriate resources  Description: INTERVENTIONS:  - Identify barriers to discharge w/patient and caregiver  - Arrange for needed discharge resources and transportation as appropriate  - Identify discharge learning needs (meds, wound care, etc )  - Arrange for interpretive services to assist at discharge as needed  - Refer to Case Management Department for coordinating discharge planning if the patient needs post-hospital services based on physician/advanced practitioner order or complex needs related to functional status, cognitive ability, or social support system  Outcome: Progressing

## 2021-03-12 NOTE — TELEPHONE ENCOUNTER
Another phone call was placed to wife to check in to see how Keyona Kolb is doing with his TF as I have not received a call back  No answer  Left a voice message reviewing our next follow up date on 3/17    Encouraged wife to return my call if Keyona Kolb is still having issues with his TF

## 2021-03-16 ENCOUNTER — OFFICE VISIT (OUTPATIENT)
Dept: HEMATOLOGY ONCOLOGY | Facility: CLINIC | Age: 66
End: 2021-03-16
Payer: MEDICARE

## 2021-03-16 ENCOUNTER — TELEPHONE (OUTPATIENT)
Dept: NUTRITION | Facility: CLINIC | Age: 66
End: 2021-03-16

## 2021-03-16 VITALS
HEART RATE: 83 BPM | TEMPERATURE: 97.3 F | WEIGHT: 138 LBS | BODY MASS INDEX: 19.76 KG/M2 | HEIGHT: 70 IN | RESPIRATION RATE: 17 BRPM | DIASTOLIC BLOOD PRESSURE: 91 MMHG | OXYGEN SATURATION: 100 % | SYSTOLIC BLOOD PRESSURE: 142 MMHG

## 2021-03-16 DIAGNOSIS — C16.0 GASTROESOPHAGEAL CANCER (HCC): Primary | ICD-10-CM

## 2021-03-16 PROCEDURE — 99214 OFFICE O/P EST MOD 30 MIN: CPT | Performed by: NURSE PRACTITIONER

## 2021-03-16 RX ORDER — DEXTROSE MONOHYDRATE 50 MG/ML
20 INJECTION, SOLUTION INTRAVENOUS ONCE
Status: CANCELLED | OUTPATIENT
Start: 2021-03-24

## 2021-03-16 RX ORDER — FLUOROURACIL 50 MG/ML
400 INJECTION, SOLUTION INTRAVENOUS ONCE
Status: CANCELLED | OUTPATIENT
Start: 2021-03-24

## 2021-03-16 RX ORDER — SODIUM CHLORIDE 9 MG/ML
20 INJECTION, SOLUTION INTRAVENOUS ONCE AS NEEDED
Status: CANCELLED | OUTPATIENT
Start: 2021-03-24

## 2021-03-16 NOTE — TELEPHONE ENCOUNTER
Voice message received from Leigh Ndiaye stating that she and Elaine Weinstein are currently at [facility] for an appt with [facility] and they would like to say hello  Stopped by [facility] to say hello to pt and his wife  Leigh Ndiaye stated Elaine Weinstein is now 138# today  Confirmed RD follow up for tomorrow  Will discuss wt further at that time

## 2021-03-17 ENCOUNTER — TELEPHONE (OUTPATIENT)
Dept: GASTROENTEROLOGY | Facility: AMBULARY SURGERY CENTER | Age: 66
End: 2021-03-17

## 2021-03-17 ENCOUNTER — NUTRITION (OUTPATIENT)
Dept: NUTRITION | Facility: CLINIC | Age: 66
End: 2021-03-17

## 2021-03-17 DIAGNOSIS — C16.0 MALIGNANT NEOPLASM OF CARDIA OF STOMACH (HCC): ICD-10-CM

## 2021-03-17 DIAGNOSIS — C15.5 MALIGNANT NEOPLASM OF LOWER THIRD OF ESOPHAGUS (HCC): ICD-10-CM

## 2021-03-17 DIAGNOSIS — Z71.3 NUTRITIONAL COUNSELING: Primary | ICD-10-CM

## 2021-03-17 DIAGNOSIS — E86.0 DEHYDRATION: ICD-10-CM

## 2021-03-17 DIAGNOSIS — R11.0 NAUSEA: Primary | ICD-10-CM

## 2021-03-17 NOTE — TELEPHONE ENCOUNTER
----- Message from Arun Holden, [address] sent at 3/17/2021  1:37 PM EDT -----  Franklin Piña & Ranjit Reardon are now agreeable to a pump feeding  Please see my note for specific recommendations  Kathi Tang is at risk for refeeding syndrome  I recommend that he has adequate home care support to help with this transition and for pump teaching  I'm not sure if he is still receiving home health services  Could your office please coordinate orders with his DME if you agree? Thank you,Kasey

## 2021-03-17 NOTE — PROGRESS NOTES
Outpatient Oncology Nutrition Consult  Type of Consult: Follow Up  Care Location: Telephone Call - spoke with wife Khoa Man) because pt was not available - Farrah Nunes goes by [employer]  Nutrition Assessment:    Oncology Diagnosis & Treatments: Stage IV metastatic gastroesophageal cancer      -Undergoing chemotherapy in the palliative setting (5FU) - restarted tx on 2/24/21     Oncology History   Malignant neoplasm of lower third of esophagus ([facility] )   7/26/2019 Initial Diagnosis    Malign[facility] lower third [facility] ([facility]     8/1/2019 -  Chemotherapy    fluorouracil (ADRUCIL) injection 750 mg, 400 mg/m2 = 750 mg, Intravenous, Once, 33 of 38 cycles  Administration: 750 mg (8/1/2019), 750 mg (8/13/2019), 750 mg (8/27/2019), 750 mg (9/10/2019), 750 mg (9/24/2019), 750 mg (10/8/2019), 795 mg (10/22/2019), 795 mg (11/5/2019), 795 mg (11/19/2019), 795 mg (12/3/2019), 795 mg (12/17/2019), 795 mg (12/31/2019), 795 mg (1/14/2020), 795 mg (1/28/2020), 795 mg (2/11/2020), 795 mg (2/25/2020), 795 mg (3/10/2020), 795 mg (3/24/2020), 795 mg (4/7/2020), 795 mg (5/13/2020), 795 mg (5/27/2020), 795 mg (6/10/2020), 795 mg (6/24/2020), 795 mg (7/22/2020), 795 mg (8/5/2020), 790 mg (8/19/2020), 750 mg (10/7/2020), 795 mg (4/29/2020), 750 mg (10/21/2020), 750 mg (11/4/2020), 750 mg (11/18/2020), 735 mg (2/24/2021), 735 mg (3/10/2021)  pegfilgrastim (NEULASTA ONPRO) subcutaneous injection kit 6 mg, 6 mg, Subcutaneous, Once, 2 of 2 cycles  Administration: 6 mg (8/3/2019), 6 mg (8/15/2019)  leucovorin 750 mg in dextrose 5 % 250 mL IVPB, 748 mg, Intravenous, Once, 21 of 21 cycles  Administration: 750 mg (8/1/2019), 750 mg (8/13/2019), 750 mg (8/27/2019), 750 mg (9/10/2019), 750 mg (9/24/2019), 750 mg (10/8/2019), 800 mg (10/22/2019), 800 mg (11/5/2019), 800 mg (11/19/2019), 800 mg (12/3/2019), 800 mg (12/17/2019), 800 mg (12/31/2019), 800 mg (1/14/2020), 800 mg (1/28/2020), 800 mg (2/11/2020), 800 mg (2/25/2020), 800 mg (3/10/2020), 800 mg (3/24/2020), 800 mg (4/7/2020), 800 mg (5/13/2020), 800 mg (4/29/2020)  oxaliplatin (ELOXATIN) 158 95 mg in dextrose 5 % 250 mL chemo infusion, 85 mg/m2 = 158 95 mg, Intravenous, Once, 6 of 6 cycles  Administration: 158 95 mg (8/1/2019), 158 95 mg (8/13/2019), 158 95 mg (8/27/2019), 158 95 mg (9/10/2019), 158 95 mg (9/24/2019), 158 95 mg (10/8/2019)     Gastroesophageal cancer ([facility] )   7/26/2019 Initial Diagnosis    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[facility] )     8/1/2019 -  Chemotherapy    fluorouracil (ADRUCIL) injection 750 mg, 400 mg/m2 = 750 mg, Intravenous, Once, 33 of 38 cycles  Administration: 750 mg (8/1/2019), 750 mg (8/13/2019), 750 mg (8/27/2019), 750 mg (9/10/2019), 750 mg (9/24/2019), 750 mg (10/8/2019), 795 mg (10/22/2019), 795 mg (11/5/2019), 795 mg (11/19/2019), 795 mg (12/3/2019), 795 mg (12/17/2019), 795 mg (12/31/2019), 795 mg (1/14/2020), 795 mg (1/28/2020), 795 mg (2/11/2020), 795 mg (2/25/2020), 795 mg (3/10/2020), 795 mg (3/24/2020), 795 mg (4/7/2020), 795 mg (5/13/2020), 795 mg (5/27/2020), 795 mg (6/10/2020), 795 mg (6/24/2020), 795 mg (7/22/2020), 795 mg (8/5/2020), 790 mg (8/19/2020), 750 mg (10/7/2020), 795 mg (4/29/2020), 750 mg (10/21/2020), 750 mg (11/4/2020), 750 mg (11/18/2020), 735 mg (2/24/2021), 735 mg (3/10/2021)  pegfilgrastim (NEULASTA ONPRO) subcutaneous injection kit 6 mg, 6 mg, Subcutaneous, Once, 2 of 2 cycles  Administration: 6 mg (8/3/2019), 6 mg (8/15/2019)  leucovorin 750 mg in dextrose 5 % 250 mL IVPB, 748 mg, Intravenous, Once, 21 of 21 cycles  Administration: 750 mg (8/1/2019), 750 mg (8/13/2019), 750 mg (8/27/2019), 750 mg (9/10/2019), 750 mg (9/24/2019), 750 mg (10/8/2019), 800 mg (10/22/2019), 800 mg (11/5/2019), 800 mg (11/19/2019), 800 mg (12/3/2019), 800 mg (12/17/2019), 800 mg (12/31/2019), 800 mg (1/14/2020), 800 mg (1/28/2020), 800 mg (2/11/2020), 800 mg (2/25/2020), 800 mg (3/10/2020), 800 mg (3/24/2020), 800 mg (4/7/2020), 800 mg (5/13/2020), 800 mg (4/29/2020)  oxaliplatin (ELOXATIN) 158 95 mg in dextrose 5 % 250 mL chemo infusion, 85 mg/m2 = 158 95 mg, Intravenous, Once, 6 of 6 cycles  Administration: 158 95 mg (8/1/2019), 158 95 mg (8/13/2019), 158 95 mg (8/27/2019), 158 95 mg (9/10/2019), 158 95 mg (9/24/2019), 158 95 mg (10/8/2019)       Patient Active Problem List   Diagnosis    PTSD (post-traumatic stress disorder)    Essential hypertension    Major depression    OCD (obsessive compulsive disorder)    Anxiety    [facility]    G[facility] cancer [facility]    Cancer-related pain    Chemotherapy-induced nausea    Severe protein-calorie malnutrition (Radonna Tushar: less than 60% of standard weight) ([facility]    Malignant ascites    Recovering alcoholic (HCC)    Other fatigue    Other dysphagia    Nausea    Dehydration    Skin fissures    Goals of care, counseling/discussion    Palliative care patient    Other insomnia    Counseling on health promotion and disease prevention    Elevated PSA    Memory loss    Regurgitation of stomach contents    Falls    Superficial thrombophlebitis of lower extremity    Pneumonia of left lung due to infectious organism    PAF (paroxysmal atrial fibrillation) (HCC)    Moderate protein-calorie malnutrition (HCC)    Dilated cardiomyopathy (HCC)    Urge urinary incontinence    Aspiration pneumonia (HCC)    Chronic combined systolic and diastolic CHF (congestive heart failure) ([facility] )    Port-A-Cath in place    Hematuria    Hyponatremia    Hypoglycemia    Severe protein-calorie malnutrition (HCC)    Cavitating mass in left upper lung lobe    Immunocompromised patient ([facility] )    Centrilobular emphysema (HCC)    Leukocytosis    Iron deficiency anemia secondary to inadequate dietary iron intake     Past Medical History:   Diagnosis Date    Anxiety     Cancer [facility] 08/2019    metastatic gastroesophageal cancer    Dehydration     Depression     [facility]     Fatigue     History of chemotherapy     currently started 8/2019    History of DVT (deep vein thrombosis)     Hypertension     currently is normal and may not need medication    Lung infection     chemo currently on hold because of the infection 12/22/2020    Nausea     Pneumonia     x2    Port-A-Cath in place     right    Psychiatric disorder     Recovering alcoholic ([facility] )     sober since 1990    Varicose veins of left lower extremity      Past Surgical History:   Procedure Laterality Date    APPENDECTOMY      COLONOSCOPY      IR GASTROSTOMY TUBE PLACEMENT  2/5/2021    IR PORT PLACEMENT  7/31/2019    UPPER GASTROINTESTINAL ENDOSCOPY         Review of Medications:   Vitamins, Supplements and Herbals: No, pt denies taking supplements     Current Outpatient Medications:     amiodarone 200 mg tablet, TAKE ONE TABLET BY MOUTH EVERY DAY WITH BREAKFAST (Patient taking differently: 200 mg every morning ), Disp: 30 tablet, Rfl: 2    ammonium lactate (LAC-HYDRIN) 12 % cream, Apply topically as needed for dry skin On hands and feet, Disp: 385 g, Rfl: 2    apixaban (Eliquis) 5 mg, Take 1 tablet (5 mg total) by mouth 2 (two) times a day, Disp: 60 tablet, Rfl: 0    diazepam (VALIUM) 5 mg tablet, Take 1 tablet (5 mg total) by mouth 2 (two) times a day (Patient taking differently: Take 10 mg by mouth daily at bedtime ), Disp: 60 tablet, Rfl: 5    [START ON 3/24/2021] fluorouracil 4,415 mg in CADD infusion pump, Infuse 4,415 mg (1,200 mg/m2/day x 1 84 m2 (Treatment plan recorded BSA)) into a venous catheter over 46 hours for 2 days [employer] to be administered on, Disp: 1 Device, Rfl: 0    hydrochlorothiazide (HYDRODIURIL) 12 5 mg tablet, Take one tablet daily as needed for SBP> 160/90, Disp: 90 tablet, Rfl: 3    ondansetron (ZOFRAN) 4 mg tablet, Take 2 tablets (8 mg total) by mouth every 8 (eight) hours as needed for nausea or vomiting, Disp: 75 tablet, Rfl: 0    pantoprazole (PROTONIX) 40 mg tablet, Take 1 tablet (40 mg total) by mouth 2 (two) times a day, Disp: 60 tablet, Rfl: 2    venlafaxine (EFFEXOR-XR) 150 mg 24 hr capsule, Take 1 capsule (150 mg total) by mouth daily, Disp: 30 capsule, Rfl: 0    Most Recent Lab Results:   Lab Results   Component Value Date    WBC 8 83 03/08/2021    IRON 13 (L) 09/01/2020    TIBC 341 09/01/2020    FERRITIN 175 09/01/2020    ALT 10 (L) 03/08/2021    AST 12 03/08/2021    ALB 2 7 (L) 03/08/2021    SODIUM 137 03/08/2021    SODIUM 137 02/23/2021    K 3 8 03/08/2021    K 4 0 02/23/2021     03/08/2021    BUN 16 03/08/2021    BUN 20 02/23/2021    CREATININE 1 03 03/08/2021    CREATININE 1 17 02/23/2021    EGFR 75 03/08/2021    PHOS 3 0 09/25/2020    PHOS 3 0 09/24/2020    POCGLU 104 09/25/2020    GLUF 184 (H) 02/23/2021    GLUF 99 01/14/2021    GLUC 105 03/08/2021    CALCIUM 9 4 03/08/2021    FOLATE 10 6 09/01/2020    MG 1 7 09/25/2020     Anthropometric Measurements:   Height: 69"  Ht Readings from Last 1 Encounters:   03/16/21 5' 10" (1 778 m)     Wt Readings from Last 20 Encounters:   03/16/21 62 6 kg (138 lb)   03/10/21 64 4 kg (141 lb 15 6 oz)   02/24/21 65 7 kg (144 lb 13 5 oz)   01/26/21 65 5 kg (144 lb 8 oz)   01/13/21 68 5 kg (151 lb 0 2 oz)   01/07/21 66 2 kg (146 lb)   12/30/20 65 8 kg (145 lb)   12/22/20 68 kg (150 lb)   12/11/20 69 4 kg (153 lb)   11/19/20 72 4 kg (159 lb 9 8 oz)   11/18/20 71 1 kg (156 lb 12 oz)   11/17/20 68 9 kg (152 lb)   11/05/20 74 4 kg (164 lb)   11/04/20 70 4 kg (155 lb 3 3 oz)   10/21/20 72 8 kg (160 lb 7 9 oz)   10/07/20 74 4 kg (164 lb 0 4 oz)   09/29/20 71 7 kg (158 lb)   09/25/20 74 2 kg (163 lb 9 3 oz)   09/18/20 77 1 kg (170 lb)   09/08/20 76 9 kg (169 lb 8 5 oz)     · UBW: 198-212#  · Home Wts: (10/15/20) 166#, (2/8/21) 150#, (2/10/21) 148#, (2/17/21) 143# (states he was 140# 6 days prior so he gained 3# since then, (2/22/21) 143#, (3/4/21) 144# (?accuracy of this wt, per pt report, scale was not functioning properly during visit) , (3/5/21) 140 4#, (3/17/21) 138#  · Comments: No new wt to calculate changes since last visit      Oncology Nutrition-Anthropometrics      Nutrition from 3/22/2021 in [facility] from 3/17/2021 in [facility]   Patient age (years):  77 years  77 years   Patient (male) height (in):  71 in  71 in   Current weight (lbs):  138 lbs  138 lbs   Current weight to be used for anthropometric calculations (kg)  62 7 kg  62 7 kg   BMI:  20 4  20 4   IBW male  160 lb  160 lb   IBW (kg) male  72 7 kg  72 7 kg   IBW % (male)  86 2 %  86 2 %   Adjusted BW (male):  154 5 lbs  154 5 lbs   Adjusted BW in kg (male):  70 2 kg  70 2 kg   % weight change after 1 week:  -1 7 %  -1 7 %   Weight change after 1 week (lbs)  -2 4 lbs  -2 4 lbs   % weight change after 1 month:  -3 5 %  -3 5 %   Weight change after 1 month (lbs)  -5 lbs  -5 lbs   % weight change after 3 months:  (!) -9 8 %  (!) -9 8 %   Weight change after 3 months (lbs)  -15 lbs  -15 lbs   % weight change after 6 months:  (!) -18 8 %  (!) -18 8 %   Weight change after 6 months (lbs)  -32 lbs  -32 lbs        Nutrition-Focused Physical Findings: n/a due to telephone call       Food/Nutrition-Related History & Client/Social History:    Current Nutrition Impact Symptoms:  [x] Nausea - "all the time" [x] Reduced Appetite - "none" [] Acid Reflux   [x] Vomiting - after eating & bolus feedings [x] Unintended Wt Loss - wt loss picked up again in August 2020 d/t vomiting with meals [] Malabsorption    [] Diarrhea  [] Unintended Wt Gain  [] Dumping Syndrome    [] Constipation   +BM EOD, soft BM's, Miralax prn [] Thick Mucous/Secretions  [] Abdominal Pain    [x] Dysgeusia (Altered Taste) - says foods don't taste the same, "everything tastes like cardboard"  -practicing good oral care [] Xerostomia (Dry Mouth)  [x] Bloating    [] Dysosmia (Altered Smell)  [] Thrush  [] Difficulty Chewing    [] Oral Mucositis (Sore Mouth) [x] Fatigue  [] Other:   [] Odynophagia  [] Esophagitis  [] Other:    [x] Dysphagia -   -Pt reports esophageal dysphagia and regurgitation has progressively gotten worse over the past couple of months   -Has had VBS and has been seen by ST in the past   -Per GI 21: "barium swallow shows lower esophageal mass and delayed  esophageal emptying"  -Wife stated 2/3/21 that pt cannot have a stent placed  -S/p PEG placed by IR 21 (ordered by Dr Sushil Diaz)  -Receiving home ST who is recommending a soft diet per wife [x] Early Satiety [] No Problems Eating      Food Allergies & Intolerances: no  -For Gout, avoids: organ meats, shellfish  Last gout flare was 2 years ago after a liverwurst sandwich  Current Diet: Full Liquids and Tube Feeding   Current Nutrition Intake: Increased since last visit (via EN)   Appetite: None  Nutrition Route: PO and G-tube  Oral Care: Brushing BID, Flossing QD, Biotene Mouthwash QD  Activity level: Home PT    24 Hr Diet Recall:   PO: 8 oz gingerale, 1 ice pop, 12 oz water    -Averaging 20 oz of fluids PO  Supplements:    Boost Very High Calorie (8 oz, 530 kcal, 22 g pro) - none recently     EN Recall:  DME: [employer]  Access Type: 16F G-tube placed 21 (ordered by GI - Dr Sushil Diaz)   Formula: TwoCalHN - order's are for TwoCal HN @ 80 mL/hr x15 hrs daily or until 5 cartons is infused via G-tube (sent by GI )  Method: Cyclic   Regimen:    -Saturday: @20 mL/hr until 1 can was infused   -: @30 mL/hr until 1 25 cans was infused   -Today's plan is: @40 mL/hr until 1 75 cans is infused  Flushin mL pre and after pump infusion (240 mL total)  EN providin mL volume (1 25 cans), 593 kcal, 25 g protein, 207 mL free water, 447 mL total water    Total Fluid Intake: 20 oz PO + 15 oz via EN = 35 oz/day (66% est needs)    Est Needs Notes:  Fluids needs for hx of CHF    Oncology Nutrition-Estimated Needs      Nutrition from 3/17/2021 in [address] [place] [address] Gerard Nutrition from 2021 in [facility] On[facility] Dietitian [facility]   Weight type used  Actual weight  Actual weight   Weight in kilograms (kg) used for estimated needs  62 7 kg  68 2 kg   Energy needs formula:   35-40 kcal/kg  35-40 kcal/kg   Energy needs based on 35 kcal/k kcal  2386 kcal   Energy needs based on 40 kcal/k kcal  2727 kcal   Protein needs formula:  1 5-2 g/kg  1 5-2 g/kg   Protein needs based on 1 5 g/kg  94 g  102 g   Protein needs based on 2 g/kg  125 g  136 g   Fluid needs formula:  25-30 mL/kg  25-30 mL/kg   Fluid needs based on 25 mL/kg  1575 mL  1700 mL   Fluid needs in ounces  53 oz  57 oz   Fluid needs based on 30 mL/kg  1890 mL  2040 mL   Fluid needs in ounces  64 oz  69 oz          Discussion & Intervention:    Eddie Escobedo was evaluated today for an RD follow up regarding wt loss, poor po intake, enteral nutrition and nutrition impact sx management  Eddie Escobedo is currently undergoing tx for gastroesophageal cancer (chemotherapy)  Spoke with wife today as pt was not available (did not  phone)  Rohit Brewster reports that Alisson Herrera is doing well with his enteral pump and tolerating his pump feeding so far  He was able to progress his feeding to 1 25 cans of TwoCal yesterday @30mL/hr  He plans to increase this today to 40 mL/hr for 1 75 cans  He was able to drink ~20 oz fluid by mouth yesterday and his EN provided ~15 oz of fluid  He remains dehydrated (meeting ~66% est hydration needs which is significantly increased from last week when he was only meeting 21% est hydration needs) but is making progress towards hydrating better and will add free water flushes throughout the day per insturctions  Rohit Brewster reports that Alisson Herrera has been feeling much better overall since last visit  Reviewed 24 hour recall, which revealed an inadequate po intake and enteral intake, and discussed ways to increase kcal, protein, and fluid intakes    Also reviewed the importance of wt management throughout the tx process and the role of enteral nutrition in managing wt and overall health  Based on today's assessment, discussion included: MNT for: TF progression, risk for refeeding syndrome, practicing proper feeding tube use & care and individualized enteral nutrition recommendations & plan: as per below  · Enteral nutrition is needed for Ry's sole source of nutrition, Continue to advance TF slowly to goal:  Initiation: Advance pump rate by 10 mL every 12 hours (as tolerated) until goal rate is achieved  TF Goal Rate: TwoCal HN at 80 mL/hr via G-tube cycled over ~15 hours daily (or until 5 cartons is infused)  Water Flushin mL free water flush at the beginning and end of TF infusion plus 125 mL free water flush 6 times per day  (total of 1000 mL/day in flushes; flushes should be spread throughout the day and every 2-3 hours during TF infusion; will need to adjust flushes prn for IV and PO fluids)  Enteral Nutrition goal to provide: 1185 mL volume (5 cartons day), 2370 kcal, 99 grams protein, 830 mL free water, 1830 mL total water daily  *Pt requires an enteral feeding pump to administer TF due to intolerance to bolus tube feeding  Recommend enteral backpack for pump transport  Stay upright at least 30-45 degrees while pump is running  Patient will need home teaching on how to use enteral pump  · As your TF is being advanced:  · Ensure adequate hydration by increasing water flushing  Aim for a total of ~60 oz per day of fluids (by mouth and by feeding tube)  Pt is at high risk for refeeding syndrome, recommend:  Slowly advance pump rate by 10 mL every 12 hrs (as tolerated) until goal rate is achieved  Monitor labs closely (glucose, sodium, potassium, magnesium, phosphorus, thiamine, etc )  Monitor for rapid weight changes and edema    Clinical features of refeeding syndrome are fluid-balance abnormalities, abnormal glucose metabolism, hypophosphatemia, hypomagnesemia, and hypokalemia  In addition, thiamine deficiency can occur  Moving forward, Brittani Longo was encouraged to remain adequately hydrated as best possible and continue to advance tube feeding per plan, as tolerated  Maryellen plans to call DME company regarding how often they should be disposing of pump bags d/t national shortage and also ask about obtaining an enteral backpack so that he may transport his pump while at appts  Materials Provided: not applicable  All questions and concerns addressed during todays visit  Brittani Longo has RD contact information  Nutrition Diagnosis:  · Inadequate Energy Intake related to physiological causes, disease state and treatment related issues as evidenced by food recall, wt loss and discussion with pt and/or family  · Increased Nutrient Needs (kcal & pro) related to increased demand for nutrients and disease state as evidenced by cancer dx and pt undergoing tx for cancer  · Swallowing Difficulty related to diagnosis/treatment related causes as evidenced by N/V and regurgitation with po feedings  · Intake- Oral or Nutritional Supplement Intake: Inadequate enteral nutrition infusion  related to EN intolerance, GI distress/pain, Inadequate EN/PN as evidenced by nausea, vomiting, bloating, and regurgitation related to bolus TF  · Patient has clinical indicators (or ASPEN criteria) consistent with severe protein-calorie malnutrition in the context of Chronic Illness as evidenced by >7 5% wt loss in 3 months, >10% wt loss in 6 months and </=75% energy intake vs  Estimated needs for >/=1 month        Monitoring & Evaluation:   Goals:  · adequate nutrition impact symptom management  · pt to meet >/=75% estimated nutrition needs daily  · weight gain of 1-2# per week  · achieve tube feeding goal rate   · tolerate tube feeding goal    · Progress Towards Goals: Progressing and Not Met    Nutrition Rx & Recommendations:  · Follow proper oral care; Try baking soda/salt water rinse recipe (mix 3/4 tsp salt + 1 tsp baking soda + 1 qt water; rinse with plain water after using) in Eating Hints book (pg 18)  Brush your teeth before/after meals & before bed  · Weigh yourself regularly  If you notice weight loss, make an effort to increase your daily food/calorie intake  If you continue to notice loss after these efforts, reach out to your dietitian to establish a plan to stabilize weight  · Tube Feeding Plan:  · Continue to advance TF slowly to goal:  Initiation: Advance pump rate by 10 mL every 12 hours (as tolerated) until goal rate is achieved  TF Goal Rate: TwoCal HN at 80 mL/hr via G-tube cycled over ~15 hours daily (or until 5 cartons is infused)  Water Flushin mL free water flush at the beginning and end of TF infusion plus 125 mL free water flush 6 times per day  (total of 1000 mL/day in flushes; flushes should be spread throughout the day and every 2-3 hours during TF infusion; will need to adjust flushes prn for IV and PO fluids)  Enteral Nutrition goal to provide: 1185 mL volume (5 cartons day), 2370 kcal, 99 grams protein, 830 mL free water, 1830 mL total water daily  *Pt requires an enteral feeding pump to administer TF due to intolerance to bolus tube feeding  Recommend enteral backpack for pump transport  Stay upright at least 30-45 degrees while pump is running  Patient will need home teaching on how to use enteral pump  · As your TF is being advanced:  · Ensure adequate hydration by increasing water flushing  Aim for a total of ~60 oz per day of fluids (by mouth and by feeding tube)  · Call [facility] when you have 10 days left of TF supplies: [phone], option 3 (customer support)  · Call [facility] about pump bag frequency of use and to ask if they can provide an enteral backpack so that you can bring your pump with you to appointments      Follow Up Plan: 3/30 @1pm   Recommend Referral to Other Providers: none at this time

## 2021-03-17 NOTE — TELEPHONE ENCOUNTER
See attached- ok to send to [facility] health for supplies according to new recommendations?     Will also need new home care orders/ referral

## 2021-03-17 NOTE — Clinical Note
Artur Ledesma are now agreeable to a pump feeding  Please see my note for specific recommendations  Maria R Kelley is at risk for refeeding syndrome  I recommend that he has adequate home care support to help with this transition and for pump teaching  I'm not sure if he is still receiving home health services  Could your office please coordinate orders with his DME if you agree?   Thank you,  Frida Staples

## 2021-03-17 NOTE — PATIENT INSTRUCTIONS
Nutrition Rx & Recommendations:  · Follow proper oral care; Try baking soda/salt water rinse recipe (mix 3/4 tsp salt + 1 tsp baking soda + 1 qt water; rinse with plain water after using) in Eating Hints book (pg 18)  Brush your teeth before/after meals & before bed  · Weigh yourself regularly  If you notice weight loss, make an effort to increase your daily food/calorie intake  If you continue to notice loss after these efforts, reach out to your dietitian to establish a plan to stabilize weight  · Tube Feeding Plan:  Initiation: Recommend initiating TwoCal HN at 20 mL/hr via G-tube cycled over 15 hours (as tolerated) and advance rate by 10 mL every 12 hours (as tolerated) until goal rate is achieved  TF Goal Rate: TwoCal HN at 80 mL/hr via G-tube cycled over ~15 hours daily (or until 5 cartons is infused)  Water Flushin mL free water flush at the beginning and end of TF infusion plus 125 mL free water flush 6 times per day  (total of 1000 mL/day in flushes; flushes should be spread throughout the day and every 2-3 hours during TF infusion; will need to adjust flushes prn for IV and PO fluids)  Enteral Nutrition goal to provide: 1185 mL volume (5 cartons day), 2370 kcal, 99 grams protein, 830 mL free water, 1830 mL total water daily  *Pt requires an enteral feeding pump to administer TF due to intolerance to bolus tube feeding  Recommend enteral backpack for pump transport  Stay upright at least 30-45 degrees while pump is running  Patient will need home teaching on how to use enteral pump  · In the meantime:  · Ensure adequate hydration by increasing water flushing  Aim for a total of ~60 oz per day of fluids (by mouth and by feeding tube)  · You can continue infusing Boost Plus via G-tube as tolerated  · [facility] when [facility] have 10 days left of TF supplies: [phone], option 3 (customer support)     · Call me at [phone] with any intolerance to your tube feeding (nausea, vomiting, cramping, diarrhea, coughing, etc  That is associated with your tube feeding)      Follow Up Plan: 3/22 at 11am   Recommend Referral to Other Providers: GI office to coordinate orders for pump feeding and home care services

## 2021-03-19 ENCOUNTER — TELEPHONE (OUTPATIENT)
Dept: FAMILY MEDICINE CLINIC | Facility: CLINIC | Age: 66
End: 2021-03-19

## 2021-03-19 ENCOUNTER — TELEPHONE (OUTPATIENT)
Dept: GASTROENTEROLOGY | Facility: CLINIC | Age: 66
End: 2021-03-19

## 2021-03-19 ENCOUNTER — PATIENT OUTREACH (OUTPATIENT)
Dept: FAMILY MEDICINE CLINIC | Facility: CLINIC | Age: 66
End: 2021-03-19

## 2021-03-19 DIAGNOSIS — R13.19 OTHER DYSPHAGIA: Chronic | ICD-10-CM

## 2021-03-19 DIAGNOSIS — E43 SEVERE PROTEIN-CALORIE MALNUTRITION (GOMEZ: LESS THAN 60% OF STANDARD WEIGHT) (HCC): ICD-10-CM

## 2021-03-19 DIAGNOSIS — R11.0 CHEMOTHERAPY-INDUCED NAUSEA: ICD-10-CM

## 2021-03-19 DIAGNOSIS — D84.9 IMMUNOCOMPROMISED PATIENT (HCC): ICD-10-CM

## 2021-03-19 DIAGNOSIS — D50.8 IRON DEFICIENCY ANEMIA SECONDARY TO INADEQUATE DIETARY IRON INTAKE: ICD-10-CM

## 2021-03-19 DIAGNOSIS — T45.1X5A CHEMOTHERAPY-INDUCED NAUSEA: ICD-10-CM

## 2021-03-19 DIAGNOSIS — R18.0 MALIGNANT ASCITES: ICD-10-CM

## 2021-03-19 DIAGNOSIS — C15.5 MALIGNANT NEOPLASM OF LOWER THIRD OF ESOPHAGUS (HCC): Primary | ICD-10-CM

## 2021-03-19 NOTE — PROGRESS NOTES
CM received message that patient received a feeding pump today but does not know how to use the machine  Outreach to patient  LVM with CM contact information  Requested a return call  Outreach to Kimberly Goldsmith from [facility]  LVM requesting return call  Received return call from patients wife  Wife explains that equipment was dropped off, but they have no idea how to use it  Pt is not tolerating bolus feeding so oncology switched him to continuous feeding via pump  Wife explains that she called [facility], however since patient was discharged from services, a new referral would have to be submitted  Wife did reach out to oncology office, however no one returned her call  Wife states she had no idea who to turn to so she contacted PCP office  Second attempt to reach [facility]  S/W Joan Wray  Explained the above needs  A new referral and appropriate documentation needs to be sent  CM placed new order for [facility] services  Notes from oncologist and RD oncology notes along with new referral faxed to [facility]  Follow up with [facility]  Still waiting for faxes to come through  [facility]  After lengthy hold times,  conference call with patients wife on the line  Reviewed referral request  Explained need to have services start asap  It was decided that patient could be brought back on service  CM provided wife with CM phone number if any other needs arise

## 2021-03-19 NOTE — TELEPHONE ENCOUNTER
Patient's wife called about setting up teaching for feeding pump and [facility] services  She said the feeding pump supplies were just delivered

## 2021-03-19 NOTE — TELEPHONE ENCOUNTER
----- Message from Keara Bond, [address] sent at 3/17/2021  1:37 PM EDT -----  Franklin Piña & Mahendra Payne are now agreeable to a pump feeding  Please see my note for specific recommendations  Ashley Rubin is at risk for refeeding syndrome  I recommend that he has adequate home care support to help with this transition and for pump teaching  I'm not sure if he is still receiving home health services  Could your office please coordinate orders with his DME if you agree? Thank you,Kasey

## 2021-03-22 ENCOUNTER — PATIENT OUTREACH (OUTPATIENT)
Dept: FAMILY MEDICINE CLINIC | Facility: CLINIC | Age: 66
End: 2021-03-22

## 2021-03-22 ENCOUNTER — TELEPHONE (OUTPATIENT)
Dept: FAMILY MEDICINE CLINIC | Facility: CLINIC | Age: 66
End: 2021-03-22

## 2021-03-22 ENCOUNTER — NUTRITION (OUTPATIENT)
Dept: NUTRITION | Facility: CLINIC | Age: 66
End: 2021-03-22

## 2021-03-22 DIAGNOSIS — Z71.3 NUTRITIONAL COUNSELING: Primary | ICD-10-CM

## 2021-03-22 NOTE — PROGRESS NOTES
Outpatient Oncology Nutrition Consult  Type of Consult: Follow Up  Care Location: Telephone Call  - met w/pt & wife Milagros Hernandes) - Frederic Malhotra goes by Lexx Chaparro  Spoke with pt and wife separately today as wife was at work  Nutrition Assessment:    Oncology Diagnosis & Treatments: Stage IV metastatic gastroesophageal cancer      -Undergoing chemotherapy in the palliative setting (5FU) - restarted tx on 2/24/21     Oncology History   Malignant neoplasm of lower third of esophagus ([facility] [address] )   7/26/2019 Initial Diagnosis    Malignant neoplasm of lower third of esophagus ([facility] [address] )     8/1/2019 -  Chemotherapy    fluorouracil (ADRUCIL) injection 750 mg, 400 mg/m2 = 750 mg, Intravenous, Once, 34 of 38 cycles  Administration: 750 mg (8/1/2019), 750 mg (8/13/2019), 750 mg (8/27/2019), 750 mg (9/10/2019), 750 mg (9/24/2019), 750 mg (10/8/2019), 795 mg (10/22/2019), 795 mg (11/5/2019), 795 mg (11/19/2019), 795 mg (12/3/2019), 795 mg (12/17/2019), 795 mg (12/31/2019), 795 mg (1/14/2020), 795 mg (1/28/2020), 795 mg (2/11/2020), 795 mg (2/25/2020), 795 mg (3/10/2020), 795 mg (3/24/2020), 795 mg (4/7/2020), 795 mg (5/13/2020), 795 mg (5/27/2020), 795 mg (6/10/2020), 795 mg (6/24/2020), 795 mg (7/22/2020), 795 mg (8/5/2020), 790 mg (8/19/2020), 750 mg (10/7/2020), 795 mg (4/29/2020), 750 mg (10/21/2020), 750 mg (11/4/2020), 750 mg (11/18/2020), 735 mg (2/24/2021), 735 mg (3/10/2021), 735 mg (3/24/2021)  pegfilgrastim (NEULASTA ONPRO) subcutaneous injection kit 6 mg, 6 mg, Subcutaneous, Once, 2 of 2 cycles  Administration: 6 mg (8/3/2019), 6 mg (8/15/2019)  leucovorin 750 mg in dextrose 5 % 250 mL IVPB, 748 mg, Intravenous, Once, 21 of 21 cycles  Administration: 750 mg (8/1/2019), 750 mg (8/13/2019), 750 mg (8/27/2019), 750 mg (9/10/2019), 750 mg (9/24/2019), 750 mg (10/8/2019), 800 mg (10/22/2019), 800 mg (11/5/2019), 800 mg (11/19/2019), 800 mg (12/3/2019), 800 mg (12/17/2019), 800 mg (12/31/2019), 800 mg (1/14/2020), 800 mg (1/28/2020), 800 mg (2/11/2020), 800 mg (2/25/2020), 800 mg (3/10/2020), 800 mg (3/24/2020), 800 mg (4/7/2020), 800 mg (5/13/2020), 800 mg (4/29/2020)  oxaliplatin (ELOXATIN) 158 95 mg in dextrose 5 % 250 mL chemo infusion, 85 mg/m2 = 158 95 mg, Intravenous, Once, 6 of 6 cycles  Administration: 158 95 mg (8/1/2019), 158 95 mg (8/13/2019), 158 95 mg (8/27/2019), 158 95 mg (9/10/2019), 158 95 mg (9/24/2019), 158 95 mg (10/8/2019)     Gastroesophageal cancer ([facility] [address] )   7/26/2019 Initial Diagnosis    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[facility] [address] )     8/1/2019 -  Chemotherapy    fluorouracil (ADRUCIL) injection 750 mg, 400 mg/m2 = 750 mg, Intravenous, Once, 34 of 38 cycles  Administration: 750 mg (8/1/2019), 750 mg (8/13/2019), 750 mg (8/27/2019), 750 mg (9/10/2019), 750 mg (9/24/2019), 750 mg (10/8/2019), 795 mg (10/22/2019), 795 mg (11/5/2019), 795 mg (11/19/2019), 795 mg (12/3/2019), 795 mg (12/17/2019), 795 mg (12/31/2019), 795 mg (1/14/2020), 795 mg (1/28/2020), 795 mg (2/11/2020), 795 mg (2/25/2020), 795 mg (3/10/2020), 795 mg (3/24/2020), 795 mg (4/7/2020), 795 mg (5/13/2020), 795 mg (5/27/2020), 795 mg (6/10/2020), 795 mg (6/24/2020), 795 mg (7/22/2020), 795 mg (8/5/2020), 790 mg (8/19/2020), 750 mg (10/7/2020), 795 mg (4/29/2020), 750 mg (10/21/2020), 750 mg (11/4/2020), 750 mg (11/18/2020), 735 mg (2/24/2021), 735 mg (3/10/2021), 735 mg (3/24/2021)  pegfilgrastim (NEULASTA ONPRO) subcutaneous injection kit 6 mg, 6 mg, Subcutaneous, Once, 2 of 2 cycles  Administration: 6 mg (8/3/2019), 6 mg (8/15/2019)  leucovorin 750 mg in dextrose 5 % 250 mL IVPB, 748 mg, Intravenous, Once, 21 of 21 cycles  Administration: 750 mg (8/1/2019), 750 mg (8/13/2019), 750 mg (8/27/2019), 750 mg (9/10/2019), 750 mg (9/24/2019), 750 mg (10/8/2019), 800 mg (10/22/2019), 800 mg (11/5/2019), 800 mg (11/19/2019), 800 mg (12/3/2019), 800 mg (12/17/2019), 800 mg (12/31/2019), 800 mg (1/14/2020), 800 mg (1/28/2020), 800 mg (2/11/2020), 800 mg (2/25/2020), 800 mg (3/10/2020), 800 mg (3/24/2020), 800 mg (4/7/2020), 800 mg (5/13/2020), 800 mg (4/29/2020)  oxaliplatin (Loriarmandokali Pettit) 158 95 mg in dextrose 5 % 250 mL chemo infusion, 85 mg/m2 = 158 95 mg, Intravenous, Once, 6 of 6 cycles  Administration: 158 95 mg (8/1/2019), 158 95 mg (8/13/2019), 158 95 mg (8/27/2019), 158 95 mg (9/10/2019), 158 95 mg (9/24/2019), 158 95 mg (10/8/2019)       Patient Active Problem List   Diagnosis    PTSD (post-traumatic stress disorder)    Essential hypertension    Major depression    OCD (obsessive compulsive disorder)    Anxiety    Malignant neoplasm of lower third of esophagus (HCC)    Gastroesophageal cancer ([facility] [address] )    Cancer-related pain    Chemotherapy-induced nausea    Severe protein-calorie malnutrition (Gustavo Shawna: less than 60% of standard weight) ([facility] [address] )    Malignant ascites    Recovering alcoholic (HCC)    Other fatigue    Other dysphagia    Nausea    Dehydration    Skin fissures    Goals of care, counseling/discussion    Palliative care patient    Other insomnia    Counseling on health promotion and disease prevention    Elevated PSA    Memory loss    Regurgitation of stomach contents    Falls    Superficial thrombophlebitis of lower extremity    Pneumonia of left lung due to infectious organism    PAF (paroxysmal atrial fibrillation) (HCC)    Moderate protein-calorie malnutrition (HCC)    Dilated cardiomyopathy (HCC)    Urge urinary incontinence    Aspiration pneumonia (HCC)    Chronic combined systolic and diastolic CHF (congestive heart failure) ([facility] [address] )    Port-A-Cath in place    Hematuria    Hyponatremia    Hypoglycemia    Severe protein-calorie malnutrition (HCC)    Cavitating mass in left upper lung lobe    Immunocompromised patient ([facility] [address] )    Centrilobular emphysema ([facility] [address] )    Leukocytosis    Iron deficiency anemia secondary to inadequate dietary iron intake     Past Medical History:   Diagnosis Date    Anxiety     Cancer ([facility] [address] ) 08/2019    metastatic gastroesophageal cancer    Dehydration     Depression     Esophageal cancer (HCC)     Fatigue     History of chemotherapy     currently started 8/2019    History of DVT (deep vein thrombosis)     Hypertension     currently is normal and may not need medication    Lung infection     chemo currently on hold because of the infection 12/22/2020    Nausea     Pneumonia     x2    Port-A-Cath in place     right    Psychiatric disorder     Recovering alcoholic ([facility] [address] )     sober since 1990    Varicose veins of left lower extremity      Past Surgical History:   Procedure Laterality Date    APPENDECTOMY      COLONOSCOPY      IR GASTROSTOMY TUBE PLACEMENT  2/5/2021    IR PORT PLACEMENT  7/31/2019    UPPER GASTROINTESTINAL ENDOSCOPY         Review of Medications:   Vitamins, Supplements and Herbals: No, pt denies taking supplements     Current Outpatient Medications:     amiodarone 200 mg tablet, TAKE 1 TABLET BY MOUTH EVERY DAY WITH BREAKFAST, Disp: 30 tablet, Rfl: 3    ammonium lactate (LAC-HYDRIN) 12 % cream, Apply topically as needed for dry skin On hands and feet, Disp: 385 g, Rfl: 2    apixaban (Eliquis) 5 mg, Take 1 tablet (5 mg total) by mouth 2 (two) times a day, Disp: 60 tablet, Rfl: 0    diazepam (VALIUM) 5 mg tablet, Take 1 tablet (5 mg total) by mouth 2 (two) times a day (Patient taking differently: Take 10 mg by mouth daily at bedtime ), Disp: 60 tablet, Rfl: 5    hydrochlorothiazide (HYDRODIURIL) 12 5 mg tablet, Take one tablet daily as needed for SBP> 160/90, Disp: 90 tablet, Rfl: 3    ondansetron (ZOFRAN) 4 mg tablet, Take 2 tablets (8 mg total) by mouth every 8 (eight) hours as needed for nausea or vomiting, Disp: 75 tablet, Rfl: 0    pantoprazole (PROTONIX) 40 mg tablet, Take 1 tablet (40 mg total) by mouth 2 (two) times a day, Disp: 60 tablet, Rfl: 2    venlafaxine (EFFEXOR-XR) 150 mg 24 hr capsule, Take 1 capsule (150 mg total) by mouth daily, Disp: 30 capsule, Rfl: 0    Most Recent Lab Results:   Lab Results   Component Value Date    WBC 8 33 03/23/2021    IRON 13 (L) 09/01/2020    TIBC 341 09/01/2020    FERRITIN 175 09/01/2020    ALT 8 (L) 03/23/2021    AST 10 03/23/2021    ALB 3 0 (L) 03/23/2021    SODIUM 134 (L) 03/23/2021    SODIUM 137 03/08/2021    K 3 6 03/23/2021    K 3 8 03/08/2021    CL 99 (L) 03/23/2021    BUN 15 03/23/2021    BUN 16 03/08/2021    CREATININE 1 20 03/23/2021    CREATININE 1 03 03/08/2021    EGFR 62 03/23/2021    PHOS 3 0 09/25/2020    PHOS 3 0 09/24/2020    POCGLU 104 09/25/2020    GLUF 184 (H) 02/23/2021    GLUF 99 01/14/2021    GLUC 118 03/23/2021    CALCIUM 9 7 03/23/2021    FOLATE 10 6 09/01/2020    MG 1 7 09/25/2020     Anthropometric Measurements:   Height: 69"  Ht Readings from Last 1 Encounters:   03/24/21 5' 10 98" (1 803 m)     Wt Readings from Last 20 Encounters:   03/24/21 62 kg (136 lb 11 oz)   03/16/21 62 6 kg (138 lb)   03/10/21 64 4 kg (141 lb 15 6 oz)   02/24/21 65 7 kg (144 lb 13 5 oz)   01/26/21 65 5 kg (144 lb 8 oz)   01/13/21 68 5 kg (151 lb 0 2 oz)   01/07/21 66 2 kg (146 lb)   12/30/20 65 8 kg (145 lb)   12/22/20 68 kg (150 lb)   12/11/20 69 4 kg (153 lb)   11/19/20 72 4 kg (159 lb 9 8 oz)   11/18/20 71 1 kg (156 lb 12 oz)   11/17/20 68 9 kg (152 lb)   11/05/20 74 4 kg (164 lb)   11/04/20 70 4 kg (155 lb 3 3 oz)   10/21/20 72 8 kg (160 lb 7 9 oz)   10/07/20 74 4 kg (164 lb 0 4 oz)   09/29/20 71 7 kg (158 lb)   09/25/20 74 2 kg (163 lb 9 3 oz)   09/18/20 77 1 kg (170 lb)     · UBW: 198-212#  · Home Wts: (10/15/20) 166#, (2/8/21) 150#, (2/10/21) 148#, (2/17/21) 143# (states he was 140# 6 days prior so he gained 3# since then, (2/22/21) 143#, (3/4/21) 144# (?accuracy of this wt, per pt report, scale was not functioning properly during visit) , (3/5/21) 140 4#, (3/17/21) 138# - home scale not functioning properly today    Oncology Nutrition-Anthropometrics      Nutrition from 3/30/2021 in [facility] from 3/22/2021 in [address]   Patient age (years):  77 years  77 years   Patient (male) height (in):  71 in  71 in   Current weight (lbs):  136 7 lbs  138 lbs   Current weight to be used for anthropometric calculations (kg)  62 1 kg  62 7 kg   BMI:  20 2  20 4   IBW male  160 lb  160 lb   IBW (kg) male  72 7 kg  72 7 kg   IBW % (male)  85 4 %  86 2 %   Adjusted BW (male):  154 2 lbs  154 5 lbs   Adjusted BW in kg (male):  70 1 kg  70 2 kg   % weight change after 1 week:  -0 9 %  -1 7 %   Weight change after 1 week (lbs)  -1 3 lbs  -2 4 lbs   % weight change after 1 month:  (!) -5 6 %  -3 5 %   Weight change after 1 month (lbs)  -8 1 lbs  -5 lbs   % weight change after 3 months:  (!) -8 9 %  (!) -9 8 %   Weight change after 3 months (lbs)  -13 3 lbs  -15 lbs   % weight change after 6 months:  --  (!) -18 8 %   Weight change after 6 months (lbs)  --  -32 lbs        Nutrition-Focused Physical Findings: n/a due to telephone call       Food/Nutrition-Related History & Client/Social History:    Current Nutrition Impact Symptoms:  [x] Nausea - no change, feels nauseous "all the time"  -worse with bolus feedings [x] Reduced Appetite - "none" [] Acid Reflux   [x] Vomiting - after all solid po intake, sometime vomits after drinking liquids  -hx w/bolus feedings    [x] Regurgitating - throughout the day [x] Unintended Wt Loss - wt loss picked up again in August 2020 d/t vomiting with meals [] Malabsorption    [] Diarrhea  [] Unintended Wt Gain  [] Dumping Syndrome    [] Constipation   +BM EOD, soft BM's, Miralax prn [] Thick Mucous/Secretions  [] Abdominal Pain    [x] Dysgeusia (Altered Taste) - says foods don't taste the same, "everything tastes like cardboard"  -practicing good oral care [] Xerostomia (Dry Mouth)  [x] Bloating - pt reports painful bloating throughout the day   [] Dysosmia (Altered Smell)  [] Thrush  [] Difficulty Chewing    [] Oral Mucositis (Sore Mouth)  [x] Fatigue  [] Other:   [] Odynophagia [] Esophagitis  [] Other:    [x] Dysphagia -   -Pt reports esophageal dysphagia and regurgitation has progressively gotten worse over the past couple of months   -Has had VBS and has been seen by ST in the past   -Per GI 2/2/21: "barium swallow shows lower esophageal mass and delayed  esophageal emptying"  -Wife stated 2/3/21 that pt cannot have a stent placed  -S/p PEG placed by IR 2/5/21 (ordered by Dr Flores Gutierres)  -Was receiving home ST who recommended a soft diet per wife [] Early Satiety [] No Problems Eating      Food Allergies & Intolerances: no  -For Gout, avoids: organ meats, shellfish  Last gout flare was 2 years ago after a liverwurst sandwich  Current Diet: Full Liquids and Tube Feeding   Current Nutrition Intake: Increased since last visit (via EN)   Appetite: None  Nutrition Route: PO and G-tube  Oral Care: Brushing BID, Flossing QD, Biotene Mouthwash QD  Activity level: Home PT    24 Hr Diet Recall:   PO: only mediations with water, ginger ale, Boost VHC, thickened juice    -Wife unsure exactly how much fluids pt is drinking po  Supplements:    Boost Very High Calorie (8 oz, 530 kcal, 22 g pro) - a couple of sips per wife     EN Recall:  DME: [employer]  Now has wedge pillow; considering buying an adjustable bed  Access Type: 16F G-tube placed 2/5/21 (ordered by GI - Dr Flores Gutierres)   Formula: TwoCalHN - order's are for TwoCal HN @80 mL/hr x15 hrs daily or until 5 cartons is infused via G-tube (sent by GI 1/55/03)  Method: Cyclic   Regimen: 80 mL/hr for ~15 hrs/day or until 5 cans is infused (made it up to 5 cartons yesterday)    Progressed to goal rate 3/23/21    -Had been averaging 3-4 cans per day prior to yesterday   -Reports painful and uncomfortable bloating all of the time, pt interested to see if there is a medication that could help with this sx    -Pt and wife report feeding tube does not stay connected to pump, they have to tape it to secure it- wife to contact DME about this   Flushin mL before and after pump infusion (240 mL) + flushes during the day but pt and wife unsure how much, they will begin keeping track  EN providin mL volume (5 cartons day), 2370 kcal, 99 grams protein, 830 mL free water from formula    Total Fluid Intake: unable to determine total fluid intake, pt and wife to start keeping track of po fluids and flushes  Est Needs Notes:  Fluids needs for hx of CHF  Oncology Nutrition-Estimated Needs      Nutrition from 3/17/2021 in [facility] from [facility]   Weight type used  Actual weight  Actual weight   Weight in kilograms (kg) used for estimated needs  62 7 kg  68 2 kg   Energy needs formula:   35-40 kcal/kg  35-40 kcal/kg   Energy needs based on 35 kcal/k kcal  2386 kcal   Energy needs based on 40 kcal/k kcal  2727 kcal   Protein needs formula:  1 5-2 g/kg  1 5-2 g/kg   Protein needs based on 1 5 g/kg  94 g  102 g   Protein needs based on 2 g/kg  125 g  136 g   Fluid needs formula:  25-30 mL/kg  25-30 mL/kg   Fluid needs based on 25 mL/kg  1575 mL  1700 mL   Fluid needs in ounces  53 oz  57 oz   Fluid needs based on 30 mL/kg  1890 mL  2040 mL   Fluid needs in ounces  64 oz  69 oz          Discussion & Intervention:    Monika Guadarrama was evaluated today for an RD follow up regarding wt loss, poor po intake, enteral nutrition and nutrition impact sx management  Monika Guadarrama is currently undergoing tx for gastroesophageal cancer (chemotherapy)  Spoke with pt and wife separately today  Pt is tolerating his pump feeding much better than he was bolus feedings  He progressed to goal rate last week and then to goal volume yesterday  He continues with wt loss and inability to tolerate solid foods po  He is able to drink some po fluids but does vomit afterwards at times    Wife and pt to start keeping track of po fluid intake and flushing so that hydration status can be determined  Reinforced the importance of adequate flushing throughout the day  Pt and wife are interested to see if there is a medication that can be given to help with nausea and bloating (?prokinetic such as Reglan); they are aware that I will reach out to Dr Genaro Enriquez to inquire about this on behalf of pt  Wife to call [facility] about pump connection issues they are experiencing (haivng to tape connection to pump to stay secure otherwise it falls out and leaks) and to see if they can provide an enteral backpack for pt to transport his pump to appts and infusions  Wife states that after they entered the correct total volume on the pump, the "dose is done" message resolved (see telephone note dated 3/29/21)  Reviewed 24 hour recall, which revealed a likely inadequate fluid intake, and discussed ways to increase fluid intake  Also reviewed the importance of wt management throughout the tx process and the role of enteral nutrition in managing wt and overall health  Based on today's assessment, discussion included: MNT for: Enteral Nutrition, hydration, nausea, bloating, practicing proper feeding tube use & care and individualized enteral nutrition recommendations & plan: see Nutrition Rx & Recommendations below  Moving forward, Monika Guadarrama was encouraged to increase flushes to goal, continue TF at goal rate until 5 cans is infused per day, keep a daily log of TF/flushes/po fluids/wts, call [employer] company about obtaining an enteral backpack so that he may transport his pump while at appts and about connection issues  Because our next follow up is not until 4/14 d/t this provider being out of office, wife was provided with Shari Murphy's contact info for urgent nutrition needs  Materials Provided: not applicable  All questions and concerns addressed during todays visit  Monika Guadarrama has RD contact information      Nutrition Diagnosis:  · Increased Nutrient Needs (kcal & pro) related to increased demand for nutrients and disease state as evidenced by cancer dx and pt undergoing tx for cancer  · Inadequate Fluid Intake related to lack of adequate flushing as evidenced by pt not meeting estimated hydration needs per discussion with pt and wife  · Swallowing Difficulty related to diagnosis/treatment related causes as evidenced by N/V and regurgitation with po feedings  · Patient has clinical indicators (or ASPEN criteria) consistent with severe protein-calorie malnutrition in the context of Chronic Illness as evidenced by >5% wt loss in 1 month, >7 5% wt loss in 3 months and </=75% energy intake vs  Estimated needs for >/=1 month  Monitoring & Evaluation:   Goals:  · adequate nutrition impact symptom management  · pt to meet >/=75% estimated nutrition needs daily  · weight gain of 1-2# per week  · achieve tube feeding goal rate   · tolerate tube feeding goal    · Progress Towards Goals: Progressing and Partially Met    Nutrition Rx & Recommendations:  · Follow proper oral care; Try baking soda/salt water rinse recipe (mix 3/4 tsp salt + 1 tsp baking soda + 1 qt water; rinse with plain water after using) in Eating Hints book (pg 18)  Brush your teeth before/after meals & before bed  · Weigh yourself regularly  If you notice weight loss, make an effort to increase your daily food/calorie intake  If you continue to notice loss after these efforts, reach out to your dietitian to establish a plan to stabilize weight  · Keep a daily diary to track:  · # cans of TwoCal infused per day  · How much and how often you are flushing your feeding tube  · How much and what you are drinking by mouth  · Your daily weight with the date    · Tube Feeding Plan:  · Continue TF at goal   Increase water flushing to goal   TF Goal Rate: TwoCal HN at 80 mL/hr via G-tube cycled over ~15 hours daily (or until 5 cartons is infused)    Water Flushin mL free water flush at the beginning and end of TF infusion plus 125 mL free water flush 6 times per day (total of 1000 mL/day in flushes; flushes should be spread throughout the day and every 2-3 hours during TF infusion; will need to adjust flushes prn for IV and PO fluids)  Enteral Nutrition goal to provide: 1185 mL volume (5 cartons day), 2370 kcal, 99 grams protein, 830 mL free water, 1830 mL total water daily  Benigno Dodson requires an enteral feeding pump to administer TF due to intolerance to bolus tube feedings  Recommend an enteral backpack for pump transport  Stay upright at least 30-45 degrees while pump is running  · Call [employer] when you have 10 days left of TF supplies: [phone], option 3 (customer support)  · [facility] enteral backpack so that you can bring your pump with you to appointments      Follow Up Plan: 4/14 @1pm   Recommend Referral to Other Providers: GI to determine appropriateness of supportive medications to help with nausea and bloating

## 2021-03-22 NOTE — PROGRESS NOTES
S/W patients wife  States VNA did come on Saturday  Everything worked out  Wife and patient are becoming more familiar with pump and are comfortable using it  Rate of continuous feeding is increasing daily  Pt is up to 30 mL's per hour  Nurse is scheduled to come out today or tomorrow  Wife will call CM if any questions or concerns arise

## 2021-03-22 NOTE — TELEPHONE ENCOUNTER
We got an order to be signed from [facility]    Copy scanned into chart    Original left in blue bin

## 2021-03-22 NOTE — TELEPHONE ENCOUNTER
Home Care rehab paperwork placed in your folder, please review and sign since Dr Ziggy Goss is unavailable

## 2021-03-22 NOTE — PATIENT INSTRUCTIONS
Nutrition Rx & Recommendations:  · Follow proper oral care; Try baking soda/salt water rinse recipe (mix 3/4 tsp salt + 1 tsp baking soda + 1 qt water; rinse with plain water after using) in Eating Hints book (pg 18)  Brush your teeth before/after meals & before bed  · Weigh yourself regularly  If you notice weight loss, make an effort to increase your daily food/calorie intake  If you continue to notice loss after these efforts, reach out to your dietitian to establish a plan to stabilize weight  · Tube Feeding Plan:  · Continue to advance TF slowly to goal:  Initiation: Advance pump rate by 10 mL every 12 hours (as tolerated) until goal rate is achieved  TF Goal Rate: TwoCal HN at 80 mL/hr via G-tube cycled over ~15 hours daily (or until 5 cartons is infused)  Water Flushin mL free water flush at the beginning and end of TF infusion plus 125 mL free water flush 6 times per day  (total of 1000 mL/day in flushes; flushes should be spread throughout the day and every 2-3 hours during TF infusion; will need to adjust flushes prn for IV and PO fluids)  Enteral Nutrition goal to provide: 1185 mL volume (5 cartons day), 2370 kcal, 99 grams protein, 830 mL free water, 1830 mL total water daily  *Pt requires an enteral feeding pump to administer TF due to intolerance to bolus tube feeding  Recommend enteral backpack for pump transport  Stay upright at least 30-45 degrees while pump is running  Patient will need home teaching on how to use enteral pump  · As your TF is being advanced:  · Ensure adequate hydration by increasing water flushing  Aim for a total of ~60 oz per day of fluids (by mouth and by feeding tube)  · [facility] when you have 10 days left of TF supplies: [phone], option 3 (customer support)  · [facility] about pump bag frequency of use and to ask if they can provide an enteral backpack so that you can bring your pump with you to appointments      Follow Up Plan: 3/30 @1pm   Recommend Referral to Other Providers: none at this time

## 2021-03-23 ENCOUNTER — APPOINTMENT (OUTPATIENT)
Dept: LAB | Facility: CLINIC | Age: 66
End: 2021-03-23
Payer: MEDICARE

## 2021-03-23 DIAGNOSIS — C16.0 GASTROESOPHAGEAL CANCER (HCC): ICD-10-CM

## 2021-03-23 DIAGNOSIS — E86.0 DEHYDRATION: ICD-10-CM

## 2021-03-23 DIAGNOSIS — C15.5 MALIGNANT NEOPLASM OF LOWER THIRD OF ESOPHAGUS (HCC): ICD-10-CM

## 2021-03-23 DIAGNOSIS — R11.0 NAUSEA: ICD-10-CM

## 2021-03-23 DIAGNOSIS — I48.91 A-FIB (HCC): ICD-10-CM

## 2021-03-23 LAB
ALBUMIN SERPL BCP-MCNC: 3 G/DL (ref 3.5–5)
ALP SERPL-CCNC: 119 U/L (ref 46–116)
ALT SERPL W P-5'-P-CCNC: 8 U/L (ref 12–78)
ANION GAP SERPL CALCULATED.3IONS-SCNC: 6 MMOL/L (ref 4–13)
AST SERPL W P-5'-P-CCNC: 10 U/L (ref 5–45)
BASOPHILS # BLD AUTO: 0.08 THOUSANDS/ΜL (ref 0–0.1)
BASOPHILS NFR BLD AUTO: 1 % (ref 0–1)
BILIRUB SERPL-MCNC: 0.88 MG/DL (ref 0.2–1)
BUN SERPL-MCNC: 15 MG/DL (ref 5–25)
CALCIUM ALBUM COR SERPL-MCNC: 10.5 MG/DL (ref 8.3–10.1)
CALCIUM SERPL-MCNC: 9.7 MG/DL (ref 8.3–10.1)
CHLORIDE SERPL-SCNC: 99 MMOL/L (ref 100–108)
CO2 SERPL-SCNC: 29 MMOL/L (ref 21–32)
CREAT SERPL-MCNC: 1.2 MG/DL (ref 0.6–1.3)
EOSINOPHIL # BLD AUTO: 0.1 THOUSAND/ΜL (ref 0–0.61)
EOSINOPHIL NFR BLD AUTO: 1 % (ref 0–6)
ERYTHROCYTE [DISTWIDTH] IN BLOOD BY AUTOMATED COUNT: 20.4 % (ref 11.6–15.1)
GFR SERPL CREATININE-BSD FRML MDRD: 62 ML/MIN/1.73SQ M
GLUCOSE SERPL-MCNC: 118 MG/DL (ref 65–140)
HCT VFR BLD AUTO: 33 % (ref 36.5–49.3)
HGB BLD-MCNC: 9.4 G/DL (ref 12–17)
IMM GRANULOCYTES # BLD AUTO: 0.03 THOUSAND/UL (ref 0–0.2)
IMM GRANULOCYTES NFR BLD AUTO: 0 % (ref 0–2)
LYMPHOCYTES # BLD AUTO: 2.01 THOUSANDS/ΜL (ref 0.6–4.47)
LYMPHOCYTES NFR BLD AUTO: 24 % (ref 14–44)
MCH RBC QN AUTO: 24.4 PG (ref 26.8–34.3)
MCHC RBC AUTO-ENTMCNC: 28.5 G/DL (ref 31.4–37.4)
MCV RBC AUTO: 86 FL (ref 82–98)
MONOCYTES # BLD AUTO: 0.71 THOUSAND/ΜL (ref 0.17–1.22)
MONOCYTES NFR BLD AUTO: 9 % (ref 4–12)
NEUTROPHILS # BLD AUTO: 5.4 THOUSANDS/ΜL (ref 1.85–7.62)
NEUTS SEG NFR BLD AUTO: 65 % (ref 43–75)
NRBC BLD AUTO-RTO: 0 /100 WBCS
PLATELET # BLD AUTO: 511 THOUSANDS/UL (ref 149–390)
PMV BLD AUTO: 11.3 FL (ref 8.9–12.7)
POTASSIUM SERPL-SCNC: 3.6 MMOL/L (ref 3.5–5.3)
PROT SERPL-MCNC: 8 G/DL (ref 6.4–8.2)
RBC # BLD AUTO: 3.85 MILLION/UL (ref 3.88–5.62)
SODIUM SERPL-SCNC: 134 MMOL/L (ref 136–145)
WBC # BLD AUTO: 8.33 THOUSAND/UL (ref 4.31–10.16)

## 2021-03-23 PROCEDURE — 80053 COMPREHEN METABOLIC PANEL: CPT

## 2021-03-23 PROCEDURE — 85025 COMPLETE CBC W/AUTO DIFF WBC: CPT

## 2021-03-23 PROCEDURE — 36415 COLL VENOUS BLD VENIPUNCTURE: CPT

## 2021-03-23 NOTE — TELEPHONE ENCOUNTER
[facility]    [facility]    Copy sent to be scanned into chart

## 2021-03-24 ENCOUNTER — HOSPITAL ENCOUNTER (OUTPATIENT)
Dept: INFUSION CENTER | Facility: HOSPITAL | Age: 66
Discharge: HOME | End: 2021-03-24
Attending: INTERNAL MEDICINE
Payer: MEDICARE

## 2021-03-24 VITALS
DIASTOLIC BLOOD PRESSURE: 75 MMHG | WEIGHT: 136.69 LBS | OXYGEN SATURATION: 96 % | HEIGHT: 71 IN | BODY MASS INDEX: 19.14 KG/M2 | RESPIRATION RATE: 18 BRPM | TEMPERATURE: 97 F | HEART RATE: 88 BPM | SYSTOLIC BLOOD PRESSURE: 138 MMHG

## 2021-03-24 DIAGNOSIS — E86.0 DEHYDRATION: ICD-10-CM

## 2021-03-24 DIAGNOSIS — C16.0 GASTROESOPHAGEAL CANCER (HCC): ICD-10-CM

## 2021-03-24 DIAGNOSIS — R11.0 NAUSEA: ICD-10-CM

## 2021-03-24 DIAGNOSIS — C15.5 MALIGNANT NEOPLASM OF LOWER THIRD OF ESOPHAGUS (HCC): Primary | ICD-10-CM

## 2021-03-24 PROCEDURE — 96409 CHEMO IV PUSH SNGL DRUG: CPT

## 2021-03-24 PROCEDURE — G0498 CHEMO EXTEND IV INFUS W/PUMP: HCPCS

## 2021-03-24 PROCEDURE — 96367 TX/PROPH/DG ADDL SEQ IV INF: CPT

## 2021-03-24 RX ORDER — AMIODARONE HYDROCHLORIDE 200 MG/1
TABLET ORAL
Qty: 30 TABLET | Refills: 3 | Status: ON HOLD | OUTPATIENT
Start: 2021-03-24 | End: 2021-05-13 | Stop reason: ALTCHOICE

## 2021-03-24 RX ORDER — FLUOROURACIL 50 MG/ML
400 INJECTION, SOLUTION INTRAVENOUS ONCE
Status: COMPLETED | OUTPATIENT
Start: 2021-03-24 | End: 2021-03-24

## 2021-03-24 RX ORDER — SODIUM CHLORIDE 9 MG/ML
20 INJECTION, SOLUTION INTRAVENOUS ONCE AS NEEDED
Status: DISCONTINUED | OUTPATIENT
Start: 2021-03-24 | End: 2021-03-27 | Stop reason: HOSPADM

## 2021-03-24 RX ADMIN — DEXAMETHASONE SODIUM PHOSPHATE: 10 INJECTION, SOLUTION INTRAMUSCULAR; INTRAVENOUS at 11:59

## 2021-03-24 RX ADMIN — SODIUM CHLORIDE 20 ML/HR: 9 INJECTION, SOLUTION INTRAVENOUS at 10:50

## 2021-03-24 RX ADMIN — IRON SUCROSE 200 MG: 20 INJECTION, SOLUTION INTRAVENOUS at 10:54

## 2021-03-24 RX ADMIN — FLUOROURACIL 735 MG: 50 INJECTION, SOLUTION INTRAVENOUS at 12:27

## 2021-03-24 NOTE — PLAN OF CARE
Problem: Potential for Falls  Goal: Patient will remain free of falls  Description: INTERVENTIONS:  - Assess patient frequently for physical needs  -  Identify cognitive and physical deficits and behaviors that affect risk of falls    -  [place] fall precautions as indicated by assessment   - Educate patient/family on patient safety including physical limitations  - Instruct patient to call for assistance with activity based on assessment  - Modify environment to reduce risk of injury  - Consider OT/PT consult to assist with strengthening/mobility  Outcome: Progressing     Problem: INFECTION - ADULT  Goal: Absence or prevention of progression during hospitalization  Description: INTERVENTIONS:  - Assess and monitor for signs and symptoms of infection  - Monitor lab/diagnostic results  - Monitor all insertion sites, i e  indwelling lines, tubes, and drains  - Monitor endotracheal (as able) and nasal secretions for changes in amount and color  - [place] appropriate cooling/warming therapies per order  - Administer medications as ordered  - Instruct and encourage patient and family to use good hand hygiene technique  - Identify and instruct in appropriate isolation precautions for identified infection/condition  Outcome: Progressing  Goal: Absence of fever/infection during neutropenic period  Description: INTERVENTIONS:  - Monitor WBC  - Implement neutropenic guidelines  Outcome: Progressing     Problem: DISCHARGE PLANNING  Goal: Discharge to home or other facility with appropriate resources  Description: INTERVENTIONS:  - Identify barriers to discharge w/patient and caregiver  - Arrange for needed discharge resources and transportation as appropriate  - Identify discharge learning needs (meds, wound care, etc )  - Arrange for interpretive services to assist at discharge as needed  - Refer to Case Management Department for coordinating discharge planning if the patient needs post-hospital services based on physician/advanced practitioner order or complex needs related to functional status, cognitive ability, or social support system  Outcome: Progressing     Problem: Potential for Falls  Goal: Patient will remain free of falls  Description: INTERVENTIONS:  - Assess patient frequently for physical needs  -  Identify cognitive and physical deficits and behaviors that affect risk of falls    -  [place] fall precautions as indicated by assessment   - Educate patient/family on patient safety including physical limitations  - Instruct patient to call for assistance with activity based on assessment  - Modify environment to reduce risk of injury  - Consider OT/PT consult to assist with strengthening/mobility  Outcome: Progressing

## 2021-03-26 ENCOUNTER — TELEPHONE (OUTPATIENT)
Dept: FAMILY MEDICINE CLINIC | Facility: CLINIC | Age: 66
End: 2021-03-26

## 2021-03-26 ENCOUNTER — HOSPITAL ENCOUNTER (OUTPATIENT)
Dept: INFUSION CENTER | Facility: HOSPITAL | Age: 66
Discharge: HOME | End: 2021-03-26
Attending: INTERNAL MEDICINE

## 2021-03-26 VITALS — TEMPERATURE: 97.7 F

## 2021-03-26 DIAGNOSIS — C15.5 MALIGNANT NEOPLASM OF LOWER THIRD OF ESOPHAGUS (HCC): Primary | ICD-10-CM

## 2021-03-26 DIAGNOSIS — E86.0 DEHYDRATION: ICD-10-CM

## 2021-03-26 DIAGNOSIS — C16.0 GASTROESOPHAGEAL CANCER (HCC): ICD-10-CM

## 2021-03-26 DIAGNOSIS — R11.0 NAUSEA: ICD-10-CM

## 2021-03-26 NOTE — PLAN OF CARE
Problem: INFECTION - ADULT  Goal: Absence or prevention of progression during hospitalization  Description: INTERVENTIONS:  - Assess and monitor for signs and symptoms of infection  - Monitor lab/diagnostic results  - Monitor all insertion sites, i e  indwelling lines, tubes, and drains  - Monitor endotracheal (as able) and nasal secretions for changes in amount and color  - [place] appropriate cooling/warming therapies per order  - Administer medications as ordered  - Instruct and encourage patient and family to use good hand hygiene technique  - Identify and instruct in appropriate isolation precautions for identified infection/condition  Outcome: Progressing  Goal: Absence of fever/infection during neutropenic period  Description: INTERVENTIONS:  - Monitor WBC  - Implement neutropenic guidelines  Outcome: Progressing     Problem: DISCHARGE PLANNING  Goal: Discharge to home or other facility with appropriate resources  Description: INTERVENTIONS:  - Identify barriers to discharge w/patient and caregiver  - Arrange for needed discharge resources and transportation as appropriate  - Identify discharge learning needs (meds, wound care, etc )  - Arrange for interpretive services to assist at discharge as needed  - Refer to Case Management Department for coordinating discharge planning if the patient needs post-hospital services based on physician/advanced practitioner order or complex needs related to functional status, cognitive ability, or social support system  Outcome: Progressing     Problem: Potential for Falls  Goal: Patient will remain free of falls  Description: INTERVENTIONS:  - Assess patient frequently for physical needs  -  Identify cognitive and physical deficits and behaviors that affect risk of falls    -  [place] fall precautions as indicated by assessment   - Educate patient/family on patient safety including physical limitations  - Instruct patient to call for assistance with activity based on assessment  - Modify environment to reduce risk of injury  - Consider OT/PT consult to assist with strengthening/mobility  Outcome: Progressing

## 2021-03-29 ENCOUNTER — TELEPHONE (OUTPATIENT)
Dept: NUTRITION | Facility: CLINIC | Age: 66
End: 2021-03-29

## 2021-03-29 NOTE — TELEPHONE ENCOUNTER
Received voice message from wife, Hunter Matias, asking for a call back regarding pump questions  I called Hunter Matias back today and she stated that Ry's enteral pump is working most of the time, but sometimes it shuts off before all of the formula is infused saying "dose is done"  She wants to know what his "total dose" should be so that the pump can finish the feeding  Teo Laughlin has been at his goal rate of 80 mL/hr since last Tuesday and is tolerating this well  He was able to get in 4 cans twice and the other days he got in 3 cans per day  He plans to progress to his goal of 5 cans per day today, but the "dose is done" message on the pump is interfering with infusion time making it difficult to reach his goal     Discussed that his total volume should be how many total 237 mL cans he will be infusing for the day  For example if he is going to infuse 4 cans, the total volume for the day is 948 mL (237 mL x4)  Stressed that the pump rate needs to stay at 80 mL/hr until pt tolerates this at goal volume for a considerable amount of time (~2 weeks)  Wife stated she understands  I asked his wife to make sure that when she adjusts the pump for total volume/dose, to make sure the pump is still infusing at 80 mL/hr and not faster; wife verbalized understanding  Also recommended that if pt still has issues with his pump, he should contact his [employer] company who supplied it for further instruction and troubleshooting  Wife then reported that they bought a wedge for the bed to help him stay upright and are considering buying an adjustable bed  He is still regurgitating throughout the day  He is only taking a very small amount of po food/fluids (ice cream, soda)  Teo Laughlin wishes he could eat more po  Reviewed EN goal and progression plan  All questions/concerns addressed at this time    Reviewed follow up plan for tomorrow at 1pm

## 2021-03-30 ENCOUNTER — TELEPHONE (OUTPATIENT)
Dept: GASTROENTEROLOGY | Facility: CLINIC | Age: 66
End: 2021-03-30

## 2021-03-30 ENCOUNTER — NUTRITION (OUTPATIENT)
Dept: NUTRITION | Facility: CLINIC | Age: 66
End: 2021-03-30

## 2021-03-30 ENCOUNTER — TELEPHONE (OUTPATIENT)
Dept: GASTROENTEROLOGY | Facility: AMBULARY SURGERY CENTER | Age: 66
End: 2021-03-30

## 2021-03-30 DIAGNOSIS — Z71.3 NUTRITIONAL COUNSELING: Primary | ICD-10-CM

## 2021-03-30 NOTE — PATIENT INSTRUCTIONS
Nutrition Rx & Recommendations:  · Follow proper oral care; Try baking soda/salt water rinse recipe (mix 3/4 tsp salt + 1 tsp baking soda + 1 qt water; rinse with plain water after using) in Eating Hints book (pg 18)  Brush your teeth before/after meals & before bed  · Weigh yourself regularly  If you notice weight loss, make an effort to increase your daily food/calorie intake  If you continue to notice loss after these efforts, reach out to your dietitian to establish a plan to stabilize weight  · Keep a daily diary to track:  · # cans of TwoCal infused per day  · How much and how often you are flushing your feeding tube  · How much and what you are drinking by mouth  · Your daily weight with the date    · Tube Feeding Plan:  · Continue TF at goal   Increase water flushing to goal   TF Goal Rate: TwoCal HN at 80 mL/hr via G-tube cycled over ~15 hours daily (or until 5 cartons is infused)  Water Flushin mL free water flush at the beginning and end of TF infusion plus 125 mL free water flush 6 times per day (total of 1000 mL/day in flushes; flushes should be spread throughout the day and every 2-3 hours during TF infusion; will need to adjust flushes prn for IV and PO fluids)  Enteral Nutrition goal to provide: 1185 mL volume (5 cartons day), 2370 kcal, 99 grams protein, 830 mL free water, 1830 mL total water daily  Monika Thierry requires an enteral feeding pump to administer TF due to intolerance to bolus tube feedings  Recommend an enteral backpack for pump transport  Stay upright at least 30-45 degrees while pump is running  · Call [employer] when you have 10 days left of TF supplies: [phone], option 3 (customer support)  · Call [employer] to ask if they can provide an enteral backpack so that you can bring your pump with you to appointments      Follow Up Plan:  @1pm   Recommend Referral to Other Providers: GI to determine appropriateness of supportive medications to help with nausea and bloating

## 2021-03-30 NOTE — TELEPHONE ENCOUNTER
----- Message from Charmaine Oglesby, [address] sent at 3/30/2021  1:56 PM EDT -----  Wang Eduardo is doing better with his pump feeding but he still complains of constant nausea and painful bloating  Do you think he would benefit from a prokinetic agent such as Reglan? If so, could someone please discuss options with wife? Thanks! David Naylor

## 2021-03-30 NOTE — TELEPHONE ENCOUNTER
----- Message from Palma Keller, [address] [place] [address] sent at 3/30/2021  1:56 PM EDT -----  Wang Faust is doing better with his pump feeding but he still complains of constant nausea and painful bloating  Do you think he would benefit from a prokinetic agent such as Reglan? If so, could someone please discuss options with wife? Thanks! Jessica Peres

## 2021-03-30 NOTE — Clinical Note
Wang Truong is doing better with his pump feeding but he still complains of constant nausea and painful bloating  Do you think he would benefit from a prokinetic agent such as Reglan? If so, could someone please discuss options with wife? Thanks!  Javon Loco

## 2021-03-31 ENCOUNTER — TELEPHONE (OUTPATIENT)
Dept: HEMATOLOGY ONCOLOGY | Facility: CLINIC | Age: 66
End: 2021-03-31

## 2021-03-31 DIAGNOSIS — R11.0 NAUSEA: Primary | ICD-10-CM

## 2021-03-31 DIAGNOSIS — C16.0 MALIGNANT NEOPLASM OF CARDIA OF STOMACH (HCC): ICD-10-CM

## 2021-03-31 DIAGNOSIS — E86.0 DEHYDRATION: ICD-10-CM

## 2021-03-31 DIAGNOSIS — C15.5 MALIGNANT NEOPLASM OF LOWER THIRD OF ESOPHAGUS (HCC): ICD-10-CM

## 2021-03-31 RX ORDER — SODIUM CHLORIDE 9 MG/ML
20 INJECTION, SOLUTION INTRAVENOUS ONCE AS NEEDED
Status: CANCELLED | OUTPATIENT
Start: 2021-04-07

## 2021-03-31 RX ORDER — DEXTROSE MONOHYDRATE 50 MG/ML
20 INJECTION, SOLUTION INTRAVENOUS ONCE
Status: CANCELLED | OUTPATIENT
Start: 2021-04-07

## 2021-03-31 RX ORDER — FLUOROURACIL 50 MG/ML
400 INJECTION, SOLUTION INTRAVENOUS ONCE
Status: CANCELLED | OUTPATIENT
Start: 2021-04-07

## 2021-03-31 NOTE — TELEPHONE ENCOUNTER
Patient's wife is calling to report that patient is not eating and is on a feeding pump  Patient is scheduled for a CT with PO and IV contrast      She is questioning if she should put barium through the feeding tube or if it can be done with IV contrast only  Patient is scheduled for CT on 4/5/21  Will forward to Dr Jaylon Roman RN      [facility]) [phone] (M)

## 2021-03-31 NOTE — TELEPHONE ENCOUNTER
I called & advised Tara of the results    She asked, "so what do I do about the chest pain on my right side?"  Please advise Order updated, message left with Mildred Bobby to advise of the same

## 2021-04-01 NOTE — TELEPHONE ENCOUNTER
Left message on patient's wife's number in regards to scheduling a follow up appointment with Dr Sandra Cobb  Patient lives in [place]

## 2021-04-05 ENCOUNTER — TELEPHONE (OUTPATIENT)
Dept: HEMATOLOGY ONCOLOGY | Facility: CLINIC | Age: 66
End: 2021-04-05

## 2021-04-05 ENCOUNTER — HOSPITAL ENCOUNTER (INPATIENT)
Facility: HOSPITAL | Age: 66
LOS: 1 days | Discharge: HOME HEALTH | DRG: 375 | End: 2021-04-06
Attending: EMERGENCY MEDICINE | Admitting: FAMILY MEDICINE
Payer: MEDICARE

## 2021-04-05 ENCOUNTER — TRANSCRIBE ORDERS (OUTPATIENT)
Dept: ADMINISTRATIVE | Facility: HOSPITAL | Age: 66
End: 2021-04-05

## 2021-04-05 ENCOUNTER — HOSPITAL ENCOUNTER (EMERGENCY)
Facility: HOSPITAL | Age: 66
Discharge: HOME | DRG: 375 | End: 2021-04-05
Attending: EMERGENCY MEDICINE
Payer: MEDICARE

## 2021-04-05 ENCOUNTER — HOSPITAL ENCOUNTER (OUTPATIENT)
Dept: RADIOLOGY | Facility: HOSPITAL | Age: 66
Discharge: HOME | DRG: 375 | End: 2021-04-05
Payer: MEDICARE

## 2021-04-05 ENCOUNTER — APPOINTMENT (OUTPATIENT)
Dept: LAB | Facility: HOSPITAL | Age: 66
DRG: 375 | End: 2021-04-05
Attending: INTERNAL MEDICINE
Payer: MEDICARE

## 2021-04-05 VITALS
BODY MASS INDEX: 19.23 KG/M2 | OXYGEN SATURATION: 99 % | TEMPERATURE: 99.1 F | HEART RATE: 106 BPM | DIASTOLIC BLOOD PRESSURE: 75 MMHG | SYSTOLIC BLOOD PRESSURE: 139 MMHG | WEIGHT: 137.8 LBS | RESPIRATION RATE: 20 BRPM

## 2021-04-05 DIAGNOSIS — Z93.1 FEEDING BY G-TUBE (HCC): ICD-10-CM

## 2021-04-05 DIAGNOSIS — I48.0 PAF (PAROXYSMAL ATRIAL FIBRILLATION) (HCC): ICD-10-CM

## 2021-04-05 DIAGNOSIS — E43 SEVERE PROTEIN-CALORIE MALNUTRITION (HCC): ICD-10-CM

## 2021-04-05 DIAGNOSIS — C15.5 MALIGNANT NEOPLASM OF LOWER THIRD OF ESOPHAGUS (HCC): ICD-10-CM

## 2021-04-05 DIAGNOSIS — C16.0 GASTROESOPHAGEAL CANCER (HCC): ICD-10-CM

## 2021-04-05 DIAGNOSIS — R11.0 NAUSEA: ICD-10-CM

## 2021-04-05 DIAGNOSIS — R05.9 COUGH: Primary | ICD-10-CM

## 2021-04-05 DIAGNOSIS — E86.0 DEHYDRATION: ICD-10-CM

## 2021-04-05 DIAGNOSIS — Z51.5 PALLIATIVE CARE PATIENT: ICD-10-CM

## 2021-04-05 DIAGNOSIS — D64.9 SEVERE ANEMIA: Primary | ICD-10-CM

## 2021-04-05 DIAGNOSIS — K92.2 GI BLEED: ICD-10-CM

## 2021-04-05 DIAGNOSIS — E43 SEVERE PROTEIN-CALORIE MALNUTRITION (GOMEZ: LESS THAN 60% OF STANDARD WEIGHT) (HCC): ICD-10-CM

## 2021-04-05 DIAGNOSIS — R13.14 PHARYNGOESOPHAGEAL DYSPHAGIA: ICD-10-CM

## 2021-04-05 PROBLEM — K65.9 PERITONITIS (HCC): Status: ACTIVE | Noted: 2021-04-05

## 2021-04-05 PROBLEM — D72.819 LEUKOPENIA: Status: ACTIVE | Noted: 2021-04-05

## 2021-04-05 PROBLEM — R13.10 DYSPHAGIA: Status: ACTIVE | Noted: 2019-08-07

## 2021-04-05 LAB
ABO GROUP BLD: NORMAL
ALBUMIN SERPL BCP-MCNC: 2.3 G/DL (ref 3.5–5)
ALP SERPL-CCNC: 95 U/L (ref 46–116)
ALT SERPL W P-5'-P-CCNC: 18 U/L (ref 12–78)
ANION GAP SERPL CALCULATED.3IONS-SCNC: 10 MMOL/L (ref 4–13)
ANISOCYTOSIS BLD QL SMEAR: PRESENT
AST SERPL W P-5'-P-CCNC: 17 U/L (ref 5–45)
BASO STIPL BLD QL SMEAR: PRESENT
BASOPHILS # BLD MANUAL: 0.04 THOUSAND/UL (ref 0–0.1)
BASOPHILS NFR MAR MANUAL: 2 % (ref 0–1)
BILIRUB SERPL-MCNC: 0.44 MG/DL (ref 0.2–1)
BLD GP AB SCN SERPL QL: NEGATIVE
BUN SERPL-MCNC: 38 MG/DL (ref 5–25)
CALCIUM ALBUM COR SERPL-MCNC: 10.4 MG/DL (ref 8.3–10.1)
CALCIUM SERPL-MCNC: 9 MG/DL (ref 8.3–10.1)
CHLORIDE SERPL-SCNC: 100 MMOL/L (ref 100–108)
CO2 SERPL-SCNC: 28 MMOL/L (ref 21–32)
CREAT SERPL-MCNC: 1.08 MG/DL (ref 0.6–1.3)
EOSINOPHIL # BLD MANUAL: 0.04 THOUSAND/UL (ref 0–0.4)
EOSINOPHIL NFR BLD MANUAL: 2 % (ref 0–6)
ERYTHROCYTE [DISTWIDTH] IN BLOOD BY AUTOMATED COUNT: 20.9 % (ref 11.6–15.1)
GFR SERPL CREATININE-BSD FRML MDRD: 71 ML/MIN/1.73SQ M
GLUCOSE SERPL-MCNC: 132 MG/DL (ref 65–140)
HCT VFR BLD AUTO: 20 % (ref 36.5–49.3)
HGB BLD-MCNC: 5.4 G/DL (ref 12–17)
LG PLATELETS BLD QL SMEAR: PRESENT
LYMPHOCYTES # BLD AUTO: 0.57 THOUSAND/UL (ref 0.6–4.47)
LYMPHOCYTES # BLD AUTO: 28 % (ref 14–44)
MCH RBC QN AUTO: 23.9 PG (ref 26.8–34.3)
MCHC RBC AUTO-ENTMCNC: 26.5 G/DL (ref 31.4–37.4)
MCV RBC AUTO: 90 FL (ref 82–98)
MONOCYTES # BLD AUTO: 0.16 THOUSAND/UL (ref 0–1.22)
MONOCYTES NFR BLD: 8 % (ref 4–12)
NEUTROPHILS # BLD MANUAL: 0.78 THOUSAND/UL (ref 1.85–7.62)
NEUTS SEG NFR BLD AUTO: 38 % (ref 43–75)
NRBC BLD AUTO-RTO: 2 /100 WBCS
NRBC BLD AUTO-RTO: 6 /100 WBC (ref 0–2)
OVALOCYTES BLD QL SMEAR: PRESENT
PLATELET # BLD AUTO: 485 THOUSANDS/UL (ref 149–390)
PLATELET BLD QL SMEAR: ABNORMAL
PMV BLD AUTO: 11.5 FL (ref 8.9–12.7)
POLYCHROMASIA BLD QL SMEAR: PRESENT
POTASSIUM SERPL-SCNC: 3.6 MMOL/L (ref 3.5–5.3)
PROT SERPL-MCNC: 6.7 G/DL (ref 6.4–8.2)
RBC # BLD AUTO: 2.22 MILLION/UL (ref 3.88–5.62)
RBC MORPH BLD: PRESENT
RH BLD: POSITIVE
SODIUM SERPL-SCNC: 138 MMOL/L (ref 136–145)
SPECIMEN EXPIRATION DATE: NORMAL
TOTAL CELLS COUNTED SPEC: 50
VARIANT LYMPHS # BLD AUTO: 22 %
WBC # BLD AUTO: 2.04 THOUSAND/UL (ref 4.31–10.16)

## 2021-04-05 PROCEDURE — C9113 INJ PANTOPRAZOLE SODIUM, VIA: HCPCS | Performed by: EMERGENCY MEDICINE

## 2021-04-05 PROCEDURE — 99284 EMERGENCY DEPT VISIT MOD MDM: CPT | Performed by: EMERGENCY MEDICINE

## 2021-04-05 PROCEDURE — 93005 ELECTROCARDIOGRAM TRACING: CPT

## 2021-04-05 PROCEDURE — 80053 COMPREHEN METABOLIC PANEL: CPT

## 2021-04-05 PROCEDURE — 85007 BL SMEAR W/DIFF WBC COUNT: CPT

## 2021-04-05 PROCEDURE — 85046 RETICYTE/HGB CONCENTRATE: CPT | Performed by: GENERAL PRACTICE

## 2021-04-05 PROCEDURE — 86901 BLOOD TYPING SEROLOGIC RH(D): CPT | Performed by: EMERGENCY MEDICINE

## 2021-04-05 PROCEDURE — 36415 COLL VENOUS BLD VENIPUNCTURE: CPT

## 2021-04-05 PROCEDURE — 36430 TRANSFUSION BLD/BLD COMPNT: CPT

## 2021-04-05 PROCEDURE — G1004 CDSM NDSC: HCPCS

## 2021-04-05 PROCEDURE — 99283 EMERGENCY DEPT VISIT LOW MDM: CPT

## 2021-04-05 PROCEDURE — P9016 RBC LEUKOCYTES REDUCED: HCPCS

## 2021-04-05 PROCEDURE — 86900 BLOOD TYPING SEROLOGIC ABO: CPT | Performed by: EMERGENCY MEDICINE

## 2021-04-05 PROCEDURE — 86850 RBC ANTIBODY SCREEN: CPT | Performed by: EMERGENCY MEDICINE

## 2021-04-05 PROCEDURE — 86923 COMPATIBILITY TEST ELECTRIC: CPT

## 2021-04-05 PROCEDURE — 30233N1 TRANSFUSION OF NONAUTOLOGOUS RED BLOOD CELLS INTO PERIPHERAL VEIN, PERCUTANEOUS APPROACH: ICD-10-PCS | Performed by: FAMILY MEDICINE

## 2021-04-05 PROCEDURE — 99284 EMERGENCY DEPT VISIT MOD MDM: CPT

## 2021-04-05 PROCEDURE — 71260 CT THORAX DX C+: CPT

## 2021-04-05 PROCEDURE — 85027 COMPLETE CBC AUTOMATED: CPT

## 2021-04-05 PROCEDURE — 74177 CT ABD & PELVIS W/CONTRAST: CPT

## 2021-04-05 PROCEDURE — 82272 OCCULT BLD FECES 1-3 TESTS: CPT

## 2021-04-05 RX ORDER — VENLAFAXINE HYDROCHLORIDE 150 MG/1
150 CAPSULE, EXTENDED RELEASE ORAL DAILY
Status: DISCONTINUED | OUTPATIENT
Start: 2021-04-06 | End: 2021-04-06 | Stop reason: HOSPADM

## 2021-04-05 RX ORDER — ONDANSETRON 4 MG/1
8 TABLET, ORALLY DISINTEGRATING ORAL EVERY 8 HOURS PRN
Status: DISCONTINUED | OUTPATIENT
Start: 2021-04-05 | End: 2021-04-06 | Stop reason: HOSPADM

## 2021-04-05 RX ORDER — DIAZEPAM 5 MG/1
10 TABLET ORAL
Status: DISCONTINUED | OUTPATIENT
Start: 2021-04-05 | End: 2021-04-06 | Stop reason: HOSPADM

## 2021-04-05 RX ORDER — HYDROCHLOROTHIAZIDE 12.5 MG/1
12.5 TABLET ORAL DAILY PRN
Status: DISCONTINUED | OUTPATIENT
Start: 2021-04-05 | End: 2021-04-06 | Stop reason: HOSPADM

## 2021-04-05 RX ORDER — PANTOPRAZOLE SODIUM 40 MG/1
40 TABLET, DELAYED RELEASE ORAL
Status: DISCONTINUED | OUTPATIENT
Start: 2021-04-06 | End: 2021-04-06

## 2021-04-05 RX ORDER — SODIUM CHLORIDE, SODIUM LACTATE, POTASSIUM CHLORIDE, CALCIUM CHLORIDE 600; 310; 30; 20 MG/100ML; MG/100ML; MG/100ML; MG/100ML
50 INJECTION, SOLUTION INTRAVENOUS CONTINUOUS
Status: DISPENSED | OUTPATIENT
Start: 2021-04-05 | End: 2021-04-06

## 2021-04-05 RX ADMIN — DIAZEPAM 10 MG: 5 TABLET ORAL at 23:05

## 2021-04-05 RX ADMIN — IOHEXOL 100 ML: 350 INJECTION, SOLUTION INTRAVENOUS at 13:45

## 2021-04-05 RX ADMIN — SODIUM CHLORIDE 80 MG: 9 INJECTION, SOLUTION INTRAVENOUS at 20:42

## 2021-04-05 NOTE — TELEPHONE ENCOUNTER
Render Gabe called back and advised that patient is in the ER for low HGB level  Render Gabe verbalized understanding of above

## 2021-04-05 NOTE — TELEPHONE ENCOUNTER
Left voice mail message on emergency contact Aquilino Whitney that patient has a critical hg and needs to go to ED  Instructed to call [id number] back when message received  FYI to Dr Jordyn Sandoval

## 2021-04-05 NOTE — TELEPHONE ENCOUNTER
Left voice mail message on patient's primary phone  line instructing patient to go to ED with critically low hg of 5 4  Will update dr Lori Vera RN

## 2021-04-05 NOTE — TELEPHONE ENCOUNTER
Patients wife calling back regarding missed call  I explained that hgb = 5 4 and that patient was to report to the ED  Wife stated they just left the ER a few minutes ago  His hgb was not addressed at that time    Reviewed again that ER was needed due to critically low hgb  Wife will take patient back

## 2021-04-05 NOTE — DISCHARGE INSTRUCTIONS
The CT scan does not show any pneumonia  Continue your current care/tube feedings/chemo  Call your oncologist with any concerns

## 2021-04-05 NOTE — ED PROVIDER NOTES
History  Chief Complaint   Patient presents with    Cough     Brought by wife from CT scan ( chest/abd/pelvis)  She states she is concerned about a pneumonia due to patient increased cough  Patient has esophageal cancer and has a jpeg tube and feeding pump  He has chemo on Wednesday  78 yo male with esophageal cancer and on tube feedings was here getting an outpt  CT scan and while they were here wife wants him checked for pneumonia  Pt  Has been coughing up mainly clear phlegm for 3 days  No fever  No vomiting, diarrhea   + mild sob  Pt  Also already had blood drawn today  He is scheduled for chemo in 2 days  History provided by:  Patient and spouse   used: No    Cough  Associated symptoms: shortness of breath    Associated symptoms: no chest pain, no fever and no rash        Prior to Admission Medications   Prescriptions Last Dose Informant Patient Reported?  Taking?   amiodarone 200 mg tablet   No No   Sig: TAKE 1 TABLET BY MOUTH EVERY DAY WITH BREAKFAST   ammonium lactate (LAC-HYDRIN) 12 % cream  Spouse/Significant Other No No   Sig: Apply topically as needed for dry skin On hands and feet   apixaban (Eliquis) 5 mg  Spouse/Significant Other No No   Sig: Take 1 tablet (5 mg total) by mouth 2 (two) times a day   diazepam (VALIUM) 5 mg tablet  Spouse/Significant Other No No   Sig: Take 1 tablet (5 mg total) by mouth 2 (two) times a day   Patient taking differently: Take 10 mg by mouth daily at bedtime    fluorouracil 4,295 mg in CADD infusion pump   No No   Sig: Infuse 4,295 mg (1,200 mg/m2/day x 1 79 m2 (Treatment plan recorded BSA)) into a venous catheter over 46 hours for 2 days [place]ar to be administered on   hydrochlorothiazide (HYDRODIURIL) 12 5 mg tablet   No No   Sig: Take one tablet daily as needed for SBP> 160/90   ondansetron (ZOFRAN) 4 mg tablet  Spouse/Significant Other No No   Sig: Take 2 tablets (8 mg total) by mouth every 8 (eight) hours as needed for nausea or vomiting   pantoprazole (PROTONIX) 40 mg tablet   No No   Sig: Take 1 tablet (40 mg total) by mouth 2 (two) times a day   venlafaxine (EFFEXOR-XR) 150 mg 24 hr capsule  Spouse/Significant Other No No   Sig: Take 1 capsule (150 mg total) by mouth daily      Facility-Administered Medications: None       Past Medical History:   Diagnosis Date    Anxiety     Cancer ([facility] ) 2019    metastatic gastroesophageal cancer    Dehydration     Depression     Esophageal cancer ([facility] )     Fatigue     History of chemotherapy     currently started 2019    History of DVT (deep vein thrombosis)     Hypertension     currently is normal and may not need medication    Lung infection     chemo currently on hold because of the infection 2020    Nausea     Pneumonia     x2    Port-A-Cath in place     right    Psychiatric disorder     Recovering alcoholic ([facility] )     sober since     Varicose veins of left lower extremity        Past Surgical History:   Procedure Laterality Date    APPENDECTOMY      COLONOSCOPY      IR GASTROSTOMY TUBE PLACEMENT  2021    IR PORT PLACEMENT  2019    UPPER GASTROINTESTINAL ENDOSCOPY         Family History   Problem Relation Age of Onset    Colon cancer Father     Cancer Father         colon    Cancer Brother         Sinus-neuroblastoma     Heart disease Mother      I have reviewed and agree with the history as documented      E-Cigarette/Vaping    E-Cigarette Use Current Every Day User     Comments last use 4 days ago      E-Cigarette/Vaping Substances    Nicotine No     THC Yes     CBD No     Flavoring No     Other No     Unknown No      Social History     Tobacco Use    Smoking status: Former Smoker     Packs/day: 2 00     Years: 15 00     Pack years: 30 00     Types: Cigars     Quit date: 5/15/1998     Years since quittin 9    Smokeless tobacco: Former User     Quit date: 2006   Substance Use Topics    Alcohol use: Not Currently     Frequency: Never     Drinks per session: Patient refused     Binge frequency: Never     Comment: quit 30 yrs ago    Drug use: Yes     Types: Marijuana     Comment: couple x week       Review of Systems   Constitutional: Negative for fever  Respiratory: Positive for cough and shortness of breath  Cardiovascular: Negative for chest pain  Gastrointestinal: Negative for diarrhea and vomiting  Genitourinary: Negative for dysuria  Skin: Negative for rash  Physical Exam  Physical Exam  Constitutional:       General: He is not in acute distress  Appearance: He is not ill-appearing or diaphoretic  Comments: thin   HENT:      Head: Normocephalic and atraumatic  Neck:      Musculoskeletal: Neck supple  Cardiovascular:      Rate and Rhythm: Normal rate and regular rhythm  Pulmonary:      Effort: Pulmonary effort is normal       Breath sounds: Normal breath sounds  Comments: Port right chest  Abdominal:      General: Abdomen is flat  Palpations: Abdomen is soft  Tenderness: There is no abdominal tenderness  Comments: Peg tube in place   Musculoskeletal: Normal range of motion  Right lower leg: No edema  Left lower leg: No edema  Skin:     General: Skin is warm and dry  Neurological:      General: No focal deficit present  Mental Status: He is alert and oriented to person, place, and time     Psychiatric:         Mood and Affect: Mood normal          Behavior: Behavior normal          Vital Signs  ED Triage Vitals [04/05/21 1427]   Temperature Pulse Respirations Blood Pressure SpO2   99 1 °F (37 3 °C) (!) 106 20 139/75 99 %      Temp src Heart Rate Source Patient Position - Orthostatic VS BP Location FiO2 (%)   -- Monitor Sitting Right arm --      Pain Score       --           Vitals:    04/05/21 1427   BP: 139/75   Pulse: (!) 106   Patient Position - Orthostatic VS: Sitting         Visual Acuity      ED Medications  Medications - No data to display    Diagnostic Studies  Results Reviewed     None                 No orders to display              Procedures  Procedures         ED Course                             SBIRT 22yo+      Most Recent Value   SBIRT (24 yo +)   In order to provide better care to our patients, we are screening all of our patients for alcohol and drug use  Would it be okay to ask you these screening questions? No Filed at: 04/05/2021 1538                    [facility]  Number of Diagnoses or Management Options  Cough:   Diagnosis management comments: I called radiology and asked them to read CT chest/abdomen stat  Will wait for the reading  And pt  Already had outpt  Blood work drawn so I won't repeat that   1600- CT report reviewed and no pneumonia/consolidation  Advised pt  And wife to continue current care and discuss with oncologist       Disposition  Final diagnoses:   Cough     Time reflects when diagnosis was documented in both MDM as applicable and the Disposition within this note     Time User Action Codes Description Comment    2/6/4169  4:92 PM Elina Shearing Add [O79] Cough       ED Disposition     ED Disposition Condition Date/Time Comment    Discharge Stable Mon Apr 5, 2021  3:59 PM Linda Siu discharge to home/self care              Follow-up Information     Follow up With Specialties Details Why Contact Info    your oncologist              Discharge Medication List as of 4/5/2021  4:00 PM      CONTINUE these medications which have NOT CHANGED    Details   amiodarone 200 mg tablet TAKE 1 TABLET BY MOUTH EVERY DAY WITH BREAKFAST, Normal      ammonium lactate (LAC-HYDRIN) 12 % cream Apply topically as needed for dry skin On hands and feet, Starting Tue 1/7/2020, Normal      apixaban (Eliquis) 5 mg Take 1 tablet (5 mg total) by mouth 2 (two) times a day, Starting Fri 1/15/2021, No Print      diazepam (VALIUM) 5 mg tablet Take 1 tablet (5 mg total) by mouth 2 (two) times a day, Starting Mon 9/14/2020, Normal      fluorouracil 4,295 mg in CADD infusion pump Infuse 4,295 mg (1,200 mg/m2/day x 1 79 m2 (Treatment plan recorded BSA)) into a venous catheter over 46 hours for 2 days Homestar to be administered on, Starting Wed 4/7/2021, Until Fri 4/9/2021, Print      hydrochlorothiazide (HYDRODIURIL) 12 5 mg tablet Take one tablet daily as needed for SBP> 160/90, Normal      ondansetron (ZOFRAN) 4 mg tablet Take 2 tablets (8 mg total) by mouth every 8 (eight) hours as needed for nausea or vomiting, Starting Tue 8/18/2020, Normal      pantoprazole (PROTONIX) 40 mg tablet Take 1 tablet (40 mg total) by mouth 2 (two) times a day, Starting Thu 2/25/2021, Normal      venlafaxine (EFFEXOR-XR) 150 mg 24 hr capsule Take 1 capsule (150 mg total) by mouth daily, Starting Mon 9/24/2018, Normal           No discharge procedures on file      PDMP Review       Value Time User    PDMP Reviewed  Yes 6/23/2020  2:58 PM Jeff Anderson MD          ED Provider  Electronically Signed by           Tee Zelaya MD  44/57/55 [id number]       Tee Zelaya MD  26/82/60 [id number]

## 2021-04-05 NOTE — ED PROVIDER NOTES
History  Chief Complaint   Patient presents with    Abnormal Lab     Pt  was just d/c from ED and came back because PCP called patient with  Hg result as 5 4 and was told go back to the ED  76 yo male with esophageal cancer just seen by me here in ER -wife brought from outpt  Lab and CT to be checked for pneumonia  I got CT read stat and there was no pneumonia but bloodwork was not done yet prior to me discharging him  Shortly after discharge wife got phone call to go to ER for a blood transfusion  Pt  Is on chemo  History provided by:  Patient   used: No        Prior to Admission Medications   Prescriptions Last Dose Informant Patient Reported?  Taking?   amiodarone 200 mg tablet   No No   Sig: TAKE 1 TABLET BY MOUTH EVERY DAY WITH BREAKFAST   ammonium lactate (LAC-HYDRIN) 12 % cream  Spouse/Significant Other No No   Sig: Apply topically as needed for dry skin On hands and feet   apixaban (Eliquis) 5 mg  Spouse/Significant Other No No   Sig: Take 1 tablet (5 mg total) by mouth 2 (two) times a day   diazepam (VALIUM) 5 mg tablet  Spouse/Significant Other No No   Sig: Take 1 tablet (5 mg total) by mouth 2 (two) times a day   Patient taking differently: Take 10 mg by mouth daily at bedtime    fluorouracil 4,295 mg in CADD infusion pump   No No   Sig: Infuse 4,295 mg (1,200 mg/m2/day x 1 79 m2 (Treatment plan recorded BSA)) into a venous catheter over 46 hours for 2 days Homestar to be administered on   hydrochlorothiazide (HYDRODIURIL) 12 5 mg tablet   No No   Sig: Take one tablet daily as needed for SBP> 160/90   ondansetron (ZOFRAN) 4 mg tablet  Spouse/Significant Other No No   Sig: Take 2 tablets (8 mg total) by mouth every 8 (eight) hours as needed for nausea or vomiting   pantoprazole (PROTONIX) 40 mg tablet   No No   Sig: Take 1 tablet (40 mg total) by mouth 2 (two) times a day   venlafaxine (EFFEXOR-XR) 150 mg 24 hr capsule  Spouse/Significant Other No No   Sig: Take 1 capsule (150 mg total) by mouth daily      Facility-Administered Medications: None       Past Medical History:   Diagnosis Date    Anxiety     [facility] ) 2019    metastatic gastroesophageal cancer    Dehydration     Depression     Esophageal cancer (HCC)     Fatigue     History of chemotherapy     currently started 2019    History of DVT (deep vein thrombosis)     Hypertension     currently is normal and may not need medication    Lung infection     chemo currently on hold because of the infection 2020    Nausea     Pneumonia     x2    Port-A-Cath in place     right    Psychiatric disorder     Recovering alcoholic ([facility] )     sober since     Varicose veins of left lower extremity        Past Surgical History:   Procedure Laterality Date    APPENDECTOMY      COLONOSCOPY      IR GASTROSTOMY TUBE PLACEMENT  2021    IR PORT PLACEMENT  2019    UPPER GASTROINTESTINAL ENDOSCOPY         Family History   Problem Relation Age of Onset    Colon cancer Father     Cancer Father         colon    Cancer Brother         Sinus-neuroblastoma     Heart disease Mother      I have reviewed and agree with the history as documented      E-Cigarette/Vaping    E-Cigarette Use Current Every Day User     Comments last use 4 days ago      E-Cigarette/Vaping Substances    Nicotine No     THC Yes     CBD No     Flavoring No     Other No     Unknown No      Social History     Tobacco Use    Smoking status: Former Smoker     Packs/day: 2 00     Years: 15 00     Pack years: 30 00     Types: Cigars     Quit date: 5/15/1998     Years since quittin 9    Smokeless tobacco: Former User     Quit date: 2006   Substance Use Topics    Alcohol use: Not Currently     Frequency: Never     Drinks per session: Patient refused     Binge frequency: Never     Comment: quit 30 yrs ago    Drug use: Yes     Types: Marijuana     Comment: couple x week       Review of Systems   Constitutional: Negative for fever    Respiratory: Positive for cough and shortness of breath  Cardiovascular: Negative for chest pain  Gastrointestinal: Negative for diarrhea and vomiting  Neurological: Positive for weakness  Physical Exam  Physical Exam  Vitals signs and nursing note reviewed  Constitutional:       General: He is not in acute distress  Appearance: He is ill-appearing  HENT:      Head: Normocephalic and atraumatic  Neck:      Musculoskeletal: Neck supple  Pulmonary:      Effort: Pulmonary effort is normal       Breath sounds: Normal breath sounds  Abdominal:      Palpations: Abdomen is soft  Genitourinary:     Rectum: Guaiac result positive  Comments: Dark brownish-black stool, guaic positive  Musculoskeletal: Normal range of motion  Right lower leg: No edema  Left lower leg: No edema  Skin:     Coloration: Skin is pale  Neurological:      General: No focal deficit present  Mental Status: He is alert and oriented to person, place, and time     Psychiatric:         Mood and Affect: Mood normal          Behavior: Behavior normal          Vital Signs  ED Triage Vitals   Temperature Pulse Respirations Blood Pressure SpO2   04/05/21 1631 04/05/21 1631 04/05/21 1631 04/05/21 1631 04/05/21 1631   98 7 °F (37 1 °C) 102 20 123/73 99 %      Temp Source Heart Rate Source Patient Position - Orthostatic VS BP Location FiO2 (%)   04/05/21 1657 04/05/21 1631 04/05/21 1631 04/05/21 1631 --   Axillary Monitor Sitting Right arm       Pain Score       --                  Vitals:    04/05/21 1815 04/05/21 1821 04/05/21 1830 04/05/21 1845   BP: 123/69 125/75 130/70 131/76   Pulse: 94 92 90 88   Patient Position - Orthostatic VS: Sitting  Lying Lying         Visual Acuity      ED Medications  Medications   pantoprazole (PROTONIX) 80 mg in sodium chloride 0 9 % 100 mL IVPB (has no administration in time range)       Diagnostic Studies  Results Reviewed     None                 No orders to display Procedures  Procedures         ED Course                                           [facility]  Number of Diagnoses or Management Options  Severe anemia:   Diagnosis management comments: Old records reviewed  2 weeks ago Hg was 9 4  Down to 5 4 today  Stool is guaic positive  protonix ordered  Pt  Getting blood transfusion now  Discussed with hem-onc on call and will admit to [facility]  Disposition  Final diagnoses:   Severe anemia   GI bleed     Time reflects when diagnosis was documented in both MDM as applicable and the Disposition within this note     Time User Action Codes Description Comment    8/8/2206  7:10 PM Jerardo Bread A Add [O70 4] Severe anemia     0/6/4670  4:54 PM Jerardo Bread A Add [N13 6] GI bleed       ED Disposition     ED Disposition Condition Date/Time Comment    Admit Stable Mon Apr 5, 2021  7:09 PM Case was discussed with **Dr Luis Bowles and Dr Jewels Segura* and the patient's admission status was agreed to be Admission Status: inpatient status to the service of Dr Lidia Crespo**   Follow-up Information    None         Patient's Medications   Discharge Prescriptions    No medications on file     No discharge procedures on file      PDMP Review       Value Time User    PDMP Reviewed  Yes 6/23/2020  2:58 PM Michael Alcala MD          ED Provider  Electronically Signed by           Ray Hoffman MD  28/04/82 [id number]

## 2021-04-05 NOTE — H&P
H&P Exam - Walker Echols 77 y o  male MRN: [id number]    Unit/Bed#: [address] [place] [address] Encounter: [id number]    Assessment:  76 yo M w/ PMH of 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, Afib, dilated cardiomyopathy (EF 35-40%), HTN, DVT, and depression/anxiety presenting with Hgb of 5 4  Patient will be admitted as inpatient status under the care of Dr Lowell Jensen with anticipated stay of greater than 2 midnights  Case discussed with attending physician  Plan:  * GI bleed  Assessment & Plan  Hemoglobin of 5 4 on admission  Fecal occult positive per ED  Transfused 2 U of blood in ED  · Consult GI, will appreciate recommendations  · Monitor H&H 2 hours after transfusion  · NPO at midnight  · IV fluids 50 cc/hour for 8 hours    Malignant neoplasm of lower third of esophagus [facility])  Assessment & Plan  History of metastatic esophageal cancer on chemotherapy  Diagnosed in August 2019  Follows with Oncology in [place], [address] with Dr Lion Carter  Plan was to get CT scan today for chemotherapy in Wednesday, however was found to be anemic on CBC  Patient gets chemotherapy every 2 weeks @infusion center in [facility]  Last seen by oncology on March 16th per wife Parker Cesar  Last chemo March 24th  Patient has lung nodules that are stable  · Continue home Protonix 40 mg p o  B i d   · Continue home Zofran 4 mg p o  Q 8 H p r n  · Consult GI, will appreciate recommendations    PAF (paroxysmal atrial fibrillation) ([facility] )  Assessment & Plan  Patient has been off of amiodarone 200 mg p o  Q d  for over a month  Will place on telemetry  Will hold home Eliquis in setting of GI bleed and severe anemia  · Consult Cardiology, will appreciate recommendations    Dysphagia  Assessment & Plan  G-tube placed on February 5th  Patient has history of malignant neoplasm of lower 3rd of esophagus  Patient can still take medications p o  Per wife, patient on 5 cans of TwoCal per day at a rate of 80 mL per hour via pump    · Consult nutrition services, will appreciate recommendations    Severe anemia  Assessment & Plan  Hgb baseline of around 9  Hgb 5 4 on admission  See GI bleed  Will prepare 2 additional units of RBCs  Leukopenia  Assessment & Plan  WBC of 2 04 on admission  Likely in setting of chemotherapy treatment  Will continue to monitor  Chronic combined systolic and diastolic CHF (congestive heart failure) (HCC)  Assessment & Plan  Wt Readings from Last 3 Encounters:   04/05/21 62 4 kg (137 lb 8 oz)   04/05/21 62 5 kg (137 lb 12 8 oz)   03/24/21 62 kg (136 lb 11 oz)       Grade 2 diastolic dysfunction from echo in September 2020 with EF estimated in the range of 35 % to 40 %  Patient appears euvolemic  · Daily weights, I's and O's      Anxiety  Assessment & Plan  Continue diazepam 10 mg p o  Q h s     Major depression  Assessment & Plan  Continue home venlafaxine 100 mg p o  Q d         History of Present Illness   78 yo M w/ PMH of 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, Afib, dilated cardiomyopathy (EF 35-40%), HTN, DVT, and depression/anxiety presenting with Hgb of 5 4  Sent here from lab work needed prior to CT scan prior to chemo on Wednesday  Otherwise, patient does not have any acute symptoms but does have the following chronic symptoms associated with his cancer (see ROS)  Review of Systems   Constitutional: Positive for fatigue  Negative for chills and fever  Respiratory: Positive for cough and shortness of breath  Negative for wheezing  Cardiovascular: Positive for palpitations  Negative for chest pain  Gastrointestinal: Negative for abdominal pain, blood in stool, constipation, diarrhea, nausea and vomiting  Genitourinary: Negative for difficulty urinating, dysuria and hematuria  Neurological: Positive for dizziness, weakness and light-headedness  Negative for headaches         Historical Information   Past Medical History:   Diagnosis Date    Anxiety     Cancer [facility]) 08/2019    metastatic gastroesophageal cancer    Dehydration     Depression     Esophageal cancer (HCC)     Fatigue     History of chemotherapy     currently started 2019    History of DVT (deep vein thrombosis)     Hypertension     currently is normal and may not need medication    Lung infection     chemo currently on hold because of the infection 2020    Nausea     Pneumonia     x2    Port-A-Cath in place     right    Psychiatric disorder     Recovering alcoholic ([facility] )     sober since     Varicose veins of left lower extremity      Past Surgical History:   Procedure Laterality Date    APPENDECTOMY      COLONOSCOPY      IR GASTROSTOMY TUBE PLACEMENT  2021    IR PORT PLACEMENT  2019    UPPER GASTROINTESTINAL ENDOSCOPY       Social History   Social History     Substance and Sexual Activity   Alcohol Use Not Currently    Frequency: Never    Drinks per session: Patient refused    Binge frequency: Never    Comment: quit 30 yrs ago     Social History     Substance and Sexual Activity   Drug Use Yes    Frequency: 7 0 times per week    Types: Marijuana    Comment:  "2 hits before bedtime"     Social History     Tobacco Use   Smoking Status Former Smoker    Packs/day: 2 00    Years: 15 00    Pack years: 30 00    Types: Cigars    Quit date: 5/15/1998    Years since quittin 9   Smokeless Tobacco Former User    Quit date: 2006     E-Cigarette Use: Current Every Day User     E-Cigarette/Vaping Substances    Nicotine No     THC Yes     CBD No     Flavoring No     Other No     Unknown No        Family History:   Family History   Problem Relation Age of Onset    Colon cancer Father     Cancer Father         colon    Cancer Brother         Sinus-neuroblastoma     Heart disease Mother        Meds/Allergies   PTA meds:   Prior to Admission Medications   Prescriptions Last Dose Informant Patient Reported?  Taking?   amiodarone 200 mg tablet More than a month at Unknown time  No No Sig: TAKE 1 TABLET BY MOUTH EVERY DAY WITH BREAKFAST   ammonium lactate (LAC-HYDRIN) 12 % cream Past Week at Unknown time Spouse/Significant Other No Yes   Sig: Apply topically as needed for dry skin On hands and feet   apixaban (Eliquis) 5 mg 4/5/2021 at Unknown time Spouse/Significant Other No Yes   Sig: Take 1 tablet (5 mg total) by mouth 2 (two) times a day   diazepam (VALIUM) 5 mg tablet 4/4/2021 at Unknown time Spouse/Significant Other No Yes   Sig: Take 1 tablet (5 mg total) by mouth 2 (two) times a day   Patient taking differently: Take 10 mg by mouth daily at bedtime    fluorouracil 4,295 mg in CADD infusion pump   No No   Sig: Infuse 4,295 mg (1,200 mg/m2/day x 1 79 m2 (Treatment plan recorded BSA)) into a venous catheter over 46 hours for 2 days Homestar to be administered on   hydrochlorothiazide (HYDRODIURIL) 12 5 mg tablet 4/5/2021 at Unknown time  No Yes   Sig: Take one tablet daily as needed for SBP> 160/90   ondansetron (ZOFRAN) 4 mg tablet Past Month at Unknown time Spouse/Significant Other No Yes   Sig: Take 2 tablets (8 mg total) by mouth every 8 (eight) hours as needed for nausea or vomiting   pantoprazole (PROTONIX) 40 mg tablet 4/4/2021 at Unknown time  No Yes   Sig: Take 1 tablet (40 mg total) by mouth 2 (two) times a day   venlafaxine (EFFEXOR-XR) 150 mg 24 hr capsule 4/5/2021 at Unknown time Spouse/Significant Other No Yes   Sig: Take 1 capsule (150 mg total) by mouth daily      Facility-Administered Medications: None     Allergies   Allergen Reactions    Bee Venom Anaphylaxis     Throat swelling    Shellfish-Derived Products - Food Allergy      Develops GOUT       Objective   First Vitals:   Blood Pressure: 123/73 (04/05/21 1631)  Pulse: 102 (04/05/21 1631)  Temperature: 98 7 °F (37 1 °C) (04/05/21 1631)  Temp Source: Axillary (04/05/21 1657)  Respirations: 20 (04/05/21 1631)  Height: 5' 10" (177 8 cm) (04/05/21 1657)  Weight - Scale: 62 4 kg (137 lb 8 oz) (04/05/21 1657)  SpO2: 99 % (04/05/21 1631)    Current Vitals:   Blood Pressure: 143/78 (04/06/21 0117)  Pulse: 76 (04/06/21 0117)  Temperature: 99 1 °F (37 3 °C) (04/06/21 0117)  Temp Source: Tympanic (04/05/21 1821)  Respirations: 18 (04/06/21 0117)  Height: 5' 10" (177 8 cm) (04/05/21 1657)  Weight - Scale: 62 4 kg (137 lb 8 oz) (04/05/21 2117)  SpO2: 97 % (04/05/21 2308)      Intake/Output Summary (Last 24 hours) at 4/6/2021 0552  Last data filed at 4/6/2021 0117  Gross per 24 hour   Intake 700 ml   Output 220 ml   Net 480 ml       Invasive Devices     Central Venous Catheter Line            Port A Cath 07/31/19 Right Chest 615 days          Peripheral Intravenous Line            Peripheral IV 04/05/21 Left Antecubital less than 1 day          Drain            Gastrostomy/Enterostomy Gastrostomy 16 Fr  LUQ 59 days                Physical Exam  Constitutional:       Appearance: Normal appearance  Cardiovascular:      Rate and Rhythm: Normal rate and regular rhythm  Pulses: Normal pulses  Heart sounds: Normal heart sounds  No murmur  Pulmonary:      Effort: Pulmonary effort is normal  No respiratory distress  Breath sounds: Normal breath sounds  No stridor  No wheezing or rhonchi  Abdominal:      General: Abdomen is flat  Bowel sounds are normal  There is no distension  Palpations: Abdomen is soft  There is no mass  Tenderness: There is no abdominal tenderness  Hernia: No hernia is present  Comments: Feeding tube in place   Musculoskeletal:      Right lower leg: No edema  Left lower leg: No edema  Skin:     General: Skin is warm and dry  Neurological:      Mental Status: He is alert and oriented to person, place, and time  Psychiatric:         Mood and Affect: Mood normal          Thought Content:  Thought content normal          Lab Results:   Results for orders placed or performed during the hospital encounter of 04/05/21   Retic Count with Reticulocyte HGB   Result Value Ref Range Immature Retic Fract 39 5 (H) 0 0 - 14 0 %    Retic Ct Pct 4 64 (H) 0 37 - 1 87 %    Retic Ct Abs <100,000 14,356-105,094    RETIC HGB 18 7 (L) 30 0 - 38 3 pg   Comprehensive metabolic panel   Result Value Ref Range    Sodium 136 136 - 145 mmol/L    Potassium 3 4 (L) 3 5 - 5 3 mmol/L    Chloride 98 (L) 100 - 108 mmol/L    CO2 28 21 - 32 mmol/L    ANION GAP 10 4 - 13 mmol/L    BUN 29 (H) 5 - 25 mg/dL    Creatinine 1 04 0 60 - 1 30 mg/dL    Glucose 99 65 - 140 mg/dL    Calcium 8 8 8 3 - 10 1 mg/dL    Corrected Calcium 10 2 (H) 8 3 - 10 1 mg/dL    AST 15 5 - 45 U/L    ALT 16 12 - 78 U/L    Alkaline Phosphatase 95 46 - 116 U/L    Total Protein 6 3 (L) 6 4 - 8 2 g/dL    Albumin 2 3 (L) 3 5 - 5 0 g/dL    Total Bilirubin 1 17 (H) 0 20 - 1 00 mg/dL    eGFR 74 ml/min/1 73sq m   CBC and differential   Result Value Ref Range    WBC 2 36 (L) 4 31 - 10 16 Thousand/uL    RBC 2 81 (L) 3 88 - 5 62 Million/uL    Hemoglobin 7 3 (L) 12 0 - 17 0 g/dL    Hematocrit 24 5 (L) 36 5 - 49 3 %    MCV 87 82 - 98 fL    MCH 26 0 (L) 26 8 - 34 3 pg    MCHC 29 8 (L) 31 4 - 37 4 g/dL    RDW 18 0 (H) 11 6 - 15 1 %    MPV 11 1 8 9 - 12 7 fL    Platelets 298 (H) 822 - 390 Thousands/uL    nRBC 2 /100 WBCs   Magnesium   Result Value Ref Range    Magnesium 2 0 1 6 - 2 6 mg/dL   Phosphorus   Result Value Ref Range    Phosphorus 3 3 2 3 - 4 1 mg/dL   Type and screen   Result Value Ref Range    ABO Grouping O     Rh Factor Positive     Antibody Screen Negative     Specimen Expiration Date [id number]    Prepare Leukoreduced RBC: 2 Units   Result Value Ref Range    Unit Product Code I3682H39     Unit Number [id number]     Unit ABO O     Unit [facility] POS     Crossmatch Compatible     Unit Dispense Status Issued     Unit Product Code J8120Q82     Unit Number [id number]     Unit ABO O     Unit [facility] POS     Crossmatch Compatible     Unit Dispense Status Issued    Manual Differential(PHLEBS Do Not Order)   Result Value Ref Range    Total Counted      RBC Morphology Present     Anisocytosis Present     Macrocytes Present     Polychromasia Present     Platelet Estimate Adequate Adequate    Large Platelet Present          Imaging:   No orders to display   CT chest abdomen pelvis  Increasing abdominopelvic ascites and new diffuse mild peritoneal enhancement which can be seen with peritoneal metastases  Peritoneal enhancement can also be seen with peritonitis in the appropriate clinical setting      Evaluation of the gastrointestinal tract and evaluation for focal peritoneal nodules is limited due to lack of oral contrast and relative paucity of intra-abdominal fat      Apparent 4 4 x 2 2 cm masslike thickening at the gastroesophageal junction in this patient with known neoplasm  There is mild fluid-filled distention of the esophagus proximal to this level      Prostatomegaly      Severe emphysematous changes  Stable pulmonary nodules      Small bilateral pleural effusions        EKG, Pathology, and Other Studies:     Code Status: Level 1 - Full Code  Advance Directive and Living Will:      Power of :    POLST:      Counseling / Coordination of Care:   None

## 2021-04-06 ENCOUNTER — TELEPHONE (OUTPATIENT)
Dept: HEMATOLOGY ONCOLOGY | Facility: CLINIC | Age: 66
End: 2021-04-06

## 2021-04-06 ENCOUNTER — TELEPHONE (OUTPATIENT)
Dept: OTHER | Facility: HOSPITAL | Age: 66
End: 2021-04-06

## 2021-04-06 VITALS
TEMPERATURE: 97.6 F | RESPIRATION RATE: 18 BRPM | DIASTOLIC BLOOD PRESSURE: 65 MMHG | BODY MASS INDEX: 19.69 KG/M2 | HEART RATE: 81 BPM | OXYGEN SATURATION: 96 % | HEIGHT: 70 IN | WEIGHT: 137.57 LBS | SYSTOLIC BLOOD PRESSURE: 118 MMHG

## 2021-04-06 LAB
ABO GROUP BLD BPU: NORMAL
ABO GROUP BLD BPU: NORMAL
ALBUMIN SERPL BCP-MCNC: 2.3 G/DL (ref 3.5–5)
ALP SERPL-CCNC: 95 U/L (ref 46–116)
ALT SERPL W P-5'-P-CCNC: 16 U/L (ref 12–78)
ANION GAP SERPL CALCULATED.3IONS-SCNC: 10 MMOL/L (ref 4–13)
ANISOCYTOSIS BLD QL SMEAR: PRESENT
ANISOCYTOSIS BLD QL SMEAR: PRESENT
AST SERPL W P-5'-P-CCNC: 15 U/L (ref 5–45)
ATRIAL RATE: 74 BPM
BASOPHILS # BLD MANUAL: 0.07 THOUSAND/UL (ref 0–0.1)
BASOPHILS NFR MAR MANUAL: 3 % (ref 0–1)
BILIRUB SERPL-MCNC: 1.17 MG/DL (ref 0.2–1)
BPU ID: NORMAL
BPU ID: NORMAL
BUN SERPL-MCNC: 29 MG/DL (ref 5–25)
CALCIUM ALBUM COR SERPL-MCNC: 10.2 MG/DL (ref 8.3–10.1)
CALCIUM SERPL-MCNC: 8.8 MG/DL (ref 8.3–10.1)
CHLORIDE SERPL-SCNC: 98 MMOL/L (ref 100–108)
CO2 SERPL-SCNC: 28 MMOL/L (ref 21–32)
CREAT SERPL-MCNC: 1.04 MG/DL (ref 0.6–1.3)
CROSSMATCH: NORMAL
CROSSMATCH: NORMAL
EOSINOPHIL # BLD MANUAL: 0 THOUSAND/UL (ref 0–0.4)
EOSINOPHIL NFR BLD MANUAL: 0 % (ref 0–6)
ERYTHROCYTE [DISTWIDTH] IN BLOOD BY AUTOMATED COUNT: 18 % (ref 11.6–15.1)
ERYTHROCYTE [DISTWIDTH] IN BLOOD BY AUTOMATED COUNT: 18.6 % (ref 11.6–15.1)
GFR SERPL CREATININE-BSD FRML MDRD: 74 ML/MIN/1.73SQ M
GLUCOSE SERPL-MCNC: 99 MG/DL (ref 65–140)
HCT VFR BLD AUTO: 24.5 % (ref 36.5–49.3)
HCT VFR BLD AUTO: 25.3 % (ref 36.5–49.3)
HGB BLD-MCNC: 7.3 G/DL (ref 12–17)
HGB BLD-MCNC: 7.4 G/DL (ref 12–17)
HGB RETIC QN AUTO: 18.7 PG (ref 30–38.3)
IMM RETICS NFR: 39.5 % (ref 0–14)
LG PLATELETS BLD QL SMEAR: PRESENT
LYMPHOCYTES # BLD AUTO: 1.57 THOUSAND/UL (ref 0.6–4.47)
LYMPHOCYTES # BLD AUTO: 67 % (ref 14–44)
MACROCYTES BLD QL AUTO: PRESENT
MAGNESIUM SERPL-MCNC: 2 MG/DL (ref 1.6–2.6)
MCH RBC QN AUTO: 25.6 PG (ref 26.8–34.3)
MCH RBC QN AUTO: 26 PG (ref 26.8–34.3)
MCHC RBC AUTO-ENTMCNC: 29.2 G/DL (ref 31.4–37.4)
MCHC RBC AUTO-ENTMCNC: 29.8 G/DL (ref 31.4–37.4)
MCV RBC AUTO: 87 FL (ref 82–98)
MCV RBC AUTO: 88 FL (ref 82–98)
MONOCYTES # BLD AUTO: 0.33 THOUSAND/UL (ref 0–1.22)
MONOCYTES NFR BLD: 14 % (ref 4–12)
NEUTROPHILS # BLD MANUAL: 0.38 THOUSAND/UL (ref 1.85–7.62)
NEUTS BAND NFR BLD MANUAL: 2 % (ref 0–8)
NEUTS SEG NFR BLD AUTO: 14 % (ref 43–75)
NRBC BLD AUTO-RTO: 1 /100 WBCS
NRBC BLD AUTO-RTO: 2 /100 WBCS
P AXIS: 9 DEGREES
PHOSPHATE SERPL-MCNC: 3.3 MG/DL (ref 2.3–4.1)
PLATELET # BLD AUTO: 396 THOUSANDS/UL (ref 149–390)
PLATELET # BLD AUTO: 422 THOUSANDS/UL (ref 149–390)
PLATELET BLD QL SMEAR: ABNORMAL
PLATELET BLD QL SMEAR: ADEQUATE
PMV BLD AUTO: 11.1 FL (ref 8.9–12.7)
PMV BLD AUTO: 11.5 FL (ref 8.9–12.7)
POLYCHROMASIA BLD QL SMEAR: PRESENT
POLYCHROMASIA BLD QL SMEAR: PRESENT
POTASSIUM SERPL-SCNC: 3.4 MMOL/L (ref 3.5–5.3)
PR INTERVAL: 158 MS
PROT SERPL-MCNC: 6.3 G/DL (ref 6.4–8.2)
QRS AXIS: 57 DEGREES
QRSD INTERVAL: 92 MS
QT INTERVAL: 380 MS
QTC INTERVAL: 421 MS
RBC # BLD AUTO: 2.81 MILLION/UL (ref 3.88–5.62)
RBC # BLD AUTO: 2.89 MILLION/UL (ref 3.88–5.62)
RBC MORPH BLD: PRESENT
RBC MORPH BLD: PRESENT
RETICS # AUTO: ABNORMAL 10*3/UL (ref 14356–105094)
RETICS # CALC: 4.64 % (ref 0.37–1.87)
SODIUM SERPL-SCNC: 136 MMOL/L (ref 136–145)
T WAVE AXIS: 78 DEGREES
TOTAL CELLS COUNTED SPEC: 100
UNIT DISPENSE STATUS: NORMAL
UNIT DISPENSE STATUS: NORMAL
UNIT PRODUCT CODE: NORMAL
UNIT PRODUCT CODE: NORMAL
UNIT RH: NORMAL
UNIT RH: NORMAL
VENTRICULAR RATE: 74 BPM
WBC # BLD AUTO: 2.35 THOUSAND/UL (ref 4.31–10.16)
WBC # BLD AUTO: 2.36 THOUSAND/UL (ref 4.31–10.16)

## 2021-04-06 PROCEDURE — 99233 SBSQ HOSP IP/OBS HIGH 50: CPT | Performed by: FAMILY MEDICINE

## 2021-04-06 PROCEDURE — 3E1G78Z IRRIGATION OF UPPER GI USING IRRIGATING SUBSTANCE, VIA NATURAL OR ARTIFICIAL OPENING: ICD-10-PCS | Performed by: FAMILY MEDICINE

## 2021-04-06 PROCEDURE — 93010 ELECTROCARDIOGRAM REPORT: CPT | Performed by: INTERNAL MEDICINE

## 2021-04-06 PROCEDURE — NC001 PR NO CHARGE: Performed by: FAMILY MEDICINE

## 2021-04-06 PROCEDURE — 85027 COMPLETE CBC AUTOMATED: CPT | Performed by: STUDENT IN AN ORGANIZED HEALTH CARE EDUCATION/TRAINING PROGRAM

## 2021-04-06 PROCEDURE — 80053 COMPREHEN METABOLIC PANEL: CPT | Performed by: STUDENT IN AN ORGANIZED HEALTH CARE EDUCATION/TRAINING PROGRAM

## 2021-04-06 PROCEDURE — 85007 BL SMEAR W/DIFF WBC COUNT: CPT | Performed by: STUDENT IN AN ORGANIZED HEALTH CARE EDUCATION/TRAINING PROGRAM

## 2021-04-06 PROCEDURE — 83735 ASSAY OF MAGNESIUM: CPT | Performed by: STUDENT IN AN ORGANIZED HEALTH CARE EDUCATION/TRAINING PROGRAM

## 2021-04-06 PROCEDURE — 99223 1ST HOSP IP/OBS HIGH 75: CPT | Performed by: INTERNAL MEDICINE

## 2021-04-06 PROCEDURE — 84100 ASSAY OF PHOSPHORUS: CPT | Performed by: STUDENT IN AN ORGANIZED HEALTH CARE EDUCATION/TRAINING PROGRAM

## 2021-04-06 PROCEDURE — 99221 1ST HOSP IP/OBS SF/LOW 40: CPT | Performed by: PHYSICIAN ASSISTANT

## 2021-04-06 RX ORDER — POTASSIUM CHLORIDE 20 MEQ/1
40 TABLET, EXTENDED RELEASE ORAL ONCE
Status: DISCONTINUED | OUTPATIENT
Start: 2021-04-06 | End: 2021-04-06

## 2021-04-06 RX ORDER — PANTOPRAZOLE SODIUM 40 MG/1
40 INJECTION, POWDER, FOR SOLUTION INTRAVENOUS EVERY 12 HOURS SCHEDULED
Status: DISCONTINUED | OUTPATIENT
Start: 2021-04-06 | End: 2021-04-06 | Stop reason: HOSPADM

## 2021-04-06 RX ORDER — POTASSIUM CHLORIDE 20 MEQ/1
40 TABLET, EXTENDED RELEASE ORAL ONCE
Status: COMPLETED | OUTPATIENT
Start: 2021-04-06 | End: 2021-04-06

## 2021-04-06 RX ADMIN — SODIUM CHLORIDE, SODIUM LACTATE, POTASSIUM CHLORIDE, AND CALCIUM CHLORIDE 50 ML/HR: .6; .31; .03; .02 INJECTION, SOLUTION INTRAVENOUS at 01:21

## 2021-04-06 RX ADMIN — VENLAFAXINE HYDROCHLORIDE 150 MG: 150 CAPSULE, EXTENDED RELEASE ORAL at 09:15

## 2021-04-06 RX ADMIN — PANTOPRAZOLE SODIUM 40 MG: 40 TABLET, DELAYED RELEASE ORAL at 06:46

## 2021-04-06 RX ADMIN — POTASSIUM CHLORIDE 40 MEQ: 1500 TABLET, EXTENDED RELEASE ORAL at 05:01

## 2021-04-06 NOTE — CONSULTS
Recommend TwoCal HN at goal rate of 50 mL/hour for a total volume of 1200 mL  This will provide 2400 kcals, 100 grams protein and 840 mL free water  Recommend flushes of 150 q 4 hours for a total fluid intake of 1740 mL

## 2021-04-06 NOTE — TELEPHONE ENCOUNTER
Patient wife Gunenr would like to confirm if the patient should keep his 4/7 infusion appt, state that he is set to be released for the ED today  Best call back [phone]

## 2021-04-06 NOTE — ASSESSMENT & PLAN NOTE
Patient has been off of amiodarone 200 mg p o  Q d  for over a month  Will place on telemetry  Will hold home Eliquis in setting of GI bleed and severe anemia    · Consult Cardiology, will appreciate recommendations

## 2021-04-06 NOTE — CONSULTS
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, Afib, dilated cardiomyopathy (EF 35-40%), HTN, DVT, and depression/anxiety presenting with Hgb of 5 4  Pt was sent because of abnormal lab work  CT scan on admission showed increasing abdominopelvic ascites and new diffuse mild peritoneal enhancement which can be seen with peritoneal metastases  Peritoneal enhancement can also be seen with peritonitis in the appropriate clinical setting  Apparent 4 4 x 2 2 cm masslike thickening at the gastroesophageal junction in this patient with known neoplasm  There is mild fluid-filled distention of the esophagus proximal to this level  Pt has G tube in place  EGD with attempted stent placement was done in January of this year by Dr Tee Tripp  Patient did have a large long semi circumferential friable mass in the distal esophagus from 38-45 cm and also extending down into the gastric cardia  Patient also underwent percutaneous gastrostomy placement by IR in February  Patient denies any blood in his stools or dark stools  Patient states that he has been on chemotherapy for about a year  Hemoglobin was 9 4 on March 23rd  Hemoglobin then dropped to 5 4 on admission  Patient denies any melena  He states that he has been throwing up  Was able to tolerate liquids in the past   Does do continue with feeds at home with PEG tube  He otherwise states that he has some bloating but denies any significant abdominal distention  Patient was taking Eliquis  Patient denies ever having colonoscopy although it is listed in his previous procedures  Notes were reviewed from Gastroenterology and patient apparently was having nausea and bloating with tube feeding  Patient received 2 units of packed RBCs and hemoglobin is 7 4 today  Allergies:    Allergies   Allergen Reactions    Bee Venom Anaphylaxis     Throat swelling    Shellfish-Derived Products - Food Allergy      Develops GOUT       Medications:  Current Facility-Administered Medications:     diazepam (VALIUM) tablet 10 mg, 10 mg, Oral, HS, Tanja Weiss MD, 10 mg at 04/05/21 2305    hydrochlorothiazide (HYDRODIURIL) tablet 12 5 mg, 12 5 mg, Oral, Daily PRN, Tanja Weiss MD    lactated ringers infusion, 50 mL/hr, Intravenous, Continuous, Carlos Mcintosh, DO, Last Rate: 50 mL/hr at 04/06/21 0121, 50 mL/hr at 04/06/21 0121    ondansetron (ZOFRAN-ODT) dispersible tablet 8 mg, 8 mg, Oral, Q8H PRN, Tanja Weiss MD    pantoprazole (PROTONIX) EC tablet 40 mg, 40 mg, Oral, BID AC, Tanja Weiss MD, 40 mg at 04/06/21 0646    venlafaxine (EFFEXOR-XR) 24 hr capsule 150 mg, 150 mg, Oral, Daily, Tanja Weiss MD    Past Medical history:  Past Medical History:   Diagnosis Date    Anxiety     [facility] 08/2019    metastatic gastroesophageal cancer    Dehydration     Depression     Es[facility]     Fatigue     History of chemotherapy     currently started 8/2019    History of DVT (deep vein thrombosis)     Hypertension     currently is normal and may not need medication    Lung infection     chemo currently on hold because of the infection 12/22/2020    Nausea     Pneumonia     x2    Port-A-Cath in place     right    Psychiatric disorder     Recovering alcoholic ([facility] )     sober since 1990    Varicose veins of left lower extremity        Past Surgical History:   Past Surgical History:   Procedure Laterality Date    APPENDECTOMY      COLONOSCOPY      IR GASTROSTOMY TUBE PLACEMENT  2/5/2021    IR PORT PLACEMENT  7/31/2019    UPPER GASTROINTESTINAL ENDOSCOPY         Social history:   Social History     Socioeconomic History    Marital status: /Civil Union     Spouse name: Not on file    Number of children: 2    Years of education: Not on file    Highest education level: Not on file   Occupational History    Occupation: retired    Social Needs    Financial resource strain: Not on file    Food insecurity     Worry: Not on file     Inability: Not on file   [employer] needs     Medical: Not on file     Non-medical: Not on file   Tobacco Use    Smoking status: Former Smoker     Packs/day: 2 00     Years: 15 00     Pack years: 30 00     Types: Cigars     Quit date: 5/15/1998     Years since quittin 9    Smokeless tobacco: Former User     Quit date: 2006   Substance and Sexual Activity    Alcohol use: Not Currently     Frequency: Never     Drinks per session: Patient refused     Binge frequency: Never     Comment: quit 30 yrs ago    Drug use: Yes     Frequency: 7 0 times per week     Types: Marijuana     Comment:  "2 hits before bedtime"    Sexual activity: Not Currently   Lifestyle    Physical activity     Days per week: Not on file     Minutes per session: Not on file    Stress: Not on file   Relationships    Social connections     Talks on phone: Not on file     Gets together: Not on file     Attends Mosque service: Not on file     Active member of club or organization: Not on file     Attends meetings of clubs or organizations: Not on file     Relationship status: Not on file    Intimate partner violence     Fear of current or ex partner: Not on file     Emotionally abused: Not on file     Physically abused: Not on file     Forced sexual activity: Not on file   Other Topics Concern    Not on file   Social History Narrative    Not on file       Review of Systems: All other systems were reviewed and were negative, otherwise please refer to HPI    Physical Exam: /65 (BP Location: Right arm)   Pulse 81   Temp 97 6 °F (36 4 °C) (Oral)   Resp 18   Ht 5' 10" (1 778 m)   Wt 62 4 kg (137 lb 9 1 oz)   SpO2 96%   BMI 19 74 kg/m² General Appearance:    Alert, cooperative, no distress, appears stated age   Head:    Normocephalic, without obvious abnormality, atraumatic   Eyes:    No scleral icterus           Mouth:  Mucosa moist   Neck:   Supple, symmetrical, trachea midline, no thyromegaly       Lungs:     Clear to auscultation bilaterally, respirations unlabored       Heart:    Regular rate and rhythm, S1 and S2 normal, no murmur, rub   or gallop     Abdomen:     Soft, non-tender, positive bowel sounds, some left upper quadrant tenderness, positive PEG tube in left upper quadrant/epigastric area- some purulence discharge at site-tube feeds running at 80cc/hr, no rebound rigidity or guarding   Genitalia:   deferred   Rectal:   deferred   Extremities:   Extremities normal,no cyanosis or edema       Skin:   Skin color, texture, turgor normal, no rashes or lesions       Neurologic:   Grossly intact, no focal deficit           Lab Results:   Recent Results (from the past 24 hour(s))   CBC and differential    Collection Time: 04/05/21  2:03 PM   Result Value Ref Range    WBC 2 04 (L) 4 31 - 10 16 Thousand/uL    RBC 2 22 (L) 3 88 - 5 62 Million/uL    Hemoglobin 5 4 (LL) 12 0 - 17 0 g/dL    Hematocrit 20 0 (L) 36 5 - 49 3 %    MCV 90 82 - 98 fL    MCH 23 9 (L) 26 8 - 34 3 pg    MCHC 26 5 (L) 31 4 - 37 4 g/dL    RDW 20 9 (H) 11 6 - 15 1 %    MPV 11 5 8 9 - 12 7 fL    Platelets 146 (H) 433 - 390 Thousands/uL    nRBC 2 /100 WBCs   Comprehensive metabolic panel    Collection Time: 04/05/21  2:03 PM   Result Value Ref Range    Sodium 138 136 - 145 mmol/L    Potassium 3 6 3 5 - 5 3 mmol/L    Chloride 100 100 - 108 mmol/L    CO2 28 21 - 32 mmol/L    ANION GAP 10 4 - 13 mmol/L    BUN 38 (H) 5 - 25 mg/dL    Creatinine 1 08 0 60 - 1 30 mg/dL    Glucose 132 65 - 140 mg/dL    Calcium 9 0 8 3 - 10 1 mg/dL    Corrected Calcium 10 4 (H) 8 3 - 10 1 mg/dL    AST 17 5 - 45 U/L    ALT 18 12 - 78 U/L    Alkaline Phosphatase 95 46 - 116 U/L    Total Protein 6 7 6 4 - 8 2 g/dL    Albumin 2 3 (L) 3 5 - 5 0 g/dL    Total Bilirubin 0 44 0 20 - 1 00 mg/dL    eGFR 71 ml/min/1 73sq m   Manual Differential(PHLEBS Do Not Order)    Collection Time: 04/05/21  2:03 PM   Result Value Ref Range    Segmented % 38 (L) 43 - 75 %    Lymphocytes % 28 14 - 44 %    Monocytes % 8 4 - 12 %    Eosinophils, % 2 0 - 6 %    Basophils % 2 (H) 0 - 1 %    Atypical Lymphocytes % 22 (H) <=0 %    Absolute Neutrophils 0 78 (L) 1 85 - 7 62 Thousand/uL    Lymphocytes Absolute 0 57 (L) 0 60 - 4 47 Thousand/uL    Monocytes Absolute 0 16 0 00 - 1 22 Thousand/uL    Eosinophils Absolute 0 04 0 00 - 0 40 Thousand/uL    Basophils Absolute 0 04 0 00 - 0 10 Thousand/uL    Total Counted 50     nRBC 6 (H) 0 - 2 /100 WBC    RBC Morphology Present     Anisocytosis Present     Basophilic Stippling Present     Ovalocytes Present     Polychromasia Present     Platelet Estimate Increased (A) Adequate    Large Platelet Present    Retic Count with Reticulocyte HGB    Collection Time: 04/05/21  2:03 PM   Result Value Ref Range    Immature Retic Fract 39 5 (H) 0 0 - 14 0 %    Retic Ct Pct 4 64 (H) 0 37 - 1 87 %    Retic Ct Abs <100,000 14,[id number]    RETIC HGB 18 7 (L) 30 0 - 38 3 pg   Type and screen    Collection Time: 04/05/21  5:06 PM   Result Value Ref Range    ABO Grouping O     Rh Factor Positive     Antibody Screen Negative     Specimen Expiration Date [id number]    Comprehensive metabolic panel    Collection Time: 04/06/21  3:04 AM   Result Value Ref Range    Sodium 136 136 - 145 mmol/L    Potassium 3 4 (L) 3 5 - 5 3 mmol/L    Chloride 98 (L) 100 - 108 mmol/L    CO2 28 21 - 32 mmol/L    ANION GAP 10 4 - 13 mmol/L    BUN 29 (H) 5 - 25 mg/dL    Creatinine 1 04 0 60 - 1 30 mg/dL    Glucose 99 65 - 140 mg/dL    Calcium 8 8 8 3 - 10 1 mg/dL    Corrected Calcium 10 2 (H) 8 3 - 10 1 mg/dL    AST 15 5 - 45 U/L    ALT 16 12 - 78 U/L    Alkaline Phosphatase 95 46 - 116 U/L    Total Protein 6 3 (L) 6 4 - 8 2 g/dL    Albumin 2 3 (L) 3 5 - 5 0 g/dL    Total Bilirubin 1 17 (H) 0 20 - 1 00 mg/dL    eGFR 74 ml/min/1 73sq m   CBC and differential    Collection Time: 04/06/21  3:04 AM   Result Value Ref Range    WBC 2 36 (L) 4 31 - 10 16 Thousand/uL    RBC 2 81 (L) 3 88 - 5 62 Million/uL    Hemoglobin 7 3 (L) 12 0 - 17 0 g/dL    Hematocrit 24 5 (L) 36 5 - 49 3 %    MCV 87 82 - 98 fL    MCH 26 0 (L) 26 8 - 34 3 pg    MCHC 29 8 (L) 31 4 - 37 4 g/dL    RDW 18 0 (H) 11 6 - 15 1 %    MPV 11 1 8 9 - 12 7 fL    Platelets 801 (H) 376 - 390 Thousands/uL    nRBC 2 /100 WBCs   Magnesium    Collection Time: 04/06/21  3:04 AM   Result Value Ref Range    Magnesium 2 0 1 6 - 2 6 mg/dL   Phosphorus    Collection Time: 04/06/21  3:04 AM   Result Value Ref Range    Phosphorus 3 3 2 3 - 4 1 mg/dL   Manual Differential(PHLEBS Do Not Order)    Collection Time: 04/06/21  3:04 AM   Result Value Ref Range    Total Counted      RBC Morphology Present     Anisocytosis Present     Macrocytes Present     Polychromasia Present     Platelet Estimate Adequate Adequate    Large Platelet Present    Prepare Leukoreduced RBC: 2 Units    Collection Time: 04/06/21  6:15 AM   Result Value Ref Range    Unit Product Code L6645M83     Unit Number [id number]     Unit ABO O     Unit [facility] POS     Crossmatch Compatible     Unit Dispense Status Presumed Trans     Unit Product Code K8394O97     Unit Number [id number]     Unit ABO O     Unit [facility] POS     Crossmatch Compatible     Unit Dispense Status Presumed Trans    CBC and differential    Collection Time: 04/06/21  6:24 AM   Result Value Ref Range    WBC 2 35 (L) 4 31 - 10 16 Thousand/uL    RBC 2 89 (L) 3 88 - 5 62 Million/uL    Hemoglobin 7 4 (L) 12 0 - 17 0 g/dL    Hematocrit 25 3 (L) 36 5 - 49 3 %    MCV 88 82 - 98 fL    MCH 25 6 (L) 26 8 - 34 3 pg    MCHC 29 2 (L) 31 4 - 37 4 g/dL    RDW 18 6 (H) 11 6 - 15 1 %    MPV 11 5 8 9 - 12 7 fL    Platelets 952 (H) 123 - 390 Thousands/uL    nRBC 1 /100 WBCs   Manual Differential(PHLEBS Do Not Order)    Collection Time: 04/06/21  6:24 AM   Result Value Ref Range    Segmented % 14 (L) 43 - 75 %    Bands % 2 0 - 8 %    Lymphocytes % 67 (H) 14 - 44 %    Monocytes % 14 (H) 4 - 12 %    Eosinophils, % 0 0 - 6 %    Basophils % 3 (H) 0 - 1 %    Absolute Neutrophils 0 38 (L) 1 85 - 7 62 Thousand/uL    Lymphocytes Absolute 1 57 0 60 - 4 47 Thousand/uL    Monocytes Absolute 0 33 0 00 - 1 22 Thousand/uL    Eosinophils Absolute 0 00 0 00 - 0 40 Thousand/uL    Basophils Absolute 0 07 0 00 - 0 10 Thousand/uL    Total Counted 100     RBC Morphology Present     Anisocytosis Present     Polychromasia Present     Platelet Estimate Increased (A) Adequate       Imaging Studies: Ct Chest Abdomen Pelvis W Contrast    Result Date: 4/5/2021  Narrative: CT CHEST, ABDOMEN AND PELVIS WITH IV CONTRAST INDICATION:   C16 0: Malignant neoplasm of cardia  COMPARISON:  CT chest/abdomen/pelvis dated 12/10/2020  CT chest dated 2/3/2021  TECHNIQUE: CT examination of the chest, abdomen and pelvis was performed  Axial, sagittal, and coronal 2D reformatted images were created from the source data and submitted for interpretation  Radiation dose length product (DLP) for this visit:  466 53 mGy-cm   This examination, like all CT scans performed in the [facility], was performed utilizing techniques to minimize radiation dose exposure, including the use of iterative  reconstruction and automated exposure control  IV Contrast:  100 mL of iohexol (OMNIPAQUE) Enteric Contrast: Enteric contrast was not administered  FINDINGS: CHEST LUNGS:  Severe paraseptal and centrilobular emphysematous changes  No focal airspace consolidation  No new worrisome pulmonary nodules  Stable 6 mm right upper lobe nodule (image 73, series 3)  Several other smaller nodules in the right lung measuring up to 4 mm are also stable  There is no tracheal or endobronchial lesion  PLEURA:  Small bilateral pleural effusions   HEART/GREAT VESSELS:  There is stable dilatation of the ascending thoracic aorta measuring up to 4 cm in maximal diameter  MEDIASTINUM AND NEVAEH:  Unremarkable  CHEST WALL AND LOWER NECK:   Unremarkable  ABDOMEN LIVER/BILIARY TREE:  Multiple scattered subcentimeter hypodensities in the liver are too small to characterize, probably cysts  GALLBLADDER:  No calcified gallstones  No pericholecystic inflammatory change  SPLEEN:  Unremarkable  PANCREAS:  Unremarkable  ADRENAL GLANDS:  Unremarkable  KIDNEYS/URETERS:  Bilateral renal cysts measuring up to 8 6 cm on the right  No hydronephrosis  STOMACH AND BOWEL: There is apparent 4 4 x 2 2 cm masslike thickening at the gastroesophageal junction in this patient with known neoplasm  Percutaneous gastrostomy tube is seen within the stomach  No evidence for bowel obstruction  APPENDIX:  No findings to suggest appendicitis  ABDOMINOPELVIC CAVITY:  There is interval increase of abdominopelvic ascites and new diffuse mild peritoneal enhancement  Evaluation for focal peritoneal nodules is somewhat limited due to lack of oral contrast and relative paucity of intra-abdominal fat  No pneumoperitoneum  No lymphadenopathy  VESSELS:  Unremarkable for patient's age  PELVIS REPRODUCTIVE ORGANS:  The prostate gland is enlarged measuring 5 5 x 4 4 x 5 1 cm  URINARY BLADDER:  Unremarkable  ABDOMINAL WALL/INGUINAL REGIONS:  Unremarkable  OSSEOUS STRUCTURES:  No acute fracture or destructive osseous lesion  Impression: Increasing abdominopelvic ascites and new diffuse mild peritoneal enhancement which can be seen with peritoneal metastases  Peritoneal enhancement can also be seen with peritonitis in the appropriate clinical setting  Evaluation of the gastrointestinal tract and evaluation for focal peritoneal nodules is limited due to lack of oral contrast and relative paucity of intra-abdominal fat  Apparent 4 4 x 2 2 cm masslike thickening at the gastroesophageal junction in this patient with known neoplasm  There is mild fluid-filled distention of the esophagus proximal to this level  Prostatomegaly  Severe emphysematous changes  Stable pulmonary nodules  Small bilateral pleural effusions  Workstation performed: [id number]       Assessment/Plan: This is a 47-year-old male who does have esophageal cancer and was noted to have severe anemia  Patient's PEG tube was lavaged and there was some dark brown reddish tinged fluid mixed with tube feeding so was difficult to tell but no el blood  He did have high residual of about 150 cc so we will decrease TF rate  Patient also has vomiting which has progressed in the recent past but denies any hematemesis  He does have masslike thickening at the GE junction and likely bleeding is related to gastrointestinal malignancy and patient is being anticoagulated with Eliquis  Patient does have mild fluid-filled distention of the esophagus and apparently stent was attempted to be placed which was unsuccessful so likely has esophageal obstruction  It appears that the cancer is likely progressing with findings noted on CT scan of new diffuse mild peritoneal enhancement  Consider Hematology/Oncology evaluation/input  Patient is on chemotherapy which can could obviously contribute to the anemia but he states that he has been on chemo for quite some time and has never had problems with anemia similar to this with this acute drop  Consider EGD for evaluation, likely chronic blood loss is from tumor and this likely cannot be controlled endoscopically  Patient with poor overall prognosis  Continue IV Protonix b i d  Follow serial H and H  Thank you for the consultation  case will be discussed with Dr Frederick Schools

## 2021-04-06 NOTE — ASSESSMENT & PLAN NOTE
G-tube placed on February 5th  Patient has history of malignant neoplasm of lower 3rd of esophagus  Patient can still take medications p o  Per wife, patient on 5 cans of TwoCal per day at a rate of 80 mL per hour via pump    · Consult nutrition services, will appreciate recommendations

## 2021-04-06 NOTE — ASSESSMENT & PLAN NOTE
Wt Readings from Last 3 Encounters:   04/05/21 62 4 kg (137 lb 8 oz)   04/05/21 62 5 kg (137 lb 12 8 oz)   03/24/21 62 kg (136 lb 11 oz)       Grade 2 diastolic dysfunction from echo in September 2020 with EF estimated in the range of 35 % to 40 %    Patient appears euvolemic  · Daily weights, I's and O's

## 2021-04-06 NOTE — TELEPHONE ENCOUNTER
I called Deyvi Bullion and informed her that treatment is on hold until the patient is seen  The patient has an appointment on 4/8 with Dr Tod Gowers

## 2021-04-06 NOTE — NURSING NOTE
The patient discharged home  All the discharge instructions provided to the patient regarding new meds change,and follow up appointments  The patient and the spouse verbalized well understandings

## 2021-04-06 NOTE — ASSESSMENT & PLAN NOTE
History of metastatic esophageal cancer on chemotherapy  Diagnosed in August 2019  Follows with Oncology in [place], [place] with Dr Johny Wright  Plan was to get CT scan today for chemotherapy in Wednesday, however was found to be anemic on CBC  Patient gets chemotherapy every 2 weeks @inf[facility]  Last seen by oncology on March 16th per wife Rafi Reed  Last chemo March 24th  Patient has lung nodules that are stable  · Continue home Protonix 40 mg p o  B i d   · Continue home Zofran 4 mg p o  Q 8 H p r n    · Consult GI, will appreciate recommendations

## 2021-04-06 NOTE — ASSESSMENT & PLAN NOTE
Hgb baseline of around 9  Hgb 5 4 on admission  See GI bleed  Will prepare 2 additional units of RBCs

## 2021-04-06 NOTE — CONSULTS
Consultation - Cardiology   Tri Quiñonez 77 y o  male MRN: [id number]  Unit/Bed#: [address] [place] [address] Encounter: [id number]    Assessment/Plan     Assessment:  1  Anemia  2  Paroxysmal atrial fibrillation  3  Esophageal cancer stage IV  4  Chronic combined systolic diastolic heart failure stable   5  Dysphagia    Plan:  Patient has been admitted to the service of [facility]  Agree with holding Eliquis at this time due to patient's profound anemia in presence of his friable esophageal tumor  Although patient has not seen el blood, his stools have been heme positive  Concern that anemia is a combination of blood loss and also bone marrow suppression  Patient has a appointment with both his GI and hematologist early next week  CHADS2 Vasc score = 3 or 3 2% risk of stroke per year, HASBLED = 3, or 5 8% risk of bleed  And this would actually be higher as patient is actually anemic and the HASBLED score does not taking account bone marrow suppression, or history of cancer  History of Present Illness   Physician Requesting Consult: Dina Carolina DO  Reason for Consult / Principal Problem:  Anemia in the setting advancing stage IV esophageal cancer and recent chemotherapy with concomitant use of Eliquis      HPI: Tri Quiñonez is a 77y o  year old male who was brought to the emergency room yesterday by his wife when she was concerned that he may have pneumonia  She noted that he was persistently coughing up clear secretions  He also states that when he attempts to drink the fluid will come back up  Patient has a history for esophageal cancer with presence of a a 4 4 x 2 2 cm masslike thickening at the GE junction of his esophagus  This is noted to be a neoplasm  Also on repeat CT scan there was concern for new finding of increasing abdominal pelvic ascites and enhancement concerning for peritoneal metastasis    There is also noted to be fluid like distension of his esophagus proximal to the level of his esophageal mass  Patient follows with Dr Rohit Kwan and had previous failed stent placement  At that time it was documented that he had a long large semi circumferential friable mass in his distal esophagus  In February patient had implantation of a J PEG tube for feedings  On admission labs patient was noted to have a hemoglobin of 5 4  He was subsequently admitted for further evaluation  Stools were heme-positive, but patient denies seeing any dark tarry stools or coughing up blood  After receiving 2 units of packed red blood cells patient's hemoglobin this morning was 7 4  Patient was started on Eliquis per his wife for history of DVT, this was continued after he was noted to have atrial fibrillation in the setting of sepsis and pneumonia  CHADS2 Vasc score = 3 or 3 2% risk of stroke per year, HASBLED = 3, or 5 8% risk of bleed  And this would actually be higher as patient is actually anemic and the HASBLED score does not taking account bone marrow suppression, or history of cancer  Echocardiogram performed in September of 2020 demonstrates an EF of 35-40% with grade 2 diastolic dysfunction      Inpatient consult to Cardiology  Consult performed by: YANI Brunner  Consult ordered by: Bright Woods MD          Review of Systems   Unable to perform ROS: Acuity of condition       Historical Information   Past Medical History:   Diagnosis Date    Anxiety     Cancer [facility]) 08/2019    metastatic gastroesophageal cancer    Dehydration     Depression     [facility]ophageal [facility] ([facility] )     Fatigue     History of chemotherapy     currently started 8/2019    History of DVT (deep vein thrombosis)     Hypertension     currently is normal and may not need medication    Lung infection     chemo currently on hold because of the infection 12/22/2020    Nausea     Pneumonia     x2    Port-A-Cath in place     right    Psychiatric disorder     Recovering alcoholic ([facility] )     sober since     Varicose veins of left lower extremity      Past Surgical History:   Procedure Laterality Date    APPENDECTOMY      COLONOSCOPY      IR GASTROSTOMY TUBE PLACEMENT  2021    IR PORT PLACEMENT  2019    UPPER GASTROINTESTINAL ENDOSCOPY       Social History     Substance and Sexual Activity   Alcohol Use Not Currently    Frequency: Never    Drinks per session: Patient refused    Binge frequency: Never    Comment: quit 30 yrs ago     Social History     Substance and Sexual Activity   Drug Use Yes    Frequency: 7 0 times per week    Types: Marijuana    Comment:  "2 hits before bedtime"     E-Cigarette/Vaping    E-Cigarette Use Current Every Day User     Comments last use 4 days ago      E-Cigarette/Vaping Substances    Nicotine No     THC Yes     CBD No     Flavoring No     Other No     Unknown No      Social History     Tobacco Use   Smoking Status Former Smoker    Packs/day: 2 00    Years: 15 00    Pack years: 30 00    Types: Cigars    Quit date: 5/15/1998    Years since quittin 9   Smokeless Tobacco Former User    Quit date: 2006     Family History:   Family History   Problem Relation Age of Onset    Colon cancer Father     Cancer Father         colon    Cancer Brother         Sinus-neuroblastoma     Heart disease Mother        Meds/Allergies   all current active meds have been reviewed, current meds:   Current Facility-Administered Medications   Medication Dose Route Frequency    diazepam (VALIUM) tablet 10 mg  10 mg Oral HS    hydrochlorothiazide (HYDRODIURIL) tablet 12 5 mg  12 5 mg Oral Daily PRN    ondansetron (ZOFRAN-ODT) dispersible tablet 8 mg  8 mg Oral Q8H PRN    pantoprazole (PROTONIX) injection 40 mg  40 mg Intravenous Q12H [facility]    venlafaxine (EFFEXOR-XR) 24 hr capsule 150 mg  150 mg Oral Daily    and PTA meds:   Prior to Admission Medications   Prescriptions Last Dose Informant Patient Reported?  Taking?   amiodarone 200 mg tablet More than a month at Unknown time  No No   Sig: TAKE 1 TABLET BY MOUTH EVERY DAY WITH BREAKFAST   ammonium lactate (LAC-HYDRIN) 12 % cream Past Week at Unknown time Spouse/Significant Other No Yes   Sig: Apply topically as needed for dry skin On hands and feet   apixaban (Eliquis) 5 mg 4/5/2021 at Unknown time Spouse/Significant Other No Yes   Sig: Take 1 tablet (5 mg total) by mouth 2 (two) times a day   diazepam (VALIUM) 5 mg tablet 4/4/2021 at Unknown time Spouse/Significant Other No Yes   Sig: Take 1 tablet (5 mg total) by mouth 2 (two) times a day   Patient taking differently: Take 10 mg by mouth daily at bedtime    fluorouracil 4,295 mg in CADD infusion pump   No No   Sig: Infuse 4,295 mg (1,200 mg/m2/day x 1 79 m2 (Treatment plan recorded BSA)) into a venous catheter over 46 hours for 2 days [place] to be administered on   hydrochlorothiazide (HYDRODIURIL) 12 5 mg tablet 4/5/2021 at Unknown time  No Yes   Sig: Take one tablet daily as needed for SBP> 160/90   ondansetron (ZOFRAN) 4 mg tablet Past Month at Unknown time Spouse/Significant Other No Yes   Sig: Take 2 tablets (8 mg total) by mouth every 8 (eight) hours as needed for nausea or vomiting   pantoprazole (PROTONIX) 40 mg tablet 4/4/2021 at Unknown time  No Yes   Sig: Take 1 tablet (40 mg total) by mouth 2 (two) times a day   venlafaxine (EFFEXOR-XR) 150 mg 24 hr capsule 4/5/2021 at Unknown time Spouse/Significant Other No Yes   Sig: Take 1 capsule (150 mg total) by mouth daily      Facility-Administered Medications: None     Allergies   Allergen Reactions    Bee Venom Anaphylaxis     Throat swelling    Shellfish-Derived Products - Food Allergy      Develops GOUT       Objective   Vitals: Blood pressure 118/65, pulse 81, temperature 97 6 °F (36 4 °C), temperature source Oral, resp  rate 18, height 5' 10" (1 778 m), weight 62 4 kg (137 lb 9 1 oz), SpO2 96 %    Orthostatic Blood Pressures      Most Recent Value   Blood Pressure  118/65 filed at 04/06/2021 2711   Patient Position - Orthostatic VS  Lying filed at 04/06/2021 1436            Intake/Output Summary (Last 24 hours) at 4/6/2021 1530  Last data filed at 4/6/2021 0801  Gross per 24 hour   Intake 825 ml   Output 320 ml   Net 505 ml       Invasive Devices     Central Venous Catheter Line            Port A Cath 07/31/19 Right Chest 615 days          Peripheral Intravenous Line            Peripheral IV 04/06/21 Right Antecubital less than 1 day          Drain            Gastrostomy/Enterostomy Gastrostomy 16 Fr  LUQ 60 days                Physical Exam  Vitals signs and nursing note reviewed  Constitutional:       Appearance: Normal appearance  He is underweight  HENT:      Head: Normocephalic  Right Ear: External ear normal       Left Ear: External ear normal       Nose: Nose normal    Eyes:      Extraocular Movements: Extraocular movements intact  Conjunctiva/sclera: Conjunctivae normal    Neck:      Musculoskeletal: Normal range of motion and neck supple  Cardiovascular:      Rate and Rhythm: Regular rhythm  Bradycardia present  Pulses: Normal pulses  Heart sounds: No murmur  Pulmonary:      Effort: Pulmonary effort is normal  No respiratory distress  Breath sounds: Examination of the right-lower field reveals decreased breath sounds  Examination of the left-lower field reveals decreased breath sounds  Decreased breath sounds present  Abdominal:      General: Bowel sounds are normal       Palpations: Abdomen is soft  Musculoskeletal:      Right lower leg: No edema  Left lower leg: No edema  Skin:     General: Skin is warm and dry  Capillary Refill: Capillary refill takes less than 2 seconds  Neurological:      General: No focal deficit present  Mental Status: He is alert  Mental status is at baseline  Lab Results:   I have personally reviewed pertinent lab results      CBC with diff:   Results from last 7 days   Lab Units 04/06/21  0624   WBC Thousand/uL 2 35* RBC Million/uL 2 89*   HEMOGLOBIN g/dL 7 4*   HEMATOCRIT % 25 3*   MCV fL 88   MCH pg 25 6*   MCHC g/dL 29 2*   RDW % 18 6*   MPV fL 11 5   PLATELETS Thousands/uL 422*     CMP:   Results from last 7 days   Lab Units 04/06/21  0304   SODIUM mmol/L 136   POTASSIUM mmol/L 3 4*   CHLORIDE mmol/L 98*   CO2 mmol/L 28   BUN mg/dL 29*   CREATININE mg/dL 1 04   CALCIUM mg/dL 8 8   AST U/L 15   ALT U/L 16   ALK PHOS U/L 95   EGFR ml/min/1 73sq m 74     Troponin:   0   Lab Value Date/Time    TROPONINI <0 02 09/18/2020 1231    TROPONINI 1 54 (H) 08/30/2020 0916     BNP:   Results from last 7 days   Lab Units 04/06/21  0304   POTASSIUM mmol/L 3 4*   CHLORIDE mmol/L 98*   CO2 mmol/L 28   BUN mg/dL 29*   CREATININE mg/dL 1 04   CALCIUM mg/dL 8 8   EGFR ml/min/1 73sq m 74     Coags:     TSH:     Magnesium:   Results from last 7 days   Lab Units 04/06/21  0304   MAGNESIUM mg/dL 2 0     Lipid Profile:     Imaging: I have personally reviewed pertinent reports      EKG:  Sinus rhythm, sinus arrhythmia with occasional PVC, no acute changes  VTE Prophylaxis: Sequential compression device (Venodyne)  Eliquis currently on hold due to profound anemia    Code Status: Level 1 - Full Code  Advance Directive and Living Will:      Power of :    POLST:      Kenney Christie, [facility]

## 2021-04-06 NOTE — PLAN OF CARE
Problem: Potential for Falls  Goal: Patient will remain free of falls  Description: INTERVENTIONS:  - Assess patient frequently for physical needs  -  Identify cognitive and physical deficits and behaviors that affect risk of falls  -  [place] fall precautions as indicated by assessment   - Educate patient/family on patient safety including physical limitations  - Instruct patient to call for assistance with activity based on assessment  - Modify environment to reduce risk of injury  - Consider OT/PT consult to assist with strengthening/mobility  Outcome: Progressing     Problem: Nutrition/Hydration-ADULT  Goal: Nutrient/Hydration intake appropriate for improving, restoring or maintaining nutritional needs  Description: Monitor and assess patient's nutrition/hydration status for malnutrition  Collaborate with interdisciplinary team and initiate plan and interventions as ordered  Monitor patient's weight and dietary intake as ordered or per policy  Utilize nutrition screening tool and intervene as necessary  Determine patient's food preferences and provide high-protein, high-caloric foods as appropriate       INTERVENTIONS:  - Monitor oral intake, urinary output, labs, and treatment plans  - Assess nutrition and hydration status and recommend course of action  - Evaluate amount of meals eaten  - Assist patient with eating if necessary   - Allow adequate time for meals  - Recommend/ encourage appropriate diets, oral nutritional supplements, and vitamin/mineral supplements  - Order, calculate, and assess calorie counts as needed  - Recommend, monitor, and adjust tube feedings and TPN/PPN based on assessed needs  - Assess need for intravenous fluids  - Provide specific nutrition/hydration education as appropriate  - Include patient/family/caregiver in decisions related to nutrition  Outcome: Progressing     Problem: Prexisting or High Potential for Compromised Skin Integrity  Goal: Skin integrity is maintained or improved  Description: INTERVENTIONS:  - Identify patients at risk for skin breakdown  - Assess and monitor skin integrity  - Assess and monitor nutrition and hydration status  - Monitor labs   - Assess for incontinence   - Turn and reposition patient  - Assist with mobility/ambulation  - Relieve pressure over bony prominences  - Avoid friction and shearing  - Provide appropriate hygiene as needed including keeping skin clean and dry  - Evaluate need for skin moisturizer/barrier cream  - Collaborate with interdisciplinary team   - Patient/family teaching  - Consider wound care consult   Outcome: Progressing

## 2021-04-06 NOTE — ASSESSMENT & PLAN NOTE
Possible findings of peritonitis found on CT scan  Patient does not have any peritoneal findings on abdominal exam   · Antibiotics?

## 2021-04-06 NOTE — PROGRESS NOTES
[address] [facility] Practice Progress Note - Carson Cuevas 77 y o  male MRN: [id number]    Unit/Bed#: [address] [place] [address] Encounter: [id number]      Assessment/Plan:  Leukopenia  Assessment & Plan  WBC of 2 04 on admission  Likely in setting of chemotherapy treatment  Will continue to monitor  Severe anemia  Assessment & Plan  Hgb baseline of around 9  Hgb 5 4 on admission  See GI bleed  Will prepare 2 additional units of RBCs  Chronic combined systolic and diastolic CHF (congestive heart failure) (HCC)  Assessment & Plan  Wt Readings from Last 3 Encounters:   04/05/21 62 4 kg (137 lb 8 oz)   04/05/21 62 5 kg (137 lb 12 8 oz)   03/24/21 62 kg (136 lb 11 oz)       Grade 2 diastolic dysfunction from echo in September 2020 with EF estimated in the range of 35 % to 40 %  Patient appears euvolemic  · Daily weights, I's and O's      PAF [facility]oxysmal [facility] fibrillation) ([facility] )  Assessment & Plan  Patient has been off of amiodarone 200 mg p o  Q d  for over a month  Will place on telemetry  Will hold home Eliquis in setting of GI bleed and severe anemia  · Consult Cardiology, will appreciate recommendations    Dysphagia  Assessment & Plan  G-tube placed on February 5th  Patient has history of malignant neoplasm of lower 3rd of esophagus  Patient can still take medications p o  Per wife, patient on 5 cans of TwoCal per day at a rate of 80 mL per hour via pump  · Consult nutrition services, will appreciate recommendations    Malignant neoplasm of lower third of esophagus [facility])  Assessment & Plan  History of metastatic esophageal cancer on chemotherapy  Diagnosed in August 2019  Follows with Oncology in [place], [place] with Dr Jaspreet Calixto  Plan was to get CT scan today for chemotherapy in Wednesday, however was found to be anemic on CBC  Patient gets chemotherapy every 2 weeks @infusion center in [place]  Last seen by oncology on March 16th per wife Jorge Horichard  Last chemo March 24th    Patient has lung nodules that are stable  · Continue home Protonix 40 mg p o  B i d   · Continue home Zofran 4 mg p o  Q 8 H p r n  · Consult GI, will appreciate recommendations    Anxiety  Assessment & Plan  Continue diazepam 10 mg p o  Q h s     Major depression  Assessment & Plan  Continue home venlafaxine 100 mg p o  Q d       * GI bleed  Assessment & Plan  Hemoglobin of 5 4 on admission  Fecal occult positive per ED  Transfused 2 U of blood in ED  · Consult GI, will appreciate recommendations  · Monitor H&H 2 hours after transfusion  · NPO at midnight  · IV fluids 50 cc/hour for 8 hours          Subjective:   Patient was examined at bedside  States he does not feel well  He has a PEG tube in place  Denies chest pain, SOB, and palpitations  Objective:     Vitals: Blood pressure 143/78, pulse 76, temperature 99 1 °F (37 3 °C), resp  rate 18, height 5' 10" (1 778 m), weight 62 4 kg (137 lb 9 1 oz), SpO2 97 %  ,Body mass index is 19 74 kg/m²    Wt Readings from Last 3 Encounters:   04/06/21 62 4 kg (137 lb 9 1 oz)   04/05/21 62 5 kg (137 lb 12 8 oz)   03/24/21 62 kg (136 lb 11 oz)       Intake/Output Summary (Last 24 hours) at 4/6/2021 0729  Last data filed at 4/6/2021 0600  Gross per 24 hour   Intake 825 ml   Output 220 ml   Net 605 ml       Physical Exam: General appearance: alert and oriented, in no acute distress  Lungs: clear to auscultation bilaterally  Heart: regular rate and rhythm, S1, S2 normal, no murmur, click, rub or gallop  Abdomen: normal findings: bowel sounds normal and abnormal findings:  PEG tube in place  Extremities: extremities normal, warm and well-perfused; no cyanosis, clubbing, or edema     Recent Results (from the past 24 hour(s))   CBC and differential    Collection Time: 04/05/21  2:03 PM   Result Value Ref Range    WBC 2 04 (L) 4 31 - 10 16 Thousand/uL    RBC 2 22 (L) 3 88 - 5 62 Million/uL    Hemoglobin 5 4 (LL) 12 0 - 17 0 g/dL    Hematocrit 20 0 (L) 36 5 - 49 3 %    MCV 90 82 - 98 fL    MCH 23 9 (L) 26 8 - 34 3 pg    MCHC 26 5 (L) 31 4 - 37 4 g/dL    RDW 20 9 (H) 11 6 - 15 1 %    MPV 11 5 8 9 - 12 7 fL    Platelets 759 (H) 619 - 390 Thousands/uL    nRBC 2 /100 WBCs   Comprehensive metabolic panel    Collection Time: 04/05/21  2:03 PM   Result Value Ref Range    Sodium 138 136 - 145 mmol/L    Potassium 3 6 3 5 - 5 3 mmol/L    Chloride 100 100 - 108 mmol/L    CO2 28 21 - 32 mmol/L    ANION GAP 10 4 - 13 mmol/L    BUN 38 (H) 5 - 25 mg/dL    Creatinine 1 08 0 60 - 1 30 mg/dL    Glucose 132 65 - 140 mg/dL    Calcium 9 0 8 3 - 10 1 mg/dL    Corrected Calcium 10 4 (H) 8 3 - 10 1 mg/dL    AST 17 5 - 45 U/L    ALT 18 12 - 78 U/L    Alkaline Phosphatase 95 46 - 116 U/L    Total Protein 6 7 6 4 - 8 2 g/dL    Albumin 2 3 (L) 3 5 - 5 0 g/dL    Total Bilirubin 0 44 0 20 - 1 00 mg/dL    eGFR 71 ml/min/1 73sq m   Manual Differential(PHLEBS Do Not Order)    Collection Time: 04/05/21  2:03 PM   Result Value Ref Range    Segmented % 38 (L) 43 - 75 %    Lymphocytes % 28 14 - 44 %    Monocytes % 8 4 - 12 %    Eosinophils, % 2 0 - 6 %    Basophils % 2 (H) 0 - 1 %    Atypical Lymphocytes % 22 (H) <=0 %    Absolute Neutrophils 0 78 (L) 1 85 - 7 62 Thousand/uL    Lymphocytes Absolute 0 57 (L) 0 60 - 4 47 Thousand/uL    Monocytes Absolute 0 16 0 00 - 1 22 Thousand/uL    Eosinophils Absolute 0 04 0 00 - 0 40 Thousand/uL    Basophils Absolute 0 04 0 00 - 0 10 Thousand/uL    Total Counted 50     nRBC 6 (H) 0 - 2 /100 WBC    RBC Morphology Present     Anisocytosis Present     Basophilic Stippling Present     Ovalocytes Present     Polychromasia Present     Platelet Estimate Increased (A) Adequate    Large Platelet Present    Retic Count with Reticulocyte HGB    Collection Time: 04/05/21  2:03 PM   Result Value Ref Range    Immature Retic Fract 39 5 (H) 0 0 - 14 0 %    Retic Ct Pct 4 64 (H) 0 37 - 1 87 %    Retic Ct Abs <100,000 14,356-105,094    RETIC HGB 18 7 (L) 30 0 - 38 3 pg   Type and screen    Collection Time: 04/05/21  5:06 PM   Result Value Ref Range    ABO Grouping O     Rh Factor Positive     Antibody Screen Negative     Specimen Expiration Date [id number]    Comprehensive metabolic panel    Collection Time: 04/06/21  3:04 AM   Result Value Ref Range    Sodium 136 136 - 145 mmol/L    Potassium 3 4 (L) 3 5 - 5 3 mmol/L    Chloride 98 (L) 100 - 108 mmol/L    CO2 28 21 - 32 mmol/L    ANION GAP 10 4 - 13 mmol/L    BUN 29 (H) 5 - 25 mg/dL    Creatinine 1 04 0 60 - 1 30 mg/dL    Glucose 99 65 - 140 mg/dL    Calcium 8 8 8 3 - 10 1 mg/dL    Corrected Calcium 10 2 (H) 8 3 - 10 1 mg/dL    AST 15 5 - 45 U/L    ALT 16 12 - 78 U/L    Alkaline Phosphatase 95 46 - 116 U/L    Total Protein 6 3 (L) 6 4 - 8 2 g/dL    Albumin 2 3 (L) 3 5 - 5 0 g/dL    Total Bilirubin 1 17 (H) 0 20 - 1 00 mg/dL    eGFR 74 ml/min/1 73sq m   CBC and differential    Collection Time: 04/06/21  3:04 AM   Result Value Ref Range    WBC 2 36 (L) 4 31 - 10 16 Thousand/uL    RBC 2 81 (L) 3 88 - 5 62 Million/uL    Hemoglobin 7 3 (L) 12 0 - 17 0 g/dL    Hematocrit 24 5 (L) 36 5 - 49 3 %    MCV 87 82 - 98 fL    MCH 26 0 (L) 26 8 - 34 3 pg    MCHC 29 8 (L) 31 4 - 37 4 g/dL    RDW 18 0 (H) 11 6 - 15 1 %    MPV 11 1 8 9 - 12 7 fL    Platelets 476 (H) 693 - 390 Thousands/uL    nRBC 2 /100 WBCs   Magnesium    Collection Time: 04/06/21  3:04 AM   Result Value Ref Range    Magnesium 2 0 1 6 - 2 6 mg/dL   Phosphorus    Collection Time: 04/06/21  3:04 AM   Result Value Ref Range    Phosphorus 3 3 2 3 - 4 1 mg/dL   Manual Differential(PHLEBS Do Not Order)    Collection Time: 04/06/21  3:04 AM   Result Value Ref Range    Total Counted      RBC Morphology Present     Anisocytosis Present     Macrocytes Present     Polychromasia Present     Platelet Estimate Adequate Adequate    Large Platelet Present    Prepare Leukoreduced RBC: 2 Units    Collection Time: 04/06/21  6:15 AM   Result Value Ref Range    Unit Product Code O3883U63     Unit Number [id number]     Unit ABO O     Unit [facility] POS     Crossmatch Compatible     Unit Dispense Status Presumed Trans     Unit Product Code N0589Y98     Unit Number [id number]     Unit ABO O     Unit [facility] POS     Crossmatch Compatible     Unit Dispense Status Presumed Trans    CBC and differential    Collection Time: 04/06/21  6:24 AM   Result Value Ref Range    WBC 2 35 (L) 4 31 - 10 16 Thousand/uL    RBC 2 89 (L) 3 88 - 5 62 Million/uL    Hemoglobin 7 4 (L) 12 0 - 17 0 g/dL    Hematocrit 25 3 (L) 36 5 - 49 3 %    MCV 88 82 - 98 fL    MCH 25 6 (L) 26 8 - 34 3 pg    MCHC 29 2 (L) 31 4 - 37 4 g/dL    RDW 18 6 (H) 11 6 - 15 1 %    MPV 11 5 8 9 - 12 7 fL    Platelets 822 (H) 296 - 390 Thousands/uL       Current Facility-Administered Medications   Medication Dose Route Frequency Provider Last Rate Last Admin    diazepam (VALIUM) tablet 10 mg  10 mg Oral HS Jose Bauer MD   10 mg at 04/05/21 2305    hydrochlorothiazide (HYDRODIURIL) tablet 12 5 mg  12 5 mg Oral Daily PRN Jose Bauer MD        lactated ringers infusion  50 mL/hr Intravenous Continuous Dieynaba Arnaldo, DO 50 mL/hr at 04/06/21 0121 50 mL/hr at 04/06/21 0121    ondansetron (ZOFRAN-ODT) dispersible tablet 8 mg  8 mg Oral Q8H PRN Jose Bauer MD        pantoprazole (PROTONIX) EC tablet 40 mg  40 mg Oral BID AC Jose Bauer MD   40 mg at 04/06/21 0646    potassium chloride (K-DUR,KLOR-CON) CR tablet 40 mEq  40 mEq Oral Once Sergio Chaparro MD        venlafaxine (EFFEXOR-XR) 24 hr capsule 150 mg  150 mg Oral Daily Jose Bauer MD           Invasive Devices     Central Venous Catheter Line            Port A Cath 07/31/19 Right Chest 615 days          Peripheral Intravenous Line            Peripheral IV 04/06/21 Right Antecubital less than 1 day          Drain            Gastrostomy/Enterostomy Gastrostomy 16 Fr  LUQ 59 days                Lab, Imaging and other studies: I have personally reviewed pertinent reports      VTE Pharmacologic Prophylaxis: Reason for no pharmacologic prophylaxis  GI bleed  VTE Mechanical Prophylaxis: sequential compression device    Alex Fernandez MD

## 2021-04-06 NOTE — TELEPHONE ENCOUNTER
Patient called, wife who is managing patient medical care made aware that Eliquis has been discontinued by cardiology 2/2 recent admission for GI bleed  Per cardio consult's attestation "discontinue Eliquis therapy  His current risk of major bleeding outweighs his risk for cardiovascular event"  All questions answered  Instructed to f/u outpatient w/ cardio  Discussed with attending physician Dr Cleo Hahn, DO

## 2021-04-06 NOTE — MALNUTRITION/BMI
This medical record reflects one or more clinical indicators suggestive of malnutrition  Malnutrition Findings:   Adult Malnutrition type: Chronic illness(Severe protein calorie malnutrition of chronic illness related to inadequate energy intake due to increased needs as evidenced by hollow orbits, depressed temples and 16% weight loss in 6 months  Treated with TF)  Adult Degree of Malnutrition: Other severe protein calorie malnutrition  Malnutrition Characteristics: Fat loss, Muscle loss, Weight loss    BMI Findings: Body mass index is 19 74 kg/m²  See Nutrition note dated 4/6/2021 for additional details  Completed nutrition assessment is viewable in the nutrition documentation

## 2021-04-06 NOTE — ASSESSMENT & PLAN NOTE
Hemoglobin of 5 4 on admission  Fecal occult positive per ED  Transfused 2 U of blood in ED    · Consult GI, will appreciate recommendations  · Monitor H&H 2 hours after transfusion  · NPO at midnight  · IV fluids 50 cc/hour for 8 hours

## 2021-04-06 NOTE — DISCHARGE SUMMARY
[facility] Discharge Summary - Medical Gayathri Loredo 77 y o  male MRN: [id number]    [address]  [facility] / Bed: [address] [facility] Encounter: [id number]    BRIEF OVERVIEW  Admitting Provider: Anna Reynolds DO  Discharge Provider: Anna Reynolds DO    Discharge To: Home with home health care  Facility: NONE    Outpatient Follow-Up: [place]  Things to address at first follow up visit:  Follow-up with GI? Have you had an endoscopy? Compliant with medications? Follow-up with Hematology-Oncology  Order CBC    Labs and results pending at discharge: None    Admission Date: 4/5/2021     Discharge Date: 4/6/21  Primary Discharge Diagnosis  Principal Problem:    GI bleed  Active Problems:    Major depression    Anxiety    Malignant neoplasm of lower third of esophagus (HCC)    Dysphagia    PAF (paroxysmal atrial fibrillation) (HCC)    Chronic combined systolic and diastolic CHF (congestive heart failure) (HCC)    Severe anemia    Leukopenia  Resolved Problems:    * No resolved hospital problems  *        Consulting Providers   Gastroenterology  Cardiology        DETAILS OF HOSPITAL STAY    Procedures Performed/Pertinent Test results  4/6: K 3 4, Na 136, Cr 1 04, WBC 2 36, Hg 7 3  4/5: Hgb 5 4 -> 2 u RBCs -> 7 3 Na 138 K 3 6 Cr 1 08 WBC 2 04  CT chest/abdomen/pelvis - Increasing abdominopelvic ascites and new diffuse mild peritoneal enhancement which can be seen with peritoneal metastases  Peritoneal enhancement can also be seen with peritonitis in the appropriate clinical setting  Apparent 4 4 x 2 2 cm masslike thickening at the gastroesophageal junction in this patient with known neoplasm  There is mild fluid-filled distention of the esophagus proximal to this level  Prostatomegaly  Severe emphysematous changes  Stable pulmonary nodules  Small bilateral pleural effusions        HPI  76 yo M w/ PMH of 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, Afib, dilated cardiomyopathy (EF 35-40%), HTN, DVT, and depression/anxiety presenting with Hgb of 5 4  Sent here from lab work needed prior to CT scan prior to chemo on Wednesday  Otherwise, patient does not have any acute symptoms but does have the following chronic symptoms associated with his cancer (see ROS from H&P)    Hospital Course  Patient was admitted for severe anemia with hemoglobin of 5 4, he was transfused 2 units and hemoglobin post transfusion was 7 3  FOBT was noted to be positive  GI was consulted for possible GI bleed  Patient has a PEG tube in place it was lavaged and there was some dark brown reddish tinged fluid mixed with tube feeding so difficult to decipher if el blood was present  Patient had a high residual of about 150 cc so tube feeding rates were decreased  GI noted that bleeding is related to GI malignancy and patient is being anticoagulated with Eliquis  GI noted to follow-up with GI outpatient and consider EGD for evaluation  Bleeding likely due to chronic blood loss from tumor and this is likely uncontrollable endoscopically  GI stated patient should discontinue the Elliquis for now  Wife made aware     Physical Exam at Discharge  Please see progress note on day of discharge    Medications   Current Discharge Medication List          Current Discharge Medication List      CONTINUE these medications which have NOT CHANGED    Details   ammonium lactate (LAC-HYDRIN) 12 % cream Apply topically as needed for dry skin On hands and feet  Qty: 385 g, Refills: 2    Associated Diagnoses: Skin fissures      apixaban (Eliquis) 5 mg Take 1 tablet (5 mg total) by mouth 2 (two) times a day  Qty: 60 tablet, Refills: 0    Associated Diagnoses: Superficial thrombophlebitis of right leg      diazepam (VALIUM) 5 mg tablet Take 1 tablet (5 mg total) by mouth 2 (two) times a day  Qty: 60 tablet, Refills: 5    Associated Diagnoses: Anxiety      hydrochlorothiazide (HYDRODIURIL) 12 5 mg tablet Take one tablet daily as needed for SBP> 160/90  Qty: 90 tablet, Refills: 3    Associated Diagnoses: Chronic combined systolic and diastolic CHF (congestive heart failure) ([facility]); PAF (paroxysmal atrial fibrillation) ([facility])      ondansetron (ZOFRAN) 4 mg tablet Take 2 tablets (8 mg total) by mouth every 8 (eight) hours as needed for nausea or vomiting  Qty: 75 tablet, Refills: 0    Associated Diagnoses: Malignant neoplasm of lower third of esophagus ([facility])      pantoprazole (PROTONIX) 40 mg tablet Take 1 tablet (40 mg total) by mouth 2 (two) times a day  Qty: 60 tablet, Refills: 2    Associated Diagnoses: Regurgitation of stomach contents; Malignant neoplasm of esophagus, unspecified location ([facility])      venlafaxine (EFFEXOR-XR) 150 mg 24 hr capsule Take 1 capsule (150 mg total) by mouth daily  Qty: 30 capsule, Refills: 0    Associated Diagnoses: Severe episode of recurrent major depressive disorder, without psychotic features ([facility])      amiodarone 200 mg tablet TAKE 1 TABLET BY MOUTH EVERY DAY WITH BREAKFAST  Qty: 30 tablet, Refills: 3    Associated Diagnoses: A-fib ([facility])      fluorouracil 4,295 mg in CADD infusion pump Infuse 4,295 mg (1,200 mg/m2/day x 1 79 m2 (Treatment plan recorded BSA)) into a venous catheter over 46 hours for 2 days [employer] to be administered on  Qty: 1 Device, Refills: 0    Associated Diagnoses: Nausea; Dehydration; Malignant neoplasm of lower third of esophagus ([address] ); Malignant neoplasm of cardia of stomach [facility])            Current Discharge Medication List         Current Discharge Medication List             Allergies  Allergies   Allergen Reactions    Bee Venom Anaphylaxis     Throat swelling    Shellfish-Derived Products - Food Allergy      Develops GOUT       Diet restrictions: Peg tube in place  Activity restrictions: none  Code Status: Level 1 - Full Code        Discharge Condition: stable      Discharge  Statement   I spent 30 minutes discharging the patient   This time was spent on the day of discharge  I had direct contact with the patient on the day of discharge  Additional documentation is required if more than 30 minutes were spent on discharge

## 2021-04-07 ENCOUNTER — HOSPITAL ENCOUNTER (OUTPATIENT)
Dept: INFUSION CENTER | Facility: HOSPITAL | Age: 66
Discharge: HOME | End: 2021-04-07
Attending: INTERNAL MEDICINE

## 2021-04-07 ENCOUNTER — TRANSITIONAL CARE MANAGEMENT (OUTPATIENT)
Dept: FAMILY MEDICINE CLINIC | Facility: CLINIC | Age: 66
End: 2021-04-07

## 2021-04-07 ENCOUNTER — PATIENT OUTREACH (OUTPATIENT)
Dept: FAMILY MEDICINE CLINIC | Facility: CLINIC | Age: 66
End: 2021-04-07

## 2021-04-07 DIAGNOSIS — Z71.89 COMPLEX CARE COORDINATION: Primary | ICD-10-CM

## 2021-04-07 NOTE — PROGRESS NOTES
Outreach to [facility]  Confirmed start of care for tomorrow  Outreach to wife  Advised that home care will be starting tomorrow  Pt is now at continuous feed at 40 mL's per hour  Pt is tolerating this dose of feeding  Reviewed upcoming appointments  Wife is on spring break right now so therefore she does not have to take any time off to take patient to appointments  Currently wife's employer is allowing her to use sick time to cover her husbands care  Encouraged wife to reach out to CM if she requires any assistance with FMLA  All questions and concerns addressed

## 2021-04-08 ENCOUNTER — TELEPHONE (OUTPATIENT)
Dept: FAMILY MEDICINE CLINIC | Facility: CLINIC | Age: 66
End: 2021-04-08

## 2021-04-08 ENCOUNTER — OFFICE VISIT (OUTPATIENT)
Dept: HEMATOLOGY ONCOLOGY | Facility: CLINIC | Age: 66
End: 2021-04-08
Payer: MEDICARE

## 2021-04-08 VITALS
RESPIRATION RATE: 16 BRPM | DIASTOLIC BLOOD PRESSURE: 82 MMHG | SYSTOLIC BLOOD PRESSURE: 118 MMHG | HEIGHT: 71 IN | BODY MASS INDEX: 19.18 KG/M2 | OXYGEN SATURATION: 98 % | WEIGHT: 137 LBS | TEMPERATURE: 97.4 F | HEART RATE: 103 BPM

## 2021-04-08 DIAGNOSIS — E86.0 DEHYDRATION: ICD-10-CM

## 2021-04-08 DIAGNOSIS — C15.5 MALIGNANT NEOPLASM OF LOWER THIRD OF ESOPHAGUS (HCC): ICD-10-CM

## 2021-04-08 DIAGNOSIS — C16.0 GASTROESOPHAGEAL CANCER (HCC): ICD-10-CM

## 2021-04-08 DIAGNOSIS — R11.0 NAUSEA: Primary | ICD-10-CM

## 2021-04-08 PROCEDURE — 99214 OFFICE O/P EST MOD 30 MIN: CPT | Performed by: INTERNAL MEDICINE

## 2021-04-08 RX ORDER — SODIUM CHLORIDE 9 MG/ML
20 INJECTION, SOLUTION INTRAVENOUS ONCE
Status: CANCELLED | OUTPATIENT
Start: 2021-04-16

## 2021-04-08 NOTE — PROGRESS NOTES
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 [address] )   7/26/2019 Initial Diagnosis    Malignant neoplasm of lower third of esophagus ([email or web address] )     8/1/2019 -  Chemotherapy    fluorouracil (ADRUCIL) injection 750 mg, 400 mg/m2 = 750 mg, Intravenous, Once, 34 of 38 cycles  Administration: 750 mg (8/1/2019), 750 mg (8/13/2019), 750 mg (8/27/2019), 750 mg (9/10/2019), 750 mg (9/24/2019), 750 mg (10/8/2019), 795 mg (10/22/2019), 795 mg (11/5/2019), 795 mg (11/19/2019), 795 mg (12/3/2019), 795 mg (12/17/2019), 795 mg (12/31/2019), 795 mg (1/14/2020), 795 mg (1/28/2020), 795 mg (2/11/2020), 795 mg (2/25/2020), 795 mg (3/10/2020), 795 mg (3/24/2020), 795 mg (4/7/2020), 795 mg (5/13/2020), 795 mg (5/27/2020), 795 mg (6/10/2020), 795 mg (6/24/2020), 795 mg (7/22/2020), 795 mg (8/5/2020), 790 mg (8/19/2020), 750 mg (10/7/2020), 795 mg (4/29/2020), 750 mg (10/21/2020), 750 mg (11/4/2020), 750 mg (11/18/2020), 735 mg (2/24/2021), 735 mg (3/10/2021), 735 mg (3/24/2021)  pegfilgrastim (NEULASTA ONPRO) subcutaneous injection kit 6 mg, 6 mg, Subcutaneous, Once, 2 of 2 cycles  Administration: 6 mg (8/3/2019), 6 mg (8/15/2019)  leucovorin 750 mg in dextrose 5 % 250 mL IVPB, 748 mg, Intravenous, Once, 21 of 21 cycles  Administration: 750 mg (8/1/2019), 750 mg (8/13/2019), 750 mg (8/27/2019), 750 mg (9/10/2019), 750 mg (9/24/2019), 750 mg (10/8/2019), 800 mg (10/22/2019), 800 mg (11/5/2019), 800 mg (11/19/2019), 800 mg (12/3/2019), 800 mg (12/17/2019), 800 mg (12/31/2019), 800 mg (1/14/2020), 800 mg (1/28/2020), 800 mg (2/11/2020), 800 mg (2/25/2020), 800 mg (3/10/2020), 800 mg (3/24/2020), 800 mg (4/7/2020), 800 mg (5/13/2020), 800 mg (4/29/2020)  oxaliplatin (ELOXATIN) 158 95 mg in dextrose 5 % 250 mL chemo infusion, 85 mg/m2 = 158 95 mg, Intravenous, Once, 6 of 6 cycles  Administration: 158 95 mg (8/1/2019), 158 95 mg (8/13/2019), 158 95 mg (8/27/2019), 158 95 mg (9/10/2019), 158 95 mg (9/24/2019), 158 95 mg (10/8/2019)     Gastroesophageal cancer ([facility] [address] )   7/26/2019 Initial Diagnosis    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[facility] [address] )     8/1/2019 -  Chemotherapy    fluorouracil (ADRUCIL) injection 750 mg, 400 mg/m2 = 750 mg, Intravenous, Once, 34 of 38 cycles  Administration: 750 mg (8/1/2019), 750 mg (8/13/2019), 750 mg (8/27/2019), 750 mg (9/10/2019), 750 mg (9/24/2019), 750 mg (10/8/2019), 795 mg (10/22/2019), 795 mg (11/5/2019), 795 mg (11/19/2019), 795 mg (12/3/2019), 795 mg (12/17/2019), 795 mg (12/31/2019), 795 mg (1/14/2020), 795 mg (1/28/2020), 795 mg (2/11/2020), 795 mg (2/25/2020), 795 mg (3/10/2020), 795 mg (3/24/2020), 795 mg (4/7/2020), 795 mg (5/13/2020), 795 mg (5/27/2020), 795 mg (6/10/2020), 795 mg (6/24/2020), 795 mg (7/22/2020), 795 mg (8/5/2020), 790 mg (8/19/2020), 750 mg (10/7/2020), 795 mg (4/29/2020), 750 mg (10/21/2020), 750 mg (11/4/2020), 750 mg (11/18/2020), 735 mg (2/24/2021), 735 mg (3/10/2021), 735 mg (3/24/2021)  pegfilgrastim (NEULASTA ONPRO) subcutaneous injection kit 6 mg, 6 mg, Subcutaneous, Once, 2 of 2 cycles  Administration: 6 mg (8/3/2019), 6 mg (8/15/2019)  leucovorin 750 mg in dextrose 5 % 250 mL IVPB, 748 mg, Intravenous, Once, 21 of 21 cycles  Administration: 750 mg (8/1/2019), 750 mg (8/13/2019), 750 mg (8/27/2019), 750 mg (9/10/2019), 750 mg (9/24/2019), 750 mg (10/8/2019), 800 mg (10/22/2019), 800 mg (11/5/2019), 800 mg (11/19/2019), 800 mg (12/3/2019), 800 mg (12/17/2019), 800 mg (12/31/2019), 800 mg (1/14/2020), 800 mg (1/28/2020), 800 mg (2/11/2020), 800 mg (2/25/2020), 800 mg (3/10/2020), 800 mg (3/24/2020), 800 mg (4/7/2020), 800 mg (5/13/2020), 800 mg (4/29/2020)  oxaliplatin (ELOXATIN) 158 95 mg in dextrose 5 % 250 mL chemo infusion, 85 mg/m2 = 158 95 mg, Intravenous, Once, 6 of 6 cycles  Administration: 158 95 mg (8/1/2019), 158 95 mg (8/13/2019), 158 95 mg (8/27/2019), 158 95 mg (9/10/2019), 158 95 mg (9/24/2019), 158 95 mg (10/8/2019)         Current Hematologic/ Oncologic Treatment:      Modified FOLFOX 6  Oxaliplatin discontinued after cycle 6  Leucovorin has been discontinued      5FU 400 milligrams/meters squared  5FU 2400 milligrams/meter squared CIVI over 46 hours      This will now be changed to [facility] every 3 weeks  Interval History:      The patient returns for follow-up visit  He has what appears to be progression on his most recent imaging  Based on this options include hospice as his ECOG performance status is a 2-3 versus immunotherapy  I do not think he is a candidate for a taxane plus Cyramza  As far symptoms are concerned he cannot eat and uses a feeding tube  Denies any nausea or vomiting currently  Does have discomfort as he did fall recently  Denies any other complaints in the rest of his 14 point review of systems today was negative  Test Results:    Imaging: Ct Chest Abdomen Pelvis W Contrast    Result Date: 4/5/2021  Narrative: CT CHEST, ABDOMEN AND PELVIS WITH IV CONTRAST INDICATION:   C16 0: Malignant neoplasm of cardia  COMPARISON:  CT chest/abdomen/pelvis dated 12/10/2020  CT chest dated 2/3/2021  TECHNIQUE: CT examination of the chest, abdomen and pelvis was performed  Axial, sagittal, and coronal 2D reformatted images were created from the source data and submitted for interpretation   Radiation dose length product (DLP) for this visit: 466 53 mGy-cm   This examination, like all CT scans performed in the [facility], was performed utilizing techniques to minimize radiation dose exposure, including the use of iterative  reconstruction and automated exposure control  IV Contrast:  100 mL of iohexol (OMNIPAQUE) Enteric Contrast: Enteric contrast was not administered  FINDINGS: CHEST LUNGS:  Severe paraseptal and centrilobular emphysematous changes  No focal airspace consolidation  No new worrisome pulmonary nodules  Stable 6 mm right upper lobe nodule (image 73, series 3)  Several other smaller nodules in the right lung measuring up to 4 mm are also stable  There is no tracheal or endobronchial lesion  PLEURA:  Small bilateral pleural effusions  HEART/GREAT VESSELS:  There is stable dilatation of the ascending thoracic aorta measuring up to 4 cm in maximal diameter  MEDIASTINUM AND NEVAEH:  Unremarkable  CHEST WALL AND LOWER NECK:   Unremarkable  ABDOMEN LIVER/BILIARY TREE:  Multiple scattered subcentimeter hypodensities in the liver are too small to characterize, probably cysts  GALLBLADDER:  No calcified gallstones  No pericholecystic inflammatory change  SPLEEN:  Unremarkable  PANCREAS:  Unremarkable  ADRENAL GLANDS:  Unremarkable  KIDNEYS/URETERS:  Bilateral renal cysts measuring up to 8 6 cm on the right  No hydronephrosis  STOMACH AND BOWEL: There is apparent 4 4 x 2 2 cm masslike thickening at the gastroesophageal junction in this patient with known neoplasm  Percutaneous gastrostomy tube is seen within the stomach  No evidence for bowel obstruction  APPENDIX:  No findings to suggest appendicitis  ABDOMINOPELVIC CAVITY:  There is interval increase of abdominopelvic ascites and new diffuse mild peritoneal enhancement  Evaluation for focal peritoneal nodules is somewhat limited due to lack of oral contrast and relative paucity of intra-abdominal fat  No pneumoperitoneum  No lymphadenopathy   VESSELS:  Unremarkable for patient's age  PELVIS REPRODUCTIVE ORGANS:  The prostate gland is enlarged measuring 5 5 x 4 4 x 5 1 cm  URINARY BLADDER:  Unremarkable  ABDOMINAL WALL/INGUINAL REGIONS:  Unremarkable  OSSEOUS STRUCTURES:  No acute fracture or destructive osseous lesion  Impression: Increasing abdominopelvic ascites and new diffuse mild peritoneal enhancement which can be seen with peritoneal metastases  Peritoneal enhancement can also be seen with peritonitis in the appropriate clinical setting  Evaluation of the gastrointestinal tract and evaluation for focal peritoneal nodules is limited due to lack of oral contrast and relative paucity of intra-abdominal fat  Apparent 4 4 x 2 2 cm masslike thickening at the gastroesophageal junction in this patient with known neoplasm  There is mild fluid-filled distention of the esophagus proximal to this level  Prostatomegaly  Severe emphysematous changes  Stable pulmonary nodules  Small bilateral pleural effusions  Workstation performed: [id number]       Labs:   Lab Results   Component Value Date    WBC 2 35 (L) 04/06/2021    HGB 7 4 (L) 04/06/2021    HCT 25 3 (L) 04/06/2021    MCV 88 04/06/2021     (H) 04/06/2021     Lab Results   Component Value Date    K 3 4 (L) 04/06/2021    CL 98 (L) 04/06/2021    CO2 28 04/06/2021    BUN 29 (H) 04/06/2021    CREATININE 1 04 04/06/2021    GLUF 184 (H) 02/23/2021    CALCIUM 8 8 04/06/2021    CORRECTEDCA 10 2 (H) 04/06/2021    AST 15 04/06/2021    ALT 16 04/06/2021    ALKPHOS 95 04/06/2021    EGFR 74 04/06/2021       Lab Results   Component Value Date    PSA 8 5 (H) 05/15/2020    PSA 8 0 (H) 01/24/2020       Lab Results   Component Value Date    IRON 13 (L) 09/01/2020    TIBC 341 09/01/2020    FERRITIN 175 09/01/2020       Lab Results   Component Value Date    [id number] 732 09/01/2020         ROS: As stated in the history of present illness otherwise his 14 point review of systems today was negative        Active Problems:   Patient Active Problem List   Diagnosis    PTSD (post-traumatic stress disorder)    Essential hypertension    Major depression    OCD (obsessive compulsive disorder)    Anxiety    [facility]    Gastroesophageal cancer ([place] [address] )    Cancer-related pain    Chemotherapy-induced nausea    Severe protein-calorie malnutrition (Veronica Dial: less than 60% of standard weight) (HCC)    Malignant ascites    Recovering alcoholic (HCC)    Other fatigue    Dysphagia    Nausea    Dehydration    Skin fissures    Goals of care, counseling/discussion    Palliative care patient    Other insomnia    Counseling on health promotion and disease prevention    Elevated PSA    Memory loss    Regurgitation of stomach contents    Falls    Superficial thrombophlebitis of lower extremity    Pneumonia of left lung due to infectious organism    PAF (paroxysmal atrial fibrillation) (HCC)    Moderate protein-calorie malnutrition (HCC)    Dilated cardiomyopathy (HCC)    Urge urinary incontinence    Aspiration pneumonia (HCC)    Chronic combined systolic and diastolic CHF (congestive heart failure) ([place] [address] )    Port-A-Cath in place    Hematuria    Hyponatremia    Hypoglycemia    Severe protein-calorie malnutrition (HCC)    Cavitating mass in left upper lung lobe    Immunocompromised patient ([facility] )    Centrilobular emphysema (HCC)    Leukocytosis    Iron deficiency anemia secondary to inadequate dietary iron intake    GI bleed    Severe anemia    Leukopenia       Past Medical History:   Past Medical History:   Diagnosis Date    Anxiety     Cancer ([place] [address] ) 08/2019    metastatic gastroesophageal cancer    Dehydration     Depression     Esophageal cancer (HCC)     Fatigue     History of chemotherapy     currently started 8/2019    History of DVT (deep vein thrombosis)     Hypertension     currently is normal and may not need medication    Lung infection     chemo currently on hold because of the infection 2020    Nausea     Pneumonia     x2    Port-A-Cath in place     right    Psychiatric disorder     Recovering alcoholic ([address] )     sober since     Varicose veins of left lower extremity        Surgical History:   Past Surgical History:   Procedure Laterality Date    APPENDECTOMY      COLONOSCOPY      IR GASTROSTOMY TUBE PLACEMENT  2021    IR PORT PLACEMENT  2019    UPPER GASTROINTESTINAL ENDOSCOPY         Family History:    Family History   Problem Relation Age of Onset    Colon cancer Father     Cancer Father         colon    Cancer Brother         Sinus-neuroblastoma     Heart disease Mother        Cancer-related family history includes Cancer in his brother and father; Colon cancer in his father      Social History:   Social History     Socioeconomic History    Marital status: /Civil Union     Spouse name: Not on file    Number of children: 2    Years of education: Not on file    Highest education level: Not on file   Occupational History    Occupation: retired    Social Needs    Financial resource strain: Not on file    Food insecurity     Worry: Not on file     Inability: Not on file   [employer] needs     Medical: Not on file     Non-medical: Not on file   Tobacco Use    Smoking status: Former Smoker     Packs/day: 2 00     Years: 15 00     Pack years: 30 00     Types: Cigars     Quit date: 5/15/1998     Years since quittin 9    Smokeless tobacco: Former User     Quit date: 2006   Substance and Sexual Activity    Alcohol use: Not Currently     Frequency: Never     Drinks per session: Patient refused     Binge frequency: Never     Comment: quit 30 yrs ago    Drug use: Yes     Frequency: 7 0 times per week     Types: Marijuana     Comment:  "2 hits before bedtime"    Sexual activity: Not Currently   Lifestyle    Physical activity     Days per week: Not on file     Minutes per session: Not on file    Stress: Not on file Relationships    Social connections     Talks on phone: Not on file     Gets together: Not on file     Attends Taoism service: Not on file     Active member of club or organization: Not on file     Attends meetings of clubs or organizations: Not on file     Relationship status: Not on file    Intimate partner violence     Fear of current or ex partner: Not on file     Emotionally abused: Not on file     Physically abused: Not on file     Forced sexual activity: Not on file   Other Topics Concern    Not on file   Social History Narrative    Not on file       Current Medications:   Current Outpatient Medications   Medication Sig Dispense Refill    ammonium lactate (LAC-HYDRIN) 12 % cream Apply topically as needed for dry skin On hands and feet 385 g 2    diazepam (VALIUM) 5 mg tablet Take 1 tablet (5 mg total) by mouth 2 (two) times a day (Patient taking differently: Take 10 mg by mouth daily at bedtime ) 60 tablet 5    fluorouracil 4,295 mg in CADD infusion pump Infuse 4,295 mg (1,200 mg/m2/day x 1 79 m2 (Treatment plan recorded BSA)) into a venous catheter over 46 hours for 2 days [place] to be administered on 1 Device 0    hydrochlorothiazide (HYDRODIURIL) 12 5 mg tablet Take one tablet daily as needed for SBP> 160/90 90 tablet 3    ondansetron (ZOFRAN) 4 mg tablet Take 2 tablets (8 mg total) by mouth every 8 (eight) hours as needed for nausea or vomiting 75 tablet 0    pantoprazole (PROTONIX) 40 mg tablet Take 1 tablet (40 mg total) by mouth 2 (two) times a day 60 tablet 2    venlafaxine (EFFEXOR-XR) 150 mg 24 hr capsule Take 1 capsule (150 mg total) by mouth daily 30 capsule 0    amiodarone 200 mg tablet TAKE 1 TABLET BY MOUTH EVERY DAY WITH BREAKFAST (Patient not taking: Reported on 4/8/2021) 30 tablet 3     No current facility-administered medications for this visit  Allergies:    Allergies   Allergen Reactions    Bee Venom Anaphylaxis     Throat swelling    Shellfish-Derived Products - Food Allergy      Develops GOUT       Physical Exam:    Body surface area is 1 79 meters squared  Wt Readings from Last 3 Encounters:   04/08/21 62 1 kg (137 lb)   04/06/21 62 4 kg (137 lb 9 1 oz)   04/05/21 62 5 kg (137 lb 12 8 oz)        Temp Readings from Last 3 Encounters:   04/08/21 (!) 97 4 °F (36 3 °C) (Temporal)   04/06/21 97 6 °F (36 4 °C) (Oral)   04/05/21 99 1 °F (37 3 °C)        BP Readings from Last 3 Encounters:   04/08/21 118/82   04/06/21 118/65   04/05/21 139/75         Pulse Readings from Last 3 Encounters:   04/08/21 103   04/06/21 81   04/05/21 (!) 106         Physical Exam     Constitutional   General appearance:  Emaciated and very weak  Eyes   Conjunctiva and lids: No swelling, erythema or discharge  Pupils and irises: Equal, round and reactive to light  Ears, Nose, Mouth, and Throat   External inspection of ears and nose: Normal     Nasal mucosa, septum, and turbinates: Normal without edema or erythema  Oropharynx: Normal with no erythema, edema, exudate or lesions  Pulmonary   Respiratory effort: No increased work of breathing or signs of respiratory distress  Auscultation of lungs:  Mild crackles at bilateral bases  Cardiovascular   Palpation of heart: Normal PMI, no thrills  Auscultation of heart: Normal rate and rhythm, normal S1 and S2, without murmurs  Examination of extremities for edema and/or varicosities: Normal     Carotid pulses: Normal     Abdomen   Abdomen: Non-tender, no masses  Liver and spleen: No hepatomegaly or splenomegaly  Lymphatic   Palpation of lymph nodes in neck: No lymphadenopathy  Musculoskeletal   Gait and station: Normal     Digits and nails: Normal without clubbing or cyanosis  Inspection/palpation of joints, bones, and muscles: Normal     Skin   Skin and subcutaneous tissue: Normal without rashes or lesions  Assessment / Plan:     This is a pleasant 24-year-old male with a past medical history of metastatic gastroesophageal cancer  He received 4 cycles of modified FOLFOX 6 chemotherapy After which his imaging showed a very nice response  We discontinued the oxaliplatin   He was having some nausea vomiting and side effects of Leucovorin was also discontinued     He has been doing reasonably okay on this  His performance status has declined because of nutrition  He is on tube feeds  His most recent imaging shows probable progression  With increasing abdominal pelvic ascites and new diffuse mild peritoneal enhancement which can be seen with peritoneal metastases  The patient also has an apparent 4 4 x 2 2 cm masslike thickening in the gastroesophageal junction with mild fluid-filled distention of the esophagus proximal to this level  Also has an enlarged prostate  At this point I will change him to [facility]  I went over the risks benefits and alternatives of medication  I explained the possible side effects of the medication to include but not limited to pneumonitis, colitis, thyroiditis, hypophysitis, adrenal insufficiency and even death  The patient is agreeable to proceeding  I will set him up to start  He will sign a consent form today  If he has any questions he will call our office  I did offer hospice and discuss this but his wife is not interested in this right now and the patient agrees  Hospice will be a consideration if he progresses on immunotherapy  The patient understands this  He is not a candidate for further chemotherapy based on his performance status at this point  Immunotherapy is an option based on the somewhat better side effect profile so we will attempt this based on his and his wife's wishes  Goals and Barriers:  Current Goal:  Prolong Survival from   Gastroesophageal malignancy  Barriers: None  Patient's Capacity to Self Care:  Patient  able to self care      Portions of the record may have been created with voice recognition software   Occasional wrong word or "sound a like" substitutions may have occurred due to the inherent limitations of voice recognition software   Read the chart carefully and recognize, using context, where substitutions have occurred

## 2021-04-08 NOTE — TELEPHONE ENCOUNTER
Spoke with Sugey Mccarty who reports that patient is on continuous feeding 15 hours per day, he is not NPO but is only tolerating water at this time  In the last couple of days, she has noted gastric contents to be a brown color upon aspiration of the tube, denies coffee ground appearance  She reports that patient's Eliquis was d/c "a couple of days ago"  Previously stomach contents have been "milky white like the color of the feeding"  Sugey Mccarty states that patient is not in any pain, she has not noted any el bleeding  Sugey Mccarty was advised that given patient's history of esophageal cancer and use of Eliquis, she should report this to GI doctor  She states that patient has an appointment with GI on Tuesday  Writer advised that she should not wait and call GI today for further guidance  Sugey Mccarty was agreeable and appreciative and stated that she would reach out to GI today  She was advised to call back with any further concerns

## 2021-04-09 ENCOUNTER — TELEPHONE (OUTPATIENT)
Dept: NUTRITION | Facility: CLINIC | Age: 66
End: 2021-04-09

## 2021-04-09 ENCOUNTER — TELEPHONE (OUTPATIENT)
Dept: HEMATOLOGY ONCOLOGY | Facility: CLINIC | Age: 66
End: 2021-04-09

## 2021-04-09 NOTE — TELEPHONE ENCOUNTER
Patients GI provider:  Dr Andie Mcgovern     Number to return call: [phone]    Reason for call: Pt's wife Guerrero Thorne called requesting to speak with you to discuss needing more nutrition and bags     Scheduled procedure/appointment date if applicable: Apt/procedure n/a

## 2021-04-09 NOTE — TELEPHONE ENCOUNTER
Patient's wife called to discuss ordering tube feed bags  She spoke with Naun Lester and was told she can not get additional bags without feeding delivery  -5 cases 2 miguel   Patient has 11 bags left  Options are to wait to discuss with nutrition on Tuesday to possibly decrease feeding recommendations or submit the order for current number of bags/ feedings  Spoke with Erica Meeks regarding supplies    She confirmed the patient can not receive delivery of feed bag without 5 cases of 2 miguel

## 2021-04-09 NOTE — TELEPHONE ENCOUNTER
Krystal Escobedo, GAVI  to Hayden Cadena, GAVI      2:59 PM  Kian Jacobs is out of the office and will be returning on Monday at which time she can address any adjustments to the tube feeding order if appropriate  Does the patient have everything he needs to get through the weekend?

## 2021-04-09 NOTE — TELEPHONE ENCOUNTER
Patient wife Stewart Garcia states that the patient is set to start immunotherapy and would like to confirm if blood work will be needed prior  Best call back [phone]

## 2021-04-09 NOTE — TELEPHONE ENCOUNTER
Received call from Tripp Marr today in regards to Ry's tube feeding regimen Classie Osgood was provided with this RD's contact information for troubleshooting while Ry's usual RD Anthony Saldana is OOO)  Tripp Marr states that Joyce Botello was recently hospitalized and his TF rate was decreased from 80mL/hr to 40mL/hr and the time of his infusion was increased from 15hrs to 24hrs  She would like to know if it is okay to resume Tracy TF regimen prior to admission ([employer] HN at 80 mL/hr via G-tube cycled over ~15 hours daily (or until 5 cartons is infused)  She states that his TF is currently running at 40mL/hr for 15hr and he is not experiencing any nausea, abdominal pain, diarrhea  Reviewed strategy to increase TF to goal rate, increasing 10mL/hr every 12hr as tolerated  Tripp Marr verbalized understanding and has no other questions/concerns at this time

## 2021-04-09 NOTE — TELEPHONE ENCOUNTER
Lily- patient has appointment with you on Tuesday  Is it possible recommendation can be updated for less cans? She has surplus of feeding and can not get bags without feeding delivery

## 2021-04-09 NOTE — TELEPHONE ENCOUNTER
Spoke with Guerrero Thorne and informed her that CBC cmp and thyroid studies are ordered for 1-2 days prior to immunotherapy   Orders are in system  Maryellen verbalizes understanding

## 2021-04-09 NOTE — TELEPHONE ENCOUNTER
She has 11 bags which is enough  She can place new order if needed  She  will discuss at 4/13/21 appointment with [facility]

## 2021-04-12 ENCOUNTER — TELEPHONE (OUTPATIENT)
Dept: FAMILY MEDICINE CLINIC | Facility: CLINIC | Age: 66
End: 2021-04-12

## 2021-04-12 ENCOUNTER — TELEPHONE (OUTPATIENT)
Dept: NUTRITION | Facility: CLINIC | Age: 66
End: 2021-04-12

## 2021-04-12 NOTE — TELEPHONE ENCOUNTER
Messages received from Marti Alvarez, 64 [address], and Lisa Pablo GI RN  S/p Hospitalization from 21-21 for severe anemia  Per Discharge Summary: "Patient had a high residual of about 150 cc so tube feeding rates were decreased "  TF was changed to TwoCal at 40 mL/hr x 24 hrs/day  Contacted pts wife today who reports pt is running out of pump bags and is unable to order more without having to place full order for TwoCal, which pt does not need more formula  Pt is back up to 80 mL/hr x15 hrs daily and tolerating fairly well  Wife reports that Serene Spatz did gain 4# recently (133#->137#)  Wife also shared that his cancer has progressed and his tx is being switched from chemotherapy to immunotherapy  Pt has been checking gastric residuals at home and finding this very inconvenient  Pt is also not re-infusing gastric contents and is discarding them  Discussed how checking gastric residuals in the home setting is not typical practice and reviewed the recommended method of how to assess s/s TF intolerance (N/V/D/C, bloating, gas, abdominal pain and cramping, choking while TF is infusiing, etc )  Also discussed that gastric contents should be re-infused  Wife to discuss need to continue checking gastric residuals in the home setting at his GI appt tomorrow  Message received from GI wondering if TF order can be reduced so pt can order bags  I do not recommend TF order be reduced just to obtain pump bags as I cannot justify a lower TF volume to meet estimated nutrition needs  Current TF order (TwoCal at 5 cans/day) meets 100% est needs  Discussed my recommendations with wife    Suggested that pt try to buy bags directly from [facility] or from        -Recommend:  -TwoCal @ 80 mL/hr x 15 hrs/day     -Water Flushin mL free water flush at the beginning and end of TF infusion plus 125 mL free water flush 6 times per day (total of 1000 mL/day in flushes; flushes should be spread throughout the day and every 2-3 hours during TF infusion; will need to adjust flushes prn for IV and PO fluids)  -GI consult on 4/13/21  Encouraged wife to speak with GI about med management to improve TF intolerance (?prokinetic agent) and the need to continue checking gastric residuals in the home setting,  -RD follow up on 4/14/21 @1pm       Will notify GI regarding above

## 2021-04-12 NOTE — PHYSICIAN ADVISOR
Current patient class: Inpatient  The patient is currently on Hospital Day: 2 at [address] [place] [address]      The patient was admitted to the hospital  on 4/5/21 at 1924 for the following diagnosis:  Abdominal pain [R10 9]  GI bleed [K92 2]  Severe anemia [D64 9]     After review of the relevant documentation, labs, vital signs and test results, this is a PROVIDER LIABLE case  In this particular case the patient was admitted to the hospital as an inpatient  The patient however failed to satisfy the 2 midnight benchmark and closer scrutiny of the case was warranted  After review of the patient presentation and relevant labs the patient was most appropriate for observation or outpatient class at the time of admission  Given that this patient has already been discharged prior to this review they become a provider liable case  Rationale is as follows: The patient is a 77 yrs   Male who presented to the ED at 4/5/2021  4:30 PM with a chief complaint of Abnormal Lab (Pt  was just d/c from ED and came back because PCP called patient with  Hg result as 5 4 and was told go back to the ED )   Patient was asymptomatic from this standpoint and no evidence of any active bleed  Patient does have history of underlying cancer and anemia was thought to be secondary to bone marrow suppression  Patient was transfused and discharged next day  Given the above and the patient just requiring a blood transfusion and being discharged next day the patient was most appropriate for observation status on admission  This case is provider liable      The patients vitals on arrival were   ED Triage Vitals   Temperature Pulse Respirations Blood Pressure SpO2   04/05/21 1631 04/05/21 1631 04/05/21 1631 04/05/21 1631 04/05/21 1631   98 7 °F (37 1 °C) 102 20 123/73 99 %      Temp Source Heart Rate Source Patient Position - Orthostatic VS BP Location FiO2 (%)   04/05/21 1657 04/05/21 1631 04/05/21 1631 04/05/21 1631 -- Axillary Monitor Sitting Right arm       Pain Score       04/05/21 2115       No Pain           Past Medical History:   Diagnosis Date    Anxiety     Cancer ([facility] [address] ) 08/2019    metastatic gastroesophageal cancer    Dehydration     Depression     Esophageal cancer (HCC)     Fatigue     History of chemotherapy     currently started 8/2019    History of DVT (deep vein thrombosis)     Hypertension     currently is normal and may not need medication    Lung infection     chemo currently on hold because of the infection 12/22/2020    Nausea     Pneumonia     x2    Port-A-Cath in place     right    Psychiatric disorder     Recovering alcoholic ([facility] )     sober since 1990    Varicose veins of left lower extremity      Past Surgical History:   Procedure Laterality Date    APPENDECTOMY      COLONOSCOPY      IR GASTROSTOMY TUBE PLACEMENT  2/5/2021    IR PORT PLACEMENT  7/31/2019    UPPER GASTROINTESTINAL ENDOSCOPY             Consults have been placed to:   IP CONSULT TO NUTRITION SERVICES  IP CONSULT TO CARDIOLOGY  IP CONSULT TO GASTROENTEROLOGY    Vitals:    04/05/21 2308 04/06/21 0117 04/06/21 0600 04/06/21 0814   BP: 132/64 143/78  118/65   BP Location:    Right arm   Pulse: 82 76  81   Resp: 18 18  18   Temp: 98 3 °F (36 8 °C) 99 1 °F (37 3 °C)  97 6 °F (36 4 °C)   TempSrc:    Oral   SpO2: 97%   96%   Weight:   62 4 kg (137 lb 9 1 oz)    Height:           Most recent labs:    No results for input(s): WBC, HGB, HCT, PLT, K, NA, CALCIUM, BUN, CREATININE, LIPASE, AMYLASE, INR, TROPONINI, CKTOTAL, AST, ALT, ALKPHOS, BILITOT in the last 72 hours  Scheduled Meds:  Continuous Infusions:No current facility-administered medications for this encounter  PRN Meds:      Surgical procedures (if appropriate):

## 2021-04-12 NOTE — TELEPHONE ENCOUNTER
We got a plan of care to be signed from [facility]    Copy scanned into chart    Original left in blue bin

## 2021-04-12 NOTE — PROGRESS NOTES
Outpatient Oncology Nutrition Consult  Type of Consult: Follow Up  Care Location: Telephone Call - spoke with wife Torrey Arnold) because pt was not available   Nutrition Assessment:    Oncology Diagnosis & Treatments: Stage IV metastatic gastroesophageal cancer  -S/p chemotherapy in the palliative setting (5FU) - tx ended 3/26/21 d/t performance status    -To start immunotherapy with Keytruda on 4/16/21    Oncology History   Malignant neoplasm of lower third of esophagus ([facility] [address] )   7/26/2019 Initial Diagnosis    Malignant neoplasm of lower third of esophagus ([facility] [address] )     8/1/2019 - 4/6/2021 Chemotherapy    fluorouracil (ADRUCIL) injection 750 mg, 400 mg/m2 = 750 mg, Intravenous, Once, 34 of 38 cycles  Administration: 750 mg (8/1/2019), 750 mg (8/13/2019), 750 mg (8/27/2019), 750 mg (9/10/2019), 750 mg (9/24/2019), 750 mg (10/8/2019), 795 mg (10/22/2019), 795 mg (11/5/2019), 795 mg (11/19/2019), 795 mg (12/3/2019), 795 mg (12/17/2019), 795 mg (12/31/2019), 795 mg (1/14/2020), 795 mg (1/28/2020), 795 mg (2/11/2020), 795 mg (2/25/2020), 795 mg (3/10/2020), 795 mg (3/24/2020), 795 mg (4/7/2020), 795 mg (5/13/2020), 795 mg (5/27/2020), 795 mg (6/10/2020), 795 mg (6/24/2020), 795 mg (7/22/2020), 795 mg (8/5/2020), 790 mg (8/19/2020), 750 mg (10/7/2020), 795 mg (4/29/2020), 750 mg (10/21/2020), 750 mg (11/4/2020), 750 mg (11/18/2020), 735 mg (2/24/2021), 735 mg (3/10/2021), 735 mg (3/24/2021)  pegfilgrastim (NEULASTA ONPRO) subcutaneous injection kit 6 mg, 6 mg, Subcutaneous, Once, 2 of 2 cycles  Administration: 6 mg (8/3/2019), 6 mg (8/15/2019)  leucovorin 750 mg in dextrose 5 % 250 mL IVPB, 748 mg, Intravenous, Once, 21 of 21 cycles  Administration: 750 mg (8/1/2019), 750 mg (8/13/2019), 750 mg (8/27/2019), 750 mg (9/10/2019), 750 mg (9/24/2019), 750 mg (10/8/2019), 800 mg (10/22/2019), 800 mg (11/5/2019), 800 mg (11/19/2019), 800 mg (12/3/2019), 800 mg (12/17/2019), 800 mg (12/31/2019), 800 mg (1/14/2020), 800 mg (1/28/2020), 800 mg (2/11/2020), 800 mg (2/25/2020), 800 mg (3/10/2020), 800 mg (3/24/2020), 800 mg (4/7/2020), 800 mg (5/13/2020), 800 mg (4/29/2020)  oxaliplatin (ELOXATIN) 158 95 mg in dextrose 5 % 250 mL chemo infusion, 85 mg/m2 = 158 95 mg, Intravenous, Once, 6 of 6 cycles  Administration: 158 95 mg (8/1/2019), 158 95 mg (8/13/2019), 158 95 mg (8/27/2019), 158 95 mg (9/10/2019), 158 95 mg (9/24/2019), 158 95 mg (10/8/2019)     4/16/2021 -  Chemotherapy    pembrolizumab (KEYTRUDA) 200 mg in sodium chloride 0 9 % 50 mL IVPB, 200 mg, Intravenous, Once, 0 of 6 cycles     Gastroesophageal cancer ([facility] [address] )   7/26/2019 Initial Diagnosis    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[facility] [address] )     8/1/2019 - 4/6/2021 Chemotherapy    fluorouracil (ADRUCIL) injection 750 mg, 400 mg/m2 = 750 mg, Intravenous, Once, 34 of 38 cycles  Administration: 750 mg (8/1/2019), 750 mg (8/13/2019), 750 mg (8/27/2019), 750 mg (9/10/2019), 750 mg (9/24/2019), 750 mg (10/8/2019), 795 mg (10/22/2019), 795 mg (11/5/2019), 795 mg (11/19/2019), 795 mg (12/3/2019), 795 mg (12/17/2019), 795 mg (12/31/2019), 795 mg (1/14/2020), 795 mg (1/28/2020), 795 mg (2/11/2020), 795 mg (2/25/2020), 795 mg (3/10/2020), 795 mg (3/24/2020), 795 mg (4/7/2020), 795 mg (5/13/2020), 795 mg (5/27/2020), 795 mg (6/10/2020), 795 mg (6/24/2020), 795 mg (7/22/2020), 795 mg (8/5/2020), 790 mg (8/19/2020), 750 mg (10/7/2020), 795 mg (4/29/2020), 750 mg (10/21/2020), 750 mg (11/4/2020), 750 mg (11/18/2020), 735 mg (2/24/2021), 735 mg (3/10/2021), 735 mg (3/24/2021)  pegfilgrastim (NEULASTA ONPRO) subcutaneous injection kit 6 mg, 6 mg, Subcutaneous, Once, 2 of 2 cycles  Administration: 6 mg (8/3/2019), 6 mg (8/15/2019)  leucovorin 750 mg in dextrose 5 % 250 mL IVPB, 748 mg, Intravenous, Once, 21 of 21 cycles  Administration: 750 mg (8/1/2019), 750 mg (8/13/2019), 750 mg (8/27/2019), 750 mg (9/10/2019), 750 mg (9/24/2019), 750 mg (10/8/2019), 800 mg (10/22/2019), 800 mg (11/5/2019), 800 mg (11/19/2019), 800 mg (12/3/2019), 800 mg (12/17/2019), 800 mg (12/31/2019), 800 mg (1/14/2020), 800 mg (1/28/2020), 800 mg (2/11/2020), 800 mg (2/25/2020), 800 mg (3/10/2020), 800 mg (3/24/2020), 800 mg (4/7/2020), 800 mg (5/13/2020), 800 mg (4/29/2020)  oxaliplatin (ELOXATIN) 158 95 mg in dextrose 5 % 250 mL chemo infusion, 85 mg/m2 = 158 95 mg, Intravenous, Once, 6 of 6 cycles  Administration: 158 95 mg (8/1/2019), 158 95 mg (8/13/2019), 158 95 mg (8/27/2019), 158 95 mg (9/10/2019), 158 95 mg (9/24/2019), 158 95 mg (10/8/2019)     4/16/2021 -  Chemotherapy    pembrolizumab (KEYTRUDA) 200 mg in sodium chloride 0 9 % 50 mL IVPB, 200 mg, Intravenous, Once, 0 of 6 cycles       Patient Active Problem List   Diagnosis    PTSD (post-traumatic stress disorder)    Essential hypertension    Major depression    OCD (obsessive compulsive disorder)    Anxiety    Malignant neoplasm of lower third of esophagus ([facility] [address] )    Gastroesophageal cancer ([facility] [address] )    Cancer-related pain    Chemotherapy-induced nausea    Severe protein-calorie malnutrition (Voncile Dies: less than 60% of standard weight) ([facility] [address] )    Malignant ascites    Recovering alcoholic (HCC)    Other fatigue    Dysphagia    Nausea    Dehydration    Skin fissures    Goals of care, counseling/discussion    Palliative care patient    Other insomnia    Counseling on health promotion and disease prevention    Elevated PSA    Memory loss    Regurgitation of stomach contents    Falls    Superficial thrombophlebitis of lower extremity    Pneumonia of left lung due to infectious organism    PAF (paroxysmal atrial fibrillation) (HCC)    Moderate protein-calorie malnutrition (HCC)    Dilated cardiomyopathy (HCC)    Urge urinary incontinence    Aspiration pneumonia (HCC)    Chronic combined systolic and diastolic CHF (congestive heart failure) ([facility] [address] )    Port-A-Cath in place    Hematuria    Hyponatremia    Hypoglycemia    Severe protein-calorie malnutrition ([facility] )    Cavitating mass in left upper lung lobe    Immunocompromised patient ([facility] )    Centrilobular emphysema ([place] [address] )    Leukocytosis    Iron deficiency anemia secondary to inadequate dietary iron intake    GI bleed    Severe anemia    Leukopenia     Past Medical History:   Diagnosis Date    Anxiety     Cancer ([place] [address] ) 08/2019    metastatic gastroesophageal cancer    Dehydration     Depression     Esophageal cancer ([place] [address] )     Fatigue     History of chemotherapy     currently started 8/2019    History of DVT (deep vein thrombosis)     Hypertension     currently is normal and may not need medication    Lung infection     chemo currently on hold because of the infection 12/22/2020    Nausea     Pneumonia     x2    Port-A-Cath in place     right    Psychiatric disorder     Recovering alcoholic ([place] [address] )     sober since 1990    Varicose veins of left lower extremity      Past Surgical History:   Procedure Laterality Date    APPENDECTOMY      COLONOSCOPY      IR GASTROSTOMY TUBE PLACEMENT  2/5/2021    IR PORT PLACEMENT  7/31/2019    UPPER GASTROINTESTINAL ENDOSCOPY         Review of Medications:   Vitamins, Supplements and Herbals: No, pt denies taking supplements     Current Outpatient Medications:     amiodarone 200 mg tablet, TAKE 1 TABLET BY MOUTH EVERY DAY WITH BREAKFAST (Patient not taking: Reported on 4/8/2021), Disp: 30 tablet, Rfl: 3    ammonium lactate (LAC-HYDRIN) 12 % cream, Apply topically as needed for dry skin On hands and feet, Disp: 385 g, Rfl: 2    diazepam (VALIUM) 5 mg tablet, Take 1 tablet (5 mg total) by mouth 2 (two) times a day (Patient taking differently: Take 10 mg by mouth daily at bedtime ), Disp: 60 tablet, Rfl: 5    Eliquis 5 MG, , Disp: , Rfl:     famotidine (PEPCID) 20 mg tablet, Take 1 tablet (20 mg total) by mouth 2 (two) times a day as needed for heartburn, Disp: 60 tablet, Rfl: 2    hydrochlorothiazide (HYDRODIURIL) 12 5 mg tablet, Take one tablet daily as needed for SBP> 160/90, Disp: 90 tablet, Rfl: 3    Nutritional Supplements (TwoCal HN 2 0) LIQD, 5 Cans by Enteral route every 16 (sixteen) hours, Disp: , Rfl:     ondansetron (ZOFRAN) 4 mg tablet, Take 2 tablets (8 mg total) by mouth every 8 (eight) hours as needed for nausea or vomiting, Disp: 75 tablet, Rfl: 0    pantoprazole (PROTONIX) 40 mg tablet, Take 1 tablet (40 mg total) by mouth daily, Disp: 30 tablet, Rfl: 2    venlafaxine (EFFEXOR-XR) 150 mg 24 hr capsule, Take 1 capsule (150 mg total) by mouth daily, Disp: 30 capsule, Rfl: 0    Most Recent Lab Results:   Lab Results   Component Value Date    WBC 2 35 (L) 04/06/2021    IRON 13 (L) 09/01/2020    TIBC 341 09/01/2020    FERRITIN 175 09/01/2020    ALT 16 04/06/2021    AST 15 04/06/2021    ALB 2 3 (L) 04/06/2021    SODIUM 136 04/06/2021    SODIUM 138 04/05/2021    K 3 4 (L) 04/06/2021    K 3 6 04/05/2021    CL 98 (L) 04/06/2021    BUN 29 (H) 04/06/2021    BUN 38 (H) 04/05/2021    CREATININE 1 04 04/06/2021    CREATININE 1 08 04/05/2021    EGFR 74 04/06/2021    PHOS 3 3 04/06/2021    PHOS 3 0 09/25/2020    POCGLU 104 09/25/2020    GLUF 184 (H) 02/23/2021    GLUF 99 01/14/2021    GLUC 99 04/06/2021    CALCIUM 8 8 04/06/2021    FOLATE 10 6 09/01/2020    MG 2 0 04/06/2021     Anthropometric Measurements:   Height: 69"  Ht Readings from Last 1 Encounters:   04/13/21 5' 10" (1 778 m)     Wt Readings from Last 20 Encounters:   04/13/21 62 1 kg (137 lb)   04/08/21 62 1 kg (137 lb)   04/06/21 62 4 kg (137 lb 9 1 oz)   04/05/21 62 5 kg (137 lb 12 8 oz)   03/24/21 62 kg (136 lb 11 oz)   03/16/21 62 6 kg (138 lb)   03/10/21 64 4 kg (141 lb 15 6 oz)   02/24/21 65 7 kg (144 lb 13 5 oz)   01/26/21 65 5 kg (144 lb 8 oz)   01/13/21 68 5 kg (151 lb 0 2 oz)   01/07/21 66 2 kg (146 lb)   12/30/20 65 8 kg (145 lb)   12/22/20 68 kg (150 lb)   12/11/20 69 4 kg (153 lb)   11/19/20 72 4 kg (159 lb 9 8 oz)   11/18/20 71 1 kg (156 lb 12 oz)   11/17/20 68 9 kg (152 lb)   11/05/20 74 4 kg (164 lb)   11/04/20 70 4 kg (155 lb 3 3 oz)   10/21/20 72 8 kg (160 lb 7 9 oz)     · UBW: 198-212#  · Home Wts: (10/15/20) 166#, (2/8/21) 150#, (2/10/21) 148#, (2/17/21) 143# (states he was 140# 6 days prior so he gained 3# since then, (2/22/21) 143#, (3/4/21) 144#, (3/5/21) 140 4#, (3/17/21) 138# - no recent home wt  · Wife states pt was not weighed at appt on 4/13/21  Oncology Nutrition-Anthropometrics      Nutrition from 4/14/2021 in [place] [address] [facility] Nutrition from 3/30/2021 in [facility]   Patient age (years):  77 years  77 years   Patient (male) height (in):  71 in  71 in   Current weight (lbs):  137 lbs  136 7 lbs   Current weight to be used for anthropometric calculations (kg)  62 3 kg  62 1 kg   BMI:  20 2  20 2   IBW male  160 lb  160 lb   IBW (kg) male  72 7 kg  72 7 kg   IBW % (male)  85 6 %  85 4 %   Adjusted BW (male):  154 2 lbs  154 2 lbs   Adjusted BW in kg (male):  70 1 kg  70 1 kg   % weight change after 1 week:  -0 4 %  -0 9 %   Weight change after 1 week (lbs)  -0 6 lbs  -1 3 lbs   % weight change after 1 month:  -0 7 %  (!) -5 6 %   Weight change after 1 month (lbs)  -1 lbs  -8 1 lbs   % weight change after 3 months:  (!) -9 3 %  (!) -8 9 %   Weight change after 3 months (lbs)  -14 1 lbs  -13 3 lbs        Nutrition-Focused Physical Findings: n/a due to telephone call       Food/Nutrition-Related History & Client/Social History:    Current Nutrition Impact Symptoms:  [] Nausea  -hx with bolus feedings [x] Reduced Appetite - "none" [] Acid Reflux - denies  -Met with GI on 4/13/21: pantoprazole decreased and pepcid added prn for heartburn and acid reflux     [] Vomiting - after all solid po intake, sometime vomits after drinking liquids  -hx w/bolus feedings     [x] Regurgitating - throughout the day [x] Unintended Wt Loss - wt loss picked up again in August 2020 d/t vomiting with meals [] Malabsorption    [] Diarrhea  [] Unintended Wt Gain  [] Dumping Syndrome    [] Constipation   -"small shea", Miralax prn [] Thick Mucous/Secretions  [] Abdominal Pain    [x] Dysgeusia (Altered Taste) - says foods don't taste the same, "everything tastes like cardboard"  -trying to practice good oral care [] Xerostomia (Dry Mouth)  [x] Bloating - not painful per wife   [] Dysosmia (Altered Smell)  [] Faith Swanton  [] Difficulty Chewing    [] Oral Mucositis (Sore Mouth)  [x] Fatigue  [] Other:   [] Odynophagia  [] Esophagitis  [] Other:    [x] Dysphagia -   -Pt reports esophageal dysphagia and regurgitation has progressively gotten worse   -Has had VBS and has been seen by ST in the past   -Per GI 2/2/21: "barium swallow shows lower esophageal mass and delayed  esophageal emptying"  -Wife stated 2/3/21 that pt cannot have a stent placed  -S/p PEG placed by IR 2/5/21 (ordered by Dr Magdiel Buckner)  -Was receiving home ST who recommended a soft diet per wife  [] Early Satiety [] No Problems Eating      Food Allergies & Intolerances: no  -For Gout, avoids: organ meats, shellfish  Last gout flare was 2 years ago after a liverwurst sandwich  Current Diet: Full Liquids and Tube Feeding   Current Nutrition Intake: Unchanged from last visit  Appetite: None  Nutrition Route: PO and G-tube  Oral Care: Brushing BID, Flossing QD, Biotene Mouthwash QD  Activity level: Home PT    24 Hr Diet Recall:   PO: gingerale (8 oz x3)    Supplements:    Boost Very High Calorie (8 oz, 530 kcal, 22 g pro) - afraid to drink it d/t regurgitation     EN Recall:  -S/p Hospitalization from 4/5/21-4/6/21 for severe anemia  Per Discharge Summary: "Patient had a high residual of about 150 cc so tube feeding rates were decreased "  TF was changed to TwoCal @ 40 mL/hr x 24 hrs/day  [facility]  Now has wedge pillow  This RD requested an enteral backpack on 4/14/21 [facility]    Access Type: 16F G-tube placed 2/5/21 (ordered by GI - Dr Magdiel Buckner)   Formula: TwoCalHN - order's are for TwoCal HN @80 mL/hr x15 hrs daily or until 5 cartons is infused via G-tube (sent by GI )  Method: Cyclic   Regimen: 80 mL/hr for ~15 hrs/day or until 5 cans is infused   -Wife reports that pt is tolerating TF well    -Pt and wife continue to report feeding tube does not stay connected to pump, they have to tape it to secure it otherwise it leaks - again, recommended that wife contact DME about this  Flushin mL before and after pump infusion (240 mL)   -Not flushing throughout the day d/t pump-tube connection issues (see above)  -Discussed how feeding tube is at risk of becoming clogged d/t lack of adequate flushing  EN providin mL volume (5 cartons day), 2370 kcal, 99 grams protein, 830 mL free water, and 1070 mL total water  Total Fluid Intake: 1790 mL (PO + EN) = 100% est needs    Est Needs Notes:  Fluids needs for hx of CHF  Oncology Nutrition-Estimated Needs      Nutrition from 3/17/2021 in [facility] from 2021 in [place] [facility]   Weight type used  Actual weight  Actual weight   Weight in kilograms (kg) used for estimated needs  62 7 kg  68 2 kg   Energy needs formula:   35-40 kcal/kg  35-40 kcal/kg   Energy needs based on 35 kcal/k kcal  2386 kcal   Energy needs based on 40 kcal/k kcal  2727 kcal   Protein needs formula:  1 5-2 g/kg  1 5-2 g/kg   Protein needs based on 1 5 g/kg  94 g  102 g   Protein needs based on 2 g/kg  125 g  136 g   Fluid needs formula:  25-30 mL/kg  25-30 mL/kg   Fluid needs based on 25 mL/kg  1575 mL  1700 mL   Fluid needs in ounces  53 oz  57 oz   Fluid needs based on 30 mL/kg  1890 mL  2040 mL   Fluid needs in ounces  64 oz  69 oz          Discussion & Intervention:    Tiffanie Kwan was evaluated today for an RD follow up regarding wt loss, poor po intake, enteral nutrition and nutrition impact sx management    Tiffanie Kwan is currently undergoing tx for gastroesophageal cancer  His chemotherapy was changed to immunotherapy and he will be starting on 4/16  Kerrie Restrepo is tolerating his pump feeding and has progressed back to a 15 hr feeding s/p hospitalization  Wife thinks that his pump feeding his going much more smoothly than bolus feedings were  His TF is currently meeting 100% of his estimated kcal and protein needs  Unfortunately pt is not flushing as recommended throughout the day d/t pump-feeding tube connection issues  Pt is only consuming some clear liquids po and is afraid to drink/eat d/t N/V/regurgitation/bloating  He is drinking ~24 oz of fluid per day and appears to be meeting his minimum hydration needs via EN and po combined  He is at risk for feeding tube clog d/t inadequate flushing  He met with GI yesterday who made mediation adjustments  No recent wt to assess for changes  Reviewed 24 hour recall, which revealed a likely adequate po intake and enteral intake when combined, and discussed ways to increase fluid intake  Also reviewed the importance of wt management throughout the tx process and the role of enteral nutrition in managing wt and overall health  Based on today's assessment, discussion included: MNT for: Enteral Nutrition, hydration, regurgitation, bloating, practicing proper feeding tube use & care and individualized enteral nutrition recommendations & plan: see Nutrition Rx & Recommendations below  After today's appt, this RD contacted [facility] to discuss pump-feeding tube connection issues/leaking, as well as to request an enteral backpack  [facility] that [facility] will [facility] an enteral backpack to pts home but all equipment issues need to be directed to [facility]  Called wife back and left a voice message updating her on same and encouraged [facility] to [facility] issues     Moving forward, Kerrie Restrepo was encouraged to call DME to discuss TF connection issues and leaking, increase flushes to goal to promote proper hydration and to prevent tube clogging, continue TF at goal rate until 5 cans is infused per day, and keep a daily log of TF/flushes/po fluids/wts  Materials Provided: not applicable  All questions and concerns addressed during todays visit  Kevonstephanie Rodriguez has RD contact information  Nutrition Diagnosis:  · Increased Nutrient Needs (kcal & pro) related to increased demand for nutrients and disease state as evidenced by cancer dx and pt undergoing tx for cancer  · Swallowing Difficulty related to diagnosis/treatment related causes as evidenced by N/V and regurgitation with po feedings  Monitoring & Evaluation:   Goals:  · adequate nutrition impact symptom management  · pt to meet >/=75% estimated nutrition needs daily  · weight gain of 1-2# per week  · achieve tube feeding goal rate   · tolerate tube feeding goal    · Progress Towards Goals: Progressing and Partially Met    Nutrition Rx & Recommendations:  · Follow proper oral care; Try baking soda/salt water rinse recipe (mix 3/4 tsp salt + 1 tsp baking soda + 1 qt water; rinse with plain water after using) in Eating Hints book (pg 18)  Brush your teeth before/after meals & before bed  · Weigh yourself regularly  If you notice weight loss, make an effort to increase your daily food/calorie intake  If you continue to notice loss after these efforts, reach out to your dietitian to establish a plan to stabilize weight  · Keep a daily diary to track:  · # cans of TwoCal infused per day  · How much and how often you are flushing your feeding tube  · How much and what you are drinking by mouth  · Your daily weight with the date    · Tube Feeding Plan:  · Continue TF at goal     · Increase water flushing to goal   Flushes should be spread throughout the day to promote adequate hydration and prevent the feeding tube from clogging  · Call [facility] to request help regarding pump-feeding tube connection issues      TF Goal Rate: TwoCal HN at 80 mL/hr via G-tube cycled over ~15 hours daily (or until 5 cartons is infused)  Water Flushin mL free water flush at the beginning and end of TF infusion plus 125 mL free water flush 6 times per day (total of 1000 mL/day in flushes; flushes should be spread throughout the day and every 2-3 hours during TF infusion; will need to adjust flushes prn for IV and PO fluids)  Enteral Nutrition goal to provide: 1185 mL volume (5 cartons day), 2370 kcal, 99 grams protein, 830 mL free water, 1830 mL total water daily  Li Stanley requires an enteral feeding pump to administer TF due to intolerance to bolus tube feedings  Recommend an enteral backpack for pump transport  Stay upright at least 30-45 degrees while pump is running  · Call [facility] when you have 10 days left of TF supplies: [phone], option 3 (customer support)       Follow Up Plan:  at 1pm   Recommend Referral to Other Providers: none at this time

## 2021-04-13 ENCOUNTER — TELEPHONE (OUTPATIENT)
Dept: FAMILY MEDICINE CLINIC | Facility: CLINIC | Age: 66
End: 2021-04-13

## 2021-04-13 ENCOUNTER — OFFICE VISIT (OUTPATIENT)
Dept: GASTROENTEROLOGY | Facility: CLINIC | Age: 66
End: 2021-04-13
Payer: MEDICARE

## 2021-04-13 VITALS
BODY MASS INDEX: 19.61 KG/M2 | DIASTOLIC BLOOD PRESSURE: 80 MMHG | HEART RATE: 98 BPM | HEIGHT: 70 IN | SYSTOLIC BLOOD PRESSURE: 124 MMHG | TEMPERATURE: 96.3 F | WEIGHT: 137 LBS

## 2021-04-13 DIAGNOSIS — C15.5 MALIGNANT NEOPLASM OF LOWER THIRD OF ESOPHAGUS (HCC): ICD-10-CM

## 2021-04-13 DIAGNOSIS — Z93.1 GASTROSTOMY TUBE DEPENDENT (HCC): ICD-10-CM

## 2021-04-13 DIAGNOSIS — R13.19 ESOPHAGEAL DYSPHAGIA: ICD-10-CM

## 2021-04-13 DIAGNOSIS — K21.9 GASTROESOPHAGEAL REFLUX DISEASE, UNSPECIFIED WHETHER ESOPHAGITIS PRESENT: Primary | ICD-10-CM

## 2021-04-13 PROCEDURE — 99214 OFFICE O/P EST MOD 30 MIN: CPT | Performed by: INTERNAL MEDICINE

## 2021-04-13 RX ORDER — PANTOPRAZOLE SODIUM 40 MG/1
40 TABLET, DELAYED RELEASE ORAL DAILY
Qty: 30 TABLET | Refills: 2 | Status: ON HOLD | OUTPATIENT
Start: 2021-04-13 | End: 2021-05-13 | Stop reason: ALTCHOICE

## 2021-04-13 RX ORDER — FAMOTIDINE 20 MG/1
20 TABLET, FILM COATED ORAL 2 TIMES DAILY PRN
Qty: 60 TABLET | Refills: 2 | Status: SHIPPED | OUTPATIENT
Start: 2021-04-13 | End: 2021-05-13 | Stop reason: HOSPADM

## 2021-04-13 RX ORDER — APIXABAN 5 MG/1
TABLET, FILM COATED ORAL
COMMUNITY
Start: 2021-04-12 | End: 2021-05-13 | Stop reason: HOSPADM

## 2021-04-13 NOTE — TELEPHONE ENCOUNTER
We got a plan of care to be signed from [facility]    Copy scanned into chart    Original left in blue bin

## 2021-04-13 NOTE — PROGRESS NOTES
[facility] - Outpatient Follow-up Note  Gayathri Loredo 77 y o  male MRN: [id number]  Encounter: [id number]          ASSESSMENT AND PLAN:      1  Gastroesophageal cancer ([facility] [address] )  2  Malignant neoplasm of lower third of esophagus (HCC)  3  Other dysphagia  4  Severe protein-calorie malnutrition ([facility] [address] ) s/p gastrostomy tube placement (by IR)  5  Co-morbidities: CHF, HTN, dilated cardiomyopathy, pAfib on Eliquis, depression and anxiety, PTSD, OCD     - continue tube feeds as instructed by dietician  - decrease pantoprazole to 40 mg daily  - start Pepcid/famotidine 20 mg as needed up to twice a day for heartburn and acid reflux  - continue to follow with Med-Oncologist    Follow up in 2-3 months     ______________________________________________________________________    SUBJECTIVE:  24-year-old man with esophageal cancer who presents for follow up dysphagia, regurgitation and poor weight loss  Co-morbidities: CHF, HTN, dilated cardiomyopathy, pAfib, DVT on Eliquis depression and anxiety, PTSD, OCD    Could not place esophageal stent endoscopically  Had IR place gastrostomy on 2/5/21  Recently admitted for anemia  Eliquis stopped due to anemia  Per wife, oncologist is considering starting immunotherapy  Currently on continuous feeds TwoCal @ 80 mL/hr x 15 hrs/day and water flushes 125 mL about 6 times per day  Brings up/regurgitates clear mucus  Esophageal cancer  -Diagnosed August 2019 on EGD  -following with Dr Mary Salvador (hematologist oncologist)  - does report dysphagia and regurgitation at times  - barium swallow shows lower esophageal mass and delayed  esophageal emptying  -initially lost a lot of weight after esophageal cancer diagnosis but then has regained some of that back  More recently over last 4-6 months has lost about 40 lb  - 2 egds after clinic visit 12/30/20, 1/13/21   Stent placement attempted during 2nd EGD but stent could not be placed as wire was bending due to gastric mass  - IR placed gastrostomy tube on 21           REVIEW OF SYSTEMS IS OTHERWISE NEGATIVE        Historical Information   Past Medical History:   Diagnosis Date    Anxiety     [facility] 2019    metastatic gastroesophageal cancer    Dehydration     Depression     Esophageal cancer (HCC)     Fatigue     History of chemotherapy     currently started 2019    History of DVT (deep vein thrombosis)     Hypertension     currently is normal and may not need medication    Lung infection     chemo currently on hold because of the infection 2020    Nausea     Pneumonia     x2    Port-A-Cath in place     right    Psychiatric disorder     Recovering alcoholic ([facility] )     sober since     Varicose veins of left lower extremity      Past Surgical History:   Procedure Laterality Date    APPENDECTOMY      COLONOSCOPY      IR GASTROSTOMY TUBE PLACEMENT  2021    IR PORT PLACEMENT  2019    UPPER GASTROINTESTINAL ENDOSCOPY       Social History   Social History     Substance and Sexual Activity   Alcohol Use Not Currently    Frequency: Never    Drinks per session: Patient refused    Binge frequency: Never    Comment: quit 30 yrs ago     Social History     Substance and Sexual Activity   Drug Use Yes    Frequency: 7 0 times per week    Types: Marijuana    Comment:  "2 hits before bedtime"     Social History     Tobacco Use   Smoking Status Former Smoker    Packs/day: 2 00    Years: 15 00    Pack years: 30 00    Types: Cigars    Quit date: 5/15/1998    Years since quittin 9   Smokeless Tobacco Former User    Quit date: 2006     Family History   Problem Relation Age of Onset    Colon cancer Father     Cancer Father         colon    Cancer Brother         Sinus-neuroblastoma     Heart disease Mother        Meds/Allergies       Current Outpatient Medications:     ammonium lactate (LAC-HYDRIN) 12 % cream    diazepam (VALIUM) 5 mg tablet    hydrochlorothiazide (HYDRODIURIL) 12 5 mg tablet    Nutritional Supplements (TwoCal HN 2 0) LIQD    ondansetron (ZOFRAN) 4 mg tablet    pantoprazole (PROTONIX) 40 mg tablet    venlafaxine (EFFEXOR-XR) 150 mg 24 hr capsule    amiodarone 200 mg tablet    Eliquis 5 MG    Allergies   Allergen Reactions    Bee Venom Anaphylaxis     Throat swelling    Shellfish-Derived Products - Food Allergy      Develops GOUT           Objective     Blood pressure 124/80, pulse 98, temperature (!) 96 3 °F (35 7 °C), temperature source Temporal, height 5' 10" (1 778 m)  Body mass index is 19 66 kg/m²  PHYSICAL EXAM:      Telephone visit      Lab Results:   No visits with results within 1 Day(s) from this visit     Latest known visit with results is:   Admission on 04/05/2021, Discharged on 04/06/2021   Component Date Value    ABO Grouping 04/05/2021 O     Rh Factor 04/05/2021 Positive     Antibody Screen 04/05/2021 Negative     Specimen Expiration Date 04/05/2021 [id number]     Unit Product Code 04/06/2021 U8373P45     Unit Number 04/06/2021 [id number]     Unit ABO 04/06/2021 O     Unit RH 04/06/2021 POS     Crossmatch 04/06/2021 Compatible     Unit Dispense Status 04/06/2021 Presumed Trans     Unit Product Code 04/06/2021 [id number]     Unit Number 04/06/2021 [id number]     Unit ABO 04/06/2021 O     Unit RH 04/06/2021 POS     Crossmatch 04/06/2021 Compatible     Unit Dispense Status 04/06/2021 Presumed Trans     Immature Retic Fract 04/05/2021 39 5*    Retic Ct Pct 04/05/2021 4 64*    Retic Ct Abs 04/05/2021 <100,000     RETIC HGB 04/05/2021 18 7*    Sodium 04/06/2021 136     Potassium 04/06/2021 3 4*    Chloride 04/06/2021 98*    CO2 04/06/2021 28     ANION GAP 04/06/2021 10     BUN 04/06/2021 29*    Creatinine 04/06/2021 1 04     Glucose 04/06/2021 99     Calcium 04/06/2021 8 8     Corrected Calcium 04/06/2021 10 2*    AST 04/06/2021 15     ALT 04/06/2021 16     Alkaline Phosphatase 04/06/2021 95     Total Protein 04/06/2021 6 3*    Albumin 04/06/2021 2 3*    Total Bilirubin 04/06/2021 1 17*    eGFR 04/06/2021 74     WBC 04/06/2021 2 36*    RBC 04/06/2021 2 81*    Hemoglobin 04/06/2021 7 3*    Hematocrit 04/06/2021 24 5*    MCV 04/06/2021 87     MCH 04/06/2021 26 0*    MCHC 04/06/2021 29 8*    RDW 04/06/2021 18 0*    MPV 04/06/2021 11 1     Platelets 18/05/1816 396*    nRBC 04/06/2021 2     Magnesium 04/06/2021 2 0     Phosphorus 04/06/2021 3 3     RBC Morphology 04/06/2021 Present     Anisocytosis 04/06/2021 Present     Macrocytes 04/06/2021 Present     Polychromasia 04/06/2021 Present     Platelet Estimate 09/82/7679 Adequate     Large Platelet 90/85/2087 Present     WBC 04/06/2021 2 35*    RBC 04/06/2021 2 89*    Hemoglobin 04/06/2021 7 4*    Hematocrit 04/06/2021 25 3*    MCV 04/06/2021 88     MCH 04/06/2021 25 6*    MCHC 04/06/2021 29 2*    RDW 04/06/2021 18 6*    MPV 04/06/2021 11 5     Platelets 75/76/6374 422*    nRBC 04/06/2021 1     Segmented % 04/06/2021 14*    Bands % 04/06/2021 2     Lymphocytes % 04/06/2021 67*    Monocytes % 04/06/2021 14*    Eosinophils, % 04/06/2021 0     Basophils % 04/06/2021 3*    Absolute Neutrophils 04/06/2021 0 38*    Lymphocytes Absolute 04/06/2021 1 57     Monocytes Absolute 04/06/2021 0 33     Eosinophils Absolute 04/06/2021 0 00     Basophils Absolute 04/06/2021 0 07     Total Counted 04/06/2021 100     RBC Morphology 04/06/2021 Present     Anisocytosis 04/06/2021 Present     Polychromasia 04/06/2021 Present     Platelet Estimate 63/26/4429 Increased*    Ventricular Rate 04/05/2021 74     Atrial Rate 04/05/2021 74     UT Interval 04/05/2021 158     QRSD Interval 04/05/2021 92     QT Interval 04/05/2021 380     QTC Interval 04/05/2021 421     P Axis 04/05/2021 9     QRS Axis 04/05/2021 57     T Wave Axis 04/05/2021 78          Radiology Results:   Fl Barium Swallow    Result Date: 1/11/2021  Narrative: BARIUM SWALLOW-ESOPHAGRAM INDICATION:   C16 0: Malignant neoplasm of cardia R13 19: Other dysphagia  COMPARISON:  None IMAGES:  17 FLUOROSCOPY TIME:   2 2 MIN  TECHNIQUE: The patient was given thin barium by mouth and images of the esophagus were obtained  FINDINGS: The proximal and mid esophagus is normal in caliber without evidence for stricture  There is significant irregularity at the distal aspect of the esophagus with filling defect extending to the gastroesophageal junction consistent with known neoplasm  There is markedly delayed emptying of contrast from the esophagus, mildly improved with administration of water  Gastroesophageal reflux was not observed  There is no hiatal hernia  Impression: Significant irregularity at the distal aspect of the esophagus with filling defect extending to the gastroesophageal junction consistent with known neoplasm  Markedly delayed emptying of contrast from the esophagus consistent with partial obstruction  Workstation performed: [id number]     Xr Chest 1 View    Result Date: 1/18/2021  Narrative: CHEST INDICATION:   C16 0: Malignant neoplasm of cardia  EGD for esophageal stent placement attempted but unable to place stent due to blockage by a esophageal tumor  COMPARISON:  Chest radiograph from 10/18/2020 and chest CT from 12/10/2020  EXAM PERFORMED/VIEWS: Single fluoroscopic image of the left lower chest  FINDINGS: One fluoroscopic image image shows an endoscope at gastroesophageal junction and a guidewire in the stomach  Impression: 122 8 seconds of fluoroscopy utilized   Workstation performed: [id number]

## 2021-04-13 NOTE — PATIENT INSTRUCTIONS
- continue tube feeds as instructed by dietician  - decrease pantoprazole to 40 mg daily  - start Pepcid/famotidine 20 mg as needed up to twice a day for heartburn and acid reflux  - continue to follow with Dr Will Brannon    Follow up in 2-3 months

## 2021-04-14 ENCOUNTER — NUTRITION (OUTPATIENT)
Dept: NUTRITION | Facility: CLINIC | Age: 66
End: 2021-04-14

## 2021-04-14 DIAGNOSIS — Z71.3 NUTRITIONAL COUNSELING: Primary | ICD-10-CM

## 2021-04-14 NOTE — PATIENT INSTRUCTIONS
Nutrition Rx & Recommendations:  · Follow proper oral care; Try baking soda/salt water rinse recipe (mix 3/4 tsp salt + 1 tsp baking soda + 1 qt water; rinse with plain water after using) in Eating Hints book (pg 18)  Brush your teeth before/after meals & before bed  · Weigh yourself regularly  If you notice weight loss, make an effort to increase your daily food/calorie intake  If you continue to notice loss after these efforts, reach out to your dietitian to establish a plan to stabilize weight  · Keep a daily diary to track:  · # cans of TwoCal infused per day  · How much and how often you are flushing your feeding tube  · How much and what you are drinking by mouth  · Your daily weight with the date    · Tube Feeding Plan:  · Continue TF at goal     · Increase water flushing to goal   Flushes should be spread throughout the day to promote adequate hydration and prevent the feeding tube from clogging  · Call [employer] to request help regarding pump-feeding tube connection issues  TF Goal Rate: TwoCal HN at 80 mL/hr via G-tube cycled over ~15 hours daily (or until 5 cartons is infused)  Water Flushin mL free water flush at the beginning and end of TF infusion plus 125 mL free water flush 6 times per day (total of 1000 mL/day in flushes; flushes should be spread throughout the day and every 2-3 hours during TF infusion; will need to adjust flushes prn for IV and PO fluids)  Enteral Nutrition goal to provide: 1185 mL volume (5 cartons day), 2370 kcal, 99 grams protein, 830 mL free water, 1830 mL total water daily  Noa Delarosa requires an enteral feeding pump to administer TF due to intolerance to bolus tube feedings  Recommend an enteral backpack for pump transport  Stay upright at least 30-45 degrees while pump is running  · Call [employer] when you have 10 days left of TF supplies: [phone], option 3 (customer support)       Follow Up Plan:  at 1pm   Recommend Referral to Other Providers: none at this time

## 2021-04-15 ENCOUNTER — TELEPHONE (OUTPATIENT)
Dept: HEMATOLOGY ONCOLOGY | Facility: CLINIC | Age: 66
End: 2021-04-15

## 2021-04-15 ENCOUNTER — LAB (OUTPATIENT)
Dept: LAB | Facility: HOSPITAL | Age: 66
End: 2021-04-15
Attending: INTERNAL MEDICINE
Payer: MEDICARE

## 2021-04-15 DIAGNOSIS — E86.0 DEHYDRATION: ICD-10-CM

## 2021-04-15 DIAGNOSIS — R11.0 NAUSEA: ICD-10-CM

## 2021-04-15 DIAGNOSIS — C16.0 GASTROESOPHAGEAL CANCER (HCC): ICD-10-CM

## 2021-04-15 DIAGNOSIS — C15.5 MALIGNANT NEOPLASM OF LOWER THIRD OF ESOPHAGUS (HCC): ICD-10-CM

## 2021-04-15 LAB
ALBUMIN SERPL BCP-MCNC: 2.1 G/DL (ref 3.5–5)
ALP SERPL-CCNC: 117 U/L (ref 46–116)
ALT SERPL W P-5'-P-CCNC: 18 U/L (ref 12–78)
ANION GAP SERPL CALCULATED.3IONS-SCNC: 8 MMOL/L (ref 4–13)
AST SERPL W P-5'-P-CCNC: 16 U/L (ref 5–45)
BASOPHILS # BLD AUTO: 0.07 THOUSANDS/ΜL (ref 0–0.1)
BASOPHILS NFR BLD AUTO: 0 % (ref 0–1)
BILIRUB SERPL-MCNC: 0.25 MG/DL (ref 0.2–1)
BUN SERPL-MCNC: 22 MG/DL (ref 5–25)
CALCIUM ALBUM COR SERPL-MCNC: 10.3 MG/DL (ref 8.3–10.1)
CALCIUM SERPL-MCNC: 8.8 MG/DL (ref 8.3–10.1)
CHLORIDE SERPL-SCNC: 102 MMOL/L (ref 100–108)
CO2 SERPL-SCNC: 29 MMOL/L (ref 21–32)
CREAT SERPL-MCNC: 0.89 MG/DL (ref 0.6–1.3)
EOSINOPHIL # BLD AUTO: 0.15 THOUSAND/ΜL (ref 0–0.61)
EOSINOPHIL NFR BLD AUTO: 1 % (ref 0–6)
ERYTHROCYTE [DISTWIDTH] IN BLOOD BY AUTOMATED COUNT: 20.1 % (ref 11.6–15.1)
GFR SERPL CREATININE-BSD FRML MDRD: 88 ML/MIN/1.73SQ M
GLUCOSE SERPL-MCNC: 116 MG/DL (ref 65–140)
HCT VFR BLD AUTO: 24.8 % (ref 36.5–49.3)
HGB BLD-MCNC: 6.9 G/DL (ref 12–17)
IMM GRANULOCYTES # BLD AUTO: 0.37 THOUSAND/UL (ref 0–0.2)
IMM GRANULOCYTES NFR BLD AUTO: 2 % (ref 0–2)
LYMPHOCYTES # BLD AUTO: 2.23 THOUSANDS/ΜL (ref 0.6–4.47)
LYMPHOCYTES NFR BLD AUTO: 13 % (ref 14–44)
MCH RBC QN AUTO: 24.8 PG (ref 26.8–34.3)
MCHC RBC AUTO-ENTMCNC: 27.8 G/DL (ref 31.4–37.4)
MCV RBC AUTO: 89 FL (ref 82–98)
MONOCYTES # BLD AUTO: 1.51 THOUSAND/ΜL (ref 0.17–1.22)
MONOCYTES NFR BLD AUTO: 9 % (ref 4–12)
NEUTROPHILS # BLD AUTO: 12.53 THOUSANDS/ΜL (ref 1.85–7.62)
NEUTS SEG NFR BLD AUTO: 75 % (ref 43–75)
NRBC BLD AUTO-RTO: 0 /100 WBCS
PLATELET # BLD AUTO: 623 THOUSANDS/UL (ref 149–390)
PMV BLD AUTO: 10.1 FL (ref 8.9–12.7)
POTASSIUM SERPL-SCNC: 3.9 MMOL/L (ref 3.5–5.3)
PROT SERPL-MCNC: 6.8 G/DL (ref 6.4–8.2)
RBC # BLD AUTO: 2.78 MILLION/UL (ref 3.88–5.62)
SODIUM SERPL-SCNC: 139 MMOL/L (ref 136–145)
T3FREE SERPL-MCNC: 1.99 PG/ML (ref 2.3–4.2)
T4 FREE SERPL-MCNC: 1.01 NG/DL (ref 0.76–1.46)
TSH SERPL DL<=0.05 MIU/L-ACNC: 9.85 UIU/ML (ref 0.36–3.74)
WBC # BLD AUTO: 16.86 THOUSAND/UL (ref 4.31–10.16)

## 2021-04-15 PROCEDURE — 84481 FREE ASSAY (FT-3): CPT

## 2021-04-15 PROCEDURE — 80053 COMPREHEN METABOLIC PANEL: CPT

## 2021-04-15 PROCEDURE — 85025 COMPLETE CBC W/AUTO DIFF WBC: CPT

## 2021-04-15 PROCEDURE — 84443 ASSAY THYROID STIM HORMONE: CPT

## 2021-04-15 PROCEDURE — 36415 COLL VENOUS BLD VENIPUNCTURE: CPT

## 2021-04-15 PROCEDURE — 84439 ASSAY OF FREE THYROXINE: CPT

## 2021-04-15 NOTE — TELEPHONE ENCOUNTER
Hg 6 8  Telephone Encounter           Call placed to patient    Spoke to patient's wife Marichuy Spivey, patient is weak and  SOB with exertion but no chest pain  Patient will go to ED with acute SOB or chest pain  Will pass on to Dr Devyn Bain RN  Patient has first [place] appt tomorrow

## 2021-04-15 NOTE — TELEPHONE ENCOUNTER
Send to [address] [facility] is the new group and is still separate for now        Thanks

## 2021-04-15 NOTE — TELEPHONE ENCOUNTER
Critical Results   Call Received From [facility] Department Location [facility]    Lab Study Hemoglobin 6 8   Date Blood Work was Done 4/15   Relevant Information

## 2021-04-15 NOTE — TELEPHONE ENCOUNTER
Kellie Rodriguez had unplanned out of office since Friday 4/9/21  Following up regarding this patient to confirm he has needed supplies  Please send all tasks / messages to gastro nurse pool in case I am not available    Thank you

## 2021-04-15 NOTE — TELEPHONE ENCOUNTER
Call placed to patient    Spoke to patient's wife Guerrero Thorne, patient is weak and  SOB with exertion but no chest pain  Patient will go to ED with acute SOB or chest pain  Will pass on to Dr Tom Pizano RN  [facility]

## 2021-04-16 ENCOUNTER — TELEPHONE (OUTPATIENT)
Dept: FAMILY MEDICINE CLINIC | Facility: CLINIC | Age: 66
End: 2021-04-16

## 2021-04-16 ENCOUNTER — HOSPITAL ENCOUNTER (OUTPATIENT)
Dept: INFUSION CENTER | Facility: HOSPITAL | Age: 66
Discharge: HOME | End: 2021-04-16
Attending: INTERNAL MEDICINE
Payer: MEDICARE

## 2021-04-16 VITALS
RESPIRATION RATE: 16 BRPM | TEMPERATURE: 98.2 F | SYSTOLIC BLOOD PRESSURE: 156 MMHG | HEART RATE: 82 BPM | OXYGEN SATURATION: 97 % | DIASTOLIC BLOOD PRESSURE: 78 MMHG

## 2021-04-16 DIAGNOSIS — R11.0 NAUSEA: ICD-10-CM

## 2021-04-16 DIAGNOSIS — D50.8 IRON DEFICIENCY ANEMIA SECONDARY TO INADEQUATE DIETARY IRON INTAKE: ICD-10-CM

## 2021-04-16 DIAGNOSIS — E03.9 HYPOTHYROIDISM, UNSPECIFIED TYPE: Primary | ICD-10-CM

## 2021-04-16 DIAGNOSIS — C15.5 MALIGNANT NEOPLASM OF LOWER THIRD OF ESOPHAGUS (HCC): Primary | ICD-10-CM

## 2021-04-16 DIAGNOSIS — C16.0 GASTROESOPHAGEAL CANCER (HCC): ICD-10-CM

## 2021-04-16 DIAGNOSIS — D72.828 OTHER ELEVATED WHITE BLOOD CELL (WBC) COUNT: Primary | ICD-10-CM

## 2021-04-16 DIAGNOSIS — E86.0 DEHYDRATION: ICD-10-CM

## 2021-04-16 LAB
ABO GROUP BLD: NORMAL
BACTERIA UR QL AUTO: ABNORMAL /HPF
BILIRUB UR QL STRIP: NEGATIVE
BLD GP AB SCN SERPL QL: NEGATIVE
CLARITY UR: ABNORMAL
COLOR UR: YELLOW
GLUCOSE UR STRIP-MCNC: NEGATIVE MG/DL
HGB UR QL STRIP.AUTO: NEGATIVE
HYALINE CASTS #/AREA URNS LPF: ABNORMAL /LPF
KETONES UR STRIP-MCNC: NEGATIVE MG/DL
LEUKOCYTE ESTERASE UR QL STRIP: NEGATIVE
NITRITE UR QL STRIP: NEGATIVE
NON-SQ EPI CELLS URNS QL MICRO: ABNORMAL /HPF
PH UR STRIP.AUTO: 8 [PH]
PROT UR STRIP-MCNC: ABNORMAL MG/DL
RBC #/AREA URNS AUTO: ABNORMAL /HPF
RH BLD: POSITIVE
SP GR UR STRIP.AUTO: 1.01 (ref 1–1.03)
SPECIMEN EXPIRATION DATE: NORMAL
UROBILINOGEN UR QL STRIP.AUTO: 2 E.U./DL
WBC #/AREA URNS AUTO: ABNORMAL /HPF

## 2021-04-16 PROCEDURE — 36430 TRANSFUSION BLD/BLD COMPNT: CPT

## 2021-04-16 PROCEDURE — 96413 CHEMO IV INFUSION 1 HR: CPT

## 2021-04-16 PROCEDURE — P9040 RBC LEUKOREDUCED IRRADIATED: HCPCS

## 2021-04-16 PROCEDURE — 86850 RBC ANTIBODY SCREEN: CPT | Performed by: INTERNAL MEDICINE

## 2021-04-16 PROCEDURE — 96367 TX/PROPH/DG ADDL SEQ IV INF: CPT

## 2021-04-16 PROCEDURE — 86920 COMPATIBILITY TEST SPIN: CPT

## 2021-04-16 PROCEDURE — 86900 BLOOD TYPING SEROLOGIC ABO: CPT | Performed by: INTERNAL MEDICINE

## 2021-04-16 PROCEDURE — 86901 BLOOD TYPING SEROLOGIC RH(D): CPT | Performed by: INTERNAL MEDICINE

## 2021-04-16 PROCEDURE — 81001 URINALYSIS AUTO W/SCOPE: CPT

## 2021-04-16 RX ORDER — ACETAMINOPHEN 325 MG/1
650 TABLET ORAL ONCE
Status: COMPLETED | OUTPATIENT
Start: 2021-04-16 | End: 2021-04-16

## 2021-04-16 RX ORDER — SODIUM CHLORIDE 9 MG/ML
20 INJECTION, SOLUTION INTRAVENOUS ONCE
Status: COMPLETED | OUTPATIENT
Start: 2021-04-16 | End: 2021-04-16

## 2021-04-16 RX ORDER — ACETAMINOPHEN 325 MG/1
650 TABLET ORAL ONCE
Status: CANCELLED | OUTPATIENT
Start: 2021-04-16

## 2021-04-16 RX ORDER — LEVOTHYROXINE SODIUM 0.03 MG/1
25 TABLET ORAL DAILY
Qty: 90 TABLET | Refills: 3 | Status: SHIPPED | OUTPATIENT
Start: 2021-04-16 | End: 2021-05-13 | Stop reason: HOSPADM

## 2021-04-16 RX ORDER — SODIUM CHLORIDE 9 MG/ML
20 INJECTION, SOLUTION INTRAVENOUS ONCE
Status: CANCELLED | OUTPATIENT
Start: 2021-04-16

## 2021-04-16 RX ADMIN — DIPHENHYDRAMINE HYDROCHLORIDE 25 MG: 50 INJECTION, SOLUTION INTRAMUSCULAR; INTRAVENOUS at 12:28

## 2021-04-16 RX ADMIN — ACETAMINOPHEN 650 MG: 325 TABLET, FILM COATED ORAL at 12:27

## 2021-04-16 RX ADMIN — SODIUM CHLORIDE 20 ML/HR: 9 INJECTION, SOLUTION INTRAVENOUS at 11:51

## 2021-04-16 RX ADMIN — SODIUM CHLORIDE 200 MG: 9 INJECTION, SOLUTION INTRAVENOUS at 11:51

## 2021-04-16 RX ADMIN — SODIUM CHLORIDE 20 ML/HR: 0.9 INJECTION, SOLUTION INTRAVENOUS at 12:27

## 2021-04-16 NOTE — TELEPHONE ENCOUNTER
Patients wife called regarding patient's thyroid level  The patients oncologist nurse informed them thyroid level was a 9- referred patient to contact PCP for a medication that would help bring down those thyroid levels  Confirmed to send to [facility] in [place]

## 2021-04-16 NOTE — PROGRESS NOTES
Outpatient Oncology Nutrition Consult  Type of Consult: Follow Up  Care Location: Telephone Call - spoke with wife Brissa Heaton) because pt was not available  Nutrition Assessment:    Oncology Diagnosis & Treatments: Stage IV metastatic gastroesophageal cancer  -S/p chemotherapy in the palliative setting (5FU) - tx ended 3/26/21 d/t performance status    -Started immunotherapy with Tex Vazquez on 4/16/21    Oncology History   Malignant neoplasm of lower third of esophagus ([facility] [address] )   7/26/2019 Initial Diagnosis    Malignant neoplasm of lower third of esophagus ([facility] [address] )     8/1/2019 - 4/6/2021 Chemotherapy    fluorouracil (ADRUCIL) injection 750 mg, 400 mg/m2 = 750 mg, Intravenous, Once, 34 of 38 cycles  Administration: 750 mg (8/1/2019), 750 mg (8/13/2019), 750 mg (8/27/2019), 750 mg (9/10/2019), 750 mg (9/24/2019), 750 mg (10/8/2019), 795 mg (10/22/2019), 795 mg (11/5/2019), 795 mg (11/19/2019), 795 mg (12/3/2019), 795 mg (12/17/2019), 795 mg (12/31/2019), 795 mg (1/14/2020), 795 mg (1/28/2020), 795 mg (2/11/2020), 795 mg (2/25/2020), 795 mg (3/10/2020), 795 mg (3/24/2020), 795 mg (4/7/2020), 795 mg (5/13/2020), 795 mg (5/27/2020), 795 mg (6/10/2020), 795 mg (6/24/2020), 795 mg (7/22/2020), 795 mg (8/5/2020), 790 mg (8/19/2020), 750 mg (10/7/2020), 795 mg (4/29/2020), 750 mg (10/21/2020), 750 mg (11/4/2020), 750 mg (11/18/2020), 735 mg (2/24/2021), 735 mg (3/10/2021), 735 mg (3/24/2021)  pegfilgrastim (NEULASTA ONPRO) subcutaneous injection kit 6 mg, 6 mg, Subcutaneous, Once, 2 of 2 cycles  Administration: 6 mg (8/3/2019), 6 mg (8/15/2019)  leucovorin 750 mg in dextrose 5 % 250 mL IVPB, 748 mg, Intravenous, Once, 21 of 21 cycles  Administration: 750 mg (8/1/2019), 750 mg (8/13/2019), 750 mg (8/27/2019), 750 mg (9/10/2019), 750 mg (9/24/2019), 750 mg (10/8/2019), 800 mg (10/22/2019), 800 mg (11/5/2019), 800 mg (11/19/2019), 800 mg (12/3/2019), 800 mg (12/17/2019), 800 mg (12/31/2019), 800 mg (1/14/2020), 800 mg (1/28/2020), 800 mg (2/11/2020), 800 mg (2/25/2020), 800 mg (3/10/2020), 800 mg (3/24/2020), 800 mg (4/7/2020), 800 mg (5/13/2020), 800 mg (4/29/2020)  oxaliplatin (ELOXATIN) 158 95 mg in dextrose 5 % 250 mL chemo infusion, 85 mg/m2 = 158 95 mg, Intravenous, Once, 6 of 6 cycles  Administration: 158 95 mg (8/1/2019), 158 95 mg (8/13/2019), 158 95 mg (8/27/2019), 158 95 mg (9/10/2019), 158 95 mg (9/24/2019), 158 95 mg (10/8/2019)     4/16/2021 -  Chemotherapy    pembrolizumab (KEYTRUDA) 200 mg in sodium chloride 0 9 % 50 mL IVPB, 200 mg, Intravenous, Once, 1 of 6 cycles  Administration: 200 mg (4/16/2021)     Gastroesophageal cancer ([facility] [address] )   7/26/2019 Initial Diagnosis    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     8/1/2019 - 4/6/2021 Chemotherapy    fluorouracil (ADRUCIL) injection 750 mg, 400 mg/m2 = 750 mg, Intravenous, Once, 34 of 38 cycles  Administration: 750 mg (8/1/2019), 750 mg (8/13/2019), 750 mg (8/27/2019), 750 mg (9/10/2019), 750 mg (9/24/2019), 750 mg (10/8/2019), 795 mg (10/22/2019), 795 mg (11/5/2019), 795 mg (11/19/2019), 795 mg (12/3/2019), 795 mg (12/17/2019), 795 mg (12/31/2019), 795 mg (1/14/2020), 795 mg (1/28/2020), 795 mg (2/11/2020), 795 mg (2/25/2020), 795 mg (3/10/2020), 795 mg (3/24/2020), 795 mg (4/7/2020), 795 mg (5/13/2020), 795 mg (5/27/2020), 795 mg (6/10/2020), 795 mg (6/24/2020), 795 mg (7/22/2020), 795 mg (8/5/2020), 790 mg (8/19/2020), 750 mg (10/7/2020), 795 mg (4/29/2020), 750 mg (10/21/2020), 750 mg (11/4/2020), 750 mg (11/18/2020), 735 mg (2/24/2021), 735 mg (3/10/2021), 735 mg (3/24/2021)  pegfilgrastim (NEULASTA ONPRO) subcutaneous injection kit 6 mg, 6 mg, Subcutaneous, Once, 2 of 2 cycles  Administration: 6 mg (8/3/2019), 6 mg (8/15/2019)  leucovorin 750 mg in dextrose 5 % 250 mL IVPB, 748 mg, Intravenous, Once, 21 of 21 cycles  Administration: 750 mg (8/1/2019), 750 mg (8/13/2019), 750 mg (8/27/2019), 750 mg (9/10/2019), 750 mg (9/24/2019), 750 mg (10/8/2019), 800 mg (10/22/2019), 800 mg (11/5/2019), 800 mg (11/19/2019), 800 mg (12/3/2019), 800 mg (12/17/2019), 800 mg (12/31/2019), 800 mg (1/14/2020), 800 mg (1/28/2020), 800 mg (2/11/2020), 800 mg (2/25/2020), 800 mg (3/10/2020), 800 mg (3/24/2020), 800 mg (4/7/2020), 800 mg (5/13/2020), 800 mg (4/29/2020)  oxaliplatin (ELOXATIN) 158 95 mg in dextrose 5 % 250 mL chemo infusion, 85 mg/m2 = 158 95 mg, Intravenous, Once, 6 of 6 cycles  Administration: 158 95 mg (8/1/2019), 158 95 mg (8/13/2019), 158 95 mg (8/27/2019), 158 95 mg (9/10/2019), 158 95 mg (9/24/2019), 158 95 mg (10/8/2019)     4/16/2021 -  Chemotherapy    pembrolizumab (KEYTRUDA) 200 mg in sodium chloride 0 9 % 50 mL IVPB, 200 mg, Intravenous, Once, 1 of 6 cycles  Administration: 200 mg (4/16/2021)       Patient Active Problem List   Diagnosis    PTSD (post-traumatic stress disorder)    Essential hypertension    Major depression    OCD (obsessive compulsive disorder)    Anxiety    Malignant neoplasm of lower third of esophagus ([facility] [address] [facility]    Gastroesophageal cancer ([facility] [address] [facility]    Cancer-related pain    Chemotherapy-induced nausea    Severe protein-calorie malnutrition (Jonathan Jim: less than 60% of standard weight) ([facility] [address] )    Malignant ascites    Recovering alcoholic (HCC)    Other fatigue    Dysphagia    Nausea    Dehydration    Skin fissures    Goals of care, counseling/discussion    Palliative care patient    Other insomnia    Counseling on health promotion and disease prevention    Elevated PSA    Memory loss    Regurgitation of stomach contents    Falls    Superficial thrombophlebitis of lower extremity    Pneumonia of left lung due to infectious organism    PAF (paroxysmal atrial fibrillation) (HCC)    Moderate protein-calorie malnutrition (HCC)    Dilated cardiomyopathy (HCC)    Urge urinary incontinence    Aspiration pneumonia (HCC)    Chronic combined systolic and diastolic CHF (congestive heart failure) ([facility] [address] )    Port-A-Cath in place    Hematuria    Hyponatremia  Hypoglycemia    Severe protein-calorie malnutrition (HCC)    Cavitating mass in left upper lung lobe    [facility]ocompromised patient [facility] )    [facility]    Leukocytosis    Iron deficiency anemia secondary to inadequate dietary iron intake    GI bleed    Severe anemia    Leukopenia     Past Medical History:   Diagnosis Date    Anxiety     Cancer ([facility] ) 08/2019    metastatic gastroesophageal cancer    Dehydration     Depression     Esophageal cancer (HCC)     Fatigue     History of chemotherapy     currently started 8/2019    History of DVT (deep vein thrombosis)     Hypertension     currently is normal and may not need medication    Lung infection     chemo currently on hold because of the infection 12/22/2020    Nausea     Pneumonia     x2    Port-A-Cath in place     right    Psychiatric disorder     Recovering alcoholic ([facility] )     sober since 1990    Varicose veins of left lower extremity      Past Surgical History:   Procedure Laterality Date    APPENDECTOMY      COLONOSCOPY      IR GASTROSTOMY TUBE PLACEMENT  2/5/2021    IR PORT PLACEMENT  7/31/2019    UPPER GASTROINTESTINAL ENDOSCOPY         Review of Medications:   Vitamins, Supplements and Herbals: No, pt denies taking supplements     Current Outpatient Medications:     amiodarone 200 mg tablet, TAKE 1 TABLET BY MOUTH EVERY DAY WITH BREAKFAST (Patient not taking: Reported on 4/8/2021), Disp: 30 tablet, Rfl: 3    ammonium lactate (LAC-HYDRIN) 12 % cream, Apply topically as needed for dry skin On hands and feet, Disp: 385 g, Rfl: 2    diazepam (VALIUM) 5 mg tablet, Take 1 tablet (5 mg total) by mouth 2 (two) times a day (Patient taking differently: Take 10 mg by mouth daily at bedtime ), Disp: 60 tablet, Rfl: 5    Eliquis 5 MG, , Disp: , Rfl:     famotidine (PEPCID) 20 mg tablet, Take 1 tablet (20 mg total) by mouth 2 (two) times a day as needed for heartburn (Patient not taking: Reported on 4/19/2021), Disp: 60 tablet, Rfl: 2    hydrochlorothiazide (HYDRODIURIL) 12 5 mg tablet, Take one tablet daily as needed for SBP> 160/90, Disp: 90 tablet, Rfl: 3    levothyroxine 25 mcg tablet, Take 1 tablet (25 mcg total) by mouth daily, Disp: 90 tablet, Rfl: 3    Nutritional Supplements (TwoCal HN 2 0) LIQD, 5 Cans by Enteral route every 16 (sixteen) hours, Disp: , Rfl:     ondansetron (ZOFRAN) 4 mg tablet, Take 2 tablets (8 mg total) by mouth every 8 (eight) hours as needed for nausea or vomiting, Disp: 75 tablet, Rfl: 0    pantoprazole (PROTONIX) 40 mg tablet, Take 1 tablet (40 mg total) by mouth daily, Disp: 30 tablet, Rfl: 2    venlafaxine (EFFEXOR-XR) 150 mg 24 hr capsule, Take 1 capsule (150 mg total) by mouth daily, Disp: 30 capsule, Rfl: 0    Most Recent Lab Results:   Lab Results   Component Value Date    WBC 16 86 (H) 04/15/2021    IRON 13 (L) 09/01/2020    TIBC 341 09/01/2020    FERRITIN 175 09/01/2020    ALT 18 04/15/2021    AST 16 04/15/2021    ALB 2 1 (L) 04/15/2021    SODIUM 139 04/15/2021    SODIUM 136 04/06/2021    K 3 9 04/15/2021    K 3 4 (L) 04/06/2021     04/15/2021    BUN 22 04/15/2021    BUN 29 (H) 04/06/2021    CREATININE 0 89 04/15/2021    CREATININE 1 04 04/06/2021    EGFR 88 04/15/2021    PHOS 3 3 04/06/2021    PHOS 3 0 09/25/2020    POCGLU 104 09/25/2020    GLUF 184 (H) 02/23/2021    GLUF 99 01/14/2021    GLUC 116 04/15/2021    CALCIUM 8 8 04/15/2021    FOLATE 10 6 09/01/2020    MG 2 0 04/06/2021     Anthropometric Measurements:   Height: 69"  Ht Readings from Last 1 Encounters:   04/19/21 5' 11" (1 803 m)     Wt Readings from Last 20 Encounters:   04/19/21 62 6 kg (137 lb 14 4 oz)   04/13/21 62 1 kg (137 lb)   04/08/21 62 1 kg (137 lb)   04/06/21 62 4 kg (137 lb 9 1 oz)   04/05/21 62 5 kg (137 lb 12 8 oz)   03/24/21 62 kg (136 lb 11 oz)   03/16/21 62 6 kg (138 lb)   03/10/21 64 4 kg (141 lb 15 6 oz)   02/24/21 65 7 kg (144 lb 13 5 oz)   01/26/21 65 5 kg (144 lb 8 oz)   01/13/21 68 5 kg (151 lb 0 2 oz)   01/07/21 66 2 kg (146 lb)   12/30/20 65 8 kg (145 lb)   12/22/20 68 kg (150 lb)   12/11/20 69 4 kg (153 lb)   11/19/20 72 4 kg (159 lb 9 8 oz)   11/18/20 71 1 kg (156 lb 12 oz)   11/17/20 68 9 kg (152 lb)   11/05/20 74 4 kg (164 lb)   11/04/20 70 4 kg (155 lb 3 3 oz)     · UBW: 198-212#  · Home Wts: (10/15/20) 166#, (2/8/21) 150#, (2/10/21) 148#, (2/17/21) 143# (states he was 140# 6 days prior so he gained 3# since then, (2/22/21) 143#, (3/4/21) 144#, (3/5/21) 140 4#, (3/17/21) 138#, (4/16/21) 142#  · Comments: Denies fluid retention  Wife states pt was not weighed at appt on 4/13/21    Oncology Nutrition-Anthropometrics      Nutrition from 4/26/2021 in [facility] Nutrition from 4/14/2021 in [facility] Oncology Dietitian Services   Patient age (years):  77 years  77 years   Patient (male) height (in):  71 in  71 in   Current weight (lbs):  142 lbs  137 lbs   Current weight to be used for anthropometric calculations (kg)  64 5 kg  62 3 kg   BMI:  21  20 2   IBW male  160 lb  160 lb   IBW (kg) male  72 7 kg  72 7 kg   IBW % (male)  88 8 %  85 6 %   Adjusted BW (male):  155 5 lbs  154 2 lbs   Adjusted BW in kg (male):  70 7 kg  70 1 kg   % weight change after 1 week:  (!) 3 6 %  -0 4 %   Weight change after 1 week (lbs)  5 lbs  -0 6 lbs   % weight change after 1 month:  2 9 %  -0 7 %   Weight change after 1 month (lbs)  4 lbs  -1 lbs   % weight change after 3 months:  -6 %  (!) -9 3 %   Weight change after 3 months (lbs)  -9 1 lbs  -14 1 lbs        Nutrition-Focused Physical Findings: n/a due to telephone call       Food/Nutrition-Related History & Client/Social History:    Current Nutrition Impact Symptoms:  [] Nausea  -hx with bolus feedings [x] Reduced Appetite  [] Acid Reflux - denies  -Met with GI on 4/13/21: pantoprazole decreased and pepcid added prn for heartburn and acid reflux     [] Vomiting - after all solid po intake, sometime vomits after drinking liquids  -hx w/bolus feedings     [x] Regurgitating - thick phlegm throughout the day [x] Unintended Wt Loss - wt loss picked up again in August 2020 d/t vomiting with meals [] Malabsorption    [] Diarrhea  [] Unintended Wt Gain  [] Dumping Syndrome    [] Constipation   -"small shea", Miralax prn [] Thick Mucous/Secretions  [] Abdominal Pain    [x] Dysgeusia (Altered Taste) - says foods don't taste the same, "everything tastes like cardboard"  -trying to practice good oral care [] Xerostomia (Dry Mouth)  [x] Bloating - not painful per wife   [] Dysosmia (Altered Smell)  [] Amol Rico  [] Difficulty Chewing    [] Oral Mucositis (Sore Mouth)  [x] Fatigue - sleeps most of the day [] Other:   [] Odynophagia  [] Esophagitis  [] Other:    [x] Dysphagia -   -Pt reports esophageal dysphagia and regurgitation has progressively gotten worse   -Has had VBS and has been seen by ST in the past   -Per GI 2/2/21: "barium swallow shows lower esophageal mass and delayed  esophageal emptying"  -Wife stated 2/3/21 that pt cannot have a stent placed  -S/p PEG placed by IR 2/5/21 (ordered by Dr Reji Hawk)  -Was receiving home ST who recommended a soft diet per wife  [] Early Satiety [] No Problems Eating      Food Allergies & Intolerances: no  -For Gout, avoids: organ meats, shellfish  Last gout flare was 2 years ago after eating a liverwurst sandwich  Current Diet: Clear Liquids and Tube Feeding   Current Nutrition Intake: Increased since last visit (fluids)  Appetite: None  Nutrition Route: PO and G-tube  Oral Care: Brushing BID, Flossing QD, Biotene Mouthwash QD  Activity level: Home PT    24 Hr Diet Recall:   PO: water (16 9 oz x2) and gingerale (12 oz x2) = 58 oz/1740 mL    Supplements:    Boost Very High Calorie (8 oz, 530 kcal, 22 g pro) - afraid to drink it d/t regurgitation     EN Recall:  DME: [facility]  Now has wedge pillow    This RD requested an enteral backpack on 4/14/21 from [employer]; they have not received it yet  Access Type: 16F G-tube placed 21 (ordered by GI - Dr Chinedu Lamb)   Formula: TwoCalHN - order's are for TwoCal HN @80 mL/hr x15 hrs daily or until 5 cartons is infused via G-tube (sent by GI )  Method: Cyclic   Regimen: 80 mL/hr for ~15 hrs/day or until 5 cans is infused   -Wife reports that pt is tolerating TF well but sometimes feels bloated  -Wife continues to report feeding tube does not stay connected to pump, they have to tape it to secure it otherwise it leaks - wife is in contact with DME about this    -Wife would like to try to increase pump rate to try to decrease amount of time spent on pump  Flushin mL before and after pump infusion + QD (360 mL)   -Not flushing throughout the day d/t pump-tube connection issues (see above)  -Discussed how feeding tube is at risk of becoming clogged d/t lack of adequate flushing  EN providin mL volume (5 cartons day), 2370 kcal, 99 grams protein, 830 mL free water, and 1190 mL total water  Total Fluid Intake: 2930 mL (PO + EN) = 155% est needs    Est Needs Notes:  Fluids needs for hx of CHF    Oncology Nutrition-Estimated Needs      Nutrition from 3/17/2021 in [facility] from 2021 in [facility] Oncology Dietitian Services   Weight type used  Actual weight  Actual weight   Weight in kilograms (kg) used for estimated needs  62 7 kg  68 2 kg   Energy needs formula:   35-40 kcal/kg  35-40 kcal/kg   Energy needs based on 35 kcal/k kcal  2386 kcal   Energy needs based on 40 kcal/k kcal  2727 kcal   Protein needs formula:  1 5-2 g/kg  1 5-2 g/kg   Protein needs based on 1 5 g/kg  94 g  102 g   Protein needs based on 2 g/kg  125 g  136 g   Fluid needs formula:  25-30 mL/kg  25-30 mL/kg   Fluid needs based on 25 mL/kg  1575 mL  1700 mL   Fluid needs in ounces  53 oz  57 oz   Fluid needs based on 30 mL/kg  1890 mL  2040 mL   Fluid needs in ounces  64 oz  69 oz          Discussion & Intervention:    Nimco Gutierrez was evaluated today for an RD follow up regarding wt loss, poor po intake, enteral nutrition and nutrition impact sx management  Nimco Gutierrez is currently undergoing tx for gastroesophageal cancer  Since last visit, Nimco Gutierrez has started [place] and is tolerating it well  He has gained 4# over the past month d/t a more consistent enteral intake  He still has not received the enteral backpack that I requested from [employer]  [facility] issues but this remains unresolved; encouraged her to continue working with DME  Pt is tolerating his TF well per wife, he just has some occasional non-painful bloating  Wife would like to start working on increasing pump rate to the reduce number of hrs spent on pump  We discussed how this can be a goal moving forward now that Nimco Gutierrez has established tolerance to and consistency with his TF regimen  Discussed how all changes should be one at a time and very slow d/t hx intolerance and malnutrition  Wife has increased water flushing to TID and Nimco Gutierrez is now drinking more po fluids  He is meeting ~155% est fluid needs  Discussed concern for CHF hx   Wife denies fluid retention and SOB  Reviewed 24 hour recall, which revealed a likely adequate po intake and enteral intake when combined, and discussed ways to optimize nutrient intake  Also reviewed the importance of wt management throughout the tx process and the role of enteral nutrition in managing wt and overall health  Based on today's assessment, discussion included: MNT for: Enteral Nutrition, hydration, regurgitation, bloating, practicing proper feeding tube use & care and individualized enteral nutrition recommendations & plan: see Nutrition Rx & Recommendations below     Moving forward, Nimco Gutierrez was encouraged to continue to work with DME regarding TF connection issues and leaking, spread flushes throughout the day to promote proper hydration and to prevent tube clogging, increase TF rate to 85 mL/hr until 5 cans is infused per day, and keep a daily log of TF/flushes/po fluids/wts  Materials Provided: not applicable  All questions and concerns addressed during todays visit  Maggie Shipley has RD contact information  Nutrition Diagnosis:  · Increased Nutrient Needs (kcal & pro) related to increased demand for nutrients and disease state as evidenced by cancer dx and pt undergoing tx for cancer  · Swallowing Difficulty related to diagnosis/treatment related causes as evidenced by N/V and regurgitation with po feedings  Monitoring & Evaluation:   Goals:  · adequate nutrition impact symptom management  · pt to meet >/=75% estimated nutrition needs daily  · weight gain of 1-2# per week  · achieve tube feeding goal rate   · tolerate tube feeding goal    · Progress Towards Goals: Progressing and Partially Met    Nutrition Rx & Recommendations:  · Follow proper oral care; Try baking soda/salt water rinse recipe (mix 3/4 tsp salt + 1 tsp baking soda + 1 qt water; rinse with plain water after using) in Eating Hints book (pg 18)  Brush your teeth before/after meals & before bed  · Weigh yourself regularly  If you notice weight loss, make an effort to increase your daily food/calorie intake  If you continue to notice loss after these efforts, reach out to your dietitian to establish a plan to stabilize weight  · Keep a daily diary to track:  · # cans of TwoCal infused per day  · How much and how often you are flushing your feeding tube  · How much and what you are drinking by mouth  · Your daily weight with the date    · Tube Feeding Plan:  · Increase rate of tube feeding infusion rate to 85 mL/hr until 5 cartons is infused  Monitor for signs and symptoms of intolerance  Over the coming weeks we may be able to increase your pump rate (only as tolerated) to 99 mL/hr x12 hrs per day to help reduce time spent on pump  · Flushes should be spread throughout the day to promote adequate hydration and prevent the feeding tube from clogging  · [facility]-feeding tube connection issues  TF Goal Rate: TwoCal HN at 85 mL/hr via G-tube cycled over ~15 hours daily (or until 5 cartons is infused)  Water Flushin mL free water flush at the beginning and end of TF infusion plus 125 mL free water flush 6 times per day (total of 1000 mL/day in flushes; flushes should be spread throughout the day and every 2-3 hours during TF infusion; will need to adjust flushes prn for IV and PO fluids)  Enteral Nutrition goal to provide: 1185 mL volume (5 cartons day), 2370 kcal, 99 grams protein, 830 mL free water, 1830 mL total water daily  Abdoulaye Mata requires an enteral feeding pump to administer TF due to intolerance to bolus tube feedings  Recommend an enteral backpack for pump transport  Stay upright at least 30-45 degrees while pump is running  · Call [facility] when you have 10 days left of TF supplies: [phone], option 3 (customer support)       Follow Up Plan: 21 at 1pm   Recommend Referral to Other Providers: none at this time

## 2021-04-16 NOTE — PLAN OF CARE
Problem: Potential for Falls  Goal: Patient will remain free of falls  Description: INTERVENTIONS:  - Assess patient frequently for physical needs  -  Identify cognitive and physical deficits and behaviors that affect risk of falls    -  Advance fall precautions as indicated by assessment   - Educate patient/family on patient safety including physical limitations  - Instruct patient to call for assistance with activity based on assessment  - Modify environment to reduce risk of injury  - Consider OT/PT consult to assist with strengthening/mobility  Outcome: Progressing     Problem: INFECTION - ADULT  Goal: Absence or prevention of progression during hospitalization  Description: INTERVENTIONS:  - Assess and monitor for signs and symptoms of infection  - Monitor lab/diagnostic results  - Monitor all insertion sites, i e  indwelling lines, tubes, and drains  - Monitor endotracheal if appropriate and nasal secretions for changes in amount and color  - Advance appropriate cooling/warming therapies per order  - Administer medications as ordered  - Instruct and encourage patient and family to use good hand hygiene technique  - Identify and instruct in appropriate isolation precautions for identified infection/condition  Outcome: Progressing  Goal: Absence of fever/infection during neutropenic period  Description: INTERVENTIONS:  - Monitor WBC    Outcome: Progressing     Problem: DISCHARGE PLANNING  Goal: Discharge to home or other facility with appropriate resources  Description: INTERVENTIONS:  - Identify barriers to discharge w/patient and caregiver  - Arrange for needed discharge resources and transportation as appropriate  - Identify discharge learning needs (meds, wound care, etc )  - Arrange for interpretive services to assist at discharge as needed  - Refer to Case Management Department for coordinating discharge planning if the patient needs post-hospital services based on physician/advanced practitioner order or complex needs related to functional status, cognitive ability, or social support system  Outcome: Progressing     Problem: Knowledge Deficit  Goal: Patient/family/caregiver demonstrates understanding of disease process, treatment plan, medications, and discharge instructions  Description: Complete learning assessment and assess knowledge base    Interventions:  - Provide teaching at level of understanding  - Provide teaching via preferred learning methods  Outcome: Progressing     Problem: HEMATOLOGIC - ADULT  Goal: Maintains hematologic stability  Description: INTERVENTIONS  - Assess for signs and symptoms of bleeding or hemorrhage  - Monitor labs  - Administer supportive blood products/factors as ordered and appropriate  Outcome: Progressing

## 2021-04-16 NOTE — RESULT ENCOUNTER NOTE
Lab results discussed with patients wife  Thyroid elevated advised to speak with PCP today to start evaluation and possible thyroid medication  Also advised elevated WBC  Patient is not symptomatic and stopped steroid use quite some time ago  Will do urine cx while in infusion  Advised to present to ER with any signs of infection over the weekend  We will continue with blood transfusion and treatment today

## 2021-04-17 LAB
ABO GROUP BLD BPU: NORMAL
ABO GROUP BLD BPU: NORMAL
BPU ID: NORMAL
BPU ID: NORMAL
CROSSMATCH: NORMAL
CROSSMATCH: NORMAL
UNIT DISPENSE STATUS: NORMAL
UNIT DISPENSE STATUS: NORMAL
UNIT PRODUCT CODE: NORMAL
UNIT PRODUCT CODE: NORMAL
UNIT RH: NORMAL
UNIT RH: NORMAL

## 2021-04-19 ENCOUNTER — OFFICE VISIT (OUTPATIENT)
Dept: FAMILY MEDICINE CLINIC | Facility: CLINIC | Age: 66
End: 2021-04-19
Payer: MEDICARE

## 2021-04-19 ENCOUNTER — TELEPHONE (OUTPATIENT)
Dept: FAMILY MEDICINE CLINIC | Facility: CLINIC | Age: 66
End: 2021-04-19

## 2021-04-19 VITALS
WEIGHT: 137.9 LBS | DIASTOLIC BLOOD PRESSURE: 74 MMHG | HEART RATE: 94 BPM | RESPIRATION RATE: 18 BRPM | OXYGEN SATURATION: 99 % | TEMPERATURE: 97.4 F | SYSTOLIC BLOOD PRESSURE: 118 MMHG | HEIGHT: 71 IN | BODY MASS INDEX: 19.31 KG/M2

## 2021-04-19 DIAGNOSIS — I50.42 CHRONIC COMBINED SYSTOLIC AND DIASTOLIC CHF (CONGESTIVE HEART FAILURE) (HCC): ICD-10-CM

## 2021-04-19 DIAGNOSIS — I48.0 PAF (PAROXYSMAL ATRIAL FIBRILLATION) (HCC): ICD-10-CM

## 2021-04-19 DIAGNOSIS — Z76.89 ENCOUNTER FOR SUPPORT AND COORDINATION OF TRANSITION OF CARE: Primary | ICD-10-CM

## 2021-04-19 PROCEDURE — 99495 TRANSJ CARE MGMT MOD F2F 14D: CPT | Performed by: FAMILY MEDICINE

## 2021-04-19 RX ORDER — HYDROCHLOROTHIAZIDE 12.5 MG/1
TABLET ORAL
Qty: 90 TABLET | Refills: 3 | Status: SHIPPED | OUTPATIENT
Start: 2021-04-19 | End: 2021-05-13 | Stop reason: HOSPADM

## 2021-04-19 NOTE — PROGRESS NOTES
Assessment/Plan:      Diagnoses and all orders for this visit:    Encounter for support and coordination of transition of care    Chronic combined systolic and diastolic CHF (congestive heart failure) (HCC)  -     hydrochlorothiazide (HYDRODIURIL) 12 5 mg tablet; Take one tablet daily as needed for SBP> 160/90    PAF (paroxysmal atrial fibrillation) (HCC)  -     hydrochlorothiazide (HYDRODIURIL) 12 5 mg tablet; Take one tablet daily as needed for SBP> 160/90          TCM Call (since 3/19/2021)     Date and time call was made  4/8/2021  9:47 AM    Hospital care reviewed  Records reviewed    Patient was hospitialized at  [address] [place] [address]        Date of Admission  04/05/21    Date of discharge  04/06/21    Diagnosis  GI bleed K92 2    Disposition  Home    Were the patients medications reviewed and updated  Yes    Current Symptoms  None      TCM Call (since 3/19/2021)     Post hospital issues  None    Scheduled for follow up? Yes    I have advised the patient to call PCP with any new or worsening symptoms  [facility]rist[facility] Rivas [facility]        Patient following up with appropriate specialist for gastroesophageal cancer  Patient's wife helps to coordinate his care  TSH elevated on recent blood work  Patient was started on levothyroxine 25 mcg daily  Will check blood work in 6 weeks to see if patient needs continued use of medication  Subjective:     Patient ID: Alfredo Resendez is a 77 y o  male        Follow-up with GI?   - yes, last Tuesday; pantoprazole from 2 times to once a day  Have you had an endoscopy?  - GI did not think it was indicated  Compliant with medications?  - yes, GI changed protonix changed from bid to daily and added famotidine 20 mg bid prn  Follow-up with Hematology-Oncology  - last saw a week after hospitalization to stop chemotherapy and start the immunotherapy (first one was April 16th, every 3 weeks)  - Another appointment with oncologist next week, advised to see if CBC following transfusion this past Friday (4/16) is necessary  Order CBC  - already ordered in 3 weeks with labs before immunotherapy  - will order TSH in 6 weeks to check for continued need for levothyroxine    Review of Systems   Constitutional: Positive for fatigue  Negative for chills and fever  Cardiovascular: Negative for chest pain  Gastrointestinal: Positive for diarrhea, nausea and vomiting  Neurological: Positive for dizziness  Psychiatric/Behavioral: Negative for suicidal ideas  Objective:     Physical Exam  Pulmonary:      Effort: Pulmonary effort is normal       Breath sounds: Normal breath sounds  Abdominal:      General: Abdomen is flat  Palpations: Abdomen is soft  Comments:  Feeding tube in place - clean, dry, and intact   Musculoskeletal:      Right lower leg: No edema  Left lower leg: No edema

## 2021-04-19 NOTE — TELEPHONE ENCOUNTER
----- Message from Reanna Maldonado DO sent at 4/16/2021  4:11 AM EDT -----  Regarding: Reminder to scheule f/u appointment with Cardiology  [facility],    Please reach out to the patient and remind him to schedule a f/u appointment with cardiology  This is important b/c Elliquis prescribed for afib was discontinued b/c of GI bleed during last admission  Thank you      Best regards,     Reanna Maldonado DO

## 2021-04-21 ENCOUNTER — TELEPHONE (OUTPATIENT)
Dept: FAMILY MEDICINE CLINIC | Facility: CLINIC | Age: 66
End: 2021-04-21

## 2021-04-21 NOTE — TELEPHONE ENCOUNTER
Form received from [facility] for review and signature  Placed in folder in Preceptor room

## 2021-04-21 NOTE — TELEPHONE ENCOUNTER
We got a plan of care from [facility] to be signed    Copy attached    Original left in blue bin

## 2021-04-22 ENCOUNTER — TELEPHONE (OUTPATIENT)
Dept: FAMILY MEDICINE CLINIC | Facility: CLINIC | Age: 66
End: 2021-04-22

## 2021-04-22 NOTE — PROGRESS NOTES
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  [facility] has biopsy-proven adenocarcinoma of the esophagus   PET/CT scan was done which shows omental nodularity that is FDG avid indicating stage IV disease     Due to symptoms of worsening dysphagia, regurgitation and weight loss he had a gastrostomy tube placed on 02/05/2021 for nutritional support  He has progressed on chemotherapy with modified Folfox 6  Imaging completed on 4/5/21 demonstrated Increasing abdominopelvic ascites and new diffuse mild peritoneal enhancement which can be seen with peritoneal metastases as well as an apparent 4 4 x 2 2 cm masslike thickening at the gastroesophageal junction consistent with known neoplasm  Treatment options were presented by Dr Emiliana Morales to include immunotherapy vs hospice secondary to his poor performance status   Patient elected to continue with treatment and was started on Keytruda in April 2021    Previous Hematologic/ Oncologic History:    Oncology History   Malignant neoplasm of lower third of esophagus ([facility] [address] )   7/26/2019 Initial Diagnosis    Malignant neoplasm of lower third of esophagus ([facility] [address] )     8/1/2019 - 4/6/2021 Chemotherapy    fluorouracil (ADRUCIL) injection 750 mg, 400 mg/m2 = 750 mg, Intravenous, Once, 34 of 38 cycles  Administration: 750 mg (8/1/2019), 750 mg (8/13/2019), 750 mg (8/27/2019), 750 mg (9/10/2019), 750 mg (9/24/2019), 750 mg (10/8/2019), 795 mg (10/22/2019), 795 mg (11/5/2019), 795 mg (11/19/2019), 795 mg (12/3/2019), 795 mg (12/17/2019), 795 mg (12/31/2019), 795 mg (1/14/2020), 795 mg (1/28/2020), 795 mg (2/11/2020), 795 mg (2/25/2020), 795 mg (3/10/2020), 795 mg (3/24/2020), 795 mg (4/7/2020), 795 mg (5/13/2020), 795 mg (5/27/2020), 795 mg (6/10/2020), 795 mg (6/24/2020), 795 mg (7/22/2020), 795 mg (8/5/2020), 790 mg (8/19/2020), 750 mg (10/7/2020), 795 mg (4/29/2020), 750 mg (10/21/2020), 750 mg (11/4/2020), 750 mg (11/18/2020), 735 mg (2/24/2021), 735 mg (3/10/2021), 735 mg (3/24/2021)  pegfilgrastim (NEULASTA ONPRO) subcutaneous injection kit 6 mg, 6 mg, Subcutaneous, Once, 2 of 2 cycles  Administration: 6 mg (8/3/2019), 6 mg (8/15/2019)  leucovorin 750 mg in dextrose 5 % 250 mL IVPB, 748 mg, Intravenous, Once, 21 of 21 cycles  Administration: 750 mg (8/1/2019), 750 mg (8/13/2019), 750 mg (8/27/2019), 750 mg (9/10/2019), 750 mg (9/24/2019), 750 mg (10/8/2019), 800 mg (10/22/2019), 800 mg (11/5/2019), 800 mg (11/19/2019), 800 mg (12/3/2019), 800 mg (12/17/2019), 800 mg (12/31/2019), 800 mg (1/14/2020), 800 mg (1/28/2020), 800 mg (2/11/2020), 800 mg (2/25/2020), 800 mg (3/10/2020), 800 mg (3/24/2020), 800 mg (4/7/2020), 800 mg (5/13/2020), 800 mg (4/29/2020)  oxaliplatin (ELOXATIN) 158 95 mg in dextrose 5 % 250 mL chemo infusion, 85 mg/m2 = 158 95 mg, Intravenous, Once, 6 of 6 cycles  Administration: 158 95 mg (8/1/2019), 158 95 mg (8/13/2019), 158 95 mg (8/27/2019), 158 95 mg (9/10/2019), 158 95 mg (9/24/2019), 158 95 mg (10/8/2019)     4/16/2021 -  Chemotherapy    pembrolizumab (KEYTRUDA) 200 mg in sodium chloride 0 9 % 50 mL IVPB, 200 mg, Intravenous, Once, 1 of 6 cycles  Administration: 200 mg (4/16/2021)     G[facility] )   7/26/2019 Initial Diagnosis    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[facility] )     8/1/2019 - 4/6/2021 Chemotherapy    fluorouracil (ADRUCIL) injection 750 mg, 400 mg/m2 = 750 mg, Intravenous, Once, 34 of 38 cycles  Administration: 750 mg (8/1/2019), 750 mg (8/13/2019), 750 mg (8/27/2019), 750 mg (9/10/2019), 750 mg (9/24/2019), 750 mg (10/8/2019), 795 mg (10/22/2019), 795 mg (11/5/2019), 795 mg (11/19/2019), 795 mg (12/3/2019), 795 mg (12/17/2019), 795 mg (12/31/2019), 795 mg (1/14/2020), 795 mg (1/28/2020), 795 mg (2/11/2020), 795 mg (2/25/2020), 795 mg (3/10/2020), 795 mg (3/24/2020), 795 mg (4/7/2020), 795 mg (5/13/2020), 795 mg (5/27/2020), 795 mg (6/10/2020), 795 mg (6/24/2020), 795 mg (7/22/2020), 795 mg (8/5/2020), 790 mg (8/19/2020), 750 mg (10/7/2020), 795 mg (4/29/2020), 750 mg (10/21/2020), 750 mg (11/4/2020), 750 mg (11/18/2020), 735 mg (2/24/2021), 735 mg (3/10/2021), 735 mg (3/24/2021)  pegfilgrastim (NEULASTA ONPRO) subcutaneous injection kit 6 mg, 6 mg, Subcutaneous, Once, 2 of 2 cycles  Administration: 6 mg (8/3/2019), 6 mg (8/15/2019)  leucovorin 750 mg in dextrose 5 % 250 mL IVPB, 748 mg, Intravenous, Once, 21 of 21 cycles  Administration: 750 mg (8/1/2019), 750 mg (8/13/2019), 750 mg (8/27/2019), 750 mg (9/10/2019), 750 mg (9/24/2019), 750 mg (10/8/2019), 800 mg (10/22/2019), 800 mg (11/5/2019), 800 mg (11/19/2019), 800 mg (12/3/2019), 800 mg (12/17/2019), 800 mg (12/31/2019), 800 mg (1/14/2020), 800 mg (1/28/2020), 800 mg (2/11/2020), 800 mg (2/25/2020), 800 mg (3/10/2020), 800 mg (3/24/2020), 800 mg (4/7/2020), 800 mg (5/13/2020), 800 mg (4/29/2020)  oxaliplatin (ELOXATIN) 158 95 mg in dextrose 5 % 250 mL chemo infusion, 85 mg/m2 = 158 95 mg, Intravenous, Once, 6 of 6 cycles  Administration: 158 95 mg (8/1/2019), 158 95 mg (8/13/2019), 158 95 mg (8/27/2019), 158 95 mg (9/10/2019), 158 95 mg (9/24/2019), 158 95 mg (10/8/2019)     4/16/2021 -  Chemotherapy    pembrolizumab (KEYTRUDA) 200 mg in sodium chloride 0 9 % 50 mL IVPB, 200 mg, Intravenous, Once, 1 of 6 cycles  Administration: 200 mg (4/16/2021)       Modified FOLFOX 6  Oxaliplatin discontinued after cycle 6  Leucovorin has been discontinued      5FU 400 milligrams/meters squared  5FU 2400 milligrams/meter squared CIVI over 46 hours    Current Hematologic/ Oncologic Treatment:    Keytruda 200 mg every 3 weeks    Recurrent Transfusion Plan to Transfuse with 2 units PRBC for Hgb <8    ECOG: 3 - Symptomatic, >50% confined to bed    Interval History:   The patient presents for routine follow up  He has completed one cycle of Keytruda, given on 4/16  He was also transfused with 2 units of blood for a Hgb 6 9  He states he felt better after receiving blood transfusion  He tolerated Keytruda without any significant side effects  Today, he reports worsening fatigue, generalized weakness  He denies any chest pain, shortness of breath at rest or heart palpitations  He appears weak and deconditioned  He denies pain  He has some nausea  He is unable to take anything substantial by mouth  He does vomit after even drinking water  He is getting all his nutritional support from tube feeds at this time  He has lost few lb since last visit  Denies diarrhea    Cancer Staging:  Cancer Staging  No matching staging information was found for the patient  Molecular Testing:         Test Results:    Imaging: Ct Chest Abdomen Pelvis W Contrast    Result Date: 4/5/2021  Narrative: CT CHEST, ABDOMEN AND PELVIS WITH IV CONTRAST INDICATION:   C16 0: Malignant neoplasm of cardia  COMPARISON:  CT chest/abdomen/pelvis dated 12/10/2020  CT chest dated 2/3/2021  TECHNIQUE: CT examination of the chest, abdomen and pelvis was performed  Axial, sagittal, and coronal 2D reformatted images were created from the source data and submitted for interpretation  Radiation dose length product (DLP) for this visit:  466 53 mGy-cm   This examination, like all CT scans performed in the [facility], was performed utilizing techniques to minimize radiation dose exposure, including the use of iterative  reconstruction and automated exposure control  IV Contrast:  100 mL of iohexol (OMNIPAQUE) Enteric Contrast: Enteric contrast was not administered  FINDINGS: CHEST LUNGS:  Severe paraseptal and centrilobular emphysematous changes  No focal airspace consolidation  No new worrisome pulmonary nodules  Stable 6 mm right upper lobe nodule (image 73, series 3)    Several other smaller nodules in the right lung measuring up to 4 mm are also stable  There is no tracheal or endobronchial lesion  PLEURA:  Small bilateral pleural effusions  HEART/GREAT VESSELS:  There is stable dilatation of the ascending thoracic aorta measuring up to 4 cm in maximal diameter  MEDIASTINUM AND NEVAEH:  Unremarkable  CHEST WALL AND LOWER NECK:   Unremarkable  ABDOMEN LIVER/BILIARY TREE:  Multiple scattered subcentimeter hypodensities in the liver are too small to characterize, probably cysts  GALLBLADDER:  No calcified gallstones  No pericholecystic inflammatory change  SPLEEN:  Unremarkable  PANCREAS:  Unremarkable  ADRENAL GLANDS:  Unremarkable  KIDNEYS/URETERS:  Bilateral renal cysts measuring up to 8 6 cm on the right  No hydronephrosis  STOMACH AND BOWEL: There is apparent 4 4 x 2 2 cm masslike thickening at the gastroesophageal junction in this patient with known neoplasm  Percutaneous gastrostomy tube is seen within the stomach  No evidence for bowel obstruction  APPENDIX:  No findings to suggest appendicitis  ABDOMINOPELVIC CAVITY:  There is interval increase of abdominopelvic ascites and new diffuse mild peritoneal enhancement  Evaluation for focal peritoneal nodules is somewhat limited due to lack of oral contrast and relative paucity of intra-abdominal fat  No pneumoperitoneum  No lymphadenopathy  VESSELS:  Unremarkable for patient's age  PELVIS REPRODUCTIVE ORGANS:  The prostate gland is enlarged measuring 5 5 x 4 4 x 5 1 cm  URINARY BLADDER:  Unremarkable  ABDOMINAL WALL/INGUINAL REGIONS:  Unremarkable  OSSEOUS STRUCTURES:  No acute fracture or destructive osseous lesion  Impression: Increasing abdominopelvic ascites and new diffuse mild peritoneal enhancement which can be seen with peritoneal metastases  Peritoneal enhancement can also be seen with peritonitis in the appropriate clinical setting   Evaluation of the gastrointestinal tract and evaluation for focal peritoneal nodules is limited due to lack of oral contrast and relative paucity of intra-abdominal fat  Apparent 4 4 x 2 2 cm masslike thickening at the gastroesophageal junction in this patient with known neoplasm  There is mild fluid-filled distention of the esophagus proximal to this level  Prostatomegaly  Severe emphysematous changes  Stable pulmonary nodules  Small bilateral pleural effusions  Workstation performed: [id number]       Labs:   Lab Results   Component Value Date    WBC 16 86 (H) 04/15/2021    HGB 6 9 (LL) 04/15/2021    HCT 24 8 (L) 04/15/2021    MCV 89 04/15/2021     (H) 04/15/2021     Lab Results   Component Value Date    K 3 9 04/15/2021     04/15/2021    CO2 29 04/15/2021    BUN 22 04/15/2021    CREATININE 0 89 04/15/2021    GLUF 184 (H) 02/23/2021    CALCIUM 8 8 04/15/2021    CORRECTEDCA 10 3 (H) 04/15/2021    AST 16 04/15/2021    ALT 18 04/15/2021    ALKPHOS 117 (H) 04/15/2021    EGFR 88 04/15/2021           Review of Systems   Constitutional: Positive for activity change, appetite change and fatigue  HENT: Positive for trouble swallowing  Gastrointestinal: Positive for nausea and vomiting  Neurological: Positive for weakness  All other systems reviewed and are negative          Active Problems:   Patient Active Problem List   Diagnosis    PTSD (post-traumatic stress disorder)    Essential hypertension    Major depression    OCD (obsessive compulsive disorder)    Anxiety    Malignant neoplasm of lower third of esophagus ([address] )    [facility]astroesophageal cancer ([facility] [address] )    Cancer-related pain    Chemotherapy-induced nausea    Severe protein-calorie malnutrition (Maryann Prim: less than 60% of standard weight) (HCC)    Malignant ascites    Recovering alcoholic (HCC)    Other fatigue    Dysphagia    Nausea    Dehydration    Skin fissures    Goals of care, counseling/discussion    Palliative care patient    Other insomnia    Counseling on health promotion and disease prevention    Elevated PSA    Memory loss    Regurgitation of stomach contents    Falls    Superficial thrombophlebitis of lower extremity    Pneumonia of left lung due to infectious organism    PAF (paroxysmal atrial fibrillation) (HCC)    Moderate protein-calorie malnutrition (HCC)    Dilated cardiomyopathy (HCC)    Urge urinary incontinence    Aspiration pneumonia (HCC)    Chronic combined systolic and diastolic CHF (congestive heart failure) ([facility] [address] )    Port-A-Cath in place    Hematuria    Hyponatremia    Hypoglycemia    Severe protein-calorie malnutrition (HCC)    Cavitating mass in left upper lung lobe    [facility]    Centrilobular emphysema (HCC)    Leukocytosis    Iron deficiency anemia secondary to inadequate dietary iron intake    GI bleed    Severe anemia    Leukopenia       Past Medical History:   Past Medical History:   Diagnosis Date    Anxiety     Cancer ([facility] ) 08/2019    metastatic gastroesophageal cancer    Dehydration     Depression     Esophageal cancer (HCC)     Fatigue     History of chemotherapy     currently started 8/2019    History of DVT (deep vein thrombosis)     Hypertension     currently is normal and may not need medication    Lung infection     chemo currently on hold because of the infection 12/22/2020    Nausea     Pneumonia     x2    Port-A-Cath in place     right    Psychiatric disorder     Recovering alcoholic ([facility] )     sober since 1990    Varicose veins of left lower extremity        Surgical History:   Past Surgical History:   Procedure Laterality Date    APPENDECTOMY      COLONOSCOPY      IR GASTROSTOMY TUBE PLACEMENT  2/5/2021    IR PORT PLACEMENT  7/31/2019    UPPER GASTROINTESTINAL ENDOSCOPY         Family History:    Family History   Problem Relation Age of Onset    Colon cancer Father     Cancer Father         colon    Cancer Brother         Sinus-neuroblastoma     Heart disease Mother        Cancer-related family history includes Cancer in his brother and father; Colon cancer in his father      Social History:   Social History     Socioeconomic History    Marital status: /Civil Union     Spouse name: Not on file    Number of children: 2    Years of education: Not on file    Highest education level: Not on file   Occupational History    Occupation: retired    Social Needs    Financial resource strain: Not on file    Food insecurity     Worry: Not on file     Inability: Not on file   [employer] needs     Medical: Not on file     Non-medical: Not on file   Tobacco Use    Smoking status: Former Smoker     Packs/day: 2 00     Years: 15 00     Pack years: 30 00     Types: Cigars     Quit date: 5/15/1998     Years since quittin 9    Smokeless tobacco: Former User     Quit date: 2006   Substance and Sexual Activity    Alcohol use: Not Currently     Frequency: Never     Drinks per session: Patient refused     Binge frequency: Never     Comment: quit 30 yrs ago    Drug use: Yes     Frequency: 7 0 times per week     Types: Marijuana     Comment:  "2 hits before bedtime"    Sexual activity: Not Currently   Lifestyle    Physical activity     Days per week: Not on file     Minutes per session: Not on file    Stress: Not on file   Relationships    Social connections     Talks on phone: Not on file     Gets together: Not on file     Attends Sabianist service: Not on file     Active member of club or organization: Not on file     Attends meetings of clubs or organizations: Not on file     Relationship status: Not on file    Intimate partner violence     Fear of current or ex partner: Not on file     Emotionally abused: Not on file     Physically abused: Not on file     Forced sexual activity: Not on file   Other Topics Concern    Not on file   Social History Narrative    Not on file       Current Medications:   Current Outpatient Medications   Medication Sig Dispense Refill    ammonium lactate (LAC-HYDRIN) 12 % cream Apply topically as needed for dry skin On hands and feet 385 g 2    diazepam (VALIUM) 5 mg tablet Take 1 tablet (5 mg total) by mouth 2 (two) times a day (Patient taking differently: Take 10 mg by mouth daily at bedtime ) 60 tablet 5    famotidine (PEPCID) 20 mg tablet Take 1 tablet (20 mg total) by mouth 2 (two) times a day as needed for heartburn 60 tablet 2    hydrochlorothiazide (HYDRODIURIL) 12 5 mg tablet Take one tablet daily as needed for SBP> 160/90 90 tablet 3    levothyroxine 25 mcg tablet Take 1 tablet (25 mcg total) by mouth daily 90 tablet 3    Nutritional Supplements (TwoCal HN 2 0) LIQD 5 Cans by Enteral route every 16 (sixteen) hours      ondansetron (ZOFRAN) 4 mg tablet Take 2 tablets (8 mg total) by mouth every 8 (eight) hours as needed for nausea or vomiting 75 tablet 0    pantoprazole (PROTONIX) 40 mg tablet Take 1 tablet (40 mg total) by mouth daily 30 tablet 2    venlafaxine (EFFEXOR-XR) 150 mg 24 hr capsule Take 1 capsule (150 mg total) by mouth daily 30 capsule 0    amiodarone 200 mg tablet TAKE 1 TABLET BY MOUTH EVERY DAY WITH BREAKFAST (Patient not taking: Reported on 4/8/2021) 30 tablet 3    Eliquis 5 MG        No current facility-administered medications for this visit  Allergies: Allergies   Allergen Reactions    Bee Venom Anaphylaxis     Throat swelling    Shellfish-Derived Products - Food Allergy      Develops GOUT       Physical Exam:  /64 (BP Location: Left arm, Patient Position: Sitting, Cuff Size: Adult)   Pulse 72   Temp 97 8 °F (36 6 °C) (Temporal)   Resp 14   Ht 5' 11" (1 803 m)   Wt 61 2 kg (135 lb)   SpO2 100%   BMI 18 83 kg/m²   Body surface area is 1 78 meters squared  Wt Readings from Last 3 Encounters:   04/27/21 61 2 kg (135 lb)   04/19/21 62 6 kg (137 lb 14 4 oz)   04/13/21 62 1 kg (137 lb)           Physical Exam  Constitutional:       General: He is not in acute distress  Appearance: He is well-developed  He is ill-appearing   He is not diaphoretic  HENT:      Head: Normocephalic and atraumatic  Mouth/Throat:      Pharynx: No oropharyngeal exudate  Eyes:      General: No scleral icterus  Pupils: Pupils are equal, round, and reactive to light  Neck:      Musculoskeletal: Normal range of motion and neck supple  Cardiovascular:      Rate and Rhythm: Regular rhythm  Tachycardia present  Heart sounds: No murmur  Pulmonary:      Effort: Pulmonary effort is normal  No respiratory distress  Breath sounds: Normal breath sounds  Abdominal:      General: Bowel sounds are normal  There is no distension  Palpations: Abdomen is soft  There is no mass  Tenderness: There is no abdominal tenderness  Comments: Peg tube site looks clean dry and intact  No signs of infection   Musculoskeletal: Normal range of motion  Right lower leg: No edema  Left lower leg: Edema present  Comments: Arrived via wheelchair   Lymphadenopathy:      Cervical: No cervical adenopathy  Skin:     General: Skin is warm and dry  Neurological:      General: No focal deficit present  Mental Status: He is alert and oriented to person, place, and time  Psychiatric:         Mood and Affect: Mood normal          Behavior: Behavior normal          Thought Content: Thought content normal          Judgment: Judgment normal          Assessment / Plan:    1  Gastroesophageal cancer ([facility] [address] )    2  Anemia in neoplastic disease      There patient is a 78 yo male with metastatic gastroesophageal cancer diagnosed in July 2019  Augustus Bateman has biopsy-proven adenocarcinoma of the esophagus   PET/CT scan was done which shows omental nodularity that is FDG avid indicating stage IV disease     Due to symptoms of worsening dysphagia, regurgitation and weight loss he had a gastrostomy tube placed on 02/05/2021 for nutritional support  He has progressed on chemotherapy with modified Folfox 6   Imaging completed on 4/5/21 demonstrated Increasing abdominopelvic ascites and new diffuse mild peritoneal enhancement which can be seen with peritoneal metastases as well as an apparent 4 4 x 2 2 cm masslike thickening at the gastroesophageal junction consistent with known neoplasm  Treatment options were presented by Dr Gabriela Max to include immunotherapy vs hospice secondary to his poor performance status  Patient elected to continue with treatment and was started on Keytruda  He received his first dose on 4/16/21  He was also transfused with 2 units of blood for hemoglobin of 6 9    He is set up to receive blood transfusion as needed for hemoglobin less than 8  Patient has ECOG of 3  He arrived via wheelchair today  He appears weak, frail, cachectic  No acute respiratory distress  Goals of care were readdressed again today given patient's overall performance status  Patient reports "I am nearing the end" when encouraged to further elaborate, patient states he understands that  He is approaching end of life  He understands he has the ability to stop treatment at any time  His family does wish for him to continue with immunotherapy and remain hopeful that he will at least receive some benefit from this and prolong his life  I did address his quality of life today, and patient does find marilee in sitting at home watching TV and spending time with his family  He is willing to continue with treatment with immunotherapy at this time as long as he can continue to do these things  If he is not showing any treatment response or he feels continuing with treatment is becoming overly burdensome he acknowledges that he can stop at any time and hospice services can be initiated  He is due for his next cycle of Keytruda next week  He will have blood work completed prior to treatment and proceed as scheduled  I am concerned today that he remains anemic given that he is feeling symptomatic with generalized weakness and worsening fatigue    I will check a stat CBC and type and cross and he will be set up for transfusion if his hemoglobin is below 8  Goals and Barriers:  Current Goal:  Prolong Survival from Gastroesophageal malignancy  Barriers: None  Patient's Capacity to Self Care:  Patient able to self care  Portions of the record may have been created with voice recognition software  Occasional wrong word or "sound a like" substitutions may have occurred due to the inherent limitations of voice recognition software  Read the chart carefully and recognize, using context, where substitutions have occurred

## 2021-04-22 NOTE — TELEPHONE ENCOUNTER
Aimee Slade, [facility] Visiting Nurse, called stating patient patient is being discharged from [facility] agency as he is has been 100% unresponsive from their contact efforts

## 2021-04-26 ENCOUNTER — NUTRITION (OUTPATIENT)
Dept: NUTRITION | Facility: CLINIC | Age: 66
End: 2021-04-26

## 2021-04-26 DIAGNOSIS — Z71.3 NUTRITIONAL COUNSELING: Primary | ICD-10-CM

## 2021-04-26 NOTE — PATIENT INSTRUCTIONS
Nutrition Rx & Recommendations:  · Follow proper oral care; Try baking soda/salt water rinse recipe (mix 3/4 tsp salt + 1 tsp baking soda + 1 qt water; rinse with plain water after using) in Eating Hints book (pg 18)  Brush your teeth before/after meals & before bed  · Weigh yourself regularly  If you notice weight loss, make an effort to increase your daily food/calorie intake  If you continue to notice loss after these efforts, reach out to your dietitian to establish a plan to stabilize weight  · Keep a daily diary to track:  · # cans of TwoCal infused per day  · How much and how often you are flushing your feeding tube  · How much and what you are drinking by mouth  · Your daily weight with the date    · Tube Feeding Plan:  · Increase rate of tube feeding infusion rate to 85 mL/hr until 5 cartons is infused  Monitor for signs and symptoms of intolerance  Over the coming weeks we may be able to increase your pump rate (only as tolerated) to 99 mL/hr x12 hrs per day to help reduce time spent on pump  · Flushes should be spread throughout the day to promote adequate hydration and prevent the feeding tube from clogging  · Continue to work with [facility] regarding pump-feeding tube connection issues  TF Goal Rate: TwoCal HN at 85 mL/hr via G-tube cycled over ~15 hours daily (or until 5 cartons is infused)  Water Flushin mL free water flush at the beginning and end of TF infusion plus 125 mL free water flush 6 times per day (total of 1000 mL/day in flushes; flushes should be spread throughout the day and every 2-3 hours during TF infusion; will need to adjust flushes prn for IV and PO fluids)  Enteral Nutrition goal to provide: 1185 mL volume (5 cartons day), 2370 kcal, 99 grams protein, 830 mL free water, 1830 mL total water daily  Mckenzie Frias requires an enteral feeding pump to administer TF due to intolerance to bolus tube feedings  Recommend an enteral backpack for pump transport    Stay upright at least 30-45 degrees while pump is running  · Call [employer] when you have 10 days left of TF supplies: [phone], option 3 (customer support)       Follow Up Plan: 5/5/21 at 1pm   Recommend Referral to Other Providers: none at this time

## 2021-04-26 NOTE — PROGRESS NOTES
Outpatient Oncology Nutrition Consult  Type of Consult: Follow Up  Care Location: Telephone Call - spoke with wife Na Ellis) because pt was not available  Nutrition Assessment:    Oncology Diagnosis & Treatments: Stage IV metastatic gastroesophageal cancer  -S/p chemotherapy in the palliative setting (5FU) - tx ended 3/26/21 d/t performance status    -Started immunotherapy with Jessica Renee on 4/16/21    Oncology History   Malignant neoplasm of lower third of esophagus ([facility] )   7/26/2019 Initial Diagnosis    Malignant neoplasm of lower third of esophagus ([facility] )     8/1/2019 - 4/6/2021 Chemotherapy    fluorouracil (ADRUCIL) injection 750 mg, 400 mg/m2 = 750 mg, Intravenous, Once, 34 of 38 cycles  Administration: 750 mg (8/1/2019), 750 mg (8/13/2019), 750 mg (8/27/2019), 750 mg (9/10/2019), 750 mg (9/24/2019), 750 mg (10/8/2019), 795 mg (10/22/2019), 795 mg (11/5/2019), 795 mg (11/19/2019), 795 mg (12/3/2019), 795 mg (12/17/2019), 795 mg (12/31/2019), 795 mg (1/14/2020), 795 mg (1/28/2020), 795 mg (2/11/2020), 795 mg (2/25/2020), 795 mg (3/10/2020), 795 mg (3/24/2020), 795 mg (4/7/2020), 795 mg (5/13/2020), 795 mg (5/27/2020), 795 mg (6/10/2020), 795 mg (6/24/2020), 795 mg (7/22/2020), 795 mg (8/5/2020), 790 mg (8/19/2020), 750 mg (10/7/2020), 795 mg (4/29/2020), 750 mg (10/21/2020), 750 mg (11/4/2020), 750 mg (11/18/2020), 735 mg (2/24/2021), 735 mg (3/10/2021), 735 mg (3/24/2021)  pegfilgrastim (NEULASTA ONPRO) subcutaneous injection kit 6 mg, 6 mg, Subcutaneous, Once, 2 of 2 cycles  Administration: 6 mg (8/3/2019), 6 mg (8/15/2019)  leucovorin 750 mg in dextrose 5 % 250 mL IVPB, 748 mg, Intravenous, Once, 21 of 21 cycles  Administration: 750 mg (8/1/2019), 750 mg (8/13/2019), 750 mg (8/27/2019), 750 mg (9/10/2019), 750 mg (9/24/2019), 750 mg (10/8/2019), 800 mg (10/22/2019), 800 mg (11/5/2019), 800 mg (11/19/2019), 800 mg (12/3/2019), 800 mg (12/17/2019), 800 mg (12/31/2019), 800 mg (1/14/2020), 800 mg (1/28/2020), 800 mg (2/11/2020), 800 mg (2/25/2020), 800 mg (3/10/2020), 800 mg (3/24/2020), 800 mg (4/7/2020), 800 mg (5/13/2020), 800 mg (4/29/2020)  oxaliplatin (ELOXATIN) 158 95 mg in dextrose 5 % 250 mL chemo infusion, 85 mg/m2 = 158 95 mg, Intravenous, Once, 6 of 6 cycles  Administration: 158 95 mg (8/1/2019), 158 95 mg (8/13/2019), 158 95 mg (8/27/2019), 158 95 mg (9/10/2019), 158 95 mg (9/24/2019), 158 95 mg (10/8/2019)     4/16/2021 -  Chemotherapy    pembrolizumab (KEYTRUDA) 200 mg in sodium chloride 0 9 % 50 mL IVPB, 200 mg, Intravenous, Once, 1 of 6 cycles  Administration: 200 mg (4/16/2021)     Gastroesophageal cancer ([facility] [address] )   7/26/2019 Initial Diagnosis    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 )     8/1/2019 - 4/6/2021 Chemotherapy    fluorouracil (ADRUCIL) injection 750 mg, 400 mg/m2 = 750 mg, Intravenous, Once, 34 of 38 cycles  Administration: 750 mg (8/1/2019), 750 mg (8/13/2019), 750 mg (8/27/2019), 750 mg (9/10/2019), 750 mg (9/24/2019), 750 mg (10/8/2019), 795 mg (10/22/2019), 795 mg (11/5/2019), 795 mg (11/19/2019), 795 mg (12/3/2019), 795 mg (12/17/2019), 795 mg (12/31/2019), 795 mg (1/14/2020), 795 mg (1/28/2020), 795 mg (2/11/2020), 795 mg (2/25/2020), 795 mg (3/10/2020), 795 mg (3/24/2020), 795 mg (4/7/2020), 795 mg (5/13/2020), 795 mg (5/27/2020), 795 mg (6/10/2020), 795 mg (6/24/2020), 795 mg (7/22/2020), 795 mg (8/5/2020), 790 mg (8/19/2020), 750 mg (10/7/2020), 795 mg (4/29/2020), 750 mg (10/21/2020), 750 mg (11/4/2020), 750 mg (11/18/2020), 735 mg (2/24/2021), 735 mg (3/10/2021), 735 mg (3/24/2021)  pegfilgrastim (NEULASTA ONPRO) subcutaneous injection kit 6 mg, 6 mg, Subcutaneous, Once, 2 of 2 cycles  Administration: 6 mg (8/3/2019), 6 mg (8/15/2019)  leucovorin 750 mg in dextrose 5 % 250 mL IVPB, 748 mg, Intravenous, Once, 21 of 21 cycles  Administration: 750 mg (8/1/2019), 750 mg (8/13/2019), 750 mg (8/27/2019), 750 mg (9/10/2019), 750 mg (9/24/2019), 750 mg (10/8/2019), 800 mg (10/22/2019), 800 mg (11/5/2019), 800 mg (11/19/2019), 800 mg (12/3/2019), 800 mg (12/17/2019), 800 mg (12/31/2019), 800 mg (1/14/2020), 800 mg (1/28/2020), 800 mg (2/11/2020), 800 mg (2/25/2020), 800 mg (3/10/2020), 800 mg (3/24/2020), 800 mg (4/7/2020), 800 mg (5/13/2020), 800 mg (4/29/2020)  oxaliplatin (ELOXATIN) 158 95 mg in dextrose 5 % 250 mL chemo infusion, 85 mg/m2 = 158 95 mg, Intravenous, Once, 6 of 6 cycles  Administration: 158 95 mg (8/1/2019), 158 95 mg (8/13/2019), 158 95 mg (8/27/2019), 158 95 mg (9/10/2019), 158 95 mg (9/24/2019), 158 95 mg (10/8/2019)     4/16/2021 -  Chemotherapy    pembrolizumab (KEYTRUDA) 200 mg in sodium chloride 0 9 % 50 mL IVPB, 200 mg, Intravenous, Once, 1 of 6 cycles  Administration: 200 mg (4/16/2021)       Patient Active Problem List   Diagnosis    PTSD (post-traumatic stress disorder)    Essential hypertension    Major depression    OCD (obsessive compulsive disorder)    Anxiety    Malignant neoplasm of lower third of esophagus ([facility] )    Gastroesophageal cancer ([facility] )    Cancer-related pain    Chemotherapy-induced nausea    Severe protein-calorie malnutrition (Nate Harps: less than 60% of standard weight) ([facility] )    Malignant ascites    Recovering alcoholic (HCC)    Other fatigue    Dysphagia    Nausea    Dehydration    Skin fissures    Goals of care, counseling/discussion    Palliative care patient    Other insomnia    Counseling on health promotion and disease prevention    Elevated PSA    Memory loss    Regurgitation of stomach contents    Falls    Superficial thrombophlebitis of lower extremity    Pneumonia of left lung due to infectious organism    PAF (paroxysmal atrial fibrillation) (HCC)    Moderate protein-calorie malnutrition (HCC)    Dilated cardiomyopathy (HCC)    Urge urinary incontinence    Aspiration pneumonia (HCC)    Chronic combined systolic and diastolic CHF (congestive heart failure) ([facility] )    Port-A-Cath in place    Hematuria    Hyponatremia  Hypoglycemia    Severe protein-calorie malnutrition (HCC)    Cavitating mass in left upper lung lobe    Immunocompromised patient ([facility] )    Centrilobular emphysema ([facility] )    Leukocytosis    Iron deficiency anemia secondary to inadequate dietary iron intake    GI bleed    Severe anemia    Leukopenia     Past Medical History:   Diagnosis Date    Anxiety     Cancer ([facility] ) 08/2019    metastatic gastroesophageal cancer    Dehydration     Depression     Esophageal cancer (HCC)     Fatigue     History of chemotherapy     currently started 8/2019    History of DVT (deep vein thrombosis)     Hypertension     currently is normal and may not need medication    Lung infection     chemo currently on hold because of the infection 12/22/2020    Nausea     Pneumonia     x2    Port-A-Cath in place     right    Psychiatric disorder     Recovering alcoholic ([place] [address] )     sober since 1990    Varicose veins of left lower extremity      Past Surgical History:   Procedure Laterality Date    APPENDECTOMY      COLONOSCOPY      IR GASTROSTOMY TUBE PLACEMENT  2/5/2021    IR PORT PLACEMENT  7/31/2019    UPPER GASTROINTESTINAL ENDOSCOPY         Review of Medications:   Vitamins, Supplements and Herbals: No, pt denies taking supplements     Current Outpatient Medications:     amiodarone 200 mg tablet, TAKE 1 TABLET BY MOUTH EVERY DAY WITH BREAKFAST (Patient not taking: Reported on 4/8/2021), Disp: 30 tablet, Rfl: 3    ammonium lactate (LAC-HYDRIN) 12 % cream, Apply topically as needed for dry skin On hands and feet, Disp: 385 g, Rfl: 2    diazepam (VALIUM) 5 mg tablet, Take 1 tablet (5 mg total) by mouth 2 (two) times a day (Patient taking differently: Take 10 mg by mouth daily at bedtime ), Disp: 60 tablet, Rfl: 5    Eliquis 5 MG, , Disp: , Rfl:     famotidine (PEPCID) 20 mg tablet, Take 1 tablet (20 mg total) by mouth 2 (two) times a day as needed for heartburn, Disp: 60 tablet, Rfl: 2    hydrochlorothiazide (HYDRODIURIL) 12 5 mg tablet, Take one tablet daily as needed for SBP> 160/90, Disp: 90 tablet, Rfl: 3    levothyroxine 25 mcg tablet, Take 1 tablet (25 mcg total) by mouth daily, Disp: 90 tablet, Rfl: 3    Nutritional Supplements (TwoCal HN 2 0) LIQD, 5 Cans by Enteral route every 16 (sixteen) hours, Disp: , Rfl:     ondansetron (ZOFRAN) 4 mg tablet, Take 2 tablets (8 mg total) by mouth every 8 (eight) hours as needed for nausea or vomiting, Disp: 75 tablet, Rfl: 0    pantoprazole (PROTONIX) 40 mg tablet, Take 1 tablet (40 mg total) by mouth daily, Disp: 30 tablet, Rfl: 2    venlafaxine (EFFEXOR-XR) 150 mg 24 hr capsule, Take 1 capsule (150 mg total) by mouth daily, Disp: 30 capsule, Rfl: 0    Most Recent Lab Results:   Lab Results   Component Value Date    WBC 16 35 (H) 04/27/2021    IRON 13 (L) 09/01/2020    TIBC 341 09/01/2020    FERRITIN 175 09/01/2020    ALT 18 04/15/2021    AST 16 04/15/2021    ALB 2 1 (L) 04/15/2021    SODIUM 139 04/15/2021    SODIUM 136 04/06/2021    K 3 9 04/15/2021    K 3 4 (L) 04/06/2021     04/15/2021    BUN 22 04/15/2021    BUN 29 (H) 04/06/2021    CREATININE 0 89 04/15/2021    CREATININE 1 04 04/06/2021    EGFR 88 04/15/2021    PHOS 3 3 04/06/2021    PHOS 3 0 09/25/2020    POCGLU 104 09/25/2020    GLUF 184 (H) 02/23/2021    GLUF 99 01/14/2021    GLUC 116 04/15/2021    CALCIUM 8 8 04/15/2021    FOLATE 10 6 09/01/2020    MG 2 0 04/06/2021     Anthropometric Measurements:   Height: 69"  Ht Readings from Last 1 Encounters:   04/27/21 5' 11" (1 803 m)     Wt Readings from Last 20 Encounters:   04/27/21 61 2 kg (135 lb)   04/19/21 62 6 kg (137 lb 14 4 oz)   04/13/21 62 1 kg (137 lb)   04/08/21 62 1 kg (137 lb)   04/06/21 62 4 kg (137 lb 9 1 oz)   04/05/21 62 5 kg (137 lb 12 8 oz)   03/24/21 62 kg (136 lb 11 oz)   03/16/21 62 6 kg (138 lb)   03/10/21 64 4 kg (141 lb 15 6 oz)   02/24/21 65 7 kg (144 lb 13 5 oz)   01/26/21 65 5 kg (144 lb 8 oz)   01/13/21 68 5 kg (151 lb 0 2 oz) 01/07/21 66 2 kg (146 lb)   12/30/20 65 8 kg (145 lb)   12/22/20 68 kg (150 lb)   12/11/20 69 4 kg (153 lb)   11/19/20 72 4 kg (159 lb 9 8 oz)   11/18/20 71 1 kg (156 lb 12 oz)   11/17/20 68 9 kg (152 lb)   11/05/20 74 4 kg (164 lb)     · UBW: 198-212#  · Home Wts: (10/15/20) 166#, (2/8/21) 150#, (2/10/21) 148#, (2/17/21) 143# (states he was 140# 6 days prior so he gained 3# since then, (2/22/21) 143#, (3/4/21) 144#, (3/5/21) 140 4#, (3/17/21) 138#, (4/16/21) 142# - pt refusing to be weighed  · Comments: Denies fluid retention    Wife states pt was not weighed at appt on 4/13/21    Oncology Nutrition-Anthropometrics      Nutrition from 5/5/2021 in [facility] from 4/26/2021 in [place] [address] Oncology Dietitian Services   Patient age (years):  77 years  77 years   Patient (male) height (in):  71 in  71 in   Current weight (lbs):  135 lbs  142 lbs   Current weight to be used for anthropometric calculations (kg)  61 4 kg  64 5 kg   BMI:  19 9  21   IBW male  160 lb  160 lb   IBW (kg) male  72 7 kg  72 7 kg   IBW % (male)  84 4 %  88 8 %   Adjusted BW (male):  153 8 lbs  155 5 lbs   Adjusted BW in kg (male):  69 9 kg  70 7 kg   % weight change after 1 week:  (!) -2 1 %  (!) 3 6 %   Weight change after 1 week (lbs)  -2 9 lbs  5 lbs   % weight change after 1 month:  -1 2 %  2 9 %   Weight change after 1 month (lbs)  -1 7 lbs  4 lbs   % weight change after 3 months:  -6 6 %  -6 %   Weight change after 3 months (lbs)  -9 5 lbs  -9 1 lbs        Nutrition-Focused Physical Findings: n/a due to telephone call       Food/Nutrition-Related History & Client/Social History:    Current Nutrition Impact Symptoms:  [] Nausea  -hx with bolus feedings [x] Reduced Appetite  [] Acid Reflux - denies   [] Vomiting - after all solid po intake, sometime vomits after drinking liquids  -hx w/bolus feedings     [x] Regurgitating - thick phlegm throughout the day [x] Unintended Wt Loss - wt loss picked up again in August 2020 d/t vomiting with meals [] Malabsorption    [] Diarrhea  [] Unintended Wt Gain  [] Dumping Syndrome    [] Constipation   -"small shea", Miralax prn [] Thick Mucous/Secretions  [] Abdominal Pain    [x] Dysgeusia (Altered Taste) - says foods don't taste the same, "everything tastes like cardboard"  -trying to practice good oral care [] Xerostomia (Dry Mouth)  [x] Bloating - not painful per wife   [] Dysosmia (Altered Smell)  [] Michaels Mar  [] Difficulty Chewing    [] Oral Mucositis (Sore Mouth)  [x] Fatigue - sleeps most of the day [] Other:   [] Odynophagia  [] Esophagitis  [] Other:    [x] Dysphagia -   -Pt reports esophageal dysphagia and regurgitation has progressively gotten worse   -Has had VBS and has been seen by ST in the past   -Per GI 2/2/21: "barium swallow shows lower esophageal mass and delayed  esophageal emptying"  -Wife stated 2/3/21 that pt cannot have a stent placed  -S/p PEG placed by IR 2/5/21 (ordered by Dr Moustapha Gonsales)  -Was receiving home ST who recommended a soft diet per wife  [] Early Satiety [] No Problems Eating      Food Allergies & Intolerances: no  -For Gout, avoids: organ meats, shellfish  Last gout flare was 2 years ago after eating a liverwurst sandwich  Current Diet: Clear Liquids and Tube Feeding   Current Nutrition Intake: Decreased since last visit   Appetite: None  Nutrition Route: PO and G-tube  Oral Care: Brushing BID, Flossing QD, Biotene Mouthwash QD  Activity level: Home PT    24 Hr Diet Recall:   PO: water (16 9 oz x2) and gingerale (12 oz x2) = 58 oz/1740 mL    Supplements:    Boost Very High Calorie (8 oz, 530 kcal, 22 g pro) - afraid to drink it d/t regurgitation     EN Recall:  DME: [employer]  Now has wedge pillow  This RD requested an enteral backpack on 4/14/21 and again on 5/5/21 from [employer] as pt has not received it yet  Access Type: 16F G-tube placed 2/5/21 (ordered by GI - Dr Moustapha Gonsales)     Formula: TwoCalHN - order's are for TwoCal HN @80 mL/hr x15 hrs daily or until 5 cartons is infused via G-tube (sent by GI 2/53/10)  Method: Cyclic   Regimen: 80 mL/hr until ~2 cartons is infused for the past 3 days; tried 85 mL/hr but pt felt it was too much    -Wife continues to report feeding tube does not stay connected to pump, they have to tape it to secure it otherwise it leaks - wife is in contact with DME about this but issue is still unresolved  -Now stoma site is leaking since 21  Pt to go to ED per GI but pt did not go yesterday because he didn't want to go (family was visiting)  Flushing: wife unable to quantify flushes, says "it all depends"    -Not flushing throughout the day d/t pump-tube connection issues (see above)  -Discussed how feeding tube is at risk of becoming clogged d/t lack of adequate flushing  EN providin mL (2 cans/day for the past 3 days, 0 cans/day yesterday because pt did not feel like running pump), 948 kcal, 40 g protein, 332 mL free water  Total Fluid Intake: unable to calculate total fluid intake but po fluid intake appears to be meeting ~100% est needs  Est Needs Notes:  Fluids needs for hx of CHF    Oncology Nutrition-Estimated Needs      Nutrition from 3/17/2021 in [facility]   Weight type used  Actual weight  Actual weight   Weight in kilograms (kg) used for estimated needs  62 7 kg  68 2 kg   Energy needs formula:   35-40 kcal/kg  35-40 kcal/kg   Energy needs based on 35 kcal/k kcal  2386 kcal   Energy needs based on 40 kcal/k kcal  2727 kcal   Protein needs formula:  1 5-2 g/kg  1 5-2 g/kg   Protein needs based on 1 5 g/kg  94 g  102 g   Protein needs based on 2 g/kg  125 g  136 g   Fluid needs formula:  25-30 mL/kg  25-30 mL/kg   Fluid needs based on 25 mL/kg  1575 mL  1700 mL   Fluid needs in ounces  53 oz  57 oz   Fluid needs based on 30 mL/kg 1890 mL  2040 mL   Fluid needs in ounces  64 oz  69 oz          Discussion & Intervention:    Agustina Mejia was evaluated today for an RD follow up regarding wt loss, poor po intake, enteral nutrition and nutrition impact sx management  Agustina Mejia is currently undergoing tx for gastroesophageal cancer  Since last visit, Agustina Mejia 's feeding tube has started leaking (as of 4/30/21)  He is only infusing an average of 2 cans of formula per day and refused his TF altogether yesterday  He is refusing to be weighed  He refused to go to the ED per GI yesterday to have his feeding tube assessed because family from out of state is visiting; encouraged him to follow through with GI's recommendations as soon as he is willing  Agustina Mejia and Price Osman are sad about pts cancer situation and continued decline  Agustina Mejia still wants to give the [employer] time to work but his QoL has been very poor  We had a conversation about the ongoing lack of follow through with my nutrition recommendations and my wish to support his health and wellbeing  Price Osman is in agreement and only wants to support her , but also wants to respect his wishes if he wants to not use his feeding tube, be weighed, etc   Significant supportive listening and care provided  Reviewed 24 hour recall, which revealed a likely inadequate po intake and enteral intake when combined, and discussed ways to increase kcal, protein, and fluid intakes and optimize nutrient intake  Also reviewed the importance of wt management throughout the tx process and the role of enteral nutrition in managing wt and overall health  Based on today's assessment, discussion included: MNT for: Enteral Nutrition, hydration, regurgitation, bloating, practicing proper feeding tube use & care and individualized enteral nutrition recommendations & plan: see Nutrition Rx & Recommendations below     Moving forward, Agustina Mejia was encouraged to follow GI recommendations for ED evaluation of leaking feeding tube, continue to work with DME regarding TF connection issues and leaking, spread flushes throughout the day to promote proper hydration and to prevent tube clogging, once feeding tube is fixed increase TF back to 80 mL/hr cycled until 5 cans/day is infused (as tolerated), and keep a daily log of TF/flushes/po fluids/wts  Together, we decided to follow up less fequently to give pt a break from any undue pressure to improve his nutritional status  Wife is in agreement to contact me in the meantime with any issues or questions  Materials Provided: not applicable  All questions and concerns addressed during todays visit  Ratna Harrington has RD contact information  Nutrition Diagnosis:  · Inadequate Energy Intake related to physiological causes, disease state and treatment related issues as evidenced by food recall, wt loss and discussion with pt and/or family  · Increased Nutrient Needs (kcal & pro) related to increased demand for nutrients and disease state as evidenced by cancer dx and pt undergoing tx for cancer  · Inadequate Enteral Nutrition Infusion related to mechanical causes as evidenced by leaking stoma, pump-feeding tube connection issues, pt refusals to run TF  · Swallowing Difficulty related to diagnosis/treatment related causes as evidenced by N/V and regurgitation with po feedings  Monitoring & Evaluation:   Goals:  · adequate nutrition impact symptom management  · pt to meet >/=75% estimated nutrition needs daily  · weight gain of 1-2# per week  · achieve tube feeding goal rate     · Progress Towards Goals: Not Progressing and Not Met    Nutrition Rx & Recommendations:  · Follow proper oral care; Try baking soda/salt water rinse recipe (mix 3/4 tsp salt + 1 tsp baking soda + 1 qt water; rinse with plain water after using) in Eating Hints book (pg 18)  Brush your teeth before/after meals & before bed  · Weigh yourself regularly   If you notice weight loss, make an effort to increase your daily food/calorie intake  If you continue to notice loss after these efforts, reach out to your dietitian to establish a plan to stabilize weight  · Keep a daily diary to track:  · # cans of TwoCal infused per day  · How much and how often you are flushing your feeding tube  · How much and what you are drinking by mouth  · Your daily weight with the date    · Tube Feeding Plan:  · Follow GI Recommendations for feeding tube evaluation in the Emergency Department  · Once feeding tube is functioning properly, please work back up to 80 mL/hr cycled until 5 cans/day is infused (as tolerated)  · Flushes should be spread throughout the day to promote adequate hydration and prevent the feeding tube from clogging  · [facility]feeding [facility]  · I have contacted [facility] again today, 21, to request the backpack to transport pump  They said they will follow up with you  TF Goal Rate: TwoCal HN at 80 mL/hr via G-tube cycled over ~15 hours daily (or until 5 cartons is infused)  Water Flushin mL free water flush at the beginning and end of TF infusion plus 125 mL free water flush 6 times per day (total of 1000 mL/day in flushes; flushes should be spread throughout the day and every 2-3 hours during TF infusion; will need to adjust flushes prn for IV and PO fluids)  Enteral Nutrition goal to provide: 1185 mL volume (5 cartons day), 2370 kcal, 99 grams protein, 830 mL free water, 1830 mL total water daily  Li Ruths requires an enteral feeding pump to administer TF due to intolerance to bolus tube feedings  Recommend an enteral backpack for pump transport  Stay upright at least 30-45 degrees while pump is running  · Call [facility] when you have 10 days left of TF supplies: [phone], option 3 (customer support)       Follow Up Plan: 21 at 1pm   Recommend Referral to Other Providers: none at this time

## 2021-04-27 ENCOUNTER — APPOINTMENT (OUTPATIENT)
Dept: LAB | Facility: CLINIC | Age: 66
End: 2021-04-27
Payer: MEDICARE

## 2021-04-27 ENCOUNTER — OFFICE VISIT (OUTPATIENT)
Dept: HEMATOLOGY ONCOLOGY | Facility: CLINIC | Age: 66
End: 2021-04-27
Payer: MEDICARE

## 2021-04-27 VITALS
WEIGHT: 135 LBS | RESPIRATION RATE: 14 BRPM | TEMPERATURE: 97.8 F | SYSTOLIC BLOOD PRESSURE: 126 MMHG | OXYGEN SATURATION: 100 % | HEIGHT: 71 IN | HEART RATE: 72 BPM | BODY MASS INDEX: 18.9 KG/M2 | DIASTOLIC BLOOD PRESSURE: 64 MMHG

## 2021-04-27 DIAGNOSIS — C16.0 GASTROESOPHAGEAL CANCER (HCC): Primary | ICD-10-CM

## 2021-04-27 DIAGNOSIS — D63.0 ANEMIA IN NEOPLASTIC DISEASE: ICD-10-CM

## 2021-04-27 DIAGNOSIS — C16.0 GASTROESOPHAGEAL CANCER (HCC): ICD-10-CM

## 2021-04-27 LAB
BASOPHILS # BLD AUTO: 0.12 THOUSANDS/ΜL (ref 0–0.1)
BASOPHILS NFR BLD AUTO: 1 % (ref 0–1)
EOSINOPHIL # BLD AUTO: 0.23 THOUSAND/ΜL (ref 0–0.61)
EOSINOPHIL NFR BLD AUTO: 1 % (ref 0–6)
ERYTHROCYTE [DISTWIDTH] IN BLOOD BY AUTOMATED COUNT: 19.1 % (ref 11.6–15.1)
HCT VFR BLD AUTO: 31.4 % (ref 36.5–49.3)
HGB BLD-MCNC: 9 G/DL (ref 12–17)
IMM GRANULOCYTES # BLD AUTO: 0.11 THOUSAND/UL (ref 0–0.2)
IMM GRANULOCYTES NFR BLD AUTO: 1 % (ref 0–2)
LYMPHOCYTES # BLD AUTO: 2.01 THOUSANDS/ΜL (ref 0.6–4.47)
LYMPHOCYTES NFR BLD AUTO: 12 % (ref 14–44)
MCH RBC QN AUTO: 24.6 PG (ref 26.8–34.3)
MCHC RBC AUTO-ENTMCNC: 28.7 G/DL (ref 31.4–37.4)
MCV RBC AUTO: 86 FL (ref 82–98)
MONOCYTES # BLD AUTO: 1.43 THOUSAND/ΜL (ref 0.17–1.22)
MONOCYTES NFR BLD AUTO: 9 % (ref 4–12)
NEUTROPHILS # BLD AUTO: 12.45 THOUSANDS/ΜL (ref 1.85–7.62)
NEUTS SEG NFR BLD AUTO: 76 % (ref 43–75)
NRBC BLD AUTO-RTO: 0 /100 WBCS
PLATELET # BLD AUTO: 667 THOUSANDS/UL (ref 149–390)
PMV BLD AUTO: 11 FL (ref 8.9–12.7)
RBC # BLD AUTO: 3.66 MILLION/UL (ref 3.88–5.62)
WBC # BLD AUTO: 16.35 THOUSAND/UL (ref 4.31–10.16)

## 2021-04-27 PROCEDURE — 99215 OFFICE O/P EST HI 40 MIN: CPT | Performed by: NURSE PRACTITIONER

## 2021-04-27 PROCEDURE — 36415 COLL VENOUS BLD VENIPUNCTURE: CPT

## 2021-04-27 PROCEDURE — 85025 COMPLETE CBC W/AUTO DIFF WBC: CPT

## 2021-04-30 RX ORDER — SODIUM CHLORIDE 9 MG/ML
20 INJECTION, SOLUTION INTRAVENOUS ONCE
Status: CANCELLED | OUTPATIENT
Start: 2021-05-12

## 2021-05-03 ENCOUNTER — TELEPHONE (OUTPATIENT)
Dept: GASTROENTEROLOGY | Facility: CLINIC | Age: 66
End: 2021-05-03

## 2021-05-03 ENCOUNTER — PATIENT OUTREACH (OUTPATIENT)
Dept: FAMILY MEDICINE CLINIC | Facility: CLINIC | Age: 66
End: 2021-05-03

## 2021-05-03 NOTE — PROGRESS NOTES
Received call from patients wife  Wife reports that patient has leakage around his PEG tube  Pt keeps 4x4 under PEG tube disc  Encouraged not to add multiple levels of 4 x4's as that can cause pulling on tube  Advised to change 4 x4 when soiled  Wife states patients skin remains intact at this time  TT sent to Dr Brooke Siddiqi who referred to Dr Wiliam Robb  Discussed probable causes  Dr Wiliam Robb suggested having patient seen in office for evaluation  Advised to have feeding tube available incase it need to be changed  Chart review done  Pt has 16 Gu[place]ese PEG tube  S/W GI office  They will work on scheduling an appointment for patient  Manager approval is needed to clear a spot on schedule  They will reach out to patients wife with time of appointment  Advised that 16 Fr  PEG tube needs to be available incase tube needs to be replaced  LVM for patients wife advising of above

## 2021-05-03 NOTE — TELEPHONE ENCOUNTER
Patients GI provider:  Dr Chinedu Lamb    Number to return call: [phone]    Reason for call: Damian Donohueon from [place] [address] pt's PCP called stating she spoke to Dr Chinedu Lamb  Pt needs an appt ASAP for peg tube check as it is leaking  Dr Chinedu Lamb states pt will need a 16 Thai Peg Tube at appt      Scheduled procedure/appointment date if applicable: Appt - 73/91/34

## 2021-05-04 ENCOUNTER — TELEPHONE (OUTPATIENT)
Dept: NUTRITION | Facility: CLINIC | Age: 66
End: 2021-05-04

## 2021-05-04 ENCOUNTER — PATIENT OUTREACH (OUTPATIENT)
Dept: FAMILY MEDICINE CLINIC | Facility: CLINIC | Age: 66
End: 2021-05-04

## 2021-05-04 DIAGNOSIS — K94.23 PEG TUBE MALFUNCTION (HCC): ICD-10-CM

## 2021-05-04 DIAGNOSIS — C15.5 MALIGNANT NEOPLASM OF LOWER THIRD OF ESOPHAGUS (HCC): Primary | ICD-10-CM

## 2021-05-04 NOTE — TELEPHONE ENCOUNTER
Called wife to ensure she understood Sarah's voicemail  Wife questioned if she should go to ER or if it was necessary to go today  Informed her that the patient needs to receive tube feeds for optimal nutrition/hydration status according to RD recommendations and especially because he does not have any oral intake  G tube needs to be evaluated by IR in case of further intervention so nutrition is not delayed any longer  Also with leaking tube feeds/gastric contents there is concern for skin breakdown around entry site  Pt's wife verbalized understanding and aware that [facility] ED referral was placed

## 2021-05-04 NOTE — PROGRESS NOTES
Chart review done  Outreach to GI to check on status of sooner appointment  Appointment is still pending manager approval  TT sent to Penelope Barber RD who is also working on the case  Outreach to patients wife  Explained the above  States she did speak to Mary Grace and an RN  Pts wife would still like to see GI to evaluate tube  Brief overview of how PEG tube works  Reviewed components of tube  CM will continue to assist in meeting healthcare needs

## 2021-05-04 NOTE — TELEPHONE ENCOUNTER
Last seen 4/13/21 Dr Alexandr Hernandez  G-Tube placed by Dr Gaudencio Darby (IR) on 2/5/21  See RD note regarding G-tube issues  Called pt's wife Tripp Marr for more clarification regarding G Tube  Per wife on Friday pt saturated clothes/bed with tube feeds  He was receiving 5 cans of tube feed daily at 80cc/hr  Also noticed bubbling of tube feeds/gastriccontents in syringe when flushing with water  Wife stated it seems he has tube feed in stomach when going flush/start next feed  Also stated G-tube flushes with ease and requires no effort  Since leaking episodes pt has only been getting 2 cans of tubes feeds at 80cc/hr and has noticed minimal leaking around site  Denies redness/skin breakdown around catheter entry site  Currently has 1 gauze around tube that she replaces as needed  Prior to Friday pt had no leaking at site and tolerated 5 cans with flushes at 80cc/hr  Reports normal BM daily and denies any blood in stool  Pt does spit up saliva/phlegm but that is his baseline   Please advise

## 2021-05-04 NOTE — TELEPHONE ENCOUNTER
Voice message received from wife stating she has questions about Franklin's feeding tube  Called wife back and spoke to Jordon Lopez this morning  Jordon Lopez reports that Franklin's feeding tube is intermittently leaking around the stoma site since Friday  She reports that it leaks more when pump is infusing formula  He has only been infusing 2 cans of TwoCal per day d/t leaking and fullness in stomach  Wife also states that when they try to flush, the water "bubbles back up" into the syringe  Wife denies s/s infection and pt is not c/o pain  She says he saw his PCP yesterday who contacted GI but she has not heard back and is not sure what to do  I let the wife know that I will notify GI regarding the above concerns so that they may reach out to discuss with wife  Reviewed next RD follow up for tomorrow at 1pm     Wife expressed understanding and is in agreement with plan

## 2021-05-04 NOTE — TELEPHONE ENCOUNTER
Personally spoke with Dr Amara Sanchez, called wife, left detailed message as it went right to VM  I recommended patient be taken to ER aswe could not guarantee that a replacement in the office would fix the problem, patient may need a tube check or upsize of his tube and we do not have the ability to do this in the office setting  I explained all of this in detail and will place an ADT order for ER  Left our office number to call back with questions

## 2021-05-05 ENCOUNTER — NUTRITION (OUTPATIENT)
Dept: NUTRITION | Facility: CLINIC | Age: 66
End: 2021-05-05

## 2021-05-05 ENCOUNTER — APPOINTMENT (OUTPATIENT)
Dept: LAB | Facility: CLINIC | Age: 66
End: 2021-05-05
Payer: MEDICARE

## 2021-05-05 DIAGNOSIS — C15.5 MALIGNANT NEOPLASM OF LOWER THIRD OF ESOPHAGUS (HCC): ICD-10-CM

## 2021-05-05 DIAGNOSIS — R11.0 NAUSEA: ICD-10-CM

## 2021-05-05 DIAGNOSIS — E86.0 DEHYDRATION: ICD-10-CM

## 2021-05-05 DIAGNOSIS — Z71.3 NUTRITIONAL COUNSELING: Primary | ICD-10-CM

## 2021-05-05 DIAGNOSIS — C16.0 GASTROESOPHAGEAL CANCER (HCC): ICD-10-CM

## 2021-05-05 LAB
ALBUMIN SERPL BCP-MCNC: 2.5 G/DL (ref 3.5–5)
ALP SERPL-CCNC: 214 U/L (ref 46–116)
ALT SERPL W P-5'-P-CCNC: 31 U/L (ref 12–78)
ANION GAP SERPL CALCULATED.3IONS-SCNC: 9 MMOL/L (ref 4–13)
AST SERPL W P-5'-P-CCNC: 24 U/L (ref 5–45)
BASOPHILS # BLD AUTO: 0.13 THOUSANDS/ΜL (ref 0–0.1)
BASOPHILS NFR BLD AUTO: 1 % (ref 0–1)
BILIRUB SERPL-MCNC: 0.55 MG/DL (ref 0.2–1)
BUN SERPL-MCNC: 25 MG/DL (ref 5–25)
CALCIUM ALBUM COR SERPL-MCNC: 11 MG/DL (ref 8.3–10.1)
CALCIUM SERPL-MCNC: 9.8 MG/DL (ref 8.3–10.1)
CHLORIDE SERPL-SCNC: 102 MMOL/L (ref 100–108)
CO2 SERPL-SCNC: 27 MMOL/L (ref 21–32)
CREAT SERPL-MCNC: 1.19 MG/DL (ref 0.6–1.3)
EOSINOPHIL # BLD AUTO: 0.17 THOUSAND/ΜL (ref 0–0.61)
EOSINOPHIL NFR BLD AUTO: 1 % (ref 0–6)
ERYTHROCYTE [DISTWIDTH] IN BLOOD BY AUTOMATED COUNT: 19.7 % (ref 11.6–15.1)
GFR SERPL CREATININE-BSD FRML MDRD: 63 ML/MIN/1.73SQ M
GLUCOSE SERPL-MCNC: 122 MG/DL (ref 65–140)
HCT VFR BLD AUTO: 30.7 % (ref 36.5–49.3)
HGB BLD-MCNC: 8.6 G/DL (ref 12–17)
IMM GRANULOCYTES # BLD AUTO: 0.08 THOUSAND/UL (ref 0–0.2)
IMM GRANULOCYTES NFR BLD AUTO: 1 % (ref 0–2)
LYMPHOCYTES # BLD AUTO: 2.7 THOUSANDS/ΜL (ref 0.6–4.47)
LYMPHOCYTES NFR BLD AUTO: 16 % (ref 14–44)
MCH RBC QN AUTO: 23.7 PG (ref 26.8–34.3)
MCHC RBC AUTO-ENTMCNC: 28 G/DL (ref 31.4–37.4)
MCV RBC AUTO: 85 FL (ref 82–98)
MONOCYTES # BLD AUTO: 1.29 THOUSAND/ΜL (ref 0.17–1.22)
MONOCYTES NFR BLD AUTO: 8 % (ref 4–12)
NEUTROPHILS # BLD AUTO: 12.54 THOUSANDS/ΜL (ref 1.85–7.62)
NEUTS SEG NFR BLD AUTO: 73 % (ref 43–75)
NRBC BLD AUTO-RTO: 0 /100 WBCS
PLATELET # BLD AUTO: 740 THOUSANDS/UL (ref 149–390)
PMV BLD AUTO: 11.6 FL (ref 8.9–12.7)
POTASSIUM SERPL-SCNC: 3.6 MMOL/L (ref 3.5–5.3)
PROT SERPL-MCNC: 8.6 G/DL (ref 6.4–8.2)
RBC # BLD AUTO: 3.63 MILLION/UL (ref 3.88–5.62)
SODIUM SERPL-SCNC: 138 MMOL/L (ref 136–145)
T3FREE SERPL-MCNC: 2.06 PG/ML (ref 2.3–4.2)
T4 FREE SERPL-MCNC: 1.03 NG/DL (ref 0.76–1.46)
TSH SERPL DL<=0.05 MIU/L-ACNC: 7.62 UIU/ML (ref 0.36–3.74)
WBC # BLD AUTO: 16.91 THOUSAND/UL (ref 4.31–10.16)

## 2021-05-05 PROCEDURE — 84443 ASSAY THYROID STIM HORMONE: CPT

## 2021-05-05 PROCEDURE — 84439 ASSAY OF FREE THYROXINE: CPT

## 2021-05-05 PROCEDURE — 80053 COMPREHEN METABOLIC PANEL: CPT

## 2021-05-05 PROCEDURE — 84481 FREE ASSAY (FT-3): CPT

## 2021-05-05 PROCEDURE — 36415 COLL VENOUS BLD VENIPUNCTURE: CPT

## 2021-05-05 PROCEDURE — 85025 COMPLETE CBC W/AUTO DIFF WBC: CPT

## 2021-05-05 NOTE — PATIENT INSTRUCTIONS
Nutrition Rx & Recommendations:  · Follow proper oral care; Try baking soda/salt water rinse recipe (mix 3/4 tsp salt + 1 tsp baking soda + 1 qt water; rinse with plain water after using) in Eating Hints book (pg 18)  Brush your teeth before/after meals & before bed  · Weigh yourself regularly  If you notice weight loss, make an effort to increase your daily food/calorie intake  If you continue to notice loss after these efforts, reach out to your dietitian to establish a plan to stabilize weight  · Keep a daily diary to track:  · # cans of TwoCal infused per day  · How much and how often you are flushing your feeding tube  · How much and what you are drinking by mouth  · Your daily weight with the date    · Tube Feeding Plan:  · Follow GI Recommendations for feeding tube evaluation in the Emergency Department  · Once feeding tube is functioning properly, please work back up to 80 mL/hr cycled until 5 cans/day is infused (as tolerated)  · Flushes should be spread throughout the day to promote adequate hydration and prevent the feeding tube from clogging  · Continue to work with [employer] regarding pump-feeding tube connection issues  · I have contacted [employer] again today, 21, to request the backpack to transport pump  They said they will follow up with you  TF Goal Rate: TwoCal HN at 80 mL/hr via G-tube cycled over ~15 hours daily (or until 5 cartons is infused)  Water Flushin mL free water flush at the beginning and end of TF infusion plus 125 mL free water flush 6 times per day (total of 1000 mL/day in flushes; flushes should be spread throughout the day and every 2-3 hours during TF infusion; will need to adjust flushes prn for IV and PO fluids)  Enteral Nutrition goal to provide: 1185 mL volume (5 cartons day), 2370 kcal, 99 grams protein, 830 mL free water, 1830 mL total water daily    Hrenandez Herbert requires an enteral feeding pump to administer TF due to intolerance to bolus tube feedings  Recommend an enteral backpack for pump transport  Stay upright at least 30-45 degrees while pump is running  · Call [employer] when you have 10 days left of TF supplies: [phone], option 3 (customer support)       Follow Up Plan: 5/21/21 at 1pm   Recommend Referral to Other Providers: none at this time

## 2021-05-07 ENCOUNTER — HOSPITAL ENCOUNTER (EMERGENCY)
Facility: HOSPITAL | Age: 66
Discharge: HOME | End: 2021-05-07
Attending: EMERGENCY MEDICINE | Admitting: EMERGENCY MEDICINE
Payer: MEDICARE

## 2021-05-07 ENCOUNTER — HOSPITAL ENCOUNTER (OUTPATIENT)
Dept: INFUSION CENTER | Facility: HOSPITAL | Age: 66
Discharge: HOME | End: 2021-05-07
Attending: INTERNAL MEDICINE

## 2021-05-07 ENCOUNTER — HOSPITAL ENCOUNTER (OUTPATIENT)
Dept: NON INVASIVE DIAGNOSTICS | Facility: HOSPITAL | Age: 66
Discharge: HOME | End: 2021-05-07
Attending: EMERGENCY MEDICINE | Admitting: RADIOLOGY
Payer: MEDICARE

## 2021-05-07 VITALS
SYSTOLIC BLOOD PRESSURE: 134 MMHG | OXYGEN SATURATION: 97 % | RESPIRATION RATE: 16 BRPM | DIASTOLIC BLOOD PRESSURE: 76 MMHG | TEMPERATURE: 98.4 F | HEART RATE: 106 BPM

## 2021-05-07 DIAGNOSIS — K94.23 GASTROSTOMY TUBE DYSFUNCTION (HCC): Primary | ICD-10-CM

## 2021-05-07 DIAGNOSIS — K94.23 MALFUNCTION OF GASTROSTOMY TUBE (HCC): ICD-10-CM

## 2021-05-07 LAB — SARS-COV-2 RNA RESP QL NAA+PROBE: NEGATIVE

## 2021-05-07 PROCEDURE — 99284 EMERGENCY DEPT VISIT MOD MDM: CPT | Performed by: EMERGENCY MEDICINE

## 2021-05-07 PROCEDURE — 49450 REPLACE G/C TUBE PERC: CPT

## 2021-05-07 PROCEDURE — U0005 INFEC AGEN DETEC AMPLI PROBE: HCPCS | Performed by: EMERGENCY MEDICINE

## 2021-05-07 PROCEDURE — 99283 EMERGENCY DEPT VISIT LOW MDM: CPT

## 2021-05-07 PROCEDURE — 49450 REPLACE G/C TUBE PERC: CPT | Performed by: RADIOLOGY

## 2021-05-07 PROCEDURE — U0003 INFECTIOUS AGENT DETECTION BY NUCLEIC ACID (DNA OR RNA); SEVERE ACUTE RESPIRATORY SYNDROME CORONAVIRUS 2 (SARS-COV-2) (CORONAVIRUS DISEASE [COVID-19]), AMPLIFIED PROBE TECHNIQUE, MAKING USE OF HIGH THROUGHPUT TECHNOLOGIES AS DESCRIBED BY CMS-2020-01-R: HCPCS | Performed by: EMERGENCY MEDICINE

## 2021-05-07 RX ADMIN — IOHEXOL 20 ML: 350 INJECTION, SOLUTION INTRAVENOUS at 17:08

## 2021-05-07 NOTE — DISCHARGE INSTRUCTIONS
How to Care for Your PEG Tube     AMBULATORY CARE:   A percutaneous endoscopic gastrostomy (PEG) tube is a plastic tube that is put into your stomach through your skin  PEG tubes are most often used to give you food or liquids if you are not able to eat or drink  Medical formula, medicines, and water can be given through a PEG tube  After your procedure you may resume your regular diet  Start with clear liquids and advance as tolerated  Small sips of flat soda will help with nausea  Your tube can be used immediately  Contact Interventional Radiology at [phone] [facility] PATIENTS: Contact Interventional Radiology at [phone]) Lailashivam Nath PATIENTS: Contact Interventional Radiology at [phone]) if any of the following occur:  · You have severe abdominal pain  · You have persistent nausea or vomiting  · Blood or tube feeding fluid leaks from the PEG tube site  · Your PEG tube is shorter than it was when it was put in    · Your PEG tube comes out  · You have discomfort or pain around your PEG tube site  · The skin around your PEG tube is red, swollen, or draining pus  · Your T tacks do not fall off  T tacks should fall off within four weeks of the peg tube placement  How to use your PEG tube: Your healthcare provider will tell you when and how often to use your PEG tube for feedings  How to care for the skin around your PEG tube:   · Do not remove the T tacks they will fall off in 3 weeks time, they hold your PEG tube in place when you first get it  Leave clean bandages over the tube area for the first 24 hours after the tube is put in  You may not need to use bandages after 24 hours if the skin around the tube looks dry  Ask when you can shower or bathe  · Routine skin care:    ¨ Clean the skin around your tube 1 to 2 times each day  Ask your healthcare provider what you should use to clean your skin  Check for redness and swelling in the area where the tube goes into your body  Check for fluid draining from your stoma (the hole where the tube was put in)  ¨ Keep the skin around your PEG tube dry  This will help prevent skin irritation and infection

## 2021-05-07 NOTE — BRIEF OP NOTE (RAD/CATH)
INTERVENTIONAL RADIOLOGY PROCEDURE NOTE    Date: 5/7/2021    Procedure: IR GASTROSTOMY (G) TUBE CHECK/CHANGE/REINSERTION/UPSIZE    Preoperative diagnosis:   1  Malfunction of gastrostomy tube (HCC)         Postoperative diagnosis: Same  Surgeon: Marilyn Kim MD     Assistant: None  No qualified resident was available  Blood loss: None    Specimens: None     Findings:   1  The G tube balloon retention ring was not seated firmly against the abdominal wall, likely resulting in leakage  2  G-tube was exchanged for a new 16 Fr G-tube  Complications: None immediate      Anesthesia: none

## 2021-05-07 NOTE — ED PROVIDER NOTES
History  Chief Complaint   Patient presents with    Feeding Tube Problem     Pt states his feeding tube has been leaking for the last couple days and he 'can hear air coming out of it'     Pt with a hx of esophageal cancer in the ER with c/o leakage from the insertion site of his Gastrotomy tube  Constant leakage of bilious fluid, worse with tube feeds  He denies abd pain  Pt sent in by GI for tube feed change  Pt is able to tolerate liquids by mouth, in small quantities  History provided by:  Patient   used: No    Feeding Tube Problem  Location:  Epigastrium  Severity:  Mild  Onset quality:  Gradual  Timing:  Constant  Progression:  Worsening  Chronicity:  New  Associated symptoms: no abdominal pain, no chest pain, no cough, no diarrhea, no fever, no nausea, no rash, no shortness of breath, no vomiting and no wheezing        Prior to Admission Medications   Prescriptions Last Dose Informant Patient Reported? Taking?    Eliquis 5 MG  Spouse/Significant Other Yes No   Nutritional Supplements (TwoCal HN 2 0) LIQD  Spouse/Significant Other Yes No   Si Cans by Enteral route every 16 (sixteen) hours   amiodarone 200 mg tablet  Spouse/Significant Other No No   Sig: TAKE 1 TABLET BY MOUTH EVERY DAY WITH BREAKFAST   Patient not taking: Reported on 2021   ammonium lactate (LAC-HYDRIN) 12 % cream  Spouse/Significant Other No No   Sig: Apply topically as needed for dry skin On hands and feet   diazepam (VALIUM) 5 mg tablet  Spouse/Significant Other No No   Sig: Take 1 tablet (5 mg total) by mouth 2 (two) times a day   Patient taking differently: Take 10 mg by mouth daily at bedtime    famotidine (PEPCID) 20 mg tablet  Spouse/Significant Other No No   Sig: Take 1 tablet (20 mg total) by mouth 2 (two) times a day as needed for heartburn   hydrochlorothiazide (HYDRODIURIL) 12 5 mg tablet  Spouse/Significant Other No No   Sig: Take one tablet daily as needed for SBP> 160/90   levothyroxine 25 mcg tablet  Spouse/Significant Other No No   Sig: Take 1 tablet (25 mcg total) by mouth daily   ondansetron (ZOFRAN) 4 mg tablet  Spouse/Significant Other No No   Sig: Take 2 tablets (8 mg total) by mouth every 8 (eight) hours as needed for nausea or vomiting   pantoprazole (PROTONIX) 40 mg tablet  Spouse/Significant Other No No   Sig: Take 1 tablet (40 mg total) by mouth daily   venlafaxine (EFFEXOR-XR) 150 mg 24 hr capsule  Spouse/Significant Other No No   Sig: Take 1 capsule (150 mg total) by mouth daily      Facility-Administered Medications: None       Past Medical History:   Diagnosis Date    Anxiety     Cancer ([facility] ) 08/2019    metastatic gastroesophageal cancer    Dehydration     Depression     Esophageal cancer ([facility] )     Fatigue     History of chemotherapy     currently started 8/2019    History of DVT (deep vein thrombosis)     Hypertension     currently is normal and may not need medication    Lung infection     chemo currently on hold because of the infection 12/22/2020    Nausea     Pneumonia     x2    Port-A-Cath in place     right    Psychiatric disorder     Recovering alcoholic ([facility] )     sober since 1990    Varicose veins of left lower extremity        Past Surgical History:   Procedure Laterality Date    APPENDECTOMY      COLONOSCOPY      IR GASTROSTOMY TUBE PLACEMENT  2/5/2021    IR PORT PLACEMENT  7/31/2019    UPPER GASTROINTESTINAL ENDOSCOPY         Family History   Problem Relation Age of Onset    Colon cancer Father     Cancer Father         colon    Cancer Brother         Sinus-neuroblastoma     Heart disease Mother      I have reviewed and agree with the history as documented      E-Cigarette/Vaping    E-Cigarette Use Current Every Day User     Comments last use 4 days ago      E-Cigarette/Vaping Substances    Nicotine No     THC Yes     CBD No     Flavoring No     Other No     Unknown No      Social History     Tobacco Use    Smoking status: Former Smoker Packs/day: 2 00     Years: 15 00     Pack years: 30 00     Types: Cigars     Quit date: 5/15/1998     Years since quittin 9    Smokeless tobacco: Former User     Quit date: 2006   Substance Use Topics    Alcohol use: Not Currently     Frequency: Never     Drinks per session: Patient refused     Binge frequency: Never     Comment: quit 30 yrs ago    Drug use: Yes     Frequency: 7 0 times per week     Types: Marijuana     Comment:  "2 hits before bedtime"       Review of Systems   Constitutional: Negative for chills and fever  Respiratory: Negative for cough, shortness of breath and wheezing  Cardiovascular: Negative for chest pain and palpitations  Gastrointestinal: Negative for abdominal pain, constipation, diarrhea, nausea and vomiting  Genitourinary: Negative for dysuria, flank pain, hematuria and urgency  Musculoskeletal: Negative for back pain  Skin: Negative for color change and rash  Neurological: Negative for facial asymmetry and light-headedness  Psychiatric/Behavioral: Negative for agitation and confusion  All other systems reviewed and are negative  Physical Exam  Physical Exam  Vitals signs and nursing note reviewed  Constitutional:       Appearance: He is well-developed  HENT:      Head: Normocephalic and atraumatic  Eyes:      Pupils: Pupils are equal, round, and reactive to light  Cardiovascular:      Rate and Rhythm: Normal rate and regular rhythm  Heart sounds: Normal heart sounds  Pulmonary:      Effort: Pulmonary effort is normal       Breath sounds: Normal breath sounds  Abdominal:      General: Bowel sounds are normal  There is no distension  Palpations: Abdomen is soft  There is no mass  Tenderness: There is no abdominal tenderness  There is no guarding or rebound  Skin:     General: Skin is warm and dry  Capillary Refill: Capillary refill takes less than 2 seconds     Neurological:      Mental Status: He is alert and oriented to person, place, and time  Psychiatric:         Behavior: Behavior normal          Thought Content: Thought content normal          Judgment: Judgment normal          Vital Signs  ED Triage Vitals [05/07/21 0948]   Temperature Pulse Respirations Blood Pressure SpO2   98 4 °F (36 9 °C) (!) 106 16 134/76 97 %      Temp Source Heart Rate Source Patient Position - Orthostatic VS BP Location FiO2 (%)   Oral Monitor Lying Right arm --      Pain Score       --           Vitals:    05/07/21 0948   BP: 134/76   Pulse: (!) 106   Patient Position - Orthostatic VS: Lying         Visual Acuity      ED Medications  Medications - No data to display    Diagnostic Studies  Results Reviewed     Procedure Component Value Units Date/Time    [facility] [place] - 2 Hour Stat [[id number]] Collected: 05/07/21 1020    Lab Status: In process Specimen: Nares from Nose Updated: 05/07/21 1024                 No orders to display              Procedures  Procedures         ED Course                                           MDM  Number of Diagnoses or Management Options  Gastrostomy tube dysfunction [facility]): new and requires workup  Diagnosis management comments: Pt in the ER for feeding tube replacement  I reviewed pt with IR, and he will be placed on the schedule for sometime after 4p  Pt has an appt with the infusion center at 1p  Will discharge him to home, and he will return at 4p as planned  Pt is stable at the time of dispo          Amount and/or Complexity of Data Reviewed  Clinical lab tests: ordered    Risk of Complications, Morbidity, and/or Mortality  Presenting problems: moderate  Diagnostic procedures: moderate  Management options: moderate    Patient Progress  Patient progress: stable      Disposition  Final diagnoses:   Gastrostomy tube dysfunction ([facility] )     Time reflects when diagnosis was documented in both MDM as applicable and the Disposition within this note     Time User Action Codes Description Comment    5/7/2021 10:44 AM Danny Julien Add [L89 [id number]] Gastrostomy tube dysfunction [facility])       ED Disposition     ED Disposition Condition Date/Time Comment    Discharge Stable Fri May 7, 2021 10:44 AM Db Ivraudel discharge to home/self care  Follow-up Information     Follow up With Specialties Details Why Contact Info    Mary Lou Stephenson MD [facility] Medicine Schedule an appointment as soon as possible for a visit in 2 days for follow up [phone]  [place] [id number]  [phone]            Patient's Medications   Discharge Prescriptions    No medications on file     No discharge procedures on file      PDMP Review       Value Time User    PDMP Reviewed  Yes 6/23/2020  2:58 PM Oral MD Freddy          ED Provider  Electronically Signed by           Danelle Leone DO  05/07/21 1054

## 2021-05-07 NOTE — DISCHARGE INSTRUCTIONS
Return to the Emergency Department at  on 5/7/2021 for feeding tube replacement by Interventional Radiology  Return sooner for further concerns

## 2021-05-07 NOTE — H&P
Interventional Radiology  History and Physical 2021     Chato Brizuela   1955   [id number]    Assessment/Plan:  57-year-old male with history of esophageal cancer feeding through a gastrostomy tube returns due to leakage at insertion site of the gastrostomy tube  Plan for gastrostomy tube check with possible exchange  Problem List Items Addressed This Visit     None      Visit Diagnoses     Malfunction of gastrostomy tube ([facility] )        Relevant Orders    IR gastrostomy (G) tube check/change/reinsertion/upsize             Subjective:     Patient ID: Chato Brizuela is a 77 y o  male  History of Present Illness  Patient with history of esophageal cancer feeding through a gastrostomy tube returns due to leakage at insertion site of the gastrostomy tube  Review of Systems   Constitutional: Negative for chills and fever           Past Medical History:   Diagnosis Date    Anxiety     Cancer ([facility] ) 2019    metastatic gastroesophageal cancer    Dehydration     Depression     Esophageal cancer (HCC)     Fatigue     History of chemotherapy     currently started 2019    History of DVT (deep vein thrombosis)     Hypertension     currently is normal and may not need medication    Lung infection     chemo currently on hold because of the infection 2020    Nausea     Pneumonia     x2    Port-A-Cath in place     right    Psychiatric disorder     Recover[facility] )     sober since     Varicose veins of left lower extremity         Past Surgical History:   Procedure Laterality Date    APPENDECTOMY      COLONOSCOPY      IR GASTROSTOMY TUBE PLACEMENT  2021    IR PORT PLACEMENT  2019    UPPER GASTROINTESTINAL ENDOSCOPY          Social History     Tobacco Use   Smoking Status Former Smoker    Packs/day: 2 00    Years: 15 00    Pack years: 30 00    Types: Cigars    Quit date: 5/15/1998    Years since quittin 9   Smokeless Tobacco Former User    Quit date: 1/7/2006        Social History     Substance and Sexual Activity   Alcohol Use Not Currently    Frequency: Never    Drinks per session: Patient refused    Binge frequency: Never    Comment: quit 30 yrs ago        Social History     Substance and Sexual Activity   Drug Use Yes    Frequency: 7 0 times per week    Types: Marijuana    Comment:  "2 hits before bedtime"        Allergies   Allergen Reactions    Bee Venom Anaphylaxis     Throat swelling    Shellfish-Derived Products - Food Allergy      Develops GOUT       Current Outpatient Medications   Medication Sig Dispense Refill    amiodarone 200 mg tablet TAKE 1 TABLET BY MOUTH EVERY DAY WITH BREAKFAST (Patient not taking: Reported on 4/8/2021) 30 tablet 3    ammonium lactate (LAC-HYDRIN) 12 % cream Apply topically as needed for dry skin On hands and feet 385 g 2    diazepam (VALIUM) 5 mg tablet Take 1 tablet (5 mg total) by mouth 2 (two) times a day (Patient taking differently: Take 10 mg by mouth daily at bedtime ) 60 tablet 5    Eliquis 5 MG       famotidine (PEPCID) 20 mg tablet Take 1 tablet (20 mg total) by mouth 2 (two) times a day as needed for heartburn 60 tablet 2    hydrochlorothiazide (HYDRODIURIL) 12 5 mg tablet Take one tablet daily as needed for SBP> 160/90 90 tablet 3    levothyroxine 25 mcg tablet Take 1 tablet (25 mcg total) by mouth daily 90 tablet 3    Nutritional Supplements (TwoCal HN 2 0) LIQD 5 Cans by Enteral route every 16 (sixteen) hours      ondansetron (ZOFRAN) 4 mg tablet Take 2 tablets (8 mg total) by mouth every 8 (eight) hours as needed for nausea or vomiting 75 tablet 0    pantoprazole (PROTONIX) 40 mg tablet Take 1 tablet (40 mg total) by mouth daily 30 tablet 2    venlafaxine (EFFEXOR-XR) 150 mg 24 hr capsule Take 1 capsule (150 mg total) by mouth daily 30 capsule 0     No current facility-administered medications for this encounter  Objective: There were no vitals filed for this visit       Physical Exam  Constitutional:       Appearance: Normal appearance  Pulmonary:      Effort: Pulmonary effort is normal    Abdominal:      Comments: G tube in place           No results found for: BNP   Lab Results   Component Value Date    WBC 16 91 (H) 05/05/2021    HGB 8 6 (L) 05/05/2021    HCT 30 7 (L) 05/05/2021    MCV 85 05/05/2021     (H) 05/05/2021     Lab Results   Component Value Date    INR 1 09 01/13/2021    INR 1 92 (H) 09/18/2020    PROTIME 14 0 01/13/2021    PROTIME 21 7 (H) 09/18/2020     Lab Results   Component Value Date    PTT 40 (H) 09/18/2020         I have personally reviewed pertinent imaging and laboratory results  Code Status: Prior  Advance Directive and Living Will:      Power of :    POLST:      This text is generated with voice recognition software  There may be translation, syntax,  or grammatical errors  If you have any questions, please contact the dictating provider

## 2021-05-07 NOTE — SEDATION DOCUMENTATION
G-tube successfully exchanged  Ruptured balloon on old G-tube  AVS reviewed and patient D/C home with his friend

## 2021-05-10 ENCOUNTER — APPOINTMENT (OUTPATIENT)
Dept: RADIOLOGY | Facility: HOSPITAL | Age: 66
DRG: 393 | End: 2021-05-10
Payer: MEDICARE

## 2021-05-10 ENCOUNTER — APPOINTMENT (OUTPATIENT)
Dept: NON INVASIVE DIAGNOSTICS | Facility: HOSPITAL | Age: 66
DRG: 393 | End: 2021-05-10
Payer: MEDICARE

## 2021-05-10 ENCOUNTER — HOSPITAL ENCOUNTER (INPATIENT)
Facility: HOSPITAL | Age: 66
LOS: 2 days | Discharge: HOSPICE | DRG: 393 | End: 2021-05-13
Attending: EMERGENCY MEDICINE | Admitting: FAMILY MEDICINE
Payer: MEDICARE

## 2021-05-10 ENCOUNTER — TELEPHONE (OUTPATIENT)
Dept: HEMATOLOGY ONCOLOGY | Facility: CLINIC | Age: 66
End: 2021-05-10

## 2021-05-10 DIAGNOSIS — C16.0 GASTROESOPHAGEAL CANCER (HCC): ICD-10-CM

## 2021-05-10 DIAGNOSIS — E86.0 DEHYDRATION: ICD-10-CM

## 2021-05-10 DIAGNOSIS — R53.1 WEAKNESS: Primary | ICD-10-CM

## 2021-05-10 DIAGNOSIS — R11.0 NAUSEA: Primary | ICD-10-CM

## 2021-05-10 DIAGNOSIS — Z78.9 ENCOUNTER FOR GASTROJEJUNAL (GJ) TUBE PLACEMENT: ICD-10-CM

## 2021-05-10 DIAGNOSIS — N17.9 ACUTE KIDNEY INJURY (HCC): ICD-10-CM

## 2021-05-10 DIAGNOSIS — C15.5 MALIGNANT NEOPLASM OF LOWER THIRD OF ESOPHAGUS (HCC): ICD-10-CM

## 2021-05-10 PROBLEM — R14.0 ABDOMINAL DISTENSION: Status: ACTIVE | Noted: 2021-05-10

## 2021-05-10 PROBLEM — E87.6 HYPOKALEMIA: Status: ACTIVE | Noted: 2021-05-10

## 2021-05-10 PROBLEM — K94.23 MALFUNCTION OF GASTROSTOMY TUBE (HCC): Status: ACTIVE | Noted: 2021-05-10

## 2021-05-10 LAB
ALBUMIN SERPL BCP-MCNC: 2.9 G/DL (ref 3.5–5)
ALP SERPL-CCNC: 169 U/L (ref 46–116)
ALT SERPL W P-5'-P-CCNC: 23 U/L (ref 12–78)
ANION GAP SERPL CALCULATED.3IONS-SCNC: 17 MMOL/L (ref 4–13)
AST SERPL W P-5'-P-CCNC: 21 U/L (ref 5–45)
BACTERIA UR QL AUTO: ABNORMAL /HPF
BASOPHILS # BLD MANUAL: 0 THOUSAND/UL (ref 0–0.1)
BASOPHILS NFR MAR MANUAL: 0 % (ref 0–1)
BILIRUB SERPL-MCNC: 0.66 MG/DL (ref 0.2–1)
BILIRUB UR QL STRIP: NEGATIVE
BUN SERPL-MCNC: 50 MG/DL (ref 5–25)
CALCIUM ALBUM COR SERPL-MCNC: 10.8 MG/DL (ref 8.3–10.1)
CALCIUM SERPL-MCNC: 9.9 MG/DL (ref 8.3–10.1)
CHLORIDE SERPL-SCNC: 99 MMOL/L (ref 100–108)
CLARITY UR: ABNORMAL
CO2 SERPL-SCNC: 26 MMOL/L (ref 21–32)
COLOR UR: YELLOW
CREAT SERPL-MCNC: 1.74 MG/DL (ref 0.6–1.3)
EOSINOPHIL # BLD MANUAL: 0 THOUSAND/UL (ref 0–0.4)
EOSINOPHIL NFR BLD MANUAL: 0 % (ref 0–6)
ERYTHROCYTE [DISTWIDTH] IN BLOOD BY AUTOMATED COUNT: 18.8 % (ref 11.6–15.1)
GFR SERPL CREATININE-BSD FRML MDRD: 40 ML/MIN/1.73SQ M
GLUCOSE SERPL-MCNC: 166 MG/DL (ref 65–140)
GLUCOSE UR STRIP-MCNC: NEGATIVE MG/DL
HCT VFR BLD AUTO: 37.8 % (ref 36.5–49.3)
HGB BLD-MCNC: 10.2 G/DL (ref 12–17)
HGB UR QL STRIP.AUTO: NEGATIVE
HYALINE CASTS #/AREA URNS LPF: ABNORMAL /LPF
HYPERCHROMIA BLD QL SMEAR: PRESENT
KETONES UR STRIP-MCNC: NEGATIVE MG/DL
LEUKOCYTE ESTERASE UR QL STRIP: NEGATIVE
LYMPHOCYTES # BLD AUTO: 0.38 THOUSAND/UL (ref 0.6–4.47)
LYMPHOCYTES # BLD AUTO: 3 % (ref 14–44)
MAGNESIUM SERPL-MCNC: 2.9 MG/DL (ref 1.6–2.6)
MCH RBC QN AUTO: 22.8 PG (ref 26.8–34.3)
MCHC RBC AUTO-ENTMCNC: 27 G/DL (ref 31.4–37.4)
MCV RBC AUTO: 85 FL (ref 82–98)
MONOCYTES # BLD AUTO: 0.77 THOUSAND/UL (ref 0–1.22)
MONOCYTES NFR BLD: 6 % (ref 4–12)
MUCOUS THREADS UR QL AUTO: ABNORMAL
NEUTROPHILS # BLD MANUAL: 11.62 THOUSAND/UL (ref 1.85–7.62)
NEUTS BAND NFR BLD MANUAL: 41 % (ref 0–8)
NEUTS SEG NFR BLD AUTO: 50 % (ref 43–75)
NITRITE UR QL STRIP: NEGATIVE
NON-SQ EPI CELLS URNS QL MICRO: ABNORMAL /HPF
NRBC BLD AUTO-RTO: 0 /100 WBCS
PH UR STRIP.AUTO: 5 [PH]
PLATELET # BLD AUTO: 817 THOUSANDS/UL (ref 149–390)
PLATELET BLD QL SMEAR: ABNORMAL
PMV BLD AUTO: 10.9 FL (ref 8.9–12.7)
POLYCHROMASIA BLD QL SMEAR: PRESENT
POTASSIUM SERPL-SCNC: 3.3 MMOL/L (ref 3.5–5.3)
PROT SERPL-MCNC: 9.6 G/DL (ref 6.4–8.2)
PROT UR STRIP-MCNC: ABNORMAL MG/DL
RBC # BLD AUTO: 4.47 MILLION/UL (ref 3.88–5.62)
RBC #/AREA URNS AUTO: ABNORMAL /HPF
RBC MORPH BLD: PRESENT
SODIUM SERPL-SCNC: 142 MMOL/L (ref 136–145)
SP GR UR STRIP.AUTO: 1.02 (ref 1–1.03)
TOTAL CELLS COUNTED SPEC: 100
TROPONIN I SERPL-MCNC: <0.02 NG/ML
UROBILINOGEN UR QL STRIP.AUTO: 0.2 E.U./DL
WBC # BLD AUTO: 12.77 THOUSAND/UL (ref 4.31–10.16)
WBC #/AREA URNS AUTO: ABNORMAL /HPF

## 2021-05-10 PROCEDURE — 81001 URINALYSIS AUTO W/SCOPE: CPT | Performed by: STUDENT IN AN ORGANIZED HEALTH CARE EDUCATION/TRAINING PROGRAM

## 2021-05-10 PROCEDURE — C1769 GUIDE WIRE: HCPCS

## 2021-05-10 PROCEDURE — 96360 HYDRATION IV INFUSION INIT: CPT

## 2021-05-10 PROCEDURE — 0DHA7UZ INSERTION OF FEEDING DEVICE INTO JEJUNUM, VIA NATURAL OR ARTIFICIAL OPENING: ICD-10-PCS | Performed by: FAMILY MEDICINE

## 2021-05-10 PROCEDURE — 99219 PR INITIAL OBSERVATION CARE/DAY 50 MINUTES: CPT | Performed by: FAMILY MEDICINE

## 2021-05-10 PROCEDURE — 85007 BL SMEAR W/DIFF WBC COUNT: CPT | Performed by: EMERGENCY MEDICINE

## 2021-05-10 PROCEDURE — 84484 ASSAY OF TROPONIN QUANT: CPT | Performed by: EMERGENCY MEDICINE

## 2021-05-10 PROCEDURE — 99024 POSTOP FOLLOW-UP VISIT: CPT | Performed by: RADIOLOGY

## 2021-05-10 PROCEDURE — 49446 CHANGE G-TUBE TO G-J PERC: CPT

## 2021-05-10 PROCEDURE — 80053 COMPREHEN METABOLIC PANEL: CPT | Performed by: EMERGENCY MEDICINE

## 2021-05-10 PROCEDURE — 85027 COMPLETE CBC AUTOMATED: CPT | Performed by: EMERGENCY MEDICINE

## 2021-05-10 PROCEDURE — 83735 ASSAY OF MAGNESIUM: CPT | Performed by: EMERGENCY MEDICINE

## 2021-05-10 PROCEDURE — 99284 EMERGENCY DEPT VISIT MOD MDM: CPT | Performed by: EMERGENCY MEDICINE

## 2021-05-10 PROCEDURE — 93005 ELECTROCARDIOGRAM TRACING: CPT

## 2021-05-10 PROCEDURE — 1123F ACP DISCUSS/DSCN MKR DOCD: CPT | Performed by: NURSE PRACTITIONER

## 2021-05-10 PROCEDURE — 36415 COLL VENOUS BLD VENIPUNCTURE: CPT | Performed by: EMERGENCY MEDICINE

## 2021-05-10 PROCEDURE — 49446 CHANGE G-TUBE TO G-J PERC: CPT | Performed by: RADIOLOGY

## 2021-05-10 PROCEDURE — 99285 EMERGENCY DEPT VISIT HI MDM: CPT

## 2021-05-10 PROCEDURE — 0DP67UZ REMOVAL OF FEEDING DEVICE FROM STOMACH, VIA NATURAL OR ARTIFICIAL OPENING: ICD-10-PCS | Performed by: FAMILY MEDICINE

## 2021-05-10 RX ORDER — VENLAFAXINE HYDROCHLORIDE 150 MG/1
150 CAPSULE, EXTENDED RELEASE ORAL DAILY
Status: DISCONTINUED | OUTPATIENT
Start: 2021-05-11 | End: 2021-05-13 | Stop reason: HOSPADM

## 2021-05-10 RX ORDER — DIAZEPAM 5 MG/1
10 TABLET ORAL
Status: DISCONTINUED | OUTPATIENT
Start: 2021-05-10 | End: 2021-05-13 | Stop reason: HOSPADM

## 2021-05-10 RX ORDER — ACETAMINOPHEN 325 MG/1
650 TABLET ORAL EVERY 6 HOURS PRN
Status: DISCONTINUED | OUTPATIENT
Start: 2021-05-10 | End: 2021-05-13 | Stop reason: HOSPADM

## 2021-05-10 RX ORDER — LEVOTHYROXINE SODIUM 0.03 MG/1
25 TABLET ORAL DAILY
Status: DISCONTINUED | OUTPATIENT
Start: 2021-05-11 | End: 2021-05-11

## 2021-05-10 RX ORDER — POTASSIUM CHLORIDE 14.9 MG/ML
20 INJECTION INTRAVENOUS
Status: DISPENSED | OUTPATIENT
Start: 2021-05-10 | End: 2021-05-10

## 2021-05-10 RX ORDER — FAMOTIDINE 20 MG/1
20 TABLET, FILM COATED ORAL 2 TIMES DAILY PRN
Status: DISCONTINUED | OUTPATIENT
Start: 2021-05-10 | End: 2021-05-12

## 2021-05-10 RX ORDER — HYDROCHLOROTHIAZIDE 12.5 MG/1
12.5 TABLET ORAL DAILY
Status: DISCONTINUED | OUTPATIENT
Start: 2021-05-11 | End: 2021-05-10

## 2021-05-10 RX ORDER — ONDANSETRON 2 MG/ML
4 INJECTION INTRAMUSCULAR; INTRAVENOUS EVERY 6 HOURS PRN
Status: DISCONTINUED | OUTPATIENT
Start: 2021-05-10 | End: 2021-05-13 | Stop reason: HOSPADM

## 2021-05-10 RX ORDER — DEXTROSE AND SODIUM CHLORIDE 5; .45 G/100ML; G/100ML
100 INJECTION, SOLUTION INTRAVENOUS CONTINUOUS
Status: DISCONTINUED | OUTPATIENT
Start: 2021-05-10 | End: 2021-05-11

## 2021-05-10 RX ORDER — POTASSIUM CHLORIDE 14.9 MG/ML
20 INJECTION INTRAVENOUS ONCE
Status: COMPLETED | OUTPATIENT
Start: 2021-05-10 | End: 2021-05-11

## 2021-05-10 RX ADMIN — BISACODYL 5 MG: 5 TABLET ORAL at 18:30

## 2021-05-10 RX ADMIN — POTASSIUM CHLORIDE 20 MEQ: 14.9 INJECTION, SOLUTION INTRAVENOUS at 23:35

## 2021-05-10 RX ADMIN — POTASSIUM CHLORIDE 20 MEQ: 14.9 INJECTION, SOLUTION INTRAVENOUS at 21:16

## 2021-05-10 RX ADMIN — SODIUM CHLORIDE 1000 ML: 0.9 INJECTION, SOLUTION INTRAVENOUS at 06:38

## 2021-05-10 RX ADMIN — POTASSIUM CHLORIDE 20 MEQ: 14.9 INJECTION, SOLUTION INTRAVENOUS at 18:34

## 2021-05-10 RX ADMIN — DEXTROSE AND SODIUM CHLORIDE 100 ML/HR: 5; .45 INJECTION, SOLUTION INTRAVENOUS at 09:45

## 2021-05-10 RX ADMIN — ONDANSETRON 4 MG: 2 INJECTION INTRAMUSCULAR; INTRAVENOUS at 11:18

## 2021-05-10 RX ADMIN — ACETAMINOPHEN 650 MG: 325 TABLET, FILM COATED ORAL at 19:02

## 2021-05-10 RX ADMIN — DIAZEPAM 10 MG: 5 TABLET ORAL at 21:21

## 2021-05-10 RX ADMIN — IOHEXOL 20 ML: 350 INJECTION, SOLUTION INTRAVENOUS at 17:06

## 2021-05-10 NOTE — CONSULTS
Interventional Radiology  Consultation 5/10/2021     IP Consult to IR  Consult performed by: Doris Flores MD  Consult ordered by: Victorino Boas, DO        Kenny Baca   1955   [id number]      Assessment/Plan:  Flange on the existing tube is noted to be over significantly away from the skin with constant leakage  Additional fluid was placed into the balloon to inflate it slightly, the skin and tube were dried, and the flange was advanced down to the surface of the skin  A silk tie was then tied around the flange to help keep it in place  There was no further leakage around this tube until the patient was brought back to his room, and then there was recurrent leakage  It is unclear if the patient has reduced gastric emptying, however, I think the best course of action is to place his gastrostomy to gravity in which case he will need a jejunostomy for feeding  Plan to bring the patient back to IR and change his catheter to a gastrojejunostomy catheter  Subjective:     Patient ID: Kenny Baca is a 77 y o  male  History of Present Illness  Patient has severe protein calorie malnutrition  Patient had a gastrostomy tube originally placed by Dr Adelfo Valentin at the beginning of February  Patient presented at the end of last week with leakage around the catheter  It was felt that the flange was not staying up against the patient's abdominal wall  The catheter was exchanged, and the flange advanced to the abdominal wall   5 mL saline was used to inflate the balloon  Patient now presents with significant leakage around the catheter  IR has been asked to evaluate  Upon physical examination, there is pullback of the flange from the surface of the skin, and there is constant leakage of thin brown material around the tract  There is a circular area of erythema around the insertion site  Mild traction was placed on the tube, and the skin site was cleaned and dried    Several additional cc of normal saline were administered into the balloon, and the flange was advanced down to the surface of the skin  Since the flange is apparently becoming displaced easily, a silk tie was used to secure it  After this was done, there was no additional leakage , until the patient returned to his room at which point there was additional leakage  Will bring the patient back down and convert his catheter to a gastrojejunostomy catheter so that his gastric lumen can be open to drain  It is unclear if the patient may have delayed gastric emptying      Past Medical History:   Diagnosis Date    Anxiety     Cancer ([facility] ) 2019    metastatic gastroesophageal cancer    Dehydration     Depression     Esophageal cancer (HCC)     Fatigue     History of chemotherapy     currently started 2019    History of DVT (deep vein thrombosis)     Hypertension     currently is normal and may not need medication    Lung infection     chemo currently on hold because of the infection 2020    Nausea     Pneumonia     x2    Port-A-Cath in place     right    Psychiatric disorder     Recovering alcoholic ([facility] )     sober since     Varicose veins of left lower extremity         Past Surgical History:   Procedure Laterality Date    APPENDECTOMY      COLONOSCOPY      IR GASTROSTOMY (G) TUBE CHECK/CHANGE/REINSERTION/UPSIZE  2021    IR GASTROSTOMY TUBE PLACEMENT  2021    IR PORT PLACEMENT  2019    UPPER GASTROINTESTINAL ENDOSCOPY          Social History     Tobacco Use   Smoking Status Former Smoker    Packs/day: 2 00    Years: 15 00    Pack years: 30 00    Types: Cigars    Quit date: 5/15/1998    Years since quittin 0   Smokeless Tobacco Former User    Quit date: 2006        Social History     Substance and Sexual Activity   Alcohol Use Not Currently    Frequency: Never    Drinks per session: Patient refused    Binge frequency: Never    Comment: quit 30 yrs ago        Social History Substance and Sexual Activity   Drug Use Yes    Frequency: 7 0 times per week    Types: Marijuana    Comment:  "2 hits before bedtime"        Allergies   Allergen Reactions    Bee Venom Anaphylaxis     Throat swelling    Shellfish-Derived Products - Food Allergy      Develops GOUT       Current Facility-Administered Medications   Medication Dose Route Frequency Provider Last Rate Last Admin    dextrose 5 % and sodium chloride 0 45 % infusion  100 mL/hr Intravenous Continuous Sravani Rolon  mL/hr at 05/10/21 0945 100 mL/hr at 05/10/21 0945    ondansetron (ZOFRAN) injection 4 mg  4 mg Intravenous Q6H PRN Jose Flannery MD   4 mg at 05/10/21 1118    potassium chloride 20 mEq IVPB (premix)  20 mEq Intravenous Q2H Sravani Rolon DO              Objective:    Vitals:    05/10/21 0645 05/10/21 0700 05/10/21 0724 05/10/21 0932   BP: 124/83  133/84 130/87   BP Location: Left arm  Left arm    Pulse: (!) 112 (!) 110 (!) 108 (!) 109   Resp: 22 21    Temp:    97 7 °F (36 5 °C)   TempSrc:       SpO2: (!) 88% 94% 94% 90%   Weight:       Height:   6' 6" (1 981 m)         Physical Exam      No results found for: BNP   Lab Results   Component Value Date    WBC 12 77 (H) 05/10/2021    HGB 10 2 (L) 05/10/2021    HCT 37 8 05/10/2021    MCV 85 05/10/2021     (H) 05/10/2021     Lab Results   Component Value Date    INR 1 09 01/13/2021    INR 1 92 (H) 09/18/2020    PROTIME 14 0 01/13/2021    PROTIME 21 7 (H) 09/18/2020     Lab Results   Component Value Date    PTT 40 (H) 09/18/2020         I have personally reviewed pertinent imaging and laboratory results  Code Status: Level 1 - Full Code  Advance Directive and Living Will:      Power of :    POLST:      IR has been consulted to evaluate the patient, determine the appropriate procedure, and whether or not a procedure can and should be performed  Thank you for allowing me to participate in the care of [facility]   Please don't hesitate to call, text, email, or TigerText with any questions  This text is generated with voice recognition software  There may be translation, syntax,  or grammatical errors  If you have any questions, please contact the dictating provider

## 2021-05-10 NOTE — ED PROVIDER NOTES
History  Chief Complaint   Patient presents with    Weakness - Generalized     pt c/o weakness, unable to get up off toilet tonight  hx cancer     HPI    77 year male that presents with weakness  Patient has history of esophageal cancer and currently has a GJ tube  He states the tube is leaking around  He just had a head tube changed 3 days ago by IR  Patient states Hollister Cheeks leaking and he is also very weak and tired  Patient only drink some liquids p o   Most solid artery to feeding  Patient also states he has not had a bowel movement and needs an enema  Next a 77 year male who presents with weakness and tube change  Will speak with IR get basic blood work  Prior to Admission Medications   Prescriptions Last Dose Informant Patient Reported? Taking?    Eliquis 5 MG  Spouse/Significant Other Yes No   Nutritional Supplements (TwoCal HN 2 0) LIQD  Spouse/Significant Other Yes No   Si Cans by Enteral route every 16 (sixteen) hours   amiodarone 200 mg tablet  Spouse/Significant Other No No   Sig: TAKE 1 TABLET BY MOUTH EVERY DAY WITH BREAKFAST   Patient not taking: Reported on 2021   ammonium lactate (LAC-HYDRIN) 12 % cream  Spouse/Significant Other No No   Sig: Apply topically as needed for dry skin On hands and feet   diazepam (VALIUM) 5 mg tablet  Spouse/Significant Other No No   Sig: Take 1 tablet (5 mg total) by mouth 2 (two) times a day   Patient taking differently: Take 10 mg by mouth daily at bedtime    famotidine (PEPCID) 20 mg tablet  Spouse/Significant Other No No   Sig: Take 1 tablet (20 mg total) by mouth 2 (two) times a day as needed for heartburn   hydrochlorothiazide (HYDRODIURIL) 12 5 mg tablet  Spouse/Significant Other No No   Sig: Take one tablet daily as needed for SBP> 160/90   levothyroxine 25 mcg tablet  Spouse/Significant Other No No   Sig: Take 1 tablet (25 mcg total) by mouth daily   ondansetron (ZOFRAN) 4 mg tablet  Spouse/Significant Other No No   Sig: Take 2 tablets (8 mg total) by mouth every 8 (eight) hours as needed for nausea or vomiting   pantoprazole (PROTONIX) 40 mg tablet  Spouse/Significant Other No No   Sig: Take 1 tablet (40 mg total) by mouth daily   venlafaxine (EFFEXOR-XR) 150 mg 24 hr capsule  Spouse/Significant Other No No   Sig: Take 1 capsule (150 mg total) by mouth daily      Facility-Administered Medications: None       Past Medical History:   Diagnosis Date    Anxiety     Cancer ([place] [address] ) 2019    metastatic gastroesophageal cancer    Dehydration     Depression     [facility] )     Fatigue     History of chemotherapy     currently started 2019    History of DVT (deep vein thrombosis)     Hypertension     currently is normal and may not need medication    Lung infection     chemo currently on hold because of the infection 2020    Nausea     Pneumonia     x2    Port-A-Cath in place     right    Psychiatric disorder     Recovering alcoholic ([place] [address] )     sober since     Varicose veins of left lower extremity        Past Surgical History:   Procedure Laterality Date    APPENDECTOMY      COLONOSCOPY      IR GASTROSTOMY (G) TUBE CHECK/CHANGE/REINSERTION/UPSIZE  2021    IR GASTROSTOMY TUBE PLACEMENT  2021    IR PORT PLACEMENT  2019    UPPER GASTROINTESTINAL ENDOSCOPY         Family History   Problem Relation Age of Onset    Colon cancer Father     Cancer Father         colon    Cancer Brother         Sinus-neuroblastoma     Heart disease Mother      I have reviewed and agree with the history as documented      E-Cigarette/Vaping    E-Cigarette Use Former User     Comments last use 4 days ago      E-Cigarette/Vaping Substances    Nicotine No     THC Yes     CBD No     Flavoring No     Other No     Unknown No      Social History     Tobacco Use    Smoking status: Former Smoker     Packs/day: 2 00     Years: 15 00     Pack years: 30 00     Types: Cigars     Quit date: 5/15/1998     Years since quittin 0  Smokeless tobacco: Former User     Quit date: 1/7/2006   Substance Use Topics    Alcohol use: Not Currently     Frequency: Never     Drinks per session: Patient refused     Binge frequency: Never     Comment: quit 30 yrs ago    Drug use: Yes     Frequency: 7 0 times per week     Types: Marijuana     Comment:  "2 hits before bedtime"       Review of Systems   Constitutional: Negative  Negative for diaphoresis and fever  HENT: Negative  Respiratory: Negative  Negative for cough, shortness of breath and wheezing  Cardiovascular: Negative  Negative for chest pain, palpitations and leg swelling  Gastrointestinal: Positive for abdominal pain  Negative for abdominal distention, nausea and vomiting  Genitourinary: Negative  Musculoskeletal: Negative  Skin: Negative  Neurological: Negative  Psychiatric/Behavioral: Negative  All other systems reviewed and are negative  Physical Exam  Physical Exam  Vitals signs reviewed  Constitutional:       General: He is not in acute distress  Appearance: He is well-developed  He is not diaphoretic  HENT:      Head: Normocephalic and atraumatic  Nose: Nose normal    Eyes:      Conjunctiva/sclera: Conjunctivae normal       Pupils: Pupils are equal, round, and reactive to light  Neck:      Musculoskeletal: Normal range of motion and neck supple  Cardiovascular:      Rate and Rhythm: Normal rate and regular rhythm  Heart sounds: Normal heart sounds  No murmur  Pulmonary:      Effort: Pulmonary effort is normal  No respiratory distress  Breath sounds: Normal breath sounds  No wheezing or rales  Abdominal:      General: Bowel sounds are normal  There is no distension  Palpations: Abdomen is soft  Tenderness: There is no abdominal tenderness  There is no guarding or rebound  Comments: GJ tube with leakage around the tube  No tenderness on exam    Musculoskeletal: Normal range of motion           General: No tenderness or deformity  Skin:     General: Skin is warm and dry  Coloration: Skin is not pale  Findings: No rash  Neurological:      Mental Status: He is alert and oriented to person, place, and time  Cranial Nerves: No cranial nerve deficit           Vital Signs  ED Triage Vitals [05/10/21 0527]   Temperature Pulse Respirations Blood Pressure SpO2   97 6 °F (36 4 °C) (!) 112 18 132/79 96 %      Temp Source Heart Rate Source Patient Position - Orthostatic VS BP Location FiO2 (%)   Tympanic Monitor Sitting Left arm --      Pain Score       7           Vitals:    05/10/21 0527   BP: 132/79   Pulse: (!) 112   Patient Position - Orthostatic VS: Sitting         Visual Acuity      ED Medications  Medications   sodium chloride 0 9 % bolus 1,000 mL (1,000 mL Intravenous New Bag 5/10/21 0638)       Diagnostic Studies  Results Reviewed     Procedure Component Value Units Date/Time    Troponin I [[id number]]  (Normal) Collected: 05/10/21 0558    Lab Status: Final result Specimen: Blood from Arm, Left Updated: 05/10/21 0625     Troponin I <0 02 ng/mL     Comprehensive metabolic panel [[id number]]  (Abnormal) Collected: 05/10/21 0558    Lab Status: Final result Specimen: Blood from Arm, Left Updated: 05/10/21 0622     Sodium 142 mmol/L      Potassium 3 3 mmol/L      Chloride 99 mmol/L      CO2 26 mmol/L      ANION GAP 17 mmol/L      BUN 50 mg/dL      Creatinine 1 74 mg/dL      Glucose 166 mg/dL      Calcium 9 9 mg/dL      Corrected Calcium 10 8 mg/dL      AST 21 U/L      ALT 23 U/L      Alkaline Phosphatase 169 U/L      Total Protein 9 6 g/dL      Albumin 2 9 g/dL      Total Bilirubin 0 66 mg/dL      eGFR 40 ml/min/1 73sq m     Narrative:      Narendra guidelines for Chronic Kidney Disease (CKD):     Stage 1 with normal or high GFR (GFR > 90 mL/min/1 73 square meters)    Stage 2 Mild CKD (GFR = 60-89 mL/min/1 73 square meters)    Stage 3A Moderate CKD (GFR = 45-59 mL/min/1 73 square meters)    Stage 3B Moderate CKD (GFR = 30-44 mL/min/1 73 square meters)    Stage 4 Severe CKD (GFR = 15-29 mL/min/1 73 square meters)    Stage 5 End Stage CKD (GFR <15 mL/min/1 73 square meters)  Note: GFR calculation is accurate only with a steady state creatinine    Magnesium [[id number]]  (Abnormal) Collected: 05/10/21 0558    Lab Status: Final result Specimen: Blood from Arm, Left Updated: 05/10/21 0622     Magnesium 2 9 mg/dL     CBC and differential [[id number]]  (Abnormal) Collected: 05/10/21 0558    Lab Status: Preliminary result Specimen: Blood from Arm, Left Updated: 05/10/21 0603     WBC 12 77 Thousand/uL      RBC 4 47 Million/uL      Hemoglobin 10 2 g/dL      Hematocrit 37 8 %      MCV 85 fL      MCH 22 8 pg      MCHC 27 0 g/dL      RDW 18 8 %      MPV 10 9 fL      Platelets 164 Thousands/uL     UA (URINE) with reflex to Scope [[id number]]     Lab Status: No result Specimen: Urine                  No orders to display              Procedures  Procedures         ED Course                                           MDM    Disposition  Final diagnoses:   Weakness   Dehydration   Acute kidney injury ([facility] )   Encounter for gastrojejunal (GJ) tube placement     Time reflects when diagnosis was documented in both MDM as applicable and the Disposition within this note     Time User Action Codes Description Comment    5/10/2021  6:44 AM Flynn Johnson Add [R53 1] Weakness     5/10/2021  6:44 AM Flynn Johnson Add [E86 0] Dehydration     5/10/2021  6:44 AM Flynn oJhnson Add [N17 9] Acute kidney injury ([facility] )     5/10/2021  6:44 AM Flynn Johnson Add [Z78 9] Encounter for gastrojejunal ([address] [place] [address]) tube placement       ED Disposition     ED Disposition Condition Date/Time Comment    Admit Stable Mon May 10, 2021  6:44 AM Case was discussed with medicine and the patient's admission status was agreed to be Admission Status: observation status to the service of Dr Alex Candelario           Follow-up Information    None         Patient's Medications   Discharge Prescriptions    No medications on file     No discharge procedures on file      PDMP Review       Value Time User    PDMP Reviewed  Yes 6/23/2020  2:58 PM Edmundo Giraldo MD          ED Provider  Electronically Signed by           Shaheed Weinstein MD  05/10/21 7827

## 2021-05-10 NOTE — BRIEF OP NOTE (RAD/CATH)
INTERVENTIONAL RADIOLOGY PROCEDURE NOTE    Date: 5/10/2021    Procedure: IR GASTROSTOMY TO GASTROJEJUNOSTOMY CONVERSION     Preoperative diagnosis:   1  Weakness    2  Dehydration    3  Acute kidney injury ([address] )    4  Encounter for gastrojejunal ([facility]) tube placement         Postoperative diagnosis: Same  Surgeon: Antonieta Nissen, MD     Assistant: None  No qualified resident was available  Blood loss:  None    Specimens:  None    Findings:  Stomach very distended with a large amount of fluid  Patient admits to drinking a large amount of ginger ale prior to coming down for the procedure because he thought he was not having any additional procedures done today  Successful conversion of 16 French gastrostomy tube to 25 French gastrojejunostomy tube  No additional leakage present after the conversion  This will allow the stomach to be decompressed if leakage occurs by connecting to a gravity bag or intermittent suction  If the patient has no difficulty consuming oral diet, however, perhaps he does not need a feeding tube at this point  It is highly unusual for a gastrostomy patient to consume large amounts of carbonated beverages, and this may be contributing to his leakage problem  Alternatively, the patient could have a surgically placed jejunostomy tube and then over distention of the stomach will not cause leakage  Complications: None immediate      Anesthesia: local

## 2021-05-10 NOTE — PLAN OF CARE
Problem: PAIN - ADULT  Goal: Verbalizes/displays adequate comfort level or baseline comfort level  Description: Interventions:  - Encourage patient to monitor pain and request assistance  - Assess pain using appropriate pain scale  - Administer analgesics based on type and severity of pain and evaluate response  - Implement non-pharmacological measures as appropriate and evaluate response  - Consider cultural and social influences on pain and pain management  - Notify physician/advanced practitioner if interventions unsuccessful or patient reports new pain  Outcome: Progressing     Problem: INFECTION - ADULT  Goal: Absence or prevention of progression during hospitalization  Description: INTERVENTIONS:  - Assess and monitor for signs and symptoms of infection  - Monitor lab/diagnostic results  - Monitor all insertion sites, i e  indwelling lines, tubes, and drains  - Monitor endotracheal if appropriate and nasal secretions for changes in amount and color  - [place] appropriate cooling/warming therapies per order  - Administer medications as ordered  - Instruct and encourage patient and family to use good hand hygiene technique  - Identify and instruct in appropriate isolation precautions for identified infection/condition  Outcome: Progressing     Problem: SAFETY ADULT  Goal: Patient will remain free of falls  Description: INTERVENTIONS:  - Assess patient frequently for physical needs  -  Identify cognitive and physical deficits and behaviors that affect risk of falls    -  [place] fall precautions as indicated by assessment   - Educate patient/family on patient safety including physical limitations  - Instruct patient to call for assistance with activity based on assessment  - Modify environment to reduce risk of injury  - Consider OT/PT consult to assist with strengthening/mobility  Outcome: Progressing  Goal: Maintain or return to baseline ADL function  Description: INTERVENTIONS:  -  Assess patient's ability to carry out ADLs; assess patient's baseline for ADL function and identify physical deficits which impact ability to perform ADLs (bathing, care of mouth/teeth, toileting, grooming, dressing, etc )  - Assess/evaluate cause of self-care deficits   - Assess range of motion  - Assess patient's mobility; develop plan if impaired  - Assess patient's need for assistive devices and provide as appropriate  - Encourage maximum independence but intervene and supervise when necessary  - Involve family in performance of ADLs  - Assess for home care needs following discharge   - Consider OT consult to assist with ADL evaluation and planning for discharge  - Provide patient education as appropriate  Outcome: Progressing  Goal: Maintain or return mobility status to optimal level  Description: INTERVENTIONS:  - Assess patient's baseline mobility status (ambulation, transfers, stairs, etc )    - Identify cognitive and physical deficits and behaviors that affect mobility  - Identify mobility aids required to assist with transfers and/or ambulation (gait belt, sit-to-stand, lift, walker, cane, etc )  - [place] fall precautions as indicated by assessment  - Record patient progress and toleration of activity level on Mobility SBAR; progress patient to next Phase/Stage  - Instruct patient to call for assistance with activity based on assessment  - Consider rehabilitation consult to assist with strengthening/weightbearing, etc   Outcome: Progressing     Problem: DISCHARGE PLANNING  Goal: Discharge to home or other facility with appropriate resources  Description: INTERVENTIONS:  - Identify barriers to discharge w/patient and caregiver  - Arrange for needed discharge resources and transportation as appropriate  - Identify discharge learning needs (meds, wound care, etc )  - Arrange for interpretive services to assist at discharge as needed  - Refer to Case Management Department for coordinating discharge planning if the patient needs post-hospital services based on physician/advanced practitioner order or complex needs related to functional status, cognitive ability, or social support system  Outcome: Progressing     Problem: Knowledge Deficit  Goal: Patient/family/caregiver demonstrates understanding of disease process, treatment plan, medications, and discharge instructions  Description: Complete learning assessment and assess knowledge base    Interventions:  - Provide teaching at level of understanding  - Provide teaching via preferred learning methods  Outcome: Progressing

## 2021-05-10 NOTE — SEDATION DOCUMENTATION
Procedure ended, pt tolerated without incident  Pt taken back to room in stable condition   Bedside report given to Marian Mcclain

## 2021-05-10 NOTE — H&P
H&P Exam - Tri Quiñonez 77 y o  male MRN: [id number]    Unit/Bed#: [address] [address] Encounter: [id number]    Assessment:  Tri Quiñonez is a 77year old male who is being admitted for leakage around G tube pending IR intervention, acute kidney injury, dehydration and hypokalemia  Patient will be admitted for observation under the care of Dr Isa Yang  Expected length stay is less than 2 midnights    Plan:  * Malfunction of gastrostomy tube [facility])  Assessment & Plan  Patient presents to the ED with G-tube leakage  Recently changed on 05/07/2021 for similar concerns  · IR consulted for G-tube replacement  JENNIFER (acute kidney injury) [facility])  Assessment & Plan  Patient presents with an elevated creatinine of 1 74  Five days ago creatinine was 1 19  Baseline ranges from 0 08-1 0  · BUN/creatinine ratio more than 20 indicating likely prerenal cause in setting of poor intake secondary to G-tube malfunction  · IR to exchange G-tube  Will initiate tube feeds once cleared by IR   · ED course:  NS 1l bolus x1  · Start IV fluids D5 0 45% saline at 100 mL/hour  · Home hydrochlorothiazide 12 5 mg held    Hypokalemia  Assessment & Plan  K 3 3 on admission  EKG ordered to check for changes  Repleted  Continue to monitor  Abdominal distension  Assessment & Plan  Patient has left-sided abdominal distension, likely distended bowel secondary to constipation  Patient has decreased bowel sounds but reports passing some flatus and last bowel movement was 3 days ago  Patient recently started taking Dulcolax for constipation so will continue this  · Nausea and vomiting present today is no different from the persistent nausea vomiting patient has had for the past 3 months associated with chemotherapy  · No acute concern for small-bowel obstruction or ileus    · Will continue to monitor patient's symptoms, bowel function abdominal exam     PAF (paroxysmal atrial fibrillation) [facility])  Assessment & Plan  Patient has history of atrial fibrillation but reports he has not been taking amiodarone and Eliquis  Will check EKG and consider restarting medications as appropriate       Malignant neoplasm of lower third of esophagus [facility])  Assessment & Plan  History of metastatic esophageal cancer on chemotherapy  Diagnosed in August 2019  Follows with Oncology in [place], [place] with Dr Karrie Goins  Patient gets chemotherapy every 2 weeks @infusion center in [place]  Started Emilia Bryon on 4/16/21  · Can continue home Pepcid 20 mg b i d  Once G-tube replaced  Essential hypertension  Assessment & Plan  Normotensive on admission  Patient is prescribed hydrochlorothiazide 12 5 mg daily p r n  But reports that he takes it daily  Held at this time due to JENNIFER  Will continue monitor blood pressure and treat as appropriate  Chronic combined systolic and diastolic CHF (congestive heart failure) (HCC)  Assessment & Plan  Wt Readings from Last 3 Encounters:   05/10/21 61 2 kg (135 lb)   04/27/21 61 2 kg (135 lb)   04/19/21 62 6 kg (137 lb 14 4 oz)   Echo 09/2020:  EF 17%, Grade 2 diastolic dysfunction  · Patient hypovolemic on exam   Will continue D5 half-normal saline at 100cc/hr to attain euvolemia  · Will continue to monitor fluid status with daily weights and daily I&Os  · Home hydrochlorothiazide 12 5 mg daily held due to JENNIFER  · Prescribed p r n  but patient does take daily    Major depression  Assessment & Plan  Can continue Continue home venlafaxine 150 mg p o  Daily once G-tube replaced  Anxiety  Assessment & Plan  Can continue home diazepam 10 mg at night once G-tube replaced  Diazepam prescribed 5 mg b i d  but patient takes 10 mg at night  Falls  Assessment & Plan  Fall precautions  PT OT evaluation requested  Dysphagia  Assessment & Plan  G-tube placed on February 5th 2021 first   Patient has history of malignant neoplasm of lower 3rd of esophagus  Patient can still take medications p o    Per wife, patient on 5 cans of TwoCal per day at a rate of 80 mL per hour via pump    History of Present Illness   78 yo M w/ PMH of 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, Afib, dilated cardiomyopathy (EF 35-40%), HTN, DVT, and depression/anxiety presents to the ED due to leakage at insertion site of peg tube and weakness secondary to poor intake  Patient had a recent PEG tube replacement 3 days ago on 05/07/2021 due to the same cause  Patient's wife provides collateral   He was able to receive PEG tube feeds after placement 3 days ago  Yesterday, they started noticing leakage around the G-tube insertion site  Patient reports he is constipated, reports last bowel movement was 3 days ago but wife reports it was yesterday  Patient denies flatus for 1 day  ED: NS 1 L bolus times x1    Review of Systems   Constitutional: Negative for chills and fever  Respiratory: Negative for chest tightness and shortness of breath  Cardiovascular: Negative for chest pain and palpitations  Gastrointestinal: Positive for constipation, nausea and vomiting (Nonbilious with streaks of blood)  Negative for diarrhea  Genitourinary: Negative for dysuria and hematuria  Musculoskeletal: Negative for myalgias  Skin: Negative for rash  Neurological: Negative for light-headedness and headaches  Psychiatric/Behavioral: Negative for decreased concentration  All other systems reviewed and are negative        Historical Information   Past Medical History:   Diagnosis Date    Anxiety     Cancer ([facility] [address] ) 08/2019    metastatic gastroesophageal cancer    Dehydration     Depression     Esophageal cancer (HCC)     Fatigue     History of chemotherapy     currently started 8/2019    History of DVT (deep vein thrombosis)     Hypertension     currently is normal and may not need medication    Lung infection     chemo currently on hold because of the infection 12/22/2020    Nausea     Pneumonia     x2    Port-A-Cath in place     right    Psychiatric disorder  Recovering alcoholic ([facility] [address] )     sober since     Varicose veins of left lower extremity      Past Surgical History:   Procedure Laterality Date    APPENDECTOMY      COLONOSCOPY      IR GASTROSTOMY (G) TUBE CHECK/CHANGE/REINSERTION/UPSIZE  2021    IR GASTROSTOMY TUBE PLACEMENT  2021    IR PORT PLACEMENT  2019    UPPER GASTROINTESTINAL ENDOSCOPY       Social History   Social History     Substance and Sexual Activity   Alcohol Use Not Currently    Frequency: Never    Drinks per session: Patient refused    Binge frequency: Never    Comment: quit 30 yrs ago     Social History     Substance and Sexual Activity   Drug Use Yes    Frequency: 7 0 times per week    Types: Marijuana    Comment:  "2 hits before bedtime"     Social History     Tobacco Use   Smoking Status Former Smoker    Packs/day: 2 00    Years: 15 00    Pack years: 30 00    Types: Cigars    Quit date: 5/15/1998    Years since quittin 0   Smokeless Tobacco Former User    Quit date: 2006     E-Cigarette Use: Former User     E-Cigarette/Vaping Substances    Nicotine No     THC Yes     CBD No     Flavoring No     Other No     Unknown No        Family History:   Family History   Problem Relation Age of Onset    Colon cancer Father     Cancer Father         colon    Cancer Brother         Sinus-neuroblastoma     Heart disease Mother        Meds/Allergies   PTA meds:   Prior to Admission Medications   Prescriptions Last Dose Informant Patient Reported? Taking?    Eliquis 5 MG Not Taking at Unknown time Spouse/Significant Other Yes No   Nutritional Supplements (TwoCal HN 2 0) LIQD 2021 at Unknown time Spouse/Significant Other Yes Yes   Si Cans by Enteral route every 16 (sixteen) hours   amiodarone 200 mg tablet Not Taking at Unknown time Spouse/Significant Other No No   Sig: TAKE 1 TABLET BY MOUTH EVERY DAY WITH BREAKFAST   Patient not taking: Reported on 2021   ammonium lactate (LAC-HYDRIN) 12 % cream  Spouse/Significant Other No No   Sig: Apply topically as needed for dry skin On hands and feet   diazepam (VALIUM) 5 mg tablet 5/9/2021 at Unknown time Spouse/Significant Other No Yes   Sig: Take 1 tablet (5 mg total) by mouth 2 (two) times a day   Patient taking differently: Take 10 mg by mouth daily at bedtime    famotidine (PEPCID) 20 mg tablet 5/9/2021 at Unknown time Spouse/Significant Other No Yes   Sig: Take 1 tablet (20 mg total) by mouth 2 (two) times a day as needed for heartburn   hydrochlorothiazide (HYDRODIURIL) 12 5 mg tablet 5/9/2021 at Unknown time Spouse/Significant Other No Yes   Sig: Take one tablet daily as needed for SBP> 160/90   levothyroxine 25 mcg tablet 5/9/2021 at Unknown time Spouse/Significant Other No Yes   Sig: Take 1 tablet (25 mcg total) by mouth daily   ondansetron (ZOFRAN) 4 mg tablet Past Month at Unknown time Spouse/Significant Other No Yes   Sig: Take 2 tablets (8 mg total) by mouth every 8 (eight) hours as needed for nausea or vomiting   pantoprazole (PROTONIX) 40 mg tablet Not Taking at Unknown time Spouse/Significant Other No No   Sig: Take 1 tablet (40 mg total) by mouth daily   Patient not taking: Reported on 5/10/2021   venlafaxine (EFFEXOR-XR) 150 mg 24 hr capsule 5/9/2021 at Unknown time Spouse/Significant Other No Yes   Sig: Take 1 capsule (150 mg total) by mouth daily      Facility-Administered Medications: None     Allergies   Allergen Reactions    Bee Venom Anaphylaxis     Throat swelling    Shellfish-Derived Products - Food Allergy      Develops GOUT       Objective   First Vitals:   Blood Pressure: 132/79 (05/10/21 0527)  Pulse: (!) 112 (05/10/21 0527)  Temperature: 97 6 °F (36 4 °C) (05/10/21 0527)  Temp Source: Tympanic (05/10/21 0527)  Respirations: 18 (05/10/21 0527)  Height: 6' 6" (198 1 cm) (05/10/21 0724)  Weight - Scale: 61 2 kg (135 lb) (05/10/21 0527)  SpO2: 96 % (05/10/21 0527)    Current Vitals:   Blood Pressure: 130/87 (05/10/21 0932)  Pulse: (!) 109 (05/10/21 0932)  Temperature: 97 7 °F (36 5 °C) (05/10/21 0932)  Temp Source: Tympanic (05/10/21 0527)  Respirations: 21 (05/10/21 0724)  Height: 6' 6" (198 1 cm) (05/10/21 0724)  Weight - Scale: 61 2 kg (135 lb) (05/10/21 0527)  SpO2: 90 % (05/10/21 0932)      Intake/Output Summary (Last 24 hours) at 5/10/2021 1621  Last data filed at 5/10/2021 1100  Gross per 24 hour   Intake --   Output 1 ml   Net -1 ml       Invasive Devices     Central Venous Catheter Line            Port A Cath 07/31/19 Right Chest 649 days          Peripheral Intravenous Line            Peripheral IV 05/10/21 Left Antecubital less than 1 day          Drain            Gastrostomy/Enterostomy Gastrostomy 16 Fr  LUQ 2 days                Physical Exam  Constitutional:       General: He is in acute distress (Mild)  Appearance: He is ill-appearing  HENT:      Head: Normocephalic and atraumatic  Neck:      Musculoskeletal: Normal range of motion  Cardiovascular:      Rate and Rhythm: Normal rate and regular rhythm  Pulses: Normal pulses  Heart sounds: Normal heart sounds  Pulmonary:      Effort: Pulmonary effort is normal  No respiratory distress  Breath sounds: Normal breath sounds  No wheezing  Abdominal:      General: Abdomen is flat and protuberant  Bowel sounds are decreased  There is distension (Left-sided)  Palpations: There is no shifting dullness, hepatomegaly or mass  Tenderness: There is abdominal tenderness in the epigastric area, left upper quadrant and left lower quadrant  Negative signs include Chin's sign  Comments: Distended bowels palpated on left side of abdomen  G tube site has serosanguineous leakage  Musculoskeletal: Normal range of motion  Right lower leg: No edema  Left lower leg: No edema  Skin:     General: Skin is warm and dry  Capillary Refill: Capillary refill takes less than 2 seconds  Neurological:      General: No focal deficit present  Mental Status: He is alert and oriented to person, place, and time  Psychiatric:         Mood and Affect: Mood normal          Behavior: Behavior normal          Lab Results:    Results Reviewed     Procedure Component Value Units Date/Time    CBC and differential [[id number]]  (Abnormal) Collected: 05/10/21 0558    Lab Status: Final result Specimen: Blood from Arm, Left Updated: 05/10/21 0647     WBC 12 77 Thousand/uL      RBC 4 47 Million/uL      Hemoglobin 10 2 g/dL      Hematocrit 37 8 %      MCV 85 fL      MCH 22 8 pg      MCHC 27 0 g/dL      RDW 18 8 %      MPV 10 9 fL      Platelets 363 Thousands/uL      nRBC 0 /100 WBCs     Narrative: This is an appended report  These results have been appended to a previously verified report      Manual Differential(PHLEBS Do Not Order) [[id number]]  (Abnormal) Collected: 05/10/21 0558    Lab Status: Final result Specimen: Blood from Arm, Left Updated: 05/10/21 0647     Segmented % 50 %      Bands % 41 %      Lymphocytes % 3 %      Monocytes % 6 %      Eosinophils, % 0 %      Basophils % 0 %      Absolute Neutrophils 11 62 Thousand/uL      Lymphocytes Absolute 0 38 Thousand/uL      Monocytes Absolute 0 77 Thousand/uL      Eosinophils Absolute 0 00 Thousand/uL      Basophils Absolute 0 00 Thousand/uL      Total Counted 100     RBC Morphology Present     Hypochromia Present     Polychromasia Present     Platelet Estimate Increased    Troponin I [[id number]]  (Normal) Collected: 05/10/21 0558    Lab Status: Final result Specimen: Blood from Arm, Left Updated: 05/10/21 0625     Troponin I <0 02 ng/mL     Comprehensive metabolic panel [[id number]]  (Abnormal) Collected: 05/10/21 0558    Lab Status: Final result Specimen: Blood from Arm, Left Updated: 05/10/21 5326     Sodium 142 mmol/L      Potassium 3 3 mmol/L      Chloride 99 mmol/L      CO2 26 mmol/L      ANION GAP 17 mmol/L      BUN 50 mg/dL      Creatinine 1 74 mg/dL      Glucose 166 mg/dL      Calcium 9 9 mg/dL Corrected Calcium 10 8 mg/dL      AST 21 U/L      ALT 23 U/L      Alkaline Phosphatase 169 U/L      Total Protein 9 6 g/dL      Albumin 2 9 g/dL      Total Bilirubin 0 66 mg/dL      eGFR 40 ml/min/1 73sq m     Narrative:      [place] guidelines for Chronic Kidney Disease (CKD):     Stage 1 with normal or high GFR (GFR > 90 mL/min/1 73 square meters)    Stage 2 Mild CKD (GFR = 60-89 mL/min/1 73 square meters)    Stage 3A Moderate CKD (GFR = 45-59 mL/min/1 73 square meters)    Stage 3B Moderate CKD (GFR = 30-44 mL/min/1 73 square meters)    Stage 4 Severe CKD (GFR = 15-29 mL/min/1 73 square meters)    Stage 5 End Stage CKD (GFR <15 mL/min/1 73 square meters)  Note: GFR calculation is accurate only with a steady state creatinine    Magnesium [[id number]]  (Abnormal) Collected: 05/10/21 0573    Lab Status: Final result Specimen: Blood from Arm, Left Updated: 05/10/21 0209     Magnesium 2 9 mg/dL     UA (URINE) with reflex to Scope [[id number]]     Lab Status: No result Specimen: Urine         Code Status: Level 1 Full Code

## 2021-05-10 NOTE — TELEPHONE ENCOUNTER
Appointment Confirmation     Appointment with  Infusion    Appointment date & time 05/12 at 12:00pm   Location [facility]   Patient verbilized Understanding  yes

## 2021-05-11 ENCOUNTER — APPOINTMENT (OUTPATIENT)
Dept: RADIOLOGY | Facility: HOSPITAL | Age: 66
DRG: 393 | End: 2021-05-11
Payer: MEDICARE

## 2021-05-11 ENCOUNTER — TELEPHONE (OUTPATIENT)
Dept: NUTRITION | Facility: CLINIC | Age: 66
End: 2021-05-11

## 2021-05-11 PROBLEM — E03.9 HYPOTHYROIDISM: Status: ACTIVE | Noted: 2021-05-11

## 2021-05-11 PROBLEM — Z99.81 DEPENDENCE ON SUPPLEMENTAL OXYGEN: Status: ACTIVE | Noted: 2021-05-11

## 2021-05-11 LAB
ALBUMIN SERPL BCP-MCNC: 2.2 G/DL (ref 3.5–5)
ALP SERPL-CCNC: 123 U/L (ref 46–116)
ALT SERPL W P-5'-P-CCNC: 12 U/L (ref 12–78)
ANION GAP SERPL CALCULATED.3IONS-SCNC: 14 MMOL/L (ref 4–13)
AST SERPL W P-5'-P-CCNC: 20 U/L (ref 5–45)
ATRIAL RATE: 107 BPM
BILIRUB SERPL-MCNC: 0.44 MG/DL (ref 0.2–1)
BUN SERPL-MCNC: 55 MG/DL (ref 5–25)
CALCIUM ALBUM COR SERPL-MCNC: 10.8 MG/DL (ref 8.3–10.1)
CALCIUM SERPL-MCNC: 9.4 MG/DL (ref 8.3–10.1)
CHLORIDE SERPL-SCNC: 102 MMOL/L (ref 100–108)
CO2 SERPL-SCNC: 24 MMOL/L (ref 21–32)
CREAT SERPL-MCNC: 1.66 MG/DL (ref 0.6–1.3)
GFR SERPL CREATININE-BSD FRML MDRD: 42 ML/MIN/1.73SQ M
GLUCOSE P FAST SERPL-MCNC: 125 MG/DL (ref 65–99)
GLUCOSE SERPL-MCNC: 125 MG/DL (ref 65–140)
HCT VFR BLD AUTO: 29.3 % (ref 36.5–49.3)
HEMOCCULT STL QL: ABNORMAL
HEMOCCULT STL QL: ABNORMAL
HEMOCCULT STL QL: POSITIVE
HGB BLD-MCNC: 8.1 G/DL (ref 12–17)
P AXIS: 63 DEGREES
POTASSIUM SERPL-SCNC: 4.4 MMOL/L (ref 3.5–5.3)
PR INTERVAL: 134 MS
PROT SERPL-MCNC: 7.7 G/DL (ref 6.4–8.2)
QRS AXIS: 61 DEGREES
QRSD INTERVAL: 86 MS
QT INTERVAL: 332 MS
QTC INTERVAL: 443 MS
SODIUM SERPL-SCNC: 140 MMOL/L (ref 136–145)
T WAVE AXIS: 74 DEGREES
VENTRICULAR RATE: 107 BPM

## 2021-05-11 PROCEDURE — 80053 COMPREHEN METABOLIC PANEL: CPT | Performed by: STUDENT IN AN ORGANIZED HEALTH CARE EDUCATION/TRAINING PROGRAM

## 2021-05-11 PROCEDURE — 99232 SBSQ HOSP IP/OBS MODERATE 35: CPT | Performed by: FAMILY MEDICINE

## 2021-05-11 PROCEDURE — 85014 HEMATOCRIT: CPT | Performed by: STUDENT IN AN ORGANIZED HEALTH CARE EDUCATION/TRAINING PROGRAM

## 2021-05-11 PROCEDURE — 99223 1ST HOSP IP/OBS HIGH 75: CPT | Performed by: NURSE PRACTITIONER

## 2021-05-11 PROCEDURE — 85018 HEMOGLOBIN: CPT | Performed by: STUDENT IN AN ORGANIZED HEALTH CARE EDUCATION/TRAINING PROGRAM

## 2021-05-11 PROCEDURE — 82272 OCCULT BLD FECES 1-3 TESTS: CPT | Performed by: STUDENT IN AN ORGANIZED HEALTH CARE EDUCATION/TRAINING PROGRAM

## 2021-05-11 PROCEDURE — G1004 CDSM NDSC: HCPCS

## 2021-05-11 PROCEDURE — 93010 ELECTROCARDIOGRAM REPORT: CPT | Performed by: INTERNAL MEDICINE

## 2021-05-11 PROCEDURE — 74176 CT ABD & PELVIS W/O CONTRAST: CPT

## 2021-05-11 PROCEDURE — 71046 X-RAY EXAM CHEST 2 VIEWS: CPT

## 2021-05-11 RX ORDER — BISACODYL 10 MG
10 SUPPOSITORY, RECTAL RECTAL DAILY PRN
Status: DISCONTINUED | OUTPATIENT
Start: 2021-05-11 | End: 2021-05-12

## 2021-05-11 RX ORDER — DEXTROSE AND SODIUM CHLORIDE 5; .45 G/100ML; G/100ML
75 INJECTION, SOLUTION INTRAVENOUS CONTINUOUS
Status: DISCONTINUED | OUTPATIENT
Start: 2021-05-11 | End: 2021-05-13 | Stop reason: HOSPADM

## 2021-05-11 RX ORDER — LEVOTHYROXINE SODIUM 0.05 MG/1
50 TABLET ORAL
Status: DISCONTINUED | OUTPATIENT
Start: 2021-05-12 | End: 2021-05-12

## 2021-05-11 RX ADMIN — DEXTROSE AND SODIUM CHLORIDE 75 ML/HR: 5; .45 INJECTION, SOLUTION INTRAVENOUS at 18:05

## 2021-05-11 RX ADMIN — VENLAFAXINE HYDROCHLORIDE 150 MG: 150 CAPSULE, EXTENDED RELEASE ORAL at 09:54

## 2021-05-11 RX ADMIN — ONDANSETRON 4 MG: 2 INJECTION INTRAMUSCULAR; INTRAVENOUS at 06:27

## 2021-05-11 RX ADMIN — ONDANSETRON 4 MG: 2 INJECTION INTRAMUSCULAR; INTRAVENOUS at 14:11

## 2021-05-11 RX ADMIN — BISACODYL 5 MG: 5 TABLET ORAL at 09:54

## 2021-05-11 RX ADMIN — DIAZEPAM 10 MG: 5 TABLET ORAL at 22:04

## 2021-05-11 RX ADMIN — DEXTROSE AND SODIUM CHLORIDE 100 ML/HR: 5; .45 INJECTION, SOLUTION INTRAVENOUS at 01:54

## 2021-05-11 RX ADMIN — LEVOTHYROXINE SODIUM 25 MCG: 25 TABLET ORAL at 09:54

## 2021-05-11 NOTE — CONSULTS
Consultation - [facility] Gastroenterology   Malu Paul 77 y o  male MRN: [id number]  Unit/Bed#: [address] [address] Encounter: [id number]        Inpatient consult to gastroenterology  Consult performed by: YANI García  Consult ordered by: Abbey Almeida DO          Reason for Consult / Principal Problem:     Metastatic esophageal cancer, GJ tube leak, significant G-tube output      ASSESSMENT AND PLAN:      Patient with metastatic esophageal cancer on palliative therapy, he presented  leaking G-tube which was replaced on 05/07 and continued to have leakage which prompted admission 5/10  He underwent conversion of G-tube to GJ tube by IR yesterday and continued with leakage  At this time G-tube portion is hooked up to suction and patient has had 800-900 cc of bile removed  Patient is nauseous and vomiting  Concern for obstruction from tumor invasion    -management of GJ tube per IR  -continue NPO  -continue supportive care for nausea/vomiting  -will obtain CT abdomen pelvis for further evaluation  -recommend Eliquis remain on hold due to risk of bleeding from friable esophageal mass   -will start Dulcolax suppository p r n  for constipation    Thank you for the consultation  Case discussed with Dr Ezequiel Garcia    ______________________________________________________________________    HPI:  Malu Paul is a 77 y o  male with past medical history of metastatic gastroesophageal cancer, Afib, dilated cardiomyopathy (EF 35-40%), HTN, DVT, and depression/anxiety presents to the ED due to leakage at insertion site of peg tube and weakness  Recently presented to the ED on 05/07 and had G-tube replaced, per his wife G-tube was functioning well until yesterday when it started leaking again  He had G-tube exchange by IR to [address] tube yesterday, however to continued with leakage and was connected to intermittent wall suction  Per nurse, he has had 800-900 cc of bilious output and he is nauseous and vomiting  Pt has history of metastatic gastroesophageal cancer diagnosed in July 2019  Lang Crump has biopsy-proven adenocarcinoma of the esophagus   PET/CT scan was done which shows omental nodularity that is FDG avid indicating stage IV disease  Due to symptoms of worsening dysphagia, regurgitation and weight loss he had a gastrostomy tube placed on 02/05/2021 for nutritional support  Imaging completed on 4/5/21 demonstrated Increasing abdominopelvic ascites and new diffuse mild peritoneal enhancement which can be seen with peritoneal metastases as well as an apparent 4 4 x 2 2 cm masslike thickening at the gastroesophageal junction consistent with known neoplasm   Per wife he is on TwoCal 4-5 cans daily and takes pills and small amounts of liquids by mouth at home  She reports he has nausea at home for which he takes Zofran as needed and recently she had a 2nd opinion done by [facility] and they gave him Reglan which she thinks has helped more than the Zofran  Patient reports lower abdominal pain which is worse when he is constipated  He reports his last bowel movement was on Sunday or Monday and it was small and dark brown  He typically has 2 bowel movements a week  He takes Dulcolax 5 mg daily at home  He is also on Protonix 40 mg daily and Pepcid 20 mg b i d  His last EGD was done on January 13, 2021 which showed long semi circumferential friable mass in the distal esophagus from 38-45 cm extending down into the gastric cardia  Regular gastric scope was passed and stent attempted but failed  Per wife, Eliquis has been on hold since April 6  Since that time hemoglobin has been fairly stable                      REVIEW OF SYSTEMS:    CONSTITUTIONAL: Denies any fever, chills, rigors, and weight loss  HEENT: No earache or tinnitus  Denies hearing loss or visual disturbances  CARDIOVASCULAR: No chest pain or palpitations     RESPIRATORY: Denies any cough, hemoptysis, shortness of breath or dyspnea on exertion  GASTROINTESTINAL: As noted in the History of Present Illness  GENITOURINARY: No problems with urination  Denies any hematuria or dysuria  NEUROLOGIC: No dizziness or vertigo, denies headaches  MUSCULOSKELETAL: Denies any muscle or joint pain  SKIN: Denies skin rashes or itching  ENDOCRINE: Denies excessive thirst  Denies intolerance to heat or cold  PSYCHOSOCIAL: Denies depression or anxiety  Denies any recent memory loss         Historical Information   Past Medical History:   Diagnosis Date    Anxiety     Cancer ([place] [id number] ) 08/2019    metastatic gastroesophageal cancer    Dehydration     Depression     Esophageal cancer (HCC)     Fatigue     History of chemotherapy     currently started 8/2019    History of DVT (deep vein thrombosis)     Hypertension     currently is normal and may not need medication    Lung infection     chemo currently on hold because of the infection 12/22/2020    Nausea     Pneumonia     x2    Port-A-Cath in place     right    Psychiatric disorder     Recovering alcoholic ([facility] )     sober since 1990    Varicose veins of left lower extremity      Past Surgical History:   Procedure Laterality Date    APPENDECTOMY      COLONOSCOPY      IR GASTROSTOMY (G) TUBE CHECK/CHANGE/REINSERTION/UPSIZE  5/7/2021    IR GASTROSTOMY TUBE PLACEMENT  2/5/2021    IR PORT PLACEMENT  7/31/2019    UPPER GASTROINTESTINAL ENDOSCOPY       Social History   Social History     Substance and Sexual Activity   Alcohol Use Not Currently    Frequency: Never    Drinks per session: Patient refused    Binge frequency: Never    Comment: quit 30 yrs ago     Social History     Substance and Sexual Activity   Drug Use Yes    Frequency: 7 0 times per week    Types: Marijuana    Comment:  "2 hits before bedtime"     Social History     Tobacco Use   Smoking Status Former Smoker    Packs/day: 2 00    Years: 15 00    Pack years: 30 00    Types: Cigars    Quit date: 5/15/1998    Years since quittin 0   Smokeless Tobacco Former User    Quit date: 2006     Family History   Problem Relation Age of Onset    Colon cancer Father     Cancer Father         colon    Cancer Brother         Sinus-neuroblastoma     Heart disease Mother        Meds/Allergies     Medications Prior to Admission   Medication    diazepam (VALIUM) 5 mg tablet    famotidine (PEPCID) 20 mg tablet    hydrochlorothiazide (HYDRODIURIL) 12 5 mg tablet    levothyroxine 25 mcg tablet    Nutritional Supplements (TwoCal HN 2 0) LIQD    ondansetron (ZOFRAN) 4 mg tablet    venlafaxine (EFFEXOR-XR) 150 mg 24 hr capsule    amiodarone 200 mg tablet    ammonium lactate (LAC-HYDRIN) 12 % cream    Eliquis 5 MG    pantoprazole (PROTONIX) 40 mg tablet     Current Facility-Administered Medications   Medication Dose Route Frequency    acetaminophen (TYLENOL) tablet 650 mg  650 mg Oral Q6H PRN    bisacodyl (DULCOLAX) EC tablet 5 mg  5 mg Oral Daily    dextrose 5 % and sodium chloride 0 45 % infusion  75 mL/hr Intravenous Continuous    diazepam (VALIUM) tablet 10 mg  10 mg Oral HS    famotidine (PEPCID) tablet 20 mg  20 mg Oral BID PRN    [START ON 2021] levothyroxine tablet 50 mcg  50 mcg Oral Early Morning    ondansetron (ZOFRAN) injection 4 mg  4 mg Intravenous Q6H PRN    venlafaxine (EFFEXOR-XR) 24 hr capsule 150 mg  150 mg Oral Daily       Allergies   Allergen Reactions    Bee Venom Anaphylaxis     Throat swelling    Shellfish-Derived Products - Food Allergy      Develops GOUT           Objective     Blood pressure 115/80, pulse 105, temperature (!) 97 3 °F (36 3 °C), temperature source Tympanic, resp  rate 17, height 6' 6" (1 981 m), weight 61 2 kg (135 lb), SpO2 97 %  Body mass index is 15 6 kg/m²        Intake/Output Summary (Last 24 hours) at 2021 1538  Last data filed at 2021 1401  Gross per 24 hour   Intake 0 ml   Output 1200 ml   Net -1200 ml         PHYSICAL EXAM:      General Appearance:   Alert, cooperative, no distress; very cachexic    HEENT:   Normocephalic, atraumatic, anicteric  Neck:  Supple, symmetrical, trachea midline   Lungs:   Diminished; no rales, rhonchi or wheezing; respirations unlabored ; on O2 NC   Heart[de-identified]   Regular rate and rhythm; no murmur, rub, or gallop     Abdomen:   Soft, non-tender, non-distended; hyperactive bowel sounds; no masses, no organomegaly    Genitalia:   Deferred    Rectal:   Deferred    Extremities:  No cyanosis, clubbing or edema    Pulses:  2+ and symmetric all extremities    Skin:  No jaundice, rashes, or lesions    Lymph nodes:  No palpable cervical lymphadenopathy        Lab Results:   Admission on 05/10/2021   Component Date Value    WBC 05/10/2021 12 77*    RBC 05/10/2021 4 47     Hemoglobin 05/10/2021 10 2*    Hematocrit 05/10/2021 37 8     MCV 05/10/2021 85     MCH 05/10/2021 22 8*    MCHC 05/10/2021 27 0*    RDW 05/10/2021 18 8*    MPV 05/10/2021 10 9     Platelets 56/65/3851 817*    nRBC 05/10/2021 0     Sodium 05/10/2021 142     Potassium 05/10/2021 3 3*    Chloride 05/10/2021 99*    CO2 05/10/2021 26     ANION GAP 05/10/2021 17*    BUN 05/10/2021 50*    Creatinine 05/10/2021 1 74*    Glucose 05/10/2021 166*    Calcium 05/10/2021 9 9     Corrected Calcium 05/10/2021 10 8*    AST 05/10/2021 21     ALT 05/10/2021 23     Alkaline Phosphatase 05/10/2021 169*    Total Protein 05/10/2021 9 6*    Albumin 05/10/2021 2 9*    Total Bilirubin 05/10/2021 0 66     eGFR 05/10/2021 40     Color, UA 05/10/2021 Yellow     Clarity, UA 05/10/2021 Slightly Cloudy     Specific Gravity, UA 05/10/2021 1 025     pH, UA 05/10/2021 5 0     Leukocytes, UA 05/10/2021 Negative     Nitrite, UA 05/10/2021 Negative     Protein, UA 05/10/2021 30 (1+)*    Glucose, UA 05/10/2021 Negative     Ketones, UA 05/10/2021 Negative     Urobilinogen, UA 05/10/2021 0 2     Bilirubin, UA 05/10/2021 Negative     Blood, UA 05/10/2021 Negative     Troponin I 05/10/2021 <0 02     Magnesium 05/10/2021 2 9*    Segmented % 05/10/2021 50     Bands % 05/10/2021 41*    Lymphocytes % 05/10/2021 3*    Monocytes % 05/10/2021 6     Eosinophils, % 05/10/2021 0     Basophils % 05/10/2021 0     Absolute Neutrophils 05/10/2021 11 62*    Lymphocytes Absolute 05/10/2021 0 38*    Monocytes Absolute 05/10/2021 0 77     Eosinophils Absolute 05/10/2021 0 00     Basophils Absolute 05/10/2021 0 00     Total Counted 05/10/2021 100     RBC Morphology 05/10/2021 Present     Hypochromia 05/10/2021 Present     Polychromasia 05/10/2021 Present     Platelet Estimate 45/66/0124 Increased*    RBC, UA 05/10/2021 2-4     WBC, UA 05/10/2021 4-10*    Epithelial Cells 05/10/2021 Occasional     Bacteria, UA 05/10/2021 Moderate*    Hyaline Casts, UA 05/10/2021 10-20*    MUCUS THREADS 05/10/2021 Occasional*    Sodium 05/11/2021 140     Potassium 05/11/2021 4 4     Chloride 05/11/2021 102     CO2 05/11/2021 24     ANION GAP 05/11/2021 14*    BUN 05/11/2021 55*    Creatinine 05/11/2021 1 66*    Glucose 05/11/2021 125     Glucose, Fasting 05/11/2021 125*    Calcium 05/11/2021 9 4     Corrected Calcium 05/11/2021 10 8*    AST 05/11/2021 20     ALT 05/11/2021 12     Alkaline Phosphatase 05/11/2021 123*    Total Protein 05/11/2021 7 7     Albumin 05/11/2021 2 2*    Total Bilirubin 05/11/2021 0 44     eGFR 05/11/2021 42     Hemoglobin 05/11/2021 8 1*    Hematocrit 05/11/2021 29 3*       Imaging Studies: I have personally reviewed pertinent imaging studies

## 2021-05-11 NOTE — TELEPHONE ENCOUNTER
Received call from wife, Stewart Garcia, with an update on Michelle Garcia reports that Ry's G-tube was replaced 5/7 for leakage, but leakage persisted  He is currently admitted to the hospital at this time  Tube was then converted to an 18F GJ-tube yesterday  Wife also reports pt was told he cannot have carbonated beverages anymore which upsets pt because one of the only things he had been able to consume po was gingerale  Wife reported that Michelle received his enteral backpack yesterday from [address]  Will defer nutrition care to inpatient nutrition team at this time (pt had positive nutrition screening upon admission)  Discussed how specific questions regarding procedures and plan moving forward should be deferred to Ry's inpatient team   Wife expressed understanding  All questions and concerns addressed at this time

## 2021-05-11 NOTE — MALNUTRITION/BMI
This medical record reflects one or more clinical indicators suggestive of malnutrition  Malnutrition Findings:   Adult Malnutrition type: Chronic illness(Severe protein calorie malnutrition of chronic illness related to increased energy needs as evidenced by protruding clavicles and 11% weight loss in 3 months  Awaiting treatment plan)  Adult Degree of Malnutrition: Other severe protein calorie malnutrition  Malnutrition Characteristics: Muscle loss, Weight loss    BMI Findings:  Adult BMI Classifications: Underweight < 18 5     Body mass index is 15 6 kg/m²  See Nutrition note dated 5/11/2021 for additional details  Completed nutrition assessment is viewable in the nutrition documentation

## 2021-05-11 NOTE — PROGRESS NOTES
[facility] Kobi Cade 77 y o  male MRN: [id number]    Unit/Bed#: [address] [place] [address] Encounter: [id number]      Assessment/Plan:  * Malfunction of gastrostomy tube [facility])  Assessment & Plan  Patient presents to the ED with G-tube leakage  Recently changed on 05/07/2021 for similar concerns  · IR consulted for G-tube replacement:  G tube was exchanged for another G-tube but there was continued leakage around G-tube  Patient was taken to OR again and G tube was exchanged for GJ tube  · Patient continues to have bilious, coffee-ground looking drainage from G-tube insertion site  Patient was noted to have significant amount of gastric residue during procedure  Unclear regarding reason for increased residue  · Per discussion with IR, will place gastric portion of the GJ tube on low intermittent wall suction  Already reported 500 cc of output  · Gastroenterology consulted for further recommendation regarding increased gastric residue and output  · Tube feeds on hold  Continue IVF D5 half-normal saline at 75 cc/hr    JENNIFER (acute kidney injury) [facility])  Assessment & Plan  Patient presents with an elevated creatinine of 1 74  Five days ago creatinine was 1 19  Baseline ranges from 0 08-1 0  · BUN/creatinine ratio more than 20 indicating likely prerenal cause in setting of poor intake secondary to G-tube malfunction  · IR to exchange G-tube  Will initiate tube feeds once cleared by IR   · ED course:  NS 1l bolus x1  · Creatinine: 1 74 on admission > 1 66  · Home hydrochlorothiazide 12 5 mg held  · Continue IV fluids D5 0 45% saline at 75 mL/hour    Hypokalemia  Assessment & Plan  K 3 3 on admission  EKG ordered to check for changes  Repleted  · K 4 4 on 5/11  · Continue to monitor  Abdominal distension  Assessment & Plan  Patient has left-sided abdominal distension, likely distended bowel secondary to constipation    Patient has decreased bowel sounds but reports passing some flatus and last bowel movement was 3 days ago  Patient recently started taking Dulcolax for constipation so will continue this  · Nausea and vomiting present today is no different from the persistent nausea vomiting patient has had for the past 3 months associated with chemotherapy  · No acute concern for small-bowel obstruction or ileus  · Will continue to monitor patient's symptoms, bowel function, abdominal exam- abdominal exam and symptoms unchanged since yesterday, no bowel movement yet  Will continue to monitor  PAF (paroxysmal atrial fibrillation) [facility])  Assessment & Plan  Patient has history of atrial fibrillation but reports he has not been taking amiodarone and Eliquis  · Patient reports he stopped taking amiodarone because he did not feel like he had an arrhythmia and was asked to discontinue Eliquis by GI due to concern for internal bleeding, but no documentation noted for this  · Patient had fluctuant heart rate yesterday with HR ranging from 50s-140s intermittently on [place]  Patient was placed on telemetry to accurately monitor heart rate and rhythm  No events overnight, pt is in sinus rhythm, but slightly tachycardic with HR in low 100s  · Will advise pt to follow up with cardiology outpatient for possible re-initiation of amiodarone or other rate control agent  · GI consulted to evaluate pt and assess safety of re-starting eliquis  Will do guaic testing on GJ tube output  Malignant neoplasm of lower third of esophagus [facility])  Assessment & Plan  History of metastatic esophageal cancer on chemotherapy  Diagnosed in August 2019  Follows with Oncology in [place], [place] with Dr Chuck Pallas  Patient gets chemotherapy every 2 weeks @infusion center in [facility] on 4/16/21  · continue home Pepcid 20 mg b i d  Essential hypertension  Assessment & Plan  Normotensive on admission  Patient is prescribed hydrochlorothiazide 12 5 mg daily p r n   But reports that he takes it daily   Held at this time due to JENNIFER  Will continue monitor blood pressure and treat as appropriate  Chronic combined systolic and diastolic CHF (congestive heart failure) ([facility])  Assessment & Plan  Wt Readings from Last 3 Encounters:   05/10/21 61 2 kg (135 lb)   04/27/21 61 2 kg (135 lb)   04/19/21 62 6 kg (137 lb 14 4 oz)   Echo 09/2020:  EF 77%, Grade 2 diastolic dysfunction  · Patient hypovolemic on exam   Will continue D5 half-normal saline at 75cc/hr to attain euvolemia  · Will continue to monitor fluid status with daily weights and daily I&Os  · Home hydrochlorothiazide 12 5 mg daily held due to JENNIFER  · Prescribed p r n  but patient does take daily    Major depression  Assessment & Plan  Can continue Continue home venlafaxine 150 mg p o  qd    Anxiety  Assessment & Plan  Can continue home diazepam 10 mg at night  Diazepam prescribed 5 mg b i d  but patient takes 10 mg at night  Falls  Assessment & Plan  Fall precautions  PT OT evaluation requested  Dependence on supplemental oxygen  Assessment & Plan  Patient is not on home oxygen but has been requiring supplemental oxygen since admission  · Currently on 3 L supplemental oxygen via nasal cannula  Rales heard on pulmonary exam   · Some concern for aspiration pneumonia due to patient making gurgling noises on exam   · PA and lateral chest x-ray ordered  Elevate head of bed orders in place and aspiration precautions ordered  Hypothyroidism  Assessment & Plan  Patient currently on levothyroxine 25 mcg daily  TSH on 5/5/21 elevated at 7 620  · Increase the levothyroxine to 50 mcg daily  · Repeat TSH in 6-8 weeks  Iron deficiency anemia secondary to inadequate dietary iron intake  Assessment & Plan  Hemoglobin 10 2 on admission  Baseline 8-10  · Hemoglobin 8 1 today  · Will continue to monitor  Dysphagia  Assessment & Plan  G-tube placed on February 5th 2021 first   Patient has history of malignant neoplasm of lower 3rd of esophagus  Patient can still take medications p o  Per wife, patient on 5 cans of TwoCal per day at a rate of 80 mL per hour via pump  Patient NPO except sips with meds    Subjective:   Patient seen and examined at bedside  Patient denies chest pain, headache, lightheadedness  Does report mild dyspnea, abdominal pain, passing some flatus, denies bowel movement since admission  Objective:     Vitals: Blood pressure 115/80, pulse 105, temperature (!) 97 3 °F (36 3 °C), temperature source Tympanic, resp  rate 17, height 6' 6" (1 981 m), weight 61 2 kg (135 lb), SpO2 97 %  ,Body mass index is 15 6 kg/m²  Wt Readings from Last 3 Encounters:   05/10/21 61 2 kg (135 lb)   04/27/21 61 2 kg (135 lb)   04/19/21 62 6 kg (137 lb 14 4 oz)       Intake/Output Summary (Last 24 hours) at 5/11/2021 1605  Last data filed at 5/11/2021 1401  Gross per 24 hour   Intake 0 ml   Output 1200 ml   Net -1200 ml       Physical Exam: General appearance: alert, fatigued and mild distress  Head: Normocephalic, without obvious abnormality, atraumatic  Lungs: Breath sounds heard bilaterally, rales heard in lower lobes  Heart: regular rate and rhythm and S1, S2 normal  Abdomen: Decreased bowel sounds, mild abdominal distention on the left side, mild tenderness to palpation in LLQ and LUQ, unchanged from yesterday    Extremities: extremities normal, warm and well-perfused; no cyanosis, clubbing, or edema     Recent Results (from the past 24 hour(s))   UA (URINE) with reflex to Scope    Collection Time: 05/10/21  6:14 PM   Result Value Ref Range    Color, UA Yellow     Clarity, UA Slightly Cloudy     Specific [place], UA 1 025 1 000 - 1 030    pH, UA 5 0 5 0, 5 5, 6 0, 6 5, 7 0, 7 5, 8 0, 8 5, 9 0    Leukocytes, UA Negative Negative    Nitrite, UA Negative Negative    Protein, UA 30 (1+) (A) Negative mg/dl    Glucose, UA Negative Negative mg/dl    Ketones, UA Negative Negative mg/dl    Urobilinogen, UA 0 2 0 2, 1 0 E U /dl E U /dl    Bilirubin, UA Negative Negative    Blood, UA Negative Negative   Urine Microscopic    Collection Time: 05/10/21  6:14 PM   Result Value Ref Range    RBC, UA 2-4 None Seen, 0-1, 1-2, 2-4, 0-5 /hpf    WBC, UA 4-10 (A) None Seen, 0-1, 1-2, 0-5, 2-4 /hpf    Epithelial Cells Occasional None Seen, Occasional /hpf    Bacteria, UA Moderate (A) None Seen, Occasional /hpf    Hyaline Casts, UA 10-20 (A) (none) /lpf    MUCUS THREADS Occasional (A) None Seen   Comprehensive metabolic panel    Collection Time: 05/11/21  6:15 AM   Result Value Ref Range    Sodium 140 136 - 145 mmol/L    Potassium 4 4 3 5 - 5 3 mmol/L    Chloride 102 100 - 108 mmol/L    CO2 24 21 - 32 mmol/L    ANION GAP 14 (H) 4 - 13 mmol/L    BUN 55 (H) 5 - 25 mg/dL    Creatinine 1 66 (H) 0 60 - 1 30 mg/dL    Glucose 125 65 - 140 mg/dL    Glucose, Fasting 125 (H) 65 - 99 mg/dL    Calcium 9 4 8 3 - 10 1 mg/dL    Corrected Calcium 10 8 (H) 8 3 - 10 1 mg/dL    AST 20 5 - 45 U/L    ALT 12 12 - 78 U/L    Alkaline Phosphatase 123 (H) 46 - 116 U/L    Total Protein 7 7 6 4 - 8 2 g/dL    Albumin 2 2 (L) 3 5 - 5 0 g/dL    Total Bilirubin 0 44 0 20 - 1 00 mg/dL    eGFR 42 ml/min/1 73sq m   Hemoglobin and hematocrit, blood    Collection Time: 05/11/21  3:20 PM   Result Value Ref Range    Hemoglobin 8 1 (L) 12 0 - 17 0 g/dL    Hematocrit 29 3 (L) 36 5 - 49 3 %       Current Facility-Administered Medications   Medication Dose Route Frequency Provider Last Rate Last Admin    acetaminophen (TYLENOL) tablet 650 mg  650 mg Oral Q6H PRN Sravani Rolon, DO   650 mg at 05/10/21 1902    bisacodyl (DULCOLAX) EC tablet 5 mg  5 mg Oral Daily Sravani Rolon, DO   5 mg at 05/11/21 0954    dextrose 5 % and sodium chloride 0 45 % infusion  75 mL/hr Intravenous Continuous Sravani Rolon, DO        diazepam (VALIUM) tablet 10 mg  10 mg Oral HS Sravani Rolon DO   10 mg at 05/10/21 2121    famotidine (PEPCID) tablet 20 mg  20 mg Oral BID PRN Karis Vera DO        [START ON 5/12/2021] levothyroxine tablet 50 mcg  50 mcg Oral Early Morning Sravani Rolon DO        ondansetron [facility] injection 4 mg  4 mg Intravenous Q6H PRN Marilou Gonzales MD   4 mg at 05/11/21 1411    venlafaxine (EFFEXOR-XR) 24 hr capsule 150 mg  150 mg Oral Daily Sravani Rolon DO   150 mg at 05/11/21 0954       Invasive Devices     Central Venous Catheter Line            Port A Cath 07/31/19 Right Chest 650 days          Peripheral Intravenous Line            Peripheral IV 05/10/21 Left Antecubital 1 day          Drain            Gastrostomy/Enterostomy Gastrostomy-jejunostomy 18 Fr  less than 1 day                Lab, Imaging and other studies: I have personally reviewed pertinent reports      VTE Pharmacologic Prophylaxis: Reason for no pharmacologic prophylaxis Coffee-ground emesis, concern for GI bleed  VTE Mechanical Prophylaxis: sequential compression device    Bethel Charles DO

## 2021-05-11 NOTE — PROGRESS NOTES
Recommend TwoCal HN at rate of 80 mL/hour for 15 hours or until 1185 mL is infused (5 cans)  This will provide 2370 kcals, 99 grams protein and 830 mL free water  Recommend flushes of 160 mL q 4 hours for a total fluid intake of 1790 mL

## 2021-05-11 NOTE — QUICK NOTE
Patient evaluated at bedside during routine rounds  Patient reports mild lightheadedness  Denies chest pain  He continues to have clear/yellow-green fluid output from [address] tube site  Given history of Afib and fluctuations in patient's vitals on Masimo, telemetry initiated

## 2021-05-11 NOTE — SPEECH THERAPY NOTE
Orders received  Patient is NPO for possible revision  Spoke with MD- patient is known to this department and seen 11/2020 with minimal-no oropharyngeal dysphagia but with significant stasis in the esophagus  Per MD will hold off on BSE at this time pending discussion with GI   Consider diet per GI      CAROLA Gonzales , [address] [facility]  Speech Language Pathologist   Available via [address] [place] [address] #[id number]  [place] #[id number]

## 2021-05-12 ENCOUNTER — HOSPITAL ENCOUNTER (OUTPATIENT)
Dept: INFUSION CENTER | Facility: HOSPITAL | Age: 66
Discharge: HOME | End: 2021-05-12
Attending: INTERNAL MEDICINE

## 2021-05-12 LAB
ALBUMIN SERPL BCP-MCNC: 2 G/DL (ref 3.5–5)
ALP SERPL-CCNC: 120 U/L (ref 46–116)
ALT SERPL W P-5'-P-CCNC: 18 U/L (ref 12–78)
ANION GAP SERPL CALCULATED.3IONS-SCNC: 8 MMOL/L (ref 4–13)
ANISOCYTOSIS BLD QL SMEAR: PRESENT
AST SERPL W P-5'-P-CCNC: 14 U/L (ref 5–45)
BILIRUB SERPL-MCNC: 0.34 MG/DL (ref 0.2–1)
BUN SERPL-MCNC: 50 MG/DL (ref 5–25)
CALCIUM ALBUM COR SERPL-MCNC: 10.9 MG/DL (ref 8.3–10.1)
CALCIUM SERPL-MCNC: 9.3 MG/DL (ref 8.3–10.1)
CHLORIDE SERPL-SCNC: 104 MMOL/L (ref 100–108)
CO2 SERPL-SCNC: 30 MMOL/L (ref 21–32)
CREAT SERPL-MCNC: 1.21 MG/DL (ref 0.6–1.3)
ERYTHROCYTE [DISTWIDTH] IN BLOOD BY AUTOMATED COUNT: 19.1 % (ref 11.6–15.1)
GFR SERPL CREATININE-BSD FRML MDRD: 62 ML/MIN/1.73SQ M
GLUCOSE SERPL-MCNC: 111 MG/DL (ref 65–140)
HCT VFR BLD AUTO: 26.5 % (ref 36.5–49.3)
HGB BLD-MCNC: 7.2 G/DL (ref 12–17)
HYPERCHROMIA BLD QL SMEAR: PRESENT
LG PLATELETS BLD QL SMEAR: PRESENT
MCH RBC QN AUTO: 22.3 PG (ref 26.8–34.3)
MCHC RBC AUTO-ENTMCNC: 27.2 G/DL (ref 31.4–37.4)
MCV RBC AUTO: 82 FL (ref 82–98)
MICROCYTES BLD QL AUTO: PRESENT
NRBC BLD AUTO-RTO: 0 /100 WBCS
PLATELET # BLD AUTO: 600 THOUSANDS/UL (ref 149–390)
PLATELET BLD QL SMEAR: ABNORMAL
PMV BLD AUTO: 10.9 FL (ref 8.9–12.7)
POLYCHROMASIA BLD QL SMEAR: PRESENT
POTASSIUM SERPL-SCNC: 4 MMOL/L (ref 3.5–5.3)
PROT SERPL-MCNC: 7.3 G/DL (ref 6.4–8.2)
RBC # BLD AUTO: 3.23 MILLION/UL (ref 3.88–5.62)
RBC MORPH BLD: PRESENT
SODIUM SERPL-SCNC: 142 MMOL/L (ref 136–145)
WBC # BLD AUTO: 14.57 THOUSAND/UL (ref 4.31–10.16)

## 2021-05-12 PROCEDURE — 80053 COMPREHEN METABOLIC PANEL: CPT | Performed by: STUDENT IN AN ORGANIZED HEALTH CARE EDUCATION/TRAINING PROGRAM

## 2021-05-12 PROCEDURE — 92526 ORAL FUNCTION THERAPY: CPT

## 2021-05-12 PROCEDURE — 85027 COMPLETE CBC AUTOMATED: CPT | Performed by: STUDENT IN AN ORGANIZED HEALTH CARE EDUCATION/TRAINING PROGRAM

## 2021-05-12 PROCEDURE — 99232 SBSQ HOSP IP/OBS MODERATE 35: CPT | Performed by: FAMILY MEDICINE

## 2021-05-12 PROCEDURE — NC001 PR NO CHARGE: Performed by: NURSE PRACTITIONER

## 2021-05-12 PROCEDURE — 85007 BL SMEAR W/DIFF WBC COUNT: CPT | Performed by: STUDENT IN AN ORGANIZED HEALTH CARE EDUCATION/TRAINING PROGRAM

## 2021-05-12 RX ORDER — LORAZEPAM 2 MG/ML
0.5 INJECTION INTRAMUSCULAR
Status: DISCONTINUED | OUTPATIENT
Start: 2021-05-12 | End: 2021-05-13 | Stop reason: HOSPADM

## 2021-05-12 RX ADMIN — DEXTROSE AND SODIUM CHLORIDE 75 ML/HR: 5; .45 INJECTION, SOLUTION INTRAVENOUS at 18:28

## 2021-05-12 RX ADMIN — DIAZEPAM 10 MG: 5 TABLET ORAL at 22:08

## 2021-05-12 RX ADMIN — DEXTROSE AND SODIUM CHLORIDE 75 ML/HR: 5; .45 INJECTION, SOLUTION INTRAVENOUS at 06:44

## 2021-05-12 RX ADMIN — LEVOTHYROXINE SODIUM 50 MCG: 50 TABLET ORAL at 05:42

## 2021-05-12 RX ADMIN — VENLAFAXINE HYDROCHLORIDE 150 MG: 150 CAPSULE, EXTENDED RELEASE ORAL at 08:44

## 2021-05-12 NOTE — QUICK NOTE
Called and spoke with patient's wife regarding patient's overall prognosis and small bowel findings on CT  Patient this morning opted for Hospice Care/COmfort Care and he would really like to drink juice despite NPO status and risk of aspiration/chocking  Patient opted for Comfort care and DNR/DNI status  Wife was called and updated and came to hospital with patient's brother Lencho Monroy and after conversation in private waiting room, Hospice/ comfort care was agreed upon by [facility] as well  They would like to take patient home where he will pass away in the comfort of his own home  Comfort Care with DNR/DNI order placed

## 2021-05-12 NOTE — PROGRESS NOTES
[facility] 26 Progress Note - Kenny Baca 77 y o  male MRN: [id number]    Unit/Bed#: [address] [place] [address] Encounter: [id number]      Assessment/Plan:    Per patient's wishes and discussion with family members, patient will be transitioned to hospice care with comfort measures in place  Due to small-bowel obstruction secondary to metastases, patient can have pleasure foods but in small amounts so as to avoid aspiration incidents  * Malfunction of gastrostomy tube [facility])  Assessment & Plan  Patient presents to the ED with G-tube leakage  Recently changed on 05/07/2021 for similar concerns  · IR consulted for G-tube replacement:  G tube was exchanged for another G-tube but there was continued leakage around G-tube  Patient was taken to OR again and G tube was exchanged for GJ tube  · Patient continues to have bilious, coffee-ground looking drainage from G-tube insertion site  Patient was noted to have significant amount of gastric residue during procedure  Unclear regarding reason for increased residue  · Per discussion with IR, will place gastric portion of the GJ tube on low intermittent wall suction  · Gastroenterology consult: hold Eliquis due to friable esophageal mass, start Dulcolax suppository p r n  Gastrostomy tube has been refluxing back to large amount of dark fluid with some blood in it  There is also some bile noted in the reflux eight  Patient appears to have jejunostomy tube dysfunction at this time  Cannot exclude a distal obstruction or kinking of the jejunostomy tube in the jejunum  5/12 there are plans for hospice/comfort care  If this is the case, patient can have a small amount of p o  Intake for pleasure but may have abdominal distention, nausea/ vomiting due to evidence of metastatic disease in the small bowel loops causing partial small bowel obstruction and reflux of bile into the stomach seen on CT   CT scan does reveal findings consistent with metastatic process in the abdomen  There is matting of the small bowel loops with thickening  This is causing partial small-bowel obstruction  He has very poor nutritional status  This status is very unlikely to improve in this situation  Patient has been put on hospice  Comfort care is recommended     · Continue IVF D5 half-normal saline at 75 cc/hr    JENNIFER (acute kidney injury) [facility])  Assessment & Plan  Patient presents with an elevated creatinine of 1 74  Five days ago creatinine was 1 19  Baseline ranges from 0 08-1 0  · BUN/creatinine ratio more than 20 indicating likely prerenal cause in setting of poor intake secondary to G-tube malfunction  · IR to exchange G-tube  Will initiate tube feeds once cleared by IR   · ED course:  NS 1l bolus x1  · Creatinine: 1 74 on admission > 1 66>1 2  · Home hydrochlorothiazide 12 5 mg held  · Continue IV fluids D5 0 45% saline at 75 mL/hour    Hypokalemia  Assessment & Plan  K 3 3 on admission  EKG ordered to check for changes  Repleted  · K 4 4 on 5/11  · Continue to monitor  Abdominal distension  Assessment & Plan  Patient has left-sided abdominal distension, likely distended bowel secondary to constipation  Patient has decreased bowel sounds but reports passing some flatus and last bowel movement was 3 days ago  Patient recently started taking Dulcolax for constipation so will continue this  · Nausea and vomiting present today is no different from the persistent nausea vomiting patient has had for the past 3 months associated with chemotherapy  · No acute concern for small-bowel obstruction or ileus  · Will continue to monitor patient's symptoms, bowel function, abdominal exam- abdominal exam and symptoms unchanged since yesterday, no bowel movement yet  Will continue to monitor  · GI Consult: hold Eliquis due to friable esophageal mass, start Dulcolax suppository p r n  Gastrostomy tube has been refluxing back to large amount of dark fluid with some blood in it    There is also some bile noted in the reflux eight  Patient appears to have jejunostomy tube dysfunction at this time  Cannot exclude a distal obstruction or kinking of the jejunostomy tube in the jejunum  5/12 there are plans for hospice/comfort care  If this is the case, patient can have a small amount of p o  Intake for pleasure but may have abdominal distention, nausea/ vomiting due to evidence of metastatic disease in the small bowel loops causing partial small bowel obstruction and reflux of bile into the stomach seen on CT  CT scan does reveal findings consistent with metastatic process in the abdomen  There is matting of the small bowel loops with thickening  This is causing partial small-bowel obstruction  He has very poor nutritional status  This status is very unlikely to improve in this situation  Patient has been put on hospice  Comfort care is recommended  PAF (paroxysmal atrial fibrillation) [facility])  Assessment & Plan  Patient has history of atrial fibrillation but reports he has not been taking amiodarone and Eliquis  · Patient reports he stopped taking amiodarone because he did not feel like he had an arrhythmia and was asked to discontinue Eliquis by GI due to concern for internal bleeding, but no documentation noted for this  · Patient had fluctuant heart rate yesterday with HR ranging from 50s-140s intermittently on Masimo  Patient was placed on telemetry to accurately monitor heart rate and rhythm  No events overnight, pt is in sinus rhythm, but slightly tachycardic with HR in low 100s  · Will advise pt to follow up with cardiology outpatient for possible re-initiation of amiodarone or other rate control agent  · Guaiac test gastric tube output positive  · GI consulted : hold Eliquis due to friable esophageal mass    Malignant neoplasm of lower third of esophagus [facility])  Assessment & Plan  History of metastatic esophageal cancer on chemotherapy  Diagnosed in August 2019    Follows with Oncology in [place], [place] with Dr Mary Salvador  Patient gets chemotherapy every 2 weeks @infusion [place] on 4/16/21  · continue home Pepcid 20 mg b i d  Essential hypertension  Assessment & Plan  Normotensive on admission  Patient is prescribed hydrochlorothiazide 12 5 mg daily p r n  But reports that he takes it daily  Held at this time due to JENNIFER  Will continue monitor blood pressure and treat as appropriate  Chronic combined systolic and diastolic CHF (congestive heart failure) ([facility])  Assessment & Plan  Wt Readings from Last 3 Encounters:   05/10/21 61 2 kg (135 lb)   04/27/21 61 2 kg (135 lb)   04/19/21 62 6 kg (137 lb 14 4 oz)   Echo 09/2020:  EF 09%, Grade 2 diastolic dysfunction  · Patient hypovolemic on exam   Will continue D5 half-normal saline at 75cc/hr to attain euvolemia  · Will continue to monitor fluid status with daily weights and daily I&Os  · Home hydrochlorothiazide 12 5 mg daily held due to JENNIFER  · Prescribed p r n  but patient does take daily    Major depression  Assessment & Plan  Can continue Continue home venlafaxine 150 mg p o  qd    Anxiety  Assessment & Plan  Can continue home diazepam 10 mg at night  Diazepam prescribed 5 mg b i d  but patient takes 10 mg at night  Falls  Assessment & Plan  Fall precautions  PT OT evaluation requested  Dependence on supplemental oxygen  Assessment & Plan  Patient is not on home oxygen but has been requiring supplemental oxygen since admission  · Currently on 3 L supplemental oxygen via nasal cannula  Rales heard on pulmonary exam   · Some concern for aspiration pneumonia due to patient making gurgling noises on exam   · PA and lateral chest x-ray ordered  Elevate head of bed orders in place and aspiration precautions ordered  Hypothyroidism  Assessment & Plan  Patient currently on levothyroxine 25 mcg daily  TSH on 5/5/21 elevated at 7 620  · Increase the levothyroxine to 50 mcg daily    · Repeat TSH in 6-8 weeks     Iron deficiency anemia secondary to inadequate dietary iron intake  Assessment & Plan  Hemoglobin 10 2 on admission  Baseline 8-10  · Hemoglobin 8 1 today  · Will continue to monitor  Dysphagia  Assessment & Plan  G-tube placed on February 5th 2021 first   Patient has history of malignant neoplasm of lower 3rd of esophagus  Patient can still take medications p o  Per wife, patient on 5 cans of TwoCal per day at a rate of 80 mL per hour via pump  Patient NPO except sips with meds    Severe protein-calorie malnutrition Ruthellen Setter: less than 60% of standard weight) ([facility])  Assessment & Plan  Malnutrition Findings:   Adult Malnutrition type: Chronic illness(Severe protein calorie malnutrition of chronic illness related to increased energy needs as evidenced by protruding clavicles and 11% weight loss in 3 months  Awaiting treatment plan)  Adult Degree of Malnutrition: Other severe protein calorie malnutrition    BMI Findings:  Adult BMI Classifications: Underweight < 18 5     Body mass index is 15 6 kg/m²  Subjective:   Patient seen examined at bedside  Denies chest pain, abdominal pain, headache, lightheadedness  Reports mild dyspnea  Patient expresses wishes to be on hospice care since he realizes that his prognosis is not reassuring per discussion with his oncologist     Objective:     Vitals: Blood pressure 127/82, pulse 98, temperature 97 8 °F (36 6 °C), temperature source Oral, resp  rate 16, height 6' 6" (1 981 m), weight 61 2 kg (135 lb), SpO2 93 %  ,Body mass index is 15 6 kg/m²    Wt Readings from Last 3 Encounters:   05/10/21 61 2 kg (135 lb)   04/27/21 61 2 kg (135 lb)   04/19/21 62 6 kg (137 lb 14 4 oz)       Intake/Output Summary (Last 24 hours) at 5/12/2021 1600  Last data filed at 5/12/2021 0644  Gross per 24 hour   Intake 948 75 ml   Output 110 ml   Net 838 75 ml       Physical Exam: General appearance: alert and moderate distress  Head: Normocephalic, without obvious abnormality, atraumatic  Lungs: rhonchi  Heart: regular rate and rhythm and S1, S2 normal  Abdomen: Hypoactive bowel sounds, diffuse tenderness to palpation left more than right, abdominal distension L > R  Extremities: extremities normal, warm and well-perfused; no cyanosis, clubbing, or edema     Recent Results (from the past 24 hour(s))   Occult blood 1-3, stool    Collection Time: 05/11/21  9:24 PM   Result Value Ref Range    Fecal Occult Blood Diagnostic Positive (A) Negative    Fecal Occult Blood Diagnostic 2 Test not performed Negative    Fecal Occult Blood Diagnostic 3 Test not performed Negative   CBC and differential    Collection Time: 05/12/21  6:02 AM   Result Value Ref Range    WBC 14 57 (H) 4 31 - 10 16 Thousand/uL    RBC 3 23 (L) 3 88 - 5 62 Million/uL    Hemoglobin 7 2 (L) 12 0 - 17 0 g/dL    Hematocrit 26 5 (L) 36 5 - 49 3 %    MCV 82 82 - 98 fL    MCH 22 3 (L) 26 8 - 34 3 pg    MCHC 27 2 (L) 31 4 - 37 4 g/dL    RDW 19 1 (H) 11 6 - 15 1 %    MPV 10 9 8 9 - 12 7 fL    Platelets 745 (H) 653 - 390 Thousands/uL    nRBC 0 /100 WBCs   Comprehensive metabolic panel    Collection Time: 05/12/21  6:02 AM   Result Value Ref Range    Sodium 142 136 - 145 mmol/L    Potassium 4 0 3 5 - 5 3 mmol/L    Chloride 104 100 - 108 mmol/L    CO2 30 21 - 32 mmol/L    ANION GAP 8 4 - 13 mmol/L    BUN 50 (H) 5 - 25 mg/dL    Creatinine 1 21 0 60 - 1 30 mg/dL    Glucose 111 65 - 140 mg/dL    Calcium 9 3 8 3 - 10 1 mg/dL    Corrected Calcium 10 9 (H) 8 3 - 10 1 mg/dL    AST 14 5 - 45 U/L    ALT 18 12 - 78 U/L    Alkaline Phosphatase 120 (H) 46 - 116 U/L    Total Protein 7 3 6 4 - 8 2 g/dL    Albumin 2 0 (L) 3 5 - 5 0 g/dL    Total Bilirubin 0 34 0 20 - 1 00 mg/dL    eGFR 62 ml/min/1 73sq m   Manual Differential(PHLEBS Do Not Order)    Collection Time: 05/12/21  6:02 AM   Result Value Ref Range    Total Counted      RBC Morphology Present     Anisocytosis Present     Hypochromia Present     Microcytes Present     Polychromasia Present Platelet Estimate Increased (A) Adequate    Large Platelet Present        Current Facility-Administered Medications   Medication Dose Route Frequency Provider Last Rate Last Admin    acetaminophen (TYLENOL) tablet 650 mg  650 mg Oral Q6H PRN Sravani Rolon, DO   650 mg at 05/10/21 1902    dextrose 5 % and sodium chloride 0 45 % infusion  75 mL/hr Intravenous Continuous Sravani Rolon, DO 75 mL/hr at 05/12/21 0644 75 mL/hr at 05/12/21 0644    diazepam (VALIUM) tablet 10 mg  10 mg Oral HS Sravani Rolon, DO   10 mg at 05/11/21 2204    LORazepam (ATIVAN) injection 0 5 mg  0 5 mg Intravenous Q1H PRN Denise Stanford MD        morphine injection 2 mg  2 mg Intravenous Q10 Min PRN Denise Stanford MD        ondansetron (ZOFRAN) injection 4 mg  4 mg Intravenous Q6H PRN Venkata Fischer MD   4 mg at 05/11/21 1411    venlafaxine (EFFEXOR-XR) 24 hr capsule 150 mg  150 mg Oral Daily Sravani Rolon, DO   150 mg at 05/12/21 0844       Invasive Devices     Central Venous Catheter Line            Port A Cath 07/31/19 Right Chest 651 days          Peripheral Intravenous Line            Peripheral IV 05/10/21 Left Antecubital 2 days          Drain            Gastrostomy/Enterostomy Gastrostomy-jejunostomy 18 Fr  1 day              Lab, Imaging and other studies: I have personally reviewed pertinent reports      VTE Pharmacologic Prophylaxis: Reason for no pharmacologic prophylaxis GI bleed  VTE Mechanical Prophylaxis: sequential compression device    Chidi Quinonez DO

## 2021-05-12 NOTE — PROGRESS NOTES
05/12/21 1526   Discharge Communications   Discharge planning discussed with: Patient and family members   Freedom of Choice Yes   IMM Given (Date): 05/12/21   IMM Given to: Family  (wife)   Discharge Notes: CM spoke to Yodit Lester for [address] via [phone]  CM advised Tanya Bennett to F/U with [facility] on the next business day  CM Handoff Comments 5/12: CPR2; D/C 24hrs; pending hospice assessment via Yodit Lester; referral initiated in allscripts; family very involved; transportation home will be needed     Contacts   Patient Contacts Armond Rivera (Spouse)   Realtionship to Patient: Family   Contact Method In Person   Reason/Outcome Referral;Continuity of Care;Discharge Planning

## 2021-05-12 NOTE — ASSESSMENT & PLAN NOTE
Can continue Continue home venlafaxine 150 mg p o  qd
Can continue home diazepam 10 mg at night  Diazepam prescribed 5 mg b i d  but patient takes 10 mg at night 
Fall precautions  PT OT evaluation requested 
G-tube placed on February 5th 2021 first   Patient has history of malignant neoplasm of lower 3rd of esophagus  Patient can still take medications p o  Per wife, patient on 5 cans of TwoCal per day at a rate of 80 mL per hour via pump    Patient NPO except sips with meds
Hemoglobin 10 2 on admission  Baseline 8-10  · Hemoglobin 8 1 today  · Will continue to monitor 
History of metastatic esophageal cancer on chemotherapy  Diagnosed in August 2019  Follows with Oncology in [place], [place] with Dr Sharon Boxer  Patient gets chemotherapy every 2 weeks @infusion center in [place]  Started Pieter Saldana on 4/16/21  · continue home Pepcid 20 mg b i d 
K 3 3 on admission  EKG ordered to check for changes  Repleted  · K 4 4 on 5/11  · Continue to monitor 
Malnutrition Findings:   Adult Malnutrition type: Chronic illness(Severe protein calorie malnutrition of chronic illness related to increased energy needs as evidenced by protruding clavicles and 11% weight loss in 3 months  Awaiting treatment plan)  Adult Degree of Malnutrition: Other severe protein calorie malnutrition    BMI Findings:  Adult BMI Classifications: Underweight < 18 5     Body mass index is 15 6 kg/m² 
Normotensive on admission  Patient is prescribed hydrochlorothiazide 12 5 mg daily p r n  But reports that he takes it daily  Held at this time due to JENNIFER  Will continue monitor blood pressure and treat as appropriate 
Patient currently on levothyroxine 25 mcg daily  TSH on 5/5/21 elevated at 7 620  · Increase the levothyroxine to 50 mcg daily  · Repeat TSH in 6-8 weeks 
Patient has history of atrial fibrillation but reports he has not been taking amiodarone and Eliquis  · Patient reports he stopped taking amiodarone because he did not feel like he had an arrhythmia and was asked to discontinue Eliquis by GI due to concern for internal bleeding, but no documentation noted for this  · Patient had fluctuant heart rate yesterday with HR ranging from 50s-140s intermittently on Masimo  Patient was placed on telemetry to accurately monitor heart rate and rhythm  No events overnight, pt is in sinus rhythm, but slightly tachycardic with HR in low 100s  · Will advise pt to follow up with cardiology outpatient for possible re-initiation of amiodarone or other rate control agent  · Guaiac test gastric tube output positive     · GI consulted : hold Eliquis due to friable esophageal mass
Patient has left-sided abdominal distension, likely distended bowel secondary to constipation  Patient has decreased bowel sounds but reports passing some flatus and last bowel movement was 3 days ago  Patient recently started taking Dulcolax for constipation so will continue this  · Nausea and vomiting present today is no different from the persistent nausea vomiting patient has had for the past 3 months associated with chemotherapy  · No acute concern for small-bowel obstruction or ileus  · Will continue to monitor patient's symptoms, bowel function, abdominal exam- abdominal exam and symptoms unchanged since yesterday, no bowel movement yet  Will continue to monitor  · GI Consult: hold Eliquis due to friable esophageal mass, start Dulcolax suppository p r n  Gastrostomy tube has been refluxing back to large amount of dark fluid with some blood in it  There is also some bile noted in the reflux eight  Patient appears to have jejunostomy tube dysfunction at this time  Cannot exclude a distal obstruction or kinking of the jejunostomy tube in the jejunum  5/12 there are plans for hospice/comfort care  If this is the case, patient can have a small amount of p o  Intake for pleasure but may have abdominal distention, nausea/ vomiting due to evidence of metastatic disease in the small bowel loops causing partial small bowel obstruction and reflux of bile into the stomach seen on CT  CT scan does reveal findings consistent with metastatic process in the abdomen  There is matting of the small bowel loops with thickening  This is causing partial small-bowel obstruction  He has very poor nutritional status  This status is very unlikely to improve in this situation  Patient has been put on hospice  Comfort care is recommended 
Patient is not on home oxygen but has been requiring supplemental oxygen since admission  · Currently on 3 L supplemental oxygen via nasal cannula  Rales heard on pulmonary exam   · Some concern for aspiration pneumonia due to patient making gurgling noises on exam   · PA and lateral chest x-ray ordered  Elevate head of bed orders in place and aspiration precautions ordered 
Patient presents to the ED with G-tube leakage  Recently changed on 05/07/2021 for similar concerns  · IR consulted for G-tube replacement:  G tube was exchanged for another G-tube but there was continued leakage around G-tube  Patient was taken to OR again and G tube was exchanged for GJ tube  · Patient continues to have bilious, coffee-ground looking drainage from G-tube insertion site  Patient was noted to have significant amount of gastric residue during procedure  Unclear regarding reason for increased residue  · Per discussion with IR, will place gastric portion of the GJ tube on low intermittent wall suction  · Gastroenterology consult: hold Eliquis due to friable esophageal mass, start Dulcolax suppository p r n  Gastrostomy tube has been refluxing back to large amount of dark fluid with some blood in it  There is also some bile noted in the reflux eight  Patient appears to have jejunostomy tube dysfunction at this time  Cannot exclude a distal obstruction or kinking of the jejunostomy tube in the jejunum  5/12 there are plans for hospice/comfort care  If this is the case, patient can have a small amount of p o  Intake for pleasure but may have abdominal distention, nausea/ vomiting due to evidence of metastatic disease in the small bowel loops causing partial small bowel obstruction and reflux of bile into the stomach seen on CT  CT scan does reveal findings consistent with metastatic process in the abdomen  There is matting of the small bowel loops with thickening  This is causing partial small-bowel obstruction  He has very poor nutritional status  This status is very unlikely to improve in this situation  Patient has been put on hospice  Comfort care is recommended       · Continue IVF D5 half-normal saline at 75 cc/hr
Patient presents with an elevated creatinine of 1 74  Five days ago creatinine was 1 19  Baseline ranges from 0 08-1 0  · BUN/creatinine ratio more than 20 indicating likely prerenal cause in setting of poor intake secondary to G-tube malfunction  · IR to exchange G-tube    Will initiate tube feeds once cleared by IR   · ED course:  NS 1l bolus x1  · Creatinine: 1 74 on admission > 1 66>1 2  · Home hydrochlorothiazide 12 5 mg held  · Continue IV fluids D5 0 45% saline at 75 mL/hour
Wt Readings from Last 3 Encounters:   05/10/21 61 2 kg (135 lb)   04/27/21 61 2 kg (135 lb)   04/19/21 62 6 kg (137 lb 14 4 oz)   Echo 09/2020:  EF 14%, Grade 2 diastolic dysfunction  · Patient hypovolemic on exam   Will continue D5 half-normal saline at 75cc/hr to attain euvolemia  · Will continue to monitor fluid status with daily weights and daily I&Os  · Home hydrochlorothiazide 12 5 mg daily held due to JENNIFER    · Prescribed p r n  but patient does take daily
Pain from implanted hardware  02/08/2018    Active  Nehemias Velarde

## 2021-05-12 NOTE — PROGRESS NOTES
05/12/21 [facility]   Patient Information   Mental Status Alert   Primary Caregiver Self   Accompanied by/Relationship  (CM) met with the patient (Pt), his wife and other family members at bedside, discussed the CM role and obtained the following information  LOS: 1 DAY; BUNDLE:NO; READMISSION: NO:; CONSULT; YES and this was addressed within the assessment  Keyonna Landin MD is the PCP  [facility] is the pharmacy of choice and according to the patient he has no barriers obtaining his medications at this time  The Pt does have a HX of [facility] with [facility] and inpatient rehab at [facility]  Patient disclosed a history of Major depression, Anxiety, substance abuse, and/or ETOH  To date, the patient and his family have decided that hospice would be the best choice at this time  The family selected [facility] and a referral was initiated in [facility]  [facility], Tanya Bennett and she is in close contact with the family  Her contact information is [phone]  The IMM was reviewed and signed by the wife with no objections  Freedom of Choice offered and CM discussed patient goals to ensure the patient's needs are met upon discharge  The CM dept will con't to F/U with the Pt through D/C  No further questions and/or concerns expressed at this time  Legal Information   Advance Directives Power of  for health care; Power of  for finance   Advance Directives Status Discussed - Patient/Family Declined  (Directives in place)   Health Care Proxy Appointed Yes - [address] Proxy section   Activities of Daily Living Prior to Admission   Functional Status Maximum assistance   Assistive Device Other (Comment)  (bedbound and pending hospice admission)   Living Arrangement House;Lives with someone  (1 story house with his wife and [address] ALE[address]O)   Ambulation Other (Comment)  (does not ambulate)   Income Information   Income Source SSI/SSD   [facility] of Transport to Appts: Family  (wife historically transported the Pt but transportation will be needed upon D/C )

## 2021-05-12 NOTE — SPEECH THERAPY NOTE
Consult received and records reviewed yesterday afternoon (aware of h/o esophageal ca and radiographic studies suggested no significant oral/pharygneal dysphagia but +s/s esophageal stasis/partial obstruction and that pt maintaned NPO c TF)  Records reviewed this again this am   Per GI evaluation (see full note from last pm):    Patient was noted to have obstructing esophageal lesion  Esophageal stenting was attempted but failed  Currently patient has abdominal discomfort and bloating  He has a gastrojejunostomy tube or GJ tube placed by intervention Radiology  Patient continues to have abdominal bloating and pain  Gastrostomy tube has been refluxing back to large amount of dark fluid with some blood in it  There is also some bile noted in the reflux eight  Patient appears to have jejunostomy tube dysfunction at this time  Cannot exclude a distal obstruction or kinking of the jejunostomy tube in the jejunum  There is also report by medical resident about purulent discharge or feeding coming out of the gastrostomy tube site  CT recommended and completed: reveals tip of the G-tube in the distal duodenum which should be adequate  However there is evidence of metastatic disease in the small bowel loop which is the cause of partial small bowel obstruction and reflux seen back of bilious fluid into the stomach  There is not that much can be done at this time on this patient  We may have to stop his J-tube feeding  Hospice consultation should be considered  Order for SLP evaluation acknowledged-->discontinued (no additional SLP evaluation indicated at this time)

## 2021-05-12 NOTE — PROGRESS NOTES
Patient seen asleep in bed, he has 300 cc of bilious output in wall suction canister  Per resident team, there are plans for hospice/comfort care  If this is the case, patient can have a small amount of p o  Intake for pleasure but may have abdominal distention, nausea/ vomiting due to evidence of metastatic disease in the small bowel loops causing partial small bowel obstruction and reflux of bile into the stomach seen on CT  We will follow as needed  Please contact GI for any questions

## 2021-05-13 VITALS
WEIGHT: 135 LBS | BODY MASS INDEX: 15.62 KG/M2 | TEMPERATURE: 97.8 F | OXYGEN SATURATION: 95 % | DIASTOLIC BLOOD PRESSURE: 78 MMHG | RESPIRATION RATE: 18 BRPM | HEART RATE: 91 BPM | HEIGHT: 78 IN | SYSTOLIC BLOOD PRESSURE: 122 MMHG

## 2021-05-13 DIAGNOSIS — G89.3 CANCER-RELATED PAIN: Chronic | ICD-10-CM

## 2021-05-13 DIAGNOSIS — C15.5 MALIGNANT NEOPLASM OF LOWER THIRD OF ESOPHAGUS (HCC): Primary | ICD-10-CM

## 2021-05-13 PROCEDURE — 99238 HOSP IP/OBS DSCHRG MGMT 30/<: CPT | Performed by: FAMILY MEDICINE

## 2021-05-13 RX ORDER — MORPHINE SULFATE 20 MG/5ML
4 SOLUTION ORAL
Qty: 50 ML | Refills: 0 | Status: SHIPPED | OUTPATIENT
Start: 2021-05-13

## 2021-05-13 RX ORDER — LORAZEPAM 0.5 MG/1
0.5 TABLET ORAL EVERY 4 HOURS PRN
Qty: 30 TABLET | Refills: 0 | Status: SHIPPED | OUTPATIENT
Start: 2021-05-13

## 2021-05-13 RX ADMIN — DEXTROSE AND SODIUM CHLORIDE 75 ML/HR: 5; .45 INJECTION, SOLUTION INTRAVENOUS at 06:06

## 2021-05-13 RX ADMIN — ONDANSETRON 4 MG: 2 INJECTION INTRAMUSCULAR; INTRAVENOUS at 08:27

## 2021-05-13 RX ADMIN — VENLAFAXINE HYDROCHLORIDE 150 MG: 150 CAPSULE, EXTENDED RELEASE ORAL at 08:56

## 2021-05-13 RX ADMIN — MORPHINE SULFATE 2 MG: 2 INJECTION, SOLUTION INTRAMUSCULAR; INTRAVENOUS at 08:25

## 2021-05-13 RX ADMIN — ONDANSETRON 4 MG: 2 INJECTION INTRAMUSCULAR; INTRAVENOUS at 02:44

## 2021-05-13 RX ADMIN — LORAZEPAM 0.5 MG: 2 INJECTION INTRAMUSCULAR; INTRAVENOUS at 06:02

## 2021-05-13 NOTE — CASE MANAGEMENT
GIORGIO spoke with Molly Matthew at [facility] who states that patient cannot be evaluated until tomorrow morning at the home as that is when a nurse will be available  Molly Matthew states that they are ordering patient 02, medications/pain medications to be delivered to the home within 2-4 hours  Coventry resident made spouse aware  Plan is for patient to return home tonight  SW initiated transport request  Will follow for a time

## 2021-05-13 NOTE — DISCHARGE SUMMARY
[facility] Discharge Summary - [address] 77 y o  male MRN: [id number]    [address] [facility] / Bed: [address] Encounter: [id number]    BRIEF OVERVIEW  Admitting Provider: Skinny Barahona MD  Discharge Provider: Skinny Barahona MD    Discharge To: Home    Outpatient Follow-Up: None    Things to address at first follow up visit: None    Labs and results pending at discharge: None    Admission Date: 5/10/2021     Discharge Date: 05/13/21    Primary Discharge Diagnosis  Principal Problem:    Malfunction of gastrostomy tube ([facility] )  Active Problems:    JENNIFER (acute kidney injury) ([facility] [address] )    Hypokalemia    Abdominal distension    PAF (paroxysmal atrial fibrillation) ([facility] [address] )    Malignant neoplasm of lower third of esophagus ([facility] [address] )    Essential hypertension    Chronic combined systolic and diastolic CHF (congestive heart failure) ([facility] [address] )    Major depression    Anxiety    Falls    Severe protein-calorie malnutrition Ruthellen Setter: less than 60% of standard weight) ([facility] )    Dysphagia    Port-A-Cath in place    Iron deficiency anemia secondary to inadequate dietary iron intake    Hypothyroidism    Dependence on supplemental oxygen  Resolved Problems:    * No resolved hospital problems  Sheikh Tiffanie Navarrete, Interventional Radiology  Dr Devera Dancer, Gastroenterology     [address] STAY    Procedures Performed/Pertinent Test results  5/12:  WBC 14 57, CR 1 2,   CXR:  Recurrent right basal infiltrate  5/11:  CR 1 66, , K 4 4, HGB 8 1, Gastric FOBT (+)  CT abdomen pelvis wo contrast:  Unchanged GEJ thickening at site of known mass  Unchanged thickening of multiple loops of small bowel within the left anterior abdomen, appear matted together, limited evaluation for adjacent peritoneum metastasis  New areas bibasilar pulmonary infiltrate and consolidation in keeping with multilobar pneumonia      5/10: trop negx1 K 3 3 Na 142 CR 1 74 (BL 0 9-1 1) WBC 12 77 HGB 10 2  Mg 2 9n UA unremarkable,      HPI (copied from H&P)  78 yo M w/ PMH of 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, Afib, dilated cardiomyopathy (EF 35-40%), HTN, DVT, and depression/anxiety presents to the ED due to leakage at insertion site of peg tube and weakness secondary to poor intake  Patient had a recent PEG tube replacement 3 days ago on 05/07/2021 due to the same cause  Patient's wife provides collateral   He was able to receive PEG tube feeds after placement 3 days ago  Yesterday, they started noticing leakage around the G-tube insertion site  Patient reports he is constipated, reports last bowel movement was 3 days ago but wife reports it was yesterday  Patient denies flatus for 1 day  ED: NS 1 L bolus times [facility]  Patient was admitted for leakage around G-tube that was placed 3 days ago  Interventional radiologist was consulted-they replaced the G-tube with another but leakage continued at which time it was replaced by a GJ-tube  It was notable the patient had significant gastric residue and continued to leak around the [address] tube also  Gastroenterology was consulted-CT abdomen pelvis was obtained which showed thickening of small bowel within left anterior abdomen which appeared matted together  Imaging finding along with patient's symptoms of decreased flatus, constipation, nausea, vomiting indicated the patient had partial small-bowel obstruction which was causing back up into stomach and leakage around GJ-tube  Gastric portion of GJ tube was placed on intermittent suction in continued to have coffee-ground output which was guaiac positive  It was determined that partial small-bowel obstruction was sequelae of peritoneal metastasis of esophageal cancer  Patient is aware of poor prognosis of metastatic esophageal cancer per his discussion with his oncologist and chose to proceed with hospice care    Family discussion was had with medical team, patient and his family members and comfort measures were put in place  Physical Exam at Discharge  /78 (BP Location: Left arm)   Pulse 91   Temp 97 8 °F (36 6 °C) (Oral)   Resp 18   Ht 6' 6" (1 981 m)   Wt 61 2 kg (135 lb)   SpO2 95%   BMI 15 60 kg/m²   General appearance: alert and mild distress  Head: Normocephalic, without obvious abnormality, atraumatic  Lungs: rhonchi  Heart: regular rate and rhythm and S1, S2 normal  Abdomen: hypoactive bowel sounds, tenderness to palpation on left side of abdomen  Leakage around [address] tube  Extremities: extremities normal, warm and well-perfused; no cyanosis, clubbing, or edema    Medications   Current Discharge Medication List          Current Discharge Medication List      CONTINUE these medications which have NOT CHANGED    Details   LORazepam (ATIVAN) 0 5 mg tablet Take 1 tablet (0 5 mg total) by mouth every 4 (four) hours as needed for anxiety  Qty: 30 tablet, Refills: 0    Associated Diagnoses: Malignant neoplasm of lower third of esophagus ([facility] ); Cancer-related pain      morphine 20 MG/5ML solution Take 1 mL (4 mg total) by mouth every 3 (three) hours as needed for severe painMax Daily Amount: 32 mg  Qty: 50 mL, Refills: 0    Associated Diagnoses: Malignant neoplasm of lower third of esophagus ([facility] [address] );  Cancer-related pain            Current Discharge Medication List         Current Discharge Medication List      STOP taking these medications       famotidine (PEPCID) 20 mg tablet Comments:   Reason for Stopping:         hydrochlorothiazide (HYDRODIURIL) 12 5 mg tablet Comments:   Reason for Stopping:         levothyroxine 25 mcg tablet Comments:   Reason for Stopping:         Nutritional Supplements (TwoCal HN 2 0) LIQD Comments:   Reason for Stopping:         ondansetron (ZOFRAN) 4 mg tablet Comments:   Reason for Stopping:         venlafaxine (EFFEXOR-XR) 150 mg 24 hr capsule Comments:   Reason for Stopping: diazepam (VALIUM) 5 mg tablet Comments:   Reason for Stopping:         ammonium lactate (LAC-HYDRIN) 12 % cream Comments:   Reason for Stopping:         Eliquis 5 MG Comments:   Reason for Stopping:         amiodarone 200 mg tablet Comments:   Reason for Stopping:         pantoprazole (PROTONIX) 40 mg tablet Comments:   Reason for Stopping:              Allergies  Allergies   Allergen Reactions    Bee Venom Anaphylaxis     Throat swelling    Shellfish-Derived Products - Food Allergy      Develops GOUT       Diet restrictions: small amounts of food to avoid aspiration  Activity restrictions: none  Code Status: Level 4 - Comfort Care    Discharge Condition: Terminal, comfort measures only    Discharge  Statement   I spent 30 minutes discharging the patient  This time was spent on the day of discharge  I had direct contact with the patient on the day of discharge  Additional documentation is required if more than 30 minutes were spent on discharge

## 2021-05-13 NOTE — CASE MANAGEMENT
Transport arranged at 5:30 pm with PK Metcalf notified Linda at [facility]  Linda confirmed that patient medications and oxygen will be prepared for that time  GIORGIO notified PASQUALE Bill of transport time  Spouse at bedside who is aware

## 2021-05-13 NOTE — NURSING NOTE
The patient discharged home with [facility] transport vis stretcher  All the AVS and discharge instructions provided to the patient and the spouse  They both verbalized well understandings  Jeyson Redd

## 2021-05-14 ENCOUNTER — TRANSITIONAL CARE MANAGEMENT (OUTPATIENT)
Dept: FAMILY MEDICINE CLINIC | Facility: CLINIC | Age: 66
End: 2021-05-14

## 2021-05-14 ENCOUNTER — TELEPHONE (OUTPATIENT)
Dept: FAMILY MEDICINE CLINIC | Facility: CLINIC | Age: 66
End: 2021-05-14

## 2021-05-14 ENCOUNTER — PATIENT OUTREACH (OUTPATIENT)
Dept: CASE MANAGEMENT | Facility: HOSPITAL | Age: 66
End: 2021-05-14

## 2021-05-14 NOTE — TELEPHONE ENCOUNTER
Diazepam stopped  Lorazepam started  I clarified this with pharmacy  Was also clarified during hospital discharge

## 2021-05-14 NOTE — TELEPHONE ENCOUNTER
Pharmacy called to confirm and verify with Dr Kalen Vasquez that he is aware patient was prescribed diazepam on 4/19/21 180 tablets take one 5mg tablet twice a day  Dr Olive Tang prescribed lorazepam on 5/13/2021    Rian Arroyo from the Pharmacy needs a call back today [phone]

## 2021-05-14 NOTE — PROGRESS NOTES
This CM is covering embedded CM caseload while out of office  ADT notification received for patient discharge from hospital with hospice by [facility]  Patient hospitalized 5/10/2021 - 5/13/2021 (3 days) at [facility] for gastronomy tube malfunction  Patient noted to have partial small-bowel obstruction was sequelae of peritoneal metastasis of esophageal cancer  Due to poor prognosis, patient/family have opted for hospice care  Email sent to embedded CM with update

## 2021-05-17 ENCOUNTER — TELEPHONE (OUTPATIENT)
Dept: NUTRITION | Facility: CLINIC | Age: 66
End: 2021-05-17

## 2021-05-17 ENCOUNTER — TELEPHONE (OUTPATIENT)
Dept: HEMATOLOGY ONCOLOGY | Facility: CLINIC | Age: 66
End: 2021-05-17

## 2021-05-17 NOTE — TELEPHONE ENCOUNTER
Received call from wife stating that pt passed away on Saturday  Supportive listening and care provided  Discussed how to return and donate enteral nutrition supplies  Asked wife to please reach out if there is anything else I can do to support her  She plans to call Dr Oziel Kearney office to inform them of the news next

## 2021-05-24 ENCOUNTER — PATIENT OUTREACH (OUTPATIENT)
Dept: FAMILY MEDICINE CLINIC | Facility: CLINIC | Age: 66
End: 2021-05-24

## 2021-07-12 ENCOUNTER — TELEPHONE (OUTPATIENT)
Dept: FAMILY MEDICINE CLINIC | Facility: CLINIC | Age: 66
End: 2021-07-12

## 2021-07-12 NOTE — TELEPHONE ENCOUNTER
Fax received from [facility]  Copy scanned into encounter  Placed in blue team folder at nurse station

## 2021-07-14 ENCOUNTER — TELEPHONE (OUTPATIENT)
Dept: FAMILY MEDICINE CLINIC | Facility: CLINIC | Age: 66
End: 2021-07-14

## 2021-07-14 NOTE — TELEPHONE ENCOUNTER
Fax received from [facility]  Copy scanned into encounter  Placed in blue team folder at nurse station

## 2023-08-10 NOTE — PROGRESS NOTES
Outpatient Oncology Nutrition Consult  Type of Consult: Follow Up  Care Location: [place] [address] with patient and wife Jesusita Cavazos) at [employer]  Garret Noriega goes by "[employer]  Nutrition Assessment:  PMH: PTSD, HTN, major depression, OCD, anxiety  Oncology Diagnosis & Treatments: Stage IV metastatic gastroesophageal cancer  Undergoing chemotherapy in the palliative setting to help improve survival (modified FOLFOX 6: 8/1/19-10/18/19; oxaliplatin discontinued 10/18/19; Now receiving 5FU and leucovorin)       Malignant neoplasm of lower third of esophagus (HCC)    7/26/2019 Initial Diagnosis     Malignant neoplasm of lower third of esophagus ([facility] [address] )      8/1/2019 -  Chemotherapy     fluorouracil (ADRUCIL) injection 750 mg, 400 mg/m2 = 750 mg, Intravenous, Once, 11 of 12 cycles  Administration: 750 mg (8/1/2019), 750 mg (8/13/2019), 750 mg (8/27/2019), 750 mg (9/10/2019), 750 mg (9/24/2019), 750 mg (10/8/2019), 795 mg (10/22/2019), 795 mg (11/5/2019), 795 mg (11/19/2019), 795 mg (12/3/2019)  pegfilgrastim (NEULASTA ONPRO) subcutaneous injection kit 6 mg, 6 mg, Subcutaneous, Once, 2 of 2 cycles  Administration: 6 mg (8/3/2019), 6 mg (8/15/2019)  leucovorin 750 mg in dextrose 5 % 250 mL IVPB, 748 mg, Intravenous, Once, 11 of 12 cycles  Administration: 750 mg (8/1/2019), 750 mg (8/13/2019), 750 mg (8/27/2019), 750 mg (9/10/2019), 750 mg (9/24/2019), 750 mg (10/8/2019), 800 mg (10/22/2019), 800 mg (11/5/2019), 800 mg (11/19/2019), 800 mg (12/3/2019)  oxaliplatin (ELOXATIN) 158 95 mg in dextrose 5 % 250 mL chemo infusion, 85 mg/m2 = 158 95 mg, Intravenous, Once, 6 of 6 cycles  Administration: 158 95 mg (8/1/2019), 158 95 mg (8/13/2019), 158 95 mg (8/27/2019), 158 95 mg (9/10/2019), 158 95 mg (9/24/2019), 158 95 mg (10/8/2019)        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[facility] [address] )    7/26/2019 Initial Diagnosis     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[facility] )      8/1/2019 -  Chemotherapy     fluorouracil (ADRUCIL) injection 750 mg, 400 mg/m2 = 750 mg, Intravenous, Once, 11 of 12 cycles  Administration: 750 mg (8/1/2019), 750 mg (8/13/2019), 750 mg (8/27/2019), 750 mg (9/10/2019), 750 mg (9/24/2019), 750 mg (10/8/2019), 795 mg (10/22/2019), 795 mg (11/5/2019), 795 mg (11/19/2019), 795 mg (12/3/2019)  pegfilgrastim (NEULASTA ONPRO) subcutaneous injection kit 6 mg, 6 mg, Subcutaneous, Once, 2 of 2 cycles  Administration: 6 mg (8/3/2019), 6 mg (8/15/2019)  leucovorin 750 mg in dextrose 5 % 250 mL IVPB, 748 mg, Intravenous, Once, 11 of 12 cycles  Administration: 750 mg (8/1/2019), 750 mg (8/13/2019), 750 mg (8/27/2019), 750 mg (9/10/2019), 750 mg (9/24/2019), 750 mg (10/8/2019), 800 mg (10/22/2019), 800 mg (11/5/2019), 800 mg (11/19/2019), 800 mg (12/3/2019)  oxaliplatin (ELOXATIN) 158 95 mg in dextrose 5 % 250 mL chemo infusion, 85 mg/m2 = 158 95 mg, Intravenous, Once, 6 of 6 cycles  Administration: 158 95 mg (8/1/2019), 158 95 mg (8/13/2019), 158 95 mg (8/27/2019), 158 95 mg (9/10/2019), 158 95 mg (9/24/2019), 158 95 mg (10/8/2019)       Past Medical History:   Diagnosis Date    Dehydration 8/8/2019    Esophageal [facility]     Hypertension     Nausea 8/8/2019    Psychiatric disorder     Recovering alcoholic ([facility] )      Past Surgical History:   Procedure Laterality Date    APPENDECTOMY      IR PORT PLACEMENT  7/31/2019       Review of Medications:   Vitamins, Supplements and Herbals: no    Current Outpatient Medications:     apixaban (ELIQUIS) 5 mg, Take 2 tablets (10 mg total) by mouth 2 (two) times a day for 13 doses, Disp: 13 tablet, Rfl: 0    apixaban (ELIQUIS) 5 mg, Take 1 tablet (5 mg total) by mouth 2 (two) times a day for 154 doses, Disp: 154 tablet, Rfl: 0    bisoprolol (ZEBETA) 10 MG tablet, Take 0 5 tablets (5 mg total) by mouth daily, Disp: , Rfl:     diazepam (VALIUM) 5 mg tablet, Take 1 tablet (5 mg total) by mouth 2 (two) times a day, Disp: 60 tablet, Rfl: 0    lidocaine-prilocaine (EMLA) cream, Apply topically as needed for mild pain, Disp: 30 g, Rfl: 0    metoclopramide (REGLAN) 5 mg/5 mL oral solution, Take 10 mg by mouth 4 (four) times a day as needed for nausea, Disp: , Rfl:     ondansetron (ZOFRAN-ODT) 8 mg disintegrating tablet, Take 1 tablet (8 mg total) by mouth every 8 (eight) hours as needed for nausea or vomiting (If Compazine is not effective), Disp: 30 tablet, Rfl: 0    oxyCODONE (ROXICODONE) 5 mg/5 mL solution, Take 5 mg by mouth every 4 (four) hours as needed for pain, Disp: , Rfl:     pantoprazole (PROTONIX) 40 mg tablet, Take 40 mg by mouth daily before breakfast, Disp: , Rfl:     prochlorperazine (COMPAZINE) 10 mg tablet, Take 10 mg by mouth every 6 (six) hours as needed for nausea or vomiting, Disp: , Rfl:     venlafaxine (EFFEXOR-XR) 150 mg 24 hr capsule, Take 1 capsule (150 mg total) by mouth daily, Disp: 30 capsule, Rfl: 0  No current facility-administered medications for this visit       Facility-Administered Medications Ordered in Other Visits:     dextrose 5 % infusion, 20 mL/hr, Intravenous, Once, Stefan Spencer MD    fluorouracil (ADRUCIL) injection 795 mg, 400 mg/m2 (Treatment Plan Recorded), Intravenous, Once, Stefan Spencer MD    leucovorin 800 mg in dextrose 5 % 250 mL IVPB, 800 mg, Intravenous, Once, Stefan Spencer MD, Last Rate: 165 mL/hr at 12/17/19 1010, 800 mg at 12/17/19 1010    sodium chloride 0 9 % infusion, 20 mL/hr, Intravenous, Once PRN, Stefan Spencer MD, Last Rate: 20 mL/hr at 12/17/19 0945, 20 mL/hr at 12/17/19 0945    Most Recent Lab Results:   Lab Results   Component Value Date    WBC 6 42 12/13/2019    ALT 32 12/13/2019    AST 27 12/13/2019    K 3 5 12/13/2019    K 3 9 11/29/2019    BUN 18 12/13/2019    BUN 14 11/29/2019    CREATININE 1 10 12/13/2019    CREATININE 0 99 11/29/2019    CALCIUM 9 1 12/13/2019       Anthropometric Measurements:   Height: 69 5"  Ht Readings from Last 1 Encounters:   12/17/19 5' 9 49" (1 765 m)     -Weight History:   Usual Weight: 198-212# (last weighed this in May 2019)  Ideal Body Weight: 163#  Wt Readings from Last 20 Encounters:   12/17/19 88 6 kg (195 lb 5 2 oz)   12/03/19 88 4 kg (194 lb 14 2 oz)   11/22/19 86 6 kg (191 lb)   11/19/19 86 7 kg (191 lb 2 2 oz)   11/05/19 86 2 kg (190 lb 0 6 oz)   10/22/19 83 1 kg (183 lb 3 2 oz)   10/18/19 83 5 kg (184 lb)   10/08/19 80 3 kg (177 lb 0 5 oz)   10/01/19 76 6 kg (168 lb 12 8 oz)   09/24/19 75 2 kg (165 lb 12 6 oz)   09/20/19 74 6 kg (164 lb 8 oz)   09/10/19 71 5 kg (157 lb 10 1 oz)   09/03/19 69 kg (152 lb 3 2 oz)   08/27/19 69 3 kg (152 lb 12 8 oz)   08/23/19 66 2 kg (146 lb)   08/13/19 67 1 kg (147 lb 14 9 oz)   08/07/19 69 1 kg (152 lb 5 oz)   08/01/19 70 9 kg (156 lb 4 9 oz)   07/31/19 71 2 kg (157 lb)   07/26/19 71 2 kg (157 lb)     Weight Changes: gain of 4 2#/ (2 2%) in 1 month (not significant); encouraged wt stability moving forward  Estimated body mass index is 28 44 kg/m² as calculated from the following:    Height as of an earlier encounter on 12/17/19: 5' 9 49" (1 765 m)  Weight as of an earlier encounter on 12/17/19: 88 6 kg (195 lb 5 2 oz)      Nutrition-Focused Physical Findings: none observed       Food/Nutrition-Related History & Client/Social History:    Current Nutrition Impact Symptoms:  [x] Nausea - mild and occasional [] Reduced Appetite [] Acid Reflux    [] Vomiting  [] Unintended Wt Loss  [] Malabsorption    [] Diarrhea  [] Unintended Wt Gain  [] Dumping Syndrome    [] Constipation - BM's 1-2x/day [] Thick Mucous/Secretions  [] Abdominal Pain    [] Dysgeusia (Altered Taste)  [x] Xerostomia (Dry Mouth) - occasionally  [] Gas    [] Dysosmia (Altered Smell)  [] Thrush  [] Difficulty Chewing    [x] Oral Mucositis (Sore Mouth) - mild burning on inside of cheeks has resolved; did not start bs/sw rinses [x] Fatigue - "constant", slightly improved overall [] Other:   [] Odynophagia  [] Esophagitis  [] Other:    [x] Dysphagia - was drinking cold water recently and it felt like it did not want to go down (occurred 1x) [] Early Satiety  [] No Problems Eating      Food Allergies: no  Food Intolerances: no   -For Gout, avoids: organ meats, shellfish  Last gout flare was 2 years ago after a liverwurst sandwich  Current Diet: Regular Diet, No Restrictions, cold liquids are not tolerated very well  Current Nutrition Intake: Unchanged from last visit  Appetite: Good, Fluctuating  Nutrition Route: PO  Meal planning/preparation mainly done by: Self and Spouse/Partner  Oral Care: Brushing BID, alcohol-free Crest Mouthwash, flossing daily  Activity level: Has been active for 2-3 hrs per day  Continues with PT  Social Hx: Quit drinking alcohol 30 years ago  Retied custom   Wife is a teacher (pre-school and Special Ed)  25 Hr Diet Recall:  Breakfast: 2 eggs over easy and 1 slice of white bread (again suggested whole grain bread, high fiber bread, or rye bread - plans to buy this once white bread runs out), coffee with milk  Lunch: [employer] yogurt with raspberry preserves or honey (suggested whole fruit), sometimes has Progresso soup  Dinner: 3 small lamb chops, 12 oz cooked spinach  Snacks: [place] yogurt, spearmint leaves candy, swiss cheese    Beverages: water (16 oz glass x2-3 - reduced due to increased urination), Boost VHC (8 oz 4x/week when does not feel like eating), V8 Healthy Greens juice (16 oz x2), regular coffee with milk (14 oz x1); Aloe juice (4 oz x1); Averaging 68 oz per day (75% of est needs)  Supplements:    Boost Very High Calorie (8 oz, 530 kcal, 22 g pro) - 4 times per week; recommend he discontinue supplement    12/17/19: Re-Estimated Nutrition Needs: (based on 88 8kg/ 195 3# actual wt)  Energy Needs: 1890-5007 kcal/day (30-35 kcal/kg)  Protein Needs: 107-133 grams/day (1 2-1 5 g/kg)  Fluid Needs: 6622-2075 mL/day (1 mL/kcal) -  oz    Discussion/Summary: Jeannie Leonard was seen today during his infusion for RD follow up    He is feeling well and has had a insignificant wt gain over the past month  He continues with PT which he feels is helping with his strength and stamina  He continues using Boost VHC 4x/week when he does not feel like eating; we discussed making homemade fruit/vegetable smoothies instead of using supplement or using Boost Plus for a lower kcal content to help promote wt stability  Reviewed the importance of a cancer preventative eating pattern and incorporating more high fiber and whole foods  To support muscle maintenance/growth, a healthy immune system, healing, and increased bodily demands for protein, discussed lean sources of protein to include at meals and snacks  His fluid intake has declined since last visit to ~75% est  needs, reviewed fluid goals and importance of adequate hydration today  He does not have any more infusions scheduled past 1/2/20 so I will contact him mid-January to set up a follow up  Discussed/Reviewed:   · weight management  · indications & use of oral nutrition supplements  · high protein foods to include at all meals and snacks  · encouraged eating every 2-3 hours (5-6 small meals/day)  · maintaining proper oral care  · adequate hydation & tips to increase overall fluid intake  · recipe suggestions/resources  · a cancer preventative eating pattern as a long-term nutrition goal  · individualized calorie, protein and fluid daily goals     All questions and concerns addressed during todays visit  Teo Laughlin and his wife have RD contact information  Nutrition Diagnosis:  · Increased Nutrient Needs (kcal & pro) related to increased demand for nutrients and disease state as evidenced by cancer dx and pt undergoing tx for cancer        Intervention & Recommendations:  Topics addressed: Nutrition education, Balance/Variety, Meals & Snacks, Meal planning, Choosing high protein meals/snacks, Meal pattern: eating small/frequent meals (every 2-3 hours), Nutrition Symptom Management, Adequate Hydration, Medical Food Supplements, Nutrition-Related Medication Management and Weight Management    Barriers: None  Readiness to change: action  Comprehension: verbalizes understanding  Expected Compliance: good    Materials Provided: not applicable    Monitoring & Evaluation:  Dietitian to Monitor: Food and Nutrition Intake, Nutrtion Impact Symptoms, Body Weight and Biochemical Data     Goals:  · weight stabilization  · adequate nutrition related symptom management  · pt to meet >/=75% estimated nutrition needs daily     · Progress Towards Goals: Progressing and Goal(s) Met    Nutrition Rx & Recommendations:  · Diet: High Calorie, High Protein (for high calorie foods see pages 52-53, and for high protein foods see pages 49-51 in your Eating Hints book)  · Small, frequent meals/snacks may be easier to tolerate than 3 large daily meals  Aim for 5-6 small meals per day (every 2-3 hours)  · Include protein at all meals/snacks  · Stay hydrated by sipping fluids of choice/tolerance throughout the day  · Incorporate physical activity as able/allowed  · Follow proper oral care; Try baking soda/salt water rinse recipe (mix 3/4 tsp salt + 1 tsp baking soda + 1 qt water; rinse with pain water after using) in Eating Hints book (pg 18)  Brush your teeth before/after meals & before bed  · Weigh yourself regularly  If you notice weight loss, make an effort to increase your daily food/calorie intake  If you continue to notice loss after these efforts, reach out to your dietitian to establish a plan to stabilize weight  · Follow a Cancer Preventative Nutrition Pattern: colorful, plant-based, low-fat, avoid added sugars, limit alcohol, include high fiber foods, limit processed meats, limit red meat, choose lean protein sources, use low-fat cooking methods, balance calories with physical activity, avoid excessive weight gain throughout life  · Discontinue oral nutrition supplements (Boost VHC)    When you don't feel like having a meal, try some of the Fruit/Vegetable Smoothie recipes we discussed, or, choose Boost Plus for a slighly lower calorie option  · Aim for  oz fluid per day  · Choose whole foods when possible: fruits, vegetables, whole grains (try Juan's Killer Bread)  · Choose lean protein sources: chicken breast, fish, low-fat dairy and yogurt, egg whites, beans, nut butter, low-fat cheese      Follow Up Plan: I will reach out mid-January to set up a follow up   Recommend Referral to Other Providers: none at this time Azelaic Acid Pregnancy And Lactation Text: This medication is considered safe during pregnancy and breast feeding.